# Patient Record
Sex: FEMALE | Race: WHITE | HISPANIC OR LATINO | Employment: OTHER | URBAN - METROPOLITAN AREA
[De-identification: names, ages, dates, MRNs, and addresses within clinical notes are randomized per-mention and may not be internally consistent; named-entity substitution may affect disease eponyms.]

---

## 2017-03-29 DIAGNOSIS — E11.65 TYPE 2 DIABETES MELLITUS WITH HYPERGLYCEMIA, WITH LONG-TERM CURRENT USE OF INSULIN (H): ICD-10-CM

## 2017-03-29 DIAGNOSIS — Z79.4 TYPE 2 DIABETES MELLITUS WITH HYPERGLYCEMIA, WITH LONG-TERM CURRENT USE OF INSULIN (H): ICD-10-CM

## 2017-03-29 DIAGNOSIS — Z79.4 ENCOUNTER FOR LONG-TERM (CURRENT) USE OF INSULIN (H): ICD-10-CM

## 2017-03-29 NOTE — TELEPHONE ENCOUNTER
Tresiba flextouch 200unit/ml         Last Written Prescription Date: 12/01/16  Last Fill Quantity: 3, # refills: 3  Last Office Visit with G, P or [facility] prescribing provider:  01/30/2017        BP Readings from Last 3 Encounters:   11/30/16 154/76   11/11/16 128/84   07/20/16 124/82     Lab Results   Component Value Date    MICROL 13 02/05/2016     No results found for: MICROALBUMIN  Creatinine   Date Value Ref Range Status   07/20/2016 0.61 0.52 - 1.04 mg/dL Final   ]  GFR Estimate   Date Value Ref Range Status   07/20/2016 >90  Non  GFR Calc   >60 mL/min/1.7m2 Final   04/12/2016 >90  Non  GFR Calc   >60 mL/min/1.7m2 Final   02/03/2015 >90  Non  GFR Calc   >60 mL/min/1.7m2 Final     GFR Estimate If Black   Date Value Ref Range Status   07/20/2016 >90   GFR Calc   >60 mL/min/1.7m2 Final   04/12/2016 >90   GFR Calc   >60 mL/min/1.7m2 Final   02/03/2015 >90   GFR Calc   >60 mL/min/1.7m2 Final     Lab Results   Component Value Date    CHOL 263 02/21/2015     Lab Results   Component Value Date    HDL 53 02/21/2015     Lab Results   Component Value Date     04/12/2016     02/21/2015     Lab Results   Component Value Date    TRIG 269 02/21/2015     Lab Results   Component Value Date    CHOLHDLRATIO 5.0 02/21/2015     Lab Results   Component Value Date    AST 17 07/20/2016     Lab Results   Component Value Date    ALT 24 07/20/2016     Lab Results   Component Value Date    A1C 12.2 11/30/2016    A1C 9.9 07/20/2016    A1C 9.4 04/12/2016    A1C 10.5 01/08/2016    A1C 11.8 02/03/2015     Potassium   Date Value Ref Range Status   07/20/2016 3.7 3.4 - 5.3 mmol/L Final     Shawna Barrera CPhT  [facility]  [phone]

## 2017-03-29 NOTE — TELEPHONE ENCOUNTER
Please let Maricarmen know she's due for follow up soon.  I haven't seen her since November - I would like to see her no later than the end of April.  I refilled the Tresiba for her.  Thanks,  Emily Phan NP  Endocrinology

## 2017-03-30 ENCOUNTER — OFFICE VISIT (OUTPATIENT)
Dept: ENDOCRINOLOGY | Facility: CLINIC | Age: 57
End: 2017-03-30
Payer: COMMERCIAL

## 2017-03-30 VITALS
DIASTOLIC BLOOD PRESSURE: 86 MMHG | HEIGHT: 64 IN | SYSTOLIC BLOOD PRESSURE: 126 MMHG | OXYGEN SATURATION: 96 % | BODY MASS INDEX: 34.81 KG/M2 | RESPIRATION RATE: 16 BRPM | HEART RATE: 76 BPM | WEIGHT: 203.9 LBS

## 2017-03-30 DIAGNOSIS — Z79.4 TYPE 2 DIABETES MELLITUS WITH HYPERGLYCEMIA, WITH LONG-TERM CURRENT USE OF INSULIN (H): Primary | ICD-10-CM

## 2017-03-30 DIAGNOSIS — E11.65 TYPE 2 DIABETES MELLITUS WITH HYPERGLYCEMIA, WITH LONG-TERM CURRENT USE OF INSULIN (H): Primary | ICD-10-CM

## 2017-03-30 LAB
CREAT UR-MCNC: 47 MG/DL
HBA1C MFR BLD: 9.7 % (ref 4.3–6)
MICROALBUMIN UR-MCNC: 207 MG/L
MICROALBUMIN/CREAT UR: 437.63 MG/G CR (ref 0–25)

## 2017-03-30 PROCEDURE — 82043 UR ALBUMIN QUANTITATIVE: CPT | Performed by: CLINICAL NURSE SPECIALIST

## 2017-03-30 PROCEDURE — 36415 COLL VENOUS BLD VENIPUNCTURE: CPT | Performed by: CLINICAL NURSE SPECIALIST

## 2017-03-30 PROCEDURE — 99214 OFFICE O/P EST MOD 30 MIN: CPT | Performed by: CLINICAL NURSE SPECIALIST

## 2017-03-30 PROCEDURE — 83036 HEMOGLOBIN GLYCOSYLATED A1C: CPT | Performed by: CLINICAL NURSE SPECIALIST

## 2017-03-30 NOTE — PROGRESS NOTES
Name: Maricarmen Dotson  F/u for Diabetes (Last seen 4/12/2016).  HPI:  Maricarmen Dotson is a 56 year old female who presents for the evaluation/management of:    1. Type 2 DM:  Originally diagnosed: in 1997 after starting Paxil and gaining 32 lbs in one month  Current Regimen: Tresiba 66 u q am, Novolog - 1 unit per 4 grams of carb + a correction of 1: 18>140-average dose 10-20 units per meal.   Does not want to take the Invokana.  BS checks: 2-3/day  Average Meter Download: 7-day avg 230 (128-347); 14-d avg 231 (128-347); 30-d avg 200 (); 90-d avg 201 ().    REPORTS PREVIOUS ADVERSE REACTION TO HUMALOG.  States she cannot take Humalog because it caused pancreatits.  Continues to struggle with a binge eating disorder.    Was previously treated by endo at [facility] (last seen there 11/6/2015).  She has had adverse reaction to most oral medications including Metformin (abdominal pain), glipizide (allergic type reaction), Avandia (abdominal pain and swelling), Byetta and Onglyza (pancreatitis).  Was previously prescribed Invokana but did not start because she was concerned about possible side effects.        History of eating disorder (binge eating) due to PTSD.  Will be working with a new counselor.  Has been able to control the binge eating better.  Exercising  - walking for 20-30 minutes.    Saw Dr. Lovell for evaluation of her adrenal and pituitary glands 12/2016 - no abnormal adrenal or pituitary abnormalities were noted.      Complications:   Diabetes Complications  Description / Detail    Diabetic Retinopathy   No retinopathy, next exam 4/2017   CAD / PAD   No   Neuropathy   No   Nephropathy / Microalbuminuria   No   Gastroparesis  No   Hypoglycemia Unawareness  No     2. Intermittent Hypertension: Blood Pressure today:   BP Readings from Last 3 Encounters:   03/30/17 126/86   11/30/16 154/76   11/11/16 128/84   .  Blood pressure medications include no medications.  Takes medications everyday without  forgetting a dose.    3. Lipids: Takes no medications for lipid control.        PMH/PSH:  Past Medical History:   Diagnosis Date     Depressive disorder     severe, prior hospitalization     Diabetes mellitus, type 2 (H)      Diverticulosis of colon (without mention of hemorrhage)      Fibromyalgia 2007     Insomnia      Irritable bowel syndrome      Past Surgical History:   Procedure Laterality Date     C SPINAL ORTHOSIS,NOS      lumbar surgery     choley  2011     HYSTERECTOMY, CERVIX STATUS UNKNOWN      TVH/BSO     Family Hx:  Family History   Problem Relation Age of Onset     DIABETES Mother      type 2     DIABETES Father      type 2     Hypertension Mother      Hypertension Father      CEREBROVASCULAR DISEASE Mother       stroke age 57     GASTROINTESTINAL DISEASE Father      colon polyps     Ovarian Cancer Sister 46     Ovarian Cancer Mother 43     Ovarian Cancer Maternal Grandmother 72     CANCER Mother      cervix     CANCER Maternal Grandmother      cervix     Thyroid disease:          DM2: Yes, mother and father         Autoimmune: DM1, SLE, RA, Vitiligo     Social Hx:  Social History     Social History     Marital status:      Spouse name: N/A     Number of children: 3     Years of education: N/A     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[employer]     Social History Main Topics     Smoking status: Never Smoker     Smokeless tobacco: Never Used     Alcohol use 0.0 oz/week     0 Standard drinks or equivalent per week      Comment: maybe once a month     Drug use: No     Sexual activity: Yes     Partners: Male     Birth control/ protection: Surgical     Other Topics Concern     Not on file     Social History Narrative          MEDICATIONS:  has a current medication list which includes the following prescription(s): insulin degludec, insulin aspart, pantothenic acid, omega-3 fatty acids, b complex-biotin-fa, insulin pen needle, insulin pen needle, insulin pen  "needle, epinephrine, gabapentin, STATIN NOT PRESCRIBED, INTENTIONAL,, tramadol, aspirin not prescribed, and polyethylene glycol.    ROS     ROS: 10 point ROS neg other than the symptoms noted above in the HPI.    Physical Exam   VS: /86 (BP Location: Left arm, Cuff Size: Adult Regular)  Pulse 76  Resp 16  Ht 1.626 m (5' 4\")  Wt 92.5 kg (203 lb 14.4 oz)  LMP 01/01/1999  SpO2 96%  BMI 35 kg/m2  GENERAL: NAD, well dressed, answering questions appropriately, appears stated age.  HEENT: OP clear, no exophthalmos, no proptosis, EOMI, no lig lag, no retraction, no scleral icterus  RESPIRATORY: Normal respiratory effort.  CARDIOVASCULAR: No peripheral edema.  EXTREMITIES: no edema, no obvious rashes, no lesions; feet with 2+ pulses, 10-g plantar sensation 6/6 bilaterally, no open lesions.  NEUROLOGY: CN grossly intact, no tremors  MSK: grossly intact, No digital cyanosis. Normal gait and station.  SKIN: no rashes, no obvious lesions, no ulcers  PSYCH: Intact judgment and insight. A&OX3 with a cordial affect.    LABS:  A1c:   Component    Latest Ref Rng 7/8/2014 9/22/2014 2/3/2015 1/8/2016 4/12/2016   Hemoglobin A1C    4.3 - 6.0 % 11.9 (H) 11.0 (H) 11.8 (H) 10.5 (H) 9.4 (H)     Basic Metabolic Panel:!COMPREHENSIVE Latest Ref Rng 4/12/2016 7/20/2016   SODIUM 133 - 144 mmol/L 143 138   POTASSIUM 3.4 - 5.3 mmol/L 3.8 3.7   CHLORIDE 94 - 109 mmol/L 107 104   CO2      BUN 7 - 30 mg/dL 15 10   Creatinine 0.52 - 1.04 mg/dL     Creatinine 0.52 - 1.04 mg/dL 0.58 0.61   Glucose 70 - 99 mg/dL 107 (H) 246 (H)   ANION GAP 3 - 14 mmol/L 5 8   CALCIUM 8.5 - 10.1 mg/dL 9.6 8.9     LFTS/Cholesterol Panel:  !LIPID/HEPATIC Latest Ref Rng 4/12/2016 7/20/2016   CHOLESTEROL <200 mg/dL     TRIGLYCERIDES 0 - 150 mg/dL     HDL CHOLESTEROL >50 mg/dL     LDL CHOLESTEROL DIRECT <100 mg/dL 160 (H)    LDL CHOLESTEROL, CALCULATED 0 - 129 mg/dL     VLDL-CHOLESTEROL 0 - 30 mg/dL     CHOLESTEROL/HDL RATIO 0.0 - 5.0     AST 0 - 45 U/L 16 17   ALT " 0 - 50 U/L 25 24     Thyroid Function:   !THYROID Latest Ref Rng 7/20/2016 4/12/2016   TSH 0.40 - 4.00 mU/L 0.70 0.86   T4 FREE 0.76 - 1.46 ng/dL  0.89     Urine Microalbumin:  Component    Latest Ref Rng 9/22/2014 2/5/2016   Creatinine Urine     177 54   Microalbumin Urine     18 13   Microalbumin mg/g Cr    0 - 25 mg/g Cr 10.06 24.44     Vitamin D Deficiency screening    30 - 75 ug/L 34     All pertinent notes, labs, and images personally reviewed by me.     A/P  Ms.Anna ROXANNE Dotson is a 56 year old here for the evaluation/management of diabetes:    1. DM2 - Uncontrolled.  Her binge eating disorder continues to be a big contributing factor to her poor control, she feels that she is controlling this better.  Her diabetes has been very difficulty to treat due to her eating disorder and reluctance to increase insulin or try additional oral medications.  Continue Tresiba U200, to 66 units, continue Novolog I:c ratio to 1:4 and correction factor 1:18.     There is some variability among people, most will usually develop symptoms suggestive of hypoglycemia when blood glucose levels are lowered to the mid 60's. The first set of symptoms are called adrenergic. Patients may experience any of the following nervousness, sweating, intense hunger, trembling, weakness, palpitations, and difficulty speaking.   The acute management of hypoglycemia involves the rapid delivery of a source of easily absorbed sugar. Regular soda, juice, lifesavers, table sugar, are good options. 15 grams of glucose is the dose that is given, followed by an assessment of symptoms and a blood glucose check if possible. If after 10 minutes there is no improvement, another 10-15 grams should be given. This can be repeated up to three times. The equivalency of 10-15 grams of glucose (approximate servings) are: 3-5 hard candies, 3 teaspoons of sugar, or 1/2 cup of regular soda or juice.   2.  Intermittent  Hypertension - Currently untreated.  Will continue  to monitor. She does not want to take any additional oral meds due to multiple past med reactions.    3. Hyperlipidemia - Currently untreated.  She is concerned about taking any oral medications due to previous adverse reactions.  Will continue to monitor.    Labs ordered today:   Orders Placed This Encounter   Procedures     Hemoglobin A1c     Albumin Random Urine Quantitative   Urine microalbumin     Radiology/Consults ordered today: None    All questions were answered.  The patient indicates understanding of the above issues and agrees with the plan set forth.  Total face to face time discussing diabetes treatment and target BG levels was greater than or equal to 25 minutes.       Follow-up:  3-4 months    Emily Phan NP  Endocrinology  [facility]  CC:

## 2017-03-30 NOTE — MR AVS SNAPSHOT
After Visit Summary   3/30/2017    Maricarmen Dotson    MRN: [id number]           Patient Information     Date Of Birth          1960        Visit Information        Provider Department      3/30/2017 10:00 AM Emily Phan APRN CNP [facility]        Today's Diagnoses     Type 2 diabetes mellitus with hyperglycemia, with long-term current use of insulin (H)    -  1       Follow-ups after your visit        Follow-up notes from your care team     Return in about 3 months (around 6/30/2017).      Who to contact     If you have questions or need follow up information about today's clinic visit or your schedule please contact [facility] directly at [phone].  Normal or non-critical lab and imaging results will be communicated to you by [employer], letter or phone within 4 business days after the clinic has received the results. If you do not hear from us within 7 days, please contact the clinic through [employer] or phone. If you have a critical or abnormal lab result, we will notify you by phone as soon as possible.  Submit refill requests through [employer] or call your pharmacy and they will forward the refill request to us. Please allow 3 business days for your refill to be completed.          Additional Information About Your Visit        MyChart Information     [employer] gives you secure access to your electronic health record. If you see a primary care provider, you can also send messages to your care team and make appointments. If you have questions, please call your primary care clinic.  If you do not have a primary care provider, please call [phone] and they will assist you.        Care EveryWhere ID     This is your Care EveryWhere ID. This could be used by other organizations to access your [facility] medical records  [id number]        Your Vitals Were     Pulse Respirations Height Last Period Pulse Oximetry BMI (Body Mass Index)    76 16 1.626 m (5'  "4\") 01/01/1999 96% 35 kg/m2       Blood Pressure from Last 3 Encounters:   03/30/17 126/86   11/30/16 154/76   11/11/16 128/84    Weight from Last 3 Encounters:   03/30/17 92.5 kg (203 lb 14.4 oz)   11/30/16 88 kg (194 lb)   11/11/16 86 kg (189 lb 11.2 oz)              We Performed the Following     Albumin Random Urine Quantitative     Hemoglobin A1c          Today's Medication Changes          These changes are accurate as of: 3/30/17 11:59 PM.  If you have any questions, ask your nurse or doctor.               Stop taking these medicines if you haven't already. Please contact your care team if you have questions.     blood glucose monitoring lancets   Stopped by:  Emily Phan APRN CNP           blood glucose monitoring test strip   Commonly known as:  ONE TOUCH VERIO IQ   Stopped by:  Emily Phan APRN CNP                    Primary Care Provider    Physician No Ref-Primary       No address on file        Thank you!     Thank you for choosing [facility]  for your care. Our goal is always to provide you with excellent care. Hearing back from our patients is one way we can continue to improve our services. Please take a few minutes to complete the written survey that you may receive in the mail after your visit with us. Thank you!             Your Updated Medication List - Protect others around you: Learn how to safely use, store and throw away your medicines at www.disposemymeds.org.          This list is accurate as of: 3/30/17 11:59 PM.  Always use your most recent med list.                   Brand Name Dispense Instructions for use    ASPIRIN NOT PRESCRIBED    INTENTIONAL    1 each    continuous prn Antiplatelet medication not prescribed intentionally due to age.       B-COMPLEX PO      Take by mouth daily       EPINEPHrine 0.3 MG/0.3ML injection     2 each    Inject 0.3 mLs (0.3 mg) into the muscle once as needed for anaphylaxis       gabapentin 100 MG capsule    NEURONTIN    " 90 capsule    Take 1 capsule (100 mg) by mouth daily       insulin degludec 200 UNIT/ML pen    TRESIBA    18 mL    Inject 68 Units Subcutaneous daily       * insulin pen needle 31G X 5 MM    B-D U/F    550 each    Use 6  time(s) per day.  Please dispense as BD Pen Needle Mini U/F 31G x 5 MM       * insulin pen needle 31G X 5 MM    B-D U/F    300 each    Use 6 daily or as directed.       * insulin pen needle 31G X 5 MM    B-D U/F    200 each    Use 5 daily or as directed.       NovoLOG FLEXPEN 100 UNIT/ML injection   Generic drug:  insulin aspart     30 mL    1 unit per 4 grams of carbohydrate eaten at each meal +a correction of 1 additional unit per 18 points of blood sugar above 140.  Total daily dose 100 units/day       OMEGA-3 FISH OIL PO      Take 500 mg by mouth daily       pantothenic acid 500 MG Tabs      Take 500 mg by mouth daily       polyethylene glycol Packet    MIRALAX/GLYCOLAX     Take 1 packet by mouth daily.       STATIN NOT PRESCRIBED (INTENTIONAL)     0 each    Statin not prescribed intentionally due to Intolerance       traMADol 50 MG tablet    ULTRAM     Take 50 mg by mouth every evening 1-2 tablets in the evening       * Notice:  This list has 3 medication(s) that are the same as other medications prescribed for you. Read the directions carefully, and ask your doctor or other care provider to review them with you.

## 2017-03-30 NOTE — NURSING NOTE
"Chief Complaint   Patient presents with     Follow Up For     Diabetes       Initial /86 (BP Location: Left arm, Cuff Size: Adult Regular)  Pulse 76  Resp 16  Ht 1.626 m (5' 4\")  Wt 92.5 kg (203 lb 14.4 oz)  LMP 01/01/1999  SpO2 96%  BMI 35 kg/m2 Estimated body mass index is 35 kg/(m^2) as calculated from the following:    Height as of this encounter: 1.626 m (5' 4\").    Weight as of this encounter: 92.5 kg (203 lb 14.4 oz).  Medication Reconciliation: complete     Ashley Huerta CMA    3/30/2017  10:21 AM      "

## 2017-03-31 NOTE — PROGRESS NOTES
Maricarmen,  Your urine protein was elevated.  It has been intermittently elevated before, in 2012 and again in 2008.  Both times, it returned to normal.  I recommend rechecking at your next visit to see if it resolves on its own or if it is persisting.  If not, the treatment is good blood sugar and blood pressure control and a medication called an ACE-inhibitor.  We can discuss this in more detail at your next follow up visit.  Here's a copy of the results for your records.  Emily Phan NP  Endocrinology

## 2017-04-03 ENCOUNTER — TELEPHONE (OUTPATIENT)
Dept: ENDOCRINOLOGY | Facility: CLINIC | Age: 57
End: 2017-04-03

## 2017-04-03 ENCOUNTER — TELEPHONE (OUTPATIENT)
Dept: FAMILY MEDICINE | Facility: CLINIC | Age: 57
End: 2017-04-03

## 2017-04-03 DIAGNOSIS — E78.5 HYPERLIPIDEMIA LDL GOAL <100: ICD-10-CM

## 2017-04-03 DIAGNOSIS — Z79.4 TYPE 2 DIABETES MELLITUS WITH HYPERGLYCEMIA, WITH LONG-TERM CURRENT USE OF INSULIN (H): ICD-10-CM

## 2017-04-03 DIAGNOSIS — E11.65 TYPE 2 DIABETES MELLITUS WITH HYPERGLYCEMIA, WITH LONG-TERM CURRENT USE OF INSULIN (H): ICD-10-CM

## 2017-04-03 DIAGNOSIS — Z79.4 ENCOUNTER FOR LONG-TERM (CURRENT) USE OF INSULIN (H): ICD-10-CM

## 2017-04-03 NOTE — TELEPHONE ENCOUNTER
Panel Management Review      Patient has the following on her problem list:     Depression / Dysthymia review  PHQ-9 SCORE 12/8/2015 6/9/2016 11/11/2016   Total Score - - -   Total Score 6 6 17   Some encounter information is confidential and restricted. Go to Review Flowsheets activity to see all data.      Patient is due for:  PHQ9 and DAP      Composite cancer screening  Chart review shows that this patient is due/due soon for the following Mammogram  Summary:    Patient is due/failing the following:   DAP, MAMMOGRAM and PHQ9    Action needed:   Patient needs to do PHQ9. and Patient needs referral/order: Mammogram    Type of outreach:    Sent letter.    Questions for provider review:    None                                                                                                                                    Grecia HAWK M.A.       Chart routed to Care Team .

## 2017-04-03 NOTE — TELEPHONE ENCOUNTER
REY Rolon from our pharmacy called to ask if rx for Novolog could be reprinted? Rx needs to be updated.  Shari Schoenberger, CMA

## 2017-04-03 NOTE — LETTER
[facility]  [address] [address]  Phone: [phone]  April 3, 2017      Maricarmen Dotson  [address] [place] [address]      Dear Maricarmen,    We care about your health and have reviewed your health plan including your medical conditions, medications, and lab results.  Based on this review, it is recommended that you follow up regarding the following health topic(s):  -Breast Cancer Screening    We recommend you take the following action(s):  Mammogram     Please call us at the [place] (or use [employer]) to address the above recommendations.     Thank you for trusting [facility] and we appreciate the opportunity to serve you.  We look forward to supporting your healthcare needs in the future.    Healthy Regards,    Your Health Care Team  [facility]

## 2017-04-05 ENCOUNTER — TELEPHONE (OUTPATIENT)
Dept: ENDOCRINOLOGY | Facility: CLINIC | Age: 57
End: 2017-04-05

## 2017-04-05 DIAGNOSIS — Z79.4 TYPE 2 DIABETES MELLITUS WITH HYPERGLYCEMIA, WITH LONG-TERM CURRENT USE OF INSULIN (H): Primary | ICD-10-CM

## 2017-04-05 DIAGNOSIS — E11.65 TYPE 2 DIABETES MELLITUS WITH HYPERGLYCEMIA, WITH LONG-TERM CURRENT USE OF INSULIN (H): Primary | ICD-10-CM

## 2017-04-05 NOTE — TELEPHONE ENCOUNTER
Rx changed to Humalog due to insurance formulary change. New printed Rx provided for humalog.  Emily Phan NP  Endocrinology

## 2017-04-05 NOTE — TELEPHONE ENCOUNTER
"I called pt to let her know that her script for Humalog was ready and pt stated \"I don't know why I have to keep telling you people that I can not have Humalog, it causes pancreatitis and it will put me in the hospital.\"  Pt would like Novolog and she would like it sent to the [facility].  I did update her allergy list.  Shari Schoenberger, [facility]     "

## 2017-06-19 ENCOUNTER — ALLIED HEALTH/NURSE VISIT (OUTPATIENT)
Dept: NURSING | Facility: CLINIC | Age: 57
End: 2017-06-19
Payer: COMMERCIAL

## 2017-06-19 VITALS — WEIGHT: 190 LBS | HEIGHT: 64 IN | BODY MASS INDEX: 32.44 KG/M2

## 2017-06-19 DIAGNOSIS — Z11.1 SCREENING FOR TUBERCULOSIS: Primary | ICD-10-CM

## 2017-06-19 PROCEDURE — 86580 TB INTRADERMAL TEST: CPT

## 2017-06-19 PROCEDURE — 99207 ZZC NO CHARGE NURSE ONLY: CPT

## 2017-06-19 NOTE — NURSING NOTE
"Chief Complaint   Patient presents with     Allied Health Visit       Initial Ht 5' 4\" (1.626 m)  Wt 190 lb (86.2 kg)  LMP 01/01/1999  BMI 32.61 kg/m2 Estimated body mass index is 32.61 kg/(m^2) as calculated from the following:    Height as of this encounter: 5' 4\" (1.626 m).    Weight as of this encounter: 190 lb (86.2 kg).  Medication Reconciliation: complete     Tina Tobar MA   June 19, 2017,  10:46 AM    "

## 2017-06-19 NOTE — PROGRESS NOTES
Mantoux administered left forearm - appointment made to have read in 48-72 hours.    Tina Tobar MA   June 19, 2017,  10:49 AM

## 2017-06-19 NOTE — MR AVS SNAPSHOT
After Visit Summary   6/19/2017    Maricarmen Dotson    MRN: [id number]           Patient Information     Date Of Birth          1960        Visit Information        Provider Department      6/19/2017 10:30 AM BLAKE NURSE AB [facility]        Today's Diagnoses     Screening for tuberculosis    -  1       Follow-ups after your visit        Follow-up notes from your care team     Return in 2 days (on 6/21/2017) for [place] read.      Your next 10 appointments already scheduled     Jun 21, 2017 10:30 AM CDT   Nurse Only with EA RN   [facility] ([facility])    [address]  [address]  [facility] [place] [address] [phone]              Who to contact     If you have questions or need follow up information about today's clinic visit or your schedule please contact [facility] directly at [phone].  Normal or non-critical lab and imaging results will be communicated to you by MyChart, letter or phone within 4 business days after the clinic has received the results. If you do not hear from us within 7 days, please contact the clinic through [employer] or phone. If you have a critical or abnormal lab result, we will notify you by phone as soon as possible.  Submit refill requests through [employer] or call your pharmacy and they will forward the refill request to us. Please allow 3 business days for your refill to be completed.          Additional Information About Your Visit        MyChart Information     [employer] gives you secure access to your electronic health record. If you see a primary care provider, you can also send messages to your care team and make appointments. If you have questions, please call your primary care clinic.  If you do not have a primary care provider, please call [phone] and they will assist you.        Care EveryWhere ID     This is your Care EveryWhere ID. This could be used by other organizations to access your  "[place] medical records  [id number]        Your Vitals Were     Height Last Period BMI (Body Mass Index)             5' 4\" (1.626 m) 01/01/1999 32.61 kg/m2          Blood Pressure from Last 3 Encounters:   03/30/17 126/86   11/30/16 154/76   11/11/16 128/84    Weight from Last 3 Encounters:   06/19/17 190 lb (86.2 kg)   03/30/17 203 lb 14.4 oz (92.5 kg)   11/30/16 194 lb (88 kg)              We Performed the Following     TB INTRADERMAL TEST        Primary Care Provider    Physician No Ref-Primary       No address on file        Thank you!     Thank you for choosing [facility]  for your care. Our goal is always to provide you with excellent care. Hearing back from our patients is one way we can continue to improve our services. Please take a few minutes to complete the written survey that you may receive in the mail after your visit with us. Thank you!             Your Updated Medication List - Protect others around you: Learn how to safely use, store and throw away your medicines at www.disposemymeds.org.          This list is accurate as of: 6/19/17 10:52 AM.  Always use your most recent med list.                   Brand Name Dispense Instructions for use    ASPIRIN NOT PRESCRIBED    INTENTIONAL    1 each    continuous prn Antiplatelet medication not prescribed intentionally due to age.       B-COMPLEX PO      Take by mouth daily       EPINEPHrine 0.3 MG/0.3ML injection     2 each    Inject 0.3 mLs (0.3 mg) into the muscle once as needed for anaphylaxis       gabapentin 100 MG capsule    NEURONTIN    90 capsule    Take 1 capsule (100 mg) by mouth daily       insulin aspart 100 UNIT/ML injection    NovoLOG FLEXPEN    90 mL    1 unit per 4 grams of carbohydrate eaten at each meal +a correction of 1 additional unit per 18 points of blood sugar above 140.  Total daily dose 100 units/day.  Dispense 6 boxes/30 pens for a 90-day supply or 2 boxes/10 pens for a 30-day supply as allowed by insurance.       " insulin degludec 200 UNIT/ML pen    TRESIBA    18 mL    Inject 68 Units Subcutaneous daily       * insulin pen needle 31G X 5 MM    B-D U/F    550 each    Use 6  time(s) per day.  Please dispense as BD Pen Needle Mini U/F 31G x 5 MM       * insulin pen needle 31G X 5 MM    B-D U/F    300 each    Use 6 daily or as directed.       * insulin pen needle 31G X 5 MM    B-D U/F    200 each    Use 5 daily or as directed.       pantothenic acid 500 MG Tabs      Take 500 mg by mouth daily       polyethylene glycol Packet    MIRALAX/GLYCOLAX     Take 1 packet by mouth daily.       STATIN NOT PRESCRIBED (INTENTIONAL)     0 each    Statin not prescribed intentionally due to Intolerance       traMADol 50 MG tablet    ULTRAM     Take 50 mg by mouth every evening 1-2 tablets in the evening       * Notice:  This list has 3 medication(s) that are the same as other medications prescribed for you. Read the directions carefully, and ask your doctor or other care provider to review them with you.

## 2017-06-21 ENCOUNTER — ALLIED HEALTH/NURSE VISIT (OUTPATIENT)
Dept: NURSING | Facility: CLINIC | Age: 57
End: 2017-06-21
Payer: COMMERCIAL

## 2017-06-21 DIAGNOSIS — Z11.1 SCREENING EXAMINATION FOR PULMONARY TUBERCULOSIS: Primary | ICD-10-CM

## 2017-06-21 LAB
PPDINDURATION: 0 MM (ref 0–5)
PPDREDNESS: 0 MM

## 2017-06-21 PROCEDURE — 99207 ZZC NO CHARGE NURSE ONLY: CPT

## 2017-06-21 NOTE — NURSING NOTE
Patient here for Mantoux TC test read.  A negative=normal result given to patient.    Ela Love RN

## 2017-06-21 NOTE — MR AVS SNAPSHOT
After Visit Summary   6/21/2017    Maricarmen Dotson    MRN: [id number]           Patient Information     Date Of Birth          1960        Visit Information        Provider Department      6/21/2017 10:30 AM EA RN [facility]        Today's Diagnoses     Screening examination for pulmonary tuberculosis    -  1       Follow-ups after your visit        Who to contact     If you have questions or need follow up information about today's clinic visit or your schedule please contact [facility] directly at [phone].  Normal or non-critical lab and imaging results will be communicated to you by [employer], letter or phone within 4 business days after the clinic has received the results. If you do not hear from us within 7 days, please contact the clinic through [employer] or phone. If you have a critical or abnormal lab result, we will notify you by phone as soon as possible.  Submit refill requests through [employer] or call your pharmacy and they will forward the refill request to us. Please allow 3 business days for your refill to be completed.          Additional Information About Your Visit        MyChart Information     [employer] gives you secure access to your electronic health record. If you see a primary care provider, you can also send messages to your care team and make appointments. If you have questions, please call your primary care clinic.  If you do not have a primary care provider, please call [phone] and they will assist you.        Care EveryWhere ID     This is your Care EveryWhere ID. This could be used by other organizations to access your [place] medical records  [id number]        Your Vitals Were     Last Period                   01/01/1999            Blood Pressure from Last 3 Encounters:   03/30/17 126/86   11/30/16 154/76   11/11/16 128/84    Weight from Last 3 Encounters:   06/19/17 190 lb (86.2 kg)   03/30/17 203 lb 14.4 oz (92.5 kg)   11/30/16 194 lb  (88 kg)              Today, you had the following     No orders found for display       Primary Care Provider    Physician No Ref-Primary       No address on file        Equal Access to Services     BRAULIO DALLAS : Hadii aad ku hadluis alfredo Sterling, nuno murguia, mora manriquez, dillon brasher. So [facility] [phone].    ATENCIÓN: Si habla español, tiene a richter disposición servicios gratuitos de asistencia lingüística. Llame al [phone].    We comply with applicable federal civil rights laws and [place] laws. We do not discriminate on the basis of race, color, national origin, age, disability sex, sexual orientation or gender identity.            Thank you!     Thank you for choosing [facility]  for your care. Our goal is always to provide you with excellent care. Hearing back from our patients is one way we can continue to improve our services. Please take a few minutes to complete the written survey that you may receive in the mail after your visit with us. Thank you!             Your Updated Medication List - Protect others around you: Learn how to safely use, store and throw away your medicines at www.disposemymeds.org.          This list is accurate as of: 6/21/17 10:38 AM.  Always use your most recent med list.                   Brand Name Dispense Instructions for use Diagnosis    ASPIRIN NOT PRESCRIBED    INTENTIONAL    1 each    continuous prn Antiplatelet medication not prescribed intentionally due to age.        B-COMPLEX PO      Take by mouth daily        EPINEPHrine 0.3 MG/0.3ML injection     2 each    Inject 0.3 mLs (0.3 mg) into the muscle once as needed for anaphylaxis    Allergic reaction       gabapentin 100 MG capsule    NEURONTIN    90 capsule    Take 1 capsule (100 mg) by mouth daily        insulin aspart 100 UNIT/ML injection    NovoLOG FLEXPEN    90 mL    1 unit per 4 grams of carbohydrate eaten at each meal +a correction of 1 additional unit  per 18 points of blood sugar above 140.  Total daily dose 100 units/day.  Dispense 6 boxes/30 pens for a 90-day supply or 2 boxes/10 pens for a 30-day supply as allowed by insurance.    Type 2 diabetes mellitus with hyperglycemia, with long-term current use of insulin (H)       insulin degludec 200 UNIT/ML pen    TRESIBA    18 mL    Inject 68 Units Subcutaneous daily    Type 2 diabetes mellitus with hyperglycemia, with long-term current use of insulin (H), Encounter for long-term (current) use of insulin (H)       * insulin pen needle 31G X 5 MM    B-D U/F    550 each    Use 6  time(s) per day.  Please dispense as BD Pen Needle Mini U/F 31G x 5 MM    Type 2 diabetes mellitus with hyperglycemia (H)       * insulin pen needle 31G X 5 MM    B-D U/F    300 each    Use 6 daily or as directed.    Type 2 diabetes mellitus with hyperglycemia (H)       * insulin pen needle 31G X 5 MM    B-D U/F    200 each    Use 5 daily or as directed.    Type 2 diabetes mellitus with hyperglycemia (H)       pantothenic acid 500 MG Tabs      Take 500 mg by mouth daily        polyethylene glycol Packet    MIRALAX/GLYCOLAX     Take 1 packet by mouth daily.        STATIN NOT PRESCRIBED (INTENTIONAL)     0 each    Statin not prescribed intentionally due to Intolerance    Statin intolerance       traMADol 50 MG tablet    ULTRAM     Take 50 mg by mouth every evening 1-2 tablets in the evening        * Notice:  This list has 3 medication(s) that are the same as other medications prescribed for you. Read the directions carefully, and ask your doctor or other care provider to review them with you.

## 2017-07-14 ENCOUNTER — TRANSFERRED RECORDS (OUTPATIENT)
Dept: HEALTH INFORMATION MANAGEMENT | Facility: CLINIC | Age: 57
End: 2017-07-14

## 2017-07-16 ENCOUNTER — OFFICE VISIT (OUTPATIENT)
Dept: URGENT CARE | Facility: URGENT CARE | Age: 57
End: 2017-07-16
Payer: COMMERCIAL

## 2017-07-16 VITALS
WEIGHT: 184 LBS | HEART RATE: 60 BPM | HEIGHT: 64 IN | TEMPERATURE: 98.7 F | OXYGEN SATURATION: 97 % | DIASTOLIC BLOOD PRESSURE: 60 MMHG | BODY MASS INDEX: 31.41 KG/M2 | SYSTOLIC BLOOD PRESSURE: 142 MMHG

## 2017-07-16 DIAGNOSIS — H61.23 BILATERAL IMPACTED CERUMEN: Primary | ICD-10-CM

## 2017-07-16 PROCEDURE — 99213 OFFICE O/P EST LOW 20 MIN: CPT | Performed by: PHYSICIAN ASSISTANT

## 2017-07-16 NOTE — PROGRESS NOTES
SUBJECTIVE:  Maricarmen Dotson is a 56 year old female who presents with bilateral ear pain, hearing loss, pressure and blockage for 1 day(s).   Was swimming and went under water and when came back up she had blockage in her ears and cannot hear.  Does wear ear plugs  Severity: moderate   Timing:gradual onset and still present  Additional symptoms include none.      History of recurrent otitis: no    Past Medical History:   Diagnosis Date     Depressive disorder     severe, prior hospitalization     Diabetes mellitus, type 2 (H)      Diverticulosis of colon (without mention of hemorrhage)      Fibromyalgia 6/26/2007     Insomnia      Irritable bowel syndrome      Current Outpatient Prescriptions   Medication Sig Dispense Refill     insulin aspart (NOVOLOG FLEXPEN) 100 UNIT/ML injection 1 unit per 4 grams of carbohydrate eaten at each meal +a correction of 1 additional unit per 18 points of blood sugar above 140.  Total daily dose 100 units/day.  Dispense 6 boxes/30 pens for a 90-day supply or 2 boxes/10 pens for a 30-day supply as allowed by insurance. 90 mL 3     insulin degludec (TRESIBA) 200 UNIT/ML pen Inject 68 Units Subcutaneous daily 18 mL 2     pantothenic acid 500 MG TABS Take 500 mg by mouth daily       B Complex-Biotin-FA (B-COMPLEX PO) Take by mouth daily       insulin pen needle (B-D U/F) 31G X 5 MM Use 5 daily or as directed. 200 each 11     insulin pen needle (B-D U/F) 31G X 5 MM Use 6 daily or as directed. 300 each prn     insulin pen needle (B-D U/F) 31G X 5 MM Use 6  time(s) per day.  Please dispense as BD Pen Needle Mini U/F 31G x 5  each 3     gabapentin (NEURONTIN) 100 MG capsule Take 1 capsule (100 mg) by mouth daily 90 capsule      traMADol (ULTRAM) 50 MG tablet Take 50 mg by mouth every evening 1-2 tablets in the evening       polyethylene glycol (MIRALAX/GLYCOLAX) packet Take 1 packet by mouth daily.       EPINEPHrine (EPIPEN) 0.3 MG/0.3ML injection Inject 0.3 mLs (0.3 mg) into the  "muscle once as needed for anaphylaxis (Patient not taking: Reported on 6/19/2017) 2 each 0     STATIN NOT PRESCRIBED, INTENTIONAL, Statin not prescribed intentionally due to Intolerance 0 each 0     ASPIRIN NOT PRESCRIBED, INTENTIONAL, continuous prn Antiplatelet medication not prescribed intentionally due to age. 1 each 0     Social History   Substance Use Topics     Smoking status: Never Smoker     Smokeless tobacco: Never Used     Alcohol use 0.0 oz/week     0 Standard drinks or equivalent per week      Comment: maybe once a month       ROS:   Review of systems negative except as stated above.    OBJECTIVE:  /60 (BP Location: Right arm, Patient Position: Chair, Cuff Size: Adult Regular)  Pulse 60  Temp 98.7  F (37.1  C) (Oral)  Ht 5' 4\" (1.626 m)  Wt 184 lb (83.5 kg)  LMP 01/01/1999  SpO2 97%  BMI 31.58 kg/m2   EXAM:  The right TM is not visualized secondary to cerumen     The right auditory canal is obstructed with cerumen  The left TM is not visualized secondary to cerumen  The left auditory canal is obstructed with cerumen  Oropharynx exam is normal: no lesions, erythema, adenopathy or exudate.  GENERAL: no acute distress  EYES: EOMI,  PERRL, conjunctiva clear  NECK: supple, non-tender to palpation, no adenopathy noted  RESP: lungs clear to auscultation - no rales, rhonchi or wheezes  CV: regular rates and rhythm, normal S1 S2, no murmur noted  SKIN: no suspicious lesions or rashes     ASSESSMENT:  Cerumen impaction    PLAN:  Ears flushed bilateral and complete removalof cerumen.  No signs of infection.  Prevention discussed and to FU with PCP as needed.      "

## 2017-07-16 NOTE — NURSING NOTE
"Maricarmen Dotson;   Chief Complaint   Patient presents with     Ear Problem     pain in both ears after swimming yesterday - did not sleep well due to pain      Urgent Care     Initial /60 (BP Location: Right arm, Patient Position: Chair, Cuff Size: Adult Regular)  Pulse 60  Temp 98.7  F (37.1  C) (Oral)  Ht 5' 4\" (1.626 m)  Wt 184 lb (83.5 kg)  LMP 01/01/1999  SpO2 97%  BMI 31.58 kg/m2 Estimated body mass index is 31.58 kg/(m^2) as calculated from the following:    Height as of this encounter: 5' 4\" (1.626 m).    Weight as of this encounter: 184 lb (83.5 kg)..  BP completed using cuff size regular.  Shivani Glynn R.N.  "

## 2017-07-16 NOTE — MR AVS SNAPSHOT
After Visit Summary   7/16/2017    Maricarmen Dotson    MRN: [id number]           Patient Information     Date Of Birth          1960        Visit Information        Provider Department      7/16/2017 10:10 AM Ronda Corley PA-C [facility]        Today's Diagnoses     Bilateral impacted cerumen    -  1       Follow-ups after your visit        Your next 10 appointments already scheduled     Jul 28, 2017  8:00 AM CDT   SHORT with Annamaria Erickson MD   [facility]    [address] [place] [address]  [address]  [facility] [address] [phone]              Who to contact     If you have questions or need follow up information about today's clinic visit or your schedule please contact [facility] directly at [phone].  Normal or non-critical lab and imaging results will be communicated to you by MyChart, letter or phone within 4 business days after the clinic has received the results. If you do not hear from us within 7 days, please contact the clinic through MyChart or phone. If you have a critical or abnormal lab result, we will notify you by phone as soon as possible.  Submit refill requests through [employer] or call your pharmacy and they will forward the refill request to us. Please allow 3 business days for your refill to be completed.          Additional Information About Your Visit        MyChart Information     [employer] gives you secure access to your electronic health record. If you see a primary care provider, you can also send messages to your care team and make appointments. If you have questions, please call your primary care clinic.  If you do not have a primary care provider, please call [phone] and they will assist you.        Care EveryWhere ID     This is your Care EveryWhere ID. This could be used by other organizations to access your [place] medical records  [id number]        Your Vitals Were  "    Pulse Temperature Height Last Period Pulse Oximetry BMI (Body Mass Index)    60 98.7  F (37.1  C) (Oral) 5' 4\" (1.626 m) 01/01/1999 97% 31.58 kg/m2       Blood Pressure from Last 3 Encounters:   07/16/17 142/60   03/30/17 126/86   11/30/16 154/76    Weight from Last 3 Encounters:   07/16/17 184 lb (83.5 kg)   06/19/17 190 lb (86.2 kg)   03/30/17 203 lb 14.4 oz (92.5 kg)              Today, you had the following     No orders found for display       Primary Care Provider    Physician No Ref-Primary       No address on file        Equal Access to Services     BRAULIO HAYNES : Virginia Sterling, nuno murguia, mora manriquez, dillon dye . So [facility] [phone].    ATENCIÓN: Si habla español, tiene a richter disposición servicios gratuitos de asistencia lingüística. Llame al [phone].    We comply with applicable federal civil rights laws and [place] laws. We do not discriminate on the basis of race, color, national origin, age, disability sex, sexual orientation or gender identity.            Thank you!     Thank you for choosing [facility]  for your care. Our goal is always to provide you with excellent care. Hearing back from our patients is one way we can continue to improve our services. Please take a few minutes to complete the written survey that you may receive in the mail after your visit with us. Thank you!             Your Updated Medication List - Protect others around you: Learn how to safely use, store and throw away your medicines at www.disposemymeds.org.          This list is accurate as of: 7/16/17 12:16 PM.  Always use your most recent med list.                   Brand Name Dispense Instructions for use Diagnosis    ASPIRIN NOT PRESCRIBED    INTENTIONAL    1 each    continuous prn Antiplatelet medication not prescribed intentionally due to age.        B-COMPLEX PO      Take by mouth daily        EPINEPHrine 0.3 MG/0.3ML injection " 2-pack    EPIPEN/ADRENACLICK/or ANY BX GENERIC EQUIV    2 each    Inject 0.3 mLs (0.3 mg) into the muscle once as needed for anaphylaxis    Allergic reaction       gabapentin 100 MG capsule    NEURONTIN    90 capsule    Take 1 capsule (100 mg) by mouth daily        insulin aspart 100 UNIT/ML injection    NovoLOG FLEXPEN    90 mL    1 unit per 4 grams of carbohydrate eaten at each meal +a correction of 1 additional unit per 18 points of blood sugar above 140.  Total daily dose 100 units/day.  Dispense 6 boxes/30 pens for a 90-day supply or 2 boxes/10 pens for a 30-day supply as allowed by insurance.    Type 2 diabetes mellitus with hyperglycemia, with long-term current use of insulin (H)       insulin degludec 200 UNIT/ML pen    TRESIBA    18 mL    Inject 68 Units Subcutaneous daily    Type 2 diabetes mellitus with hyperglycemia, with long-term current use of insulin (H), Encounter for long-term (current) use of insulin (H)       * insulin pen needle 31G X 5 MM    B-D U/F    550 each    Use 6  time(s) per day.  Please dispense as BD Pen Needle Mini U/F 31G x 5 MM    Type 2 diabetes mellitus with hyperglycemia (H)       * insulin pen needle 31G X 5 MM    B-D U/F    300 each    Use 6 daily or as directed.    Type 2 diabetes mellitus with hyperglycemia (H)       * insulin pen needle 31G X 5 MM    B-D U/F    200 each    Use 5 daily or as directed.    Type 2 diabetes mellitus with hyperglycemia (H)       pantothenic acid 500 MG Tabs      Take 500 mg by mouth daily        polyethylene glycol Packet    MIRALAX/GLYCOLAX     Take 1 packet by mouth daily.        STATIN NOT PRESCRIBED (INTENTIONAL)     0 each    Statin not prescribed intentionally due to Intolerance    Statin intolerance       traMADol 50 MG tablet    ULTRAM     Take 50 mg by mouth every evening 1-2 tablets in the evening        * Notice:  This list has 3 medication(s) that are the same as other medications prescribed for you. Read the directions carefully, and  ask your doctor or other care provider to review them with you.

## 2017-07-19 ENCOUNTER — TRANSFERRED RECORDS (OUTPATIENT)
Dept: HEALTH INFORMATION MANAGEMENT | Facility: CLINIC | Age: 57
End: 2017-07-19

## 2017-07-20 ENCOUNTER — TRANSFERRED RECORDS (OUTPATIENT)
Dept: HEALTH INFORMATION MANAGEMENT | Facility: CLINIC | Age: 57
End: 2017-07-20

## 2017-07-28 ENCOUNTER — OFFICE VISIT (OUTPATIENT)
Dept: PEDIATRICS | Facility: CLINIC | Age: 57
End: 2017-07-28
Payer: COMMERCIAL

## 2017-07-28 VITALS
TEMPERATURE: 98.3 F | WEIGHT: 184.2 LBS | SYSTOLIC BLOOD PRESSURE: 124 MMHG | HEART RATE: 57 BPM | DIASTOLIC BLOOD PRESSURE: 70 MMHG | OXYGEN SATURATION: 99 % | BODY MASS INDEX: 31.45 KG/M2 | HEIGHT: 64 IN

## 2017-07-28 DIAGNOSIS — E78.5 HYPERLIPIDEMIA LDL GOAL <100: ICD-10-CM

## 2017-07-28 DIAGNOSIS — Z13.820 SCREENING FOR OSTEOPOROSIS: ICD-10-CM

## 2017-07-28 DIAGNOSIS — S06.9X9S TBI (TRAUMATIC BRAIN INJURY), WITH LOC OF UNSPECIFIED DURATION, SEQUELA: ICD-10-CM

## 2017-07-28 DIAGNOSIS — Z11.59 NEED FOR HEPATITIS C SCREENING TEST: ICD-10-CM

## 2017-07-28 DIAGNOSIS — I65.23 CAROTID ATHEROSCLEROSIS, BILATERAL: Primary | ICD-10-CM

## 2017-07-28 DIAGNOSIS — Z23 NEED FOR TD VACCINE: ICD-10-CM

## 2017-07-28 DIAGNOSIS — G89.4 CHRONIC PAIN SYNDROME: ICD-10-CM

## 2017-07-28 DIAGNOSIS — M79.7 FIBROMYALGIA: ICD-10-CM

## 2017-07-28 DIAGNOSIS — R80.9 TYPE 2 DIABETES MELLITUS WITH MICROALBUMINURIA, WITH LONG-TERM CURRENT USE OF INSULIN (H): ICD-10-CM

## 2017-07-28 DIAGNOSIS — Z79.4 TYPE 2 DIABETES MELLITUS WITH MICROALBUMINURIA, WITH LONG-TERM CURRENT USE OF INSULIN (H): ICD-10-CM

## 2017-07-28 DIAGNOSIS — E11.29 TYPE 2 DIABETES MELLITUS WITH MICROALBUMINURIA, WITH LONG-TERM CURRENT USE OF INSULIN (H): ICD-10-CM

## 2017-07-28 PROCEDURE — 90471 IMMUNIZATION ADMIN: CPT | Performed by: INTERNAL MEDICINE

## 2017-07-28 PROCEDURE — 99215 OFFICE O/P EST HI 40 MIN: CPT | Mod: 25 | Performed by: INTERNAL MEDICINE

## 2017-07-28 PROCEDURE — 90714 TD VACC NO PRESV 7 YRS+ IM: CPT | Performed by: INTERNAL MEDICINE

## 2017-07-28 RX ORDER — NAPROXEN SODIUM 220 MG
440 TABLET ORAL 2 TIMES DAILY WITH MEALS
Qty: 60 TABLET | COMMUNITY
Start: 2017-07-28 | End: 2018-01-16

## 2017-07-28 RX ORDER — GABAPENTIN 100 MG/1
400 CAPSULE ORAL DAILY
Qty: 90 CAPSULE | COMMUNITY
Start: 2017-07-28 | End: 2018-01-16

## 2017-07-28 ASSESSMENT — ANXIETY QUESTIONNAIRES
IF YOU CHECKED OFF ANY PROBLEMS ON THIS QUESTIONNAIRE, HOW DIFFICULT HAVE THESE PROBLEMS MADE IT FOR YOU TO DO YOUR WORK, TAKE CARE OF THINGS AT HOME, OR GET ALONG WITH OTHER PEOPLE: NOT DIFFICULT AT ALL
2. NOT BEING ABLE TO STOP OR CONTROL WORRYING: SEVERAL DAYS
GAD7 TOTAL SCORE: 1
7. FEELING AFRAID AS IF SOMETHING AWFUL MIGHT HAPPEN: NOT AT ALL
5. BEING SO RESTLESS THAT IT IS HARD TO SIT STILL: NOT AT ALL
3. WORRYING TOO MUCH ABOUT DIFFERENT THINGS: NOT AT ALL
1. FEELING NERVOUS, ANXIOUS, OR ON EDGE: NOT AT ALL
6. BECOMING EASILY ANNOYED OR IRRITABLE: NOT AT ALL

## 2017-07-28 ASSESSMENT — PATIENT HEALTH QUESTIONNAIRE - PHQ9: 5. POOR APPETITE OR OVEREATING: NOT AT ALL

## 2017-07-28 NOTE — PATIENT INSTRUCTIONS
1. Schedule mammogram and bone density scan  2. Tetanus/diptheria vaccine today  3. Will check hepatitis C screening test with diabetes labs  4. Will get records and review to see if anything further is needed.

## 2017-07-28 NOTE — NURSING NOTE
"Chief Complaint   Patient presents with     Establish Care       Initial /70 (BP Location: Right arm, Patient Position: Chair, Cuff Size: Adult Regular)  Pulse 57  Temp 98.3  F (36.8  C) (Tympanic)  Ht 5' 4\" (1.626 m)  Wt 184 lb 3.2 oz (83.6 kg)  LMP 01/01/1999  SpO2 99%  BMI 31.62 kg/m2 Estimated body mass index is 31.62 kg/(m^2) as calculated from the following:    Height as of this encounter: 5' 4\" (1.626 m).    Weight as of this encounter: 184 lb 3.2 oz (83.6 kg).  Medication Reconciliation: dionisio Herndon LPN    Prior to injection verified patient identity using patient's name and date of birth.   Patient instructed to remain in clinic for 15 minutes afterwards, and to report any adverse reaction to me immediately.    "

## 2017-07-28 NOTE — MR AVS SNAPSHOT
After Visit Summary   7/28/2017    Maricarmen Dotson    MRN: [id number]           Patient Information     Date Of Birth          1960        Visit Information        Provider Department      7/28/2017 8:00 AM Annamaria Erickson MD [facility]        Today's Diagnoses     Screening for osteoporosis    -  1    Need for TD vaccine        Need for hepatitis C screening test          Care Instructions    1. Schedule mammogram and bone density scan  2. Tetanus/diptheria vaccine today  3. Will check hepatitis C screening test with diabetes labs  4. Will get records and review to see if anything further is needed.          Follow-ups after your visit        Future tests that were ordered for you today     Open Future Orders        Priority Expected Expires Ordered    Hepatitis C Screen Reflex to HCV RNA Quant and Genotype Routine  12/28/2017 7/28/2017    DX Hip/Pelvis/Spine Routine  7/28/2018 7/28/2017            Who to contact     If you have questions or need follow up information about today's clinic visit or your schedule please contact [facility] directly at [phone].  Normal or non-critical lab and imaging results will be communicated to you by [employer], letter or phone within 4 business days after the clinic has received the results. If you do not hear from us within 7 days, please contact the clinic through [employer] or phone. If you have a critical or abnormal lab result, we will notify you by phone as soon as possible.  Submit refill requests through MyDocTime or call your pharmacy and they will forward the refill request to us. Please allow 3 business days for your refill to be completed.          Additional Information About Your Visit        [employer] Information     My[employer]Time gives you secure access to your electronic health record. If you see a primary care provider, you can also send messages to your care team and make appointments. If you have questions, please call  "your primary care clinic.  If you do not have a primary care provider, please call [phone] and they will assist you.        Care EveryWhere ID     This is your Care EveryWhere ID. This could be used by other organizations to access your [facility] medical records  [id number]        Your Vitals Were     Pulse Temperature Height Last Period Pulse Oximetry BMI (Body Mass Index)    57 98.3  F (36.8  C) (Tympanic) 5' 4\" (1.626 m) 01/01/1999 99% 31.62 kg/m2       Blood Pressure from Last 3 Encounters:   07/28/17 124/70   07/16/17 142/60   03/30/17 126/86    Weight from Last 3 Encounters:   07/28/17 184 lb 3.2 oz (83.6 kg)   07/16/17 184 lb (83.5 kg)   06/19/17 190 lb (86.2 kg)              We Performed the Following     TD PRESERV FREE >=7 YRS ADS IM          Today's Medication Changes          These changes are accurate as of: 7/28/17  9:17 AM.  If you have any questions, ask your nurse or doctor.               These medicines have changed or have updated prescriptions.        Dose/Directions    gabapentin 100 MG capsule   Commonly known as:  NEURONTIN   This may have changed:  how much to take   Changed by:  Annamaria Erickson MD        Dose:  400 mg   Take 4 capsules (400 mg) by mouth daily   Quantity:  90 capsule   Refills:  0                Primary Care Provider    Physician No Ref-Primary       No address on file        Equal Access to Services     [facility] AH: Hadii peg thorntono Sosu, waaxda luqadaha, qaybta kaalmada dillon manriquez . So [facility] [phone].    ATENCIÓN: Si habla español, tiene a richter disposición servicios gratuitos de asistencia lingüística. Llame al [phone].    We comply with applicable federal civil rights laws and [place] laws. We do not discriminate on the basis of race, color, national origin, age, disability sex, sexual orientation or gender identity.            Thank you!     Thank you for choosing [facility]  for your " care. Our goal is always to provide you with excellent care. Hearing back from our patients is one way we can continue to improve our services. Please take a few minutes to complete the written survey that you may receive in the mail after your visit with us. Thank you!             Your Updated Medication List - Protect others around you: Learn how to safely use, store and throw away your medicines at www.disposemymeds.org.          This list is accurate as of: 7/28/17  9:17 AM.  Always use your most recent med list.                   Brand Name Dispense Instructions for use Diagnosis    ASPIRIN NOT PRESCRIBED    INTENTIONAL    1 each    continuous prn Antiplatelet medication not prescribed intentionally due to age.        B-COMPLEX PO      Take by mouth daily        [employer] OMEGA-3 PO      Take 3,400 mg by mouth daily        EPINEPHrine 0.3 MG/0.3ML injection 2-pack    EPIPEN/ADRENACLICK/or ANY BX GENERIC EQUIV    2 each    Inject 0.3 mLs (0.3 mg) into the muscle once as needed for anaphylaxis    Allergic reaction       gabapentin 100 MG capsule    NEURONTIN    90 capsule    Take 4 capsules (400 mg) by mouth daily        insulin aspart 100 UNIT/ML injection    NovoLOG FLEXPEN    90 mL    1 unit per 4 grams of carbohydrate eaten at each meal +a correction of 1 additional unit per 18 points of blood sugar above 140.  Total daily dose 100 units/day.  Dispense 6 boxes/30 pens for a 90-day supply or 2 boxes/10 pens for a 30-day supply as allowed by insurance.    Type 2 diabetes mellitus with hyperglycemia, with long-term current use of insulin (H)       insulin degludec 200 UNIT/ML pen    TRESIBA    18 mL    Inject 68 Units Subcutaneous daily    Type 2 diabetes mellitus with hyperglycemia, with long-term current use of insulin (H), Encounter for long-term (current) use of insulin (H)       * insulin pen needle 31G X 5 MM    B-D U/F    550 each    Use 6  time(s) per day.  Please dispense as BD Pen Needle Mini U/F 31G x 5 MM     Type 2 diabetes mellitus with hyperglycemia (H)       * insulin pen needle 31G X 5 MM    B-D U/F    300 each    Use 6 daily or as directed.    Type 2 diabetes mellitus with hyperglycemia (H)       * insulin pen needle 31G X 5 MM    B-D U/F    200 each    Use 5 daily or as directed.    Type 2 diabetes mellitus with hyperglycemia (H)       milk thistle extract 140 MG Caps capsule           naproxen sodium 220 MG tablet    ANAPROX    60 tablet    Take 2 tablets (440 mg) by mouth 2 times daily (with meals)    Screening for osteoporosis       pantothenic acid 500 MG Tabs      Take 500 mg by mouth daily        polyethylene glycol Packet    MIRALAX/GLYCOLAX     Take 1 packet by mouth daily.        STATIN NOT PRESCRIBED (INTENTIONAL)     0 each    Statin not prescribed intentionally due to Intolerance    Statin intolerance       traMADol 50 MG tablet    ULTRAM     Take 50 mg by mouth every evening 1-2 tablets in the evening        VOLTAREN 1 % Gel topical gel   Generic drug:  diclofenac     100 g    Apply topically nightly as needed for moderate pain Apply 4 grams to knees or 2 grams to hands four times daily using enclosed dosing card.        * Notice:  This list has 3 medication(s) that are the same as other medications prescribed for you. Read the directions carefully, and ask your doctor or other care provider to review them with you.

## 2017-07-28 NOTE — PROGRESS NOTES
SUBJECTIVE:                                                    Maricarmen Dotson is a 56 year old female who presents to clinic today for the following health issues:    New Patient/Transfer of Care - here to establish care.  accompanies her today. Previously saw Dr. Patel.    1. Carotid atherosclerosis. X-rays ordered by chiropractor for chronic back/neck pain. Showed an enlarged ascending thoracic aneurysm. New finding. Also showed carotid occlusion. Previous US in 2014 showed less than 50% occlusion. Has been unable to tolerate cholesterol medication in the past due to join pain. Has tried multiple medications in the past including atorvastatin and crestor. Does krill oil now.     2. H/o colon polyps. Has had since 20's. Last colonoscopy in 2012 and was negative. Recommended 10 year follow-up.     3. DM: follows with Emily Phan. Has lost some weight and has been able to reduce dose of Tresiba significantly. Knows she needs to schedule a follow-up visit.    4. Bones: taking 1000 units of vitamin D. Recently restarted. Has had GI upset with it in the past. Has to take with food. Gets 1-3 servings of calcium a day. Does no tolerate dairy products. Eats spinach. Has not tolerated oral calcium supplement - has nausea/pain, vomiting. Last DEXA 7-8 years ago. Was normal.     5. Fibromyalgia, osteoarthritis: Sees Dr. Mcadams - has been increasing gabapentin slowly. Up to 400 mg. Would like to get to 900 mg eventually. Has been trying different NSAID to see if can find one that is tolerated. Tried sulindac and did no tolerate. Using aleve right now, but only lasts for a couple of hours. Also uses a topical diclofenac gel. Uses tramadol 2 pills at bedtime. Too sleepy if takes during the day.    6. Concussion: Was 11/2012. Did therapy and has some deficits - still has right eye and right ear pain with fatigue. Headaches. Has been able to learn when coming on and how to avoid turning into a more severe headache.  "Has some unsteadiness in gait. Has some slowing of cognition - will take longer to learn something complex.    Reviewed and updated as needed this visit by clinical staff  Tobacco  Allergies  Meds  Problems  Med Hx  Surg Hx  Fam Hx  Soc Hx        Reviewed and updated as needed this visit by Provider  Allergies  Meds  Problems       -------------------------------------    Problem list and histories reviewed & adjusted, as indicated.  Additional history: as documented    ROS:  Constitutional, HEENT, cardiovascular, pulmonary, GI, , musculoskeletal, neuro, skin, endocrine and psych systems are negative, except as otherwise noted.      Problem list, Medication list, Allergies, and Medical/Social/Surgical histories reviewed in EPIC and updated as appropriate.    OBJECTIVE:                                                    /70 (BP Location: Right arm, Patient Position: Chair, Cuff Size: Adult Regular)  Pulse 57  Temp 98.3  F (36.8  C) (Tympanic)  Ht 5' 4\" (1.626 m)  Wt 184 lb 3.2 oz (83.6 kg)  LMP 01/01/1999  SpO2 99%  BMI 31.62 kg/m2   Body mass index is 31.62 kg/(m^2).  General Appearance: healthy, alert and no distress  Eyes:   no discharge, erythema.  Normal pupils.  Respiratory: lungs clear to auscultation - no rales, rhonchi or wheezes.  Cardiovascular: regular rate and rhythm, normal S1 S2, no S3 or S4 and no murmur, click or rub.  No peripheral edema.  Skin: no rashes or lesions.  Well perfused and normal turgor.    Diagnostic Test Results (from [facility]):  1. Lumbar spine x-ray (7/19/2017): uncomplicated surgical changes at L4-5, atherosclerosis in abdominal aorta. Possible osteopenia - recommend DEXA.  2. Thoracic spine x-ray (7/19/2017): mild diffuse degenerative changes, mild scoliosis, prominence and tortuosity of the thoracic aorta.   3. Cervical spine x-ray (7/19/2017): degenerative spondylolisthesis at C3-4 caused by facet arthrosis which is also present at C4-5 through C5-6. " Atherosclerosis of carotid sinus as well as prominence of aortic arch. 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 Possible osteopenia, recommend DEXA follow-up.     ASSESSMENT/PLAN:                                                      (I65.23) Carotid atherosclerosis, bilateral  (primary encounter diagnosis)  Comment: < 50% on last carotid US, no bruits, asymptomatic. Noted on x-ray but does not seem to be severe  Plan:   - no further imaging needed at this time  - patient intolerant of statins  - recommend controlling BP closely and controlled today.   - will recheck lipids with DM visit    (G89.4) Chronic pain syndrome  (M79.7) Fibromyalgia  Plan:   - continue gabapentin, tramadol and diclofenac gel  - continue to follow with Dr. Mcadams    (E11.29,  R80.9,  Z79.4) Type 2 diabetes mellitus with microalbuminuria, with long-term current use of insulin (H)  Comment: has been reducing doses. Over due for diabetes follow-up  Plan: continue to follow with endo - will schedule f/u with fasting labs.    (E78.5) Hyperlipidemia LDL goal <100  Comment: over due for recheck. Has not tolerated statin in past.  Plan: recheck with DM visit    (S06.9X9S) TBI (traumatic brain injury), with LOC of unspecified duration, sequela (H)  Comment: with residual symptoms of headaches and eye pain, fatigue  Plan: continue to monitor    (Z13.820) Screening for osteoporosis  Plan: DX Hip/Pelvis/Spine, naproxen sodium (ANAPROX)         220 MG tablet    (Z23) Need for TD vaccine  Comment: prefers Td to Tdap given previous reactions.  Plan: TD PRESERV FREE >=7 YRS ADS IM    (Z11.59) Need for hepatitis C screening test  Plan: Hepatitis C Screen Reflex to HCV RNA Quant and         Genotype    BP Screening:   Last 3 BP Readings:    BP Readings from Last 3 Encounters:   07/28/17 124/70   07/16/17 142/60   03/30/17 126/86       The following was recommended to the patient:  Re-screen BP within a year and recommended lifestyle  "modifications  BMI:   Estimated body mass index is 31.62 kg/(m^2) as calculated from the following:    Height as of this encounter: 5' 4\" (1.626 m).    Weight as of this encounter: 184 lb 3.2 oz (83.6 kg).   Weight management plan: Discussed healthy diet and exercise guidelines and patient will follow up in 6 months in clinic to re-evaluate.    A total of 45 minutes was spent with Maricarmen and her  in clinic today, of which >50% was spent counseling or in coordination of care regarding establishing care, carotid atherosclerosis, post-TBI symptoms, diabetes, osteoarthritis, fibromyalgia/chronic pain and preventive care.    Follow up with Provider - annual visit and as needed     [facility]        "

## 2017-07-29 ASSESSMENT — ANXIETY QUESTIONNAIRES: GAD7 TOTAL SCORE: 1

## 2017-07-29 ASSESSMENT — PATIENT HEALTH QUESTIONNAIRE - PHQ9: SUM OF ALL RESPONSES TO PHQ QUESTIONS 1-9: 1

## 2017-07-30 PROBLEM — M79.7 FIBROMYALGIA: Status: ACTIVE | Noted: 2017-07-30

## 2017-07-31 DIAGNOSIS — F32.1 MODERATE MAJOR DEPRESSION (H): Primary | ICD-10-CM

## 2017-08-05 ENCOUNTER — MYC MEDICAL ADVICE (OUTPATIENT)
Dept: ENDOCRINOLOGY | Facility: CLINIC | Age: 57
End: 2017-08-05

## 2017-08-12 ENCOUNTER — TRANSFERRED RECORDS (OUTPATIENT)
Dept: HEALTH INFORMATION MANAGEMENT | Facility: CLINIC | Age: 57
End: 2017-08-12

## 2017-08-14 ENCOUNTER — OFFICE VISIT (OUTPATIENT)
Dept: PEDIATRICS | Facility: CLINIC | Age: 57
End: 2017-08-14
Payer: COMMERCIAL

## 2017-08-14 VITALS
DIASTOLIC BLOOD PRESSURE: 74 MMHG | HEART RATE: 64 BPM | BODY MASS INDEX: 31.21 KG/M2 | OXYGEN SATURATION: 97 % | WEIGHT: 182.8 LBS | SYSTOLIC BLOOD PRESSURE: 134 MMHG | HEIGHT: 64 IN | TEMPERATURE: 99.1 F

## 2017-08-14 DIAGNOSIS — S76.311D RIGHT HAMSTRING MUSCLE STRAIN, SUBSEQUENT ENCOUNTER: Primary | ICD-10-CM

## 2017-08-14 PROCEDURE — 99213 OFFICE O/P EST LOW 20 MIN: CPT | Performed by: INTERNAL MEDICINE

## 2017-08-14 RX ORDER — SULINDAC 200 MG/1
200 TABLET ORAL 3 TIMES DAILY
COMMUNITY
Start: 2017-07-20 | End: 2018-06-26 | Stop reason: SINTOL

## 2017-08-14 NOTE — NURSING NOTE
"Chief Complaint   Patient presents with     Musculoskeletal Problem       Initial /74 (BP Location: Right arm, Patient Position: Chair, Cuff Size: Adult Regular)  Pulse 64  Temp 99.1  F (37.3  C) (Tympanic)  Ht 5' 4\" (1.626 m)  Wt 182 lb 12.8 oz (82.9 kg)  LMP 01/01/1999  SpO2 97%  BMI 31.38 kg/m2 Estimated body mass index is 31.38 kg/(m^2) as calculated from the following:    Height as of this encounter: 5' 4\" (1.626 m).    Weight as of this encounter: 182 lb 12.8 oz (82.9 kg).  Medication Reconciliation: dionisio Herndon LPN      "

## 2017-08-14 NOTE — LETTER
Maricarmen Dotson  [address] [place] [address]        August 14, 2017           To Whom It May Concern:    Maricarmen Dotson  was seen on 8/14/2017.  Please excuse her from work on 8/14/2017 due to injury. She may return to work on 8/15/2017 without any restrictions.    Sincerely,        Annamaria Erickson MD

## 2017-08-14 NOTE — PROGRESS NOTES
SUBJECTIVE:                                                    Maricarmen Dotson is a 56 year old female who presents to clinic today for the following health issues:    Musculoskeletal problem/pain      Duration: 2 days    Description  Location: right hamstring    Intensity:  severe    Accompanying signs and symptoms: none    History  Previous similar problem: no   Previous evaluation:  none    Precipitating or alleviating factors:  Trauma or overuse: YES- tripped and stumbled, felt a pull in right thigh  Aggravating factors include: sitting, climbing stairs, lifting, exercise and overuse    Therapies tried and outcome: rest/inactivity, ice and narcotics, stopped narcotics yesterday    Strained right hamstring Sunday after last visit. Got better. Last Saturday, stumbled and felt right hamstring pull. Other leg gave out from under her. Had to walk the rest of the way to get home. Seen in the [facility] on Saturday - gave narcotics to help with pain. Now able to walk on it. Has to be careful with sitting on it. Unable to go up stairs. Narcotic caused nausea and made more unsteady. Now using tramadol. Also using sulindac. Overall improving. Would like to go back to work tomorrow. 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     Reviewed and updated as needed this visit by clinical staffTobacco  Allergies  Meds  Problems       Reviewed and updated as needed this visit by Provider  Allergies  Meds  Problems       -------------------------------------    Problem list and histories reviewed & adjusted, as indicated.  Additional history: as documented    ROS:  Constitutional, HEENT, cardiovascular, pulmonary, gi and gu systems are negative, except as otherwise noted.    Problem list, Medication list, Allergies, and Medical/Social/Surgical histories reviewed in EPIC and updated as appropriate.    OBJECTIVE:                                                    /74 (BP Location: Right arm, Patient  "Position: Chair, Cuff Size: Adult Regular)  Pulse 64  Temp 99.1  F (37.3  C) (Tympanic)  Ht 5' 4\" (1.626 m)  Wt 182 lb 12.8 oz (82.9 kg)  LMP 01/01/1999  SpO2 97%  BMI 31.38 kg/m2   Body mass index is 31.38 kg/(m^2).  General Appearance: healthy, alert and no distress  Eyes:   no discharge, erythema.  Normal pupils.  Musculoskeletal: unable to sit directly on posterior right leg due to pain. Antalgic gait.  Skin: no rashes or lesions.  Well perfused and normal turgor.    Diagnostic Test Results:  none      ASSESSMENT/PLAN:                                                      (S74.827Y) Right hamstring muscle strain, subsequent encounter  (primary encounter diagnosis)  Comment: improving, but still with significant pain  Plan:   - ok to go back to work - letter written  - continue ice, compression, avoiding direct pressure  - ok to continue sulindac and tramadol  - if not improving in next 2 weeks, will have see sports medicine      Follow up with Provider - as needed     [facility]          "

## 2017-08-16 ENCOUNTER — OFFICE VISIT (OUTPATIENT)
Dept: URGENT CARE | Facility: URGENT CARE | Age: 57
End: 2017-08-16
Payer: COMMERCIAL

## 2017-08-16 ENCOUNTER — TELEPHONE (OUTPATIENT)
Dept: PEDIATRICS | Facility: CLINIC | Age: 57
End: 2017-08-16

## 2017-08-16 VITALS
TEMPERATURE: 98.9 F | HEART RATE: 71 BPM | SYSTOLIC BLOOD PRESSURE: 142 MMHG | OXYGEN SATURATION: 97 % | DIASTOLIC BLOOD PRESSURE: 90 MMHG | BODY MASS INDEX: 31.24 KG/M2 | WEIGHT: 182 LBS

## 2017-08-16 DIAGNOSIS — S80.11XA HEMATOMA OF RIGHT LOWER EXTREMITY, INITIAL ENCOUNTER: Primary | ICD-10-CM

## 2017-08-16 PROCEDURE — 99213 OFFICE O/P EST LOW 20 MIN: CPT | Performed by: FAMILY MEDICINE

## 2017-08-16 NOTE — PATIENT INSTRUCTIONS
Continue with current medications.  Warm compress to area.      Hematoma  A hematoma is a collection of blood trapped outside of a blood vessel. It is what we think of as a bruise or a contusion. It is usually seen under the skin as a black and blue spot on your arm or leg, or a bump on your head after an injury. It can be almost anywhere on or in your body. It can also occur in an internal organ where it can be more serious.  A hematoma is caused by an injury with damage to small blood vessels. This causes blood to leak into the tissues. Blood forms a pocket under the skin that swells and looks like a purplish patch.  Gradually the blood in the hematoma is absorbed back into the body. The swelling and pain of the hematoma will go away. This takes from 1 to 4 weeks, depending on the size of the hematoma. The skin over the hematoma may turn bluish then brown and yellow as the blood is dissolved and absorbed. Usually, this only takes a couple of weeks but can last months.  Home care    Limit motion of the joints near the hematoma. If the hematoma is large and painful, avoid sports and other vigorous physical activity until the swelling and pain goes away.    Apply an ice pack (ice cubes in a plastic bag, wrapped in a thin towel or a frozen bag of peas) over the injured area for 20 minutes every 1 to 2 hours the first day. Continue with ice packs 3 to 4 times a day for the next 2 days. Continue the use of ice packs for relief of pain and swelling as needed.    If you need anything for pain, you can take acetaminophen or ibuprofen, unless you were given a different pain medicine to use. Talk with your doctor before using these medicines if you have chronic liver or kidney disease, or ever had a stomach ulcer or gastrointestinal bleeding, or are taking blood thinner medicines.  Follow-up care  Follow up with your healthcare provider, or as advised. If X-rays or a CT scan were done, you will be notified if there is a  change in the reading, especially if it affects treatment.  When to seek medical advice  Call your healthcare provider right away f any of the following occur:    Redness around the hematoma    Increase in pain or warmth in the hematoma    Increase in size of the hematoma    Fever of 100.4 F (38 C) or higher, or as directed by your healthcare provider    If the hematoma is on the arm or leg, watch for:    Increased swelling or pain in the extremity    Numbness or tingling or blue color of the hand or foot  Date Last Reviewed: 11/5/2015 2000-2017 [employer]. [address] [place] [address]. All rights reserved. This information is not intended as a substitute for professional medical care. Always follow your healthcare professional's instructions.

## 2017-08-16 NOTE — TELEPHONE ENCOUNTER
Patient calling to report that she has noted ecchymosis that began yesterday.  Has bruising behind her knee, and then notes bruising posterior upper thigh as well.  States bruised area is about the size of a tennis ball.  Notes slight swelling to the area,  Minimal pain.  Bruised areas are not where she struck the rock.  Is on Sulindac.  Advised this is more than likely due to the injury.  Will discuss with Dr. Erickson.  KYLEE Kraft RN    [id number]-Per Dr Erickson-would just monitor for now.  Advised patient of this.  Asked her to call if any weakness in leg, increase in swelling or pain, or any other concerns.  Patient voices understanding of instructions.  No further questions.  Will call back if any other questions or concerns.  KYLEE Kraft RN

## 2017-08-16 NOTE — LETTER
Maricarmen Dotson  [address] [place] [address]        August 16, 2017           To Whom It May Concern:    Maricaremn Dotson  was seen on 8/16/2017.  Please excuse her from work 8/16-8/17 due to injury.        Sincerely,        Ramo De Santiago MD

## 2017-08-16 NOTE — PROGRESS NOTES
Hi Team! Carlotta seems to have OA and no other obvious signs of SLE. Since her NISREEN is still positive, will have her follow up ~annually. I did recommend she start the plaquenil as recommended by Dr. Bray. If she has another joint flare, she is to reach out to my office.  Thanks! Maranda Hernandez, DO [facility] 3/25/2025   SUBJECTIVE:  Chief Complaint   Patient presents with     Urgent Care     Musculoskeletal Problem     pulled right hamstring on saturday and progressively getting worse. Some bruising on back of leg .1-10 pain scale: 9.5 Tx: narcotic     Maricarmen Dotson is a 56 year old female presents with a chief complaint of right back of leg pain and bruising.  The injury occurred 5 day(s) ago.   The injury happened while out walking. How: fall, slid on left side but felt sharp pull on right leg.  The patient complained of moderate pain  and has had decreased ROM.  Pain exacerbated by movement and sitting.  Patient has been icing area since initial injury, using voltaren, sulindac and tramadol for pain control, does have underlying fibromyalgia.  Able to walk but slowly.  Sitting makes it worse as she is on the sore area.  Patient was seen by primary 2 days ago.    Past Medical History:   Diagnosis Date     Ascending aorta enlargement (H)      Depressive disorder     severe, prior hospitalization     Diabetes mellitus, type 2 (H)      Diverticulosis of colon (without mention of hemorrhage)      Fibromyalgia 6/26/2007     Insomnia      Irritable bowel syndrome      Current Outpatient Prescriptions   Medication Sig Dispense Refill     sulindac (CLINORIL) 200 MG tablet Take 200 mg by mouth 2 times daily Takes 1-2 times per day       milk thistle extract 140 MG CAPS capsule        Flax Oil-Fish Oil-Borage Oil (CVS OMEGA-3 PO) Take 3,400 mg by mouth daily       gabapentin (NEURONTIN) 100 MG capsule Take 4 capsules (400 mg) by mouth daily 90 capsule      diclofenac (VOLTAREN) 1 % GEL topical gel Apply topically nightly as needed for moderate pain Apply 4 grams to knees or 2 grams to hands four times daily using enclosed dosing card. 100 g 1     naproxen sodium (ANAPROX) 220 MG tablet Take 2 tablets (440 mg) by mouth 2 times daily (with meals) 60 tablet      insulin aspart (NOVOLOG FLEXPEN) 100 UNIT/ML injection 1 unit per 4 grams of  carbohydrate eaten at each meal +a correction of 1 additional unit per 18 points of blood sugar above 140.  Total daily dose 100 units/day.  Dispense 6 boxes/30 pens for a 90-day supply or 2 boxes/10 pens for a 30-day supply as allowed by insurance. 90 mL 3     insulin degludec (TRESIBA) 200 UNIT/ML pen Inject 68 Units Subcutaneous daily 18 mL 2     pantothenic acid 500 MG TABS Take 500 mg by mouth daily       B Complex-Biotin-FA (B-COMPLEX PO) Take by mouth daily       insulin pen needle (B-D U/F) 31G X 5 MM Use 5 daily or as directed. 200 each 11     insulin pen needle (B-D U/F) 31G X 5 MM Use 6 daily or as directed. 300 each prn     insulin pen needle (B-D U/F) 31G X 5 MM Use 6  time(s) per day.  Please dispense as BD Pen Needle Mini U/F 31G x 5  each 3     EPINEPHrine (EPIPEN) 0.3 MG/0.3ML injection Inject 0.3 mLs (0.3 mg) into the muscle once as needed for anaphylaxis 2 each 0     STATIN NOT PRESCRIBED, INTENTIONAL, Statin not prescribed intentionally due to Intolerance 0 each 0     traMADol (ULTRAM) 50 MG tablet Take 50 mg by mouth every evening 1-2 tablets in the evening       ASPIRIN NOT PRESCRIBED, INTENTIONAL, continuous prn Antiplatelet medication not prescribed intentionally due to age. 1 each 0     polyethylene glycol (MIRALAX/GLYCOLAX) packet Take 1 packet by mouth daily.       Social History   Substance Use Topics     Smoking status: Never Smoker     Smokeless tobacco: Never Used     Alcohol use 0.0 oz/week     0 Standard drinks or equivalent per week      Comment: maybe once a month       ROS:  CONSTITUTIONAL:NEGATIVE for fever, chills, change in weight  INTEGUMENTARY/SKIN: POSITIVE for bruising   ENT/MOUTH: NEGATIVE for ear, mouth and throat problems  RESP:NEGATIVE for significant cough or SOB  CV: NEGATIVE for chest pain, palpitations or peripheral edema  MUSCULOSKELETAL: POSITIVE  for injury to right leg    EXAM:   /90 (BP Location: Right arm, Patient Position: Standing, Cuff Size:  Adult Regular)  Pulse 71  Temp 98.9  F (37.2  C) (Tympanic)  Wt 182 lb (82.6 kg)  LMP 01/01/1999  SpO2 97%  BMI 31.24 kg/m2  Gen: healthy, alert and mild distress  Extremity: right leg - posterior with bluish/purplish ecchymosis, popliteal area with greenish bluish patches.  Mild tenderness.  ROM intact.   There is not compromise to the distal circulation.  Pulses are +2 and CRT is brisk  EXTREMITIES: peripheral pulses normal  NEURO: Normal strength and tone, sensory exam grossly normal, mentation intact and speech normal    X-RAY was not done.    ASSESSMENT/PLAN:   (S80.11XA) Hematoma of right lower extremity, initial encounter  (primary encounter diagnosis)  Comment: s/p injury, hamstring strain  Plan: symptomatic treatment      Reassurance given, reviewed symptomatic treatment with warm compress at this time instead of ice, rest, continue with current medications.  Reviewed that bruising/hematoma will descend downward, discussed possible small muscle tear but anticipate that will heal on its own.  Offered  flexeril but patient did declined, cites that has SE with this medication.    Work excuse note to be off 8/16-8/17  Follow up with primary if no resolution of symptoms.    Ramo De Santiago MD  August 16, 2017 4:30 PM

## 2017-08-16 NOTE — NURSING NOTE
"Chief Complaint   Patient presents with     Urgent Care     Musculoskeletal Problem     pulled right hamstring on saturday and progressively getting worse. Some bruising on back of leg .1-10 pain scale: 9.5 Tx: narcotic       Initial /90 (BP Location: Right arm, Patient Position: Standing, Cuff Size: Adult Regular)  Pulse 71  Temp 98.9  F (37.2  C) (Tympanic)  Wt 182 lb (82.6 kg)  LMP 01/01/1999  SpO2 97%  BMI 31.24 kg/m2 Estimated body mass index is 31.24 kg/(m^2) as calculated from the following:    Height as of 8/14/17: 5' 4\" (1.626 m).    Weight as of this encounter: 182 lb (82.6 kg).  Medication Reconciliation: unable or not appropriate to perform    Monica Leach Medical Assistant    "

## 2017-08-16 NOTE — MR AVS SNAPSHOT
After Visit Summary   8/16/2017    Maricarmen Dotson    MRN: [id number]           Patient Information     Date Of Birth          1960        Visit Information        Provider Department      8/16/2017 3:25 PM Ramo De Santiago MD [facility]        Today's Diagnoses     Hematoma of right lower extremity, initial encounter    -  1      Care Instructions    Continue with current medications.  Warm compress to area.      Hematoma  A hematoma is a collection of blood trapped outside of a blood vessel. It is what we think of as a bruise or a contusion. It is usually seen under the skin as a black and blue spot on your arm or leg, or a bump on your head after an injury. It can be almost anywhere on or in your body. It can also occur in an internal organ where it can be more serious.  A hematoma is caused by an injury with damage to small blood vessels. This causes blood to leak into the tissues. Blood forms a pocket under the skin that swells and looks like a purplish patch.  Gradually the blood in the hematoma is absorbed back into the body. The swelling and pain of the hematoma will go away. This takes from 1 to 4 weeks, depending on the size of the hematoma. The skin over the hematoma may turn bluish then brown and yellow as the blood is dissolved and absorbed. Usually, this only takes a couple of weeks but can last months.  Home care    Limit motion of the joints near the hematoma. If the hematoma is large and painful, avoid sports and other vigorous physical activity until the swelling and pain goes away.    Apply an ice pack (ice cubes in a plastic bag, wrapped in a thin towel or a frozen bag of peas) over the injured area for 20 minutes every 1 to 2 hours the first day. Continue with ice packs 3 to 4 times a day for the next 2 days. Continue the use of ice packs for relief of pain and swelling as needed.    If you need anything for pain, you can take acetaminophen or ibuprofen, unless you  were given a different pain medicine to use. Talk with your doctor before using these medicines if you have chronic liver or kidney disease, or ever had a stomach ulcer or gastrointestinal bleeding, or are taking blood thinner medicines.  Follow-up care  Follow up with your healthcare provider, or as advised. If X-rays or a CT scan were done, you will be notified if there is a change in the reading, especially if it affects treatment.  When to seek medical advice  Call your healthcare provider right away f any of the following occur:    Redness around the hematoma    Increase in pain or warmth in the hematoma    Increase in size of the hematoma    Fever of 100.4 F (38 C) or higher, or as directed by your healthcare provider    If the hematoma is on the arm or leg, watch for:    Increased swelling or pain in the extremity    Numbness or tingling or blue color of the hand or foot  Date Last Reviewed: 11/5/2015 2000-2017 [employer]. [address] [place] [address]. All rights reserved. This information is not intended as a substitute for professional medical care. Always follow your healthcare professional's instructions.                Follow-ups after your visit        Who to contact     If you have questions or need follow up information about today's clinic visit or your schedule please contact [facility] directly at [phone].  Normal or non-critical lab and imaging results will be communicated to you by MyChart, letter or phone within 4 business days after the clinic has received the results. If you do not hear from us within 7 days, please contact the clinic through MyChart or phone. If you have a critical or abnormal lab result, we will notify you by phone as soon as possible.  Submit refill requests through [employer] or call your pharmacy and they will forward the refill request to us. Please allow 3 business days for your refill to be completed.          Additional  Information About Your Visit        [employer] Information     [email or web address] gives you secure access to your electronic health record. If you see a primary care provider, you can also send messages to your care team and make appointments. If you have questions, please call your primary care clinic.  If you do not have a primary care provider, please call [phone] and they will assist you.        Care EveryWhere ID     This is your Care EveryWhere ID. This could be used by other organizations to access your [place] medical records  [id number]        Your Vitals Were     Pulse Temperature Last Period Pulse Oximetry BMI (Body Mass Index)       71 98.9  F (37.2  C) (Tympanic) 01/01/1999 97% 31.24 kg/m2        Blood Pressure from Last 3 Encounters:   08/16/17 142/90   08/14/17 134/74   07/28/17 124/70    Weight from Last 3 Encounters:   08/16/17 182 lb (82.6 kg)   08/14/17 182 lb 12.8 oz (82.9 kg)   07/28/17 184 lb 3.2 oz (83.6 kg)              Today, you had the following     No orders found for display       Primary Care Provider Office Phone # Fax #    Annamaria Erickson [phone] [address] [place] [address]        Equal Access to Services     [facility]: Hadii aad ku hadasho Soomaali, waaxda luqadaha, qaybta kaalmada adeegyada, waxay idiin haybertn rbuina ruiz lageoff brasher. So [facility] [phone].    ATENCIÓN: Si habla español, tiene a richter disposición servicios gratuitos de asistencia lingüística. Llame al [phone].    We comply with applicable federal civil rights laws and [place] laws. We do not discriminate on the basis of race, color, national origin, age, disability sex, sexual orientation or gender identity.            Thank you!     Thank you for choosing [facility] [place] [facility]  for your care. Our goal is always to provide you with excellent care. Hearing back from our patients is one way we can continue to improve our services. Please take a few minutes to  complete the written survey that you may receive in the mail after your visit with us. Thank you!             Your Updated Medication List - Protect others around you: Learn how to safely use, store and throw away your medicines at www.disposemymeds.org.          This list is accurate as of: 8/16/17  4:07 PM.  Always use your most recent med list.                   Brand Name Dispense Instructions for use Diagnosis    ASPIRIN NOT PRESCRIBED    INTENTIONAL    1 each    continuous prn Antiplatelet medication not prescribed intentionally due to age.        B-COMPLEX PO      Take by mouth daily        CVS OMEGA-3 PO      Take 3,400 mg by mouth daily        EPINEPHrine 0.3 MG/0.3ML injection 2-pack    EPIPEN/ADRENACLICK/or ANY BX GENERIC EQUIV    2 each    Inject 0.3 mLs (0.3 mg) into the muscle once as needed for anaphylaxis    Allergic reaction       gabapentin 100 MG capsule    NEURONTIN    90 capsule    Take 4 capsules (400 mg) by mouth daily        insulin aspart 100 UNIT/ML injection    NovoLOG FLEXPEN    90 mL    1 unit per 4 grams of carbohydrate eaten at each meal +a correction of 1 additional unit per 18 points of blood sugar above 140.  Total daily dose 100 units/day.  Dispense 6 boxes/30 pens for a 90-day supply or 2 boxes/10 pens for a 30-day supply as allowed by insurance.    Type 2 diabetes mellitus with hyperglycemia, with long-term current use of insulin (H)       insulin degludec 200 UNIT/ML pen    TRESIBA    18 mL    Inject 68 Units Subcutaneous daily    Type 2 diabetes mellitus with hyperglycemia, with long-term current use of insulin (H), Encounter for long-term (current) use of insulin (H)       * insulin pen needle 31G X 5 MM    B-D U/F    550 each    Use 6  time(s) per day.  Please dispense as BD Pen Needle Mini U/F 31G x 5 MM    Type 2 diabetes mellitus with hyperglycemia (H)       * insulin pen needle 31G X 5 MM    B-D U/F    300 each    Use 6 daily or as directed.    Type 2 diabetes mellitus  with hyperglycemia (H)       * insulin pen needle 31G X 5 MM    B-D U/F    200 each    Use 5 daily or as directed.    Type 2 diabetes mellitus with hyperglycemia (H)       milk thistle extract 140 MG Caps capsule           naproxen sodium 220 MG tablet    ANAPROX    60 tablet    Take 2 tablets (440 mg) by mouth 2 times daily (with meals)    Screening for osteoporosis       pantothenic acid 500 MG Tabs      Take 500 mg by mouth daily        polyethylene glycol Packet    MIRALAX/GLYCOLAX     Take 1 packet by mouth daily.        STATIN NOT PRESCRIBED (INTENTIONAL)     0 each    Statin not prescribed intentionally due to Intolerance    Statin intolerance       sulindac 200 MG tablet    CLINORIL     Take 200 mg by mouth 2 times daily Takes 1-2 times per day        traMADol 50 MG tablet    ULTRAM     Take 50 mg by mouth every evening 1-2 tablets in the evening        VOLTAREN 1 % Gel topical gel   Generic drug:  diclofenac     100 g    Apply topically nightly as needed for moderate pain Apply 4 grams to knees or 2 grams to hands four times daily using enclosed dosing card.        * Notice:  This list has 3 medication(s) that are the same as other medications prescribed for you. Read the directions carefully, and ask your doctor or other care provider to review them with you.

## 2017-08-17 ENCOUNTER — MYC MEDICAL ADVICE (OUTPATIENT)
Dept: PEDIATRICS | Facility: CLINIC | Age: 57
End: 2017-08-17

## 2017-08-18 ENCOUNTER — TELEPHONE (OUTPATIENT)
Dept: PEDIATRICS | Facility: CLINIC | Age: 57
End: 2017-08-18

## 2017-08-18 NOTE — LETTER
[address]  [address]  [place] [address]  Phone: [phone]  Fax: [phone]                        Maricarmen Dotson  [facility] [place] [address]           August 14, 2017               To Whom It May Concern:      Maricarmen Dotson  was seen in the clinic on 8/14/2017 and 8/16/2017.  Please excuse her from work on 8/14/2017- 8/18/2017 due to injury. She may return to work on 8/21/2017 without any restrictions.      Sincerely,          Annamaria Erickson MD

## 2017-08-18 NOTE — TELEPHONE ENCOUNTER
Patient calls.  Asking for an excuse letter for work for today, 8/18.  She received one on 8/14 from, PCP and on 8/16 for 8/16-8/17 from UC.  Patient was seen in UC on 8/16.  Patient states she is improving and plans to return to work on Monday.  No new symptoms.  Ok for the letter?  Letter saved, please print if ok and sign.  Please leave at the FD and call patient's cell phone when ready for .  OK to LM.    Ela Love RN  Message handled by Nurse Triage.

## 2017-08-30 ENCOUNTER — OFFICE VISIT (OUTPATIENT)
Dept: PEDIATRICS | Facility: CLINIC | Age: 57
End: 2017-08-30
Payer: COMMERCIAL

## 2017-08-30 ENCOUNTER — TELEPHONE (OUTPATIENT)
Dept: PEDIATRICS | Facility: CLINIC | Age: 57
End: 2017-08-30

## 2017-08-30 VITALS
BODY MASS INDEX: 30.23 KG/M2 | TEMPERATURE: 99.3 F | WEIGHT: 177.1 LBS | DIASTOLIC BLOOD PRESSURE: 82 MMHG | SYSTOLIC BLOOD PRESSURE: 138 MMHG | HEART RATE: 58 BPM | OXYGEN SATURATION: 97 % | HEIGHT: 64 IN

## 2017-08-30 DIAGNOSIS — R11.0 NAUSEA: ICD-10-CM

## 2017-08-30 DIAGNOSIS — R41.3 MEMORY CHANGES: ICD-10-CM

## 2017-08-30 DIAGNOSIS — M79.651 PAIN OF RIGHT THIGH: ICD-10-CM

## 2017-08-30 DIAGNOSIS — R51.9 NONINTRACTABLE EPISODIC HEADACHE, UNSPECIFIED HEADACHE TYPE: Primary | ICD-10-CM

## 2017-08-30 PROCEDURE — 99215 OFFICE O/P EST HI 40 MIN: CPT | Performed by: INTERNAL MEDICINE

## 2017-08-30 NOTE — TELEPHONE ENCOUNTER
Patient calling back.  Had a concussion 5 years ago.  For the past five days has had a headache at the top of her head posteriorly  Notes a little relief from the headache in the am upon arising, but by the afternoon, headache becomes much worse.  Symptoms of nausea and dizziness.  Notes that on weekends, when she is away from work, the headaches are less severe.  Patient doesn't think she struck her head in recent fall this past month, but is unsure.  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    Hamstring pain is recurring.  Appointment advised and scheduled due to symptom of headache, with nausea and dizziness.  Patient in agreement.  KYLEE Kraft RN

## 2017-08-30 NOTE — TELEPHONE ENCOUNTER
Pt calls.      She needed to stay home from work, so she needs a note.  Her leg is hurting and she has a headache.  She is wondering if she needs to be seen.   I wanted to ask more questions and then the phone went dead.    Will forward to Dr. Erickson for advisal.

## 2017-08-30 NOTE — NURSING NOTE
"Chief Complaint   Patient presents with     Musculoskeletal Problem     hamstring injury       Initial /82 (BP Location: Right arm, Patient Position: Sitting, Cuff Size: Adult Regular)  Pulse 58  Temp 99.3  F (37.4  C) (Tympanic)  Ht 5' 4\" (1.626 m)  Wt 177 lb 1.6 oz (80.3 kg)  LMP 01/01/1999  SpO2 97%  BMI 30.4 kg/m2 Estimated body mass index is 30.4 kg/(m^2) as calculated from the following:    Height as of this encounter: 5' 4\" (1.626 m).    Weight as of this encounter: 177 lb 1.6 oz (80.3 kg).  Medication Reconciliation: dionisio Fermin      "

## 2017-08-30 NOTE — PROGRESS NOTES
SUBJECTIVE:   Maricarmen Dotson is a 56 year old female who presents to clinic today for the following health issues:      Musculoskeletal problem/pain      Duration: 7/31 initial injury    Description  Location: hamstring, back of knee     Intensity:  8/10    Accompanying signs and symptoms: swelling and heat, pain extends entire length of upper leg, but focus is on knee and buttocks    History  Previous similar problem: no   Previous evaluation:  none    Precipitating or alleviating factors:  Trauma or overuse: YES  Aggravating factors include: sitting, standing, walking, climbing stairs, lifting, exercise and overuse    Therapies tried and outcome: rest/inactivity, heat, ice and tramadol    Patient with recent hamstring injury. Felt like improving, but now worse again. Bruise went away and now with new bruise. No new injury. Pain worse with pressure to area and with activity. Using ice, heat, tramadol and trying to stay off of it. Feels has worsened with trying to go back to work.    Headache for past 8 days, crown of head. Located in same area and intensity as previous concussion. Concerned possibly related to new job. 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 Symptoms similar to concussion symptoms in these patients as well. Tramadol and sulindac not helping. Has to pack with ice, which helps some. Previous concussion happened 5 years ago. Was at [facility] and had testing done. Had a hard time concentrating and difficulty with cognition at that time. Doesn't think hit head when fell a few weeks ago. Also having increased nausea, changes in cognition, hard to remember things with these headaches as well. Having a hard time retaining new information at work and employer getting frustrated with her. Has to really concentrate when trying to drive or concentrate on things. Started working with larger magnet  "MRI at work around the first of this month. Has had intermittent headaches since starting there, but now more intense and with nausea and memory issues only for past 8 days. Has noticed symptoms improve when home over the weekend, but worsen again when goes back to work. Patient considering stopping her job as these symptoms are concerning to her and has had difficulty with training with hamstring injury.     Reviewed and updated as needed this visit by clinical staffTobacco  Allergies  Meds  Problems  Med Hx  Surg Hx  Fam Hx  Soc Hx        Reviewed and updated as needed this visit by Provider  Allergies  Meds  Problems         -------------------------------------    Problem list and histories reviewed & adjusted, as indicated.  Additional history: as documented    ROS:  Constitutional, HEENT, cardiovascular, pulmonary, GI, , musculoskeletal, neuro, skin, endocrine and psych systems are negative, except as otherwise noted.      Problem list, Medication list, Allergies, and Medical/Social/Surgical histories reviewed in [facility] and updated as appropriate.    OBJECTIVE:                                                    /82 (BP Location: Right arm, Patient Position: Sitting, Cuff Size: Adult Regular)  Pulse 58  Temp 99.3  F (37.4  C) (Tympanic)  Ht 5' 4\" (1.626 m)  Wt 177 lb 1.6 oz (80.3 kg)  LMP 01/01/1999  SpO2 97%  BMI 30.4 kg/m2   Body mass index is 30.4 kg/(m^2).  General Appearance: tired appearing, alert and no distress, response to questions slower than typical for her  Eyes:   no discharge, erythema.  Normal pupils. EOMI  Respiratory: lungs clear to auscultation - no rales, rhonchi or wheezes.  Cardiovascular: regular rate and rhythm, normal S1 S2, no S3 or S4 and no murmur, click or rub.  No peripheral edema.  Musculoskeletal: bruising over inferior portion of posterior thigh about 3 x 4 cm in size. Tenderness to palpation over entire right hamstring.   Skin: bruising as above.  Well " perfused and normal turgor.  NEURO: alert and oriented x3, CN II-XII intact. Normal tone and strength in all four extremities.. Normal rapid alternating movements and normal finger-nose-finger testing. Romberg negative.     Diagnostic Test Results:  none      ASSESSMENT/PLAN:                                                      (R51) Nonintractable episodic headache, unspecified headache type  (primary encounter diagnosis)  (R41.3) Memory changes  (R11.0) Nausea  Comment: discussed at length with patient and . 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 Mechanism likely would be due to pull of magnet on brain tissue causing microtrauma against skull? Could also be due to other etiologies - new migraine, etc.   Plan:   - given case reports and difficulty with memory and also difficulty with training with thigh injury, patient has decided to put in notice at work.   - will observe over next couple of weeks and see if symptoms improve (previosuly would start to improve over weekend)  - if persists, would consider brain imaging vs referral to Neurology  - continue ice as this is most helpful  - discussed brain rest - limit screen time, activity etc  - f/u in 1 month for recheck - sooner if any change in symptoms.    (M79.651) Pain of right thigh  Comment: increased pain with new bruising. Suspected previous hamstring strain/sprain in past. Now wonder if has some tearing with increased bruising.  Plan:   - will be able to rest more fully now  - continue ice  - if not improving, will have see sports medicine - will call for referral. May need imaging.    Follow up with Provider - 1 month    A total of 40 minutes was spent with Maricarmen in clinic today, of which >50% was spent counseling or in coordination of care regarding headaches, nausea, memory changes and right thigh  pain/possible hamstring tear/sprain.    [facility]

## 2017-09-19 ENCOUNTER — TELEPHONE (OUTPATIENT)
Dept: PEDIATRICS | Facility: CLINIC | Age: 57
End: 2017-09-19

## 2017-09-19 NOTE — TELEPHONE ENCOUNTER
Patient calling that her concussion symptoms are getting worse. Cognition worse. Nausea and headaches. Concerned to drive.  Was told to be seen if sx worsening. Appointment scheduled.   Sunita Rush RN

## 2017-09-20 ENCOUNTER — OFFICE VISIT (OUTPATIENT)
Dept: PEDIATRICS | Facility: CLINIC | Age: 57
End: 2017-09-20
Payer: COMMERCIAL

## 2017-09-20 VITALS
OXYGEN SATURATION: 97 % | BODY MASS INDEX: 30.86 KG/M2 | DIASTOLIC BLOOD PRESSURE: 68 MMHG | RESPIRATION RATE: 16 BRPM | HEART RATE: 62 BPM | SYSTOLIC BLOOD PRESSURE: 122 MMHG | WEIGHT: 179.8 LBS

## 2017-09-20 DIAGNOSIS — G44.219 EPISODIC TENSION-TYPE HEADACHE, NOT INTRACTABLE: Primary | ICD-10-CM

## 2017-09-20 DIAGNOSIS — M79.644 PAIN OF RIGHT THUMB: ICD-10-CM

## 2017-09-20 DIAGNOSIS — R42 VERTIGO: ICD-10-CM

## 2017-09-20 DIAGNOSIS — R11.0 NAUSEA: ICD-10-CM

## 2017-09-20 DIAGNOSIS — H93.13 TINNITUS, BILATERAL: ICD-10-CM

## 2017-09-20 DIAGNOSIS — R50.9 FEVER, UNSPECIFIED: ICD-10-CM

## 2017-09-20 DIAGNOSIS — F40.240 CLAUSTROPHOBIA: ICD-10-CM

## 2017-09-20 LAB
ALBUMIN UR-MCNC: NEGATIVE MG/DL
APPEARANCE UR: CLEAR
BASOPHILS # BLD AUTO: 0 10E9/L (ref 0–0.2)
BASOPHILS NFR BLD AUTO: 0.2 %
BILIRUB UR QL STRIP: NEGATIVE
COLOR UR AUTO: YELLOW
CRP SERPL-MCNC: <2.9 MG/L (ref 0–8)
DIFFERENTIAL METHOD BLD: NORMAL
EOSINOPHIL # BLD AUTO: 0.2 10E9/L (ref 0–0.7)
EOSINOPHIL NFR BLD AUTO: 2.2 %
ERYTHROCYTE [DISTWIDTH] IN BLOOD BY AUTOMATED COUNT: 12 % (ref 10–15)
ERYTHROCYTE [SEDIMENTATION RATE] IN BLOOD BY WESTERGREN METHOD: 16 MM/H (ref 0–30)
GLUCOSE UR STRIP-MCNC: 500 MG/DL
HCT VFR BLD AUTO: 39.9 % (ref 35–47)
HGB BLD-MCNC: 13.7 G/DL (ref 11.7–15.7)
HGB UR QL STRIP: NEGATIVE
KETONES UR STRIP-MCNC: NEGATIVE MG/DL
LEUKOCYTE ESTERASE UR QL STRIP: NEGATIVE
LYMPHOCYTES # BLD AUTO: 2.1 10E9/L (ref 0.8–5.3)
LYMPHOCYTES NFR BLD AUTO: 23.1 %
MCH RBC QN AUTO: 31.1 PG (ref 26.5–33)
MCHC RBC AUTO-ENTMCNC: 34.3 G/DL (ref 31.5–36.5)
MCV RBC AUTO: 91 FL (ref 78–100)
MONOCYTES # BLD AUTO: 0.3 10E9/L (ref 0–1.3)
MONOCYTES NFR BLD AUTO: 3.5 %
NEUTROPHILS # BLD AUTO: 6.4 10E9/L (ref 1.6–8.3)
NEUTROPHILS NFR BLD AUTO: 71 %
NITRATE UR QL: NEGATIVE
NON-SQ EPI CELLS #/AREA URNS LPF: ABNORMAL /LPF
PH UR STRIP: 5 PH (ref 5–7)
PLATELET # BLD AUTO: 229 10E9/L (ref 150–450)
RBC # BLD AUTO: 4.41 10E12/L (ref 3.8–5.2)
RBC #/AREA URNS AUTO: ABNORMAL /HPF
SALICYLATES SERPL-MCNC: <2 MG/DL
SOURCE: ABNORMAL
SP GR UR STRIP: 1.02 (ref 1–1.03)
UROBILINOGEN UR STRIP-ACNC: 0.2 EU/DL (ref 0.2–1)
VIT B12 SERPL-MCNC: 431 PG/ML (ref 193–986)
WBC # BLD AUTO: 9.1 10E9/L (ref 4–11)
WBC #/AREA URNS AUTO: ABNORMAL /HPF

## 2017-09-20 PROCEDURE — 99214 OFFICE O/P EST MOD 30 MIN: CPT | Mod: GC | Performed by: INTERNAL MEDICINE

## 2017-09-20 PROCEDURE — 87040 BLOOD CULTURE FOR BACTERIA: CPT | Performed by: INTERNAL MEDICINE

## 2017-09-20 PROCEDURE — 80050 GENERAL HEALTH PANEL: CPT | Performed by: INTERNAL MEDICINE

## 2017-09-20 PROCEDURE — 80329 ANALGESICS NON-OPIOID 1 OR 2: CPT | Performed by: INTERNAL MEDICINE

## 2017-09-20 PROCEDURE — 81001 URINALYSIS AUTO W/SCOPE: CPT | Performed by: INTERNAL MEDICINE

## 2017-09-20 PROCEDURE — 86618 LYME DISEASE ANTIBODY: CPT | Performed by: INTERNAL MEDICINE

## 2017-09-20 PROCEDURE — 85652 RBC SED RATE AUTOMATED: CPT | Performed by: INTERNAL MEDICINE

## 2017-09-20 PROCEDURE — 82607 VITAMIN B-12: CPT | Performed by: INTERNAL MEDICINE

## 2017-09-20 PROCEDURE — 86140 C-REACTIVE PROTEIN: CPT | Performed by: INTERNAL MEDICINE

## 2017-09-20 PROCEDURE — 36415 COLL VENOUS BLD VENIPUNCTURE: CPT | Performed by: INTERNAL MEDICINE

## 2017-09-20 RX ORDER — DIAZEPAM 10 MG
10 TABLET ORAL
Qty: 1 TABLET | Refills: 0 | Status: SHIPPED | OUTPATIENT
Start: 2017-09-20 | End: 2017-10-16

## 2017-09-20 RX ORDER — MECLIZINE HYDROCHLORIDE 25 MG/1
25 TABLET ORAL EVERY 6 HOURS PRN
Qty: 30 TABLET | Refills: 1 | Status: SHIPPED | OUTPATIENT
Start: 2017-09-20 | End: 2017-10-18

## 2017-09-20 NOTE — NURSING NOTE
"Chief Complaint   Patient presents with     RECHECK     concussion; having worsening symptoms       Initial /68 (BP Location: Right arm, Patient Position: Chair, Cuff Size: Adult Regular)  Pulse 62  Resp 16  Wt 179 lb 12.8 oz (81.6 kg)  LMP 01/01/1999  SpO2 97%  BMI 30.86 kg/m2 Estimated body mass index is 30.86 kg/(m^2) as calculated from the following:    Height as of 8/30/17: 5' 4\" (1.626 m).    Weight as of this encounter: 179 lb 12.8 oz (81.6 kg).  Medication Reconciliation: complete  Bettina Escamilla, BOGDAN  "

## 2017-09-20 NOTE — MR AVS SNAPSHOT
After Visit Summary   9/20/2017    Maricarmen Dotson    MRN: [id number]           Patient Information     Date Of Birth          1960        Visit Information        Provider Department      9/20/2017 9:15 AM Don Magallanes MD [facility]        Today's Diagnoses     Episodic tension-type headache, not intractable    -  1    Vertigo        Tinnitus, bilateral        Fever, unspecified        Nausea        Pain of right thumb        Claustrophobia          Care Instructions      - you had blood and urine tests today. The results will be communicated with you once available.  - a brain MRI was ordered for you. You should be contacted to schedule this. If you do not hear from the MRI people, call [facility] and ask to be transferred to the Radiology Scheduling person  - you were prescribed meclizine as needed for nausea, use this as prescribed  - if all the tests are normal, we will refer you to neurology  - if your symptoms are not improving despite these interventions, return to clinic  - come back to clinic sooner if you're symptoms worsen. Go to the ER if you fall and hit your head or if you have any neurology concerns (new or worse confusion, walking that is more unsteady, stroke like symptoms, difficulty waking)    Vertigo (Unknown Cause)    In addition to helping with hearing, the inner ear is part of the balance center of your body. Problems with the inner ear can a false feeling of motion. This is called vertigo. Often, it feels as if you or the room is spinning. A vertigo attack may cause sudden nausea, vomiting and heavy sweating. Severe vertigo causes a loss of balance and can cause you to fall. During vertigo, small head movements and changes in body position will often make the symptoms worse. You may also have ringing in the ears called tinnitus.  An episode of vertigo may last seconds, minutes or hours. Once you are over the first episode, it may never come back.  However, symptoms may return off and on.  The cause of your vertigo is not yet known. Possible causes of vertigo include:    Inflammation of the inner ear    Disease of the nerves to the inner ear    Movement of calcium particles in the inner ear    Poor blood flow to the balance centers of the brain    Migraine headaches  Home care    If symptoms are severe, rest quietly in bed. Change positions very slowly. There is usually one position that will feel best, such as lying on one side or lying on your back with your head slightly raised on pillows.    Do not drive a car or work with dangerous machinery until symptoms have been gone for at least one week.    Take medicine as prescribed to relieve your symptoms. Unless another medicine was prescribed for symptoms of nausea, vomiting, and dizziness, you may use over-the-counter motion sickness pills. Ask your pharmacist for suggestions.  Follow-up care  Follow up with your healthcare provider or as directed. If you are referred to a specialist or for testing, make the appointment promptly.  When to seek medical advice  Call your healthcare provider if any of the following occur:    Fever of 100.4 F (38 C) or higher, or as directed by your healthcare provider    Vertigo worsens or is not controlled by prescribed medicine     Repeated vomiting not relieved by prescribed medicine     Severe headache    Confusion    Weakness of an arm or leg or one side of the face    Difficulty with speech or vision    Loss of consciousness     Seizure  Date Last Reviewed: 8/16/2015 2000-2017 [employer].. [address] [place] [address], [place] [id number]. All rights reserved. This information is not intended as a substitute for professional medical care. Always follow your healthcare professional's instructions.        Tinnitus (Ringing in the Ears)     Treatment may include maskers and hearing aids.     Tinnitus is the term for a noise in your ear not caused by an outside sound.  The noise might be a ringing, clicking, hiss, or roar. It can vary in pitch and may be soft or quite loud. For some people, tinnitus is a minor nuisance. But for others, the noise can make it hard to hear, work, and even sleep. When tinnitus can't be cured, a number of treatments may offer relief.  What causes tinnitus?  Loud noises, hearing loss, and ear wax can cause tinnitus. So can certain medicines. Large amounts of aspirin or caffeine are sometimes to blame. In many cases, the exact cause of tinnitus is unknown.  How is tinnitus treated?  Identifying and removing the cause is the best way to treat tinnitus. For that reason, your healthcare provider may refer you to an otolaryngologist (ear, nose, and throat doctor). Your hearing may also be checked by an audiologist (hearing specialist). If you have hearing loss, wearing a hearing aid may help your tinnitus. When the cause can't be found, the tinnitus itself may be treated. Some of the treatments are listed below, and your healthcare provider can tell you more about them:    Maskers are small devices that look like hearing aids. They emit a pleasant sound that helps cover up the ringing in your ears. Hearing aids and maskers are sometimes used together.    Medicines that treat anxiety and depression may ease tinnitus in some people.    Hypnosis or relaxation therapy may help head noise seem less severe.    Tinnitus retraining therapy combines counseling and maskers. Both can help take your mind off the tinnitus.  For more information    American Speech-Hearing-Language Association [phone] www.panda.org    American Tinnitus Association [phone] www.elaine.org    National [place] on Deafness and other Communication Disorders [phone] www.nidcd.nih.gov   Date Last Reviewed: 7/1/2016 2000-2017 [employer]. [address] [place] [address], [place] [address]. All rights reserved. This information is not intended as a substitute for professional  medical care. Always follow your healthcare professional's instructions.            Follow-ups after your visit        Future tests that were ordered for you today     Open Future Orders        Priority Expected Expires Ordered    MR Brain w/o & w Contrast Routine  9/20/2018 9/20/2017            Who to contact     If you have questions or need follow up information about today's clinic visit or your schedule please contact [facility] directly at [phone].  Normal or non-critical lab and imaging results will be communicated to you by [employer], letter or phone within 4 business days after the clinic has received the results. If you do not hear from us within 7 days, please contact the clinic through [employer] or phone. If you have a critical or abnormal lab result, we will notify you by phone as soon as possible.  Submit refill requests through [employer] or call your pharmacy and they will forward the refill request to us. Please allow 3 business days for your refill to be completed.          Additional Information About Your Visit        [employer] Information     [employer] gives you secure access to your electronic health record. If you see a primary care provider, you can also send messages to your care team and make appointments. If you have questions, please call your primary care clinic.  If you do not have a primary care provider, please call [phone] and they will assist you.        Care EveryWhere ID     This is your Care EveryWhere ID. This could be used by other organizations to access your [facility] medical records  [id number]        Your Vitals Were     Pulse Respirations Last Period Pulse Oximetry BMI (Body Mass Index)       62 16 01/01/1999 97% 30.86 kg/m2        Blood Pressure from Last 3 Encounters:   09/20/17 122/68   08/30/17 138/82   08/16/17 142/90    Weight from Last 3 Encounters:   09/20/17 179 lb 12.8 oz (81.6 kg)   08/30/17 177 lb 1.6 oz (80.3 kg)   08/16/17 182 lb (82.6 kg)               We Performed the Following     Blood culture     CBC with platelets and differential     Comprehensive metabolic panel (BMP + Alb, Alk Phos, ALT, AST, Total. Bili, TP)     CRP, inflammation     ESR: Erythrocyte sedimentation rate     Lyme Disease Juliette with reflex to WB Serum     Salicylate level     TSH with free T4 reflex     UA with Microscopic reflex to Culture     Vitamin B12          Today's Medication Changes          These changes are accurate as of: 9/20/17 10:17 AM.  If you have any questions, ask your nurse or doctor.               Start taking these medicines.        Dose/Directions    diazepam 10 MG tablet   Commonly known as:  VALIUM   Used for:  Pain of right thumb, Claustrophobia   Started by:  Don Magallanes MD        Dose:  10 mg   Take 1 tablet (10 mg) by mouth once as needed for anxiety Take 30-60 minutes before procedure.  Do not operate a vehicle after taking this medication.   Quantity:  1 tablet   Refills:  0       meclizine 25 MG tablet   Commonly known as:  ANTIVERT   Used for:  Nausea   Started by:  Don Magallanes MD        Dose:  25 mg   Take 1 tablet (25 mg) by mouth every 6 hours as needed for dizziness   Quantity:  30 tablet   Refills:  1            Where to get your medicines      These medications were sent to [facility] - [address]   [address], [place] [address]     Phone:  [phone]     meclizine 25 MG tablet         Some of these will need a paper prescription and others can be bought over the counter.  Ask your nurse if you have questions.     Bring a paper prescription for each of these medications     diazepam 10 MG tablet                Primary Care Provider Office Phone # Fax #    Annamaria Erickson [phone]       [facility] [place] [address]        Equal Access to Services     [facility] Hospital[facility] JAY AH: Virginia Sterling, nuno murguia, mora roldan  dillon manriquezjudith shajishelby alvaradoaan ah. Marycarmen [facility] [phone].    ATENCIÓN: Si rebekahla yudi, tiene a richter disposición servicios gratuitos de asistencia lingüística. Lynnette al [phone].    We comply with applicable federal civil rights laws and [place] laws. We do not discriminate on the basis of race, color, national origin, age, disability sex, sexual orientation or gender identity.            Thank you!     Thank you for choosing [facility]  for your care. Our goal is always to provide you with excellent care. Hearing back from our patients is one way we can continue to improve our services. Please take a few minutes to complete the written survey that you may receive in the mail after your visit with us. Thank you!             Your Updated Medication List - Protect others around you: Learn how to safely use, store and throw away your medicines at www.disposemymeds.org.          This list is accurate as of: 9/20/17 10:17 AM.  Always use your most recent med list.                   Brand Name Dispense Instructions for use Diagnosis    ASPIRIN NOT PRESCRIBED    INTENTIONAL    1 each    continuous prn Antiplatelet medication not prescribed intentionally due to age.        B-COMPLEX PO      Take by mouth daily        [facility] OMEGA-3 PO      Take 3,400 mg by mouth daily        diazepam 10 MG tablet    VALIUM    1 tablet    Take 1 tablet (10 mg) by mouth once as needed for anxiety Take 30-60 minutes before procedure.  Do not operate a vehicle after taking this medication.    Pain of right thumb, Claustrophobia       EPINEPHrine 0.3 MG/0.3ML injection 2-pack    EPIPEN/ADRENACLICK/or ANY BX GENERIC EQUIV    2 each    Inject 0.3 mLs (0.3 mg) into the muscle once as needed for anaphylaxis    Allergic reaction       gabapentin 100 MG capsule    NEURONTIN    90 capsule    Take 4 capsules (400 mg) by mouth daily        insulin aspart 100 UNIT/ML injection    NovoLOG FLEXPEN    90 mL    1 unit per 4  grams of carbohydrate eaten at each meal +a correction of 1 additional unit per 18 points of blood sugar above 140.  Total daily dose 100 units/day.  Dispense 6 boxes/30 pens for a 90-day supply or 2 boxes/10 pens for a 30-day supply as allowed by insurance.    Type 2 diabetes mellitus with hyperglycemia, with long-term current use of insulin (H)       insulin degludec 200 UNIT/ML pen    TRESIBA    18 mL    Inject 68 Units Subcutaneous daily    Type 2 diabetes mellitus with hyperglycemia, with long-term current use of insulin (H), Encounter for long-term (current) use of insulin (H)       * insulin pen needle 31G X 5 MM    B-D U/F    550 each    Use 6  time(s) per day.  Please dispense as BD Pen Needle Mini U/F 31G x 5 MM    Type 2 diabetes mellitus with hyperglycemia (H)       * insulin pen needle 31G X 5 MM    B-D U/F    300 each    Use 6 daily or as directed.    Type 2 diabetes mellitus with hyperglycemia (H)       * insulin pen needle 31G X 5 MM    B-D U/F    200 each    Use 5 daily or as directed.    Type 2 diabetes mellitus with hyperglycemia (H)       meclizine 25 MG tablet    ANTIVERT    30 tablet    Take 1 tablet (25 mg) by mouth every 6 hours as needed for dizziness    Nausea       milk thistle extract 140 MG Caps capsule           naproxen sodium 220 MG tablet    ANAPROX    60 tablet    Take 2 tablets (440 mg) by mouth 2 times daily (with meals)    Screening for osteoporosis       pantothenic acid 500 MG Tabs      Take 500 mg by mouth daily        polyethylene glycol Packet    MIRALAX/GLYCOLAX     Take 1 packet by mouth daily.        STATIN NOT PRESCRIBED (INTENTIONAL)     0 each    Statin not prescribed intentionally due to Intolerance    Statin intolerance       sulindac 200 MG tablet    CLINORIL     Take 200 mg by mouth 2 times daily Takes 1-2 times per day        traMADol 50 MG tablet    ULTRAM     Take 50 mg by mouth every evening 1-2 tablets in the evening        VOLTAREN 1 % Gel topical gel    Generic drug:  diclofenac     100 g    Apply topically nightly as needed for moderate pain Apply 4 grams to knees or 2 grams to hands four times daily using enclosed dosing card.        * Notice:  This list has 3 medication(s) that are the same as other medications prescribed for you. Read the directions carefully, and ask your doctor or other care provider to review them with you.

## 2017-09-21 LAB
ALBUMIN SERPL-MCNC: 3.7 G/DL (ref 3.4–5)
ALP SERPL-CCNC: 116 U/L (ref 40–150)
ALT SERPL W P-5'-P-CCNC: 23 U/L (ref 0–50)
ANION GAP SERPL CALCULATED.3IONS-SCNC: 11 MMOL/L (ref 3–14)
AST SERPL W P-5'-P-CCNC: 18 U/L (ref 0–45)
B BURGDOR IGG+IGM SER QL: 0.01 (ref 0–0.89)
BILIRUB SERPL-MCNC: 0.3 MG/DL (ref 0.2–1.3)
BUN SERPL-MCNC: 14 MG/DL (ref 7–30)
CALCIUM SERPL-MCNC: 9.2 MG/DL (ref 8.5–10.1)
CHLORIDE SERPL-SCNC: 102 MMOL/L (ref 94–109)
CO2 SERPL-SCNC: 23 MMOL/L (ref 20–32)
CREAT SERPL-MCNC: 0.72 MG/DL (ref 0.52–1.04)
GFR SERPL CREATININE-BSD FRML MDRD: 84 ML/MIN/1.7M2
GLUCOSE SERPL-MCNC: 349 MG/DL (ref 70–99)
POTASSIUM SERPL-SCNC: 4 MMOL/L (ref 3.4–5.3)
PROT SERPL-MCNC: 7 G/DL (ref 6.8–8.8)
SODIUM SERPL-SCNC: 136 MMOL/L (ref 133–144)
TSH SERPL DL<=0.005 MIU/L-ACNC: 1.18 MU/L (ref 0.4–4)

## 2017-09-22 ENCOUNTER — HOSPITAL ENCOUNTER (OUTPATIENT)
Dept: MRI IMAGING | Facility: CLINIC | Age: 57
Discharge: HOME | End: 2017-09-22
Attending: INTERNAL MEDICINE | Admitting: INTERNAL MEDICINE
Payer: COMMERCIAL

## 2017-09-22 DIAGNOSIS — G44.219 EPISODIC TENSION-TYPE HEADACHE, NOT INTRACTABLE: ICD-10-CM

## 2017-09-22 DIAGNOSIS — H93.13 TINNITUS, BILATERAL: ICD-10-CM

## 2017-09-22 DIAGNOSIS — R42 VERTIGO: ICD-10-CM

## 2017-09-22 DIAGNOSIS — R50.9 FEVER, UNSPECIFIED: ICD-10-CM

## 2017-09-22 DIAGNOSIS — R11.0 NAUSEA: ICD-10-CM

## 2017-09-22 PROCEDURE — 25000128 H RX IP 250 OP 636: Performed by: RADIOLOGY

## 2017-09-22 PROCEDURE — 70553 MRI BRAIN STEM W/O & W/DYE: CPT

## 2017-09-22 PROCEDURE — A9585 GADOBUTROL INJECTION: HCPCS | Performed by: RADIOLOGY

## 2017-09-22 RX ORDER — GADOBUTROL 604.72 MG/ML
10 INJECTION INTRAVENOUS ONCE
Status: COMPLETED | OUTPATIENT
Start: 2017-09-22 | End: 2017-09-22

## 2017-09-22 RX ADMIN — GADOBUTROL 8 ML: 604.72 INJECTION INTRAVENOUS at 15:10

## 2017-09-24 DIAGNOSIS — G44.219 EPISODIC TENSION-TYPE HEADACHE, NOT INTRACTABLE: Primary | ICD-10-CM

## 2017-09-24 DIAGNOSIS — R42 VERTIGO: ICD-10-CM

## 2017-09-26 LAB
BACTERIA SPEC CULT: NO GROWTH
Lab: NORMAL
SPECIMEN SOURCE: NORMAL

## 2017-10-16 ENCOUNTER — TELEPHONE (OUTPATIENT)
Dept: PEDIATRICS | Facility: CLINIC | Age: 57
End: 2017-10-16

## 2017-10-16 NOTE — TELEPHONE ENCOUNTER
I'm not sure if her symptoms are really from a concussion or if something else is going on. I would like Neurology's input on cause of her symptoms. There are not any medications that will help the tinnitus. The arm pain and numbness is new. I would want to see her if I'm going to prescribe any medications. Has she touched base with Dr. Mcadams who prescribes her pain medications?    Annamaria Erickson MD  Internal Medicine/Pediatrics

## 2017-10-16 NOTE — TELEPHONE ENCOUNTER
Patient notified of message below.  Scheduled appointment with Dr. Erickson on Wed.  Unable to come today.  Advised she may want to hold on Chiropractor appointment until she is seen by Dr Erickson.  No further questions.  Will call back if any other questions or concerns.  KYLEE [facility] RN

## 2017-10-16 NOTE — TELEPHONE ENCOUNTER
"Patient is going to follow-up with Chiropractor tomorrow for \"an adjustment\".  Is there anything we can do to give her relief today?  Would not be able to come in until after 1515.  Patient states that she has gotten easily confused since concussion.  Unable to get into Neurology until the 26th.   Unsure what Neurology will do for concussion symptoms?  KYLEE Kraft RN    "

## 2017-10-16 NOTE — TELEPHONE ENCOUNTER
Increasing concussion symptoms this w/e.  Takes tramadol and sulindac during the day now.  R arm shooting pain, L arm and both legs numb to fingers and toes.   Ringing in ears getting louder.  Yvrose louder when lying down  Neurology appointment 10-26-17.  She feels symptoms are worsening and this appointment too far out.  She is wondering if she should come for appointment here.  I told her I was unsure if we could help her at this time.  9-20-17 appointment with Dr. Amos:  - MRI brain (one time dose of valium prescribed for claustrophobia)   - if the above tests are negative, refer to neurology  - consider OT, concussion clinic otherwise for presumed concussion symptoms   I just wondered if she needs referral to concussion clinic?  OT?  Vi Cardenas RN

## 2017-10-16 NOTE — TELEPHONE ENCOUNTER
I have a 2:20 appointment today if she wants to be seen today. We could discuss starting OT.    Annamaria Erickson MD  Internal Medicine/Pediatrics

## 2017-10-18 ENCOUNTER — OFFICE VISIT (OUTPATIENT)
Dept: PEDIATRICS | Facility: CLINIC | Age: 57
End: 2017-10-18
Payer: COMMERCIAL

## 2017-10-18 VITALS
WEIGHT: 182.9 LBS | HEART RATE: 65 BPM | OXYGEN SATURATION: 97 % | TEMPERATURE: 98.7 F | HEIGHT: 64 IN | DIASTOLIC BLOOD PRESSURE: 96 MMHG | SYSTOLIC BLOOD PRESSURE: 156 MMHG | BODY MASS INDEX: 31.22 KG/M2

## 2017-10-18 DIAGNOSIS — M79.7 FIBROMYALGIA: ICD-10-CM

## 2017-10-18 DIAGNOSIS — M54.2 CERVICALGIA: ICD-10-CM

## 2017-10-18 DIAGNOSIS — R41.89 COGNITIVE CHANGES: ICD-10-CM

## 2017-10-18 DIAGNOSIS — R20.0 NUMBNESS AND TINGLING: Primary | ICD-10-CM

## 2017-10-18 DIAGNOSIS — M79.601 RIGHT ARM PAIN: ICD-10-CM

## 2017-10-18 DIAGNOSIS — S06.9X9S TRAUMATIC BRAIN INJURY WITH LOSS OF CONSCIOUSNESS, SEQUELA (H): ICD-10-CM

## 2017-10-18 DIAGNOSIS — R20.2 NUMBNESS AND TINGLING: Primary | ICD-10-CM

## 2017-10-18 DIAGNOSIS — G44.229 CHRONIC TENSION-TYPE HEADACHE, NOT INTRACTABLE: ICD-10-CM

## 2017-10-18 PROCEDURE — 99215 OFFICE O/P EST HI 40 MIN: CPT | Performed by: INTERNAL MEDICINE

## 2017-10-18 NOTE — NURSING NOTE
"Chief Complaint   Patient presents with     RECHECK     concussion       Initial BP (!) 156/96 (BP Location: Left arm, Patient Position: Chair, Cuff Size: Adult Regular)  Pulse 65  Temp 98.7  F (37.1  C) (Tympanic)  Ht 5' 4\" (1.626 m)  Wt 182 lb 14.4 oz (83 kg)  LMP 01/01/1999  SpO2 97%  BMI 31.39 kg/m2 Estimated body mass index is 31.39 kg/(m^2) as calculated from the following:    Height as of this encounter: 5' 4\" (1.626 m).    Weight as of this encounter: 182 lb 14.4 oz (83 kg).  Medication Reconciliation: dionisio Herndon LPN      "

## 2017-10-18 NOTE — PROGRESS NOTES
"  SUBJECTIVE:   Maricarmen Dotosn is a 56 year old female who presents to clinic today for the following health issues:  9\"08  Follow up concussion      Duration: 10 days    Description (location/character/radiation): bilateral arm pain becoming progressively worse    Intensity:  moderate, severe    Accompanying signs and symptoms: nausea, decreased use of right arm, numbness arms and legs, forgetting things, increase in tinnitis    History (similar episodes/previous evaluation): seen previously    Precipitating or alleviating factors: concussion    Therapies tried and outcome: Tramadol, Voltarin Gel, Sulindac, and meclizine ineffective    57 y/o F with h/o DM, fibromyalgia and previous concussion who started having concussion symptoms last August consisting of headache over the crown of her head, difficulty with concentration, memorry problems, nausea. She had a fall preceding her symptoms that resulted in a hamstring tear, but seh did nto recall hitting her head. Had also started working with a 3 Pinky MRI magnet and her  had found case reports of technicians having headache, nausea and cognitive changes after working the magnet. She decided to stop working with the magnet. Over the next month, had continued symptoms as well as developed tinnitus and was having feeling of unsteadiness. She was last seen on 9/20 and had brain MRI and extensive lab testing that was negative.    Now, started to have bilateral arm > leg pain over the past 10 days. Right > left. Having pins/needles feeling in all extremities. Pain mostly in arms. Worse when uses - worse with walking. Right arm numb in upper arm down to elbow. Has increased pain if lets hang down. If keeps up arm, feels better. Has been icing shoulder area.      Has some pain relief with both tramadol and sulindac, but only lasts about an hour or so. Tries to wait 4 hours to take something else. Taking 2 pills of tramadol 3 times a day (6 total in the day). Taking " "sulindac 200 mg TID. Taking gabapentin - 400 mg a day. Had tried increasing to 500 mg and had concern about cognitive ability.     Over last week, forgetting little things. For example, will forget steps when making sandwhich, uses wrong toothbrush.    Has an appointment scheduled with Neurology next week, but concerned about waiting that long.     Reviewed and updated as needed this visit by clinical staffTobacco  Allergies  Meds  Problems  Med Hx  Surg Hx  Fam Hx  Soc Hx        Reviewed and updated as needed this visit by Provider  Allergies  Meds  Problems       -------------------------------------    ROS:  Constitutional, HEENT, cardiovascular, pulmonary, GI, , musculoskeletal, neuro, skin, endocrine and psych systems are negative, except as otherwise noted.      OBJECTIVE:                                                    BP (!) 156/96 (BP Location: Left arm, Patient Position: Chair, Cuff Size: Adult Regular)  Pulse 65  Temp 98.7  F (37.1  C) (Tympanic)  Ht 5' 4\" (1.626 m)  Wt 182 lb 14.4 oz (83 kg)  LMP 01/01/1999  SpO2 97%  BMI 31.39 kg/m2   Body mass index is 31.39 kg/(m^2).  General Appearance: tired appearing, alert and no distress  Eyes:   no discharge, erythema.  Normal pupils. EOMI  Both Ears: normal: no effusions, no erythema, normal landmarks  Neck: tight muscles in back of neck  Respiratory: lungs clear to auscultation - no rales, rhonchi or wheezes.  Cardiovascular: regular rate and rhythm, normal S1 S2, no S3 or S4 and no murmur, click or rub.  No peripheral edema.  Musculoskeletal: holding right arm across body with hand on left shoulder. Has full range of motion passively; however, is very uncomfortable with moving her right arm/shoulder. Tender to palpation over anterior and top of shoulder and over upper arm. Strength in arm is normal.   Skin: no rashes or lesions.  Well perfused and normal turgor.  Neurological: speech normal, cranial nerves 2-12 intact, rapid alternating " movements normal and decreased sensation to light touch over upper arm  Psychiatric: mildly anxious appearing    Diagnostic Test Results:  Brain MRI 9/22: negative  9/20: normal UA, blood culture, ESR, CRP, Lyme, Salicylate level, vitamin B12, TSH, CMP and CBC     ASSESSMENT/PLAN:                                                      (R20.0,  R20.2) Numbness and tingling  (primary encounter diagnosis)  (M79.601) right arm pain  Comment: new numbness/tingling and arm pain after having had possible concussive symptoms with headache, difficulty with attention, memory problems and tinnitus. Previous concussion without definite traumatic incident triggering recurrence of symptoms (had fall without questionable trauma to head and exposure to 3 Pinky magnet in MRI). Had negative brain MRI. Numbness and tingling in all extremities with more severe pain in right upper extremity. Pain in right upper extremity could be due to rotator cuff or pinched nerve in neck; however, neither of these would explain her diffuse symptoms in all extremities. Had normal Vitamin B12 levels. Symptoms could be due to fibromyalgia, anxiety or reaction to medication. Gabapentin recently increased. Also possible side effect of higher doses of tramadol  Plan:   - will treat neck as below, hopefully this will also help arm symptoms  - decrease sulindac to twice daily - currently taking more than recommended dose  - will decrease gabapentin 300 mg a day and split up dose to 100 mg at dinner and 200 mg at bedtime in case more systemic sx due to medication  - will try limit tramadol to 4-6/day for now  - will try adding on acetaminophen for better pain control    (G44.229) Chronic tension-type headache, not intractable  (M54.2) Cervicalgia  Comment: as above, patient feels sx identical to previous concussion but without definite trigger. Could also be having headaches from tight muscles in neck. Negative MRI is reassuring  Plan: [facility] PT, HAND, AND  CHIROPRACTIC REFERRAL  - recommend have evaluation by PT to see if able to decrease pain in neck and headaches  - might also need referral to OT, will defer to Neuro  - has appointment with Neuro next week    (M79.7) Fibromyalgia  Comment: may be contributing to worsening pain.  Plan:   - tx as above    (S06.9X9S) Traumatic brain injury with loss of consciousness, sequela (H)  Comment: previous brain injury puts at increased risk for more severe symptoms even with mild trauma; however, would not expect to have worsening symptoms if having recurrence of symptoms due either to fall or stronger magnet  Plan:   - eval with Neurology    (R41.89) Cognitive changes  Comment: patient and  have noticed cognitive slowing. ?related to anxiety/stress vs pain related to above vs recurrence of concussion. Reassuring that MRI and labs negative.   Plan:   - Neuro eval  - may need neuropsych testing    A total of 45 minutes was spent with Maricarmen in clinic today, of which >50% was spent counseling or in coordination of care regarding numbness and tingling, right arm pain, chronic headache, cervicalgia, fibromyalgia, TBI, cognitive changes.    Follow up with Provider - as needed     Annamaria Xavier [facility]

## 2017-10-18 NOTE — PATIENT INSTRUCTIONS
1. Decrease sulindac to twice a day  2. Try taking gabapentin 100 mg at dinner and 200 mg before bed  3. Could take up to 8 pills of tramadol in the day - this could be causing some of the symptoms. Would try to stay with 4-6 tramadol  4. Could try using acetaminophen for a short period of time  5. You will be contacted to set up the appointment with physical  6. Sling when pain a lot worse - try not to use all of the time  7. Follow-up with Neurology next week  8. Consider occupational therapy in the future

## 2017-10-18 NOTE — MR AVS SNAPSHOT
After Visit Summary   10/18/2017    Maricarmen Dotson    MRN: [id number]           Patient Information     Date Of Birth          1960        Visit Information        Provider Department      10/18/2017 8:40 AM Annamaria Erickson MD [facility]        Today's Diagnoses     Numbness and tingling    -  1    Chronic tension-type headache, not intractable        Cervicalgia        Fibromyalgia        Traumatic brain injury with loss of consciousness, sequela (H)        Cognitive changes          Care Instructions    1. Decrease sulindac to twice a day  2. Try taking gabapentin 100 mg at dinner and 200 mg before bed  3. Could take up to 8 pills of tramadol in the day - this could be causing some of the symptoms. Would try to stay with 4-6 tramadol  4. Could try using acetaminophen for a short period of time  5. You will be contacted to set up the appointment with physical  6. Sling when pain a lot worse - try not to use all of the time  7. Follow-up with Neurology next week  8. Consider occupational therapy in the future          Follow-ups after your visit        Additional Services     [facility] PT, HAND, AND CHIROPRACTIC REFERRAL       === This order will print in the [facility] Scheduling  Office ===    Physical therapy, hand therapy and chiropractic care are available through:    [facility]  [facility]    Call one easy number to schedule at any of the above locations:  [phone].    Your provider has referred you to Physical Therapy at [facility] or [facility]    Indication/Reason for Referral: Neck Pain  Onset of Illness:  Several weeks  Therapy Orders:  Evaluate and Treat  Special Programs:  None  Special Request:  None    Additional Comments for the therapist or chiropractor:  none    Please be aware that coverage of these services is subject to the terms and limitations of your health insurance plan.  Call member services at your  "health plan with any benefit or coverage questions.      Please bring the following to your appointment:    >>   Your personal calendar for scheduling future appointments  >>   Comfortable clothing                  Who to contact     If you have questions or need follow up information about today's clinic visit or your schedule please contact [facility] directly at [phone].  Normal or non-critical lab and imaging results will be communicated to you by MyChart, letter or phone within 4 business days after the clinic has received the results. If you do not hear from us within 7 days, please contact the clinic through [employer] or phone. If you have a critical or abnormal lab result, we will notify you by phone as soon as possible.  Submit refill requests through [email or web address] or call your pharmacy and they will forward the refill request to us. Please allow 3 business days for your refill to be completed.          Additional Information About Your Visit        MyChart Information     [email or web address] gives you secure access to your electronic health record. If you see a primary care provider, you can also send messages to your care team and make appointments. If you have questions, please call your primary care clinic.  If you do not have a primary care provider, please call [phone] and they will assist you.        Care EveryWhere ID     This is your Care EveryWhere ID. This could be used by other organizations to access your [facility] medical records  [id number]        Your Vitals Were     Pulse Temperature Height Last Period Pulse Oximetry BMI (Body Mass Index)    65 98.7  F (37.1  C) (Tympanic) 5' 4\" (1.626 m) 01/01/1999 97% 31.39 kg/m2       Blood Pressure from Last 3 Encounters:   10/18/17 (!) 156/96   09/20/17 122/68   08/30/17 138/82    Weight from Last 3 Encounters:   10/18/17 182 lb 14.4 oz (83 kg)   09/20/17 179 lb 12.8 oz (81.6 kg)   08/30/17 177 lb 1.6 oz (80.3 kg)              We Performed the " Following     [facility], HAND, AND CHIR[facility]RACTIC REFERRAL          Today's Medication Changes          These changes are accurate as of: 10/18/17  9:54 AM.  If you have any questions, ask your nurse or doctor.               Stop taking these medicines if you haven't already. Please contact your care team if you have questions.     meclizine 25 MG tablet   Commonly known as:  ANTIVERT   Stopped by:  Annamaria Erickson MD                    Primary Care Provider Office Phone # Fax #    Annamaria Erickson [phone] [address] [place] [address]        Equal Access to Services     [facility]: Hadii aad ku hadasho Soomaali, waaxda luqadaha, qaybta kaalmada adeegyada, dillon dye . So [facility] [phone].    ATENCIÓN: Si habla español, tiene a richter disposición servicios gratuitos de asistencia lingüística. Llame al [phone].    We comply with applicable federal civil rights laws and [place] laws. We do not discriminate on the basis of race, color, national origin, age, disability, sex, sexual orientation, or gender identity.            Thank you!     [facility] you for choosing [facility]  [facility] your care. Our goal is always to provide you with excellent care. Hearing back from our patients is one way we can continue to improve our services. Please take a few minutes to complete the written survey that you may receive in the mail after your visit with us. Thank you!             Your Updated Medication List - Protect others around you: Learn how to safely use, store and throw away your medicines at www.disposemymeds.org.          This list is accurate as of: 10/18/17  9:54 AM.  Always use your most recent med list.                   Brand Name Dispense Instructions for use Diagnosis    ASPIRIN NOT PRESCRIBED    INTENTIONAL    1 each    continuous prn Antiplatelet medication not prescribed intentionally due to age.        B-COMPLEX PO       Take by mouth daily        [employer] OMEGA-3 PO      Take 3,400 mg by mouth daily        EPINEPHrine 0.3 MG/0.3ML injection 2-pack    EPIPEN/ADRENACLICK/or ANY BX GENERIC EQUIV    2 each    Inject 0.3 mLs (0.3 mg) into the muscle once as needed for anaphylaxis    Allergic reaction       gabapentin 100 MG capsule    NEURONTIN    90 capsule    Take 4 capsules (400 mg) by mouth daily        insulin aspart 100 UNIT/ML injection    NovoLOG FLEXPEN    90 mL    1 unit per 4 grams of carbohydrate eaten at each meal +a correction of 1 additional unit per 18 points of blood sugar above 140.  Total daily dose 100 units/day.  Dispense 6 boxes/30 pens for a 90-day supply or 2 boxes/10 pens for a 30-day supply as allowed by insurance.    Type 2 diabetes mellitus with hyperglycemia, with long-term current use of insulin (H)       insulin degludec 200 UNIT/ML pen    TRESIBA    18 mL    Inject 68 Units Subcutaneous daily    Type 2 diabetes mellitus with hyperglycemia, with long-term current use of insulin (H), Encounter for long-term (current) use of insulin (H)       * insulin pen needle 31G X 5 MM    B-D U/F    550 each    Use 6  time(s) per day.  Please dispense as BD Pen Needle Mini U/F 31G x 5 MM    Type 2 diabetes mellitus with hyperglycemia (H)       * insulin pen needle 31G X 5 MM    B-D U/F    300 each    Use 6 daily or as directed.    Type 2 diabetes mellitus with hyperglycemia (H)       * insulin pen needle 31G X 5 MM    B-D U/F    200 each    Use 5 daily or as directed.    Type 2 diabetes mellitus with hyperglycemia (H)       milk thistle extract 140 MG Caps capsule           naproxen sodium 220 MG tablet    ANAPROX    60 tablet    Take 2 tablets (440 mg) by mouth 2 times daily (with meals)    Screening for osteoporosis       pantothenic acid 500 MG Tabs      Take 500 mg by mouth daily        polyethylene glycol Packet    MIRALAX/GLYCOLAX     Take 1 packet by mouth daily.        STATIN NOT PRESCRIBED (INTENTIONAL)     0 each     Statin not prescribed intentionally due to Intolerance    Statin intolerance       sulindac 200 MG tablet    CLINORIL     Take 200 mg by mouth 3 times daily        traMADol 50 MG tablet    ULTRAM     Take 50 mg by mouth 3 times daily 1-2 tablets in the evening        VOLTAREN 1 % Gel topical gel   Generic drug:  diclofenac     100 g    Apply topically nightly as needed for moderate pain Apply 4 grams to knees or 2 grams to hands four times daily using enclosed dosing card.        * Notice:  This list has 3 medication(s) that are the same as other medications prescribed for you. Read the directions carefully, and ask your doctor or other care provider to review them with you.

## 2017-10-23 ENCOUNTER — THERAPY VISIT (OUTPATIENT)
Dept: PHYSICAL THERAPY | Facility: CLINIC | Age: 57
End: 2017-10-23
Payer: COMMERCIAL

## 2017-10-23 DIAGNOSIS — M54.2 CERVICAL PAIN: Primary | ICD-10-CM

## 2017-10-23 PROCEDURE — 97161 PT EVAL LOW COMPLEX 20 MIN: CPT | Mod: GP | Performed by: PHYSICAL THERAPIST

## 2017-10-23 PROCEDURE — 97110 THERAPEUTIC EXERCISES: CPT | Mod: GP | Performed by: PHYSICAL THERAPIST

## 2017-10-23 NOTE — PROGRESS NOTES
Subjective:    Patient is a 56 year old female presenting with rehab cervical spine hpi.     Condition occurred with:  Insidious onset.  Condition occurred: for unknown reasons.  This is a new condition  Patient reports that she has been having an on going pain in the neck, right tingling/numbness from shoulder to elbow.  Sometimes moves to the fingers.     Neck pain central low neck and right scapular boarder.  Patient reports having a reaction to gabapentin and stopping that (neuropathy that is improving).   Stopped working with 3T MRI until August 2018.    Started around August but the extremity symptoms worsened about 10/1/17.   Hobbies: painting, drawing, gardening, home improvement  Started home improvement projects (started 3 weeks ago and not done).  Hasn't been drawing and painting (for fun) since the symtpoms started.   Better with ice, holding arm in a certain position, pain gel.  Worse: life, using the arm too much, reading, phone use, sleeping.  Ears ringing with sleeping flat.  Had ringing in the ears since Aug and is getting worse.   Daughter concerned that she is sleeping during the day and doesn't remember sleeping (11:00am, 3:00pm).  Hit head yesterday.     .        Pain is described as aching and sharp and is intermittent and reported as 7/10.  Associated symptoms:  Headache. Pain is worse in the P.M. and worse in the A.M..     Since onset symptoms are gradually improving.        General health as reported by patient is fair.  Pertinent medical history includes:  Osteoarthritis, diabetes, overweight, fibromyalgia and sleep disorder/apnea.    Other surgeries include:  Orthopedic surgery.  Current medications:  Sleep medication, anti-inflammatory and pain medication.  Current occupation is unemployeed.  Patient is currently not working due to present treatment problem.      Barriers include:  None as reported by the patient.    Red flags:  None as reported by the patient.                         Objective:    System              Cervical/Thoracic Evaluation    Headaches: none  Cervical Myotomes:  normal                                                                          [facility] Cervical Evaluation    Posture:  Sitting: fair  Standing: fair  Protruding Head: no  Wry Neck: no  Correction of Posture: better    Movement Loss:  Protrusion (PRO): nil  Flexion (Flex): nil  Retraction (RET): min  Extension (EXT): min  Lateral Flexion Right (LF R): nil  Lateral Flexion Left (LF L): nil  Rotation Right (ROT R): min and pain  Rotation Left (ROT L): nil  Test Movements:      RET: During: produces  After: no worse  Mechanical Response: no effect  Repeat RET: During: produces  After: no worse  Mechanical Response: no effect            LF R: During: produces  After: no worse  Mechanical Response: no effect  Repeat LF R: During: produces  After: no worse  Mechanical Response: no effect                Principle of Treatment:      Extension: retraction  Lateral: SB brought on dizziness and nausea.                                             ROS    Assessment/Plan:      Patient is a 56 year old female with cervical complaints.    Patient has the following significant findings with corresponding treatment plan.                Diagnosis 1:  Neck and left arm pain  Pain -  hot/cold therapy, US, mechanical traction, manual therapy, self management, education, directional preference exercise and home program  Decreased ROM/flexibility - manual therapy, therapeutic exercise and home program  Impaired muscle performance - neuro re-education and home program  Decreased function - therapeutic activities and home program  Impaired posture - neuro re-education and home program    Therapy Evaluation Codes:   1) History comprised of:   Personal factors that impact the plan of care:      Overall behavior pattern and Past/current experiences.    Comorbidity factors that impact the plan of care are:      Diabetes, Fibromyalgia and  Migraines/headaches.     Medications impacting care: Muscle relaxant.  2) Examination of Body Systems comprised of:   Body structures and functions that impact the plan of care:      Cervical spine and Shoulder.   Activity limitations that impact the plan of care are:      Reading/Computer work, Sitting and Working.  3) Clinical presentation characteristics are:   Evolving/Changing.  4) Decision-Making    Moderate complexity using standardized patient assessment instrument and/or measureable assessment of functional outcome.  Cumulative Therapy Evaluation is: Moderate complexity.    Previous and current functional limitations:  (See Goal Flow Sheet for this information)    Short term and Long term goals: (See Goal Flow Sheet for this information)     Communication ability:  Patient appears to be able to clearly communicate and understand verbal and written communication and follow directions correctly.  Treatment Explanation - The following has been discussed with the patient:   RX ordered/plan of care  Anticipated outcomes  Possible risks and side effects  This patient would benefit from PT intervention to resume normal activities.   Rehab potential is good.    Frequency:  1 X week, once daily  Duration:  for 6 weeks  Discharge Plan:  Achieve all LTG.  Independent in home treatment program.  Reach maximal therapeutic benefit.    Please refer to the daily flowsheet for treatment today, total treatment time and time spent performing 1:1 timed codes.

## 2017-10-23 NOTE — MR AVS SNAPSHOT
After Visit Summary   10/23/2017    Maricarmen Dotson    MRN: [id number]           Patient Information     Date Of Birth          1960        Visit Information        Provider Department      10/23/2017 10:50 AM Mayank Hernandez PT [facility] [place]        Today's Diagnoses     Cervical pain    -  1       Follow-ups after your visit        Your next 10 appointments already scheduled     Oct 30, 2017 10:10 AM CDT   [facility]EE[facility] Spine with Mayank Hernandez PT   [facility] [place] ([facility] [place]  )    [address]  [address]  [place] [address]   [phone]              Who to contact     If you have questions or need follow up information about today's clinic visit or your schedule please contact [facility] [place] directly at [phone].  Normal or non-critical lab and imaging results will be communicated to you by MyChart, letter or phone within 4 business days after the clinic has received the results. If you do not hear from us within 7 days, please contact the clinic through [email or web address] or phone. If you have a critical or abnormal lab result, we will notify you by phone as soon as possible.  Submit refill requests through [facility] or call your pharmacy and they will forward the refill request to us. Please allow 3 business days for your refill to be completed.          Additional Information About Your Visit        MyChart Information     [facility] gives you secure access to your electronic health record. If you see a primary care provider, you can also send messages to your care team and make appointments. If you have questions, please call your primary care clinic.  If you do not have a primary care provider, please call [phone] and they will assist you.        Care EveryWhere ID     This is your Care EveryWhere ID. This could be used by other organizations to access your [facility] medical records  [id number]        Your Vitals Were      Last Period                   01/01/1999            Blood Pressure from Last 3 Encounters:   10/18/17 (!) 156/96   09/20/17 122/68   08/30/17 138/82    Weight from Last 3 Encounters:   10/18/17 83 kg (182 lb 14.4 oz)   09/20/17 81.6 kg (179 lb 12.8 oz)   08/30/17 80.3 kg (177 lb 1.6 oz)              We Performed the Following     HC PT EVAL, LOW COMPLEXITY     GEENA INITIAL EVAL REPORT     THERAPEUTIC EXERCISES        Primary Care Provider Office Phone # Fax #    Annamaria Erickson [phone] [address] [place] [address]        Equal Access to Services     [facility]: Hadii aad ku hadasho Sosu, waaxda luqadaha, qaybta kaalmada adejudithyada, dillon dye . So [facility] [place] [facility] [phone].    ATENCIÓN: Si habla español, tiene a richter disposición servicios gratuitos de asistencia lingüística. Llame al [phone].    We comply with applicable federal civil rights laws and [place] laws. We do not discriminate on the basis of race, color, national origin, age, disability, sex, sexual orientation, or gender identity.            Thank you!     Thank you for choosing [facility]  for your care. Our goal is always to provide you with excellent care. Hearing back from our patients is one way we can continue to improve our services. Please take a few minutes to complete the written survey that you may receive in the mail after your visit with us. Thank you!             Your Updated Medication List - Protect others around you: Learn how to safely use, store and throw away your medicines at www.disposemymeds.org.          This list is accurate as of: 10/23/17  1:23 PM.  Always use your most recent med list.                   Brand Name Dispense Instructions for use Diagnosis    ASPIRIN NOT PRESCRIBED    INTENTIONAL    1 each    continuous prn Antiplatelet medication not prescribed intentionally due to age.        B-COMPLEX PO      Take by  mouth daily        [employer] OMEGA-3 PO      Take 3,400 mg by mouth daily        EPINEPHrine 0.3 MG/0.3ML injection 2-pack    EPIPEN/ADRENACLICK/or ANY BX GENERIC EQUIV    2 each    Inject 0.3 mLs (0.3 mg) into the muscle once as needed for anaphylaxis    Allergic reaction       gabapentin 100 MG capsule    NEURONTIN    90 capsule    Take 4 capsules (400 mg) by mouth daily        insulin aspart 100 UNIT/ML injection    NovoLOG FLEXPEN    90 mL    1 unit per 4 grams of carbohydrate eaten at each meal +a correction of 1 additional unit per 18 points of blood sugar above 140.  Total daily dose 100 units/day.  Dispense 6 boxes/30 pens for a 90-day supply or 2 boxes/10 pens for a 30-day supply as allowed by insurance.    Type 2 diabetes mellitus with hyperglycemia, with long-term current use of insulin (H)       insulin degludec 200 UNIT/ML pen    TRESIBA    18 mL    Inject 68 Units Subcutaneous daily    Type 2 diabetes mellitus with hyperglycemia, with long-term current use of insulin (H), Encounter for long-term (current) use of insulin (H)       * insulin pen needle 31G X 5 MM    B-D U/F    550 each    Use 6  time(s) per day.  Please dispense as BD Pen Needle Mini U/F 31G x 5 MM    Type 2 diabetes mellitus with hyperglycemia (H)       * insulin pen needle 31G X 5 MM    B-D U/F    300 each    Use 6 daily or as directed.    Type 2 diabetes mellitus with hyperglycemia (H)       * insulin pen needle 31G X 5 MM    B-D U/F    200 each    Use 5 daily or as directed.    Type 2 diabetes mellitus with hyperglycemia (H)       milk thistle extract 140 MG Caps capsule           naproxen sodium 220 MG tablet    ANAPROX    60 tablet    Take 2 tablets (440 mg) by mouth 2 times daily (with meals)    Screening for osteoporosis       pantothenic acid 500 MG Tabs      Take 500 mg by mouth daily        polyethylene glycol Packet    MIRALAX/GLYCOLAX     Take 1 packet by mouth daily.        STATIN NOT PRESCRIBED (INTENTIONAL)     0 each    Statin  not prescribed intentionally due to Intolerance    Statin intolerance       sulindac 200 MG tablet    CLINORIL     Take 200 mg by mouth 3 times daily        traMADol 50 MG tablet    ULTRAM     Take 50 mg by mouth 3 times daily 1-2 tablets in the evening        VOLTAREN 1 % Gel topical gel   Generic drug:  diclofenac     100 g    Apply topically nightly as needed for moderate pain Apply 4 grams to knees or 2 grams to hands four times daily using enclosed dosing card.        * Notice:  This list has 3 medication(s) that are the same as other medications prescribed for you. Read the directions carefully, and ask your doctor or other care provider to review them with you.

## 2017-10-26 ENCOUNTER — TRANSFERRED RECORDS (OUTPATIENT)
Dept: HEALTH INFORMATION MANAGEMENT | Facility: CLINIC | Age: 57
End: 2017-10-26

## 2017-11-08 ENCOUNTER — TELEPHONE (OUTPATIENT)
Dept: PHARMACY | Facility: CLINIC | Age: 57
End: 2017-11-08

## 2017-11-08 NOTE — TELEPHONE ENCOUNTER
Please call patient to schedule an appointment with endocrinology, she is overdue for diabetic visit.    Thanks!

## 2017-11-22 ENCOUNTER — THERAPY VISIT (OUTPATIENT)
Dept: PHYSICAL THERAPY | Facility: CLINIC | Age: 57
End: 2017-11-22
Attending: INTERNAL MEDICINE

## 2017-11-22 DIAGNOSIS — R42 DIZZINESS: ICD-10-CM

## 2017-11-22 DIAGNOSIS — H93.13 TINNITUS OF BOTH EARS: ICD-10-CM

## 2017-11-22 NOTE — MR AVS SNAPSHOT
After Visit Summary   11/22/2017    Maricarmen Dotson    MRN: [id number]           Patient Information     Date Of Birth          1960        Visit Information        Provider Department      11/22/2017 3:00 PM Reno Calloway PT [facility]        Today's Diagnoses     Dizziness        Tinnitus of both ears           Follow-ups after your visit        Your next 10 appointments already scheduled     Dec 06, 2017  4:00 PM CST   Treatment 60 with [facility] ([facility])    [address] [place] [id number] [phone]              Who to contact     Please call your clinic at [phone] to:    Ask questions about your health    Make or cancel appointments    Discuss your medicines    Learn about your test results    Speak to your doctor   If you have compliments or concerns about an experience at your clinic, or if you wish to file a complaint, please contact [facility] Patient Relations at [phone] or email us at [email or web address]         Additional Information About Your Visit        MyChart Information     [employer] gives you secure access to your electronic health record. If you see a primary care provider, you can also send messages to your care team and make appointments. If you have questions, please call your primary care clinic.  If you do not have a primary care provider, please call [phone] and they will assist you.      SETiT is an electronic gateway that provides easy, online access to your medical records. With [employer], you can request a clinic appointment, read your test results, renew a prescription or communicate with your care team.     To access your existing account, please contact your [facility] Clinic or call [phone] for assistance.        Care EveryWhere ID     This is your Care EveryWhere ID. This could be used  by other organizations to access your [facility] medical records  [id number]        Your Vitals Were     Last Period                   01/01/1999            Blood Pressure from Last 3 Encounters:   10/18/17 (!) 156/96   09/20/17 122/68   08/30/17 138/82    Weight from Last 3 Encounters:   10/18/17 83 kg (182 lb 14.4 oz)   09/20/17 81.6 kg (179 lb 12.8 oz)   08/30/17 80.3 kg (177 lb 1.6 oz)              Today, you had the following     No orders found for display       Primary Care Provider Office Phone # Fax #    Annamaria Erickson [phone] [address] [place] [address]        Equal Access to Services     [facility]: Hadii peg thorntono Sosu, waaxda luqadaha, qaybta kaalmada adeegyada, dillon dye . So [facility] [phone].    ATENCIÓN: Si habla español, tiene a richter disposición servicios gratuitos de asistencia lingüística. [facility] [phone].    We comply with applicable federal civil rights laws and [place] laws. We do not discriminate on the basis of race, color, national origin, age, disability, sex, sexual orientation, or gender identity.            Thank you!     Thank you for choosing [facility]  for your care. Our goal is always to provide you with excellent care. Hearing back from our patients is one way we can continue to improve our services. Please take a few minutes to complete the written survey that you may receive in the mail after your visit with us. Thank you!             Your Updated Medication List - Protect others around you: Learn how to safely use, store and throw away your medicines at www.disposemymeds.org.          This list is accurate as of: 11/22/17  5:44 PM.  Always use your most recent med list.                   Brand Name Dispense Instructions for use Diagnosis    ASPIRIN NOT PRESCRIBED    INTENTIONAL    1 each    continuous prn Antiplatelet medication not prescribed intentionally due to age.         B-COMPLEX PO      Take by mouth daily        CVS OMEGA-3 PO      Take 3,400 mg by mouth daily        EPINEPHrine 0.3 MG/0.3ML injection 2-pack    EPIPEN/ADRENACLICK/or ANY BX GENERIC EQUIV    2 each    Inject 0.3 mLs (0.3 mg) into the muscle once as needed for anaphylaxis    Allergic reaction       gabapentin 100 MG capsule    NEURONTIN    90 capsule    Take 4 capsules (400 mg) by mouth daily        insulin aspart 100 UNIT/ML injection    NovoLOG FLEXPEN    90 mL    1 unit per 4 grams of carbohydrate eaten at each meal +a correction of 1 additional unit per 18 points of blood sugar above 140.  Total daily dose 100 units/day.  Dispense 6 boxes/30 pens for a 90-day supply or 2 boxes/10 pens for a 30-day supply as allowed by insurance.    Type 2 diabetes mellitus with hyperglycemia, with long-term current use of insulin (H)       insulin degludec 200 UNIT/ML pen    TRESIBA    18 mL    Inject 68 Units Subcutaneous daily    Type 2 diabetes mellitus with hyperglycemia, with long-term current use of insulin (H), Encounter for long-term (current) use of insulin (H)       * insulin pen needle 31G X 5 MM    B-D U/F    550 each    Use 6  time(s) per day.  Please dispense as BD Pen Needle Mini U/F 31G x 5 MM    Type 2 diabetes mellitus with hyperglycemia (H)       * insulin pen needle 31G X 5 MM    B-D U/F    300 each    Use 6 daily or as directed.    Type 2 diabetes mellitus with hyperglycemia (H)       * insulin pen needle 31G X 5 MM    B-D U/F    200 each    Use 5 daily or as directed.    Type 2 diabetes mellitus with hyperglycemia (H)       milk thistle extract 140 MG Caps capsule           naproxen sodium 220 MG tablet    ANAPROX    60 tablet    Take 2 tablets (440 mg) by mouth 2 times daily (with meals)    Screening for osteoporosis       pantothenic acid 500 MG Tabs      Take 500 mg by mouth daily        polyethylene glycol Packet    MIRALAX/GLYCOLAX     Take 1 packet by mouth daily.        STATIN NOT PRESCRIBED  (INTENTIONAL)     0 each    Statin not prescribed intentionally due to Intolerance    Statin intolerance       sulindac 200 MG tablet    CLINORIL     Take 200 mg by mouth 3 times daily        traMADol 50 MG tablet    ULTRAM     Take 50 mg by mouth 3 times daily 1-2 tablets in the evening        VOLTAREN 1 % Gel topical gel   Generic drug:  diclofenac     100 g    Apply topically nightly as needed for moderate pain Apply 4 grams to knees or 2 grams to hands four times daily using enclosed dosing card.        * Notice:  This list has 3 medication(s) that are the same as other medications prescribed for you. Read the directions carefully, and ask your doctor or other care provider to review them with you.

## 2017-11-22 NOTE — PROGRESS NOTES
11/22/17 1500   Quick Adds   Type of Visit Initial OP PT Evaluation       Present No   General Information   Start of Care Date 11/22/17   Referring Physician Annamaria Erickson MD   Orders Evaluate and Treat as Indicated   Order Date 10/26/17   Medical Diagnosis dizziness, unsteady gait.    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 currently feels a sense of motion, feels off balance at times and always has tinnitus with the sense of motion.  change in position can affect sx but lying down at night tends to make tinnitus worse.  denies hearing loss, just tinnitus. I did review her records for [facility] visit which did not provide etiology for sx other than potentially migraines/anxiety.  patient states MRI was WNL.    Fall Risk Screen   Have you fallen 2 or more times in the past year? Yes   Range of Motion (ROM)   ROM Comment per neurology notes, WNL   Gait   Gait Comments patient walks into exam room without difficulty   Gait Special Tests Dynamic Gait Index   Score out of 24 21   Comments see flowsheet for details   Balance Special Tests Modified CTSIB Conditions   Condition 1, seconds 30 Seconds   Condition 2, seconds 30 Seconds   Condition 4, seconds 30 Seconds   Condition 5, seconds 10 Seconds   Modified CTSIB Comments difficulty with condition 2, gets into squatting position after increased sway then exhibits no LOB in squatting position   Sensory Examination   Sensory Perception Comments per Neurology records WNL   Cervicogenic Screen   Neck ROM WFL for positional testing   Oculomotor Exam   Smooth Pursuit Abnormal   Smooth Pursuit Comment provokes dizziness   Saccades Normal   VOR Normal   VOR  Cancellation Abnormal   VOR Cancellation Comments provokes sx   Rapid Head Thrust Normal   Convergence Testing Normal   Infrared Goggle Exam or Frenzel Lense Exam   Vestibular Suppressant in Last 24 Hours? No   Exam completed with Infrared Goggles   Spontaneous Nystagmus Negative   Gaze Evoked Nystagmus Negative   Head Shake Horizontal Nystagmus Negative   Kortney-Hallpike (right) Negative   Kortney-Hallpike (Left) Negative   HSCC Supine Roll Test (Right) Negative   HSCC Supine Roll Test (Right) Comments provokes sx, no nystagmus   HSCC Supine Roll Test (Left) Negative   HSCC Supine Roll Test (Left) Comments  provokes sx, no nystagmus   Dynamic Visual Acuity (DVA)   Static Acuity (LogMar) .1   Horizontal Head Movement at 1 Hz (LogMar) .2   Horizontal Head Movement at 2 Hz (LogMar) .2   DVA Comments WNL but post test provokes severe dizziness and nauses, recovers in approx 5 min.   Planned Therapy Interventions   Planned Therapy Interventions balance training;gait training;neuromuscular re-education   Clinical Impression   Criteria for Skilled Therapeutic Interventions Met yes, treatment indicated   PT Diagnosis dysequilibrium of unknown etiology   Influenced by the following impairments impaired balance, vertigo   Functional limitations due to impairments impaired gait, history of falls   Clinical Presentation Evolving/Changing   Clinical Presentation Rationale comorbidities, clinical judgment   Clinical Decision Making (Complexity) Moderate complexity   Therapy Frequency other (see comments)  (5 visits)   Predicted Duration of Therapy Intervention (days/wks) 12-16 weeks   Risk & Benefits of therapy have been explained Yes   Patient, Family & other staff in agreement with plan of care Yes   Clinical Impression Comments patient with dizziness and atypical balance reactions of unknown etiology.  would benefit from compensatory strategies and trial of adaptation/subsitution exercises to see if she can increase her activity  level and improve her subjective sx.     Goal 1   Goal Identifier mCTSIB   Goal Description condition 5, 30 seconds   Target Date 03/14/18   Goal 2   Goal Identifier DHI   Goal Description score < 35   Target Date 03/14/18   Goal 3   Goal Identifier falls    Goal Description patient to report 0 falls in 3 month period   Target Date 03/14/18   Total Evaluation Time   Total Evaluation Time (Minutes) 40

## 2017-12-06 ENCOUNTER — THERAPY VISIT (OUTPATIENT)
Dept: PHYSICAL THERAPY | Facility: CLINIC | Age: 57
End: 2017-12-06

## 2017-12-06 DIAGNOSIS — R26.81 UNSTEADY GAIT: Primary | ICD-10-CM

## 2017-12-09 ENCOUNTER — TELEPHONE (OUTPATIENT)
Dept: FAMILY MEDICINE | Facility: CLINIC | Age: 57
End: 2017-12-09

## 2017-12-09 NOTE — TELEPHONE ENCOUNTER
Hi  As you may be aware [facility] is conducting a personalized medicine hypertension clinical trial (PGEN.)  We are enrolling patients with new onset hypertension or uncontrolled hypertension on one blood pressure medicine.      During a recent office visit this patient was identified as a potential candidate via a BPA alert.  It appears that her blood pressure was high at last visit but there was an acute injury at the time. Would you be ok with our research team reaching out to the patient and offering enrollment visit?  At the visit we would have the patient see a pharmacist or provider to recheck blood pressure.  And if high we would make the diagnosis of HTN and offer enrollment.  Details of there study can be found by typing <dot>PGENPISTUDYSUMMARY.  For your convenience I have copied and pasted the materials below     Sumaya Aldridge MD  [facility] principle investigator    =======================================================    [facility] Summary     Thank you for your interest in the [facility] hypertension research study. This study is designed to test whether genetically guided blood pressure medication management is better than the standard of care.  Both groups will use medications that have been used for many years to treat high blood pressure ( diuretics, beta blockers, calcium channel blockers, AceI /ARB).  The ORDER of medications chosen may be different however. All participants will receive the genetic testing for free along with free research related visits.  Participants will not be given the results of their genetic test results until the END of the study.Participants will also receive compensation totaling about $100 for completing all study visits and surveys.      If you would like to enroll or know more about using your genetics to determine which hypertensive medications may work best for you please contact the research coordinator as [phone] or email [email or web address]    Who may  qualify for the study:  1) New diagnosis of high blood pressure but not yet on blood pressure medication.  2) Existing diagnosis of hypertension but blood pressure is  uncontrolled with 1 or less class of medications.   3) Age between 30 and 80  4) BMI between 19-50    Who should NOT be in the study:    1) All patient taking more than 1 class of hypertensive medication are excluded.   2) Documented instance of following conditions    A. Cardiac Disease  i. ICD-10 Code for Coronary Artery Disease - CAD: 410.* -414.*, I25.*  ii. ICD-10 Code for Heart Failure - 428.*, I50.*  iii. ICD-10 Code for Congenital Cardiac Disease - 745.*, -747.*, Q20.*, -Q28.*   B. Kidney Disease        i. ICD-10 Code for Chronic Kidney Disease - CKD:ICD9 585.*and ICD10 N18.*   C. Vascular Disease        i. ICD-10 Code for Peripheral Vascular Disease - 443.9, I73.9        ii. ICD-10 Code for Pulmonary Hypertension - 416.0, 416.8, I27.0, I27.2   D. Secondary Hypertension                     i. ICD-10 Code for Hypertension Secondary to Other Diseases - I15.0-2, I15.8-9  3) Any patient who has chronic medical conditions that  their doctor/provider feels would make them unsafe to participate in a research study where initial choice of blood pressure  medication could be from 1 of these 4 BP classes of medicines: diuretics, beta blockers, calcium channel blockers, AceI /ARB.    Study visit occur at the following clinic locations  [place]:  1) [place]  2) [place]  3) [place]  4) [place]  5) [place]  6) [place]  7) [place]    South:  1) [facility])  2) [place]  3) [facility]:  1) [place]    Patient Expectations:    1) Take Hypertension medications  2) Attend scheduled study visits  3) Complete online surveys sent between visits     If enrolled patient is asked to attend 5 to 8 study visits over the next 12 months.

## 2017-12-10 NOTE — TELEPHONE ENCOUNTER
Yes, I'm ok with contacting the patient about the study.    Thanks,  Annamaria Erickson MD  Internal Medicine/Pediatrics

## 2017-12-11 NOTE — TELEPHONE ENCOUNTER
Spoke with patient regarding potential enrollment in the PGEN study and she is not interested at this time.

## 2017-12-11 NOTE — TELEPHONE ENCOUNTER
Please reach out to this patient and advise her of PGEN study.  Please arrange enrollment visit if patient is interested.  Sumaya Aldridge MD

## 2018-01-10 ENCOUNTER — TELEPHONE (OUTPATIENT)
Dept: PALLIATIVE MEDICINE | Facility: CLINIC | Age: 58
End: 2018-01-10

## 2018-01-10 NOTE — LETTER
January 16, 2018    Maricarmen Dotson  [address]  [place] [address]    Dear Maricarmen                                                                   Welcome to the [facility] at the [facility]. We are located at [address], [address], [place] [address]. Your appointment has been scheduled on Tuesday January 30, 2018 at 9:00 AM with Diamond Henry NP.    At your first visit, you will meet your team of caregivers who will help you to develop pain management strategies that will last a lifetime. You will meet with our support staff to review your insurance information and collect your co-payment if required by your insurance company. You will meet with a medical pain specialist and care coordinator who will assess your pain and develop a plan of care for your successful pain rehabilitation. You should expect to spend 1-2 hours at your first visit with us. Usually, patients work with us for a period of 6-12 months, and eventually return to their primary doctor once their pain management has stabilized.      To help us make your visit go as smoothly as possible, please bring the following items with you on your visit:   Completed Pain Questionnaire enclosed in this packet.  If you do not bring the completed questionnaire, we may have to reschedule your appointment.  List of any medicines that you are currently taking or have been prescribed  Pertinent NON-[facility] medical information such as medical records or tests results (X-rays, or laboratory tests)  Your health insurance card  Financial resources to cover your co-payment or balance due at the time of service (cash, personal check, Visa, and MasterCard are acceptable methods of payment)     Due to the high demand for new patient evaluations, you must notify the scheduling department 48 hours in advance if you are not able to keep this appointment.  Failure to do so could affect your ability to  reschedule with our clinic. Please do not assume that you will receive any prescription medications at your first visit.    Please call [phone] with any questions regarding your appointment. We look forward to meeting you and working to address your health care needs.     Sincerely,    [facility]

## 2018-01-15 NOTE — TELEPHONE ENCOUNTER
LM with family member to schedule new patient evaluation.      Madyson An    [facility]

## 2018-01-16 ENCOUNTER — OFFICE VISIT (OUTPATIENT)
Dept: ENDOCRINOLOGY | Facility: CLINIC | Age: 58
End: 2018-01-16
Payer: COMMERCIAL

## 2018-01-16 ENCOUNTER — ALLIED HEALTH/NURSE VISIT (OUTPATIENT)
Dept: EDUCATION SERVICES | Facility: CLINIC | Age: 58
End: 2018-01-16
Payer: COMMERCIAL

## 2018-01-16 VITALS
DIASTOLIC BLOOD PRESSURE: 68 MMHG | BODY MASS INDEX: 31.92 KG/M2 | WEIGHT: 187 LBS | SYSTOLIC BLOOD PRESSURE: 161 MMHG | RESPIRATION RATE: 16 BRPM | HEART RATE: 84 BPM | TEMPERATURE: 98.5 F | HEIGHT: 64 IN

## 2018-01-16 DIAGNOSIS — E11.65 TYPE 2 DIABETES MELLITUS WITH HYPERGLYCEMIA, WITH LONG-TERM CURRENT USE OF INSULIN (H): Primary | ICD-10-CM

## 2018-01-16 DIAGNOSIS — Z79.4 TYPE 2 DIABETES MELLITUS WITH HYPERGLYCEMIA, WITH LONG-TERM CURRENT USE OF INSULIN (H): Primary | ICD-10-CM

## 2018-01-16 DIAGNOSIS — E78.5 HYPERLIPIDEMIA LDL GOAL <100: ICD-10-CM

## 2018-01-16 LAB — HBA1C MFR BLD: 9.4 % (ref 4.3–6)

## 2018-01-16 PROCEDURE — 83036 HEMOGLOBIN GLYCOSYLATED A1C: CPT | Performed by: CLINICAL NURSE SPECIALIST

## 2018-01-16 PROCEDURE — 99207 C FOOT EXAM  NO CHARGE: CPT | Performed by: CLINICAL NURSE SPECIALIST

## 2018-01-16 PROCEDURE — 95250 CONT GLUC MNTR PHYS/QHP EQP: CPT

## 2018-01-16 PROCEDURE — 36415 COLL VENOUS BLD VENIPUNCTURE: CPT | Performed by: CLINICAL NURSE SPECIALIST

## 2018-01-16 PROCEDURE — 99214 OFFICE O/P EST MOD 30 MIN: CPT | Performed by: CLINICAL NURSE SPECIALIST

## 2018-01-16 NOTE — MR AVS SNAPSHOT
After Visit Summary   1/16/2018    Maricarmen Dotson    MRN: [id number]           Patient Information     Date Of Birth          1960        Visit Information        Provider Department      1/16/2018 1:00 PM Emily Phan APRN [facility]        Today's Diagnoses     Type 2 diabetes mellitus with hyperglycemia, with long-term current use of insulin (H)    -  1    Hyperlipidemia LDL goal <100          Care Instructions    Today's A1c:    Component      Latest Ref Rng & Units 11/30/2016 3/30/2017 1/16/2018   Hemoglobin A1C      4.3 - 6.0 % 12.2 (H) 9.7 (H) 9.4 (H)     Your target A1c is less than 7.0%.    Increase Tresiba to 48 units once daily.  If no low blood sugars AND fasting blood sugar before breakfast is greater than 130 - increase Tresiba further to 50 units once daily.    Follow up with diabetes ed in 1 week and 2 weeks.    Follow up with me in one month.    Emily Phan NP  Endocrinology            Follow-ups after your visit        Additional Services     DIABETES EDUCATOR REFERRAL       DIABETES SELF MANAGEMENT TRAINING (DSMT)      Your provider has referred you to Diabetes Education: FMG: Diabetes Education - All [facility] [phone]   [email or web address]     If an urgent visit is needed or A1C is above 12, Care Team to call the Diabetes  Education Team at [phone] or send an In Basket message to the Diabetes Education Pool (P DIAB ED-PATIENT CARE).    A  will call you to make your appointment. If it has been more than 3 business days since your referral was placed, please call the above phone number to schedule.    Type of training and number of hours: Previous Diagnosis: Follow-up DSMT - 2 hours.    Medicare covers: 10 hours of initial DSMT in 12 month period from the time of first visit, plus 2 hours of follow-up DSMT annually, and additional hours as requested for insulin  training.    Diabetes Type: Type 2 - On Insulin             Diabetes Co-Morbidities: other eating disorder               A1C Goal:  <7.0       A1C is: Lab Results       Component                Value               Date                       A1C                      9.4                 01/16/2018              Diabetes Education Topics: Diagnostic Continuous Glucose Monitoring     Special Educational Needs Requiring Individual DSMT: None       MEDICAL NUTRITION THERAPY (MNT) for Diabetes    Medical Nutrition Therapy with a Registered Dietitian can be provided in coordination with Diabetes Self-Management Training to assist in achieving optimal diabetes management.    MNT Type and Hours: Do not initiate MNT at this time.                       Medicare will cover: 3 hours initial MNT in 12 month period after first visit, plus 2 hours of follow-up MNT annually    Please be aware that coverage of these services is subject to the terms and limitations of your health insurance plan.  Call member services at your health plan to determine Diabetes Self-Management Training (Codes  &amp; ) and Medical Nutrition Therapy (Codes 02563 & 80680) benefits and ask which blood glucose monitor brands are covered by your plan.  Please bring the following with you to your appointment:    (1)  List of current medications   (2)  List of Blood Glucose Monitor brands that are covered by your insurance plan  (3)  Blood Glucose Monitor and log book  (4)   Food records for the 3 days prior to your visit    The Certified Diabetes Educator may make diabetes medication adjustments per the CDE Protocol and Collaborative Practice Agreement.                  Your next 10 appointments already scheduled     Jan 25, 2018 11:30 AM CST   Diabetic Education with Allie Matias   [facility] [place] ([facility])    [address] [facility] [address]   [phone]            Jan 30, 2018  9:00 AM  CST   New Visit with MARICRUZ Wadsworth CNP   [facility] ([facility])    [address] [place] [address]  [address]  [facility] [address]   [phone]            Feb 01, 2018 11:30 AM CST   Diabetic Education with Allie Matias   [facility] [place] ([facility])    [address]  [facility] [address] [phone]              Future tests that were ordered for you today     Open Future Orders        Priority Expected Expires Ordered    GLUCOSE MONITORING CONTINUOUS, >=72 HR Routine  1/16/2019 1/16/2018            Who to contact     If you have questions or need follow up information about today's clinic visit or your schedule please contact [facility] directly at [phone].  Normal or non-critical lab and imaging results will be communicated to you by [employer], letter or phone within 4 business days after the clinic has received the results. If you do not hear from us within 7 days, please contact the clinic through [employer] or phone. If you have a critical or abnormal lab result, we will notify you by phone as soon as possible.  Submit refill requests through [employer] or call your pharmacy and they will forward the refill request to us. Please allow 3 business days for your refill to be completed.          Additional Information About Your Visit        [employer] Information     [employer] gives you secure access to your electronic health record. If you see a primary care provider, you can also send messages to your care team and make appointments. If you have questions, please call your primary care clinic.  If you do not have a primary care provider, please call [phone] and they will assist you.        Care EveryWhere ID     This is your Care EveryWhere ID. This could be used by other organizations to access your [place] medical records  [id number]        Your Vitals Were     Pulse Temperature  "Respirations Height Last Period BMI (Body Mass Index)    84 98.5  F (36.9  C) (Oral) 16 1.613 m (5' 3.5\") 01/01/1999 32.61 kg/m2       Blood Pressure from Last 3 Encounters:   01/16/18 161/68   10/18/17 (!) 156/96   09/20/17 122/68    Weight from Last 3 Encounters:   01/16/18 84.8 kg (187 lb)   10/18/17 83 kg (182 lb 14.4 oz)   09/20/17 81.6 kg (179 lb 12.8 oz)              We Performed the Following     Albumin Random Urine Quantitative with Creat Ratio     DIABETES EDUCATOR REFERRAL     FOOT EXAM     Hemoglobin A1c        Primary Care Provider Office Phone # Fax #    Annamaria Erickson [phone] [address] [place] [address]        Equal Access to Services     [facility]: Hadii aad tonie hadasho Soomaali, waaxda luqadaha, qaybta kaalmada adeegyada, dillon castañeda haymanav dye . So [facility] [phone].    ATENCIÓN: Si habla español, tiene a richter disposición servicios gratuitos de asistencia lingüística. Llame al [phone].    We comply with applicable federal civil rights laws and [place] laws. We do not discriminate on the basis of race, color, national origin, age, disability, sex, sexual orientation, or gender identity.            Thank you!     Thank you for choosing [facility]  for your care. Our goal is always to provide you with excellent care. Hearing back from our patients is one way we can continue to improve our services. Please take a few minutes to complete the written survey that you may receive in the mail after your visit with us. Thank you!             Your Updated Medication List - Protect others around you: Learn how to safely use, store and throw away your medicines at www.disposemymeds.org.          This list is accurate as of: 1/16/18  2:13 PM.  Always use your most recent med list.                   Brand Name Dispense Instructions for use Diagnosis    ASPIRIN NOT PRESCRIBED    INTENTIONAL    1 each    continuous prn " Antiplatelet medication not prescribed intentionally due to age.        EPINEPHrine 0.3 MG/0.3ML injection 2-pack    EPIPEN/ADRENACLICK/or ANY BX GENERIC EQUIV    2 each    Inject 0.3 mLs (0.3 mg) into the muscle once as needed for anaphylaxis    Allergic reaction       insulin aspart 100 UNIT/ML injection    NovoLOG FLEXPEN    90 mL    1 unit per 4 grams of carbohydrate eaten at each meal +a correction of 1 additional unit per 18 points of blood sugar above 140.  Total daily dose 100 units/day.  Dispense 6 boxes/30 pens for a 90-day supply or 2 boxes/10 pens for a 30-day supply as allowed by insurance.    Type 2 diabetes mellitus with hyperglycemia, with long-term current use of insulin (H)       insulin degludec 200 UNIT/ML pen    TRESIBA    18 mL    Inject 68 Units Subcutaneous daily    Type 2 diabetes mellitus with hyperglycemia, with long-term current use of insulin (H), Encounter for long-term (current) use of insulin (H)       * insulin pen needle 31G X 5 MM    B-D U/F    550 each    Use 6  time(s) per day.  Please dispense as BD Pen Needle Mini U/F 31G x 5 MM    Type 2 diabetes mellitus with hyperglycemia (H)       * insulin pen needle 31G X 5 MM    B-D U/F    300 each    Use 6 daily or as directed.    Type 2 diabetes mellitus with hyperglycemia (H)       * insulin pen needle 31G X 5 MM    B-D U/F    200 each    Use 5 daily or as directed.    Type 2 diabetes mellitus with hyperglycemia (H)       polyethylene glycol Packet    MIRALAX/GLYCOLAX     Take 1 packet by mouth daily.        STATIN NOT PRESCRIBED (INTENTIONAL)     0 each    Statin not prescribed intentionally due to Intolerance    Statin intolerance       sulindac 200 MG tablet    CLINORIL     Take 200 mg by mouth 3 times daily        traMADol 50 MG tablet    ULTRAM     Take 50 mg by mouth 3 times daily 1-2 tablets in the evening        VOLTAREN 1 % Gel topical gel   Generic drug:  diclofenac     100 g    Apply topically nightly as needed for moderate  pain Apply 4 grams to knees or 2 grams to hands four times daily using enclosed dosing card.        * Notice:  This list has 3 medication(s) that are the same as other medications prescribed for you. Read the directions carefully, and ask your doctor or other care provider to review them with you.

## 2018-01-16 NOTE — MR AVS SNAPSHOT
After Visit Summary   1/16/2018    Maricarmen Dotson    MRN: [id number]           Patient Information     Date Of Birth          1960        Visit Information        Provider Department      1/16/2018 1:00 PM Deanna Bolton RN [facility]        Care Instructions    1. Plan to wear the LibrePro sensor for 14 days. It is okay to shower, bathe, and swim (up to 3 feet deep for 30 minutes)    2. Continue with your usual diabetes care plan - check blood sugars and take medicines, as prescribed.    3. Keep a log of what you eat and drink, when you take your medications and how much you take, and exercise you do while you are wearing the sensor.    3. Do not cover the sensor with extra adhesive (the small hole in the center of the sensor must remain uncovered)    4. Use a little extra care, especially when getting dressed or exercising, to avoid accidentally loosening or removing the sensor.     5. Remove the sensor if you need to have an MRI or CT scan.     Return the sensor to the [facility] on 2/1/18.    [facility] for the [facility]:  For Your Diabetes Education and Nutrition Appointments Call:  [phone]   For Diabetes Education or Nutrition Related Questions:   Phone: [phone]  E-mail: [email or web address]  Fax: [phone]   If you need a medication refill please contact your pharmacy. Please allow 3 business days for your refills to be completed.    Instructions for emailing the Diabetes Educators    If you need to communicate a non-urgent message to a Diabetes Educator via email, please send to [email or web address].    Please follow the following email guidelines:    Subject line: Secure: your clinic name (example: Secure: [facility])  In the email please include: First name, middle initial, last name and date of birth.    We will be in touch with you within one (1) business day.               Follow-ups after your visit        Your next 10 appointments already scheduled     Jan 25, 2018 11:30 AM CST   Diabetic Education with Allie Matias   [facility] [place] ([facility])    [address] [address] [phone]            Jan 30, 2018  9:00 AM CST   New Visit with MARICRUZ Wadsworth CNP   [facility] ([facility])    [address] [place] [address]  [address]  [facility] [address]   [phone]            Feb 01, 2018 11:30 AM CST   Diabetic Education with Allie Matias   [facility] [place] ([facility])    [address] [address] [phone]              Future tests that were ordered for you today     Open Future Orders        Priority Expected Expires Ordered    GLUCOSE MONITORING CONTINUOUS, >=72 HR Routine  1/16/2019 1/16/2018            Who to contact     If you have questions or need follow up information about today's clinic visit or your schedule please contact [facility] directly at [phone].  Normal or non-critical lab and imaging results will be communicated to you by [employer], letter or phone within 4 business days after the clinic has received the results. If you do not hear from us within 7 days, please contact the clinic through [employer] or phone. If you have a critical or abnormal lab result, we will notify you by phone as soon as possible.  Submit refill requests through [employer] or call your pharmacy and they will forward the refill request to us. Please allow 3 business days for your refill to be completed.          Additional Information About Your Visit        [employer] Information     [employer] gives you secure access to your electronic health record. If you see a primary care provider, you can also send messages to your care team and make appointments. If you have questions, please call  your primary care clinic.  If you do not have a primary care provider, please call [phone] and they will assist you.        Care EveryWhere ID     This is your Care EveryWhere ID. This could be used by other organizations to access your [facility] medical records  [id number]        Your Vitals Were     Last Period                   01/01/1999            Blood Pressure from Last 3 Encounters:   01/16/18 161/68   10/18/17 (!) 156/96   09/20/17 122/68    Weight from Last 3 Encounters:   01/16/18 84.8 kg (187 lb)   10/18/17 83 kg (182 lb 14.4 oz)   09/20/17 81.6 kg (179 lb 12.8 oz)              Today, you had the following     No orders found for display       Primary Care Provider Office Phone # Fax #    Annamaria Erickson [phone] [address] [place] [address]        Equal Access to Services     [facility]: Hadii aad ku hadasho Soomaali, waaxda luqadaha, qaybta kaalmada adeegyada, waxay allenin haybertn rubina dye . So [facility] [phone].    ATENCIÓN: Si habla español, tiene a richter disposición servicios gratuitos de asistencia lingüística. Llame al [phone].    We comply with applicable federal civil rights laws and [place] laws. We do not discriminate on the basis of race, color, national origin, age, disability, sex, sexual orientation, or gender identity.            Thank you!     Thank you for choosing [facility] [place]  for your care. Our goal is always to provide you with excellent care. Hearing back from our patients is one way we can continue to improve our services. Please take a few minutes to complete the written survey that you may receive in the mail after your visit with us. Thank you!             Your Updated Medication List - Protect others around you: Learn how to safely use, store and throw away your medicines at www.disposemymeds.org.          This list is accurate as of: 1/16/18  2:03 PM.  Always use your most  recent med list.                   Brand Name Dispense Instructions for use Diagnosis    ASPIRIN NOT PRESCRIBED    INTENTIONAL    1 each    continuous prn Antiplatelet medication not prescribed intentionally due to age.        EPINEPHrine 0.3 MG/0.3ML injection 2-pack    EPIPEN/ADRENACLICK/or ANY BX GENERIC EQUIV    2 each    Inject 0.3 mLs (0.3 mg) into the muscle once as needed for anaphylaxis    Allergic reaction       insulin aspart 100 UNIT/ML injection    NovoLOG FLEXPEN    90 mL    1 unit per 4 grams of carbohydrate eaten at each meal +a correction of 1 additional unit per 18 points of blood sugar above 140.  Total daily dose 100 units/day.  Dispense 6 boxes/30 pens for a 90-day supply or 2 boxes/10 pens for a 30-day supply as allowed by insurance.    Type 2 diabetes mellitus with hyperglycemia, with long-term current use of insulin (H)       insulin degludec 200 UNIT/ML pen    TRESIBA    18 mL    Inject 68 Units Subcutaneous daily    Type 2 diabetes mellitus with hyperglycemia, with long-term current use of insulin (H), Encounter for long-term (current) use of insulin (H)       * insulin pen needle 31G X 5 MM    B-D U/F    550 each    Use 6  time(s) per day.  Please dispense as BD Pen Needle Mini U/F 31G x 5 MM    Type 2 diabetes mellitus with hyperglycemia (H)       * insulin pen needle 31G X 5 MM    B-D U/F    300 each    Use 6 daily or as directed.    Type 2 diabetes mellitus with hyperglycemia (H)       * insulin pen needle 31G X 5 MM    B-D U/F    200 each    Use 5 daily or as directed.    Type 2 diabetes mellitus with hyperglycemia (H)       polyethylene glycol Packet    MIRALAX/GLYCOLAX     Take 1 packet by mouth daily.        STATIN NOT PRESCRIBED (INTENTIONAL)     0 each    Statin not prescribed intentionally due to Intolerance    Statin intolerance       sulindac 200 MG tablet    CLINORIL     Take 200 mg by mouth 3 times daily        traMADol 50 MG tablet    ULTRAM     Take 50 mg by mouth 3 times  daily 1-2 tablets in the evening        VOLTAREN 1 % Gel topical gel   Generic drug:  diclofenac     100 g    Apply topically nightly as needed for moderate pain Apply 4 grams to knees or 2 grams to hands four times daily using enclosed dosing card.        * Notice:  This list has 3 medication(s) that are the same as other medications prescribed for you. Read the directions carefully, and ask your doctor or other care provider to review them with you.

## 2018-01-16 NOTE — PROGRESS NOTES
Diabetes Self Management Training: Professional Continuous Glucose Monitor Insertion    SUBJECTIVE:   Maricarmen Dotson is accompanied by self and daughter    Patient concerns: I want to finally figure this out after 20 years of diabetes.  I also binge eat sometimes at night when I'm lonely which I'm working on with my therapist.    OBJECTIVE:    Lab Results:  Lab Results   Component Value Date    A1C 9.4 01/16/2018      Lab Results   Component Value Date     09/20/2017     Lab Results   Component Value Date    HDL 53 02/21/2015       Vitals:  LMP 01/01/1999    ASSESSMENT:    LibrePro sensor started today. (Lot # [id number], Serial # [id number], Expiration date 1/31/18) Sensor was inserted with no resistance or bleeding at insertion site.    Pt will plan to wear the sensor through  2/1/18.    WRITTEN AND VERBAL INFORMATION GIVEN TO SUPPORT UNDERSTANDING OF:  LibrePro CGM: Sensor insertion, intention of monitoring for 14 days. Keep records of BG, food intake, exercise, and medication dosing during wear.       Patient verbalizes understanding of how to remove sensor and all instructions provided.     Educational and other materials:  Food/exercise/medication log sheets  Contact information  Return Check List    PLAN:  Pt was given instructions for tracking BG, medications, food intake and activity.    See Patient Instructions, AVS printed and provided to patient today.    Follow-up:    Patient to return all items associated with professional Continuous Glucose Monitor System to the [facility] by 2/1/18.   1/25/18 & 2/1/18    Alecia Land MS, RD, LD, CDE    Time Spent: 15 minutes  Encounter Type: Individual

## 2018-01-16 NOTE — LETTER
1/16/2018         RE: Maricarmen Dotson  [address]  [place] [address]        Dear Colleague,    Thank you for referring your patient, Maricarmen Dotson, to the [facility]. Please see a copy of my visit note below.    Name: Maricarmen Dotson  F/u for Diabetes (Last seen 3/30/2017).  HPI:  Maricarmen Dotson is a 56 year old female who presents for the evaluation/management of:    1. Type 2 DM:  Originally diagnosed: in 1997 after starting Paxil and gaining 32 lbs in one month  Comes in today accompanied by her daughter.    Current Regimen: Tresiba 46 u q am, Novolog - 1 unit per 4 grams of carb + a correction of 1: 18>140-average dose 10-20 units per meal.   Does not want to take the Invokana.  BS checks: 2-3/day  Meter Download: Unable to download her meter.  Review of meter memory: 7-day avg 263 (201-363); 14-d avg 251 (185-363); 30-d avg 234 (); 90-d avg 226 ().    REPORTS PREVIOUS ADVERSE REACTION TO HUMALOG.  States she cannot take Humalog because it caused pancreatits.  Continues to struggle with a binge eating disorder.    Was previously treated by endo at [facility] (last seen there 11/6/2015).  She has had adverse reaction to most oral medications including Metformin (abdominal pain), glipizide (allergic type reaction), Avandia (abdominal pain and swelling), Byetta and Onglyza (pancreatitis).  Was previously prescribed Invokana but did not start because she was concerned about possible side effects.        History of eating disorder (binge eating) due to PTSD.  Now working with a new counselor.      Saw Dr. Lovell for evaluation of her adrenal and pituitary glands 12/2016 - no abnormal adrenal or pituitary abnormalities were noted.      Complications:   Diabetes Complications  Description / Detail    Diabetic Retinopathy   No retinopathy, next exam 4/2017   CAD / PAD   No   Neuropathy   No   Nephropathy / Microalbuminuria   No   Gastroparesis  No   Hypoglycemia Unawareness  No      2. Intermittent Hypertension: Blood Pressure today:   BP Readings from Last 3 Encounters:   18 161/68   10/18/17 (!) 156/96   17 122/68   .  Blood pressure medications include no medications.  Takes medications everyday without forgetting a dose.    3. Lipids: Takes no medications for lipid control.        PMH/PSH:  Past Medical History:   Diagnosis Date     Ascending aorta enlargement (H)      Depressive disorder     severe, prior hospitalization     Diabetes mellitus, type 2 (H)      Diverticulosis of colon (without mention of hemorrhage)      Fibromyalgia 2007     Insomnia      Irritable bowel syndrome      Past Surgical History:   Procedure Laterality Date     C SPINAL ORTHOSIS,NOS      lumbar surgery     choley  2011     HYSTERECTOMY, CERVIX STATUS UNKNOWN      TVH/BSO     Family Hx:  Family History   Problem Relation Age of Onset     DIABETES Mother      type 2     Hypertension Mother      CEREBROVASCULAR DISEASE Mother       stroke age 57     Ovarian Cancer Mother 43     CANCER Mother      cervix     DIABETES Father      type 2     Hypertension Father      GASTROINTESTINAL DISEASE Father      colon polyps     Breast Cancer Sister      Ovarian Cancer Sister 46     Breast Cancer Sister      Ovarian Cancer Maternal Grandmother 72     CANCER Maternal Grandmother      cervix     Thyroid disease:          DM2: Yes, mother and father         Autoimmune: DM1, SLE, RA, Vitiligo     Social Hx:  Social History     Social History     Marital status:      Spouse name: N/A     Number of children: 3     Years of education: N/A     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[employer]     Social History Main Topics     Smoking status: Never Smoker     Smokeless tobacco: Never Used     Alcohol use 0.0 oz/week     0 Standard drinks or equivalent per week      Comment: maybe once a month     Drug use: No     Sexual activity: Yes     Partners: Male     Birth control/  "protection: Surgical     Other Topics Concern     Not on file     Social History Narrative          MEDICATIONS:  has a current medication list which includes the following prescription(s): sulindac, diclofenac, insulin aspart, insulin degludec, insulin pen needle, insulin pen needle, insulin pen needle, epinephrine, STATIN NOT PRESCRIBED, INTENTIONAL,, tramadol, aspirin not prescribed, and polyethylene glycol.    ROS     ROS: 10 point ROS neg other than the symptoms noted above in the HPI.    Physical Exam   VS: /68 (BP Location: Right arm, Patient Position: Chair, Cuff Size: Adult Large)  Pulse 84  Temp 98.5  F (36.9  C) (Oral)  Resp 16  Ht 1.613 m (5' 3.5\")  Wt 84.8 kg (187 lb)  LMP 01/01/1999  BMI 32.61 kg/m2  GENERAL: NAD, well dressed, answering questions appropriately, appears stated age.  HEENT: OP clear, no exophthalmos, no proptosis, EOMI, no lig lag, no retraction, no scleral icterus  RESPIRATORY: Normal respiratory effort.  CARDIOVASCULAR: No peripheral edema.  EXTREMITIES: no edema, no obvious rashes, no lesions; feet with 2+ pulses, 10-g plantar sensation 6/6 bilaterally, no open lesions.  NEUROLOGY: CN grossly intact, no tremors  MSK: grossly intact, No digital cyanosis. Normal gait and station.  SKIN: no rashes, no obvious lesions, no ulcers  PSYCH: Intact judgment and insight. A&OX3 with a cordial affect.    LABS:  A1c:   Component      Latest Ref Rng & Units 11/30/2016 3/30/2017 1/16/2018   Hemoglobin A1C      4.3 - 6.0 % 12.2 (H) 9.7 (H) 9.4 (H)     Basic Metabolic Panel:  !COMPREHENSIVE Latest Ref Rng & Units 9/20/2017   SODIUM 133 - 144 mmol/L 136   POTASSIUM 3.4 - 5.3 mmol/L 4.0   CHLORIDE 94 - 109 mmol/L 102   CO2 mmol/L    BUN 7 - 30 mg/dL 14   Creatinine 0.52 - 1.04 mg/dL    Creatinine 0.52 - 1.04 mg/dL 0.72   Glucose 70 - 99 mg/dL 349 (H)   ANION GAP 3 - 14 mmol/L 11   CALCIUM 8.5 - 10.1 mg/dL 9.2     LFTS/Cholesterol Panel:  !LIPID/HEPATIC Latest Ref Rng 4/12/2016 7/20/2016 "   CHOLESTEROL <200 mg/dL     TRIGLYCERIDES 0 - 150 mg/dL     HDL CHOLESTEROL >50 mg/dL     LDL CHOLESTEROL DIRECT <100 mg/dL 160 (H)    LDL CHOLESTEROL, CALCULATED 0 - 129 mg/dL     VLDL-CHOLESTEROL 0 - 30 mg/dL     CHOLESTEROL/HDL RATIO 0.0 - 5.0       !LIPID/HEPATIC Latest Ref Rng & Units 7/20/2016 9/20/2017   AST 0 - 45 U/L 17 18   ALT 0 - 50 U/L 24 23     Thyroid Function:   !THYROID Latest Ref Rng & Units 9/20/2017   TSH 0.40 - 4.00 mU/L 1.18     Urine Microalbumin:  Component    Latest Ref Rng & Units 3/30/2017   Creatinine Urine    mg/dL 47   Albumin Urine mg/L    mg/L 207   Albumin Urine mg/g Cr    0 - 25 mg/g Cr 437.63 (H)       Vitamin D Deficiency screening    30 - 75 ug/L 34     All pertinent notes, labs, and images personally reviewed by me.     A/P  Ms.Anna ROXANNE Dotson is a 56 year old here for the evaluation/management of diabetes:    1. DM2 - Uncontrolled.    Her binge eating disorder continues to be a big contributing factor to her poor control, she's now seeing a therapist.    Her diabetes has been very difficulty to treat due to her eating disorder and reluctance to increase insulin or try additional oral medications.  Increase Tresiba U200, to 48 --> 50 units as needed to target fasting glucose , continue Novolog I:C ratio to 1:4 and correction factor 1:18.   Obtain diagnostic CGM.  Consider personal CGM.  She also expressed an interest in possible insulin pump therapy.    There is some variability among people, most will usually develop symptoms suggestive of hypoglycemia when blood glucose levels are lowered to the mid 60's. The first set of symptoms are called adrenergic. Patients may experience any of the following nervousness, sweating, intense hunger, trembling, weakness, palpitations, and difficulty speaking.   The acute management of hypoglycemia involves the rapid delivery of a source of easily absorbed sugar. Regular soda, juice, lifesavers, table sugar, are good options. 15 grams  of glucose is the dose that is given, followed by an assessment of symptoms and a blood glucose check if possible. If after 10 minutes there is no improvement, another 10-15 grams should be given. This can be repeated up to three times. The equivalency of 10-15 grams of glucose (approximate servings) are: 3-5 hard candies, 3 teaspoons of sugar, or 1/2 cup of regular soda or juice.   2.  Intermittent  Hypertension - Currently untreated.  Will continue to monitor. She does not want to take any additional oral meds due to multiple past med reactions.    3. Hyperlipidemia - Currently untreated-? Statin intolerance.  She is concerned about taking any oral medications due to previous adverse reactions.  Will continue to monitor.    Labs ordered today:   Orders Placed This Encounter   Procedures     GLUCOSE MONITORING CONTINUOUS, >=72 HR     FOOT EXAM     Hemoglobin A1c     DIABETES EDUCATOR REFERRAL   Urine microalbumin     Radiology/Consults ordered today: C FOOT EXAM  NO CHARGE  DIABETES EDUCATOR REFERRAL    All questions were answered.  The patient indicates understanding of the above issues and agrees with the plan set forth.  Total face to face time discussing diabetes treatment and target BG levels was greater than or equal to 25 minutes.       Follow-up:  1 month    Emily Phan NP  Endocrinology  [facility]  CC: Annamaria Erickson          Again, thank you for allowing me to participate in the care of your patient.        Sincerely,        MARICRUZ Castillo CNP

## 2018-01-16 NOTE — TELEPHONE ENCOUNTER
[facility] Referral      1. Confirmed address with patient? Yes  2. Confirmed phone number with patient? Yes  3. Confirmed referring provider? Yes  4. Is the PCP the same as the referring provider? Yes  5. Has the patient been to any previous pain clinics? Yes-[facility] AND [facility]-10-15 YRS AGO  (If yes, send DEVIKA with welcome letter)  6. Which insurance are we to bill for this appointment?  BCBS    7. Informed pt of cancellation (48 hour) policy? Yes    REGARDING OPIOID MEDICATIONS: We will always address appropriateness of opioid pain medications, but we generally will not automatically take on a prescribing role. When we do take on prescribing of opioids for chronic pain, it is in collaboration with the referring physician for an intermediate period of time (months), with an expectation that the primary physician or provider will assume the prescribing role if medications are effective at stable doses with demonstrated compliance. Therefore, please do not assume that your prescribing responsibilities end on the day of pain clinic consultation.  7. Informed pt of prescribing policy? Yes

## 2018-01-16 NOTE — PROGRESS NOTES
Name: Maricarmen Dotson  F/u for Diabetes (Last seen 3/30/2017).  HPI:  Maricarmen Dotson is a 56 year old female who presents for the evaluation/management of:    1. Type 2 DM:  Originally diagnosed: in 1997 after starting Paxil and gaining 32 lbs in one month  Comes in today accompanied by her daughter.    Current Regimen: Tresiba 46 u q am, Novolog - 1 unit per 4 grams of carb + a correction of 1: 18>140-average dose 10-20 units per meal.   Does not want to take the Invokana.  BS checks: 2-3/day  Meter Download: Unable to download her meter.  Review of meter memory: 7-day avg 263 (201-363); 14-d avg 251 (185-363); 30-d avg 234 (); 90-d avg 226 ().    REPORTS PREVIOUS ADVERSE REACTION TO HUMALOG.  States she cannot take Humalog because it caused pancreatits.  Continues to struggle with a binge eating disorder.    Was previously treated by endo at [facility] (last seen there 11/6/2015).  She has had adverse reaction to most oral medications including Metformin (abdominal pain), glipizide (allergic type reaction), Avandia (abdominal pain and swelling), Byetta and Onglyza (pancreatitis).  Was previously prescribed Invokana but did not start because she was concerned about possible side effects.        History of eating disorder (binge eating) due to PTSD.  Now working with a new counselor.      Saw Dr. Lovell for evaluation of her adrenal and pituitary glands 12/2016 - no abnormal adrenal or pituitary abnormalities were noted.      Complications:   Diabetes Complications  Description / Detail    Diabetic Retinopathy   No retinopathy, next exam 4/2017   CAD / PAD   No   Neuropathy   No   Nephropathy / Microalbuminuria   No   Gastroparesis  No   Hypoglycemia Unawareness  No     2. Intermittent Hypertension: Blood Pressure today:   BP Readings from Last 3 Encounters:   01/16/18 161/68   10/18/17 (!) 156/96   09/20/17 122/68   .  Blood pressure medications include no medications.  Takes medications everyday without  forgetting a dose.    3. Lipids: Takes no medications for lipid control.        PMH/PSH:  Past Medical History:   Diagnosis Date     Ascending aorta enlargement (H)      Depressive disorder     severe, prior hospitalization     Diabetes mellitus, type 2 (H)      Diverticulosis of colon (without mention of hemorrhage)      Fibromyalgia 2007     Insomnia      Irritable bowel syndrome      Past Surgical History:   Procedure Laterality Date     C SPINAL ORTHOSIS,NOS      lumbar surgery     choley  2011     HYSTERECTOMY, CERVIX STATUS UNKNOWN      TVH/BSO     Family Hx:  Family History   Problem Relation Age of Onset     DIABETES Mother      type 2     Hypertension Mother      CEREBROVASCULAR DISEASE Mother       stroke age 57     Ovarian Cancer Mother 43     CANCER Mother      cervix     DIABETES Father      type 2     Hypertension Father      GASTROINTESTINAL DISEASE Father      colon polyps     Breast Cancer Sister      Ovarian Cancer Sister 46     Breast Cancer Sister      Ovarian Cancer Maternal Grandmother 72     CANCER Maternal Grandmother      cervix     Thyroid disease:          DM2: Yes, mother and father         Autoimmune: DM1, SLE, RA, Vitiligo     Social Hx:  Social History     Social History     Marital status:      Spouse name: N/A     Number of children: 3     Years of education: N/A     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[employer]     Social History Main Topics     Smoking status: Never Smoker     Smokeless tobacco: Never Used     Alcohol use 0.0 oz/week     0 Standard drinks or equivalent per week      Comment: maybe once a month     Drug use: No     Sexual activity: Yes     Partners: Male     Birth control/ protection: Surgical     Other Topics Concern     Not on file     Social History Narrative          MEDICATIONS:  has a current medication list which includes the following prescription(s): sulindac, diclofenac, insulin aspart, insulin degludec,  "insulin pen needle, insulin pen needle, insulin pen needle, epinephrine, STATIN NOT PRESCRIBED, INTENTIONAL,, tramadol, aspirin not prescribed, and polyethylene glycol.    ROS     ROS: 10 point ROS neg other than the symptoms noted above in the HPI.    Physical Exam   VS: /68 (BP Location: Right arm, Patient Position: Chair, Cuff Size: Adult Large)  Pulse 84  Temp 98.5  F (36.9  C) (Oral)  Resp 16  Ht 1.613 m (5' 3.5\")  Wt 84.8 kg (187 lb)  LMP 01/01/1999  BMI 32.61 kg/m2  GENERAL: NAD, well dressed, answering questions appropriately, appears stated age.  HEENT: OP clear, no exophthalmos, no proptosis, EOMI, no lig lag, no retraction, no scleral icterus  RESPIRATORY: Normal respiratory effort.  CARDIOVASCULAR: No peripheral edema.  EXTREMITIES: no edema, no obvious rashes, no lesions; feet with 2+ pulses, 10-g plantar sensation 6/6 bilaterally, no open lesions.  NEUROLOGY: CN grossly intact, no tremors  MSK: grossly intact, No digital cyanosis. Normal gait and station.  SKIN: no rashes, no obvious lesions, no ulcers  PSYCH: Intact judgment and insight. A&OX3 with a cordial affect.    LABS:  A1c:   Component      Latest Ref Rng & Units 11/30/2016 3/30/2017 1/16/2018   Hemoglobin A1C      4.3 - 6.0 % 12.2 (H) 9.7 (H) 9.4 (H)     Basic Metabolic Panel:  !COMPREHENSIVE Latest Ref Rng & Units 9/20/2017   SODIUM 133 - 144 mmol/L 136   POTASSIUM 3.4 - 5.3 mmol/L 4.0   CHLORIDE 94 - 109 mmol/L 102   CO2 mmol/L    BUN 7 - 30 mg/dL 14   Creatinine 0.52 - 1.04 mg/dL    Creatinine 0.52 - 1.04 mg/dL 0.72   Glucose 70 - 99 mg/dL 349 (H)   ANION GAP 3 - 14 mmol/L 11   CALCIUM 8.5 - 10.1 mg/dL 9.2     LFTS/Cholesterol Panel:  !LIPID/HEPATIC Latest Ref Rng 4/12/2016 7/20/2016   CHOLESTEROL <200 mg/dL     TRIGLYCERIDES 0 - 150 mg/dL     HDL CHOLESTEROL >50 mg/dL     LDL CHOLESTEROL DIRECT <100 mg/dL 160 (H)    LDL CHOLESTEROL, CALCULATED 0 - 129 mg/dL     VLDL-CHOLESTEROL 0 - 30 mg/dL     CHOLESTEROL/HDL RATIO 0.0 - 5.0  "      !LIPID/HEPATIC Latest Ref Rng & Units 7/20/2016 9/20/2017   AST 0 - 45 U/L 17 18   ALT 0 - 50 U/L 24 23     Thyroid Function:   !THYROID Latest Ref Rng & Units 9/20/2017   TSH 0.40 - 4.00 mU/L 1.18     Urine Microalbumin:  Component    Latest Ref Rng & Units 3/30/2017   Creatinine Urine    mg/dL 47   Albumin Urine mg/L    mg/L 207   Albumin Urine mg/g Cr    0 - 25 mg/g Cr 437.63 (H)       Vitamin D Deficiency screening    30 - 75 ug/L 34     All pertinent notes, labs, and images personally reviewed by me.     A/P  Ms.Anna ROXANNE Dotson is a 56 year old here for the evaluation/management of diabetes:    1. DM2 - Uncontrolled.    Her binge eating disorder continues to be a big contributing factor to her poor control, she's now seeing a therapist.    Her diabetes has been very difficulty to treat due to her eating disorder and reluctance to increase insulin or try additional oral medications.  Increase Tresiba U200, to 48 --> 50 units as needed to target fasting glucose , continue Novolog I:C ratio to 1:4 and correction factor 1:18.   Obtain diagnostic CGM.  Consider personal CGM.  She also expressed an interest in possible insulin pump therapy.    There is some variability among people, most will usually develop symptoms suggestive of hypoglycemia when blood glucose levels are lowered to the mid 60's. The first set of symptoms are called adrenergic. Patients may experience any of the following nervousness, sweating, intense hunger, trembling, weakness, palpitations, and difficulty speaking.   The acute management of hypoglycemia involves the rapid delivery of a source of easily absorbed sugar. Regular soda, juice, lifesavers, table sugar, are good options. 15 grams of glucose is the dose that is given, followed by an assessment of symptoms and a blood glucose check if possible. If after 10 minutes there is no improvement, another 10-15 grams should be given. This can be repeated up to three times. The  equivalency of 10-15 grams of glucose (approximate servings) are: 3-5 hard candies, 3 teaspoons of sugar, or 1/2 cup of regular soda or juice.   2.  Intermittent  Hypertension - Currently untreated.  Will continue to monitor. She does not want to take any additional oral meds due to multiple past med reactions.    3. Hyperlipidemia - Currently untreated-? Statin intolerance.  She is concerned about taking any oral medications due to previous adverse reactions.  Will continue to monitor.    Labs ordered today:   Orders Placed This Encounter   Procedures     GLUCOSE MONITORING CONTINUOUS, >=72 HR     FOOT EXAM     Hemoglobin A1c     DIABETES EDUCATOR REFERRAL   Urine microalbumin     Radiology/Consults ordered today: C FOOT EXAM  NO CHARGE  DIABETES EDUCATOR REFERRAL    All questions were answered.  The patient indicates understanding of the above issues and agrees with the plan set forth.  Total face to face time discussing diabetes treatment and target BG levels was greater than or equal to 25 minutes.       Follow-up:  1 month    Emily Phan NP  Endocrinology  [facility]  CC: Annamaria Erickson

## 2018-01-16 NOTE — NURSING NOTE
"Chief Complaint   Patient presents with     Diabetes       Initial /68 (BP Location: Right arm, Patient Position: Chair, Cuff Size: Adult Large)  Pulse 84  Temp 98.5  F (36.9  C) (Oral)  Resp 16  Ht 1.613 m (5' 3.5\")  Wt 84.8 kg (187 lb)  LMP 01/01/1999  BMI 32.61 kg/m2 Estimated body mass index is 32.61 kg/(m^2) as calculated from the following:    Height as of this encounter: 1.613 m (5' 3.5\").    Weight as of this encounter: 84.8 kg (187 lb).  Medication Reconciliation: complete    "

## 2018-01-16 NOTE — PATIENT INSTRUCTIONS
Today's A1c:    Component      Latest Ref Rng & Units 11/30/2016 3/30/2017 1/16/2018   Hemoglobin A1C      4.3 - 6.0 % 12.2 (H) 9.7 (H) 9.4 (H)     Your target A1c is less than 7.0%.    Increase Tresiba to 48 units once daily.  If no low blood sugars AND fasting blood sugar before breakfast is greater than 130 - increase Tresiba further to 50 units once daily.    Follow up with diabetes ed in 1 week and 2 weeks.    Follow up with me in one month.    Emily Phan NP  Endocrinology

## 2018-01-16 NOTE — LETTER
1/16/2018         RE: Maricarmen Dotson  [address]  [place] [address]        Dear Colleague,    Thank you for referring your patient, Maricarmen Dotson, to the [facility]. Please see a copy of my visit note below.    Diabetes Self Management Training: Professional Continuous Glucose Monitor Insertion    SUBJECTIVE:   Maricarmen Dotson is accompanied by self and daughter    Patient concerns: I want to finally figure this out after 20 years of diabetes.  I also binge eat sometimes at night when I'm lonely which I'm working on with my therapist.    OBJECTIVE:    Lab Results:  Lab Results   Component Value Date    A1C 9.4 01/16/2018      Lab Results   Component Value Date     09/20/2017     Lab Results   Component Value Date    HDL 53 02/21/2015       Vitals:  LMP 01/01/1999    ASSESSMENT:    LibrePro sensor started today. (Lot # [id number], Serial # [id number], Expiration date 1/31/18) Sensor was inserted with no resistance or bleeding at insertion site.    Pt will plan to wear the sensor through  2/1/18.    WRITTEN AND VERBAL INFORMATION GIVEN TO SUPPORT UNDERSTANDING OF:  LibrePro CGM: Sensor insertion, intention of monitoring for 14 days. Keep records of BG, food intake, exercise, and medication dosing during wear.       Patient verbalizes understanding of how to remove sensor and all instructions provided.     Educational and other materials:  Food/exercise/medication log sheets  Contact information  Return Check List    PLAN:  Pt was given instructions for tracking BG, medications, food intake and activity.    See Patient Instructions, AVS printed and provided to patient today.    Follow-up:    Patient to return all items associated with professional Continuous Glucose Monitor System to the [facility] by 2/1/18.   1/25/18 & 2/1/18    Alecia Land MS, RD, LD, CDE    Time Spent: 15 minutes  Encounter Type: Individual

## 2018-01-17 NOTE — PROGRESS NOTES
Maricarmen,  Here's a copy of your recent A1c result for your records.  Emily Phan NP  Endocrinology

## 2018-01-19 ENCOUNTER — VIRTUAL VISIT (OUTPATIENT)
Dept: EDUCATION SERVICES | Facility: CLINIC | Age: 58
End: 2018-01-19
Payer: COMMERCIAL

## 2018-01-19 DIAGNOSIS — Z79.4 TYPE 2 DIABETES MELLITUS WITH HYPERGLYCEMIA, WITH LONG-TERM CURRENT USE OF INSULIN (H): Primary | ICD-10-CM

## 2018-01-19 DIAGNOSIS — E11.65 TYPE 2 DIABETES MELLITUS WITH HYPERGLYCEMIA, WITH LONG-TERM CURRENT USE OF INSULIN (H): Primary | ICD-10-CM

## 2018-01-19 NOTE — PROGRESS NOTES
"Diabetes Follow-up    Subjective/Objective:    Maricarmen Dotson sent in blood glucose log for review. Last date of communication was: 1/16.    Diabetes is being managed with Injectable Medications: Tresiba 46-0-0-0 and NovoLog Insulin TID per carb ratio.   Maricarmen is currently wearing a CGM for 14 day study, will not make changes today, see conversation below.    BG/Food Log:               Assessment:  Briefly, Numbers are elevated majority of the time. When she has checked \"not feeling well\" that is pain or anxiety.     I called Maricarmen to confirm that she is wearing the CGM, she is. Since she is in the middle of an ongoing assessment, we agreed that this is not the time to make medication adjustments since, in a few days, she will have much more detailed information available to help with decision making.   She tells me she really just sent in the data to see if the [employer] program worked (it is associated with her meter). I let her know we did get the info.    Other topics in our conversation (spent ~ 45 minutes on the phone):  1) she has an eating disorder associated with PTSD and anxiety- sometimes binge eats  2) she feels she has gotten better at \"when to eat and the amounts\", trying not to binge at night, trying to eat less  3) Maricarmen says that she used to be on 68 units of Tresiba, but that was months ago- she tried a 30elimination diet from a book she got at the co-op and felt much better, saw BG improvements and decreased insulin at the time, now  BG is increasing  4) describes the elimination diet as focusing on more plant foods, plant proteins, \"thinking about how many hands have touched the food\", or more whole foods oriented- she is interested in learning more about plant-based eating for health; provided web site pcrm.org for some information and invited her to work together eventually if she wants to move closer to that as a goal    Plan/Response:  See Patient Instructions for co-developed, patient-stated " behavior change goals.  No changes in the patient's current treatment plan  She will follow up on CGM as planned.     Liseth Taveras RD, CHARLETTE, CDE      Any diabetes medication dose changes were made via the CDE Protocol and Collaborative Practice Agreement with the patient's referring provider. A copy of this encounter was shared with the provider.

## 2018-01-19 NOTE — MR AVS SNAPSHOT
After Visit Summary   1/19/2018    Maricarmen Dotson    MRN: [id number]           Patient Information     Date Of Birth          1960        Visit Information        Provider Department      1/19/2018 1:30 PM O[facility] DIABETIC ED RESOURCE [facility]        Today's Diagnoses     Type 2 diabetes mellitus with hyperglycemia, with long-term current use of insulin (H)    -  1       Follow-ups after your visit        Your next 10 appointments already scheduled     Jan 25, 2018 11:30 AM CST   Diabetic Education with Deanna Bolton RN   [facility] [place] ([facility])    [address] [facility] [address]   [phone]            Jan 30, 2018  9:00 AM CST   New Visit with MARICRUZ [facility] ([facility])    [address]  [address]  [facility] [address]   [phone]            Feb 01, 2018 11:30 AM CST   Diabetic Education with Allie Matias   [facility] [place] ([facility])    [address] [facility] [address]   [phone]            Feb 05, 2018 11:00 AM CST   (Arrive by 10:45 AM)   MA SCREENING DIGITAL BILATERAL with [id number]   [facility] ([facility])    [address]  [facility] [id number] [phone]           Do not use any powder, lotion or deodorant under your arms or on your breast. If you do, we will ask you to remove it before your exam.  Wear comfortable, two-piece clothing.  If you have any allergies, tell your care team.  Bring any previous mammograms from other facilities or have them mailed to the breast center. Three-dimensional (3D) mammograms are available at [facility] locations in [facility], [facility], [facility], and [place]. -[facility] locations include [place] [facility] in  [place]. Benefits of 3D mammograms include: - Improved rate of cancer detection - Decreases your chance of having to go back for more tests, which means fewer: - \"False-positive\" results (This means that there is an abnormal area but it isn't cancer.) - Invasive testing procedures, such as a biopsy or surgery - Can provide clearer images of the breast if you have dense breast tissue. 3D mammography is an optional exam that anyone can have with a 2D mammogram. It doesn't replace or take the place of a 2D mammogram. 2D mammograms remain an effective screening test for all women.  Not all insurance companies cover the cost of a 3D mammogram. Check with your insurance.            Feb 13, 2018 10:00 AM CST   Return Visit with MARICRUZ Castillo [facility] ([facility])    [address] [facility] [address] [phone]              Who to contact     If you have questions or need follow up information about today's clinic visit or your schedule please contact [facility] directly at [phone].  Normal or non-critical lab and imaging results will be communicated to you by [employer], letter or phone within 4 business days after the clinic has received the results. If you do not hear from us within 7 days, please contact the clinic through [employer] or phone. If you have a critical or abnormal lab result, we will notify you by phone as soon as possible.  Submit refill requests through [employer] or call your pharmacy and they will forward the refill request to us. Please allow 3 business days for your refill to be completed.          Additional Information About Your Visit        [employer] Information     [employer] gives you secure access to your electronic health record. If you see a primary care provider, you can also send messages to your care team and make appointments. If you have questions, please call your primary care clinic.  If " you do not have a primary care provider, please call [phone] and they will assist you.        Care EveryWhere ID     This is your Care EveryWhere ID. This could be used by other organizations to access your [facility] medical records  [id number]        Your Vitals Were     Last Period                   01/01/1999            Blood Pressure from Last 3 Encounters:   01/16/18 161/68   10/18/17 (!) 156/96   09/20/17 122/68    Weight from Last 3 Encounters:   01/16/18 84.8 kg (187 lb)   10/18/17 83 kg (182 lb 14.4 oz)   09/20/17 81.6 kg (179 lb 12.8 oz)              Today, you had the following     No orders found for display       Primary Care Provider Office Phone # Fax #    Annamaria Erickson [phone] [address] [place] [address]        Equal Access to Services     [facility]: Hadii aad ku hadasho Soomaali, waaxda luqadaha, qaybta kaalmada adeegyada, dillon villain haybertn rubina dye . So [facility] [phone].    ATENCIÓN: Si habla español, tiene a richter disposición servicios gratuitos de asistencia lingüística. Llame al [phone].    We comply with applicable federal civil rights laws and [place] laws. We do not discriminate on the basis of race, color, national origin, age, disability, sex, sexual orientation, or gender identity.            Thank you!     Thank you for choosing [facility] [facility]  for your care. Our goal is always to provide you with excellent care. Hearing back from our patients is one way we can continue to improve our services. Please take a few minutes to complete the written survey that you may receive in the mail after your visit with us. Thank you!             Your Updated Medication List - Protect others around you: Learn how to safely use, store and throw away your medicines at www.disposemymeds.org.          This list is accurate as of: 1/19/18  2:43 PM.  Always use your most recent med list.                    Brand Name Dispense Instructions for use Diagnosis    ASPIRIN NOT PRESCRIBED    INTENTIONAL    1 each    continuous prn Antiplatelet medication not prescribed intentionally due to age.        EPINEPHrine 0.3 MG/0.3ML injection 2-pack    EPIPEN/ADRENACLICK/or ANY BX GENERIC EQUIV    2 each    Inject 0.3 mLs (0.3 mg) into the muscle once as needed for anaphylaxis    Allergic reaction       insulin aspart 100 UNIT/ML injection    NovoLOG FLEXPEN    90 mL    1 unit per 4 grams of carbohydrate eaten at each meal +a correction of 1 additional unit per 18 points of blood sugar above 140.  Total daily dose 100 units/day.  Dispense 6 boxes/30 pens for a 90-day supply or 2 boxes/10 pens for a 30-day supply as allowed by insurance.    Type 2 diabetes mellitus with hyperglycemia, with long-term current use of insulin (H)       insulin degludec 200 UNIT/ML pen    TRESIBA    18 mL    Inject 68 Units Subcutaneous daily    Type 2 diabetes mellitus with hyperglycemia, with long-term current use of insulin (H), Encounter for long-term (current) use of insulin (H)       * insulin pen needle 31G X 5 MM    B-D U/F    550 each    Use 6  time(s) per day.  Please dispense as BD Pen Needle Mini U/F 31G x 5 MM    Type 2 diabetes mellitus with hyperglycemia (H)       * insulin pen needle 31G X 5 MM    B-D U/F    300 each    Use 6 daily or as directed.    Type 2 diabetes mellitus with hyperglycemia (H)       * insulin pen needle 31G X 5 MM    B-D U/F    200 each    Use 5 daily or as directed.    Type 2 diabetes mellitus with hyperglycemia (H)       polyethylene glycol Packet    MIRALAX/GLYCOLAX     Take 1 packet by mouth daily.        STATIN NOT PRESCRIBED (INTENTIONAL)     0 each    Statin not prescribed intentionally due to Intolerance    Statin intolerance       sulindac 200 MG tablet    CLINORIL     Take 200 mg by mouth 3 times daily        traMADol 50 MG tablet    ULTRAM     Take 50 mg by mouth 3 times daily 1-2 tablets in the evening         VOLTAREN 1 % Gel topical gel   Generic drug:  diclofenac     100 g    Apply topically nightly as needed for moderate pain Apply 4 grams to knees or 2 grams to hands four times daily using enclosed dosing card.        * Notice:  This list has 3 medication(s) that are the same as other medications prescribed for you. Read the directions carefully, and ask your doctor or other care provider to review them with you.

## 2018-01-23 DIAGNOSIS — Z79.4 TYPE 2 DIABETES MELLITUS WITH HYPERGLYCEMIA, WITH LONG-TERM CURRENT USE OF INSULIN (H): ICD-10-CM

## 2018-01-23 DIAGNOSIS — Z79.4 ENCOUNTER FOR LONG-TERM (CURRENT) USE OF INSULIN (H): ICD-10-CM

## 2018-01-23 DIAGNOSIS — E11.65 TYPE 2 DIABETES MELLITUS WITH HYPERGLYCEMIA, WITH LONG-TERM CURRENT USE OF INSULIN (H): ICD-10-CM

## 2018-01-23 NOTE — TELEPHONE ENCOUNTER
"Requested Prescriptions   Pending Prescriptions Disp Refills     TRESIBA FLEXTOUCH 200 UNIT/ML pen [Pharmacy Med Name: TRESIBA FLEXTOUCH 200UNIT/ML SOPN]  Last Written Prescription Date:  3/29/2017  Last Fill Quantity: 18 mL,  # refills: 2   Last Office Visit with FMSTEPHAN, YAHIR or [facility] prescribing provider:  1/16/2018     Future Office Visit:    Next 5 appointments (look out 90 days)     Feb 13, 2018 10:00 AM CST   Return Visit with MARICRUZ Castillo CNP   [facility]    [address]  [facility] [address]   [phone]               18 mL 1     Sig: INJECT 68 UNITS UNDER THE SKIN DAILY    Long Acting Insulin Protocol Failed    1/23/2018  3:34 PM       Failed - Blood pressure less than 140/90 in past 6 months    BP Readings from Last 3 Encounters:   01/16/18 161/68   10/18/17 (!) 156/96   09/20/17 122/68          Failed - LDL on file in past 12 months    Recent Labs   Lab Test  04/12/16   1402   LDL  160*          Passed - Microalbumin on file in past 12 months    Recent Labs   Lab Test  03/30/17   1030   MICROL  207   UMALCR  437.63*          Passed - Serum creatinine on file in past 12 months    Recent Labs   Lab Test  09/20/17   1020   CR  0.72          Passed - HgbA1C in past 3 or 6 months    Recent Labs   Lab Test  01/16/18   1321   A1C  9.4*          Passed - Patient is age 18 or older       Passed - Recent visit with authorizing provider's specialty in past 6 months    Patient had office visit in the last 6 months or has a visit in the next 30 days with authorizing provider.  See \"Patient Info\" tab in inbasket, or \"Choose Columns\" in Meds & Orders section of the refill encounter.              "

## 2018-01-24 RX ORDER — INSULIN DEGLUDEC 200 U/ML
INJECTION, SOLUTION SUBCUTANEOUS
Qty: 18 ML | Refills: 1 | Status: SHIPPED | OUTPATIENT
Start: 2018-01-24 | End: 2018-03-22

## 2018-01-24 NOTE — TELEPHONE ENCOUNTER
Emily-failing bp and FLP.  Please advise.  Elsy Crespo, RN      1/16/18  A/P  Ms.Anna ROXANNE Dotson is a 56 year old here for the evaluation/management of diabetes:     1. DM2 - Uncontrolled.    Her binge eating disorder continues to be a big contributing factor to her poor control, she's now seeing a therapist.    Her diabetes has been very difficulty to treat due to her eating disorder and reluctance to increase insulin or try additional oral medications.  Increase Tresiba U200, to 48 --> 50 units as needed to target fasting glucose , continue Novolog I:C ratio to 1:4 and correction factor 1:18.   Obtain diagnostic CGM.  Consider personal CGM.  She also expressed an interest in possible insulin pump therapy.     There is some variability among people, most will usually develop symptoms suggestive of hypoglycemia when blood glucose levels are lowered to the mid 60's. The first set of symptoms are called adrenergic. Patients may experience any of the following nervousness, sweating, intense hunger, trembling, weakness, palpitations, and difficulty speaking.   The acute management of hypoglycemia involves the rapid delivery of a source of easily absorbed sugar. Regular soda, juice, lifesavers, table sugar, are good options. 15 grams of glucose is the dose that is given, followed by an assessment of symptoms and a blood glucose check if possible. If after 10 minutes there is no improvement, another 10-15 grams should be given. This can be repeated up to three times. The equivalency of 10-15 grams of glucose (approximate servings) are: 3-5 hard candies, 3 teaspoons of sugar, or 1/2 cup of regular soda or juice.   2.  Intermittent  Hypertension - Currently untreated.  Will continue to monitor. She does not want to take any additional oral meds due to multiple past med reactions.     3. Hyperlipidemia - Currently untreated-? Statin intolerance.  She is concerned about taking any oral medications due to previous  adverse reactions.  Will continue to monitor.     Labs ordered today:       Orders Placed This Encounter   Procedures     GLUCOSE MONITORING CONTINUOUS, >=72 HR     FOOT EXAM     Hemoglobin A1c     DIABETES EDUCATOR REFERRAL   Urine microalbumin      Radiology/Consults ordered today: C FOOT EXAM  NO CHARGE  DIABETES EDUCATOR REFERRAL     All questions were answered.  The patient indicates understanding of the above issues and agrees with the plan set forth.  Total face to face time discussing diabetes treatment and target BG levels was greater than or equal to 25 minutes.         Follow-up:  1 month     Emily Phan NP  Endocrinology  [facility]  CC: Annamaria Erickson

## 2018-01-25 ENCOUNTER — ALLIED HEALTH/NURSE VISIT (OUTPATIENT)
Dept: EDUCATION SERVICES | Facility: CLINIC | Age: 58
End: 2018-01-25
Payer: COMMERCIAL

## 2018-01-25 DIAGNOSIS — E11.9 TYPE 2 DIABETES, HBA1C GOAL < 7% (H): Primary | ICD-10-CM

## 2018-01-25 PROCEDURE — G0108 DIAB MANAGE TRN  PER INDIV: HCPCS

## 2018-01-25 NOTE — MR AVS SNAPSHOT
After Visit Summary   1/25/2018    Maricarmen Dotson    MRN: [id number]           Patient Information     Date Of Birth          1960        Visit Information        Provider Department      1/25/2018 11:30 AM Deanna Bolton RN [facility]        Today's Diagnoses     Type 2 diabetes, HbA1c goal < 7% (H)    -  1      Care Instructions        Continue with your carbohydrate ratio of 1 unit per 5 grams of carb          Follow-ups after your visit        Additional Services     HEALTH  REFERRAL       Your provider has referred you to a Health .    A Health  will reach out to the patient within three days, if the following criteria has been met.     Clinic: [facility]    Reason for Referral: Diabetes/Obesity    Patient does have knowledge of this service.    Patient Criteria: Diabetes (A1C Score >= 8)/Obesity (BMI >= 35kg/m2)    Provider's Main Focus: Diet/Weight Loss, Lifestyle Changes/Disease Management: manage binge eating                  Your next 10 appointments already scheduled     Jan 29, 2018  9:30 AM CST   Diabetic Education with Deanna Bolton RN   [facility] ([facility])    [address] [id number] [phone]            Jan 30, 2018  9:00 AM CST   New Visit with MARICRUZ [facility] ([facility])    [address]  [address]  [place] [address]   [phone]            Feb 05, 2018 11:00 AM CST   (Arrive by 10:45 AM)   MA SCREENING DIGITAL BILATERAL with [id number]   [facility] ([facility])    [address], [address]  [place] [id number] [phone]           Do not use any powder, lotion or deodorant under your arms or on your breast. If you do, we will ask you to remove it before your exam.  Wear comfortable, two-piece clothing.  If you have  "any allergies, tell your care team.  Bring any previous mammograms from other facilities or have them mailed to the breast center. Three-dimensional (3D) mammograms are available at [facility] locations in [facility], [place], [place], [facility], [place], [place], and [place]. [facility] locations include [place] and [facility] in [place]. Benefits of 3D mammograms include: - Improved rate of cancer detection - Decreases your chance of having to go back for more tests, which means fewer: - \"False-positive\" results (This means that there is an abnormal area but it isn't cancer.) - Invasive testing procedures, such as a biopsy or surgery - Can provide clearer images of the breast if you have dense breast tissue. 3D mammography is an optional exam that anyone can have with a 2D mammogram. It doesn't replace or take the place of a 2D mammogram. 2D mammograms remain an effective screening test for all women.  Not all insurance companies cover the cost of a 3D mammogram. Check with your insurance.            Feb 13, 2018 10:00 AM CST   [facility]    [address]  [place] [facility] [address] [phone]              Who to contact     If you have questions or need follow up information about today's clinic visit or your schedule please contact [facility] directly at [phone].  Normal or non-critical lab and imaging results will be communicated to you by MyChart, letter or phone within 4 business days after the clinic has received the results. If you do not hear from us within 7 days, please contact the clinic through MyChart or phone. If you have a critical or abnormal lab result, we will notify you by phone as soon as possible.  Submit refill requests through [employer] or call your pharmacy and they will forward the refill request to us. Please allow 3 " business days for your refill to be completed.          Additional Information About Your Visit        MyChart Information     [employer] gives you secure access to your electronic health record. If you see a primary care provider, you can also send messages to your care team and make appointments. If you have questions, please call your primary care clinic.  If you do not have a primary care provider, please call [phone] and they will assist you.        Care EveryWhere ID     This is your Care EveryWhere ID. This could be used by other organizations to access your [place] medical records  [id number]        Your Vitals Were     Last Period                   01/01/1999            Blood Pressure from Last 3 Encounters:   01/16/18 161/68   10/18/17 (!) 156/96   09/20/17 122/68    Weight from Last 3 Encounters:   01/16/18 84.8 kg (187 lb)   10/18/17 83 kg (182 lb 14.4 oz)   09/20/17 81.6 kg (179 lb 12.8 oz)              We Performed the Following     DIABETES EDUCATION - Individual  []     HEALTH  REFERRAL        Primary Care Provider Office Phone # Fax #    Annamaria Erickson [phone] [address] [place] [address]        Equal Access to Services     [facility]: Hadii aad ku hadasho Soomaali, waaxda luqadaha, qaybta kaalmada adeegyada, dillon brasher. So [facility] [phone].    ATENCIÓN: Si habla español, tiene a richter disposición servicios gratuitos de asistencia lingüística. Llame al [phone].    We comply with applicable federal civil rights laws and [place] laws. We do not discriminate on the basis of race, color, national origin, age, disability, sex, sexual orientation, or gender identity.            Thank you!     Thank you for choosing [facility] [place]  for your care. Our goal is always to provide you with excellent care. Hearing back from our patients is one way we can continue to improve our  services. Please take a few minutes to complete the written survey that you may receive in the mail after your visit with us. Thank you!             Your Updated Medication List - Protect others around you: Learn how to safely use, store and throw away your medicines at www.disposemymeds.org.          This list is accurate as of 1/25/18  5:02 PM.  Always use your most recent med list.                   Brand Name Dispense Instructions for use Diagnosis    ASPIRIN NOT PRESCRIBED    INTENTIONAL    1 each    continuous prn Antiplatelet medication not prescribed intentionally due to age.        EPINEPHrine 0.3 MG/0.3ML injection 2-pack    EPIPEN/ADRENACLICK/or ANY BX GENERIC EQUIV    2 each    Inject 0.3 mLs (0.3 mg) into the muscle once as needed for anaphylaxis    Allergic reaction       insulin aspart 100 UNIT/ML injection    NovoLOG FLEXPEN    90 mL    1 unit per 4 grams of carbohydrate eaten at each meal +a correction of 1 additional unit per 18 points of blood sugar above 140.  Total daily dose 100 units/day.  Dispense 6 boxes/30 pens for a 90-day supply or 2 boxes/10 pens for a 30-day supply as allowed by insurance.    Type 2 diabetes mellitus with hyperglycemia, with long-term current use of insulin (H)       * insulin pen needle 31G X 5 MM    B-D U/F    550 each    Use 6  time(s) per day.  Please dispense as BD Pen Needle Mini U/F 31G x 5 MM    Type 2 diabetes mellitus with hyperglycemia (H)       * insulin pen needle 31G X 5 MM    B-D U/F    300 each    Use 6 daily or as directed.    Type 2 diabetes mellitus with hyperglycemia (H)       * insulin pen needle 31G X 5 MM    B-D U/F    200 each    Use 5 daily or as directed.    Type 2 diabetes mellitus with hyperglycemia (H)       polyethylene glycol Packet    MIRALAX/GLYCOLAX     Take 1 packet by mouth daily.        STATIN NOT PRESCRIBED (INTENTIONAL)     0 each    Statin not prescribed intentionally due to Intolerance    Statin intolerance       sulindac 200 MG  tablet    CLINORIL     Take 200 mg by mouth 3 times daily        traMADol 50 MG tablet    ULTRAM     Take 50 mg by mouth 3 times daily 1-2 tablets in the evening        TRESIBA FLEXTOUCH 200 UNIT/ML pen   Generic drug:  insulin degludec     18 mL    INJECT 68 UNITS UNDER THE SKIN DAILY    Type 2 diabetes mellitus with hyperglycemia, with long-term current use of insulin (H), Encounter for long-term (current) use of insulin (H)       VOLTAREN 1 % Gel topical gel   Generic drug:  diclofenac     100 g    Apply topically nightly as needed for moderate pain Apply 4 grams to knees or 2 grams to hands four times daily using enclosed dosing card.        * Notice:  This list has 3 medication(s) that are the same as other medications prescribed for you. Read the directions carefully, and ask your doctor or other care provider to review them with you.

## 2018-01-25 NOTE — LETTER
1/25/2018         RE: Maricarmen Dotson  [address]  [place] [address]        Dear Colleague,    Thank you for referring your patient, Maricarmen Dotson, to the [facility]. Please see a copy of my visit note below.    Diabetes Self Management Training: Follow-up Visit and Continuous Glucose Monitor Download    Maricarmen Dotson presents today for education, evaluation of glucose control and download of continuous glucose monitoring related to Type 2 diabetes.    She is accompanied by self    Patient's diabetes management related comments/concerns: dealing with a concussion, post traumatic stress and often binge eating, trying to do better but may need help, wants better control of diabetes      Current Diabetes Management per Patient:  Taking diabetes medications?   yes:     Diabetes Medication(s)     Insulin Sig    TRESIBA FLEXTOUCH 200 UNIT/ML pen INJECT 68 UNITS UNDER THE SKIN DAILY    insulin aspart (NOVOLOG FLEXPEN) 100 UNIT/ML injection 1 unit per 4 grams of carbohydrate eaten at each meal +a correction of 1 additional unit per 18 points of blood sugar above 140.  Total daily dose 100 units/day.  Dispense 6 boxes/30 pens for a 90-day supply or 2 boxes/10 pens for a 30-day supply as allowed by insurance.          *Abbreviated insulin dose documentation key: Insulin Trade Name ([id number]-ivfjb-tytmek-bedtime) - i.e. Humalog 5-5-5-0 (Humalog 5 units at breakfast, 5 units at lunch, and 5 units at dinner).    LibrePro Continuous Glucose Monitor Interpretation         Most Recent A1c Result:    Lab Results   Component Value Date    A1C 9.4 01/16/2018     Indication/s for LibrePro study: Difficult to manage hypoglycemia and/or hyperglycemia, despite multiple adjustments in self-monitoring of blood glucose and diabetes medication administration.    Statistics:   1. Sensor worn for 10 days.  2. Glucose excursions:   Percent in target is: 53%  Percent above target is: 46%  Percent  below target is: 1%  3. Estimated A1c: 7.9%                        Data evaluation:   1. Sensor modal day evaluation shows the followin. Consistent day-to-day patterns noted: pattern of daytime hyperglycemia  2. pattern of nocturnal hyperglycemia.  3. Average glucose: 181 mg/dL.  4. Low glucose events: 2 events with and average duration of 30 minutes          Patient's Logbook shows the following:   Carbohydrate counting is: patient does not carbohydrate count  Medication and/or insulin dosing is: accurate    Assessment:  Patient glucose above goal due to binge eating, she is not taking insulin when she binges and it's not the regular meal.  Multiple     INTERVENTION:  Recommend taking Novolog when binge eating  Consider insulin pump  Discussed health      Education provided today on:  AADE Self-Care Behaviors:  Healthy Eating: consistency in amount, composition, and timing of food intake  Taking Medication: action of prescribed medication, when to take medications and dosing  Benefits of insulin pump therapy    Pt verbalized understanding of concepts discussed and recommendations provided today.       Education Materials Provided:  Omnipod pump brochure    PLAN:  Continue tresiba 46 units  Carbohydrate ratio of 1:5 - take Novolog when binge eating  Correction of 1:20 > 140  See Health   Consider insulin pump    FOLLOW-UP:  Follow-up appointment scheduled on 18.  Chart routed to referring provider.    Deanna Bolton RN,CDE   Time Spent: 60 minutes  Encounter Type: Individual    Any diabetes medication dose changes were made via the CDE Protocol and Collaborative Practice Agreement with the patient's referring provider. A copy of this encounter was shared with the provider.

## 2018-01-25 NOTE — PROGRESS NOTES
Diabetes Self Management Training: Follow-up Visit and Continuous Glucose Monitor Download    Maricarmen Dotson presents today for education, evaluation of glucose control and download of continuous glucose monitoring related to Type 2 diabetes.    She is accompanied by self    Patient's diabetes management related comments/concerns: dealing with a concussion, post traumatic stress and often binge eating, trying to do better but may need help, wants better control of diabetes      Current Diabetes Management per Patient:  Taking diabetes medications?   yes:     Diabetes Medication(s)     Insulin Sig    TRESIBA FLEXTOUCH 200 UNIT/ML pen INJECT 68 UNITS UNDER THE SKIN DAILY    insulin aspart (NOVOLOG FLEXPEN) 100 UNIT/ML injection 1 unit per 4 grams of carbohydrate eaten at each meal +a correction of 1 additional unit per 18 points of blood sugar above 140.  Total daily dose 100 units/day.  Dispense 6 boxes/30 pens for a 90-day supply or 2 boxes/10 pens for a 30-day supply as allowed by insurance.          *Abbreviated insulin dose documentation key: Insulin Trade Name ([id number]-nhhmq-mubozx-bedtime) - i.e. Humalog 5-5-5-0 (Humalog 5 units at breakfast, 5 units at lunch, and 5 units at dinner).    LibrePro Continuous Glucose Monitor Interpretation         Most Recent A1c Result:    Lab Results   Component Value Date    A1C 9.4 2018     Indication/s for LibrePro study: Difficult to manage hypoglycemia and/or hyperglycemia, despite multiple adjustments in self-monitoring of blood glucose and diabetes medication administration.    Statistics:   1. Sensor worn for 10 days.  2. Glucose excursions:   Percent in target is: 53%  Percent above target is: 46%  Percent below target is: 1%  3. Estimated A1c: 7.9%                        Data evaluation:   1. Sensor modal day evaluation shows the followin. Consistent day-to-day patterns noted: pattern of daytime hyperglycemia  2. pattern of nocturnal  hyperglycemia.  3. Average glucose: 181 mg/dL.  4. Low glucose events: 2 events with and average duration of 30 minutes          Patient's Logbook shows the following:   Carbohydrate counting is: patient does not carbohydrate count  Medication and/or insulin dosing is: accurate    Assessment:  Patient glucose above goal due to binge eating, she is not taking insulin when she binges and it's not the regular meal.  Multiple     INTERVENTION:  Recommend taking Novolog when binge eating  Consider insulin pump  Discussed health      Education provided today on:  AADE Self-Care Behaviors:  Healthy Eating: consistency in amount, composition, and timing of food intake  Taking Medication: action of prescribed medication, when to take medications and dosing  Benefits of insulin pump therapy    Pt verbalized understanding of concepts discussed and recommendations provided today.       Education Materials Provided:  Omnipod pump brochure    PLAN:  Continue tresiba 46 units  Carbohydrate ratio of 1:5 - take Novolog when binge eating  Correction of 1:20 > 140  See Health   Consider insulin pump    FOLLOW-UP:  Follow-up appointment scheduled on 1/29/18.  Chart routed to referring provider.    Deanna Bolton RN,CDE   Time Spent: 60 minutes  Encounter Type: Individual    Any diabetes medication dose changes were made via the CDE Protocol and Collaborative Practice Agreement with the patient's referring provider. A copy of this encounter was shared with the provider.

## 2018-01-29 ENCOUNTER — ALLIED HEALTH/NURSE VISIT (OUTPATIENT)
Dept: EDUCATION SERVICES | Facility: CLINIC | Age: 58
End: 2018-01-29
Payer: COMMERCIAL

## 2018-01-29 ENCOUNTER — TELEPHONE (OUTPATIENT)
Dept: NURSING | Facility: CLINIC | Age: 58
End: 2018-01-29

## 2018-01-29 DIAGNOSIS — E11.65 TYPE 2 DIABETES MELLITUS WITH HYPERGLYCEMIA (H): ICD-10-CM

## 2018-01-29 DIAGNOSIS — E11.9 TYPE 2 DIABETES, HBA1C GOAL < 7% (H): Primary | ICD-10-CM

## 2018-01-29 PROCEDURE — G0108 DIAB MANAGE TRN  PER INDIV: HCPCS

## 2018-01-29 NOTE — PROGRESS NOTES
Diabetes Self Management Training: Follow-up Visit and Continuous Glucose Monitor Download    Maricarmen Dotson presents today for education and download of continuous glucose monitoring related to Type 2 diabetes.    She is accompanied by daughter    Patient's diabetes management related comments/concerns: dealing with PTSD and post concussive syndrome, binge eating and not taking insulin at that time      Current Diabetes Management per Patient:  Taking diabetes medications?   yes:     Diabetes Medication(s)     Insulin Sig    TRESIBA FLEXTOUCH 200 UNIT/ML pen INJECT 68 UNITS UNDER THE SKIN DAILY    insulin aspart (NOVOLOG FLEXPEN) 100 UNIT/ML injection 1 unit per 4 grams of carbohydrate eaten at each meal +a correction of 1 additional unit per 18 points of blood sugar above 140.  Total daily dose 100 units/day.  Dispense 6 boxes/30 pens for a 90-day supply or 2 boxes/10 pens for a 30-day supply as allowed by insurance.          *Abbreviated insulin dose documentation key: Insulin Trade Name ([id number]-qcpmh-kaencf-bedtime) - i.e. Humalog 5-5-5-0 (Humalog 5 units at breakfast, 5 units at lunch, and 5 units at dinner).    LibrePro Continuous Glucose Monitor Interpretation           Most Recent A1c Result:    Lab Results   Component Value Date    A1C 9.4 2018     Indication/s for LibrePro study: Difficult to manage hypoglycemia and/or hyperglycemia, despite multiple adjustments in self-monitoring of blood glucose and diabetes medication administration.    Statistics:   1. Sensor worn for 14 days.  2. Glucose excursions:   Percent in target is: 53%  Percent above target is: 46%  Percent below target is: 1%  3. Estimated A1c: 7.9%                        Data evaluation:   1. Sensor modal day evaluation shows the followin. Consistent day-to-day patterns noted: pattern of daytime hyperglycemia  2. pattern of nocturnal hyperglycemia.  3. Average glucose: 181 mg/dL.  4. Low glucose events: 2 events with and  average duration of 30 minutes          Patient's Logbook shows the following:   Carbohydrate counting is: not currently carbohydrate counting, does understand carbohydrate counting but not using at this time.  Binge eating when stressed, not bolusing for snacking or binging during the night time.  Medication and/or insulin dosing is: accurate when she is bolus    Assessment:  Patient not bolusing for all carbohydrates when she is binge eating due to complexity of calculating carbohydrates when her stress level is high.  PTSD and post concussive syndrome making it difficult to manage her diabetes.   Patient has long term use of insulin and she may do better with an Omnipod which can be set up with a food library to decrease the complexity of her calculating insulin bolus at her stressful times.        INTERVENTION:    Education provided today on:  AADE Self-Care Behaviors:  Healthy Eating: consistency in amount, composition, and timing of food intake  Monitoring: individual blood glucose targets and frequency of monitoring  Healthy Coping: methods for coping with stress and when to seek professional counseling    Insulin pump management and the importance of Managing insulin pump therapy and Basic button pushing    Pt verbalized understanding of concepts discussed and recommendations provided today.         PLAN:  No changes to insulin dosing today  Take pictures of your food when you can  Send your log prior to appointment   prepump appointment scheduled on 2/13/18 at 10:30am    Contact [employer] regarding the status of your pump  AVS printed and provided to patient today.    FOLLOW-UP:  Follow-up appointment scheduled on 2/13/18.  Chart routed to referring provider.    Deanna Bolton RN,CDE   Time Spent: 60 minutes  Encounter Type: Individual    Any diabetes medication dose changes were made via the CDE Protocol and Collaborative Practice Agreement with the patient's referring provider. A copy of this encounter was  shared with the provider.

## 2018-01-29 NOTE — LETTER
1/29/2018         RE: Maricarmen Dotson  [address]  [place] [address]        Dear Colleague,    Thank you for referring your patient, Maricarmen Dotson, to the [facility]. Please see a copy of my visit note below.    Diabetes Self Management Training: Follow-up Visit and Continuous Glucose Monitor Download    Maricarmen Dotson presents today for education and download of continuous glucose monitoring related to Type 2 diabetes.    She is accompanied by daughter    Patient's diabetes management related comments/concerns: dealing with PTSD and post concussive syndrome, binge eating and not taking insulin at that time      Current Diabetes Management per Patient:  Taking diabetes medications?   yes:     Diabetes Medication(s)     Insulin Sig    TRESIBA FLEXTOUCH 200 UNIT/ML pen INJECT 68 UNITS UNDER THE SKIN DAILY    insulin aspart (NOVOLOG FLEXPEN) 100 UNIT/ML injection 1 unit per 4 grams of carbohydrate eaten at each meal +a correction of 1 additional unit per 18 points of blood sugar above 140.  Total daily dose 100 units/day.  Dispense 6 boxes/30 pens for a 90-day supply or 2 boxes/10 pens for a 30-day supply as allowed by insurance.          *Abbreviated insulin dose documentation key: Insulin Trade Name ([id number]-[id number]-[id number]-bedtime) - i.e. Humalog 5-5-5-0 (Humalog 5 units at breakfast, 5 units at lunch, and 5 units at dinner).    LibrePro Continuous Glucose Monitor Interpretation           Most Recent A1c Result:    Lab Results   Component Value Date    A1C 9.4 01/16/2018     Indication/s for LibrePro study: Difficult to manage hypoglycemia and/or hyperglycemia, despite multiple adjustments in self-monitoring of blood glucose and diabetes medication administration.    Statistics:   1. Sensor worn for 14 days.  2. Glucose excursions:   Percent in target is: 53%  Percent above target is: 46%  Percent below target is: 1%  3. Estimated A1c: 7.9%                        Data  evaluation:   1. Sensor modal day evaluation shows the followin. Consistent day-to-day patterns noted: pattern of daytime hyperglycemia  2. pattern of nocturnal hyperglycemia.  3. Average glucose: 181 mg/dL.  4. Low glucose events: 2 events with and average duration of 30 minutes          Patient's Logbook shows the following:   Carbohydrate counting is: not currently carbohydrate counting, does understand carbohydrate counting but not using at this time.  Binge eating when stressed, not bolusing for snacking or binging during the night time.  Medication and/or insulin dosing is: accurate when she is bolus    Assessment:  Patient not bolusing for all carbohydrates when she is binge eating due to complexity of calculating carbohydrates when her stress level is high.  PTSD and post concussive syndrome making it difficult to manage her diabetes.   Patient has long term use of insulin and she may do better with an Omnipod which can be set up with a food library to decrease the complexity of her calculating insulin bolus at her stressful times.        INTERVENTION:    Education provided today on:  AADE Self-Care Behaviors:  Healthy Eating: consistency in amount, composition, and timing of food intake  Monitoring: individual blood glucose targets and frequency of monitoring  Healthy Coping: methods for coping with stress and when to seek professional counseling    Insulin pump management and the importance of Managing insulin pump therapy and Basic button pushing    Pt verbalized understanding of concepts discussed and recommendations provided today.         PLAN:  No changes to insulin dosing today  Take pictures of your food when you can  Send your log prior to appointment   prepump appointment scheduled on 18 at 10:30am    Contact Omnipod regarding the status of your pump  AVS printed and provided to patient today.    FOLLOW-UP:  Follow-up appointment scheduled on 18.  Chart routed to referring  provider.    Deanna Bolton RN,CDE   Time Spent: 60 minutes  Encounter Type: Individual    Any diabetes medication dose changes were made via the CDE Protocol and Collaborative Practice Agreement with the patient's referring provider. A copy of this encounter was shared with the provider.

## 2018-01-29 NOTE — PROGRESS NOTES
"      [facility]     Date of visit: 1/30/2018    Reason for consultation:    Maricarmen Dotson is a 57 year old female who is seen in consultation today at the request of her PCP,  Annamaria Erickson for evaluation of her pain issues and recommendations for management, with specific emphasis on  Reason for Referral: Comprehensive Evaluation and Management    Please complete the following questions:    What is your diagnosis for the patient's pain? Fibromyalgia    Do you have any specific questions for the pain specialist? Yes: help with pain management of fibromyalgia    Are there any red flags that may impact the assessment or management of the patient? None     Please see the [facility] [place] health questionnaire which the patient completed and reviewed with me in detail.    Review of [place] Prescription Monitoring Program (): No concern for abuse or misuse of controlled medications based on this report.     Pain medications are being prescribed by Dr. Mcadams.     Subjective:    Chief Complaint:    Chief Complaint   Patient presents with     Pain       Pain history:  Maricarmen Dotson is a 57 year old female who presents for initial evaluation of chief complaint of widespread pain.      She first started having problems with widespread pain in 1996. She states that at that time she suddenly had difficulty walking, had extensive evaluation of that time and was ultimately was diagnosed with fibromyalgia. Maricarmen reports over the years she has learned triggers and methods to help with pain. She has tried physical therapy, biofeedback, opioids, and other medication management. Maricarmen reports benefit from biofeedback and meditation, states she has learned a great deal about the mind body connection. She states,\"movement is paramount.\" She has tried regular exercise, continues to walk every other day. She has tried acupuncture with good benefit. She has also tried massage therapy " "with variable relief. She was referred to [facility] in the early 2000's, states she was put on opioids at that time without relief. Ultimately stopped going as she was not having benefit. She was referred to [facility] in 2009, worked with the program for only a couple visits, had little benefit. She states she has recently been mindful of her diet, spent 30 days with an anti-inflammatory diet while analyzing food that were triggers for pain. She found this helpful for pain. Specifically found nitrates cause an increase in pain. She has used ice and TENS unit with variable relief. She was on gabapentin for a long time with good benefit, stopped taking a few months ago due to increased arm pain. She has been on numerous medication in the past for treatment of fibromyalgia without success, states, \"when I take medication, it often does the opposite.\" She continues to follow with a rheumatologist, Dr. Mcadams, states he prescribes her tramadol and Sulindac long term. Her pain is located in neck, bilateral shoulders, upper back, lower back, bilateral upper arms, and bilateral leg pain. Occasional numbness and tingling in right elbow. Denies weakness.       Pain description:  Location: bilateral shoulders, upper back  Quality: sharp, burning, scraping, aching, shooting, raw  Severity/Intensity: 5/10 at best, 10/10 at worst, 8/10 on average  Aggravating factors include: physical activity, waking up and before bed  Relieving factors include: pressure points, ice, movement, prescribed medications    The patient otherwise denies bowel or bladder incontinence, parasthesias, weakness, saddle anesthesia, unintentional weight loss, or fever/chills/sweats.     Maricarmen Dotson has been seen at a pain clinic in the past. [facility] and [facility] in 2009.    Pain Treatments:  1. Medications:       Current pain medications:   Sulindac 200mg BID- H, SE of stomach upset, better with " "food   Tramadol 50mg 2-6 tabs/day- H, SE stomach upset, somewhat sedated   Voltaren gel- H, variable relief    Biofreeze- [facility]    Current calculated MME: 30    THE 4 A's OF OPIOID MAINTENANCE ANALGESIA    Analgesia: good    Activity: good    Adverse effects: upset stomach    Adherence to Rx protocol: good    1. Previous Pain Relevant Medications:  (H--helped; HI--Helped initially; [facility]--Somewhat helpful; [facility]--No help; W--worse; SE--side effects; ?--Unsure if helpful)   NOTE: This medication information taken from patient's intake form, not medical records.    Opiates: T3- SE, nausea/vomiting, hydrocodone- SE, nausea, trouble walking, morphine- SE, nausea/vomiting, oxycodone- SE, nausea/vomiting, tramadol- H, SE, stomach upset, sedated   NSAIDS: Ibuprofen- NH, Aleve- SE, \"javad me up,\" Toradol- SE, GI upset    Muscle Relaxants: Flexeril- SE \"jacked me up, crawling up to the ceiling,\" Zanaflex- NH, Norflex- NH, caused muscle spasms, clonazepam- W, caused muscle spasms   Anti-migraine mediations: no   Anti-depressants: amitriptyline- SE, worsened anxiety, Wellbutrin- SE, worsened anxiety, Celexa- SE, worsened anxiety, SI, despiramine- SE, worsened anxiety, doxepin- SE, worsened anxiety, \"really bad, more depressed,\" Cymbalta- NH, SE, worsened anxiety, insomnia, Remeron- SE, worsened anxiety, insomnia, nortriptyline- SE, worsened anxiety, Zoloft- NH, SE, worsened anxiety, trazodone- SE, worsened anxiety, Effexor- SE, worsened anxiety, insomnia, hallucinating, Lexapro- SE, worsened anxiety, Mirapex- SE, worsened anxiety, depression    Sleep aids: Lunesta- W, insomnia, Ambien- W, insomnia,  temazepam- W, insomnia   Anxiolytics: Valium- W, Klonopin- W   Neuropathics: gabapentin- HI, tegretol- NH, SE, cognitive issues, sedated depakote- NH, SE cognitive issues, lyrica- NH, SE, topamax- NH, SE    Topicals: Biofreeze- H   Other medications not covered above: Tylenol- NH    2. Physical Therapy: yes- NH  3. Surgery: no  4. " Injections: right knee steroid injection- NH  5. Chiropractic: yes- H  6. Acupuncture: yes- H  7. TENS Unit: yes- H, variable    Imaging:  MRI of brain was completed on 9/22/17 and shows:  FINDINGS: There is no evidence of hemorrhage, mass, acute infarct, or  anomaly. The brain parenchyma, ventricles and subarachnoid spaces  appear normal. There are no gadolinium enhancing lesions.     The facial structures appear normal. The arteries at the base of the  brain and the dural venous sinuses appear patent.     Past Medical History:  Past Medical History:   Diagnosis Date     Ascending aorta enlargement (H)      Depressive disorder     severe, prior hospitalization     Diabetes mellitus, type 2 (H)      Diverticulosis of colon (without mention of hemorrhage)      Fibromyalgia 6/26/2007     Insomnia      Irritable bowel syndrome        Past Surgical History:  Past Surgical History:   Procedure Laterality Date     C SPINAL ORTHOSIS,NOS  2002    lumbar surgery     choley  2011     HYSTERECTOMY, CERVIX STATUS UNKNOWN  2000    TVH/BSO       Medications:  Current Outpatient Prescriptions   Medication Sig Dispense Refill     TRESIBA FLEXTOUCH 200 UNIT/ML pen INJECT 68 UNITS UNDER THE SKIN DAILY 18 mL 1     sulindac (CLINORIL) 200 MG tablet Take 200 mg by mouth 3 times daily        diclofenac (VOLTAREN) 1 % GEL topical gel Apply topically nightly as needed for moderate pain Apply 4 grams to knees or 2 grams to hands four times daily using enclosed dosing card. 100 g 1     insulin aspart (NOVOLOG FLEXPEN) 100 UNIT/ML injection 1 unit per 4 grams of carbohydrate eaten at each meal +a correction of 1 additional unit per 18 points of blood sugar above 140.  Total daily dose 100 units/day.  Dispense 6 boxes/30 pens for a 90-day supply or 2 boxes/10 pens for a 30-day supply as allowed by insurance. 90 mL 3     insulin pen needle (B-D U/F) 31G X 5 MM Use 5 daily or as directed. 200 each 11     insulin pen needle (B-D U/F) 31G X 5 MM  Use 6 daily or as directed. 300 each prn     insulin pen needle (B-D U/F) 31G X 5 MM Use 6  time(s) per day.  Please dispense as BD Pen Needle Mini U/F 31G x 5  each 3     EPINEPHrine (EPIPEN) 0.3 MG/0.3ML injection Inject 0.3 mLs (0.3 mg) into the muscle once as needed for anaphylaxis 2 each 0     STATIN NOT PRESCRIBED, INTENTIONAL, Statin not prescribed intentionally due to Intolerance 0 each 0     traMADol (ULTRAM) 50 MG tablet Take 50 mg by mouth 3 times daily 1-2 tablets in the evening        ASPIRIN NOT PRESCRIBED, INTENTIONAL, continuous prn Antiplatelet medication not prescribed intentionally due to age. 1 each 0     polyethylene glycol (MIRALAX/GLYCOLAX) packet Take 1 packet by mouth daily.         Allergies:     Allergies   Allergen Reactions     Amoxicillin Anaphylaxis     Bee Anaphylaxis     Bee sting       Iodine Anaphylaxis     Severe anaphalatic shock       Sulfa Drugs Anaphylaxis     Tetanus-Diphtheria Toxoids      Rxn was to Tdap-whole body joint pain and fever.  Had similar reaction to Td vaccine 7/28/2017     Zithromax [Azithromycin Dihydrate] Nausea and Vomiting     Contrast Dye      Cyproheptadine      Severe headache, cardiac issues, and dizziness     Humalog [Insulin Lispro]      Causes pancreatitis -      Latex      Skin burn and can't breathe       Metformin      Onglyza [Saxagliptin Hydrochloride]      Fibromyalgia flare     Prochlorperazine      Altered mental status, hallucinations     Tetracycline Nausea and Vomiting, Hives and Difficulty breathing     Pt called to update today after the visit that she is allergic to the tetracycline-added to her list       Social History:  Home situation: lives in a house with  and 2 daughters  Support system: family  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, rad tech  Tobacco use: no  Drug use: no  Alcohol use:  1-3x a month  History of chemical dependency treatment: no  Mental health admissions: yes, depression and anxiety, most recently as      Family history:  Family History   Problem Relation Age of Onset     DIABETES Mother      type 2     Hypertension Mother      CEREBROVASCULAR DISEASE Mother       stroke age 57     Ovarian Cancer Mother 43     CANCER Mother      cervix     DIABETES Father      type 2     Hypertension Father      GASTROINTESTINAL DISEASE Father      colon polyps     Breast Cancer Sister      Ovarian Cancer Sister 46     Breast Cancer Sister      Ovarian Cancer Maternal Grandmother 72     CANCER Maternal Grandmother      cervix     Family history of headaches: yes    Review of Systems:    POSTIVE IN BOLD  GENERAL: fever/chills, fatigue, general unwell feeling, weight gain/loss.  HEAD/EYES:  headache, dizziness, or vision changes.    EARS/NOSE/THROAT: nosebleeds, hearing loss, sinus infection, earache, tinnitus.  IMMUNE:  allergies, cancer, immune deficiency, or infections.  SKIN:  itching, rash, hives  HEME/Lymphatic: anemia, easy bruising, easy bleeding.  RESPIRATORY: cough, wheezing, or shortness of breath  CARDIOVASCULAR/Circulation: extremity edema, syncope, hypertension, tachycardia, or angina.  GASTROINTESTINAL: abdominal pain, nausea/emesis, diarrhea, constipation,  hematochezia, or melena.  ENDOCRINE:  diabetes, steroid use,  thyroid disease or osteoporosis.  MUSCULOSKELETAL: joint pain, stiffness, neck pain, back pain, arthritis, or gout.  GENITOURINARY: frequency, urgency, dysuria, difficulty voiding, hematuria or incontinence.  NEUROLOGIC: weakness, numbness, paresthesias, seizure, tremor, stroke or memory loss.  PSYCHIATRIC: depression, anxiety, stress, suicidal thoughts or mood swings.     Objective:    Physical Exam:  Vitals:    18 0902 18 0904 18 1008   BP: 154/78 193/83 138/84   Pulse: 69 69 91   SpO2: 96%     Weight: 85.3 kg (188 lb)       Exam:  Constitutional: Well developed, well nourished, appears stated age.  HEENT: Head atraumatic, normocephalic. Eyes without conjunctival injection  or jaundice. Oropharynx clear. Neck supple. No obvious neck masses.  Cardiovascular: Regular rate/rhythm; no murmurs/rubs/gallops appreciated.  Respiratory: Lungs clear to auscultation bilaterally. Good aeration. No wheezing/rales/rhonchi.   Skin: No rash, lesions, or petechiae of exposed skin.   Extremities: Peripheral pulses intact. No clubbing, cyanosis, or edema.  Psychiatric/mental status: Alert, without lethargy or stupor. Speech fluent. Appropriate affect. Mood normal. Able to follow commands without difficulty.     Musculoskeletal exam:  Able to walk on the heels and toes with moderate difficulty. Patient has antalgic gait favoring the right side.   Normal bulk and tone. Unremarkable spinal curvature.     Cervical spine:  Range of motion within normal limits   Tenderness in the cervical paraspinal muscles.Yes  Spurling's negative bilaterally.     Thoracic spine:    Kyphosis. Yes- slight   Tenderness in the thoracic paraspinal muscles.Yes    Lumbar spine:    Flex:  110 degrees   Ext: 25 degrees   Tenderness in the lumbar paraspinal muscles.No   Rotation/ext to right: painful    Rotation/ext to left: painful     Myofascial tenderness:  throughout  Focal tenderness: Bilateral SI joint tenderness. No gluteal, piriformis, or GT, tenderness.    Neurologic exam:  CN:  Cranial nerves 2-12 are grossly intact  Motor:  5/5 UE and LE strength  Strength:       C4 (shoulder shrug)  symmetric 5/5       C5 (shoulder abduction) symmetric 5/5       C6 (elbow flexion) symmetric 5/5       C7 (elbow extension) symmetric 5/5       C8 (finger abduction, thumb flexion) symmetric 5/5    Reflexes:     Biceps:     R:  2/4 L: 2/4   Brachioradialis   R:  2/4 L: 2/4   Patella:  R:  2/4 L: 2/4   Achilles:  R:  2/4 L: 2/4  Other reflexes:    No ankle clonus     Sensory:   Light touch: normal bilateral upper and lower extremities    Vibration: normal in LE   No allodynia, dysesthesia. Hyperalgesia.    Fibromyalgia Assessment:    Myofascial  tenderness:  yes   Tender point survey:  18/18 2010 ACR Criteria for fibromyalgia - scoring   Widespread pain index of > or equal to 7 - yes   Symptoms present for at least 3 months - yes   No other disorder to explain their pain - yes   Symptoms Severity scale score > or equal to 5   Fatigue (0-3) - 3   Waking unrefreshed (0-3) - 3   Cognitive symptoms (0-3) - 3   Somatic Symptoms - 3  (None - 0, Few - 1, Moderate - 2, Great deal - 3)    Patient does meet the criteria for a diagnosis of Fibromyalgia.     DIRE Score for ongoing opioid management is calculated as follows:   Diagnosis = 2 pts (slowly progressive; moderate pain/objective findings)   Intractability = 2 pts (most treatments tried; patient not fully engaged/barriers)   Risk    Psych = 3 pts (no significant personality dysfunction/mental illness; good communication with clinic)    Chem Hlth = 2 pts (use of medications to cope with stress; chemical dependency in remission)   Reliability = 2 pts (occasional difficulties with compliance; generally reliable)   Social = 2 pts (reduction in some relationships/life rolls)   (Psych + Chem hlth + Reliability + Social) = 13     Efficacy = 2 pts (moderate benefit/function; low med dose; too early/not tried meds)         DIRE Score = 15        7-13: likely NOT suitable candidate for long-term opioid analgesia       14-21: may be a suitable candidate for long-term opioid analgesia     Assessment:  Maricarmen Dotson is a 57 year old female with a past medical history significant for IBS, fibromyalgia, diabetes mellitus type 2, and depression who presents with complaints of widespread pain.     1. Widespread pain- etiology likely fibromyalgia.   2. Mental Health - the patient's mental health concerns, specifically depression, affect her experience of pain and contribute to her clinically significant distress.    1. Fibromyalgia    2. Chronic pain syndrome        Plan:  The following recommendations were given to the  patient. Diagnosis, treatment options, risks, benefits, and alternatives were discussed, and all questions were answered. The patient expressed understanding of the plan for management.     I am recommending a multidisciplinary treatment plan to help this patient better manage her pain.  This includes:      1.  Pain Physical Therapy:  YES   Schedule first visit with CHARBEL Licea.   2.  Pain Psychologist to address relaxation, behavioral change, coping style, and other factors important to improvement.  YES  Schedule first visit with Dr. Hannah.    3.  Medication Management:     1. We discussed that Maricarmen has tried numerous medications in the past for treatment of fibromyalgia with very little success and ultimately, we may find that medications may not be helpful for her pain management.     2. We discussed that I do not recommend opioids as a long term option for pain management when other options are available and that opioids are not indicated for fibromyalgia. We reviewed the development of tolerance, significant risks and side effects, and opioid induced hyperalgesia. She verbalized understanding. States she may be interested in tapering off of tramadol in the future, will continue with rheumatologist for now.    3.  Continue current medication regimen.    4.  We discussed low dose naltrexone as a possible option. She will consider low dose naltrexone as a possible medication to trial for fibromyalgia, discuss at next visit.    4.  Follow up with MARICRUZ Patel CNP in 4-6 weeks.     Review of Electronic Chart: Today I have also reviewed available medical information in the patient's medical record at [facility] ([facility] including relevant provider notes, laboratory work, and imaging.       I spent 60 minutes of time face to face with the patient.  Greater than 50% of this time was spent in patient counseling and/or coordination of care regarding principles of multidisciplinary care, medication management, and  treatment options as discussed above.      MARICRUZ Patel [facility] [place]

## 2018-01-29 NOTE — PATIENT INSTRUCTIONS
No changes to insulin dosing today    Take pictures of your food when you can    Send your log prior to appointment     prepump appointment scheduled on 2/13/18 at 10:30am    Contact Omn[employer]od regarding the status of your pump

## 2018-01-29 NOTE — MR AVS SNAPSHOT
"              After Visit Summary   1/29/2018    Maricarmen Dotson    MRN: [id number]           Patient Information     Date Of Birth          1960        Visit Information        Provider Department      1/29/2018 9:30 AM Deanna Bolton RN [facility]        Care Instructions    No changes to insulin dosing today    Take pictures of your food when you can    Send your log prior to appointment     prepump appointment scheduled on 2/13/18 at 10:30am    Contact [employer] regarding the status of your pump          Follow-ups after your visit        Your next 10 appointments already scheduled     Jan 30, 2018  9:00 AM CST   New Visit with MARICRUZ Wadsworth CNP   [facility] ([facility] [place])    [address] [facility] [address]  [address]  [facility] [address]   [phone]            Feb 05, 2018 11:00 AM CST   (Arrive by 10:45 AM)   MA SCREENING DIGITAL BILATERAL with RHBCMA2   [facility] ([facility])    [address]  [facility] [id number] [phone]           Do not use any powder, lotion or deodorant under your arms or on your breast. If you do, we will ask you to remove it before your exam.  Wear comfortable, two-piece clothing.  If you have any allergies, tell your care team.  Bring any previous mammograms from other facilities or have them mailed to the breast center. Three-dimensional (3D) mammograms are available at [facility] locations in [place], [facility], [place], [facility], [place], [place], and [place]. -Health locations include [facility] in [place]. Benefits of 3D mammograms include: - Improved rate of cancer detection - Decreases your chance of having to go back for more tests, which means fewer: - \"False-positive\" results (This means that there is an abnormal area but it isn't cancer.) - Invasive testing procedures, such as a biopsy or " surgery - Can provide clearer images of the breast if you have dense breast tissue. 3D mammography is an optional exam that anyone can have with a 2D mammogram. It doesn't replace or take the place of a 2D mammogram. 2D mammograms remain an effective screening test for all women.  Not all insurance companies cover the cost of a 3D mammogram. Check with your insurance.            Feb 13, 2018 10:00 AM CST   Return Visit with MARICRUZ Castillo CNP   [facility] ([facility])    [id number] [place] [id number] [phone]            Feb 13, 2018 10:30 AM CST   Diabetic Education with Deanna Bolton RN   [facility] ([facility])    [address] [id number] [phone]            Feb 27, 2018  9:30 AM CST   Diabetic Education with Deanna Bolton RN   [facility] ([facility])    [address] [facility] [id number] [phone]              Who to contact     If you have questions or need follow up information about today's clinic visit or your schedule please contact [facility] directly at [phone].  Normal or non-critical lab and imaging results will be communicated to you by MyChart, letter or phone within 4 business days after the clinic has received the results. If you do not hear from us within 7 days, please contact the clinic through MyChart or phone. If you have a critical or abnormal lab result, we will notify you by phone as soon as possible.  Submit refill requests through [employer] or call your pharmacy and they will forward the refill request to us. Please allow 3 business days for your refill to be completed.          Additional Information About Your Visit        [employer] Information     [employer] gives you secure access to your electronic health record. If you see a primary care  provider, you can also send messages to your care team and make appointments. If you have questions, please call your primary care clinic.  If you do not have a primary care provider, please call [phone] and they will assist you.        Care EveryWhere ID     This is your Care EveryWhere ID. This could be used by other organizations to access your [facility] medical records  [id number]        Your Vitals Were     Last Period                   01/01/1999            Blood Pressure from Last 3 Encounters:   01/16/18 161/68   10/18/17 (!) 156/96   09/20/17 122/68    Weight from Last 3 Encounters:   01/16/18 84.8 kg (187 lb)   10/18/17 83 kg (182 lb 14.4 oz)   09/20/17 81.6 kg (179 lb 12.8 oz)              Today, you had the following     No orders found for display       Primary Care Provider Office Phone # Fax #    Annamaria Erickson [phone] [address] [place] [address]        Equal Access to Services     [facility]: Hadii aad ku hadasho Soomaali, waaxda luqadaha, qaybta kaalmada adeegyada, waxay idiin hayaan rubina kharatony dye . So [facility] [phone].    ATENCIÓN: Si habla español, tiene a richter disposición servicios gratuitos de asistencia lingüística. Llame al [phone].    We comply with applicable federal civil rights laws and [place] laws. We do not discriminate on the basis of race, color, national origin, age, disability, sex, sexual orientation, or gender identity.            Thank you!     Thank you for choosing [facility]  for your care. Our goal is always to provide you with excellent care. Hearing back from our patients is one way we can continue to improve our services. Please take a few minutes to complete the written survey that you may receive in the mail after your visit with us. Thank you!             Your Updated Medication List - Protect others around you: Learn how to safely use, store and throw away your  medicines at www.disposemymeds.org.          This list is accurate as of 1/29/18 10:58 AM.  Always use your most recent med list.                   Brand Name Dispense Instructions for use Diagnosis    ASPIRIN NOT PRESCRIBED    INTENTIONAL    1 each    continuous prn Antiplatelet medication not prescribed intentionally due to age.        EPINEPHrine 0.3 MG/0.3ML injection 2-pack    EPIPEN/ADRENACLICK/or ANY BX GENERIC EQUIV    2 each    Inject 0.3 mLs (0.3 mg) into the muscle once as needed for anaphylaxis    Allergic reaction       insulin aspart 100 UNIT/ML injection    NovoLOG FLEXPEN    90 mL    1 unit per 4 grams of carbohydrate eaten at each meal +a correction of 1 additional unit per 18 points of blood sugar above 140.  Total daily dose 100 units/day.  Dispense 6 boxes/30 pens for a 90-day supply or 2 boxes/10 pens for a 30-day supply as allowed by insurance.    Type 2 diabetes mellitus with hyperglycemia, with long-term current use of insulin (H)       * insulin pen needle 31G X 5 MM    B-D U/F    550 each    Use 6  time(s) per day.  Please dispense as BD Pen Needle Mini U/F 31G x 5 MM    Type 2 diabetes mellitus with hyperglycemia (H)       * insulin pen needle 31G X 5 MM    B-D U/F    300 each    Use 6 daily or as directed.    Type 2 diabetes mellitus with hyperglycemia (H)       * insulin pen needle 31G X 5 MM    B-D U/F    200 each    Use 5 daily or as directed.    Type 2 diabetes mellitus with hyperglycemia (H)       polyethylene glycol Packet    MIRALAX/GLYCOLAX     Take 1 packet by mouth daily.        STATIN NOT PRESCRIBED (INTENTIONAL)     0 each    Statin not prescribed intentionally due to Intolerance    Statin intolerance       sulindac 200 MG tablet    CLINORIL     Take 200 mg by mouth 3 times daily        traMADol 50 MG tablet    ULTRAM     Take 50 mg by mouth 3 times daily 1-2 tablets in the evening        TRESIBA FLEXTOUCH 200 UNIT/ML pen   Generic drug:  insulin degludec     18 mL    INJECT  68 UNITS UNDER THE SKIN DAILY    Type 2 diabetes mellitus with hyperglycemia, with long-term current use of insulin (H), Encounter for long-term (current) use of insulin (H)       VOLTAREN 1 % Gel topical gel   Generic drug:  diclofenac     100 g    Apply topically nightly as needed for moderate pain Apply 4 grams to knees or 2 grams to hands four times daily using enclosed dosing card.        * Notice:  This list has 3 medication(s) that are the same as other medications prescribed for you. Read the directions carefully, and ask your doctor or other care provider to review them with you.

## 2018-01-30 ENCOUNTER — TELEPHONE (OUTPATIENT)
Dept: ENDOCRINOLOGY | Facility: CLINIC | Age: 58
End: 2018-01-30

## 2018-01-30 ENCOUNTER — OFFICE VISIT (OUTPATIENT)
Dept: PALLIATIVE MEDICINE | Facility: CLINIC | Age: 58
End: 2018-01-30
Payer: COMMERCIAL

## 2018-01-30 VITALS
WEIGHT: 188 LBS | BODY MASS INDEX: 32.78 KG/M2 | DIASTOLIC BLOOD PRESSURE: 84 MMHG | SYSTOLIC BLOOD PRESSURE: 138 MMHG | OXYGEN SATURATION: 96 % | HEART RATE: 91 BPM

## 2018-01-30 DIAGNOSIS — G89.4 CHRONIC PAIN SYNDROME: ICD-10-CM

## 2018-01-30 DIAGNOSIS — M79.7 FIBROMYALGIA: Primary | ICD-10-CM

## 2018-01-30 PROCEDURE — 99244 OFF/OP CNSLTJ NEW/EST MOD 40: CPT | Performed by: NURSE PRACTITIONER

## 2018-01-30 ASSESSMENT — PAIN SCALES - GENERAL: PAINLEVEL: EXTREME PAIN (8)

## 2018-01-30 NOTE — MR AVS SNAPSHOT
After Visit Summary   1/30/2018    Maricarmen Dotson    MRN: [id number]           Patient Information     Date Of Birth          1960        Visit Information        Provider Department      1/30/2018 9:00 AM Geri Henry APRN CNP [facility]        Today's Diagnoses     Fibromyalgia    -  1    Chronic pain syndrome          Care Instructions    Diagnosis reviewed, treatment option addressed, and risk/benifits discussed.  Self-care instructions given.  I am recommending a multidisciplinary treatment plan to help this patient better manage her pain.       1.  Pain Physical Therapy:  YES   Schedule first visit with LAMBERT Licea   2.  Pain Psychologist to address relaxation, behavioral change, coping style, and other factors important to improvement.  YES  Schedule first visit with Dr. Hannah.    3.  Medication Management:     1. Continue current medication regimen.    2. Consider low dose naltrexone as a possible medication for management of fibromyalgia.     4.  Follow up with MARICRUZ Patel CNP in 4-6 weeks.     Angi Hannah, Ph.D., L.P.   Clinical Health Psychologist  [email or web address]"  [address] [place] [address]  [phone]      ----------------------------------------------------------------  Nurse Triage line:  [phone]   Call this number with any questions or concerns. You may leave a detailed message anytime. Calls are typically returned Monday through Friday between 8 AM and 4:30 PM. We usually get back to you within 2 business days depending on the issue/request.       Medication refills:    For non-narcotic medications, call your pharmacy directly to request a refill. The pharmacy will contact the Pain Management Center for authorization. Please allow 3-4 days for these refills to be processed.     For narcotic refills, call the nurse triage line or send a [employer] message. Please contact us 7-10 days before your refill is due. The message MUST  include the name of the specific medication(s) requested and how you would like to receive the prescription(s). The options are as follows:    Pain Clinic staff can mail the prescription to your pharmacy. Please tell us the name of the pharmacy.    You may pick the prescription up at the Pain Clinic (tell us the location) or during a clinic visit with your pain provider    Pain Clinic staff can deliver the prescription to the [facility] pharmacy in the clinic building. Please tell us the location.      Scheduling number: [phone].  Call this number to schedule or change appointments.    We believe regular attendance is key to your success in our program.    Any time you are unable to keep your appointment we ask that you call us at least 24 hours in advance to let us know. This will allow us to offer the appointment time to another patient.               Follow-ups after your visit        Additional Services     PAIN PHD EVAL/TREAT/FOLLOW UP           PAIN PT EVAL AND TREAT                 Your next 10 appointments already scheduled     Feb 05, 2018 11:00 AM CST   (Arrive by 10:45 AM)   MA SCREENING DIGITAL BILATERAL with RHBCMA2   [facility] ([facility])    [address]  [facility] [address]   [phone]           Do not use any powder, lotion or deodorant under your arms or on your breast. If you do, we will ask you to remove it before your exam.  Wear comfortable, two-piece clothing.  If you have any allergies, tell your care team.  Bring any previous mammograms from other facilities or have them mailed to the breast center. Three-dimensional (3D) mammograms are available at [facility] locations in [facility], [facility], [facility], and [place]. -Health locations include [facility] in [place]. Benefits of 3D mammograms include: - Improved rate of cancer detection - Decreases your chance of  "having to go back for more tests, which means fewer: - \"False-positive\" results (This means that there is an abnormal area but it isn't cancer.) - Invasive testing procedures, such as a biopsy or surgery - Can provide clearer images of the breast if you have dense breast tissue. 3D mammography is an optional exam that anyone can have with a 2D mammogram. It doesn't replace or take the place of a 2D mammogram. 2D mammograms remain an effective screening test for all women.  Not all insurance companies cover the cost of a 3D mammogram. Check with your insurance.            Feb 13, 2018 10:00 AM CST   Return Visit with MARICRUZ Castillo [facility] ([facility])    [address] [facility] [address]   [phone]            Feb 13, 2018 10:30 AM CST   Diabetic Education with Deanna Bolton RN   [facility] ([facility])    [address] [address]   [phone]            Feb 27, 2018  9:30 AM CST   Diabetic Education with Deanna Bolton RN   [facility] ([facility])    [address] [address]   [phone]              Who to contact     If you have questions or need follow up information about today's clinic visit or your schedule please contact [facility] directly at [phone].  Normal or non-critical lab and imaging results will be communicated to you by MyChart, letter or phone within 4 business days after the clinic has received the results. If you do not hear from us within 7 days, please contact the clinic through MyChart or phone. If you have a critical or abnormal lab result, we will notify you by phone as soon as possible.  Submit refill requests through [employer] or call your pharmacy and they will forward the refill request to us. Please allow 3 business days for your refill to be " completed.          Additional Information About Your Visit        [employer] Information     [employer] gives you secure access to your electronic health record. If you see a primary care provider, you can also send messages to your care team and make appointments. If you have questions, please call your primary care clinic.  If you do not have a primary care provider, please call [phone] and they will assist you.        Care EveryWhere ID     This is your Care EveryWhere ID. This could be used by other organizations to access your [facility] medical records  [id number]        Your Vitals Were     Pulse Last Period Pulse Oximetry BMI (Body Mass Index)          69 01/01/1999 96% 32.78 kg/m2         Blood Pressure from Last 3 Encounters:   01/30/18 193/83   01/16/18 161/68   10/18/17 (!) 156/96    Weight from Last 3 Encounters:   01/30/18 85.3 kg (188 lb)   01/16/18 84.8 kg (187 lb)   10/18/17 83 kg (182 lb 14.4 oz)              We Performed the Following     PAIN PHD EVAL/TREAT/FOLLOW UP     PAIN PT EVAL AND TREAT        Primary Care Provider Office Phone # Fax #    Annamaria Erickson [phone] [address] [place] [address]        Equal Access to Services     BRAULIO HAYNES : Hadii aad ku hadasho Soomaali, waaxda luqadaha, qaybta kaalmada adeegyada, dillon dye . So [facility] [phone].    ATENCIÓN: Si habla español, tiene a richter disposición servicios gratuitos de asistencia lingüística. Llame al [phone].    We comply with applicable federal civil rights laws and [place] laws. We do not discriminate on the basis of race, color, national origin, age, disability, sex, sexual orientation, or gender identity.            Thank you!     Thank you for choosing [facility]  for your care. Our goal is always to provide you with excellent care. Hearing back from our patients is one way we can continue to improve our services. Please take a  few minutes to complete the written survey that you may receive in the mail after your visit with us. Thank you!             Your Updated Medication List - Protect others around you: Learn how to safely use, store and throw away your medicines at www.disposemymeds.org.          This list is accurate as of 1/30/18  9:55 AM.  Always use your most recent med list.                   Brand Name Dispense Instructions for use Diagnosis    ASPIRIN NOT PRESCRIBED    INTENTIONAL    1 each    continuous prn Antiplatelet medication not prescribed intentionally due to age.        EPINEPHrine 0.3 MG/0.3ML injection 2-pack    EPIPEN/ADRENACLICK/or ANY BX GENERIC EQUIV    2 each    Inject 0.3 mLs (0.3 mg) into the muscle once as needed for anaphylaxis    Allergic reaction       insulin aspart 100 UNIT/ML injection    NovoLOG FLEXPEN    90 mL    1 unit per 4 grams of carbohydrate eaten at each meal +a correction of 1 additional unit per 18 points of blood sugar above 140.  Total daily dose 100 units/day.  Dispense 6 boxes/30 pens for a 90-day supply or 2 boxes/10 pens for a 30-day supply as allowed by insurance.    Type 2 diabetes mellitus with hyperglycemia, with long-term current use of insulin (H)       * insulin pen needle 31G X 5 MM    B-D U/F    550 each    Use 6  time(s) per day.  Please dispense as BD Pen Needle Mini U/F 31G x 5 MM    Type 2 diabetes mellitus with hyperglycemia (H)       * insulin pen needle 31G X 5 MM    B-D U/F    300 each    Use 6 daily or as directed.    Type 2 diabetes mellitus with hyperglycemia (H)       * insulin pen needle 31G X 5 MM    B-D U/F    200 each    Use 5 daily or as directed.    Type 2 diabetes mellitus with hyperglycemia (H)       polyethylene glycol Packet    MIRALAX/GLYCOLAX     Take 1 packet by mouth daily.        STATIN NOT PRESCRIBED (INTENTIONAL)     0 each    Statin not prescribed intentionally due to Intolerance    Statin intolerance       sulindac 200 MG tablet    CLINORIL      Take 200 mg by mouth 3 times daily        traMADol 50 MG tablet    ULTRAM     Take 50 mg by mouth 3 times daily 1-2 tablets in the evening        TRESIBA FLEXTOUCH 200 UNIT/ML pen   Generic drug:  insulin degludec     18 mL    INJECT 68 UNITS UNDER THE SKIN DAILY    Type 2 diabetes mellitus with hyperglycemia, with long-term current use of insulin (H), Encounter for long-term (current) use of insulin (H)       VOLTAREN 1 % Gel topical gel   Generic drug:  diclofenac     100 g    Apply topically nightly as needed for moderate pain Apply 4 grams to knees or 2 grams to hands four times daily using enclosed dosing card.        * Notice:  This list has 3 medication(s) that are the same as other medications prescribed for you. Read the directions carefully, and ask your doctor or other care provider to review them with you.

## 2018-01-30 NOTE — NURSING NOTE
"Chief Complaint   Patient presents with     Pain       Initial /83  Pulse 69  Wt 85.3 kg (188 lb)  LMP 01/01/1999  SpO2 96%  BMI 32.78 kg/m2 Estimated body mass index is 32.78 kg/(m^2) as calculated from the following:    Height as of 1/16/18: 1.613 m (5' 3.5\").    Weight as of this encounter: 85.3 kg (188 lb).        Gianna PICHARDO LPN  [facility] [place]     "

## 2018-01-30 NOTE — TELEPHONE ENCOUNTER
Paperwork completed and signed.  Please fax back to [employer]  Emily Phan NP  Endocrinology

## 2018-01-30 NOTE — PATIENT INSTRUCTIONS
Diagnosis reviewed, treatment option addressed, and risk/benifits discussed.  Self-care instructions given.  I am recommending a multidisciplinary treatment plan to help this patient better manage her pain.       1.  Pain Physical Therapy:  YES   Schedule first visit with CHARBEL Licea.   2.  Pain Psychologist to address relaxation, behavioral change, coping style, and other factors important to improvement.  YES  Schedule first visit with Dr. Hannah.    3.  Medication Management:     1. Continue current medication regimen.    2. Consider low dose naltrexone as a possible medication for management of fibromyalgia.     4.  Follow up with MARICRUZ Patel CNP in 4-6 weeks.     Angi Hannah, Ph.D., L.P.   Clinical Health Psychologist  [email or web address] [address]  [phone]      ----------------------------------------------------------------  Nurse Triage line:  [phone]   Call this number with any questions or concerns. You may leave a detailed message anytime. Calls are typically returned Monday through Friday between 8 AM and 4:30 PM. We usually get back to you within 2 business days depending on the issue/request.       Medication refills:    For non-narcotic medications, call your pharmacy directly to request a refill. The pharmacy will contact the Pain Management Center for authorization. Please allow 3-4 days for these refills to be processed.     For narcotic refills, call the nurse triage line or send a [employer] message. Please contact us 7-10 days before your refill is due. The message MUST include the name of the specific medication(s) requested and how you would like to receive the prescription(s). The options are as follows:    Pain Clinic staff can mail the prescription to your pharmacy. Please tell us the name of the pharmacy.    You may pick the prescription up at the Pain Clinic (tell us the location) or during a clinic visit with your pain provider    Pain Clinic staff  can deliver the prescription to the [facility] pharmacy in the clinic building. Please tell us the location.      Scheduling number: [phone].  Call this number to schedule or change appointments.    We believe regular attendance is key to your success in our program.    Any time you are unable to keep your appointment we ask that you call us at least 24 hours in advance to let us know. This will allow us to offer the appointment time to another patient.

## 2018-01-30 NOTE — TELEPHONE ENCOUNTER
Received Certificate of Medical Necessity for an Omnipod from Amina Shaffer, Associate  via email.  Phone# [phone].  Fax completed form to [employer] at fax# [phone].  Form on Emily Phan's desk.  Linda Holder M.A.

## 2018-01-31 ENCOUNTER — MYC MEDICAL ADVICE (OUTPATIENT)
Dept: EDUCATION SERVICES | Facility: CLINIC | Age: 58
End: 2018-01-31

## 2018-01-31 ENCOUNTER — MYC MEDICAL ADVICE (OUTPATIENT)
Dept: ENDOCRINOLOGY | Facility: CLINIC | Age: 58
End: 2018-01-31

## 2018-01-31 NOTE — TELEPHONE ENCOUNTER
Received e-mail from Amina Shaffer at [facility] asking for the last two clinic visit notes to be faxed to [employer].  Faxed notes to [facility] at [phone].  Linda Holder M.A.

## 2018-02-02 ENCOUNTER — OFFICE VISIT (OUTPATIENT)
Dept: PALLIATIVE MEDICINE | Facility: CLINIC | Age: 58
End: 2018-02-02
Payer: COMMERCIAL

## 2018-02-02 ENCOUNTER — TELEPHONE (OUTPATIENT)
Dept: EDUCATION SERVICES | Facility: CLINIC | Age: 58
End: 2018-02-02

## 2018-02-02 DIAGNOSIS — M79.7 FIBROMYALGIA: Primary | ICD-10-CM

## 2018-02-02 PROCEDURE — 97530 THERAPEUTIC ACTIVITIES: CPT | Mod: GP | Performed by: PHYSICAL THERAPIST

## 2018-02-02 PROCEDURE — 97162 PT EVAL MOD COMPLEX 30 MIN: CPT | Mod: GP | Performed by: PHYSICAL THERAPIST

## 2018-02-02 NOTE — PROGRESS NOTES
PHYSICAL THERAPY INITIAL EVALUATION and PLAN OF CARE    Patient Name: Maricarmen Dotson     : 1960    MRN: [id number]   Pain Management Provider:  MARICRUZ Solitario CNP    Diagnosis: Fibromyalgia    SUBJECTIVE:    PRESENTATION AND ETIOLOGY    Chief Complaint: bilateral shoulder and upper back pain; see providers note; currently working with psychologist for PTSD related to childhood trauma    Onset / Etiology: widespread pain in     Pattern Since Onset: Worsened    Pain is described as sharp, burning, scraping, aching, shooting, raw      Frequency: Constant      Intensity: Best 5/10, Worst 10/10; Average 8/10    LEVEL OF FUNCTION AT START OF CARE    Walking tolerance: 10-45 minutes  Sitting tolerance: 15-20 minutes  Standing tolerance: less than 5 minutes  Housework tolerance: 10-15 minutes  Sleep: wakes every 1-2 hours during sleep    Current Aggrevating Activities / Functional Limitations: physical activity, waking up and before bed      CURRENT / PREVIOUS INTERVENTION(S):     Relieving Activities / Self Care: pressure points, ice, movement, prescribed medications    Previous / Current therapies for current chief complaint: PT, biofeedback, opioids, meditation, Pain Clinic in early , [facility] , ice, TENS       DEMOGRAPHICS  Employment Status: rad tech, not working    Social Support: lives with  and 2 daughters    Home or Community Barriers: difficulty with focus and concentration; Stairs: difficulty with balance, coordination and pain    Pertinent Medical  / Surgical History: Epic Snapshot Reviewed, See provider's note    Patient's goals for physical therapy: learn pain management strategies     ===============================================================  OBJECTIVE:  POSTURE:  Observation: Patient demonstrates forward head, protracted shoulders and thoracic kyphosis in standing and sitting posture.  Appears moderately anxious, daughter is present during  appointment       GAIT, LOCOMOTION, and BALANCE:  Gait and Locomotion: normal  Balance: next    RANGE OF MOTION:   Cervical: next  Lumbar: next  Shoulders: next  Hips: next      MUSCLE PERFORMANCE:   Strength: demonstrates core instability      Flexibility: tightness anterior chest and upper traps    Pain behaviors: None    ===============================================================  Today's Treatment:  Initial evaluation  Therapeutic Activity:   For 30 minutes including Pt educated on the concept of the nervous system as a hypersensitive and hyperactive alarm system and the role of physical therapy in desensitizing the nerves and reducing pain. Patient was educated about nerve sensitivity caused by central sensitization and driven by both biological and psychosocial factors.  The patient was encouraged to identify personal stressors which contribute to pain and to discuss these with PT for guidance and plan to move ahead.  Patient was educated regarding multiple pain management treatment options.  These include pain education, movement, regular aerobic exercise, strengthening exercise, medications and sleep hygiene, balanced diet and lifestyle, and stress management techniques.  The patient was encouraged to identify/commit to initial steps in pain management treatment options and write down challenges, goals and questions.  Focus next session: self cares, cardio, HEP  ===============================================================  ASSESSMENT:  Physical Therapy Diagnosis:Impaired Posture and Impaired Muscle Performance    Patient requires PT intervention for the following impairments: Limited knowledge of condition and / or self care - inability to control symptoms, Impaired functional mobility, Impaired posture / muscle imbalance, Pain, ROM limitations and Deconditioning    Anticipated Goals and Expected Outcomes:  8 weeks  Patient will report the use of 2 self care practices during their day.  Patient will  report the participation in 30 minutes of aerobic activity daily and practicing stretching daily.  Patient will demonstrate the ability to find core strength in neutral posture.  Patient will demonstrate the ability to relax muscle group before stretching.  16 weeks  Patient will be independent with a home exercise program.  Patient will be independent with posture correction.  Patient will report independence with a self care/flare management program.  Patient will demonstrate improved functional strength and endurance as reports by increased tolerance for IADLs and more consistent participation in daily activity.     Rehab potential for achieving goals: good.    ===============================================================  PLAN:   Patient will benefit from skilled physical therapy consisting of:  neuromuscular reeducation of: kinesthetic sense and posture for sitting and/or standing activities, education in self care / home management training to include instruction in: symptom control techniques, therapeutic activities to achieve improved functional performance in: daily actvities and therapeutic exercise to develop: strength and endurance, flexibility and core stability,     Assessment will be ongoing with changes in treatment as indicated.  Benefits/risks/alternatives to treatment have been reviewed and the patient has been instructed to contact this office if they have any questions or concerns.  This plan of care has been discussed with the patient and the patient is in agreement.     Frequency / Duration:  Patient will be seen for a total of 13 visits; 16 weeks    Total Visit Time: 45  minutes            Anuja Hernández, PT                                      Date:  2/2/2018      =====================================================  **  Referring Provider Certification: Referring Provider reviewing certifies that the above treatment / plan of care is required and authorized, and that the patient's plan  will be reviewed every thirty (30) days **.   ======================================================     PRESENT:  NA    MULTIDISCIPLINARY PATIENT / FAMILY EDUCATION RECORD  Department:  Physical Therapy    Readiness to Learn: Ability to understand verbal instructions, Ability to understand written instructions, Knowledge of educational needs / treatment plan  Specific Barriers to Learning: None  Referrals: None  Learning Needs: Rehabilitation techniques to improve functional independence Pain management education to improve daily activity tolerance.  Who: Patient  How: Demonstration, Verbal instructions, Written instructions  Response: Appropriate verbal response, Asked questions, Demonstrated ability, Verbalized recall / understanding

## 2018-02-02 NOTE — MR AVS SNAPSHOT
After Visit Summary   2/2/2018    Maricarmen Dotson    MRN: [id number]           Patient Information     Date Of Birth          1960        Visit Information        Provider Department      2/2/2018 10:15 AM Anuja Hernández, [facility]        Today's Diagnoses     Fibromyalgia    -  1       Follow-ups after your visit        Your next 10 appointments already scheduled     Feb 05, 2018  3:15 PM CST   Return Visit with Anuja Hernández PT   [facility] ([facility])    [address]  [address] [place] [address]   [phone]            Feb 13, 2018 10:30 AM CST   Diabetic Education with Deanna Bolton RN   [facility] ([facility])    [address]  [facility] [address]   [phone]            Feb 20, 2018  9:30 AM CST   Diabetic Education with Deanna Bolton RN   [facility] ([facility])    [address]  [facility] [address]   [phone]            Feb 27, 2018  1:00 PM CST   Return Visit with MARICRUZ Wadsworth CNP   [facility] ([facility])    [address]  [address] [place] [address]   [phone]            Mar 20, 2018 10:30 AM CDT   Return Visit with MARICRUZ Castillo CNP   [facility] ([facility])    [address]  [facility] [id number] [phone]              Who to contact     If you have questions or need follow up information about today's clinic visit or your schedule please contact [facility] directly at [phone].  Normal or non-critical lab and imaging results will be communicated to you by MyChart, letter or phone within 4 business days after the clinic has received the results. If you do not hear from us within 7 days, please contact the clinic through  Q2ebankinghart or phone. If you have a critical or abnormal lab result, we will notify you by phone as soon as possible.  Submit refill requests through Gochikuru or call your pharmacy and they will forward the refill request to us. Please allow 3 business days for your refill to be completed.          Additional Information About Your Visit        Q2ebankinghart Information     Gochikuru gives you secure access to your electronic health record. If you see a primary care provider, you can also send messages to your care team and make appointments. If you have questions, please call your primary care clinic.  If you do not have a primary care provider, please call [phone] and they will assist you.        Care EveryWhere ID     This is your Care EveryWhere ID. This could be used by other organizations to access your [facility] medical records  [id number]        Your Vitals Were     Last Period                   01/01/1999            Blood Pressure from Last 3 Encounters:   01/30/18 138/84   01/16/18 161/68   10/18/17 (!) 156/96    Weight from Last 3 Encounters:   01/30/18 85.3 kg (188 lb)   01/16/18 84.8 kg (187 lb)   10/18/17 83 kg (182 lb 14.4 oz)              We Performed the Following     HC PT EVAL, MODERATE COMPLEXITY     THERAPEUTIC ACTIVITIES        Primary Care Provider Office Phone # Fax #    Annamaria Erickson [phone] [address] [place] [address]        Equal Access to Services     [facility]: Hadii peg schilling hadasho Sosu, waaxda luqadaha, qaybta kaalmada mitra, dillon dye . So [facility] [phone].    ATENCIÓN: Si habla español, tiene a richter disposición servicios gratuitos de asistencia lingüística. Llame al [phone].    We comply with applicable federal civil rights laws and [place] laws. We do not discriminate on the basis of race, color, national origin, age, disability, sex, sexual orientation, or gender identity.            Thank  you!     Thank you for choosing [facility]  for your care. Our goal is always to provide you with excellent care. Hearing back from our patients is one way we can continue to improve our services. Please take a few minutes to complete the written survey that you may receive in the mail after your visit with us. Thank you!             Your Updated Medication List - Protect others around you: Learn how to safely use, store and throw away your medicines at www.disposemymeds.org.          This list is accurate as of 2/2/18  2:49 PM.  Always use your most recent med list.                   Brand Name Dispense Instructions for use Diagnosis    ASPIRIN NOT PRESCRIBED    INTENTIONAL    1 each    continuous prn Antiplatelet medication not prescribed intentionally due to age.        EPINEPHrine 0.3 MG/0.3ML injection 2-pack    EPIPEN/ADRENACLICK/or ANY BX GENERIC EQUIV    2 each    Inject 0.3 mLs (0.3 mg) into the muscle once as needed for anaphylaxis    Allergic reaction       insulin aspart 100 UNIT/ML injection    NovoLOG FLEXPEN    90 mL    1 unit per 4 grams of carbohydrate eaten at each meal +a correction of 1 additional unit per 18 points of blood sugar above 140.  Total daily dose 100 units/day.  Dispense 6 boxes/30 pens for a 90-day supply or 2 boxes/10 pens for a 30-day supply as allowed by insurance.    Type 2 diabetes mellitus with hyperglycemia, with long-term current use of insulin (H)       * insulin pen needle 31G X 5 MM    B-D U/F    550 each    Use 6  time(s) per day.  Please dispense as BD Pen Needle Mini U/F 31G x 5 MM    Type 2 diabetes mellitus with hyperglycemia (H)       * insulin pen needle 31G X 5 MM    B-D U/F    300 each    Use 6 daily or as directed.    Type 2 diabetes mellitus with hyperglycemia (H)       * insulin pen needle 31G X 5 MM    B-D U/F    200 each    Use 5 daily or as directed.    Type 2 diabetes mellitus with hyperglycemia (H)       polyethylene glycol Packet     MIRALAX/GLYCOLAX     Take 1 packet by mouth daily.        STATIN NOT PRESCRIBED (INTENTIONAL)     0 each    Statin not prescribed intentionally due to Intolerance    Statin intolerance       sulindac 200 MG tablet    CLINORIL     Take 200 mg by mouth 3 times daily        traMADol 50 MG tablet    ULTRAM     Take 50 mg by mouth 3 times daily 1-2 tablets in the evening        TRESIBA FLEXTOUCH 200 UNIT/ML pen   Generic drug:  insulin degludec     18 mL    INJECT 68 UNITS UNDER THE SKIN DAILY    Type 2 diabetes mellitus with hyperglycemia, with long-term current use of insulin (H), Encounter for long-term (current) use of insulin (H)       VOLTAREN 1 % Gel topical gel   Generic drug:  diclofenac     100 g    Apply topically nightly as needed for moderate pain Apply 4 grams to knees or 2 grams to hands four times daily using enclosed dosing card.        * Notice:  This list has 3 medication(s) that are the same as other medications prescribed for you. Read the directions carefully, and ask your doctor or other care provider to review them with you.

## 2018-02-02 NOTE — TELEPHONE ENCOUNTER
Maricarmen sent in a BG report, the link is here:    [email or web address]   Spoke to Maricarmen on the phone due to discrepancy in appointment schedule.    Maricarmen thinks she is coming in on 2/13 for pre pump (that is listed in appointments) and she thinks she has cancelled the pump start on 2/27 and moved it up to 2/20 at 9:30 .  But the 2/27 is still listed in her appointments and the 2/20 is not     Asked CDE she is scheduled with to take a look and reschedule pump start from 2/27 to 2/20 at 9:30.  Maricarmen believes that is the plan unless she hears differently.     I looked at her BG log, she is high (200-300's) and has been ill with suspected food poisoning L She increased her Tresiba 2 units as she saw higher numbers with the illness.   She denies desire to make other changes today and said she will send in numbers the day before you meet. No other action taken today.    Liseth Taveras RD, LD, CDE

## 2018-02-05 ENCOUNTER — OFFICE VISIT (OUTPATIENT)
Dept: PALLIATIVE MEDICINE | Facility: CLINIC | Age: 58
End: 2018-02-05
Payer: COMMERCIAL

## 2018-02-05 DIAGNOSIS — M79.7 FIBROMYALGIA: Primary | ICD-10-CM

## 2018-02-05 PROCEDURE — 97112 NEUROMUSCULAR REEDUCATION: CPT | Mod: GP | Performed by: PHYSICAL THERAPIST

## 2018-02-05 NOTE — PROGRESS NOTES
PAIN PHYSICAL THERAPY PROGRESS NOTE  Patient Name: Maricarmen Dotson      YOB: 1960     Medical Record Number: [id number]  Diagnosis: Fibromyalgia    Visit: 2/13    Subjective: Patient reports feeling more sore since cold weather and not sleeping well.  Had to put dog down on last Friday after appointment.  Walks 5-10 minutes several times per day.    Objective Findings:  OBSERVATION: Patient demonstrates forward head, protracted shoulders and thoracic kyphosis in standing and sitting posture.  GAIT & LOCOMOTION: Mild antalgic WB on the left due to left knee pain  RANGE OF MOTION: AROM cervical, lumbar, shoulders, hips WFL all directions.  Noted hyper mobility throughout trunk and extremities.  Difficulty with pacing movements during HEP demonstration using foam roller.  Poor abdominal core stability with supine toe dips.  Instructed in supine kinesthetic body scan.      Treatment Interventions:  Neuromuscular Reeducation:   For 45 minutes including instruction in supine kinesthetic body scan and review of HEP/foam roller  __________________________________________________________________  Instruction on home program: body scan    Assessment:  Ongoing Functional Limitations Include:  Patient tolerated/responded well to treatment    Intensity Level: 3 (1=low intensity; 5=high intensity)  Demonstrates/Verbalizes Technique: 4 (1= poor technique-difficulty performing exercises,significant cues required; 5= good technique-performs exercises without cues)  Body Awareness: 2 (1=low awareness; 5=high awareness)  Posture/Stability: 3 (1= poor posture, stability; 5= good posture, stability)  Motivational Level: Cooperative and Distracted, anxious  Response to Teaching: cooperative  Factors that affect learning: None    _______________________________________________________________________  Plan of Care  Continue PT to support reactivation and integration of self regulation pain management skills;  Continue with  prescribed plan of care - progress as tolerated.  Focus next session will be on: rotation on roller, toe dips     Present:  NA     Total Visit Time:  45 minutes    Therapist: Anuja Hernández PT             Date: 2/5/2018

## 2018-02-05 NOTE — MR AVS SNAPSHOT
After Visit Summary   2/5/2018    Maricarmen Dotson    MRN: [id number]           Patient Information     Date Of Birth          1960        Visit Information        Provider Department      2/5/2018 3:15 PM Anuja Hernández, PT [facility]        Today's Diagnoses     Fibromyalgia    -  1       Follow-ups after your visit        Your next 10 appointments already scheduled     Feb 13, 2018 10:30 AM CST   Diabetic Education with Deanna Bolton RN   [facility] ([facility])    [address] [facility] [address]   [phone]            Feb 20, 2018  9:30 AM CST   Diabetic Education with Deanna Bolton RN   [facility] ([facility])    [address] [address]   [phone]            Feb 27, 2018  1:00 PM CST   Return Visit with MARICRUZ Wadsworth CNP   [facility] ([facility])    [address]  [address] [place] [address]   [phone]            Mar 20, 2018 10:30 AM CDT   Return Visit with MARICRUZ Castillo CNP   [facility]    [address] [facility] [address] [phone]              Who to contact     If you have questions or need follow up information about today's clinic visit or your schedule please contact [facility] directly at [phone].  Normal or non-critical lab and imaging results will be communicated to you by MyChart, letter or phone within 4 business days after the clinic has received the results. If you do not hear from us within 7 days, please contact the clinic through [employer] Information System[employer]art or phone. If you have a critical or abnormal lab result, we will notify you by phone as soon as possible.  Submit refill requests through Retsly or call your pharmacy and they will forward the refill request to us.  Please allow 3 business days for your refill to be completed.          Additional Information About Your Visit        MyChart Information     cfgAdvancehart gives you secure access to your electronic health record. If you see a primary care provider, you can also send messages to your care team and make appointments. If you have questions, please call your primary care clinic.  If you do not have a primary care provider, please call [phone] and they will assist you.        Care EveryWhere ID     This is your Care EveryWhere ID. This could be used by other organizations to access your [facility] medical records  [id number]        Your Vitals Were     Last Period                   01/01/1999            Blood Pressure from Last 3 Encounters:   01/30/18 138/84   01/16/18 161/68   10/18/17 (!) 156/96    Weight from Last 3 Encounters:   01/30/18 85.3 kg (188 lb)   01/16/18 84.8 kg (187 lb)   10/18/17 83 kg (182 lb 14.4 oz)              We Performed the Following     NEUROMUSCULAR RE-EDUCATION        Primary Care Provider Office Phone # Fax #    Annamaria Erickson [phone] [address] [place] [address]        Equal Access to Services     [facility]: Hadii aad ku hadasho Soomaali, waaxda luqadaha, qaybta kaalmada adeegyada, dillon willamsn rubina dye ah. So [facility] [phone].    ATENCIÓN: Si habla español, tiene a richter disposición servicios gratuitos de asistencia lingüística. Llame al [phone].    We comply with applicable federal civil rights laws and [place] laws. We do not discriminate on the basis of race, color, national origin, age, disability, sex, sexual orientation, or gender identity.            Thank you!     Thank you for choosing [facility]  for your care. Our goal is always to provide you with excellent care. Hearing back from our patients is one way we can continue to improve our services. Please take a few minutes to complete the  written survey that you may receive in the mail after your visit with us. Thank you!             Your Updated Medication List - Protect others around you: Learn how to safely use, store and throw away your medicines at www.disposemymeds.org.          This list is accurate as of 2/5/18  4:12 PM.  Always use your most recent med list.                   Brand Name Dispense Instructions for use Diagnosis    ASPIRIN NOT PRESCRIBED    INTENTIONAL    1 each    continuous prn Antiplatelet medication not prescribed intentionally due to age.        EPINEPHrine 0.3 MG/0.3ML injection 2-pack    EPIPEN/ADRENACLICK/or ANY BX GENERIC EQUIV    2 each    Inject 0.3 mLs (0.3 mg) into the muscle once as needed for anaphylaxis    Allergic reaction       insulin aspart 100 UNIT/ML injection    NovoLOG FLEXPEN    90 mL    1 unit per 4 grams of carbohydrate eaten at each meal +a correction of 1 additional unit per 18 points of blood sugar above 140.  Total daily dose 100 units/day.  Dispense 6 boxes/30 pens for a 90-day supply or 2 boxes/10 pens for a 30-day supply as allowed by insurance.    Type 2 diabetes mellitus with hyperglycemia, with long-term current use of insulin (H)       * insulin pen needle 31G X 5 MM    B-D U/F    550 each    Use 6  time(s) per day.  Please dispense as BD Pen Needle Mini U/F 31G x 5 MM    Type 2 diabetes mellitus with hyperglycemia (H)       * insulin pen needle 31G X 5 MM    B-D U/F    300 each    Use 6 daily or as directed.    Type 2 diabetes mellitus with hyperglycemia (H)       * insulin pen needle 31G X 5 MM    B-D U/F    200 each    Use 5 daily or as directed.    Type 2 diabetes mellitus with hyperglycemia (H)       polyethylene glycol Packet    MIRALAX/GLYCOLAX     Take 1 packet by mouth daily.        STATIN NOT PRESCRIBED (INTENTIONAL)     0 each    Statin not prescribed intentionally due to Intolerance    Statin intolerance       sulindac 200 MG tablet    CLINORIL     Take 200 mg by mouth 3 times  daily        traMADol 50 MG tablet    ULTRAM     Take 50 mg by mouth 3 times daily 1-2 tablets in the evening        TRESIBA FLEXTOUCH 200 UNIT/ML pen   Generic drug:  insulin degludec     18 mL    INJECT 68 UNITS UNDER THE SKIN DAILY    Type 2 diabetes mellitus with hyperglycemia, with long-term current use of insulin (H), Encounter for long-term (current) use of insulin (H)       VOLTAREN 1 % Gel topical gel   Generic drug:  diclofenac     100 g    Apply topically nightly as needed for moderate pain Apply 4 grams to knees or 2 grams to hands four times daily using enclosed dosing card.        * Notice:  This list has 3 medication(s) that are the same as other medications prescribed for you. Read the directions carefully, and ask your doctor or other care provider to review them with you.

## 2018-02-06 ENCOUNTER — TELEPHONE (OUTPATIENT)
Dept: NURSING | Facility: CLINIC | Age: 58
End: 2018-02-06

## 2018-02-06 NOTE — TELEPHONE ENCOUNTER
Outreached pt. For second time and was able to connect with someone at the listed number who said that the pt. Is not available at the moment.  Will try back another time.     Neel Miller, Health    2/6/2018    1:58 PM

## 2018-02-13 ENCOUNTER — ALLIED HEALTH/NURSE VISIT (OUTPATIENT)
Dept: EDUCATION SERVICES | Facility: CLINIC | Age: 58
End: 2018-02-13
Payer: COMMERCIAL

## 2018-02-13 ENCOUNTER — TELEPHONE (OUTPATIENT)
Dept: EDUCATION SERVICES | Facility: CLINIC | Age: 58
End: 2018-02-13

## 2018-02-13 DIAGNOSIS — E11.65 TYPE 2 DIABETES MELLITUS WITH HYPERGLYCEMIA, WITH LONG-TERM CURRENT USE OF INSULIN (H): Primary | ICD-10-CM

## 2018-02-13 DIAGNOSIS — Z79.4 TYPE 2 DIABETES MELLITUS WITH HYPERGLYCEMIA, WITH LONG-TERM CURRENT USE OF INSULIN (H): Primary | ICD-10-CM

## 2018-02-13 DIAGNOSIS — E11.65 TYPE 2 DIABETES MELLITUS WITH HYPERGLYCEMIA (H): Primary | ICD-10-CM

## 2018-02-13 PROCEDURE — G0108 DIAB MANAGE TRN  PER INDIV: HCPCS

## 2018-02-13 RX ORDER — SYRINGE-NEEDLE,INSULIN,0.5 ML 27GX1/2"
SYRINGE, EMPTY DISPOSABLE MISCELLANEOUS
Qty: 100 EACH | Refills: 11 | Status: SHIPPED | OUTPATIENT
Start: 2018-02-13 | End: 2018-09-10

## 2018-02-13 NOTE — TELEPHONE ENCOUNTER
Formula for insulin pump settings:  Pre-Pump TDD of 64 units insulin - 10% = 52 Units     Basal rate is 50% of 52 insulin pump TDD = 26 ÷ 24 hours = 1.05 units/hr  I:C ratio: rule of 450 ÷ 52insulin pump TDD = 9 grams/unit (round to the nearest whole number)  Insulin sensitivity factor: 1800 ÷ 52 insulin pump TDD = 35 mg/dl/ unit     Patient Initial Pump settings:  Omnipod  BASAL RATES and times:  12 AM (midnight): 1.05 units/hour    CARB RATIO and times:  12 AM (midnight): 9   Corection Factor (Sensitivity) and times:  12 AM - 12 AM: 35 mg/dL    Active Insulin Time: 4 hours   BLOOD GLUCOSE TARGET: 100mg/dl  Correct above 120mg/dl  Reverse correction: On  Minimum BG for bolus calculation:65mg/dl   Max basal: 2.0  Max bolus: 25  Temp basal: percent  Meter BG goal:   Low reservoir: 20 units  Pod expiration: 1-3 hours per patient preference      Additional notes: Adjustments to current basal insulin(Lantus/Levemir/Basaglar/Toujeo/Tresiba) prior to pump start? No Tresiba the morning of the pump start      Addition order needed:  Insulin vials Yes   Test strips  Yes   Glucagon Yes   Ketone test strips Yes   Syringes Yes     Please let me know if you agree with above initiation settings or indicate alternative plan.    Deanna Bolton RN,CDE

## 2018-02-13 NOTE — PROGRESS NOTES
"Diabetes Self Management Training: Pre-Insulin Pump Training    Maricarmen Dotson presents today for pre-insulin pump start education related to Type 2 diabetes.    She is accompanied by daughter    Patient's diabetes management related comments/concerns: excited to start the pump, hoping to get blood sugars under control and not have to worry about figuring out insulin doses      ASSESSMENT:  Patient Problem List reviewed for relevant medical history and current medical status.    Current Diabetes Management per Patient:  Taking diabetes medications?   yes:     Diabetes Medication(s)     Insulin Sig    TRESIBA FLEXTOUCH 200 UNIT/ML pen INJECT 48 UNITS UNDER THE SKIN DAILY    insulin aspart (NOVOLOG FLEXPEN) 100 UNIT/ML injection 1 unit per 4 grams of carbohydrate eaten at each meal +a correction of 1 additional unit per 18 points of blood sugar above 140.  Total daily dose 100 units/day.  Dispense 6 boxes/30 pens for a 90-day supply or 2 boxes/10 pens for a 30-day supply as allowed by insurance.          *Abbreviated insulin dose documentation key: Insulin Trade Name ([id number]-gflyi-ardsfd-bedtime) - i.e. Humalog 5-5-5-0 (Humalog 5 units at breakfast, 5 units at lunch, and 5 units at dinner).    Patient glucose self monitoring as follows: three times daily  BG results:        Patient's most recent   Lab Results   Component Value Date    A1C 9.4 01/16/2018    is not meeting goal of <7.0    Nutrition:  Patient currently counts carbohydrates in grams    Physical Activity:    No regular exercise    Diabetes Complications:  Acute Complications: At risk for hypoglycemia? yes  Symptoms of low blood sugar? none    Vitals:  [facility] 01/01/1999  Estimated body mass index is 32.78 kg/(m^2) as calculated from the following:    Height as of 1/16/18: 1.613 m (5' 3.5\").    Weight as of 1/30/18: 85.3 kg (188 lb).   Last 3 BP:   BP Readings from Last 3 Encounters:   01/30/18 138/84   01/16/18 161/68   10/18/17 (!) 156/96       History "   Smoking Status     Never Smoker   Smokeless Tobacco     Never Used       Labs:  Lab Results   Component Value Date    A1C 9.4 01/16/2018     Lab Results   Component Value Date     09/20/2017     Lab Results   Component Value Date     04/12/2016     02/21/2015     HDL Cholesterol   Date Value Ref Range Status   02/21/2015 53 >50 mg/dL Final   ]  GFR Estimate   Date Value Ref Range Status   09/20/2017 84 >60 mL/min/1.7m2 Final     Comment:     Non  GFR Calc     GFR Estimate If Black   Date Value Ref Range Status   09/20/2017 >90 >60 mL/min/1.7m2 Final     Comment:      GFR Calc     Lab Results   Component Value Date    CR 0.72 09/20/2017     No results found for: MICROALBUMIN    Health Beliefs and Attitudes:   Patient Activation Measure Survey Score:  JEFFERY Score (Last Two) 6/2/2011 10/18/2013   JEFFERY Raw Score 50 42   Activation Score 86.3 66   JEFFERY Level 4 3       Diabetes knowledge and skills assessment:     Patient is knowledgeable in diabetes management concepts related to: Monitoring and Taking Medication    Patient needs further education on the following diabetes management concepts: Healthy Eating    Patient is knowledgeable in the following insulin pump concepts: Carbohydrate counting and Calculating boluses    Patient needs further education on the following insulin pump concepts: Balancing glucose and insulin, Managing insulin pump therapy and Basic button pushing    Barriers to Learning Assessment: Cognitive impairment      INTERVENTION:    Education provided today on:  Insulin Pump Concepts:  Balancing glucose and insulin  Carbohydrate counting  Calculating boluses  Problem solving with insulin pump therapy (BG monitoring; hypoglycemia signs/symptoms, treatment (glucagon) and prevention; hyperglycemia signs/symptoms, treatment and prevention; ketones, DKA signs/symptoms and prevention)  Hands on practice with basic pump button use    Opportunities for  ongoing education and support in diabetes-self management were discussed.    Pt verbalized understanding of concepts discussed and recommendations provided today.       Education Materials Provided:  Pre-pump education checklist    PLAN:  Patient has completed pre-insulin pump start education and is ready to proceed with insulin pump start.  Insulin pump order form given to MD to complete.  Insulin pump start appointment scheduled on: 2/20/18.  No Tresiba the morning of the pump start      Deanna Bolton RN,CDE   Time Spent: 70 minutes  Encounter Type: Individual    Any diabetes medication dose changes were made via the CDE Protocol and Collaborative Practice Agreement with the patient's referring provider. A copy of this encounter was shared with the provider.

## 2018-02-13 NOTE — MR AVS SNAPSHOT
After Visit Summary   2/13/2018    Maricarmen Dotson    MRN: [id number]           Patient Information     Date Of Birth          1960        Visit Information        Provider Department      2/13/2018 10:30 AM Deanna Bolton RN [facility]        Today's Diagnoses     Type 2 diabetes mellitus with hyperglycemia (H)    -  1       Follow-ups after your visit        Your next 10 appointments already scheduled     Feb 20, 2018  9:30 AM CST   Diabetic Education with Deanna Bolton RN   [facility]    [address] [address]   [phone]            Feb 27, 2018  1:00 PM CST   Return Visit with MARICRUZ Wadsworth CNP   [facility] ([facility])    [address]  [address] [place] [address]   [phone]            Mar 20, 2018 10:30 AM CDT   Return Visit with [facility]CRUZ Castillo CNP   [facility]    [address] [facility] [address]   [phone]              Who to contact     If you have questions or need follow up information about today's clinic visit or your schedule please contact [facility] directly at [phone].  Normal or non-critical lab and imaging results will be communicated to you by MyChart, letter or phone within 4 business days after the clinic has received the results. If you do not hear from us within 7 days, please contact the clinic through MyChart or phone. If you have a critical or abnormal lab result, we will notify you by phone as soon as possible.  Submit refill requests through [employer] or call your pharmacy and they will forward the refill request to us. Please allow 3 business days for your refill to be completed.          Additional Information About Your Visit        MyChart Information     [facility] gives  you secure access to your electronic health record. If you see a primary care provider, you can also send messages to your care team and make appointments. If you have questions, please call your primary care clinic.  If you do not have a primary care provider, please call [phone] and they will assist you.        Care EveryWhere ID     This is your Care EveryWhere ID. This could be used by other organizations to access your [facility] medical records  [id number]        Your Vitals Were     Last Period                   01/01/1999            Blood Pressure from Last 3 Encounters:   01/30/18 138/84   01/16/18 161/68   10/18/17 (!) 156/96    Weight from Last 3 Encounters:   01/30/18 85.3 kg (188 lb)   01/16/18 84.8 kg (187 lb)   10/18/17 83 kg (182 lb 14.4 oz)              We Performed the Following     DIABETES EDUCATION - Individual  []          Today's Medication Changes          These changes are accurate as of 2/13/18 11:59 PM.  If you have any questions, ask your nurse or doctor.               Start taking these medicines.        Dose/Directions    acetone (Urine) test Strp   Used for:  Type 2 diabetes mellitus with hyperglycemia, with long-term current use of insulin (H)   Started by:  Emily Phan APRN CNP        Dose:  1 strip   1 strip by In Vitro route daily   Quantity:  50 each   Refills:  3       blood glucose monitoring test strip   Commonly known as:  no brand specified   Used for:  Type 2 diabetes mellitus with hyperglycemia, with long-term current use of insulin (H)   Started by:  Emily Phan APRN CNP        Freestyle test strips (NOT LITE or INSULINX) to be used with Omnipod pump test 6 times daily   Quantity:  300 each   Refills:  11       glucagon 1 MG kit   Commonly known as:  GLUCAGON EMERGENCY   Used for:  Type 2 diabetes mellitus with hyperglycemia, with long-term current use of insulin (H)   Started by:  Emily Phan APRN CNP        Dose:  1 mg   Inject 1 mg into  "the muscle once for 1 dose   Quantity:  1 mg   Refills:  0       insulin syringe-needle U-100 31G X 5/16\" 1 ML   Commonly known as:  BD insulin syringe ULTRAFINE   Used for:  Type 2 diabetes mellitus with hyperglycemia, with long-term current use of insulin (H)   Started by:  Emily Phan APRN CNP        Use one syringe daily or as directed.   Quantity:  100 each   Refills:  11         These medicines have changed or have updated prescriptions.        Dose/Directions    * insulin aspart 100 UNIT/ML injection   Commonly known as:  NovoLOG FLEXPEN   This may have changed:  Another medication with the same name was added. Make sure you understand how and when to take each.   Used for:  Type 2 diabetes mellitus with hyperglycemia, with long-term current use of insulin (H)   Changed by:  Emily Phan APRN CNP        1 unit per 4 grams of carbohydrate eaten at each meal +a correction of 1 additional unit per 18 points of blood sugar above 140.  Total daily dose 100 units/day.  Dispense 6 boxes/30 pens for a 90-day supply or 2 boxes/10 pens for a 30-day supply as allowed by insurance.   Quantity:  90 mL   Refills:  3       * insulin aspart 100 UNITS/ML injection   Commonly known as:  NovoLOG VIAL   This may have changed:  You were already taking a medication with the same name, and this prescription was added. Make sure you understand how and when to take each.   Used for:  Type 2 diabetes mellitus with hyperglycemia, with long-term current use of insulin (H)   Changed by:  Emily Phan APRN CNP        To be used in insulin pump est TDD 65-75 units   Quantity:  3 vial   Refills:  3       * Notice:  This list has 2 medication(s) that are the same as other medications prescribed for you. Read the directions carefully, and ask your doctor or other care provider to review them with you.         Where to get your medicines      These medications were sent to [facility] [place], [place] [address]   [address], [place] [address]     Phone:  [phone]     acetone (Urine) test Strp    blood glucose monitoring test strip    glucagon 1 MG kit    insulin aspart 100 UNITS/ML injection    insulin syringe-needle U-100 31G X 5/16\" 1 ML                Primary Care Provider Office Phone # Fax #    Annamaria Erickson [phone]       [address] [place] [address]        Equal Access to Services     [facility]: Hadii aad ku hadasho Soomaali, waaxda luqadaha, qaybta kaalmada adeegyada, waxay idiin hayaan adeeg kharash la'aan ah. So [facility] [phone].    ATENCIÓN: Si habla español, tiene a richter disposición servicios gratuitos de asistencia lingüística. [facility] [phone].    We comply with applicable federal civil rights laws and [place] laws. We do not discriminate on the basis of race, color, national origin, age, disability, sex, sexual orientation, or gender identity.            Thank you!     Thank you for choosing [facility]  for your care. Our goal is always to provide you with excellent care. Hearing back from our patients is one way we can continue to improve our services. Please take a few minutes to complete the written survey that you may receive in the mail after your visit with us. Thank you!             Your Updated Medication List - Protect others around you: Learn how to safely use, store and throw away your medicines at www.disposemymeds.org.          This list is accurate as of 2/13/18 11:59 PM.  Always use your most recent med list.                   Brand Name Dispense Instructions for use Diagnosis    acetone (Urine) test Strp     50 each    1 strip by In Vitro route daily    Type 2 diabetes mellitus with hyperglycemia, with long-term current use of insulin (H)       ASPIRIN NOT PRESCRIBED    INTENTIONAL    1 each    continuous prn Antiplatelet medication not prescribed " "intentionally due to age.        blood glucose monitoring test strip    no brand specified    300 each    Freestyle test strips (NOT LITE or INSULINX) to be used with Omnipod pump test 6 times daily    Type 2 diabetes mellitus with hyperglycemia, with long-term current use of insulin (H)       EPINEPHrine 0.3 MG/0.3ML injection 2-pack    EPIPEN/ADRENACLICK/or ANY BX GENERIC EQUIV    2 each    Inject 0.3 mLs (0.3 mg) into the muscle once as needed for anaphylaxis    Allergic reaction       glucagon 1 MG kit    GLUCAGON EMERGENCY    1 mg    Inject 1 mg into the muscle once for 1 dose    Type 2 diabetes mellitus with hyperglycemia, with long-term current use of insulin (H)       * insulin aspart 100 UNIT/ML injection    NovoLOG FLEXPEN    90 mL    1 unit per 4 grams of carbohydrate eaten at each meal +a correction of 1 additional unit per 18 points of blood sugar above 140.  Total daily dose 100 units/day.  Dispense 6 boxes/30 pens for a 90-day supply or 2 boxes/10 pens for a 30-day supply as allowed by insurance.    Type 2 diabetes mellitus with hyperglycemia, with long-term current use of insulin (H)       * insulin aspart 100 UNITS/ML injection    NovoLOG VIAL    3 vial    To be used in insulin pump est TDD 65-75 units    Type 2 diabetes mellitus with hyperglycemia, with long-term current use of insulin (H)       * insulin pen needle 31G X 5 MM    B-D U/F    550 each    Use 6  time(s) per day.  Please dispense as BD Pen Needle Mini U/F 31G x 5 MM    Type 2 diabetes mellitus with hyperglycemia (H)       * insulin pen needle 31G X 5 MM    B-D U/F    300 each    Use 6 daily or as directed.    Type 2 diabetes mellitus with hyperglycemia (H)       * insulin pen needle 31G X 5 MM    B-D U/F    200 each    Use 5 daily or as directed.    Type 2 diabetes mellitus with hyperglycemia (H)       insulin syringe-needle U-100 31G X 5/16\" 1 ML    BD insulin syringe ULTRAFINE    100 each    Use one syringe daily or as directed.    " Type 2 diabetes mellitus with hyperglycemia, with long-term current use of insulin (H)       polyethylene glycol Packet    MIRALAX/GLYCOLAX     Take 1 packet by mouth daily.        STATIN NOT PRESCRIBED (INTENTIONAL)     0 each    Statin not prescribed intentionally due to Intolerance    Statin intolerance       sulindac 200 MG tablet    CLINORIL     Take 200 mg by mouth 3 times daily        traMADol 50 MG tablet    ULTRAM     Take 50 mg by mouth 3 times daily 1-2 tablets in the evening        TRESIBA FLEXTOUCH 200 UNIT/ML pen   Generic drug:  insulin degludec     18 mL    INJECT 68 UNITS UNDER THE SKIN DAILY    Type 2 diabetes mellitus with hyperglycemia, with long-term current use of insulin (H), Encounter for long-term (current) use of insulin (H)       VOLTAREN 1 % Gel topical gel   Generic drug:  diclofenac     100 g    Apply topically nightly as needed for moderate pain Apply 4 grams to knees or 2 grams to hands four times daily using enclosed dosing card.        * Notice:  This list has 5 medication(s) that are the same as other medications prescribed for you. Read the directions carefully, and ask your doctor or other care provider to review them with you.

## 2018-02-13 NOTE — LETTER
2/13/2018         RE: Maricarmen Dotson  [address] [place] [address]        Dear Colleague,    Thank you for referring your patient, Maricarmen Dotson, to the [facility] [place]. Please see a copy of my visit note below.    Diabetes Self Management Training: Pre-Insulin Pump Training    Maricarmen Dotson presents today for pre-insulin pump start education related to Type 2 diabetes.    She is accompanied by daughter    Patient's diabetes management related comments/concerns: excited to start the pump, hoping to get blood sugars under control and not have to worry about figuring out insulin doses      ASSESSMENT:  Patient Problem List reviewed for relevant medical history and current medical status.    Current Diabetes Management per Patient:  Taking diabetes medications?   yes:     Diabetes Medication(s)     Insulin Sig    TRESIBA FLEXTOUCH 200 UNIT/ML pen INJECT 48 UNITS UNDER THE SKIN DAILY    insulin aspart (NOVOLOG FLEXPEN) 100 UNIT/ML injection 1 unit per 4 grams of carbohydrate eaten at each meal +a correction of 1 additional unit per 18 points of blood sugar above 140.  Total daily dose 100 units/day.  Dispense 6 boxes/30 pens for a 90-day supply or 2 boxes/10 pens for a 30-day supply as allowed by insurance.          *Abbreviated insulin dose documentation key: Insulin Trade Name (wxlpfcqlq-xdeea-zlthiy-bedtime) - i.e. Humalog 5-5-5-0 (Humalog 5 units at breakfast, 5 units at lunch, and 5 units at dinner).    Patient glucose self monitoring as follows: three times daily  BG results:        Patient's most recent   Lab Results   Component Value Date    A1C 9.4 01/16/2018    is not meeting goal of <7.0    Nutrition:  Patient currently counts carbohydrates in grams    Physical Activity:    No regular exercise    Diabetes Complications:  Acute Complications: At risk for hypoglycemia? yes  Symptoms of low blood sugar? none    Vitals:  LMP 01/01/1999  Estimated body mass index is  "32.78 kg/(m^2) as calculated from the following:    Height as of 1/16/18: 1.613 m (5' 3.5\").    Weight as of 1/30/18: 85.3 kg (188 lb).   Last 3 BP:   BP Readings from Last 3 Encounters:   01/30/18 138/84   01/16/18 161/68   10/18/17 (!) 156/96       History   Smoking Status     Never Smoker   Smokeless Tobacco     Never Used       Labs:  Lab Results   Component Value Date    A1C 9.4 01/16/2018     Lab Results   Component Value Date     09/20/2017     Lab Results   Component Value Date     04/12/2016     02/21/2015     HDL Cholesterol   Date Value Ref Range Status   02/21/2015 53 >50 mg/dL Final   ]  GFR Estimate   Date Value Ref Range Status   09/20/2017 84 >60 mL/min/1.7m2 Final     Comment:     Non  GFR Calc     GFR Estimate If Black   Date Value Ref Range Status   09/20/2017 >90 >60 mL/min/1.7m2 Final     Comment:      GFR Calc     Lab Results   Component Value Date    CR 0.72 09/20/2017     No results found for: MICROALBUMIN    Health Beliefs and Attitudes:   Patient Activation Measure Survey Score:  JEFFERY Score (Last Two) 6/2/2011 10/18/2013   JEFFERY Raw Score 50 42   Activation Score 86.3 66   JEFFERY Level 4 3       Diabetes knowledge and skills assessment:     Patient is knowledgeable in diabetes management concepts related to: Monitoring and Taking Medication    Patient needs further education on the following diabetes management concepts: Healthy Eating    Patient is knowledgeable in the following insulin pump concepts: Carbohydrate counting and Calculating boluses    Patient needs further education on the following insulin pump concepts: Balancing glucose and insulin, Managing insulin pump therapy and Basic button pushing    Barriers to Learning Assessment: Cognitive impairment      INTERVENTION:    Education provided today on:  Insulin Pump Concepts:  Balancing glucose and insulin  Carbohydrate counting  Calculating boluses  Problem solving with insulin pump " therapy (BG monitoring; hypoglycemia signs/symptoms, treatment (glucagon) and prevention; hyperglycemia signs/symptoms, treatment and prevention; ketones, DKA signs/symptoms and prevention)  Hands on practice with basic pump button use    Opportunities for ongoing education and support in diabetes-self management were discussed.    Pt verbalized understanding of concepts discussed and recommendations provided today.       Education Materials Provided:  Pre-pump education checklist    PLAN:  Patient has completed pre-insulin pump start education and is ready to proceed with insulin pump start.  Insulin pump order form given to MD to complete.  Insulin pump start appointment scheduled on: 2/20/18.  No Tresiba the morning of the pump start      Deanna Bolton RN,CDE   Time Spent: 70 minutes  Encounter Type: Individual    Any diabetes medication dose changes were made via the CDE Protocol and Collaborative Practice Agreement with the patient's referring provider. A copy of this encounter was shared with the provider.

## 2018-02-13 NOTE — TELEPHONE ENCOUNTER
I agree with the pump initiation settings as previously discussed with BEBETO Christian, and as documented in this encounter.  Emily Phan NP  Endocrinology

## 2018-02-19 ENCOUNTER — TELEPHONE (OUTPATIENT)
Dept: NURSING | Facility: CLINIC | Age: 58
End: 2018-02-19

## 2018-02-20 ENCOUNTER — ALLIED HEALTH/NURSE VISIT (OUTPATIENT)
Dept: EDUCATION SERVICES | Facility: CLINIC | Age: 58
End: 2018-02-20
Payer: COMMERCIAL

## 2018-02-20 DIAGNOSIS — E11.9 TYPE 2 DIABETES, HBA1C GOAL < 7% (H): Primary | ICD-10-CM

## 2018-02-20 DIAGNOSIS — E11.65 TYPE 2 DIABETES MELLITUS WITH HYPERGLYCEMIA (H): ICD-10-CM

## 2018-02-20 PROCEDURE — G0108 DIAB MANAGE TRN  PER INDIV: HCPCS

## 2018-02-20 NOTE — MR AVS SNAPSHOT
After Visit Summary   2/20/2018    Maricarmen Dotson    MRN: [id number]           Patient Information     Date Of Birth          1960        Visit Information        Provider Department      2/20/2018 9:30 AM Deanna Bolton RN [facility]        Today's Diagnoses     Type 2 diabetes, HbA1c goal < 7% (H)    -  1    Type 2 diabetes mellitus with hyperglycemia (H)           Follow-ups after your visit        Your next 10 appointments already scheduled     Feb 27, 2018  1:00 PM CST   Return Visit with MARICRUZ Wadsworth CNP   [facility] ([facility])    [address]  [address]  [place] [place] [address]   [phone]            Mar 01, 2018  1:30 PM CST   Diabetic Education with Deanna Bolton RN   [facility] ([facility])    [address]  [facility] [address] [phone]            Mar 20, 2018 10:30 AM CDT   Return Visit with MARICRUZ Castillo CNP   [facility] ([facility])    [address]  [facility] [address] [phone]              Who to contact     If you have questions or need follow up information about today's clinic visit or your schedule please contact [facility] directly at [phone].  Normal or non-critical lab and imaging results will be communicated to you by MyChart, letter or phone within 4 business days after the clinic has received the results. If you do not hear from us within 7 days, please contact the clinic through MyChart or phone. If you have a critical or abnormal lab result, we will notify you by phone as soon as possible.  Submit refill requests through [employer] or call your pharmacy and they will forward the refill request to us. Please allow 3 business days for your refill to be completed.          Additional Information About Your Visit         [facility] Information     [employer] gives you secure access to your electronic health record. If you see a primary care provider, you can also send messages to your care team and make appointments. If you have questions, please call your primary care clinic.  If you do not have a primary care provider, please call [phone] and they will assist you.        Care EveryWhere ID     This is your Care EveryWhere ID. This could be used by other organizations to access your [place] medical records  [id number]        Your Vitals Were     Last Period                   01/01/1999            Blood Pressure from Last 3 Encounters:   01/30/18 138/84   01/16/18 161/68   10/18/17 (!) 156/96    Weight from Last 3 Encounters:   01/30/18 85.3 kg (188 lb)   01/16/18 84.8 kg (187 lb)   10/18/17 83 kg (182 lb 14.4 oz)              We Performed the Following     DIABETES EDUCATION - Individual  []        Primary Care Provider Office Phone # Fax #    Annamaria Erickson [phone] [address] [place] [address]        Equal Access to Services     [facility]: Hadii aad ku hadasho Soomaali, waaxda luqadaha, qaybta kaalmada mitra, dillon dye . So [facility] [phone].    ATENCIÓN: Si habla español, tiene a richter disposición servicios gratuitos de asistencia lingüística. [facility] [phone].    We comply with applicable federal civil rights laws and [place] laws. We do not discriminate on the basis of race, color, national origin, age, disability, sex, sexual orientation, or gender identity.            Thank you!     Thank you for choosing [facility]  for your care. Our goal is always to provide you with excellent care. Hearing back from our patients is one way we can continue to improve our services. Please take a few minutes to complete the written survey that you may receive in the mail after your visit with us. Thank  you!             Your Updated Medication List - Protect others around you: Learn how to safely use, store and throw away your medicines at www.disposemymeds.org.          This list is accurate as of 2/20/18 12:20 PM.  Always use your most recent med list.                   Brand Name Dispense Instructions for use Diagnosis    acetone (Urine) test Strp     50 each    1 strip by In Vitro route daily    Type 2 diabetes mellitus with hyperglycemia, with long-term current use of insulin (H)       * ASPIRIN NOT PRESCRIBED    INTENTIONAL    1 each    continuous prn Antiplatelet medication not prescribed intentionally due to age.        blood glucose monitoring test strip    no brand specified    300 each    Freestyle test strips (NOT LITE or INSULINX) to be used with Omnipod pump test 6 times daily    Type 2 diabetes mellitus with hyperglycemia, with long-term current use of insulin (H)       EPINEPHrine 0.3 MG/0.3ML injection 2-pack    EPIPEN/ADRENACLICK/or ANY BX GENERIC EQUIV    2 each    Inject 0.3 mLs (0.3 mg) into the muscle once as needed for anaphylaxis    Allergic reaction       glucagon 1 MG kit    GLUCAGON EMERGENCY    1 mg    Inject 1 mg into the muscle once for 1 dose    Type 2 diabetes mellitus with hyperglycemia, with long-term current use of insulin (H)       * insulin aspart 100 UNIT/ML injection    NovoLOG FLEXPEN    90 mL    1 unit per 4 grams of carbohydrate eaten at each meal +a correction of 1 additional unit per 18 points of blood sugar above 140.  Total daily dose 100 units/day.  Dispense 6 boxes/30 pens for a 90-day supply or 2 boxes/10 pens for a 30-day supply as allowed by insurance.    Type 2 diabetes mellitus with hyperglycemia, with long-term current use of insulin (H)       * insulin aspart 100 UNITS/ML injection    NovoLOG VIAL    3 vial    To be used in insulin pump est TDD 65-75 units    Type 2 diabetes mellitus with hyperglycemia, with long-term current use of insulin (H)       * insulin  "pen needle 31G X 5 MM    B-D U/F    550 each    Use 6  time(s) per day.  Please dispense as BD Pen Needle Mini U/F 31G x 5 MM    Type 2 diabetes mellitus with hyperglycemia (H)       * insulin pen needle 31G X 5 MM    B-D U/F    300 each    Use 6 daily or as directed.    Type 2 diabetes mellitus with hyperglycemia (H)       * insulin pen needle 31G X 5 MM    B-D U/F    200 each    Use 5 daily or as directed.    Type 2 diabetes mellitus with hyperglycemia (H)       * insulin pump infusion      Date last updated:  2/20/18 Omnipod BASAL RATES and times: 12   AM (midnight): 1.05 units/hour  . CARB RATIO and times: 12   AM (midnight): 9 Corection Factor (Sensitivity) and times: 12   AM (midnight): 35 mg/dL BLOOD GLUCOSE TARGET and times: 12   AM (midnight): 100, correct above 120 Active Insulin Time:  4 hours        insulin syringe-needle U-100 31G X 5/16\" 1 ML    BD insulin syringe ULTRAFINE    100 each    Use one syringe daily or as directed.    Type 2 diabetes mellitus with hyperglycemia, with long-term current use of insulin (H)       polyethylene glycol Packet    MIRALAX/GLYCOLAX     Take 1 packet by mouth daily.        STATIN NOT PRESCRIBED (INTENTIONAL)     0 each    Statin not prescribed intentionally due to Intolerance    Statin intolerance       sulindac 200 MG tablet    CLINORIL     Take 200 mg by mouth 3 times daily        traMADol 50 MG tablet    ULTRAM     Take 50 mg by mouth 3 times daily 1-2 tablets in the evening        TRESIBA FLEXTOUCH 200 UNIT/ML pen   Generic drug:  insulin degludec     18 mL    INJECT 68 UNITS UNDER THE SKIN DAILY    Type 2 diabetes mellitus with hyperglycemia, with long-term current use of insulin (H), Encounter for long-term (current) use of insulin (H)       VOLTAREN 1 % Gel topical gel   Generic drug:  diclofenac     100 g    Apply topically nightly as needed for moderate pain Apply 4 grams to knees or 2 grams to hands four times daily using enclosed dosing card.        * " Notice:  This list has 7 medication(s) that are the same as other medications prescribed for you. Read the directions carefully, and ask your doctor or other care provider to review them with you.

## 2018-02-20 NOTE — PROGRESS NOTES
Diabetes Self Management Training: Insulin Pump Start    SUBJECTIVE:  Patient presents for an insulin pump start.   Pump Type: OmniPod  Last basal insulin injection: 48 units of Tresiba taken at 8am on 2/19/18   Last bolus insulin injection:  4-5 units of NovoLog taken at 7:40am on 2/20/18    OBJECTIVE:  Vitals: LMP 01/01/1999    Blood sugar at beginning of pump start appointment: 145 mg/dL  Blood sugar at the end of pump start appointment: 234 mg/dL      ASSESSMENT / INTERVENTION:  Patient instructed on basic insulin pump topics and insulin pump programming.     Insulin pump was programmed according to the Pump Start Orders signed by MD- see telephone encounter on 2/13/18     Patient Initial Pump settings:  Omnipod  BASAL RATES and times:  12 AM (midnight): 1.05 units/hour    CARB RATIO and times:  12 AM (midnight): 9   Corection Factor (Sensitivity) and times:  12 AM - 12 AM: 35 mg/dL    Active Insulin Time: 4 hours   BLOOD GLUCOSE TARGET: 100mg/dl  Correct above 120mg/dl  Reverse correction: On  Minimum BG for bolus calculation:65mg/dl   Max basal: 2.0  Max bolus: 25  Temp basal: percent  Meter BG goal:   Low reservoir: 20 units  Pod expiration: 1-3 hours per patient preference         Problem Solving: high blood sugars and DKA prevention  when to call a healthcare provider  when to call the pump company help line     Education materials provided to patient: Tips to remember after starting an insulin pump    FOLLOW-UP:  Telephone follow-up scheduled in 1-3 days.    Post-pump start follow-up appointment scheduled on: 3/1/18 at 1:30pm      Deanna Bolton RN,CDE   Time Spent: 90  Visit Type: Individual    Any diabetes medication dose changes were made via the CDE Protocol and Collaborative Practice Agreement with the patient's referring provider. A copy of this encounter was shared with the provider.

## 2018-02-20 NOTE — LETTER
2/20/2018         RE: Maricarmen Dotson  [address] [place] [address]        Dear Colleague,    Thank you for referring your patient, Maricarmen Dotson, to the [facility]. Please see a copy of my visit note below.    Diabetes Self Management Training: Insulin Pump Start    SUBJECTIVE:  Patient presents for an insulin pump start.   Pump Type: OmniPod  Last basal insulin injection: 48 units of Tresiba taken at 8am on 2/19/18   Last bolus insulin injection:  4-5 units of NovoLog taken at 7:40am on 2/20/18    OBJECTIVE:  Vitals: LMP 01/01/1999    Blood sugar at beginning of pump start appointment: 145 mg/dL  Blood sugar at the end of pump start appointment: 234 mg/dL      ASSESSMENT / INTERVENTION:  Patient instructed on basic insulin pump topics and insulin pump programming.     Insulin pump was programmed according to the Pump Start Orders signed by MD- see telephone encounter on 2/13/18     Patient Initial Pump settings:  Omnipod  BASAL RATES and times:  12 AM (midnight): 1.05 units/hour    CARB RATIO and times:  12 AM (midnight): 9   Corection Factor (Sensitivity) and times:  12 AM - 12 AM: 35 mg/dL    Active Insulin Time: 4 hours   BLOOD GLUCOSE TARGET: 100mg/dl  Correct above 120mg/dl  Reverse correction: On  Minimum BG for bolus calculation:65mg/dl   Max basal: 2.0  Max bolus: 25  Temp basal: percent  Meter BG goal:   Low reservoir: 20 units  Pod expiration: 1-3 hours per patient preference         Problem Solving: high blood sugars and DKA prevention  when to call a healthcare provider  when to call the pump company help line     Education materials provided to patient: Tips to remember after starting an insulin pump    FOLLOW-UP:  Telephone follow-up scheduled in 1-3 days.    Post-pump start follow-up appointment scheduled on: 3/1/18 at 1:30pm      Deanna Bolton RN,CDE   Time Spent: 90  Visit Type: Individual    Any diabetes medication dose changes were made via the  CDE Protocol and Collaborative Practice Agreement with the patient's referring provider. A copy of this encounter was shared with the provider.

## 2018-02-26 NOTE — PROGRESS NOTES
[facility]    Date of visit: 2/27/2018    Chief complaint:   Chief Complaint   Patient presents with     Pain       Interval history:  Maricarmen Dotson was last seen on 1/30/18.      Recommendations/plan at the last visit included:                      1.  Pain Physical Therapy:  YES   Schedule first visit with CHARBEL Licea.                        2.  Pain Psychologist to address relaxation, behavioral change, coping style, and other factors important to improvement.  YES  Schedule first visit with Dr. Hannah.                         3.  Medication Management:                                               1. We discussed that Maricarmen has tried numerous medications in the past for treatment of fibromyalgia with very little success and ultimately, we may find that medications may not be helpful for her pain management.                                               2. We discussed that I do not recommend opioids as a long term option for pain management when other options are available and that opioids are not indicated for fibromyalgia. We reviewed the development of tolerance, significant risks and side effects, and opioid induced hyperalgesia. She verbalized understanding. States she may be interested in tapering off of tramadol in the future, will continue with rheumatologist for now.                                              3.  Continue current medication regimen.                                              4.  We discussed low dose naltrexone as a possible option. She will consider low dose naltrexone as a possible medication to trial for fibromyalgia, discuss at next visit.                         4.  Follow up with MARICRUZ Patel CNP in 4-6 weeks.    Since her last visit, Maricarmen Dotson reports:  -Her pain is worse than it was at last visit.   -She has had x2 visits with Anuja. States she would like to continue working with her but states she is unable to as  "her insurance does not cover well and it is too expensive.   -She is not able to see Dr. Hannah either as she already sees a psychologist and her insurance will not cover.   -She expresses frustration with the situation, would like to be able to participate but cannot afford it.  -She states she does not want to start other medications as she tolerates medication differently than most people and feels as though she would have side effects.   -She states she came to this visit today to inform me she cannot continue.     Pain scores:   Pain quality: Aching, Burning, Gnawing, Numb, Sharp, Shooting, Stabbing and Tender    Pain timing: Constant     Pain rating: intensity ranges from 7/10 to 9/10, and averages 8/10 on a 0-10 scale.   Aggravating factors include: doing too much, stress   Relieving factors include: movement, ice    Annual Controlled Substance Agreement/UDS due date: N/A    Current pain treatments:    Sulindac 200mg BID- H, SE of stomach upset, better with food   Tramadol 50mg 2-6 tabs/day- H, SE stomach upset, somewhat sedated   Voltaren gel- H, variable relief    Biofreeze- [facility]    Current MME: 0    Past pain treatments:  1. Previous Pain Relevant Medications:  (H--helped; HI--Helped initially; [facility]--Somewhat helpful; NH--No help; W--worse; SE--side effects; ?--Unsure if helpful)   NOTE: This medication information taken from patient's intake form, not medical records.                         Opiates: T3- SE, nausea/vomiting, hydrocodone- SE, nausea, trouble walking, morphine- SE, nausea/vomiting, oxycodone- SE, nausea/vomiting, tramadol- H, SE, stomach upset, sedated                        NSAIDS: Ibuprofen- NH, Aleve- SE, \"javad me up,\" Toradol- SE, GI upset                        Muscle Relaxants: Flexeril- SE \"jacked me up, crawling up to the ceiling,\" Zanaflex- NH, Norflex- NH, caused muscle spasms, clonazepam- W, caused muscle spasms                        Anti-migraine mediations: no                     " "   Anti-depressants: amitriptyline- SE, worsened anxiety, Wellbutrin- SE, worsened anxiety, Celexa- SE, worsened anxiety, SI, despiramine- SE, worsened anxiety, doxepin- SE, worsened anxiety, \"really bad, more depressed,\" Cymbalta- NH, SE, worsened anxiety, insomnia, Remeron- SE, worsened anxiety, insomnia, nortriptyline- SE, worsened anxiety, Zoloft- NH, SE, worsened anxiety, trazodone- SE, worsened anxiety, Effexor- SE, worsened anxiety, insomnia, hallucinating, Lexapro- SE, worsened anxiety, Mirapex- SE, worsened anxiety, depression                         Sleep aids: Lunesta- W, insomnia, Ambien- W, insomnia,  temazepam- W, insomnia                        Anxiolytics: Valium- W, Klonopin- W                        Neuropathics: gabapentin- HI, tegretol- NH, SE, cognitive issues, sedated depakote- NH, SE cognitive issues, lyrica- NH, SE, topamax- NH, SE                                         Topicals: Biofreeze- H                        Other medications not covered above: Tylenol- NH     2. Physical Therapy: yes- NH  3. Surgery: no  4. Injections: right knee steroid injection- NH  5. Chiropractic: yes- H  6. Acupuncture: yes- H  7. TENS Unit: yes- H, variable    [place] Board of Pharmacy Data Base Reviewed:    YES; As expected, no concern for misuse/abuse of controlled medications based on this report.    Medications:  Current Outpatient Prescriptions   Medication Sig Dispense Refill     INSULIN PUMP - OUTPATIENT Date last updated:  2/20/18  Omnipod  BASAL RATES and times:  12   AM (midnight): 1.05 units/hour  .  CARB RATIO and times:  12   AM (midnight): 9  Corection Factor (Sensitivity) and times:  12   AM (midnight): 35 mg/dL  BLOOD GLUCOSE TARGET and times:  12   AM (midnight): 100, correct above 120  Active Insulin Time:  4 hours       blood glucose monitoring (NO BRAND SPECIFIED) test strip Freestyle test strips (NOT LITE or INSULINX) to be used with Omnipod pump test 6 times daily 300 each 11     " "insulin aspart (NOVOLOG VIAL) 100 UNITS/ML injection To be used in insulin pump est TDD 65-75 units 3 vial 3     acetone, Urine, test STRP 1 strip by In Vitro route daily 50 each 3     insulin syringe-needle U-100 (BD INSULIN SYRINGE ULTRAFINE) 31G X 5/16\" 1 ML Use one syringe daily or as directed. 100 each 11     TRESIBA FLEXTOUCH 200 UNIT/ML pen INJECT 68 UNITS UNDER THE SKIN DAILY 18 mL 1     sulindac (CLINORIL) 200 MG tablet Take 200 mg by mouth 3 times daily        diclofenac (VOLTAREN) 1 % GEL topical gel Apply topically nightly as needed for moderate pain Apply 4 grams to knees or 2 grams to hands four times daily using enclosed dosing card. 100 g 1     insulin aspart (NOVOLOG FLEXPEN) 100 UNIT/ML injection 1 unit per 4 grams of carbohydrate eaten at each meal +a correction of 1 additional unit per 18 points of blood sugar above 140.  Total daily dose 100 units/day.  Dispense 6 boxes/30 pens for a 90-day supply or 2 boxes/10 pens for a 30-day supply as allowed by insurance. 90 mL 3     insulin pen needle (B-D U/F) 31G X 5 MM Use 5 daily or as directed. 200 each 11     insulin pen needle (B-D U/F) 31G X 5 MM Use 6 daily or as directed. 300 each prn     insulin pen needle (B-D U/F) 31G X 5 MM Use 6  time(s) per day.  Please dispense as BD Pen Needle Mini U/F 31G x 5  each 3     EPINEPHrine (EPIPEN) 0.3 MG/0.3ML injection Inject 0.3 mLs (0.3 mg) into the muscle once as needed for anaphylaxis 2 each 0     STATIN NOT PRESCRIBED, INTENTIONAL, Statin not prescribed intentionally due to Intolerance 0 each 0     traMADol (ULTRAM) 50 MG tablet Take 50 mg by mouth 3 times daily 1-2 tablets in the evening        ASPIRIN NOT PRESCRIBED, INTENTIONAL, continuous prn Antiplatelet medication not prescribed intentionally due to age. 1 each 0     polyethylene glycol (MIRALAX/GLYCOLAX) packet Take 1 packet by mouth daily.       glucagon (GLUCAGON EMERGENCY) 1 MG kit Inject 1 mg into the muscle once for 1 dose 1 mg 0 " "      Medical History: any changes in medical history since they were last seen? No    Imaging:  MRI of brain was completed on 9/22/17 and shows:  FINDINGS: There is no evidence of hemorrhage, mass, acute infarct, or  anomaly. The brain parenchyma, ventricles and subarachnoid spaces  appear normal. There are no gadolinium enhancing lesions.      The facial structures appear normal. The arteries at the base of the  brain and the dural venous sinuses appear patent.      Review of Systems:  The 14 system ROS was reviewed from the intake questionnaire, and is positive for: dizziness, ringing in ears, abdominal pain, nausea, diarrhea, constipation, diabetes, joint pain, stiffness, back pain, neck pain, weakness, numbness and tingling, anxiety, stress.   Any bowel or bladder problems: denies  Mood: \"not great\"    Physical Exam:  Blood pressure (!) 139/98, pulse 75, weight 85.3 kg (188 lb), last menstrual period 01/01/1999, SpO2 96 %.  General: A&O x4, no signs of distress.  Gait: Normal  MSK exam: 5/5 upper and lower extremity strength.     Assessment:   Maricarmen Dotson is a 57 year old female with a past medical history significant for IBS, fibromyalgia, diabetes mellitus type 2, and depression who presents with complaints of widespread pain.      1. Widespread pain- etiology likely fibromyalgia.   2. Mental Health - the patient's mental health concerns, specifically depression, affect her experience of pain and contribute to her clinically significant distress.    1. Fibromyalgia    2. Chronic pain syndrome        Plan:    Maricarmen states she is unable to continue to participate in our program due to the expense/insurance coverage. I would recommend ongoing regular physical activity and stretching moving forward. I would also recommend a gradual taper off tramadol (prescribed by her rheumatologist) and a trial of low dose naltrexone. May also consider use of Robaxin for muscle spasm as she has had issues with other muscle " relaxants. She is not interested in this.     She will follow up with her PCP for further management.     Total time spent was 30 minutes, and more than 50% of face to face time was spent in counseling and/or coordination of care regarding chronic pain management.      MARICRUZ Patel [facility] [place]

## 2018-02-27 ENCOUNTER — OFFICE VISIT (OUTPATIENT)
Dept: PALLIATIVE MEDICINE | Facility: CLINIC | Age: 58
End: 2018-02-27
Payer: COMMERCIAL

## 2018-02-27 ENCOUNTER — VIRTUAL VISIT (OUTPATIENT)
Dept: EDUCATION SERVICES | Facility: CLINIC | Age: 58
End: 2018-02-27
Payer: COMMERCIAL

## 2018-02-27 VITALS
DIASTOLIC BLOOD PRESSURE: 98 MMHG | OXYGEN SATURATION: 96 % | WEIGHT: 188 LBS | SYSTOLIC BLOOD PRESSURE: 139 MMHG | HEART RATE: 75 BPM | BODY MASS INDEX: 32.78 KG/M2

## 2018-02-27 DIAGNOSIS — G89.4 CHRONIC PAIN SYNDROME: ICD-10-CM

## 2018-02-27 DIAGNOSIS — E11.65 TYPE 2 DIABETES MELLITUS WITH HYPERGLYCEMIA (H): Primary | ICD-10-CM

## 2018-02-27 DIAGNOSIS — M79.7 FIBROMYALGIA: Primary | ICD-10-CM

## 2018-02-27 PROCEDURE — 99214 OFFICE O/P EST MOD 30 MIN: CPT | Performed by: NURSE PRACTITIONER

## 2018-02-27 ASSESSMENT — PAIN SCALES - GENERAL: PAINLEVEL: EXTREME PAIN (8)

## 2018-02-27 NOTE — PATIENT INSTRUCTIONS
----------------------------------------------------------------  Nurse Triage line:  [phone]   Call this number with any questions or concerns. You may leave a detailed message anytime. Calls are typically returned Monday through Friday between 8 AM and 4:30 PM. We usually get back to you within 2 business days depending on the issue/request.       Medication refills:    For non-narcotic medications, call your pharmacy directly to request a refill. The pharmacy will contact the [facility] Management Center for authorization. Please allow 3-4 days for these refills to be processed.       Scheduling number: [phone].  Call this number to schedule or change appointments.    We believe regular attendance is key to your success in our program.    Any time you are unable to keep your appointment we ask that you call us at least 24 hours in advance to let us know. This will allow us to offer the appointment time to another patient.

## 2018-02-27 NOTE — MR AVS SNAPSHOT
After Visit Summary   2/27/2018    Maricarmen Dotson    MRN: [id number]           Patient Information     Date Of Birth          1960        Visit Information        Provider Department      2/27/2018 1:00 PM Geri Henry APRN CNP [facility]        Today's Diagnoses     Fibromyalgia    -  1    Chronic pain syndrome          Care Instructions        ----------------------------------------------------------------  Nurse Triage line:  [phone]   Call this number with any questions or concerns. You may leave a detailed message anytime. Calls are typically returned Monday through Friday between 8 AM and 4:30 PM. We usually get back to you within 2 business days depending on the issue/request.       Medication refills:    For non-narcotic medications, call your pharmacy directly to request a refill. The pharmacy will contact the [facility] for authorization. Please allow 3-4 days for these refills to be processed.       Scheduling number: [phone].  Call this number to schedule or change appointments.    We believe regular attendance is key to your success in our program.    Any time you are unable to keep your appointment we ask that you call us at least 24 hours in advance to let us know. This will allow us to offer the appointment time to another patient.               Follow-ups after your visit        Your next 10 appointments already scheduled     Mar 01, 2018  1:30 PM CST   Diabetic Education with Deanna Bolton RN   [facility] ([facility])    [address]  [facility] [address]   [phone]            Mar 22, 2018  9:00 AM CDT   Return Visit with MARICRUZ Castillo CNP   [facility] ([facility])    [address]  [facility] [address]   [phone]              Who to contact     If you have questions or need follow up information about  today's clinic visit or your schedule please contact [facility] directly at [phone].  Normal or non-critical lab and imaging results will be communicated to you by MyChart, letter or phone within 4 business days after the clinic has received the results. If you do not hear from us within 7 days, please contact the clinic through Fishbowl[employer] or phone. If you have a critical or abnormal lab result, we will notify you by phone as soon as possible.  Submit refill requests through [employer] or call your pharmacy and they will forward the refill request to us. Please allow 3 business days for your refill to be completed.          Additional Information About Your Visit        [employer] Information     [employer] gives you secure access to your electronic health record. If you see a primary care provider, you can also send messages to your care team and make appointments. If you have questions, please call your primary care clinic.  If you do not have a primary care provider, please call [phone] and they will assist you.        Care EveryWhere ID     This is your Care EveryWhere ID. This could be used by other organizations to access your [facility] medical records  [id number]        Your Vitals Were     Pulse Last Period Pulse Oximetry BMI (Body Mass Index)          75 01/01/1999 96% 32.78 kg/m2         Blood Pressure from Last 3 Encounters:   02/27/18 (!) 139/98   01/30/18 138/84   01/16/18 161/68    Weight from Last 3 Encounters:   02/27/18 85.3 kg (188 lb)   01/30/18 85.3 kg (188 lb)   01/16/18 84.8 kg (187 lb)              Today, you had the following     No orders found for display       Primary Care Provider Office Phone # Fax #    Annamaria Erickson [phone] [address] [place] [address]        Equal Access to Services     BRAULIO HAYNES : Virginia Sterling, nuno murguia, dillon hicks.  So [facility] [phone].    ATENCIÓN: Si mima bagley, tiene a richter disposición servicios gratuitos de asistencia lingüística. Lynnette kent [phone].    We comply with applicable federal civil rights laws and [place] laws. We do not discriminate on the basis of race, color, national origin, age, disability, sex, sexual orientation, or gender identity.            Thank you!     Thank you for choosing [facility]  for your care. Our goal is always to provide you with excellent care. Hearing back from our patients is one way we can continue to improve our services. Please take a few minutes to complete the written survey that you may receive in the mail after your visit with us. Thank you!             Your Updated Medication List - Protect others around you: Learn how to safely use, store and throw away your medicines at www.disposemymeds.org.          This list is accurate as of 2/27/18  1:21 PM.  Always use your most recent med list.                   Brand Name Dispense Instructions for use Diagnosis    acetone (Urine) test Strp     50 each    1 strip by In Vitro route daily    Type 2 diabetes mellitus with hyperglycemia, with long-term current use of insulin (H)       * ASPIRIN NOT PRESCRIBED    INTENTIONAL    1 each    continuous prn Antiplatelet medication not prescribed intentionally due to age.        blood glucose monitoring test strip    no brand specified    300 each    Freestyle test strips (NOT LITE or INSULINX) to be used with Omnipod pump test 6 times daily    Type 2 diabetes mellitus with hyperglycemia, with long-term current use of insulin (H)       EPINEPHrine 0.3 MG/0.3ML injection 2-pack    EPIPEN/ADRENACLICK/or ANY BX GENERIC EQUIV    2 each    Inject 0.3 mLs (0.3 mg) into the muscle once as needed for anaphylaxis    Allergic reaction       glucagon 1 MG kit    GLUCAGON EMERGENCY    1 mg    Inject 1 mg into the muscle once for 1 dose    Type 2 diabetes mellitus with hyperglycemia, with  "long-term current use of insulin (H)       * insulin aspart 100 UNIT/ML injection    NovoLOG FLEXPEN    90 mL    1 unit per 4 grams of carbohydrate eaten at each meal +a correction of 1 additional unit per 18 points of blood sugar above 140.  Total daily dose 100 units/day.  Dispense 6 boxes/30 pens for a 90-day supply or 2 boxes/10 pens for a 30-day supply as allowed by insurance.    Type 2 diabetes mellitus with hyperglycemia, with long-term current use of insulin (H)       * insulin aspart 100 UNITS/ML injection    NovoLOG VIAL    3 vial    To be used in insulin pump est TDD 65-75 units    Type 2 diabetes mellitus with hyperglycemia, with long-term current use of insulin (H)       * insulin pen needle 31G X 5 MM    B-D U/F    550 each    Use 6  time(s) per day.  Please dispense as BD Pen Needle Mini U/F 31G x 5 MM    Type 2 diabetes mellitus with hyperglycemia (H)       * insulin pen needle 31G X 5 MM    B-D U/F    300 each    Use 6 daily or as directed.    Type 2 diabetes mellitus with hyperglycemia (H)       * insulin pen needle 31G X 5 MM    B-D U/F    200 each    Use 5 daily or as directed.    Type 2 diabetes mellitus with hyperglycemia (H)       * insulin pump infusion      Date last updated:  2/20/18 Omnipod BASAL RATES and times: 12   AM (midnight): 1.05 units/hour  . CARB RATIO and times: 12   AM (midnight): 9 Corection Factor (Sensitivity) and times: 12   AM (midnight): 35 mg/dL BLOOD GLUCOSE TARGET and times: 12   AM (midnight): 100, correct above 120 Active Insulin Time:  4 hours        insulin syringe-needle U-100 31G X 5/16\" 1 ML    BD insulin syringe ULTRAFINE    100 each    Use one syringe daily or as directed.    Type 2 diabetes mellitus with hyperglycemia, with long-term current use of insulin (H)       polyethylene glycol Packet    MIRALAX/GLYCOLAX     Take 1 packet by mouth daily.        STATIN NOT PRESCRIBED (INTENTIONAL)     0 each    Statin not prescribed intentionally due to Intolerance    " Statin intolerance       sulindac 200 MG tablet    CLINORIL     Take 200 mg by mouth 3 times daily        traMADol 50 MG tablet    ULTRAM     Take 50 mg by mouth 3 times daily 1-2 tablets in the evening        TRESIBA FLEXTOUCH 200 UNIT/ML pen   Generic drug:  insulin degludec     18 mL    INJECT 68 UNITS UNDER THE SKIN DAILY    Type 2 diabetes mellitus with hyperglycemia, with long-term current use of insulin (H), Encounter for long-term (current) use of insulin (H)       VOLTAREN 1 % Gel topical gel   Generic drug:  diclofenac     100 g    Apply topically nightly as needed for moderate pain Apply 4 grams to knees or 2 grams to hands four times daily using enclosed dosing card.        * Notice:  This list has 7 medication(s) that are the same as other medications prescribed for you. Read the directions carefully, and ask your doctor or other care provider to review them with you.

## 2018-02-27 NOTE — PROGRESS NOTES
Diabetes Self-Management Training: Insulin Pump Telephone Visit    Current Diabetes Management per Patient:  Comments/concerns:   Dear Deanna,  Not sure if you have been getting information from my pod.  See you this Thursday.  Sincerely,  Maricarmen Dotson    Insulin Pump Type: OmniPod    Taking other diabetes medications?   yes:     Diabetes Medication(s)     Diabetic Other Sig    glucagon (GLUCAGON EMERGENCY) 1 MG kit Inject 1 mg into the muscle once for 1 dose    Insulin Sig    INSULIN PUMP - OUTPATIENT Date last updated:  2/20/18  Omnipod  BASAL RATES and times:  12   AM (midnight): 1.05 units/hour  .  CARB RATIO and times:  12   AM (midnight): 9  Corection Factor (Sensitivity) and times:  12   AM (midnight): 35 mg/dL  BLOOD GLUCOSE TARGET and times:  12   AM (midnight): 100, correct above 120  Active Insulin Time:  4 hours    insulin aspart (NOVOLOG VIAL) 100 UNITS/ML injection To be used in insulin pump est TDD 65-75 units    TRESIBA FLEXTOUCH 200 UNIT/ML pen INJECT 68 UNITS UNDER THE SKIN DAILY    insulin aspart (NOVOLOG FLEXPEN) 100 UNIT/ML injection 1 unit per 4 grams of carbohydrate eaten at each meal +a correction of 1 additional unit per 18 points of blood sugar above 140.  Total daily dose 100 units/day.  Dispense 6 boxes/30 pens for a 90-day supply or 2 boxes/10 pens for a 30-day supply as allowed by insurance.          Blood Glucose Results and Insulin Use:       Current Pump Settings: See Insulin Pump - Outpatient in Medication List for complete list of settings.     Assessment:  In goal during the day, some higher readings in the evening, overnight and morning. Will discuss at appointment on 3/1/18.      Intervention/Plan:  Alex Hernadez,    Thank you for sending an email with your amnao report. Everything looks good, a few high readings but nothing I want to make a change on before our appointment on Thursday.      I will see you on Thursday at 1:30pm.     Deanna Bolton RN,CDE     Any  diabetes medication dose changes were made via the CDE Protocol and Collaborative Practice Agreement with the patient's referring provider. A copy of this encounter was shared with the provider.

## 2018-02-27 NOTE — MR AVS SNAPSHOT
After Visit Summary   2/27/2018    Maricarmen Dotson    MRN: [id number]           Patient Information     Date Of Birth          1960        Visit Information        Provider Department      2/27/2018 2:30 PM Deanna Bolton RN [facility]        Today's Diagnoses     Type 2 diabetes mellitus with hyperglycemia (H)    -  1       Follow-ups after your visit        Your next 10 appointments already scheduled     Mar 01, 2018  1:30 PM CST   Diabetic Education with Deanna Bolton RN   [facility] ([facility])    [address]  [facility] [id number] [phone]            Mar 22, 2018  9:00 AM CDT   Return Visit with MARICRUZ Castillo [facility] ([facility])    [address]  [facility] [address] [phone]              Who to contact     If you have questions or need follow up information about today's clinic visit or your schedule please contact [facility] directly at [phone].  Normal or non-critical lab and imaging results will be communicated to you by [employer], letter or phone within 4 business days after the clinic has received the results. If you do not hear from us within 7 days, please contact the clinic through [employer] or phone. If you have a critical or abnormal lab result, we will notify you by phone as soon as possible.  Submit refill requests through [employer] or call your pharmacy and they will forward the refill request to us. Please allow 3 business days for your refill to be completed.          Additional Information About Your Visit        [employer] Information     [employer] gives you secure access to your electronic health record. If you see a primary care provider, you can also send messages to your care team and make appointments. If you have questions, please call your primary care clinic.  If you  do not have a primary care provider, please call [phone] and they will assist you.        Care EveryWhere ID     This is your Care EveryWhere ID. This could be used by other organizations to access your [facility] medical records  [id number]        Your Vitals Were     Last Period                   01/01/1999            Blood Pressure from Last 3 Encounters:   02/27/18 (!) 139/98   01/30/18 138/84   01/16/18 161/68    Weight from Last 3 Encounters:   02/27/18 85.3 kg (188 lb)   01/30/18 85.3 kg (188 lb)   01/16/18 84.8 kg (187 lb)              Today, you had the following     No orders found for display       Primary Care Provider Office Phone # Fax #    Annamaria Erickson [phone] [address] [place] [address]        Equal Access to Services     [facility]: Hadii peg schilling hadasho Sosu, waaxda luqadaha, qaybta kaalmada adeegyada, dillon castañeda haymanav dye . So [facility] [phone].    ATENCIÓN: Si habla español, tiene a richter disposición servicios gratuitos de asistencia lingüística. Llame al [phone].    We comply with applicable federal civil rights laws and [place] laws. We do not discriminate on the basis of race, color, national origin, age, disability, sex, sexual orientation, or gender identity.            Thank you!     Thank you for choosing [facility]  for your care. Our goal is always to provide you with excellent care. Hearing back from our patients is one way we can continue to improve our services. Please take a few minutes to complete the written survey that you may receive in the mail after your visit with us. Thank you!             Your Updated Medication List - Protect others around you: Learn how to safely use, store and throw away your medicines at www.disposemymeds.org.          This list is accurate as of 2/27/18  3:42 PM.  Always use your most recent med list.                   Brand Name  Dispense Instructions for use Diagnosis    acetone (Urine) test Strp     50 each    1 strip by In Vitro route daily    Type 2 diabetes mellitus with hyperglycemia, with long-term current use of insulin (H)       * ASPIRIN NOT PRESCRIBED    INTENTIONAL    1 each    continuous prn Antiplatelet medication not prescribed intentionally due to age.        blood glucose monitoring test strip    no brand specified    300 each    Freestyle test strips (NOT LITE or INSULINX) to be used with Omnipod pump test 6 times daily    Type 2 diabetes mellitus with hyperglycemia, with long-term current use of insulin (H)       EPINEPHrine 0.3 MG/0.3ML injection 2-pack    EPIPEN/ADRENACLICK/or ANY BX GENERIC EQUIV    2 each    Inject 0.3 mLs (0.3 mg) into the muscle once as needed for anaphylaxis    Allergic reaction       glucagon 1 MG kit    GLUCAGON EMERGENCY    1 mg    Inject 1 mg into the muscle once for 1 dose    Type 2 diabetes mellitus with hyperglycemia, with long-term current use of insulin (H)       * insulin aspart 100 UNIT/ML injection    NovoLOG FLEXPEN    90 mL    1 unit per 4 grams of carbohydrate eaten at each meal +a correction of 1 additional unit per 18 points of blood sugar above 140.  Total daily dose 100 units/day.  Dispense 6 boxes/30 pens for a 90-day supply or 2 boxes/10 pens for a 30-day supply as allowed by insurance.    Type 2 diabetes mellitus with hyperglycemia, with long-term current use of insulin (H)       * insulin aspart 100 UNITS/ML injection    NovoLOG VIAL    3 vial    To be used in insulin pump est TDD 65-75 units    Type 2 diabetes mellitus with hyperglycemia, with long-term current use of insulin (H)       * insulin pen needle 31G X 5 MM    B-D U/F    550 each    Use 6  time(s) per day.  Please dispense as BD Pen Needle Mini U/F 31G x 5 MM    Type 2 diabetes mellitus with hyperglycemia (H)       * insulin pen needle 31G X 5 MM    B-D U/F    300 each    Use 6 daily or as directed.    Type 2  "diabetes mellitus with hyperglycemia (H)       * insulin pen needle 31G X 5 MM    B-D U/F    200 each    Use 5 daily or as directed.    Type 2 diabetes mellitus with hyperglycemia (H)       * insulin pump infusion      Date last updated:  2/20/18 Omnipod BASAL RATES and times: 12   AM (midnight): 1.05 units/hour  . CARB RATIO and times: 12   AM (midnight): 9 Corection Factor (Sensitivity) and times: 12   AM (midnight): 35 mg/dL BLOOD GLUCOSE TARGET and times: 12   AM (midnight): 100, correct above 120 Active Insulin Time:  4 hours        insulin syringe-needle U-100 31G X 5/16\" 1 ML    BD insulin syringe ULTRAFINE    100 each    Use one syringe daily or as directed.    Type 2 diabetes mellitus with hyperglycemia, with long-term current use of insulin (H)       polyethylene glycol Packet    MIRALAX/GLYCOLAX     Take 1 packet by mouth daily.        STATIN NOT PRESCRIBED (INTENTIONAL)     0 each    Statin not prescribed intentionally due to Intolerance    Statin intolerance       sulindac 200 MG tablet    CLINORIL     Take 200 mg by mouth 3 times daily        traMADol 50 MG tablet    ULTRAM     Take 50 mg by mouth 3 times daily 1-2 tablets in the evening        TRESIBA FLEXTOUCH 200 UNIT/ML pen   Generic drug:  insulin degludec     18 mL    INJECT 68 UNITS UNDER THE SKIN DAILY    Type 2 diabetes mellitus with hyperglycemia, with long-term current use of insulin (H), Encounter for long-term (current) use of insulin (H)       VOLTAREN 1 % Gel topical gel   Generic drug:  diclofenac     100 g    Apply topically nightly as needed for moderate pain Apply 4 grams to knees or 2 grams to hands four times daily using enclosed dosing card.        * Notice:  This list has 7 medication(s) that are the same as other medications prescribed for you. Read the directions carefully, and ask your doctor or other care provider to review them with you.      "

## 2018-03-01 ENCOUNTER — ALLIED HEALTH/NURSE VISIT (OUTPATIENT)
Dept: EDUCATION SERVICES | Facility: CLINIC | Age: 58
End: 2018-03-01
Payer: COMMERCIAL

## 2018-03-01 DIAGNOSIS — E11.65 TYPE 2 DIABETES MELLITUS WITH HYPERGLYCEMIA (H): Primary | ICD-10-CM

## 2018-03-01 PROCEDURE — G0108 DIAB MANAGE TRN  PER INDIV: HCPCS

## 2018-03-01 NOTE — PROGRESS NOTES
Diabetes Self Management Training: Insulin Pump Follow-up Visit    Maricarmen Dotson presents today for education, evaluation of glucose control and insulin pump follow up related to Type 2 diabetes.    She is accompanied by self    Patient's diabetes management related comments/concerns: glooko issues with downloading, doesn't want to waste insulin so waiting until pod is close to 80 hours before changing, doesn't like the alarms. Still binge eating during the night and not entering the carbohydrates, worries about giving too much insulin and going low, but will take a correction afterwards- this reduces the stress and anxiety during this time.   Experiencing abdominal bloating and wonder if related to the Novolog  Canula insert hurts every time changing the pod    ASSESSMENT:  Patient Problem List and Family Medical History reviewed for relevant medical history, current medical status, and diabetes risk factors.    Current Diabetes Management per Patient:  Insulin Pump Type: OmniPod    Taking other diabetes medications?   yes:     Diabetes Medication(s)     Diabetic Other Sig    glucagon (GLUCAGON EMERGENCY) 1 MG kit Inject 1 mg into the muscle once for 1 dose    Insulin Sig    INSULIN PUMP - OUTPATIENT Date last updated:  2/20/18  Omnipod  BASAL RATES and times:  12   AM (midnight): 1.05 units/hour  .  CARB RATIO and times:  12   AM (midnight): 9  Corection Factor (Sensitivity) and times:  12   AM (midnight): 35 mg/dL  BLOOD GLUCOSE TARGET and times:  12   AM (midnight): 100, correct above 120  Active Insulin Time:  4 hours    insulin aspart (NOVOLOG VIAL) 100 UNITS/ML injection To be used in insulin pump est TDD 65-75 units    TRESIBA FLEXTOUCH 200 UNIT/ML pen INJECT 68 UNITS UNDER THE SKIN DAILY    insulin aspart (NOVOLOG FLEXPEN) 100 UNIT/ML injection 1 unit per 4 grams of carbohydrate eaten at each meal +a correction of 1 additional unit per 18 points of blood sugar above 140.  Total daily dose 100 units/day.   Dispense 6 boxes/30 pens for a 90-day supply or 2 boxes/10 pens for a 30-day supply as allowed by insurance.            Blood Glucose Results and Insulin Use:             Current Pump Settings: See Insulin Pump - Outpatient in Medication List for complete list of settings.         Patient's most recent   Lab Results   Component Value Date    A1C 9.4 01/16/2018     Diabetes Complications:  Acute Complications: At risk for hypoglycemia? yes  Symptoms of low blood sugar? none      INTERVENTION:  Evening post meal glucose rise, recommend a decrease in carbohydrate ratio in the evening.  Overnight binge eating contributing to the high fasting glucose, it would be best to bolus for the carbohydrates at the time when she is binge eating but to prevent further anxiety recommend continue with correcting the high glucose with a correction only after the binge eating.    Unclear what the bloating symptoms can be from since she was on the Novolog prior th the pump start. Possibly from binge eating or something in her diet. Recommend she follow up with [facility]. She is not interested at this time.    Review [employer]io upload    Changes made to pump settings:  carb ratio: 5pm; 9 --> 8      Education provided today on:  Education specific to insulin pump provided today on:   importance of changing pod every 3 days(72 hours) to reduce additional alarms, fill with 3 days of insulin to prevent waste, what pump alerts and alarms indicate, button pressing- howto cahnge her carbohydrate ratio, where to find alerts and alarm history  Uploading to [employer]    Pt verbalized understanding of concepts discussed and recommendations provided today.         PLAN:  See Patient Instructions for co-developed, patient-stated behavior change goals.  AVS printed and provided to patient today.    FOLLOW-UP:  Upload pump 1 week  See Emily guerrero on 3/22 as scheduled.  Chart routed to referring provider.      Deanna Bolton RN,CDE   Time Spent: 60  minutes  Encounter Type: Individual      Any diabetes medication dose changes were made via the CDE Protocol and Collaborative Practice Agreement with the patient's referring provider. A copy of this encounter was shared with the provider.

## 2018-03-01 NOTE — MR AVS SNAPSHOT
After Visit Summary   3/1/2018    Maricarmen Dotson    MRN: [id number]           Patient Information     Date Of Birth          1960        Visit Information        Provider Department      3/1/2018 1:30 PM Deanna Bolton RN [facility]        Care Instructions      When you binge eat during the night it's ok to just take a correction after you eat and NOT put in the carbohydrates     Today your evening carbohydrate ratio was changed from 9 to 8. This will allow more insulin for your carbohydrates     Upload your pump in 1 week    Follow up with Emily on 3/22/18          Follow-ups after your visit        Your next 10 appointments already scheduled     Mar 22, 2018  9:00 AM CDT   Return Visit with MARICRUZ Castillo CNP   [facility] ([facility]    [address]  [facility] [address] [phone]              Who to contact     If you have questions or need follow up information about today's clinic visit or your schedule please contact [facility] directly at [phone].  Normal or non-critical lab and imaging results will be communicated to you by [employer], letter or phone within 4 business days after the clinic has received the results. If you do not hear from us within 7 days, please contact the clinic through [employer] or phone. If you have a critical or abnormal lab result, we will notify you by phone as soon as possible.  Submit refill requests through [employer] or call your pharmacy and they will forward the refill request to us. Please allow 3 business days for your refill to be completed.          Additional Information About Your Visit        MyChart Information     [employer] gives you secure access to your electronic health record. If you see a primary care provider, you can also send messages to your care team and make appointments. If you have questions, please call your  primary care clinic.  If you do not have a primary care provider, please call [phone] and they will assist you.        Care EveryWhere ID     This is your Care EveryWhere ID. This could be used by other organizations to access your [place] medical records  [id number]        Your Vitals Were     Last Period                   01/01/1999            Blood Pressure from Last 3 Encounters:   02/27/18 (!) 139/98   01/30/18 138/84   01/16/18 161/68    Weight from Last 3 Encounters:   02/27/18 85.3 kg (188 lb)   01/30/18 85.3 kg (188 lb)   01/16/18 84.8 kg (187 lb)              Today, you had the following     No orders found for display       Primary Care Provider Office Phone # Fax #    Annamaria Erickson [phone] [address] [place] [address]        Equal Access to Services     [facility]: Hadii aad ku hadasho Soomaali, waaxda luqadaha, qaybta kaalmada adeegyada, dillon castañeda haymanav dye . So [facility] [phone].    ATENCIÓN: Si habla español, tiene a richter disposición servicios gratuitos de asistencia lingüística. Llame al [phone].    We comply with applicable federal civil rights laws and [place] laws. We do not discriminate on the basis of race, color, national origin, age, disability, sex, sexual orientation, or gender identity.            Thank you!     Thank you for choosing [facility] [place]  for your care. Our goal is always to provide you with excellent care. Hearing back from our patients is one way we can continue to improve our services. Please take a few minutes to complete the written survey that you may receive in the mail after your visit with us. Thank you!             Your Updated Medication List - Protect others around you: Learn how to safely use, store and throw away your medicines at www.disposemymeds.org.          This list is accurate as of 3/1/18  2:18 PM.  Always use your most recent med list.                    Brand Name Dispense Instructions for use Diagnosis    acetone (Urine) test Strp     50 each    1 strip by In Vitro route daily    Type 2 diabetes mellitus with hyperglycemia, with long-term current use of insulin (H)       * ASPIRIN NOT PRESCRIBED    INTENTIONAL    1 each    continuous prn Antiplatelet medication not prescribed intentionally due to age.        blood glucose monitoring test strip    no brand specified    300 each    Freestyle test strips (NOT LITE or INSULINX) to be used with Omnipod pump test 6 times daily    Type 2 diabetes mellitus with hyperglycemia, with long-term current use of insulin (H)       EPINEPHrine 0.3 MG/0.3ML injection 2-pack    EPIPEN/ADRENACLICK/or ANY BX GENERIC EQUIV    2 each    Inject 0.3 mLs (0.3 mg) into the muscle once as needed for anaphylaxis    Allergic reaction       glucagon 1 MG kit    GLUCAGON EMERGENCY    1 mg    Inject 1 mg into the muscle once for 1 dose    Type 2 diabetes mellitus with hyperglycemia, with long-term current use of insulin (H)       * insulin aspart 100 UNIT/ML injection    NovoLOG FLEXPEN    90 mL    1 unit per 4 grams of carbohydrate eaten at each meal +a correction of 1 additional unit per 18 points of blood sugar above 140.  Total daily dose 100 units/day.  Dispense 6 boxes/30 pens for a 90-day supply or 2 boxes/10 pens for a 30-day supply as allowed by insurance.    Type 2 diabetes mellitus with hyperglycemia, with long-term current use of insulin (H)       * insulin aspart 100 UNITS/ML injection    NovoLOG VIAL    3 vial    To be used in insulin pump est TDD 65-75 units    Type 2 diabetes mellitus with hyperglycemia, with long-term current use of insulin (H)       * insulin pen needle 31G X 5 MM    B-D U/F    550 each    Use 6  time(s) per day.  Please dispense as BD Pen Needle Mini U/F 31G x 5 MM    Type 2 diabetes mellitus with hyperglycemia (H)       * insulin pen needle 31G X 5 MM    B-D U/F    300 each    Use 6 daily or as  "directed.    Type 2 diabetes mellitus with hyperglycemia (H)       * insulin pen needle 31G X 5 MM    B-D U/F    200 each    Use 5 daily or as directed.    Type 2 diabetes mellitus with hyperglycemia (H)       * insulin pump infusion      Date last updated:  2/20/18 Omnipod BASAL RATES and times: 12   AM (midnight): 1.05 units/hour  . CARB RATIO and times: 12   AM (midnight): 9 Corection Factor (Sensitivity) and times: 12   AM (midnight): 35 mg/dL BLOOD GLUCOSE TARGET and times: 12   AM (midnight): 100, correct above 120 Active Insulin Time:  4 hours        insulin syringe-needle U-100 31G X 5/16\" 1 ML    BD insulin syringe ULTRAFINE    100 each    Use one syringe daily or as directed.    Type 2 diabetes mellitus with hyperglycemia, with long-term current use of insulin (H)       polyethylene glycol Packet    MIRALAX/GLYCOLAX     Take 1 packet by mouth daily.        STATIN NOT PRESCRIBED (INTENTIONAL)     0 each    Statin not prescribed intentionally due to Intolerance    Statin intolerance       sulindac 200 MG tablet    CLINORIL     Take 200 mg by mouth 3 times daily        traMADol 50 MG tablet    ULTRAM     Take 50 mg by mouth 3 times daily 1-2 tablets in the evening        TRESIBA FLEXTOUCH 200 UNIT/ML pen   Generic drug:  insulin degludec     18 mL    INJECT 68 UNITS UNDER THE SKIN DAILY    Type 2 diabetes mellitus with hyperglycemia, with long-term current use of insulin (H), Encounter for long-term (current) use of insulin (H)       VOLTAREN 1 % Gel topical gel   Generic drug:  diclofenac     100 g    Apply topically nightly as needed for moderate pain Apply 4 grams to knees or 2 grams to hands four times daily using enclosed dosing card.        * Notice:  This list has 7 medication(s) that are the same as other medications prescribed for you. Read the directions carefully, and ask your doctor or other care provider to review them with you.      "

## 2018-03-01 NOTE — Clinical Note
Patient arrived on unit with transport via gurney. [facility] Ox 4. Immobilizer in place to left arm. C/o uncontrolled pain. 2 RN skin check performed with Mickey DUNCAN. Unhealing wound to left 2nd digit and to medial heel. Wound to right 2 digit as well. Calloused Bilateral feet. Swelling to BLE. Swelling, discoloration and bruising to LUE. Otherwise skin intact.    fyi

## 2018-03-01 NOTE — PATIENT INSTRUCTIONS
When you binge eat during the night it's ok to just take a correction after you eat and NOT put in the carbohydrates in the evening    Today your evening carbohydrate ratio was changed from 9 to 8. This will allow more insulin for your carbohydrates     Upload your pump in 1 week    Follow up with Emily on 3/22/18

## 2018-03-01 NOTE — LETTER
3/1/2018         RE: Maricarmen Dotson  [address]  [place] [address]        Dear Colleague,    Thank you for referring your patient, Maricarmen Dotson, to the [facility] [place]. Please see a copy of my visit note below.    Diabetes Self Management Training: Insulin Pump Follow-up Visit    Maricarmen Dotson presents today for education, evaluation of glucose control and insulin pump follow up related to Type 2 diabetes.    She is accompanied by self    Patient's diabetes management related comments/concerns: [employer] issues with downloading, doesn't want to waste insulin so waiting until pod is close to 80 hours before changing, doesn't like the alarms. Still binge eating during the night and not entering the carbohydrates, worries about giving too much insulin and going low, but will take a correction afterwards- this reduces the stress and anxiety during this time.   Experiencing abdominal bloating and wonder if related to the Novolog  [place] insert hurts every time changing the pod    ASSESSMENT:  Patient Problem List and Family Medical History reviewed for relevant medical history, current medical status, and diabetes risk factors.    Current Diabetes Management per Patient:  Insulin Pump Type: OmniPod    Taking other diabetes medications?   yes:     Diabetes Medication(s)     Diabetic Other Sig    glucagon (GLUCAGON EMERGENCY) 1 MG kit Inject 1 mg into the muscle once for 1 dose    Insulin Sig    INSULIN PUMP - OUTPATIENT Date last updated:  2/20/18  Omnipod  BASAL RATES and times:  12   AM (midnight): 1.05 units/hour  .  CARB RATIO and times:  12   AM (midnight): 9  Corection Factor (Sensitivity) and times:  12   AM (midnight): 35 mg/dL  BLOOD GLUCOSE TARGET and times:  12   AM (midnight): 100, correct above 120  Active Insulin Time:  4 hours    insulin aspart (NOVOLOG VIAL) 100 UNITS/ML injection To be used in insulin pump est TDD 65-75 units    TRESIBA FLEXTOUCH 200 UNIT/ML pen  INJECT 68 UNITS UNDER THE SKIN DAILY    insulin aspart (NOVOLOG FLEXPEN) 100 UNIT/ML injection 1 unit per 4 grams of carbohydrate eaten at each meal +a correction of 1 additional unit per 18 points of blood sugar above 140.  Total daily dose 100 units/day.  Dispense 6 boxes/30 pens for a 90-day supply or 2 boxes/10 pens for a 30-day supply as allowed by insurance.            Blood Glucose Results and Insulin Use:             Current Pump Settings: See Insulin Pump - Outpatient in Medication List for complete list of settings.         Patient's most recent   Lab Results   Component Value Date    A1C 9.4 01/16/2018     Diabetes Complications:  Acute Complications: At risk for hypoglycemia? yes  Symptoms of low blood sugar? none      INTERVENTION:  Evening post meal glucose rise, recommend a decrease in carbohydrate ratio in the evening.  Overnight binge eating contributing to the high fasting glucose, it would be best to bolus for the carbohydrates at the time when she is binge eating but to prevent further anxiety recommend continue with correcting the high glucose with a correction only after the binge eating.    Unclear what the bloating symptoms can be from since she was on the Novolog prior th the pump start. Possibly from binge eating or something in her diet. Recommend she follow up with [facility]. She is not interested at this time.    Review [employer] upload    Changes made to pump settings:  carb ratio: 5pm; 9 --> 8      Education provided today on:  Education specific to insulin pump provided today on:   importance of changing pod every 3 days(72 hours) to reduce additional alarms, fill with 3 days of insulin to prevent waste, what pump alerts and alarms indicate, button pressing- howto cahnge her carbohydrate ratio, where to find alerts and alarm history  Uploading to [employer]    Pt verbalized understanding of concepts discussed and recommendations provided today.         PLAN:  See Patient Instructions for  co-developed, patient-stated behavior change goals.  AVS printed and provided to patient today.    FOLLOW-UP:  Upload pump 1 week  See Emily guerrero on 3/22 as scheduled.  Chart routed to referring provider.      Deanna Bolton RN,CDE   Time Spent: 60 minutes  Encounter Type: Individual      Any diabetes medication dose changes were made via the CDE Protocol and Collaborative Practice Agreement with the patient's referring provider. A copy of this encounter was shared with the provider.

## 2018-03-12 ENCOUNTER — ALLIED HEALTH/NURSE VISIT (OUTPATIENT)
Dept: EDUCATION SERVICES | Facility: CLINIC | Age: 58
End: 2018-03-12
Payer: COMMERCIAL

## 2018-03-12 ENCOUNTER — TELEPHONE (OUTPATIENT)
Dept: EDUCATION SERVICES | Facility: CLINIC | Age: 58
End: 2018-03-12

## 2018-03-12 DIAGNOSIS — E11.65 TYPE 2 DIABETES MELLITUS WITH HYPERGLYCEMIA (H): Primary | ICD-10-CM

## 2018-03-12 PROCEDURE — G0108 DIAB MANAGE TRN  PER INDIV: HCPCS

## 2018-03-12 NOTE — PATIENT INSTRUCTIONS
"If your blood sugars are not coming down go ahead and change your basal pattern to \"anxiety\"    If after 3 days and your sugars are still high then change your basal pattern to \" crazy sugars\"    Your correction was changed today to give you more insulin for your high blood sugars    If you enter false carbohydrates make sure to keep track and let us know when you are entering these carbohydrates    Follow up weekly   "

## 2018-03-12 NOTE — LETTER
3/12/2018         RE: Maricarmen Dotson  [address]  [place] [address]        Dear Colleague,    Thank you for referring your patient, Maricarmen Dotson, to the [facility] [place]. Please see a copy of my visit note below.    Diabetes Self Management Training: Insulin Pump Follow-up Visit    Maricarmen Dotson presents today for evaluation of glucose control related to Type 2 diabetes.    She is accompanied by daughter    Patient's diabetes management related comments/concerns: stressed and anxious,has been binge eating more often,  not sure why blood sugars are high, ringing in her ears has been worse lately and knows its because of high blood sugars    ASSESSMENT:  Patient Problem List and Family Medical History reviewed for relevant medical history, current medical status, and diabetes risk factors.    Current Diabetes Management per Patient:  Insulin Pump Type: OmniPod    Taking other diabetes medications? no      Blood Glucose Results and Insulin Use:              Current Pump Settings: See Insulin Pump - Outpatient in Medication List for complete list of settings.       BG values are: not in goal    Patient's most recent   Lab Results   Component Value Date    A1C 9.4 01/16/2018     Nutrition:  Patient counts carbohydrates in grams  Binge eating in during the night    Diabetes Complications:  Acute Complications: At risk for hypoglycemia? yes  Symptoms of low blood sugar? none  Symptoms of hyperglycemia? Ringing in ears      INTERVENTION:  Patient overall glucose has increase since last visit without any hypoglycemia,  she would benefit from increase in basal rate. The plan needs to be as simple as possible to reduce her stress and anxiety.  Recommend set alternate basal patterns for times of stress and anxiety.   Discussed the options of using a temp basal, patient feels it will be too difficult and possibly increase her stress/anxiety to have to think about the process of setting  "up a temp basal. She prefers the pattern option.     Correction does not bring glucose into goal, recommend decrease in correction/sensitivity and bolusing before meals.    Changes made to pump settings: patterns named per patient preference   basal rate :   - \"regular\" 1.05 --> 1.15   - \"anxiety\" 1.25  - \"crazy glucose\" 1.35    correction/sensitivity: 35 -->22    Patient instructed on when to use the different patterns and the patterns do not change back on their own, she will need to change the patterns as she needs them.     Education provided today on:  AADE Self-Care Behaviors:  Taking Medication: action of prescribed medication  Problem Solving: low blood glucose - causes, signs/symptoms, treatment and prevention and carrying a carbohydrate source at all times    Education specific to insulin pump provided today on:   importance of bolusing before meals and treating hypoglycemia correctly (Rule of 15)    Pt verbalized understanding of concepts discussed and recommendations provided today.       PLAN:  See Patient Instructions for co-developed, patient-stated behavior change goals.  AVS printed and provided to patient today.    FOLLOW-UP:  Follow-up appointment schedule  3/19  Chart routed to referring provider.      Deanna Bolton RN,CDE   Time Spent: 60 minutes  Encounter Type: Individual      Any diabetes medication dose changes were made via the CDE Protocol and Collaborative Practice Agreement with the patient's referring provider. A copy of this encounter was shared with the provider.          "

## 2018-03-12 NOTE — TELEPHONE ENCOUNTER
Pt is saying ever since she changed her dose from 9 to 8 her BG has been running between 250-400.

## 2018-03-12 NOTE — PROGRESS NOTES
Diabetes Self Management Training: Insulin Pump Follow-up Visit    Maricarmen Dotson presents today for evaluation of glucose control related to Type 2 diabetes.    She is accompanied by daughter    Patient's diabetes management related comments/concerns: stressed and anxious,has been binge eating more often,  not sure why blood sugars are high, ringing in her ears has been worse lately and knows its because of high blood sugars    ASSESSMENT:  Patient Problem List and Family Medical History reviewed for relevant medical history, current medical status, and diabetes risk factors.    Current Diabetes Management per Patient:  Insulin Pump Type: OmniPod    Taking other diabetes medications? no      Blood Glucose Results and Insulin Use:              Current Pump Settings: See Insulin Pump - Outpatient in Medication List for complete list of settings.       BG values are: not in goal    Patient's most recent   Lab Results   Component Value Date    A1C 9.4 01/16/2018     Nutrition:  Patient counts carbohydrates in grams  Binge eating in during the night    Diabetes Complications:  Acute Complications: At risk for hypoglycemia? yes  Symptoms of low blood sugar? none  Symptoms of hyperglycemia? Ringing in ears      INTERVENTION:  Patient overall glucose has increase since last visit without any hypoglycemia,  she would benefit from increase in basal rate. The plan needs to be as simple as possible to reduce her stress and anxiety.  Recommend set alternate basal patterns for times of stress and anxiety.   Discussed the options of using a temp basal, patient feels it will be too difficult and possibly increase her stress/anxiety to have to think about the process of setting up a temp basal. She prefers the pattern option.     Correction does not bring glucose into goal, recommend decrease in correction/sensitivity and bolusing before meals.    Changes made to pump settings: patterns named per patient preference   basal rate :  "  - \"regular\" 1.05 --> 1.15   - \"anxiety\" 1.25  - \"crazy glucose\" 1.35    correction/sensitivity: 35 -->22    Patient instructed on when to use the different patterns and the patterns do not change back on their own, she will need to change the patterns as she needs them.     Education provided today on:  AADE Self-Care Behaviors:  Taking Medication: action of prescribed medication  Problem Solving: low blood glucose - causes, signs/symptoms, treatment and prevention and carrying a carbohydrate source at all times    Education specific to insulin pump provided today on:   importance of bolusing before meals and treating hypoglycemia correctly (Rule of 15)    Pt verbalized understanding of concepts discussed and recommendations provided today.       PLAN:  See Patient Instructions for co-developed, patient-stated behavior change goals.  AVS printed and provided to patient today.    FOLLOW-UP:  Follow-up appointment schedule  3/19  Chart routed to referring provider.      Deanna Bolton RN,CDE   Time Spent: 60 minutes  Encounter Type: Individual      Any diabetes medication dose changes were made via the CDE Protocol and Collaborative Practice Agreement with the patient's referring provider. A copy of this encounter was shared with the provider.        "

## 2018-03-12 NOTE — TELEPHONE ENCOUNTER
Patient sent email later Friday evening:  Dear Diabetic Educators,  Having very high glucose readings. Not sure if you are receiving my glucose readings from from [employer]. Not sure what to do, so I have been telling the opd meter that I am eating 60 to 70 grams of carbs when I am really only eating 12 to 25. This is giving me a higher bolus to cover for my glucose readings of 250 and more.  I was changed on March 1st, 2018, from a 9 to an 8, what ever that means, and have had very high glucose readings every since.  Please help me change my meter from the 9 to back to 8 or even 7 or what ever. Please let me know if you are getting my [employer] readings because my phone tells me nothing.  I hope to hear from someone by Monday, March 12th, 2018.   I would like another follow up appointment with Deanna or anyone else who may help.  You may email me at [email or web address]    Sincerely,  Maricarmen Dotson      Response:  Call to patient - scheduled follow up today at 2:30pm.  Deanna Bolton RN,CDE

## 2018-03-12 NOTE — MR AVS SNAPSHOT
"              After Visit Summary   3/12/2018    Maricarmen Dotson    MRN: [id number]           Patient Information     Date Of Birth          1960        Visit Information        Provider Department      3/12/2018 2:30 PM Deanna Bolton RN [facility]        Care Instructions    If your blood sugars are not coming down go ahead and change your basal pattern to \"anxiety\"    If after 3 days and your sugars are still high then change your basal pattern to \" crazy sugars\"    Your correction was changed today to give you more insulin for your high blood sugars    If you enter false carbohydrates make sure to keep track and let us know when you are entering these carbohydrates    Follow up weekly           Follow-ups after your visit        Your next 10 appointments already scheduled     Mar 19, 2018  9:30 AM CDT   Diabetic Education with Allie Matias   [facility] ([facility])    [address]  [facility] [address]   [phone]            Mar 22, 2018  9:00 AM CDT   Return Visit with MARICRUZ Castillo [facility] ([facility])    [address]  [facility] [address]   [phone]            Mar 28, 2018 11:30 AM CDT   Diabetic Education with Allie Matias   [facility] ([facility])    [address]  [facility] [address]   [phone]            Apr 02, 2018  9:30 AM CDT   Diabetic Education with Allie Matias   [facility] ([facility])    [address]  [facility] [address]   [phone]            Apr 09, 2018  9:30 AM CDT   Diabetic Education with Deanna Bolton RN   [facility] ([facility])    [address]  [facility] [address]   [phone]              Who to contact     If you have " questions or need follow up information about today's clinic visit or your schedule please contact [facility] directly at [phone].  Normal or non-critical lab and imaging results will be communicated to you by MyChart, letter or phone within 4 business days after the clinic has received the results. If you do not hear from us within 7 days, please contact the clinic through MyChart or phone. If you have a critical or abnormal lab result, we will notify you by phone as soon as possible.  Submit refill requests through [employer] or call your pharmacy and they will forward the refill request to us. Please allow 3 business days for your refill to be completed.          Additional Information About Your Visit        [employer] Information     [employer] gives you secure access to your electronic health record. If you see a primary care provider, you can also send messages to your care team and make appointments. If you have questions, please call your primary care clinic.  If you do not have a primary care provider, please call [phone] and they will assist you.        Care EveryWhere ID     This is your Care EveryWhere ID. This could be used by other organizations to access your [facility] medical records  [id number]        Your Vitals Were     Last Period                   01/01/1999            Blood Pressure from Last 3 Encounters:   02/27/18 (!) 139/98   01/30/18 138/84   01/16/18 161/68    Weight from Last 3 Encounters:   02/27/18 85.3 kg (188 lb)   01/30/18 85.3 kg (188 lb)   01/16/18 84.8 kg (187 lb)              Today, you had the following     No orders found for display       Primary Care Provider Office Phone # Fax #    Annamaria Erickson [phone] [address] [place] [address]        Equal Access to Services     [facility] [place] AH: Virginia Sterling, nuno murguia, dillon hicks  la'aan sameera. So [facility] [phone].    ATENCIÓN: Si habla yudi, tiene a richter disposición servicios gratuitos de asistencia lingüística. Lynnette al [phone].    We comply with applicable federal civil rights laws and [place] laws. We do not discriminate on the basis of race, color, national origin, age, disability, sex, sexual orientation, or gender identity.            Thank you!     Thank you for choosing [facility]  for your care. Our goal is always to provide you with excellent care. Hearing back from our patients is one way we can continue to improve our services. Please take a few minutes to complete the written survey that you may receive in the mail after your visit with us. Thank you!             Your Updated Medication List - Protect others around you: Learn how to safely use, store and throw away your medicines at www.disposemymeds.org.          This list is accurate as of 3/12/18  3:33 PM.  Always use your most recent med list.                   Brand Name Dispense Instructions for use Diagnosis    acetone (Urine) test Strp     50 each    1 strip by In Vitro route daily    Type 2 diabetes mellitus with hyperglycemia, with long-term current use of insulin (H)       * ASPIRIN NOT PRESCRIBED    INTENTIONAL    1 each    continuous prn Antiplatelet medication not prescribed intentionally due to age.        blood glucose monitoring test strip    no brand specified    300 each    Freestyle test strips (NOT LITE or INSULINX) to be used with Omnipod pump test 6 times daily    Type 2 diabetes mellitus with hyperglycemia, with long-term current use of insulin (H)       EPINEPHrine 0.3 MG/0.3ML injection 2-pack    EPIPEN/ADRENACLICK/or ANY BX GENERIC EQUIV    2 each    Inject 0.3 mLs (0.3 mg) into the muscle once as needed for anaphylaxis    Allergic reaction       glucagon 1 MG kit    GLUCAGON EMERGENCY    1 mg    Inject 1 mg into the muscle once for 1 dose    Type 2 diabetes mellitus  "with hyperglycemia, with long-term current use of insulin (H)       * insulin aspart 100 UNIT/ML injection    NovoLOG FLEXPEN    90 mL    1 unit per 4 grams of carbohydrate eaten at each meal +a correction of 1 additional unit per 18 points of blood sugar above 140.  Total daily dose 100 units/day.  Dispense 6 boxes/30 pens for a 90-day supply or 2 boxes/10 pens for a 30-day supply as allowed by insurance.    Type 2 diabetes mellitus with hyperglycemia, with long-term current use of insulin (H)       * insulin aspart 100 UNITS/ML injection    NovoLOG VIAL    3 vial    To be used in insulin pump est TDD 65-75 units    Type 2 diabetes mellitus with hyperglycemia, with long-term current use of insulin (H)       * insulin pen needle 31G X 5 MM    B-D U/F    550 each    Use 6  time(s) per day.  Please dispense as BD Pen Needle Mini U/F 31G x 5 MM    Type 2 diabetes mellitus with hyperglycemia (H)       * insulin pen needle 31G X 5 MM    B-D U/F    300 each    Use 6 daily or as directed.    Type 2 diabetes mellitus with hyperglycemia (H)       * insulin pen needle 31G X 5 MM    B-D U/F    200 each    Use 5 daily or as directed.    Type 2 diabetes mellitus with hyperglycemia (H)       * insulin pump infusion      Date last updated:  3/1/18 Omnipod BASAL RATES and times: 12   AM (midnight): 1.05 units/hour  . CARB RATIO and times: 12   AM (midnight): 9 5 PM: 8 Corection Factor (Sensitivity) and times: 12   AM (midnight): 35 mg/dL BLOOD GLUCOSE TARGET and times: 12   AM (midnight): 100, correct above 120 Active Insulin Time:  4 hours        insulin syringe-needle U-100 31G X 5/16\" 1 ML    BD insulin syringe ULTRAFINE    100 each    Use one syringe daily or as directed.    Type 2 diabetes mellitus with hyperglycemia, with long-term current use of insulin (H)       polyethylene glycol Packet    MIRALAX/GLYCOLAX     Take 1 packet by mouth daily.        STATIN NOT PRESCRIBED (INTENTIONAL)     0 each    Statin not prescribed " intentionally due to Intolerance    Statin intolerance       sulindac 200 MG tablet    CLINORIL     Take 200 mg by mouth 3 times daily        traMADol 50 MG tablet    ULTRAM     Take 50 mg by mouth 3 times daily 1-2 tablets in the evening        TRESIBA FLEXTOUCH 200 UNIT/ML pen   Generic drug:  insulin degludec     18 mL    INJECT 68 UNITS UNDER THE SKIN DAILY    Type 2 diabetes mellitus with hyperglycemia, with long-term current use of insulin (H), Encounter for long-term (current) use of insulin (H)       VOLTAREN 1 % Gel topical gel   Generic drug:  diclofenac     100 g    Apply topically nightly as needed for moderate pain Apply 4 grams to knees or 2 grams to hands four times daily using enclosed dosing card.        * Notice:  This list has 7 medication(s) that are the same as other medications prescribed for you. Read the directions carefully, and ask your doctor or other care provider to review them with you.

## 2018-03-16 ENCOUNTER — TELEPHONE (OUTPATIENT)
Dept: EDUCATION SERVICES | Facility: CLINIC | Age: 58
End: 2018-03-16

## 2018-03-16 NOTE — TELEPHONE ENCOUNTER
Maricarmen send in BG log copied below.   She was recently directed to bolus pre-meal and used different basal patterns depending on anxiety level. Has pattern of some binge eating with stress.   Maricarmen is schedule to see CDE Monday, in 2 days, so will direct her to bring information to appointment about food intake, basal rate used for full evaluation.   She is schedule for regular follow up in clinic.    Replied to Maricarmen:     Alex Hernadez,   Thank you for sending in your data!    I see you were working with Deanna on a couple of different basal settings, and on pre-meal dosing.   Since you have an appointment on Monday at clinic, I think we will not make changes today since your appointment will cover more detail.     Can you please bring along your records including a food record? If you have not kept one, it might be handy to write down when, what, and how much you eat today and over the weekend- just to best see how your insulin is working on food ;)     Thanks Maricarmen!  Have a nice weekend, Masha

## 2018-03-19 ENCOUNTER — ALLIED HEALTH/NURSE VISIT (OUTPATIENT)
Dept: EDUCATION SERVICES | Facility: CLINIC | Age: 58
End: 2018-03-19
Payer: COMMERCIAL

## 2018-03-19 DIAGNOSIS — Z79.4 TYPE 2 DIABETES MELLITUS WITH HYPERGLYCEMIA, WITH LONG-TERM CURRENT USE OF INSULIN (H): Primary | ICD-10-CM

## 2018-03-19 DIAGNOSIS — E11.65 TYPE 2 DIABETES MELLITUS WITH HYPERGLYCEMIA, WITH LONG-TERM CURRENT USE OF INSULIN (H): Primary | ICD-10-CM

## 2018-03-19 PROCEDURE — G0108 DIAB MANAGE TRN  PER INDIV: HCPCS | Performed by: DIETITIAN, REGISTERED

## 2018-03-19 NOTE — MR AVS SNAPSHOT
After Visit Summary   3/19/2018    Maricarmen Dotson    MRN: [id number]           Patient Information     Date Of Birth          1960        Visit Information        Provider Department      3/19/2018 9:30 AM Allie Matias [facility]        Today's Diagnoses     Type 2 diabetes mellitus with hyperglycemia, with long-term current use of insulin (H)    -  1       Follow-ups after your visit        Your next 10 appointments already scheduled     Mar 22, 2018  9:00 AM CDT   Return Visit with MARICRUZ Castillo [facility] ([facility])    [address]  [facility] [address]   [phone]            Mar 28, 2018 11:30 AM CDT   Diabetic Education with Allie Matias   [facility] ([facility])    [address]  [facility] [address]   [phone]            Apr 02, 2018  9:30 AM CDT   Diabetic Education with Allie Matias   [facility] ([facility])    [address]  [facility] [address]   [phone]            Apr 09, 2018  9:30 AM CDT   Diabetic Education with Deanna Bolton RN   [facility] ([facility])    [address]  [facility] [address] [phone]              Who to contact     If you have questions or need follow up information about today's clinic visit or your schedule please contact [facility] directly at [phone].  Normal or non-critical lab and imaging results will be communicated to you by MyChart, letter or phone within 4 business days after the clinic has received the results. If you do not hear from us within 7 days, please contact the clinic through MyChart or phone. If you have a critical or abnormal lab result, we will notify you by phone as soon as possible.  Submit refill requests  through [email or web address] or call your pharmacy and they will forward the refill request to us. Please allow 3 business days for your refill to be completed.          Additional Information About Your Visit        [employer] Information     [email or web address] gives you secure access to your electronic health record. If you see a primary care provider, you can also send messages to your care team and make appointments. If you have questions, please call your primary care clinic.  If you do not have a primary care provider, please call [phone] and they will assist you.        Care EveryWhere ID     This is your Care EveryWhere ID. This could be used by other organizations to access your [facility] medical records  [id number]        Your Vitals Were     Last Period                   01/01/1999            Blood Pressure from Last 3 Encounters:   02/27/18 (!) 139/98   01/30/18 138/84   01/16/18 161/68    Weight from Last 3 Encounters:   02/27/18 85.3 kg (188 lb)   01/30/18 85.3 kg (188 lb)   01/16/18 84.8 kg (187 lb)              We Performed the Following     DIABETES EDUCATION - Individual  []        Primary Care Provider Office Phone # Fax #    Annamaria Erickson [phone] [address] [place] [address]        Equal Access to Services     [facility] : Hadii aad ku hadasho Soomaali, waaxda luqadaha, qaybta kaalmada adeegyada, dillon brasher. So [facility] [phone].    ATENCIÓN: Si habla español, tiene a richter disposición servicios gratuitos de asistencia lingüística. Llame al [phone].    We comply with applicable federal civil rights laws and [place] laws. We do not discriminate on the basis of race, color, national origin, age, disability, sex, sexual orientation, or gender identity.            Thank you!     Thank you for choosing [facility] [place]  for your care. Our goal is always to provide you with excellent care. Hearing back from  our patients is one way we can continue to improve our services. Please take a few minutes to complete the written survey that you may receive in the mail after your visit with us. Thank you!             Your Updated Medication List - Protect others around you: Learn how to safely use, store and throw away your medicines at www.disposemymeds.org.          This list is accurate as of 3/19/18 11:59 PM.  Always use your most recent med list.                   Brand Name Dispense Instructions for use Diagnosis    acetone (Urine) test Strp     50 each    1 strip by In Vitro route daily    Type 2 diabetes mellitus with hyperglycemia, with long-term current use of insulin (H)       * ASPIRIN NOT PRESCRIBED    INTENTIONAL    1 each    continuous prn Antiplatelet medication not prescribed intentionally due to age.        blood glucose monitoring test strip    no brand specified    300 each    Freestyle test strips (NOT LITE or INSULINX) to be used with Omnipod pump test 6 times daily    Type 2 diabetes mellitus with hyperglycemia, with long-term current use of insulin (H)       EPINEPHrine 0.3 MG/0.3ML injection 2-pack    EPIPEN/ADRENACLICK/or ANY BX GENERIC EQUIV    2 each    Inject 0.3 mLs (0.3 mg) into the muscle once as needed for anaphylaxis    Allergic reaction       * insulin aspart 100 UNIT/ML injection    NovoLOG FLEXPEN    90 mL    1 unit per 4 grams of carbohydrate eaten at each meal +a correction of 1 additional unit per 18 points of blood sugar above 140.  Total daily dose 100 units/day.  Dispense 6 boxes/30 pens for a 90-day supply or 2 boxes/10 pens for a 30-day supply as allowed by insurance.    Type 2 diabetes mellitus with hyperglycemia, with long-term current use of insulin (H)       * insulin aspart 100 UNITS/ML injection    NovoLOG VIAL    3 vial    To be used in insulin pump est TDD 65-75 units    Type 2 diabetes mellitus with hyperglycemia, with long-term current use of insulin (H)       * insulin pen  "needle 31G X 5 MM    B-D U/F    550 each    Use 6  time(s) per day.  Please dispense as BD Pen Needle Mini U/F 31G x 5 MM    Type 2 diabetes mellitus with hyperglycemia (H)       * insulin pen needle 31G X 5 MM    B-D U/F    300 each    Use 6 daily or as directed.    Type 2 diabetes mellitus with hyperglycemia (H)       * insulin pen needle 31G X 5 MM    B-D U/F    200 each    Use 5 daily or as directed.    Type 2 diabetes mellitus with hyperglycemia (H)       * insulin pump infusion      Date last updated:  3/1/18 Omnipod BASAL RATES and times: 12   AM (midnight): 1.05 units/hour  . CARB RATIO and times: 12   AM (midnight): 9 5 PM: 8 Corection Factor (Sensitivity) and times: 12   AM (midnight): 35 mg/dL BLOOD GLUCOSE TARGET and times: 12   AM (midnight): 100, correct above 120 Active Insulin Time:  4 hours        insulin syringe-needle U-100 31G X 5/16\" 1 ML    BD insulin syringe ULTRAFINE    100 each    Use one syringe daily or as directed.    Type 2 diabetes mellitus with hyperglycemia, with long-term current use of insulin (H)       polyethylene glycol Packet    MIRALAX/GLYCOLAX     Take 1 packet by mouth daily.        STATIN NOT PRESCRIBED (INTENTIONAL)     0 each    Statin not prescribed intentionally due to Intolerance    Statin intolerance       sulindac 200 MG tablet    CLINORIL     Take 200 mg by mouth 3 times daily        traMADol 50 MG tablet    ULTRAM     Take 50 mg by mouth 3 times daily 1-2 tablets in the evening        TRESIBA FLEXTOUCH 200 UNIT/ML pen   Generic drug:  insulin degludec     18 mL    INJECT 68 UNITS UNDER THE SKIN DAILY    Type 2 diabetes mellitus with hyperglycemia, with long-term current use of insulin (H), Encounter for long-term (current) use of insulin (H)       VOLTAREN 1 % Gel topical gel   Generic drug:  diclofenac     100 g    Apply topically nightly as needed for moderate pain Apply 4 grams to knees or 2 grams to hands four times daily using enclosed dosing card.        * " Notice:  This list has 7 medication(s) that are the same as other medications prescribed for you. Read the directions carefully, and ask your doctor or other care provider to review them with you.

## 2018-03-19 NOTE — LETTER
"    3/19/2018         RE: Maricarmen Dotson  [address] [place] [address]        Dear Colleague,    Thank you for referring your patient, Maricarmen Dotson, to the [facility]. Please see a copy of my visit note below.    Diabetes Self Management Training: Insulin Pump    SUBJECTIVE:  Maricarmen Dotson presents today for evaluation of glucose control related to  Type 2 diabetes   She is accompanied by daughter    ASSESSMENT:  Patient Problem List and Family Medical History reviewed for relevant medical history, current medical status, and diabetes risk factors.     Current Diabetes Management per Patient:  Insulin Pump Type: OmniPod    Taking other diabetes medications? no      Blood Glucose Results and Insulin Use:              Patient concerns: concerned that pump does not always offer the right amount of bolus insulin, is paying closer attention to BG correction    Current Pump Settings--            BG values are: not in goal    Patient's most recent A1c is not meeting target of <7.0%    Lab Results   Component Value Date    A1C 9.4 01/16/2018    A1C 9.7 03/30/2017    A1C 12.2 11/30/2016        Nutrition:  Patient counts carbohydrates in grams  continued struggle with stress eating, but wants to improve, realizes that she does not feel as well when BG's are high    Physical Activity:    not assessed    Diabetes Complications:  Acute Complications: At risk for hypoglycemia? yes  No hypoglycemia episodes past week    INTERVENTION:  Patient did not alternate between different basal patterns as discussed at last visit (\"regular, anxiety, crazy glucose\").  Patient would benefit from adjustment of basal rate per different time periods.  May also want to re-consider whether pt would find it less stressful to have one basal pattern, keeping in mind that \"she feels anxious and stressed most days\".    Changes made to pump settings: (patterns previously named per patient preference)  basal " "rate adjusted per the following  - \"regular\" : 1.15 -->  1.25 at 12am, 1.0 at 12pm, 1.25 at 6pm    - \"anxiety\" :  1.25 -->  1.35 at 12am, 1.0 at 12pm, 1.35 at 6pm    - \"crazy glucose\" : 1.35 --> 1.45 at 12am, 1.0 at 12pm, 1.45 at 6pm     Patient would also benefit from adjustment of correction/sensitivity:  33 --> 31     Education provided today on:  AADE Self-Care Behaviors:  Problem Solving: high blood glucose - pt is addressing stress eating tendencies with counselor, would continue to benefit from weekly CDE follow up for adjustment of pump settings. Pt reports \"starting to feel better\" as she notices some improvement in glycemic control.     Follow-up:    Telephone follow up in 3 days.   Follow-up appointment scheduled on 3/26/18 with BEBETO Coon RD.  Chart routed to referring provider.    BEBETO Coon RD  Diabetes       Time Spent: 60 minutes  Encounter Type: Individual        "

## 2018-03-19 NOTE — PROGRESS NOTES
"Diabetes Self Management Training: Insulin Pump    SUBJECTIVE:  Maricarmen Dotson presents today for evaluation of glucose control related to  Type 2 diabetes   She is accompanied by daughter    ASSESSMENT:  Patient Problem List and Family Medical History reviewed for relevant medical history, current medical status, and diabetes risk factors.     Current Diabetes Management per Patient:  Insulin Pump Type: OmniPod    Taking other diabetes medications? no      Blood Glucose Results and Insulin Use:              Patient concerns: concerned that pump does not always offer the right amount of bolus insulin, is paying closer attention to BG correction    Current Pump Settings--            BG values are: not in goal    Patient's most recent A1c is not meeting target of <7.0%    Lab Results   Component Value Date    A1C 9.4 01/16/2018    A1C 9.7 03/30/2017    A1C 12.2 11/30/2016        Nutrition:  Patient counts carbohydrates in grams  continued struggle with stress eating, but wants to improve, realizes that she does not feel as well when BG's are high    Physical Activity:    not assessed    Diabetes Complications:  Acute Complications: At risk for hypoglycemia? yes  No hypoglycemia episodes past week    INTERVENTION:  Patient did not alternate between different basal patterns as discussed at last visit (\"regular, anxiety, crazy glucose\").  Patient would benefit from adjustment of basal rate per different time periods.  May also want to re-consider whether pt would find it less stressful to have one basal pattern, keeping in mind that \"she feels anxious and stressed most days\".    Changes made to pump settings: (patterns previously named per patient preference)  basal rate adjusted per the following  - \"regular\" : 1.15 -->  1.25 at 12am, 1.0 at 12pm, 1.25 at 6pm    - \"anxiety\" :  1.25 -->  1.35 at 12am, 1.0 at 12pm, 1.35 at 6pm    - \"crazy glucose\" : 1.35 --> 1.45 at 12am, 1.0 at 12pm, 1.45 at 6pm     Patient would also " "benefit from adjustment of correction/sensitivity:  33 --> 31     Education provided today on:  AADE Self-Care Behaviors:  Problem Solving: high blood glucose - pt is addressing stress eating tendencies with counselor, would continue to benefit from weekly CDE follow up for adjustment of pump settings. Pt reports \"starting to feel better\" as she notices some improvement in glycemic control.     Follow-up:    Telephone follow up in 3 days.   Follow-up appointment scheduled on 3/26/18 with BEBETO Coon RD.  Chart routed to referring provider.    BEBETO Coon RD  Diabetes       Time Spent: 60 minutes  Encounter Type: Individual      "

## 2018-03-21 ENCOUNTER — VIRTUAL VISIT (OUTPATIENT)
Dept: EDUCATION SERVICES | Facility: CLINIC | Age: 58
End: 2018-03-21
Payer: COMMERCIAL

## 2018-03-21 DIAGNOSIS — Z79.4 TYPE 2 DIABETES MELLITUS WITH HYPERGLYCEMIA, WITH LONG-TERM CURRENT USE OF INSULIN (H): Primary | ICD-10-CM

## 2018-03-21 DIAGNOSIS — E11.65 TYPE 2 DIABETES MELLITUS WITH HYPERGLYCEMIA, WITH LONG-TERM CURRENT USE OF INSULIN (H): Primary | ICD-10-CM

## 2018-03-21 NOTE — MR AVS SNAPSHOT
After Visit Summary   3/21/2018    Maricarmen Dotson    MRN: [id number]           Patient Information     Date Of Birth          1960        Visit Information        Provider Department      3/21/2018 11:30 AM Allie Matias [facility]        Today's Diagnoses     Type 2 diabetes mellitus with hyperglycemia, with long-term current use of insulin (H)    -  1       Follow-ups after your visit        Your next 10 appointments already scheduled     Mar 22, 2018  9:00 AM CDT   Return Visit with MARICRUZ Castillo [facility] ([facility])    [address]  [facility] [address]   [phone]            Mar 28, 2018 11:30 AM CDT   Diabetic Education with Allie Matias   [facility] ([facility])    [address]  [facility] [address]   [phone]            Apr 02, 2018  9:30 AM CDT   Diabetic Education with Allie Matias   [facility] ([facility])    [address]  [facility] [address]   [phone]            Apr 09, 2018  9:30 AM CDT   Diabetic Education with Deanna Bolton RN   [facility] ([facility])    [address]  [facility] [id number] [phone]              Who to contact     If you have questions or need follow up information about today's clinic visit or your schedule please contact [facility] directly at [phone].  Normal or non-critical lab and imaging results will be communicated to you by MyChart, letter or phone within 4 business days after the clinic has received the results. If you do not hear from us within 7 days, please contact the clinic through MyChart or phone. If you have a critical or abnormal lab result, we will notify you by phone as soon as possible.  Submit refill requests  through [employer] or call your pharmacy and they will forward the refill request to us. Please allow 3 business days for your refill to be completed.          Additional Information About Your Visit        [employer] Information     [employer] gives you secure access to your electronic health record. If you see a primary care provider, you can also send messages to your care team and make appointments. If you have questions, please call your primary care clinic.  If you do not have a primary care provider, please call [phone] and they will assist you.        Care EveryWhere ID     This is your Care EveryWhere ID. This could be used by other organizations to access your [facility] medical records  [id number]        Your Vitals Were     Last Period                   01/01/1999            Blood Pressure from Last 3 Encounters:   02/27/18 (!) 139/98   01/30/18 138/84   01/16/18 161/68    Weight from Last 3 Encounters:   02/27/18 85.3 kg (188 lb)   01/30/18 85.3 kg (188 lb)   01/16/18 84.8 kg (187 lb)              Today, you had the following     No orders found for display       Primary Care Provider Office Phone # Fax #    Annamaria Erickson [phone] [address] [place] [address]        Equal Access to Services     [facility] JAY : Hadii aad ku hadasho Soomaali, waaxda luqadaha, qaybta kaalmada adeegyada, dillon brasher. So [facility] [phone].    ATENCIÓN: Si habla español, tiene a richter disposición servicios gratuitos de asistencia lingüística. Llame al [phone].    We comply with applicable federal civil rights laws and [place] laws. We do not discriminate on the basis of race, color, national origin, age, disability, sex, sexual orientation, or gender identity.            Thank you!     Thank you for choosing [facility] [place]  for your care. Our goal is always to provide you with excellent care. Hearing back from our  patients is one way we can continue to improve our services. Please take a few minutes to complete the written survey that you may receive in the mail after your visit with us. Thank you!             Your Updated Medication List - Protect others around you: Learn how to safely use, store and throw away your medicines at www.disposemymeds.org.          This list is accurate as of 3/21/18 12:37 PM.  Always use your most recent med list.                   Brand Name Dispense Instructions for use Diagnosis    acetone (Urine) test Strp     50 each    1 strip by In Vitro route daily    Type 2 diabetes mellitus with hyperglycemia, with long-term current use of insulin (H)       * ASPIRIN NOT PRESCRIBED    INTENTIONAL    1 each    continuous prn Antiplatelet medication not prescribed intentionally due to age.        blood glucose monitoring test strip    no brand specified    300 each    Freestyle test strips (NOT LITE or INSULINX) to be used with Omnipod pump test 6 times daily    Type 2 diabetes mellitus with hyperglycemia, with long-term current use of insulin (H)       EPINEPHrine 0.3 MG/0.3ML injection 2-pack    EPIPEN/ADRENACLICK/or ANY BX GENERIC EQUIV    2 each    Inject 0.3 mLs (0.3 mg) into the muscle once as needed for anaphylaxis    Allergic reaction       glucagon 1 MG kit    GLUCAGON EMERGENCY    1 mg    Inject 1 mg into the muscle once for 1 dose    Type 2 diabetes mellitus with hyperglycemia, with long-term current use of insulin (H)       * insulin aspart 100 UNIT/ML injection    NovoLOG FLEXPEN    90 mL    1 unit per 4 grams of carbohydrate eaten at each meal +a correction of 1 additional unit per 18 points of blood sugar above 140.  Total daily dose 100 units/day.  Dispense 6 boxes/30 pens for a 90-day supply or 2 boxes/10 pens for a 30-day supply as allowed by insurance.    Type 2 diabetes mellitus with hyperglycemia, with long-term current use of insulin (H)       * insulin aspart 100 UNITS/ML injection  "   NovoLOG VIAL    3 vial    To be used in insulin pump est TDD 65-75 units    Type 2 diabetes mellitus with hyperglycemia, with long-term current use of insulin (H)       * insulin pen needle 31G X 5 MM    B-D U/F    550 each    Use 6  time(s) per day.  Please dispense as BD Pen Needle Mini U/F 31G x 5 MM    Type 2 diabetes mellitus with hyperglycemia (H)       * insulin pen needle 31G X 5 MM    B-D U/F    300 each    Use 6 daily or as directed.    Type 2 diabetes mellitus with hyperglycemia (H)       * insulin pen needle 31G X 5 MM    B-D U/F    200 each    Use 5 daily or as directed.    Type 2 diabetes mellitus with hyperglycemia (H)       * insulin pump infusion      Date last updated:  3/1/18 Omnipod BASAL RATES and times: 12   AM (midnight): 1.05 units/hour  . CARB RATIO and times: 12   AM (midnight): 9 5 PM: 8 Corection Factor (Sensitivity) and times: 12   AM (midnight): 35 mg/dL BLOOD GLUCOSE TARGET and times: 12   AM (midnight): 100, correct above 120 Active Insulin Time:  4 hours        insulin syringe-needle U-100 31G X 5/16\" 1 ML    BD insulin syringe ULTRAFINE    100 each    Use one syringe daily or as directed.    Type 2 diabetes mellitus with hyperglycemia, with long-term current use of insulin (H)       polyethylene glycol Packet    MIRALAX/GLYCOLAX     Take 1 packet by mouth daily.        STATIN NOT PRESCRIBED (INTENTIONAL)     0 each    Statin not prescribed intentionally due to Intolerance    Statin intolerance       sulindac 200 MG tablet    CLINORIL     Take 200 mg by mouth 3 times daily        traMADol 50 MG tablet    ULTRAM     Take 50 mg by mouth 3 times daily 1-2 tablets in the evening        TRESIBA FLEXTOUCH 200 UNIT/ML pen   Generic drug:  insulin degludec     18 mL    INJECT 68 UNITS UNDER THE SKIN DAILY    Type 2 diabetes mellitus with hyperglycemia, with long-term current use of insulin (H), Encounter for long-term (current) use of insulin (H)       VOLTAREN 1 % Gel topical gel "   Generic drug:  diclofenac     100 g    Apply topically nightly as needed for moderate pain Apply 4 grams to knees or 2 grams to hands four times daily using enclosed dosing card.        * Notice:  This list has 7 medication(s) that are the same as other medications prescribed for you. Read the directions carefully, and ask your doctor or other care provider to review them with you.

## 2018-03-21 NOTE — Clinical Note
Ramon Huerta- just miguel, no changes made. I'm wondering about whether it might be worth considering going back to one basal rate when she comes in next week? Let's talk more.   Thanks, Allie

## 2018-03-21 NOTE — PROGRESS NOTES
Diabetes Self-Management Training: Insulin Pump Telephone Visit    Current Diabetes Management per Patient:  Insulin Pump Type: OmniPod    Taking other diabetes medications?  No    Late date of communication: 3/19/18    Patient comments:  Reports feeling encouraged that she is getting more of a handle on entering the correct amount of carbs, and doing fewer over rides for BG correction.     Blood Glucose Results and Insulin Use:         Current Pump Settings:         Assessment:  BG values are: Not in goal   Pt reports having increased pain past couple of day, but feeling more positive    Intervention/Plan:  Patient may benefit from increasing the current basal rate- however recommend (and pt agreed) that she may want to give it a few more days before making another adjustment.     Follow-up:  Follow-up appointment scheduled on 3/26 with BEBETO Coon RD.  Chart routed to referring provider.    Allie Matias RD, BEBETO  Diabetes

## 2018-03-22 ENCOUNTER — OFFICE VISIT (OUTPATIENT)
Dept: ENDOCRINOLOGY | Facility: CLINIC | Age: 58
End: 2018-03-22
Payer: COMMERCIAL

## 2018-03-22 VITALS
BODY MASS INDEX: 33.32 KG/M2 | DIASTOLIC BLOOD PRESSURE: 80 MMHG | TEMPERATURE: 97.9 F | HEART RATE: 68 BPM | SYSTOLIC BLOOD PRESSURE: 154 MMHG | WEIGHT: 191.1 LBS

## 2018-03-22 DIAGNOSIS — Z79.4 TYPE 2 DIABETES MELLITUS WITH HYPERGLYCEMIA, WITH LONG-TERM CURRENT USE OF INSULIN (H): ICD-10-CM

## 2018-03-22 DIAGNOSIS — E11.65 TYPE 2 DIABETES MELLITUS WITH HYPERGLYCEMIA, WITH LONG-TERM CURRENT USE OF INSULIN (H): ICD-10-CM

## 2018-03-22 PROCEDURE — 99214 OFFICE O/P EST MOD 30 MIN: CPT | Performed by: CLINICAL NURSE SPECIALIST

## 2018-03-22 NOTE — LETTER
3/22/2018         RE: Maricarmen Dotson  [address]  [place] [address]        Dear Colleague,    Thank you for referring your patient, Maricarmen Dotson, to the [facility]. Please see a copy of my visit note below.    Name: Maricarmen Dotson  F/u for Diabetes (Last seen 1/16/2018).  HPI:  Maricarmen Dotson is a 57 year old female who presents for the evaluation/management of:    1. Type 2 DM:  Originally diagnosed: in 1997 after starting Paxil and gaining 32 lbs in one month  Comes in today accompanied by her daughter.    Insulin pump start 2/20/2018.    Blood sugar range:     Current Regimen:   Insulin pump - Omnipod  Time Rate (U/hr)   0000 1.250   12:00 1.000   18:00 1.250       Pattern 2 (anxiety)  Time Rate (U/hr)   0000 1.350   12:00 1.000   18:00 1.350       Pattern 3 (crazy sugars)  Time Rate (U/hr)   0000 1.450   12:00 1.000   18:00 1.450         Carbohydrate Ratio -    Time Ratio   0000 9   17:00 8     Sensitivity   31   Active Insulin Time   hours   Basal  42% (25.8 units)   Bolus   58% (36.3 units)   Total Carbohydrates/day 115 g   Total Insulin/day  62.1   Average Blood Sugar 238   BS Checks 4.7 times a day   Target range  100-120       REPORTS PREVIOUS ADVERSE REACTION TO HUMALOG.  States she cannot take Humalog because it caused pancreatits.  Continues to struggle with a binge eating disorder.    Was previously treated by endo at [facility] (last seen there 11/6/2015).  She has had adverse reaction to most oral medications including Metformin (abdominal pain), glipizide (allergic type reaction), Avandia (abdominal pain and swelling), Byetta and Onglyza (pancreatitis).  Was previously prescribed Invokana but did not start because she was concerned about possible side effects.        History of eating disorder (binge eating) due to PTSD.  Now working with a new counselor.      Saw Dr. Lovell for evaluation of her adrenal and pituitary glands 12/2016 - no abnormal adrenal or  pituitary abnormalities were noted.      Complications:   Diabetes Complications  Description / Detail    Diabetic Retinopathy   No retinopathy, next exam 2017   CAD / PAD   No   Neuropathy   No   Nephropathy / Microalbuminuria   No   Gastroparesis  No   Hypoglycemia Unawareness  No     2. Intermittent Hypertension: Blood Pressure today:   BP Readings from Last 3 Encounters:   18 154/80   18 (!) 139/98   18 138/84   .  Blood pressure medications include no medications.  Takes medications everyday without forgetting a dose.    3. Lipids: Takes no medications for lipid control.        PMH/PSH:  Past Medical History:   Diagnosis Date     Ascending aorta enlargement (H)      Depressive disorder     severe, prior hospitalization     Diabetes mellitus, type 2 (H)      Diverticulosis of colon (without mention of hemorrhage)      Fibromyalgia 2007     Insomnia      Irritable bowel syndrome      Past Surgical History:   Procedure Laterality Date     C SPINAL ORTHOSIS,NOS      lumbar surgery     choley       HYSTERECTOMY, CERVIX STATUS UNKNOWN      TVH/BSO     Family Hx:  Family History   Problem Relation Age of Onset     DIABETES Mother      type 2     Hypertension Mother      CEREBROVASCULAR DISEASE Mother       stroke age 57     Ovarian Cancer Mother 43     CANCER Mother      cervix     DIABETES Father      type 2     Hypertension Father      GASTROINTESTINAL DISEASE Father      colon polyps     Breast Cancer Sister      Ovarian Cancer Sister 46     Breast Cancer Sister      Ovarian Cancer Maternal Grandmother 72     CANCER Maternal Grandmother      cervix     Thyroid disease:          DM2: Yes, mother and father         Autoimmune: DM1, SLE, RA, Vitiligo     Social Hx:  Social History     Social History     Marital status:      Spouse name: N/A     Number of children: 3     Years of education: N/A     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[employer]      Social History Main Topics     Smoking status: Never Smoker     Smokeless tobacco: Never Used     Alcohol use 0.0 oz/week     0 Standard drinks or equivalent per week      Comment: maybe once a month     Drug use: No     Sexual activity: Yes     Partners: Male     Birth control/ protection: Surgical     Other Topics Concern     Not on file     Social History Narrative          MEDICATIONS:  has a current medication list which includes the following prescription(s): insulin aspart, insulin pump, blood glucose monitoring, insulin aspart, acetone (urine) test, insulin syringe-needle u-100, sulindac, diclofenac, insulin aspart, insulin pen needle, insulin pen needle, insulin pen needle, epinephrine, STATIN NOT PRESCRIBED, INTENTIONAL,, tramadol, aspirin not prescribed, polyethylene glycol, and glucagon.    ROS     ROS: 10 point ROS neg other than the symptoms noted above in the HPI.    Physical Exam   VS: /80 (BP Location: Left arm, Patient Position: Chair, Cuff Size: Adult Large)  Pulse 68  Temp 97.9  F (36.6  C) (Oral)  Wt 86.7 kg (191 lb 1.6 oz)  LMP 01/01/1999  BMI 33.32 kg/m2  GENERAL: NAD, well dressed, answering questions appropriately, appears stated age.  HEENT: OP clear, no exophthalmos, no proptosis, EOMI, no lig lag, no retraction, no scleral icterus  RESPIRATORY: Clear bilaterally.  Normal respiratory effort.  CARDIOVASCULAR: RRR.  No peripheral edema.  EXTREMITIES: no edema, no obvious rashes, no lesions  NEUROLOGY: CN grossly intact, no tremors  MSK: grossly intact, No digital cyanosis. Normal gait and station.  SKIN: no rashes, no obvious lesions, no ulcers  PSYCH: Intact judgment and insight. A&OX3 with a cordial affect.    LABS:  A1c:   Component      Latest Ref Rng & Units 11/30/2016 3/30/2017 1/16/2018   Hemoglobin A1C      4.3 - 6.0 % 12.2 (H) 9.7 (H) 9.4 (H)     Basic Metabolic Panel:  !COMPREHENSIVE Latest Ref Rng & Units 9/20/2017   SODIUM 133 - 144 mmol/L 136   POTASSIUM 3.4 - 5.3  mmol/L 4.0   CHLORIDE 94 - 109 mmol/L 102   CO2 mmol/L    BUN 7 - 30 mg/dL 14   Creatinine 0.52 - 1.04 mg/dL    Creatinine 0.52 - 1.04 mg/dL 0.72   Glucose 70 - 99 mg/dL 349 (H)   ANION GAP 3 - 14 mmol/L 11   CALCIUM 8.5 - 10.1 mg/dL 9.2     LFTS/Cholesterol Panel:  !LIPID/HEPATIC Latest Ref Rng 4/12/2016 7/20/2016   CHOLESTEROL <200 mg/dL     TRIGLYCERIDES 0 - 150 mg/dL     HDL CHOLESTEROL >50 mg/dL     LDL CHOLESTEROL DIRECT <100 mg/dL 160 (H)    LDL CHOLESTEROL, CALCULATED 0 - 129 mg/dL     VLDL-CHOLESTEROL 0 - 30 mg/dL     CHOLESTEROL/HDL RATIO 0.0 - 5.0       !LIPID/HEPATIC Latest Ref Rng & Units 7/20/2016 9/20/2017   AST 0 - 45 U/L 17 18   ALT 0 - 50 U/L 24 23     Thyroid Function:   !THYROID Latest Ref Rng & Units 9/20/2017   TSH 0.40 - 4.00 mU/L 1.18     Urine Microalbumin:  Component    Latest Ref Rng & Units 3/30/2017   Creatinine Urine    mg/dL 47   Albumin Urine mg/L    mg/L 207   Albumin Urine mg/g Cr    0 - 25 mg/g Cr 437.63 (H)       Vitamin D Deficiency screening    30 - 75 ug/L 34     All pertinent notes, labs, and images personally reviewed by me.     A/P  Ms.Anna ROXANNE Dotson is a 57 year old here for the evaluation/management of diabetes:    1. DM2 - Uncontrolled.    Her binge eating disorder continues to be a big contributing factor to her poor control, she's now seeing a therapist.    Blood sugars are improving since starting insulin pump therapy.  She does not want to make any pump changes today - she prefers to wait until her regular contact with BEBETO Coon this coming Monday to make pump adjustments - I.e. Increase the basal rate.  Plan to change to more rapid acting Fiasp insulin as she boluses after eating due to her eating disorder -  once she has used up her current supply of Novolog.  Continue to work with diabetes ed weekly for ongoing pump adjustments.  There is some variability among people, most will usually develop symptoms suggestive of hypoglycemia when blood glucose levels  are lowered to the mid 60's. The first set of symptoms are called adrenergic. Patients may experience any of the following nervousness, sweating, intense hunger, trembling, weakness, palpitations, and difficulty speaking.   The acute management of hypoglycemia involves the rapid delivery of a source of easily absorbed sugar. Regular soda, juice, lifesavers, table sugar, are good options. 15 grams of glucose is the dose that is given, followed by an assessment of symptoms and a blood glucose check if possible. If after 10 minutes there is no improvement, another 10-15 grams should be given. This can be repeated up to three times. The equivalency of 10-15 grams of glucose (approximate servings) are: 3-5 hard candies, 3 teaspoons of sugar, or 1/2 cup of regular soda or juice.   2.  Intermittent  Hypertension - Currently untreated.  Will continue to monitor. She does not want to take any additional oral meds due to multiple past med reactions.    3. Hyperlipidemia - Currently untreated-? Statin intolerance.  She is concerned about taking any oral medications due to previous adverse reactions.  Will continue to monitor.    Labs ordered today:   Orders Placed This Encounter   Procedures     Lipid panel reflex to direct LDL Fasting     Hemoglobin A1c     Albumin Random Urine Quantitative with Creat Ratio   Urine microalbumin     Radiology/Consults ordered today: None    All questions were answered.  The patient indicates understanding of the above issues and agrees with the plan set forth.  Total face to face time discussing diabetes treatment and target BG levels was greater than or equal to 25 minutes.       Follow-up:  3 months    Emily Phan NP  Endocrinology  [facility]  CC: Annamaria Erickson                   Again, thank you for allowing me to participate in the care of your patient.        Sincerely,        MARICRUZ Castillo CNP

## 2018-03-22 NOTE — PATIENT INSTRUCTIONS
After you use up your supply of Novolog, we'll plan to change to Fiasp insulin.  You have the printed prescription for this.    Check in with Allie on Monday for further pump adjustments.    Follow up with me in 3 months.    Emily Phan NP  Endocrinology

## 2018-03-22 NOTE — MR AVS SNAPSHOT
After Visit Summary   3/22/2018    Maricarmen Dotson    MRN: [id number]           Patient Information     Date Of Birth          1960        Visit Information        Provider Department      3/22/2018 9:00 AM Emily Phan APRN [facility]        Today's Diagnoses     Type 2 diabetes mellitus with hyperglycemia, with long-term current use of insulin (H)          Care Instructions        After you use up your supply of Novolog, we'll plan to change to Fiasp insulin.  You have the printed prescription for this.    Check in with Allie on Monday for further pump adjustments.    Follow up with me in 3 months.    Emily Phan NP  Endocrinology            Follow-ups after your visit        Your next 10 appointments already scheduled     Mar 28, 2018 11:30 AM CDT   Diabetic Education with Allie Matias   [facility] ([facility])    [address] [facility] [id number] [phone]            Apr 02, 2018  9:30 AM CDT   Diabetic Education with Allie Matias   [facility] ([facility])    [address] [facility] [phone]            Apr 09, 2018  9:30 AM CDT   Diabetic Education with Denana Bolton RN   [facility] ([facility])    [address] [facility] [phone]              Who to contact     If you have questions or need follow up information about today's clinic visit or your schedule please contact [facility] directly at [phone].  Normal or non-critical lab and imaging results will be communicated to you by MyChart, letter or phone within 4 business days after the clinic has received the results. If you do not hear from us within 7 days, please contact the clinic through MyChart or phone. If you have a critical or abnormal lab result, we will  notify you by phone as soon as possible.  Submit refill requests through [employer] or call your pharmacy and they will forward the refill request to us. Please allow 3 business days for your refill to be completed.          Additional Information About Your Visit        [facility] Information     [employer] gives you secure access to your electronic health record. If you see a primary care provider, you can also send messages to your care team and make appointments. If you have questions, please call your primary care clinic.  If you do not have a primary care provider, please call [phone] and they will assist you.        Care EveryWhere ID     This is your Care EveryWhere ID. This could be used by other organizations to access your [facility] medical records  [id number]        Your Vitals Were     Pulse Temperature Last Period BMI (Body Mass Index)          68 97.9  F (36.6  C) (Oral) 01/01/1999 33.32 kg/m2         Blood Pressure from Last 3 Encounters:   03/22/18 142/80   02/27/18 (!) 139/98   01/30/18 138/84    Weight from Last 3 Encounters:   03/22/18 86.7 kg (191 lb 1.6 oz)   02/27/18 85.3 kg (188 lb)   01/30/18 85.3 kg (188 lb)              We Performed the Following     Albumin Random Urine Quantitative with Creat Ratio          Today's Medication Changes          These changes are accurate as of 3/22/18  9:52 AM.  If you have any questions, ask your nurse or doctor.               These medicines have changed or have updated prescriptions.        Dose/Directions    * insulin aspart 100 UNIT/ML injection   Commonly known as:  NovoLOG FLEXPEN   This may have changed:  Another medication with the same name was added. Make sure you understand how and when to take each.   Used for:  Type 2 diabetes mellitus with hyperglycemia, with long-term current use of insulin (H)   Changed by:  Emily Phan APRN CNP        1 unit per 4 grams of carbohydrate eaten at each meal +a correction of 1 additional unit per 18  points of blood sugar above 140.  Total daily dose 100 units/day.  Dispense 6 boxes/30 pens for a 90-day supply or 2 boxes/10 pens for a 30-day supply as allowed by insurance.   Quantity:  90 mL   Refills:  3       * insulin aspart 100 UNITS/ML injection   Commonly known as:  NovoLOG VIAL   This may have changed:  Another medication with the same name was added. Make sure you understand how and when to take each.   Used for:  Type 2 diabetes mellitus with hyperglycemia, with long-term current use of insulin (H)   Changed by:  Emily Phan APRN CNP        To be used in insulin pump est TDD 65-75 units   Quantity:  3 vial   Refills:  3       * insulin aspart 100 UNITS/ML injection   Commonly known as:  FIASP   This may have changed:  You were already taking a medication with the same name, and this prescription was added. Make sure you understand how and when to take each.   Used for:  Type 2 diabetes mellitus with hyperglycemia, with long-term current use of insulin (H)   Changed by:  Emily Phan APRN CNP        Up to 75 units sq daily via insulin pump   Quantity:  70 mL   Refills:  1       * Notice:  This list has 3 medication(s) that are the same as other medications prescribed for you. Read the directions carefully, and ask your doctor or other care provider to review them with you.         Where to get your medicines      Some of these will need a paper prescription and others can be bought over the counter.  Ask your nurse if you have questions.     Bring a paper prescription for each of these medications     insulin aspart 100 UNITS/ML injection                Primary Care Provider Office Phone # Fax #    Annamaria Erickson [phone]       [facility] [place] [address]        Equal Access to Services     [facility] AH: Virginia hoang Sosu, waaxda luqadaha, qaybta kaalleonel manriquez, dillon brasher. So [facility]  [phone].    ATENCIÓN: Si mima bagley, tiene a richter disposición servicios gratuitos de asistencia lingüística. Lynnette kent [phone].    We comply with applicable federal civil rights laws and [place] laws. We do not discriminate on the basis of race, color, national origin, age, disability, sex, sexual orientation, or gender identity.            Thank you!     Thank you for choosing [facility]  for your care. Our goal is always to provide you with excellent care. Hearing back from our patients is one way we can continue to improve our services. Please take a few minutes to complete the written survey that you may receive in the mail after your visit with us. Thank you!             Your Updated Medication List - Protect others around you: Learn how to safely use, store and throw away your medicines at www.disposemymeds.org.          This list is accurate as of 3/22/18  9:52 AM.  Always use your most recent med list.                   Brand Name Dispense Instructions for use Diagnosis    acetone (Urine) test Strp     50 each    1 strip by In Vitro route daily    Type 2 diabetes mellitus with hyperglycemia, with long-term current use of insulin (H)       * ASPIRIN NOT PRESCRIBED    INTENTIONAL    1 each    continuous prn Antiplatelet medication not prescribed intentionally due to age.        blood glucose monitoring test strip    no brand specified    300 each    Freestyle test strips (NOT LITE or INSULINX) to be used with Omnipod pump test 6 times daily    Type 2 diabetes mellitus with hyperglycemia, with long-term current use of insulin (H)       EPINEPHrine 0.3 MG/0.3ML injection 2-pack    EPIPEN/ADRENACLICK/or ANY BX GENERIC EQUIV    2 each    Inject 0.3 mLs (0.3 mg) into the muscle once as needed for anaphylaxis    Allergic reaction       glucagon 1 MG kit    GLUCAGON EMERGENCY    1 mg    Inject 1 mg into the muscle once for 1 dose    Type 2 diabetes mellitus with hyperglycemia, with  "long-term current use of insulin (H)       * insulin aspart 100 UNIT/ML injection    NovoLOG FLEXPEN    90 mL    1 unit per 4 grams of carbohydrate eaten at each meal +a correction of 1 additional unit per 18 points of blood sugar above 140.  Total daily dose 100 units/day.  Dispense 6 boxes/30 pens for a 90-day supply or 2 boxes/10 pens for a 30-day supply as allowed by insurance.    Type 2 diabetes mellitus with hyperglycemia, with long-term current use of insulin (H)       * insulin aspart 100 UNITS/ML injection    NovoLOG VIAL    3 vial    To be used in insulin pump est TDD 65-75 units    Type 2 diabetes mellitus with hyperglycemia, with long-term current use of insulin (H)       * insulin aspart 100 UNITS/ML injection    FIASP    70 mL    Up to 75 units sq daily via insulin pump    Type 2 diabetes mellitus with hyperglycemia, with long-term current use of insulin (H)       * insulin pen needle 31G X 5 MM    B-D U/F    550 each    Use 6  time(s) per day.  Please dispense as BD Pen Needle Mini U/F 31G x 5 MM    Type 2 diabetes mellitus with hyperglycemia (H)       * insulin pen needle 31G X 5 MM    B-D U/F    300 each    Use 6 daily or as directed.    Type 2 diabetes mellitus with hyperglycemia (H)       * insulin pen needle 31G X 5 MM    B-D U/F    200 each    Use 5 daily or as directed.    Type 2 diabetes mellitus with hyperglycemia (H)       * insulin pump infusion      Date last updated:  3/1/18 Omnipod BASAL RATES and times: 12   AM (midnight): 1.05 units/hour  . CARB RATIO and times: 12   AM (midnight): 9 5 PM: 8 Corection Factor (Sensitivity) and times: 12   AM (midnight): 35 mg/dL BLOOD GLUCOSE TARGET and times: 12   AM (midnight): 100, correct above 120 Active Insulin Time:  4 hours        insulin syringe-needle U-100 31G X 5/16\" 1 ML    BD insulin syringe ULTRAFINE    100 each    Use one syringe daily or as directed.    Type 2 diabetes mellitus with hyperglycemia, with long-term current use of insulin " (H)       polyethylene glycol Packet    MIRALAX/GLYCOLAX     Take 1 packet by mouth daily.        STATIN NOT PRESCRIBED (INTENTIONAL)     0 each    Statin not prescribed intentionally due to Intolerance    Statin intolerance       sulindac 200 MG tablet    CLINORIL     Take 200 mg by mouth 3 times daily        traMADol 50 MG tablet    ULTRAM     Take 50 mg by mouth 3 times daily 1-2 tablets in the evening        VOLTAREN 1 % Gel topical gel   Generic drug:  diclofenac     100 g    Apply topically nightly as needed for moderate pain Apply 4 grams to knees or 2 grams to hands four times daily using enclosed dosing card.        * Notice:  This list has 8 medication(s) that are the same as other medications prescribed for you. Read the directions carefully, and ask your doctor or other care provider to review them with you.

## 2018-03-22 NOTE — PROGRESS NOTES
Name: Maricarmen Dotson  F/u for Diabetes (Last seen 1/16/2018).  HPI:  Maricarmen Dotson is a 57 year old female who presents for the evaluation/management of:    1. Type 2 DM:  Originally diagnosed: in 1997 after starting Paxil and gaining 32 lbs in one month  Comes in today accompanied by her daughter.    Insulin pump start 2/20/2018.    Blood sugar range:     Current Regimen:   Insulin pump - Omnipod  Time Rate (U/hr)   0000 1.250   12:00 1.000   18:00 1.250       Pattern 2 (anxiety)  Time Rate (U/hr)   0000 1.350   12:00 1.000   18:00 1.350       Pattern 3 (crazy sugars)  Time Rate (U/hr)   0000 1.450   12:00 1.000   18:00 1.450         Carbohydrate Ratio -    Time Ratio   0000 9   17:00 8     Sensitivity   31   Active Insulin Time   hours   Basal  42% (25.8 units)   Bolus   58% (36.3 units)   Total Carbohydrates/day 115 g   Total Insulin/day  62.1   Average Blood Sugar 238   BS Checks 4.7 times a day   Target range  100-120       REPORTS PREVIOUS ADVERSE REACTION TO HUMALOG.  States she cannot take Humalog because it caused pancreatits.  Continues to struggle with a binge eating disorder.    Was previously treated by endo at [facility] (last seen there 11/6/2015).  She has had adverse reaction to most oral medications including Metformin (abdominal pain), glipizide (allergic type reaction), Avandia (abdominal pain and swelling), Byetta and Onglyza (pancreatitis).  Was previously prescribed Invokana but did not start because she was concerned about possible side effects.        History of eating disorder (binge eating) due to PTSD.  Now working with a new counselor.      Saw Dr. Lovell for evaluation of her adrenal and pituitary glands 12/2016 - no abnormal adrenal or pituitary abnormalities were noted.      Complications:   Diabetes Complications  Description / Detail    Diabetic Retinopathy   No retinopathy, next exam 4/2017   CAD / PAD   No   Neuropathy   No   Nephropathy / Microalbuminuria   No   Gastroparesis   No   Hypoglycemia Unawareness  No     2. Intermittent Hypertension: Blood Pressure today:   BP Readings from Last 3 Encounters:   18 154/80   18 (!) 139/98   18 138/84   .  Blood pressure medications include no medications.  Takes medications everyday without forgetting a dose.    3. Lipids: Takes no medications for lipid control.        PMH/PSH:  Past Medical History:   Diagnosis Date     Ascending aorta enlargement (H)      Depressive disorder     severe, prior hospitalization     Diabetes mellitus, type 2 (H)      Diverticulosis of colon (without mention of hemorrhage)      Fibromyalgia 2007     Insomnia      Irritable bowel syndrome      Past Surgical History:   Procedure Laterality Date     C SPINAL ORTHOSIS,NOS      lumbar surgery     choley  2011     HYSTERECTOMY, CERVIX STATUS UNKNOWN      TVH/BSO     Family Hx:  Family History   Problem Relation Age of Onset     DIABETES Mother      type 2     Hypertension Mother      CEREBROVASCULAR DISEASE Mother       stroke age 57     Ovarian Cancer Mother 43     CANCER Mother      cervix     DIABETES Father      type 2     Hypertension Father      GASTROINTESTINAL DISEASE Father      colon polyps     Breast Cancer Sister      Ovarian Cancer Sister 46     Breast Cancer Sister      Ovarian Cancer Maternal Grandmother 72     CANCER Maternal Grandmother      cervix     Thyroid disease:          DM2: Yes, mother and father         Autoimmune: DM1, SLE, RA, Vitiligo     Social Hx:  Social History     Social History     Marital status:      Spouse name: N/A     Number of children: 3     Years of education: N/A     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[employer]     Social History Main Topics     Smoking status: Never Smoker     Smokeless tobacco: Never Used     Alcohol use 0.0 oz/week     0 Standard drinks or equivalent per week      Comment: maybe once a month     Drug use: No     Sexual activity: Yes      Partners: Male     Birth control/ protection: Surgical     Other Topics Concern     Not on file     Social History Narrative          MEDICATIONS:  has a current medication list which includes the following prescription(s): insulin aspart, insulin pump, blood glucose monitoring, insulin aspart, acetone (urine) test, insulin syringe-needle u-100, sulindac, diclofenac, insulin aspart, insulin pen needle, insulin pen needle, insulin pen needle, epinephrine, STATIN NOT PRESCRIBED, INTENTIONAL,, tramadol, aspirin not prescribed, polyethylene glycol, and glucagon.    ROS     ROS: 10 point ROS neg other than the symptoms noted above in the HPI.    Physical Exam   VS: /80 (BP Location: Left arm, Patient Position: Chair, Cuff Size: Adult Large)  Pulse 68  Temp 97.9  F (36.6  C) (Oral)  Wt 86.7 kg (191 lb 1.6 oz)  LMP 01/01/1999  BMI 33.32 kg/m2  GENERAL: NAD, well dressed, answering questions appropriately, appears stated age.  HEENT: OP clear, no exophthalmos, no proptosis, EOMI, no lig lag, no retraction, no scleral icterus  RESPIRATORY: Clear bilaterally.  Normal respiratory effort.  CARDIOVASCULAR: RRR.  No peripheral edema.  EXTREMITIES: no edema, no obvious rashes, no lesions  NEUROLOGY: CN grossly intact, no tremors  MSK: grossly intact, No digital cyanosis. Normal gait and station.  SKIN: no rashes, no obvious lesions, no ulcers  PSYCH: Intact judgment and insight. A&OX3 with a cordial affect.    LABS:  A1c:   Component      Latest Ref Rng & Units 11/30/2016 3/30/2017 1/16/2018   Hemoglobin A1C      4.3 - 6.0 % 12.2 (H) 9.7 (H) 9.4 (H)     Basic Metabolic Panel:  !COMPREHENSIVE Latest Ref Rng & Units 9/20/2017   SODIUM 133 - 144 mmol/L 136   POTASSIUM 3.4 - 5.3 mmol/L 4.0   CHLORIDE 94 - 109 mmol/L 102   CO2 mmol/L    BUN 7 - 30 mg/dL 14   Creatinine 0.52 - 1.04 mg/dL    Creatinine 0.52 - 1.04 mg/dL 0.72   Glucose 70 - 99 mg/dL 349 (H)   ANION GAP 3 - 14 mmol/L 11   CALCIUM 8.5 - 10.1 mg/dL 9.2      LFTS/Cholesterol Panel:  !LIPID/HEPATIC Latest Ref Rng 4/12/2016 7/20/2016   CHOLESTEROL <200 mg/dL     TRIGLYCERIDES 0 - 150 mg/dL     HDL CHOLESTEROL >50 mg/dL     LDL CHOLESTEROL DIRECT <100 mg/dL 160 (H)    LDL CHOLESTEROL, CALCULATED 0 - 129 mg/dL     VLDL-CHOLESTEROL 0 - 30 mg/dL     CHOLESTEROL/HDL RATIO 0.0 - 5.0       !LIPID/HEPATIC Latest Ref Rng & Units 7/20/2016 9/20/2017   AST 0 - 45 U/L 17 18   ALT 0 - 50 U/L 24 23     Thyroid Function:   !THYROID Latest Ref Rng & Units 9/20/2017   TSH 0.40 - 4.00 mU/L 1.18     Urine Microalbumin:  Component    Latest Ref Rng & Units 3/30/2017   Creatinine Urine    mg/dL 47   Albumin Urine mg/L    mg/L 207   Albumin Urine mg/g Cr    0 - 25 mg/g Cr 437.63 (H)       Vitamin D Deficiency screening    30 - 75 ug/L 34     All pertinent notes, labs, and images personally reviewed by me.     A/P  Ms.Anna ROXANNE Dotson is a 57 year old here for the evaluation/management of diabetes:    1. DM2 - Uncontrolled.    Her binge eating disorder continues to be a big contributing factor to her poor control, she's now seeing a therapist.    Blood sugars are improving since starting insulin pump therapy.  She does not want to make any pump changes today - she prefers to wait until her regular contact with BEBETO Coon this coming Monday to make pump adjustments - I.e. Increase the basal rate.  Plan to change to more rapid acting Fiasp insulin as she boluses after eating due to her eating disorder -  once she has used up her current supply of Novolog.  Continue to work with diabetes ed weekly for ongoing pump adjustments.  There is some variability among people, most will usually develop symptoms suggestive of hypoglycemia when blood glucose levels are lowered to the mid 60's. The first set of symptoms are called adrenergic. Patients may experience any of the following nervousness, sweating, intense hunger, trembling, weakness, palpitations, and difficulty speaking.   The acute  management of hypoglycemia involves the rapid delivery of a source of easily absorbed sugar. Regular soda, juice, lifesavers, table sugar, are good options. 15 grams of glucose is the dose that is given, followed by an assessment of symptoms and a blood glucose check if possible. If after 10 minutes there is no improvement, another 10-15 grams should be given. This can be repeated up to three times. The equivalency of 10-15 grams of glucose (approximate servings) are: 3-5 hard candies, 3 teaspoons of sugar, or 1/2 cup of regular soda or juice.   2.  Intermittent  Hypertension - Currently untreated.  Will continue to monitor. She does not want to take any additional oral meds due to multiple past med reactions.    3. Hyperlipidemia - Currently untreated-? Statin intolerance.  She is concerned about taking any oral medications due to previous adverse reactions.  Will continue to monitor.    Labs ordered today:   Orders Placed This Encounter   Procedures     Lipid panel reflex to direct LDL Fasting     Hemoglobin A1c     Albumin Random Urine Quantitative with Creat Ratio   Urine microalbumin     Radiology/Consults ordered today: None    All questions were answered.  The patient indicates understanding of the above issues and agrees with the plan set forth.  Total face to face time discussing diabetes treatment and target BG levels was greater than or equal to 25 minutes.       Follow-up:  3 months    Emily Phan NP  Endocrinology  [facility]  CC: Annamaria Erickson

## 2018-03-28 ENCOUNTER — ALLIED HEALTH/NURSE VISIT (OUTPATIENT)
Dept: EDUCATION SERVICES | Facility: CLINIC | Age: 58
End: 2018-03-28
Payer: COMMERCIAL

## 2018-03-28 DIAGNOSIS — Z79.4 TYPE 2 DIABETES MELLITUS WITH HYPERGLYCEMIA, WITH LONG-TERM CURRENT USE OF INSULIN (H): Primary | ICD-10-CM

## 2018-03-28 DIAGNOSIS — E11.65 TYPE 2 DIABETES MELLITUS WITH HYPERGLYCEMIA, WITH LONG-TERM CURRENT USE OF INSULIN (H): Primary | ICD-10-CM

## 2018-03-28 PROCEDURE — G0108 DIAB MANAGE TRN  PER INDIV: HCPCS | Performed by: DIETITIAN, REGISTERED

## 2018-03-28 NOTE — PROGRESS NOTES
Diabetes Self-Management Training: Insulin Pump Follow up    Current Diabetes Management per Patient:  Insulin Pump Type: OmniPod  Late date of communication: 3/21/18    Patient comments: reports that she continues to feel better, has not been stress eating, was not feeling well over the past few days.     Taking other diabetes medications? no    Blood Glucose Results and Insulin Use:           Current Pump Settings: See Insulin Pump - Outpatient in Medication List for complete list of settings.       Assessment:  BG values are: Not in goal    Patient would benefit from increase in basal rate , decrease in correction/sensitivity and using a temporary basal rate.    Intervention/Plan:  Basal settings increased (using only Regular pattern):    12am: 1.25 --> increased to 1.35  12pm: 1.0 --> increased to 1.10  6pm: 1.25 --> increased to 1.35    Sensitivity: changed from 31 --> 30 (more insulin to correct high blood sugar).    Try experimenting with the Temp basal as discussed (increase for anxiety, decrease for exercise, etc). Start with small adjustments to get some experience on how it works. If this feature works well for pt- consider reducing down to one basal pattern.    Follow-up:  Follow-up appointment scheduled on April 2nd.  Chart routed to referring provider.    Allie Matias RD, CDE  Diabetes       Download data sent to be scanned into this Epic encounter.    Any diabetes medication dose changes were made via the CDE Protocol and Collaborative Practice Agreement with the patient's endocrinology provider. A copy of this encounter was shared with the provider.

## 2018-03-28 NOTE — LETTER
3/28/2018         RE: Maricarmen Dotson  [address] [place] [address]        Dear Colleague,    Thank you for referring your patient, Maricarmen Dotson, to the [facility]. Please see a copy of my visit note below.    Diabetes Self-Management Training: Insulin Pump Telephone Visit    Current Diabetes Management per Patient:  Insulin Pump Type: OmniPod  Late date of communication: 3/21/18    Patient comments: reports that she continues to feel better, has not been stress eating, was not feeling well over the past few days.     Taking other diabetes medications? no    Blood Glucose Results and Insulin Use:           Current Pump Settings: See Insulin Pump - Outpatient in Medication List for complete list of settings.       Assessment:  BG values are: Not in goal    Patient would benefit from increase in basal rate , decrease in correction/sensitivity and using a temporary basal rate.    Intervention/Plan:  Basal settings increased (using only Regular pattern):    12am: 1.25 --> increased to 1.35  12pm: 1.0 --> increased to 1.10  6pm: 1.25 --> increased to 1.35    Sensitivity: changed from 31 --> 30 (more insulin to correct high blood sugar).    Try experimenting with the Temp basal as discussed (increase for anxiety, decrease for exercise, etc). Start with small adjustments to get some experience on how it works. If this feature works well for pt- consider reducing down to one basal pattern.    Follow-up:  Follow-up appointment scheduled on April 2nd.  Chart routed to referring provider.    Allie Matias RD, CDE  Diabetes       Download data sent to be scanned into this Epic encounter.    Any diabetes medication dose changes were made via the CDE Protocol and Collaborative Practice Agreement with the patient's endocrinology provider. A copy of this encounter was shared with the provider.

## 2018-03-28 NOTE — MR AVS SNAPSHOT
After Visit Summary   3/28/2018    Maricarmen Dotson    MRN: [id number]           Patient Information     Date Of Birth          1960        Visit Information        Provider Department      3/28/2018 11:30 AM Allie Matias [facility]        Care Instructions    Changes in pump settings made at today's visit:    Basal settings increased (Regular):    12am: 1.25 --> increased to 1.35  12pm: 1.0 --> increased to 1.10  6pm: 1.25 --> increased to 1.35    Sensitivity: changed from 31 --> 30 (more insulin to correct high blood sugar).    Try experimenting with the Temp basal as discussed (for anxiety, exercise, etc).    Allie Matias RD, CDE  Diabetes      [facility] for [facility]:  For Your Diabetes Education and Nutrition Appointments Call:  [phone]   For Diabetes Education or Nutrition Related Questions:   Phone: [phone]  E-mail: [email or web address]  Fax: [phone]   If you need a medication refill please contact your pharmacy. Please allow 3 business days for your refills to be completed.    Instructions for emailing the Diabetes Educators    If you need to communicate a non-urgent message to a Diabetes Educator via email, please send to [email or web address].    Please follow the following email guidelines:    Subject line: Secure: your clinic name (example: Secure: [facility])  In the email please include: First name, middle initial, last name and date of birth.    We will be in touch with you within one (1) business day.             Follow-ups after your visit        Your next 10 appointments already scheduled     Apr 02, 2018  9:30 AM CDT   Diabetic Education with Allie Matias   [facility] ([facility])    [address] [id number] [phone]            Apr 09, 2018  9:30 AM CDT   Diabetic Education with  Deanna Bolton RN   [facility] ([facility])    [address] [address] [phone]            Jun 22, 2018  8:45 AM CDT   LAB with CR LAB   [facility] ([facility])    [address] [facility] [id number] [phone]           Please do not eat 10-12 hours before your appointment if you are coming in fasting for labs on lipids, cholesterol, or glucose (sugar). This does not apply to pregnant women. Water, hot tea and black coffee (with nothing added) are okay. Do not drink other fluids, diet soda or chew gum.            Jun 22, 2018  9:00 AM CDT   Return Visit with MARICRUZ Castillo CNP   [facility] ([facility])    [address] [address] [facility] [address] [phone]              Who to contact     If you have questions or need follow up information about today's clinic visit or your schedule please contact [facility] directly at [phone].  Normal or non-critical lab and imaging results will be communicated to you by MyChart, letter or phone within 4 business days after the clinic has received the results. If you do not hear from us within 7 days, please contact the clinic through MyLuvshart or phone. If you have a critical or abnormal lab result, we will notify you by phone as soon as possible.  Submit refill requests through [employer] or call your pharmacy and they will forward the refill request to us. Please allow 3 business days for your refill to be completed.          Additional Information About Your Visit        MyLuvshart Information     [employer] gives you secure access to your electronic health record. If you see a primary care provider, you can also send messages to your care team and make appointments. If you have questions, please call your primary care clinic.  If you do not have a primary care  provider, please call [phone] and they will assist you.        Care EveryWhere ID     This is your Care EveryWhere ID. This could be used by other organizations to access your [place] medical records  [id number]        Your Vitals Were     Last Period                   01/01/1999            Blood Pressure from Last 3 Encounters:   03/22/18 154/80   02/27/18 (!) 139/98   01/30/18 138/84    Weight from Last 3 Encounters:   03/22/18 86.7 kg (191 lb 1.6 oz)   02/27/18 85.3 kg (188 lb)   01/30/18 85.3 kg (188 lb)              Today, you had the following     No orders found for display       Primary Care Provider Office Phone # Fax #    Annamaria Erickson [phone] [address] [place] [address]        Equal Access to Services     [facility]: Hadii peg schilling hadasho Soomaali, waaxda luqadaha, qaybta kaalmada adejudithyada, dillon dye . So [facility] [phone].    ATENCIÓN: Si habla español, tiene a richter disposición servicios gratuitos de asistencia lingüística. Llame al [phone].    We comply with applicable federal civil rights laws and [place] laws. We do not discriminate on the basis of race, color, national origin, age, disability, sex, sexual orientation, or gender identity.            Thank you!     Thank you for choosing [facility]  for your care. Our goal is always to provide you with excellent care. Hearing back from our patients is one way we can continue to improve our services. Please take a few minutes to complete the written survey that you may receive in the mail after your visit with us. Thank you!             Your Updated Medication List - Protect others around you: Learn how to safely use, store and throw away your medicines at www.disposemymeds.org.          This list is accurate as of 3/28/18 12:08 PM.  Always use your most recent med list.                   Brand Name Dispense Instructions for  use Diagnosis    acetone (Urine) test Strp     50 each    1 strip by In Vitro route daily    Type 2 diabetes mellitus with hyperglycemia, with long-term current use of insulin (H)       * ASPIRIN NOT PRESCRIBED    INTENTIONAL    1 each    continuous prn Antiplatelet medication not prescribed intentionally due to age.        blood glucose monitoring test strip    no brand specified    300 each    Freestyle test strips (NOT LITE or INSULINX) to be used with Omnipod pump test 6 times daily    Type 2 diabetes mellitus with hyperglycemia, with long-term current use of insulin (H)       EPINEPHrine 0.3 MG/0.3ML injection 2-pack    EPIPEN/ADRENACLICK/or ANY BX GENERIC EQUIV    2 each    Inject 0.3 mLs (0.3 mg) into the muscle once as needed for anaphylaxis    Allergic reaction       glucagon 1 MG kit    GLUCAGON EMERGENCY    1 mg    Inject 1 mg into the muscle once for 1 dose    Type 2 diabetes mellitus with hyperglycemia, with long-term current use of insulin (H)       * insulin aspart 100 UNIT/ML injection    NovoLOG FLEXPEN    90 mL    1 unit per 4 grams of carbohydrate eaten at each meal +a correction of 1 additional unit per 18 points of blood sugar above 140.  Total daily dose 100 units/day.  Dispense 6 boxes/30 pens for a 90-day supply or 2 boxes/10 pens for a 30-day supply as allowed by insurance.    Type 2 diabetes mellitus with hyperglycemia, with long-term current use of insulin (H)       * insulin aspart 100 UNITS/ML injection    NovoLOG VIAL    3 vial    To be used in insulin pump est TDD 65-75 units    Type 2 diabetes mellitus with hyperglycemia, with long-term current use of insulin (H)       * insulin aspart 100 UNITS/ML injection    FIASP    70 mL    Up to 75 units sq daily via insulin pump    Type 2 diabetes mellitus with hyperglycemia, with long-term current use of insulin (H)       * insulin pen needle 31G X 5 MM    B-D U/F    550 each    Use 6  time(s) per day.  Please dispense as BD Pen Needle Mini  "U/F 31G x 5 MM    Type 2 diabetes mellitus with hyperglycemia (H)       * insulin pen needle 31G X 5 MM    B-D U/F    300 each    Use 6 daily or as directed.    Type 2 diabetes mellitus with hyperglycemia (H)       * insulin pen needle 31G X 5 MM    B-D U/F    200 each    Use 5 daily or as directed.    Type 2 diabetes mellitus with hyperglycemia (H)       * insulin pump infusion      Date last updated:  3/1/18 Omnipod BASAL RATES and times: 12   AM (midnight): 1.05 units/hour  . CARB RATIO and times: 12   AM (midnight): 9 5 PM: 8 Corection Factor (Sensitivity) and times: 12   AM (midnight): 35 mg/dL BLOOD GLUCOSE TARGET and times: 12   AM (midnight): 100, correct above 120 Active Insulin Time:  4 hours        insulin syringe-needle U-100 31G X 5/16\" 1 ML    BD insulin syringe ULTRAFINE    100 each    Use one syringe daily or as directed.    Type 2 diabetes mellitus with hyperglycemia, with long-term current use of insulin (H)       polyethylene glycol Packet    MIRALAX/GLYCOLAX     Take 1 packet by mouth daily.        STATIN NOT PRESCRIBED (INTENTIONAL)     0 each    Statin not prescribed intentionally due to Intolerance    Statin intolerance       sulindac 200 MG tablet    CLINORIL     Take 200 mg by mouth 3 times daily        traMADol 50 MG tablet    ULTRAM     Take 50 mg by mouth 3 times daily 1-2 tablets in the evening        VOLTAREN 1 % Gel topical gel   Generic drug:  diclofenac     100 g    Apply topically nightly as needed for moderate pain Apply 4 grams to knees or 2 grams to hands four times daily using enclosed dosing card.        * Notice:  This list has 8 medication(s) that are the same as other medications prescribed for you. Read the directions carefully, and ask your doctor or other care provider to review them with you.      "

## 2018-03-28 NOTE — PATIENT INSTRUCTIONS
Changes in pump settings made at today's visit:    Basal settings increased (Regular):    12am: 1.25 --> increased to 1.35  12pm: 1.0 --> increased to 1.10  6pm: 1.25 --> increased to 1.35    Sensitivity: changed from 31 --> 30 (more insulin to correct high blood sugar).    Try experimenting with the Temp basal as discussed (for anxiety, exercise, etc).    Allie Matias RD, CDE  Diabetes      [facility] for [facility]:  For Your Diabetes Education and Nutrition Appointments Call:  [phone]   For Diabetes Education or Nutrition Related Questions:   Phone: [phone]  E-mail: [email or web address]  Fax: [phone]   If you need a medication refill please contact your pharmacy. Please allow 3 business days for your refills to be completed.    Instructions for emailing the Diabetes Educators    If you need to communicate a non-urgent message to a Diabetes Educator via email, please send to [email or web address].    Please follow the following email guidelines:    Subject line: Secure: your clinic name (example: Secure: [facility])  In the email please include: First name, middle initial, last name and date of birth.    We will be in touch with you within one (1) business day.

## 2018-04-02 ENCOUNTER — OFFICE VISIT (OUTPATIENT)
Dept: PEDIATRICS | Facility: CLINIC | Age: 58
End: 2018-04-02
Payer: COMMERCIAL

## 2018-04-02 ENCOUNTER — ALLIED HEALTH/NURSE VISIT (OUTPATIENT)
Dept: EDUCATION SERVICES | Facility: CLINIC | Age: 58
End: 2018-04-02
Payer: COMMERCIAL

## 2018-04-02 VITALS
WEIGHT: 193 LBS | OXYGEN SATURATION: 95 % | HEIGHT: 64 IN | BODY MASS INDEX: 32.95 KG/M2 | HEART RATE: 63 BPM | TEMPERATURE: 98.1 F | SYSTOLIC BLOOD PRESSURE: 144 MMHG | DIASTOLIC BLOOD PRESSURE: 76 MMHG

## 2018-04-02 DIAGNOSIS — H69.93 DYSFUNCTION OF BOTH EUSTACHIAN TUBES: Primary | ICD-10-CM

## 2018-04-02 DIAGNOSIS — H61.22 IMPACTED CERUMEN OF LEFT EAR: ICD-10-CM

## 2018-04-02 DIAGNOSIS — E11.9 DIABETES MELLITUS, TYPE 2 (H): Primary | ICD-10-CM

## 2018-04-02 PROCEDURE — 99213 OFFICE O/P EST LOW 20 MIN: CPT | Performed by: PHYSICIAN ASSISTANT

## 2018-04-02 PROCEDURE — G0108 DIAB MANAGE TRN  PER INDIV: HCPCS | Performed by: DIETITIAN, REGISTERED

## 2018-04-02 RX ORDER — FLUTICASONE PROPIONATE 50 MCG
2 SPRAY, SUSPENSION (ML) NASAL DAILY
Qty: 1 BOTTLE | Refills: 0 | Status: SHIPPED | OUTPATIENT
Start: 2018-04-02 | End: 2018-06-29

## 2018-04-02 NOTE — MR AVS SNAPSHOT
After Visit Summary   4/2/2018    Maricarmen Dotson    MRN: [id number]           Patient Information     Date Of Birth          1960        Visit Information        Provider Department      4/2/2018 11:30 AM Michael Adams PA-C [facility]        Today's Diagnoses     Dysfunction of both eustachian tubes    -  1    Impacted cerumen of left ear           Follow-ups after your visit        Your next 10 appointments already scheduled     Apr 09, 2018  9:30 AM CDT   Diabetic Education with Deanna Bolton RN   [facility] ([facility])    [address] [address]   [phone]            Apr 16, 2018  9:30 AM CDT   Diabetic Education with Deanna Bolton RN   [facility] ([facility])    [address] [address] [address]   [phone]            Apr 23, 2018  9:30 AM CDT   Diabetic Education with Allie Matias   [facility] ([facility])    [address] [address]   [phone]            Jun 22, 2018  8:45 AM CDT   LAB with CR LAB   [facility] ([facility])    [address] [place] [address]   [phone]           Please do not eat 10-12 hours before your appointment if you are coming in fasting for labs on lipids, cholesterol, or glucose (sugar). This does not apply to pregnant women. Water, hot tea and black coffee (with nothing added) are okay. Do not drink other fluids, diet soda or chew gum.            Jun 22, 2018  9:00 AM CDT   Return Visit with MARICRUZ Castillo CNP   [facility] ([facility])    [address] [place] [address]   [phone]              Who to contact     If you have questions or need follow up information about today's clinic visit or  "your schedule please contact [facility] directly at [phone].  Normal or non-critical lab and imaging results will be communicated to you by MyChart, letter or phone within 4 business days after the clinic has received the results. If you do not hear from us within 7 days, please contact the clinic through [employer] or phone. If you have a critical or abnormal lab result, we will notify you by phone as soon as possible.  Submit refill requests through [employer] or call your pharmacy and they will forward the refill request to us. Please allow 3 business days for your refill to be completed.          Additional Information About Your Visit        [employer] Information     [employer] gives you secure access to your electronic health record. If you see a primary care provider, you can also send messages to your care team and make appointments. If you have questions, please call your primary care clinic.  If you do not have a primary care provider, please call [phone] and they will assist you.        Care EveryWhere ID     This is your Care EveryWhere ID. This could be used by other organizations to access your [place] medical records  [id number]        Your Vitals Were     Pulse Temperature Height Last Period Pulse Oximetry BMI (Body Mass Index)    63 98.1  F (36.7  C) (Oral) 5' 3.5\" (1.613 m) 01/01/1999 95% 33.65 kg/m2       Blood Pressure from Last 3 Encounters:   04/02/18 144/76   03/22/18 154/80   02/27/18 (!) 139/98    Weight from Last 3 Encounters:   04/02/18 193 lb (87.5 kg)   03/22/18 191 lb 1.6 oz (86.7 kg)   02/27/18 188 lb (85.3 kg)              Today, you had the following     No orders found for display         Today's Medication Changes          These changes are accurate as of 4/2/18 12:04 PM.  If you have any questions, ask your nurse or doctor.               Start taking these medicines.        Dose/Directions    fluticasone 50 MCG/ACT spray   Commonly known as:  FLONASE   Used for:  " Dysfunction of both eustachian tubes   Started by:  Michael Adams PA-C        Dose:  2 spray   Spray 2 sprays into both nostrils daily   Quantity:  1 Bottle   Refills:  0         These medicines have changed or have updated prescriptions.        Dose/Directions    * ASPIRIN NOT PRESCRIBED   Commonly known as:  INTENTIONAL   This may have changed:  Another medication with the same name was added. Make sure you understand how and when to take each.   Changed by:  Allie Matias        continuous prn Antiplatelet medication not prescribed intentionally due to age.   Quantity:  1 each   Refills:  0       * insulin pump infusion   This may have changed:  You were already taking a medication with the same name, and this prescription was added. Make sure you understand how and when to take each.   Used for:  Diabetes mellitus, type 2 (H)   Changed by:  Allie Matias        Date last updated:  3/28/18 Omnipod BASAL RATES and times: 12am: 1.35 12pm: 1.2 6pm: 1.35 CARB RATIO and times: 12am-12am: 8 Corection Factor (Sensitivity) and times: 12   AM (midnight): 31 BLOOD GLUCOSE TARGET and times: 12   AM (midnight): 100, correct above 120 Active Insulin Time:  4 hours   Refills:  0       * Notice:  This list has 2 medication(s) that are the same as other medications prescribed for you. Read the directions carefully, and ask your doctor or other care provider to review them with you.         Where to get your medicines      These medications were sent to [facility] - [address]   [address]  [address], [place] [address]     Phone:  [phone]     fluticasone 50 MCG/ACT spray         Some of these will need a paper prescription and others can be bought over the counter.  Ask your nurse if you have questions.     You don't need a prescription for these medications     insulin pump infusion                Primary Care Provider Office Phone # Fax #    Annamaria  Duc Erickson [phone] [address] [place] [address]        Equal Access to Services     [facility] [place] : Hadsmiley peg schilling natalya Sterling, walaurada elliejonah, mora kasharmin manriquez, dillon brasher. So [facility] [phone].    ATENCIÓN: Si habla español, tiene a richter disposición servicios gratuitos de asistencia lingüística. Llame al [phone].    We comply with applicable federal civil rights laws and [place] laws. We do not discriminate on the basis of race, color, national origin, age, disability, sex, sexual orientation, or gender identity.            Thank you!     Thank you for choosing [facility]  for your care. Our goal is always to provide you with excellent care. Hearing back from our patients is one way we can continue to improve our services. Please take a few minutes to complete the written survey that you may receive in the mail after your visit with us. Thank you!             Your Updated Medication List - Protect others around you: Learn how to safely use, store and throw away your medicines at www.disposemymeds.org.          This list is accurate as of 4/2/18 12:04 PM.  Always use your most recent med list.                   Brand Name Dispense Instructions for use Diagnosis    acetone (Urine) test Strp     50 each    1 strip by In Vitro route daily    Type 2 diabetes mellitus with hyperglycemia, with long-term current use of insulin (H)       * ASPIRIN NOT PRESCRIBED    INTENTIONAL    1 each    continuous prn Antiplatelet medication not prescribed intentionally due to age.        blood glucose monitoring test strip    no brand specified    300 each    Freestyle test strips (NOT LITE or INSULINX) to be used with Omnipod pump test 6 times daily    Type 2 diabetes mellitus with hyperglycemia, with long-term current use of insulin (H)       EPINEPHrine 0.3 MG/0.3ML injection 2-pack    EPIPEN/ADRENACLICK/or ANY BX GENERIC EQUIV     2 each    Inject 0.3 mLs (0.3 mg) into the muscle once as needed for anaphylaxis    Allergic reaction       fluticasone 50 MCG/ACT spray    FLONASE    1 Bottle    Spray 2 sprays into both nostrils daily    Dysfunction of both eustachian tubes       glucagon 1 MG kit    GLUCAGON EMERGENCY    1 mg    Inject 1 mg into the muscle once for 1 dose    Type 2 diabetes mellitus with hyperglycemia, with long-term current use of insulin (H)       * insulin aspart 100 UNIT/ML injection    NovoLOG FLEXPEN    90 mL    1 unit per 4 grams of carbohydrate eaten at each meal +a correction of 1 additional unit per 18 points of blood sugar above 140.  Total daily dose 100 units/day.  Dispense 6 boxes/30 pens for a 90-day supply or 2 boxes/10 pens for a 30-day supply as allowed by insurance.    Type 2 diabetes mellitus with hyperglycemia, with long-term current use of insulin (H)       * insulin aspart 100 UNITS/ML injection    NovoLOG VIAL    3 vial    To be used in insulin pump est TDD 65-75 units    Type 2 diabetes mellitus with hyperglycemia, with long-term current use of insulin (H)       * insulin aspart 100 UNITS/ML injection    FIASP    70 mL    Up to 75 units sq daily via insulin pump    Type 2 diabetes mellitus with hyperglycemia, with long-term current use of insulin (H)       * insulin pen needle 31G X 5 MM    B-D U/F    550 each    Use 6  time(s) per day.  Please dispense as BD Pen Needle Mini U/F 31G x 5 MM    Type 2 diabetes mellitus with hyperglycemia (H)       * insulin pen needle 31G X 5 MM    B-D U/F    300 each    Use 6 daily or as directed.    Type 2 diabetes mellitus with hyperglycemia (H)       * insulin pen needle 31G X 5 MM    B-D U/F    200 each    Use 5 daily or as directed.    Type 2 diabetes mellitus with hyperglycemia (H)       * insulin pump infusion      Date last updated:  3/28/18 Omnipod BASAL RATES and times: 12am: 1.35 12pm: 1.2 6pm: 1.35 CARB RATIO and times: 12am-12am: 8 Corection Factor  "(Sensitivity) and times: 12   AM (midnight): 31 BLOOD GLUCOSE TARGET and times: 12   AM (midnight): 100, correct above 120 Active Insulin Time:  4 hours    Diabetes mellitus, type 2 (H)       insulin syringe-needle U-100 31G X 5/16\" 1 ML    BD insulin syringe ULTRAFINE    100 each    Use one syringe daily or as directed.    Type 2 diabetes mellitus with hyperglycemia, with long-term current use of insulin (H)       polyethylene glycol Packet    MIRALAX/GLYCOLAX     Take 1 packet by mouth daily.        STATIN NOT PRESCRIBED (INTENTIONAL)     0 each    Statin not prescribed intentionally due to Intolerance    Statin intolerance       sulindac 200 MG tablet    CLINORIL     Take 200 mg by mouth 3 times daily        traMADol 50 MG tablet    ULTRAM     Take 50 mg by mouth 3 times daily 1-2 tablets in the evening        VOLTAREN 1 % Gel topical gel   Generic drug:  diclofenac     100 g    Apply topically nightly as needed for moderate pain Apply 4 grams to knees or 2 grams to hands four times daily using enclosed dosing card.        * Notice:  This list has 8 medication(s) that are the same as other medications prescribed for you. Read the directions carefully, and ask your doctor or other care provider to review them with you.      "

## 2018-04-02 NOTE — PROGRESS NOTES
"  SUBJECTIVE:   Maricarmen Dotson is a 57 year old female who presents to clinic today for the following health issues:    Pt is here today to have bilateral ears looked at. States she is unable to hear and ringing in both ears. Has been getting worse over time.     ROS:  ROS otherwise negative    OBJECTIVE:                                                    /76 (BP Location: Right arm, Cuff Size: Adult Regular)  Pulse 63  Temp 98.1  F (36.7  C) (Oral)  Ht 5' 3.5\" (1.613 m)  Wt 193 lb (87.5 kg)  LMP 01/01/1999  SpO2 95%  BMI 33.65 kg/m2  Body mass index is 33.65 kg/(m^2).   GENERAL: alert, no distress  HENT: ear canals- cerumen impaction on left; ear irrigation completed and reveals mild cerumen on posterior aspect. No longer impacted. TMs- fluid bilaterally; Nose- normal; Mouth- no ulcers, no lesions  NECK: no tenderness, no adenopathy  RESP: lungs clear to auscultation - no rales, no rhonchi, no wheezes  CV: regular rates and rhythm, normal S1 S2, no S3 or S4 and no murmur, no click or rub    Diagnostic test results:  No results found for this or any previous visit (from the past 24 hour(s)).     ASSESSMENT/PLAN:                                                    (H69.83) Dysfunction of both eustachian tubes  (primary encounter diagnosis)  Comment: begin flonase and fluids.  Plan: fluticasone (FLONASE) 50 MCG/ACT spray            (H61.22) Impacted cerumen of left ear  Comment:   Plan:     See Patient Instructions    Michael Adams PA-C  [facility]    "

## 2018-04-02 NOTE — LETTER
"    4/2/2018         RE: Maricarmen Dotson  [address] [place] [address]        Dear Colleague,    Thank you for referring your patient, Maricarmen Dotson, to the [facility]. Please see a copy of my visit note below.    Diabetes Self-Management Training: Insulin Pump Follow up    Current Diabetes Management per Patient:  Insulin Pump Type: OmniPod    Taking other diabetes medications? No    Blood Glucose Results and Insulin Use:             Current Pump Settings:         Assessment:  Pt reports making progress with less binge eating during the night (smaller quantities and less frequency). Reports that when she does go into \"dissociation\" and uses food- she \"goes into a different world\". Pt continues to use correction after the unplanned snacking as needed.     Blood sugars are lower earlier part of the day, then trend higher by evening meal in spite of no snacking in the afternoon.     Patient may benefit from lowering the I:C to allow for more insulin to cover carbs during breakfast/ lunch, along with increasing the afternoon basal rate to prevent late afternoon/pre-dinner hyperglycemia.    Intervention/Plan:  Changes made to pump settings currently using Regular pattern only):  basal rate: increase from 1.1 to --> 1.2 from 12pm- 6pm  carb ratio: decrease from 9 to --> 8 from 12am - 5pm.     Also reviewed use of Temp basal- pt able to provide a return demonstration with appropriate questions.     Follow-up:  Follow-up appointment scheduled on 4/9/18 at 9:30.  Chart routed to referring provider.    Allie Matias RD, CDE  Diabetes       Download data sent to be scanned into this Epic encounter.    Any diabetes medication dose changes were made via the CDE Protocol and Collaborative Practice Agreement with the patient's endocrinology provider. A copy of this encounter was shared with the provider.      "

## 2018-04-02 NOTE — PROGRESS NOTES
"Diabetes Self-Management Training: Insulin Pump Follow up    Current Diabetes Management per Patient:  Insulin Pump Type: OmniPod    Taking other diabetes medications? No    Blood Glucose Results and Insulin Use:             Current Pump Settings:         Assessment:  Pt reports making progress with less binge eating during the night (smaller quantities and less frequency). Reports that when she does go into \"dissociation\" and uses food- she \"goes into a different world\". Pt continues to use correction after the unplanned snacking as needed.     Blood sugars are lower earlier part of the day, then trend higher by evening meal in spite of no snacking in the afternoon.     Patient may benefit from lowering the I:C to allow for more insulin to cover carbs during breakfast/ lunch, along with increasing the afternoon basal rate to prevent late afternoon/pre-dinner hyperglycemia.    Intervention/Plan:  Changes made to pump settings currently using Regular pattern only):  basal rate: increase from 1.1 to --> 1.2 from 12pm- 6pm  carb ratio: decrease from 9 to --> 8 from 12am - 5pm.     Also reviewed use of Temp basal- pt able to provide a return demonstration with appropriate questions.     Follow-up:  Follow-up appointment scheduled on 4/9/18 at 9:30.  Chart routed to referring provider.    Allie Matias RD, CDE  Diabetes       Download data sent to be scanned into this Epic encounter.    Any diabetes medication dose changes were made via the CDE Protocol and Collaborative Practice Agreement with the patient's endocrinology provider. A copy of this encounter was shared with the provider.    "

## 2018-04-02 NOTE — MR AVS SNAPSHOT
After Visit Summary   4/2/2018    Maricarmen Dotson    MRN: [id number]           Patient Information     Date Of Birth          1960        Visit Information        Provider Department      4/2/2018 9:30 AM Allie Matias [facility] Instructions    Changes made today with your pump settings to help reduce blood sugars going into evening meal:     1. Insulin to carbohydrate ratio changed from 9 --> to 8 from 12am to 5pm (more insulin for carbs at breakfast/lunch). Continue 8 from 5pm to midnight.    2. Increase basal rate from 12pm to 6pm from 1.1 --> 1.2.     Allie Matias RD, LD, CDE  Diabetes        Bring blood glucose meter and logbook with you to all doctor and follow-up appointments.     [facility] the [facility]:  For Your Diabetes Education and Nutrition Appointments Call:  [phone]   For Diabetes Education or Nutrition Related Questions:   Phone: [phone]  E-mail: [email or web address]  Fax: [phone]   If you need a medication refill please contact your pharmacy. Please allow 3 business days for your refills to be completed.    Instructions for emailing the Diabetes Educators    If you need to communicate a non-urgent message to a Diabetes Educator via email, please send to [email or web address].    Please follow the following email guidelines:    Subject line: Secure: your clinic name (example: Secure: [facility])  In the email please include: First name, middle initial, last name and date of birth.    We will be in touch with you within one (1) business day.             Follow-ups after your visit        Your next 10 appointments already scheduled     Apr 02, 2018 11:30 AM CDT   SHORT with Michael Adams PA-C   [facility] ([facility])    [address] [place] [address]  [address]  [facility] [address] [phone]            Apr  09, 2018  9:30 AM CDT   Diabetic Education with Deanna Bolton RN   [facility] ([facility])    [address] [id number] [phone]            Apr 16, 2018  9:30 AM CDT   Diabetic Education with Deanna Bolton RN   [facility] ([facility])    [address]  [facility] [id number] [phone]            Apr 23, 2018  9:30 AM CDT   Diabetic Education with Allie SURAJ Matias   [facility] ([facility])    [address] [id number] [phone]            Jun 22, 2018  8:45 AM CDT   LAB with [facility] LAB   [facility] ([facility])    [address] [place] [id number] [phone]           Please do not eat 10-12 hours before your appointment if you are coming in fasting for labs on lipids, cholesterol, or glucose (sugar). This does not apply to pregnant women. Water, hot tea and black coffee (with nothing added) are okay. Do not drink other fluids, diet soda or chew gum.            Jun 22, 2018  9:00 AM CDT   Return Visit with MARICRUZ Castillo [facility] ([facility])    [address] [place] [address]. [address]  [facility] [phone]              Who to contact     If you have questions or need follow up information about today's clinic visit or your schedule please contact [facility] directly at [phone].  Normal or non-critical lab and imaging results will be communicated to you by MyChart, letter or phone within 4 business days after the clinic has received the results. If you do not hear from us within 7 days, please contact the clinic through MyChart or phone. If you have a critical or abnormal lab result, we will notify you by phone as soon as possible.  Submit refill requests through  [employer] or call your pharmacy and they will forward the refill request to us. Please allow 3 business days for your refill to be completed.          Additional Information About Your Visit        MyChart Information     [employer] gives you secure access to your electronic health record. If you see a primary care provider, you can also send messages to your care team and make appointments. If you have questions, please call your primary care clinic.  If you do not have a primary care provider, please call [phone] and they will assist you.        Care EveryWhere ID     This is your Care EveryWhere ID. This could be used by other organizations to access your [facility] medical records  [id number]        Your Vitals Were     Last Period                   01/01/1999            Blood Pressure from Last 3 Encounters:   03/22/18 154/80   02/27/18 (!) 139/98   01/30/18 138/84    Weight from Last 3 Encounters:   03/22/18 86.7 kg (191 lb 1.6 oz)   02/27/18 85.3 kg (188 lb)   01/30/18 85.3 kg (188 lb)              Today, you had the following     No orders found for display       Primary Care Provider Office Phone # Fax #    Annamaria Erickson [phone] [address] [place] [address]        Equal Access to Services     [facility] : Hadii aad ku hadasho Soomaali, waaxda luqadaha, qaybta kaalmada adeegyada, waxay maddy brasher. So [facility] [phone].    ATENCIÓN: Si habla español, tiene a richter disposición servicios gratuitos de asistencia lingüística. Llame al [phone].    We comply with applicable federal civil rights laws and [place] laws. We do not discriminate on the basis of race, color, national origin, age, disability, sex, sexual orientation, or gender identity.            Thank you!     Thank you for choosing [facility]  for your care. Our goal is always to provide you with excellent care. Hearing back from our patients  is one way we can continue to improve our services. Please take a few minutes to complete the written survey that you may receive in the mail after your visit with us. Thank you!             Your Updated Medication List - Protect others around you: Learn how to safely use, store and throw away your medicines at www.disposemymeds.org.          This list is accurate as of 4/2/18 10:13 AM.  Always use your most recent med list.                   Brand Name Dispense Instructions for use Diagnosis    acetone (Urine) test Strp     50 each    1 strip by In Vitro route daily    Type 2 diabetes mellitus with hyperglycemia, with long-term current use of insulin (H)       * ASPIRIN NOT PRESCRIBED    INTENTIONAL    1 each    continuous prn Antiplatelet medication not prescribed intentionally due to age.        blood glucose monitoring test strip    no brand specified    300 each    Freestyle test strips (NOT LITE or INSULINX) to be used with Omnipod pump test 6 times daily    Type 2 diabetes mellitus with hyperglycemia, with long-term current use of insulin (H)       EPINEPHrine 0.3 MG/0.3ML injection 2-pack    EPIPEN/ADRENACLICK/or ANY BX GENERIC EQUIV    2 each    Inject 0.3 mLs (0.3 mg) into the muscle once as needed for anaphylaxis    Allergic reaction       glucagon 1 MG kit    GLUCAGON EMERGENCY    1 mg    Inject 1 mg into the muscle once for 1 dose    Type 2 diabetes mellitus with hyperglycemia, with long-term current use of insulin (H)       * insulin aspart 100 UNIT/ML injection    NovoLOG FLEXPEN    90 mL    1 unit per 4 grams of carbohydrate eaten at each meal +a correction of 1 additional unit per 18 points of blood sugar above 140.  Total daily dose 100 units/day.  Dispense 6 boxes/30 pens for a 90-day supply or 2 boxes/10 pens for a 30-day supply as allowed by insurance.    Type 2 diabetes mellitus with hyperglycemia, with long-term current use of insulin (H)       * insulin aspart 100 UNITS/ML injection     "NovoLOG VIAL    3 vial    To be used in insulin pump est TDD 65-75 units    Type 2 diabetes mellitus with hyperglycemia, with long-term current use of insulin (H)       * insulin aspart 100 UNITS/ML injection    FIASP    70 mL    Up to 75 units sq daily via insulin pump    Type 2 diabetes mellitus with hyperglycemia, with long-term current use of insulin (H)       * insulin pen needle 31G X 5 MM    B-D U/F    550 each    Use 6  time(s) per day.  Please dispense as BD Pen Needle Mini U/F 31G x 5 MM    Type 2 diabetes mellitus with hyperglycemia (H)       * insulin pen needle 31G X 5 MM    B-D U/F    300 each    Use 6 daily or as directed.    Type 2 diabetes mellitus with hyperglycemia (H)       * insulin pen needle 31G X 5 MM    B-D U/F    200 each    Use 5 daily or as directed.    Type 2 diabetes mellitus with hyperglycemia (H)       * insulin pump infusion      Date last updated:  3/1/18 Omnipod BASAL RATES and times: 12   AM (midnight): 1.05 units/hour  . CARB RATIO and times: 12   AM (midnight): 9 5 PM: 8 Corection Factor (Sensitivity) and times: 12   AM (midnight): 35 mg/dL BLOOD GLUCOSE TARGET and times: 12   AM (midnight): 100, correct above 120 Active Insulin Time:  4 hours        insulin syringe-needle U-100 31G X 5/16\" 1 ML    BD insulin syringe ULTRAFINE    100 each    Use one syringe daily or as directed.    Type 2 diabetes mellitus with hyperglycemia, with long-term current use of insulin (H)       polyethylene glycol Packet    MIRALAX/GLYCOLAX     Take 1 packet by mouth daily.        STATIN NOT PRESCRIBED (INTENTIONAL)     0 each    Statin not prescribed intentionally due to Intolerance    Statin intolerance       sulindac 200 MG tablet    CLINORIL     Take 200 mg by mouth 3 times daily        traMADol 50 MG tablet    ULTRAM     Take 50 mg by mouth 3 times daily 1-2 tablets in the evening        VOLTAREN 1 % Gel topical gel   Generic drug:  diclofenac     100 g    Apply topically nightly as needed for " moderate pain Apply 4 grams to knees or 2 grams to hands four times daily using enclosed dosing card.        * Notice:  This list has 8 medication(s) that are the same as other medications prescribed for you. Read the directions carefully, and ask your doctor or other care provider to review them with you.

## 2018-04-02 NOTE — PATIENT INSTRUCTIONS
Changes made today with your pump settings to help reduce blood sugars going into evening meal:     1. Insulin to carbohydrate ratio changed from 9 --> to 8 from 12am to 5pm (more insulin for carbs at breakfast/lunch). Continue 8 from 5pm to midnight.    2. Increase basal rate from 12pm to 6pm from 1.1 --> 1.2.     Allie Matias RD, LD, CDE  Diabetes        Bring blood glucose meter and logbook with you to all doctor and follow-up appointments.     [facility] for the [facility]:  For Your Diabetes Education and Nutrition Appointments Call:  [phone]   For Diabetes Education or Nutrition Related Questions:   Phone: [phone]  E-mail: [email or web address]  Fax: [phone]   If you need a medication refill please contact your pharmacy. Please allow 3 business days for your refills to be completed.    Instructions for emailing the Diabetes Educators    If you need to communicate a non-urgent message to a Diabetes Educator via email, please send to [email or web address].    Please follow the following email guidelines:    Subject line: Secure: your clinic name (example: Secure: [facility])  In the email please include: First name, middle initial, last name and date of birth.    We will be in touch with you within one (1) business day.

## 2018-04-09 ENCOUNTER — ALLIED HEALTH/NURSE VISIT (OUTPATIENT)
Dept: EDUCATION SERVICES | Facility: CLINIC | Age: 58
End: 2018-04-09
Payer: COMMERCIAL

## 2018-04-09 DIAGNOSIS — E11.65 TYPE 2 DIABETES MELLITUS WITH HYPERGLYCEMIA (H): Primary | ICD-10-CM

## 2018-04-09 PROCEDURE — G0108 DIAB MANAGE TRN  PER INDIV: HCPCS

## 2018-04-09 NOTE — LETTER
4/9/2018         RE: Maricarmen Dotson  [address]  [place] [address]        Dear Colleague,    Thank you for referring your patient, Maricarmen Dotson, to the [facility]. Please see a copy of my visit note below.    Diabetes Self Management Training: Insulin Pump Follow-up Visit    Maricarmen Dotson presents today for evaluation of glucose control related to Type 2 diabetes.    She is accompanied by self    Patient's diabetes management related comments/concerns: things are going better, felling better, less bloating so she knows things are going better, still bumping up the bolus dose at times because she doesn't think it's enough. Wants to continue to go slow with dose adjustments.      ASSESSMENT:  Patient Problem List and Family Medical History reviewed for relevant medical history, current medical status, and diabetes risk factors.    Current Diabetes Management per Patient:  Insulin Pump Type: OmniPod    Taking other diabetes medications? no      Blood Glucose Results and Insulin Use:          Current Pump Settings: See Insulin Pump - Outpatient in Medication List for complete list of settings.       BG values are: not in goal    Patient's most recent   Lab Results   Component Value Date    A1C 9.4 01/16/2018       INTERVENTION:  Patient overall  glucose above goal, she continues to eat during the night due to anxiety and she is not able to bolus for the carbohydrates at the time or it will cause additional stress and anxiety, instead she will take a correction dose afterward.  She is working with behavior therapy on this. Post meal glucose above goal and post correction above goal. She did increase the recommended bolus amount on 5 of the 6 reported days.     Instead of increasing her base dose (regular pattern) recommend she change to her anxiety pattern which will allow a small increase to her basal rate. And update the patterns to reflect a 0.1u/hr increase per  pattern. This way she can also return to the regular pattern if she is not comfortable with the dose adjustment.     Correction does not bring glucose into goal, recommend decrease in sensitivity  Post meal glucose above goal recommend decrease to carbohydrate ratio     Changes made to pump settings:  Basal patterns:  regular anxiety Crazy sugars   12am-12am:  1.35  12pm-6pm:    1.20  6pm-12am:    1.35 12am-12am:  1.35 --> 1.45  12pm-6pm:    1.00 --> 1.3  6pm-12am:    1.35 --> 1.45 12am-12am:  1.45 --> 1.55  12pm-6pm:    1.00 --> 1.4  6pm-12am:    1.45 --> 1.55     carb ratio: 8 --> 7  correction/sensitivity: 31 --> 29    Education provided today on:  AADE Self-Care Behaviors:  Monitoring: individual blood glucose targets and frequency of monitoring  Taking Medication: action of prescribed medication and how a basal rate delivers insulin in a pump    Education specific to insulin pump provided today on:   buttom pushing, how to change between patterns and how to use a temporary basal rate  Review of treating hypoglycemia correctly (Rule of 15)    Pt verbalized understanding of concepts discussed and recommendations provided today.       PLAN:  Changes made to pump settings today to allow more insulin delivery.  Change to regular pattern if anxiety of concerns of hypoglycemia  May also try a temp basal at +/- 10% when concerned or feeling anxious about high or low blood sugars ( goal  before meals)      FOLLOW-UP:  Follow-up appointment scheduled on Monday 4/16/18.  Chart routed to referring provider.      Deanna Bolton RN,CDE   Time Spent: 45 minutes  Encounter Type: Individual      Any diabetes medication dose changes were made via the CDE Protocol and Collaborative Practice Agreement with the patient's referring provider. A copy of this encounter was shared with the provider.

## 2018-04-09 NOTE — PROGRESS NOTES
Diabetes Self Management Training: Insulin Pump Follow-up Visit    Maricarmen Dotson presents today for evaluation of glucose control related to Type 2 diabetes.    She is accompanied by self    Patient's diabetes management related comments/concerns: things are going better, felling better, less bloating so she knows things are going better, still bumping up the bolus dose at times because she doesn't think it's enough. Wants to continue to go slow with dose adjustments.      ASSESSMENT:  Patient Problem List and Family Medical History reviewed for relevant medical history, current medical status, and diabetes risk factors.    Current Diabetes Management per Patient:  Insulin Pump Type: OmniPod    Taking other diabetes medications? no      Blood Glucose Results and Insulin Use:          Current Pump Settings: See Insulin Pump - Outpatient in Medication List for complete list of settings.       BG values are: not in goal    Patient's most recent   Lab Results   Component Value Date    A1C 9.4 01/16/2018       INTERVENTION:  Patient overall  glucose above goal, she continues to eat during the night due to anxiety and she is not able to bolus for the carbohydrates at the time or it will cause additional stress and anxiety, instead she will take a correction dose afterward.  She is working with behavior therapy on this. Post meal glucose above goal and post correction above goal. She did increase the recommended bolus amount on 5 of the 6 reported days.     Instead of increasing her base dose (regular pattern) recommend she change to her anxiety pattern which will allow a small increase to her basal rate. And update the patterns to reflect a 0.1u/hr increase per pattern. This way she can also return to the regular pattern if she is not comfortable with the dose adjustment.     Correction does not bring glucose into goal, recommend decrease in sensitivity  Post meal glucose above goal recommend decrease to carbohydrate  ratio     Changes made to pump settings:  Basal patterns:  regular anxiety Crazy sugars   12am-12am:  1.35  12pm-6pm:    1.20  6pm-12am:    1.35 12am-12am:  1.35 --> 1.45  12pm-6pm:    1.00 --> 1.3  6pm-12am:    1.35 --> 1.45 12am-12am:  1.45 --> 1.55  12pm-6pm:    1.00 --> 1.4  6pm-12am:    1.45 --> 1.55     carb ratio: 8 --> 7  correction/sensitivity: 31 --> 29    Education provided today on:  AADE Self-Care Behaviors:  Monitoring: individual blood glucose targets and frequency of monitoring  Taking Medication: action of prescribed medication and how a basal rate delivers insulin in a pump    Education specific to insulin pump provided today on:   buttom pushing, how to change between patterns and how to use a temporary basal rate  Review of treating hypoglycemia correctly (Rule of 15)    Pt verbalized understanding of concepts discussed and recommendations provided today.       PLAN:  Changes made to pump settings today to allow more insulin delivery.  Change to regular pattern if anxiety of concerns of hypoglycemia  May also try a temp basal at +/- 10% when concerned or feeling anxious about high or low blood sugars ( goal  before meals)      FOLLOW-UP:  Follow-up appointment scheduled on Monday 4/16/18.  Chart routed to referring provider.      Deanna Bolton RN,CDE   Time Spent: 45 minutes  Encounter Type: Individual      Any diabetes medication dose changes were made via the CDE Protocol and Collaborative Practice Agreement with the patient's referring provider. A copy of this encounter was shared with the provider.

## 2018-04-09 NOTE — MR AVS SNAPSHOT
After Visit Summary   4/9/2018    Maricarmen Dotson    MRN: [id number]           Patient Information     Date Of Birth          1960        Visit Information        Provider Department      4/9/2018 9:30 AM Deanna Bolton RN [facility]        Today's Diagnoses     Type 2 diabetes mellitus with hyperglycemia (H)    -  1       Follow-ups after your visit        Your next 10 appointments already scheduled     Apr 16, 2018  9:30 AM CDT   Diabetic Education with Deanna Bolton RN   [facility] ([facility])    [id number] [place] [id number]   [phone]            Apr 23, 2018  9:30 AM CDT   Diabetic Education with Allie Matias   [facility] ([facility])    [address] [place] [id number] [phone]            Jun 22, 2018  8:45 AM CDT   LAB with CR LAB   [facility] ([facility])    [address] [place] [id number] [phone]           Please do not eat 10-12 hours before your appointment if you are coming in fasting for labs on lipids, cholesterol, or glucose (sugar). This does not apply to pregnant women. Water, hot tea and black coffee (with nothing added) are okay. Do not drink other fluids, diet soda or chew gum.            Jun 22, 2018  9:00 AM CDT   Return Visit with MARICRUZ Castillo [facility] ([facility])    [address] [place] [id number] [phone]              Who to contact     If you have questions or need follow up information about today's clinic visit or your schedule please contact [facility] directly at [phone].  Normal or non-critical lab and imaging results will be communicated to you by MyChart, letter or phone within 4 business days after the clinic has  received the results. If you do not hear from us within 7 days, please contact the clinic through [employer] or phone. If you have a critical or abnormal lab result, we will notify you by phone as soon as possible.  Submit refill requests through [employer] or call your pharmacy and they will forward the refill request to us. Please allow 3 business days for your refill to be completed.          Additional Information About Your Visit        [employer] Information     [employer] gives you secure access to your electronic health record. If you see a primary care provider, you can also send messages to your care team and make appointments. If you have questions, please call your primary care clinic.  If you do not have a primary care provider, please call [phone] and they will assist you.        Care EveryWhere ID     This is your Care EveryWhere ID. This could be used by other organizations to access your [facility] medical records  [id number]        Your Vitals Were     Last Period                   01/01/1999            Blood Pressure from Last 3 Encounters:   04/02/18 144/76   03/22/18 154/80   02/27/18 (!) 139/98    Weight from Last 3 Encounters:   04/02/18 87.5 kg (193 lb)   03/22/18 86.7 kg (191 lb 1.6 oz)   02/27/18 85.3 kg (188 lb)              We Performed the Following     DIABETES EDUCATION - Individual  []        Primary Care Provider Office Phone # Fax #    Annamaria Erickson [phone] [address] [place] [address]        Equal Access to Services     GONZALO HAYNES : Hadii aad ku hadasho Soomaali, waaxda luqadaha, qaybta kaalmada adeegraciel, dillon dye . So [facility] [phone].    ATENCIÓN: Si habla español, tiene a richter disposición servicios gratuitos de asistencia lingüística. Llame al [phone].    We comply with applicable federal civil rights laws and [place] laws. We do not discriminate on the basis of race, color, national  origin, age, disability, sex, sexual orientation, or gender identity.            Thank you!     Thank you for choosing [facility]  for your care. Our goal is always to provide you with excellent care. Hearing back from our patients is one way we can continue to improve our services. Please take a few minutes to complete the written survey that you may receive in the mail after your visit with us. Thank you!             Your Updated Medication List - Protect others around you: Learn how to safely use, store and throw away your medicines at www.disposemymeds.org.          This list is accurate as of 4/9/18 10:58 AM.  Always use your most recent med list.                   Brand Name Dispense Instructions for use Diagnosis    acetone (Urine) test Strp     50 each    1 strip by In Vitro route daily    Type 2 diabetes mellitus with hyperglycemia, with long-term current use of insulin (H)       * ASPIRIN NOT PRESCRIBED    INTENTIONAL    1 each    continuous prn Antiplatelet medication not prescribed intentionally due to age.        blood glucose monitoring test strip    no brand specified    300 each    Freestyle test strips (NOT LITE or INSULINX) to be used with Omnipod pump test 6 times daily    Type 2 diabetes mellitus with hyperglycemia, with long-term current use of insulin (H)       EPINEPHrine 0.3 MG/0.3ML injection 2-pack    EPIPEN/ADRENACLICK/or ANY BX GENERIC EQUIV    2 each    Inject 0.3 mLs (0.3 mg) into the muscle once as needed for anaphylaxis    Allergic reaction       fluticasone 50 MCG/ACT spray    FLONASE    1 Bottle    Spray 2 sprays into both nostrils daily    Dysfunction of both eustachian tubes       glucagon 1 MG kit    GLUCAGON EMERGENCY    1 mg    Inject 1 mg into the muscle once for 1 dose    Type 2 diabetes mellitus with hyperglycemia, with long-term current use of insulin (H)       * insulin aspart 100 UNIT/ML injection    NovoLOG FLEXPEN    90 mL    1 unit per 4  "grams of carbohydrate eaten at each meal +a correction of 1 additional unit per 18 points of blood sugar above 140.  Total daily dose 100 units/day.  Dispense 6 boxes/30 pens for a 90-day supply or 2 boxes/10 pens for a 30-day supply as allowed by insurance.    Type 2 diabetes mellitus with hyperglycemia, with long-term current use of insulin (H)       * insulin aspart 100 UNITS/ML injection    NovoLOG VIAL    3 vial    To be used in insulin pump est TDD 65-75 units    Type 2 diabetes mellitus with hyperglycemia, with long-term current use of insulin (H)       * insulin aspart 100 UNITS/ML injection    FIASP    70 mL    Up to 75 units sq daily via insulin pump    Type 2 diabetes mellitus with hyperglycemia, with long-term current use of insulin (H)       * insulin pen needle 31G X 5 MM    B-D U/F    550 each    Use 6  time(s) per day.  Please dispense as BD Pen Needle Mini U/F 31G x 5 MM    Type 2 diabetes mellitus with hyperglycemia (H)       * insulin pen needle 31G X 5 MM    B-D U/F    300 each    Use 6 daily or as directed.    Type 2 diabetes mellitus with hyperglycemia (H)       * insulin pen needle 31G X 5 MM    B-D U/F    200 each    Use 5 daily or as directed.    Type 2 diabetes mellitus with hyperglycemia (H)       * insulin pump infusion      Date last updated:  3/28/18 Omnipod BASAL RATES and times: 12am: 1.35 12pm: 1.2 6pm: 1.35 CARB RATIO and times: 12am-12am: 8 Corection Factor (Sensitivity) and times: 12   AM (midnight): 31 BLOOD GLUCOSE TARGET and times: 12   AM (midnight): 100, correct above 120 Active Insulin Time:  4 hours    Diabetes mellitus, type 2 (H)       insulin syringe-needle U-100 31G X 5/16\" 1 ML    BD insulin syringe ULTRAFINE    100 each    Use one syringe daily or as directed.    Type 2 diabetes mellitus with hyperglycemia, with long-term current use of insulin (H)       polyethylene glycol Packet    MIRALAX/GLYCOLAX     Take 1 packet by mouth daily.        STATIN NOT PRESCRIBED " (INTENTIONAL)     0 each    Statin not prescribed intentionally due to Intolerance    Statin intolerance       sulindac 200 MG tablet    CLINORIL     Take 200 mg by mouth 3 times daily        traMADol 50 MG tablet    ULTRAM     Take 50 mg by mouth 3 times daily 1-2 tablets in the evening        VOLTAREN 1 % Gel topical gel   Generic drug:  diclofenac     100 g    Apply topically nightly as needed for moderate pain Apply 4 grams to knees or 2 grams to hands four times daily using enclosed dosing card.        * Notice:  This list has 8 medication(s) that are the same as other medications prescribed for you. Read the directions carefully, and ask your doctor or other care provider to review them with you.

## 2018-04-16 ENCOUNTER — ALLIED HEALTH/NURSE VISIT (OUTPATIENT)
Dept: EDUCATION SERVICES | Facility: CLINIC | Age: 58
End: 2018-04-16
Payer: COMMERCIAL

## 2018-04-16 DIAGNOSIS — E11.9 TYPE 2 DIABETES, HBA1C GOAL < 7% (H): Primary | ICD-10-CM

## 2018-04-16 PROCEDURE — G0108 DIAB MANAGE TRN  PER INDIV: HCPCS

## 2018-04-16 NOTE — LETTER
4/16/2018         RE: Maricarmen Dotson  [address]  [place] [address]        Dear Colleague,    Thank you for referring your patient, Maricarmen Dotson, to the [facility]. Please see a copy of my visit note below.    Diabetes Self Management Training: Insulin Pump Follow-up Visit    Maricarmen Dotson presents today for evaluation of glucose control and insulin pump review related to Type 2 diabetes.    She is accompanied by self    Patient's diabetes management related comments/concerns: stopped the nasal steroid and BG's have been a little better the last couple days. Has been entering additional carbohydrates not eating at meals because the amount of insulin doesn't seem to be enough.  Still experiencing anxiety.     ASSESSMENT:  Patient Problem List and Family Medical History reviewed for relevant medical history, current medical status, and diabetes risk factors.    Current Diabetes Management per Patient:  Insulin Pump Type: OmniPod    Taking other diabetes medications?   yes:     Diabetes Medication(s)     Diabetic Other Sig    glucagon (GLUCAGON EMERGENCY) 1 MG kit Inject 1 mg into the muscle once for 1 dose    Insulin Sig    INSULIN PUMP - OUTPATIENT Date last updated:  4/9/18  Omnipod  BASAL RATES and times:  REGULAR PATTERN:  12am: 1.35  12pm: 1.2  6pm: 1.35  ANXIETY PATTERN:  12am: 1.45  12pm: 1.3  6pm: 1.45  CRAZY SUGAR PATTERN:  12am: 1.55  12pm: 1.4  6pm: 1.55  CARB RATIO and times:  12am-12am: 7  Corection Factor (Sensitivity) and times:  12   AM (midnight): 29  BLOOD GLUCOSE TARGET and times:  12   AM (midnight): 100, correct above 120  Active Insulin Time:  4 hours    insulin aspart (FIASP) 100 UNITS/ML injection Up to 75 units sq daily via insulin pump    insulin aspart (NOVOLOG VIAL) 100 UNITS/ML injection To be used in insulin pump est TDD 65-75 units    insulin aspart (NOVOLOG FLEXPEN) 100 UNIT/ML injection 1 unit per 4 grams of carbohydrate eaten at each  meal +a correction of 1 additional unit per 18 points of blood sugar above 140.  Total daily dose 100 units/day.  Dispense 6 boxes/30 pens for a 90-day supply or 2 boxes/10 pens for a 30-day supply as allowed by insurance.            Blood Glucose Results and Insulin Use:          Current Pump Settings: See Insulin Pump - Outpatient in Medication List for complete list of settings.       BG values are: improving    Patient's most recent   Lab Results   Component Value Date    A1C 9.4 01/16/2018    is not meeting goal of <7.0    Diabetes Complications:  Acute Complications: At risk for hypoglycemia? yes  Symptoms of low blood sugar? none      INTERVENTION:  Patient glucose improved over the last couple of days since she stopped the nasal steroid, she does have post meal rise even with adding in false carbohydrates. she would benefit from decrease in carbohydrate ratio.    If she does have decrease in her anxiety she may need to change to her regular pattern. Recommend watch for hypoglycemia and change pattern if needed. Education on how to make changes.     Changes made to pump settings:  carb ratio: 7 --> 6      Education provided today on:  AIDA Self-Care Behaviors:  Monitoring: individual blood glucose targets and frequency of monitoring    Education specific to insulin pump provided today on:   Review of how to use a temporary basal rate, discussed the importance of counting and entering carbohydrates accurately and how to change basal patterns with hands on practice  Pt verbalized understanding of concepts discussed and recommendations provided today.       Education Materials Provided:  Freddie personal sensor    PLAN:  Carbohydrate ratio decrease today to allow more insulin for carbohydrate   Enter correct amount of carbohydrates at meals.  If any lows between meals or overnight then change basal pattern from anxiety to --> regular  If assistance needed in make changes she can call the [facility] (Omnipod) 24 hour  helpline    FOLLOW-UP:  Follow-up appointment scheduled on 4/23/18 with Allie mejia.  Chart routed to referring provider.      Deanna Bolton RN,CDE   Time Spent: 30 minutes  Encounter Type: Individual      Any diabetes medication dose changes were made via the CDE Protocol and Collaborative Practice Agreement with the patient's referring provider. A copy of this encounter was shared with the provider.

## 2018-04-16 NOTE — PROGRESS NOTES
Diabetes Self Management Training: Insulin Pump Follow-up Visit    Maricarmen Dotson presents today for evaluation of glucose control and insulin pump review related to Type 2 diabetes.    She is accompanied by self    Patient's diabetes management related comments/concerns: stopped the nasal steroid and BG's have been a little better the last couple days. Has been entering additional carbohydrates not eating at meals because the amount of insulin doesn't seem to be enough.  Still experiencing anxiety.     ASSESSMENT:  Patient Problem List and Family Medical History reviewed for relevant medical history, current medical status, and diabetes risk factors.    Current Diabetes Management per Patient:  Insulin Pump Type: OmniPod    Taking other diabetes medications?   yes:     Diabetes Medication(s)     Diabetic Other Sig    glucagon (GLUCAGON EMERGENCY) 1 MG kit Inject 1 mg into the muscle once for 1 dose    Insulin Sig    INSULIN PUMP - OUTPATIENT Date last updated:  4/9/18  Omnipod  BASAL RATES and times:  REGULAR PATTERN:  12am: 1.35  12pm: 1.2  6pm: 1.35  ANXIETY PATTERN:  12am: 1.45  12pm: 1.3  6pm: 1.45  CRAZY SUGAR PATTERN:  12am: 1.55  12pm: 1.4  6pm: 1.55  CARB RATIO and times:  12am-12am: 7  Corection Factor (Sensitivity) and times:  12   AM (midnight): 29  BLOOD GLUCOSE TARGET and times:  12   AM (midnight): 100, correct above 120  Active Insulin Time:  4 hours    insulin aspart (FIASP) 100 UNITS/ML injection Up to 75 units sq daily via insulin pump    insulin aspart (NOVOLOG VIAL) 100 UNITS/ML injection To be used in insulin pump est TDD 65-75 units    insulin aspart (NOVOLOG FLEXPEN) 100 UNIT/ML injection 1 unit per 4 grams of carbohydrate eaten at each meal +a correction of 1 additional unit per 18 points of blood sugar above 140.  Total daily dose 100 units/day.  Dispense 6 boxes/30 pens for a 90-day supply or 2 boxes/10 pens for a 30-day supply as allowed by insurance.            Blood Glucose Results and  Insulin Use:          Current Pump Settings: See Insulin Pump - Outpatient in Medication List for complete list of settings.       BG values are: improving    Patient's most recent   Lab Results   Component Value Date    A1C 9.4 01/16/2018    is not meeting goal of <7.0    Diabetes Complications:  Acute Complications: At risk for hypoglycemia? yes  Symptoms of low blood sugar? none      INTERVENTION:  Patient glucose improved over the last couple of days since she stopped the nasal steroid, she does have post meal rise even with adding in false carbohydrates. she would benefit from decrease in carbohydrate ratio.    If she does have decrease in her anxiety she may need to change to her regular pattern. Recommend watch for hypoglycemia and change pattern if needed. Education on how to make changes.     Changes made to pump settings:  carb ratio: 7 --> 6      Education provided today on:  AADE Self-Care Behaviors:  Monitoring: individual blood glucose targets and frequency of monitoring    Education specific to insulin pump provided today on:   Review of how to use a temporary basal rate, discussed the importance of counting and entering carbohydrates accurately and how to change basal patterns with hands on practice  Pt verbalized understanding of concepts discussed and recommendations provided today.       Education Materials Provided:  Freddie personal sensor    PLAN:  Carbohydrate ratio decrease today to allow more insulin for carbohydrate   Enter correct amount of carbohydrates at meals.  If any lows between meals or overnight then change basal pattern from anxiety to --> regular  If assistance needed in make changes she can call the [facility] ([employer]) 24 hour helpline    FOLLOW-UP:  Follow-up appointment scheduled on 4/23/18 with Allie mejia.  Chart routed to referring provider.      Deanna Bolton RN,CDE   Time Spent: 30 minutes  Encounter Type: Individual      Any diabetes medication dose changes were made via  the CDE Protocol and Collaborative Practice Agreement with the patient's referring provider. A copy of this encounter was shared with the provider.

## 2018-04-16 NOTE — MR AVS SNAPSHOT
After Visit Summary   4/16/2018    Maricarmen Dotson    MRN: [id number]           Patient Information     Date Of Birth          1960        Visit Information        Provider Department      4/16/2018 9:30 AM Deanna Bolton RN [facility]        Today's Diagnoses     Type 2 diabetes, HbA1c goal < 7% (H)    -  1       Follow-ups after your visit        Your next 10 appointments already scheduled     Apr 23, 2018  9:30 AM CDT   Diabetic Education with Allie Matias   [facility])    [address] [place] [address]   [phone]            Apr 30, 2018  1:30 PM CDT   Diabetic Education with Deanna Bolton RN   [facility] ([facility])    [address] [facility] [address]   [phone]            May 07, 2018 10:30 AM CDT   Diabetic Education with Deanna Bolton RN   [facility] ([facility])    [address] [place] [address]   [phone]            Jun 22, 2018  8:45 AM CDT   LAB with CR LAB   [facility] ([facility])    [address] [place] [address]   [phone]           Please do not eat 10-12 hours before your appointment if you are coming in fasting for labs on lipids, cholesterol, or glucose (sugar). This does not apply to pregnant women. Water, hot tea and black coffee (with nothing added) are okay. Do not drink other fluids, diet soda or chew gum.            Jun 22, 2018  9:00 AM CDT   Return Visit with MARICRUZ Castillo [facility] ([facility])    [address] [place] [address]   [phone]              Who to contact     If you have questions or need follow up information about today's clinic visit or your schedule please contact  [facility] [place] directly at [phone].  Normal or non-critical lab and imaging results will be communicated to you by MyChart, letter or phone within 4 business days after the clinic has received the results. If you do not hear from us within 7 days, please contact the clinic through Nazarhart or phone. If you have a critical or abnormal lab result, we will notify you by phone as soon as possible.  Submit refill requests through [employer] or call your pharmacy and they will forward the refill request to us. Please allow 3 business days for your refill to be completed.          Additional Information About Your Visit        NazarharVEEDIMS Information     [employer] gives you secure access to your electronic health record. If you see a primary care provider, you can also send messages to your care team and make appointments. If you have questions, please call your primary care clinic.  If you do not have a primary care provider, please call [phone] and they will assist you.        Care EveryWhere ID     This is your Care EveryWhere ID. This could be used by other organizations to access your [place] medical records  [id number]        Your Vitals Were     Last Period                   01/01/1999            Blood Pressure from Last 3 Encounters:   04/02/18 144/76   03/22/18 154/80   02/27/18 (!) 139/98    Weight from Last 3 Encounters:   04/02/18 87.5 kg (193 lb)   03/22/18 86.7 kg (191 lb 1.6 oz)   02/27/18 85.3 kg (188 lb)              We Performed the Following     DIABETES EDUCATION - Individual  []          Today's Medication Changes          These changes are accurate as of 4/16/18 10:24 AM.  If you have any questions, ask your nurse or doctor.               These medicines have changed or have updated prescriptions.        Dose/Directions    * ASPIRIN NOT PRESCRIBED   Commonly known as:  INTENTIONAL   This may have changed:  Another medication with the same name was changed. Make sure  you understand how and when to take each.        continuous prn Antiplatelet medication not prescribed intentionally due to age.   Quantity:  1 each   Refills:  0       * insulin pump infusion   This may have changed:  additional instructions        Date last updated:  4/16/18 Omnipod BASAL RATES and times: REGULAR PATTERN: 12am: 1.35 12pm: 1.2 6pm: 1.35 ANXIETY PATTERN: 12am: 1.45 12pm: 1.3 6pm: 1.45 CRAZY SUGAR PATTERN: 12am: 1.55 12pm: 1.4 6pm: 1.55 CARB RATIO and times: 12am-12am: 6 Corection Factor (Sensitivity) and times: 12   AM (midnight): 29 BLOOD GLUCOSE TARGET and times: 12   AM (midnight): 100, correct above 120 Active Insulin Time:  4 hours   Refills:  0       * Notice:  This list has 2 medication(s) that are the same as other medications prescribed for you. Read the directions carefully, and ask your doctor or other care provider to review them with you.             Primary Care Provider Office Phone # Fax #    Annamaria Erickson [phone] [address] [place] [address]        Equal Access to Services     [facility]: Hadii peg schilling hadtaruno Sosu, waaxda luqadaha, qaybta kaalmada adearun, dillon dye . So [facility] [phone].    ATENCIÓN: Si habla español, tiene a richter disposición servicios gratuitos de asistencia lingüística. Llame al [phone].    We comply with applicable federal civil rights laws and [place] laws. We do not discriminate on the basis of race, color, national origin, age, disability, sex, sexual orientation, or gender identity.            Thank you!     Thank you for choosing [facility] [place]  for your care. Our goal is always to provide you with excellent care. Hearing back from our patients is one way we can continue to improve our services. Please take a few minutes to complete the written survey that you may receive in the mail after your visit with us. Thank you!              Your Updated Medication List - Protect others around you: Learn how to safely use, store and throw away your medicines at www.disposemymeds.org.          This list is accurate as of 4/16/18 10:24 AM.  Always use your most recent med list.                   Brand Name Dispense Instructions for use Diagnosis    acetone (Urine) test Strp     50 each    1 strip by In Vitro route daily    Type 2 diabetes mellitus with hyperglycemia, with long-term current use of insulin (H)       * ASPIRIN NOT PRESCRIBED    INTENTIONAL    1 each    continuous prn Antiplatelet medication not prescribed intentionally due to age.        blood glucose monitoring test strip    no brand specified    300 each    Freestyle test strips (NOT LITE or INSULINX) to be used with Omnipod pump test 6 times daily    Type 2 diabetes mellitus with hyperglycemia, with long-term current use of insulin (H)       EPINEPHrine 0.3 MG/0.3ML injection 2-pack    EPIPEN/ADRENACLICK/or ANY BX GENERIC EQUIV    2 each    Inject 0.3 mLs (0.3 mg) into the muscle once as needed for anaphylaxis    Allergic reaction       fluticasone 50 MCG/ACT spray    FLONASE    1 Bottle    Spray 2 sprays into both nostrils daily    Dysfunction of both eustachian tubes       glucagon 1 MG kit    GLUCAGON EMERGENCY    1 mg    Inject 1 mg into the muscle once for 1 dose    Type 2 diabetes mellitus with hyperglycemia, with long-term current use of insulin (H)       * insulin aspart 100 UNIT/ML injection    NovoLOG FLEXPEN    90 mL    1 unit per 4 grams of carbohydrate eaten at each meal +a correction of 1 additional unit per 18 points of blood sugar above 140.  Total daily dose 100 units/day.  Dispense 6 boxes/30 pens for a 90-day supply or 2 boxes/10 pens for a 30-day supply as allowed by insurance.    Type 2 diabetes mellitus with hyperglycemia, with long-term current use of insulin (H)       * insulin aspart 100 UNITS/ML injection    NovoLOG VIAL    3 vial    To be used in insulin pump  "est TDD 65-75 units    Type 2 diabetes mellitus with hyperglycemia, with long-term current use of insulin (H)       * insulin aspart 100 UNITS/ML injection    FIASP    70 mL    Up to 75 units sq daily via insulin pump    Type 2 diabetes mellitus with hyperglycemia, with long-term current use of insulin (H)       * insulin pen needle 31G X 5 MM    B-D U/F    550 each    Use 6  time(s) per day.  Please dispense as BD Pen Needle Mini U/F 31G x 5 MM    Type 2 diabetes mellitus with hyperglycemia (H)       * insulin pen needle 31G X 5 MM    B-D U/F    300 each    Use 6 daily or as directed.    Type 2 diabetes mellitus with hyperglycemia (H)       * insulin pen needle 31G X 5 MM    B-D U/F    200 each    Use 5 daily or as directed.    Type 2 diabetes mellitus with hyperglycemia (H)       * insulin pump infusion      Date last updated:  4/16/18 Omnipod BASAL RATES and times: REGULAR PATTERN: 12am: 1.35 12pm: 1.2 6pm: 1.35 ANXIETY PATTERN: 12am: 1.45 12pm: 1.3 6pm: 1.45 CRAZY SUGAR PATTERN: 12am: 1.55 12pm: 1.4 6pm: 1.55 CARB RATIO and times: 12am-12am: 6 Corection Factor (Sensitivity) and times: 12   AM (midnight): 29 BLOOD GLUCOSE TARGET and times: 12   AM (midnight): 100, correct above 120 Active Insulin Time:  4 hours        insulin syringe-needle U-100 31G X 5/16\" 1 ML    BD insulin syringe ULTRAFINE    100 each    Use one syringe daily or as directed.    Type 2 diabetes mellitus with hyperglycemia, with long-term current use of insulin (H)       polyethylene glycol Packet    MIRALAX/GLYCOLAX     Take 1 packet by mouth daily.        STATIN NOT PRESCRIBED (INTENTIONAL)     0 each    Statin not prescribed intentionally due to Intolerance    Statin intolerance       sulindac 200 MG tablet    CLINORIL     Take 200 mg by mouth 3 times daily        traMADol 50 MG tablet    ULTRAM     Take 50 mg by mouth 3 times daily 1-2 tablets in the evening        VOLTAREN 1 % Gel topical gel   Generic drug:  diclofenac     100 g    Apply " topically nightly as needed for moderate pain Apply 4 grams to knees or 2 grams to hands four times daily using enclosed dosing card.        * Notice:  This list has 8 medication(s) that are the same as other medications prescribed for you. Read the directions carefully, and ask your doctor or other care provider to review them with you.

## 2018-04-23 ENCOUNTER — ALLIED HEALTH/NURSE VISIT (OUTPATIENT)
Dept: EDUCATION SERVICES | Facility: CLINIC | Age: 58
End: 2018-04-23
Payer: COMMERCIAL

## 2018-04-23 DIAGNOSIS — E11.65 TYPE 2 DIABETES MELLITUS WITH HYPERGLYCEMIA, WITH LONG-TERM CURRENT USE OF INSULIN (H): ICD-10-CM

## 2018-04-23 DIAGNOSIS — Z79.4 TYPE 2 DIABETES MELLITUS WITH HYPERGLYCEMIA, WITH LONG-TERM CURRENT USE OF INSULIN (H): ICD-10-CM

## 2018-04-23 DIAGNOSIS — E11.9 DIABETES MELLITUS, TYPE 2 (H): Primary | ICD-10-CM

## 2018-04-23 LAB
CREAT UR-MCNC: 63 MG/DL
MICROALBUMIN UR-MCNC: 12 MG/L
MICROALBUMIN/CREAT UR: 19.37 MG/G CR (ref 0–25)

## 2018-04-23 PROCEDURE — 82043 UR ALBUMIN QUANTITATIVE: CPT | Performed by: CLINICAL NURSE SPECIALIST

## 2018-04-23 PROCEDURE — G0108 DIAB MANAGE TRN  PER INDIV: HCPCS | Performed by: DIETITIAN, REGISTERED

## 2018-04-23 NOTE — MR AVS SNAPSHOT
After Visit Summary   4/23/2018    Maricarmen Dotson    MRN: [id number]           Patient Information     Date Of Birth          1960        Visit Information        Provider Department      4/23/2018 9:30 AM Allie Matias [facility]        Today's Diagnoses     Diabetes mellitus, type 2 (H)    -  1       Follow-ups after your visit        Your next 10 appointments already scheduled     Apr 23, 2018 10:30 AM CDT   LAB with CR LAB   [facility] ([facility])    [address] [place] [address]   [phone]           Please do not eat 10-12 hours before your appointment if you are coming in fasting for labs on lipids, cholesterol, or glucose (sugar). This does not apply to pregnant women. Water, hot tea and black coffee (with nothing added) are okay. Do not drink other fluids, diet soda or chew gum.            Apr 30, 2018  1:30 PM CDT   Diabetic Education with Deanna Bolton RN   [facility] ([facility])    [address] [facility] [address] [address]   [phone]            May 07, 2018 10:30 AM CDT   Diabetic Education with Deanna Bolton RN   [facility] ([facility])    [address] [address]   [phone]            May 14, 2018  9:30 AM CDT   Diabetic Education with Allie Matias   [facility] ([facility])    [address] [address]   [phone]            May 21, 2018  9:30 AM CDT   Diabetic Education with Deanna Bolton RN   [facility] ([facility])    [address] [address]   [phone]            May 29, 2018  9:30 AM CDT   Diabetic Education with Deanna Bolton RN   [facility] ([facility]  [facility]    [address] [facility] [address] [facility] [address] [phone]            Jun 22, 2018  8:45 AM CDT   LAB with CR LAB   [facility] ([facility])    [address] [address] [phone]           Please do not eat 10-12 hours before your appointment if you are coming in fasting for labs on lipids, cholesterol, or glucose (sugar). This does not apply to pregnant women. Water, hot tea and black coffee (with nothing added) are okay. Do not drink other fluids, diet soda or chew gum.            Jun 22, 2018  9:00 AM CDT   Return Visit with MARICRUZ Castillo CNP   [facility] ([facility])    [address] [facility] [address] [address] [phone]              Who to contact     If you have questions or need follow up information about today's clinic visit or your schedule please contact [facility] [place] directly at [phone].  Normal or non-critical lab and imaging results will be communicated to you by Applausehart, letter or phone within 4 business days after the clinic has received the results. If you do not hear from us within 7 days, please contact the clinic through SongAftert or phone. If you have a critical or abnormal lab result, we will notify you by phone as soon as possible.  Submit refill requests through [employer] or call your pharmacy and they will forward the refill request to us. Please allow 3 business days for your refill to be completed.          Additional Information About Your Visit        ApplauseharAthletePath Information     [employer] gives you secure access to your electronic health record. If you see a primary care provider, you can also send messages to your care team and make appointments. If you have questions, please call your primary care clinic.  If you do not have a primary care provider, please call [phone] and they will assist you.        Care EveryWhere ID      This is your Care EveryWhere ID. This could be used by other organizations to access your [place] medical records  [id number]        Your Vitals Were     Last Period                   01/01/1999            Blood Pressure from Last 3 Encounters:   04/02/18 144/76   03/22/18 154/80   02/27/18 (!) 139/98    Weight from Last 3 Encounters:   04/02/18 87.5 kg (193 lb)   03/22/18 86.7 kg (191 lb 1.6 oz)   02/27/18 85.3 kg (188 lb)              We Performed the Following     DIABETES EDUCATION - Individual  []        Primary Care Provider Office Phone # Fax #    Annamaria Erickson [phone] [address] [place] [address]        Equal Access to Services     [facility]: Hadii peg schilling hadasho Soomaali, waaxda luqadaha, qaybta kaalmada adeegyada, dillon villain haymanav dye . So [facility] [phone].    ATENCIÓN: Si habla español, tiene a richter disposición servicios gratuitos de asistencia lingüística. Llame al [phone].    We comply with applicable federal civil rights laws and [place] laws. We do not discriminate on the basis of race, color, national origin, age, disability, sex, sexual orientation, or gender identity.            Thank you!     Thank you for choosing [facility]  for your care. Our goal is always to provide you with excellent care. Hearing back from our patients is one way we can continue to improve our services. Please take a few minutes to complete the written survey that you may receive in the mail after your visit with us. Thank you!             Your Updated Medication List - Protect others around you: Learn how to safely use, store and throw away your medicines at www.disposemymeds.org.          This list is accurate as of 4/23/18 10:28 AM.  Always use your most recent med list.                   Brand Name Dispense Instructions for use Diagnosis    acetone (Urine) test Strp     50 each    1 strip by In  Vitro route daily    Type 2 diabetes mellitus with hyperglycemia, with long-term current use of insulin (H)       * ASPIRIN NOT PRESCRIBED    INTENTIONAL    1 each    continuous prn Antiplatelet medication not prescribed intentionally due to age.        blood glucose monitoring test strip    no brand specified    300 each    Freestyle test strips (NOT LITE or INSULINX) to be used with Omnipod pump test 6 times daily    Type 2 diabetes mellitus with hyperglycemia, with long-term current use of insulin (H)       EPINEPHrine 0.3 MG/0.3ML injection 2-pack    EPIPEN/ADRENACLICK/or ANY BX GENERIC EQUIV    2 each    Inject 0.3 mLs (0.3 mg) into the muscle once as needed for anaphylaxis    Allergic reaction       fluticasone 50 MCG/ACT spray    FLONASE    1 Bottle    Spray 2 sprays into both nostrils daily    Dysfunction of both eustachian tubes       glucagon 1 MG kit    GLUCAGON EMERGENCY    1 mg    Inject 1 mg into the muscle once for 1 dose    Type 2 diabetes mellitus with hyperglycemia, with long-term current use of insulin (H)       * insulin aspart 100 UNIT/ML injection    NovoLOG FLEXPEN    90 mL    1 unit per 4 grams of carbohydrate eaten at each meal +a correction of 1 additional unit per 18 points of blood sugar above 140.  Total daily dose 100 units/day.  Dispense 6 boxes/30 pens for a 90-day supply or 2 boxes/10 pens for a 30-day supply as allowed by insurance.    Type 2 diabetes mellitus with hyperglycemia, with long-term current use of insulin (H)       * insulin aspart 100 UNITS/ML injection    NovoLOG VIAL    3 vial    To be used in insulin pump est TDD 65-75 units    Type 2 diabetes mellitus with hyperglycemia, with long-term current use of insulin (H)       * insulin aspart 100 UNITS/ML injection    FIASP    70 mL    Up to 75 units sq daily via insulin pump    Type 2 diabetes mellitus with hyperglycemia, with long-term current use of insulin (H)       * insulin pen needle 31G X 5 MM    B-D U/F    550 each  "   Use 6  time(s) per day.  Please dispense as BD Pen Needle Mini U/F 31G x 5 MM    Type 2 diabetes mellitus with hyperglycemia (H)       * insulin pen needle 31G X 5 MM    B-D U/F    300 each    Use 6 daily or as directed.    Type 2 diabetes mellitus with hyperglycemia (H)       * insulin pen needle 31G X 5 MM    B-D U/F    200 each    Use 5 daily or as directed.    Type 2 diabetes mellitus with hyperglycemia (H)       * insulin pump infusion      Date last updated:  4/16/18 Omnipod BASAL RATES and times: REGULAR PATTERN: 12am: 1.35 12pm: 1.2 6pm: 1.35 ANXIETY PATTERN: 12am: 1.45 12pm: 1.3 6pm: 1.45 CRAZY SUGAR PATTERN: 12am: 1.55 12pm: 1.4 6pm: 1.55 CARB RATIO and times: 12am-12am: 6 Corection Factor (Sensitivity) and times: 12   AM (midnight): 29 BLOOD GLUCOSE TARGET and times: 12   AM (midnight): 100, correct above 120 Active Insulin Time:  4 hours        insulin syringe-needle U-100 31G X 5/16\" 1 ML    BD insulin syringe ULTRAFINE    100 each    Use one syringe daily or as directed.    Type 2 diabetes mellitus with hyperglycemia, with long-term current use of insulin (H)       polyethylene glycol Packet    MIRALAX/GLYCOLAX     Take 1 packet by mouth daily.        STATIN NOT PRESCRIBED (INTENTIONAL)     0 each    Statin not prescribed intentionally due to Intolerance    Statin intolerance       sulindac 200 MG tablet    CLINORIL     Take 200 mg by mouth 3 times daily        traMADol 50 MG tablet    ULTRAM     Take 50 mg by mouth 3 times daily 1-2 tablets in the evening        VOLTAREN 1 % Gel topical gel   Generic drug:  diclofenac     100 g    Apply topically nightly as needed for moderate pain Apply 4 grams to knees or 2 grams to hands four times daily using enclosed dosing card.        * Notice:  This list has 8 medication(s) that are the same as other medications prescribed for you. Read the directions carefully, and ask your doctor or other care provider to review them with you.      "

## 2018-04-23 NOTE — LETTER
"    4/23/2018         RE: Maricarmen Dotson  [address] [place] [address]        Dear Colleague,    Thank you for referring your patient, Maricarmen Dotson, to the [facility]. Please see a copy of my visit note below.    Diabetes Self Management Training: Insulin Pump Follow up Visit    SUBJECTIVE:  Maricarmen Dotson presents today for education and evaluation of glucose control related to Type 2 diabetes.     She is accompanied by self    Patient concerns: is pleased that \"blood sugars have been really good on some days\". Notes the days where she has more pain and anxiety- but has not changed her basal patterns. Wanted to review again today.    Current Diabetes Management per Patient:  Insulin Pump Type: OmniPod    Diabetes Medication(s)     Diabetic Other Sig    glucagon (GLUCAGON EMERGENCY) 1 MG kit Inject 1 mg into the muscle once for 1 dose    Insulin Sig    insulin aspart (FIASP) 100 UNITS/ML injection Up to 75 units sq daily via insulin pump    insulin aspart (NOVOLOG FLEXPEN) 100 UNIT/ML injection 1 unit per 4 grams of carbohydrate eaten at each meal +a correction of 1 additional unit per 18 points of blood sugar above 140.  Total daily dose 100 units/day.  Dispense 6 boxes/30 pens for a 90-day supply or 2 boxes/10 pens for a 30-day supply as allowed by insurance.    insulin aspart (NOVOLOG VIAL) 100 UNITS/ML injection To be used in insulin pump est TDD 65-75 units    INSULIN PUMP - OUTPATIENT Date last updated:  4/16/18  Omnipod  BASAL RATES and times:  REGULAR PATTERN:  12am: 1.35  12pm: 1.2  6pm: 1.35  ANXIETY PATTERN:  12am: 1.45  12pm: 1.3  6pm: 1.45  CRAZY SUGAR PATTERN:  12am: 1.55  12pm: 1.4  6pm: 1.55  CARB RATIO and times:  12am-12am: 6  Corection Factor (Sensitivity) and times:  12   AM (midnight): 29  BLOOD GLUCOSE TARGET and times:  12   AM (midnight): 100, correct above 120  Active Insulin Time:  4 hours          Blood Glucose Results and Insulin " "Use:                Current Pump Settings: See Insulin Pump - Outpatient in Medication List for complete list of settings.       Assessment:    BG avg improved from 27% in range the previous week to 53% in range this past week.     Pt had several questions- and would benefit from further education/ reinforcement on how to use the different basal patterns.     Intervention/Plan:  Continue to use \"anxiety\" basal pattern at this time.  Pt will use \"crazy sugar\" pattern when experiencing continued/significant hyperglycemia and/or having increased pain/ less activity, etc. (Pt would like to try this when fasting BG >220 mg/dl- and will use caution).  Recommend no changes in current pump settings.     Follow-up:  Follow-up appointment scheduled on 4/29/18 with Deanna Bolton.  Chart routed to referring provider.    Allie Matias RD, CDE  Diabetes     Time spent: 30 min  Encounter Type: Individual        Any diabetes medication dose changes were made via the CDE Protocol and Collaborative Practice Agreement with the patient's referring provider. A copy of this encounter was shared with the provider.       "

## 2018-04-23 NOTE — PROGRESS NOTES
"Diabetes Self Management Training: Insulin Pump Follow up Visit    SUBJECTIVE:  Maricarmen Dotson presents today for education and evaluation of glucose control related to Type 2 diabetes.     She is accompanied by self    Patient concerns: is pleased that \"blood sugars have been really good on some days\". Notes the days where she has more pain and anxiety- but has not changed her basal patterns. Wanted to review again today.    Current Diabetes Management per Patient:  Insulin Pump Type: OmniPod    Diabetes Medication(s)     Diabetic Other Sig    glucagon (GLUCAGON EMERGENCY) 1 MG kit Inject 1 mg into the muscle once for 1 dose    Insulin Sig    insulin aspart (FIASP) 100 UNITS/ML injection Up to 75 units sq daily via insulin pump    insulin aspart (NOVOLOG FLEXPEN) 100 UNIT/ML injection 1 unit per 4 grams of carbohydrate eaten at each meal +a correction of 1 additional unit per 18 points of blood sugar above 140.  Total daily dose 100 units/day.  Dispense 6 boxes/30 pens for a 90-day supply or 2 boxes/10 pens for a 30-day supply as allowed by insurance.    insulin aspart (NOVOLOG VIAL) 100 UNITS/ML injection To be used in insulin pump est TDD 65-75 units    INSULIN PUMP - OUTPATIENT Date last updated:  4/16/18  Omnipod  BASAL RATES and times:  REGULAR PATTERN:  12am: 1.35  12pm: 1.2  6pm: 1.35  ANXIETY PATTERN:  12am: 1.45  12pm: 1.3  6pm: 1.45  CRAZY SUGAR PATTERN:  12am: 1.55  12pm: 1.4  6pm: 1.55  CARB RATIO and times:  12am-12am: 6  Corection Factor (Sensitivity) and times:  12   AM (midnight): 29  BLOOD GLUCOSE TARGET and times:  12   AM (midnight): 100, correct above 120  Active Insulin Time:  4 hours          Blood Glucose Results and Insulin Use:                Current Pump Settings: See Insulin Pump - Outpatient in Medication List for complete list of settings.       Assessment:    BG avg improved from 27% in range the previous week to 53% in range this past week.     Pt had several questions- and would " "benefit from further education/ reinforcement on how to use the different basal patterns.     Intervention/Plan:  Continue to use \"anxiety\" basal pattern at this time.  Pt will use \"crazy sugar\" pattern when experiencing continued/significant hyperglycemia and/or having increased pain/ less activity, etc. (Pt would like to try this when fasting BG >220 mg/dl- and will use caution).  Recommend no changes in current pump settings.     Follow-up:  Follow-up appointment scheduled on 4/29/18 with Deanna Bolton.  Chart routed to referring provider.    Allie Matias RD, CDE  Diabetes     Time spent: 30 min  Encounter Type: Individual        Any diabetes medication dose changes were made via the CDE Protocol and Collaborative Practice Agreement with the patient's referring provider. A copy of this encounter was shared with the provider.     "

## 2018-04-24 ENCOUNTER — TRANSFERRED RECORDS (OUTPATIENT)
Dept: HEALTH INFORMATION MANAGEMENT | Facility: CLINIC | Age: 58
End: 2018-04-24

## 2018-04-24 NOTE — PROGRESS NOTES
Maricarmen,  Your repeat urine protein test was normal - there was no abnormally elevated protein in your urine.  Here's a copy of the results for your records.  Emily Phan NP  Endocrinology

## 2018-04-26 ENCOUNTER — TRANSFERRED RECORDS (OUTPATIENT)
Dept: HEALTH INFORMATION MANAGEMENT | Facility: CLINIC | Age: 58
End: 2018-04-26

## 2018-04-27 PROBLEM — I65.23 CAROTID ATHEROSCLEROSIS, BILATERAL: Status: ACTIVE | Noted: 2018-04-27

## 2018-04-30 ENCOUNTER — ALLIED HEALTH/NURSE VISIT (OUTPATIENT)
Dept: EDUCATION SERVICES | Facility: CLINIC | Age: 58
End: 2018-04-30
Payer: COMMERCIAL

## 2018-04-30 DIAGNOSIS — E11.9 TYPE 2 DIABETES, HBA1C GOAL < 7% (H): Primary | ICD-10-CM

## 2018-04-30 DIAGNOSIS — E11.65 TYPE 2 DIABETES MELLITUS WITH HYPERGLYCEMIA (H): ICD-10-CM

## 2018-04-30 PROCEDURE — G0108 DIAB MANAGE TRN  PER INDIV: HCPCS

## 2018-04-30 NOTE — PROGRESS NOTES
Diabetes Self Management Training: Insulin Pump Follow-up Visit    Maricarmen Dotson presents today for education and evaluation of glucose control related to Type 2 diabetes.    She is accompanied by self    Patient's diabetes management related comments/concerns: feeling a lot better since starting the pump, blood sugars seem to be better during the day, wants to review how to change basal patterns.     ASSESSMENT:  Patient Problem List and Family Medical History reviewed for relevant medical history, current medical status, and diabetes risk factors.    Current Diabetes Management per Patient:  Insulin Pump Type: OmniPod      Taking other diabetes medications? No      Blood Glucose Results and Insulin Use:           Current Pump Settings: See Insulin Pump - Outpatient in Medication List for complete list of settings.       BG values are: not in goal    Patient's most recent   Lab Results   Component Value Date    A1C 9.4 01/16/2018    is not meeting goal of <8.0    Nutrition:  Patient counts carbohydrates in grams and continues to binge eat overnight      Physical Activity:    Walking the dogs    Diabetes Complications:  Acute Complications: At risk for hypoglycemia? yes  Symptoms of low blood sugar? none      INTERVENTION:  Patient glucose improving but continues to have highest readings overnight and early morning due to binge eating. She is not able to bolus at the time of binging due to anxiety and ability to cope with counting and entering carbohydrates at that time.  She continues to correction after the binge. Correction does not bring her first morning glucose into goal so she would benefit from decrease in correction/sensitivity from 12am-5am. This will not fix the high glucose from binge eating but should improve first morning glucose before breakfast.     Changes made to pump settings:  correction/sensitivity: 12am-5am --> 29-->27    Education provided today on:  AADE Self-Care Behaviors:  Taking  Medication: action of prescribed medication  Problem Solving: low blood glucose - causes, signs/symptoms, treatment and prevention    Education specific to insulin pump provided today on:   pump button pressing and erview of how to change between basal patterns    Pt verbalized understanding of concepts discussed and recommendations provided today.       PLAN:  Changes made to pump settings  AVS printed and provided to patient today.    FOLLOW-UP:  Follow-up appointment scheduled on 5/7/18.  Chart routed to referring provider.      Deanna Bolton RN,CDE   Time Spent: 30 minutes  Encounter Type: Individual      Any diabetes medication dose changes were made via the CDE Protocol and Collaborative Practice Agreement with the patient's referring provider. A copy of this encounter was shared with the provider.

## 2018-04-30 NOTE — MR AVS SNAPSHOT
After Visit Summary   4/30/2018    Maricarmen Dotson    MRN: [id number]           Patient Information     Date Of Birth          1960        Visit Information        Provider Department      4/30/2018 1:30 PM Deanna Bolton RN [facility]        Today's Diagnoses     Type 2 diabetes, HbA1c goal < 7% (H)    -  1    Type 2 diabetes mellitus with hyperglycemia (H)           Follow-ups after your visit        Your next 10 appointments already scheduled     May 07, 2018 10:30 AM CDT   Diabetic Education with Deanna Bolton RN   [facility] ([facility])    [id number] [place] [address]   [phone]            May 14, 2018  9:30 AM CDT   Diabetic Education with Allie Matias   [facility] ([facility])    [address] [place] [address]   [phone]            May 21, 2018  9:30 AM CDT   Diabetic Education with Deanna Bolton RN   [facility] ([facility])    [id number] [place] [address]   [phone]            May 29, 2018  9:30 AM CDT   Diabetic Education with Deanna Bolton RN   [facility] ([facility])    [id number] [place] [address]   [phone]            Jun 22, 2018  8:45 AM CDT   LAB with [facility]   [facility]    [address] [place] [id number]   [phone]           Please do not eat 10-12 hours before your appointment if you are coming in fasting for labs on lipids, cholesterol, or glucose (sugar). This does not apply to pregnant women. Water, hot tea and black coffee (with nothing added) are okay. Do not drink other fluids, diet soda or chew gum.            Jun 22, 2018  9:00 AM CDT   Return Visit with MARICRUZ Castillo  CNP   [facility] ([facility]    [address]  [facility] [address] [phone]              Who to contact     If you have questions or need follow up information about today's clinic visit or your schedule please contact [facility] directly at [phone].  Normal or non-critical lab and imaging results will be communicated to you by MyChart, letter or phone within 4 business days after the clinic has received the results. If you do not hear from us within 7 days, please contact the clinic through [employer] [facility] or phone. If you have a critical or abnormal lab result, we will notify you by phone as soon as possible.  Submit refill requests through [facility] or call your pharmacy and they will forward the refill request to us. Please allow 3 business days for your refill to be completed.          Additional Information About Your Visit        [employer] Information     [facility] gives you secure access to your electronic health record. If you see a primary care provider, you can also send messages to your care team and make appointments. If you have questions, please call your primary care clinic.  If you do not have a primary care provider, please call [phone] and they will assist you.        Care EveryWhere ID     This is your Care EveryWhere ID. This could be used by other organizations to access your [facility] medical records  [id number]        Your Vitals Were     Last Period                   01/01/1999            Blood Pressure from Last 3 Encounters:   04/02/18 144/76   03/22/18 154/80   02/27/18 (!) 139/98    Weight from Last 3 Encounters:   04/02/18 87.5 kg (193 lb)   03/22/18 86.7 kg (191 lb 1.6 oz)   02/27/18 85.3 kg (188 lb)              We Performed the Following     DIABETES EDUCATION - Individual  []          Today's Medication Changes          These changes are accurate as of 4/30/18  2:15 PM.  If you have any  questions, ask your nurse or doctor.               These medicines have changed or have updated prescriptions.        Dose/Directions    * ASPIRIN NOT PRESCRIBED   Commonly known as:  INTENTIONAL   This may have changed:  Another medication with the same name was changed. Make sure you understand how and when to take each.        continuous prn Antiplatelet medication not prescribed intentionally due to age.   Quantity:  1 each   Refills:  0       * insulin pump infusion   This may have changed:  additional instructions        Date last updated:  4/30/18 Omnipod BASAL RATES and times: REGULAR PATTERN: 12am: 1.35 12pm: 1.2 6pm: 1.35 ANXIETY PATTERN: 12am: 1.45 12pm: 1.3 6pm: 1.45 CRAZY SUGAR PATTERN: 12am: 1.55 12pm: 1.4 6pm: 1.55 CARB RATIO and times: 12am-12am: 6 Corection Factor (Sensitivity) and times: 12   AM (midnight): 27 5 AM: 29 BLOOD GLUCOSE TARGET and times: 12   AM (midnight): 100, correct above 120 Active Insulin Time:  4 hours   Refills:  0       * Notice:  This list has 2 medication(s) that are the same as other medications prescribed for you. Read the directions carefully, and ask your doctor or other care provider to review them with you.             Primary Care Provider Office Phone # Fax #    Annamaria Erickson [phone] [address] [place] [address]        Equal Access to Services     : Hadii peg ku hadasho Soomaali, waaxda luqadaha, qaybta kaalmada adejudithyada, dillon brasher. So [facility] [phone].    ATENCIÓN: Si habla español, tiene a richter disposición servicios gratuitos de asistencia lingüística. Llame al [phone].    We comply with applicable federal civil rights laws and [place] laws. We do not discriminate on the basis of race, color, national origin, age, disability, sex, sexual orientation, or gender identity.            Thank you!     Thank you for choosing [facility]  for  your care. Our goal is always to provide you with excellent care. Hearing back from our patients is one way we can continue to improve our services. Please take a few minutes to complete the written survey that you may receive in the mail after your visit with us. Thank you!             Your Updated Medication List - Protect others around you: Learn how to safely use, store and throw away your medicines at www.disposemymeds.org.          This list is accurate as of 4/30/18  2:15 PM.  Always use your most recent med list.                   Brand Name Dispense Instructions for use Diagnosis    acetone (Urine) test Strp     50 each    1 strip by In Vitro route daily    Type 2 diabetes mellitus with hyperglycemia, with long-term current use of insulin (H)       * ASPIRIN NOT PRESCRIBED    INTENTIONAL    1 each    continuous prn Antiplatelet medication not prescribed intentionally due to age.        blood glucose monitoring test strip    no brand specified    300 each    Freestyle test strips (NOT LITE or INSULINX) to be used with Omnipod pump test 6 times daily    Type 2 diabetes mellitus with hyperglycemia, with long-term current use of insulin (H)       EPINEPHrine 0.3 MG/0.3ML injection 2-pack    EPIPEN/ADRENACLICK/or ANY BX GENERIC EQUIV    2 each    Inject 0.3 mLs (0.3 mg) into the muscle once as needed for anaphylaxis    Allergic reaction       fluticasone 50 MCG/ACT spray    FLONASE    1 Bottle    Spray 2 sprays into both nostrils daily    Dysfunction of both eustachian tubes       glucagon 1 MG kit    GLUCAGON EMERGENCY    1 mg    Inject 1 mg into the muscle once for 1 dose    Type 2 diabetes mellitus with hyperglycemia, with long-term current use of insulin (H)       * insulin aspart 100 UNIT/ML injection    NovoLOG FLEXPEN    90 mL    1 unit per 4 grams of carbohydrate eaten at each meal +a correction of 1 additional unit per 18 points of blood sugar above 140.  Total daily dose 100 units/day.  Dispense 6  "boxes/30 pens for a 90-day supply or 2 boxes/10 pens for a 30-day supply as allowed by insurance.    Type 2 diabetes mellitus with hyperglycemia, with long-term current use of insulin (H)       * insulin aspart 100 UNITS/ML injection    NovoLOG VIAL    3 vial    To be used in insulin pump est TDD 65-75 units    Type 2 diabetes mellitus with hyperglycemia, with long-term current use of insulin (H)       * insulin aspart 100 UNITS/ML injection    FIASP    70 mL    Up to 75 units sq daily via insulin pump    Type 2 diabetes mellitus with hyperglycemia, with long-term current use of insulin (H)       * insulin pen needle 31G X 5 MM    B-D U/F    550 each    Use 6  time(s) per day.  Please dispense as BD Pen Needle Mini U/F 31G x 5 MM    Type 2 diabetes mellitus with hyperglycemia (H)       * insulin pen needle 31G X 5 MM    B-D U/F    300 each    Use 6 daily or as directed.    Type 2 diabetes mellitus with hyperglycemia (H)       * insulin pen needle 31G X 5 MM    B-D U/F    200 each    Use 5 daily or as directed.    Type 2 diabetes mellitus with hyperglycemia (H)       * insulin pump infusion      Date last updated:  4/30/18 Omnipod BASAL RATES and times: REGULAR PATTERN: 12am: 1.35 12pm: 1.2 6pm: 1.35 ANXIETY PATTERN: 12am: 1.45 12pm: 1.3 6pm: 1.45 CRAZY SUGAR PATTERN: 12am: 1.55 12pm: 1.4 6pm: 1.55 CARB RATIO and times: 12am-12am: 6 Corection Factor (Sensitivity) and times: 12   AM (midnight): 27 5 AM: 29 BLOOD GLUCOSE TARGET and times: 12   AM (midnight): 100, correct above 120 Active Insulin Time:  4 hours        insulin syringe-needle U-100 31G X 5/16\" 1 ML    BD insulin syringe ULTRAFINE    100 each    Use one syringe daily or as directed.    Type 2 diabetes mellitus with hyperglycemia, with long-term current use of insulin (H)       polyethylene glycol Packet    MIRALAX/GLYCOLAX     Take 1 packet by mouth daily.        STATIN NOT PRESCRIBED (INTENTIONAL)     0 each    Statin not prescribed intentionally due to " Intolerance    Statin intolerance       sulindac 200 MG tablet    CLINORIL     Take 200 mg by mouth 3 times daily        traMADol 50 MG tablet    ULTRAM     Take 50 mg by mouth 3 times daily 1-2 tablets in the evening        VOLTAREN 1 % Gel topical gel   Generic drug:  diclofenac     100 g    Apply topically nightly as needed for moderate pain Apply 4 grams to knees or 2 grams to hands four times daily using enclosed dosing card.        * Notice:  This list has 8 medication(s) that are the same as other medications prescribed for you. Read the directions carefully, and ask your doctor or other care provider to review them with you.

## 2018-04-30 NOTE — LETTER
4/30/2018         RE: Maricarmen Dotson  [address]  [place] [address]        Dear Colleague,    Thank you for referring your patient, Maricarmen Dotson, to the [facility]. Please see a copy of my visit note below.    Diabetes Self Management Training: Insulin Pump Follow-up Visit    Maricarmen Dotson presents today for education and evaluation of glucose control related to Type 2 diabetes.    She is accompanied by self    Patient's diabetes management related comments/concerns: feeling a lot better since starting the pump, blood sugars seem to be better during the day, wants to review how to change basal patterns.     ASSESSMENT:  Patient Problem List and Family Medical History reviewed for relevant medical history, current medical status, and diabetes risk factors.    Current Diabetes Management per Patient:  Insulin Pump Type: OmniPod      Taking other diabetes medications? No      Blood Glucose Results and Insulin Use:           Current Pump Settings: See Insulin Pump - Outpatient in Medication List for complete list of settings.       BG values are: not in goal    Patient's most recent   Lab Results   Component Value Date    A1C 9.4 01/16/2018    is not meeting goal of <8.0    Nutrition:  Patient counts carbohydrates in grams and continues to binge eat overnight      Physical Activity:    Walking the dogs    Diabetes Complications:  Acute Complications: At risk for hypoglycemia? yes  Symptoms of low blood sugar? none      INTERVENTION:  Patient glucose improving but continues to have highest readings overnight and early morning due to binge eating. She is not able to bolus at the time of binging due to anxiety and ability to cope with counting and entering carbohydrates at that time.  She continues to correction after the binge. Correction does not bring her first morning glucose into goal so she would benefit from decrease in correction/sensitivity from 12am-5am. This will  not fix the high glucose from binge eating but should improve first morning glucose before breakfast.     Changes made to pump settings:  correction/sensitivity: 12am-5am --> 29-->27    Education provided today on:  AADE Self-Care Behaviors:  Taking Medication: action of prescribed medication  Problem Solving: low blood glucose - causes, signs/symptoms, treatment and prevention    Education specific to insulin pump provided today on:   pump button pressing and erview of how to change between basal patterns    Pt verbalized understanding of concepts discussed and recommendations provided today.       PLAN:  Changes made to pump settings  AVS printed and provided to patient today.    FOLLOW-UP:  Follow-up appointment scheduled on 5/7/18.  Chart routed to referring provider.      Deanna Bolton RN,CDE   Time Spent: 30 minutes  Encounter Type: Individual      Any diabetes medication dose changes were made via the CDE Protocol and Collaborative Practice Agreement with the patient's referring provider. A copy of this encounter was shared with the provider.

## 2018-05-07 ENCOUNTER — ALLIED HEALTH/NURSE VISIT (OUTPATIENT)
Dept: EDUCATION SERVICES | Facility: CLINIC | Age: 58
End: 2018-05-07
Payer: COMMERCIAL

## 2018-05-07 DIAGNOSIS — E11.65 TYPE 2 DIABETES MELLITUS WITH HYPERGLYCEMIA (H): Primary | ICD-10-CM

## 2018-05-07 PROCEDURE — G0108 DIAB MANAGE TRN  PER INDIV: HCPCS

## 2018-05-07 NOTE — MR AVS SNAPSHOT
After Visit Summary   5/7/2018    Maricarmen Dotson    MRN: [id number]           Patient Information     Date Of Birth          1960        Visit Information        Provider Department      5/7/2018 10:30 AM Deanna Bolton RN [facility]        Today's Diagnoses     Type 2 diabetes mellitus with hyperglycemia (H)    -  1      Care Instructions    Decrease in correction 12am-5am 27 --> 26, thi will allow a little more insulin to correct the high overnight glucose from the binge eating.   Enter carbohydrates before the meal, if you get to the end of the meal and realize you ate more carbohydrates than you entered then it is ok to enter the additional carbohydrates at the end of the meal.  You do NOT have to test your blood sugar at the end of the meal.          Follow-ups after your visit        Your next 10 appointments already scheduled     May 14, 2018  9:30 AM CDT   Diabetic Education with Allie Matias   [facility] ([facility])    [address] [place] [id number]   [phone]            May 21, 2018  9:30 AM CDT   Diabetic Education with Deanna Bolton RN   [facility] ([facility])    [address] [place] [address]   [phone]            May 29, 2018  9:30 AM CDT   Diabetic Education with Deanna Bolton RN   [facility] ([facility])    [address] [place] [address]   [phone]            Jun 22, 2018  8:45 AM CDT   LAB with [facility]   [facility] ([facility]    [address] [place] [address]   [phone]           Please do not eat 10-12 hours before your appointment if you are coming in fasting for labs on lipids, cholesterol, or glucose (sugar). This does not apply to pregnant women. Water, hot tea and  black coffee (with nothing added) are okay. Do not drink other fluids, diet soda or chew gum.            Jun 22, 2018  9:00 AM CDT   Return Visit with MARICRUZ Castillo CNP   [facility] ([facility]    [address]  [facility] [id number] [phone]              Who to contact     If you have questions or need follow up information about today's clinic visit or your schedule please contact [facility] directly at [phone].  Normal or non-critical lab and imaging results will be communicated to you by [employer], letter or phone within 4 business days after the clinic has received the results. If you do not hear from us within 7 days, please contact the clinic through [employer] or phone. If you have a critical or abnormal lab result, we will notify you by phone as soon as possible.  Submit refill requests through [employer] or call your pharmacy and they will forward the refill request to us. Please allow 3 business days for your refill to be completed.          Additional Information About Your Visit        MyChart Information     [employer] gives you secure access to your electronic health record. If you see a primary care provider, you can also send messages to your care team and make appointments. If you have questions, please call your primary care clinic.  If you do not have a primary care provider, please call [phone] and they will assist you.        Care EveryWhere ID     This is your Care EveryWhere ID. This could be used by other organizations to access your [facility] medical records  [id number]        Your Vitals Were     Last Period                   01/01/1999            Blood Pressure from Last 3 Encounters:   04/02/18 144/76   03/22/18 154/80   02/27/18 (!) 139/98    Weight from Last 3 Encounters:   04/02/18 87.5 kg (193 lb)   03/22/18 86.7 kg (191 lb 1.6 oz)   02/27/18 85.3 kg (188 lb)              We Performed the  Following     DIABETES EDUCATION - Individual  []          Today's Medication Changes          These changes are accurate as of 5/7/18 11:46 AM.  If you have any questions, ask your nurse or doctor.               These medicines have changed or have updated prescriptions.        Dose/Directions    * ASPIRIN NOT PRESCRIBED   Commonly known as:  INTENTIONAL   This may have changed:  Another medication with the same name was changed. Make sure you understand how and when to take each.        continuous prn Antiplatelet medication not prescribed intentionally due to age.   Quantity:  1 each   Refills:  0       * insulin pump infusion   This may have changed:  additional instructions        Date last updated:  5/7/18 Omnipod BASAL RATES and times: REGULAR PATTERN: 12am: 1.35 12pm: 1.2 6pm: 1.35 ANXIETY PATTERN: 12am: 1.45 12pm: 1.3 6pm: 1.45 CRAZY SUGAR PATTERN: 12am: 1.55 12pm: 1.4 6pm: 1.55 CARB RATIO and times: 12am-12am: 6 Corection Factor (Sensitivity) and times: 12   AM (midnight): 26 5 AM: 29 BLOOD GLUCOSE TARGET and times: 12   AM (midnight): 100, correct above 120 Active Insulin Time:  4 hours   Refills:  0       * Notice:  This list has 2 medication(s) that are the same as other medications prescribed for you. Read the directions carefully, and ask your doctor or other care provider to review them with you.             Primary Care Provider Office Phone # Fax #    Annamaria Erickson [phone] [address] [place] [address]        Equal Access to Services     [facility] : Hadii aad ku hadasho Soomaali, waaxda luqadaha, qaybta kaalmada adeegyada, waxay maddy dye . So [facility] [phone].    ATENCIÓN: Si habla español, tiene a richter disposición servicios gratuitos de asistencia lingüística. Llame al [phone].    We comply with applicable federal civil rights laws and [place] laws. We do not discriminate on the basis of race, color,  national origin, age, disability, sex, sexual orientation, or gender identity.            Thank you!     Thank you for choosing [facility]  for your care. Our goal is always to provide you with excellent care. Hearing back from our patients is one way we can continue to improve our services. Please take a few minutes to complete the written survey that you may receive in the mail after your visit with us. Thank you!             Your Updated Medication List - Protect others around you: Learn how to safely use, store and throw away your medicines at www.disposemymeds.org.          This list is accurate as of 5/7/18 11:46 AM.  Always use your most recent med list.                   Brand Name Dispense Instructions for use Diagnosis    acetone (Urine) test Strp     50 each    1 strip by In Vitro route daily    Type 2 diabetes mellitus with hyperglycemia, with long-term current use of insulin (H)       * ASPIRIN NOT PRESCRIBED    INTENTIONAL    1 each    continuous prn Antiplatelet medication not prescribed intentionally due to age.        blood glucose monitoring test strip    no brand specified    300 each    Freestyle test strips (NOT LITE or INSULINX) to be used with Omnipod pump test 6 times daily    Type 2 diabetes mellitus with hyperglycemia, with long-term current use of insulin (H)       EPINEPHrine 0.3 MG/0.3ML injection 2-pack    EPIPEN/ADRENACLICK/or ANY BX GENERIC EQUIV    2 each    Inject 0.3 mLs (0.3 mg) into the muscle once as needed for anaphylaxis    Allergic reaction       fluticasone 50 MCG/ACT spray    FLONASE    1 Bottle    Spray 2 sprays into both nostrils daily    Dysfunction of both eustachian tubes       glucagon 1 MG kit    GLUCAGON EMERGENCY    1 mg    Inject 1 mg into the muscle once for 1 dose    Type 2 diabetes mellitus with hyperglycemia, with long-term current use of insulin (H)       * insulin aspart 100 UNIT/ML injection    NovoLOG FLEXPEN    90 mL    1  "unit per 4 grams of carbohydrate eaten at each meal +a correction of 1 additional unit per 18 points of blood sugar above 140.  Total daily dose 100 units/day.  Dispense 6 boxes/30 pens for a 90-day supply or 2 boxes/10 pens for a 30-day supply as allowed by insurance.    Type 2 diabetes mellitus with hyperglycemia, with long-term current use of insulin (H)       * insulin aspart 100 UNITS/ML injection    NovoLOG VIAL    3 vial    To be used in insulin pump est TDD 65-75 units    Type 2 diabetes mellitus with hyperglycemia, with long-term current use of insulin (H)       * insulin aspart 100 UNITS/ML injection    FIASP    70 mL    Up to 75 units sq daily via insulin pump    Type 2 diabetes mellitus with hyperglycemia, with long-term current use of insulin (H)       * insulin pen needle 31G X 5 MM    B-D U/F    550 each    Use 6  time(s) per day.  Please dispense as BD Pen Needle Mini U/F 31G x 5 MM    Type 2 diabetes mellitus with hyperglycemia (H)       * insulin pen needle 31G X 5 MM    B-D U/F    300 each    Use 6 daily or as directed.    Type 2 diabetes mellitus with hyperglycemia (H)       * insulin pen needle 31G X 5 MM    B-D U/F    200 each    Use 5 daily or as directed.    Type 2 diabetes mellitus with hyperglycemia (H)       * insulin pump infusion      Date last updated:  5/7/18 Omnipod BASAL RATES and times: REGULAR PATTERN: 12am: 1.35 12pm: 1.2 6pm: 1.35 ANXIETY PATTERN: 12am: 1.45 12pm: 1.3 6pm: 1.45 CRAZY SUGAR PATTERN: 12am: 1.55 12pm: 1.4 6pm: 1.55 CARB RATIO and times: 12am-12am: 6 Corection Factor (Sensitivity) and times: 12   AM (midnight): 26 5 AM: 29 BLOOD GLUCOSE TARGET and times: 12   AM (midnight): 100, correct above 120 Active Insulin Time:  4 hours        insulin syringe-needle U-100 31G X 5/16\" 1 ML    BD insulin syringe ULTRAFINE    100 each    Use one syringe daily or as directed.    Type 2 diabetes mellitus with hyperglycemia, with long-term current use of insulin (H)       " polyethylene glycol Packet    MIRALAX/GLYCOLAX     Take 1 packet by mouth daily.        STATIN NOT PRESCRIBED (INTENTIONAL)     0 each    Statin not prescribed intentionally due to Intolerance    Statin intolerance       sulindac 200 MG tablet    CLINORIL     Take 200 mg by mouth 3 times daily        traMADol 50 MG tablet    ULTRAM     Take 50 mg by mouth 3 times daily 1-2 tablets in the evening        VOLTAREN 1 % Gel topical gel   Generic drug:  diclofenac     100 g    Apply topically nightly as needed for moderate pain Apply 4 grams to knees or 2 grams to hands four times daily using enclosed dosing card.        * Notice:  This list has 8 medication(s) that are the same as other medications prescribed for you. Read the directions carefully, and ask your doctor or other care provider to review them with you.

## 2018-05-07 NOTE — PATIENT INSTRUCTIONS
Decrease in correction 12am-5am 27 --> 26, thi will allow a little more insulin to correct the high overnight glucose from the binge eating.   Enter carbohydrates before the meal, if you get to the end of the meal and realize you ate more carbohydrates than you entered then it is ok to enter the additional carbohydrates at the end of the meal.  You do NOT have to test your blood sugar at the end of the meal.

## 2018-05-07 NOTE — LETTER
5/7/2018         RE: Maricarmen Dotson  [address]  [place] [address]        Dear Colleague,    Thank you for referring your patient, Maricarmen Dotson, to the [facility]. Please see a copy of my visit note below.    Diabetes Self Management Training: Insulin Pump Follow-up Visit    Maricarmen Dotson presents today for education and evaluation of glucose control related to Type 2 diabetes.    She is accompanied by self    Patient's diabetes management related comments/concerns: feeling good about the change to correction last time. Some high readings during the day due to not entering all carbohydrates. Ended up eating more than planned so did not enter all the carbohydrates at the beginning of the meal and was not sure what to do about it.     ASSESSMENT:  Patient Problem List and Family Medical History reviewed for relevant medical history, current medical status, and diabetes risk factors.    Current Diabetes Management per Patient:  Insulin Pump Type: OmniPod    Taking other diabetes medications? no      Blood Glucose Results and Insulin Use:     \     Current Pump Settings: See Insulin Pump - Outpatient in Medication List for complete list of settings.       BG values are: improving    Patient's most recent   Lab Results   Component Value Date    A1C 9.4 01/16/2018       INTERVENTION:  Patient  would benefit from decrease in correction/sensitivity 12am-5am bolusing before meals and enter additional carbohydrates into the pump at the end of the meal if she eats more than she entered at the beginning of the meal.    Changes made to pump settings:  correction/sensitivity: 12am-5am: 27 -->26    Education provided today on:  AADE Self-Care Behaviors:  Taking Medication: action of prescribed medication    Education specific to insulin pump provided today on:   pump button pressing and importance of bolusing before meals    Pt verbalized understanding of concepts discussed and  recommendations provided today.         PLAN:  Decrease in correction 12am-5am 27 --> 26, thi will allow a little more insulin to correct the high overnight glucose from the binge eating.   Enter carbohydrates before the meal, if you get to the end of the meal and realize you ate more carbohydrates than you entered then it is ok to enter the additional carbohydrates at the end of the meal.  You do NOT have to test your blood sugar at the end of the meal.  AVS printed and provided to patient today.    FOLLOW-UP:  Follow-up appointment scheduled on 5/14/18.  Chart routed to referring provider.      Deanna Bolton RN,CDE   Time Spent: 30 minutes  Encounter Type: Individual      Any diabetes medication dose changes were made via the CDE Protocol and Collaborative Practice Agreement with the patient's referring provider. A copy of this encounter was shared with the provider.

## 2018-05-07 NOTE — PROGRESS NOTES
Diabetes Self Management Training: Insulin Pump Follow-up Visit    Maricarmen Dotson presents today for education and evaluation of glucose control related to Type 2 diabetes.    She is accompanied by self    Patient's diabetes management related comments/concerns: feeling good about the change to correction last time. Some high readings during the day due to not entering all carbohydrates. Ended up eating more than planned so did not enter all the carbohydrates at the beginning of the meal and was not sure what to do about it.     ASSESSMENT:  Patient Problem List and Family Medical History reviewed for relevant medical history, current medical status, and diabetes risk factors.    Current Diabetes Management per Patient:  Insulin Pump Type: OmniPod    Taking other diabetes medications? no      Blood Glucose Results and Insulin Use:     \     Current Pump Settings: See Insulin Pump - Outpatient in Medication List for complete list of settings.       BG values are: improving    Patient's most recent   Lab Results   Component Value Date    A1C 9.4 01/16/2018       INTERVENTION:  Patient  would benefit from decrease in correction/sensitivity 12am-5am bolusing before meals and enter additional carbohydrates into the pump at the end of the meal if she eats more than she entered at the beginning of the meal.    Changes made to pump settings:  correction/sensitivity: 12am-5am: 27 -->26    Education provided today on:  AADE Self-Care Behaviors:  Taking Medication: action of prescribed medication    Education specific to insulin pump provided today on:   pump button pressing and importance of bolusing before meals    Pt verbalized understanding of concepts discussed and recommendations provided today.         PLAN:  Decrease in correction 12am-5am 27 --> 26, thi will allow a little more insulin to correct the high overnight glucose from the binge eating.   Enter carbohydrates before the meal, if you get to the end of the meal  and realize you ate more carbohydrates than you entered then it is ok to enter the additional carbohydrates at the end of the meal.  You do NOT have to test your blood sugar at the end of the meal.  AVS printed and provided to patient today.    FOLLOW-UP:  Follow-up appointment scheduled on 5/14/18.  Chart routed to referring provider.      Deanna Bolton RN,CDE   Time Spent: 30 minutes  Encounter Type: Individual      Any diabetes medication dose changes were made via the CDE Protocol and Collaborative Practice Agreement with the patient's referring provider. A copy of this encounter was shared with the provider.

## 2018-05-11 ENCOUNTER — TRANSFERRED RECORDS (OUTPATIENT)
Dept: HEALTH INFORMATION MANAGEMENT | Facility: CLINIC | Age: 58
End: 2018-05-11

## 2018-05-14 ENCOUNTER — ALLIED HEALTH/NURSE VISIT (OUTPATIENT)
Dept: EDUCATION SERVICES | Facility: CLINIC | Age: 58
End: 2018-05-14
Payer: COMMERCIAL

## 2018-05-14 DIAGNOSIS — Z79.4 TYPE 2 DIABETES MELLITUS WITH HYPERGLYCEMIA, WITH LONG-TERM CURRENT USE OF INSULIN (H): Primary | ICD-10-CM

## 2018-05-14 DIAGNOSIS — E11.65 TYPE 2 DIABETES MELLITUS WITH HYPERGLYCEMIA, WITH LONG-TERM CURRENT USE OF INSULIN (H): Primary | ICD-10-CM

## 2018-05-14 PROCEDURE — G0108 DIAB MANAGE TRN  PER INDIV: HCPCS | Performed by: DIETITIAN, REGISTERED

## 2018-05-14 NOTE — LETTER
"    5/14/2018         RE: Maricarmen Dotson  [address] [place] [address]        Dear Colleague,    Thank you for referring your patient, Maricarmen Dotson, to the [facility]. Please see a copy of my visit note below.    Diabetes Self Management Training: Insulin Pump    SUBJECTIVE:  Maricarmen Dotson presents today for evaluation of glucose control related to  Type 2 diabetes     She is accompanied by self    Patient concerns: had a procedure 3 days ago- in a lot of pain causing higher blood sugars, also getting ready for son's wedding this week-end (making all the flowers)    ASSESSMENT:  Patient Problem List and Family Medical History reviewed for relevant medical history, current medical status, and diabetes risk factors.     Current Diabetes Management per Patient:  Insulin Pump Type: OmniPod     Taking other diabetes medications? No    Blood Glucose Results and Insulin Use:           Current Pump Settings: See Insulin Pump - Outpatient in Medication List for complete list of settings.         INTERVENTION:  Patient requesting further decrease in correction/ sensitivity from 12am- 5am.    Pt would also benefit from switching to \"crazy sugars\" basal program when experiencing consistently high BG's. She is hesitant about doing this- as she \"does not want to give herself too much insulin\". Did review difference in total basal insulin for \"anxiety\" vs \"crazy sugars\" which is 2.4 u/d- pt felt more comfortable with this reinforcement and was able to provide a return demonstration for shifting between basal patterns.    Changes made to pump settings:  correction/sensitivity: 12:00 to 5:00 am- change from 26--> 25    Education provided today on:  AADE Self-Care Behaviors:  Problem Solving: correcting high blood glucose    Education specific to insulin pump provided today on:   how to change to alternative basal rate    Education Materials Provided:  No new materials provided " today    PLAN:  Decrease sensitivity at 12:00 to 5:00 am- changed from 26--> 25 to provide more insulin for BG correction.    FOLLOW-UP:  Follow-up appointment scheduled on 5/21/18.  Chart routed to referring provider.      Allie Matias RD, CDE  Diabetes     Time Spent: 30 minutes  Encounter Type: Individual    Any diabetes medication dose changes were made via the CDE Protocol and Collaborative Practice Agreement with the patient's referring provider. A copy of this encounter was shared with the provider

## 2018-05-14 NOTE — MR AVS SNAPSHOT
After Visit Summary   5/14/2018    Maricarmen Dotson    MRN: [id number]           Patient Information     Date Of Birth          1960        Visit Information        Provider Department      5/14/2018 9:30 AM Allie Matias [facility]        Today's Diagnoses     Type 2 diabetes mellitus with hyperglycemia, with long-term current use of insulin (H)    -  1       Follow-ups after your visit        Your next 10 appointments already scheduled     May 21, 2018  9:30 AM CDT   Diabetic Education with Deanna Bolton RN   [facility] ([facility])    [id number] [place] [id number]   [phone]            May 29, 2018  9:30 AM CDT   Diabetic Education with Deanna Bolton RN   [facility] ([facility])    [id number] [place] [id number] [phone]            Jun 22, 2018  8:45 AM CDT   LAB with [facility] LAB   [facility]    [address] [place] [id number] [phone]           Please do not eat 10-12 hours before your appointment if you are coming in fasting for labs on lipids, cholesterol, or glucose (sugar). This does not apply to pregnant women. Water, hot tea and black coffee (with nothing added) are okay. Do not drink other fluids, diet soda or chew gum.            Jun 22, 2018  9:00 AM CDT   Return Visit with MARICRUZ Castillo CNP   [facility] ([facility]    [address] [place] [id number] [phone]              Who to contact     If you have questions or need follow up information about today's clinic visit or your schedule please contact [facility] directly at [phone].  Normal or non-critical lab and imaging results will be communicated to you by MyChart, letter or phone  within 4 business days after the clinic has received the results. If you do not hear from us within 7 days, please contact the clinic through [employer] or phone. If you have a critical or abnormal lab result, we will notify you by phone as soon as possible.  Submit refill requests through [employer] or call your pharmacy and they will forward the refill request to us. Please allow 3 business days for your refill to be completed.          Additional Information About Your Visit        [employer] Information     [employer] gives you secure access to your electronic health record. If you see a primary care provider, you can also send messages to your care team and make appointments. If you have questions, please call your primary care clinic.  If you do not have a primary care provider, please call [phone] and they will assist you.        Care EveryWhere ID     This is your Care EveryWhere ID. This could be used by other organizations to access your [facility] medical records  [id number]        Your Vitals Were     Last Period                   01/01/1999            Blood Pressure from Last 3 Encounters:   04/02/18 144/76   03/22/18 154/80   02/27/18 (!) 139/98    Weight from Last 3 Encounters:   04/02/18 87.5 kg (193 lb)   03/22/18 86.7 kg (191 lb 1.6 oz)   02/27/18 85.3 kg (188 lb)              We Performed the Following     DIABETES EDUCATION - Individual  []        Primary Care Provider Office Phone # Fax #    Annamaria Erickson [phone] [address] [place] [address]        Equal Access to Services     [facility]: Hadii aad ku hadasho Soomaali, waaxda luqadaha, qaybta kaalmada adeegyada, dillon dye . So [facility] [phone].    ATENCIÓN: Si habla español, tiene a richter disposición servicios gratuitos de asistencia lingüística. Llame al [phone].    We comply with applicable federal civil rights laws and [place] laws. We do not discriminate  on the basis of race, color, national origin, age, disability, sex, sexual orientation, or gender identity.            Thank you!     Thank you for choosing [facility]  for your care. Our goal is always to provide you with excellent care. Hearing back from our patients is one way we can continue to improve our services. Please take a few minutes to complete the written survey that you may receive in the mail after your visit with us. Thank you!             Your Updated Medication List - Protect others around you: Learn how to safely use, store and throw away your medicines at www.disposemymeds.org.          This list is accurate as of 5/14/18 12:04 PM.  Always use your most recent med list.                   Brand Name Dispense Instructions for use Diagnosis    acetone (Urine) test Strp     50 each    1 strip by In Vitro route daily    Type 2 diabetes mellitus with hyperglycemia, with long-term current use of insulin (H)       * ASPIRIN NOT PRESCRIBED    INTENTIONAL    1 each    continuous prn Antiplatelet medication not prescribed intentionally due to age.        blood glucose monitoring test strip    no brand specified    300 each    Freestyle test strips (NOT LITE or INSULINX) to be used with Omnipod pump test 6 times daily    Type 2 diabetes mellitus with hyperglycemia, with long-term current use of insulin (H)       EPINEPHrine 0.3 MG/0.3ML injection 2-pack    EPIPEN/ADRENACLICK/or ANY BX GENERIC EQUIV    2 each    Inject 0.3 mLs (0.3 mg) into the muscle once as needed for anaphylaxis    Allergic reaction       fluticasone 50 MCG/ACT spray    FLONASE    1 Bottle    Spray 2 sprays into both nostrils daily    Dysfunction of both eustachian tubes       glucagon 1 MG kit    GLUCAGON EMERGENCY    1 mg    Inject 1 mg into the muscle once for 1 dose    Type 2 diabetes mellitus with hyperglycemia, with long-term current use of insulin (H)       * insulin aspart 100 UNIT/ML injection     "NovoLOG FLEXPEN    90 mL    1 unit per 4 grams of carbohydrate eaten at each meal +a correction of 1 additional unit per 18 points of blood sugar above 140.  Total daily dose 100 units/day.  Dispense 6 boxes/30 pens for a 90-day supply or 2 boxes/10 pens for a 30-day supply as allowed by insurance.    Type 2 diabetes mellitus with hyperglycemia, with long-term current use of insulin (H)       * insulin aspart 100 UNITS/ML injection    NovoLOG VIAL    3 vial    To be used in insulin pump est TDD 65-75 units    Type 2 diabetes mellitus with hyperglycemia, with long-term current use of insulin (H)       * insulin aspart 100 UNITS/ML injection    FIASP    70 mL    Up to 75 units sq daily via insulin pump    Type 2 diabetes mellitus with hyperglycemia, with long-term current use of insulin (H)       * insulin pen needle 31G X 5 MM    B-D U/F    550 each    Use 6  time(s) per day.  Please dispense as BD Pen Needle Mini U/F 31G x 5 MM    Type 2 diabetes mellitus with hyperglycemia (H)       * insulin pen needle 31G X 5 MM    B-D U/F    300 each    Use 6 daily or as directed.    Type 2 diabetes mellitus with hyperglycemia (H)       * insulin pen needle 31G X 5 MM    B-D U/F    200 each    Use 5 daily or as directed.    Type 2 diabetes mellitus with hyperglycemia (H)       * insulin pump infusion      Date last updated:  5/7/18 Omnipod BASAL RATES and times: REGULAR PATTERN: 12am: 1.35 12pm: 1.2 6pm: 1.35 ANXIETY PATTERN: 12am: 1.45 12pm: 1.3 6pm: 1.45 CRAZY SUGAR PATTERN: 12am: 1.55 12pm: 1.4 6pm: 1.55 CARB RATIO and times: 12am-12am: 6 Corection Factor (Sensitivity) and times: 12   AM (midnight): 26 5 AM: 29 BLOOD GLUCOSE TARGET and times: 12   AM (midnight): 100, correct above 120 Active Insulin Time:  4 hours        insulin syringe-needle U-100 31G X 5/16\" 1 ML    BD insulin syringe ULTRAFINE    100 each    Use one syringe daily or as directed.    Type 2 diabetes mellitus with hyperglycemia, with long-term current use of " insulin (H)       polyethylene glycol Packet    MIRALAX/GLYCOLAX     Take 1 packet by mouth daily.        STATIN NOT PRESCRIBED (INTENTIONAL)     0 each    Statin not prescribed intentionally due to Intolerance    Statin intolerance       sulindac 200 MG tablet    CLINORIL     Take 200 mg by mouth 3 times daily        traMADol 50 MG tablet    ULTRAM     Take 50 mg by mouth 3 times daily 1-2 tablets in the evening        VOLTAREN 1 % Gel topical gel   Generic drug:  diclofenac     100 g    Apply topically nightly as needed for moderate pain Apply 4 grams to knees or 2 grams to hands four times daily using enclosed dosing card.        * Notice:  This list has 8 medication(s) that are the same as other medications prescribed for you. Read the directions carefully, and ask your doctor or other care provider to review them with you.

## 2018-05-14 NOTE — PROGRESS NOTES
"Diabetes Self Management Training: Insulin Pump    SUBJECTIVE:  Maricarmen Dotson presents today for evaluation of glucose control related to  Type 2 diabetes     She is accompanied by self    Patient concerns: had a procedure 3 days ago- in a lot of pain causing higher blood sugars, also getting ready for son's wedding this week-end (making all the flowers)    ASSESSMENT:  Patient Problem List and Family Medical History reviewed for relevant medical history, current medical status, and diabetes risk factors.     Current Diabetes Management per Patient:  Insulin Pump Type: OmniPod     Taking other diabetes medications? No    Blood Glucose Results and Insulin Use:           Current Pump Settings: See Insulin Pump - Outpatient in Medication List for complete list of settings.         INTERVENTION:  Patient requesting further decrease in correction/ sensitivity from 12am- 5am.    Pt would also benefit from switching to \"crazy sugars\" basal program when experiencing consistently high BG's. She is hesitant about doing this- as she \"does not want to give herself too much insulin\". Did review difference in total basal insulin for \"anxiety\" vs \"crazy sugars\" which is 2.4 u/d- pt felt more comfortable with this reinforcement and was able to provide a return demonstration for shifting between basal patterns.    Changes made to pump settings:  correction/sensitivity: 12:00 to 5:00 am- change from 26--> 25    Education provided today on:  AADE Self-Care Behaviors:  Problem Solving: correcting high blood glucose    Education specific to insulin pump provided today on:   how to change to alternative basal rate    Education Materials Provided:  No new materials provided today    PLAN:  Decrease sensitivity at 12:00 to 5:00 am- changed from 26--> 25 to provide more insulin for BG correction.    FOLLOW-UP:  Follow-up appointment scheduled on 5/21/18.  Chart routed to referring provider.      Allie Matias RD, CDE  Diabetes Education " Specialist    Time Spent: 30 minutes  Encounter Type: Individual    Any diabetes medication dose changes were made via the CDE Protocol and Collaborative Practice Agreement with the patient's referring provider. A copy of this encounter was shared with the provider

## 2018-05-21 ENCOUNTER — ALLIED HEALTH/NURSE VISIT (OUTPATIENT)
Dept: EDUCATION SERVICES | Facility: CLINIC | Age: 58
End: 2018-05-21
Payer: COMMERCIAL

## 2018-05-21 ENCOUNTER — TRANSFERRED RECORDS (OUTPATIENT)
Dept: HEALTH INFORMATION MANAGEMENT | Facility: CLINIC | Age: 58
End: 2018-05-21

## 2018-05-21 DIAGNOSIS — E11.65 TYPE 2 DIABETES MELLITUS WITH HYPERGLYCEMIA (H): Primary | ICD-10-CM

## 2018-05-21 PROCEDURE — G0108 DIAB MANAGE TRN  PER INDIV: HCPCS

## 2018-05-21 NOTE — LETTER
"    5/21/2018         RE: Maricarmen Dotson  [address] [place] [address]        Dear Colleague,    Thank you for referring your patient, Maricarmen Dotson, to the [facility]. Please see a copy of my visit note below.    Diabetes Self Management Training: Insulin Pump Follow-up Visit    Maricarmen Dotson presents today for evaluation of glucose control related to Type 2 diabetes.    She is accompanied by     Patient's diabetes management related comments/concerns: used the crazy sugar pattern on Saturday 5/19 during son's wedding and glucose was good all day.  Yesterday went back to anxiety pattern and glucose was high but may of been due to increase pain. Having MRI today, gets anxious just took a valium.  Feeling confident can now change her anxiety pattern to to regular daily pattern since she has been consistently using it daily.      ASSESSMENT:  Patient Problem List and Family Medical History reviewed for relevant medical history, current medical status, and diabetes risk factors.    Current Diabetes Management per Patient:  Insulin Pump Type: OmniPod    BG meter: Omnipod PDM meter    Taking other diabetes medications? no      Blood Glucose Results and Insulin Use:       Current Pump Settings: See Insulin Pump - Outpatient in Medication List for complete list of settings.       BG values are: not in goal    Patient's most recent   Lab Results   Component Value Date    A1C 9.4 01/16/2018    is not meeting goal of <8.0    Nutrition:  Patient counts carbohydrates in grams      Physical Activity:    Not discussed    Diabetes Complications:  Acute Complications: At risk for hypoglycemia? yes  Symptoms of low blood sugar? none      INTERVENTION:  Patient glucose above goal, glucose was in goal on 5/19 when she was busy and stressed and used the \"crazy sugars pattern\" . Higher glucose yesterday and today due to pain and anxiety.  Recommend increase to basal insulin.   Patient " feeling more confident in managing pump and trusting the increase in dose. Recommend increase to all basal patterns.      Changes made to pump settings:  REGULAR PATTERN:   12am: 1.35 -->1.45   12pm:  1.2 -->1.3   6pm: 1.35 -->1.45   ANXIETY PATTERN:   12am: 1.45 --> 1.55   12pm: 1.3 -->1.4   6pm:  1.45 -->1.55   CRAZY SUGAR PATTERN:   12am:  1.55 -->1.65   12pm:  1.4 --> 1.5   6pm:  1.55 --> 1.65     Education provided today on:  AADE Self-Care Behaviors:  Taking Medication: action of prescribed medication    Education specific to insulin pump provided today on:   Using the different basal patterns     Pt verbalized understanding of concepts discussed and recommendations provided today.       PLAN:  Increase to basal settings today.  Basal rate set at the regular pattern, increase to anxiety pattern if glucose not improving today after MRI.      FOLLOW-UP:  Follow-up appointment scheduled on 5/29/18.  Chart routed to referring provider.      Deanna Bolton RN,CDE   Time Spent: 30 minutes  Encounter Type: Individual      Any diabetes medication dose changes were made via the CDE Protocol and Collaborative Practice Agreement with the patient's referring provider. A copy of this encounter was shared with the provider.

## 2018-05-21 NOTE — PROGRESS NOTES
"Diabetes Self Management Training: Insulin Pump Follow-up Visit    Maricarmen Dotson presents today for evaluation of glucose control related to Type 2 diabetes.    She is accompanied by     Patient's diabetes management related comments/concerns: used the crazy sugar pattern on Saturday 5/19 during son's wedding and glucose was good all day.  Yesterday went back to anxiety pattern and glucose was high but may of been due to increase pain. Having MRI today, gets anxious just took a valium.  Feeling confident can now change her anxiety pattern to to regular daily pattern since she has been consistently using it daily.      ASSESSMENT:  Patient Problem List and Family Medical History reviewed for relevant medical history, current medical status, and diabetes risk factors.    Current Diabetes Management per Patient:  Insulin Pump Type: OmniPod    BG meter: Omnipod PDM meter    Taking other diabetes medications? no      Blood Glucose Results and Insulin Use:       Current Pump Settings: See Insulin Pump - Outpatient in Medication List for complete list of settings.       BG values are: not in goal    Patient's most recent   Lab Results   Component Value Date    A1C 9.4 01/16/2018    is not meeting goal of <8.0    Nutrition:  Patient counts carbohydrates in grams      Physical Activity:    Not discussed    Diabetes Complications:  Acute Complications: At risk for hypoglycemia? yes  Symptoms of low blood sugar? none      INTERVENTION:  Patient glucose above goal, glucose was in goal on 5/19 when she was busy and stressed and used the \"crazy sugars pattern\" . Higher glucose yesterday and today due to pain and anxiety.  Recommend increase to basal insulin.   Patient feeling more confident in managing pump and trusting the increase in dose. Recommend increase to all basal patterns.      Changes made to pump settings:  REGULAR PATTERN:   12am: 1.35 -->1.45   12pm:  1.2 -->1.3   6pm: 1.35 -->1.45   ANXIETY PATTERN: "   12am: 1.45 --> 1.55   12pm: 1.3 -->1.4   6pm:  1.45 -->1.55   CRAZY SUGAR PATTERN:   12am:  1.55 -->1.65   12pm:  1.4 --> 1.5   6pm:  1.55 --> 1.65     Education provided today on:  AADE Self-Care Behaviors:  Taking Medication: action of prescribed medication    Education specific to insulin pump provided today on:   Using the different basal patterns     Pt verbalized understanding of concepts discussed and recommendations provided today.       PLAN:  Increase to basal settings today.  Basal rate set at the regular pattern, increase to anxiety pattern if glucose not improving today after MRI.      FOLLOW-UP:  Follow-up appointment scheduled on 5/29/18.  Chart routed to referring provider.      Deanna Bolton RN,CDE   Time Spent: 30 minutes  Encounter Type: Individual      Any diabetes medication dose changes were made via the CDE Protocol and Collaborative Practice Agreement with the patient's referring provider. A copy of this encounter was shared with the provider.

## 2018-05-21 NOTE — MR AVS SNAPSHOT
After Visit Summary   5/21/2018    Maricarmen Dotson    MRN: [id number]           Patient Information     Date Of Birth          1960        Visit Information        Provider Department      5/21/2018 9:30 AM Deanna Bolton RN [facility]        Today's Diagnoses     Type 2 diabetes mellitus with hyperglycemia (H)    -  1       Follow-ups after your visit        Your next 10 appointments already scheduled     May 29, 2018  9:30 AM CDT   Diabetic Education with Deanna Bolton RN   [facility] ([facility])    [address] [address]   [phone]            Jun 11, 2018 10:30 AM CDT   Diabetic Education with Allie Matias   [facility] ([facility])    [address] [facility] [address] [phone]            Jun 25, 2018  9:30 AM CDT   Diabetic Education with Deanna Bolton RN   [facility] ([facility])    [address] [address]   [phone]              Who to contact     If you have questions or need follow up information about today's clinic visit or your schedule please contact [facility] directly at [phone].  Normal or non-critical lab and imaging results will be communicated to you by MyChart, letter or phone within 4 business days after the clinic has received the results. If you do not hear from us within 7 days, please contact the clinic through MyChart or phone. If you have a critical or abnormal lab result, we will notify you by phone as soon as possible.  Submit refill requests through [email or web address] or call your pharmacy and they will forward the refill request to us. Please allow 3 business days for your refill to be completed.          Additional Information About Your Visit        MyChart Information     [employer] gives  you secure access to your electronic health record. If you see a primary care provider, you can also send messages to your care team and make appointments. If you have questions, please call your primary care clinic.  If you do not have a primary care provider, please call [phone] and they will assist you.        Care EveryWhere ID     This is your Care EveryWhere ID. This could be used by other organizations to access your [facility] medical records  [id number]        Your Vitals Were     Last Period                   01/01/1999            Blood Pressure from Last 3 Encounters:   04/02/18 144/76   03/22/18 154/80   02/27/18 (!) 139/98    Weight from Last 3 Encounters:   04/02/18 87.5 kg (193 lb)   03/22/18 86.7 kg (191 lb 1.6 oz)   02/27/18 85.3 kg (188 lb)              We Performed the Following     DIABETES EDUCATION - Individual  []          Today's Medication Changes          These changes are accurate as of 5/21/18 10:33 AM.  If you have any questions, ask your nurse or doctor.               These medicines have changed or have updated prescriptions.        Dose/Directions    * ASPIRIN NOT PRESCRIBED   Commonly known as:  INTENTIONAL   This may have changed:  Another medication with the same name was changed. Make sure you understand how and when to take each.        continuous prn Antiplatelet medication not prescribed intentionally due to age.   Quantity:  1 each   Refills:  0       * insulin aspart 100 UNITS/ML injection   Commonly known as:  NovoLOG VIAL   This may have changed:  Another medication with the same name was removed. Continue taking this medication, and follow the directions you see here.   Used for:  Type 2 diabetes mellitus with hyperglycemia, with long-term current use of insulin (H)        To be used in insulin pump est TDD 65-75 units   Quantity:  3 vial   Refills:  3       * insulin aspart 100 UNITS/ML injection   Commonly known as:  FIASP   This may have changed:  Another  medication with the same name was removed. Continue taking this medication, and follow the directions you see here.   Used for:  Type 2 diabetes mellitus with hyperglycemia, with long-term current use of insulin (H)        Up to 75 units sq daily via insulin pump   Quantity:  70 mL   Refills:  1       * insulin pump infusion   This may have changed:  additional instructions        Date last updated:  5/21/18 Omnipod BASAL RATES and times: REGULAR PATTERN: 12am: 1.45 12pm: 1.3 6pm: 1.45 ANXIETY PATTERN: 12am: 1.55 12pm: 1.4 6pm: 1.55 CRAZY SUGAR PATTERN: 12am: 1.65 12pm: 1.5 6pm: 1.65 CARB RATIO and times: 12am-12am: 6 Corection Factor (Sensitivity) and times: 12   AM (midnight): 25 5 AM: 29 BLOOD GLUCOSE TARGET and times: 12   AM (midnight): 100, correct above 120 Active Insulin Time:  4 hours   Refills:  0       * Notice:  This list has 4 medication(s) that are the same as other medications prescribed for you. Read the directions carefully, and ask your doctor or other care provider to review them with you.             Primary Care Provider Office Phone # Fax #    Annamaria Erickson [phone] [address] [place] [address]        Equal Access to Services     [facility] : Hadii aad ku hadasho Soomaali, waaxda luqadaha, qaybta kaalmada adeegyada, dillon brasher. So [facility] [phone].    ATENCIÓN: Si habla español, tiene a richter disposición servicios gratuitos de asistencia lingüística. Llame al [phone].    We comply with applicable federal civil rights laws and [place] laws. We do not discriminate on the basis of race, color, national origin, age, disability, sex, sexual orientation, or gender identity.            Thank you!     Thank you for choosing [facility]  for your care. Our goal is always to provide you with excellent care. Hearing back from our patients is one way we can continue to improve our services.  Please take a few minutes to complete the written survey that you may receive in the mail after your visit with us. Thank you!             Your Updated Medication List - Protect others around you: Learn how to safely use, store and throw away your medicines at www.disposemymeds.org.          This list is accurate as of 5/21/18 10:33 AM.  Always use your most recent med list.                   Brand Name Dispense Instructions for use Diagnosis    acetone (Urine) test Strp     50 each    1 strip by In Vitro route daily    Type 2 diabetes mellitus with hyperglycemia, with long-term current use of insulin (H)       * ASPIRIN NOT PRESCRIBED    INTENTIONAL    1 each    continuous prn Antiplatelet medication not prescribed intentionally due to age.        blood glucose monitoring test strip    no brand specified    300 each    Freestyle test strips (NOT LITE or INSULINX) to be used with Omnipod pump test 6 times daily    Type 2 diabetes mellitus with hyperglycemia, with long-term current use of insulin (H)       EPINEPHrine 0.3 MG/0.3ML injection 2-pack    EPIPEN/ADRENACLICK/or ANY BX GENERIC EQUIV    2 each    Inject 0.3 mLs (0.3 mg) into the muscle once as needed for anaphylaxis    Allergic reaction       fluticasone 50 MCG/ACT spray    FLONASE    1 Bottle    Spray 2 sprays into both nostrils daily    Dysfunction of both eustachian tubes       glucagon 1 MG kit    GLUCAGON EMERGENCY    1 mg    Inject 1 mg into the muscle once for 1 dose    Type 2 diabetes mellitus with hyperglycemia, with long-term current use of insulin (H)       * insulin aspart 100 UNITS/ML injection    NovoLOG VIAL    3 vial    To be used in insulin pump est TDD 65-75 units    Type 2 diabetes mellitus with hyperglycemia, with long-term current use of insulin (H)       * insulin aspart 100 UNITS/ML injection    FIASP    70 mL    Up to 75 units sq daily via insulin pump    Type 2 diabetes mellitus with hyperglycemia, with long-term current use of  "insulin (H)       * insulin pen needle 31G X 5 MM    B-D U/F    550 each    Use 6  time(s) per day.  Please dispense as BD Pen Needle Mini U/F 31G x 5 MM    Type 2 diabetes mellitus with hyperglycemia (H)       * insulin pen needle 31G X 5 MM    B-D U/F    300 each    Use 6 daily or as directed.    Type 2 diabetes mellitus with hyperglycemia (H)       * insulin pen needle 31G X 5 MM    B-D U/F    200 each    Use 5 daily or as directed.    Type 2 diabetes mellitus with hyperglycemia (H)       * insulin pump infusion      Date last updated:  5/21/18 Omnipod BASAL RATES and times: REGULAR PATTERN: 12am: 1.45 12pm: 1.3 6pm: 1.45 ANXIETY PATTERN: 12am: 1.55 12pm: 1.4 6pm: 1.55 CRAZY SUGAR PATTERN: 12am: 1.65 12pm: 1.5 6pm: 1.65 CARB RATIO and times: 12am-12am: 6 Corection Factor (Sensitivity) and times: 12   AM (midnight): 25 5 AM: 29 BLOOD GLUCOSE TARGET and times: 12   AM (midnight): 100, correct above 120 Active Insulin Time:  4 hours        insulin syringe-needle U-100 31G X 5/16\" 1 ML    BD insulin syringe ULTRAFINE    100 each    Use one syringe daily or as directed.    Type 2 diabetes mellitus with hyperglycemia, with long-term current use of insulin (H)       polyethylene glycol Packet    MIRALAX/GLYCOLAX     Take 1 packet by mouth daily.        STATIN NOT PRESCRIBED (INTENTIONAL)     0 each    Statin not prescribed intentionally due to Intolerance    Statin intolerance       sulindac 200 MG tablet    CLINORIL     Take 200 mg by mouth 3 times daily        traMADol 50 MG tablet    ULTRAM     Take 50 mg by mouth 3 times daily 1-2 tablets in the evening        VOLTAREN 1 % Gel topical gel   Generic drug:  diclofenac     100 g    Apply topically nightly as needed for moderate pain Apply 4 grams to knees or 2 grams to hands four times daily using enclosed dosing card.        * Notice:  This list has 7 medication(s) that are the same as other medications prescribed for you. Read the directions carefully, and ask your " doctor or other care provider to review them with you.

## 2018-05-29 ENCOUNTER — ALLIED HEALTH/NURSE VISIT (OUTPATIENT)
Dept: EDUCATION SERVICES | Facility: CLINIC | Age: 58
End: 2018-05-29
Payer: COMMERCIAL

## 2018-05-29 DIAGNOSIS — E11.9 TYPE 2 DIABETES, HBA1C GOAL < 7% (H): Primary | ICD-10-CM

## 2018-05-29 PROCEDURE — G0108 DIAB MANAGE TRN  PER INDIV: HCPCS

## 2018-05-29 NOTE — PROGRESS NOTES
"Diabetes Self Management Training: Insulin Pump Follow-up Visit    Maricarmen Dotson presents today for evaluation of glucose control related to Type 2 diabetes.    She is accompanied by self    Patient's diabetes management related comments/concerns: did not binge eat during the night 5/23-/5/25, last couple days glucose readings are higher due to overdoing activity and increase pain, wondering if she should use the \"anxiety pattern for a couple hours today due to pain and higher glucose readings?, plans to see Emily in July.    ASSESSMENT:  Patient Problem List and Family Medical History reviewed for relevant medical history, current medical status, and diabetes risk factors.    Current Diabetes Management per Patient:  Insulin Pump Type: OmniPod    Taking other diabetes medications? no      Blood Glucose Results and Insulin Use:           Current Pump Settings: See Insulin Pump - Outpatient in Medication List for complete list of settings.       BG values are: improving but     Patient's most recent   Lab Results   Component Value Date    A1C 9.4 01/16/2018    is not meeting goal of <7.0    Nutrition:  Patient counts carbohydrates in grams      Physical Activity:    Not assessed    Diabetes Complications:  Not discussed today.      INTERVENTION:  Patient had 3 days without overnight binge eating and fasting glucose higher than bedtime. Recommend small  increase to basal rate 12am-6am.      Changes made to pump settings:  basal rate:   REGULAR PATTERN:    12am: 1.45 --> 1.55  6 AM: 1.45  12pm: 1.3  6pm: 1.45  ANXIETY PATTERN:  12am: 1.55 --> 1.65   6am: 1.55  12pm: 1.4  6pm: 1.55  CRAZY SUGAR PATTERN:  12am: 1.65 --> 1.75  6am: 1.65  12pm: 1.5  6pm: 1.65    Education provided today on:  AADE Self-Care Behaviors:  Taking Medication: action of prescribed medication    Education specific to insulin pump provided today on:   pump button pressing and discussed using the temporary basal rate for a couple hours instead of " "changing patterns.  She is not comfortable using the temp basal    Pt verbalized understanding of concepts discussed and recommendations provided today.         PLAN:  Changes made to basal rate to allow a little more insulin between 12am- 6am  Test BG when you get home and take a correction if glucose is still high, if you notice glucose is still high and not improving ok to change to \" anxiety pattern\" and monitor glucose   See Emily in July  Follow up with Allie next week  AVS printed and provided to patient today.      Deanna Bolton RN,CDE   Time Spent: 30 minutes  Encounter Type: Individual      Any diabetes medication dose changes were made via the CDE Protocol and Collaborative Practice Agreement with the patient's referring provider. A copy of this encounter was shared with the provider.        "

## 2018-05-29 NOTE — PATIENT INSTRUCTIONS
"Changes made to basal rate to allow a little more insulin between 12am- 6am  Test BG when you get home and take a correction if glucose is still high, if you notice glucose is still high and not improving ok to change to \" anxiety pattern\" and monitor glucose   See Emily in July  Follow up with Allie next week  "

## 2018-05-29 NOTE — MR AVS SNAPSHOT
"              After Visit Summary   5/29/2018    Maricarmen Dotson    MRN: [id number]           Patient Information     Date Of Birth          1960        Visit Information        Provider Department      5/29/2018 9:30 AM Deanna Bolton RN [facility]        Care Instructions    Changes made to basal rate to allow a little more insulin between 12am- 6am  Test BG when you get home and take a correction if glucose is still high, if you notice glucose is still high and not improving ok to change to \" anxiety pattern\" and monitor glucose   See Emily in July  Follow up with Allie next week          Follow-ups after your visit        Your next 10 appointments already scheduled     Jun 11, 2018 10:30 AM CDT   Diabetic Education with Allie Matias   [facility] ([facility])    [address] [id number] [phone]            Jun 25, 2018  9:30 AM CDT   Diabetic Education with Deanna Bolton RN   [facility] ([facility])    [address] [id number] [phone]              Who to contact     If you have questions or need follow up information about today's clinic visit or your schedule please contact [facility] directly at [phone].  Normal or non-critical lab and imaging results will be communicated to you by MyChart, letter or phone within 4 business days after the clinic has received the results. If you do not hear from us within 7 days, please contact the clinic through MyChart or phone. If you have a critical or abnormal lab result, we will notify you by phone as soon as possible.  Submit refill requests through [employer] or call your pharmacy and they will forward the refill request to us. Please allow 3 business days for your refill to be completed.          Additional Information About Your Visit      "   [employer] Information     [employer] gives you secure access to your electronic health record. If you see a primary care provider, you can also send messages to your care team and make appointments. If you have questions, please call your primary care clinic.  If you do not have a primary care provider, please call [phone] and they will assist you.        Care EveryWhere ID     This is your Care EveryWhere ID. This could be used by other organizations to access your [facility] medical records  [id number]        Your Vitals Were     Last Period                   01/01/1999            Blood Pressure from Last 3 Encounters:   04/02/18 144/76   03/22/18 154/80   02/27/18 (!) 139/98    Weight from Last 3 Encounters:   04/02/18 87.5 kg (193 lb)   03/22/18 86.7 kg (191 lb 1.6 oz)   02/27/18 85.3 kg (188 lb)              Today, you had the following     No orders found for display       Primary Care Provider Office Phone # Fax #    Annamaria Erickson [phone] [address] [place] [address]        Equal Access to Services     : Hadii aad ku hadasho Sosu, waaxda luqadaha, qaybta kaalmada mitra, dillon dye . So [facility] [phone].    ATENCIÓN: Si habla español, tiene a richter disposición servicios gratuitos de asistencia lingüística. [facility] [phone].    We comply with applicable federal civil rights laws and [place] laws. We do not discriminate on the basis of race, color, national origin, age, disability, sex, sexual orientation, or gender identity.            Thank you!     Thank you for choosing [facility]  for your care. Our goal is always to provide you with excellent care. Hearing back from our patients is one way we can continue to improve our services. Please take a few minutes to complete the written survey that you may receive in the mail after your visit with us. Thank you!              Your Updated Medication List - Protect others around you: Learn how to safely use, store and throw away your medicines at www.disposemymeds.org.          This list is accurate as of 5/29/18 10:21 AM.  Always use your most recent med list.                   Brand Name Dispense Instructions for use Diagnosis    acetone (Urine) test Strp     50 each    1 strip by In Vitro route daily    Type 2 diabetes mellitus with hyperglycemia, with long-term current use of insulin (H)       * ASPIRIN NOT PRESCRIBED    INTENTIONAL    1 each    continuous prn Antiplatelet medication not prescribed intentionally due to age.        blood glucose monitoring test strip    no brand specified    300 each    Freestyle test strips (NOT LITE or INSULINX) to be used with Omnipod pump test 6 times daily    Type 2 diabetes mellitus with hyperglycemia, with long-term current use of insulin (H)       EPINEPHrine 0.3 MG/0.3ML injection 2-pack    EPIPEN/ADRENACLICK/or ANY BX GENERIC EQUIV    2 each    Inject 0.3 mLs (0.3 mg) into the muscle once as needed for anaphylaxis    Allergic reaction       fluticasone 50 MCG/ACT spray    FLONASE    1 Bottle    Spray 2 sprays into both nostrils daily    Dysfunction of both eustachian tubes       glucagon 1 MG kit    GLUCAGON EMERGENCY    1 mg    Inject 1 mg into the muscle once for 1 dose    Type 2 diabetes mellitus with hyperglycemia, with long-term current use of insulin (H)       * insulin aspart 100 UNITS/ML injection    NovoLOG VIAL    3 vial    To be used in insulin pump est TDD 65-75 units    Type 2 diabetes mellitus with hyperglycemia, with long-term current use of insulin (H)       * insulin aspart 100 UNITS/ML injection    FIASP    70 mL    Up to 75 units sq daily via insulin pump    Type 2 diabetes mellitus with hyperglycemia, with long-term current use of insulin (H)       * insulin pen needle 31G X 5 MM    B-D U/F    550 each    Use 6  time(s) per day.  Please dispense as BD Pen Needle Mini U/F  "31G x 5 MM    Type 2 diabetes mellitus with hyperglycemia (H)       * insulin pen needle 31G X 5 MM    B-D U/F    300 each    Use 6 daily or as directed.    Type 2 diabetes mellitus with hyperglycemia (H)       * insulin pen needle 31G X 5 MM    B-D U/F    200 each    Use 5 daily or as directed.    Type 2 diabetes mellitus with hyperglycemia (H)       * insulin pump infusion      Date last updated:  5/21/18 Omnipod BASAL RATES and times: REGULAR PATTERN: 12am: 1.45 12pm: 1.3 6pm: 1.45 ANXIETY PATTERN: 12am: 1.55 12pm: 1.4 6pm: 1.55 CRAZY SUGAR PATTERN: 12am: 1.65 12pm: 1.5 6pm: 1.65 CARB RATIO and times: 12am-12am: 6 Corection Factor (Sensitivity) and times: 12   AM (midnight): 25 5 AM: 29 BLOOD GLUCOSE TARGET and times: 12   AM (midnight): 100, correct above 120 Active Insulin Time:  4 hours        insulin syringe-needle U-100 31G X 5/16\" 1 ML    BD insulin syringe ULTRAFINE    100 each    Use one syringe daily or as directed.    Type 2 diabetes mellitus with hyperglycemia, with long-term current use of insulin (H)       polyethylene glycol Packet    MIRALAX/GLYCOLAX     Take 1 packet by mouth daily.        STATIN NOT PRESCRIBED (INTENTIONAL)     0 each    Statin not prescribed intentionally due to Intolerance    Statin intolerance       sulindac 200 MG tablet    CLINORIL     Take 200 mg by mouth 3 times daily        traMADol 50 MG tablet    ULTRAM     Take 50 mg by mouth 3 times daily 1-2 tablets in the evening        VOLTAREN 1 % Gel topical gel   Generic drug:  diclofenac     100 g    Apply topically nightly as needed for moderate pain Apply 4 grams to knees or 2 grams to hands four times daily using enclosed dosing card.        * Notice:  This list has 7 medication(s) that are the same as other medications prescribed for you. Read the directions carefully, and ask your doctor or other care provider to review them with you.      "

## 2018-05-29 NOTE — LETTER
"    5/29/2018         RE: Maricarmen Dotson  [address] [place] [address]        Dear Colleague,    Thank you for referring your patient, Maricarmen Dotson, to the [facility]. Please see a copy of my visit note below.    Diabetes Self Management Training: Insulin Pump Follow-up Visit    Maricarmen Dotson presents today for evaluation of glucose control related to Type 2 diabetes.    She is accompanied by self    Patient's diabetes management related comments/concerns: did not binge eat during the night 5/23-/5/25, last couple days glucose readings are higher due to overdoing activity and increase pain, wondering if she should use the \"anxiety pattern for a couple hours today due to pain and higher glucose readings?, plans to see Emily in July.    ASSESSMENT:  Patient Problem List and Family Medical History reviewed for relevant medical history, current medical status, and diabetes risk factors.    Current Diabetes Management per Patient:  Insulin Pump Type: OmniPod    Taking other diabetes medications? no      Blood Glucose Results and Insulin Use:           Current Pump Settings: See Insulin Pump - Outpatient in Medication List for complete list of settings.       BG values are: improving but     Patient's most recent   Lab Results   Component Value Date    A1C 9.4 01/16/2018    is not meeting goal of <7.0    Nutrition:  Patient counts carbohydrates in grams      Physical Activity:    Not assessed    Diabetes Complications:  Not discussed today.      INTERVENTION:  Patient had 3 days without overnight binge eating and fasting glucose higher than bedtime. Recommend small  increase to basal rate 12am-6am.      Changes made to pump settings:  basal rate:   REGULAR PATTERN:    12am: 1.45 --> 1.55  6 AM: 1.45  12pm: 1.3  6pm: 1.45  ANXIETY PATTERN:  12am: 1.55 --> 1.65   6am: 1.55  12pm: 1.4  6pm: 1.55  CRAZY SUGAR PATTERN:  12am: 1.65 --> 1.75  6am: 1.65  12pm: 1.5  6pm: " "1.65    Education provided today on:  AADE Self-Care Behaviors:  Taking Medication: action of prescribed medication    Education specific to insulin pump provided today on:   pump button pressing and discussed using the temporary basal rate for a couple hours instead of changing patterns.  She is not comfortable using the temp basal    Pt verbalized understanding of concepts discussed and recommendations provided today.         PLAN:  Changes made to basal rate to allow a little more insulin between 12am- 6am  Test BG when you get home and take a correction if glucose is still high, if you notice glucose is still high and not improving ok to change to \" anxiety pattern\" and monitor glucose   See Emily in July  Follow up with Allie next week  AVS printed and provided to patient today.      Deanna Bolton RN,CDE   Time Spent: 30 minutes  Encounter Type: Individual      Any diabetes medication dose changes were made via the CDE Protocol and Collaborative Practice Agreement with the patient's referring provider. A copy of this encounter was shared with the provider.          "

## 2018-06-05 ENCOUNTER — TRANSFERRED RECORDS (OUTPATIENT)
Dept: HEALTH INFORMATION MANAGEMENT | Facility: CLINIC | Age: 58
End: 2018-06-05

## 2018-06-11 ENCOUNTER — ALLIED HEALTH/NURSE VISIT (OUTPATIENT)
Dept: EDUCATION SERVICES | Facility: CLINIC | Age: 58
End: 2018-06-11
Payer: COMMERCIAL

## 2018-06-11 DIAGNOSIS — E11.65 TYPE 2 DIABETES MELLITUS WITH HYPERGLYCEMIA, WITH LONG-TERM CURRENT USE OF INSULIN (H): Primary | ICD-10-CM

## 2018-06-11 DIAGNOSIS — Z79.4 TYPE 2 DIABETES MELLITUS WITH HYPERGLYCEMIA, WITH LONG-TERM CURRENT USE OF INSULIN (H): Primary | ICD-10-CM

## 2018-06-11 PROCEDURE — G0108 DIAB MANAGE TRN  PER INDIV: HCPCS | Performed by: DIETITIAN, REGISTERED

## 2018-06-11 NOTE — MR AVS SNAPSHOT
After Visit Summary   6/11/2018    Maricarmen Dotson    MRN: [id number]           Patient Information     Date Of Birth          1960        Visit Information        Provider Department      6/11/2018 10:30 AM Allie Matias [facility]        Care Instructions    My Diabetes Care Goals:    1. Basal rate increased during the night from 12:00 am to 6:00 am:  Regular rate:  1.55 to --> 1.6  Anxiety rate: 1.65 to --> 1.7  Crazy Sugars: 1.75 to --> 1.8    2. Sensitivity changed at 12:00 am to 5:00 am from 25 --> to 23.     Allie Matias RD, LD, CDE  Diabetes       [facility] for the [facility]:  For Your Diabetes Education and Nutrition Appointments Call:  [phone]   For Diabetes Education or Nutrition Related Questions:   Phone: [phone]  E-mail: [email or web address]  Fax: [phone]   If you need a medication refill please contact your pharmacy. Please allow 3 business days for your refills to be completed.    Instructions for emailing the Diabetes Educators    If you need to communicate a non-urgent message to a Diabetes Educator via email, please send to [email or web address].    Please follow the following email guidelines:    Subject line: Secure: your clinic name (example: Secure: [facility])  In the email please include: First name, middle initial, last name and date of birth.    We will be in touch with you within one (1) business day.             Follow-ups after your visit        Your next 10 appointments already scheduled     Jun 25, 2018  9:30 AM CDT   Diabetic Education with Deanna Bolton RN   [facility] ([facility])    [address] [address]   [phone]            Jul 06, 2018  8:00 AM CDT   Return Visit with MARICRUZ Castillo CNP   [facility] ([facility])     [address]  [facility] [address]   [phone]            Jul 09, 2018  9:30 AM CDT   Diabetic Education with Allie Matias   [facility] ([facility])    [address]  [facility] [address]   [phone]            Jul 23, 2018  9:30 AM CDT   Diabetic Education with Deanna Bolton RN   [facility] ([facility])    [address]  [facility] [address] [phone]              Who to contact     If you have questions or need follow up information about today's clinic visit or your schedule please contact [facility] directly at [phone].  Normal or non-critical lab and imaging results will be communicated to you by [employer], letter or phone within 4 business days after the clinic has received the results. If you do not hear from us within 7 days, please contact the clinic through [employer] or phone. If you have a critical or abnormal lab result, we will notify you by phone as soon as possible.  Submit refill requests through [employer] or call your pharmacy and they will forward the refill request to us. Please allow 3 business days for your refill to be completed.          Additional Information About Your Visit        Source[employer] Information     [employer] gives you secure access to your electronic health record. If you see a primary care provider, you can also send messages to your care team and make appointments. If you have questions, please call your primary care clinic.  If you do not have a primary care provider, please call [phone] and they will assist you.        Care EveryWhere ID     This is your Care EveryWhere ID. This could be used by other organizations to access your [facility] medical records  [id number]        Your Vitals Were     Last Period                   01/01/1999            Blood Pressure from Last 3 Encounters:   04/02/18 144/76    03/22/18 154/80   02/27/18 (!) 139/98    Weight from Last 3 Encounters:   04/02/18 87.5 kg (193 lb)   03/22/18 86.7 kg (191 lb 1.6 oz)   02/27/18 85.3 kg (188 lb)              Today, you had the following     No orders found for display       Primary Care Provider Office Phone # Fax #    Annamaria Erickson [phone] [address] [place] [address]        Equal Access to Services     [facility]: Hadii aad ku hadasho Soomaali, waaxda luqadaha, qaybta kaalmada adeegyada, waxay idiin hayaan adeeg joseph dye . So [facility] [phone].    ATENCIÓN: Si habla español, tiene a richter disposición servicios gratuitos de asistencia lingüística. Llame al [phone].    We comply with applicable federal civil rights laws and [place] laws. We do not discriminate on the basis of race, color, national origin, age, disability, sex, sexual orientation, or gender identity.            Thank you!     Thank you for choosing [facility]  for your care. Our goal is always to provide you with excellent care. Hearing back from our patients is one way we can continue to improve our services. Please take a few minutes to complete the written survey that you may receive in the mail after your visit with us. Thank you!             Your Updated Medication List - Protect others around you: Learn how to safely use, store and throw away your medicines at www.disposemymeds.org.          This list is accurate as of 6/11/18 11:18 AM.  Always use your most recent med list.                   Brand Name Dispense Instructions for use Diagnosis    acetone (Urine) test Strp     50 each    1 strip by In Vitro route daily    Type 2 diabetes mellitus with hyperglycemia, with long-term current use of insulin (H)       * ASPIRIN NOT PRESCRIBED    INTENTIONAL    1 each    continuous prn Antiplatelet medication not prescribed intentionally due to age.        blood glucose monitoring test  strip    no brand specified    300 each    Freestyle test strips (NOT LITE or INSULINX) to be used with Omnipod pump test 6 times daily    Type 2 diabetes mellitus with hyperglycemia, with long-term current use of insulin (H)       EPINEPHrine 0.3 MG/0.3ML injection 2-pack    EPIPEN/ADRENACLICK/or ANY BX GENERIC EQUIV    2 each    Inject 0.3 mLs (0.3 mg) into the muscle once as needed for anaphylaxis    Allergic reaction       fluticasone 50 MCG/ACT spray    FLONASE    1 Bottle    Spray 2 sprays into both nostrils daily    Dysfunction of both eustachian tubes       glucagon 1 MG kit    GLUCAGON EMERGENCY    1 mg    Inject 1 mg into the muscle once for 1 dose    Type 2 diabetes mellitus with hyperglycemia, with long-term current use of insulin (H)       * insulin aspart 100 UNITS/ML injection    NovoLOG VIAL    3 vial    To be used in insulin pump est TDD 65-75 units    Type 2 diabetes mellitus with hyperglycemia, with long-term current use of insulin (H)       * insulin aspart 100 UNITS/ML injection    FIASP    70 mL    Up to 75 units sq daily via insulin pump    Type 2 diabetes mellitus with hyperglycemia, with long-term current use of insulin (H)       * insulin pen needle 31G X 5 MM    B-D U/F    550 each    Use 6  time(s) per day.  Please dispense as BD Pen Needle Mini U/F 31G x 5 MM    Type 2 diabetes mellitus with hyperglycemia (H)       * insulin pen needle 31G X 5 MM    B-D U/F    300 each    Use 6 daily or as directed.    Type 2 diabetes mellitus with hyperglycemia (H)       * insulin pen needle 31G X 5 MM    B-D U/F    200 each    Use 5 daily or as directed.    Type 2 diabetes mellitus with hyperglycemia (H)       * insulin pump infusion      Date last updated:  5/29/18 Omnipod BASAL RATES and times: REGULAR PATTERN: 12am: 1.55 6 AM:  1.45 12pm: 1.3 6pm: 1.45 ANXIETY PATTERN: 12am: 1.65  6 AM: 1.55 12pm: 1.4 6pm: 1.55 CRAZY SUGAR PATTERN: 12am: 1.75  6 AM: 1.65 12pm: 1.5 6pm: 1.65 CARB RATIO and times:  "12am-12am: 6 Corection Factor (Sensitivity) and times: 12   AM (midnight): 25 5 AM: 29 BLOOD GLUCOSE TARGET and times: 12   AM (midnight): 100, correct above 120 Active Insulin Time:  4 hours        insulin syringe-needle U-100 31G X 5/16\" 1 ML    BD insulin syringe ULTRAFINE    100 each    Use one syringe daily or as directed.    Type 2 diabetes mellitus with hyperglycemia, with long-term current use of insulin (H)       polyethylene glycol Packet    MIRALAX/GLYCOLAX     Take 1 packet by mouth daily.        STATIN NOT PRESCRIBED (INTENTIONAL)     0 each    Statin not prescribed intentionally due to Intolerance    Statin intolerance       sulindac 200 MG tablet    CLINORIL     Take 200 mg by mouth 3 times daily        traMADol 50 MG tablet    ULTRAM     Take 50 mg by mouth 3 times daily 1-2 tablets in the evening        VOLTAREN 1 % Gel topical gel   Generic drug:  diclofenac     100 g    Apply topically nightly as needed for moderate pain Apply 4 grams to knees or 2 grams to hands four times daily using enclosed dosing card.        * Notice:  This list has 7 medication(s) that are the same as other medications prescribed for you. Read the directions carefully, and ask your doctor or other care provider to review them with you.      "

## 2018-06-11 NOTE — PATIENT INSTRUCTIONS
My Diabetes Care Goals:    1. Basal rate increased during the night from 12:00 am to 6:00 am:  Regular rate:  1.55 to --> 1.6  Anxiety rate: 1.65 to --> 1.7  Crazy Sugars: 1.75 to --> 1.8    2. Sensitivity changed at 12:00 am to 5:00 am from 25 --> to 23.     Allie Matias RD, LD, CDE  Diabetes       [facility] for the [facility]:  For Your Diabetes Education and Nutrition Appointments Call:  [phone]   For Diabetes Education or Nutrition Related Questions:   Phone: [phone]  E-mail: [email or web address]  Fax: [phone]   If you need a medication refill please contact your pharmacy. Please allow 3 business days for your refills to be completed.    Instructions for emailing the Diabetes Educators    If you need to communicate a non-urgent message to a Diabetes Educator via email, please send to [email or web address].    Please follow the following email guidelines:    Subject line: Secure: your clinic name (example: Secure: [facility])  In the email please include: First name, middle initial, last name and date of birth.    We will be in touch with you within one (1) business day.

## 2018-06-11 NOTE — LETTER
"    6/11/2018         RE: Maricarmen Dotson  [address] [place] [address]        Dear Colleague,    Thank you for referring your patient, Maricarmen Dotson, to the [facility] [place]. Please see a copy of my visit note below.    Diabetes Self Management Training: Insulin Pump    SUBJECTIVE:  Maricarmen Dotson presents today for evaluation of glucose control related to Type 2 diabetes.    She is accompanied by self    Patient concerns: is concerned about high blood sugars in the morning, has been using \"crazy sugars\" pattern last couple of days- feels more comfortable changing basal pattern as needed, is wondering however- if her overnight settings should increase.    ASSESSMENT:  Patient Problem List and Family Medical History reviewed for relevant medical history, current medical status, and diabetes risk factors.     Current Diabetes Management per Patient:  Insulin Pump Type: OmniPod     Taking other diabetes medications?   No  Diabetes Medication(s)     Diabetic Other Sig    glucagon (GLUCAGON EMERGENCY) 1 MG kit Inject 1 mg into the muscle once for 1 dose    Insulin Sig    insulin aspart (FIASP) 100 UNITS/ML injection Up to 75 units sq daily via insulin pump    insulin aspart (NOVOLOG VIAL) 100 UNITS/ML injection To be used in insulin pump est TDD 65-75 units    INSULIN PUMP - OUTPATIENT Date last updated:  5/29/18  Omnipod  BASAL RATES and times:  REGULAR PATTERN:  12am: 1.55  6 AM:  1.45  12pm: 1.3  6pm: 1.45  ANXIETY PATTERN:  12am: 1.65   6 AM: 1.55  12pm: 1.4  6pm: 1.55  CRAZY SUGAR PATTERN:  12am: 1.75   6 AM: 1.65  12pm: 1.5  6pm: 1.65  CARB RATIO and times:  12am-12am: 6  Corection Factor (Sensitivity) and times:  12   AM (midnight): 25  5 AM: 29  BLOOD GLUCOSE TARGET and times:  12   AM (midnight): 100, correct above 120  Active Insulin Time:  4 hours             Blood Glucose Results and Insulin Use:               Hypoglycemia:  none      Nutrition:  Patient continues to " "struggle with binge eating during the night- however overall \"eating less\", takes a correction afterward but does not bolus before the food (due to complicated stress response)    Physical Activity:    Walking 4 miles qod, and 1 1/2 miles on alternate days.    Current Pump Settings: See Insulin Pump - Outpatient in Medication List for complete list of settings.         SENSOR USE:  Using Continuous Glucose Monitor in conjunction with pump? No      INTERVENTION:  Patient would benefit from increase in basal rate overnight  and decrease in correction/sensitivity overnight.    Changes made to pump settings:  Basal rate increased during the night from 12:00 am to 6:00 am:  Regular rate:  1.55 to --> 1.6  Anxiety rate: 1.65 to --> 1.7  Crazy Sugars: 1.75 to --> 1.8    Sensitivity changed at 12:00 am to 5:00 am from 25 --> to 23.    Education provided today on:  AADE Self-Care Behaviors:  Problem Solving: high blood glucose - causes, signs/symptoms, treatment and prevention    Education specific to insulin pump provided today on:   Reviewed difference between BG correction and bolus for food, ways to compensate for missed bolus by using a higher basal rate along with stronger (lower) sensitivity factor    Education Materials Provided:  [facility] Understanding Diabetes Booklet, Safe Disposal Options for Needles & Syringes, BG Log Sheet and No new materials provided today    PLAN:  See Patient Instructions for co-developed, patient-stated behavior change goals.  AVS printed and provided to patient today.  Written instructions for pump setting changes given to patient.     FOLLOW-UP:  Follow-up appointment 2 weeks.  Chart routed to referring provider.      Allie Matias RD, CDE  Diabetes     Time Spent: 45 minutes  Encounter Type: Individual    Any diabetes medication dose changes were made via the CDE Protocol and Collaborative Practice Agreement with the patient's referring provider. A copy of this " encounter was shared with the provider

## 2018-06-11 NOTE — PROGRESS NOTES
"Diabetes Self Management Training: Insulin Pump    SUBJECTIVE:  Maricarmen Dotson presents today for evaluation of glucose control related to Type 2 diabetes.    She is accompanied by self    Patient concerns: is concerned about high blood sugars in the morning, has been using \"crazy sugars\" pattern last couple of days- feels more comfortable changing basal pattern as needed, is wondering however- if her overnight settings should increase.    ASSESSMENT:  Patient Problem List and Family Medical History reviewed for relevant medical history, current medical status, and diabetes risk factors.     Current Diabetes Management per Patient:  Insulin Pump Type: OmniPod     Taking other diabetes medications?   No  Diabetes Medication(s)     Diabetic Other Sig    glucagon (GLUCAGON EMERGENCY) 1 MG kit Inject 1 mg into the muscle once for 1 dose    Insulin Sig    insulin aspart (FIASP) 100 UNITS/ML injection Up to 75 units sq daily via insulin pump    insulin aspart (NOVOLOG VIAL) 100 UNITS/ML injection To be used in insulin pump est TDD 65-75 units    INSULIN PUMP - OUTPATIENT Date last updated:  5/29/18  Omnipod  BASAL RATES and times:  REGULAR PATTERN:  12am: 1.55  6 AM:  1.45  12pm: 1.3  6pm: 1.45  ANXIETY PATTERN:  12am: 1.65   6 AM: 1.55  12pm: 1.4  6pm: 1.55  CRAZY SUGAR PATTERN:  12am: 1.75   6 AM: 1.65  12pm: 1.5  6pm: 1.65  CARB RATIO and times:  12am-12am: 6  Corection Factor (Sensitivity) and times:  12   AM (midnight): 25  5 AM: 29  BLOOD GLUCOSE TARGET and times:  12   AM (midnight): 100, correct above 120  Active Insulin Time:  4 hours             Blood Glucose Results and Insulin Use:               Hypoglycemia:  none      Nutrition:  Patient continues to struggle with binge eating during the night- however overall \"eating less\", takes a correction afterward but does not bolus before the food (due to complicated stress response)    Physical Activity:    Walking 4 miles qod, and 1 1/2 miles on alternate " days.    Current Pump Settings: See Insulin Pump - Outpatient in Medication List for complete list of settings.         SENSOR USE:  Using Continuous Glucose Monitor in conjunction with pump? No      INTERVENTION:  Patient would benefit from increase in basal rate overnight  and decrease in correction/sensitivity overnight.    Changes made to pump settings:  Basal rate increased during the night from 12:00 am to 6:00 am:  Regular rate:  1.55 to --> 1.6  Anxiety rate: 1.65 to --> 1.7  Crazy Sugars: 1.75 to --> 1.8    Sensitivity changed at 12:00 am to 5:00 am from 25 --> to 23.    Education provided today on:  AADE Self-Care Behaviors:  Problem Solving: high blood glucose - causes, signs/symptoms, treatment and prevention    Education specific to insulin pump provided today on:   Reviewed difference between BG correction and bolus for food, ways to compensate for missed bolus by using a higher basal rate along with stronger (lower) sensitivity factor    Education Materials Provided:  [facility] Understanding Diabetes Booklet, Safe Disposal Options for Needles & Syringes, BG Log Sheet and No new materials provided today    PLAN:  See Patient Instructions for co-developed, patient-stated behavior change goals.  AVS printed and provided to patient today.  Written instructions for pump setting changes given to patient.     FOLLOW-UP:  Follow-up appointment 2 weeks.  Chart routed to referring provider.      Allie Matias RD, CDE  Diabetes     Time Spent: 45 minutes  Encounter Type: Individual    Any diabetes medication dose changes were made via the CDE Protocol and Collaborative Practice Agreement with the patient's referring provider. A copy of this encounter was shared with the provider

## 2018-06-19 ENCOUNTER — MYC MEDICAL ADVICE (OUTPATIENT)
Dept: EDUCATION SERVICES | Facility: CLINIC | Age: 58
End: 2018-06-19

## 2018-06-19 ENCOUNTER — MYC MEDICAL ADVICE (OUTPATIENT)
Dept: ENDOCRINOLOGY | Facility: CLINIC | Age: 58
End: 2018-06-19

## 2018-06-19 DIAGNOSIS — Z79.4 TYPE 2 DIABETES MELLITUS WITH HYPERGLYCEMIA, WITH LONG-TERM CURRENT USE OF INSULIN (H): Primary | ICD-10-CM

## 2018-06-19 DIAGNOSIS — E11.65 TYPE 2 DIABETES MELLITUS WITH HYPERGLYCEMIA, WITH LONG-TERM CURRENT USE OF INSULIN (H): Primary | ICD-10-CM

## 2018-06-20 ENCOUNTER — PATIENT OUTREACH (OUTPATIENT)
Dept: CARE COORDINATION | Facility: CLINIC | Age: 58
End: 2018-06-20

## 2018-06-20 NOTE — LETTER
[facility] CARE COORDINATION  June 20, 2018    Maricarmen Dotson  [address]  [place] [address]      Dear Maricarmen,    I am a clinic care coordinator who works with Annamaria Erickson MD. I recently tried to call and was unable to reach you. I wanted to introduce myself and provide you with my contact information so that you can call me with questions or concerns about your health care. Below is a description of clinic care coordination and how I can further assist you.     The clinic care coordinator is a registered nurse and/or  who understand the health care system. The goal of clinic care coordination is to help you manage your health and improve access to the [facility] system in the most efficient manner. The registered nurse can assist you in meeting your health care goals by providing education, coordinating services, and strengthening the communication among your providers. The  can assist you with financial, behavioral, psychosocial, chemical dependency, counseling, and/or psychiatric resources.    Please feel free to contact me at [phone], with any questions or concerns. We at [facility] are focused on providing you with the highest-quality healthcare experience possible and that all starts with you.     I am sorry to hear you've been experiencing difficulty in obtaining your insulin; please note, our Diabetes Educator has outlined some helpful suggestions that may be alternatives to defray some of the newly increased costs. She has responded to your inquiry via StudyRoom.     Sincerely,     Josiane Fernandez

## 2018-06-20 NOTE — PROGRESS NOTES
Clinic Care Coordination Contact      CC RN received referral from Emily Phan CNP with endocrinology, re: assistance with affordability of insulin. Upon chart review, it looks like the Diabetes Educator, has taken the lead on offering some of the best options for coverage/alternatives.    CC RN notified Emily Phan that care coordination would defer to Diabetes Ed for guidance on this, as they are more the subject-matter experts on insulin pump supplies, medications, and alternatives.   The [facility] labs messages to the patient regarding this matter have not yet been read as of 06/20/18 @ 1045, so CC RN did phone outreach to patient to attempt to alert her of the dialogue and recommendations from the team.     Clinical Data: Care Coordinator Outreach  Outreach attempted x 1.  Left message on voicemail with call back information and requested return call.  Plan: Care Coordinator will mail out care coordination introduction letter with care coordinator contact information and explanation of care coordination services. Care Coordinator will do no further outreaches at this time; will defer to diabetes education's suggestions.     Josiane Fernandez, RN  Clinic Care Coordinator  [phone]

## 2018-06-21 ENCOUNTER — MYC MEDICAL ADVICE (OUTPATIENT)
Dept: ENDOCRINOLOGY | Facility: CLINIC | Age: 58
End: 2018-06-21

## 2018-06-21 DIAGNOSIS — Z79.4 TYPE 2 DIABETES MELLITUS WITH HYPERGLYCEMIA, WITH LONG-TERM CURRENT USE OF INSULIN (H): ICD-10-CM

## 2018-06-21 DIAGNOSIS — E11.65 TYPE 2 DIABETES MELLITUS WITH HYPERGLYCEMIA, WITH LONG-TERM CURRENT USE OF INSULIN (H): ICD-10-CM

## 2018-06-25 ENCOUNTER — ALLIED HEALTH/NURSE VISIT (OUTPATIENT)
Dept: EDUCATION SERVICES | Facility: CLINIC | Age: 58
End: 2018-06-25
Payer: COMMERCIAL

## 2018-06-25 DIAGNOSIS — E11.9 DIABETES MELLITUS, TYPE 2 (H): Primary | ICD-10-CM

## 2018-06-25 PROCEDURE — G0108 DIAB MANAGE TRN  PER INDIV: HCPCS

## 2018-06-25 NOTE — PROGRESS NOTES
"Diabetes Self Management Training: Insulin Pump Follow-up Visit    Maricarmen Dotson presents today for education and evaluation of glucose control related to Type 2 diabetes.    She is accompanied by self    Patient's diabetes management related comments/concerns: blood sugars are high because a lot of anxiety the last couple weeks.   Was still having problems with PDM now communicating very well with the pod and the time staying on track, ordered a new PDM and self set up with the assistance from a [employer] rep.     ASSESSMENT:  Patient Problem List and Family Medical History reviewed for relevant medical history, current medical status, and diabetes risk factors.    Current Diabetes Management per Patient:  Insulin Pump Type: OmniPod    Taking other diabetes medications? no      Blood Glucose Results and Insulin Use:           Current Pump Settings: See Insulin Pump - Outpatient in Medication List for complete list of settings.       BG values are: not in goal    Patient's most recent   Lab Results   Component Value Date    A1C 9.4 01/16/2018    is not meeting goal of <8.0    Nutrition:  Patient continues to struggle with binge eating during the night and takes a correction afterward but does not bolus before the food     Physical Activity:    No assessed    Diabetes Complications:  Not discussed today.      INTERVENTION:  Patient glucose above goal, she is currently using her regular basal pattern, recommend using \"anxiety pattern\" and if glucose continues above goal recommend change to \"crazy sugars pattern\"        Education provided today on:  AADE Self-Care Behaviors:  Problem Solving: review of low blood glucose - causes, signs/symptoms, treatment and prevention    Education specific to insulin pump provided today on:   pump button pressing and how to change patterns, importance of counting carbohydrates accurately    Pt verbalized understanding of concepts discussed and recommendations provided today.   "     Education Materials Provided:  No new materials provided    PLAN:  Basal pattern changed to anxiety, if glucose not in goal in 3 days change to crazy sugars pattern.      FOLLOW-UP:  Follow-up appointment scheduled on 7/9/18 with Allie.  Chart routed to referring provider.      Deanna Bolton RN,CDE   Time Spent: 30 minutes  Encounter Type: Individual      Any diabetes medication dose changes were made via the CDE Protocol and Collaborative Practice Agreement with the patient's referring provider. A copy of this encounter was shared with the provider.

## 2018-06-25 NOTE — LETTER
"    6/25/2018         RE: Maricarmen Dotson  [address] [place] [address]        Dear Colleague,    Thank you for referring your patient, Maricarmen Dotson, to the [facility]. Please see a copy of my visit note below.    Diabetes Self Management Training: Insulin Pump Follow-up Visit    Maricarmen Dotson presents today for education and evaluation of glucose control related to Type 2 diabetes.    She is accompanied by self    Patient's diabetes management related comments/concerns: blood sugars are high because a lot of anxiety the last couple weeks.   Was still having problems with PDM now communicating very well with the pod and the time staying on track, ordered a new PDM and self set up with the assistance from a [employer] rep.     ASSESSMENT:  Patient Problem List and Family Medical History reviewed for relevant medical history, current medical status, and diabetes risk factors.    Current Diabetes Management per Patient:  Insulin Pump Type: OmniPod    Taking other diabetes medications? no      Blood Glucose Results and Insulin Use:           Current Pump Settings: See Insulin Pump - Outpatient in Medication List for complete list of settings.       BG values are: not in goal    Patient's most recent   Lab Results   Component Value Date    A1C 9.4 01/16/2018    is not meeting goal of <8.0    Nutrition:  Patient continues to struggle with binge eating during the night and takes a correction afterward but does not bolus before the food     Physical Activity:    No assessed    Diabetes Complications:  Not discussed today.      INTERVENTION:  Patient glucose above goal, she is currently using her regular basal pattern, recommend using \"anxiety pattern\" and if glucose continues above goal recommend change to \"crazy sugars pattern\"        Education provided today on:  AADE Self-Care Behaviors:  Problem Solving: review of low blood glucose - causes, signs/symptoms, treatment and " prevention    Education specific to insulin pump provided today on:   pump button pressing and how to change patterns, importance of counting carbohydrates accurately    Pt verbalized understanding of concepts discussed and recommendations provided today.       Education Materials Provided:  No new materials provided    PLAN:  Basal pattern changed to anxiety, if glucose not in goal in 3 days change to crazy sugars pattern.      FOLLOW-UP:  Follow-up appointment scheduled on 7/9/18 with [facility]  Chart routed to referring provider.      Deanna Bolton RN,CDE   Time Spent: 30 minutes  Encounter Type: Individual      Any diabetes medication dose changes were made via the CDE Protocol and Collaborative Practice Agreement with the patient's referring provider. A copy of this encounter was shared with the provider.

## 2018-06-26 ENCOUNTER — OFFICE VISIT (OUTPATIENT)
Dept: PEDIATRICS | Facility: CLINIC | Age: 58
End: 2018-06-26
Payer: COMMERCIAL

## 2018-06-26 ENCOUNTER — TELEPHONE (OUTPATIENT)
Dept: PEDIATRICS | Facility: CLINIC | Age: 58
End: 2018-06-26

## 2018-06-26 VITALS
DIASTOLIC BLOOD PRESSURE: 80 MMHG | OXYGEN SATURATION: 94 % | HEIGHT: 64 IN | WEIGHT: 187.1 LBS | BODY MASS INDEX: 31.94 KG/M2 | HEART RATE: 70 BPM | TEMPERATURE: 98.6 F | SYSTOLIC BLOOD PRESSURE: 148 MMHG

## 2018-06-26 DIAGNOSIS — R51.9 ACUTE NONINTRACTABLE HEADACHE, UNSPECIFIED HEADACHE TYPE: ICD-10-CM

## 2018-06-26 DIAGNOSIS — Z79.4 TYPE 2 DIABETES MELLITUS WITH HYPERGLYCEMIA, WITH LONG-TERM CURRENT USE OF INSULIN (H): ICD-10-CM

## 2018-06-26 DIAGNOSIS — R00.2 PALPITATIONS: Primary | ICD-10-CM

## 2018-06-26 DIAGNOSIS — E11.65 TYPE 2 DIABETES MELLITUS WITH HYPERGLYCEMIA, WITH LONG-TERM CURRENT USE OF INSULIN (H): ICD-10-CM

## 2018-06-26 LAB
BASOPHILS # BLD AUTO: 0 10E9/L (ref 0–0.2)
BASOPHILS NFR BLD AUTO: 0.3 %
DIFFERENTIAL METHOD BLD: NORMAL
EOSINOPHIL # BLD AUTO: 0.3 10E9/L (ref 0–0.7)
EOSINOPHIL NFR BLD AUTO: 2.5 %
ERYTHROCYTE [DISTWIDTH] IN BLOOD BY AUTOMATED COUNT: 11.9 % (ref 10–15)
HBA1C MFR BLD: 8.7 % (ref 0–5.6)
HCT VFR BLD AUTO: 42.6 % (ref 35–47)
HGB BLD-MCNC: 14.5 G/DL (ref 11.7–15.7)
LYMPHOCYTES # BLD AUTO: 2.9 10E9/L (ref 0.8–5.3)
LYMPHOCYTES NFR BLD AUTO: 26.8 %
MCH RBC QN AUTO: 30.5 PG (ref 26.5–33)
MCHC RBC AUTO-ENTMCNC: 34 G/DL (ref 31.5–36.5)
MCV RBC AUTO: 90 FL (ref 78–100)
MONOCYTES # BLD AUTO: 0.5 10E9/L (ref 0–1.3)
MONOCYTES NFR BLD AUTO: 5.1 %
NEUTROPHILS # BLD AUTO: 7 10E9/L (ref 1.6–8.3)
NEUTROPHILS NFR BLD AUTO: 65.3 %
PLATELET # BLD AUTO: 255 10E9/L (ref 150–450)
RBC # BLD AUTO: 4.75 10E12/L (ref 3.8–5.2)
WBC # BLD AUTO: 10.7 10E9/L (ref 4–11)

## 2018-06-26 PROCEDURE — 93000 ELECTROCARDIOGRAM COMPLETE: CPT | Performed by: PHYSICIAN ASSISTANT

## 2018-06-26 PROCEDURE — 80053 COMPREHEN METABOLIC PANEL: CPT | Performed by: PHYSICIAN ASSISTANT

## 2018-06-26 PROCEDURE — 85025 COMPLETE CBC W/AUTO DIFF WBC: CPT | Performed by: PHYSICIAN ASSISTANT

## 2018-06-26 PROCEDURE — 36415 COLL VENOUS BLD VENIPUNCTURE: CPT | Performed by: PHYSICIAN ASSISTANT

## 2018-06-26 PROCEDURE — 99214 OFFICE O/P EST MOD 30 MIN: CPT | Performed by: PHYSICIAN ASSISTANT

## 2018-06-26 PROCEDURE — 84443 ASSAY THYROID STIM HORMONE: CPT | Performed by: PHYSICIAN ASSISTANT

## 2018-06-26 PROCEDURE — 80061 LIPID PANEL: CPT | Performed by: PHYSICIAN ASSISTANT

## 2018-06-26 PROCEDURE — 83036 HEMOGLOBIN GLYCOSYLATED A1C: CPT | Performed by: PHYSICIAN ASSISTANT

## 2018-06-26 RX ORDER — KETOROLAC TROMETHAMINE 30 MG/ML
60 INJECTION, SOLUTION INTRAMUSCULAR; INTRAVENOUS ONCE
Qty: 2 ML | Refills: 0 | OUTPATIENT
Start: 2018-06-26 | End: 2018-06-26

## 2018-06-26 NOTE — PROGRESS NOTES
"  SUBJECTIVE:   Maricarmen Dotson is a 57 year old female, accompanied by , who presents to clinic today for the following health issues:    Headache  Onset: 1 day ago--late morning (24 hours)    Description:   Location: bilateral in the frontal area, bilateral in the temporal area, bilateral in the occipital area   Character: throbbing pain, dull pain, sharp pain, squeezing pain, global  Frequency:  constant  Duration:  All day  Patient has been having more headaches frequently with lower blood sugars (90s)     Intensity: severe    Progression of Symptoms:  worsening and constant    Accompanying Signs & Symptoms:  Stiff neck: YES  Neck or upper back pain: no  Fever: no  Sinus pressure: YES  Nausea or vomiting: YES- nausea  Dizziness: YES  Numbness: no  Weakness: YES--diffuse    Visual changes: YES--blurry    History:   Head trauma: YES- last year  Family history of migraines: no  Previous tests for headaches: YES--history of migraines  Neurologist evaluations: no  Able to do daily activities: no  Wake with a headaches: YES  Didn't sleep last night  Do headaches wake you up: YES  Daily pain medication use: no  Work/school stressors/changes: no    Precipitating factors:   Does light make it worse: YES  Does sound make it worse: YES    Alleviating factors:  Does sleep help: no  Therapies Tried and outcome: tramadol with no relief    ROS:  ROS otherwise negative    OBJECTIVE:                                                    /80 (BP Location: Right arm, Cuff Size: Adult Regular)  Pulse 70  Temp 98.6  F (37  C) (Oral)  Ht 5' 3.5\" (1.613 m)  Wt 187 lb 1.6 oz (84.9 kg)  LMP 01/01/1999  SpO2 94%  BMI 32.62 kg/m2  Body mass index is 32.62 kg/(m^2).   GENERAL: alert, in darkened room  Eyes:  Eyes grossly normal to inspection, PERRL and conjunctivae and sclerae normal  HENT: ear canals- normal; TMs- normal; Nose- normal; Mouth- no ulcers, no lesions  NECK: no tenderness, no adenopathy  RESP: lungs clear to " auscultation - no rales, no rhonchi, no wheezes  CV: regular rates and irregular rhythm, normal S1 S2, no S3 or S4 and no murmur, no click or rub  SKIN: no LE edema    Diagnostic test results:  EKG reveals slight bradycardia at 59 and flipped T waves compared to previous EKG  Results for orders placed or performed in visit on 06/26/18 (from the past 24 hour(s))   CBC with platelets differential   Result Value Ref Range    WBC 10.7 4.0 - 11.0 10e9/L    RBC Count 4.75 3.8 - 5.2 10e12/L    Hemoglobin 14.5 11.7 - 15.7 g/dL    Hematocrit 42.6 35.0 - 47.0 %    MCV 90 78 - 100 fl    MCH 30.5 26.5 - 33.0 pg    MCHC 34.0 31.5 - 36.5 g/dL    RDW 11.9 10.0 - 15.0 %    Platelet Count 255 150 - 450 10e9/L    Diff Method Automated Method     % Neutrophils 65.3 %    % Lymphocytes 26.8 %    % Monocytes 5.1 %    % Eosinophils 2.5 %    % Basophils 0.3 %    Absolute Neutrophil 7.0 1.6 - 8.3 10e9/L    Absolute Lymphocytes 2.9 0.8 - 5.3 10e9/L    Absolute Monocytes 0.5 0.0 - 1.3 10e9/L    Absolute Eosinophils 0.3 0.0 - 0.7 10e9/L    Absolute Basophils 0.0 0.0 - 0.2 10e9/L   Hemoglobin A1c   Result Value Ref Range    Hemoglobin A1C 8.7 (H) 0 - 5.6 %        ASSESSMENT/PLAN:                                                    (R00.2) Palpitations  (primary encounter diagnosis)  Comment: obtain labs. Follow up with PMD to discuss further.   Plan: EKG 12-lead complete w/read - Clinics, TSH with        free T4 reflex, CBC with platelets         differential, Comprehensive metabolic panel            (R51) Acute nonintractable headache, unspecified headache type  Comment: proceed with toradol, fluids, rest.  Plan: ketorolac (TORADOL) 60 MG/2ML SOLN injection            (E11.65,  Z79.4) Type 2 diabetes mellitus with hyperglycemia, with long-term current use of insulin (H)  Comment: labs sent to endocrinology  Plan:       [facility]

## 2018-06-26 NOTE — TELEPHONE ENCOUNTER
"Patient has had a headache for the past two days.  Began yesterday late morning.  Described as a \"throbbing pain\".  Unable to sleep with this headache.  Is \"packing her head in ice\" for some relief.  Bending over makes the headache worse.  Nausea, but not vomiting.  Unable to eat due to the nausea.  Headache begins in her eyes and radiates to the top of her head bilaterally.  Has not had a migraine in years.  Neck muscles feel tight.  Stopped the Sulindac two weeks ago as she felt this was causing palpitations.  Takes Tramadol for chronic pain but not offering much relief from headache.  No weakness in any extremites.  Facial symmetry is normal.  Speech is clear.  Patient is alert and oriented.  Had MRI of the brain done last month and reports this was normal.  Appointment scheduled with CARLO Adams this morning.  Patient in agreement.  KYLEE Kraft RN      "

## 2018-06-26 NOTE — NURSING NOTE
The following medication was given:     MEDICATION: Toradol 60 mg  ROUTE: IM  SITE: Ventrogluteal - Right  DOSE: 2mL  LOT #: [id number]  :  Simple-Fill  EXPIRATION DATE:  08/19  NDC#: 746820  Obi Berrios CMA

## 2018-06-26 NOTE — MR AVS SNAPSHOT
After Visit Summary   6/26/2018    Maricarmen Dotson    MRN: [id number]           Patient Information     Date Of Birth          1960        Visit Information        Provider Department      6/26/2018 11:30 AM Michael Adams PA-C [facility]        Today's Diagnoses     Palpitations    -  1    Acute nonintractable headache, unspecified headache type        Type 2 diabetes mellitus with hyperglycemia, with long-term current use of insulin (H)           Follow-ups after your visit        Your next 10 appointments already scheduled     Jun 29, 2018 11:40 AM CDT   Office Visit with Annamaria Erickson MD   [facility]    [address]  [address]  [facility] [id number] [phone]           Bring a current list of meds and any records pertaining to this visit. For Physicals, please bring immunization records and any forms needing to be filled out. Please arrive 10 minutes early to complete paperwork.            Jul 06, 2018  8:00 AM CDT   Return Visit with MARICRUZ Castillo CNP   [facility]    [address]  [facility] [address]   [phone]            Jul 09, 2018  9:30 AM CDT   Diabetic Education with Allie Matias   [facility] ([facility])    [address]  [facility] [address]   [phone]            Jul 23, 2018  9:30 AM CDT   Diabetic Education with Deanna Bolton RN   [facility] ([facility])    [address] [facility] [address]   [phone]              Who to contact     If you have questions or need follow up information about today's clinic visit or your schedule please contact [facility] directly at [phone].  Normal or non-critical lab and imaging results will be communicated to you by Courtney, letter  "or phone within 4 business days after the clinic has received the results. If you do not hear from us within 7 days, please contact the clinic through Muzzley or phone. If you have a critical or abnormal lab result, we will notify you by phone as soon as possible.  Submit refill requests through Muzzley or call your pharmacy and they will forward the refill request to us. Please allow 3 business days for your refill to be completed.          Additional Information About Your Visit        Muzzley Information     Muzzley gives you secure access to your electronic health record. If you see a primary care provider, you can also send messages to your care team and make appointments. If you have questions, please call your primary care clinic.  If you do not have a primary care provider, please call [phone] and they will assist you.        Care EveryWhere ID     This is your Care EveryWhere ID. This could be used by other organizations to access your [place] medical records  [id number]        Your Vitals Were     Pulse Temperature Height Last Period Pulse Oximetry BMI (Body Mass Index)    70 98.6  F (37  C) (Oral) 5' 3.5\" (1.613 m) 01/01/1999 94% 32.62 kg/m2       Blood Pressure from Last 3 Encounters:   06/26/18 148/80   04/02/18 144/76   03/22/18 154/80    Weight from Last 3 Encounters:   06/26/18 187 lb 1.6 oz (84.9 kg)   04/02/18 193 lb (87.5 kg)   03/22/18 191 lb 1.6 oz (86.7 kg)              We Performed the Following     CBC with platelets differential     Comprehensive metabolic panel     EKG 12-lead complete w/read - Clinics     Hemoglobin A1c     Lipid panel reflex to direct LDL Fasting     TSH with free T4 reflex          Today's Medication Changes          These changes are accurate as of 6/26/18  1:04 PM.  If you have any questions, ask your nurse or doctor.               Start taking these medicines.        Dose/Directions    ketorolac 60 MG/2ML Soln injection   Commonly known as:  TORADOL   Used " for:  Acute nonintractable headache, unspecified headache type   Started by:  Michael Adams PA-C        Dose:  60 mg   Inject 2 mLs (60 mg) into the muscle once for 1 dose   Quantity:  2 mL   Refills:  0         Stop taking these medicines if you haven't already. Please contact your care team if you have questions.     sulindac 200 MG tablet   Commonly known as:  CLINORIL   Stopped by:  Michael Adams PA-C                Where to get your medicines      Some of these will need a paper prescription and others can be bought over the counter.  Ask your nurse if you have questions.     You don't need a prescription for these medications     ketorolac 60 MG/2ML Soln injection                Primary Care Provider Office Phone # Fax #    Annamaria Erickson [phone] [address] [place] [address]        Equal Access to Services     [facility]: Hadii peg thorntono Sosu, waaxda luqadaha, qaybta kaalmada mitra, dillon dye . So [facility] [phone].    ATENCIÓN: Si habla español, tiene a richter disposición servicios gratuitos de asistencia lingüística. Llame al [phone].    We comply with applicable federal civil rights laws and [place] laws. We do not discriminate on the basis of race, color, national origin, age, disability, sex, sexual orientation, or gender identity.            Thank you!     Thank you for choosing [facility]  for your care. Our goal is always to provide you with excellent care. Hearing back from our patients is one way we can continue to improve our services. Please take a few minutes to complete the written survey that you may receive in the mail after your visit with us. Thank you!             Your Updated Medication List - Protect others around you: Learn how to safely use, store and throw away your medicines at www.disposemymeds.org.          This list is accurate as of 6/26/18   1:04 PM.  Always use your most recent med list.                   Brand Name Dispense Instructions for use Diagnosis    acetone (Urine) test Strp     50 each    1 strip by In Vitro route daily    Type 2 diabetes mellitus with hyperglycemia, with long-term current use of insulin (H)       * ASPIRIN NOT PRESCRIBED    INTENTIONAL    1 each    continuous prn Antiplatelet medication not prescribed intentionally due to age.        blood glucose monitoring test strip    no brand specified    300 each    Freestyle test strips (NOT LITE or INSULINX) to be used with Omnipod pump test 6 times daily    Type 2 diabetes mellitus with hyperglycemia, with long-term current use of insulin (H)       EPINEPHrine 0.3 MG/0.3ML injection 2-pack    EPIPEN/ADRENACLICK/or ANY BX GENERIC EQUIV    2 each    Inject 0.3 mLs (0.3 mg) into the muscle once as needed for anaphylaxis    Allergic reaction       fluticasone 50 MCG/ACT spray    FLONASE    1 Bottle    Spray 2 sprays into both nostrils daily    Dysfunction of both eustachian tubes       glucagon 1 MG kit    GLUCAGON EMERGENCY    1 mg    Inject 1 mg into the muscle once for 1 dose    Type 2 diabetes mellitus with hyperglycemia, with long-term current use of insulin (H)       * insulin aspart 100 UNITS/ML injection    NovoLOG VIAL    3 vial    To be used in insulin pump est TDD 65-75 units    Type 2 diabetes mellitus with hyperglycemia, with long-term current use of insulin (H)       * insulin aspart 100 UNITS/ML injection    FIASP    70 mL    Up to 75 units sq daily via insulin pump    Type 2 diabetes mellitus with hyperglycemia, with long-term current use of insulin (H)       * insulin pen needle 31G X 5 MM    B-D U/F    550 each    Use 6  time(s) per day.  Please dispense as BD Pen Needle Mini U/F 31G x 5 MM    Type 2 diabetes mellitus with hyperglycemia (H)       * insulin pen needle 31G X 5 MM    B-D U/F    300 each    Use 6 daily or as directed.    Type 2 diabetes mellitus with  "hyperglycemia (H)       * insulin pen needle 31G X 5 MM    B-D U/F    200 each    Use 5 daily or as directed.    Type 2 diabetes mellitus with hyperglycemia (H)       * insulin pump infusion      Date last updated:  6/11/18 Omnipod BASAL RATES and times: REGULAR PATTERN: 12am: 1.60 6 AM:  1.45 12pm: 1.3 6pm: 1.45 ANXIETY PATTERN: 12am: 1.70  6 AM: 1.55 12pm: 1.4 6pm: 1.55 CRAZY SUGAR PATTERN: 12am: 1.80  6 AM: 1.65 12pm: 1.5 6pm: 1.65 CARB RATIO and times: 12am-12am: 6 Corection Factor (Sensitivity) and times: 12   AM (midnight): 23 5 AM: 29 BLOOD GLUCOSE TARGET and times: 12   AM (midnight): 100, correct above 120    Type 2 diabetes mellitus with hyperglycemia, with long-term current use of insulin (H)       insulin syringe-needle U-100 31G X 5/16\" 1 ML    BD insulin syringe ULTRAFINE    100 each    Use one syringe daily or as directed.    Type 2 diabetes mellitus with hyperglycemia, with long-term current use of insulin (H)       ketorolac 60 MG/2ML Soln injection    TORADOL    2 mL    Inject 2 mLs (60 mg) into the muscle once for 1 dose    Acute nonintractable headache, unspecified headache type       polyethylene glycol Packet    MIRALAX/GLYCOLAX     Take 1 packet by mouth daily.        STATIN NOT PRESCRIBED (INTENTIONAL)     0 each    Statin not prescribed intentionally due to Intolerance    Statin intolerance       traMADol 50 MG tablet    ULTRAM     Take 50 mg by mouth 3 times daily 1-2 tablets in the evening        VOLTAREN 1 % Gel topical gel   Generic drug:  diclofenac     100 g    Apply topically nightly as needed for moderate pain Apply 4 grams to knees or 2 grams to hands four times daily using enclosed dosing card.        * Notice:  This list has 7 medication(s) that are the same as other medications prescribed for you. Read the directions carefully, and ask your doctor or other care provider to review them with you.      "

## 2018-06-27 ENCOUNTER — MYC MEDICAL ADVICE (OUTPATIENT)
Dept: ENDOCRINOLOGY | Facility: CLINIC | Age: 58
End: 2018-06-27

## 2018-06-27 DIAGNOSIS — Z79.4 TYPE 2 DIABETES MELLITUS WITH MICROALBUMINURIA, WITH LONG-TERM CURRENT USE OF INSULIN (H): Primary | ICD-10-CM

## 2018-06-27 DIAGNOSIS — E11.29 TYPE 2 DIABETES MELLITUS WITH MICROALBUMINURIA, WITH LONG-TERM CURRENT USE OF INSULIN (H): Primary | ICD-10-CM

## 2018-06-27 DIAGNOSIS — Z96.41 INSULIN PUMP IN PLACE: ICD-10-CM

## 2018-06-27 DIAGNOSIS — Z79.4 TYPE 2 DIABETES MELLITUS WITH HYPERGLYCEMIA, WITH LONG-TERM CURRENT USE OF INSULIN (H): ICD-10-CM

## 2018-06-27 DIAGNOSIS — R80.9 TYPE 2 DIABETES MELLITUS WITH MICROALBUMINURIA, WITH LONG-TERM CURRENT USE OF INSULIN (H): Primary | ICD-10-CM

## 2018-06-27 DIAGNOSIS — E11.65 TYPE 2 DIABETES MELLITUS WITH HYPERGLYCEMIA, WITH LONG-TERM CURRENT USE OF INSULIN (H): ICD-10-CM

## 2018-06-27 LAB
ALBUMIN SERPL-MCNC: 4 G/DL (ref 3.4–5)
ALP SERPL-CCNC: 110 U/L (ref 40–150)
ALT SERPL W P-5'-P-CCNC: 23 U/L (ref 0–50)
ANION GAP SERPL CALCULATED.3IONS-SCNC: 7 MMOL/L (ref 3–14)
AST SERPL W P-5'-P-CCNC: 18 U/L (ref 0–45)
BILIRUB SERPL-MCNC: 0.6 MG/DL (ref 0.2–1.3)
BUN SERPL-MCNC: 10 MG/DL (ref 7–30)
CALCIUM SERPL-MCNC: 9.3 MG/DL (ref 8.5–10.1)
CHLORIDE SERPL-SCNC: 105 MMOL/L (ref 94–109)
CHOLEST SERPL-MCNC: 256 MG/DL
CO2 SERPL-SCNC: 28 MMOL/L (ref 20–32)
CREAT SERPL-MCNC: 0.71 MG/DL (ref 0.52–1.04)
GFR SERPL CREATININE-BSD FRML MDRD: 85 ML/MIN/1.7M2
GLUCOSE SERPL-MCNC: 143 MG/DL (ref 70–99)
HDLC SERPL-MCNC: 51 MG/DL
LDLC SERPL CALC-MCNC: 171 MG/DL
NONHDLC SERPL-MCNC: 205 MG/DL
POTASSIUM SERPL-SCNC: 3.6 MMOL/L (ref 3.4–5.3)
PROT SERPL-MCNC: 7.4 G/DL (ref 6.8–8.8)
SODIUM SERPL-SCNC: 140 MMOL/L (ref 133–144)
TRIGL SERPL-MCNC: 171 MG/DL
TSH SERPL DL<=0.005 MIU/L-ACNC: 0.83 MU/L (ref 0.4–4)

## 2018-06-27 NOTE — TELEPHONE ENCOUNTER
Patient called in again.  Worried that she is unable to pay for Rx.  Needs assistance from Emily to write a letter of necessity for her. Please call to discuss.

## 2018-06-27 NOTE — TELEPHONE ENCOUNTER
Emily, Please fax a prescription for a 90 day supply of the patients Novolog to [facility].  See Network for Good request regarding this.  Linda Holder M.A.

## 2018-06-27 NOTE — TELEPHONE ENCOUNTER
Sent the following Electronic Brailler message:    Alex ShanksEmily harvey has a written prescription here for you for Humalog with a 1 month free voucher.   (interchangeable with Novolog).  This would at least buy you a little more time.  You would need to come in and pick this up unless you prefer I walk it across the pfeiffer to our pharmacy here in the building in [place].   I tried to call you as I see you have several phone calls into the clinic.  I want to make sure I answer all of your concerns.   I think at this point, it would be much better to have these conversations either face to face or over the phone.  The number listed in your chart is no longer in service.  Do you have an updated phone number that I can reach you at?  I am confused about your message regarding the appeal.  You mention that you need to write an appeal but then at the end of your note, you mention letting you know if there is anything Emily needs from you to write the appeal.  Appeals can often be done by patients so I am confused as to who is writing the appeal.  I reviewed your chart and do not see a message from your insurance letting us know the appeal process.   Did you receive a letter from them?  Please call me so I can help you.   Ask to speak with Linda in the gold station.    Thank you,  Linda Holder M.A.  [facility]  [phone] [facility]

## 2018-06-27 NOTE — TELEPHONE ENCOUNTER
Please call - I have coupons for a free vial of Humalog.  She could come pick it up here along with a prescription for the Humalog.  At least that would buy her a little more time.  Emily Phan NP  Endocrinology

## 2018-06-28 ENCOUNTER — MYC MEDICAL ADVICE (OUTPATIENT)
Dept: ENDOCRINOLOGY | Facility: CLINIC | Age: 58
End: 2018-06-28

## 2018-06-28 NOTE — PROGRESS NOTES
Maricarmen,  Your cholesterol numbers are elevated.  I know that you have been unable to tolerate statins in the past.  Maybe there are other alternatives?  Have you considered seeing a cardiologist?  Your A1c improved from 9.4% to 8.7% so that's good!  Here's a copy of the results for your records.  Emily Phan NP  Endocrinology

## 2018-06-28 NOTE — TELEPHONE ENCOUNTER
Sent the following MyChart:    Alex Hernadez,    I see that you saw that Emily sent in the prescription for Novolog to [facility].  Sorry to hear the co-pay is so high.  Hopefully your appeal will be approved.  In the meantime, I was wondering what you decided regarding the prescription for Humalog that Emily Phan wrote for you.  It has a voucher that goes with it for a one month free sample.  Emily felt that this could buy you a little more time while they are considering your appeal for the Novolog.  The Humalog is interchangeable with the Novolog.  (the Humalog is not meant to be combined with Novolog.  Use them separately)  I have the prescription here.  I can either walk it over to our pharmacy here or you can pick it up.  Please let me know what you would like to do.    Thank you,  Linda Holder M.A.  Endocrinology  [facility]  [phone] [facility]

## 2018-06-28 NOTE — PROGRESS NOTES
SUBJECTIVE:   Maricarmen Dotson is a 57 year old female who presents to clinic today for the following health issues:    Follow up on same day provider visit 6/26/18 for headache, palpitations    Headache started last Wednesday. Had to go to bed. First time had headache that bad in years. Had a hard time breaking the headache. [place] nauseated. Saw Mary Ann on 6/26 because headache came back and could not break it. Had a Toradol injection. Helped minimally. Taking tramadol 2 pills with dinner and then 1 more before goes to bed - usual dosing.     Headaches start in eyes, starts to have blurred vision. Moves up to back of head. Throbbing pain. Also back of head/neck area. Using ice pack on top of head helps somewhat. Has been trying not to do things to exacerbate headache. Also sensitive to light. Symptoms triggered by activity. Has some pain all of the time. If goes in dark area and relaxes, will help to decrease. When worsens, starts with ice around shoulder and neck. If that doesn't help has to pack top of head. Worse when bending over.    Stopped sulindac about 3 weeks ago due to heart palpitations. They have decreased since stopping the medication. Has been gradually improving. BP also better with stopping. BP's were more 158/100 prior. Feels headaches are probably from withdrawal from sulindac. Has not been drinking any caffeine with headaches and palpitations. Used to drink caffeine 2-3 times a month. No other new or changed medications. Not using tylenol or NSAIDs. Previously with concussion with chronic headaches and tried multiple medications at that. Used to have a prn medication for her migraines many years ago, but does not recall the name. Daughter recently with Lyme, but patient without known tick bite or rash.    Continues with bilateral ear pain. Was prescribed flonase in the past but did not like the side effects.    Diabetes: price of insulin has drastically increased. Unable to afford any longer.  "Working with endo and diabetes ed to get things straightened out and find a more affordable option.     Reviewed and updated as needed this visit by clinical staff  Tobacco  Allergies  Meds  Problems  Med Hx  Surg Hx  Fam Hx  Soc Hx        Reviewed and updated as needed this visit by Provider  Allergies  Meds  Problems         -------------------------------------    ROS:  Constitutional, HEENT, cardiovascular, pulmonary, GI, , musculoskeletal, neuro, skin, endocrine and psych systems are negative, except as otherwise noted.    OBJECTIVE:                                                    /82 (BP Location: Right arm, Patient Position: Sitting, Cuff Size: Adult Large)  Pulse 65  Temp 99.3  F (37.4  C) (Tympanic)  Ht 5' 3.5\" (1.613 m)  Wt 187 lb 1.6 oz (84.9 kg)  LMP 01/01/1999  SpO2 98%  BMI 32.62 kg/m2   Body mass index is 32.62 kg/(m^2).  General Appearance: healthy, alert and no distress  Eyes:   no discharge, erythema.  Normal pupils. EOMI  Oropharynx: tongue mediline  Neck: Supple.  No adenopathy, no asymmetry, masses, or scars and thyroid normal to palpation  Respiratory: lungs clear to auscultation - no rales, rhonchi or wheezes.  Cardiovascular: regular rate and rhythm, normal S1 S2, no S3 or S4 and no murmur, click or rub.  No peripheral edema.  Skin: no rashes or lesions.  Well perfused and normal turgor.  NEURO: alert and oriented x3, CN II-XII intact. Normal tone and strength in all four extremities.  Normal rapid alternating movements and normal finger-nose-finger testing. Normal gait     Diagnostic Test Results:  Reviewed normal EKG, cmp, tsh, cbc from earlier this week     ASSESSMENT/PLAN:                                                      (R51) Chronic intractable headache, unspecified headache type  (primary encounter diagnosis)  Comment: could be medication withdrawal headaches as she was on the sulindac for a number of years prior to stopping the medication. Could also be " recurrence of migraines. Less likely intracranial pathology with normal MRI 1 year ago, but still possible. Could also be getting cluster headaches. Had similar last summer. No red flag symptoms.  Plan: SUMAtriptan (IMITREX) 50 MG tablet  - will continue tramadol and ice  - imitrex for more severe symptoms. - discussed possible symptoms  - if not improving, would consider trial of low dose propranolol as preventive - has not tolerated many of the other preventives in the past  - if does not respond to propranolol, would recommend see Neurology     (R00.2) Palpitations  Comment: improving with stopping sulindac. Has normal cardiac exam today and reassuring EKG a couple of days ago  Plan: will monitor for symptoms  - if do not completely resolve over next few weeks, would consider holter monitor    (E11.29) Type 2 diabetes  Comment: working with endo and on a pump currently. Having issues with cost of insulin    A total of 40 minutes was spent in face-to-face contact with Maricarmen in clinic today, of which >50% was spent counseling or in coordination of care regarding headaches and palpitations.    FUTURE APPOINTMENTS:       - Follow-up visit in 1 month if palpitations not improving    [facility]

## 2018-06-29 ENCOUNTER — OFFICE VISIT (OUTPATIENT)
Dept: PEDIATRICS | Facility: CLINIC | Age: 58
End: 2018-06-29
Payer: COMMERCIAL

## 2018-06-29 VITALS
HEIGHT: 64 IN | SYSTOLIC BLOOD PRESSURE: 136 MMHG | BODY MASS INDEX: 31.94 KG/M2 | HEART RATE: 65 BPM | TEMPERATURE: 99.3 F | OXYGEN SATURATION: 98 % | WEIGHT: 187.1 LBS | DIASTOLIC BLOOD PRESSURE: 82 MMHG

## 2018-06-29 DIAGNOSIS — G89.29 CHRONIC INTRACTABLE HEADACHE, UNSPECIFIED HEADACHE TYPE: Primary | ICD-10-CM

## 2018-06-29 DIAGNOSIS — R80.9 TYPE 2 DIABETES MELLITUS WITH MICROALBUMINURIA, WITH LONG-TERM CURRENT USE OF INSULIN (H): ICD-10-CM

## 2018-06-29 DIAGNOSIS — R00.2 PALPITATIONS: ICD-10-CM

## 2018-06-29 DIAGNOSIS — R51.9 CHRONIC INTRACTABLE HEADACHE, UNSPECIFIED HEADACHE TYPE: Primary | ICD-10-CM

## 2018-06-29 DIAGNOSIS — Z79.4 TYPE 2 DIABETES MELLITUS WITH MICROALBUMINURIA, WITH LONG-TERM CURRENT USE OF INSULIN (H): ICD-10-CM

## 2018-06-29 DIAGNOSIS — E11.29 TYPE 2 DIABETES MELLITUS WITH MICROALBUMINURIA, WITH LONG-TERM CURRENT USE OF INSULIN (H): ICD-10-CM

## 2018-06-29 PROCEDURE — 99215 OFFICE O/P EST HI 40 MIN: CPT | Performed by: INTERNAL MEDICINE

## 2018-06-29 RX ORDER — SUMATRIPTAN 50 MG/1
50-100 TABLET, FILM COATED ORAL
Qty: 9 TABLET | Refills: 1 | Status: SHIPPED | OUTPATIENT
Start: 2018-06-29 | End: 2018-07-06

## 2018-06-29 ASSESSMENT — PATIENT HEALTH QUESTIONNAIRE - PHQ9: 5. POOR APPETITE OR OVEREATING: SEVERAL DAYS

## 2018-06-29 ASSESSMENT — ANXIETY QUESTIONNAIRES
2. NOT BEING ABLE TO STOP OR CONTROL WORRYING: SEVERAL DAYS
5. BEING SO RESTLESS THAT IT IS HARD TO SIT STILL: SEVERAL DAYS
GAD7 TOTAL SCORE: 11
1. FEELING NERVOUS, ANXIOUS, OR ON EDGE: NEARLY EVERY DAY
3. WORRYING TOO MUCH ABOUT DIFFERENT THINGS: SEVERAL DAYS
6. BECOMING EASILY ANNOYED OR IRRITABLE: MORE THAN HALF THE DAYS
7. FEELING AFRAID AS IF SOMETHING AWFUL MIGHT HAPPEN: MORE THAN HALF THE DAYS
IF YOU CHECKED OFF ANY PROBLEMS ON THIS QUESTIONNAIRE, HOW DIFFICULT HAVE THESE PROBLEMS MADE IT FOR YOU TO DO YOUR WORK, TAKE CARE OF THINGS AT HOME, OR GET ALONG WITH OTHER PEOPLE: VERY DIFFICULT

## 2018-06-29 NOTE — LETTER
My Depression Action Plan  Name: Maricarmen Dotson   Date of Birth 1960  Date: 6/29/2018    My doctor: Annamaria Erickson   My clinic: [address]  [address]  [facility] [address] [phone]          GREEN    ZONE   Good Control    What it looks like:     Things are going generally well. You have normal up s and down s. You may even feel depressed from time to time, but bad moods usually last less than a day.   What you need to do:  1. Continue to care for yourself (see self care plan)  2. Check your depression survival kit and update it as needed  3. Follow your physician s recommendations including any medication.  4. Do not stop taking medication unless you consult with your physician first.           YELLOW         ZONE Getting Worse    What it looks like:     Depression is starting to interfere with your life.     It may be hard to get out of bed; you may be starting to isolate yourself from others.    Symptoms of depression are starting to last most all day and this has happened for several days.     You may have suicidal thoughts but they are not constant.   What you need to do:     1. Call your care team, your response to treatment will improve if you keep your care team informed of your progress. Yellow periods are signs an adjustment may need to be made.     2. Continue your self-care, even if you have to fake it!    3. Talk to someone in your support network    4. Open up your depression survival kit           RED    ZONE Medical Alert - Get Help    What it looks like:     Depression is seriously interfering with your life.     You may experience these or other symptoms: You can t get out of bed most days, can t work or engage in other necessary activities, you have trouble taking care of basic hygiene, or basic responsibilities, thoughts of suicide or death that will not go away, self-injurious behavior.     What you need to do:  1. Call  your care team and request a same-day appointment. If they are not available (weekends or after hours) call your local crisis line, emergency room or 911.            Depression Self Care Plan / Survival Kit    Self-Care for Depression  Here s the deal. Your body and mind are really not as separate as most people think.  What you do and think affects how you feel and how you feel influences what you do and think. This means if you do things that people who feel good do, it will help you feel better.  Sometimes this is all it takes.  There is also a place for medication and therapy depending on how severe your depression is, so be sure to consult with your medical provider and/ or Behavioral Health Consultant if your symptoms are worsening or not improving.     In order to better manage my stress, I will:    Exercise  Get some form of exercise, every day. This will help reduce pain and release endorphins, the  feel good  chemicals in your brain. This is almost as good as taking antidepressants!  This is not the same as joining a gym and then never going! (they count on that by the way ) It can be as simple as just going for a walk or doing some gardening, anything that will get you moving.      Hygiene   Maintain good hygiene (Get out of bed in the morning, Make your bed, Brush your teeth, Take a shower, and Get dressed like you were going to work, even if you are unemployed).  If your clothes don't fit try to get ones that do.    Diet  I will strive to eat foods that are good for me, drink plenty of water, and avoid excessive sugar, caffeine, alcohol, and other mood-altering substances.  Some foods that are helpful in depression are: complex carbohydrates, B vitamins, flaxseed, fish or fish oil, fresh fruits and vegetables.    Psychotherapy  I agree to participate in Individual Therapy (if recommended).    Medication  If prescribed medications, I agree to take them.  Missing doses can result in serious side effects.   I understand that drinking alcohol, or other illicit drug use, may cause potential side effects.  I will not stop my medication abruptly without first discussing it with my provider.    Staying Connected With Others  I will stay in touch with my friends, family members, and my primary care provider/team.    Use your imagination  Be creative.  We all have a creative side; it doesn t matter if it s oil painting, sand castles, or mud pies! This will also kick up the endorphins.    Witness Beauty  (AKA stop and smell the roses) Take a look outside, even in mid-winter. Notice colors, textures. Watch the squirrels and birds.     Service to others  Be of service to others.  There is always someone else in need.  By helping others we can  get out of ourselves  and remember the really important things.  This also provides opportunities for practicing all the other parts of the program.    Humor  Laugh and be silly!  Adjust your TV habits for less news and crime-drama and more comedy.    Control your stress  Try breathing deep, massage therapy, biofeedback, and meditation. Find time to relax each day.     My support system    Clinic Contact:  Phone number:    Contact 1:  Phone number:    Contact 2:  Phone number:    [place]/:  Phone number:    Therapist:  Phone number:    [facility] crisis center:    Phone number:    Other community support:  Phone number:

## 2018-06-29 NOTE — PATIENT INSTRUCTIONS
1. Try sumatriptan if headache worsens - can repeat in 2 hours  - can take 1-2 pills at a time  2. If not improving, would try propranolol as a preventive to try to break headache cycle  3. Follow-up in about 1 month if palpitations are not fully gone

## 2018-06-29 NOTE — MR AVS SNAPSHOT
After Visit Summary   6/29/2018    Maricarmen Dotson    MRN: [id number]           Patient Information     Date Of Birth          1960        Visit Information        Provider Department      6/29/2018 11:40 AM [facility]kson [facility]        Today's Diagnoses     Chronic intractable headache, unspecified headache type    -  1      Care Instructions    1. Try sumatriptan if headache worsens - can repeat in 2 hours  - can take 1-2 pills at a time  2. If not improving, would try propranolol as a preventive to try to break headache cycle  3. Follow-up in about 1 month if palpitations are not fully gone          Follow-ups after your visit        Follow-up notes from your care team     Return in about 4 weeks (around 7/27/2018), or if symptoms worsen or fail to improve.      Your next 10 appointments already scheduled     Jul 06, 2018  8:00 AM CDT   [facility]    [address]  [facility] [address]   [phone]            Jul 09, 2018  9:30 AM CDT   Diabetic Education with Allie Matias   [facility] ([facility]    [address]  [facility] [address]   [phone]            Jul 23, 2018  9:30 AM CDT   Diabetic Education with Deanna Bolton RN   [facility] ([facility]    [address]  [facility] [address]   [phone]              Who to contact     If you have questions or need follow up information about today's clinic visit or your schedule please contact [facility] directly at [phone].  Normal or non-critical lab and imaging results will be communicated to you by MyChart, letter or phone within 4 business days after the clinic has received the results. If you do not hear from us within 7 days, please contact the clinic  "through [employer] or phone. If you have a critical or abnormal lab result, we will notify you by phone as soon as possible.  Submit refill requests through [employer] or call your pharmacy and they will forward the refill request to us. Please allow 3 business days for your refill to be completed.          Additional Information About Your Visit        [employer] Information     [employer] gives you secure access to your electronic health record. If you see a primary care provider, you can also send messages to your care team and make appointments. If you have questions, please call your primary care clinic.  If you do not have a primary care provider, please call [phone] and they will assist you.        Care EveryWhere ID     This is your Care EveryWhere ID. This could be used by other organizations to access your [facility] medical records  [id number]        Your Vitals Were     Pulse Temperature Height Last Period Pulse Oximetry BMI (Body Mass Index)    65 99.3  F (37.4  C) (Tympanic) 5' 3.5\" (1.613 m) 01/01/1999 98% 32.62 kg/m2       Blood Pressure from Last 3 Encounters:   06/29/18 136/82   06/26/18 148/80   04/02/18 144/76    Weight from Last 3 Encounters:   06/29/18 187 lb 1.6 oz (84.9 kg)   06/26/18 187 lb 1.6 oz (84.9 kg)   04/02/18 193 lb (87.5 kg)              Today, you had the following     No orders found for display         Today's Medication Changes          These changes are accurate as of 6/29/18 12:48 PM.  If you have any questions, ask your nurse or doctor.               Start taking these medicines.        Dose/Directions    SUMAtriptan 50 MG tablet   Commonly known as:  IMITREX   Used for:  Chronic intractable headache, unspecified headache type   Started by:  Annamaria Erickson MD        Dose:   mg   Take 1-2 tablets ( mg) by mouth at onset of headache for migraine May repeat in 2 hours. Max 4 tablets/24 hours.   Quantity:  9 tablet   Refills:  1            Where to get your " medicines      These medications were sent to [facility] [place] [facility] [place] - [address]   [address], [place] [address]     Phone:  [phone]     SUMAtriptan 50 MG tablet                Primary Care Provider Office Phone # Fax #    Annamaria Duc Erickson [phone] [address] [place] [address]        Equal Access to Services     [facility]: Hadii aad ku hadasho Soomaali, waaxda luqadaha, qaybta kaalmada adeegyada, waxay idiin hayaan adeeg kharash la'aan ah. So [facility] [phone].    ATENCIÓN: Si habla espcarrie, tiene a richter disposición servicios gratuitos de asistencia lingüística. Llame al [phone].    We comply with applicable federal civil rights laws and [place] laws. We do not discriminate on the basis of race, color, national origin, age, disability, sex, sexual orientation, or gender identity.            Thank you!     Thank you for choosing [facility]  for your care. Our goal is always to provide you with excellent care. Hearing back from our patients is one way we can continue to improve our services. Please take a few minutes to complete the written survey that you may receive in the mail after your visit with us. Thank you!             Your Updated Medication List - Protect others around you: Learn how to safely use, store and throw away your medicines at www.disposemymeds.org.          This list is accurate as of 6/29/18 12:48 PM.  Always use your most recent med list.                   Brand Name Dispense Instructions for use Diagnosis    acetone (Urine) test Strp     50 each    1 strip by In Vitro route daily    Type 2 diabetes mellitus with hyperglycemia, with long-term current use of insulin (H)       * ASPIRIN NOT PRESCRIBED    INTENTIONAL    1 each    continuous prn Antiplatelet medication not prescribed intentionally due to age.        blood glucose monitoring test strip    no brand  specified    300 each    Freestyle test strips (NOT LITE or INSULINX) to be used with Omnipod pump test 6 times daily    Type 2 diabetes mellitus with hyperglycemia, with long-term current use of insulin (H)       EPINEPHrine 0.3 MG/0.3ML injection 2-pack    EPIPEN/ADRENACLICK/or ANY BX GENERIC EQUIV    2 each    Inject 0.3 mLs (0.3 mg) into the muscle once as needed for anaphylaxis    Allergic reaction       glucagon 1 MG kit    GLUCAGON EMERGENCY    1 mg    Inject 1 mg into the muscle once for 1 dose    Type 2 diabetes mellitus with hyperglycemia, with long-term current use of insulin (H)       * insulin aspart 100 UNITS/ML injection    FIASP    70 mL    Up to 75 units sq daily via insulin pump    Type 2 diabetes mellitus with hyperglycemia, with long-term current use of insulin (H)       * insulin aspart 100 UNITS/ML injection    NovoLOG VIAL    3 vial    To be used in insulin pump est TDD 65-75 units    Type 2 diabetes mellitus with hyperglycemia, with long-term current use of insulin (H)       insulin lispro 100 UNIT/ML injection    humaLOG    10 mL    Up to 75 units per day via insulin pump    Type 2 diabetes mellitus with microalbuminuria, with long-term current use of insulin (H), Insulin pump in place       * insulin pen needle 31G X 5 MM    B-D U/F    550 each    Use 6  time(s) per day.  Please dispense as BD Pen Needle Mini U/F 31G x 5 MM    Type 2 diabetes mellitus with hyperglycemia (H)       * insulin pen needle 31G X 5 MM    B-D U/F    300 each    Use 6 daily or as directed.    Type 2 diabetes mellitus with hyperglycemia (H)       * insulin pen needle 31G X 5 MM    B-D U/F    200 each    Use 5 daily or as directed.    Type 2 diabetes mellitus with hyperglycemia (H)       * insulin pump infusion      Date last updated:  6/11/18 Omnipod BASAL RATES and times: REGULAR PATTERN: 12am: 1.60 6 AM:  1.45 12pm: 1.3 6pm: 1.45 ANXIETY PATTERN: 12am: 1.70  6 AM: 1.55 12pm: 1.4 6pm: 1.55 CRAZY SUGAR PATTERN: 12am:  "1.80  6 AM: 1.65 12pm: 1.5 6pm: 1.65 CARB RATIO and times: 12am-12am: 6 Corection Factor (Sensitivity) and times: 12   AM (midnight): 23 5 AM: 29 BLOOD GLUCOSE TARGET and times: 12   AM (midnight): 100, correct above 120    Type 2 diabetes mellitus with hyperglycemia, with long-term current use of insulin (H)       insulin syringe-needle U-100 31G X 5/16\" 1 ML    BD insulin syringe ULTRAFINE    100 each    Use one syringe daily or as directed.    Type 2 diabetes mellitus with hyperglycemia, with long-term current use of insulin (H)       polyethylene glycol Packet    MIRALAX/GLYCOLAX     Take 1 packet by mouth daily.        STATIN NOT PRESCRIBED (INTENTIONAL)     0 each    Statin not prescribed intentionally due to Intolerance    Statin intolerance       SUMAtriptan 50 MG tablet    IMITREX    9 tablet    Take 1-2 tablets ( mg) by mouth at onset of headache for migraine May repeat in 2 hours. Max 4 tablets/24 hours.    Chronic intractable headache, unspecified headache type       traMADol 50 MG tablet    ULTRAM     Take 50 mg by mouth 3 times daily 1-2 tablets in the evening        VOLTAREN 1 % Gel topical gel   Generic drug:  diclofenac     100 g    Apply topically nightly as needed for moderate pain Apply 4 grams to knees or 2 grams to hands four times daily using enclosed dosing card.        * Notice:  This list has 7 medication(s) that are the same as other medications prescribed for you. Read the directions carefully, and ask your doctor or other care provider to review them with you.      "

## 2018-06-30 ASSESSMENT — ANXIETY QUESTIONNAIRES: GAD7 TOTAL SCORE: 11

## 2018-06-30 ASSESSMENT — PATIENT HEALTH QUESTIONNAIRE - PHQ9: SUM OF ALL RESPONSES TO PHQ QUESTIONS 1-9: 11

## 2018-07-02 ENCOUNTER — MYC MEDICAL ADVICE (OUTPATIENT)
Dept: PEDIATRICS | Facility: CLINIC | Age: 58
End: 2018-07-02

## 2018-07-02 DIAGNOSIS — G89.29 CHRONIC INTRACTABLE HEADACHE, UNSPECIFIED HEADACHE TYPE: ICD-10-CM

## 2018-07-02 DIAGNOSIS — R51.9 CHRONIC INTRACTABLE HEADACHE, UNSPECIFIED HEADACHE TYPE: ICD-10-CM

## 2018-07-02 NOTE — TELEPHONE ENCOUNTER
Patient just filled RX on 06/29 and has used 5 pills.  Advised she has a refill at the pharmacy, and can contact them for a refill.  However, I think her insurance will decline to cover it this early.  May need to consider a preventative medication.  Per office appointment note of 06/29/18-will continue tramadol and ice  - imitrex for more severe symptoms. - discussed possible symptoms  - if not improving, would consider trial of low dose propranolol as preventive - has not tolerated many of the other preventives in the past  - if does not respond to propranolol, would recommend see Neurology      KYLEE Kraft RN.

## 2018-07-02 NOTE — TELEPHONE ENCOUNTER
Please find out how she has been taking the medication. I agree, she cannot take it every day or will cause rebound headaches. I can change the script to 100 mg if she is needing 2 when she takes it, but really should start a preventive medication if she is still having daily headaches. rx pended for propranolol and higher dose of imitrex. Would start propranolol at 80 mg daily for 5 days, then increase to 160 mg if she is tolerating (160 mg is typically the lowest effective dose)    Annamaria Erickson MD  Internal Medicine/Pediatrics

## 2018-07-03 ENCOUNTER — MYC MEDICAL ADVICE (OUTPATIENT)
Dept: ENDOCRINOLOGY | Facility: CLINIC | Age: 58
End: 2018-07-03

## 2018-07-03 DIAGNOSIS — Z79.4 TYPE 2 DIABETES MELLITUS WITH HYPERGLYCEMIA, WITH LONG-TERM CURRENT USE OF INSULIN (H): ICD-10-CM

## 2018-07-03 DIAGNOSIS — E11.65 TYPE 2 DIABETES MELLITUS WITH HYPERGLYCEMIA, WITH LONG-TERM CURRENT USE OF INSULIN (H): ICD-10-CM

## 2018-07-03 NOTE — TELEPHONE ENCOUNTER
The following message sent to the patient via [employer]:    Alex Hernadez    Emilycandice Phan is out of the office but will return on 7/05/18 and will be able to address your note then.  Just want to keep you in the loop so you know when to expect a reply.  Have a good 4th of July.    Thank you,  Linda Holder M.A.  Endocrinology  [facility]  [phone] [facility]

## 2018-07-05 ENCOUNTER — MYC MEDICAL ADVICE (OUTPATIENT)
Dept: ENDOCRINOLOGY | Facility: CLINIC | Age: 58
End: 2018-07-05

## 2018-07-05 DIAGNOSIS — E11.65 TYPE 2 DIABETES MELLITUS WITH HYPERGLYCEMIA, WITH LONG-TERM CURRENT USE OF INSULIN (H): ICD-10-CM

## 2018-07-05 DIAGNOSIS — Z79.4 TYPE 2 DIABETES MELLITUS WITH HYPERGLYCEMIA, WITH LONG-TERM CURRENT USE OF INSULIN (H): ICD-10-CM

## 2018-07-05 NOTE — TELEPHONE ENCOUNTER
On review of previous note regarding this request it is noted that the prescription was sent to the wrong pharmacy.  Please send prescription to the correct pharmacy.  Pharmacy updated in this note to reflect [facility] in [place] off [address].  Patient will need to be notified via MyChart when this has been done.  Linda Holder M.A.

## 2018-07-06 ENCOUNTER — MYC MEDICAL ADVICE (OUTPATIENT)
Dept: ENDOCRINOLOGY | Facility: CLINIC | Age: 58
End: 2018-07-06

## 2018-07-06 ENCOUNTER — TELEPHONE (OUTPATIENT)
Dept: ENDOCRINOLOGY | Facility: CLINIC | Age: 58
End: 2018-07-06

## 2018-07-06 ENCOUNTER — OFFICE VISIT (OUTPATIENT)
Dept: ENDOCRINOLOGY | Facility: CLINIC | Age: 58
End: 2018-07-06
Payer: COMMERCIAL

## 2018-07-06 VITALS
TEMPERATURE: 98.7 F | BODY MASS INDEX: 32.59 KG/M2 | OXYGEN SATURATION: 98 % | WEIGHT: 186.9 LBS | DIASTOLIC BLOOD PRESSURE: 90 MMHG | SYSTOLIC BLOOD PRESSURE: 150 MMHG | HEART RATE: 64 BPM

## 2018-07-06 DIAGNOSIS — Z79.4 TYPE 2 DIABETES MELLITUS WITH HYPERGLYCEMIA, WITH LONG-TERM CURRENT USE OF INSULIN (H): ICD-10-CM

## 2018-07-06 DIAGNOSIS — E11.65 TYPE 2 DIABETES MELLITUS WITH HYPERGLYCEMIA, WITH LONG-TERM CURRENT USE OF INSULIN (H): ICD-10-CM

## 2018-07-06 PROCEDURE — 99213 OFFICE O/P EST LOW 20 MIN: CPT | Performed by: CLINICAL NURSE SPECIALIST

## 2018-07-06 RX ORDER — SUMATRIPTAN 100 MG/1
100 TABLET, FILM COATED ORAL
Qty: 9 TABLET | Refills: 1 | Status: CANCELLED | OUTPATIENT
Start: 2018-07-06

## 2018-07-06 RX ORDER — PROPRANOLOL HYDROCHLORIDE 80 MG/1
80 CAPSULE, EXTENDED RELEASE ORAL DAILY
Qty: 60 CAPSULE | Refills: 1 | Status: CANCELLED | OUTPATIENT
Start: 2018-07-06

## 2018-07-06 NOTE — PATIENT INSTRUCTIONS
Patient to follow up with Primary Care provider regarding elevated blood pressure.    Component      Latest Ref Rng & Units 11/30/2016 3/30/2017 1/16/2018 6/26/2018   Hemoglobin A1C      0 - 5.6 % 12.2 (H) 9.7 (H) 9.4 (H) 8.7 (H)

## 2018-07-06 NOTE — LETTER
7/6/2018         RE: Maricarmen Dotson  [address]  [place] [address]        Dear Colleague,    Thank you for referring your patient, Maricarmen Dotson, to the [facility]. Please see a copy of my visit note below.    Name: Maricarmen Dotson  F/u for Diabetes (Last seen 3/22/2018).  HPI:  Maricarmen Dotson is a 57 year old female who presents for the evaluation/management of:    1. Type 2 DM:  Originally diagnosed: in 1997 after starting Paxil and gaining 32 lbs in one month  Comes in today accompanied by her daughter.    Insulin pump start 2/20/2018.    Blood sugar range:   Having issues with insurance coverage for Novolog - high out of pocket co-pay, can't afford it, is appealing    Current Regimen:   Insulin pump - Omnipod  Currently using pattern 2   Time Rate (U/hr)   0000 1.600   06:00 1.450   12:00 1.300   18:00 1.450   Pattern 2 (anxiety)  Time Rate (U/hr)   0000 1.700   06:00 1.550   12:00 1.400   18:00 1.550   Pattern 3 (crazy sugars)  Time Rate (U/hr)   0000 1.800   06:00 1.650   12:00 1.500   18:00 1.650     Carbohydrate Ratio -    Time Ratio   0000 6         Sensitivity   00:00  05:00    23  29   Active Insulin Time   hours   Basal  62% (35.1 units)   Bolus   38% (21.1 units)   Total Carbohydrates/day 80 g   Total Insulin/day  56.2 units   Average Blood Sugar 177   BS Checks 4.7 times a day   Target range  100-120       REPORTS PREVIOUS ADVERSE REACTION TO HUMALOG.  States she cannot take Humalog because it caused pancreatits.  Continues to struggle with a binge eating disorder.    Was previously treated by endo at [facility] (last seen there 11/6/2015).  She has had adverse reaction to most oral medications including Metformin (abdominal pain), glipizide (allergic type reaction), Avandia (abdominal pain and swelling), Byetta and Onglyza (pancreatitis).  Was previously prescribed Invokana but did not start because she was concerned about possible side effects.        History of  eating disorder (binge eating) due to PTSD.  Now working with a new counselor.      Saw Dr. Lovell for evaluation of her adrenal and pituitary glands 2016 - no abnormal adrenal or pituitary abnormalities were noted.      Complications:   Diabetes Complications  Description / Detail    Diabetic Retinopathy   No retinopathy, next exam 2017   CAD / PAD   No   Neuropathy   No   Nephropathy / Microalbuminuria   No   Gastroparesis  No   Hypoglycemia Unawareness  No     2. Intermittent Hypertension: Blood Pressure today:   BP Readings from Last 3 Encounters:   18 150/90   18 136/82   18 148/80   .  Blood pressure medications include no medications.  Takes medications everyday without forgetting a dose.    3. Lipids: Takes no medications for lipid control.        PMH/PSH:  Past Medical History:   Diagnosis Date     Ascending aorta enlargement (H)      Depressive disorder     severe, prior hospitalization     Diabetes mellitus, type 2 (H)      Diverticulosis of colon (without mention of hemorrhage)      Fibromyalgia 2007     Insomnia      Irritable bowel syndrome      Past Surgical History:   Procedure Laterality Date     C SPINAL ORTHOSIS,NOS      lumbar surgery     choley       HYSTERECTOMY, CERVIX STATUS UNKNOWN      TVH/BSO     Family Hx:  Family History   Problem Relation Age of Onset     Diabetes Mother      type 2     Hypertension Mother      Cerebrovascular Disease Mother       stroke age 57     Ovarian Cancer Mother 43     Cancer Mother      cervix     Diabetes Father      type 2     Hypertension Father      GASTROINTESTINAL DISEASE Father      colon polyps     Breast Cancer Sister      Ovarian Cancer Sister 46     Breast Cancer Sister      Ovarian Cancer Maternal Grandmother 72     Cancer Maternal Grandmother      cervix     Thyroid disease:          DM2: Yes, mother and father         Autoimmune: DM1, SLE, RA, Vitiligo     Social Hx:  Social History     Social History      Marital status:      Spouse name: N/A     Number of children: 3     Years of education: N/A     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[employer]     Social History Main Topics     Smoking status: Never Smoker     Smokeless tobacco: Never Used     Alcohol use 0.0 oz/week     0 Standard drinks or equivalent per week      Comment: maybe once a month     Drug use: No     Sexual activity: Yes     Partners: Male     Birth control/ protection: Surgical     Other Topics Concern     Not on file     Social History Narrative          MEDICATIONS:  has a current medication list which includes the following prescription(s): acetone (urine) test, aspirin not prescribed, blood glucose monitoring, diclofenac, epinephrine, glucagon, insulin aspart, insulin aspart, insulin pen needle, insulin pen needle, insulin pen needle, insulin pump, insulin syringe-needle u-100, polyethylene glycol, STATIN NOT PRESCRIBED, INTENTIONAL,, and tramadol.    ROS     ROS: 10 point ROS neg other than the symptoms noted above in the HPI.    Physical Exam   VS: /90 (BP Location: Left arm, Patient Position: Chair, Cuff Size: Adult Large)  Pulse 64  Temp 98.7  F (37.1  C) (Oral)  Wt 84.8 kg (186 lb 14.4 oz)  LMP 01/01/1999  SpO2 98%  BMI 32.59 kg/m2  GENERAL: NAD, well dressed, answering questions appropriately, appears stated age.  HEENT: OP clear, no exophthalmos, no proptosis, EOMI, no lig lag, no retraction, no scleral icterus  RESPIRATORY: Normal respiratory effort.  CARDIOVASCULAR: Regular rate.  No peripheral edema.  NEUROLOGY: CN grossly intact, no tremors  MSK: grossly intact, No digital cyanosis. Normal gait and station.  PSYCH: Intact judgment and insight. A&OX3 with a cordial affect.    LABS:  A1c:   Component      Latest Ref Rng & Units 11/30/2016 3/30/2017 1/16/2018 6/26/2018   Hemoglobin A1C      0 - 5.6 % 12.2 (H) 9.7 (H) 9.4 (H) 8.7 (H)     Basic Metabolic Panel:  !COMPREHENSIVE Latest Ref Rng &  Units 9/20/2017   SODIUM 133 - 144 mmol/L 136   POTASSIUM 3.4 - 5.3 mmol/L 4.0   CHLORIDE 94 - 109 mmol/L 102   CO2 mmol/L    BUN 7 - 30 mg/dL 14   Creatinine 0.52 - 1.04 mg/dL    Creatinine 0.52 - 1.04 mg/dL 0.72   Glucose 70 - 99 mg/dL 349 (H)   ANION GAP 3 - 14 mmol/L 11   CALCIUM 8.5 - 10.1 mg/dL 9.2     LFTS/Cholesterol Panel:  !LIPID/HEPATIC Latest Ref Rng 4/12/2016 7/20/2016   CHOLESTEROL <200 mg/dL     TRIGLYCERIDES 0 - 150 mg/dL     HDL CHOLESTEROL >50 mg/dL     LDL CHOLESTEROL DIRECT <100 mg/dL 160 (H)    LDL CHOLESTEROL, CALCULATED 0 - 129 mg/dL     VLDL-CHOLESTEROL 0 - 30 mg/dL     CHOLESTEROL/HDL RATIO 0.0 - 5.0       !LIPID/HEPATIC Latest Ref Rng & Units 7/20/2016 9/20/2017   AST 0 - 45 U/L 17 18   ALT 0 - 50 U/L 24 23     Thyroid Function:   !THYROID Latest Ref Rng & Units 9/20/2017   TSH 0.40 - 4.00 mU/L 1.18     Urine Microalbumin:  Component    Latest Ref Rng & Units 3/30/2017   Creatinine Urine    mg/dL 47   Albumin Urine mg/L    mg/L 207   Albumin Urine mg/g Cr    0 - 25 mg/g Cr 437.63 (H)       Vitamin D Deficiency screening    30 - 75 ug/L 34     All pertinent notes, labs, and images personally reviewed by me.     A/P  Ms.Anna ROXANNE Dotson is a 57 year old here for the evaluation/management of diabetes:    1. DM2 - Uncontrolled.    Her binge eating disorder continues to be a big contributing factor to her poor control, she has been seeing a therapist.    Blood sugars are improving since starting insulin pump therapy.  She does not want to make any pump changes today -she is concerned about increasing her insulin use until she can resolve insurance coverage issues or get some assistance with the out of pocket costs.    Continue to work with diabetes ed weekly for ongoing pump adjustments.  There is some variability among people, most will usually develop symptoms suggestive of hypoglycemia when blood glucose levels are lowered to the mid 60's. The first set of symptoms are called adrenergic.  Patients may experience any of the following nervousness, sweating, intense hunger, trembling, weakness, palpitations, and difficulty speaking.   The acute management of hypoglycemia involves the rapid delivery of a source of easily absorbed sugar. Regular soda, juice, lifesavers, table sugar, are good options. 15 grams of glucose is the dose that is given, followed by an assessment of symptoms and a blood glucose check if possible. If after 10 minutes there is no improvement, another 10-15 grams should be given. This can be repeated up to three times. The equivalency of 10-15 grams of glucose (approximate servings) are: 3-5 hard candies, 3 teaspoons of sugar, or 1/2 cup of regular soda or juice.   2.  Intermittent  Hypertension - Currently untreated.  Will continue to monitor. She does not want to take any additional oral meds due to multiple past med reactions.    3. Hyperlipidemia - Currently untreated-? Statin intolerance.  She is concerned about taking any oral medications due to previous adverse reactions.  Will continue to monitor.    Labs ordered today:   No orders of the defined types were placed in this encounter.  Urine microalbumin     Radiology/Consults ordered today: None    All questions were answered.  The patient indicates understanding of the above issues and agrees with the plan set forth.  Total face to face time discussing diabetes treatment and target BG levels was greater than or equal to 20 minutes.       Follow-up:  3 months    Emily Phan NP  Endocrinology  [facility]  CC: Annamaria Erickson                   Again, thank you for allowing me to participate in the care of your patient.        Sincerely,        MARICRUZ Castillo CNP

## 2018-07-06 NOTE — PROGRESS NOTES
Name: Maricarmen Dotson  F/u for Diabetes (Last seen 3/22/2018).  HPI:  Maricarmen Dotson is a 57 year old female who presents for the evaluation/management of:    1. Type 2 DM:  Originally diagnosed: in 1997 after starting Paxil and gaining 32 lbs in one month  Comes in today accompanied by her daughter.    Insulin pump start 2/20/2018.    Blood sugar range:   Having issues with insurance coverage for Novolog - high out of pocket co-pay, can't afford it, is appealing    Current Regimen:   Insulin pump - Omnipod  Currently using pattern 2   Time Rate (U/hr)   0000 1.600   06:00 1.450   12:00 1.300   18:00 1.450   Pattern 2 (anxiety)  Time Rate (U/hr)   0000 1.700   06:00 1.550   12:00 1.400   18:00 1.550   Pattern 3 (crazy sugars)  Time Rate (U/hr)   0000 1.800   06:00 1.650   12:00 1.500   18:00 1.650     Carbohydrate Ratio -    Time Ratio   0000 6         Sensitivity   00:00  05:00    23  29   Active Insulin Time   hours   Basal  62% (35.1 units)   Bolus   38% (21.1 units)   Total Carbohydrates/day 80 g   Total Insulin/day  56.2 units   Average Blood Sugar 177   BS Checks 4.7 times a day   Target range  100-120       REPORTS PREVIOUS ADVERSE REACTION TO HUMALOG.  States she cannot take Humalog because it caused pancreatits.  Continues to struggle with a binge eating disorder.    Was previously treated by endo at [facility] (last seen there 11/6/2015).  She has had adverse reaction to most oral medications including Metformin (abdominal pain), glipizide (allergic type reaction), Avandia (abdominal pain and swelling), Byetta and Onglyza (pancreatitis).  Was previously prescribed Invokana but did not start because she was concerned about possible side effects.        History of eating disorder (binge eating) due to PTSD.  Now working with a new counselor.      Saw Dr. Lovell for evaluation of her adrenal and pituitary glands 12/2016 - no abnormal adrenal or pituitary abnormalities were noted.      Complications:    Diabetes Complications  Description / Detail    Diabetic Retinopathy   No retinopathy, next exam 2017   CAD / PAD   No   Neuropathy   No   Nephropathy / Microalbuminuria   No   Gastroparesis  No   Hypoglycemia Unawareness  No     2. Intermittent Hypertension: Blood Pressure today:   BP Readings from Last 3 Encounters:   18 150/90   18 136/82   18 148/80   .  Blood pressure medications include no medications.  Takes medications everyday without forgetting a dose.    3. Lipids: Takes no medications for lipid control.        PMH/PSH:  Past Medical History:   Diagnosis Date     Ascending aorta enlargement (H)      Depressive disorder     severe, prior hospitalization     Diabetes mellitus, type 2 (H)      Diverticulosis of colon (without mention of hemorrhage)      Fibromyalgia 2007     Insomnia      Irritable bowel syndrome      Past Surgical History:   Procedure Laterality Date     C SPINAL ORTHOSIS,NOS      lumbar surgery     choley  2011     HYSTERECTOMY, CERVIX STATUS UNKNOWN      TVH/BSO     Family Hx:  Family History   Problem Relation Age of Onset     Diabetes Mother      type 2     Hypertension Mother      Cerebrovascular Disease Mother       stroke age 57     Ovarian Cancer Mother 43     Cancer Mother      cervix     Diabetes Father      type 2     Hypertension Father      GASTROINTESTINAL DISEASE Father      colon polyps     Breast Cancer Sister      Ovarian Cancer Sister 46     Breast Cancer Sister      Ovarian Cancer Maternal Grandmother 72     Cancer Maternal Grandmother      cervix     Thyroid disease:          DM2: Yes, mother and father         Autoimmune: DM1, SLE, RA, Vitiligo     Social Hx:  Social History     Social History     Marital status:      Spouse name: N/A     Number of children: 3     Years of education: N/A     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[employer]     Social History Main Topics     Smoking status: Never Smoker      Smokeless tobacco: Never Used     Alcohol use 0.0 oz/week     0 Standard drinks or equivalent per week      Comment: maybe once a month     Drug use: No     Sexual activity: Yes     Partners: Male     Birth control/ protection: Surgical     Other Topics Concern     Not on file     Social History Narrative          MEDICATIONS:  has a current medication list which includes the following prescription(s): acetone (urine) test, aspirin not prescribed, blood glucose monitoring, diclofenac, epinephrine, glucagon, insulin aspart, insulin aspart, insulin pen needle, insulin pen needle, insulin pen needle, insulin pump, insulin syringe-needle u-100, polyethylene glycol, STATIN NOT PRESCRIBED, INTENTIONAL,, and tramadol.    ROS     ROS: 10 point ROS neg other than the symptoms noted above in the HPI.    Physical Exam   VS: /90 (BP Location: Left arm, Patient Position: Chair, Cuff Size: Adult Large)  Pulse 64  Temp 98.7  F (37.1  C) (Oral)  Wt 84.8 kg (186 lb 14.4 oz)  LMP 01/01/1999  SpO2 98%  BMI 32.59 kg/m2  GENERAL: NAD, well dressed, answering questions appropriately, appears stated age.  HEENT: OP clear, no exophthalmos, no proptosis, EOMI, no lig lag, no retraction, no scleral icterus  RESPIRATORY: Normal respiratory effort.  CARDIOVASCULAR: Regular rate.  No peripheral edema.  NEUROLOGY: CN grossly intact, no tremors  MSK: grossly intact, No digital cyanosis. Normal gait and station.  PSYCH: Intact judgment and insight. A&OX3 with a cordial affect.    LABS:  A1c:   Component      Latest Ref Rng & Units 11/30/2016 3/30/2017 1/16/2018 6/26/2018   Hemoglobin A1C      0 - 5.6 % 12.2 (H) 9.7 (H) 9.4 (H) 8.7 (H)     Basic Metabolic Panel:  !COMPREHENSIVE Latest Ref Rng & Units 9/20/2017   SODIUM 133 - 144 mmol/L 136   POTASSIUM 3.4 - 5.3 mmol/L 4.0   CHLORIDE 94 - 109 mmol/L 102   CO2 mmol/L    BUN 7 - 30 mg/dL 14   Creatinine 0.52 - 1.04 mg/dL    Creatinine 0.52 - 1.04 mg/dL 0.72   Glucose 70 - 99 mg/dL 349  (H)   ANION GAP 3 - 14 mmol/L 11   CALCIUM 8.5 - 10.1 mg/dL 9.2     LFTS/Cholesterol Panel:  !LIPID/HEPATIC Latest Ref Rng 4/12/2016 7/20/2016   CHOLESTEROL <200 mg/dL     TRIGLYCERIDES 0 - 150 mg/dL     HDL CHOLESTEROL >50 mg/dL     LDL CHOLESTEROL DIRECT <100 mg/dL 160 (H)    LDL CHOLESTEROL, CALCULATED 0 - 129 mg/dL     VLDL-CHOLESTEROL 0 - 30 mg/dL     CHOLESTEROL/HDL RATIO 0.0 - 5.0       !LIPID/HEPATIC Latest Ref Rng & Units 7/20/2016 9/20/2017   AST 0 - 45 U/L 17 18   ALT 0 - 50 U/L 24 23     Thyroid Function:   !THYROID Latest Ref Rng & Units 9/20/2017   TSH 0.40 - 4.00 mU/L 1.18     Urine Microalbumin:  Component    Latest Ref Rng & Units 3/30/2017   Creatinine Urine    mg/dL 47   Albumin Urine mg/L    mg/L 207   Albumin Urine mg/g Cr    0 - 25 mg/g Cr 437.63 (H)       Vitamin D Deficiency screening    30 - 75 ug/L 34     All pertinent notes, labs, and images personally reviewed by me.     A/P  Ms.Anna ROXANNE Dotson is a 57 year old here for the evaluation/management of diabetes:    1. DM2 - Uncontrolled.    Her binge eating disorder continues to be a big contributing factor to her poor control, she has been seeing a therapist.    Blood sugars are improving since starting insulin pump therapy.  She does not want to make any pump changes today -she is concerned about increasing her insulin use until she can resolve insurance coverage issues or get some assistance with the out of pocket costs.    Continue to work with diabetes ed weekly for ongoing pump adjustments.  There is some variability among people, most will usually develop symptoms suggestive of hypoglycemia when blood glucose levels are lowered to the mid 60's. The first set of symptoms are called adrenergic. Patients may experience any of the following nervousness, sweating, intense hunger, trembling, weakness, palpitations, and difficulty speaking.   The acute management of hypoglycemia involves the rapid delivery of a source of easily absorbed  sugar. Regular soda, juice, lifesavers, table sugar, are good options. 15 grams of glucose is the dose that is given, followed by an assessment of symptoms and a blood glucose check if possible. If after 10 minutes there is no improvement, another 10-15 grams should be given. This can be repeated up to three times. The equivalency of 10-15 grams of glucose (approximate servings) are: 3-5 hard candies, 3 teaspoons of sugar, or 1/2 cup of regular soda or juice.   2.  Intermittent  Hypertension - Currently untreated.  Will continue to monitor. She does not want to take any additional oral meds due to multiple past med reactions.    3. Hyperlipidemia - Currently untreated-? Statin intolerance.  She is concerned about taking any oral medications due to previous adverse reactions.  Will continue to monitor.    Labs ordered today:   No orders of the defined types were placed in this encounter.  Urine microalbumin     Radiology/Consults ordered today: None    All questions were answered.  The patient indicates understanding of the above issues and agrees with the plan set forth.  Total face to face time discussing diabetes treatment and target BG levels was greater than or equal to 20 minutes.       Follow-up:  3 months    Emily Phan NP  Endocrinology  [facility]  CC: Annamaria Erickson

## 2018-07-06 NOTE — MR AVS SNAPSHOT
After Visit Summary   7/6/2018    Maricarmen Dotson    MRN: [id number]           Patient Information     Date Of Birth          1960        Visit Information        Provider Department      7/6/2018 8:00 AM Emily Phan APRN CNP [facility]        Today's Diagnoses     Type 2 diabetes mellitus with hyperglycemia, with long-term current use of insulin (H)          Care Instructions    Patient to follow up with Primary Care provider regarding elevated blood pressure.    Component      Latest Ref Rng & Units 11/30/2016 3/30/2017 1/16/2018 6/26/2018   Hemoglobin A1C      0 - 5.6 % 12.2 (H) 9.7 (H) 9.4 (H) 8.7 (H)             Follow-ups after your visit        Follow-up notes from your care team     Return in about 3 months (around 10/6/2018).      Your next 10 appointments already scheduled     Jul 09, 2018  9:30 AM CDT   Diabetic Education with Allie Matias   [facility] ([facility])    [address] [address] [phone]            Jul 23, 2018  9:30 AM CDT   Diabetic Education with Deanna Bolton RN   [facility] ([facility])    [address] [id number] [phone]              Who to contact     If you have questions or need follow up information about today's clinic visit or your schedule please contact [facility] directly at [phone].  Normal or non-critical lab and imaging results will be communicated to you by MyChart, letter or phone within 4 business days after the clinic has received the results. If you do not hear from us within 7 days, please contact the clinic through MyChart or phone. If you have a critical or abnormal lab result, we will notify you by phone as soon as possible.  Submit refill requests through [employer] or call your pharmacy and they will forward the refill request to us. Please  allow 3 business days for your refill to be completed.          Additional Information About Your Visit        [employer] Information     [employer] gives you secure access to your electronic health record. If you see a primary care provider, you can also send messages to your care team and make appointments. If you have questions, please call your primary care clinic.  If you do not have a primary care provider, please call [phone] and they will assist you.        Care EveryWhere ID     This is your Care EveryWhere ID. This could be used by other organizations to access your [place] medical records  [id number]        Your Vitals Were     Pulse Temperature Last Period Pulse Oximetry BMI (Body Mass Index)       64 98.7  F (37.1  C) (Oral) 01/01/1999 98% 32.59 kg/m2        Blood Pressure from Last 3 Encounters:   07/06/18 150/90   06/29/18 136/82   06/26/18 148/80    Weight from Last 3 Encounters:   07/06/18 84.8 kg (186 lb 14.4 oz)   06/29/18 84.9 kg (187 lb 1.6 oz)   06/26/18 84.9 kg (187 lb 1.6 oz)              Today, you had the following     No orders found for display         Today's Medication Changes          These changes are accurate as of 7/6/18  9:55 AM.  If you have any questions, ask your nurse or doctor.               These medicines have changed or have updated prescriptions.        Dose/Directions    insulin aspart 100 UNITS/ML injection   Commonly known as:  NovoLOG VIAL   This may have changed:  additional instructions   Used for:  Type 2 diabetes mellitus with hyperglycemia, with long-term current use of insulin (H)   Changed by:  Emily Phan APRN CNP        To be used in insulin pump. TDD 80 units   Quantity:  7 vial   Refills:  3       insulin pen needle 31G X 5 MM   Commonly known as:  B-D U/F   This may have changed:  Another medication with the same name was removed. Continue taking this medication, and follow the directions you see here.   Used for:  Type 2 diabetes mellitus with  hyperglycemia (H)   Changed by:  Emily Phan APRN CNP        Use 6  time(s) per day.  Please dispense as BD Pen Needle Mini U/F 31G x 5 MM   Quantity:  550 each   Refills:  3            Where to get your medicines      Some of these will need a paper prescription and others can be bought over the counter.  Ask your nurse if you have questions.     Bring a paper prescription for each of these medications     insulin aspart 100 UNITS/ML injection                Primary Care Provider Office Phone # Fax #    Annamaria Duc Erickson [phone] [address] [place] [address]        Equal Access to Services     [facility]: Hadii aad ku hadasho Soomaali, waaxda luqadaha, qaybta kaalmada rubinayamari, dillon dye . So [facility] [phone].    ATENCIÓN: Si habla español, tiene a richter disposición servicios gratuitos de asistencia lingüística. [facility] [phone].    We comply with applicable federal civil rights laws and [place] laws. We do not discriminate on the basis of race, color, national origin, age, disability, sex, sexual orientation, or gender identity.            Thank you!     Thank you for choosing [facility]  for your care. Our goal is always to provide you with excellent care. Hearing back from our patients is one way we can continue to improve our services. Please take a few minutes to complete the written survey that you may receive in the mail after your visit with us. Thank you!             Your Updated Medication List - Protect others around you: Learn how to safely use, store and throw away your medicines at www.disposemymeds.org.          This list is accurate as of 7/6/18  9:55 AM.  Always use your most recent med list.                   Brand Name Dispense Instructions for use Diagnosis    acetone (Urine) test Strp     50 each    1 strip by In Vitro route daily    Type 2 diabetes mellitus with hyperglycemia, with  "long-term current use of insulin (H)       * ASPIRIN NOT PRESCRIBED    INTENTIONAL    1 each    continuous prn Antiplatelet medication not prescribed intentionally due to age.        blood glucose monitoring test strip    no brand specified    300 each    Freestyle test strips (NOT LITE or INSULINX) to be used with Omnipod pump test 6 times daily    Type 2 diabetes mellitus with hyperglycemia, with long-term current use of insulin (H)       EPINEPHrine 0.3 MG/0.3ML injection 2-pack    EPIPEN/ADRENACLICK/or ANY BX GENERIC EQUIV    2 each    Inject 0.3 mLs (0.3 mg) into the muscle once as needed for anaphylaxis    Allergic reaction       glucagon 1 MG kit    GLUCAGON EMERGENCY    1 mg    Inject 1 mg into the muscle once for 1 dose    Type 2 diabetes mellitus with hyperglycemia, with long-term current use of insulin (H)       insulin aspart 100 UNITS/ML injection    NovoLOG VIAL    7 vial    To be used in insulin pump. TDD 80 units    Type 2 diabetes mellitus with hyperglycemia, with long-term current use of insulin (H)       insulin pen needle 31G X 5 MM    B-D U/F    550 each    Use 6  time(s) per day.  Please dispense as BD Pen Needle Mini U/F 31G x 5 MM    Type 2 diabetes mellitus with hyperglycemia (H)       * insulin pump infusion      Date last updated:  6/11/18 Omnipod BASAL RATES and times: REGULAR PATTERN: 12am: 1.60 6 AM:  1.45 12pm: 1.3 6pm: 1.45 ANXIETY PATTERN: 12am: 1.70  6 AM: 1.55 12pm: 1.4 6pm: 1.55 CRAZY SUGAR PATTERN: 12am: 1.80  6 AM: 1.65 12pm: 1.5 6pm: 1.65 CARB RATIO and times: 12am-12am: 6 Corection Factor (Sensitivity) and times: 12   AM (midnight): 23 5 AM: 29 BLOOD GLUCOSE TARGET and times: 12   AM (midnight): 100, correct above 120    Type 2 diabetes mellitus with hyperglycemia, with long-term current use of insulin (H)       insulin syringe-needle U-100 31G X 5/16\" 1 ML    BD insulin syringe ULTRAFINE    100 each    Use one syringe daily or as directed.    Type 2 diabetes mellitus with " hyperglycemia, with long-term current use of insulin (H)       polyethylene glycol Packet    MIRALAX/GLYCOLAX     Take 1 packet by mouth daily.        STATIN NOT PRESCRIBED (INTENTIONAL)     0 each    Statin not prescribed intentionally due to Intolerance    Statin intolerance       traMADol 50 MG tablet    ULTRAM     Take 50 mg by mouth 3 times daily 1-2 tablets in the evening        VOLTAREN 1 % Gel topical gel   Generic drug:  diclofenac     100 g    Apply topically nightly as needed for moderate pain Apply 4 grams to knees or 2 grams to hands four times daily using enclosed dosing card.        * Notice:  This list has 2 medication(s) that are the same as other medications prescribed for you. Read the directions carefully, and ask your doctor or other care provider to review them with you.

## 2018-07-06 NOTE — TELEPHONE ENCOUNTER
Patient returned my call.  All information given.  Appointment scheduled for Wednesday, July 11th at noon per Emily Phan's request.  Patient is in agreement.  Linda Holder M.A.

## 2018-07-06 NOTE — TELEPHONE ENCOUNTER
Patient states she does not want either one of them. States she is now having side effects from the imitrex and doesn't want to try anything new as well. States she would rather have the headaches. States if they haven't gone away within a week or so she will call back to discuss.  Sunita Rush RN

## 2018-07-06 NOTE — TELEPHONE ENCOUNTER
"Patient brought in an Appeal Filing Form for her Novolog.  (ph# [phone])  Form states \"if you wish to appeal, complete and send this form and your denial notice to ATTN: Administrative Appeals Department  [employer] at fax# [phone].  Form completed by Emily Phan NP.  Patient did not leave a copy of her denial notice.  1. Will need to notify patient to bring this in.  2.  Advise patient to come in to see Emily for a NO CHARGE visit on Wednesday, July 11th at 11:50 a.m to follow up on her insulin.  Called patient and left a message for her to return my call or view the Acsis message I sent.  Patient reply pending.  Linda Holder M.A.  "

## 2018-07-09 ENCOUNTER — ALLIED HEALTH/NURSE VISIT (OUTPATIENT)
Dept: EDUCATION SERVICES | Facility: CLINIC | Age: 58
End: 2018-07-09
Payer: COMMERCIAL

## 2018-07-09 DIAGNOSIS — E11.9 DIABETES MELLITUS, TYPE 2 (H): Primary | ICD-10-CM

## 2018-07-09 NOTE — MR AVS SNAPSHOT
After Visit Summary   7/9/2018    Maricarmen Dotson    MRN: [id number]           Patient Information     Date Of Birth          1960        Visit Information        Provider Department      7/9/2018 9:30 AM Allie Matias [facility]        Today's Diagnoses     Diabetes mellitus, type 2 (H)    -  1       Follow-ups after your visit        Your next 10 appointments already scheduled     Jul 11, 2018 12:00 PM CDT   [facility]UZ [facility]    [address]  [facility] [address] [phone]            Jul 23, 2018  9:30 AM CDT   Diabetic Education with Deanna Bolton RN   [facility] ([facility])    [address]  [facility] [address] [phone]              Who to contact     If you have questions or need follow up information about today's clinic visit or your schedule please contact [facility] directly at [phone].  Normal or non-critical lab and imaging results will be communicated to you by Inspiratohart, letter or phone within 4 business days after the clinic has received the results. If you do not hear from us within 7 days, please contact the clinic through Inspir[employer]art or phone. If you have a critical or abnormal lab result, we will notify you by phone as soon as possible.  Submit refill requests through [employer] or call your pharmacy and they will forward the refill request to us. Please allow 3 business days for your refill to be completed.          Additional Information About Your Visit        Inspir[employer]art Information     [employer] gives you secure access to your electronic health record. If you see a primary care provider, you can also send messages to your care team and make appointments. If you have questions, please call your primary care clinic.  If you do not have a primary care  provider, please call [phone] and they will assist you.        Care EveryWhere ID     This is your Care EveryWhere ID. This could be used by other organizations to access your [place] medical records  [id number]        Your Vitals Were     Last Period                   01/01/1999            Blood Pressure from Last 3 Encounters:   07/06/18 150/90   06/29/18 136/82   06/26/18 148/80    Weight from Last 3 Encounters:   07/06/18 84.8 kg (186 lb 14.4 oz)   06/29/18 84.9 kg (187 lb 1.6 oz)   06/26/18 84.9 kg (187 lb 1.6 oz)              Today, you had the following     No orders found for display       Primary Care Provider Office Phone # Fax #    Annamaria Erickson [phone] [address] [place] [address]        Equal Access to Services     [facility]: Hadii aad ku hadasho Soomaali, waaxda luqadaha, qaybta kaalmada adeegyada, dillon villain haybertn rubina dye . So [facility] [phone].    ATENCIÓN: Si habla español, tiene a richter disposición servicios gratuitos de asistencia lingüística. Llame al [phone].    We comply with applicable federal civil rights laws and [place] laws. We do not discriminate on the basis of race, color, national origin, age, disability, sex, sexual orientation, or gender identity.            Thank you!     Thank you for choosing [facility]  for your care. Our goal is always to provide you with excellent care. Hearing back from our patients is one way we can continue to improve our services. Please take a few minutes to complete the written survey that you may receive in the mail after your visit with us. Thank you!             Your Updated Medication List - Protect others around you: Learn how to safely use, store and throw away your medicines at www.disposemymeds.org.          This list is accurate as of 7/9/18 10:08 AM.  Always use your most recent med list.                   Brand Name Dispense  Instructions for use Diagnosis    acetone (Urine) test Strp     50 each    1 strip by In Vitro route daily    Type 2 diabetes mellitus with hyperglycemia, with long-term current use of insulin (H)       * ASPIRIN NOT PRESCRIBED    INTENTIONAL    1 each    continuous prn Antiplatelet medication not prescribed intentionally due to age.        blood glucose monitoring test strip    no brand specified    300 each    Freestyle test strips (NOT LITE or INSULINX) to be used with Omnipod pump test 6 times daily    Type 2 diabetes mellitus with hyperglycemia, with long-term current use of insulin (H)       EPINEPHrine 0.3 MG/0.3ML injection 2-pack    EPIPEN/ADRENACLICK/or ANY BX GENERIC EQUIV    2 each    Inject 0.3 mLs (0.3 mg) into the muscle once as needed for anaphylaxis    Allergic reaction       glucagon 1 MG kit    GLUCAGON EMERGENCY    1 mg    Inject 1 mg into the muscle once for 1 dose    Type 2 diabetes mellitus with hyperglycemia, with long-term current use of insulin (H)       insulin aspart 100 UNITS/ML injection    NovoLOG VIAL    7 vial    To be used in insulin pump. TDD 80 units    Type 2 diabetes mellitus with hyperglycemia, with long-term current use of insulin (H)       insulin pen needle 31G X 5 MM    B-D U/F    550 each    Use 6  time(s) per day.  Please dispense as BD Pen Needle Mini U/F 31G x 5 MM    Type 2 diabetes mellitus with hyperglycemia (H)       * insulin pump infusion      Date last updated:  6/11/18 Omnipod BASAL RATES and times: REGULAR PATTERN: 12am: 1.60 6 AM:  1.45 12pm: 1.3 6pm: 1.45 ANXIETY PATTERN: 12am: 1.70  6 AM: 1.55 12pm: 1.4 6pm: 1.55 CRAZY SUGAR PATTERN: 12am: 1.80  6 AM: 1.65 12pm: 1.5 6pm: 1.65 CARB RATIO and times: 12am-12am: 6 Corection Factor (Sensitivity) and times: 12   AM (midnight): 23 5 AM: 29 BLOOD GLUCOSE TARGET and times: 12   AM (midnight): 100, correct above 120    Type 2 diabetes mellitus with hyperglycemia, with long-term current use of insulin (H)       insulin  "syringe-needle U-100 31G X 5/16\" 1 ML    BD insulin syringe ULTRAFINE    100 each    Use one syringe daily or as directed.    Type 2 diabetes mellitus with hyperglycemia, with long-term current use of insulin (H)       polyethylene glycol Packet    MIRALAX/GLYCOLAX     Take 1 packet by mouth daily.        STATIN NOT PRESCRIBED (INTENTIONAL)     0 each    Statin not prescribed intentionally due to Intolerance    Statin intolerance       traMADol 50 MG tablet    ULTRAM     Take 50 mg by mouth 3 times daily 1-2 tablets in the evening        VOLTAREN 1 % Gel topical gel   Generic drug:  diclofenac     100 g    Apply topically nightly as needed for moderate pain Apply 4 grams to knees or 2 grams to hands four times daily using enclosed dosing card.        * Notice:  This list has 2 medication(s) that are the same as other medications prescribed for you. Read the directions carefully, and ask your doctor or other care provider to review them with you.      "

## 2018-07-09 NOTE — PROGRESS NOTES
"Pt came in for DM Ed scheduled appt- but stated \"I'm not sure why they wanted me to keep this appt\".  Reports that she does not want to make any changes to her pump settings, is trying to be conservative with her insulin as she will be running out. Has a high co-pay, is trying to work with her insurance company. Says she also has Tresiba on hand if needed.     Pt appeared agitated- left after 10 min. Stated \"I have one more appt\"- we'll see after that \"once I get this insulin issue resolved\".     Plan:  Follow up with Endo as planned 7/11/18.  CDE follow up 7/23/18 with Deanna Bolton.    Time spent: 10 min  No charge visit      "

## 2018-07-09 NOTE — LETTER
"    7/9/2018         RE: Maricarmen Dotson  [address]  [place] [address]        Dear Colleague,    Thank you for referring your patient, Maricarmen Dotson, to the [facility]. Please see a copy of my visit note below.    Pt came in for DM Ed scheduled appt- but stated \"I'm not sure why they wanted me to keep this appt\".  Reports that she does not want to make any changes to her pump settings, is trying to be conservative with her insulin as she will be running out. Has a high co-pay, is trying to work with her insurance company. Says she also has Tresiba on hand if needed.     Pt appeared agitated- left after 10 min. Stated \"I have one more appt\"- we'll see after that \"once I get this insulin issue resolved\".     Plan:  Follow up with Endo as planned 7/11/18.  CDE follow up 7/23/18 with Deanna Bolton.    Time spent: 10 min  No charge visit        "

## 2018-07-10 ENCOUNTER — MYC MEDICAL ADVICE (OUTPATIENT)
Dept: PEDIATRICS | Facility: CLINIC | Age: 58
End: 2018-07-10

## 2018-07-10 ENCOUNTER — MYC MEDICAL ADVICE (OUTPATIENT)
Dept: ENDOCRINOLOGY | Facility: CLINIC | Age: 58
End: 2018-07-10

## 2018-07-11 ENCOUNTER — OFFICE VISIT (OUTPATIENT)
Dept: ENDOCRINOLOGY | Facility: CLINIC | Age: 58
End: 2018-07-11
Payer: COMMERCIAL

## 2018-07-11 VITALS
BODY MASS INDEX: 33.04 KG/M2 | TEMPERATURE: 98.6 F | DIASTOLIC BLOOD PRESSURE: 92 MMHG | WEIGHT: 189.5 LBS | HEART RATE: 76 BPM | OXYGEN SATURATION: 96 % | SYSTOLIC BLOOD PRESSURE: 160 MMHG

## 2018-07-11 DIAGNOSIS — E11.65 TYPE 2 DIABETES MELLITUS WITH HYPERGLYCEMIA, WITH LONG-TERM CURRENT USE OF INSULIN (H): ICD-10-CM

## 2018-07-11 DIAGNOSIS — Z79.4 TYPE 2 DIABETES MELLITUS WITH HYPERGLYCEMIA, WITH LONG-TERM CURRENT USE OF INSULIN (H): ICD-10-CM

## 2018-07-11 PROCEDURE — 99207 ZZC NO BILLABLE SERVICE THIS VISIT: CPT | Performed by: CLINICAL NURSE SPECIALIST

## 2018-07-11 NOTE — TELEPHONE ENCOUNTER
"Patient brought in a copy of her denial today at 12:00 p.m.  The appeal filing form and a copy of the denial was faxed immediately to the Administrative Appeals Department - [employer] at fax# [phone]. (ph# [phone]) Requested this as a \"RUSH\"  Patient informed.  Advised patient I would be happy to call them to verbally request they rush this request.  Patient states this is not necessary.  Patient states she has spoken with the appeals department and was told that they have to follow their protocol regardless of a \"rush\" request and will call her once they have processed the paperwork and have made a decision.  Welcomed patient to call me with any concerns or requests.  Linda Holder M.A.  "

## 2018-07-11 NOTE — PROGRESS NOTES
Patient was put on my schedule to complete forms for denial appeal for her insurance company.  He insurance company denied coverage of Novolog - this needs to be appealed as she cannot use Humalog due to previous allergic reaction.  She has been using Novolog without a problem.      No charge for this encounter.    Emily Phan NP  Endocrinology

## 2018-07-11 NOTE — LETTER
7/11/2018         RE: Maricarmen Dotson  [address]  [place] [address]        Dear Colleague,    Thank you for referring your patient, Maricarmen Dotson, to the [facility]. Please see a copy of my visit note below.    Patient was put on my schedule to complete forms for denial appeal for her insurance company.  He insurance company denied coverage of Novolog - this needs to be appealed as she cannot use Humalog due to previous allergic reaction.  She has been using Novolog without a problem.      No charge for this encounter.    Emily Phan NP  Endocrinology      Again, thank you for allowing me to participate in the care of your patient.        Sincerely,        MARICRUZ Castillo CNP

## 2018-07-11 NOTE — MR AVS SNAPSHOT
After Visit Summary   7/11/2018    Maricarmen Dotson    MRN: [id number]           Patient Information     Date Of Birth          1960        Visit Information        Provider Department      7/11/2018 12:00 PM Emily Phan APRN CNP [facility]        Today's Diagnoses     Type 2 diabetes mellitus with hyperglycemia, with long-term current use of insulin (H)           Follow-ups after your visit        Your next 10 appointments already scheduled     Jul 23, 2018  9:30 AM CDT   Diabetic Education with Deanna Bolton RN   [facility] ([facility])    [address] [facility] [id number] [phone]              Who to contact     If you have questions or need follow up information about today's clinic visit or your schedule please contact [facility] directly at [phone].  Normal or non-critical lab and imaging results will be communicated to you by MyChart, letter or phone within 4 business days after the clinic has received the results. If you do not hear from us within 7 days, please contact the clinic through MyChart or phone. If you have a critical or abnormal lab result, we will notify you by phone as soon as possible.  Submit refill requests through [email or web address] or call your pharmacy and they will forward the refill request to us. Please allow 3 business days for your refill to be completed.          Additional Information About Your Visit        MyChart Information     [email or web address] gives you secure access to your electronic health record. If you see a primary care provider, you can also send messages to your care team and make appointments. If you have questions, please call your primary care clinic.  If you do not have a primary care provider, please call [phone] and they will assist you.        Care EveryWhere ID     This is your Care EveryWhere ID. This could be used by other organizations  to access your [place] medical records  [id number]        Your Vitals Were     Pulse Temperature Last Period Pulse Oximetry Breastfeeding? BMI (Body Mass Index)    76 98.6  F (37  C) (Oral) 01/01/1999 96% No 33.04 kg/m2       Blood Pressure from Last 3 Encounters:   07/11/18 (!) 160/92   07/06/18 150/90   06/29/18 136/82    Weight from Last 3 Encounters:   07/11/18 86 kg (189 lb 8 oz)   07/06/18 84.8 kg (186 lb 14.4 oz)   06/29/18 84.9 kg (187 lb 1.6 oz)              Today, you had the following     No orders found for display         Where to get your medicines      These medications were sent to [email or web address]  [address] [address]     Phone:  [phone]     insulin aspart 100 UNITS/ML injection          Primary Care Provider Office Phone # Fax #    Annamaria Erickson [phone] [address] [place] [address]        Equal Access to Services     [facility]: Hadii aad ku hadasho Sosu, waaxda luqadaha, qaybta kaalmada mitra, dillon brasher. So [facility] [phone].    ATENCIÓN: Si habla español, tiene a richter disposición servicios gratuitos de asistencia lingüística. [facility] [phone].    We comply with applicable federal civil rights laws and [place] laws. We do not discriminate on the basis of race, color, national origin, age, disability, sex, sexual orientation, or gender identity.            Thank you!     Thank you for choosing [facility]  for your care. Our goal is always to provide you with excellent care. Hearing back from our patients is one way we can continue to improve our services. Please take a few minutes to complete the written survey that you may receive in the mail after your visit with us. Thank you!             Your Updated Medication List - Protect others around you: Learn how to safely use, store and throw away your  medicines at www.disposemymeds.org.          This list is accurate as of 7/11/18 11:59 PM.  Always use your most recent med list.                   Brand Name Dispense Instructions for use Diagnosis    acetone (Urine) test Strp     50 each    1 strip by In Vitro route daily    Type 2 diabetes mellitus with hyperglycemia, with long-term current use of insulin (H)       * ASPIRIN NOT PRESCRIBED    INTENTIONAL    1 each    continuous prn Antiplatelet medication not prescribed intentionally due to age.        blood glucose monitoring test strip    no brand specified    300 each    Freestyle test strips (NOT LITE or INSULINX) to be used with Omnipod pump test 6 times daily    Type 2 diabetes mellitus with hyperglycemia, with long-term current use of insulin (H)       EPINEPHrine 0.3 MG/0.3ML injection 2-pack    EPIPEN/ADRENACLICK/or ANY BX GENERIC EQUIV    2 each    Inject 0.3 mLs (0.3 mg) into the muscle once as needed for anaphylaxis    Allergic reaction       glucagon 1 MG kit    GLUCAGON EMERGENCY    1 mg    Inject 1 mg into the muscle once for 1 dose    Type 2 diabetes mellitus with hyperglycemia, with long-term current use of insulin (H)       insulin aspart 100 UNITS/ML injection    NovoLOG VIAL    7 vial    To be used in insulin pump. TDD 80 units    Type 2 diabetes mellitus with hyperglycemia, with long-term current use of insulin (H)       insulin pen needle 31G X 5 MM    B-D U/F    550 each    Use 6  time(s) per day.  Please dispense as BD Pen Needle Mini U/F 31G x 5 MM    Type 2 diabetes mellitus with hyperglycemia (H)       * insulin pump infusion      Date last updated:  6/11/18 Omnipod BASAL RATES and times: REGULAR PATTERN: 12am: 1.60 6 AM:  1.45 12pm: 1.3 6pm: 1.45 ANXIETY PATTERN: 12am: 1.70  6 AM: 1.55 12pm: 1.4 6pm: 1.55 CRAZY SUGAR PATTERN: 12am: 1.80  6 AM: 1.65 12pm: 1.5 6pm: 1.65 CARB RATIO and times: 12am-12am: 6 Corection Factor (Sensitivity) and times: 12   AM (midnight): 23 5 AM: 29 BLOOD  "GLUCOSE TARGET and times: 12   AM (midnight): 100, correct above 120    Type 2 diabetes mellitus with hyperglycemia, with long-term current use of insulin (H)       insulin syringe-needle U-100 31G X 5/16\" 1 ML    BD insulin syringe ULTRAFINE    100 each    Use one syringe daily or as directed.    Type 2 diabetes mellitus with hyperglycemia, with long-term current use of insulin (H)       polyethylene glycol Packet    MIRALAX/GLYCOLAX     Take 1 packet by mouth daily.        STATIN NOT PRESCRIBED (INTENTIONAL)     0 each    Statin not prescribed intentionally due to Intolerance    Statin intolerance       traMADol 50 MG tablet    ULTRAM     Take 50 mg by mouth 3 times daily 1-2 tablets in the evening        VOLTAREN 1 % Gel topical gel   Generic drug:  diclofenac     100 g    Apply topically nightly as needed for moderate pain Apply 4 grams to knees or 2 grams to hands four times daily using enclosed dosing card.        * Notice:  This list has 2 medication(s) that are the same as other medications prescribed for you. Read the directions carefully, and ask your doctor or other care provider to review them with you.      "

## 2018-07-11 NOTE — TELEPHONE ENCOUNTER
Prescription for Novolog [facility] per Emily Phan.  Patient informed.  Linda Holder M.A.

## 2018-07-23 ENCOUNTER — OFFICE VISIT (OUTPATIENT)
Dept: NURSING | Facility: CLINIC | Age: 58
End: 2018-07-23
Payer: COMMERCIAL

## 2018-07-23 ENCOUNTER — ALLIED HEALTH/NURSE VISIT (OUTPATIENT)
Dept: EDUCATION SERVICES | Facility: CLINIC | Age: 58
End: 2018-07-23
Payer: COMMERCIAL

## 2018-07-23 VITALS
SYSTOLIC BLOOD PRESSURE: 150 MMHG | BODY MASS INDEX: 32.35 KG/M2 | WEIGHT: 189.5 LBS | HEART RATE: 64 BPM | HEIGHT: 64 IN | DIASTOLIC BLOOD PRESSURE: 78 MMHG

## 2018-07-23 DIAGNOSIS — Z00.6 EXAMINATION OF PARTICIPANT IN CLINICAL TRIAL: Primary | ICD-10-CM

## 2018-07-23 DIAGNOSIS — I10 HYPERTENSION GOAL BP (BLOOD PRESSURE) < 140/90: ICD-10-CM

## 2018-07-23 DIAGNOSIS — E11.65 TYPE 2 DIABETES MELLITUS WITH HYPERGLYCEMIA (H): Primary | ICD-10-CM

## 2018-07-23 PROCEDURE — G0108 DIAB MANAGE TRN  PER INDIV: HCPCS

## 2018-07-23 PROCEDURE — 99207 ZZC NO CHARGE NURSE ONLY: CPT

## 2018-07-23 NOTE — PATIENT INSTRUCTIONS
[facility] Hypertension Study   Visit 1 patient information    Thank you for your interest in the [facility] hypertension research study. In two weeks, your hypertension medication will be prescribed through a free follow-up virtual encounter (via phone or through [employer]).    In the coming days you will be contacted by a research team member to schedule your next office visit. After it is scheduled, be sure to let the research coordinator know as soon as possible if you cannot make it so that the visit can be rescheduled for you.      Please contact the research coordinator if you receive care for your high blood pressure outside of your study visits.    If you have any questions, please contact the research coordinator at [phone]. If you experience any symptoms please call the [facility] 24/7 triage  number at [phone]. If your phone number, email or address changes please alert the research coordinator.      In the meantime, we ask that you complete the online surveys emailed out to you, if completing online, or mailed out to you, if completing paper surveys, before your next visit.    Lifestyle changes that can help control high blood pressure:  Even though [facility] to test effectiveness of genetically guided medications for managing high blood pressure, there are several things you can do to ensure your blood pressure stays in good control:    Maintain a healthy weight (BMI<26). A modest amount of weight loss can be helpful    Limit salt intake to under 2400mg daily    Follow the DASH diet (lean meats, low salt, whole grains, lots of fruits/vegies)    Stay active, try to get in 30 minutes of exercise daily.    Manage your daily stress.    Do not smoke cigarettes (or cut back)    Limit alcohol (2 drinks/day for men, 1 drink/day for women)

## 2018-07-23 NOTE — PROGRESS NOTES
The following section is to be completed by [facility] Pharmacist/ PGEN provider:      PGEN study visit 1 (or Research lab visit 1)  : Potential New Onset HTN  7/23/2018     This research participant has responded to study outreach efforts and has expressed interest in PGEN study.  she has self reported that she satisfies the following inclusion and exclusion criteria:    Inclusion criteria:  1) New diagnosis of high blood pressure but not yet on blood pressure medication or   2) Existing hypertension not controlled with one CLASS of blood pressure medication and  3) Age between 30 and 80 and  4) BMI between 19-50      Today's bmi is Body mass index is 33.04 kg/(m^2).    Exclusion Criteria:    1) All patients taking more than 1 class of hypertensive medications  2) Heart disease  (Coronary artery disease)  3) Chronic kidney disease  4) Vascular disease / Peripheral artery disease/ Pulmonary HTN   5) High blood pressure due to other underlying condition (i.e. Secondary HTN)  6) Systolic BP > 169 OR  DBP >109        Is screening BP reading today above the following parameters? Yes  >140/90 for ages 30-59 and all patients with diabetes   >150/90 for age >60       Smoking: none  Alcohol consumption may a couple times yearly  Exercise walking twice daily (approximately 3 miles at least)  Following a DASH diet currently ?Yes    Update  Medications, and Allergies: Done      Current Outpatient Prescriptions on File Prior to Visit:  acetone, Urine, test STRP 1 strip by In Vitro route daily   ASPIRIN NOT PRESCRIBED, INTENTIONAL, continuous prn Antiplatelet medication not prescribed intentionally due to age.   blood glucose monitoring (NO BRAND SPECIFIED) test strip Freestyle test strips (NOT LITE or INSULINX) to be used with Omnipod pump test 6 times daily   diclofenac (VOLTAREN) 1 % GEL topical gel Apply topically nightly as needed for moderate pain Apply 4 grams to knees or 2 grams to hands four times daily using enclosed  "dosing card.   EPINEPHrine (EPIPEN) 0.3 MG/0.3ML injection Inject 0.3 mLs (0.3 mg) into the muscle once as needed for anaphylaxis   glucagon (GLUCAGON EMERGENCY) 1 MG kit Inject 1 mg into the muscle once for 1 dose   insulin aspart (NOVOLOG VIAL) 100 UNITS/ML injection To be used in insulin pump. TDD 80 units   insulin pen needle (B-D U/F) 31G X 5 MM Use 6  time(s) per day.  Please dispense as BD Pen Needle Mini U/F 31G x 5 MM   INSULIN PUMP - OUTPATIENT Date last updated:  6/11/18OmnipodBASAL RATES and times:REGULAR PATTERN:12am: 1.606 AM:  1.4512pm: 1.36pm: 1.45ANXIETY PATTERN:12am: 1.70 6 AM: 1.5512pm: 1.46pm: 1.55CRAZY SUGAR PATTERN:12am: 1.80 6 AM: 1.6512pm: 1.56pm: 1.65CARB RATIO and times:12am-12am: 6Corection Factor (Sensitivity) and times:12   AM (midnight): 235 AM: 29BLOOD GLUCOSE TARGET and times:12   AM (midnight): 100, correct above 120   insulin syringe-needle U-100 (BD INSULIN SYRINGE ULTRAFINE) 31G X 5/16\" 1 ML Use one syringe daily or as directed.   polyethylene glycol (MIRALAX/GLYCOLAX) packet Take 1 packet by mouth daily.   STATIN NOT PRESCRIBED, INTENTIONAL, Statin not prescribed intentionally due to Intolerance   traMADol (ULTRAM) 50 MG tablet Take 50 mg by mouth 3 times daily 1-2 tablets in the evening      No current facility-administered medications on file prior to visit.      Patient states that she will take up to 2 - 4 tabs (100 - 200 mg) of tramadol depending on the severity of her pain at night time from 5 PM to 9 PM prior to bedtime. Patient states she had been on sulindac for pain but recently had ADR of heart palpitations and elevated BP from the med.    Last Basic Metabolic Panel:  Lab Results   Component Value Date     06/26/2018      Lab Results   Component Value Date    POTASSIUM 3.6 06/26/2018     Lab Results   Component Value Date    CHLORIDE 105 06/26/2018     Lab Results   Component Value Date    KARIN 9.3 06/26/2018     Lab Results   Component Value Date    CO2 28 " "06/26/2018     Lab Results   Component Value Date    BUN 10 06/26/2018     Lab Results   Component Value Date    CR 0.71 06/26/2018     Lab Results   Component Value Date     06/26/2018        A baseline potassium, creatinine, BUN, GFR has been done within past 12 months (at [facility] or accessible via Care Everywhere)    /78 (BP Location: Right arm, Patient Position: Sitting, Cuff Size: Adult Large)  Pulse 64  Ht 5' 3.5\" (1.613 m)  Wt 189 lb 8 oz (86 kg)  LMP 01/01/1999  BMI 33.04 kg/m2  Estimated body mass index is 33.04 kg/(m^2) as calculated from the following:    Height as of this encounter: 5' 3.5\" (1.613 m).    Weight as of this encounter: 189 lb 8 oz (86 kg).      Is the manual confirmatory BP reading as recorded in the vitals section today within the study parameters for enrollment? Yes  >140/90 for ages 30-59 and diabetics  >150/90 for age >60  Exclude patients with: Systolic BP > 169 OR  DBP >109       Reviews consent with patient by Jayesh Bridges PharmD     Please complete \"PGEN Hypertension Study\" flow sheet : Done    Which arm is this potential patient being considered for enrollment into ? New Onset HTN     Patient has signed the consent form and HIPPAA form: Done.     Patients with established HTN but not currently taking blood pressure medication are advised to wait 4 weeks to be started on medication as per protocol (even though he/she would not being going through a 'wash out' period )      Cheek swabs (genetic profile test) was obtained per protocol and will be provided to clinic lab today : Done    Bar code associated with genetic test kit is : GCE 1200     Results will be available to the research team in 2-3 weeks and patient will be advised of the treatment plan, based on the randomization process, at that time (or at 4 weeks for patients in the uncontrolled HTN group)      Our research team will reach out to patient to schedule a follow up visit.    Patient was counseled " regarding the lifestyle changes (listed below)  to help with BP management Done  Lifestyle changes that can help control high blood pressure:  Even though PGEN is a study to test effectiveness of genetically guided medications for managing high blood pressure, there are several things you can do to ensure your blood pressure stays in good control:    Maintain a healthy weight (BMI<26). A modest amount of weight loss can be helpful    Limit salt intake to under 2400mg daily    Follow the DASH diet (lean meats, low salt, whole grains, lots of fruits/vegies)    Stay active, try to get in 30 minutes of exercise daily.    Manage your daily stress.    Do not smoke cigarettes (or cut back)    Limit alcohol (2 drinks/day for men, 1 drink/day for women)  Was AVS  provided to patient with the relevant <dot>PGENPI... dot phrase pulled into patient instructions: yes    Patient was given an opportunity to ask questions.  Patient verbalized understanding of this plan and is agreeable to continuing with this research study : yes    Jayesh Bridges, PharmD    [facility] provider notes and plan:  Based on the above and chart review the patient is noted to have:New Onset HTN    Will patient be enrolled into PGEN study today?:  yes    I have added the diagnosis of hypertension to the problem list with the appropriate JNC8 target blood pressure:  yes        PGEN research team will reach out to patient and arrange future research visit follow-up   See avs for more information about this study and today's visit.     Claudia Hart PA-C

## 2018-07-23 NOTE — MR AVS SNAPSHOT
After Visit Summary   7/23/2018    Maricarmen Dotson    MRN: [id number]           Patient Information     Date Of Birth          1960        Visit Information        Provider Department      7/23/2018 9:30 AM Deanna Bolton RN [facility]        Today's Diagnoses     Type 2 diabetes mellitus with hyperglycemia (H)    -  1       Follow-ups after your visit        Your next 10 appointments already scheduled     Aug 06, 2018  9:30 AM CDT   Diabetic Education with Deanna Bolton RN   [facility] ([facility])    [address]  [facility] [address]   [phone]            Aug 20, 2018  9:30 AM CDT   Diabetic Education with Allie Matias   [facility] ([facility])    [address]  [facility] [address]   [phone]            Sep 05, 2018  9:30 AM CDT   Diabetic Education with Deanna Bolton RN   [facility] ([facility])    [address]  [facility] [address]   [phone]            Sep 17, 2018  9:30 AM CDT   Diabetic Education with Allie Matias   [facility] ([facility])    [address]  [facility] [address]   [phone]              Who to contact     If you have questions or need follow up information about today's clinic visit or your schedule please contact [facility] directly at [phone].  Normal or non-critical lab and imaging results will be communicated to you by MyChart, letter or phone within 4 business days after the clinic has received the results. If you do not hear from us within 7 days, please contact the clinic through MyChart or phone. If you have a critical or abnormal lab result, we will notify you by phone as soon as possible.  Submit refill requests through [employer] or call  your pharmacy and they will forward the refill request to us. Please allow 3 business days for your refill to be completed.          Additional Information About Your Visit        [employer] Information     mojio gives you secure access to your electronic health record. If you see a primary care provider, you can also send messages to your care team and make appointments. If you have questions, please call your primary care clinic.  If you do not have a primary care provider, please call [phone] and they will assist you.        Care EveryWhere ID     This is your Care EveryWhere ID. This could be used by other organizations to access your [place] medical records  [id number]        Your Vitals Were     Last Period                   01/01/1999            Blood Pressure from Last 3 Encounters:   07/23/18 150/78   07/11/18 (!) 160/92   07/06/18 150/90    Weight from Last 3 Encounters:   07/23/18 86 kg (189 lb 8 oz)   07/11/18 86 kg (189 lb 8 oz)   07/06/18 84.8 kg (186 lb 14.4 oz)              We Performed the Following     DIABETES EDUCATION - Individual  []        Primary Care Provider Office Phone # Fax #    Annamaria Erickson [phone] [address] [place] [address]        Equal Access to Services     [facility] : Hadii aad ku hadasho Soomaali, waaxda luqadaha, qaybta kaalmada adeegyada, dillon brasher. So [facility] [phone].    ATENCIÓN: Si habla español, tiene a richter disposición servicios gratuitos de asistencia lingüística. Llame al [phone].    We comply with applicable federal civil rights laws and [place] laws. We do not discriminate on the basis of race, color, national origin, age, disability, sex, sexual orientation, or gender identity.            Thank you!     Thank you for choosing [facility]  for your care. Our goal is always to provide you with excellent care. Hearing back from our  patients is one way we can continue to improve our services. Please take a few minutes to complete the written survey that you may receive in the mail after your visit with us. Thank you!             Your Updated Medication List - Protect others around you: Learn how to safely use, store and throw away your medicines at www.disposemymeds.org.          This list is accurate as of 7/23/18  5:06 PM.  Always use your most recent med list.                   Brand Name Dispense Instructions for use Diagnosis    acetone (Urine) test Strp     50 each    1 strip by In Vitro route daily    Type 2 diabetes mellitus with hyperglycemia, with long-term current use of insulin (H)       * ASPIRIN NOT PRESCRIBED    INTENTIONAL    1 each    continuous prn Antiplatelet medication not prescribed intentionally due to age.        blood glucose monitoring test strip    no brand specified    300 each    Freestyle test strips (NOT LITE or INSULINX) to be used with Omnipod pump test 6 times daily    Type 2 diabetes mellitus with hyperglycemia, with long-term current use of insulin (H)       EPINEPHrine 0.3 MG/0.3ML injection 2-pack    EPIPEN/ADRENACLICK/or ANY BX GENERIC EQUIV    2 each    Inject 0.3 mLs (0.3 mg) into the muscle once as needed for anaphylaxis    Allergic reaction       glucagon 1 MG kit    GLUCAGON EMERGENCY    1 mg    Inject 1 mg into the muscle once for 1 dose    Type 2 diabetes mellitus with hyperglycemia, with long-term current use of insulin (H)       insulin aspart 100 UNITS/ML injection    NovoLOG VIAL    7 vial    To be used in insulin pump. TDD 80 units    Type 2 diabetes mellitus with hyperglycemia, with long-term current use of insulin (H)       insulin pen needle 31G X 5 MM    B-D U/F    550 each    Use 6  time(s) per day.  Please dispense as BD Pen Needle Mini U/F 31G x 5 MM    Type 2 diabetes mellitus with hyperglycemia (H)       * insulin pump infusion      Date last updated:  6/11/18 Omnipod BASAL RATES and  "times: REGULAR PATTERN: 12am: 1.60 6 AM:  1.45 12pm: 1.3 6pm: 1.45 ANXIETY PATTERN: 12am: 1.70  6 AM: 1.55 12pm: 1.4 6pm: 1.55 CRAZY SUGAR PATTERN: 12am: 1.80  6 AM: 1.65 12pm: 1.5 6pm: 1.65 CARB RATIO and times: 12am-12am: 6 Corection Factor (Sensitivity) and times: 12   AM (midnight): 23 5 AM: 29 BLOOD GLUCOSE TARGET and times: 12   AM (midnight): 100, correct above 120    Type 2 diabetes mellitus with hyperglycemia, with long-term current use of insulin (H)       insulin syringe-needle U-100 31G X 5/16\" 1 ML    BD insulin syringe ULTRAFINE    100 each    Use one syringe daily or as directed.    Type 2 diabetes mellitus with hyperglycemia, with long-term current use of insulin (H)       polyethylene glycol Packet    MIRALAX/GLYCOLAX     Take 1 packet by mouth daily.        STATIN NOT PRESCRIBED (INTENTIONAL)     0 each    Statin not prescribed intentionally due to Intolerance    Statin intolerance       traMADol 50 MG tablet    ULTRAM     Take 50 mg by mouth 3 times daily 1-2 tablets in the evening        VOLTAREN 1 % Gel topical gel   Generic drug:  diclofenac     100 g    Apply topically nightly as needed for moderate pain Apply 4 grams to knees or 2 grams to hands four times daily using enclosed dosing card.        * Notice:  This list has 2 medication(s) that are the same as other medications prescribed for you. Read the directions carefully, and ask your doctor or other care provider to review them with you.      "

## 2018-07-23 NOTE — PROGRESS NOTES
Diabetes Self Management Training: Insulin Pump Follow-up Visit    Maricarmen Dotson presents today for education, evaluation of glucose control and modification of medication(s) related to Type 2 diabetes.    She is accompanied by self    Patient's diabetes management related comments/concerns: very anxious lately, has been using the crazy sugars basal pattern but doesn't like the name, wants to change it today. Still fighting with insurance on insulin coverage but has enough right now. Noticing decrease in cognition when anxiety is high.    ASSESSMENT:  Patient Problem List and Family Medical History reviewed for relevant medical history, current medical status, and diabetes risk factors.    Current Diabetes Management per Patient:  Insulin Pump Type: OmniPod    Taking other diabetes medications? no      Pump report:            Current Pump Settings: See Insulin Pump - Outpatient in Medication List for complete list of settings.       BG values are: not in goal    Patient's most recent   Lab Results   Component Value Date    A1C 8.7 06/26/2018    is not meeting goal of <7.0    Nutrition:  Patient counts carbohydrates in grams, not eating a lot of carbohydrates but is entering false carbohydrates into the pump to treat the high glucose  Binge eating in the afternoons between 1-3pm, not able to enter carbohydrates into the pump at that time due to anxiety      Physical Activity:    Patient is Walking 5 miles a day when she can    Diabetes Complications:  Acute Complications: At risk for hypoglycemia? yes  Symptoms of low blood sugar? none      INTERVENTION:  Patient would benefit from additional basal pattern to match her afternoon binge. She has been able to change between patterns without difficulty, if hse feels comfortable may want to consider trying the temp basal when increase anxiety/stress and the need for insulin.     Changes made to pump settings:  Anxiety basal pattern changed to High  Crazy sugars pattern  changed to Anxiety  Added additional pattern called Afternoonbinge: settings as follow:  12am: 1.8  6am: 1.65  8pm: 1.8      Education provided today on:  AADE Self-Care Behaviors:  Healthy Eating: consistency in amount, composition, and timing of food intake  Taking Medication: action of prescribed medication    Education specific to insulin pump provided today on:   how to use a temporary basal rate and changes in basal patterns    Pt verbalized understanding of concepts discussed and recommendations provided today.       Education Materials Provided:  No new materials provided    PLAN:  Changes made to pump settings, patient entered afternoon binge basal pattern   use temp basal at +10 or 20 % for high stress/anxiety and at -10 or 20% for activity      FOLLOW-UP:  Chart routed to referring provider.  2 weeks      Deanna Bolton RN,CDE   Time Spent: 60 minutes  Encounter Type: Individual      Any diabetes medication dose changes were made via the CDE Protocol and Collaborative Practice Agreement with the patient's referring provider. A copy of this encounter was shared with the provider.

## 2018-07-23 NOTE — Clinical Note
Please review and sign off on the assessment / enrollment data; send off to Shellie.  Thanks. Jayesh

## 2018-07-23 NOTE — MR AVS SNAPSHOT
After Visit Summary   7/23/2018    Maricarmen Dotson    MRN: [id number]           Patient Information     Date Of Birth          1960        Visit Information        Provider Department      7/23/2018 2:30 PM Provider, [facility]        Care Instructions    [facility] Hypertension Study   Visit 1 patient information    Thank you for your interest in the [facility] hypertension research study. In two weeks, your hypertension medication will be prescribed through a free follow-up virtual encounter (via phone or through [employer]).    In the coming days you will be contacted by a research team member to schedule your next office visit. After it is scheduled, be sure to let the research coordinator know as soon as possible if you cannot make it so that the visit can be rescheduled for you.      Please contact the research coordinator if you receive care for your high blood pressure outside of your study visits.    If you have any questions, please contact the research coordinator at [phone]. If you experience any symptoms please call the [place] 24/7 triage  number at [phone]. If your phone number, email or address changes please alert the research coordinator.      In the meantime, we ask that you complete the online surveys emailed out to you, if completing online, or mailed out to you, if completing paper surveys, before your next visit.    Lifestyle changes that can help control high blood pressure:  Even though [facility] is a study to test effectiveness of genetically guided medications for managing high blood pressure, there are several things you can do to ensure your blood pressure stays in good control:    Maintain a healthy weight (BMI<26). A modest amount of weight loss can be helpful    Limit salt intake to under 2400mg daily    Follow the DASH diet (lean meats, low salt, whole grains, lots of fruits/vegies)    Stay active, try to get in 30 minutes of exercise  daily.    Manage your daily stress.    Do not smoke cigarettes (or cut back)    Limit alcohol (2 drinks/day for men, 1 drink/day for women)            Follow-ups after your visit        Your next 10 appointments already scheduled     Aug 06, 2018  9:30 AM CDT   Diabetic Education with Deanna Bolton RN   [facility] ([facility])    [address]  [facility] [address]   [phone]            Aug 20, 2018  9:30 AM CDT   Diabetic Education with Allie Matias   [facility] ([facility]    [address]  [facility] [address]   [phone]            Sep 05, 2018  9:30 AM CDT   Diabetic Education with Deanna Bolton RN   [facility] [place] [facility] ([facility])    [address]  [facility] [address]   [phone]            Sep 17, 2018  9:30 AM CDT   Diabetic Education with Allie Matias   [facility] ([facility])    [address]  [facility] [address]   [phone]              Who to contact     If you have questions or need follow up information about today's clinic visit or your schedule please contact [facility] directly at [phone].  Normal or non-critical lab and imaging results will be communicated to you by MyChart, letter or phone within 4 business days after the clinic has received the results. If you do not hear from us within 7 days, please contact the clinic through [employer] or phone. If you have a critical or abnormal lab result, we will notify you by phone as soon as possible.  Submit refill requests through [employer] or call your pharmacy and they will forward the refill request to us. Please allow 3 business days for your refill to be completed.          Additional Information About Your Visit        [employer] Information     [employer] gives you secure access to your  "electronic health record. If you see a primary care provider, you can also send messages to your care team and make appointments. If you have questions, please call your primary care clinic.  If you do not have a primary care provider, please call [phone] and they will assist you.        Care EveryWhere ID     This is your Care EveryWhere ID. This could be used by other organizations to access your [facility] medical records  [id number]        Your Vitals Were     Pulse Height Last Period BMI (Body Mass Index)          64 5' 3.5\" (1.613 m) 01/01/1999 33.04 kg/m2         Blood Pressure from Last 3 Encounters:   07/23/18 150/78   07/11/18 (!) 160/92   07/06/18 150/90    Weight from Last 3 Encounters:   07/23/18 189 lb 8 oz (86 kg)   07/11/18 189 lb 8 oz (86 kg)   07/06/18 186 lb 14.4 oz (84.8 kg)              Today, you had the following     No orders found for display       Primary Care Provider Office Phone # Fax #    Annamaria Erickson [phone] [address] [place] [address]        Equal Access to Services     [facility] AH: Hadii aad ku hadasho Somariluali, waaxda luqadaha, qaybta kaalmada adeegyada, dillon willamsn rubina dye ah. So [facility] [phone].    ATENCIÓN: Si habla español, tiene a richter disposición servicios gratuitos de asistencia lingüística. [facility] [phone].    We comply with applicable federal civil rights laws and [place] laws. We do not discriminate on the basis of race, color, national origin, age, disability, sex, sexual orientation, or gender identity.            Thank you!     Thank you for choosing [facility]  for your care. Our goal is always to provide you with excellent care. Hearing back from our patients is one way we can continue to improve our services. Please take a few minutes to complete the written survey that you may receive in the mail after your visit with us. Thank you!             Your Updated " Medication List - Protect others around you: Learn how to safely use, store and throw away your medicines at www.disposemymeds.org.          This list is accurate as of 7/23/18  3:24 PM.  Always use your most recent med list.                   Brand Name Dispense Instructions for use Diagnosis    acetone (Urine) test Strp     50 each    1 strip by In Vitro route daily    Type 2 diabetes mellitus with hyperglycemia, with long-term current use of insulin (H)       * ASPIRIN NOT PRESCRIBED    INTENTIONAL    1 each    continuous prn Antiplatelet medication not prescribed intentionally due to age.        blood glucose monitoring test strip    no brand specified    300 each    Freestyle test strips (NOT LITE or INSULINX) to be used with Omnipod pump test 6 times daily    Type 2 diabetes mellitus with hyperglycemia, with long-term current use of insulin (H)       EPINEPHrine 0.3 MG/0.3ML injection 2-pack    EPIPEN/ADRENACLICK/or ANY BX GENERIC EQUIV    2 each    Inject 0.3 mLs (0.3 mg) into the muscle once as needed for anaphylaxis    Allergic reaction       glucagon 1 MG kit    GLUCAGON EMERGENCY    1 mg    Inject 1 mg into the muscle once for 1 dose    Type 2 diabetes mellitus with hyperglycemia, with long-term current use of insulin (H)       insulin aspart 100 UNITS/ML injection    NovoLOG VIAL    7 vial    To be used in insulin pump. TDD 80 units    Type 2 diabetes mellitus with hyperglycemia, with long-term current use of insulin (H)       insulin pen needle 31G X 5 MM    B-D U/F    550 each    Use 6  time(s) per day.  Please dispense as BD Pen Needle Mini U/F 31G x 5 MM    Type 2 diabetes mellitus with hyperglycemia (H)       * insulin pump infusion      Date last updated:  6/11/18 Omnipod BASAL RATES and times: REGULAR PATTERN: 12am: 1.60 6 AM:  1.45 12pm: 1.3 6pm: 1.45 ANXIETY PATTERN: 12am: 1.70  6 AM: 1.55 12pm: 1.4 6pm: 1.55 CRAZY SUGAR PATTERN: 12am: 1.80  6 AM: 1.65 12pm: 1.5 6pm: 1.65 CARB RATIO and times:  "12am-12am: 6 Corection Factor (Sensitivity) and times: 12   AM (midnight): 23 5 AM: 29 BLOOD GLUCOSE TARGET and times: 12   AM (midnight): 100, correct above 120    Type 2 diabetes mellitus with hyperglycemia, with long-term current use of insulin (H)       insulin syringe-needle U-100 31G X 5/16\" 1 ML    BD insulin syringe ULTRAFINE    100 each    Use one syringe daily or as directed.    Type 2 diabetes mellitus with hyperglycemia, with long-term current use of insulin (H)       polyethylene glycol Packet    MIRALAX/GLYCOLAX     Take 1 packet by mouth daily.        STATIN NOT PRESCRIBED (INTENTIONAL)     0 each    Statin not prescribed intentionally due to Intolerance    Statin intolerance       traMADol 50 MG tablet    ULTRAM     Take 50 mg by mouth 3 times daily 1-2 tablets in the evening        VOLTAREN 1 % Gel topical gel   Generic drug:  diclofenac     100 g    Apply topically nightly as needed for moderate pain Apply 4 grams to knees or 2 grams to hands four times daily using enclosed dosing card.        * Notice:  This list has 2 medication(s) that are the same as other medications prescribed for you. Read the directions carefully, and ask your doctor or other care provider to review them with you.      "

## 2018-07-23 NOTE — LETTER
7/23/2018         RE: Maricarmen Dotson  [address]  [place] [address]        Dear Colleague,    Thank you for referring your patient, Maricarmen Dotson, to the [facility]. Please see a copy of my visit note below.    Diabetes Self Management Training: Insulin Pump Follow-up Visit    Maricarmen Doston presents today for education, evaluation of glucose control and modification of medication(s) related to Type 2 diabetes.    She is accompanied by self    Patient's diabetes management related comments/concerns: very anxious lately, has been using the crazy sugars basal pattern but doesn't like the name, wants to change it today. Still fighting with insurance on insulin coverage but has enough right now. Noticing decrease in cognition when anxiety is high.    ASSESSMENT:  Patient Problem List and Family Medical History reviewed for relevant medical history, current medical status, and diabetes risk factors.    Current Diabetes Management per Patient:  Insulin Pump Type: OmniPod    Taking other diabetes medications? no      Pump report:            Current Pump Settings: See Insulin Pump - Outpatient in Medication List for complete list of settings.       BG values are: not in goal    Patient's most recent   Lab Results   Component Value Date    A1C 8.7 06/26/2018    is not meeting goal of <7.0    Nutrition:  Patient counts carbohydrates in grams, not eating a lot of carbohydrates but is entering false carbohydrates into the pump to treat the high glucose  Binge eating in the afternoons between 1-3pm, not able to enter carbohydrates into the pump at that time due to anxiety      Physical Activity:    Patient is Walking 5 miles a day when she can    Diabetes Complications:  Acute Complications: At risk for hypoglycemia? yes  Symptoms of low blood sugar? none      INTERVENTION:  Patient would benefit from additional basal pattern to match her afternoon binge. She has been able to change  between patterns without difficulty, if hse feels comfortable may want to consider trying the temp basal when increase anxiety/stress and the need for insulin.     Changes made to pump settings:  Anxiety basal pattern changed to High  Crazy sugars pattern changed to Anxiety  Added additional pattern called Afternoonbinge: settings as follow:  12am: 1.8  6am: 1.65  8pm: 1.8      Education provided today on:  AADE Self-Care Behaviors:  Healthy Eating: consistency in amount, composition, and timing of food intake  Taking Medication: action of prescribed medication    Education specific to insulin pump provided today on:   how to use a temporary basal rate and changes in basal patterns    Pt verbalized understanding of concepts discussed and recommendations provided today.       Education Materials Provided:  No new materials provided    PLAN:  Changes made to pump settings, patient entered afternoon binge basal pattern   use temp basal at +10 or 20 % for high stress/anxiety and at -10 or 20% for activity      FOLLOW-UP:  Chart routed to referring provider.  2 weeks      Deanna Bolton RN,CDE   Time Spent: 60 minutes  Encounter Type: Individual      Any diabetes medication dose changes were made via the CDE Protocol and Collaborative Practice Agreement with the patient's referring provider. A copy of this encounter was shared with the provider.

## 2018-08-06 ENCOUNTER — ALLIED HEALTH/NURSE VISIT (OUTPATIENT)
Dept: EDUCATION SERVICES | Facility: CLINIC | Age: 58
End: 2018-08-06
Payer: COMMERCIAL

## 2018-08-06 DIAGNOSIS — E11.65 TYPE 2 DIABETES MELLITUS WITH HYPERGLYCEMIA (H): Primary | ICD-10-CM

## 2018-08-06 PROCEDURE — G0108 DIAB MANAGE TRN  PER INDIV: HCPCS

## 2018-08-06 PROCEDURE — 99207 ZZC DROP WITH A PROCEDURE: CPT

## 2018-08-06 NOTE — PATIENT INSTRUCTIONS
Continue on basal pattern 2.  If you binge eat, try setting a temp basal at + 10% for 1 hour at the beginning of the binge eating.    Low reservoir will now notify you at 10 units  Only fill the reservoir with 150 units to prevent waste

## 2018-08-06 NOTE — PROGRESS NOTES
Diabetes Self Management Training: Insulin Pump Follow-up Visit    Maricarmen Dotson presents today for evaluation of glucose control related to Type 2 diabetes.    She is accompanied by self    Patient's diabetes management related comments/concerns: using pattern 2 right now but thinking about changing to pattern 3. Worries about BG's going high, was at 144 fasting this morning.    Wondering how much insulin to put in the pos to prevent waste  Low reservoir alarm is annoying      ASSESSMENT:  Patient Problem List and Family Medical History reviewed for relevant medical history, current medical status, and diabetes risk factors.    Current Diabetes Management per Patient:  Insulin Pump Type: OmniPod    Taking other diabetes medications? no      Pump report:             Current Pump Settings: See Insulin Pump - Outpatient in Medication List for complete list of settings.         BG values are: improving    Patient's most recent   Lab Results   Component Value Date    A1C 8.7 06/26/2018    is not meeting goal of <7.0      INTERVENTION:  Patient glucose is improving and has been in goal most of the time within the last 2 weeks. Her highest readings are in the afternoon after binge eating.  Stress reaction binge eating in the afternoon, she does not feel she can bolus for her carbohydrates at that time. Instead of changing to pattern 3 which will run continuously until she changes it, recommend she try setting a temp basal at + 10% at the beginning of binge eating to compensate some for the carbohydrates she is consuming.      Changes made to pump settings:  Low reservoir changed from 20 units to --> 10 units    Education provided today on:  AADE Self-Care Behaviors:  Taking Medication: review of action of prescribed medication, difference between basal/bolus insulin    Education specific to insulin pump provided today on:   pump button pressing, how to use a temporary basal rate, filling pod with 3 days insulin and low  reservoir settings    Pt verbalized understanding of concepts discussed and recommendations provided today.         PLAN:  Continue on basal pattern 2.  If you binge eat, try setting a temp basal at + 10% for 1 hour at the beginning of the binge eating.    Low reservoir will now notify you at 10 units  Only fill the reservoir with 150 units to prevent waste  AVS printed and provided to patient today.    FOLLOW-UP:  Follow-up appointment scheduled on 8/20/18.  Chart routed to referring provider.      Deanna Bolton RN,CDE   Time Spent: 30 minutes  Encounter Type: Individual      Any diabetes medication dose changes were made via the CDE Protocol and Collaborative Practice Agreement with the patient's referring provider. A copy of this encounter was shared with the provider.

## 2018-08-06 NOTE — MR AVS SNAPSHOT
After Visit Summary   8/6/2018    Maricarmen Dotson    MRN: [id number]           Patient Information     Date Of Birth          1960        Visit Information        Provider Department      8/6/2018 9:30 AM Deanna Bolton RN [facility]        Care Instructions      Continue on basal pattern 2.  If you binge eat, try setting a temp basal at + 10% for 1 hour at the beginning of the binge eating.    Low reservoir will now notify you at 10 units  Only fill the reservoir with 150 units to prevent waste            Follow-ups after your visit        Your next 10 appointments already scheduled     Aug 20, 2018  9:30 AM CDT   Diabetic Education with Allie Matias   [facility] ([facility])    [address]  [facility] [id number] [phone]            Sep 05, 2018  9:30 AM CDT   Diabetic Education with Deanna Bolton RN   [facility] ([facility])    [address]  [facility] [id number] [phone]            Sep 17, 2018  9:30 AM CDT   Diabetic Education with Allie Matias   [facility] ([facility])    [address]  [facility] [id number] [phone]              Who to contact     If you have questions or need follow up information about today's clinic visit or your schedule please contact [facility] directly at [phone].  Normal or non-critical lab and imaging results will be communicated to you by MyChart, letter or phone within 4 business days after the clinic has received the results. If you do not hear from us within 7 days, please contact the clinic through MyChart or phone. If you have a critical or abnormal lab result, we will notify you by phone as soon as possible.  Submit refill requests through [employer] or call your pharmacy and they will forward the  refill request to us. Please allow 3 business days for your refill to be completed.          Additional Information About Your Visit        [facility] Information     [employer] gives you secure access to your electronic health record. If you see a primary care provider, you can also send messages to your care team and make appointments. If you have questions, please call your primary care clinic.  If you do not have a primary care provider, please call [phone] and they will assist you.        Care EveryWhere ID     This is your Care EveryWhere ID. This could be used by other organizations to access your [place] medical records  [id number]        Your Vitals Were     Last Period                   01/01/1999            Blood Pressure from Last 3 Encounters:   07/23/18 150/78   07/11/18 (!) 160/92   07/06/18 150/90    Weight from Last 3 Encounters:   07/23/18 86 kg (189 lb 8 oz)   07/11/18 86 kg (189 lb 8 oz)   07/06/18 84.8 kg (186 lb 14.4 oz)              Today, you had the following     No orders found for display       Primary Care Provider Office Phone # Fax #    Annamaria Erickson [phone] [address] [place] [address]        Equal Access to Services     [facility] : Hadii peg ku hadasho Soomaali, waaxda luqadaha, qaybta kaalmada adeegyada, dillon brasher. So [facility] [phone].    ATENCIÓN: Si habla español, tiene a richter disposición servicios gratuitos de asistencia lingüística. Llame al [phone].    We comply with applicable federal civil rights laws and [place] laws. We do not discriminate on the basis of race, color, national origin, age, disability, sex, sexual orientation, or gender identity.            Thank you!     Thank you for choosing [facility] [place]  for your care. Our goal is always to provide you with excellent care. Hearing back from our patients is one way we can continue to improve our  services. Please take a few minutes to complete the written survey that you may receive in the mail after your visit with us. Thank you!             Your Updated Medication List - Protect others around you: Learn how to safely use, store and throw away your medicines at www.disposemymeds.org.          This list is accurate as of 8/6/18 10:11 AM.  Always use your most recent med list.                   Brand Name Dispense Instructions for use Diagnosis    acetone (Urine) test Strp     50 each    1 strip by In Vitro route daily    Type 2 diabetes mellitus with hyperglycemia, with long-term current use of insulin (H)       * ASPIRIN NOT PRESCRIBED    INTENTIONAL    1 each    continuous prn Antiplatelet medication not prescribed intentionally due to age.        blood glucose monitoring test strip    no brand specified    300 each    Freestyle test strips (NOT LITE or INSULINX) to be used with Omnipod pump test 6 times daily    Type 2 diabetes mellitus with hyperglycemia, with long-term current use of insulin (H)       EPINEPHrine 0.3 MG/0.3ML injection 2-pack    EPIPEN/ADRENACLICK/or ANY BX GENERIC EQUIV    2 each    Inject 0.3 mLs (0.3 mg) into the muscle once as needed for anaphylaxis    Allergic reaction       glucagon 1 MG kit    GLUCAGON EMERGENCY    1 mg    Inject 1 mg into the muscle once for 1 dose    Type 2 diabetes mellitus with hyperglycemia, with long-term current use of insulin (H)       insulin aspart 100 UNITS/ML injection    NovoLOG VIAL    7 vial    To be used in insulin pump. TDD 80 units    Type 2 diabetes mellitus with hyperglycemia, with long-term current use of insulin (H)       insulin pen needle 31G X 5 MM    B-D U/F    550 each    Use 6  time(s) per day.  Please dispense as BD Pen Needle Mini U/F 31G x 5 MM    Type 2 diabetes mellitus with hyperglycemia (H)       * insulin pump infusion      Date last updated:  6/11/18 Omnipod BASAL RATES and times: REGULAR PATTERN: 12am: 1.60 6 AM:  1.45 12pm:  "1.3 6pm: 1.45 ANXIETY PATTERN: 12am: 1.70  6 AM: 1.55 12pm: 1.4 6pm: 1.55 CRAZY SUGAR PATTERN: 12am: 1.80  6 AM: 1.65 12pm: 1.5 6pm: 1.65 CARB RATIO and times: 12am-12am: 6 Corection Factor (Sensitivity) and times: 12   AM (midnight): 23 5 AM: 29 BLOOD GLUCOSE TARGET and times: 12   AM (midnight): 100, correct above 120    Type 2 diabetes mellitus with hyperglycemia, with long-term current use of insulin (H)       insulin syringe-needle U-100 31G X 5/16\" 1 ML    BD insulin syringe ULTRAFINE    100 each    Use one syringe daily or as directed.    Type 2 diabetes mellitus with hyperglycemia, with long-term current use of insulin (H)       polyethylene glycol Packet    MIRALAX/GLYCOLAX     Take 1 packet by mouth daily.        STATIN NOT PRESCRIBED (INTENTIONAL)     0 each    Statin not prescribed intentionally due to Intolerance    Statin intolerance       traMADol 50 MG tablet    ULTRAM     Take 50 mg by mouth 3 times daily 1-2 tablets in the evening        VOLTAREN 1 % Gel topical gel   Generic drug:  diclofenac     100 g    Apply topically nightly as needed for moderate pain Apply 4 grams to knees or 2 grams to hands four times daily using enclosed dosing card.        * Notice:  This list has 2 medication(s) that are the same as other medications prescribed for you. Read the directions carefully, and ask your doctor or other care provider to review them with you.      "

## 2018-08-06 NOTE — LETTER
8/6/2018         RE: Maricarmen Dotson  [address]  [place] [address]        Dear Colleague,    Thank you for referring your patient, Maricarmen Dotson, to the [facility]. Please see a copy of my visit note below.    Diabetes Self Management Training: Insulin Pump Follow-up Visit    Maricarmen Dotson presents today for evaluation of glucose control related to Type 2 diabetes.    She is accompanied by self    Patient's diabetes management related comments/concerns: using pattern 2 right now but thinking about changing to pattern 3. Worries about BG's going high, was at 144 fasting this morning.    Wondering how much insulin to put in the pos to prevent waste  Low reservoir alarm is annoying      ASSESSMENT:  Patient Problem List and Family Medical History reviewed for relevant medical history, current medical status, and diabetes risk factors.    Current Diabetes Management per Patient:  Insulin Pump Type: OmniPod    Taking other diabetes medications? no      Pump report:             Current Pump Settings: See Insulin Pump - Outpatient in Medication List for complete list of settings.         BG values are: improving    Patient's most recent   Lab Results   Component Value Date    A1C 8.7 06/26/2018    is not meeting goal of <7.0      INTERVENTION:  Patient glucose is improving and has been in goal most of the time within the last 2 weeks. Her highest readings are in the afternoon after binge eating.  Stress reaction binge eating in the afternoon, she does not feel she can bolus for her carbohydrates at that time. Instead of changing to pattern 3 which will run continuously until she changes it, recommend she try setting a temp basal at + 10% at the beginning of binge eating to compensate some for the carbohydrates she is consuming.      Changes made to pump settings:  Low reservoir changed from 20 units to --> 10 units    Education provided today on:  AADE Self-Care  Behaviors:  Taking Medication: review of action of prescribed medication, difference between basal/bolus insulin    Education specific to insulin pump provided today on:   pump button pressing, how to use a temporary basal rate, filling pod with 3 days insulin and low reservoir settings    Pt verbalized understanding of concepts discussed and recommendations provided today.         PLAN:  Continue on basal pattern 2.  If you binge eat, try setting a temp basal at + 10% for 1 hour at the beginning of the binge eating.    Low reservoir will now notify you at 10 units  Only fill the reservoir with 150 units to prevent waste  AVS printed and provided to patient today.    FOLLOW-UP:  Follow-up appointment scheduled on 8/20/18.  Chart routed to referring provider.      Deanna Bolton RN,CDE   Time Spent: 30 minutes  Encounter Type: Individual      Any diabetes medication dose changes were made via the CDE Protocol and Collaborative Practice Agreement with the patient's referring provider. A copy of this encounter was shared with the provider.

## 2018-08-20 ENCOUNTER — ALLIED HEALTH/NURSE VISIT (OUTPATIENT)
Dept: EDUCATION SERVICES | Facility: CLINIC | Age: 58
End: 2018-08-20
Payer: COMMERCIAL

## 2018-08-20 DIAGNOSIS — E11.65 TYPE 2 DIABETES MELLITUS WITH HYPERGLYCEMIA, WITH LONG-TERM CURRENT USE OF INSULIN (H): Primary | ICD-10-CM

## 2018-08-20 DIAGNOSIS — Z79.4 TYPE 2 DIABETES MELLITUS WITH HYPERGLYCEMIA, WITH LONG-TERM CURRENT USE OF INSULIN (H): Primary | ICD-10-CM

## 2018-08-20 PROCEDURE — G0108 DIAB MANAGE TRN  PER INDIV: HCPCS | Performed by: DIETITIAN, REGISTERED

## 2018-08-20 NOTE — MR AVS SNAPSHOT
After Visit Summary   8/20/2018    Maricarmen Dotson    MRN: [id number]           Patient Information     Date Of Birth          1960        Visit Information        Provider Department      8/20/2018 9:30 AM Allie Matias RD [facility]        Today's Diagnoses     Type 2 diabetes mellitus with hyperglycemia, with long-term current use of insulin (H)    -  1      Care Instructions    My Diabetes Care Goals:    Max basal increased from 2.0 to --> 2.25 unit(s)/hr to allow for temp basal insulin increase as needed.    Allie Matias RD, LD, CDE  Diabetes       Bring blood glucose meter and logbook with you to all doctor and follow-up appointments.     [facility] for the [facility] Area:  For Your Diabetes Education and Nutrition Appointments Call:  [phone]   For Diabetes Education or Nutrition Related Questions:   Phone: [phone]  E-mail: [email or web address]  Fax: [phone]   If you need a medication refill please contact your pharmacy. Please allow 3 business days for your refills to be completed.    Instructions for emailing the Diabetes Educators    If you need to communicate a non-urgent message to a Diabetes Educator via email, please send to [email or web address].    Please follow the following email guidelines:    Subject line: Secure: your clinic name (example: Secure: [facility])  In the email please include: First name, middle initial, last name and date of birth.    We will be in touch with you within one (1) business day.               Follow-ups after your visit        Your next 10 appointments already scheduled     Sep 05, 2018  9:30 AM CDT   Diabetes Education with Deanna Bolton RN   [facility] ([facility])    [address] [facility] [address]   [phone]            Sep 17, 2018  9:30 AM CDT   Diabetes Education  with Allie Matias RD   [facility] ([facility])    [address]  [facility] [address] [phone]              Who to contact     If you have questions or need follow up information about today's clinic visit or your schedule please contact [facility] directly at [phone].  Normal or non-critical lab and imaging results will be communicated to you by MyChart, letter or phone within 4 business days after the clinic has received the results. If you do not hear from us within 7 days, please contact the clinic through [employer] or phone. If you have a critical or abnormal lab result, we will notify you by phone as soon as possible.  Submit refill requests through [employer] or call your pharmacy and they will forward the refill request to us. Please allow 3 business days for your refill to be completed.          Additional Information About Your Visit        [employer] Information     [employer] gives you secure access to your electronic health record. If you see a primary care provider, you can also send messages to your care team and make appointments. If you have questions, please call your primary care clinic.  If you do not have a primary care provider, please call [phone] and they will assist you.        Care EveryWhere ID     This is your Care EveryWhere ID. This could be used by other organizations to access your [facility] medical records  [id number]        Your Vitals Were     Last Period                   01/01/1999            Blood Pressure from Last 3 Encounters:   07/23/18 150/78   07/11/18 (!) 160/92   07/06/18 150/90    Weight from Last 3 Encounters:   07/23/18 86 kg (189 lb 8 oz)   07/11/18 86 kg (189 lb 8 oz)   07/06/18 84.8 kg (186 lb 14.4 oz)              We Performed the Following     DIABETES EDUCATION - Individual  []        Primary Care Provider Office Phone # Fax #    Annamaria Erickson MD  [phone] [address] [place] [address]        Equal Access to Services     BRAULIO HAYNES : Hadii aad ku hadtarunleroy Hallie, walaurada luqkeara, obita kaabisaida tamarun, dillon allenin hayaaeliza turciosjudith ruiz laPepemanav brasher. So [facility] [phone].    ATENCIÓN: Si habla español, tiene a richter disposición servicios gratuitos de asistencia lingüística. Llame al [phone].    We comply with applicable federal civil rights laws and [place] laws. We do not discriminate on the basis of race, color, national origin, age, disability, sex, sexual orientation, or gender identity.            Thank you!     Thank you for choosing [facility] [place]  for your care. Our goal is always to provide you with excellent care. Hearing back from our patients is one way we can continue to improve our services. Please take a few minutes to complete the written survey that you may receive in the mail after your visit with us. Thank you!             Your Updated Medication List - Protect others around you: Learn how to safely use, store and throw away your medicines at www.disposemymeds.org.          This list is accurate as of 8/20/18  5:34 PM.  Always use your most recent med list.                   Brand Name Dispense Instructions for use Diagnosis    acetone (Urine) test Strp     50 each    1 strip by In Vitro route daily    Type 2 diabetes mellitus with hyperglycemia, with long-term current use of insulin (H)       * ASPIRIN NOT PRESCRIBED    INTENTIONAL    1 each    continuous prn Antiplatelet medication not prescribed intentionally due to age.        blood glucose monitoring test strip    no brand specified    300 each    Freestyle test strips (NOT LITE or INSULINX) to be used with Omnipod pump test 6 times daily    Type 2 diabetes mellitus with hyperglycemia, with long-term current use of insulin (H)       EPINEPHrine 0.3 MG/0.3ML injection 2-pack    EPIPEN/ADRENACLICK/or ANY BX GENERIC  "EQUIV    2 each    Inject 0.3 mLs (0.3 mg) into the muscle once as needed for anaphylaxis    Allergic reaction       glucagon 1 MG kit    GLUCAGON EMERGENCY    1 mg    Inject 1 mg into the muscle once for 1 dose    Type 2 diabetes mellitus with hyperglycemia, with long-term current use of insulin (H)       insulin aspart 100 UNITS/ML injection    NovoLOG VIAL    7 vial    To be used in insulin pump. TDD 80 units    Type 2 diabetes mellitus with hyperglycemia, with long-term current use of insulin (H)       insulin pen needle 31G X 5 MM    B-D U/F    550 each    Use 6  time(s) per day.  Please dispense as BD Pen Needle Mini U/F 31G x 5 MM    Type 2 diabetes mellitus with hyperglycemia (H)       * insulin pump infusion      Date last updated:  6/11/18 Omnipod BASAL RATES and times: REGULAR PATTERN: 12am: 1.60 6 AM:  1.45 12pm: 1.3 6pm: 1.45 ANXIETY PATTERN: 12am: 1.70  6 AM: 1.55 12pm: 1.4 6pm: 1.55 CRAZY SUGAR PATTERN: 12am: 1.80  6 AM: 1.65 12pm: 1.5 6pm: 1.65 CARB RATIO and times: 12am-12am: 6 Corection Factor (Sensitivity) and times: 12   AM (midnight): 23 5 AM: 29 BLOOD GLUCOSE TARGET and times: 12   AM (midnight): 100, correct above 120    Type 2 diabetes mellitus with hyperglycemia, with long-term current use of insulin (H)       insulin syringe-needle U-100 31G X 5/16\" 1 ML    BD insulin syringe ULTRAFINE    100 each    Use one syringe daily or as directed.    Type 2 diabetes mellitus with hyperglycemia, with long-term current use of insulin (H)       polyethylene glycol Packet    MIRALAX/GLYCOLAX     Take 1 packet by mouth daily.        STATIN NOT PRESCRIBED (INTENTIONAL)     0 each    Statin not prescribed intentionally due to Intolerance    Statin intolerance       traMADol 50 MG tablet    ULTRAM     Take 50 mg by mouth 3 times daily 1-2 tablets in the evening        VOLTAREN 1 % Gel topical gel   Generic drug:  diclofenac     100 g    Apply topically nightly as needed for moderate pain Apply 4 grams to " knees or 2 grams to hands four times daily using enclosed dosing card.        * Notice:  This list has 2 medication(s) that are the same as other medications prescribed for you. Read the directions carefully, and ask your doctor or other care provider to review them with you.

## 2018-08-20 NOTE — LETTER
"    8/20/2018         RE: Maricarmen Dotson  [address] [place] [address]        Dear Colleague,    Thank you for referring your patient, Maricarmen Dotson, to the [facility]. Please see a copy of my visit note below.    Diabetes Self Management Training: Insulin Pump    SUBJECTIVE:  Maricarmen Dotson presents today for evaluation of glucose control related to Type 2 diabetes.    She is accompanied by self    Patient concerns: has no specific concerns, has been using basal pattern 3, also tried to use a temp basal increase which worked the first time- but reports was \"unable to do a second temp basal\"    ASSESSMENT:  Patient Problem List and Family Medical History reviewed for relevant medical history, current medical status, and diabetes risk factors.     Current Diabetes Management per Patient:  Insulin Pump Type: OmniPod         Blood Glucose Results and Insulin Use:               Hypoglycemia:      Reports no recent episodes  Symptoms of low blood sugar? Yes - nausea  Patient carries a carbohydrate source with them regularly: Yes - glucose tabs    Nutrition:  Patient currently counts carbohydrates in grams and has an inconsistent intake of carbohydrates  Avg Daily Carbs (g)  88.5       Physical Activity:    No changes- continues regular walking program      Current Pump Settings: See Insulin Pump - Outpatient in Medication List for complete list of settings.           SENSOR USE:  Using Continuous Glucose Monitor in conjunction with pump? No      INTERVENTION:  Patient would benefit from changing the max basal setting from 2.0 --> 2.25 to allow for temp basal increases as needed.    Education provided today on:  AADE Self-Care Behaviors:  Problem Solving: high blood glucose - causes, signs/symptoms, treatment and prevention    Education specific to insulin pump provided today on:   Use of temp basal vs BG correction via bolus    Education Materials Provided:  No new materials " provided    PLAN:  See Patient Instructions for co-developed, patient-stated behavior change goals.  AVS printed and provided to patient today.    FOLLOW-UP:  Follow-up appointment scheduled 2 weeks with Deanna Bolton RN, CDE.  Chart routed to referring provider.      Allie Matias RD, CDE  Diabetes     Time Spent: 45 minutes  Encounter Type: Individual    Any diabetes medication dose changes were made via the CDE Protocol and Collaborative Practice Agreement with the patient's referring provider. A copy of this encounter was shared with the provider

## 2018-08-20 NOTE — PATIENT INSTRUCTIONS
My Diabetes Care Goals:    Max basal increased from 2.0 to --> 2.25 unit(s)/hr to allow for temp basal insulin increase as needed.    Allie Matias RD, LD, CDE  Diabetes       Bring blood glucose meter and logbook with you to all doctor and follow-up appointments.     [facility] for the [facility]:  For Your Diabetes Education and Nutrition Appointments Call:  [phone]   For Diabetes Education or Nutrition Related Questions:   Phone: [phone]  E-mail: [email or web address]  Fax: [phone]   If you need a medication refill please contact your pharmacy. Please allow 3 business days for your refills to be completed.    Instructions for emailing the Diabetes Educators    If you need to communicate a non-urgent message to a Diabetes Educator via email, please send to [email or web address].    Please follow the following email guidelines:    Subject line: Secure: your clinic name (example: Secure: [facility])  In the email please include: First name, middle initial, last name and date of birth.    We will be in touch with you within one (1) business day.

## 2018-08-20 NOTE — PROGRESS NOTES
"Diabetes Self Management Training: Insulin Pump    SUBJECTIVE:  Maricarmen Dotson presents today for evaluation of glucose control related to Type 2 diabetes.    She is accompanied by self    Patient concerns: has no specific concerns, has been using basal pattern 3, also tried to use a temp basal increase which worked the first time- but reports was \"unable to do a second temp basal\"    ASSESSMENT:  Patient Problem List and Family Medical History reviewed for relevant medical history, current medical status, and diabetes risk factors.     Current Diabetes Management per Patient:  Insulin Pump Type: OmniPod         Blood Glucose Results and Insulin Use:               Hypoglycemia:      Reports no recent episodes  Symptoms of low blood sugar? Yes - nausea  Patient carries a carbohydrate source with them regularly: Yes - glucose tabs    Nutrition:  Patient currently counts carbohydrates in grams and has an inconsistent intake of carbohydrates  Avg Daily Carbs (g)  88.5       Physical Activity:    No changes- continues regular walking program      Current Pump Settings: See Insulin Pump - Outpatient in Medication List for complete list of settings.           SENSOR USE:  Using Continuous Glucose Monitor in conjunction with pump? No      INTERVENTION:  Patient would benefit from changing the max basal setting from 2.0 --> 2.25 to allow for temp basal increases as needed.    Education provided today on:  AADE Self-Care Behaviors:  Problem Solving: high blood glucose - causes, signs/symptoms, treatment and prevention    Education specific to insulin pump provided today on:   Use of temp basal vs BG correction via bolus    Education Materials Provided:  No new materials provided    PLAN:  See Patient Instructions for co-developed, patient-stated behavior change goals.  AVS printed and provided to patient today.    FOLLOW-UP:  Follow-up appointment scheduled 2 weeks with Deanna Bolton RN, CDE.  Chart routed to referring " provider.      Allie Matias RD, CDE  Diabetes     Time Spent: 45 minutes  Encounter Type: Individual    Any diabetes medication dose changes were made via the CDE Protocol and Collaborative Practice Agreement with the patient's referring provider. A copy of this encounter was shared with the provider

## 2018-08-24 ENCOUNTER — TRANSFERRED RECORDS (OUTPATIENT)
Dept: HEALTH INFORMATION MANAGEMENT | Facility: CLINIC | Age: 58
End: 2018-08-24

## 2018-08-24 ENCOUNTER — TELEPHONE (OUTPATIENT)
Dept: FAMILY MEDICINE | Facility: CLINIC | Age: 58
End: 2018-08-24

## 2018-08-24 DIAGNOSIS — I10 HYPERTENSION GOAL BP (BLOOD PRESSURE) < 140/90: Primary | ICD-10-CM

## 2018-08-24 NOTE — TELEPHONE ENCOUNTER
Hi    Your patient is a participant in the PGEN blood pressure study that [facility] is conducting.     she is scheduled to be started on medications based on PGEN protocol.    I have placed the standing order according to our study protocol.  Please refer to those orders for more details.       If you do NOT agree with the standing order, please route back to me with the changes you would like to see and I can then modify the order to reflect your adjustment based on clinical judgement.    More details about this study can be found by going to [email or web address].  Or typing [email or web address]  In Epic        Study team:   Please reach out and advise regarding prescription for metoprolol XL 25mg once daily.    Please advise patient of these common side effects potentially associated with this prescription: bradycardia; fatigue.    Pharmacist will also provide more medication related education.    Please also arrange follow-up per protocol.   Please also advise that our study team advises all patients to establish/maintain an ongoing primary care relationship since that is so important to help better manage her ongoing health care needs.    Sanjeev Connell MD  PGen study co-     Claudia Hart PA-C  PGen study co-investigator

## 2018-08-24 NOTE — TELEPHONE ENCOUNTER
Patient is due to be prescribed medication on August 6th based on genetic recommendations. The preferred pharmacy is [facility].    As a reminder, please place the standing order in addition to prescribing medication.

## 2018-08-28 NOTE — TELEPHONE ENCOUNTER
Agree with plan to start beta blocker.  Would consider move next to ACE/ARB based on comorbid conditions and avoid thiazide type diuretics based on listed allergy (anaphylaxis) to sulfa drugs.    Sanjeev Connell MD

## 2018-08-29 NOTE — TELEPHONE ENCOUNTER
Contacted patient but she was unable to be advised of medication at this time - she requested we return the call tomorrow when she will be able to talk.    Shellie Ortiz

## 2018-08-31 RX ORDER — METOPROLOL SUCCINATE 25 MG/1
25 TABLET, EXTENDED RELEASE ORAL DAILY
Qty: 30 TABLET | Refills: 1 | Status: SHIPPED | OUTPATIENT
Start: 2018-08-31 | End: 2018-09-07 | Stop reason: SINTOL

## 2018-08-31 NOTE — TELEPHONE ENCOUNTER
Patient advised of medication, she will pick it up and begin taking it on August 31st. Her second PGen visit is scheduled for Sept. 14th at 9:45 in [place].

## 2018-09-05 ENCOUNTER — ALLIED HEALTH/NURSE VISIT (OUTPATIENT)
Dept: EDUCATION SERVICES | Facility: CLINIC | Age: 58
End: 2018-09-05
Payer: COMMERCIAL

## 2018-09-05 DIAGNOSIS — E11.65 TYPE 2 DIABETES MELLITUS WITH HYPERGLYCEMIA (H): Primary | ICD-10-CM

## 2018-09-05 DIAGNOSIS — Z79.4 TYPE 2 DIABETES MELLITUS WITH HYPERGLYCEMIA, WITH LONG-TERM CURRENT USE OF INSULIN (H): ICD-10-CM

## 2018-09-05 DIAGNOSIS — E11.65 TYPE 2 DIABETES MELLITUS WITH HYPERGLYCEMIA, WITH LONG-TERM CURRENT USE OF INSULIN (H): ICD-10-CM

## 2018-09-05 PROCEDURE — G0108 DIAB MANAGE TRN  PER INDIV: HCPCS

## 2018-09-05 NOTE — LETTER
"    9/5/2018         RE: Maricarmen Dotson  [address] [place] [address]        Dear Colleague,    Thank you for referring your patient, Maricarmen Dotson, to the [facility]. Please see a copy of my visit note below.    Diabetes Self-Management Education & Support    Diabetes Education Self Management & Training and Insulin Pump    SUBJECTIVE/OBJECTIVE:  Presents for: Follow-up  Accompanied by: Self  Diabetes education in the past 24mo: Yes  Focus of Visit: Taking Medication  Diabetes type: Type 2  Date of diagnosis: 06/01/94  Disease course: Stable  How confident are you filling out medical forms by yourself:: Extremely  Diabetes management related comments/concerns: has not been feeling well, sore throast and thinks blood sugars are up from illness  Transportation concerns: No  Other concerns:: Other (anxiety )  Cultural Influences/Ethnic Background:  American      Diabetes Symptoms & Complications  Blurred vision: No  Fatigue: Yes  Foot paresthesias: No  Foot ulcerations: No  Polydipsia: No  Polyphagia: Yes  Polyuria: No  Visual change: No  Weakness: Yes  Weight loss: No  Symptom course: Stable  Weight trend: Increasing steadily  Autonomic neuropathy: No  CVA: No  Heart disease: No  Nephropathy: No  Peripheral neuropathy: No  Peripheral Vascular Disease: No  Retinopathy: No  Sexual dysfunction: No    Patient Problem List and Family Medical History reviewed for relevant medical history, current medical status, and diabetes risk factors.    Vitals:  [facility] 01/01/1999  Estimated body mass index is 33.04 kg/(m^2) as calculated from the following:    Height as of 7/23/18: 1.613 m (5' 3.5\").    Weight as of 7/23/18: 86 kg (189 lb 8 oz).   Last 3 BP:   BP Readings from Last 3 Encounters:   07/23/18 150/78   07/11/18 (!) 160/92   07/06/18 150/90       History   Smoking Status     Never Smoker   Smokeless Tobacco     Never Used       Labs:  Lab Results   Component Value Date    A1C 8.7 " 2018     Lab Results   Component Value Date     2018     Lab Results   Component Value Date     2018     HDL Cholesterol   Date Value Ref Range Status   2018 51 >49 mg/dL Final   ]  GFR Estimate   Date Value Ref Range Status   2018 85 >60 mL/min/1.7m2 Final     Comment:     Non  GFR Calc     GFR Estimate If Black   Date Value Ref Range Status   2018 >90 >60 mL/min/1.7m2 Final     Comment:      GFR Calc     Lab Results   Component Value Date    CR 0.71 2018     No results found for: MICROALBUMIN    Healthy Eating  Cultural/Baptism diet restrictions?: No  Meal planning: Avoiding sweets, Carbohydrate counting, Heart healthy, Low salt, Smaller portions  Meals include: Breakfast, Lunch, Dinner, Snacks  Beverages: Water    Being Active  Exercise:: Yes  Days per week of moderate to strenuous exercise (like a brisk walk): 7  On average, minutes per day of exercise at this level: 30  How intense was your typical exercise? : Moderate (like brisk walking)  Exercise Minutes per Week: 210  Barrier to exercise: None    Monitoring  Did patient bring glucose meter to appointment? : No  Blood Glucose Meter: Accu-check  Home Glucose (Sugar) Monitorin+ times per day  Blood glucose trend: Increasing steadily  Low Glucose Range (mg/dL): 180-200  High Glucose Range (mg/dL): >200  Overall Range (mg/dL): 180-200    Taking Medications  Diabetes Medication(s)     Diabetic Other Sig    glucagon (GLUCAGON EMERGENCY) 1 MG kit Inject 1 mg into the muscle once for 1 dose    Insulin Sig    insulin aspart (NOVOLOG VIAL) 100 UNITS/ML injection To be used in insulin pump. TDD 80 units    INSULIN PUMP - OUTPATIENT Date last updated:  18  Omnipod  BASAL RATES and times:  REGULAR PATTERN:  12am: 1.60  6 AM:  1.45  12pm: 1.3  6pm: 1.45  ANXIETY PATTERN:  12am: 1.70   6 AM: 1.55  12pm: 1.4  6pm: 1.55  CRAZY SUGAR PATTERN:  12am: 1.80   6 AM: 1.65  12pm:  1.5  6pm: 1.65  CARB RATIO and times:  12am-12am: 6  Corection Factor (Sensitivity) and times:  12   AM (midnight): 23  5 AM: 29  BLOOD GLUCOSE TARGET and times:  12   AM (midnight): 100, correct above 120          Current Treatments: Insulin Pump  Dose schedule: pre-breakfast, pre-lunch, pre-dinner, at bedtime  Given by: Patient  Injection/Infusion sites: Abdomen, Arms, Buttocks, Thighs    Problem Solving  Hypoglycemia Frequency: Never  Hypoglycemia Treatment: Glucose (tablets or gel)  Patient carries a carbohydrate source: Yes  Medical alert: No  Severe weather/disaster plan for diabetes management?: Yes  DKA prevention plan?: No  Sick day plan for diabetes management?: (P) No    Hypoglycemia Symptoms       Hypoglycemia Complications       Reducing Risks  CAD Risks: Diabetes Mellitus, Family history, Hypertension, Post-menopausal, Stress  Has dilated eye exam at least once a year?: Yes  Sees dentist every 6 months?: Yes  Sees podiatrist (foot doctor)?: No    Healthy Coping  Emotional response to diabetes: Ready to learn  Informal Support system:: Family  Stage of change: ACTION (Actively working towards change)  Difficulty affording diabetes management supplies?: Yes  Support resources: In-person Offerings  Patient Activation Measure Survey Score:  JEFFERY Score (Last Two) 6/2/2011 10/18/2013   JEFFERY Raw Score 50 42   Activation Score 86.3 66   JEFFERY Level 4 3       Patient seen today for Insulin Pump Review    Insulin Pump Review  Taking other diabetes medications?: No  Patient has glucagon emergency kit: Yes  Patient understands DKA prevention: Yes  Patient has ketone test strips: Yes  Patient has an insulin multiple daily injection back-up plan: Yes    Pump report:              ASSESSMENT:  Patient glucose above goal, she is fighting a throat infection and does not feel she needs to go the the doctor at this time.  She changed her basal to her afternoon binge basal patten on the evening of 9/1/18 and since then her  glucose has improved.     Goals        General    Monitoring (pt-stated)     Notes - Note created  9/5/2018 11:44 AM by Deanna Bolton RN    Goal Statement: I will test blood sugar before bed and take a correction dose if needed    Measure of Success: pump download    Strengths: using omnipod as meter  Ideas to overcome barriers: none    Date to Achieve By: 10/8/18              Patient's most recent   Lab Results   Component Value Date    A1C 8.7 06/26/2018    is not meeting goal of <8.0    INTERVENTION:   Diabetes knowledge and skills assessment:     Patient is knowledgeable in diabetes management concepts related to: Being Active and carbohydrate counting    Patient needs further education on the following diabetes management concepts: Taking Medication    Based on learning assessment above, most appropriate setting for further diabetes education would be: Individual setting.    Education provided today on:  AADE Self-Care Behaviors:  Monitoring: frequency of monitoring  Taking Medication: action of prescribed medication, when to take medications and dosing, using basal patterns    Opportunities for ongoing education and support in diabetes-self management were discussed.    Pt verbalized understanding of concepts discussed and recommendations provided today.       Education Materials Provided:  No new materials provided today    PLAN:  See Patient Instructions for co-developed, patient-stated behavior change goals.  AVS printed and provided to patient today. See Follow-Up section for recommended follow-up.    Deanna Bolton RN,CDE   Time Spent: 45 minutes  Encounter Type: Individual    Any diabetes medication dose changes were made via the CDE Protocol and Collaborative Practice Agreement with the patient's referring provider. A copy of this encounter was shared with the provider.

## 2018-09-05 NOTE — PROGRESS NOTES
"Diabetes Self-Management Education & Support    Diabetes Education Self Management & Training and Insulin Pump    SUBJECTIVE/OBJECTIVE:  Presents for: Follow-up  Accompanied by: Self  Diabetes education in the past 24mo: Yes  Focus of Visit: Taking Medication  Diabetes type: Type 2  Date of diagnosis: 06/01/94  Disease course: Stable  How confident are you filling out medical forms by yourself:: Extremely  Diabetes management related comments/concerns: has not been feeling well, sore throast and thinks blood sugars are up from illness  Transportation concerns: No  Other concerns:: Other (anxiety )  Cultural Influences/Ethnic Background:  American      Diabetes Symptoms & Complications  Blurred vision: No  Fatigue: Yes  Foot paresthesias: No  Foot ulcerations: No  Polydipsia: No  Polyphagia: Yes  Polyuria: No  Visual change: No  Weakness: Yes  Weight loss: No  Symptom course: Stable  Weight trend: Increasing steadily  Autonomic neuropathy: No  CVA: No  Heart disease: No  Nephropathy: No  Peripheral neuropathy: No  Peripheral Vascular Disease: No  Retinopathy: No  Sexual dysfunction: No    Patient Problem List and Family Medical History reviewed for relevant medical history, current medical status, and diabetes risk factors.    Vitals:  [facility] 01/01/1999  Estimated body mass index is 33.04 kg/(m^2) as calculated from the following:    Height as of 7/23/18: 1.613 m (5' 3.5\").    Weight as of 7/23/18: 86 kg (189 lb 8 oz).   Last 3 BP:   BP Readings from Last 3 Encounters:   07/23/18 150/78   07/11/18 (!) 160/92   07/06/18 150/90       History   Smoking Status     Never Smoker   Smokeless Tobacco     Never Used       Labs:  Lab Results   Component Value Date    A1C 8.7 06/26/2018     Lab Results   Component Value Date     06/26/2018     Lab Results   Component Value Date     06/26/2018     HDL Cholesterol   Date Value Ref Range Status   06/26/2018 51 >49 mg/dL Final   ]  GFR Estimate   Date Value Ref Range " Status   2018 85 >60 mL/min/1.7m2 Final     Comment:     Non  GFR Calc     GFR Estimate If Black   Date Value Ref Range Status   2018 >90 >60 mL/min/1.7m2 Final     Comment:      GFR Calc     Lab Results   Component Value Date    CR 0.71 2018     No results found for: MICROALBUMIN    Healthy Eating  Cultural/Sabianist diet restrictions?: No  Meal planning: Avoiding sweets, Carbohydrate counting, Heart healthy, Low salt, Smaller portions  Meals include: Breakfast, Lunch, Dinner, Snacks  Beverages: Water    Being Active  Exercise:: Yes  Days per week of moderate to strenuous exercise (like a brisk walk): 7  On average, minutes per day of exercise at this level: 30  How intense was your typical exercise? : Moderate (like brisk walking)  Exercise Minutes per Week: 210  Barrier to exercise: None    Monitoring  Did patient bring glucose meter to appointment? : No  Blood Glucose Meter: Accu-check  Home Glucose (Sugar) Monitorin+ times per day  Blood glucose trend: Increasing steadily  Low Glucose Range (mg/dL): 180-200  High Glucose Range (mg/dL): >200  Overall Range (mg/dL): 180-200    Taking Medications  Diabetes Medication(s)     Diabetic Other Sig    glucagon (GLUCAGON EMERGENCY) 1 MG kit Inject 1 mg into the muscle once for 1 dose    Insulin Sig    insulin aspart (NOVOLOG VIAL) 100 UNITS/ML injection To be used in insulin pump. TDD 80 units    INSULIN PUMP - OUTPATIENT Date last updated:  18  Omnipod  BASAL RATES and times:  REGULAR PATTERN:  12am: 1.60  6 AM:  1.45  12pm: 1.3  6pm: 1.45  ANXIETY PATTERN:  12am: 1.70   6 AM: 1.55  12pm: 1.4  6pm: 1.55  CRAZY SUGAR PATTERN:  12am: 1.80   6 AM: 1.65  12pm: 1.5  6pm: 1.65  CARB RATIO and times:  12am-12am: 6  Corection Factor (Sensitivity) and times:  12   AM (midnight): 23  5 AM: 29  BLOOD GLUCOSE TARGET and times:  12   AM (midnight): 100, correct above 120          Current Treatments: Insulin Pump  Dose  schedule: pre-breakfast, pre-lunch, pre-dinner, at bedtime  Given by: Patient  Injection/Infusion sites: Abdomen, Arms, Buttocks, Thighs    Problem Solving  Hypoglycemia Frequency: Never  Hypoglycemia Treatment: Glucose (tablets or gel)  Patient carries a carbohydrate source: Yes  Medical alert: No  Severe weather/disaster plan for diabetes management?: Yes  DKA prevention plan?: No  Sick day plan for diabetes management?: (P) No    Hypoglycemia Symptoms       Hypoglycemia Complications       Reducing Risks  CAD Risks: Diabetes Mellitus, Family history, Hypertension, Post-menopausal, Stress  Has dilated eye exam at least once a year?: Yes  Sees dentist every 6 months?: Yes  Sees podiatrist (foot doctor)?: No    Healthy Coping  Emotional response to diabetes: Ready to learn  Informal Support system:: Family  Stage of change: ACTION (Actively working towards change)  Difficulty affording diabetes management supplies?: Yes  Support resources: In-person Offerings  Patient Activation Measure Survey Score:  JEFFERY Score (Last Two) 6/2/2011 10/18/2013   JEFFERY Raw Score 50 42   Activation Score 86.3 66   JEFFERY Level 4 3       Patient seen today for Insulin Pump Review    Insulin Pump Review  Taking other diabetes medications?: No  Patient has glucagon emergency kit: Yes  Patient understands DKA prevention: Yes  Patient has ketone test strips: Yes  Patient has an insulin multiple daily injection back-up plan: Yes    Pump report:              ASSESSMENT:  Patient glucose above goal, she is fighting a throat infection and does not feel she needs to go the the doctor at this time.  She changed her basal to her afternoon binge basal patten on the evening of 9/1/18 and since then her glucose has improved.     Goals        General    Monitoring (pt-stated)     Notes - Note created  9/5/2018 11:44 AM by Deanna Bolton RN    Goal Statement: I will test blood sugar before bed and take a correction dose if needed    Measure of Success:  pump download    Strengths: using omnipod as meter  Ideas to overcome barriers: none    Date to Achieve By: 10/8/18              Patient's most recent   Lab Results   Component Value Date    A1C 8.7 06/26/2018    is not meeting goal of <8.0    INTERVENTION:   Diabetes knowledge and skills assessment:     Patient is knowledgeable in diabetes management concepts related to: Being Active and carbohydrate counting    Patient needs further education on the following diabetes management concepts: Taking Medication    Based on learning assessment above, most appropriate setting for further diabetes education would be: Individual setting.    Education provided today on:  AADE Self-Care Behaviors:  Monitoring: frequency of monitoring  Taking Medication: action of prescribed medication, when to take medications and dosing, using basal patterns    Opportunities for ongoing education and support in diabetes-self management were discussed.    Pt verbalized understanding of concepts discussed and recommendations provided today.       Education Materials Provided:  No new materials provided today    PLAN:  See Patient Instructions for co-developed, patient-stated behavior change goals.  AVS printed and provided to patient today. See Follow-Up section for recommended follow-up.    Deanna Bolton RN,CDE   Time Spent: 45 minutes  Encounter Type: Individual    Any diabetes medication dose changes were made via the CDE Protocol and Collaborative Practice Agreement with the patient's referring provider. A copy of this encounter was shared with the provider.

## 2018-09-05 NOTE — MR AVS SNAPSHOT
After Visit Summary   9/5/2018    Maricarmen Dotson    MRN: [id number]           Patient Information     Date Of Birth          1960        Visit Information        Provider Department      9/5/2018 9:30 AM Deanna Bolton RN [facility]        Today's Diagnoses     Type 2 diabetes mellitus with hyperglycemia (H)    -  1    Type 2 diabetes mellitus with hyperglycemia, with long-term current use of insulin (H)          Care Instructions    Test blood sugar before bed and take correction dose          Follow-ups after your visit        Your next 10 appointments already scheduled     Sep 14, 2018  9:45 AM CDT   SHORT with Ri Cyriln Provider   [facility]    [address]  [facility] [address] [phone]            Sep 24, 2018  9:30 AM CDT   Diabetes Education with Allie Matias RD   [facility] ([facility])    [address]  [facility] [id number] [phone]            Oct 08, 2018  9:30 AM CDT   Diabetes Education with Deanna Bolton RN   [facility] [place] ([facility])    [address]  [facility] [address] [phone]            Oct 22, 2018  9:30 AM CDT   Diabetes Education with Allie Matias RD   [facility] ([facility])    [address]  [facility] [address] [phone]              Who to contact     If you have questions or need follow up information about today's clinic visit or your schedule please contact [facility] directly at [phone].  Normal or non-critical lab and imaging results will be communicated to you by MyChart, letter or phone within 4 business days after the clinic has received the results. If you do not hear from us within 7 days, please contact the clinic through [employer] or  phone. If you have a critical or abnormal lab result, we will notify you by phone as soon as possible.  Submit refill requests through [employer] or call your pharmacy and they will forward the refill request to us. Please allow 3 business days for your refill to be completed.          Additional Information About Your Visit        [employer] Information     [employer] gives you secure access to your electronic health record. If you see a primary care provider, you can also send messages to your care team and make appointments. If you have questions, please call your primary care clinic.  If you do not have a primary care provider, please call [phone] and they will assist you.        Care EveryWhere ID     This is your Care EveryWhere ID. This could be used by other organizations to access your [place] medical records  [id number]        Your Vitals Were     Last Period                   01/01/1999            Blood Pressure from Last 3 Encounters:   07/23/18 150/78   07/11/18 (!) 160/92   07/06/18 150/90    Weight from Last 3 Encounters:   07/23/18 86 kg (189 lb 8 oz)   07/11/18 86 kg (189 lb 8 oz)   07/06/18 84.8 kg (186 lb 14.4 oz)              We Performed the Following     DIABETES EDUCATION - Individual  []          Today's Medication Changes          These changes are accurate as of 9/5/18  1:59 PM.  If you have any questions, ask your nurse or doctor.               These medicines have changed or have updated prescriptions.        Dose/Directions    * ASPIRIN NOT PRESCRIBED   Commonly known as:  INTENTIONAL   This may have changed:  Another medication with the same name was changed. Make sure you understand how and when to take each.        continuous prn Antiplatelet medication not prescribed intentionally due to age.   Quantity:  1 each   Refills:  0       * insulin pump infusion   This may have changed:  additional instructions   Used for:  Type 2 diabetes mellitus with hyperglycemia, with long-term  current use of insulin (H)        Date last updated: 9/4/18 Omnipod BASAL RATES and times: REGULAR: 12am: 1.60 --6 AM: 1.45 --12pm: 1.3 -- 6pm: 1.45 HIGH 2: 12am: 1.70 --  6 AM: 1.55 -- 12pm: 1.4 -- 6pm: 1.55 ANXIETY 3: 12am: 1.80 -- 6 AM: 1.65 --12pm: 1.5 --6pm: 1.65 AFTERNOON BINGE: 12 AM: 1.8 -- 6 AM: 4.65 --8 PM: 1.8 CARB RATIO: 12am-12am: 6 Corection Factor (Sensitivity): 12AM (midnight): 23 -- 5 AM: 29 BLOOD GLUCOSE TARGET:12AM (midnight):100, correct [id number]   Refills:  0       * Notice:  This list has 2 medication(s) that are the same as other medications prescribed for you. Read the directions carefully, and ask your doctor or other care provider to review them with you.             Primary Care Provider Office Phone # Fax #    Annamaria Duc Erickson [phone] [address] [place] [address]        Goals        General    Monitoring (pt-stated)     Notes - Note created  9/5/2018 11:44 AM by Deanna Bolton, RN    Goal Statement: I will test blood sugar before bed and take a correction dose if needed    Measure of Success: pump download    Strengths: using omnipod as meter  Ideas to overcome barriers: none    Date to Achieve By: 10/8/18          Equal Access to Services     GONZALO HAYNES AH: Hadsmiley thorntono Sosu, waaxda luqadaha, qaybta kaalmada adeegyada, dillon brasher. So [facility] [phone].    ATENCIÓN: Si habla español, tiene a richter disposición servicios gratuitos de asistencia lingüística. Llame al [phone].    We comply with applicable federal civil rights laws and [place] laws. We do not discriminate on the basis of race, color, national origin, age, disability, sex, sexual orientation, or gender identity.            Thank you!     Thank you for choosing [facility] [facility]  for your care. Our goal is always to provide you with excellent care. Hearing back from our patients is one way we can continue to  improve our services. Please take a few minutes to complete the written survey that you may receive in the mail after your visit with us. Thank you!             Your Updated Medication List - Protect others around you: Learn how to safely use, store and throw away your medicines at www.disposemymeds.org.          This list is accurate as of 9/5/18  1:59 PM.  Always use your most recent med list.                   Brand Name Dispense Instructions for use Diagnosis    acetone (Urine) test Strp     50 each    1 strip by In Vitro route daily    Type 2 diabetes mellitus with hyperglycemia, with long-term current use of insulin (H)       * ASPIRIN NOT PRESCRIBED    INTENTIONAL    1 each    continuous prn Antiplatelet medication not prescribed intentionally due to age.        blood glucose monitoring test strip    no brand specified    300 each    Freestyle test strips (NOT LITE or INSULINX) to be used with Omnipod pump test 6 times daily    Type 2 diabetes mellitus with hyperglycemia, with long-term current use of insulin (H)       EPINEPHrine 0.3 MG/0.3ML injection 2-pack    EPIPEN/ADRENACLICK/or ANY BX GENERIC EQUIV    2 each    Inject 0.3 mLs (0.3 mg) into the muscle once as needed for anaphylaxis    Allergic reaction       glucagon 1 MG kit    GLUCAGON EMERGENCY    1 mg    Inject 1 mg into the muscle once for 1 dose    Type 2 diabetes mellitus with hyperglycemia, with long-term current use of insulin (H)       insulin aspart 100 UNITS/ML injection    NovoLOG VIAL    7 vial    To be used in insulin pump. TDD 80 units    Type 2 diabetes mellitus with hyperglycemia, with long-term current use of insulin (H)       insulin pen needle 31G X 5 MM    B-D U/F    550 each    Use 6  time(s) per day.  Please dispense as BD Pen Needle Mini U/F 31G x 5 MM    Type 2 diabetes mellitus with hyperglycemia (H)       * insulin pump infusion      Date last updated: 9/4/18 Omnipod BASAL RATES and times: REGULAR: 12am: 1.60 --6 AM: 1.45  "--12pm: 1.3 -- 6pm: 1.45 HIGH 2: 12am: 1.70 --  6 AM: 1.55 -- 12pm: 1.4 -- 6pm: 1.55 ANXIETY 3: 12am: 1.80 -- 6 AM: 1.65 --12pm: 1.5 --6pm: 1.65 AFTERNOON BINGE: 12 AM: 1.8 -- 6 AM: 4.65 --8 PM: 1.8 CARB RATIO: 12am-12am: 6 Corection Factor (Sensitivity): 12AM (midnight): 23 -- 5 AM: 29 BLOOD GLUCOSE TARGET:12AM (midnight):100, correct [id number]    Type 2 diabetes mellitus with hyperglycemia, with long-term current use of insulin (H)       insulin syringe-needle U-100 31G X 5/16\" 1 ML    BD insulin syringe ULTRAFINE    100 each    Use one syringe daily or as directed.    Type 2 diabetes mellitus with hyperglycemia, with long-term current use of insulin (H)       metoprolol succinate 25 MG 24 hr tablet    TOPROL-XL    30 tablet    Take 1 tablet (25 mg) by mouth daily    Hypertension goal BP (blood pressure) < 140/90       polyethylene glycol Packet    MIRALAX/GLYCOLAX     Take 1 packet by mouth daily.        STATIN NOT PRESCRIBED (INTENTIONAL)     0 each    Statin not prescribed intentionally due to Intolerance    Statin intolerance       traMADol 50 MG tablet    ULTRAM     Take 50 mg by mouth 3 times daily 1-2 tablets in the evening        VOLTAREN 1 % Gel topical gel   Generic drug:  diclofenac     100 g    Apply topically nightly as needed for moderate pain Apply 4 grams to knees or 2 grams to hands four times daily using enclosed dosing card.        * Notice:  This list has 2 medication(s) that are the same as other medications prescribed for you. Read the directions carefully, and ask your doctor or other care provider to review them with you.      "

## 2018-09-07 ENCOUNTER — TELEPHONE (OUTPATIENT)
Dept: NURSING | Facility: CLINIC | Age: 58
End: 2018-09-07

## 2018-09-07 DIAGNOSIS — I10 HYPERTENSION GOAL BP (BLOOD PRESSURE) < 140/90: Primary | ICD-10-CM

## 2018-09-07 RX ORDER — AMLODIPINE BESYLATE 5 MG/1
5 TABLET ORAL DAILY
Qty: 30 TABLET | Refills: 1 | Status: SHIPPED | OUTPATIENT
Start: 2018-09-07 | End: 2018-12-06

## 2018-09-07 NOTE — TELEPHONE ENCOUNTER
Patient called reporting side effects from metoprolol: severe fatigue, joint pain, tightness in throat. She was advised to stop the medication today. The next medication on her standing order, amlodipine 5mg, has been sent to her pharmacy. She will  and begin taking the medication today and her second visit has been rescheduled for 9/24.    Shellie Ortiz RN

## 2018-09-10 ENCOUNTER — OFFICE VISIT (OUTPATIENT)
Dept: PEDIATRICS | Facility: CLINIC | Age: 58
End: 2018-09-10
Payer: COMMERCIAL

## 2018-09-10 VITALS
HEART RATE: 73 BPM | BODY MASS INDEX: 32.83 KG/M2 | TEMPERATURE: 98.2 F | SYSTOLIC BLOOD PRESSURE: 148 MMHG | WEIGHT: 192.3 LBS | OXYGEN SATURATION: 97 % | HEIGHT: 64 IN | DIASTOLIC BLOOD PRESSURE: 88 MMHG

## 2018-09-10 DIAGNOSIS — J02.9 ACUTE PHARYNGITIS, UNSPECIFIED ETIOLOGY: Primary | ICD-10-CM

## 2018-09-10 DIAGNOSIS — J35.8 CALCULUS, TONSIL: ICD-10-CM

## 2018-09-10 LAB
DEPRECATED S PYO AG THROAT QL EIA: NORMAL
HETEROPH AB SER QL: NEGATIVE
SPECIMEN SOURCE: NORMAL

## 2018-09-10 PROCEDURE — 36415 COLL VENOUS BLD VENIPUNCTURE: CPT | Performed by: PHYSICIAN ASSISTANT

## 2018-09-10 PROCEDURE — 86308 HETEROPHILE ANTIBODY SCREEN: CPT | Performed by: PHYSICIAN ASSISTANT

## 2018-09-10 PROCEDURE — 87081 CULTURE SCREEN ONLY: CPT | Performed by: PHYSICIAN ASSISTANT

## 2018-09-10 PROCEDURE — 87880 STREP A ASSAY W/OPTIC: CPT | Performed by: PHYSICIAN ASSISTANT

## 2018-09-10 PROCEDURE — 99214 OFFICE O/P EST MOD 30 MIN: CPT | Performed by: PHYSICIAN ASSISTANT

## 2018-09-10 NOTE — PROGRESS NOTES
"  SUBJECTIVE:   Maricarmen Dotson is a 57 year old female who presents to clinic today for the following health issues:    Acute Illness   Acute illness concerns: throat  Onset: x3 weeks    Fever: YES- low    Chills/Sweats: YES- chills    Headache (location?): YES- top, behind eyes    Sinus Pressure:no    Conjunctivitis:  no    Ear Pain: YES: bilateral    Rhinorrhea: YES- slight    Congestion: YES- chest    Sore Throat: YES     Cough: YES-non-productive    Wheeze: no    Decreased Appetite: no    Nausea: YES    Vomiting: no    Diarrhea:  no    Dysuria/Freq.: no    Fatigue/Achiness: YES- both    Sick/Strep Exposure: YES     Therapies Tried and outcome: Benadryl    ROS:  ROS otherwise negative    OBJECTIVE:                                                    /88 (BP Location: Right arm, Cuff Size: Adult Regular)  Pulse 73  Temp 98.2  F (36.8  C) (Tympanic)  Ht 5' 3.5\" (1.613 m)  Wt 192 lb 4.8 oz (87.2 kg)  LMP 01/01/1999  SpO2 97%  BMI 33.53 kg/m2  Body mass index is 33.53 kg/(m^2).   GENERAL: alert, no distress  HENT: ear canals- normal; TMs- normal; Nose- normal; Mouth-erythemic posterior pharynx with tonsiliths present  NECK: no tenderness, no adenopathy  RESP: lungs clear to auscultation - no rales, no rhonchi, no wheezes  CV: regular rates and rhythm, normal S1 S2, no S3 or S4 and no murmur, no click or rub    Diagnostic test results:  Results for orders placed or performed in visit on 09/10/18 (from the past 24 hour(s))   Mononucleosis screen   Result Value Ref Range    Mononucleosis Screen Negative NEG^Negative        ASSESSMENT/PLAN:                                                    1. Acute pharyngitis, unspecified etiology  TC pending.   - Strep, Rapid Screen  - Mononucleosis screen  - Beta strep group A culture    2. Calculus, tonsil  If unable to remove, call for ENT referral.    Michael Adams PA-C  [facility] [place]  "

## 2018-09-10 NOTE — MR AVS SNAPSHOT
After Visit Summary   9/10/2018    Maricarmen Dotson    MRN: [id number]           Patient Information     Date Of Birth          1960        Visit Information        Provider Department      9/10/2018 1:10 PM Michael Adams [facility]-C [facility]        Today's Diagnoses     Acute pharyngitis, unspecified etiology    -  1    Calculus, tonsil           Follow-ups after your visit        Follow-up notes from your care team     Return in about 2 weeks (around 9/24/2018) for if symptoms worsen or fail to improve.      Your next 10 appointments already scheduled     Sep 24, 2018  9:30 AM CDT   Diabetes Education with Allie Matias RD   [facility] [place] ([facility])    [address]  [facility] [address]   [phone]            Sep 24, 2018 11:00 AM CDT   SHORT with Gil Barrera Provider   [facility] ([facility]    [address]  [facility] [id number] [phone]            Oct 08, 2018  9:30 AM CDT   Diabetes Education with Deanna Bolton RN   [facility] [place] ([facility])    [address]  [facility] [address]   [phone]            Oct 22, 2018  9:30 AM CDT   Diabetes Education with Allie Matias RD   [facility] [place] ([facility])    [address]  [facility] [address]   [phone]              Who to contact     If you have questions or need follow up information about today's clinic visit or your schedule please contact [facility] directly at [phone].  Normal or non-critical lab and imaging results will be communicated to you by MyChart, letter or phone within 4 business days after the clinic has received the results. If you do not hear from us within 7 days, please contact the clinic through MyChart or phone. If you have a critical or abnormal lab  "result, we will notify you by phone as soon as possible.  Submit refill requests through [employer] or call your pharmacy and they will forward the refill request to us. Please allow 3 business days for your refill to be completed.          Additional Information About Your Visit        [employer] Information     [employer] gives you secure access to your electronic health record. If you see a primary care provider, you can also send messages to your care team and make appointments. If you have questions, please call your primary care clinic.  If you do not have a primary care provider, please call [phone] and they will assist you.        Care EveryWhere ID     This is your Care EveryWhere ID. This could be used by other organizations to access your [place] medical records  [id number]        Your Vitals Were     Pulse Temperature Height Last Period Pulse Oximetry BMI (Body Mass Index)    73 98.2  F (36.8  C) (Tympanic) 5' 3.5\" (1.613 m) 01/01/1999 97% 33.53 kg/m2       Blood Pressure from Last 3 Encounters:   09/10/18 148/88   07/23/18 150/78   07/11/18 (!) 160/92    Weight from Last 3 Encounters:   09/10/18 192 lb 4.8 oz (87.2 kg)   07/23/18 189 lb 8 oz (86 kg)   07/11/18 189 lb 8 oz (86 kg)              We Performed the Following     Beta strep group A culture     Mononucleosis screen     Strep, Rapid Screen          Today's Medication Changes          These changes are accurate as of 9/10/18  3:16 PM.  If you have any questions, ask your nurse or doctor.               Stop taking these medicines if you haven't already. Please contact your care team if you have questions.     insulin pen needle 31G X 5 MM   Commonly known as:  B-D U/F   Stopped by:  Michael Adams PA-C                    Primary Care Provider Office Phone # Fax #    Annamaria Erickson [phone] [address] [place] [address]        Goals        General    Monitoring (pt-stated)     Notes - " Note created  9/5/2018 11:44 AM by Deanna Bolton, RN    Goal Statement: I will test blood sugar before bed and take a correction dose if needed    Measure of Success: pump download    Strengths: using omnipod as meter  Ideas to overcome barriers: none    Date to Achieve By: 10/8/18          Equal Access to Services     HERBIEGONZALO DALLAS AH: Hadii peg ku hadasho Soomaali, waaxda luqadaha, qaybta kaalmada rubinageneda, dillon castañeda екатеринаeliza girardkaylintony brasher. So [facility] [phone].    ATENCIÓN: Si habla español, tiene a richter disposición servicios gratuitos de asistencia lingüística. Llame al [phone].    We comply with applicable federal civil rights laws and [place] laws. We do not discriminate on the basis of race, color, national origin, age, disability, sex, sexual orientation, or gender identity.            Thank you!     Thank you for choosing [facility]  for your care. Our goal is always to provide you with excellent care. Hearing back from our patients is one way we can continue to improve our services. Please take a few minutes to complete the written survey that you may receive in the mail after your visit with us. Thank you!             Your Updated Medication List - Protect others around you: Learn how to safely use, store and throw away your medicines at www.disposemymeds.org.          This list is accurate as of 9/10/18  3:16 PM.  Always use your most recent med list.                   Brand Name Dispense Instructions for use Diagnosis    acetone (Urine) test Strp     50 each    1 strip by In Vitro route daily    Type 2 diabetes mellitus with hyperglycemia, with long-term current use of insulin (H)       amLODIPine 5 MG tablet    NORVASC    30 tablet    Take 1 tablet (5 mg) by mouth daily    Hypertension goal BP (blood pressure) < 140/90       blood glucose monitoring test strip    no brand specified    300 each    Freestyle test strips (NOT LITE or INSULINX) to be used with Omnipod pump test 6  times daily    Type 2 diabetes mellitus with hyperglycemia, with long-term current use of insulin (H)       EPINEPHrine 0.3 MG/0.3ML injection 2-pack    EPIPEN/ADRENACLICK/or ANY BX GENERIC EQUIV    2 each    Inject 0.3 mLs (0.3 mg) into the muscle once as needed for anaphylaxis    Allergic reaction       glucagon 1 MG kit    GLUCAGON EMERGENCY    1 mg    Inject 1 mg into the muscle once for 1 dose    Type 2 diabetes mellitus with hyperglycemia, with long-term current use of insulin (H)       insulin aspart 100 UNITS/ML injection    NovoLOG VIAL    7 vial    To be used in insulin pump. TDD 80 units    Type 2 diabetes mellitus with hyperglycemia, with long-term current use of insulin (H)       insulin pump infusion      Date last updated: 9/4/18 Omnipod BASAL RATES and times: REGULAR: 12am: 1.60 --6 AM: 1.45 --12pm: 1.3 -- 6pm: 1.45 HIGH 2: 12am: 1.70 --  6 AM: 1.55 -- 12pm: 1.4 -- 6pm: 1.55 ANXIETY 3: 12am: 1.80 -- 6 AM: 1.65 --12pm: 1.5 --6pm: 1.65 AFTERNOON BINGE: 12 AM: 1.8 -- 6 AM: 4.65 --8 PM: 1.8 CARB RATIO: 12am-12am: 6 Corection Factor (Sensitivity): 12AM (midnight): 23 -- 5 AM: 29 BLOOD GLUCOSE TARGET:12AM (midnight):100, correct [id number]    Type 2 diabetes mellitus with hyperglycemia, with long-term current use of insulin (H)       polyethylene glycol Packet    MIRALAX/GLYCOLAX     Take 1 packet by mouth daily.        STATIN NOT PRESCRIBED (INTENTIONAL)     0 each    Statin not prescribed intentionally due to Intolerance    Statin intolerance       traMADol 50 MG tablet    ULTRAM     Take 50 mg by mouth 3 times daily 1-2 tablets in the evening        VOLTAREN 1 % Gel topical gel   Generic drug:  diclofenac     100 g    Apply topically nightly as needed for moderate pain Apply 4 grams to knees or 2 grams to hands four times daily using enclosed dosing card.

## 2018-09-11 LAB
BACTERIA SPEC CULT: NORMAL
SPECIMEN SOURCE: NORMAL

## 2018-09-12 ENCOUNTER — TELEPHONE (OUTPATIENT)
Dept: FAMILY MEDICINE | Facility: CLINIC | Age: 58
End: 2018-09-12

## 2018-09-12 NOTE — TELEPHONE ENCOUNTER
"Patient called to report symptoms she attributes to amlodipine. She began taking amlodipine on Sept 7th, but recently began experiencing heart palpitations and reports home blood pressure measurements of 158/100 and 162/98. She also reported chest pain, dizziness, and shortness of breath overnight that caused problems with sleep. She stated she took a 4 mile walk this morning which she says improved her symptoms as \"the medication was worked out of her system\". She is not currently symptomatic as of this call.    She was advised to go to the emergency department with chest pain, dizziness, shortness of breath symptoms. She declined and stated she \"knows what this is and is fine\". She was encouraged to be seen in clinic or urgent care today. Again, she refused stating she would just stop the medication and make an appointment for next week. She was again advised of symptoms she should seek emergency care for (including BP >180/110). She stated understanding and agreement.     At this time she has been withdrawn from the study as we have exhausted our medications options for her on the study, and with her extensive history of drug reactions should be followed more closely by PCP for medication management of blood pressure.     Shellie Ortiz RN  "

## 2018-10-08 ENCOUNTER — ALLIED HEALTH/NURSE VISIT (OUTPATIENT)
Dept: EDUCATION SERVICES | Facility: CLINIC | Age: 58
End: 2018-10-08
Payer: COMMERCIAL

## 2018-10-08 DIAGNOSIS — E11.65 TYPE 2 DIABETES MELLITUS WITH HYPERGLYCEMIA, WITH LONG-TERM CURRENT USE OF INSULIN (H): ICD-10-CM

## 2018-10-08 DIAGNOSIS — E11.65 TYPE 2 DIABETES MELLITUS WITH HYPERGLYCEMIA (H): Primary | ICD-10-CM

## 2018-10-08 DIAGNOSIS — Z79.4 TYPE 2 DIABETES MELLITUS WITH HYPERGLYCEMIA, WITH LONG-TERM CURRENT USE OF INSULIN (H): ICD-10-CM

## 2018-10-08 PROCEDURE — G0108 DIAB MANAGE TRN  PER INDIV: HCPCS

## 2018-10-08 NOTE — PROGRESS NOTES
Diabetes Self-Management Education & Support      Diabetes Self-Management Education & Support - Insulin Pump Review    SUBJECTIVE/OBJECTIVE  Presents for: Follow-up  Accompanied by: Daughter, Self  Diabetes education in the past 24mo: No  Focus of Visit: Insulin Pump  Diabetes type: Type 2  Disease course: Getting harder to manage  Diabetes management related comments/concerns: more insulin i use the more i need and the more insulin resistance i become, entering false carbs to bring the high readings down and sometimes setting a temp basal at increase 30%.  Transportation concerns: No  Other concerns:: Cognitive impairment (struggling more with memory)  Cultural Influences/Ethnic Background:  American      Patient seen today for Insulin Pump Review:              Insulin Pump Information  Insulin Pump Type: OmniPod    Insulin Pump Review  Insulin Pump Type: OmniPod  Taking other diabetes medications?: No  Problems taking diabetes medications regularly?: No  Diabetes medication side effects?: No  Patient has glucagon emergency kit: Yes  Patient understands DKA prevention: Yes  Patient has ketone test strips: Yes  Patient has an insulin multiple daily injection back-up plan: Yes  Patient would benefit from: Change in basal rate(s), Use of a temporary basal rate, Change in insulin sensitivity factor(s)  Changes made to pump settings: Basal rate, Correction/sensitivity  Education specific to insulin pump provided today: Pump button pressing, How to use a temporary basal rate, Importance of bolusing before meals, Benefits of post-meal blood glucose testing, Other (not entering false carbs)    Healthy Eating  Healthy Eating Assessed Today: Yes  Cultural/Mosque diet restrictions?: No  Patient on a regular basis: Has a low intake of carbohydrates, Counts carbohydrates, Snacks frequently throughout the day (binge eating during times of stress/anxiety)  Meal planning: Avoiding sweets, Carbohydrate counting, Heart healthy,  Low salt, Smaller portions  Meals include: Breakfast, Lunch, Dinner, Snacks  Beverages: Water  Has patient met with a dietitian in the past?: Yes    Being Active  Being Active Assessed Today: No  How intense was your typical exercise? : Moderate (like brisk walking)  Barrier to exercise: Physical limitation    Monitoring  Monitoring Assessed Today: Yes  Did patient bring glucose meter to appointment? : Yes  Blood Glucose Meter: FreeStyle  Home Glucose (Sugar) Monitorin+ times per day  Blood glucose trend: Increasing steadily  Low Glucose Range (mg/dL):   High Glucose Range (mg/dL): >200  Overall Range (mg/dL): >200      Taking Medications  Diabetes Medication(s)     Diabetic Other Sig    glucagon (GLUCAGON EMERGENCY) 1 MG kit Inject 1 mg into the muscle once for 1 dose    Insulin Sig    insulin aspart (NOVOLOG VIAL) 100 UNITS/ML injection To be used in insulin pump. TDD 80 units    INSULIN PUMP - OUTPATIENT Date last updated: 18 Omnipod  BASAL RATES and times:  REGULAR:  12am: 1.60 --6 AM: 1.45 --12pm: 1.3 -- 6pm: 1.45  HIGH 2:  12am: 1.70 --  6 AM: 1.55 -- 12pm: 1.4 -- 6pm: 1.55  ANXIETY 3:  12am: 1.80 -- 6 AM: 1.65 --12pm: 1.5 --6pm: 1.65  AFTERNOON BINGE:  12 AM: 1.8 -- 6 AM: 4.65 --8 PM: 1.8  CARB RATIO: 12am-12am: 6  Corection Factor (Sensitivity): 12AM (midnight): 23 -- 5 AM: 29  BLOOD GLUCOSE TARGET:12AM (midnight):100, correct [id number]          Taking Medication Assessed Today: Yes  Current Treatments: Insulin Pump  Dose schedule:  (premeal, presnacks and corection dose)  Given by: Patient  Injection/Infusion sites: Abdomen  Problems taking diabetes medications regularly?: No  Diabetes medication side effects?: No  Treatment Compliance: All of the time    Problem Solving  Problem Solving Assessed Today: No  Hypoglycemia Frequency: Never  Hypoglycemia Treatment: Glucose (tablets or gel)  Patient carries a carbohydrate source: Yes  Medical alert: No  Severe weather/disaster plan for diabetes  management?: Yes  DKA prevention plan?: No  Sick day plan for diabetes management?: (P) No              Reducing Risks  Reducing Risks Assessed Today: No  CAD Risks: Diabetes Mellitus, Family history, Hypertension, Obesity, Post-menopausal, Stress  Has dilated eye exam at least once a year?: Yes  Sees dentist every 6 months?: Yes  Sees podiatrist (foot doctor)?: No    Healthy Coping  Healthy Coping Assessed Today: Yes  Informal Support system:: Family, Spouse  Stage of change: ACTION (Actively working towards change)  Difficulty affording diabetes management supplies?: Yes  Patient Activation Measure Survey Score:  JEFFERY Score (Last Two) 6/2/2011 10/18/2013   JEFFERY Raw Score 50 42   Activation Score 86.3 66   JEFFERY Level 4 3         ASSESSMENT  Patient glucose above goal, stress and anxiety contributing to increase glucose, some binge eating.  She is entering false carbohydrates and occassionally using a temp basal for high readings. Recommend increase to daytime basal, decrease in carbohydrate ratio and only enter carbohydrates eaten.  Close follow up and assessment needed to reduce patient anxiety. Recommend she upload pump once a week and send a mychart/email to request CDE review results and make recommendations.     Changes made to pump settings:  Basal: added new basal pattern called Very High 5 per patient request.  1 basal rate set 12am-12am: 1.8 unit(s)/hr.  If consistently high recommend she increase to 1.9 units/hr. Provided deomonstration to patients daughter in case she needs assistance with making these changes.   Sensitivity:  12am: 23  5 am: 29 --> 25    INTERVENTION:   Diabetes knowledge and skills assessment:     Patient is knowledgeable in diabetes management concepts related to: Healthy Eating and Monitoring    Patient needs further education on the following diabetes management concepts: Taking Medication, Problem Solving and Healthy Coping    Based on learning assessment above, most appropriate  setting for further diabetes education would be: Individual setting.    Education provided today on:  AADE Self-Care Behaviors:  Monitoring: frequency of monitoring  Taking Medication: action of prescribed medication, when to take medications and dosing  Healthy Coping: utilize support systems     Opportunities for ongoing education and support in diabetes-self management were discussed.    Pt verbalized understanding of concepts discussed and recommendations provided today.       Education Materials Provided:  No new materials provided today      PLAN  See Patient Instructions for co-developed, patient-stated behavior change goals.  AVS printed and provided to patient today. See Follow-Up section for recommended follow-up.    Deanna Bolton RN,CDE   Time Spent: 60 minutes  Encounter Type: Individual    Any diabetes medication dose changes were made via the CDE Protocol and Collaborative Practice Agreement with the patient's referring provider. A copy of this encounter was shared with the provider.

## 2018-10-08 NOTE — LETTER
10/8/2018         RE: Maricarmen Dotson  [address] [place] [address]        Dear Colleague,    Thank you for referring your patient, Maricarmen Dotson, to the [facility]. Please see a copy of my visit note below.    Diabetes Self-Management Education & Support      Diabetes Self-Management Education & Support - Insulin Pump Review    SUBJECTIVE/OBJECTIVE  Presents for: Follow-up  Accompanied by: Daughter, Self  Diabetes education in the past 24mo: No  Focus of Visit: Insulin Pump  Diabetes type: Type 2  Disease course: Getting harder to manage  Diabetes management related comments/concerns: more insulin i use the more i need and the more insulin resistance i become, entering false carbs to bring the high readings down and sometimes setting a temp basal at increase 30%.  Transportation concerns: No  Other concerns:: Cognitive impairment (struggling more with memory)  Cultural Influences/Ethnic Background:  American      Patient seen today for Insulin Pump Review:              Insulin Pump Information  Insulin Pump Type: OmniPod    Insulin Pump Review  Insulin Pump Type: OmniPod  Taking other diabetes medications?: No  Problems taking diabetes medications regularly?: No  Diabetes medication side effects?: No  Patient has glucagon emergency kit: Yes  Patient understands DKA prevention: Yes  Patient has ketone test strips: Yes  Patient has an insulin multiple daily injection back-up plan: Yes  Patient would benefit from: Change in basal rate(s), Use of a temporary basal rate, Change in insulin sensitivity factor(s)  Changes made to pump settings: Basal rate, Correction/sensitivity  Education specific to insulin pump provided today: Pump button pressing, How to use a temporary basal rate, Importance of bolusing before meals, Benefits of post-meal blood glucose testing, Other (not entering false carbs)    Healthy Eating  Healthy Eating Assessed Today: Yes  Cultural/Mu-ism diet  restrictions?: No  Patient on a regular basis: Has a low intake of carbohydrates, Counts carbohydrates, Snacks frequently throughout the day (binge eating during times of stress/anxiety)  Meal planning: Avoiding sweets, Carbohydrate counting, Heart healthy, Low salt, Smaller portions  Meals include: Breakfast, Lunch, Dinner, Snacks  Beverages: Water  Has patient met with a dietitian in the past?: Yes    Being Active  Being Active Assessed Today: No  How intense was your typical exercise? : Moderate (like brisk walking)  Barrier to exercise: Physical limitation    Monitoring  Monitoring Assessed Today: Yes  Did patient bring glucose meter to appointment? : Yes  Blood Glucose Meter: FreeStyle  Home Glucose (Sugar) Monitorin+ times per day  Blood glucose trend: Increasing steadily  Low Glucose Range (mg/dL):   High Glucose Range (mg/dL): >200  Overall Range (mg/dL): >200      Taking Medications  Diabetes Medication(s)     Diabetic Other Sig    glucagon (GLUCAGON EMERGENCY) 1 MG kit Inject 1 mg into the muscle once for 1 dose    Insulin Sig    insulin aspart (NOVOLOG VIAL) 100 UNITS/ML injection To be used in insulin pump. TDD 80 units    INSULIN PUMP - OUTPATIENT Date last updated: 18 Omnipod  BASAL RATES and times:  REGULAR:  12am: 1.60 --6 AM: 1.45 --12pm: 1.3 -- 6pm: 1.45  HIGH 2:  12am: 1.70 --  6 AM: 1.55 -- 12pm: 1.4 -- 6pm: 1.55  ANXIETY 3:  12am: 1.80 -- 6 AM: 1.65 --12pm: 1.5 --6pm: 1.65  AFTERNOON BINGE:  12 AM: 1.8 -- 6 AM: 4.65 --8 PM: 1.8  CARB RATIO: 12am-12am: 6  Corection Factor (Sensitivity): 12AM (midnight): 23 -- 5 AM: 29  BLOOD GLUCOSE TARGET:12AM (midnight):100, correct [id number]          Taking Medication Assessed Today: Yes  Current Treatments: Insulin Pump  Dose schedule:  (premeal, presnacks and corection dose)  Given by: Patient  Injection/Infusion sites: Abdomen  Problems taking diabetes medications regularly?: No  Diabetes medication side effects?: No  Treatment Compliance: All  of the time    Problem Solving  Problem Solving Assessed Today: No  Hypoglycemia Frequency: Never  Hypoglycemia Treatment: Glucose (tablets or gel)  Patient carries a carbohydrate source: Yes  Medical alert: No  Severe weather/disaster plan for diabetes management?: Yes  DKA prevention plan?: No  Sick day plan for diabetes management?: (P) No              Reducing Risks  Reducing Risks Assessed Today: No  CAD Risks: Diabetes Mellitus, Family history, Hypertension, Obesity, Post-menopausal, Stress  Has dilated eye exam at least once a year?: Yes  Sees dentist every 6 months?: Yes  Sees podiatrist (foot doctor)?: No    Healthy Coping  Healthy Coping Assessed Today: Yes  Informal Support system:: Family, Spouse  Stage of change: ACTION (Actively working towards change)  Difficulty affording diabetes management supplies?: Yes  Patient Activation Measure Survey Score:  JEFFERY Score (Last Two) 6/2/2011 10/18/2013   JEFFERY Raw Score 50 42   Activation Score 86.3 66   JEFFERY Level 4 3         ASSESSMENT  Patient glucose above goal, stress and anxiety contributing to increase glucose, some binge eating.  She is entering false carbohydrates and occassionally using a temp basal for high readings. Recommend increase to daytime basal, decrease in carbohydrate ratio and only enter carbohydrates eaten.  Close follow up and assessment needed to reduce patient anxiety. Recommend she upload pump once a week and send a mychart/email to request CDE review results and make recommendations.     Changes made to pump settings:  Basal: added new basal pattern called Very High 5 per patient request.  1 basal rate set 12am-12am: 1.8 unit(s)/hr.  If consistently high recommend she increase to 1.9 units/hr. Provided deomonstration to patients daughter in case she needs assistance with making these changes.   Sensitivity:  12am: 23  5 am: 29 --> 25    INTERVENTION:   Diabetes knowledge and skills assessment:     Patient is knowledgeable in diabetes  management concepts related to: Healthy Eating and Monitoring    Patient needs further education on the following diabetes management concepts: Taking Medication, Problem Solving and Healthy Coping    Based on learning assessment above, most appropriate setting for further diabetes education would be: Individual setting.    Education provided today on:  AADE Self-Care Behaviors:  Monitoring: frequency of monitoring  Taking Medication: action of prescribed medication, when to take medications and dosing  Healthy Coping: utilize support systems     Opportunities for ongoing education and support in diabetes-self management were discussed.    Pt verbalized understanding of concepts discussed and recommendations provided today.       Education Materials Provided:  No new materials provided today      PLAN  See Patient Instructions for co-developed, patient-stated behavior change goals.  AVS printed and provided to patient today. See Follow-Up section for recommended follow-up.    Deanna Bolton RN,CDE   Time Spent: 60 minutes  Encounter Type: Individual    Any diabetes medication dose changes were made via the CDE Protocol and Collaborative Practice Agreement with the patient's referring provider. A copy of this encounter was shared with the provider.

## 2018-10-08 NOTE — MR AVS SNAPSHOT
After Visit Summary   10/8/2018    Maricarmen Dotson    MRN: [id number]           Patient Information     Date Of Birth          1960        Visit Information        Provider Department      10/8/2018 9:30 AM Deanna Bolton RN [facility]        Care Instructions        If you continue to notice your blood sugars are consistently high with No low readings then increase your Very High 5 pattern to 1.9 units/hr    If you need to use your temp basal then increase to no more than 10-20%.               Follow-ups after your visit        Your next 10 appointments already scheduled     Oct 22, 2018  9:30 AM CDT   Diabetes Education with Allie Matias RD   [facility]    [address] [facility] [address] [phone]              Who to contact     If you have questions or need follow up information about today's clinic visit or your schedule please contact [facility] directly at [phone].  Normal or non-critical lab and imaging results will be communicated to you by [email or web address], letter or phone within 4 business days after the clinic has received the results. If you do not hear from us within 7 days, please contact the clinic through [email or web address] or phone. If you have a critical or abnormal lab result, we will notify you by phone as soon as possible.  Submit refill requests through Scrip-t or call your pharmacy and they will forward the refill request to us. Please allow 3 business days for your refill to be completed.          Additional Information About Your Visit        [email or web address] Information     Scrip-t gives you secure access to your electronic health record. If you see a primary care provider, you can also send messages to your care team and make appointments. If you have questions, please call your primary care clinic.  If you do not have a primary care provider, please  call [phone] and they will assist you.        Care EveryWhere ID     This is your Care EveryWhere ID. This could be used by other organizations to access your [place] medical records  [id number]        Your Vitals Were     Last Period                   01/01/1999            Blood Pressure from Last 3 Encounters:   09/10/18 148/88   07/23/18 150/78   07/11/18 (!) 160/92    Weight from Last 3 Encounters:   09/10/18 87.2 kg (192 lb 4.8 oz)   07/23/18 86 kg (189 lb 8 oz)   07/11/18 86 kg (189 lb 8 oz)              Today, you had the following     No orders found for display       Primary Care Provider Office Phone # Fax #    Annamaria Erickson [phone] [address] [place] [id number]        Goals        General    Monitoring (pt-stated)     Notes - Note created  9/5/2018 11:44 AM by Deanna Bolton RN    Goal Statement: I will test blood sugar before bed and take a correction dose if needed    Measure of Success: pump download    Strengths: using omnipod as meter  Ideas to overcome barriers: none    Date to Achieve By: 10/8/18          Equal Access to Services     BRAULIO HAYNES AH: Hadii peg Sterling, waaxda luelfegoadaha, qaybta kaalmada adejudithyada, dillon brasher. So [facility] [phone].    ATENCIÓN: Si habla español, tiene a richter disposición servicios gratuitos de asistencia lingüística. [facility] [phone].    We comply with applicable federal civil rights laws and [place] laws. We do not discriminate on the basis of race, color, national origin, age, disability, sex, sexual orientation, or gender identity.            Thank you!     Thank you for choosing [facility]  for your care. Our goal is always to provide you with excellent care. Hearing back from our patients is one way we can continue to improve our services. Please take a few minutes to complete the written survey that you may receive in the mail  after your visit with us. Thank you!             Your Updated Medication List - Protect others around you: Learn how to safely use, store and throw away your medicines at www.disposemymeds.org.          This list is accurate as of 10/8/18 10:43 AM.  Always use your most recent med list.                   Brand Name Dispense Instructions for use Diagnosis    acetone (urine) test strip    KETOSTIX    50 each    1 strip by In Vitro route daily    Type 2 diabetes mellitus with hyperglycemia, with long-term current use of insulin (H)       amLODIPine 5 MG tablet    NORVASC    30 tablet    Take 1 tablet (5 mg) by mouth daily    Hypertension goal BP (blood pressure) < 140/90       blood glucose monitoring test strip    no brand specified    300 each    Freestyle test strips (NOT LITE or INSULINX) to be used with Omnipod pump test 6 times daily    Type 2 diabetes mellitus with hyperglycemia, with long-term current use of insulin (H)       EPINEPHrine 0.3 MG/0.3ML injection 2-pack    EPIPEN/ADRENACLICK/or ANY BX GENERIC EQUIV    2 each    Inject 0.3 mLs (0.3 mg) into the muscle once as needed for anaphylaxis    Allergic reaction       glucagon 1 MG kit    GLUCAGON EMERGENCY    1 mg    Inject 1 mg into the muscle once for 1 dose    Type 2 diabetes mellitus with hyperglycemia, with long-term current use of insulin (H)       insulin aspart 100 UNITS/ML injection    NovoLOG VIAL    7 vial    To be used in insulin pump. TDD 80 units    Type 2 diabetes mellitus with hyperglycemia, with long-term current use of insulin (H)       insulin pump infusion      Date last updated: 9/4/18 Omnipod BASAL RATES and times: REGULAR: 12am: 1.60 --6 AM: 1.45 --12pm: 1.3 -- 6pm: 1.45 HIGH 2: 12am: 1.70 --  6 AM: 1.55 -- 12pm: 1.4 -- 6pm: 1.55 ANXIETY 3: 12am: 1.80 -- 6 AM: 1.65 --12pm: 1.5 --6pm: 1.65 AFTERNOON BINGE: 12 AM: 1.8 -- 6 AM: 4.65 --8 PM: 1.8 CARB RATIO: 12am-12am: 6 Corection Factor (Sensitivity): 12AM (midnight): 23 -- 5 AM: 29 BLOOD  GLUCOSE TARGET:12AM (midnight):100, correct [id number]    Type 2 diabetes mellitus with hyperglycemia, with long-term current use of insulin (H)       polyethylene glycol Packet    MIRALAX/GLYCOLAX     Take 1 packet by mouth daily.        STATIN NOT PRESCRIBED (INTENTIONAL)     0 each    Statin not prescribed intentionally due to Intolerance    Statin intolerance       traMADol 50 MG tablet    ULTRAM     Take 50 mg by mouth 3 times daily 1-2 tablets in the evening        VOLTAREN 1 % Gel topical gel   Generic drug:  diclofenac     100 g    Apply topically nightly as needed for moderate pain Apply 4 grams to knees or 2 grams to hands four times daily using enclosed dosing card.

## 2018-10-08 NOTE — PATIENT INSTRUCTIONS
If you continue to notice your blood sugars are consistently high with No low readings then increase your Very High 5 pattern to 1.9 units/hr    If you need to use your temp basal then increase to no more than 10-20%.

## 2018-10-11 ENCOUNTER — TRANSFERRED RECORDS (OUTPATIENT)
Dept: HEALTH INFORMATION MANAGEMENT | Facility: CLINIC | Age: 58
End: 2018-10-11

## 2018-10-11 LAB
ALT SERPL-CCNC: 21 IU/L (ref 0–32)
AST SERPL-CCNC: 27 IU/L (ref 0–40)

## 2018-10-16 ENCOUNTER — TELEPHONE (OUTPATIENT)
Dept: ENDOCRINOLOGY | Facility: CLINIC | Age: 58
End: 2018-10-16

## 2018-10-16 ENCOUNTER — PATIENT OUTREACH (OUTPATIENT)
Dept: EDUCATION SERVICES | Facility: CLINIC | Age: 58
End: 2018-10-16
Payer: COMMERCIAL

## 2018-10-16 DIAGNOSIS — Z79.4 TYPE 2 DIABETES MELLITUS WITH HYPERGLYCEMIA, WITH LONG-TERM CURRENT USE OF INSULIN (H): Primary | ICD-10-CM

## 2018-10-16 DIAGNOSIS — E11.65 TYPE 2 DIABETES MELLITUS WITH HYPERGLYCEMIA, WITH LONG-TERM CURRENT USE OF INSULIN (H): Primary | ICD-10-CM

## 2018-10-16 NOTE — PROGRESS NOTES
Diabetes Self-Management Training: Insulin Pump Telephone Visit    Current Diabetes Management per Patient:  Comments/concerns: email from patient with pump report, no comments.    Insulin Pump Type: OmniPod    Taking other diabetes medications? no    Current Pump report:        Assessment/Plan:  Alex Hernadez,    Thank you for sending your report.  It looks like the changes we made did improve your glucose readings. I see on October 11th you were consistently on the lower side of normal, then had a couple rebound higher readings the next day.  I would not make any changes based on what I see on your report unless there is something you noticed and have some more insight for me.  Otherwise keep up the good work and upload again next Sunday.    I will be out of the office starting tomorrow and returning next Monday so please reach out to Allie or our team if you need something.      Deanna Bolton RN, CDE      Any diabetes medication dose changes were made via the CDE Protocol and Collaborative Practice Agreement with the patient's referring provider. A copy of this encounter was shared with the provider.

## 2018-10-16 NOTE — TELEPHONE ENCOUNTER
Panel Management Review      Patient has the following on her problem list:     Diabetes    ASA: Failed    Last A1C  Lab Results   Component Value Date    A1C 8.7 06/26/2018    A1C 9.4 01/16/2018    A1C 9.7 03/30/2017    A1C 12.2 11/30/2016    A1C 9.9 07/20/2016     A1C tested: FAILED    Last LDL:    Lab Results   Component Value Date    CHOL 256 06/26/2018     Lab Results   Component Value Date    HDL 51 06/26/2018     Lab Results   Component Value Date     06/26/2018     Lab Results   Component Value Date    TRIG 171 06/26/2018     Lab Results   Component Value Date    CHOLHDLRATIO 5.0 02/21/2015     Lab Results   Component Value Date    NHDL 205 06/26/2018       Is the patient on a Statin? YES             Is the patient on Aspirin? NO    Medications     HMG CoA Reductase Inhibitors    STATIN NOT PRESCRIBED, INTENTIONAL,          Last three blood pressure readings:  BP Readings from Last 3 Encounters:   09/10/18 148/88   07/23/18 150/78   07/11/18 (!) 160/92       Date of last diabetes office visit: 7/11/18     Tobacco History:     History   Smoking Status     Never Smoker   Smokeless Tobacco     Never Used         Hypertension   Last three blood pressure readings:  BP Readings from Last 3 Encounters:   09/10/18 148/88   07/23/18 150/78   07/11/18 (!) 160/92     Blood pressure: FAILED    HTN Guidelines:  Age 18-59 BP range:  Less than 140/90  Age 60-85 with Diabetes:  Less than 140/90  Age 60-85 without Diabetes:  less than 150/90      Composite cancer screening  Chart review shows that this patient is due/due soon for the following None  Summary:    Patient is due/failing the following:   ASA and BP CHECK    Action needed:   Patient needs nurse only appointment. For blood pressure check.  Already has diabetic follow up scheduled for 12/27/18.    Type of outreach:    Phone, left message for patient to call back.     Questions for provider review:    Patient is failing Aspirin therapy.  Please indicate if  Intentionally not prescribed.                                                                                                                                    Linda Holder M.A.     Chart routed to Provider .

## 2018-10-16 NOTE — LETTER
[facility]  [address]  [facility] [address]  [phone]  October 19, 2018    Maricarmen Dotson  [address]  [place] [address]    Dear Maricarmen,    I care about your health and have reviewed your health plan. I have reviewed your medical conditions, medication list, and lab results and am making recommendations based on this review, to better manage your health.    You are in particular need of attention regarding:  -High Blood Pressure  I am recommending that you:  {recommendations:-schedule a NURSE-ONLY BLOOD PRESSURE APPOINTMENT within the next 1-4 weeks.    Here is a list of Health Maintenance topics that are due now or due soon:  Health Maintenance Due   Topic Date Due     PNEUMOVAX 1X HI RISK PATIENT < 65 (NO IB MSG)  12/03/1963     URINE DRUG SCREEN Q1 YR  12/03/1976     HIV SCREEN (SYSTEM ASSIGNED)  12/03/1979     HEPATITIS C SCREENING  12/03/1979     ADVANCE DIRECTIVE PLANNING Q5 YRS  12/03/2016     MAMMO SCREEN Q2 YR (SYSTEM ASSIGNED)  10/14/2017     EYE EXAM Q1 YEAR  07/14/2018     INFLUENZA VACCINE (1) 09/01/2018     LIPID MONITORING Q3 MO  09/26/2018     A1C Q3 MO  09/26/2018       Please call us at [phone] (or use [employer]) to address the above recommendations.     Thank you for trusting [facility] and we appreciate the opportunity to serve you.  We look forward to supporting your healthcare needs in the future.    Healthy Regards,    Emily Phan NP         none

## 2018-10-19 NOTE — TELEPHONE ENCOUNTER
OM for patient to call back to the [facility].  Letter sent.  Linda Holder M.A.

## 2018-10-19 NOTE — TELEPHONE ENCOUNTER
Emily Phan, patient is failing ASA therapy.  Please document if ASA is intentionally not prescribed.  Thank you.  Linda Holder M.A.

## 2018-10-22 NOTE — TELEPHONE ENCOUNTER
Linda, can you call her and see if she is taking ASA or not, and if not, why?   Let me know,  Thanks,  Emily

## 2018-10-30 ENCOUNTER — PATIENT OUTREACH (OUTPATIENT)
Dept: EDUCATION SERVICES | Facility: CLINIC | Age: 58
End: 2018-10-30
Payer: COMMERCIAL

## 2018-10-30 ENCOUNTER — MYC MEDICAL ADVICE (OUTPATIENT)
Dept: EDUCATION SERVICES | Facility: CLINIC | Age: 58
End: 2018-10-30

## 2018-10-30 NOTE — TELEPHONE ENCOUNTER
Diabetes Self-Management Training: Insulin Pump Telephone Visit    Current Diabetes Management per Patient:  Comments/concerns:   Dear Deanna,    I am having trouble sending my down load from my OmniPod.  It will let me down load the weeks information, but will not let me email you or myself the results.  So I will see you on our next apt on November 6th.     Needless to say I am very frustrated with this OmniPod system.    Also...  After 1 to 3 hours after I start a new pot the meter gives me this REMINDER and sets of an alarm that will continue to beep until I press OK... It says : Reminder: Always check BG after a pod change. Check infusion site and ensure cannula is properly inserted.  Question: Am I receiving insulin between the time I change my pod and this message comes up on my PDM?    Sincerely,  Maricarmen Dotson    Insulin Pump Type: OmniPod    Taking other diabetes medications? no    Current Pump report:                      Assessment/Plan:  Alex Hernadez,  Don't worry about sending me the report I can look it up. If you just want to send me a [employer] message from now on telling me you uploaded, that is all I need.      The answer is yes you are getting insulin after a pod change. The reason for the reminder is the there is an issue with a pod it is often right after a pod change. It just wants to make sure you are considering it.  I am looking into if you can turn that off.      Otherwise your report really does look good. I noticed 1 low reading and some high readings but most are in goal.      I will let you know what I find out about the alarm.    Deanna Bolton RN,CDE       Any diabetes medication dose changes were made via the CDE Protocol and Collaborative Practice Agreement with the patient's referring provider. A copy of this encounter was shared with the provider.

## 2018-11-01 NOTE — TELEPHONE ENCOUNTER
Spoke with patient.  Patient states she can't take Aspirin because of gastrointestinal problems.  States she has a history of Stomach ulcers.  Please hawa on med list that Asa is intentionally not prescribed.  (tried to do this on your behalf but had trouble)  Linda Holder M.A.

## 2018-11-06 ENCOUNTER — MYC MEDICAL ADVICE (OUTPATIENT)
Dept: PEDIATRICS | Facility: CLINIC | Age: 58
End: 2018-11-06

## 2018-11-06 ENCOUNTER — MYC MEDICAL ADVICE (OUTPATIENT)
Dept: ENDOCRINOLOGY | Facility: CLINIC | Age: 58
End: 2018-11-06

## 2018-11-06 ENCOUNTER — ALLIED HEALTH/NURSE VISIT (OUTPATIENT)
Dept: EDUCATION SERVICES | Facility: CLINIC | Age: 58
End: 2018-11-06
Payer: COMMERCIAL

## 2018-11-06 DIAGNOSIS — E11.65 TYPE 2 DIABETES MELLITUS WITH HYPERGLYCEMIA (H): Primary | ICD-10-CM

## 2018-11-06 PROCEDURE — G0108 DIAB MANAGE TRN  PER INDIV: HCPCS

## 2018-11-06 NOTE — PROGRESS NOTES
Diabetes Self-Management Education & Support      Diabetes Self-Management Education & Support - Insulin Pump Review    SUBJECTIVE/OBJECTIVE  Presents for: Follow-up  Accompanied by: Daughter, Self  Diabetes education in the past 24mo: No  Focus of Visit: Insulin Pump  Diabetes type: Type 2  Disease course: Stable  Diabetes management related comments/concerns: struggling with anxiety and binge eating, wants help with downloading pump to phone/[employer]  Transportation concerns: No  Other concerns:: Cognitive impairment (struggling more with memory)  Cultural Influences/Ethnic Background:  American      Patient seen today for Insulin Pump Review:              Insulin Pump Information  Insulin Pump Type: OmniPod    Insulin Pump Review  Insulin Pump Type: OmniPod  Taking other diabetes medications?: No  Problems taking diabetes medications regularly?: No  Diabetes medication side effects?: No  Patient has glucagon emergency kit: Yes  Patient understands DKA prevention: Yes  Patient has ketone test strips: Yes  Patient has an insulin multiple daily injection back-up plan: Yes  Patient would benefit from: Change in basal rate(s), Use of a temporary basal rate  Changes made to pump settings: Basal rate  Education specific to insulin pump provided today: Pump button pressing, Other, Steps to take when blood glucose is about 250 mg/dL (changing basal rates, review of downloading Omnipod PDM to [employer])    Healthy Eating  Healthy Eating Assessed Today: Yes  Cultural/Gnosticism diet restrictions?: No  Patient on a regular basis: Counts carbohydrates, Eats 3 meals a day (binge eating lately wiht anxiety and not cognitive when binge is occuring but able to take care afterwards)  Meal planning: Avoiding sweets, Carbohydrate counting, Heart healthy, Low salt, Smaller portions  Meals include: Breakfast, Lunch, Dinner, Snacks  Beverages: Water  Has patient met with a dietitian in the past?: Yes    Being Active  Being Active Assessed  Today: No  Barrier to exercise: Physical limitation    Monitoring  Monitoring Assessed Today: Yes  Did patient bring glucose meter to appointment? : Yes  Blood Glucose Meter: FreeStyle  Home Glucose (Sugar) Monitorin+ times per day  Blood glucose trend: Increasing steadily  Low Glucose Range (mg/dL):   High Glucose Range (mg/dL): >200  Overall Range (mg/dL): >200      Taking Medications  Diabetes Medication(s)     Diabetic Other Sig    glucagon (GLUCAGON EMERGENCY) 1 MG kit Inject 1 mg into the muscle once for 1 dose    Insulin Sig    insulin aspart (NOVOLOG VIAL) 100 UNITS/ML injection To be used in insulin pump. TDD 80 units    INSULIN PUMP - OUTPATIENT Date last updated: 10/8/18 Omnipod  BASAL RATES and times:  REGULAR:  12am: 1.60 --6 AM: 1.45 --12pm: 1.3 -- 6pm: 1.45  HIGH 2:  12am: 1.70 --  6 AM: 1.55 -- 12pm: 1.4 -- 6pm: 1.55  ANXIETY 3:  12am: 1.80 -- 6 AM: 1.65 --12pm: 1.5 --6pm: 1.65  AFTERNOON BINGE:  12 AM: 1.8 -- 6 AM: 4.65 --8 PM: 1.8  VERY HIGH 5: 12 AM: 1.8  CARB RATIO: 12am-12am: 6  Corection Factor (Sensitivity): 12AM (midnight): 23 -- 5 AM: 25  BLOOD GLUCOSE TARGET:12AM (midnigh          Taking Medication Assessed Today: Yes  Current Treatments: Insulin Pump  Dose schedule:  (premeal, presnacks and corection dose)  Given by: Patient  Injection/Infusion sites: Abdomen  Problems taking diabetes medications regularly?: No  Diabetes medication side effects?: No  Treatment Compliance: All of the time    Problem Solving  Problem Solving Assessed Today: Yes  Hypoglycemia Frequency: Other  Hypoglycemia Treatment: Glucose (tablets or gel)  Patient carries a carbohydrate source: Yes  Medical alert: No  Severe weather/disaster plan for diabetes management?: Yes  DKA prevention plan?: No              Reducing Risks  Reducing Risks Assessed Today: No  CAD Risks: Diabetes Mellitus, Family history, Hypertension, Obesity, Post-menopausal, Stress  Has dilated eye exam at least once a year?: Yes  Sees  dentist every 6 months?: Yes  Sees podiatrist (foot doctor)?: No    Healthy Coping  Healthy Coping Assessed Today: Yes  Informal Support system:: Family, Spouse  Stage of change: ACTION (Actively working towards change)  Difficulty affording diabetes management supplies?: Yes  Patient Activation Measure Survey Score:  JEFFERY Score (Last Two) 6/2/2011 10/18/2013   JEFFERY Raw Score 50 42   Activation Score 86.3 66   JEFFERY Level 4 3         ASSESSMENT  Patient glucose above goal, likely due to binge eating. Patient is not able to bolus when she is binge eating, continue to recommend she correct when she is able to.  Overall glucose above goal, recommend increase to current basal pattern in the evening and overnight.  Also patient prefers to rename the afternoon binge pattern to anxiety 4. Also recommend a small adjustment to anxiety 3 to allow her to change to anxiety 3 pattern if her anxiety and binge eating improves and she needs to reduce her basal insulin.     Changes made to pump settings:    Anxiety 3 pattern:  12am: 1.8  6am: 1.65  12pm: 1.5  6pm: 1.65 --> 1.8      Afternoon binge --> change to Anxiety 4  12am: 1.8 --> 1.9  6am: 1.65  8pm --> 6pm: 1.8 --> 1.9    INTERVENTION:   Diabetes knowledge and skills assessment:     Education provided today on:  AADE Self-Care Behaviors:  Taking Medication: when to take medications and dosing  Problem Solving: high blood glucose - causes, signs/symptoms, treatment and prevention and low blood glucose - causes, signs/symptoms, treatment and prevention    Education specific to insulin pump provided today on:   pump button pressing and downloading pump to [employer]      Opportunities for ongoing education and support in diabetes-self management were discussed.    Pt verbalized understanding of concepts discussed and recommendations provided today.       Education Materials Provided:  No new materials provided today      PLAN  See Patient Instructions for co-developed, patient-stated  behavior change goals.  AVS printed and provided to patient today. See Follow-Up section for recommended follow-up.    Deanna Bolton RN,CDE   Time Spent: 60 minutes  Encounter Type: Individual    Any diabetes medication dose changes were made via the CDE Protocol and Collaborative Practice Agreement with the patient's referring provider. A copy of this encounter was shared with the provider.

## 2018-11-06 NOTE — MR AVS SNAPSHOT
After Visit Summary   11/6/2018    Maricarmen Dotson    MRN: [id number]           Patient Information     Date Of Birth          1960        Visit Information        Provider Department      11/6/2018 12:30 PM Deanna Bolton RN [facility]        Today's Diagnoses     Type 2 diabetes mellitus with hyperglycemia (H)    -  1       Follow-ups after your visit        Your next 10 appointments already scheduled     Dec 13, 2018  9:30 AM CST   Diabetes Education with Deanna Bolton RN   [facility] ([facility])    [address]  [facility] [address] [phone]            Dec 27, 2018  6:00 PM CST   Return Visit with MARICRUZ Castillo [facility] ([facility])    [address]  [facility] [id number] [phone]              Future tests that were ordered for you today     Open Future Orders        Priority Expected Expires Ordered    **A1C FUTURE 1yr Routine 10/7/2019 11/6/2019 11/6/2018    Lipid panel reflex to direct LDL Fasting Routine 10/7/2019 11/6/2019 11/6/2018            Who to contact     If you have questions or need follow up information about today's clinic visit or your schedule please contact [facility] directly at [phone].  Normal or non-critical lab and imaging results will be communicated to you by MyChart, letter or phone within 4 business days after the clinic has received the results. If you do not hear from us within 7 days, please contact the clinic through MyChart or phone. If you have a critical or abnormal lab result, we will notify you by phone as soon as possible.  Submit refill requests through [employer] or call your pharmacy and they will forward the refill request to us. Please allow 3 business days for your refill to be completed.          Additional Information About Your Visit         [employer] Information     [employer] gives you secure access to your electronic health record. If you see a primary care provider, you can also send messages to your care team and make appointments. If you have questions, please call your primary care clinic.  If you do not have a primary care provider, please call [phone] and they will assist you.        Care EveryWhere ID     This is your Care EveryWhere ID. This could be used by other organizations to access your [facility] medical records  [id number]        Your Vitals Were     Last Period                   01/01/1999            Blood Pressure from Last 3 Encounters:   09/10/18 148/88   07/23/18 150/78   07/11/18 (!) 160/92    Weight from Last 3 Encounters:   09/10/18 87.2 kg (192 lb 4.8 oz)   07/23/18 86 kg (189 lb 8 oz)   07/11/18 86 kg (189 lb 8 oz)              We Performed the Following     DIABETES EDUCATION - Individual  []        Primary Care Provider Office Phone # Fax #    Annamaria Duc Erickson [phone] [address] [place] [address]        Goals        General    Monitoring (pt-stated)     Notes - Note created  9/5/2018 11:44 AM by Deanna Bolton, RN    Goal Statement: I will test blood sugar before bed and take a correction dose if needed    Measure of Success: pump download    Strengths: using omnipod as meter  Ideas to overcome barriers: none    Date to Achieve By: 10/8/18          Equal Access to Services     BRAULIO HAYNES AH: Hadii peg thorntono Sosu, waaxda luqadaha, qaybta kaalmada adeegyada, dillon brasher. So [facility] [phone].    ATENCIÓN: Si habla español, tiene a richter disposición servicios gratuitos de asistencia lingüística. Llame al [phone].    We comply with applicable federal civil rights laws and [place] laws. We do not discriminate on the basis of race, color, national origin, age, disability, sex, sexual orientation, or gender identity.             Thank you!     Thank you for choosing [facility]  for your care. Our goal is always to provide you with excellent care. Hearing back from our patients is one way we can continue to improve our services. Please take a few minutes to complete the written survey that you may receive in the mail after your visit with us. Thank you!             Your Updated Medication List - Protect others around you: Learn how to safely use, store and throw away your medicines at www.disposemymeds.org.          This list is accurate as of 11/6/18  3:11 PM.  Always use your most recent med list.                   Brand Name Dispense Instructions for use Diagnosis    acetone (urine) test strip    KETOSTIX    50 each    1 strip by In Vitro route daily    Type 2 diabetes mellitus with hyperglycemia, with long-term current use of insulin (H)       amLODIPine 5 MG tablet    NORVASC    30 tablet    Take 1 tablet (5 mg) by mouth daily    Hypertension goal BP (blood pressure) < 140/90       blood glucose monitoring test strip    no brand specified    300 each    Freestyle test strips (NOT LITE or INSULINX) to be used with Omnipod pump test 6 times daily    Type 2 diabetes mellitus with hyperglycemia, with long-term current use of insulin (H)       EPINEPHrine 0.3 MG/0.3ML injection 2-pack    EPIPEN/ADRENACLICK/or ANY BX GENERIC EQUIV    2 each    Inject 0.3 mLs (0.3 mg) into the muscle once as needed for anaphylaxis    Allergic reaction       glucagon 1 MG kit    GLUCAGON EMERGENCY    1 mg    Inject 1 mg into the muscle once for 1 dose    Type 2 diabetes mellitus with hyperglycemia, with long-term current use of insulin (H)       insulin aspart 100 UNITS/ML injection    NovoLOG VIAL    7 vial    To be used in insulin pump. TDD 80 units    Type 2 diabetes mellitus with hyperglycemia, with long-term current use of insulin (H)       insulin pump infusion      Date last updated: 10/8/18 Omnipod BASAL RATES and times:  REGULAR: 12am: 1.60 --6 AM: 1.45 --12pm: 1.3 -- 6pm: 1.45 HIGH 2: 12am: 1.70 --  6 AM: 1.55 -- 12pm: 1.4 -- 6pm: 1.55 ANXIETY 3: 12am: 1.80 -- 6 AM: 1.65 --12pm: 1.5 --6pm: 1.65 AFTERNOON BINGE: 12 AM: 1.8 -- 6 AM: 4.65 --8 PM: 1.8 VERY HIGH 5: 12 AM: 1.8 CARB RATIO: 12am-12am: 6 Corection Factor (Sensitivity): 12AM (midnight): 23 -- 5 AM: 25 BLOOD GLUCOSE TARGET:12AM (midnigh    Type 2 diabetes mellitus with hyperglycemia, with long-term current use of insulin (H)       polyethylene glycol Packet    MIRALAX/GLYCOLAX     Take 1 packet by mouth daily.        STATIN NOT PRESCRIBED (INTENTIONAL)     0 each    Statin not prescribed intentionally due to Intolerance    Statin intolerance       traMADol 50 MG tablet    ULTRAM     Take 50 mg by mouth 3 times daily 1-2 tablets in the evening        VOLTAREN 1 % Gel topical gel   Generic drug:  diclofenac     100 g    Apply topically nightly as needed for moderate pain Apply 4 grams to knees or 2 grams to hands four times daily using enclosed dosing card.

## 2018-11-20 ENCOUNTER — PATIENT OUTREACH (OUTPATIENT)
Dept: EDUCATION SERVICES | Facility: CLINIC | Age: 58
End: 2018-11-20
Payer: COMMERCIAL

## 2018-12-06 ENCOUNTER — OFFICE VISIT (OUTPATIENT)
Dept: PEDIATRICS | Facility: CLINIC | Age: 58
End: 2018-12-06
Payer: COMMERCIAL

## 2018-12-06 VITALS
BODY MASS INDEX: 33.38 KG/M2 | HEART RATE: 67 BPM | DIASTOLIC BLOOD PRESSURE: 84 MMHG | OXYGEN SATURATION: 96 % | HEIGHT: 64 IN | SYSTOLIC BLOOD PRESSURE: 146 MMHG | TEMPERATURE: 98.4 F | WEIGHT: 195.5 LBS

## 2018-12-06 DIAGNOSIS — K59.00 CONSTIPATION, UNSPECIFIED CONSTIPATION TYPE: Primary | ICD-10-CM

## 2018-12-06 DIAGNOSIS — K58.2 IRRITABLE BOWEL SYNDROME WITH BOTH CONSTIPATION AND DIARRHEA: ICD-10-CM

## 2018-12-06 DIAGNOSIS — F43.10 POSTTRAUMATIC STRESS DISORDER: ICD-10-CM

## 2018-12-06 DIAGNOSIS — L60.8 TOENAIL DEFORMITY: ICD-10-CM

## 2018-12-06 DIAGNOSIS — F32.1 MODERATE MAJOR DEPRESSION (H): ICD-10-CM

## 2018-12-06 DIAGNOSIS — G47.00 INSOMNIA, UNSPECIFIED TYPE: ICD-10-CM

## 2018-12-06 PROCEDURE — 99215 OFFICE O/P EST HI 40 MIN: CPT | Performed by: INTERNAL MEDICINE

## 2018-12-06 RX ORDER — MELOXICAM 15 MG/1
15 TABLET ORAL AT BEDTIME
COMMUNITY
Start: 2018-10-29 | End: 2020-02-17 | Stop reason: SINTOL

## 2018-12-06 ASSESSMENT — ANXIETY QUESTIONNAIRES
3. WORRYING TOO MUCH ABOUT DIFFERENT THINGS: SEVERAL DAYS
1. FEELING NERVOUS, ANXIOUS, OR ON EDGE: MORE THAN HALF THE DAYS
IF YOU CHECKED OFF ANY PROBLEMS ON THIS QUESTIONNAIRE, HOW DIFFICULT HAVE THESE PROBLEMS MADE IT FOR YOU TO DO YOUR WORK, TAKE CARE OF THINGS AT HOME, OR GET ALONG WITH OTHER PEOPLE: SOMEWHAT DIFFICULT
2. NOT BEING ABLE TO STOP OR CONTROL WORRYING: SEVERAL DAYS
5. BEING SO RESTLESS THAT IT IS HARD TO SIT STILL: SEVERAL DAYS
7. FEELING AFRAID AS IF SOMETHING AWFUL MIGHT HAPPEN: SEVERAL DAYS
GAD7 TOTAL SCORE: 8
6. BECOMING EASILY ANNOYED OR IRRITABLE: SEVERAL DAYS

## 2018-12-06 ASSESSMENT — PATIENT HEALTH QUESTIONNAIRE - PHQ9
SUM OF ALL RESPONSES TO PHQ QUESTIONS 1-9: 10
5. POOR APPETITE OR OVEREATING: SEVERAL DAYS

## 2018-12-06 NOTE — MR AVS SNAPSHOT
After Visit Summary   12/6/2018    Maricarmen Dotson    MRN: [id number]           Patient Information     Date Of Birth          1960        Visit Information        Provider Department      12/6/2018 11:20 AM Annamaria Erickson MD [facility]        Care Instructions    1. Recommend bowel clean out with miralax followed by magnesium citrate  2. Then would continue fiber and daily miralax for a few weeks, then can try backing off  3. Continue to monitor sleep          Follow-ups after your visit        Follow-up notes from your care team     Return in about 4 weeks (around 1/3/2019), or if symptoms worsen or fail to improve.      Your next 10 appointments already scheduled     Dec 13, 2018  9:30 AM CST   Diabetes Education with Deanna Bolton RN   [facility] ([facility])    [address] [address]   [phone]            Dec 27, 2018  6:00 PM CST   Return Visit with MARICRUZ Castillo CNP   [facility]    [address] [address]   [phone]              Who to contact     If you have questions or need follow up information about today's clinic visit or your schedule please contact [facility] directly at [phone].  Normal or non-critical lab and imaging results will be communicated to you by MyChart, letter or phone within 4 business days after the clinic has received the results. If you do not hear from us within 7 days, please contact the clinic through MyChart or phone. If you have a critical or abnormal lab result, we will notify you by phone as soon as possible.  Submit refill requests through [facility] or call your pharmacy and they will forward the refill request to us. Please allow 3 business days for your refill to be completed.          Additional Information About Your Visit        MyChart Information      "[employer] gives you secure access to your electronic health record. If you see a primary care provider, you can also send messages to your care team and make appointments. If you have questions, please call your primary care clinic.  If you do not have a primary care provider, please call [phone] and they will assist you.        Care EveryWhere ID     This is your Care EveryWhere ID. This could be used by other organizations to access your [facility] medical records  [id number]        Your Vitals Were     Pulse Temperature Height Last Period Pulse Oximetry BMI (Body Mass Index)    67 98.4  F (36.9  C) (Tympanic) 5' 3.5\" (1.613 m) 01/01/1999 96% 34.09 kg/m2       Blood Pressure from Last 3 Encounters:   12/06/18 146/84   09/10/18 148/88   07/23/18 150/78    Weight from Last 3 Encounters:   12/06/18 195 lb 8 oz (88.7 kg)   09/10/18 192 lb 4.8 oz (87.2 kg)   07/23/18 189 lb 8 oz (86 kg)              Today, you had the following     No orders found for display       Primary Care Provider Office Phone # Fax #    Annamaria Erickson [phone]       [address] [place] [address]        Goals        General    Monitoring (pt-stated)     Notes - Note created  9/5/2018 11:44 AM by Deanna Bolton RN    Goal Statement: I will test blood sugar before bed and take a correction dose if needed    Measure of Success: pump download    Strengths: using omnipod as meter  Ideas to overcome barriers: none    Date to Achieve By: 10/8/18          Equal Access to Services     BRAULIO HAYNES AH: Hadii peg Sterling, waaxda luqadaha, qaybluther cooleyaldillon murphy. So [facility] [phone].    ATENCIÓN: Si habla español, tiene a richter disposición servicios gratuitos de asistencia lingüística. Llame al [phone].    We comply with applicable federal civil rights laws and [place] laws. We do not discriminate on the basis of race, color, national origin, age, " disability, sex, sexual orientation, or gender identity.            Thank you!     Thank you for choosing [facility]  for your care. Our goal is always to provide you with excellent care. Hearing back from our patients is one way we can continue to improve our services. Please take a few minutes to complete the written survey that you may receive in the mail after your visit with us. Thank you!             Your Updated Medication List - Protect others around you: Learn how to safely use, store and throw away your medicines at www.disposemymeds.org.          This list is accurate as of 12/6/18 12:20 PM.  Always use your most recent med list.                   Brand Name Dispense Instructions for use Diagnosis    acetone urine test strip    KETOSTIX    50 each    1 strip by In Vitro route daily    Type 2 diabetes mellitus with hyperglycemia, with long-term current use of insulin (H)       ASPIRIN NOT PRESCRIBED    INTENTIONAL    0 each    Please choose reason not prescribed, below    Type 2 diabetes mellitus with hyperglycemia, with long-term current use of insulin (H)       BENEFIBER DRINK MIX PO      Take 2 Tablespoonful by mouth 2 times daily        blood glucose monitoring test strip    NO BRAND SPECIFIED    300 each    Freestyle test strips (NOT LITE or INSULINX) to be used with Omnipod pump test 6 times daily    Type 2 diabetes mellitus with hyperglycemia, with long-term current use of insulin (H)       EPINEPHrine 0.3 MG/0.3ML injection 2-pack    EPIPEN/ADRENACLICK/or ANY BX GENERIC EQUIV    2 each    Inject 0.3 mLs (0.3 mg) into the muscle once as needed for anaphylaxis    Allergic reaction       glucagon 1 MG kit    GLUCAGON EMERGENCY    1 mg    Inject 1 mg into the muscle once for 1 dose    Type 2 diabetes mellitus with hyperglycemia, with long-term current use of insulin (H)       insulin aspart 100 UNITS/ML vial    NovoLOG VIAL    7 vial    To be used in insulin pump. TDD 80 units    Type 2  diabetes mellitus with hyperglycemia, with long-term current use of insulin (H)       insulin pump infusion      Date last updated: 10/8/18 Omnipod BASAL RATES and times: REGULAR: 12am: 1.60 --6 AM: 1.45 --12pm: 1.3 -- 6pm: 1.45 HIGH 2: 12am: 1.70 --  6 AM: 1.55 -- 12pm: 1.4 -- 6pm: 1.55 ANXIETY 3: 12am: 1.80 -- 6 AM: 1.65 --12pm: 1.5 --6pm: 1.65 AFTERNOON BINGE: 12 AM: 1.8 -- 6 AM: 4.65 --8 PM: 1.8 VERY HIGH 5: 12 AM: 1.8 CARB RATIO: 12am-12am: 6 Corection Factor (Sensitivity): 12AM (midnight): 23 -- 5 AM: 25 BLOOD GLUCOSE TARGET:12AM (midnigh    Type 2 diabetes mellitus with hyperglycemia, with long-term current use of insulin (H)       meloxicam 15 MG tablet    MOBIC          STATIN NOT PRESCRIBED    INTENTIONAL    0 each    Statin not prescribed intentionally due to Intolerance    Statin intolerance       traMADol 50 MG tablet    ULTRAM     Take 50 mg by mouth 3 times daily 1-2 tablets in the evening        UNABLE TO FIND      progest oil 50 mg cap        VOLTAREN 1 % topical gel   Generic drug:  diclofenac     100 g    Apply topically nightly as needed for moderate pain Apply 4 grams to knees or 2 grams to hands four times daily using enclosed dosing card.

## 2018-12-06 NOTE — PROGRESS NOTES
SUBJECTIVE:   Maricarmen Dotson is a 58 year old female who presents to clinic today for the following health issues:    Constipation      Duration: 1 month    Description:       Frequency of bowel movements: 24 hours       Consistency of stool: starts hard but then softens    Intensity:  moderate, severe    Accompanying signs and symptoms: severe splenic flexure       Abdominal pain: YES       Rectal pain: YES       Blood in stool: no        Nausea/vomitting: YES- nausea    History:        Similar problems in past: no     Precipitating or alleviating factors: IBS       Medications worsening symptoms: no     Therapies tried and outcome: Benefiber 2x/day       Chronic laxative use: YES- fiber    With irritable bowel tends to alternate between constipation and loose stool. Worse over the last month. Usually takes 2 tablespoons of fiber before bed and that is adequate and increased to twice a day. Has not tried other laxatives. The only change is starting progesterone about a month ago. No dietary changes. Has been eating a lot of fiber drinking a lot of water. Has continued to exercise. Doesn't feel like completely empties. Feels like has to go more, but unable to go more. Pain across top and left side of abdomen. Tends to improve after bowel movements. Has used oral stool softeners in the past. Previously was using miralax and didn't feel like was doing as well. Stopped using 3 months ago. Previously took 1 capful once a day.     Insomnia      Duration: 25 years but worsening    Description  Frequency of insomnia:  nightly  Time to fall asleep: 10-60 minutes  Middle of night awakening:  nightly  Early morning awakening:  nightly    Accompanying signs and symptoms:  excessive daytime sleepiness, fatigue, memory impairment, morning headache, recent weight gain and depression/mood changes    History  Similar episodes in past:  YES  Previous evaluation/sleep study:  YES    Precipitating or alleviating factors:  New  "stressful situation: YES  Caffeine intake after lunchtime: no   OTC decongestants: no  Any new medications: YES- progesterone oil caps    Therapies tried and outcome: caffeine avoidance, alcohol and Benadryl -  not effective    Sleep issues worse lately. Having a harder time detaching and getting to sleep. Is still seeing a trauma therapist and trying to get prepared to start DBT for trauma. Trying to keep same daytime routine. Has been trying to avoid driving. Seems to go in 8 day cycle where will have 1-2 nights where she sleeps pretty well, then decreasing amounts of sleep and then no sleep for a couple of day.    Taking 2-4 benadryl at night to help with sleep. Not helping. Stopped taking for the most part. Took 2 last night. Also takes if feeling anxious. Not new - has been doing this for years. Has tried sleeping medications in the past. Has had side effects with them. Does not want to try other medications right now. Using melatonin already. Has done CBT-I in past as well.    Toenail: also dropped something on great toenail on left side. Lifted up a little and had to cut out portion. No redness or drainage. No pain. Looks like growing out. Is diabetic.    Reviewed and updated as needed this visit by clinical staff  Tobacco  Allergies  Meds  Problems  Med Hx  Surg Hx  Fam Hx  Soc Hx        Reviewed and updated as needed this visit by Provider  Allergies  Meds  Problems       -------------------------------------    ROS:  Constitutional, HEENT, cardiovascular, pulmonary, GI, , musculoskeletal, neuro, skin, endocrine and psych systems are negative, except as otherwise noted.    OBJECTIVE:                                                    /84 (BP Location: Right arm, Patient Position: Sitting, Cuff Size: Adult Regular)  Pulse 67  Temp 98.4  F (36.9  C) (Tympanic)  Ht 5' 3.5\" (1.613 m)  Wt 195 lb 8 oz (88.7 kg)  LMP 01/01/1999  SpO2 96%  BMI 34.09 kg/m2   Body mass index is 34.09 " kg/(m^2).  General Appearance: healthy, alert and no distress  Eyes:   no discharge, erythema.  Normal pupils.  Respiratory: lungs clear to auscultation - no rales, rhonchi or wheezes.  Cardiovascular: regular rate and rhythm, normal S1 S2, no S3 or S4 and no murmur, click or rub.  No peripheral edema.  Skin: left great toenail with medial portion cut back. Small slit and lifting up of pain on remaining medial portion of nail. Proximal 4-5 mm looks like normal healthy nail. No erythema. no rashes or lesions.  Well perfused and normal turgor.  Psychiatric: mentation appears normal and affect normal/bright.    Diagnostic Test Results:  none      ASSESSMENT/PLAN:                                                      (K59.00) Constipation, unspecified constipation type  (primary encounter diagnosis)  (K58.2) Irritable bowel syndrome with both constipation and diarrhea  Comment: constipation not controlled, sounds like not fully emptying. Likely with dilated colon with prolonged constipation.  Plan:   - recommend bowel clean out with miralax and mag citrate as she has done with colonoscopy in the past  - continue fiber supplement and daily miralax over next couple of weeks and then can try to wean back on one    (G47.00) Insomnia, unspecified type  Comment: very longstanding problem.   Plan:   - discussed at length. She has tried pretty much everything  - encouraged go sleep hygiene  - will continue to work with her therapist  - hopefully will improve some if we can get her constipation under control  - does not want to try other medications at this time    (F32.1) Moderate major depression (H)  (F43.10) Posttraumatic stress disorder  Comment: not well controlled  Plan:   - working with trauma therapist and plans trauma DBT in future    (L60.8) toenail deformity  Comment: looks like growing out nicely without evidence of infection  Plan: continue to monitor  - f/u if develops pain, redness or drainage.    A total of 40  minutes was spent in face-to-face contact with Maricarmen in clinic today, of which >50% was spent counseling or in coordination of care regarding constipation, IBS, insomnia, depression, PTSD and toe nail deformity.    FUTURE APPOINTMENTS:       - Follow-up for annual visit or as needed    [facility]

## 2018-12-06 NOTE — PATIENT INSTRUCTIONS
1. Recommend bowel clean out with miralax followed by magnesium citrate  2. Then would continue fiber and daily miralax for a few weeks, then can try backing off  3. Continue to monitor sleep

## 2018-12-07 ASSESSMENT — ANXIETY QUESTIONNAIRES: GAD7 TOTAL SCORE: 8

## 2018-12-10 ENCOUNTER — PATIENT OUTREACH (OUTPATIENT)
Dept: EDUCATION SERVICES | Facility: CLINIC | Age: 58
End: 2018-12-10
Payer: COMMERCIAL

## 2018-12-10 DIAGNOSIS — E11.65 TYPE 2 DIABETES MELLITUS WITH HYPERGLYCEMIA (H): Primary | ICD-10-CM

## 2018-12-10 NOTE — PROGRESS NOTES
Diabetes Self-Management Training: Insulin Pump Telephone Visit    Current Diabetes Management per Patient:  Comments/concerns:   From: [email or web address]   Sent: Monday, December 10, 2018 12:11 PM  To: DEPT-CLINICS-DIABETIC-EDUCATION; [email or web address]  Subject: [place] PDF [place]    Insulin Pump Type: OmniPod    Taking other diabetes medications? no    Current Pump report:                  Assessment/Plan:  Patient using Very high basal pattern, she typically uses this pattern when she has a lot of stress/anxiety and is binge eating. Highest readings overnight some inconsistency during the day. Recommend only make changes in person at her appointment to reduce additional stress/anxiety.      Alex Hernadez,    Thank you for uploading your pump.  I see you are using your highest basal pattern.  Overall your report doesn t look to bad.   You have an appointment with me on Thursday, I don t think you need to change anything before you see.  If you do have concerns about the higher numbers let me know and I can give you some suggestions.  Otherwise just keep testing and correcting when needed and I will see you Thursday.  Deanna Bolton RN, CDE        Any diabetes medication dose changes were made via the CDE Protocol and Collaborative Practice Agreement with the patient's referring provider. A copy of this encounter was shared with the provider.

## 2018-12-13 ENCOUNTER — ALLIED HEALTH/NURSE VISIT (OUTPATIENT)
Dept: EDUCATION SERVICES | Facility: CLINIC | Age: 58
End: 2018-12-13
Payer: COMMERCIAL

## 2018-12-13 DIAGNOSIS — E11.65 TYPE 2 DIABETES MELLITUS WITH HYPERGLYCEMIA (H): ICD-10-CM

## 2018-12-13 DIAGNOSIS — E11.65 TYPE 2 DIABETES MELLITUS WITH HYPERGLYCEMIA (H): Primary | ICD-10-CM

## 2018-12-13 LAB
CHOLEST SERPL-MCNC: 262 MG/DL
HBA1C MFR BLD: 8.4 % (ref 0–5.6)
HDLC SERPL-MCNC: 50 MG/DL
LDLC SERPL CALC-MCNC: 178 MG/DL
NONHDLC SERPL-MCNC: 212 MG/DL
TRIGL SERPL-MCNC: 168 MG/DL

## 2018-12-13 PROCEDURE — 80061 LIPID PANEL: CPT | Performed by: CLINICAL NURSE SPECIALIST

## 2018-12-13 PROCEDURE — 36415 COLL VENOUS BLD VENIPUNCTURE: CPT | Performed by: CLINICAL NURSE SPECIALIST

## 2018-12-13 PROCEDURE — 83036 HEMOGLOBIN GLYCOSYLATED A1C: CPT | Performed by: CLINICAL NURSE SPECIALIST

## 2018-12-13 PROCEDURE — G0108 DIAB MANAGE TRN  PER INDIV: HCPCS

## 2018-12-13 NOTE — PROGRESS NOTES
Diabetes Self-Management Education & Support      Diabetes Self-Management Education & Support - Insulin Pump Review    SUBJECTIVE/OBJECTIVE  Presents for: Follow-up  Accompanied by: Daughter, Self  Diabetes education in the past 24mo: Yes  Focus of Visit: Insulin Pump  Diabetes type: Type 2  Disease course: Stable  Diabetes management related comments/concerns: struggling with anxiety and binge eating, wants help with downloading pump to phone/[employer]  Transportation concerns: No  Other concerns:: Cognitive impairment(struggling more with memory)  Cultural Influences/Ethnic Background:  American      Patient seen today for Insulin Pump Review:          Insulin Pump Information  Insulin Pump Type: OmniPod    Insulin Pump Review  Insulin Pump Type: OmniPod  Problems taking diabetes medications regularly?: No(only when stress binge eating, unable to bolus at that time but will corrrect afterwards)  Diabetes medication side effects?: No  Patient would benefit from: Change in basal rate(s)  Changes made to pump settings: Basal rate  Education specific to insulin pump provided today: review of how to use a temporary basal rate    Healthy Eating  Healthy Eating Assessed Today: Yes  Cultural/Gnosticist diet restrictions?: No  Patient on a regular basis: Snacks frequently throughout the night, Eats 3 meals a day(stress- binge eating during the night)  Meal planning: Avoiding sweets, Carbohydrate counting, Heart healthy, Low salt, Smaller portions  Meals include: Breakfast, Lunch, Dinner, Snacks  Beverages: Water  Has patient met with a dietitian in the past?: Yes    Being Active  Being Active Assessed Today: No  Barrier to exercise: Physical limitation    Monitoring  Monitoring Assessed Today: Yes  Did patient bring glucose meter to appointment? : Yes  Blood Glucose Meter: FreeStyle  Home Glucose (Sugar) Monitorin+ times per day  Blood glucose trend: Increasing steadily  Low Glucose Range (mg/dL):   High Glucose  Range (mg/dL): >200  Overall Range (mg/dL): >200      Taking Medications  Diabetes Medication(s)     Diabetic Other Sig    glucagon (GLUCAGON EMERGENCY) 1 MG kit Inject 1 mg into the muscle once for 1 dose    Insulin Sig    insulin aspart (NOVOLOG VIAL) 100 UNITS/ML injection To be used in insulin pump. TDD 80 units    INSULIN PUMP - OUTPATIENT Date last updated: 10/8/18 Omnipod  BASAL RATES and times:  REGULAR:  12am: 1.60 --6 AM: 1.45 --12pm: 1.3 -- 6pm: 1.45  HIGH 2:  12am: 1.70 --  6 AM: 1.55 -- 12pm: 1.4 -- 6pm: 1.55  ANXIETY 3:  12am: 1.80 -- 6 AM: 1.65 --12pm: 1.5 --6pm: 1.65  AFTERNOON BINGE:  12 AM: 1.8 -- 6 AM: 4.65 --8 PM: 1.8  VERY HIGH 5: 12 AM: 1.8  CARB RATIO: 12am-12am: 6  Corection Factor (Sensitivity): 12AM (midnight): 23 -- 5 AM: 25  BLOOD GLUCOSE TARGET:12AM (midnigh          Taking Medication Assessed Today: Yes  Current Treatments: Insulin Pump  Dose schedule: (premeal, presnacks and corection dose)  Given by: Patient  Injection/Infusion sites: Abdomen  Problems taking diabetes medications regularly?: No(only when stress binge eating, unable to bolus at that time but will corrrect afterwards)  Diabetes medication side effects?: No  Treatment Compliance: All of the time    Problem Solving  Problem Solving Assessed Today: Yes  Hypoglycemia Frequency: Other(occured once due to not eating as much carbs as entered at previous meal)  Hypoglycemia Treatment: Glucose (tablets or gel)  Patient carries a carbohydrate source: Yes  Medical alert: No  Severe weather/disaster plan for diabetes management?: Yes  DKA prevention plan?: No              Reducing Risks  Reducing Risks Assessed Today: No  CAD Risks: Diabetes Mellitus, Family history, Hypertension, Obesity, Post-menopausal, Stress  Has dilated eye exam at least once a year?: Yes  Sees dentist every 6 months?: Yes  Sees podiatrist (foot doctor)?: No    Healthy Coping  Healthy Coping Assessed Today: No  Informal Support system:: Family, Spouse  Stage  of change: ACTION (Actively working towards change)  Difficulty affording diabetes management supplies?: Yes  Patient Activation Measure Survey Score:  JEFFERY Score (Last Two) 6/2/2011 10/18/2013   JEFFERY Raw Score 50 42   Activation Score 86.3 66   JEFFERY Level 4 3         ASSESSMENT  Patient started pattern 5 on the evening of 12/7/18. Glucose has improved with the change in pattern, she continues to be high during the night due to binge eating, she will correct and glucose does improve but still waking a little high in the morning.  On 12/12/18 she did not binge eat overnight but did take a correction after midnight, fasting glucose did rise that morning. Recommend a small increase to the overnight basal from 12am-6am to improve overnight glucose.  This may need to start earlier in the night but recommend monitor the change for now then reevaluate.   The hypoglycemia episode patient stated she did not feel well so did not eat all carbohydrates at previous meal, review of monitoring closely if this occurs and may want to consider suppliment with a fast acting carbohydrate if she is not able to eat her full meal to prevent the hypoglycemia from occurring.      INTERVENTION:   Diabetes knowledge and skills assessment:     Patient is knowledgeable in diabetes management concepts related to: Monitoring and carbohydrate counting    Patient needs further education on the following diabetes management concepts: Taking Medication    Based on learning assessment above, most appropriate setting for further diabetes education would be: Individual setting.    Education provided today on:  AADE Self-Care Behaviors:  Monitoring: frequency of monitoring  Taking Medication: action of prescribed medication and dosing  Problem Solving: review of low blood glucose - causes, signs/symptoms, treatment and prevention    Opportunities for ongoing education and support in diabetes-self management were discussed.    Pt verbalized understanding of  concepts discussed and recommendations provided today.       Education Materials Provided:  No new materials provided today      PLAN  See Patient Instructions for co-developed, patient-stated behavior change goals.  AVS printed and provided to patient today. See Follow-Up section for recommended follow-up.    Deanna Bolton RN,CDE   Time Spent: 30 minutes  Encounter Type: Individual    Any diabetes medication dose changes were made via the CDE Protocol and Collaborative Practice Agreement with the patient's referring provider. A copy of this encounter was shared with the provider.

## 2018-12-13 NOTE — LETTER
12/13/2018         RE: Maricarmen Dotson  [address] [place] [address]        Dear Colleague,    Thank you for referring your patient, Maricarmen Dtoson, to the [facility]. Please see a copy of my visit note below.    Diabetes Self-Management Education & Support      Diabetes Self-Management Education & Support - Insulin Pump Review    SUBJECTIVE/OBJECTIVE  Presents for: Follow-up  Accompanied by: Daughter, Self  Diabetes education in the past 24mo: Yes  Focus of Visit: Insulin Pump  Diabetes type: Type 2  Disease course: Stable  Diabetes management related comments/concerns: struggling with anxiety and binge eating, wants help with downloading pump to phone/[employer]  Transportation concerns: No  Other concerns:: Cognitive impairment(struggling more with memory)  Cultural Influences/Ethnic Background:  American      Patient seen today for Insulin Pump Review:          Insulin Pump Information  Insulin Pump Type: OmniPod    Insulin Pump Review  Insulin Pump Type: OmniPod  Problems taking diabetes medications regularly?: No(only when stress binge eating, unable to bolus at that time but will corrrect afterwards)  Diabetes medication side effects?: No  Patient would benefit from: Change in basal rate(s)  Changes made to pump settings: Basal rate  Education specific to insulin pump provided today: review of how to use a temporary basal rate    Healthy Eating  Healthy Eating Assessed Today: Yes  Cultural/Baptism diet restrictions?: No  Patient on a regular basis: Snacks frequently throughout the night, Eats 3 meals a day(stress- binge eating during the night)  Meal planning: Avoiding sweets, Carbohydrate counting, Heart healthy, Low salt, Smaller portions  Meals include: Breakfast, Lunch, Dinner, Snacks  Beverages: Water  Has patient met with a dietitian in the past?: Yes    Being Active  Being Active Assessed Today: No  Barrier to exercise: Physical  limitation    Monitoring  Monitoring Assessed Today: Yes  Did patient bring glucose meter to appointment? : Yes  Blood Glucose Meter: FreeStyle  Home Glucose (Sugar) Monitorin+ times per day  Blood glucose trend: Increasing steadily  Low Glucose Range (mg/dL):   High Glucose Range (mg/dL): >200  Overall Range (mg/dL): >200      Taking Medications  Diabetes Medication(s)     Diabetic Other Sig    glucagon (GLUCAGON EMERGENCY) 1 MG kit Inject 1 mg into the muscle once for 1 dose    Insulin Sig    insulin aspart (NOVOLOG VIAL) 100 UNITS/ML injection To be used in insulin pump. TDD 80 units    INSULIN PUMP - OUTPATIENT Date last updated: 10/8/18 Omnipod  BASAL RATES and times:  REGULAR:  12am: 1.60 --6 AM: 1.45 --12pm: 1.3 -- 6pm: 1.45  HIGH 2:  12am: 1.70 --  6 AM: 1.55 -- 12pm: 1.4 -- 6pm: 1.55  ANXIETY 3:  12am: 1.80 -- 6 AM: 1.65 --12pm: 1.5 --6pm: 1.65  AFTERNOON BINGE:  12 AM: 1.8 -- 6 AM: 4.65 --8 PM: 1.8  VERY HIGH 5: 12 AM: 1.8  CARB RATIO: 12am-12am: 6  Corection Factor (Sensitivity): 12AM (midnight): 23 -- 5 AM: 25  BLOOD GLUCOSE TARGET:12AM (midnigh          Taking Medication Assessed Today: Yes  Current Treatments: Insulin Pump  Dose schedule: (premeal, presnacks and corection dose)  Given by: Patient  Injection/Infusion sites: Abdomen  Problems taking diabetes medications regularly?: No(only when stress binge eating, unable to bolus at that time but will corrrect afterwards)  Diabetes medication side effects?: No  Treatment Compliance: All of the time    Problem Solving  Problem Solving Assessed Today: Yes  Hypoglycemia Frequency: Other(occured once due to not eating as much carbs as entered at previous meal)  Hypoglycemia Treatment: Glucose (tablets or gel)  Patient carries a carbohydrate source: Yes  Medical alert: No  Severe weather/disaster plan for diabetes management?: Yes  DKA prevention plan?: No              Reducing Risks  Reducing Risks Assessed Today: No  CAD Risks: Diabetes Mellitus,  Family history, Hypertension, Obesity, Post-menopausal, Stress  Has dilated eye exam at least once a year?: Yes  Sees dentist every 6 months?: Yes  Sees podiatrist (foot doctor)?: No    Healthy Coping  Healthy Coping Assessed Today: No  Informal Support system:: Family, Spouse  Stage of change: ACTION (Actively working towards change)  Difficulty affording diabetes management supplies?: Yes  Patient Activation Measure Survey Score:  JEFFERY Score (Last Two) 6/2/2011 10/18/2013   JEFFERY Raw Score 50 42   Activation Score 86.3 66   JEFFERY Level 4 3         ASSESSMENT  Patient started pattern 5 on the evening of 12/7/18. Glucose has improved with the change in pattern, she continues to be high during the night due to binge eating, she will correct and glucose does improve but still waking a little high in the morning.  On 12/12/18 she did not binge eat overnight but did take a correction after midnight, fasting glucose did rise that morning. Recommend a small increase to the overnight basal from 12am-6am to improve overnight glucose.  This may need to start earlier in the night but recommend monitor the change for now then reevaluate.   The hypoglycemia episode patient stated she did not feel well so did not eat all carbohydrates at previous meal, review of monitoring closely if this occurs and may want to consider suppliment with a fast acting carbohydrate if she is not able to eat her full meal to prevent the hypoglycemia from occurring.      INTERVENTION:   Diabetes knowledge and skills assessment:     Patient is knowledgeable in diabetes management concepts related to: Monitoring and carbohydrate counting    Patient needs further education on the following diabetes management concepts: Taking Medication    Based on learning assessment above, most appropriate setting for further diabetes education would be: Individual setting.    Education provided today on:  AADE Self-Care Behaviors:  Monitoring: frequency of  monitoring  Taking Medication: action of prescribed medication and dosing  Problem Solving: review of low blood glucose - causes, signs/symptoms, treatment and prevention    Opportunities for ongoing education and support in diabetes-self management were discussed.    Pt verbalized understanding of concepts discussed and recommendations provided today.       Education Materials Provided:  No new materials provided today      PLAN  See Patient Instructions for co-developed, patient-stated behavior change goals.  AVS printed and provided to patient today. See Follow-Up section for recommended follow-up.    Deanna Bolton RN,CDE   Time Spent: 30 minutes  Encounter Type: Individual    Any diabetes medication dose changes were made via the CDE Protocol and Collaborative Practice Agreement with the patient's referring provider. A copy of this encounter was shared with the provider.

## 2018-12-13 NOTE — PATIENT INSTRUCTIONS
Increase to basal 5 overnight, use temp basal as needed at +/- 10 %     Consider personal ashleigh sensor to evaluate glucose patterns     Upload pump next week    Follow up on 12/27 with Emily    Follow up with Deanna in January

## 2018-12-15 NOTE — RESULT ENCOUNTER NOTE
Maricarmen,  Here's a copy of your recent lab results.  We can talk about these results in detail at your upcoming visit.  Emily Phan NP  Endocrinology

## 2018-12-27 ENCOUNTER — OFFICE VISIT (OUTPATIENT)
Dept: ENDOCRINOLOGY | Facility: CLINIC | Age: 58
End: 2018-12-27
Payer: COMMERCIAL

## 2018-12-27 VITALS
OXYGEN SATURATION: 95 % | HEART RATE: 78 BPM | TEMPERATURE: 98.5 F | SYSTOLIC BLOOD PRESSURE: 160 MMHG | WEIGHT: 197 LBS | BODY MASS INDEX: 34.35 KG/M2 | DIASTOLIC BLOOD PRESSURE: 96 MMHG

## 2018-12-27 DIAGNOSIS — E11.65 TYPE 2 DIABETES MELLITUS WITH HYPERGLYCEMIA, WITH LONG-TERM CURRENT USE OF INSULIN (H): ICD-10-CM

## 2018-12-27 DIAGNOSIS — Z79.4 TYPE 2 DIABETES MELLITUS WITH HYPERGLYCEMIA, WITH LONG-TERM CURRENT USE OF INSULIN (H): ICD-10-CM

## 2018-12-27 DIAGNOSIS — Z96.41 INSULIN PUMP IN PLACE: Primary | ICD-10-CM

## 2018-12-27 PROCEDURE — 99207 C FOOT EXAM  NO CHARGE: CPT | Performed by: CLINICAL NURSE SPECIALIST

## 2018-12-27 PROCEDURE — 99214 OFFICE O/P EST MOD 30 MIN: CPT | Performed by: CLINICAL NURSE SPECIALIST

## 2018-12-27 RX ORDER — FLASH GLUCOSE SENSOR
KIT MISCELLANEOUS
Qty: 1 EACH | Refills: 0 | Status: SHIPPED | OUTPATIENT
Start: 2018-12-27 | End: 2019-08-26

## 2018-12-27 RX ORDER — FLASH GLUCOSE SENSOR
KIT MISCELLANEOUS
Qty: 3 EACH | Refills: 11 | Status: SHIPPED | OUTPATIENT
Start: 2018-12-27 | End: 2019-05-20

## 2018-12-27 NOTE — PROGRESS NOTES
Patient declined flu shot this season.     Patient advised to follow up with Primary Care provider regarding elevated blood pressure.  Highly encouraged patient to allow me to set up an appointment with her primary to discuss her high blood pressure.  Patient refuses at this time.   Declines any blood pressure medication at this time due to GI issues.   Linda Holder CMA on 12/27/2018 at 5:52 PM

## 2018-12-27 NOTE — LETTER
12/27/2018         RE: Maricarmen Dotson  [address]  [place] [address]        Dear Colleague,    Thank you for referring your patient, Maricarmen Dotson, to the [facility]. Please see a copy of my visit note below.    Patient declined flu shot this season.     Patient advised to follow up with Primary Care provider regarding elevated blood pressure.  Highly encouraged patient to allow me to set up an appointment with her primary to discuss her high blood pressure.  Patient refuses at this time.   Declines any blood pressure medication at this time due to GI issues.   Linda Holder, BOGDAN on 12/27/2018 at 5:52 PM    Name: Maricarmen Dotson  F/u for Diabetes (Last seen 7/11/2018).  HPI:  Maricarmen Dotson is a 58 year old female who presents for the evaluation/management of:    1. Type 2 DM:  Originally diagnosed: in 1997 after starting Paxil and gaining 32 lbs in one month  Comes in today accompanied by her .    Insulin pump start 2/20/2018.    Blood sugar range:   Has issues with skin irritation after one week wearing the Freddie.    Current Regimen:   Insulin pump - Omnipod  Currently using pattern 2   Time Rate (U/hr)   0000 1.600   06:00 1.450   12:00 1.300   18:00 1.450     Pattern 2 (anxiety)  Time Rate (U/hr)   0000 1.700   06:00 1.550   12:00 1.400   18:00 1.550     Pattern 3 (crazy sugars)  Time Rate (U/hr)   0000 1.800   06:00 1.650   12:00 1.500   18:00 1.650     Pattern 4   Time Rate (U/hr)   0000 1.900   06:00 1.650   18:00 1.900     Pattern 5   Time Rate (U/hr)   0000 2.00   06:00 1.800     Carbohydrate Ratio -    Time Ratio   0000 6         Sensitivity   00:00  05:00    23  25   Active Insulin Time  4 hours   Basal  62% (43.5 units)   Bolus   38% (26.9 units)   Total Carbohydrates/day 89 g   Total Insulin/day  70.3 units   Average Blood Sugar  BG range 181     BS Checks 4.5 times a day   Target range 100-120       REPORTS PREVIOUS ADVERSE REACTION TO HUMALOG.   States she cannot take Humalog because it caused pancreatits.  Continues to struggle with a binge eating disorder.    Was previously treated by endo at [facility] (last seen there 11/6/2015).  She has had adverse reaction to most oral medications including Metformin (abdominal pain), glipizide (allergic type reaction), Avandia (abdominal pain and swelling), Byetta and Onglyza (pancreatitis).  Was previously prescribed Invokana but did not start because she was concerned about possible side effects.        History of eating disorder (binge eating) due to PTSD.  Now working with a new counselor.      Saw Dr. Lovell for evaluation of her adrenal and pituitary glands 12/2016 - no abnormal adrenal or pituitary abnormalities were noted.      Complications:   Diabetes Complications  Description / Detail    Diabetic Retinopathy   No retinopathy, plans to reschedule - referral to [facility] provided   CAD / PAD   No   Neuropathy   No   Nephropathy / Microalbuminuria   No   Gastroparesis  No   Hypoglycemia Unawareness  No     2. Intermittent Hypertension: Blood Pressure today:   BP Readings from Last 3 Encounters:   12/27/18 (!) 160/96   12/06/18 146/84   09/10/18 148/88   .  Blood pressure medications include no medications.    3. Lipids: Takes no medications for lipid control.        PMH/PSH:  Past Medical History:   Diagnosis Date     Ascending aorta enlargement (H)      Depressive disorder     severe, prior hospitalization     Diabetes mellitus, type 2 (H)      Diverticulosis of colon (without mention of hemorrhage)      Fibromyalgia 6/26/2007     Insomnia      Irritable bowel syndrome      Past Surgical History:   Procedure Laterality Date     C SPINAL ORTHOSIS,NOS  2002    lumbar surgery     choley  2011     HYSTERECTOMY, CERVIX STATUS UNKNOWN  2000    TVH/BSO     Family Hx:  Family History   Problem Relation Age of Onset     Diabetes Mother         type 2     Hypertension Mother      Cerebrovascular Disease Mother           stroke age 57     Ovarian Cancer Mother 43     Cancer Mother         cervix     Diabetes Father         type 2     Hypertension Father      Gastrointestinal Disease Father         colon polyps     Breast Cancer Sister      Ovarian Cancer Sister 46     Breast Cancer Sister      Ovarian Cancer Maternal Grandmother 72     Cancer Maternal Grandmother         cervix     Thyroid disease:          DM2: Yes, mother and father         Autoimmune: DM1, SLE, RA, Vitiligo     Social Hx:  Social History     Socioeconomic History     Marital status:      Spouse name: Not on file     Number of children: 3     Years of education: Not on file     Highest education level: Not on file   Social Needs     Financial resource strain: Not on file     Food insecurity - worry: Not on file     Food insecurity - inability: Not on file     Transportation needs - medical: Not on file     Transportation needs - non-medical: Not on file   Occupational History     Occupation: xr technician     Employer: [employer]   Tobacco Use     Smoking status: Never Smoker     Smokeless tobacco: Never Used   Substance and Sexual Activity     Alcohol use: Yes     Alcohol/week: 0.0 oz     Comment: maybe once a month     Drug use: No     Sexual activity: Yes     Partners: Male     Birth control/protection: Surgical   Other Topics Concern     Parent/sibling w/ CABG, MI or angioplasty before 65F 55M? Not Asked   Social History Narrative     Not on file          MEDICATIONS:  has a current medication list which includes the following prescription(s): acetone urine, aspirin not prescribed, blood glucose, continuous blood glucose monitoring, continuous blood glucose monitoring, diclofenac, epinephrine, insulin aspart, insulin pump, meloxicam, statin not prescribed, tramadol, UNABLE TO FIND, wheat dextrin, freestyle ashleigh 14 day reader, freestyle ashleigh 14 day sensor, and glucagon.    ROS     ROS: 10 point ROS neg other than the symptoms  noted above in the HPI.    Physical Exam   VS: BP (!) 160/96 (BP Location: Left arm, Patient Position: Chair, Cuff Size: Adult Large)   Pulse 78   Temp 98.5  F (36.9  C) (Oral)   Wt 89.4 kg (197 lb)   LMP 01/01/1999   SpO2 95%   Breastfeeding? No   BMI 34.35 kg/m     GENERAL: NAD, well dressed, answering questions appropriately, appears stated age.  HEENT: no exophthalmos, no proptosis, no lig lag, no retraction, no scleral icterus  RESPIRATORY: Normal respiratory effort.  CARDIOVASCULAR: Regular rate.  No peripheral edema.  EXTREMTIES: Feet with 2+ pulses, 10-gram plantar sensation 6/6 bilaterally, no open lesions  NEUROLOGY: CN grossly intact, no tremors  MSK: grossly intact, No digital cyanosis. Normal gait and station.  PSYCH: Intact judgment and insight. A&OX3 with a cordial affect.    LABS:  A1c:   Component      Latest Ref Rng & Units 3/30/2017 1/16/2018 6/26/2018 12/13/2018   Hemoglobin A1C      0 - 5.6 % 9.7 (H) 9.4 (H) 8.7 (H) 8.4 (H)     Basic Metabolic Panel:  !COMPREHENSIVE Latest Ref Rng & Units 6/26/2018   SODIUM 133 - 144 mmol/L 140   POTASSIUM 3.4 - 5.3 mmol/L 3.6   CHLORIDE 94 - 109 mmol/L 105   BUN 7 - 30 mg/dL 10   Creatinine 0.52 - 1.04 mg/dL    Creatinine 0.52 - 1.04 mg/dL 0.71   Glucose 70 - 99 mg/dL 143 (H)   ANION GAP 3 - 14 mmol/L 7   CALCIUM 8.5 - 10.1 mg/dL 9.3   ALBUMIN 3.4 - 5.0 g/dL 4.0     LFTS/Cholesterol Panel:  !LIPID/HEPATIC Latest Ref Rng & Units 10/11/2018   CHOLESTEROL <200 mg/dL    TRIGLYCERIDES <150 mg/dL    HDL CHOLESTEROL >49 mg/dL    LDL CHOLESTEROL DIRECT <100 mg/dL    LDL CHOLESTEROL, CALCULATED <100 mg/dL    VLDL-CHOLESTEROL 0 - 30 mg/dL    NON HDL CHOLESTEROL <130 mg/dL    CHOLESTEROL/HDL RATIO 0.0 - 5.0    AST 0 - 40 IU/L 27   ALT 0 - 32 IU/L 21     !LIPID/HEPATIC Latest Ref Rng & Units 12/13/2018   CHOLESTEROL <200 mg/dL 262 (H)   TRIGLYCERIDES <150 mg/dL 168 (H)   HDL CHOLESTEROL >49 mg/dL 50   LDL CHOLESTEROL DIRECT <100 mg/dL    LDL CHOLESTEROL, CALCULATED  <100 mg/dL 178 (H)   VLDL-CHOLESTEROL 0 - 30 mg/dL    NON HDL CHOLESTEROL <130 mg/dL 212 (H)   CHOLESTEROL/HDL RATIO 0.0 - 5.0    AST 0 - 40 IU/L    ALT 0 - 32 IU/L      Thyroid Function:   !THYROID Latest Ref Rng & Units 6/26/2018   TSH 0.40 - 4.00 mU/L 0.83     Urine Microalbumin:  Component      Latest Ref Rng & Units 4/23/2018   Creatinine Urine      mg/dL 63   Albumin Urine mg/L      mg/L 12   Albumin Urine mg/g Cr      0 - 25 mg/g Cr 19.37     Vitamin D Deficiency screening    30 - 75 ug/L 34     All pertinent notes, labs, and images personally reviewed by me.     A/P  Ms.Anna ROXANNE Dotson is a 58 year old here for the evaluation/management of diabetes:    1. DM2 - Uncontrolled - continues to improve.    Currently on insulin pump therapy.    Recommend she try spraying her skin with Nasonex prior to applying Freddie.  Will order 10-day freddei for now, not sure she will be able to tolerate 14 day sensors due to skin irritation.  Continue to work with diabetes ed weekly for ongoing pump adjustments.    2.  Intermittent  Hypertension - Currently untreated.  Will continue to monitor. She does not want to take any additional oral meds due to multiple past med reactions.  Schedule nurse only BP recheck, f/u with PCP for further treatment.    3. Hyperlipidemia - Currently untreated-? Statin intolerance.  She is concerned about taking any oral medications due to previous adverse reactions.  Will continue to monitor.    Labs ordered today:   Orders Placed This Encounter   Procedures     FOOT EXAM     OPHTHALMOLOGY ADULT REFERRAL   Urine microalbumin     Radiology/Consults ordered today: C FOOT EXAM  NO CHARGE  OPHTHALMOLOGY ADULT REFERRAL    All questions were answered.  The patient indicates understanding of the above issues and agrees with the plan set forth.  Total face to face time discussing diabetes treatment and target BG levels was greater than or equal to 25 minutes.       Follow-up:  4 months    Emily Phan  NP  Endocrinology  [facility]  CC: Annamaria Erickson                   Again, thank you for allowing me to participate in the care of your patient.        Sincerely,        MARICRUZ Castillo CNP

## 2018-12-28 NOTE — PROGRESS NOTES
Name: Maricarmen Dotson  F/u for Diabetes (Last seen 7/11/2018).  HPI:  Maricarmen Dotson is a 58 year old female who presents for the evaluation/management of:    1. Type 2 DM:  Originally diagnosed: in 1997 after starting Paxil and gaining 32 lbs in one month  Comes in today accompanied by her .    Insulin pump start 2/20/2018.    Blood sugar range:   Has issues with skin irritation after one week wearing the Freddie.    Current Regimen:   Insulin pump - Omnipod  Currently using pattern 2   Time Rate (U/hr)   0000 1.600   06:00 1.450   12:00 1.300   18:00 1.450     Pattern 2 (anxiety)  Time Rate (U/hr)   0000 1.700   06:00 1.550   12:00 1.400   18:00 1.550     Pattern 3 (crazy sugars)  Time Rate (U/hr)   0000 1.800   06:00 1.650   12:00 1.500   18:00 1.650     Pattern 4   Time Rate (U/hr)   0000 1.900   06:00 1.650   18:00 1.900     Pattern 5   Time Rate (U/hr)   0000 2.00   06:00 1.800     Carbohydrate Ratio -    Time Ratio   0000 6         Sensitivity   00:00  05:00    23  25   Active Insulin Time  4 hours   Basal  62% (43.5 units)   Bolus   38% (26.9 units)   Total Carbohydrates/day 89 g   Total Insulin/day  70.3 units   Average Blood Sugar  BG range 181     BS Checks 4.5 times a day   Target range 100-120       REPORTS PREVIOUS ADVERSE REACTION TO HUMALOG.  States she cannot take Humalog because it caused pancreatits.  Continues to struggle with a binge eating disorder.    Was previously treated by endo at [facility] (last seen there 11/6/2015).  She has had adverse reaction to most oral medications including Metformin (abdominal pain), glipizide (allergic type reaction), Avandia (abdominal pain and swelling), Byetta and Onglyza (pancreatitis).  Was previously prescribed Invokana but did not start because she was concerned about possible side effects.        History of eating disorder (binge eating) due to PTSD.  Now working with a new counselor.      Saw Dr. Lovell for evaluation of her adrenal and  pituitary glands 2016 - no abnormal adrenal or pituitary abnormalities were noted.      Complications:   Diabetes Complications  Description / Detail    Diabetic Retinopathy   No retinopathy, plans to reschedule - referral to [facility]   CAD / PAD   No   Neuropathy   No   Nephropathy / Microalbuminuria   No   Gastroparesis  No   Hypoglycemia Unawareness  No     2. Intermittent Hypertension: Blood Pressure today:   BP Readings from Last 3 Encounters:   18 (!) 160/96   18 146/84   09/10/18 148/88   .  Blood pressure medications include no medications.    3. Lipids: Takes no medications for lipid control.        PMH/PSH:  Past Medical History:   Diagnosis Date     Ascending aorta enlargement (H)      Depressive disorder     severe, prior hospitalization     Diabetes mellitus, type 2 (H)      Diverticulosis of colon (without mention of hemorrhage)      Fibromyalgia 2007     Insomnia      Irritable bowel syndrome      Past Surgical History:   Procedure Laterality Date     C SPINAL ORTHOSIS,NOS      lumbar surgery     choley       HYSTERECTOMY, CERVIX STATUS UNKNOWN      TVH/BSO     Family Hx:  Family History   Problem Relation Age of Onset     Diabetes Mother         type 2     Hypertension Mother      Cerebrovascular Disease Mother          stroke age 57     Ovarian Cancer Mother 43     Cancer Mother         cervix     Diabetes Father         type 2     Hypertension Father      Gastrointestinal Disease Father         colon polyps     Breast Cancer Sister      Ovarian Cancer Sister 46     Breast Cancer Sister      Ovarian Cancer Maternal Grandmother 72     Cancer Maternal Grandmother         cervix     Thyroid disease:          DM2: Yes, mother and father         Autoimmune: DM1, SLE, RA, Vitiligo     Social Hx:  Social History     Socioeconomic History     Marital status:      Spouse name: Not on file     Number of children: 3     Years of education: Not on file      Highest education level: Not on file   Social Needs     Financial resource strain: Not on file     Food insecurity - worry: Not on file     Food insecurity - inability: Not on file     Transportation needs - medical: Not on file     Transportation needs - non-medical: Not on file   Occupational History     Occupation: xr technician     Employer: [employer]   Tobacco Use     Smoking status: Never Smoker     Smokeless tobacco: Never Used   Substance and Sexual Activity     Alcohol use: Yes     Alcohol/week: 0.0 oz     Comment: maybe once a month     Drug use: No     Sexual activity: Yes     Partners: Male     Birth control/protection: Surgical   Other Topics Concern     Parent/sibling w/ CABG, MI or angioplasty before 65F 55M? Not Asked   Social History Narrative     Not on file          MEDICATIONS:  has a current medication list which includes the following prescription(s): acetone urine, aspirin not prescribed, blood glucose, continuous blood glucose monitoring, continuous blood glucose monitoring, diclofenac, epinephrine, insulin aspart, insulin pump, meloxicam, statin not prescribed, tramadol, UNABLE TO FIND, wheat dextrin, freestyle ashleigh 14 day reader, freestyle ashleigh 14 day sensor, and glucagon.    ROS     ROS: 10 point ROS neg other than the symptoms noted above in the HPI.    Physical Exam   VS: BP (!) 160/96 (BP Location: Left arm, Patient Position: Chair, Cuff Size: Adult Large)   Pulse 78   Temp 98.5  F (36.9  C) (Oral)   Wt 89.4 kg (197 lb)   LMP 01/01/1999   SpO2 95%   Breastfeeding? No   BMI 34.35 kg/m    GENERAL: NAD, well dressed, answering questions appropriately, appears stated age.  HEENT: no exophthalmos, no proptosis, no lig lag, no retraction, no scleral icterus  RESPIRATORY: Normal respiratory effort.  CARDIOVASCULAR: Regular rate.  No peripheral edema.  EXTREMTIES: Feet with 2+ pulses, 10-gram plantar sensation 6/6 bilaterally, no open lesions  NEUROLOGY: CN grossly  intact, no tremors  MSK: grossly intact, No digital cyanosis. Normal gait and station.  PSYCH: Intact judgment and insight. A&OX3 with a cordial affect.    LABS:  A1c:   Component      Latest Ref Rng & Units 3/30/2017 1/16/2018 6/26/2018 12/13/2018   Hemoglobin A1C      0 - 5.6 % 9.7 (H) 9.4 (H) 8.7 (H) 8.4 (H)     Basic Metabolic Panel:  !COMPREHENSIVE Latest Ref Rng & Units 6/26/2018   SODIUM 133 - 144 mmol/L 140   POTASSIUM 3.4 - 5.3 mmol/L 3.6   CHLORIDE 94 - 109 mmol/L 105   BUN 7 - 30 mg/dL 10   Creatinine 0.52 - 1.04 mg/dL    Creatinine 0.52 - 1.04 mg/dL 0.71   Glucose 70 - 99 mg/dL 143 (H)   ANION GAP 3 - 14 mmol/L 7   CALCIUM 8.5 - 10.1 mg/dL 9.3   ALBUMIN 3.4 - 5.0 g/dL 4.0     LFTS/Cholesterol Panel:  !LIPID/HEPATIC Latest Ref Rng & Units 10/11/2018   CHOLESTEROL <200 mg/dL    TRIGLYCERIDES <150 mg/dL    HDL CHOLESTEROL >49 mg/dL    LDL CHOLESTEROL DIRECT <100 mg/dL    LDL CHOLESTEROL, CALCULATED <100 mg/dL    VLDL-CHOLESTEROL 0 - 30 mg/dL    NON HDL CHOLESTEROL <130 mg/dL    CHOLESTEROL/HDL RATIO 0.0 - 5.0    AST 0 - 40 IU/L 27   ALT 0 - 32 IU/L 21     !LIPID/HEPATIC Latest Ref Rng & Units 12/13/2018   CHOLESTEROL <200 mg/dL 262 (H)   TRIGLYCERIDES <150 mg/dL 168 (H)   HDL CHOLESTEROL >49 mg/dL 50   LDL CHOLESTEROL DIRECT <100 mg/dL    LDL CHOLESTEROL, CALCULATED <100 mg/dL 178 (H)   VLDL-CHOLESTEROL 0 - 30 mg/dL    NON HDL CHOLESTEROL <130 mg/dL 212 (H)   CHOLESTEROL/HDL RATIO 0.0 - 5.0    AST 0 - 40 IU/L    ALT 0 - 32 IU/L      Thyroid Function:   !THYROID Latest Ref Rng & Units 6/26/2018   TSH 0.40 - 4.00 mU/L 0.83     Urine Microalbumin:  Component      Latest Ref Rng & Units 4/23/2018   Creatinine Urine      mg/dL 63   Albumin Urine mg/L      mg/L 12   Albumin Urine mg/g Cr      0 - 25 mg/g Cr 19.37     Vitamin D Deficiency screening    30 - 75 ug/L 34     All pertinent notes, labs, and images personally reviewed by me.     A/P  Ms.Maricarmen Dotson is a 58 year old here for the evaluation/management  of diabetes:    1. DM2 - Uncontrolled - continues to improve.    Currently on insulin pump therapy.    Recommend she try spraying her skin with Nasonex prior to applying Freddie.  Will order 10-day freddie for now, not sure she will be able to tolerate 14 day sensors due to skin irritation.  Continue to work with diabetes ed weekly for ongoing pump adjustments.    2.  Intermittent  Hypertension - Currently untreated.  Will continue to monitor. She does not want to take any additional oral meds due to multiple past med reactions.  Schedule nurse only BP recheck, f/u with PCP for further treatment.    3. Hyperlipidemia - Currently untreated-? Statin intolerance.  She is concerned about taking any oral medications due to previous adverse reactions.  Will continue to monitor.    Labs ordered today:   Orders Placed This Encounter   Procedures     FOOT EXAM     OPHTHALMOLOGY ADULT REFERRAL   Urine microalbumin     Radiology/Consults ordered today: C FOOT EXAM  NO CHARGE  OPHTHALMOLOGY ADULT REFERRAL    All questions were answered.  The patient indicates understanding of the above issues and agrees with the plan set forth.  Total face to face time discussing diabetes treatment and target BG levels was greater than or equal to 25 minutes.       Follow-up:  4 months    Emily Phan NP  Endocrinology  [facility]  CC: Annamaria Erickson

## 2018-12-28 NOTE — PATIENT INSTRUCTIONS
If you decide you want the 14-day Freddie - just message me.    Component      Latest Ref Rng & Units 3/30/2017 1/16/2018 6/26/2018 12/13/2018   Hemoglobin A1C      0 - 5.6 % 9.7 (H) 9.4 (H) 8.7 (H) 8.4 (H)     Follow up Thursday April 25 at 9 am.     Patient advised to follow up with Primary Care provider regarding elevated blood pressure.    Emily Phan NP  Endocrinology

## 2018-12-31 ENCOUNTER — TELEPHONE (OUTPATIENT)
Dept: PEDIATRICS | Facility: CLINIC | Age: 58
End: 2018-12-31

## 2018-12-31 DIAGNOSIS — Z79.4 TYPE 2 DIABETES MELLITUS WITH HYPERGLYCEMIA, WITH LONG-TERM CURRENT USE OF INSULIN (H): Primary | ICD-10-CM

## 2018-12-31 DIAGNOSIS — E11.65 TYPE 2 DIABETES MELLITUS WITH HYPERGLYCEMIA, WITH LONG-TERM CURRENT USE OF INSULIN (H): Primary | ICD-10-CM

## 2018-12-31 RX ORDER — FLASH GLUCOSE SENSOR
1 KIT MISCELLANEOUS
Qty: 2 EACH | Refills: 11 | Status: SHIPPED | OUTPATIENT
Start: 2018-12-31 | End: 2019-05-20

## 2018-12-31 RX ORDER — FLASH GLUCOSE SCANNING READER
1 EACH MISCELLANEOUS CONTINUOUS
Qty: 1 DEVICE | Refills: 0 | Status: SHIPPED | OUTPATIENT
Start: 2018-12-31 | End: 2019-08-07

## 2018-12-31 NOTE — TELEPHONE ENCOUNTER
Hello,    Could you please send over an order for the Freestyle Freddie [place](14 day) as well as the Freestyle Freddie Sensor (14 day)    Thank you,    [facility]

## 2019-01-06 ENCOUNTER — MYC MEDICAL ADVICE (OUTPATIENT)
Dept: EDUCATION SERVICES | Facility: CLINIC | Age: 59
End: 2019-01-06

## 2019-01-07 ENCOUNTER — PATIENT OUTREACH (OUTPATIENT)
Dept: EDUCATION SERVICES | Facility: CLINIC | Age: 59
End: 2019-01-07
Payer: COMMERCIAL

## 2019-01-09 ENCOUNTER — MYC MEDICAL ADVICE (OUTPATIENT)
Dept: EDUCATION SERVICES | Facility: CLINIC | Age: 59
End: 2019-01-09

## 2019-01-09 ENCOUNTER — TELEPHONE (OUTPATIENT)
Dept: ENDOCRINOLOGY | Facility: CLINIC | Age: 59
End: 2019-01-09

## 2019-01-09 NOTE — TELEPHONE ENCOUNTER
Received a fax for a Physician Order Update Form regarding her Omnipod.  Form on Emily Phan's desk.  Linda Holder M.A.

## 2019-01-17 ENCOUNTER — MYC MEDICAL ADVICE (OUTPATIENT)
Dept: ENDOCRINOLOGY | Facility: CLINIC | Age: 59
End: 2019-01-17

## 2019-01-17 NOTE — TELEPHONE ENCOUNTER
Updated Rx - Physician Order Update completed and signed.  The order and supporting clinic documentation including clinic visit, diabetic visit and recent A1c's faxed to [facility] at fax# [phone].  Informed patient of the status of this request via [employer].  Linda Holder CMA on 1/17/2019 at 9:25 AM

## 2019-01-18 ENCOUNTER — MYC MEDICAL ADVICE (OUTPATIENT)
Dept: EDUCATION SERVICES | Facility: CLINIC | Age: 59
End: 2019-01-18

## 2019-01-21 ENCOUNTER — PATIENT OUTREACH (OUTPATIENT)
Dept: EDUCATION SERVICES | Facility: CLINIC | Age: 59
End: 2019-01-21

## 2019-01-21 NOTE — PROGRESS NOTES
Diabetes Follow-up    Subjective/Objective:    Maricarmen Dotson sent in blood glucose log for review. Last date of communication was: 1/18.  She is scheduled for CDE visit this morning, just sent in FYI.       Diabetes is being managed with   Diabetes Medications   Diabetes Medication(s)     Diabetic Other Sig    glucagon (GLUCAGON EMERGENCY) 1 MG kit Inject 1 mg into the muscle once for 1 dose    Insulin Sig    insulin aspart (NOVOLOG VIAL) 100 UNITS/ML vial To be used in insulin pump.  units    INSULIN PUMP - OUTPATIENT Date last updated: 10/8/18 Omnipod  BASAL RATES and times:  REGULAR:  12am: 1.60 --6 AM: 1.45 --12pm: 1.3 -- 6pm: 1.45  HIGH 2:  12am: 1.70 --  6 AM: 1.55 -- 12pm: 1.4 -- 6pm: 1.55  ANXIETY 3:  12am: 1.80 -- 6 AM: 1.65 --12pm: 1.5 --6pm: 1.65  AFTERNOON BINGE:  12 AM: 1.8 -- 6 AM: 4.65 --8 PM: 1.8  VERY HIGH 5: 12 AM: 1.8  CARB RATIO: 12am-12am: 6  Corection Factor (Sensitivity): 12AM (midnight): 23 -- 5 AM: 25  BLOOD GLUCOSE TARGET:12AM (midnigh          BG/Food Log:               Assessment:  CDE that Maricarmen was scheduled with is out ill today. Maricarmen was sent a [employer] message but had not read it. I called to let her know, and that someone will call to reschedule her. She denies immediate needs today and will wait until later in the week for appointment to address log.    Plan/Response:  Reschedule appointment.    Liseth Taveras RD, LD, CDE      Any diabetes medication dose changes were made via the CDE Protocol and Collaborative Practice Agreement with the patient's referring provider.

## 2019-04-18 ENCOUNTER — HOSPITAL ENCOUNTER (OUTPATIENT)
Facility: CLINIC | Age: 59
Setting detail: OBSERVATION
Discharge: HOME | End: 2019-04-19
Attending: EMERGENCY MEDICINE | Admitting: INTERNAL MEDICINE
Payer: COMMERCIAL

## 2019-04-18 ENCOUNTER — TRANSFERRED RECORDS (OUTPATIENT)
Dept: HEALTH INFORMATION MANAGEMENT | Facility: CLINIC | Age: 59
End: 2019-04-18

## 2019-04-18 ENCOUNTER — APPOINTMENT (OUTPATIENT)
Dept: GENERAL RADIOLOGY | Facility: CLINIC | Age: 59
End: 2019-04-18
Attending: EMERGENCY MEDICINE
Payer: COMMERCIAL

## 2019-04-18 DIAGNOSIS — R06.02 SHORTNESS OF BREATH: ICD-10-CM

## 2019-04-18 DIAGNOSIS — R07.9 ACUTE CHEST PAIN: ICD-10-CM

## 2019-04-18 DIAGNOSIS — I16.0 HYPERTENSIVE URGENCY: ICD-10-CM

## 2019-04-18 DIAGNOSIS — I10 HYPERTENSION, UNSPECIFIED TYPE: Primary | ICD-10-CM

## 2019-04-18 LAB
ANION GAP SERPL CALCULATED.3IONS-SCNC: 6 MMOL/L (ref 3–14)
BASOPHILS # BLD AUTO: 0.1 10E9/L (ref 0–0.2)
BASOPHILS NFR BLD AUTO: 0.4 %
BUN SERPL-MCNC: 18 MG/DL (ref 7–30)
CALCIUM SERPL-MCNC: 9.7 MG/DL (ref 8.5–10.1)
CHLORIDE SERPL-SCNC: 106 MMOL/L (ref 94–109)
CO2 SERPL-SCNC: 28 MMOL/L (ref 20–32)
CREAT SERPL-MCNC: 0.84 MG/DL (ref 0.52–1.04)
DIFFERENTIAL METHOD BLD: ABNORMAL
EOSINOPHIL # BLD AUTO: 0.2 10E9/L (ref 0–0.7)
EOSINOPHIL NFR BLD AUTO: 1.3 %
ERYTHROCYTE [DISTWIDTH] IN BLOOD BY AUTOMATED COUNT: 12.5 % (ref 10–15)
GFR SERPL CREATININE-BSD FRML MDRD: 77 ML/MIN/{1.73_M2}
GLUCOSE BLDC GLUCOMTR-MCNC: 229 MG/DL (ref 70–99)
GLUCOSE SERPL-MCNC: 198 MG/DL (ref 70–99)
HCT VFR BLD AUTO: 44.1 % (ref 35–47)
HGB BLD-MCNC: 14.9 G/DL (ref 11.7–15.7)
IMM GRANULOCYTES # BLD: 0.1 10E9/L (ref 0–0.4)
IMM GRANULOCYTES NFR BLD: 0.6 %
LYMPHOCYTES # BLD AUTO: 3.1 10E9/L (ref 0.8–5.3)
LYMPHOCYTES NFR BLD AUTO: 24.2 %
MCH RBC QN AUTO: 29.6 PG (ref 26.5–33)
MCHC RBC AUTO-ENTMCNC: 33.8 G/DL (ref 31.5–36.5)
MCV RBC AUTO: 88 FL (ref 78–100)
MONOCYTES # BLD AUTO: 0.6 10E9/L (ref 0–1.3)
MONOCYTES NFR BLD AUTO: 4.3 %
NEUTROPHILS # BLD AUTO: 9 10E9/L (ref 1.6–8.3)
NEUTROPHILS NFR BLD AUTO: 69.2 %
NRBC # BLD AUTO: 0 10*3/UL
NRBC BLD AUTO-RTO: 0 /100
NT-PROBNP SERPL-MCNC: 50 PG/ML (ref 0–900)
PLATELET # BLD AUTO: 314 10E9/L (ref 150–450)
POTASSIUM SERPL-SCNC: 3.6 MMOL/L (ref 3.4–5.3)
RBC # BLD AUTO: 5.03 10E12/L (ref 3.8–5.2)
SODIUM SERPL-SCNC: 140 MMOL/L (ref 133–144)
TROPONIN I SERPL-MCNC: <0.015 UG/L (ref 0–0.04)
TROPONIN I SERPL-MCNC: <0.015 UG/L (ref 0–0.04)
WBC # BLD AUTO: 13 10E9/L (ref 4–11)

## 2019-04-18 PROCEDURE — 83880 ASSAY OF NATRIURETIC PEPTIDE: CPT | Performed by: EMERGENCY MEDICINE

## 2019-04-18 PROCEDURE — 25000128 H RX IP 250 OP 636

## 2019-04-18 PROCEDURE — 25000132 ZZH RX MED GY IP 250 OP 250 PS 637: Performed by: PHYSICIAN ASSISTANT

## 2019-04-18 PROCEDURE — 99220 ZZC INITIAL OBSERVATION CARE,LEVL III: CPT | Performed by: PHYSICIAN ASSISTANT

## 2019-04-18 PROCEDURE — 83036 HEMOGLOBIN GLYCOSYLATED A1C: CPT | Performed by: EMERGENCY MEDICINE

## 2019-04-18 PROCEDURE — 84484 ASSAY OF TROPONIN QUANT: CPT | Performed by: PHYSICIAN ASSISTANT

## 2019-04-18 PROCEDURE — 25000132 ZZH RX MED GY IP 250 OP 250 PS 637: Performed by: EMERGENCY MEDICINE

## 2019-04-18 PROCEDURE — 71046 X-RAY EXAM CHEST 2 VIEWS: CPT

## 2019-04-18 PROCEDURE — 96374 THER/PROPH/DIAG INJ IV PUSH: CPT

## 2019-04-18 PROCEDURE — 36415 COLL VENOUS BLD VENIPUNCTURE: CPT | Performed by: PHYSICIAN ASSISTANT

## 2019-04-18 PROCEDURE — G0378 HOSPITAL OBSERVATION PER HR: HCPCS

## 2019-04-18 PROCEDURE — 84484 ASSAY OF TROPONIN QUANT: CPT | Performed by: EMERGENCY MEDICINE

## 2019-04-18 PROCEDURE — 85025 COMPLETE CBC W/AUTO DIFF WBC: CPT | Performed by: EMERGENCY MEDICINE

## 2019-04-18 PROCEDURE — 96375 TX/PRO/DX INJ NEW DRUG ADDON: CPT

## 2019-04-18 PROCEDURE — 80048 BASIC METABOLIC PNL TOTAL CA: CPT | Performed by: EMERGENCY MEDICINE

## 2019-04-18 PROCEDURE — 25000128 H RX IP 250 OP 636: Performed by: EMERGENCY MEDICINE

## 2019-04-18 PROCEDURE — 00000146 ZZHCL STATISTIC GLUCOSE BY METER IP

## 2019-04-18 PROCEDURE — 93005 ELECTROCARDIOGRAM TRACING: CPT

## 2019-04-18 PROCEDURE — 99285 EMERGENCY DEPT VISIT HI MDM: CPT | Mod: 25

## 2019-04-18 RX ORDER — NICOTINE POLACRILEX 4 MG
15-30 LOZENGE BUCCAL
Status: DISCONTINUED | OUTPATIENT
Start: 2019-04-18 | End: 2019-04-19

## 2019-04-18 RX ORDER — HYDRALAZINE HYDROCHLORIDE 20 MG/ML
10 INJECTION INTRAMUSCULAR; INTRAVENOUS EVERY 4 HOURS PRN
Status: DISCONTINUED | OUTPATIENT
Start: 2019-04-18 | End: 2019-04-19 | Stop reason: HOSPADM

## 2019-04-18 RX ORDER — ONDANSETRON 2 MG/ML
4 INJECTION INTRAMUSCULAR; INTRAVENOUS ONCE
Status: COMPLETED | OUTPATIENT
Start: 2019-04-18 | End: 2019-04-18

## 2019-04-18 RX ORDER — POLYETHYLENE GLYCOL 3350 17 G/17G
17 POWDER, FOR SOLUTION ORAL AT BEDTIME
Status: DISCONTINUED | OUTPATIENT
Start: 2019-04-18 | End: 2019-04-19 | Stop reason: HOSPADM

## 2019-04-18 RX ORDER — NITROGLYCERIN 0.4 MG/1
0.4 TABLET SUBLINGUAL EVERY 5 MIN PRN
Status: DISCONTINUED | OUTPATIENT
Start: 2019-04-18 | End: 2019-04-19 | Stop reason: HOSPADM

## 2019-04-18 RX ORDER — LISINOPRIL 10 MG/1
10 TABLET ORAL ONCE
Status: DISCONTINUED | OUTPATIENT
Start: 2019-04-18 | End: 2019-04-18

## 2019-04-18 RX ORDER — FUROSEMIDE 10 MG/ML
20 INJECTION INTRAMUSCULAR; INTRAVENOUS ONCE
Status: COMPLETED | OUTPATIENT
Start: 2019-04-18 | End: 2019-04-18

## 2019-04-18 RX ORDER — ALUMINA, MAGNESIA, AND SIMETHICONE 2400; 2400; 240 MG/30ML; MG/30ML; MG/30ML
30 SUSPENSION ORAL EVERY 4 HOURS PRN
Status: DISCONTINUED | OUTPATIENT
Start: 2019-04-18 | End: 2019-04-19 | Stop reason: HOSPADM

## 2019-04-18 RX ORDER — LOSARTAN POTASSIUM 25 MG/1
25 TABLET ORAL DAILY
Status: DISCONTINUED | OUTPATIENT
Start: 2019-04-19 | End: 2019-04-19 | Stop reason: HOSPADM

## 2019-04-18 RX ORDER — ACETAMINOPHEN 325 MG/1
650 TABLET ORAL EVERY 4 HOURS PRN
Status: DISCONTINUED | OUTPATIENT
Start: 2019-04-18 | End: 2019-04-19 | Stop reason: HOSPADM

## 2019-04-18 RX ORDER — ONDANSETRON 2 MG/ML
INJECTION INTRAMUSCULAR; INTRAVENOUS
Status: COMPLETED
Start: 2019-04-18 | End: 2019-04-18

## 2019-04-18 RX ORDER — NALOXONE HYDROCHLORIDE 0.4 MG/ML
.1-.4 INJECTION, SOLUTION INTRAMUSCULAR; INTRAVENOUS; SUBCUTANEOUS
Status: DISCONTINUED | OUTPATIENT
Start: 2019-04-18 | End: 2019-04-19 | Stop reason: HOSPADM

## 2019-04-18 RX ORDER — TRAMADOL HYDROCHLORIDE 50 MG/1
100 TABLET ORAL AT BEDTIME
Status: DISCONTINUED | OUTPATIENT
Start: 2019-04-18 | End: 2019-04-19 | Stop reason: HOSPADM

## 2019-04-18 RX ORDER — ASPIRIN 81 MG/1
81 TABLET ORAL DAILY
Status: DISCONTINUED | OUTPATIENT
Start: 2019-04-19 | End: 2019-04-19 | Stop reason: HOSPADM

## 2019-04-18 RX ORDER — POLYETHYLENE GLYCOL 3350 17 G/17G
17 POWDER, FOR SOLUTION ORAL AT BEDTIME
COMMUNITY

## 2019-04-18 RX ORDER — DEXTROSE MONOHYDRATE 25 G/50ML
25-50 INJECTION, SOLUTION INTRAVENOUS
Status: DISCONTINUED | OUTPATIENT
Start: 2019-04-18 | End: 2019-04-19 | Stop reason: HOSPADM

## 2019-04-18 RX ORDER — AMLODIPINE BESYLATE 5 MG/1
5 TABLET ORAL ONCE
Status: COMPLETED | OUTPATIENT
Start: 2019-04-18 | End: 2019-04-18

## 2019-04-18 RX ORDER — DEXTROSE MONOHYDRATE 25 G/50ML
25-50 INJECTION, SOLUTION INTRAVENOUS
Status: DISCONTINUED | OUTPATIENT
Start: 2019-04-18 | End: 2019-04-19

## 2019-04-18 RX ORDER — ASPIRIN 81 MG/1
162 TABLET, CHEWABLE ORAL ONCE
Status: DISCONTINUED | OUTPATIENT
Start: 2019-04-18 | End: 2019-04-19 | Stop reason: HOSPADM

## 2019-04-18 RX ORDER — MELOXICAM 15 MG/1
15 TABLET ORAL AT BEDTIME
Status: DISCONTINUED | OUTPATIENT
Start: 2019-04-18 | End: 2019-04-19 | Stop reason: HOSPADM

## 2019-04-18 RX ORDER — ACETAMINOPHEN 650 MG/1
650 SUPPOSITORY RECTAL EVERY 4 HOURS PRN
Status: DISCONTINUED | OUTPATIENT
Start: 2019-04-18 | End: 2019-04-19 | Stop reason: HOSPADM

## 2019-04-18 RX ORDER — LISINOPRIL 10 MG/1
10 TABLET ORAL DAILY
Status: DISCONTINUED | OUTPATIENT
Start: 2019-04-18 | End: 2019-04-18

## 2019-04-18 RX ORDER — NICOTINE POLACRILEX 4 MG
15-30 LOZENGE BUCCAL
Status: DISCONTINUED | OUTPATIENT
Start: 2019-04-18 | End: 2019-04-19 | Stop reason: HOSPADM

## 2019-04-18 RX ORDER — HYDRALAZINE HYDROCHLORIDE 20 MG/ML
10 INJECTION INTRAMUSCULAR; INTRAVENOUS ONCE
Status: COMPLETED | OUTPATIENT
Start: 2019-04-18 | End: 2019-04-18

## 2019-04-18 RX ORDER — LIDOCAINE 40 MG/G
CREAM TOPICAL
Status: DISCONTINUED | OUTPATIENT
Start: 2019-04-18 | End: 2019-04-19 | Stop reason: HOSPADM

## 2019-04-18 RX ADMIN — ONDANSETRON 4 MG: 2 INJECTION INTRAMUSCULAR; INTRAVENOUS at 15:55

## 2019-04-18 RX ADMIN — AMLODIPINE BESYLATE 5 MG: 5 TABLET ORAL at 18:43

## 2019-04-18 RX ADMIN — TRAMADOL HYDROCHLORIDE 100 MG: 50 TABLET, COATED ORAL at 21:41

## 2019-04-18 RX ADMIN — FUROSEMIDE 20 MG: 10 INJECTION, SOLUTION INTRAVENOUS at 17:09

## 2019-04-18 RX ADMIN — POLYETHYLENE GLYCOL 3350 17 G: 17 POWDER, FOR SOLUTION ORAL at 21:42

## 2019-04-18 RX ADMIN — HYDRALAZINE HYDROCHLORIDE 10 MG: 20 INJECTION INTRAMUSCULAR; INTRAVENOUS at 15:41

## 2019-04-18 RX ADMIN — LISINOPRIL 10 MG: 10 TABLET ORAL at 18:42

## 2019-04-18 ASSESSMENT — ENCOUNTER SYMPTOMS
NAUSEA: 1
SHORTNESS OF BREATH: 1
VOMITING: 0

## 2019-04-18 ASSESSMENT — MIFFLIN-ST. JEOR: SCORE: 1477.19

## 2019-04-18 NOTE — ED NOTES
Pt repeatedly complaining about her blood pressure cuff squeezing her. Informed pt that her BP is dangerously high and I really need the cuff to be on. Assured pt that the hydralazine will likely reduce her BP and it will stop hurting so bad as it goes down.

## 2019-04-18 NOTE — ED NOTES
[facility]  ED Nurse Handoff Report    Maricarmen Dotson is a 58 year old female   ED Chief complaint: Chest Pain  . ED Diagnosis:   Final diagnoses:   Hypertensive urgency   Acute chest pain   Shortness of breath     Allergies:   Allergies   Allergen Reactions     Amoxicillin Anaphylaxis     Bee Anaphylaxis     Bee sting       Iodine Anaphylaxis     Severe anaphalatic shock       Sulfa Drugs Anaphylaxis     Tetanus-Diphtheria Toxoids      Rxn was to Tdap-whole body joint pain and fever.  Had similar reaction to Td vaccine 7/28/2017     Metoprolol Other (See Comments)     Fatigue, joint pain, throat tightness     Zithromax [Azithromycin Dihydrate] Nausea and Vomiting     Contrast Dye      Cyproheptadine      Severe headache, cardiac issues, and dizziness     Humalog [Insulin Lispro]      Causes pancreatitis -      Latex      Skin burn and can't breathe       Metformin      Onglyza [Saxagliptin Hydrochloride]      Fibromyalgia flare     Prochlorperazine      Altered mental status, hallucinations     Sumatriptan      Causes severe depression     Tetracycline Nausea and Vomiting, Hives and Difficulty breathing     Pt called to update today after the visit that she is allergic to the tetracycline-added to her list     Sulindac Anxiety     Panic attacks       Code Status: Full Code  Activity level - Baseline/Home:  Independent. Activity Level - Current:   Stand with Assist. Lift room needed: No. Bariatric: No   Needed: No   Isolation: No. Infection: Not Applicable.     Vital Signs:   Vitals:    04/18/19 1600 04/18/19 1615 04/18/19 1630 04/18/19 1715   BP: 174/77 183/81 171/85 184/87   Pulse: 75 73 78 88   Resp:  19  13   Temp:       TempSrc:       SpO2:  97%     Weight:       Height:           Cardiac Rhythm:  ,   Cardiac  Cardiac Rhythm: Normal sinus rhythm  Pain level:    Patient confused: No. Patient Falls Risk: Yes.   Elimination Status: Has voided   Patient Report - Initial Complaint:     "Intermittent center chest pain for 3 days. Describes it as a \"burning\" and says nothing makes it better or worse. Says pain is worse after exercising. Associated nausea. ABCs intact.     . Focused Assessment: Cardiac - Cardiac WDL: -WDL except   Chest Pain Assessment - Chest Pain Location: midsternal  Precipitating Factors: unknown  Associated Signs/Symptoms: anxiety; nausea   Cardiac Monitoring - EKG Monitoring: Yes  Cardiac Regularity: Regular  Cardiac Rhythm: NSR    Tests Performed: labs, xray, EKG. Abnormal Results:   Labs Ordered and Resulted from Time of ED Arrival Up to the Time of Departure from the ED   CBC WITH PLATELETS DIFFERENTIAL - Abnormal; Notable for the following components:       Result Value    WBC 13.0 (*)     Absolute Neutrophil 9.0 (*)     All other components within normal limits   BASIC METABOLIC PANEL - Abnormal; Notable for the following components:    Glucose 198 (*)     All other components within normal limits   NT PROBNP INPATIENT   TROPONIN I   PERIPHERAL IV CATHETER   CARDIAC CONTINUOUS MONITORING     XR Chest 2 Views   Preliminary Result   IMPRESSION: Normal. No change.        .   Treatments provided: BP management  Family Comments:  at bedside  OBS brochure/video discussed/provided to patient:  Yes  ED Medications:   Medications   lisinopril (PRINIVIL/ZESTRIL) tablet 10 mg (has no administration in time range)   hydrALAZINE (APRESOLINE) injection 10 mg (10 mg Intravenous Given 4/18/19 1541)   ondansetron (ZOFRAN) injection 4 mg (4 mg Intravenous Given 4/18/19 1555)   furosemide (LASIX) injection 20 mg (20 mg Intravenous Given 4/18/19 1709)     Drips infusing:  No  For the majority of the shift, the patient's behavior Green. Interventions performed were none.     Severe Sepsis OR Septic Shock Diagnosis Present: No      ED Nurse Name/Phone Number: Mirian Marcano,   5:20 PM    RECEIVING UNIT ED HANDOFF REVIEW    Above ED Nurse Handoff Report was reviewed: Yes  Reviewed by: " Lorraine Chavarria on April 18, 2019 at 5:51 PM

## 2019-04-18 NOTE — ED PROVIDER NOTES
History     Chief Complaint:  Chest Pain    HPI   Maricarmen Dotson is a 58 year old diabetic female with history of hypertension who presents to the emergency department for evaluation of chest pain. Patient has had 3 days of intermittent chest pain with some noted nausea no vomiting. Also she has been feeling disproportionately winded/short of breath after her couple mile walks and notes her legs develop cramping pain at the end of her walks. She notes that she stopped using her metoprolol a few months ago and tried to manage it holistically. She has bilateral leg swelling. Patient denies recent cough or cold symptoms. Her last A1C was 8. Patient notes that she was in a trial for blood pressure medication that caused her to have abdominal pain.      Allergies:  Amoxicillin  Bee  Iodine  Sulfa Drugs  Tetanus-Diphtheria Toxoids  Metoprolol  Zithromax [Azithromycin Dihydrate]  Contrast Dye  Cyproheptadine  Humalog [Insulin Lispro]  Latex  Metformin  Onglyza [Saxagliptin Hydrochloride]  Prochlorperazine  Sumatriptan  Tetracycline  Sulindac     Medications:    diclofenac (VOLTAREN) 1 % GEL topical gel  EPINEPHrine (EPIPEN) 0.3 MG/0.3ML injection  glucagon (GLUCAGON EMERGENCY) 1 MG kit  insulin aspart (NOVOLOG VIAL) 100 UNITS/ML vial  meloxicam (MOBIC) 15 MG tablet  traMADol (ULTRAM) 50 MG tablet  Wheat Dextrin (BENEFIBER DRINK MIX PO)    Past Medical History:    Ascending aorta enlargement    Depressive disorder   Diabetes mellitus, type 2    Diverticulosis of colon (without mention of hemorrhage)   Fibromyalgia  Insomnia   Irritable bowel syndrome    Past Surgical History:    Lumbar surgery  Cholecystectomy  Bilateral salpingo oophorectomy      Family History:    Mother- diabetes, hypertension, CV disease, ovarian cancer, cervical cancer  Father- type 2 diabetes, hypertension, colon polyps  Sister- breast cancer, ovarian cancer, breast cancer  Maternal grandmother- ovarian cancer, servical cancre    Social  "History:  Marital Status:   [2]  Social History     Tobacco Use     Smoking status: Never Smoker     Smokeless tobacco: Never Used   Substance Use Topics     Alcohol use: Yes     Alcohol/week: 0.0 oz     Comment: maybe once a month     Drug use: No        Review of Systems   Respiratory: Positive for shortness of breath.    Cardiovascular: Positive for chest pain and leg swelling.   Gastrointestinal: Positive for nausea. Negative for vomiting.   All other systems reviewed and are negative.        Physical Exam     Patient Vitals for the past 24 hrs:   BP Temp Temp src Pulse Heart Rate Resp SpO2 Height Weight   04/18/19 1600 174/77 -- -- 75 -- -- -- -- --   04/18/19 1550 -- -- -- -- 75 14 96 % -- --   04/18/19 1545 (!) 204/95 -- -- 76 74 -- -- -- --   04/18/19 1541 (!) 217/82 -- -- -- -- -- -- -- --   04/18/19 1522 (!) 219/123 -- -- -- -- -- -- -- --   04/18/19 1517 -- 98.6  F (37  C) Oral 72 72 -- 95 % 1.626 m (5' 4\") 90.7 kg (200 lb)        Physical Exam  General: Patient is alert and interactive when I enter the room  Head:  The scalp, face, and head appear normal  Eyes:  The pupils are equal, round, and reactive to light    Conjunctivae and sclerae are normal  ENT:    External acoustic canals are normal    The oropharynx is normal without erythema.     Uvula is in the midline  Neck:  Normal range of motion  CV:  Regular rate. S1/S2. No murmurs.   Resp:  Lungs are clear without wheezes or rales. No distress  GI:  Abdomen is soft, no rigidity, guarding, or rebound    No distension. No tenderness to palpation in any quadrant.     MS:  Normal tone. Joints grossly normal without effusions.     No asymmetric leg swelling, calf or thigh tenderness.      Normal motor assessment of all extremities.  Skin:  No rash or lesions noted. Normal capillary refill noted  Neuro: Speech is normal and fluent. Face is symmetric.     Moving all extremities well. CN's II-XII intact. 5/5 UE and LE strength.   Psych:  Awake. Alert. "  Normal affect.  Appropriate interactions.  Lymph: No anterior cervical lymphadenopathy noted    Emergency Department Course     ECG:  ECG taken at 1518, ECG read at 1528  Normal sinus rhythm  Moderate voltage criteria for LCH, may be normal variant  T wave abnormality, consider lateral ischemia   Inverted T wave worse since 6/28/18 otherwise unchanged  Abnormal ECG  Rate 73 bpm. MI interval 162 ms. QRS duration 94 ms. QT/QTc 412/453 ms. P-R-T axes 38 -12 -19.    Imaging:  Radiology findings were communicated with the patient who voiced understanding of the findings.    XR Chest 2 Views  Preliminary Result  IMPRESSION: Normal. No change.  Readings are preliminary at time of note     Laboratory:  Laboratory findings were communicated with the patient who voiced understanding of the findings.    CBC: WBC 13 (H), HGB 14.9,   BMP: glucose 198 (H), o/w WNL (Creatinine 0.84)  BNP: 50  Troponin (Collected 1533): <0.015     Interventions:  Hydralazine 10 mg IV  Zofran 4 mg IV  Lasix 20 mg IV     Emergency Department Course:  Nursing notes and vitals reviewed.  I performed an exam of the patient as documented above.   I discussed the treatment plan with the patient. They expressed understanding of this plan and consented to admission. I discussed the patient with Dr. Alegria, who will admit the patient to a monitored bed for further evaluation and treatment.     I personally reviewed the imaging and lab results with the Patient and answered all related questions prior to admission for observation.  Impression & Plan      Medical Decision Making:  Maricarmen Dotson is a 58 year old female who presents for evaluation of elevated blood pressure.  There is a history of hypertension in the past.  The workup here is negative and the patient does not have any clinical, laboratory, ecg or historical signs of end-organ dysfunction. Likely hypertensive urgency with markedly elevated pressures. Shortness of breath at the end of  exertion of uncertain significance but lack during exercise makes ACS less likely. Still would stress her as an outpatient or inpatient and do serial trops.     Diagnosis:    ICD-10-CM    1. Hypertensive urgency I16.0    2. Acute chest pain R07.9    3. Shortness of breath R06.02      Disposition:   Admission for observation    Scribe Disclosure:  I, Jules Naikabel, am serving as a scribe at 5:05 PM on 4/18/2019 to document services personally performed by Kemal Garduno MD, based on my observations and the provider's statements to me.   [facility] EMERGENCY DEPARTMENT       Kemal Garduno MD  04/18/19 1531

## 2019-04-18 NOTE — ED NOTES
"Pt refused lisinopril saying \"the last time I took that I had to crawl everywhere because I couldn't use my hips or my knees or my ankles.\" MD Garduno notified  "

## 2019-04-18 NOTE — ED TRIAGE NOTES
"Intermittent center chest pain for 3 days. Describes it as a \"burning\" and says nothing makes it better or worse. Says pain is worse after exercising. ABCs intact.  "

## 2019-04-18 NOTE — PROGRESS NOTES
ROOM # 232    Living Situation (if not independent, order SW consult):  Facility name:[facility]  :     Activity level at baseline: ind  Activity level on admit: ind      Patient registered to observation; given Patient Bill of Rights; given the opportunity to ask questions about observation status and their plan of care.  Patient has been oriented to the observation room, bathroom and call light is in place.    Discussed discharge goals and expectations with patient/family.

## 2019-04-19 ENCOUNTER — APPOINTMENT (OUTPATIENT)
Dept: NUCLEAR MEDICINE | Facility: CLINIC | Age: 59
End: 2019-04-19
Attending: PHYSICIAN ASSISTANT
Payer: COMMERCIAL

## 2019-04-19 ENCOUNTER — APPOINTMENT (OUTPATIENT)
Dept: CARDIOLOGY | Facility: CLINIC | Age: 59
End: 2019-04-19
Attending: PHYSICIAN ASSISTANT
Payer: COMMERCIAL

## 2019-04-19 ENCOUNTER — APPOINTMENT (OUTPATIENT)
Dept: MRI IMAGING | Facility: CLINIC | Age: 59
End: 2019-04-19
Attending: INTERNAL MEDICINE
Payer: COMMERCIAL

## 2019-04-19 VITALS
DIASTOLIC BLOOD PRESSURE: 73 MMHG | RESPIRATION RATE: 16 BRPM | TEMPERATURE: 98.8 F | HEIGHT: 64 IN | WEIGHT: 200 LBS | BODY MASS INDEX: 34.15 KG/M2 | SYSTOLIC BLOOD PRESSURE: 116 MMHG | HEART RATE: 83 BPM | OXYGEN SATURATION: 94 %

## 2019-04-19 PROBLEM — I16.0 HYPERTENSIVE URGENCY: Status: ACTIVE | Noted: 2019-04-19

## 2019-04-19 LAB
GLUCOSE BLDC GLUCOMTR-MCNC: 136 MG/DL (ref 70–99)
GLUCOSE BLDC GLUCOMTR-MCNC: 148 MG/DL (ref 70–99)
HBA1C MFR BLD: 8.5 % (ref 0–5.6)
INTERPRETATION ECG - MUSE: NORMAL
TROPONIN I SERPL-MCNC: <0.015 UG/L (ref 0–0.04)

## 2019-04-19 PROCEDURE — 99207 ZZC NO CHARGE LOS: CPT

## 2019-04-19 PROCEDURE — 25800025 ZZH RX 258: Performed by: INTERNAL MEDICINE

## 2019-04-19 PROCEDURE — 36415 COLL VENOUS BLD VENIPUNCTURE: CPT | Performed by: PHYSICIAN ASSISTANT

## 2019-04-19 PROCEDURE — 93017 CV STRESS TEST TRACING ONLY: CPT

## 2019-04-19 PROCEDURE — 25000131 ZZH RX MED GY IP 250 OP 636 PS 637: Performed by: PHYSICIAN ASSISTANT

## 2019-04-19 PROCEDURE — 93018 CV STRESS TEST I&R ONLY: CPT | Performed by: INTERNAL MEDICINE

## 2019-04-19 PROCEDURE — 70553 MRI BRAIN STEM W/O & W/DYE: CPT

## 2019-04-19 PROCEDURE — 99217 ZZC OBSERVATION CARE DISCHARGE: CPT | Performed by: INTERNAL MEDICINE

## 2019-04-19 PROCEDURE — 78452 HT MUSCLE IMAGE SPECT MULT: CPT | Mod: 26 | Performed by: INTERNAL MEDICINE

## 2019-04-19 PROCEDURE — 00000146 ZZHCL STATISTIC GLUCOSE BY METER IP

## 2019-04-19 PROCEDURE — 93016 CV STRESS TEST SUPVJ ONLY: CPT | Performed by: INTERNAL MEDICINE

## 2019-04-19 PROCEDURE — 93306 TTE W/DOPPLER COMPLETE: CPT | Mod: 26 | Performed by: INTERNAL MEDICINE

## 2019-04-19 PROCEDURE — 25500064 ZZH RX 255 OP 636: Performed by: INTERNAL MEDICINE

## 2019-04-19 PROCEDURE — 40000264 ECHOCARDIOGRAM COMPLETE

## 2019-04-19 PROCEDURE — 78452 HT MUSCLE IMAGE SPECT MULT: CPT

## 2019-04-19 PROCEDURE — 84484 ASSAY OF TROPONIN QUANT: CPT | Performed by: PHYSICIAN ASSISTANT

## 2019-04-19 PROCEDURE — 34300033 ZZH RX 343: Performed by: INTERNAL MEDICINE

## 2019-04-19 PROCEDURE — 25000132 ZZH RX MED GY IP 250 OP 250 PS 637: Performed by: INTERNAL MEDICINE

## 2019-04-19 PROCEDURE — 25000128 H RX IP 250 OP 636

## 2019-04-19 PROCEDURE — G0378 HOSPITAL OBSERVATION PER HR: HCPCS

## 2019-04-19 PROCEDURE — A9502 TC99M TETROFOSMIN: HCPCS | Performed by: INTERNAL MEDICINE

## 2019-04-19 PROCEDURE — 25000132 ZZH RX MED GY IP 250 OP 250 PS 637: Performed by: PHYSICIAN ASSISTANT

## 2019-04-19 PROCEDURE — A9585 GADOBUTROL INJECTION: HCPCS | Performed by: INTERNAL MEDICINE

## 2019-04-19 RX ORDER — LORAZEPAM 0.5 MG/1
.5-1 TABLET ORAL ONCE
Status: COMPLETED | OUTPATIENT
Start: 2019-04-19 | End: 2019-04-19

## 2019-04-19 RX ORDER — ACYCLOVIR 200 MG/1
30 CAPSULE ORAL ONCE
Status: COMPLETED | OUTPATIENT
Start: 2019-04-19 | End: 2019-04-19

## 2019-04-19 RX ORDER — ONDANSETRON 2 MG/ML
4 INJECTION INTRAMUSCULAR; INTRAVENOUS EVERY 6 HOURS PRN
Status: DISCONTINUED | OUTPATIENT
Start: 2019-04-19 | End: 2019-04-19 | Stop reason: HOSPADM

## 2019-04-19 RX ORDER — REGADENOSON 0.08 MG/ML
INJECTION, SOLUTION INTRAVENOUS
Status: COMPLETED
Start: 2019-04-19 | End: 2019-04-19

## 2019-04-19 RX ORDER — LOSARTAN POTASSIUM 25 MG/1
25 TABLET ORAL DAILY
Qty: 30 TABLET | Refills: 3 | Status: SHIPPED | OUTPATIENT
Start: 2019-04-20 | End: 2019-04-29

## 2019-04-19 RX ORDER — ONDANSETRON 4 MG/1
4 TABLET, ORALLY DISINTEGRATING ORAL EVERY 6 HOURS PRN
Status: DISCONTINUED | OUTPATIENT
Start: 2019-04-19 | End: 2019-04-19 | Stop reason: HOSPADM

## 2019-04-19 RX ORDER — GADOBUTROL 604.72 MG/ML
10 INJECTION INTRAVENOUS ONCE
Status: COMPLETED | OUTPATIENT
Start: 2019-04-19 | End: 2019-04-19

## 2019-04-19 RX ADMIN — GADOBUTROL 9 ML: 604.72 INJECTION INTRAVENOUS at 14:49

## 2019-04-19 RX ADMIN — REGADENOSON 0.4 MG: 0.08 INJECTION, SOLUTION INTRAVENOUS at 12:32

## 2019-04-19 RX ADMIN — TETROFOSMIN 11 MCI.: 1.38 INJECTION, POWDER, LYOPHILIZED, FOR SOLUTION INTRAVENOUS at 10:21

## 2019-04-19 RX ADMIN — ONDANSETRON 4 MG: 4 TABLET, ORALLY DISINTEGRATING ORAL at 01:04

## 2019-04-19 RX ADMIN — LOSARTAN POTASSIUM 25 MG: 25 TABLET, FILM COATED ORAL at 09:41

## 2019-04-19 RX ADMIN — TETROFOSMIN 30.7 MCI.: 1.38 INJECTION, POWDER, LYOPHILIZED, FOR SOLUTION INTRAVENOUS at 12:34

## 2019-04-19 RX ADMIN — SODIUM CHLORIDE 30 ML: 9 INJECTION, SOLUTION INTRAMUSCULAR; INTRAVENOUS; SUBCUTANEOUS at 08:29

## 2019-04-19 RX ADMIN — LORAZEPAM 1 MG: 0.5 TABLET ORAL at 14:04

## 2019-04-19 RX ADMIN — ONDANSETRON 4 MG: 4 TABLET, ORALLY DISINTEGRATING ORAL at 09:42

## 2019-04-19 NOTE — PROGRESS NOTES
PRIMARY DIAGNOSIS: Hypertension  OUTPATIENT/OBSERVATION GOALS TO BE MET BEFORE DISCHARGE:  1. ADLs back to baseline: No    2. Activity and level of assistance: Ind    3. Pain status: Improved with use of alternative comfort measures i.e.: ice    4. Return to near baseline physical activity: Yes       Discharge Planner Nurse   Safe discharge environment identified: Yes  Barriers to discharge: No       Entered by: Carmen Sousa 04/19/2019 3:17 AM    A&Ox4, VSS. Continues to have chest pressure but has much improved. Denies SOB. C/o nausea, PRN zofran given. Independent in room. Plan for tele monitoring, serial trops, and lexiscan and complete echo in AM. Will continue to monitor.

## 2019-04-19 NOTE — PHARMACY-ADMISSION MEDICATION HISTORY
Admission medication history interview status for this patient is complete. See [facility] admission navigator for allergy information, prior to admission medications and immunization status.     Medication history interview source(s):Patient  Medication history resources (including written lists, pill bottles, clinic record):[facility]   Primary pharmacy: [facility]    Changes made to PTA medication list:  Added: Miralaz  Deleted: Benefiber, Progest oil  Changed: Tramadol, Will need to update INSULIN pump settings (pt will provide once able)    Medication reconciliation/reorder completed by provider prior to medication history? No    For patients on insulin therapy: Y (Y/N)  Insulin pump, Novolog  S  Prior to Admission medications    Medication Sig Last Dose Taking? Auth Provider   EPINEPHrine (EPIPEN) 0.3 MG/0.3ML injection Inject 0.3 mLs (0.3 mg) into the muscle once as needed for anaphylaxis  Yes Scarlett Bullard Ra, APRN CNP   meloxicam (MOBIC) 15 MG tablet Take 15 mg by mouth At Bedtime  4/17/2019 at Unknown time Yes Reported, Patient   polyethylene glycol (MIRALAX/GLYCOLAX) packet Take 17 g by mouth At Bedtime 4/17/2019 at Unknown time Yes Unknown, Entered By History   traMADol (ULTRAM) 50 MG tablet Take 100 mg by mouth At Bedtime  4/17/2019 at Unknown time Yes Reported, Patient   acetone, Urine, test STRP 1 strip by In Vitro route daily   Emily Phan APRN CNP   ASPIRIN NOT PRESCRIBED (INTENTIONAL) Please choose reason not prescribed, below   Emily Phan APRN CNP   diclofenac (VOLTAREN) 1 % topical gel Apply topically nightly as needed for moderate pain Apply 4 grams to knees or 2 grams to hands four times daily using enclosed dosing card.   Annamaria Erickson MD   glucagon (GLUCAGON EMERGENCY) 1 MG kit Inject 1 mg into the muscle once for 1 dose   Emily Phan APRN CNP   insulin aspart (NOVOLOG VIAL) 100 UNITS/ML vial To be used in insulin pump. pt does use.  TDD ???  Emily Phan  MARICRUZ Boone CNP   INSULIN PUMP - OUTPATIENT Date last updated: 10/8/18 Omnipod    pt does use, will need to be updated once info in provided  Emily Phan APRN CNP   STATIN NOT PRESCRIBED, INTENTIONAL, Statin not prescribed intentionally due to Intolerance   Anne York MD

## 2019-04-19 NOTE — PHARMACY
Pharmacy: Assistance with insulin pump management  Pharmacist interrogated and updated insulin pump    INSULIN PUMP - OUTPATIENT  Date last updated: 4/19/19 Omnipod   BASAL RATES and times: Pt has several basal programs  REGULAR:   12am: 1.60 --6 AM: 1.45 --12pm: 1.3 -- 6pm: 1.45   HIGH 2:   12am: 1.70 --  6 AM: 1.55 -- 12pm: 1.4 -- 6pm: 1.55   ANXIETY 3:   12am: 1.80 -- 6 AM: 1.65 --12pm: 1.5 --6pm: 1.8  Anxiety 4: 12 am: 1.9--6am: 1.65--6pm: 1.9  AFTERNOON BINGE:   12 AM: 1.8 -- 6 AM: 4.65 --8 PM: 1.8   VERY HIGH 5: 12 AM: 2 --6am:1.8--6pm: 2  CARB RATIO: 12am-12am: 6   Corection Factor (Sensitivity): 12AM (midnight): 23 -- 5 AM: 25  Target Blood glucose: 100-120  Active insulin time: 4 hrs    Plan: Pt is currently using the Very high basal program

## 2019-04-19 NOTE — PLAN OF CARE
Patient's After Visit Summary was reviewed with patient and/or spouse.   Patient verbalized understanding of After Visit Summary, recommended follow up and was given an opportunity to ask questions.   Discharge medications sent home with patient/family: YES, Medication explained.     Discharged with spouse    OBSERVATION patient END time: 4739

## 2019-04-19 NOTE — PROGRESS NOTES
PRIMARY DIAGNOSIS: Hypertension  OUTPATIENT/OBSERVATION GOALS TO BE MET BEFORE DISCHARGE:  1. ADLs back to baseline: No    2. Activity and level of assistance: Ind    3. Pain status: Improved with use of alternative comfort measures i.e.: ice    4. Return to near baseline physical activity: Yes       Discharge Planner Nurse   Safe discharge environment identified: Yes  Barriers to discharge: No       Entered by: Carmen Sousa 04/19/2019 5:44 AM    VSS. Independent in room. Plan for tele monitoring, serial trops, lexiscan and complete echo in AM. Will continue to monitor.       Temp: 98  F (36.7  C) Temp src: Oral BP: 134/70 Pulse: 83 Heart Rate: 66 Resp: 16 SpO2: 95 % O2 Device: None (Room air)

## 2019-04-19 NOTE — PLAN OF CARE
"PRIMARY DIAGNOSIS: CHEST PAIN  OUTPATIENT/OBSERVATION GOALS TO BE MET BEFORE DISCHARGE:  1. Ruled out acute coronary syndrome:  Yes, troponin negative x3  2. Pain Status: rates pain 7/10 for a headache, heart palpitations are \"uncomfortable\" declines intervention   3. Appropriate provocative testing performed: Yes  - Stress Test Procedure: Lexiscan, echo  - Interpretation of cardiac rhythm per telemetry tech: off tele the majority of the day d\t being at a lexiscan and then went to get her MRI done      4. Cleared by Consultants (if applicable):No  5. Return to near baseline physical activity: Yes  Discharge Planner Nurse   Safe discharge environment identified: Yes  Barriers to discharge: No       Entered by: Rosa Elena Cedeno 04/19/2019 3:57 PM  VSS, on RA. Up independently in room. NPO. Complains of 7/10 pain for headache, heart palpitations are uncomfortable, pt stated she is just anxious related to her stay. Pt had an Echo this morning. Pt has an insulin pump and has been managing her own insulin. . Pt has been complaining of being nauseous, Zofran given x1. Pt refused scheduled Aspirin, per pt she gets stomach ulcers when she takes Aspirin. Pt completed her lexiscan and MRI this afternoon, just waiting for results  then possibly discharge home with  if results are negative.       Please review provider order for any additional goals.   Nurse to notify provider when observation goals have been met and patient is ready for discharge.    "

## 2019-04-19 NOTE — DISCHARGE SUMMARY
Discharge Summary    Maricarmen Dotson MRN# [id number]   YOB: 1960 Age: 58 year old     Date of Admission:  4/18/2019  Date of Discharge:  4/19/2019  Admitting Physician:  Maricarmen Mcare MD  Discharge Physician:  Hammad Valles MD  Discharging Service:  Hospitalist     Home clinic: [facility]  Primary Provider: Annamaria Erickson          Discharge Diagnosis:   1. Hypertensive urgency  2. Shortness of breath and dyspnea on exertion, likely due to 1 above  3. Episode of disorientation earlier in week. MRI negative. Consider due to hypertensive encephalopathy  4. History of concussion  5. Type 2 diabetes, managed with insulin pump  6. Fibromyalgia             Discharge Disposition:   Discharged to home           Allergies:   Allergies   Allergen Reactions     Amoxicillin Anaphylaxis     Bee Anaphylaxis     Bee sting       Iodine Anaphylaxis     Severe anaphalatic shock       Sulfa Drugs Anaphylaxis     Tetanus-Diphtheria Toxoids      Rxn was to Tdap-whole body joint pain and fever.  Had similar reaction to Td vaccine 7/28/2017     Metoprolol Other (See Comments)     Fatigue, joint pain, throat tightness     Zithromax [Azithromycin Dihydrate] Nausea and Vomiting     Contrast Dye      Cyproheptadine      Severe headache, cardiac issues, and dizziness     Humalog [Insulin Lispro]      Causes pancreatitis -      Latex      Skin burn and can't breathe       Lisinopril      Joint aches     Metformin      Onglyza [Saxagliptin Hydrochloride]      Fibromyalgia flare     Prochlorperazine      Altered mental status, hallucinations     Sumatriptan      Causes severe depression     Tetracycline Nausea and Vomiting, Hives and Difficulty breathing     Pt called to update today after the visit that she is allergic to the tetracycline-added to her list     Sulindac Anxiety     Panic attacks                Condition on Discharge:   Discharge condition: Stable   Discharge vitals: Blood pressure 126/65, pulse  "83, temperature 97.7  F (36.5  C), temperature source Oral, resp. rate 16, height 1.626 m (5' 4\"), weight 90.7 kg (200 lb), last menstrual period 01/01/1999, SpO2 94 %, not currently breastfeeding.   Code status on discharge: Full Code   Physical exam on day of discharge:   GENERAL:  Comfortable. Cooperative.  PSYCH: pleasant, oriented, No acute distress.  EYES: PERRLA, Normal conjunctiva.  HEART:  Regular rate and rhythm. No JVD. Pulses normal. No edema.  LUNGS:  Clear to auscultation, normal Respiratory effort.  ABDOMEN:  Soft, no hepatosplenomegaly, normal bowel sounds.  EXTREMETIES: No clubbing, cyanosis or ischemia  SKIN:  Dry to touch, No rash.         History of Present Illness and Hospital Course:     See detailed admission note for full details.  Maricarmen Dotson is a 58 year old female with history of DM II managed on an insulin pump, fibromyalgia (chronic cervical, thoracic, and chest pain), history of TBI/concussion, MDD, migraines, anxiety, HLD, IBS, and many adverse reactions to multiple medications. She presented to the ED from Opthalmology clinic on 4/18/19 with hypertension. Her eye exam was unremarkable but her doctor recommended that she come to the ED for evaluation of high blood pressure. In the ED her BP was initially 204/95. Other vital signs were normal. She endorsed symptoms of headache. She also reported recent dyspnea with exertion, myalgias (chronic), and a recent episode 4 days earlier when she got lost driving home in a familiar place.  Her blood pressure was treated with Lisinopril, Hydralazine, and Lasix. She was admitted to observation. She was ruled out for MI with serial troponins. VASQUEZ was evaluated with echocardiogram and Lexiscan stress test. These were both unremarkable. Her episode of disorientation was evaluated with MRI of brain which was normal. Lisinopril was not continued due to history of joint pain with this. She was stared on Losartan 25 mg daily. Her blood pressure was " much improved. Her symptoms of VASQUEZ, headache, and fatigue all improved.  I suspect that her symptoms were related to uncontrolled hypertension. She will discharge home today on Losartan 25 mg daily and follow up with her PCP with a CARLA in 1-2 weeks.              Procedures / Imaging:   Echocardiogram  Lexiscan stress test  MRI of brain  CXR           Consultations:   No consultations were requested during this admission             Pending Results:   None           Discharge Instructions and Follow-Up:   Discharge diet: Diabetic (1800 ADA)   Discharge activity: Activity as tolerated   Discharge follow-up: Follow up with primary care provider in 1-2 weeks with a BMP   Outpatient therapy: None    Other instructions: None             Discharge Medications:   Current Discharge Medication List      START taking these medications    Details   losartan (COZAAR) 25 MG tablet Take 1 tablet (25 mg) by mouth daily  Qty: 30 tablet, Refills: 3    Associated Diagnoses: Hypertension, unspecified type         CONTINUE these medications which have NOT CHANGED    Details   EPINEPHrine (EPIPEN) 0.3 MG/0.3ML injection Inject 0.3 mLs (0.3 mg) into the muscle once as needed for anaphylaxis  Qty: 2 each, Refills: 0    Associated Diagnoses: Allergic reaction      !! Insulin Aspart (INSULIN PUMP - OUTPATIENT)       !! Insulin Aspart (INSULIN PUMP - OUTPATIENT) Inject Subcutaneous continuous INSULIN PUMP - OUTPATIENT  Date last updated: 4/19/2019 Omnipod   BASAL RATES and times:   REGULAR:   12am: 1.60 --6 AM: 1.45 --12pm: 1.3 -- 6pm: 1.45   HIGH 2:   12am: 1.70 --  6 AM: 1.55 -- 12pm: 1.4 -- 6pm: 1.55   ANXIETY 3:   12am: 1.80 -- 6 AM: 1.65 --12pm:1.5--6pm: 1.8  ANXIETY 4:   12am: 1.90 -- 6 AM: 1.65 --6pm: 1.9  AFTERNOON BINGE:   12 AM: 1.8 -- 6 AM: 4.65 --8 PM: 1.8   VERY HIGH 5: 12 AM: 2--6am:1.8--6pm:2   CARB RATIO: 12am-12am: 6   Corection Factor (Sensitivity): 12AM (midnight): 23 -- 5 AM: 25   Target blood glucose: 100-120  Active  insulin time: 4 hrs      meloxicam (MOBIC) 15 MG tablet Take 15 mg by mouth At Bedtime       polyethylene glycol (MIRALAX/GLYCOLAX) packet Take 17 g by mouth At Bedtime      traMADol (ULTRAM) 50 MG tablet Take 100 mg by mouth At Bedtime       acetone, Urine, test STRP 1 strip by In Vitro route daily  Qty: 50 each, Refills: 3    Comments: Urine ketone stix  Associated Diagnoses: Type 2 diabetes mellitus with hyperglycemia, with long-term current use of insulin (H)      ASPIRIN NOT PRESCRIBED (INTENTIONAL) Please choose reason not prescribed, below  Qty: 0 each, Refills: 0    Associated Diagnoses: Type 2 diabetes mellitus with hyperglycemia, with long-term current use of insulin (H)      blood glucose (NO BRAND SPECIFIED) test strip Freestyle test strips (NOT LITE or INSULINX) to be used with Omnipod pump test 6 times daily  Qty: 200 each, Refills: 11    Associated Diagnoses: Type 2 diabetes mellitus with hyperglycemia, with long-term current use of insulin (H)      Continuous Blood Gluc  (FREESTYLE RADHA 14 DAY READER) MARIA C 1 each continuous  Qty: 1 Device, Refills: 0    Associated Diagnoses: Type 2 diabetes mellitus with hyperglycemia, with long-term current use of insulin (H)      !! Continuous Blood Gluc Sensor (FREESTYLE RADHA 14 DAY SENSOR) MISC 1 each every 14 days  Qty: 2 each, Refills: 11    Associated Diagnoses: Type 2 diabetes mellitus with hyperglycemia, with long-term current use of insulin (H)      !! continuous blood glucose monitoring (FREESTYLE RADHA) sensor For use with Freestyle Radha Flash  for continuous monitioring of blood glucose levels. Replace sensor every 10 days.  Qty: 1 each, Refills: 0    Associated Diagnoses: Type 2 diabetes mellitus with hyperglycemia, with long-term current use of insulin (H); Insulin pump in place      !! continuous blood glucose monitoring (FREESTYLE RADHA) sensor For use with Freestyle Radha Flash  for continuous monitioring of blood glucose  levels. Replace sensor every 10 days.  Qty: 3 each, Refills: 11    Associated Diagnoses: Type 2 diabetes mellitus with hyperglycemia, with long-term current use of insulin (H); Insulin pump in place      diclofenac (VOLTAREN) 1 % topical gel Apply topically nightly as needed for moderate pain Apply 4 grams to knees or 2 grams to hands four times daily using enclosed dosing card.  Qty: 100 g, Refills: 1      glucagon (GLUCAGON EMERGENCY) 1 MG kit Inject 1 mg into the muscle once for 1 dose  Qty: 1 mg, Refills: 0    Associated Diagnoses: Type 2 diabetes mellitus with hyperglycemia, with long-term current use of insulin (H)      insulin aspart (NOVOLOG VIAL) 100 UNITS/ML vial To be used in insulin pump.  units  Qty: 9 vial, Refills: 3    Associated Diagnoses: Type 2 diabetes mellitus with hyperglycemia, with long-term current use of insulin (H)      STATIN NOT PRESCRIBED, INTENTIONAL, Statin not prescribed intentionally due to Intolerance  Qty: 0 each, Refills: 0    Associated Diagnoses: Statin intolerance       !! - Potential duplicate medications found. Please discuss with provider.             Total time spent in face to face contact with the patient and coordinating discharge was:  25 Minutes

## 2019-04-19 NOTE — PROGRESS NOTES
Pre-procedure:  Are you having any pain or shortness of breath (prior to starting)? no  Initial vital signs: /80, HR 71, RR 16  Allergies reviewed: reviewed   Rhythm: Sinus  Medications taken within 48 hours of procedure: denies   Any nitrates within the last 48 hours:denies  Last Caffeine: yesterday 0830  Lung sounds: CTA, no wheezing, crackles or rtx  Health History (COPD, Asthma, etc): denies           Procedure: Lexiscan  Reaction/symptoms after receiving Erin injection: Shortness of breath, headache  Intensity of Pain: 8  Rhythm: sinus  1. Vital Signs:/80, HR 99, RR 16  2. Vital Signs:/78, HR 96, RR 18     Reversal agent: N/A    Post:   Resolution of symptoms?: YES, now at 5, headache, tightness in chest and shortness of breath dissipating.   Vital signs: /68, HR 81, RR 16  Rhythm: sinus  Walk: YES  Comment: informed about procedure. Verbalized understanding.  pt tolerated procedure well. Symptoms subsiding.   Return to Radiology

## 2019-04-19 NOTE — PLAN OF CARE
"PRIMARY DIAGNOSIS: CHEST PAIN  OUTPATIENT/OBSERVATION GOALS TO BE MET BEFORE DISCHARGE:  1. Ruled out acute coronary syndrome:  Yes, troponin negative x3  2. Pain Status: rates pain 7/10 for a headache, heart palpitations are \"uncomfortable\" declines intervention   3. Appropriate provocative testing performed: No  - Stress Test Procedure: Lexiscan  - Interpretation of cardiac rhythm per telemetry tech: SB SR ST depression inverted T wave prolonged QT    4. Cleared by Consultants (if applicable):No  5. Return to near baseline physical activity: Yes  Discharge Planner Nurse   Safe discharge environment identified: Yes  Barriers to discharge: Yes       Entered by: Rosa Elena Cedeno 04/19/2019 3:57 PM  VSS, on RA. Up independently in room. NPO. Complains of 7/10 pain for headache, heart palpitations are uncomfortable, pt stated she is just anxious related to her stay. Pt had an Echo this morning. Pt has an insulin pump and has been managing her own insulin. . Pt has been complaining of being nauseous, Zofran given x1. Pt refused scheduled Aspirin, per pt she gets stomach ulcers when she takes Aspirin. Plan is for pt to have a lexiscan and MRI today then possibly discharge home with  if results are negative.      Please review provider order for any additional goals.   Nurse to notify provider when observation goals have been met and patient is ready for discharge.    "

## 2019-04-19 NOTE — PROGRESS NOTES
PRIMARY DIAGNOSIS: Hypertension  OUTPATIENT/OBSERVATION GOALS TO BE MET BEFORE DISCHARGE:  1. ADLs back to baseline: No    2. Activity and level of assistance: Up with standby assistance.    3. Pain status: Improved with use of alternative comfort measures i.e.: ice    4. Return to near baseline physical activity: Yes     Discharge Planner Nurse   Safe discharge environment identified: Yes  Barriers to discharge: No       Entered by: Lorraine Chavarria 04/18/2019 8:12 PM   VSS. Alert and Oriented. Up with SBA. Pain 8/10 headache.   Please review provider order for any additional goals.   Nurse to notify provider when observation goals have been met and patient is ready for discharge.

## 2019-04-19 NOTE — H&P
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     1. Hypertensive urgency and SOB after walking ~1 mile  Patient has a history of hypertension but went off blood pressure meds about a year ago given issues with adverse side effects to multiple medications (diffuse severe joint aches with lisinopril, fatigue/joint pain/ throat tightness with metoprolol).  She has been noticing symptoms of shortness of breath after taking her dog for a walk 1 to 2 miles in, which is new, as well as headache and chest discomfort, though she also states that she has chronic issues with overall body aches related to her fibromyalgia, so she was not really sure if this was anything particularly new.  She also mentions that about a week ago she had a several minute episode while she was driving where she felt confused about how to get home (has lived in the same area for 20 years) but eventually found her way; no other significant neurologic deficits at that time. She presented today because she went to an eye appointment and was told that while her exam looked excellent, her blood pressure was way too high. No lab evidence of end organ damage related to elevated BPs.  -Monitor blood pressures tonight after receiving IV Lasix 20 mg, IV hydralazine 10 mg, p.o. lisinopril 10 mg, and p.o. amlodipine 5 mg  -As needed hydralazine available this evening for SBP > 180  -Given her adverse side effects to lisinopril and metoprolol in the past, as well as reports of increased bilateral leg swelling  "recently, would consider trialing losartan if discharging on a new blood pressure medication  -Ideal goal BP overnight would be to get under systolic 180 and diastolic 100  -Telemetry  -TTE w/ bubble study  -Lexiscan stress test given decreased activity tolerance/SOB with activity  -Unclear what exactly was going on with the episode about a week ago where she became confused while driving.  She does not have any focal neurologic deficits at this time. Unclear if this is related to her prior history of TBI from concussion vs an adverse issue with uncontrolled hypertension ?TIA. Discussed with patient that we could pursue MRI to rule out stroke, the patient did not want to \"waste an insulin pump\" and would prefer to pursue further work up in outpatient setting rather than undergoing that testing here, so this has not been ordered tonight and will defer to AM provider regarding what needs to be done inpatient prior to discharge    2. DM II  Has not tolerated oral diabetic medications due to adverse side effects (see Allergy list). She is now on an insulin pump.   -Pharmacy consult to help with orders PTA insulin pump settings  -QID (before meal and bedtime) glucose checks  -Hypoglycemia protocol ordered if needed    3. Fibromyalgia  -Continue PTA meloxicam 15 mg HS, Ultram 100 mg HS    4. History of concussion  Patient self-reports some mild cognitive issues with this. Recently resumed outpatient therapy for management.       Diet: Low Saturated Fat Na <2400 mg No Caffeinated Beverages    DVT Prophylaxis: Low Risk/Ambulatory with no VTE prophylaxis indicated  Walker Catheter: not present  Code Status: Full Code      Disposition Plan   Expected discharge: Tomorrow, recommended to prior living arrangement once blood pressure less than , DBP <100.  Entered: Ashley Thomas PA-C 04/18/2019, 9:46 PM     The patient's care was discussed with the Patient and Patient's Family.    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     Patient reports also a checkup today for her history of diabetes.  She was told that her exam looks great, but that her blood pressure was extremely high.  She was told to go to the ER for further evaluation.  In retrospect, the patient reports that she has been having some new concerning symptoms over the past week.  A little over a week ago she noticed that after walking about a mile with her dog she was getting very short of breath, which is not common for her.  she has also noticed some increased swelling in her legs bilaterally over the past day or so.  She has been noticing some increased chest discomfort (has been diagnosed with costochondritis in the past) as well as chronic cervical and thoracic back pain, but she felt like this was just a normal variant of her fibromyalgia at the time and did not think much of it.  She also reports having a headache. She has not been having any blurred vision, slurred speech, difficulty swallowing, facial droop, unilateral weakness, or new  sensory issues.  She also mentions that she was trying to drive home about a week ago and felt confused about how to get home (she and her  have lived in the same place for about 20 years, so this was out of the ordinary) and needed to drive around a while before she eventually found her  way home.  She states that she has a history of concussion and has some concern for mild cognitive decline with that, but has not ever had an episode like the one last week before.  She has had no recurrence of those types of symptoms since that episode last week.  She came to the ER at the instruction of the staff at her eye clinic to evaluate her high blood pressure.    On arrival to the ER, blood pressures were very high with /123. Otherwise, vital signs were stable.  BMP unremarkable. CBC w/ diff showed mild leukocytosis of 13.0 with absolute PMNs 9.0, o/w WNL. BNP was 50. Troponin <0.015. CXR was normal. With persistently elevated BPs after receiving IV lasix 20 mg, IV hydralazine 10 mg, PO lisinopril 10 mg, and PO amlodipine 5 mg, ER provider requested admission to observation for further BP management and possible ACS rule out.    Review of Systems    The 10 point Review of Systems is negative other than noted in the HPI.    Past Medical History    I have reviewed this patient's medical history and updated it with pertinent information if needed.   Past Medical History:   Diagnosis Date     Ascending aorta enlargement (H)      Depressive disorder     severe, prior hospitalization     Diabetes mellitus, type 2 (H)      Diverticulosis of colon (without mention of hemorrhage)      Fibromyalgia 6/26/2007     Insomnia      Irritable bowel syndrome      Past Surgical History   I have reviewed this patient's surgical history and updated it with pertinent information if needed.  Past Surgical History:   Procedure Laterality Date     C SPINAL ORTHOSIS,NOS  2002    lumbar surgery     choley  2011     HYSTERECTOMY, CERVIX STATUS UNKNOWN  2000    TVH/BSO       Social History   I have reviewed this patient's social history and updated it with pertinent information if needed.  Social History     Tobacco Use     Smoking status: Never Smoker     Smokeless tobacco: Never Used   Substance Use Topics     Alcohol use: Yes      Alcohol/week: 0.0 oz     Comment: maybe once a month     Drug use: No       Family History   I have reviewed this patient's family history and updated it with pertinent information if needed.   Family History   Problem Relation Age of Onset     Diabetes Mother         type 2     Hypertension Mother      Cerebrovascular Disease Mother          stroke age 57     Ovarian Cancer Mother 43     Cancer Mother         cervix     Diabetes Father         type 2     Hypertension Father      Gastrointestinal Disease Father         colon polyps     Breast Cancer Sister      Ovarian Cancer Sister 46     Breast Cancer Sister      Ovarian Cancer Maternal Grandmother 72     Cancer Maternal Grandmother         cervix       Prior to Admission Medications   Prior to Admission Medications   Prescriptions Last Dose Informant Patient Reported? Taking?   ASPIRIN NOT PRESCRIBED (INTENTIONAL)   No No   Sig: Please choose reason not prescribed, below   Continuous Blood Gluc  (FREESTYLE RADHA 14 DAY READER) MARIA C   No No   Si each continuous   Continuous Blood Gluc Sensor (FREESTYLE RADHA 14 DAY SENSOR) MISC   No No   Si each every 14 days   EPINEPHrine (EPIPEN) 0.3 MG/0.3ML injection   No Yes   Sig: Inject 0.3 mLs (0.3 mg) into the muscle once as needed for anaphylaxis   INSULIN PUMP - OUTPATIENT pt does use, will need to be updated once info in provided  No No   Sig: Date last updated: 10/8/18 Omnipod  BASAL RATES and times:  REGULAR:  12am: 1.60 --6 AM: 1.45 --12pm: 1.3 -- 6pm: 1.45  HIGH 2:  12am: 1.70 --  6 AM: 1.55 -- 12pm: 1.4 -- 6pm: 1.55  ANXIETY 3:  12am: 1.80 -- 6 AM: 1.65 --12pm: 1.5 --6pm: 1.65  AFTERNOON BINGE:  12 AM: 1.8 -- 6 AM: 4.65 --8 PM: 1.8  VERY HIGH 5: 12 AM: 1.8  CARB RATIO: 12am-12am: 6  Corection Factor (Sensitivity): 12AM (midnight): 23 -- 5 AM: 25  BLOOD GLUCOSE TARGET:12AM (midnigh   STATIN NOT PRESCRIBED, INTENTIONAL,   No No   Sig: Statin not prescribed intentionally due to Intolerance    acetone, Urine, test STRP   No No   Si strip by In Vitro route daily   blood glucose (NO BRAND SPECIFIED) test strip   No No   Sig: Freestyle test strips (NOT LITE or INSULINX) to be used with Omnipod pump test 6 times daily   continuous blood glucose monitoring (FREESTYLE RADHA) sensor   No No   Sig: For use with Freestyle Radha Flash  for continuous monitioring of blood glucose levels. Replace sensor every 10 days.   continuous blood glucose monitoring (FREESTYLE RADHA) sensor   No No   Sig: For use with Freestyle Radha Flash  for continuous monitioring of blood glucose levels. Replace sensor every 10 days.   diclofenac (VOLTAREN) 1 % topical gel   Yes No   Sig: Apply topically nightly as needed for moderate pain Apply 4 grams to knees or 2 grams to hands four times daily using enclosed dosing card.   glucagon (GLUCAGON EMERGENCY) 1 MG kit   No No   Sig: Inject 1 mg into the muscle once for 1 dose   insulin aspart (NOVOLOG VIAL) 100 UNITS/ML vial pt does use.  TDD ???  No No   Sig: To be used in insulin pump.  units   meloxicam (MOBIC) 15 MG tablet 2019 at Unknown time  Yes Yes   Sig: Take 15 mg by mouth At Bedtime    polyethylene glycol (MIRALAX/GLYCOLAX) packet 2019 at Unknown time  Yes Yes   Sig: Take 17 g by mouth At Bedtime   traMADol (ULTRAM) 50 MG tablet 2019 at Unknown time  Yes Yes   Sig: Take 100 mg by mouth At Bedtime       Facility-Administered Medications: None     Allergies   Allergies   Allergen Reactions     Amoxicillin Anaphylaxis     Bee Anaphylaxis     Bee sting       Iodine Anaphylaxis     Severe anaphalatic shock       Sulfa Drugs Anaphylaxis     Tetanus-Diphtheria Toxoids      Rxn was to Tdap-whole body joint pain and fever.  Had similar reaction to Td vaccine 2017     Metoprolol Other (See Comments)     Fatigue, joint pain, throat tightness     Zithromax [Azithromycin Dihydrate] Nausea and Vomiting     Contrast Dye      Cyproheptadine       Severe headache, cardiac issues, and dizziness     Humalog [Insulin Lispro]      Causes pancreatitis -      Latex      Skin burn and can't breathe       Lisinopril      Joint aches     Metformin      Onglyza [Saxagliptin Hydrochloride]      Fibromyalgia flare     Prochlorperazine      Altered mental status, hallucinations     Sumatriptan      Causes severe depression     Tetracycline Nausea and Vomiting, Hives and Difficulty breathing     Pt called to update today after the visit that she is allergic to the tetracycline-added to her list     Sulindac Anxiety     Panic attacks       Physical Exam   Vital Signs: Temp: 98.3  F (36.8  C) Temp src: Oral BP: (!) 171/100 Pulse: 83 Heart Rate: 96 Resp: 20 SpO2: 96 % O2 Device: None (Room air)    Weight: 200 lbs 0 oz    GENERAL:  Comfortable.  PSYCH: pleasant, oriented, No acute distress.  HEENT:  Atraumatic, normocephalic. PERRLA. Normal conjunctiva, normal hearing, and oropharynx is normal.  NECK:  Supple, no neck vein distention, adenopathy or bruits, normal thyroid.  HEART:  Normal S1, S2 with no murmur, no pericardial rub, gallops or S3 or S4.  LUNGS:  Clear to auscultation, normal respiratory effort. No wheezing, rales or ronchi.  GI:  Soft, no hepatosplenomegaly, normal bowel sounds. Non-tender, non distended.   EXTREMITIES:  No pedal edema, +2 pulses bilateral and equal.  SKIN:  Dry to touch, No rash, wound or ulcerations.  NEUROLOGIC:  CN 2-12 intact, BL 5/5 symmetric upper and lower extremity strength, sensation is intact with no focal deficits.     Data   Data reviewed today: I reviewed all medications, new labs and imaging results over the last 24 hours. I personally reviewed:    Results for orders placed or performed during the hospital encounter of 04/18/19   XR Chest 2 Views    Narrative    CHEST TWO VIEWS  4/18/2019 4:47 PM     HISTORY: Dyspnea, evaluate for cardiomegaly, pleural effusions.    COMPARISON: 5/23/2011      Impression    IMPRESSION: Normal. No  change.    KHUSHI HSU MD   CBC with platelets differential   Result Value Ref Range    WBC 13.0 (H) 4.0 - 11.0 10e9/L    RBC Count 5.03 3.8 - 5.2 10e12/L    Hemoglobin 14.9 11.7 - 15.7 g/dL    Hematocrit 44.1 35.0 - 47.0 %    MCV 88 78 - 100 fl    MCH 29.6 26.5 - 33.0 pg    MCHC 33.8 31.5 - 36.5 g/dL    RDW 12.5 10.0 - 15.0 %    Platelet Count 314 150 - 450 10e9/L    Diff Method Automated Method     % Neutrophils 69.2 %    % Lymphocytes 24.2 %    % Monocytes 4.3 %    % Eosinophils 1.3 %    % Basophils 0.4 %    % Immature Granulocytes 0.6 %    Nucleated RBCs 0 0 /100    Absolute Neutrophil 9.0 (H) 1.6 - 8.3 10e9/L    Absolute Lymphocytes 3.1 0.8 - 5.3 10e9/L    Absolute Monocytes 0.6 0.0 - 1.3 10e9/L    Absolute Eosinophils 0.2 0.0 - 0.7 10e9/L    Absolute Basophils 0.1 0.0 - 0.2 10e9/L    Abs Immature Granulocytes 0.1 0 - 0.4 10e9/L    Absolute Nucleated RBC 0.0    Basic metabolic panel   Result Value Ref Range    Sodium 140 133 - 144 mmol/L    Potassium 3.6 3.4 - 5.3 mmol/L    Chloride 106 94 - 109 mmol/L    Carbon Dioxide 28 20 - 32 mmol/L    Anion Gap 6 3 - 14 mmol/L    Glucose 198 (H) 70 - 99 mg/dL    Urea Nitrogen 18 7 - 30 mg/dL    Creatinine 0.84 0.52 - 1.04 mg/dL    GFR Estimate 77 >60 mL/min/[1.73_m2]    GFR Estimate If Black 89 >60 mL/min/[1.73_m2]    Calcium 9.7 8.5 - 10.1 mg/dL   Nt probnp inpatient (BNP)   Result Value Ref Range    N-Terminal Pro BNP Inpatient 50 0 - 900 pg/mL   Troponin I   Result Value Ref Range    Troponin I ES <0.015 0.000 - 0.045 ug/L   Troponin I - Now then in 4 hours x 2   Result Value Ref Range    Troponin I ES <0.015 0.000 - 0.045 ug/L   EKG 12 lead   Result Value Ref Range    Interpretation ECG Click View Image link to view waveform and result      *Note: Due to a large number of results and/or encounters for the requested time period, some results have not been displayed. A complete set of results can be found in Results Review.

## 2019-04-22 ENCOUNTER — TELEPHONE (OUTPATIENT)
Dept: PEDIATRICS | Facility: CLINIC | Age: 59
End: 2019-04-22

## 2019-04-22 NOTE — TELEPHONE ENCOUNTER
Please contact patient for In-patient follow up.  [phone] (home)     Visit date: 041919  Diagnosis listed: Hypertension, Unspecified Type, Hypertensive Urgency  Number of visits in past 12 months: 0 ED / 0 IP

## 2019-04-23 NOTE — TELEPHONE ENCOUNTER
Agree with plan. May take some time for her body to adjust to the losartan. Need to give it a longer chance. Can discuss anxiety medication at appointment  Annamaria Erickson MD  Internal Medicine/Pediatrics

## 2019-04-23 NOTE — TELEPHONE ENCOUNTER
Per discharge note-She will discharge home today on Losartan 25 mg daily and follow up with her PCP with a BMP in 1-2 weeks.   Has appointment with Endocrinology on 04/25/19.    ED / Discharge Outreach Protocol    Patient Contact    Attempt # 2    Was call answered?  No.  Left message on voicemail with information to call me back.  KYLEE Kraft RN

## 2019-04-23 NOTE — TELEPHONE ENCOUNTER
"Hospital/TCU/ED for chronic condition Discharge Protocol  Dr. Erickson-please see blood pressure reading of today, and plan we discussed.  Appointment scheduled for Monday, 04/29.    \"Hi, my name is Alyssa Kraft, a registered nurse, and I am calling from [facility].  I am calling to follow up and see how things are going for you after your recent emergency visit/hospital/TCU stay.\"    Tell me how you are doing now that you are home?\" Patient states that the headaches have resolved.  Still having issues with her blood pressure.   Is using a manual blood pressure cuff.  Is taking Losartan at 4 pm daily.   Has to eat later in the evening due to the nausea.  Home readings are running-  130/73 on Saturday  160/90 this am.   No dizziness, shortness of breath or chest pain with this blood pressure.  Has noted dry cough and nausea since starting the Losartan.  Blood sugars have been running normal.  Will check her blood pressure this afternoon prior to taking the Losartan, and then again two hours later.   Will My Chart blood pressure reading to me tomorrow.  Patient would like to discuss anxiety medication as she feels this is playing into her elevated blood pressure.      Discharge Instructions    \"Let's review your discharge instructions.  What is/are the follow-up recommendations?  Pt. Response: To follow-up with PCP in 1-2 weeks    \"Has an appointment with your primary care provider been scheduled?\"   No (schedule appointment)    \"When you see the provider, I would recommend that you bring your medications with you.\"    Medications    \"Tell me what changed about your medicines when you discharged?\"    Changes to chronic meds?    0-1    \"What questions do you have about your medications?\"    None     New diagnoses of heart failure, COPD, diabetes, or MI?    No     On insulin: \"Did you start on insulin in the hospital or did you have your insulin dose changed?\"  No         Medication reconciliation completed? Yes  Was " "MTM referral placed (*Make sure to put transitions as reason for referral)?   No    Call Summary    \"What questions or concerns do you have about your recent visit and your follow-up care?\"     none    \"If you have questions or things don't continue to improve, we encourage you contact us through the main clinic number (give number).  Even if the clinic is not open, triage nurses are available 24/7 to help you.     We would like you to know that our clinic has extended hours (provide information).  We also have urgent care (provide details on closest location and hours/contact info)\"      \"Thank you for your time and take care!\"    KYLEE Kraft RN         "

## 2019-04-24 ENCOUNTER — MYC MEDICAL ADVICE (OUTPATIENT)
Dept: PEDIATRICS | Facility: CLINIC | Age: 59
End: 2019-04-24

## 2019-04-25 ENCOUNTER — APPOINTMENT (OUTPATIENT)
Dept: CT IMAGING | Facility: CLINIC | Age: 59
End: 2019-04-25
Attending: EMERGENCY MEDICINE
Payer: COMMERCIAL

## 2019-04-25 ENCOUNTER — HOSPITAL ENCOUNTER (EMERGENCY)
Facility: CLINIC | Age: 59
Discharge: HOME | End: 2019-04-25
Attending: EMERGENCY MEDICINE | Admitting: EMERGENCY MEDICINE
Payer: COMMERCIAL

## 2019-04-25 VITALS
RESPIRATION RATE: 21 BRPM | DIASTOLIC BLOOD PRESSURE: 52 MMHG | HEART RATE: 63 BPM | BODY MASS INDEX: 34.15 KG/M2 | TEMPERATURE: 98.8 F | WEIGHT: 200 LBS | SYSTOLIC BLOOD PRESSURE: 108 MMHG | HEIGHT: 64 IN | OXYGEN SATURATION: 95 %

## 2019-04-25 DIAGNOSIS — R51.9 NONINTRACTABLE HEADACHE, UNSPECIFIED CHRONICITY PATTERN, UNSPECIFIED HEADACHE TYPE: ICD-10-CM

## 2019-04-25 DIAGNOSIS — I10 ESSENTIAL HYPERTENSION: ICD-10-CM

## 2019-04-25 LAB
ANION GAP SERPL CALCULATED.3IONS-SCNC: 6 MMOL/L (ref 3–14)
BUN SERPL-MCNC: 15 MG/DL (ref 7–30)
CALCIUM SERPL-MCNC: 8.9 MG/DL (ref 8.5–10.1)
CHLORIDE SERPL-SCNC: 108 MMOL/L (ref 94–109)
CO2 SERPL-SCNC: 26 MMOL/L (ref 20–32)
CREAT SERPL-MCNC: 0.69 MG/DL (ref 0.52–1.04)
ERYTHROCYTE [DISTWIDTH] IN BLOOD BY AUTOMATED COUNT: 12.5 % (ref 10–15)
GFR SERPL CREATININE-BSD FRML MDRD: >90 ML/MIN/{1.73_M2}
GLUCOSE BLDC GLUCOMTR-MCNC: 205 MG/DL (ref 70–99)
GLUCOSE SERPL-MCNC: 126 MG/DL (ref 70–99)
HCT VFR BLD AUTO: 40.5 % (ref 35–47)
HGB BLD-MCNC: 13.9 G/DL (ref 11.7–15.7)
MCH RBC QN AUTO: 30.5 PG (ref 26.5–33)
MCHC RBC AUTO-ENTMCNC: 34.3 G/DL (ref 31.5–36.5)
MCV RBC AUTO: 89 FL (ref 78–100)
PLATELET # BLD AUTO: 295 10E9/L (ref 150–450)
POTASSIUM SERPL-SCNC: 3.6 MMOL/L (ref 3.4–5.3)
RBC # BLD AUTO: 4.55 10E12/L (ref 3.8–5.2)
SODIUM SERPL-SCNC: 140 MMOL/L (ref 133–144)
TROPONIN I SERPL-MCNC: <0.015 UG/L (ref 0–0.04)
WBC # BLD AUTO: 11.5 10E9/L (ref 4–11)

## 2019-04-25 PROCEDURE — 96375 TX/PRO/DX INJ NEW DRUG ADDON: CPT

## 2019-04-25 PROCEDURE — 25800030 ZZH RX IP 258 OP 636: Performed by: EMERGENCY MEDICINE

## 2019-04-25 PROCEDURE — 70450 CT HEAD/BRAIN W/O DYE: CPT

## 2019-04-25 PROCEDURE — 84484 ASSAY OF TROPONIN QUANT: CPT | Performed by: EMERGENCY MEDICINE

## 2019-04-25 PROCEDURE — 25000132 ZZH RX MED GY IP 250 OP 250 PS 637: Performed by: EMERGENCY MEDICINE

## 2019-04-25 PROCEDURE — 25000128 H RX IP 250 OP 636: Performed by: EMERGENCY MEDICINE

## 2019-04-25 PROCEDURE — 00000146 ZZHCL STATISTIC GLUCOSE BY METER IP

## 2019-04-25 PROCEDURE — 93005 ELECTROCARDIOGRAM TRACING: CPT

## 2019-04-25 PROCEDURE — 96376 TX/PRO/DX INJ SAME DRUG ADON: CPT

## 2019-04-25 PROCEDURE — 80048 BASIC METABOLIC PNL TOTAL CA: CPT | Performed by: EMERGENCY MEDICINE

## 2019-04-25 PROCEDURE — 99285 EMERGENCY DEPT VISIT HI MDM: CPT | Mod: 25

## 2019-04-25 PROCEDURE — 96374 THER/PROPH/DIAG INJ IV PUSH: CPT

## 2019-04-25 PROCEDURE — 85027 COMPLETE CBC AUTOMATED: CPT | Performed by: EMERGENCY MEDICINE

## 2019-04-25 RX ORDER — METOCLOPRAMIDE HYDROCHLORIDE 5 MG/ML
10 INJECTION INTRAMUSCULAR; INTRAVENOUS ONCE
Status: COMPLETED | OUTPATIENT
Start: 2019-04-25 | End: 2019-04-25

## 2019-04-25 RX ORDER — DIPHENHYDRAMINE HYDROCHLORIDE 50 MG/ML
25 INJECTION INTRAMUSCULAR; INTRAVENOUS ONCE
Status: COMPLETED | OUTPATIENT
Start: 2019-04-25 | End: 2019-04-25

## 2019-04-25 RX ORDER — CLONIDINE HYDROCHLORIDE 0.1 MG/1
0.1 TABLET ORAL ONCE
Status: COMPLETED | OUTPATIENT
Start: 2019-04-25 | End: 2019-04-25

## 2019-04-25 RX ORDER — LORAZEPAM 2 MG/ML
1 INJECTION INTRAMUSCULAR ONCE
Status: COMPLETED | OUTPATIENT
Start: 2019-04-25 | End: 2019-04-25

## 2019-04-25 RX ORDER — PROMETHAZINE HYDROCHLORIDE 25 MG/ML
25 INJECTION, SOLUTION INTRAMUSCULAR; INTRAVENOUS ONCE
Status: DISCONTINUED | OUTPATIENT
Start: 2019-04-25 | End: 2019-04-25

## 2019-04-25 RX ORDER — DIPHENHYDRAMINE HYDROCHLORIDE 50 MG/ML
12.5 INJECTION INTRAMUSCULAR; INTRAVENOUS ONCE
Status: COMPLETED | OUTPATIENT
Start: 2019-04-25 | End: 2019-04-25

## 2019-04-25 RX ORDER — ONDANSETRON 2 MG/ML
8 INJECTION INTRAMUSCULAR; INTRAVENOUS ONCE
Status: COMPLETED | OUTPATIENT
Start: 2019-04-25 | End: 2019-04-25

## 2019-04-25 RX ORDER — AMLODIPINE BESYLATE 5 MG/1
5 TABLET ORAL ONCE
Status: COMPLETED | OUTPATIENT
Start: 2019-04-25 | End: 2019-04-25

## 2019-04-25 RX ORDER — DIAZEPAM 10 MG/2ML
5 INJECTION, SOLUTION INTRAMUSCULAR; INTRAVENOUS ONCE
Status: COMPLETED | OUTPATIENT
Start: 2019-04-25 | End: 2019-04-25

## 2019-04-25 RX ORDER — AMLODIPINE BESYLATE 5 MG/1
5 TABLET ORAL DAILY
Qty: 30 TABLET | Refills: 0 | Status: SHIPPED | OUTPATIENT
Start: 2019-04-25 | End: 2019-05-02

## 2019-04-25 RX ADMIN — PROMETHAZINE HYDROCHLORIDE 25 MG: 25 INJECTION INTRAMUSCULAR; INTRAVENOUS at 20:27

## 2019-04-25 RX ADMIN — AMLODIPINE BESYLATE 5 MG: 5 TABLET ORAL at 16:32

## 2019-04-25 RX ADMIN — DIPHENHYDRAMINE HYDROCHLORIDE 25 MG: 50 INJECTION, SOLUTION INTRAMUSCULAR; INTRAVENOUS at 17:41

## 2019-04-25 RX ADMIN — LORAZEPAM 1 MG: 2 INJECTION INTRAMUSCULAR; INTRAVENOUS at 20:53

## 2019-04-25 RX ADMIN — ONDANSETRON 8 MG: 2 INJECTION INTRAMUSCULAR; INTRAVENOUS at 16:32

## 2019-04-25 RX ADMIN — Medication 5 MG: at 22:28

## 2019-04-25 RX ADMIN — DIPHENHYDRAMINE HYDROCHLORIDE 25 MG: 50 INJECTION, SOLUTION INTRAMUSCULAR; INTRAVENOUS at 18:00

## 2019-04-25 RX ADMIN — METOCLOPRAMIDE 10 MG: 5 INJECTION, SOLUTION INTRAMUSCULAR; INTRAVENOUS at 17:41

## 2019-04-25 RX ADMIN — CLONIDINE HYDROCHLORIDE 0.1 MG: 0.1 TABLET ORAL at 18:32

## 2019-04-25 RX ADMIN — DIPHENHYDRAMINE HYDROCHLORIDE 12.5 MG: 50 INJECTION, SOLUTION INTRAMUSCULAR; INTRAVENOUS at 21:21

## 2019-04-25 ASSESSMENT — ENCOUNTER SYMPTOMS
PALPITATIONS: 1
NAUSEA: 1
HEADACHES: 1
SHORTNESS OF BREATH: 0

## 2019-04-25 ASSESSMENT — MIFFLIN-ST. JEOR: SCORE: 1477.19

## 2019-04-25 NOTE — ED AVS SNAPSHOT
[facility] Emergency Department  [address]  [facility] [address]  Phone:  [phone]  Fax:  [phone]                                    Maricarmen Dotson   MRN: [id number]    Department:  [facility] Emergency Department   Date of Visit:  4/25/2019           After Visit Summary Signature Page    I have received my discharge instructions, and my questions have been answered. I have discussed any challenges I see with this plan with the nurse or doctor.    ..........................................................................................................................................  Patient/Patient Representative Signature      ..........................................................................................................................................  Patient Representative Print Name and Relationship to Patient    ..................................................               ................................................  Date                                   Time    ..........................................................................................................................................  Reviewed by Signature/Title    ...................................................              ..............................................  Date                                               Time          22EPIC Rev 08/18

## 2019-04-25 NOTE — TELEPHONE ENCOUNTER
Patient is in the ER for evaluation now.  Advised to go to the ER at 3:00, but she waited to see if higher dose of medication would work.  KYLEE Kraft RN

## 2019-04-25 NOTE — TELEPHONE ENCOUNTER
I called patient twice, but she is unable to hear me.  I told her I would My Chart her and she did hear that.  KYLEE Kraft RN

## 2019-04-25 NOTE — ED PROVIDER NOTES
History     Chief Complaint:  Hypertension     HPI   Maricarmen Dotson is a 58 year old female who presents with her daughter to the emergency department with concern for hypertension. The patient was evaluated for hypertensive urgency here in the ER about 7 days ago and was admitted to the hospital and changed from lisinopril to losartan on discharge and at that point her blood pressure was controlled at 130s/70s but  Throughout the last week her blood pressure climbed back up to around 200 systolic today prompting her nurse to instruct her to return here. She notes symptoms of blurred vision, headache, nausea.  She denies drinking coffee, herbal medications, or other supplements. She has been on metoprolol in the past but this caused body aches.  She denies chest pain, shortness of breath, but did note palpitations earlier after lunch several hours ago. .     Allergies:  Sulindac  Tetracycline  Sumatriptan  Prochlorperazine  Onglyza  Metformin  Lisinopril  Latex  Humalog  Cyproheptadine  Contrast dye  Zithromax  Metoprolol  Sulfa  Tetanus-diptheria toxoids  Iodine  Amoxicillin     Medications:    Tramadol  Cozaar  Mobic  Voltaren  Epipen  Insulin  Aspirin     Past Medical History:    Ascending aorta enlargement  Depression  DM type II  Diverticulosis  Fibromyalgia  Insomnia  IBS    Past Surgical History:    Lumbar surgery  Hysterectomy    Family History:    DM type II  HTN  CVD  Cancer    Social History:  The patient denies tobacco or alcohol use.    Review of Systems   Eyes: Positive for visual disturbance.   Respiratory: Negative for shortness of breath.    Cardiovascular: Positive for palpitations. Negative for chest pain.   Gastrointestinal: Positive for nausea.   Neurological: Positive for headaches.   All other systems reviewed and are negative.      Physical Exam     Patient Vitals for the past 24 hrs:   BP Temp Temp src Pulse Heart Rate Resp SpO2 Height Weight   04/25/19 2030 108/52 -- -- 63 -- -- 94 % --  "--   04/25/19 2020 111/60 -- -- -- 62 -- -- -- --   04/25/19 1900 133/69 -- -- 66 66 21 95 % -- --   04/25/19 1850 135/70 -- -- -- 72 13 96 % -- --   04/25/19 1805 -- -- -- -- 66 13 96 % -- --   04/25/19 1800 184/87 -- -- 70 78 21 96 % -- --   04/25/19 1700 176/82 -- -- 64 63 -- -- -- --   04/25/19 1650 (!) 159/91 -- -- -- 61 -- 97 % -- --   04/25/19 1645 172/90 -- -- -- 61 11 96 % -- --   04/25/19 1615 -- -- -- -- -- -- 97 % -- --   04/25/19 1610 (!) 199/106 -- -- -- -- -- 97 % -- --   04/25/19 1558 (!) 198/101 98.8  F (37.1  C) Oral 66 66 18 98 % 1.626 m (5' 4\") 90.7 kg (200 lb)         Physical Exam    GENERAL:  Patient appears in no obvious discomfort  HEENT:   External ears are normal.     Temporal arteries are non-tender.      Oropharynx is moist, without lesions or trismus.  Eyes:   PERRL.  EOMI.      No corneal clouding.   NECK:   Supple, no meningismus.       Negative Brudzinski's sign.  CV:    Regular rate and rhythm.    No murmurs, rubs or gallops.    No peripheral edema or unilateral leg swelling    2+ radial pulses bilateral  PULM:   Clear to auscultation bilateral.      No respiratory distress.      No stridor or wheezing.  ABD:  Soft, non-tender, non-distended.      No pulsatile masses.      No rebound or guarding.  MSK:    No gross deformity to all four extremities.      No significant joint effusions.  LYMPH:  No cervical lymphadenopathy.  NEURO:  A & O x 3    CN II-XII intact, speech is clear with no aphasia.      No pronator drift.      Strength is 5/5 in all 4 extremities.  Sensation is intact.      Normal muscular tone, no tremor.  SKIN:   Warm, dry and intact.    PSYCH:   Mood is good and affect is appropriate.      Emergency Department Course   ECG:  Indication: hypertension  Time: 1604  Vent. Rate  66 bpm. [place] interval 174. QRS duration 98. QT/QTc 412/431. P-R-T axis  45 -11  -3.  Normal sinus rhythm. Moderate voltage criteria for LVH, may be normal variant. Cannot rule out septal infarct, age " undetermined. Abnormal ECG. No significant change compared to EKG dated 17. Read time: 1610    Imaging:   Radiographic findings communicated with patient who voiced understanding.   CT Head without contrast:   Normal CT scan of the head. as per radiology.      Laboratory:    CBC: WBC: 11.5, HGB: 13.9, PLT: 295  BMP: Glucose 126, o/w WNL (Creatinine: 0.69)     1632 Troponin: <0.015    Interventions:  Patient's condition partially ameliorated with IV benzodiazepines     1632 Zofran 8 mg IV   Amlodipine 5 mg Oral  1741 Reglan 10 mg IV   Benadryl 25 mg IV  1800 Benadryl 25 mg IV  1832 Catapres 0.1 mg Oral   Phenergan 25 mg IV   Ativan 1 mg IV   Benadryl 12.5 mg IV  Valium 5 mg IV        Emergency Department Course:  Nursing notes and vitals reviewed. () I performed an exam of the patient as documented above.     IV inserted. Medicine administered as documented above. Blood drawn. This was sent to the lab for further testing, results above.    Patient sent in for head CT.     () I rechecked the patient and discussed the results of her workup thus far.     Findings and plan explained to the Patient. Patient discharged home with instructions regarding supportive care, medications, and reasons to return. The importance of close follow-up was reviewed. The patient was prescribed Amlodipine.     I personally reviewed the imaging results with the Patient and answered all related questions prior to discharge.     Impression & Plan      Medical Decision Makin-year-old female presented to the ED with primary concerns of hypertension.  She has no signs of acute endorgan damage with no evidence of acute renal insufficiency, myocardial infarction or clinical signs of stroke.  She was given oral antihypertensives with remarkable improvement of her blood pressure.  Patient will increase her losartan from 25 to 50 mg.  I will add on 5 mg daily amlodipine with close follow-up with her primary care  physician for further investigation and treatment.    During her ED course, she did develop a severe headache that was not initially present. Kassidy was somewhat recalcitrant to the initial medications thus head CT undertaken. CT reveals no evidence of acute intracranial hemorrhage/alternative pathology.  She initially had a response to Reglan but then had recurrence of symptoms and likely profound/prolonged akathisia related to the additional medications.  This required multiple rounds of diphenhydramine as well as benzodiazepines to ultimately control symptoms.  Patient safe for discharge home with close follow-up PCP and return ED for any worsening symptoms.      Diagnosis:    ICD-10-CM    1. Essential hypertension I10    2. Nonintractable headache, unspecified chronicity pattern, unspecified headache type R51        Disposition:  discharged to home    Discharge Medications:     Medication List      Started    amLODIPine 5 MG tablet  Commonly known as:  NORVASC  5 mg, Oral, DAILY          Scribe Disclosure:  Neville LUIS, am serving as a scribe on 4/25/2019 at 4:10 PM to personally document services performed by Magdy Jonas MD based on my observations and the provider's statements to me.     Neville Gomez  4/25/2019   [facility] EMERGENCY DEPARTMENT       aMgdy Jonas MD  04/27/19 1690

## 2019-04-25 NOTE — TELEPHONE ENCOUNTER
Please call patient and make sure she feels ok with blood pressures that high. definitely recommend increasing losartan to 50 mg.     Annamaria Erickson MD  Internal Medicine/Pediatrics

## 2019-04-25 NOTE — TELEPHONE ENCOUNTER
Patient reports having headaches, blurred vision, shortness of breath and dizziness with elevated blood pressure.  Denies chest pain.  Advised her to proceed to the ER for evaluation due to symptoms she is having.  KYLEE Kraft RN

## 2019-04-25 NOTE — ED TRIAGE NOTES
Patient presents with complaints of hypertension which started last Thursday. She was seen here in the ER recently for this. She states that her Losartan was increased. She called her PCP who told her to come in to ER. Patient states she is having headaches, nausea, and blurred vision. All similar symptoms to when she was seen here recently. Patient has not followed up with PCP. She called nurse who told her to come to ER. ABC intact without need for intervention at this time.

## 2019-04-25 NOTE — ED NOTES
"Pt complains of HA \"from eyes to back of head.\" rates 9/10.  Nausea continues but has improved since zofran administration.  Pt is moaning and appears restless.  C/O Blurred vision.    "

## 2019-04-26 ENCOUNTER — MYC MEDICAL ADVICE (OUTPATIENT)
Dept: PEDIATRICS | Facility: CLINIC | Age: 59
End: 2019-04-26

## 2019-04-26 LAB — INTERPRETATION ECG - MUSE: NORMAL

## 2019-04-26 NOTE — TELEPHONE ENCOUNTER
Message handled by Nurse Triage with [facility] - provider name: Dr. Sandra LU for patient to continue medications.  Agreed to have patient only check BP once daily 1 hour after medications and keep log.  Should continue to improve with continued use.  Reassure checking to much can increase anxiety and cause symptoms.     Discussed recommendations with patient.  She does agree with the plan and seemed reassured regarding continued decrease over next few days.  Will seek ER for worsening symptoms.  Annamaria PICHARDO RN - Triage  [facility]

## 2019-04-26 NOTE — DISCHARGE INSTRUCTIONS
Please increase your losartan from 25 mg to 50 mg a day.      I am also starting you on a new blood pressure medication called amlodipine.    Discharge Instructions  Hypertension - High Blood Pressure    During you visit to the Emergency Department, your blood pressure was higher than the recommended blood pressure.  This may be related to stress, pain, medication or other temporary conditions. In these cases, your blood pressure may return to normal on its own. If you have a history of high blood pressure, you may need to have your provider adjust your medications. Sometimes, your high measurement here may indicate that you have developed high blood pressure that will stay high unless it is treated. As a general rule, high blood pressure causes problems over years rather than days, weeks, or months. So, while it is important to treat blood pressure, it is rarely important to treat blood pressure immediately. Occasionally we will begin a medication in the Emergency Department; more often we will recommend close follow-up for medications with a primary doctor/clinic.    Generally, every Emergency Department visit should have a follow-up clinic visit with either a primary or a specialty clinic/provider. Please follow-up as instructed by your emergency provider today.    Return to the Emergency Department if you start to have:  A severe headache.  Chest pain.  Shortness of breath.  Weakness or numbness that affects one part of the body.  Confusion.  Vision changes.  Significant swelling of legs and/or eyes.  A reaction to any medication started in the Emergency Department.    What can I do to help myself?  Avoid alcohol.  Take any blood pressure medicine that you are prescribed.  Get a good night?s sleep.  Lower your salt intake.  Exercise.  Lose weight.  Manage stress.  See your doctor regularly    If blood pressure medication was started in the Emergency Department:  The medicine may not have an immediate effect. The  body and brain determine what blood pressure you have. The medicine?s job is to retrain the body?s ?thermostat? to a lower blood pressure.  You will need to follow up with your provider to see how this medicine is working for you.  If you were given a prescription for medicine here today, be sure to read all of the information (including the package insert) that comes with your prescription.  This will include important information about the medicine, its side effects, and any warnings that you need to know about.  The pharmacist who fills the prescription can provide more information and answer questions you may have about the medicine.  If you have questions or concerns that the pharmacist cannot address, please call or return to the Emergency Department.   Remember that you can always come back to the Emergency Department if you are not able to see your regular provider in the amount of time listed above, if you get any new symptoms, or if there is anything that worries you.  Discharge Instructions  Headache    You were seen today for a headache. Headaches may be caused by many different things such as muscle tension, sinus inflammation, anxiety and stress, having too little sleep, too much alcohol, some medical conditions or injury. You may have a migraine, which is caused by changes in the blood vessels in your head.  At this time your provider does not find that your headache is a sign of anything dangerous or life-threatening.  However, sometimes the signs of serious illness do not show up right away.      Generally, every Emergency Department visit should have a follow-up clinic visit with either a primary or a specialty clinic/provider. Please follow-up as instructed by your emergency provider today.    Return to the Emergency Department if:  You get a new fever of 100.4 F or higher.  Your headache gets much worse.  You get a stiff neck with your headache.  You get a new headache that is significantly  different or worse than headaches you have had before.  You are vomiting (throwing up) and cannot keep food or water down.  You have blurry or double vision or other problems with your eyes.  You have a new weakness on one side of your body.  You have difficulty with balance which is new.  You or your family thinks you are confused.  You have a seizure.    What can I do to help myself?  Pain medications - You may take a pain medication such as Tylenol  (acetaminophen), Advil , Motrin  (ibuprofen) or Aleve  (naproxen).  Take a pain reliever as soon as you notice symptoms.  Starting medications as soon as you start to have symptoms may lessen the amount of pain you have.  Relaxing in a quiet, dark room may help.  Get enough sleep and eat meals regularly.  You may need to watch for certain foods or other things which may trigger your headaches.  Keeping a journal of your headaches and possible triggers may help you and your primary provider to identify things which you should avoid which may be causing your headaches.  If you were given a prescription for medicine here today, be sure to read all of the information (including the package insert) that comes with your prescription.  This will include important information about the medicine, its side effects, and any warnings that you need to know about.  The pharmacist who fills the prescription can provide more information and answer questions you may have about the medicine.  If you have questions or concerns that the pharmacist cannot address, please call or return to the Emergency Department.   Remember that you can always come back to the Emergency Department if you are not able to see your regular provider in the amount of time listed above, if you get any new symptoms, or if there is anything that worries you.

## 2019-04-26 NOTE — TELEPHONE ENCOUNTER
BP elevated so went to ER yesterday and given amlodipine 5 mg.    BP Now is 160/90.  No BP med's yet.  Will take BP med's at noon when  from pharmacy.    Patient is denying any CP, SOB, vision changes/disturbances, discussed seeking ER care for those concerns.      Patient will re-check her BP 1 hour after taking medications and my chart back with readings for further evaluation.  Annamaria PICHARDO RN - Triage  [facility]

## 2019-04-26 NOTE — ED NOTES
Pt states that ativan did not reduce anxiety or restlessness.  MD notified.  Verbal order for 12.5mg of Bendryl given.  Benadryl administered.  Spouse states that he believes that patient is hallucinating. States that she is reaching out for things that are not in the room. MD notified.

## 2019-04-26 NOTE — ED NOTES
"Pt is restless and stating that she is \"anxious.\"  Pt is noted to be standing in room and swaying.  Moaning at times.  MD notified.  Ativan ordered and administered.    "

## 2019-04-26 NOTE — TELEPHONE ENCOUNTER
Patient calling to update on BP reading today.  Patient was seen in ED yesterday for elevated BP and associated symptoms.    Reports just taking her BP now and it was 150/90.  This was taken 1 hour after taking both amlodipine and Losartan.    Patient has continued symptoms of concern noting that her chest feels tight, SOB, Blurred vision, fatigue, nausea.  Same since seen in ED yesterday.    Patient can be called back at [phone]    Please review and advise,  Annamaria PICHARDO RN - Triage  [facility]

## 2019-04-29 ENCOUNTER — OFFICE VISIT (OUTPATIENT)
Dept: PEDIATRICS | Facility: CLINIC | Age: 59
End: 2019-04-29
Payer: COMMERCIAL

## 2019-04-29 VITALS
BODY MASS INDEX: 34.04 KG/M2 | TEMPERATURE: 99.1 F | DIASTOLIC BLOOD PRESSURE: 76 MMHG | SYSTOLIC BLOOD PRESSURE: 120 MMHG | RESPIRATION RATE: 16 BRPM | OXYGEN SATURATION: 96 % | HEART RATE: 66 BPM | WEIGHT: 198.3 LBS

## 2019-04-29 DIAGNOSIS — F43.10 POSTTRAUMATIC STRESS DISORDER: ICD-10-CM

## 2019-04-29 DIAGNOSIS — I10 HYPERTENSION GOAL BP (BLOOD PRESSURE) < 140/90: Primary | ICD-10-CM

## 2019-04-29 DIAGNOSIS — G47.00 INSOMNIA, UNSPECIFIED TYPE: ICD-10-CM

## 2019-04-29 DIAGNOSIS — F41.9 ANXIETY: ICD-10-CM

## 2019-04-29 PROCEDURE — 99214 OFFICE O/P EST MOD 30 MIN: CPT | Performed by: INTERNAL MEDICINE

## 2019-04-29 RX ORDER — LOSARTAN POTASSIUM 50 MG/1
50 TABLET ORAL DAILY
Qty: 30 TABLET | Refills: 1 | Status: SHIPPED | OUTPATIENT
Start: 2019-04-29 | End: 2019-05-20

## 2019-04-29 RX ORDER — LORAZEPAM 1 MG/1
.5-1 TABLET ORAL
Qty: 15 TABLET | Refills: 0 | Status: SHIPPED | OUTPATIENT
Start: 2019-04-29 | End: 2019-06-04

## 2019-04-29 ASSESSMENT — PATIENT HEALTH QUESTIONNAIRE - PHQ9
5. POOR APPETITE OR OVEREATING: NEARLY EVERY DAY
SUM OF ALL RESPONSES TO PHQ QUESTIONS 1-9: 14

## 2019-04-29 ASSESSMENT — ANXIETY QUESTIONNAIRES
IF YOU CHECKED OFF ANY PROBLEMS ON THIS QUESTIONNAIRE, HOW DIFFICULT HAVE THESE PROBLEMS MADE IT FOR YOU TO DO YOUR WORK, TAKE CARE OF THINGS AT HOME, OR GET ALONG WITH OTHER PEOPLE: VERY DIFFICULT
5. BEING SO RESTLESS THAT IT IS HARD TO SIT STILL: NEARLY EVERY DAY
7. FEELING AFRAID AS IF SOMETHING AWFUL MIGHT HAPPEN: MORE THAN HALF THE DAYS
2. NOT BEING ABLE TO STOP OR CONTROL WORRYING: MORE THAN HALF THE DAYS
3. WORRYING TOO MUCH ABOUT DIFFERENT THINGS: MORE THAN HALF THE DAYS
1. FEELING NERVOUS, ANXIOUS, OR ON EDGE: NEARLY EVERY DAY
6. BECOMING EASILY ANNOYED OR IRRITABLE: MORE THAN HALF THE DAYS
GAD7 TOTAL SCORE: 17
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
## 2019-04-29 NOTE — PATIENT INSTRUCTIONS
1. Changed losartan to 50 mg pill  2. Continue amlodipine for now for another week or so - if feet symptoms not improving, will try switching to a different calcium channel blocker  3. Lorazepam (ativan) 0.5-1 mg at bedtime as needed  - will try to limit to about twice a week  4. Continue with therapist  5. Follow-up labs in 2 weeks for lab visit to check electrolytes  6. Follow-up with me in 1-2 months

## 2019-04-30 ASSESSMENT — ANXIETY QUESTIONNAIRES: GAD7 TOTAL SCORE: 17

## 2019-05-02 ENCOUNTER — MYC MEDICAL ADVICE (OUTPATIENT)
Dept: ENDOCRINOLOGY | Facility: CLINIC | Age: 59
End: 2019-05-02

## 2019-05-10 ENCOUNTER — MYC MEDICAL ADVICE (OUTPATIENT)
Dept: PEDIATRICS | Facility: CLINIC | Age: 59
End: 2019-05-10

## 2019-05-10 DIAGNOSIS — I10 ESSENTIAL HYPERTENSION: Primary | ICD-10-CM

## 2019-05-13 DIAGNOSIS — I10 HYPERTENSION GOAL BP (BLOOD PRESSURE) < 140/90: ICD-10-CM

## 2019-05-13 LAB
ANION GAP SERPL CALCULATED.3IONS-SCNC: 4 MMOL/L (ref 3–14)
BUN SERPL-MCNC: 17 MG/DL (ref 7–30)
CALCIUM SERPL-MCNC: 9.2 MG/DL (ref 8.5–10.1)
CHLORIDE SERPL-SCNC: 108 MMOL/L (ref 94–109)
CO2 SERPL-SCNC: 27 MMOL/L (ref 20–32)
CREAT SERPL-MCNC: 0.77 MG/DL (ref 0.52–1.04)
GFR SERPL CREATININE-BSD FRML MDRD: 86 ML/MIN/{1.73_M2}
GLUCOSE SERPL-MCNC: 183 MG/DL (ref 70–99)
POTASSIUM SERPL-SCNC: 3.7 MMOL/L (ref 3.4–5.3)
SODIUM SERPL-SCNC: 139 MMOL/L (ref 133–144)

## 2019-05-13 PROCEDURE — 80048 BASIC METABOLIC PNL TOTAL CA: CPT | Performed by: INTERNAL MEDICINE

## 2019-05-13 PROCEDURE — 36415 COLL VENOUS BLD VENIPUNCTURE: CPT | Performed by: INTERNAL MEDICINE

## 2019-05-20 ENCOUNTER — OFFICE VISIT (OUTPATIENT)
Dept: CARDIOLOGY | Facility: CLINIC | Age: 59
End: 2019-05-20
Attending: INTERNAL MEDICINE
Payer: COMMERCIAL

## 2019-05-20 VITALS
SYSTOLIC BLOOD PRESSURE: 144 MMHG | DIASTOLIC BLOOD PRESSURE: 82 MMHG | WEIGHT: 201 LBS | BODY MASS INDEX: 34.31 KG/M2 | HEART RATE: 70 BPM | HEIGHT: 64 IN | OXYGEN SATURATION: 97 %

## 2019-05-20 DIAGNOSIS — Z82.49 FAMILY HISTORY OF ISCHEMIC HEART DISEASE: ICD-10-CM

## 2019-05-20 DIAGNOSIS — I10 HYPERTENSION GOAL BP (BLOOD PRESSURE) < 140/90: ICD-10-CM

## 2019-05-20 DIAGNOSIS — I10 ACCELERATED ESSENTIAL HYPERTENSION: ICD-10-CM

## 2019-05-20 DIAGNOSIS — E11.9 DIABETES MELLITUS WITHOUT COMPLICATION (H): Primary | ICD-10-CM

## 2019-05-20 DIAGNOSIS — G47.30 SLEEP APNEA, UNSPECIFIED TYPE: ICD-10-CM

## 2019-05-20 DIAGNOSIS — E78.5 HYPERLIPIDEMIA, UNSPECIFIED HYPERLIPIDEMIA TYPE: ICD-10-CM

## 2019-05-20 PROCEDURE — 99204 OFFICE O/P NEW MOD 45 MIN: CPT | Performed by: INTERNAL MEDICINE

## 2019-05-20 RX ORDER — SIMVASTATIN 10 MG
5 TABLET ORAL AT BEDTIME
Qty: 10 TABLET | Refills: 1 | Status: SHIPPED | OUTPATIENT
Start: 2019-05-20 | End: 2019-06-10

## 2019-05-20 RX ORDER — LOSARTAN POTASSIUM 50 MG/1
100 TABLET ORAL DAILY
Qty: 60 TABLET | Refills: 3 | Status: SHIPPED | OUTPATIENT
Start: 2019-05-20 | End: 2019-09-26

## 2019-05-20 RX ORDER — HYDROCHLOROTHIAZIDE 12.5 MG/1
12.5 CAPSULE ORAL DAILY
Qty: 10 CAPSULE | Refills: 1 | Status: SHIPPED | OUTPATIENT
Start: 2019-05-20 | End: 2019-06-10

## 2019-05-20 ASSESSMENT — MIFFLIN-ST. JEOR: SCORE: 1481.98

## 2019-05-20 NOTE — LETTER
2019      Annamaria Erickson MD  [address] [place] [address]      RE: Maricarmen ROXANNE Dotson       Dear Colleague,    I had the pleasure of seeing Maricarmen Dotson in the [facility].    Service Date: 2019      REFERRING PHYSICIAN:  Dr. Annamaria Erickson.      HISTORY OF PRESENT ILLNESS:  Ms. Dotson is a very pleasant 58-year-old female who has been referred to our clinic today due to accelerated hypertension.  In reading through her chart, she has had a diagnosis of high blood pressure since this last fall and been tried on a couple of medications but has had some severe reactions.  In fact, she was hospitalized in April for hypertensive urgency and had some hallucinations and very severely elevated blood pressures with systolics over 200, diastolics over 110.  She has had reaction to amlodipine, clonidine, lisinopril and nifedipine.  She is tolerating Cozaar or losartan currently and she has titrated this up to 100 mg.  She initially told me that she had hives when she started the 100 mg strength and reduced it back to 50, but I guess she is still taking 100 mg.  She is a little cautious, however, about obtaining a full script for this, as she is concerned about the possibility of side effect yet with this medication.  Nevertheless, she is taking it and her blood pressures have been better.  She did note that when she first increased it her blood pressures actually went up, but now they have started to come down again.  She has several risk factors for heart disease.  She has a very strong family history.  Father had a myocardial infarction at age 62.  Her mom had one at 48 and  at age 57 from heart disease.  She has mixed hyperlipidemia and has tried Lipitor and Crestor in the past, both with severe myalgias within 24 hours of starting.  Because of this, she has been very reluctant to trying any others.  She has insulin-dependent diabetes and has been on  medication for diabetes for over 25 years.  She had gestational diabetes and preeclampsia a long time ago -- that was over 40 years ago.  Her last hemoglobin A1c was measured at 8.5.  This was 04/18.  She says normally she has been under better control but lately with all of the blood pressure issues has had a lot of trouble.  When she was hospitalized, she was tried on several medications, but I do not see that they did any secondary hypertensive workup.  She denies ever having imaging of her abdomen.  I do not see any testing for aldosterone or cortisol levels.  Lastly, it sounds like she was seen by Dr. Xiong for sleep apnea and was recommended to wear a CPAP mask, but she is severely claustrophobic, so she never followed up with her.  I could not gather whether she had mild, moderate or severe, but this certainly could be causing a lot of difficulty with her blood pressure as well, so I convinced her to go back to a different Sleep Center where they can not only assess the degree of her sleep apnea but hopefully treat her symptoms despite her claustrophobia.  I did review her EKG from 04/25.  This shows a normal sinus rhythm with flattened T waves that are nonspecific.  She does have criteria for LVH.  She has had ischemic testing here as when she presented with accelerated hypertension she was having some chest tightness.  Myocardial perfusion imaging study on 04/19 was normal with normal ejection fraction.  Her echocardiogram suggested mild concentric hypertrophy, normal LV function with an EF 55%-60%.  RV function appeared normal.  We could not estimate her pulmonary pressures due to inadequate tricuspid regurgitation.  There was some mild sclerosis of her aortic valve, but no significant incompetence or insufficiencies for her valves were noted.      PHYSICAL EXAMINATION:   VITAL SIGNS:  On exam today, blood pressure was measured at 144/82, pulse is 70, weight 201 with a body mass index of 34.   NECK:   Carotid upstrokes seem brisk without bruit.  I did not appreciate any jugular venous distention.   CARDIOVASCULAR:  Tones were regular.  I did hear a soft systolic murmur at the right upper sternal border.   LUNGS:  Clear posteriorly.   EXTREMITIES:  She has strong and symmetric pulses in both the upper and lower extremities without peripheral edema.      SUMMARY:  Ms. Dotson is a very pleasant 58-year-old female with accelerated hypertension, mixed hyperlipidemia untreated, insulin-dependent diabetes not well controlled, strong family history of premature coronary artery disease and untreated sleep apnea.  I am really concerned about all of her health issues that are not well controlled or not being treated, mostly it sounds like related to side effects she is having.  She is at increased risk for future cardiovascular events, in fact high risk, and I did talk about this today.  She was brought to my attention for her blood pressures, so I will recommend the following for this.  I would assess for some simple testing for secondary causes.  I would like her to undergo a renal ultrasound to assess for renal artery stenosis.  We can maybe measure aldosterone levels and cortisol levels if this comes back negative.  I would want to refer her back to a Sleep Center for further evaluation of her sleep apnea and treatment if possible, and I have sent her to [facility] under Dr. Efraín Tyler for this, who I believe can be helpful with her obstacles with claustrophobia as well.  She is still hesitant at the dosage of losartan, but she is taking it and she is willing to continue to try it.  I am very hesitant to starting any multiple medications with her because of her intolerances in the past and issues in the past, but I really do feel strongly she should be on a statin with her risk factors and so we talked about this.  We talked about starting another statin, but first we are going to try her on a very  small dose of hydrochlorothiazide at 12.5 mg in addition to the losartan that she is taking.  This is a good combination drug for her, and if she is tolerant of these and they work, we can change it to the pill that has both, the combination pill.  I did go over the potential side effects of hydrochlorothiazide with her, and I only gave her 10 pills as she seems to develop her intolerance quite quickly with these medications.  I do not want to give her too many.  She is instructed to contact me if she is tolerating this.  I have asked her to take some type of sport drink -- with the exception not Gatorade -- something like vitamin water or coconut water to replenish her potassium levels.  If she is tolerant after 10 days and requesting a refill, I will likely want her to get her blood tested for it just to see where we are at with her potassium, and if she happens to tolerate this medicine, we will go on to the next, which will be simvastatin.  She has tried Lipitor and Crestor in the past and that has been in her allergy list or side effect list.  We do have to go through the gamut of statins in order for her insurance to consider the new injectable PCSK9 inhibitors, which I talked to her about today.  She is willing to try these once again, so I did prescribe her simvastatin 10 pills of 5 mg, and once again, she is not going to start this until she knows where she is at with the hydrochlorothiazide, but this will be the next drug I will have her try and work on correcting her mixed hyperlipidemia with.  I have encouraged her to work with her endocrinologist on getting her blood sugars under better control, and we will start with this and work our way through her risk factors to try to get her in a better position in her health with reduced risk, both with her blood pressure and cholesterol to start with.  I will see her back in clinic after she has her sleep study evaluation.  Please feel free to contact me with  any questions you have in regards to her care.  Thank you for allowing me to participate in the care of your nice patient.      cc:   [place] [address]         THIEN BENDER DO             D: 2019   T: 2019   MT: KEN      Name:     HARRY DAMIAN   MRN:      [id number]        Account:      [id number]   :      1960           Service Date: 2019      Document: [id number]         Outpatient Encounter Medications as of 2019   Medication Sig Dispense Refill     acetone, Urine, test STRP 1 strip by In Vitro route daily 50 each 3     blood glucose (NO BRAND SPECIFIED) test strip Freestyle test strips (NOT LITE or INSULINX) to be used with Omnipod pump test 6 times daily 200 each 11     Continuous Blood Gluc  (FREESTYLE FREDDIE 14 DAY READER) MARIA C 1 each continuous 1 Device 0     continuous blood glucose monitoring (FREESTYLE FREDDIE) sensor For use with Freestyle Freddie Flash  for continuous monitioring of blood glucose levels. Replace sensor every 10 days. 1 each 0     hydrochlorothiazide (MICROZIDE) 12.5 MG capsule Take 1 capsule (12.5 mg) by mouth daily 10 capsule 1     Insulin Aspart (INSULIN PUMP - OUTPATIENT)        Insulin Aspart (INSULIN PUMP - OUTPATIENT) Inject Subcutaneous continuous INSULIN PUMP - OUTPATIENT  Date last updated: 2019 Omnipod   BASAL RATES and times:   REGULAR:   12am: 1.60 --6 AM: 1.45 --12pm: 1.3 -- 6pm: 1.45   HIGH 2:   12am: 1.70 --  6 AM: 1.55 -- 12pm: 1.4 -- 6pm: 1.55   ANXIETY 3:   12am: 1.80 -- 6 AM: 1.65 --12pm:1.5--6pm: 1.8  ANXIETY 4:   12am: 1.90 -- 6 AM: 1.65 --6pm: 1.9  AFTERNOON BINGE:   12 AM: 1.8 -- 6 AM: 4.65 --8 PM: 1.8   VERY HIGH 5: 12 AM: 2--6am:1.8--6pm:2   CARB RATIO: 12am-12am: 6   Corection Factor (Sensitivity): 12AM (midnight): 23 -- 5 AM: 25   Target blood glucose: 100-120  Active insulin time: 4 hrs       insulin aspart  (NOVOLOG VIAL) 100 UNITS/ML vial To be used in insulin pump.  units 9 vial 3     LORazepam (ATIVAN) 1 MG tablet Take 0.5-1 tablets (0.5-1 mg) by mouth nightly as needed for anxiety or sleep 15 tablet 0     losartan (COZAAR) 50 MG tablet Take 2 tablets (100 mg) by mouth daily 60 tablet 3     meloxicam (MOBIC) 15 MG tablet Take 15 mg by mouth At Bedtime        polyethylene glycol (MIRALAX/GLYCOLAX) packet Take 17 g by mouth At Bedtime       simvastatin (ZOCOR) 10 MG tablet Take 0.5 tablets (5 mg) by mouth At Bedtime 10 tablet 1     traMADol (ULTRAM) 50 MG tablet Take 100 mg by mouth At Bedtime        ASPIRIN NOT PRESCRIBED (INTENTIONAL) Please choose reason not prescribed, below (Patient not taking: Reported on 5/20/2019) 0 each 0     diclofenac (VOLTAREN) 1 % topical gel Apply topically nightly as needed for moderate pain Apply 4 grams to knees or 2 grams to hands four times daily using enclosed dosing card. 100 g 1     EPINEPHrine (EPIPEN) 0.3 MG/0.3ML injection Inject 0.3 mLs (0.3 mg) into the muscle once as needed for anaphylaxis (Patient not taking: Reported on 5/20/2019) 2 each 0     glucagon (GLUCAGON EMERGENCY) 1 MG kit Inject 1 mg into the muscle once for 1 dose 1 mg 0     NIFEdipine ER OSMOTIC (PROCARDIA XL) 30 MG 24 hr tablet Take 1 tablet (30 mg) by mouth daily (Patient not taking: Reported on 5/20/2019) 30 tablet 0     STATIN NOT PRESCRIBED, INTENTIONAL, Statin not prescribed intentionally due to Intolerance (Patient not taking: Reported on 5/20/2019) 0 each 0     [DISCONTINUED] Continuous Blood Gluc Sensor (FREESTYLE RADHA 14 DAY SENSOR) MIS 1 each every 14 days 2 each 11     [DISCONTINUED] continuous blood glucose monitoring (FREESTYLE RADHA) sensor For use with Freestyle Radha Flash  for continuous monitioring of blood glucose levels. Replace sensor every 10 days. 3 each 11     [DISCONTINUED] losartan (COZAAR) 50 MG tablet Take 1 tablet (50 mg) by mouth daily (Patient taking  differently: Take 100 mg by mouth daily ) 30 tablet 1     No facility-administered encounter medications on file as of 5/20/2019.                    Again, thank you for allowing me to participate in the care of your patient.      Sincerely,    Nyla Jensen,      [facility]

## 2019-05-20 NOTE — PROGRESS NOTES
HPI and Plan:   See dictation    Orders Placed This Encounter   Procedures     US Renal Complete     SLEEP EVALUATION & MANAGEMENT REFERRAL - [facility] [phone]  (Age 18 and up)     Follow-Up with Cardiologist       Orders Placed This Encounter   Medications     simvastatin (ZOCOR) 10 MG tablet     Sig: Take 0.5 tablets (5 mg) by mouth At Bedtime     Dispense:  10 tablet     Refill:  1     hydrochlorothiazide (MICROZIDE) 12.5 MG capsule     Sig: Take 1 capsule (12.5 mg) by mouth daily     Dispense:  10 capsule     Refill:  1     losartan (COZAAR) 50 MG tablet     Sig: Take 2 tablets (100 mg) by mouth daily     Dispense:  60 tablet     Refill:  3       Medications Discontinued During This Encounter   Medication Reason     continuous blood glucose monitoring (FREESTYLE RADHA) sensor Medication Reconciliation Clean Up     Continuous Blood Gluc Sensor (FREESTYLE RADHA 14 DAY SENSOR) MISC Medication Reconciliation Clean Up     losartan (COZAAR) 50 MG tablet Reorder         Encounter Diagnoses   Name Primary?     Diabetes mellitus without complication (H) Yes     Accelerated essential hypertension      Hyperlipidemia, unspecified hyperlipidemia type      Family history of ischemic heart disease      Sleep apnea, unspecified type      Hypertension goal BP (blood pressure) < 140/90        CURRENT MEDICATIONS:  Current Outpatient Medications   Medication Sig Dispense Refill     acetone, Urine, test STRP 1 strip by In Vitro route daily 50 each 3     blood glucose (NO BRAND SPECIFIED) test strip Freestyle test strips (NOT LITE or INSULINX) to be used with Omnipod pump test 6 times daily 200 each 11     Continuous Blood Gluc  (FREESTYLE RADHA 14 DAY READER) MARIA C 1 each continuous 1 Device 0     continuous blood glucose monitoring (FREESTYLE RADHA) sensor For use with Freestyle Radha Flash  for continuous monitioring of blood glucose levels. Replace sensor every 10 days. 1 each  0     hydrochlorothiazide (MICROZIDE) 12.5 MG capsule Take 1 capsule (12.5 mg) by mouth daily 10 capsule 1     Insulin Aspart (INSULIN PUMP - OUTPATIENT)        Insulin Aspart (INSULIN PUMP - OUTPATIENT) Inject Subcutaneous continuous INSULIN PUMP - OUTPATIENT  Date last updated: 4/19/2019 Omnipod   BASAL RATES and times:   REGULAR:   12am: 1.60 --6 AM: 1.45 --12pm: 1.3 -- 6pm: 1.45   HIGH 2:   12am: 1.70 --  6 AM: 1.55 -- 12pm: 1.4 -- 6pm: 1.55   ANXIETY 3:   12am: 1.80 -- 6 AM: 1.65 --12pm:1.5--6pm: 1.8  ANXIETY 4:   12am: 1.90 -- 6 AM: 1.65 --6pm: 1.9  AFTERNOON BINGE:   12 AM: 1.8 -- 6 AM: 4.65 --8 PM: 1.8   VERY HIGH 5: 12 AM: 2--6am:1.8--6pm:2   CARB RATIO: 12am-12am: 6   Corection Factor (Sensitivity): 12AM (midnight): 23 -- 5 AM: 25   Target blood glucose: 100-120  Active insulin time: 4 hrs       insulin aspart (NOVOLOG VIAL) 100 UNITS/ML vial To be used in insulin pump.  units 9 vial 3     LORazepam (ATIVAN) 1 MG tablet Take 0.5-1 tablets (0.5-1 mg) by mouth nightly as needed for anxiety or sleep 15 tablet 0     losartan (COZAAR) 50 MG tablet Take 2 tablets (100 mg) by mouth daily 60 tablet 3     meloxicam (MOBIC) 15 MG tablet Take 15 mg by mouth At Bedtime        polyethylene glycol (MIRALAX/GLYCOLAX) packet Take 17 g by mouth At Bedtime       simvastatin (ZOCOR) 10 MG tablet Take 0.5 tablets (5 mg) by mouth At Bedtime 10 tablet 1     traMADol (ULTRAM) 50 MG tablet Take 100 mg by mouth At Bedtime        ASPIRIN NOT PRESCRIBED (INTENTIONAL) Please choose reason not prescribed, below (Patient not taking: Reported on 5/20/2019) 0 each 0     diclofenac (VOLTAREN) 1 % topical gel Apply topically nightly as needed for moderate pain Apply 4 grams to knees or 2 grams to hands four times daily using enclosed dosing card. 100 g 1     EPINEPHrine (EPIPEN) 0.3 MG/0.3ML injection Inject 0.3 mLs (0.3 mg) into the muscle once as needed for anaphylaxis (Patient not taking: Reported on 5/20/2019) 2 each 0      glucagon (GLUCAGON EMERGENCY) 1 MG kit Inject 1 mg into the muscle once for 1 dose 1 mg 0     NIFEdipine ER OSMOTIC (PROCARDIA XL) 30 MG 24 hr tablet Take 1 tablet (30 mg) by mouth daily (Patient not taking: Reported on 5/20/2019) 30 tablet 0     STATIN NOT PRESCRIBED, INTENTIONAL, Statin not prescribed intentionally due to Intolerance (Patient not taking: Reported on 5/20/2019) 0 each 0       ALLERGIES     Allergies   Allergen Reactions     Amoxicillin Anaphylaxis     Bee Anaphylaxis     Bee sting       Iodine Anaphylaxis     Severe anaphalatic shock       Sulfa Drugs Anaphylaxis     Tetanus-Diphtheria Toxoids      Rxn was to Tdap-whole body joint pain and fever.  Had similar reaction to Td vaccine 7/28/2017     Metoprolol Other (See Comments)     Fatigue, joint pain, throat tightness     Zithromax [Azithromycin Dihydrate] Nausea and Vomiting     Amlodipine      Neuropathic type pain in hands/feet     Atorvastatin      myalgias     Catapres [Clonidine]      Contrast Dye      Crestor [Rosuvastatin]      myalgias     Cyproheptadine      Severe headache, cardiac issues, and dizziness     Humalog [Insulin Lispro]      Causes pancreatitis -      Latex      Skin burn and can't breathe       Lisinopril      Joint aches     Metformin      Nifedipine      Onglyza [Saxagliptin Hydrochloride]      Fibromyalgia flare     Phenergan [Promethazine]      Prochlorperazine      Altered mental status, hallucinations     Reglan [Metoclopramide]      Sumatriptan      Causes severe depression     Tetracycline Nausea and Vomiting, Hives and Difficulty breathing     Pt called to update today after the visit that she is allergic to the tetracycline-added to her list     Sulindac Anxiety     Panic attacks       PAST MEDICAL HISTORY:  Past Medical History:   Diagnosis Date     Ascending aorta enlargement (H)      Depressive disorder     severe, prior hospitalization     Diabetes mellitus, type 2 (H)      Diverticulosis of colon (without  mention of hemorrhage)      Fibromyalgia 2007     Insomnia      Irritable bowel syndrome        PAST SURGICAL HISTORY:  Past Surgical History:   Procedure Laterality Date     C SPINAL ORTHOSIS,NOS  2002    lumbar surgery     choley  2011     HYSTERECTOMY, CERVIX STATUS UNKNOWN      TVH/BSO       FAMILY HISTORY:  Family History   Problem Relation Age of Onset     Diabetes Mother         type 2     Hypertension Mother      Cerebrovascular Disease Mother          stroke age 57     Ovarian Cancer Mother 43     Cancer Mother         cervix     Diabetes Father         type 2     Hypertension Father      Gastrointestinal Disease Father         colon polyps     Breast Cancer Sister      Ovarian Cancer Sister 46     Breast Cancer Sister      Ovarian Cancer Maternal Grandmother 72     Cancer Maternal Grandmother         cervix       SOCIAL HISTORY:  Social History     Socioeconomic History     Marital status:      Spouse name: None     Number of children: 3     Years of education: None     Highest education level: None   Occupational History     Occupation: xr technician     Employer: [employer]   Social Needs     Financial resource strain: None     Food insecurity:     Worry: None     Inability: None     Transportation needs:     Medical: None     Non-medical: None   Tobacco Use     Smoking status: Never Smoker     Smokeless tobacco: Never Used   Substance and Sexual Activity     Alcohol use: Yes     Alcohol/week: 0.0 oz     Comment: maybe once a month     Drug use: No     Sexual activity: Yes     Partners: Male     Birth control/protection: Surgical   Lifestyle     Physical activity:     Days per week: None     Minutes per session: None     Stress: None   Relationships     Social connections:     Talks on phone: None     Gets together: None     Attends Gnosticism service: None     Active member of club or organization: None     Attends meetings of clubs or organizations: None      "Relationship status: None     Intimate partner violence:     Fear of current or ex partner: None     Emotionally abused: None     Physically abused: None     Forced sexual activity: None   Other Topics Concern     Parent/sibling w/ CABG, MI or angioplasty before 65F 55M? Not Asked   Social History Narrative     None       Review of Systems:  Skin:  Positive for pigmentation warts and mole color changes    Eyes:  Positive for glasses    ENT:  Positive for nasal congestion    Respiratory:  Positive for shortness of breath;cough;wheezing;dyspnea on exertion     Cardiovascular:    Positive for;palpitations;chest pain;edema;fatigue;lightheadedness;dizziness feet and hands more at the end of the day   Gastroenterology: Positive for nausea;excessive gas or bloating    Genitourinary:         Musculoskeletal:  Positive for back pain;neck pain;arthritis;joint pain;fibromyalgia    Neurologic:  Positive for headaches    Psychiatric:  Positive for anxiety    Heme/Lymph/Imm:  Positive for allergies    Endocrine:  Positive for diabetes      Physical Exam:  Vitals: /82 (BP Location: Right arm, Patient Position: Sitting, Cuff Size: Adult Large)   Pulse 70   Ht 1.626 m (5' 4.02\")   Wt 91.2 kg (201 lb)   LMP 01/01/1999   SpO2 97%   BMI 34.48 kg/m      Constitutional:  cooperative;in no acute distress        Skin:  warm and dry to the touch          Head:  normocephalic        Eyes:  pupils equal and round        Lymph:      ENT:  no pallor or cyanosis        Neck:  carotid pulses are full and equal bilaterally        Respiratory:  clear to auscultation;normal symmetry         Cardiac: regular rhythm       systolic ejection murmur        pulses full and equal                                        GI:  abdomen soft;no bruits        Extremities and Muscular Skeletal:  no deformities, clubbing, cyanosis, erythema observed;no edema              Neurological:  no gross motor deficits;affect appropriate        Psych:  Alert and " Oriented x 3          CC  Annamaria Erickson MD  [address] [place] [address]

## 2019-05-20 NOTE — PROGRESS NOTES
Service Date: 2019      REFERRING PHYSICIAN:  Dr. Annamaria Erickson.      HISTORY OF PRESENT ILLNESS:  Ms. Dotson is a very pleasant 58-year-old female who has been referred to our clinic today due to accelerated hypertension.  In reading through her chart, she has had a diagnosis of high blood pressure since this last fall and been tried on a couple of medications but has had some severe reactions.  In fact, she was hospitalized in April for hypertensive urgency and had some hallucinations and very severely elevated blood pressures with systolics over 200, diastolics over 110.  She has had reaction to amlodipine, clonidine, lisinopril and nifedipine.  She is tolerating Cozaar or losartan currently and she has titrated this up to 100 mg.  She initially told me that she had hives when she started the 100 mg strength and reduced it back to 50, but I guess she is still taking 100 mg.  She is a little cautious, however, about obtaining a full script for this, as she is concerned about the possibility of side effect yet with this medication.  Nevertheless, she is taking it and her blood pressures have been better.  She did note that when she first increased it her blood pressures actually went up, but now they have started to come down again.  She has several risk factors for heart disease.  She has a very strong family history.  Father had a myocardial infarction at age 62.  Her mom had one at 48 and  at age 57 from heart disease.  She has mixed hyperlipidemia and has tried Lipitor and Crestor in the past, both with severe myalgias within 24 hours of starting.  Because of this, she has been very reluctant to trying any others.  She has insulin-dependent diabetes and has been on medication for diabetes for over 25 years.  She had gestational diabetes and preeclampsia a long time ago -- that was over 40 years ago.  Her last hemoglobin A1c was measured at 8.5.  This was .  She says normally she has been under  better control but lately with all of the blood pressure issues has had a lot of trouble.  When she was hospitalized, she was tried on several medications, but I do not see that they did any secondary hypertensive workup.  She denies ever having imaging of her abdomen.  I do not see any testing for aldosterone or cortisol levels.  Lastly, it sounds like she was seen by Dr. Xiong for sleep apnea and was recommended to wear a CPAP mask, but she is severely claustrophobic, so she never followed up with her.  I could not gather whether she had mild, moderate or severe, but this certainly could be causing a lot of difficulty with her blood pressure as well, so I convinced her to go back to a different Sleep Center where they can not only assess the degree of her sleep apnea but hopefully treat her symptoms despite her claustrophobia.  I did review her EKG from 04/25.  This shows a normal sinus rhythm with flattened T waves that are nonspecific.  She does have criteria for LVH.  She has had ischemic testing here as when she presented with accelerated hypertension she was having some chest tightness.  Myocardial perfusion imaging study on 04/19 was normal with normal ejection fraction.  Her echocardiogram suggested mild concentric hypertrophy, normal LV function with an EF 55%-60%.  RV function appeared normal.  We could not estimate her pulmonary pressures due to inadequate tricuspid regurgitation.  There was some mild sclerosis of her aortic valve, but no significant incompetence or insufficiencies for her valves were noted.      PHYSICAL EXAMINATION:   VITAL SIGNS:  On exam today, blood pressure was measured at 144/82, pulse is 70, weight 201 with a body mass index of 34.   NECK:  Carotid upstrokes seem brisk without bruit.  I did not appreciate any jugular venous distention.   CARDIOVASCULAR:  Tones were regular.  I did hear a soft systolic murmur at the right upper sternal border.   LUNGS:  Clear posteriorly.    EXTREMITIES:  She has strong and symmetric pulses in both the upper and lower extremities without peripheral edema.      SUMMARY:  Ms. Dotson is a very pleasant 58-year-old female with accelerated hypertension, mixed hyperlipidemia untreated, insulin-dependent diabetes not well controlled, strong family history of premature coronary artery disease and untreated sleep apnea.  I am really concerned about all of her health issues that are not well controlled or not being treated, mostly it sounds like related to side effects she is having.  She is at increased risk for future cardiovascular events, in fact high risk, and I did talk about this today.  She was brought to my attention for her blood pressures, so I will recommend the following for this.  I would assess for some simple testing for secondary causes.  I would like her to undergo a renal ultrasound to assess for renal artery stenosis.  We can maybe measure aldosterone levels and cortisol levels if this comes back negative.  I would want to refer her back to a Sleep Center for further evaluation of her sleep apnea and treatment if possible, and I have sent her to [facility] under Dr. Efraín Tyler for this, who I believe can be helpful with her obstacles with claustrophobia as well.  She is still hesitant at the dosage of losartan, but she is taking it and she is willing to continue to try it.  I am very hesitant to starting any multiple medications with her because of her intolerances in the past and issues in the past, but I really do feel strongly she should be on a statin with her risk factors and so we talked about this.  We talked about starting another statin, but first we are going to try her on a very small dose of hydrochlorothiazide at 12.5 mg in addition to the losartan that she is taking.  This is a good combination drug for her, and if she is tolerant of these and they work, we can change it to the pill that has both, the  combination pill.  I did go over the potential side effects of hydrochlorothiazide with her, and I only gave her 10 pills as she seems to develop her intolerance quite quickly with these medications.  I do not want to give her too many.  She is instructed to contact me if she is tolerating this.  I have asked her to take some type of sport drink -- with the exception not Gatorade -- something like vitamin water or coconut water to replenish her potassium levels.  If she is tolerant after 10 days and requesting a refill, I will likely want her to get her blood tested for it just to see where we are at with her potassium, and if she happens to tolerate this medicine, we will go on to the next, which will be simvastatin.  She has tried Lipitor and Crestor in the past and that has been in her allergy list or side effect list.  We do have to go through the gamut of statins in order for her insurance to consider the new injectable PCSK9 inhibitors, which I talked to her about today.  She is willing to try these once again, so I did prescribe her simvastatin 10 pills of 5 mg, and once again, she is not going to start this until she knows where she is at with the hydrochlorothiazide, but this will be the next drug I will have her try and work on correcting her mixed hyperlipidemia with.  I have encouraged her to work with her endocrinologist on getting her blood sugars under better control, and we will start with this and work our way through her risk factors to try to get her in a better position in her health with reduced risk, both with her blood pressure and cholesterol to start with.  I will see her back in clinic after she has her sleep study evaluation.  Please feel free to contact me with any questions you have in regards to her care.  Thank you for allowing me to participate in the care of your nice patient.      cc:   Annamaria Erickson MD    [facility]    [address]  [place]  [address]         THIEN BENDER DO             D: 2019   T: 2019   MT: KEN      Name:     HARRY DAMIAN   MRN:      [id number]        Account:      [id number]   :      1960           Service Date: 2019      Document: [id number]

## 2019-05-20 NOTE — PATIENT INSTRUCTIONS
START hydrochlorothiazide 12.5MG ONCE DAILY (AM) FOR BLOOD PRESSURE.   DRINK A VITAMIN WATER, COCONUT WATER OR SIMILAR (NOT GATORADE) BECAUSE IT MIGHT DEPLETE YOUR BODY OF POTASSIUM  CALL ME IF YOU TOLERATE FOR MORE PILLS OR IF YOU HAVE SIDE EFFECT    NEXT, TRY SIMVASTATIN FOR CHOLESTEROL, SIMILAR SIDE EFFECT PROFILE TO OTHERS, CALL IF YOU TOLERATE OR IF NOT, WILL TRY ANOTHER. WE NEED TO FAIL ALL BEFORE INJECTABLES CAN BE COVERED.

## 2019-05-20 NOTE — LETTER
5/20/2019    Annamaria Erickson MD  [address] [place] [address]    RE: Maricarmen Dotson       Dear Colleague,    I had the pleasure of seeing Maricarmen Dotson in the [facility].    HPI and Plan:   See dictation    Orders Placed This Encounter   Procedures     US Renal Complete     SLEEP EVALUATION & MANAGEMENT REFERRAL - [facility] [phone]  (Age 18 and up)     Follow-Up with Cardiologist       Orders Placed This Encounter   Medications     simvastatin (ZOCOR) 10 MG tablet     Sig: Take 0.5 tablets (5 mg) by mouth At Bedtime     Dispense:  10 tablet     Refill:  1     hydrochlorothiazide (MICROZIDE) 12.5 MG capsule     Sig: Take 1 capsule (12.5 mg) by mouth daily     Dispense:  10 capsule     Refill:  1     losartan (COZAAR) 50 MG tablet     Sig: Take 2 tablets (100 mg) by mouth daily     Dispense:  60 tablet     Refill:  3       Medications Discontinued During This Encounter   Medication Reason     continuous blood glucose monitoring (FREESTYLE RADHA) sensor Medication Reconciliation Clean Up     Continuous Blood Gluc Sensor (FREESTYLE RADHA 14 DAY SENSOR) MISC Medication Reconciliation Clean Up     losartan (COZAAR) 50 MG tablet Reorder         Encounter Diagnoses   Name Primary?     Diabetes mellitus without complication (H) Yes     Accelerated essential hypertension      Hyperlipidemia, unspecified hyperlipidemia type      Family history of ischemic heart disease      Sleep apnea, unspecified type      Hypertension goal BP (blood pressure) < 140/90        CURRENT MEDICATIONS:  Current Outpatient Medications   Medication Sig Dispense Refill     acetone, Urine, test STRP 1 strip by In Vitro route daily 50 each 3     blood glucose (NO BRAND SPECIFIED) test strip Freestyle test strips (NOT LITE or INSULINX) to be used with Omnipod pump test 6 times daily 200 each 11     Continuous Blood Gluc  (FREESTYLE RADHA 14 DAY READER)  MARIA C 1 each continuous 1 Device 0     continuous blood glucose monitoring (FREESTYLE RADHA) sensor For use with Freestyle Radha Flash  for continuous monitioring of blood glucose levels. Replace sensor every 10 days. 1 each 0     hydrochlorothiazide (MICROZIDE) 12.5 MG capsule Take 1 capsule (12.5 mg) by mouth daily 10 capsule 1     Insulin Aspart (INSULIN PUMP - OUTPATIENT)        Insulin Aspart (INSULIN PUMP - OUTPATIENT) Inject Subcutaneous continuous INSULIN PUMP - OUTPATIENT  Date last updated: 4/19/2019 Omnipod   BASAL RATES and times:   REGULAR:   12am: 1.60 --6 AM: 1.45 --12pm: 1.3 -- 6pm: 1.45   HIGH 2:   12am: 1.70 --  6 AM: 1.55 -- 12pm: 1.4 -- 6pm: 1.55   ANXIETY 3:   12am: 1.80 -- 6 AM: 1.65 --12pm:1.5--6pm: 1.8  ANXIETY 4:   12am: 1.90 -- 6 AM: 1.65 --6pm: 1.9  AFTERNOON BINGE:   12 AM: 1.8 -- 6 AM: 4.65 --8 PM: 1.8   VERY HIGH 5: 12 AM: 2--6am:1.8--6pm:2   CARB RATIO: 12am-12am: 6   Corection Factor (Sensitivity): 12AM (midnight): 23 -- 5 AM: 25   Target blood glucose: 100-120  Active insulin time: 4 hrs       insulin aspart (NOVOLOG VIAL) 100 UNITS/ML vial To be used in insulin pump.  units 9 vial 3     LORazepam (ATIVAN) 1 MG tablet Take 0.5-1 tablets (0.5-1 mg) by mouth nightly as needed for anxiety or sleep 15 tablet 0     losartan (COZAAR) 50 MG tablet Take 2 tablets (100 mg) by mouth daily 60 tablet 3     meloxicam (MOBIC) 15 MG tablet Take 15 mg by mouth At Bedtime        polyethylene glycol (MIRALAX/GLYCOLAX) packet Take 17 g by mouth At Bedtime       simvastatin (ZOCOR) 10 MG tablet Take 0.5 tablets (5 mg) by mouth At Bedtime 10 tablet 1     traMADol (ULTRAM) 50 MG tablet Take 100 mg by mouth At Bedtime        ASPIRIN NOT PRESCRIBED (INTENTIONAL) Please choose reason not prescribed, below (Patient not taking: Reported on 5/20/2019) 0 each 0     diclofenac (VOLTAREN) 1 % topical gel Apply topically nightly as needed for moderate pain Apply 4 grams to knees or 2 grams to hands  four times daily using enclosed dosing card. 100 g 1     EPINEPHrine (EPIPEN) 0.3 MG/0.3ML injection Inject 0.3 mLs (0.3 mg) into the muscle once as needed for anaphylaxis (Patient not taking: Reported on 5/20/2019) 2 each 0     glucagon (GLUCAGON EMERGENCY) 1 MG kit Inject 1 mg into the muscle once for 1 dose 1 mg 0     NIFEdipine ER OSMOTIC (PROCARDIA XL) 30 MG 24 hr tablet Take 1 tablet (30 mg) by mouth daily (Patient not taking: Reported on 5/20/2019) 30 tablet 0     STATIN NOT PRESCRIBED, INTENTIONAL, Statin not prescribed intentionally due to Intolerance (Patient not taking: Reported on 5/20/2019) 0 each 0       ALLERGIES     Allergies   Allergen Reactions     Amoxicillin Anaphylaxis     Bee Anaphylaxis     Bee sting       Iodine Anaphylaxis     Severe anaphalatic shock       Sulfa Drugs Anaphylaxis     Tetanus-Diphtheria Toxoids      Rxn was to Tdap-whole body joint pain and fever.  Had similar reaction to Td vaccine 7/28/2017     Metoprolol Other (See Comments)     Fatigue, joint pain, throat tightness     Zithromax [Azithromycin Dihydrate] Nausea and Vomiting     Amlodipine      Neuropathic type pain in hands/feet     Atorvastatin      myalgias     Catapres [Clonidine]      Contrast Dye      Crestor [Rosuvastatin]      myalgias     Cyproheptadine      Severe headache, cardiac issues, and dizziness     Humalog [Insulin Lispro]      Causes pancreatitis -      Latex      Skin burn and can't breathe       Lisinopril      Joint aches     Metformin      Nifedipine      Onglyza [Saxagliptin Hydrochloride]      Fibromyalgia flare     Phenergan [Promethazine]      Prochlorperazine      Altered mental status, hallucinations     Reglan [Metoclopramide]      Sumatriptan      Causes severe depression     Tetracycline Nausea and Vomiting, Hives and Difficulty breathing     Pt called to update today after the visit that she is allergic to the tetracycline-added to her list     Sulindac Anxiety     Panic attacks       PAST  MEDICAL HISTORY:  Past Medical History:   Diagnosis Date     Ascending aorta enlargement (H)      Depressive disorder     severe, prior hospitalization     Diabetes mellitus, type 2 (H)      Diverticulosis of colon (without mention of hemorrhage)      Fibromyalgia 2007     Insomnia      Irritable bowel syndrome        PAST SURGICAL HISTORY:  Past Surgical History:   Procedure Laterality Date     C SPINAL ORTHOSIS,NOS      lumbar surgery     choley  2011     HYSTERECTOMY, CERVIX STATUS UNKNOWN      TVH/BSO       FAMILY HISTORY:  Family History   Problem Relation Age of Onset     Diabetes Mother         type 2     Hypertension Mother      Cerebrovascular Disease Mother          stroke age 57     Ovarian Cancer Mother 43     Cancer Mother         cervix     Diabetes Father         type 2     Hypertension Father      Gastrointestinal Disease Father         colon polyps     Breast Cancer Sister      Ovarian Cancer Sister 46     Breast Cancer Sister      Ovarian Cancer Maternal Grandmother 72     Cancer Maternal Grandmother         cervix       SOCIAL HISTORY:  Social History     Socioeconomic History     Marital status:      Spouse name: None     Number of children: 3     Years of education: None     Highest education level: None   Occupational History     Occupation: xr technician     Employer: [employer]   Social Needs     Financial resource strain: None     Food insecurity:     Worry: None     Inability: None     Transportation needs:     Medical: None     Non-medical: None   Tobacco Use     Smoking status: Never Smoker     Smokeless tobacco: Never Used   Substance and Sexual Activity     Alcohol use: Yes     Alcohol/week: 0.0 oz     Comment: maybe once a month     Drug use: No     Sexual activity: Yes     Partners: Male     Birth control/protection: Surgical   Lifestyle     Physical activity:     Days per week: None     Minutes per session: None     Stress: None  "  Relationships     Social connections:     Talks on phone: None     Gets together: None     Attends Latter day service: None     Active member of club or organization: None     Attends meetings of clubs or organizations: None     Relationship status: None     Intimate partner violence:     Fear of current or ex partner: None     Emotionally abused: None     Physically abused: None     Forced sexual activity: None   Other Topics Concern     Parent/sibling w/ CABG, MI or angioplasty before 65F 55M? Not Asked   Social History Narrative     None       Review of Systems:  Skin:  Positive for pigmentation warts and mole color changes    Eyes:  Positive for glasses    ENT:  Positive for nasal congestion    Respiratory:  Positive for shortness of breath;cough;wheezing;dyspnea on exertion     Cardiovascular:    Positive for;palpitations;chest pain;edema;fatigue;lightheadedness;dizziness feet and hands more at the end of the day   Gastroenterology: Positive for nausea;excessive gas or bloating    Genitourinary:         Musculoskeletal:  Positive for back pain;neck pain;arthritis;joint pain;fibromyalgia    Neurologic:  Positive for headaches    Psychiatric:  Positive for anxiety    Heme/Lymph/Imm:  Positive for allergies    Endocrine:  Positive for diabetes      Physical Exam:  Vitals: /82 (BP Location: Right arm, Patient Position: Sitting, Cuff Size: Adult Large)   Pulse 70   Ht 1.626 m (5' 4.02\")   Wt 91.2 kg (201 lb)   LMP 01/01/1999   SpO2 97%   BMI 34.48 kg/m       Constitutional:  cooperative;in no acute distress        Skin:  warm and dry to the touch          Head:  normocephalic        Eyes:  pupils equal and round        Lymph:      ENT:  no pallor or cyanosis        Neck:  carotid pulses are full and equal bilaterally        Respiratory:  clear to auscultation;normal symmetry         Cardiac: regular rhythm       systolic ejection murmur        pulses full and equal                                    "     GI:  abdomen soft;no bruits        Extremities and Muscular Skeletal:  no deformities, clubbing, cyanosis, erythema observed;no edema              Neurological:  no gross motor deficits;affect appropriate        Psych:  Alert and Oriented x 3          CC  Annamaria Erickson MD  [address] [place] [address] [place] [address]                    Thank you for allowing me to participate in the care of your patient.      Sincerely,     Nyla Jensen,      [facility]    cc:   Annamaria Erickson MD  [address] [place] [address] [place] [address]

## 2019-06-04 ENCOUNTER — TELEPHONE (OUTPATIENT)
Dept: CARDIOLOGY | Facility: CLINIC | Age: 59
End: 2019-06-04

## 2019-06-04 DIAGNOSIS — F41.9 ANXIETY: ICD-10-CM

## 2019-06-04 DIAGNOSIS — G47.00 INSOMNIA, UNSPECIFIED TYPE: ICD-10-CM

## 2019-06-04 NOTE — TELEPHONE ENCOUNTER
Left detailed voicemail for pt regarding MyChart messages. Reviewed messages with Dr. Jensen and she said that the pt should be seen in the clinic again to be evaluated better. Left scheduling's number and Team 1's number for pt to call back and schedule an appt.

## 2019-06-05 RX ORDER — LORAZEPAM 1 MG/1
TABLET ORAL
Qty: 15 TABLET | Refills: 0 | Status: SHIPPED | OUTPATIENT
Start: 2019-06-05 | End: 2019-07-20

## 2019-06-05 NOTE — TELEPHONE ENCOUNTER
Requested Prescriptions   Pending Prescriptions Disp Refills     LORazepam (ATIVAN) 1 MG tablet [Pharmacy Med Name: LORAZEPAM 1MG TABS]        Last Written Prescription Date:  4/29/2019  Last Fill Quantity: 15,   # refills: 0  Last Office Visit: 4/29/2019  Future Office visit:    Next 5 appointments (look out 90 days)    Jun 06, 2019  1:00 PM CDT  Return Visit with MARICRUZ Castillo CNP  [facility] ([facility] [address] [facility] [address] [phone]   Aug 26, 2019 10:45 AM CDT  Return Visit with Nyla Jensen DO  [facility] ([facility]) [address] [address] [address] [phone]           Routing refill request to provider for review/approval because:  Drug not on the [facility] or [facility] refill protocol or controlled substance   15 tablet 0     Sig: TAKE ONE-HALF TO ONE TABLET BY MOUTH NIGHTLY AS NEEDED FOR ANXIETY OR SLEEP       There is no refill protocol information for this order

## 2019-06-05 NOTE — TELEPHONE ENCOUNTER
** checked, Patient has been receiving Tramadol HCl 50 mg from Dr Tyson Mcadams-ongoing in the last year refills monthly for #90-last refilled Tramadol for #90 on 5/21, last refilled 4/29 for #15**    Ela Love, RN  Message handled by Nurse Triage.

## 2019-06-06 ENCOUNTER — OFFICE VISIT (OUTPATIENT)
Dept: ENDOCRINOLOGY | Facility: CLINIC | Age: 59
End: 2019-06-06
Payer: COMMERCIAL

## 2019-06-06 VITALS
TEMPERATURE: 98.5 F | OXYGEN SATURATION: 97 % | DIASTOLIC BLOOD PRESSURE: 72 MMHG | BODY MASS INDEX: 34.18 KG/M2 | SYSTOLIC BLOOD PRESSURE: 124 MMHG | HEART RATE: 64 BPM | WEIGHT: 199.2 LBS | RESPIRATION RATE: 14 BRPM

## 2019-06-06 DIAGNOSIS — E11.65 TYPE 2 DIABETES MELLITUS WITH HYPERGLYCEMIA, WITH LONG-TERM CURRENT USE OF INSULIN (H): Primary | ICD-10-CM

## 2019-06-06 DIAGNOSIS — Z79.4 TYPE 2 DIABETES MELLITUS WITH HYPERGLYCEMIA, WITH LONG-TERM CURRENT USE OF INSULIN (H): Primary | ICD-10-CM

## 2019-06-06 DIAGNOSIS — Z96.41 INSULIN PUMP IN PLACE: ICD-10-CM

## 2019-06-06 PROCEDURE — 99214 OFFICE O/P EST MOD 30 MIN: CPT | Performed by: CLINICAL NURSE SPECIALIST

## 2019-06-06 NOTE — PATIENT INSTRUCTIONS
In an effort to stay on schedule, please remember to check in for your next clinic appointment 15-20 minutes early.  This is necessary so your insulin pump/blood glucose meter or CGM can be uploaded and any labs can be done if necessary.  We appreciate your understanding.    Labs to recheck A1c and thyroid function 7/18 or later.

## 2019-06-06 NOTE — LETTER
6/6/2019         RE: Maricarmen Dotson  [address]  [place] [address]        Dear Colleague,    Thank you for referring your patient, Maricarmen Doston, to the [facility]. Please see a copy of my visit note below.    Name: Maricarmen Dotson  F/u for Diabetes (Last seen 12/27/2018).  HPI:  Maricarmen Dotson is a 58 year old female who presents for the management of:    1. Type 2 DM:  Originally diagnosed: in 1997 after starting Paxil and gaining 32 lbs in one month    Insulin pump start 2/20/2018.    Blood sugar range:   Has issues with skin irritation after one week wearing the Freddie.    Current Regimen:   Insulin pump - Omnipod  Currently using pattern 3    Standard pattern  Time Rate (U/hr)   0000 1.600   06:00 1.450   12:00 1.300   18:00 1.450     Pattern 2 (anxiety)  Time Rate (U/hr)   0000 1.700   06:00 1.550   12:00 1.400   18:00 1.550     Pattern 3 (crazy sugars)  Time Rate (U/hr)   0000 1.800   06:00 1.650   12:00 1.500   18:00 1.800     Pattern 4   Time Rate (U/hr)   0000 1.900   06:00 1.650   18:00 1.900     Pattern 5   Time Rate (U/hr)   0000 2.000   06:00 1.800   18:00 2.000     Carbohydrate Ratio -    Time Ratio   0000 6         Sensitivity   00:00  05:00    23  25   Active Insulin Time  4 hours   Basal  62% (34.7 units)   Bolus   38% (21.6 units)   Total Carbohydrates/day 69 g   Total Insulin/day  56.3 units   Average Blood Sugar  BG range 182     BS Checks 4.5 times a day   Target range 100-120       REPORTS PREVIOUS ADVERSE REACTION TO HUMALOG.  States she cannot take Humalog because it caused pancreatits.  Continues to struggle with a binge eating disorder.    Was previously treated by endo at [facility] (last seen there 11/6/2015).  She has had adverse reaction to most oral medications including Metformin (abdominal pain), glipizide (allergic type reaction), Avandia (abdominal pain and swelling), Byetta and Onglyza (pancreatitis).  Was previously prescribed Invokana but  did not start because she was concerned about possible side effects.        History of eating disorder (binge eating) due to PTSD.  Now working with a new counselor.        Complications:   Diabetes Complications  Description / Detail    Diabetic Retinopathy   No retinopathy, last exam 2019   CAD / PAD   No   Neuropathy   No   Nephropathy / Microalbuminuria   No   Gastroparesis  No   Hypoglycemia Unawareness  No     2. Intermittent Hypertension: Blood Pressure today:   BP Readings from Last 3 Encounters:   19 124/72   19 144/82   19 120/76   .  Blood pressure medications include no medications.    3. Lipids: Takes no medications for lipid control.        PMH/PSH:  Past Medical History:   Diagnosis Date     Ascending aorta enlargement (H)      Depressive disorder     severe, prior hospitalization     Diabetes mellitus, type 2 (H)      Diverticulosis of colon (without mention of hemorrhage)      Fibromyalgia 2007     Insomnia      Irritable bowel syndrome      Past Surgical History:   Procedure Laterality Date     C SPINAL ORTHOSIS,NOS      lumbar surgery     choley       HYSTERECTOMY, CERVIX STATUS UNKNOWN      TVH/BSO     Family Hx:  Family History   Problem Relation Age of Onset     Diabetes Mother         type 2     Hypertension Mother      Cerebrovascular Disease Mother          stroke age 57     Ovarian Cancer Mother 43     Cancer Mother         cervix     Diabetes Father         type 2     Hypertension Father      Gastrointestinal Disease Father         colon polyps     Breast Cancer Sister      Ovarian Cancer Sister 46     Breast Cancer Sister      Ovarian Cancer Maternal Grandmother 72     Cancer Maternal Grandmother         cervix     Thyroid disease:          DM2: Yes, mother and father         Autoimmune: DM1, SLE, RA, Vitiligo     Social Hx:  Social History     Socioeconomic History     Marital status:      Spouse name: Not on file     Number of children:  3     Years of education: Not on file     Highest education level: Not on file   Occupational History     Occupation: xr technician     Employer: [employer]   Social Needs     Financial resource strain: Not on file     Food insecurity:     Worry: Not on file     Inability: Not on file     Transportation needs:     Medical: Not on file     Non-medical: Not on file   Tobacco Use     Smoking status: Never Smoker     Smokeless tobacco: Never Used   Substance and Sexual Activity     Alcohol use: Yes     Alcohol/week: 0.0 oz     Comment: maybe once a month     Drug use: No     Sexual activity: Yes     Partners: Male     Birth control/protection: Surgical   Lifestyle     Physical activity:     Days per week: Not on file     Minutes per session: Not on file     Stress: Not on file   Relationships     Social connections:     Talks on phone: Not on file     Gets together: Not on file     Attends Mormonism service: Not on file     Active member of club or organization: Not on file     Attends meetings of clubs or organizations: Not on file     Relationship status: Not on file     Intimate partner violence:     Fear of current or ex partner: Not on file     Emotionally abused: Not on file     Physically abused: Not on file     Forced sexual activity: Not on file   Other Topics Concern     Parent/sibling w/ CABG, MI or angioplasty before 65F 55M? Not Asked   Social History Narrative     Not on file          MEDICATIONS:  has a current medication list which includes the following prescription(s): acetone urine, aspirin not prescribed, blood glucose, freestyle ashleigh 14 day reader, continuous blood glucose monitoring, voltaren, epinephrine, glucagon, hydrochlorothiazide, insulin aspart, insulin aspart, insulin aspart, lorazepam, losartan, meloxicam, nifedipine er osmotic, polyethylene glycol, simvastatin, statin not prescribed, and tramadol.    ROS     ROS: 10 point ROS neg other than the symptoms noted above in  the HPI.    Physical Exam   VS: /72 (BP Location: Left arm, Patient Position: Chair, Cuff Size: Adult Large)   Pulse 64   Temp 98.5  F (36.9  C) (Oral)   Resp 14   Wt 90.4 kg (199 lb 3.2 oz)   LMP 01/01/1999   SpO2 97%   Breastfeeding? No   BMI 34.18 kg/m     GENERAL: NAD, well dressed, answering questions appropriately, appears stated age.  HEENT: no exophthalmos, no proptosis, no lig lag, no retraction, no scleral icterus  RESPIRATORY: Clear bilaterally. Normal respiratory effort.  CARDIOVASCULAR: RRR.  EXTREMTIES: No edema.  NEUROLOGY: CN grossly intact, no tremors  MSK: grossly intact, No digital cyanosis. Normal gait and station.  PSYCH: Intact judgment and insight. A&OX3 with a cordial affect.    LABS:  A1c:   Component      Latest Ref Rng & Units 1/16/2018 6/26/2018 4/18/2019   Hemoglobin A1C      0 - 5.6 % 9.4 (H) 8.7 (H) 8.5 (H)     Basic Metabolic Panel:  !COMPREHENSIVE Latest Ref Rng & Units 5/13/2019   SODIUM 133 - 144 mmol/L 139   POTASSIUM 3.4 - 5.3 mmol/L 3.7   CHLORIDE 94 - 109 mmol/L 108   BUN 7 - 30 mg/dL 17   Creatinine 0.52 - 1.04 mg/dL    Creatinine 0.52 - 1.04 mg/dL 0.77   Glucose 70 - 99 mg/dL 183 (H)   ANION GAP 3 - 14 mmol/L 4   CALCIUM 8.5 - 10.1 mg/dL 9.2     LFTS/Cholesterol Panel:  !LIPID/HEPATIC Latest Ref Rng & Units 12/13/2018   CHOLESTEROL <200 mg/dL 262 (H)   TRIGLYCERIDES <150 mg/dL 168 (H)   HDL CHOLESTEROL >49 mg/dL 50   LDL CHOLESTEROL DIRECT <100 mg/dL    LDL CHOLESTEROL, CALCULATED <100 mg/dL 178 (H)   VLDL-CHOLESTEROL 0 - 30 mg/dL    NON HDL CHOLESTEROL <130 mg/dL 212 (H)     !LIPID/HEPATIC Latest Ref Rng & Units 10/11/2018   AST 0 - 40 IU/L 27   ALT 0 - 32 IU/L 21     Thyroid Function:   !THYROID Latest Ref Rng & Units 6/26/2018   TSH 0.40 - 4.00 mU/L 0.83     Urine Microalbumin:  Component      Latest Ref Rng & Units 4/23/2018   Creatinine Urine      mg/dL 63   Albumin Urine mg/L      mg/L 12   Albumin Urine mg/g Cr      0 - 25 mg/g Cr 19.37     Vitamin D  Deficiency screening    30 - 75 ug/L 34     All pertinent notes, labs, and images personally reviewed by me.     A/P  Ms.Anna ROXANNE Dotson is a 58 year old here for the evaluation/management of diabetes:    1. DM2 - Uncontrolled - continues to improve.    Currently on insulin pump therapy.    Glucose running higher evenings and overnight.  Will increase 6 pm to 6 am basal rate.  Pattern 3 (crazy sugars)  Time Rate (U/hr)   0000 1.800-->1.85   06:00 1.650   12:00 1.500   18:00 1.800-->1.850     Has Freddie but hasn't started using it yet.  Referral to diabetes ed provided for help starting sensor.  Continue to work with diabetes ed weekly for ongoing pump adjustments.  Lab-only appointment 7/18 for A1c and TFT's.    2.  Intermittent  Hypertension - Currently untreated. Now working with cardiology.    3. Hyperlipidemia - Currently untreated-? Statin intolerance.  Now working with cardiology.    Labs ordered today:   Orders Placed This Encounter   Procedures     **A1C FUTURE anytime     TSH     T4 FREE     DIABETES EDUCATOR REFERRAL   Urine microalbumin     Radiology/Consults ordered today: DIABETES EDUCATOR REFERRAL    All questions were answered.  The patient indicates understanding of the above issues and agrees with the plan set forth.  Total face to face time discussing diabetes treatment and target BG levels was greater than or equal to 25 minutes.       Follow-up:  4 months    Emily Phan NP  Endocrinology  [facility]  CC: Annamaria Erickson                   Again, thank you for allowing me to participate in the care of your patient.        Sincerely,        MARICRUZ Castillo CNP

## 2019-06-06 NOTE — PROGRESS NOTES
Name: Maricarmen Dotson  F/u for Diabetes (Last seen 12/27/2018).  HPI:  Maricarmen Dotson is a 58 year old female who presents for the management of:    1. Type 2 DM:  Originally diagnosed: in 1997 after starting Paxil and gaining 32 lbs in one month    Insulin pump start 2/20/2018.    Blood sugar range:   Has issues with skin irritation after one week wearing the Freddie.    Current Regimen:   Insulin pump - Omnipod  Currently using pattern 3    Standard pattern  Time Rate (U/hr)   0000 1.600   06:00 1.450   12:00 1.300   18:00 1.450     Pattern 2 (anxiety)  Time Rate (U/hr)   0000 1.700   06:00 1.550   12:00 1.400   18:00 1.550     Pattern 3 (crazy sugars)  Time Rate (U/hr)   0000 1.800   06:00 1.650   12:00 1.500   18:00 1.800     Pattern 4   Time Rate (U/hr)   0000 1.900   06:00 1.650   18:00 1.900     Pattern 5   Time Rate (U/hr)   0000 2.000   06:00 1.800   18:00 2.000     Carbohydrate Ratio -    Time Ratio   0000 6         Sensitivity   00:00  05:00    23  25   Active Insulin Time  4 hours   Basal  62% (34.7 units)   Bolus   38% (21.6 units)   Total Carbohydrates/day 69 g   Total Insulin/day  56.3 units   Average Blood Sugar  BG range 182     BS Checks 4.5 times a day   Target range 100-120       REPORTS PREVIOUS ADVERSE REACTION TO HUMALOG.  States she cannot take Humalog because it caused pancreatits.  Continues to struggle with a binge eating disorder.    Was previously treated by endo at [facility] (last seen there 11/6/2015).  She has had adverse reaction to most oral medications including Metformin (abdominal pain), glipizide (allergic type reaction), Avandia (abdominal pain and swelling), Byetta and Onglyza (pancreatitis).  Was previously prescribed Invokana but did not start because she was concerned about possible side effects.        History of eating disorder (binge eating) due to PTSD.  Now working with a new counselor.        Complications:   Diabetes Complications  Description / Detail     Diabetic Retinopathy   No retinopathy, last exam 2019   CAD / PAD   No   Neuropathy   No   Nephropathy / Microalbuminuria   No   Gastroparesis  No   Hypoglycemia Unawareness  No     2. Intermittent Hypertension: Blood Pressure today:   BP Readings from Last 3 Encounters:   19 124/72   19 144/82   19 120/76   .  Blood pressure medications include no medications.    3. Lipids: Takes no medications for lipid control.        PMH/PSH:  Past Medical History:   Diagnosis Date     Ascending aorta enlargement (H)      Depressive disorder     severe, prior hospitalization     Diabetes mellitus, type 2 (H)      Diverticulosis of colon (without mention of hemorrhage)      Fibromyalgia 2007     Insomnia      Irritable bowel syndrome      Past Surgical History:   Procedure Laterality Date     C SPINAL ORTHOSIS,NOS      lumbar surgery     choley  2011     HYSTERECTOMY, CERVIX STATUS UNKNOWN      TVH/BSO     Family Hx:  Family History   Problem Relation Age of Onset     Diabetes Mother         type 2     Hypertension Mother      Cerebrovascular Disease Mother          stroke age 57     Ovarian Cancer Mother 43     Cancer Mother         cervix     Diabetes Father         type 2     Hypertension Father      Gastrointestinal Disease Father         colon polyps     Breast Cancer Sister      Ovarian Cancer Sister 46     Breast Cancer Sister      Ovarian Cancer Maternal Grandmother 72     Cancer Maternal Grandmother         cervix     Thyroid disease:          DM2: Yes, mother and father         Autoimmune: DM1, SLE, RA, Vitiligo     Social Hx:  Social History     Socioeconomic History     Marital status:      Spouse name: Not on file     Number of children: 3     Years of education: Not on file     Highest education level: Not on file   Occupational History     Occupation: xr technician     Employer: [employer]   Social Needs     Financial resource strain: Not on file      Food insecurity:     Worry: Not on file     Inability: Not on file     Transportation needs:     Medical: Not on file     Non-medical: Not on file   Tobacco Use     Smoking status: Never Smoker     Smokeless tobacco: Never Used   Substance and Sexual Activity     Alcohol use: Yes     Alcohol/week: 0.0 oz     Comment: maybe once a month     Drug use: No     Sexual activity: Yes     Partners: Male     Birth control/protection: Surgical   Lifestyle     Physical activity:     Days per week: Not on file     Minutes per session: Not on file     Stress: Not on file   Relationships     Social connections:     Talks on phone: Not on file     Gets together: Not on file     Attends Denominational service: Not on file     Active member of club or organization: Not on file     Attends meetings of clubs or organizations: Not on file     Relationship status: Not on file     Intimate partner violence:     Fear of current or ex partner: Not on file     Emotionally abused: Not on file     Physically abused: Not on file     Forced sexual activity: Not on file   Other Topics Concern     Parent/sibling w/ CABG, MI or angioplasty before 65F 55M? Not Asked   Social History Narrative     Not on file          MEDICATIONS:  has a current medication list which includes the following prescription(s): acetone urine, aspirin not prescribed, blood glucose, freestyle ashleigh 14 day reader, continuous blood glucose monitoring, voltaren, epinephrine, glucagon, hydrochlorothiazide, insulin aspart, insulin aspart, insulin aspart, lorazepam, losartan, meloxicam, nifedipine er osmotic, polyethylene glycol, simvastatin, statin not prescribed, and tramadol.    ROS     ROS: 10 point ROS neg other than the symptoms noted above in the HPI.    Physical Exam   VS: /72 (BP Location: Left arm, Patient Position: Chair, Cuff Size: Adult Large)   Pulse 64   Temp 98.5  F (36.9  C) (Oral)   Resp 14   Wt 90.4 kg (199 lb 3.2 oz)   LMP 01/01/1999   SpO2 97%    Breastfeeding? No   BMI 34.18 kg/m    GENERAL: NAD, well dressed, answering questions appropriately, appears stated age.  HEENT: no exophthalmos, no proptosis, no lig lag, no retraction, no scleral icterus  RESPIRATORY: Clear bilaterally. Normal respiratory effort.  CARDIOVASCULAR: RRR.  EXTREMTIES: No edema.  NEUROLOGY: CN grossly intact, no tremors  MSK: grossly intact, No digital cyanosis. Normal gait and station.  PSYCH: Intact judgment and insight. A&OX3 with a cordial affect.    LABS:  A1c:   Component      Latest Ref Rng & Units 1/16/2018 6/26/2018 4/18/2019   Hemoglobin A1C      0 - 5.6 % 9.4 (H) 8.7 (H) 8.5 (H)     Basic Metabolic Panel:  !COMPREHENSIVE Latest Ref Rng & Units 5/13/2019   SODIUM 133 - 144 mmol/L 139   POTASSIUM 3.4 - 5.3 mmol/L 3.7   CHLORIDE 94 - 109 mmol/L 108   BUN 7 - 30 mg/dL 17   Creatinine 0.52 - 1.04 mg/dL    Creatinine 0.52 - 1.04 mg/dL 0.77   Glucose 70 - 99 mg/dL 183 (H)   ANION GAP 3 - 14 mmol/L 4   CALCIUM 8.5 - 10.1 mg/dL 9.2     LFTS/Cholesterol Panel:  !LIPID/HEPATIC Latest Ref Rng & Units 12/13/2018   CHOLESTEROL <200 mg/dL 262 (H)   TRIGLYCERIDES <150 mg/dL 168 (H)   HDL CHOLESTEROL >49 mg/dL 50   LDL CHOLESTEROL DIRECT <100 mg/dL    LDL CHOLESTEROL, CALCULATED <100 mg/dL 178 (H)   VLDL-CHOLESTEROL 0 - 30 mg/dL    NON HDL CHOLESTEROL <130 mg/dL 212 (H)     !LIPID/HEPATIC Latest Ref Rng & Units 10/11/2018   AST 0 - 40 IU/L 27   ALT 0 - 32 IU/L 21     Thyroid Function:   !THYROID Latest Ref Rng & Units 6/26/2018   TSH 0.40 - 4.00 mU/L 0.83     Urine Microalbumin:  Component      Latest Ref Rng & Units 4/23/2018   Creatinine Urine      mg/dL 63   Albumin Urine mg/L      mg/L 12   Albumin Urine mg/g Cr      0 - 25 mg/g Cr 19.37     Vitamin D Deficiency screening    30 - 75 ug/L 34     All pertinent notes, labs, and images personally reviewed by me.     A/P  Ms.Maricarmen Dotson is a 58 year old here for the evaluation/management of diabetes:    1. DM2 - Uncontrolled - continues  to improve.    Currently on insulin pump therapy.    Glucose running higher evenings and overnight.  Will increase 6 pm to 6 am basal rate.  Pattern 3 (crazy sugars)  Time Rate (U/hr)   0000 1.800-->1.85   06:00 1.650   12:00 1.500   18:00 1.800-->1.850     Has Freddie but hasn't started using it yet.  Referral to diabetes ed provided for help starting sensor.  Continue to work with diabetes ed weekly for ongoing pump adjustments.  Lab-only appointment 7/18 for A1c and TFT's.    2.  Intermittent  Hypertension - Currently untreated. Now working with cardiology.    3. Hyperlipidemia - Currently untreated-? Statin intolerance.  Now working with cardiology.    Labs ordered today:   Orders Placed This Encounter   Procedures     **A1C FUTURE anytime     TSH     T4 FREE     DIABETES EDUCATOR REFERRAL   Urine microalbumin     Radiology/Consults ordered today: DIABETES EDUCATOR REFERRAL    All questions were answered.  The patient indicates understanding of the above issues and agrees with the plan set forth.  Total face to face time discussing diabetes treatment and target BG levels was greater than or equal to 25 minutes.       Follow-up:  4 months    Emily Pahn NP  Endocrinology  [facility]  CC: Annamaria Erickson

## 2019-06-07 ENCOUNTER — HOSPITAL ENCOUNTER (OUTPATIENT)
Dept: CARDIOLOGY | Facility: CLINIC | Age: 59
Discharge: HOME | End: 2019-06-07
Attending: INTERNAL MEDICINE | Admitting: INTERNAL MEDICINE
Payer: COMMERCIAL

## 2019-06-07 ENCOUNTER — TELEPHONE (OUTPATIENT)
Dept: CARDIOLOGY | Facility: CLINIC | Age: 59
End: 2019-06-07

## 2019-06-07 DIAGNOSIS — E11.9 DIABETES MELLITUS WITHOUT COMPLICATION (H): ICD-10-CM

## 2019-06-07 DIAGNOSIS — I10 ACCELERATED ESSENTIAL HYPERTENSION: ICD-10-CM

## 2019-06-07 DIAGNOSIS — I70.1 RENAL ARTERY STENOSIS (H): Primary | ICD-10-CM

## 2019-06-07 DIAGNOSIS — I10 HTN (HYPERTENSION): ICD-10-CM

## 2019-06-07 PROCEDURE — 93975 VASCULAR STUDY: CPT | Mod: 26 | Performed by: INTERNAL MEDICINE

## 2019-06-07 PROCEDURE — 76770 US EXAM ABDO BACK WALL COMP: CPT | Mod: 26 | Performed by: INTERNAL MEDICINE

## 2019-06-07 PROCEDURE — 93975 VASCULAR STUDY: CPT | Mod: TC

## 2019-06-07 NOTE — TELEPHONE ENCOUNTER
Renal ultrasound results noted, please see impression below. Pt is scheduled for follow up on 7/29/19 with Dr. Jensen. Will route to Dr. Jensen for recommendations.     Impression:    1. Right kidney:    1a. Renal parenchyma: Normal cortical thickness, with no evidence of  hydronephrosis.  1b. Renal artery: Mid-artery velocities are elevated ( cm/s)  which may suggest hemodynamically (>60%) renal artery stenosis.  Renal-aortic-ratio likely underestimated in setting of elevated aortic  velocities. 2-D imaging limited in evaluation for plaque or  fibromuscular dysplasia. Recommend CT angiogram.      2. Left kidney:    2a. Renal parenchyma: Normal cortical thickness, with no evidence of  hydronephrosis.  2b. Renal artery: Mid-artery velocities are elevated ( cm/s)  which may suggest hemodynamically (>60%) renal artery stenosis.  Renal-aortic-ratio likely underestimated in setting of elevated aortic  velocities. 2-D imaging limited in evaluation for plaque or  fibromuscular dysplasia. Recommend CT angiogram.

## 2019-06-10 ENCOUNTER — OFFICE VISIT (OUTPATIENT)
Dept: PEDIATRICS | Facility: CLINIC | Age: 59
End: 2019-06-10
Payer: COMMERCIAL

## 2019-06-10 VITALS
HEART RATE: 67 BPM | SYSTOLIC BLOOD PRESSURE: 122 MMHG | BODY MASS INDEX: 34.38 KG/M2 | WEIGHT: 201.4 LBS | HEIGHT: 64 IN | DIASTOLIC BLOOD PRESSURE: 70 MMHG | OXYGEN SATURATION: 97 %

## 2019-06-10 DIAGNOSIS — G56.01 CARPAL TUNNEL SYNDROME OF RIGHT WRIST: Primary | ICD-10-CM

## 2019-06-10 DIAGNOSIS — R20.0 NUMBNESS AND TINGLING OF FOOT: ICD-10-CM

## 2019-06-10 DIAGNOSIS — M79.89 SWELLING OF RIGHT HAND: ICD-10-CM

## 2019-06-10 DIAGNOSIS — I10 HYPERTENSION GOAL BP (BLOOD PRESSURE) < 140/90: ICD-10-CM

## 2019-06-10 DIAGNOSIS — E78.5 HYPERLIPIDEMIA LDL GOAL <100: ICD-10-CM

## 2019-06-10 DIAGNOSIS — R20.2 NUMBNESS AND TINGLING OF FOOT: ICD-10-CM

## 2019-06-10 PROCEDURE — 99214 OFFICE O/P EST MOD 30 MIN: CPT | Performed by: INTERNAL MEDICINE

## 2019-06-10 ASSESSMENT — MIFFLIN-ST. JEOR: SCORE: 1483.54

## 2019-06-10 NOTE — PROGRESS NOTES
"Subjective     Maricarmen Dotson is a 58 year old female who presents to clinic today for the following health issues: problem with hydrochlorothiazide 12.5 mg once daily.     HPI   Concern - \"severe\" pain, swelling, numbness/tingling in right hand since starting Hydrochlorothiazide 12.5 mg. Stopped med on 5/22 or 5/23.  Onset: since 5/22 - about 2 days after starting med    Description:   See above    Intensity: severe    Progression of Symptoms:  same and constant    Accompanying Signs & Symptoms:  See above    Previous history of similar problem:   Yes, patient recalls having similar difficulty with another medication but it improved after stopping that particular med.    Precipitating factors:   Worsened by: every day use including ADLs    Alleviating factors:  Improved by: nothing is improving pain - immobilization helps the most.     Therapies Tried and outcome: ice, wrist brace, meloxicam once daily, tramadol t.i.d.    57 y/o F with poorly controlled type 2 diabetes, accelerated hypertension and multiple drug allergies presents for possible med reaction as swell as wrist and joint pain. Given difficulty controlling BP and med allergies, she was referred to Cardiology. Saw Dr. Jensen on 5/20 and started on a low dose of hydrochlorothiazide. She developed numbness in both hands, feet, joint pains, and joint swelling after a couple of days after starting the medication. Stopped the medication after symptoms started. Also had bilateral CVA tenderness - felt like throbbing and aching constantly. Symptoms worse with urination. Flank pain were went away with stopping the medication. Other symptoms improved, but still with pain and swelling in right hand and numbness up to elbow and numbness in left heel. Has not been able to cook, garden, do housework or do painting. No injury to arm. Has been using ice, wrist brace. Icing helps with throbbing pain so can fall asleep. Also using usual meloxicam and tramadol. Wears a " "hard splint at night and a soft splint during the day. Pain on both sides of wrists and shoots into all fingers. If overuses it, then will get the shooting pain up into elbow. Immobilization helps with constant pain.     Hypertension: is taking 100 mg of losartan. BP has been fairly well controlled with this. Recently had renal US that showed possible renal artery stenosis bilaterally. Will need CT angiogram. Also referred for sleep study as had a history of MIESHA in past, but did not tolerate CPAP due to claustrophobia.    HLP: patient with elevated cholesterol as well as family history of early coronary disease and poorly controlled diabetes. Has not tolerated statin medications in the past. Dr. Jensen appropriately recommended a retrial of statin medication given her multiple risk factors. Patient plans to wait on this trial until she sees Dr. Jensen back in clinic. Also discussed possibility of PCSK9 inhibitors.    Reviewed and updated as needed this visit by Provider  Tobacco  Allergies  Meds  Problems  Med Hx  Surg Hx  Fam Hx       Review of Systems   ROS COMP: Constitutional, HEENT, cardiovascular, pulmonary, gi and gu systems are negative, except as otherwise noted.      Objective    /70 (BP Location: Right arm, Patient Position: Sitting, Cuff Size: Adult Regular)   Pulse 67   Ht 1.626 m (5' 4\")   Wt 91.4 kg (201 lb 6.4 oz)   LMP 01/01/1999   SpO2 97%   Breastfeeding? No   BMI 34.57 kg/m    Body mass index is 34.57 kg/m .  Physical Exam   GENERAL: healthy, alert and no distress  EYES: Eyes grossly normal to inspection, PERRL and conjunctivae and sclerae normal  RESP: lungs clear to auscultation - no rales, rhonchi or wheezes  CV: regular rate and rhythm, normal S1 S2, no S3 or S4, no murmur, click or rub, no peripheral edema and peripheral pulses strong  MS: right wrist with mild swelling. Normal ROM. Some tenderness to palpation over volar aspect of wrist. Positive Phalen's and Tinel's " "signs.    Diagnostic Test Results:  none         Assessment & Plan     1. Carpal tunnel syndrome of right wrist  2. Swelling of right hand  Symptoms c/w carpal tunnel. Unclear if caused by hydrochlorothiazide. Not a typical reaction. Recommend continued ice and increase rest with using harder splint during the day at times as well. Has not tolerated injections in the past as they cause her glucose to go very high and caused blurriness. Discussed next step if not improving over next several weeks would be to see hand surgery.    3. Numbness and tingling of foot  Majority of symptoms resolved. Still with some residual numbness on bottom of left heel. Will continue to monitor.    4. Hypertension goal BP (blood pressure) < 140/90  Controlled today. Continue losartan 100 mg once a day. Possible bilateral renal artery stenosis on US. Will get a CT angiogram for further evaluation.    5. Hyperlipidemia LDL goal <100  Cholesterol very elevated with multiple risk factors. Working with Cardiology to re-trial a statin and may consider a PCSK9 inhibitor if not tolerated.     BMI:   Estimated body mass index is 34.57 kg/m  as calculated from the following:    Height as of this encounter: 1.626 m (5' 4\").    Weight as of this encounter: 91.4 kg (201 lb 6.4 oz).   Weight management plan: Discussed healthy diet and exercise guidelines      Return in about 1 month (around 7/8/2019), or if symptoms worsen or fail to improve.    Annamaria Erickson MD  [facility]    "

## 2019-06-11 NOTE — PATIENT INSTRUCTIONS
1. Continue ice  2. Use wrist braces at all times to rest for now  3. Continue Mobic  4. If not improving in 4-6 weeks, would have see Hand surgery for evaluation - let me know if you need a referral   5. Follow-up with Cardiology as scheduled    Patient Education     Understanding Carpal Tunnel Syndrome    The carpal tunnel is a narrow space inside the wrist. It is ringed by bone and a band of tough tissue called the transverse carpal ligament. A major nerve called the median nerve runs from the forearm into the hand through the carpal tunnel. Tendons also run through the carpal tunnel.  With carpal tunnel syndrome, the tendons or nearby tissues within the carpal tunnel may swell or thicken. Or the transverse carpal ligament may harden and shorten. This narrows the space in the carpal tunnel and puts pressure on the median nerve. This pressure leads to tingling and numbness of the hand and wrist. In time, the condition can make even simple tasks hard to do.  What causes carpal tunnel syndrome?  Doctors aren t entirely clear why the condition occurs. Certain things may make a person more likely to have it. These include:    Being female    Being pregnant    Being overweight    Having diabetes or rheumatoid arthritis  Symptoms of carpal tunnel syndrome  Symptoms often come and go. At first, symptoms may occur mainly at night. Later, they may be noticed during the day as well. They may get worse with activities such as driving, reading, typing, or holding a phone. Symptoms can include:    Tingling and numbness in the hand or wrist    Sharp pain that shoots up the arm or down to the fingers    Hand stiffness or cramping, especially in the morning    Trouble making a fist    Hand weakness and clumsiness  Treatment for carpal tunnel syndrome  Certain treatments help reduce the pressure on the median nerve and relieve symptoms. Choices for treatment may include one or more of the following:    Wrist splint. This involves  wearing a special brace on the wrist and hand. The splint holds the wrist straight, in a neutral position. This helps keep the carpal tunnel as open as possible.    Cortisone shots. Cortisone is a medicine that helps reduce swelling. It is injected directly into the wrist. It helps shrink tissues inside the carpal tunnel. This relieves symptoms for a time.    Pain medicines. You may take over-the-counter or prescription medicines to help reduce swelling and relieve symptoms.    Surgery. If the condition doesn t respond to other treatments and doesn t go away on its own, you may need surgery. During surgery, the surgeon cuts the transverse carpal ligament to relieve pressure on the median nerve.     When to call your healthcare provider  Call your healthcare provider right away if you have any of these:    Fever of 100.4 F (38 C) or higher, or as directed    Symptoms that don t get better, or get worse    New symptoms   Date Last Reviewed: 3/10/2016    1389-5104 [employer]. [address] [place] [address], [place] [address]. All rights reserved. This information is not intended as a substitute for professional medical care. Always follow your healthcare professional's instructions.

## 2019-06-13 NOTE — TELEPHONE ENCOUNTER
Reviewed with Dr. Jensen, who gave verbal order for CT angiogram of renal arteries. Order placed. Noted pt has contrast dye listed on allergies, no reaction documented. Called pt to clarify, no answer. LVM requesting call back to Team 1.     Addendum 6/14/19: Have not yet received return call, sent [employer] message to pt.     Addendum 6/18/19 - Received return call from pt. Inquired about pt's contrast allergy. Pt stated while she was hospitalized in September 2011 for a back injury pt had anaphylaxis following a CT scan with contrast. Allergy list updated. Pt stated that she is not sure if she is comfortable having a test with IV contrast. Advised will review with Dr. Jensen. Pt also stated if Dr. Jensen would like to order an MRI that she is severely claustrophobic but has tolerated MRIs before with premedication for anxiety.

## 2019-06-17 ENCOUNTER — OFFICE VISIT (OUTPATIENT)
Dept: CARDIOLOGY | Facility: CLINIC | Age: 59
End: 2019-06-17
Payer: COMMERCIAL

## 2019-06-17 VITALS
DIASTOLIC BLOOD PRESSURE: 72 MMHG | BODY MASS INDEX: 34.15 KG/M2 | SYSTOLIC BLOOD PRESSURE: 138 MMHG | HEIGHT: 64 IN | HEART RATE: 70 BPM | WEIGHT: 200 LBS

## 2019-06-17 DIAGNOSIS — I10 HYPERTENSION GOAL BP (BLOOD PRESSURE) < 140/90: Primary | ICD-10-CM

## 2019-06-17 DIAGNOSIS — E11.9 TYPE 2 DIABETES, HBA1C GOAL < 7% (H): ICD-10-CM

## 2019-06-17 PROCEDURE — 99214 OFFICE O/P EST MOD 30 MIN: CPT | Performed by: NURSE PRACTITIONER

## 2019-06-17 ASSESSMENT — MIFFLIN-ST. JEOR: SCORE: 1477.19

## 2019-06-17 NOTE — LETTER
6/17/2019    Annamaria Erickson MD  [address] [place] [address]    RE: Maricarmen Dotson       Dear Colleague,    I had the pleasure of seeing Maricarmen Dotson in the [facility].    Cardiology Clinic Progress Note  Maricarmen Dotson MRN# [id number]   YOB: 1960 Age: 58 year old     Reason For Visit:  Blood pressure follow up   Primary Cardiologist:   Dr. Jensen          History of Presenting Illness:    Maricarmen Dotson is a pleasant 58 year old patient who carries a past medical history significant for DTs, depression, fibromyalgia and hypertension.    She was last seen on 5/20/2019 for evaluation of uncontrolled hypertension.  Unfortunately, she has had significant reactions to multiple antihypertensives therefore finding a tolerable medication has been difficult.  She was started on low-dose hydrochlorothiazide in addition to her losartan and unfortunately started to have severe pain, swelling, numbness and tingling to her right hand, and severe flank pain.  Upon stopping the hydrochlorothiazide, her back pain and the majority of her swelling has resolved.  She continues with trace right hand swelling. Today she presents with her daughter for follow up.     She is feeling well on a cardiac standpoint, denies chest pain, shortness of breath, PND, orthopnea, presyncope, syncope, edema, heart racing, or palpitations. Her primary complaints are pain from her fibromyalgia and difficulty with sleep. She is scheduled with sleep medicine on 6/25/19 for evaluation. Of note, she has previously been diagnosed with sleep apnea and intolerant to all masks due to severe claustrophobia. She currently wears a dental appliance.     She recently underwent a renal ultrasound that demonstrated no evidence of hydronephrosis but mid artery elevated velocities bilaterally suggestive for hemodynamically > 60% renal artery stenosis.  She is recommended for CT angiogram for  further evaluation.     Blood pressure today is well controlled at 138/70, with repeat reading 138/72.  Home pressure readings average in the 130s systolic with an occasional outlier in the 170s, which she attributes to episodes of anxiety or increased myalgia pain.  She states she is working ways to control her external stressors such as walking, monitoring her diet, wearing wrist braces to control her carpal tunnel pain, and seeking out a therapist for better control of her anxiety.    She was recommended she start on a statin but due to severe myalgia pain has opted to no statins at this time. Cholesterol levels were significantly elevated with a total cholesterol of 262, HDL 50, , and triglycerides 168.  On 4/18/2019 she did undergo a nuclear stress test that was negative for ischemia or infarct.  Ejection fraction was normal at 85%.    Activity consists of walking daily.   Follows a strict heart healthy diet. Diabetes not well controlled, last A1C 8.5  Remains compliant with all medications.                   Assessment and Plan:     1. Hypertension - Well controlled today. Home pressures average in the 130's systolic. Continue to monitor daily with a goal of <140/90. Continue on current dose of losartan. Follow up with renal US. Encourage exercise, weight loss, and strict low sodium diet.     2. Sleep apnea - hx of sleep apnea, intolerant to masks due to claustrophobia. Scheduled to follow up with sleep medicine for re-evaluation and management.     3. Diabetes - Last A1C 8.5, managed on insulin. Follows with PCP.     4. Anxiety - Follow up with PCP for management.                 Thank you for allowing me to participate in this delightful patient's care. I have recommended she follow up with Dr. Jensen as scheduled.        Edith Monae, MARICRUZ, CNP          Review of Systems:   Review of Systems:  Skin:  Negative for     Eyes:  Positive for glasses  ENT:  Positive for tinnitus  Respiratory:  Positive  for shortness of breath;dyspnea on exertion;cough  Cardiovascular:    palpitations;chest pain;Positive for;edema;fatigue;lightheadedness  Gastroenterology: Positive for nausea;heartburn;constipation  Genitourinary:  Negative for    Musculoskeletal:  Positive for back pain;neck pain;joint pain  Neurologic:  Positive for numbness or tingling of hands;numbness or tingling of feet  Psychiatric:  Positive for sleep disturbances;anxiety  Heme/Lymph/Imm:  Positive for allergies  Endocrine:  Positive for diabetes              Physical Exam:     GEN:  In general, this is a overweight female in no acute distress.  HEENT:  Pupils equal, round. Sclerae nonicteric. Clear oropharynx. Mucous membranes moist.  NECK: Supple, no masses appreciated. Trachea midline. No JVD   C/V:  Regular rate and rhythm, No murmur, rub or gallop. No S3 or RV heave.   RESP: Respirations are unlabored. No use of accessory muscles. Clear to auscultation bilaterally without wheezing, rales, or rhonchi.  GI: Abdomen soft, nontender, nondistended. No HSM appreciated.   EXTREM: No LE edema. No cyanosis or clubbing.  NEURO: Alert and oriented, cooperative. No obvious focal deficits.   PSYCH: Normal affect.  SKIN: Warm and dry. No rashes or petechiae appreciated.          Past Medical History:     Past Medical History:   Diagnosis Date     Ascending aorta enlargement (H)      Depressive disorder     severe, prior hospitalization     Diabetes mellitus, type 2 (H)      Diverticulosis of colon (without mention of hemorrhage)      Fibromyalgia 6/26/2007     Insomnia      Irritable bowel syndrome               Past Surgical History:     Past Surgical History:   Procedure Laterality Date     C SPINAL ORTHOSIS,NOS  2002    lumbar surgery     choley  2011     HYSTERECTOMY, CERVIX STATUS UNKNOWN  2000    TVH/BSO              Allergies:   Amoxicillin; Bee; Iodine; Sulfa drugs; Tetanus-diphtheria toxoids; Metoprolol; Zithromax [azithromycin dihydrate]; Amlodipine;  Atorvastatin; Catapres [clonidine]; Contrast dye; Crestor [rosuvastatin]; Cyproheptadine; Humalog [insulin lispro]; Hydrochlorothiazide; Latex; Lisinopril; Metformin; Nifedipine; Onglyza [saxagliptin hydrochloride]; Phenergan [promethazine]; Prochlorperazine; Reglan [metoclopramide]; Sumatriptan; Tetracycline; and Sulindac       Data:   All laboratory data reviewed:    LAST CHOLESTEROL:  Lab Results   Component Value Date    CHOL 262 12/13/2018     Lab Results   Component Value Date    HDL 50 12/13/2018     Lab Results   Component Value Date     12/13/2018     Lab Results   Component Value Date    TRIG 168 12/13/2018     Lab Results   Component Value Date    CHOLHDLRATIO 5.0 02/21/2015       LAST BMP:  Lab Results   Component Value Date     05/13/2019      Lab Results   Component Value Date    POTASSIUM 3.7 05/13/2019     Lab Results   Component Value Date    CHLORIDE 108 05/13/2019     Lab Results   Component Value Date    KARIN 9.2 05/13/2019     Lab Results   Component Value Date    CO2 27 05/13/2019     Lab Results   Component Value Date    BUN 17 05/13/2019     Lab Results   Component Value Date    CR 0.77 05/13/2019     Lab Results   Component Value Date     05/13/2019       LAST CBC:  Lab Results   Component Value Date    WBC 11.5 04/25/2019     Lab Results   Component Value Date    RBC 4.55 04/25/2019     Lab Results   Component Value Date    HGB 13.9 04/25/2019     Lab Results   Component Value Date    HCT 40.5 04/25/2019     Lab Results   Component Value Date    MCV 89 04/25/2019     Lab Results   Component Value Date    MCH 30.5 04/25/2019     Lab Results   Component Value Date    MCHC 34.3 04/25/2019     Lab Results   Component Value Date    RDW 12.5 04/25/2019     Lab Results   Component Value Date     04/25/2019                     Thank you for allowing me to participate in the care of your patient    Sincerely,     MARICRUZ [facility]

## 2019-06-17 NOTE — PROGRESS NOTES
Cardiology Clinic Progress Note  Maricarmen Dotson MRN# [id number]   YOB: 1960 Age: 58 year old     Reason For Visit:  Blood pressure follow up   Primary Cardiologist:   Dr. Jensen          History of Presenting Illness:    Maricarmen Dotson is a pleasant 58 year old patient who carries a past medical history significant for DTs, depression, fibromyalgia and hypertension.    She was last seen on 5/20/2019 for evaluation of uncontrolled hypertension.  Unfortunately, she has had significant reactions to multiple antihypertensives therefore finding a tolerable medication has been difficult.  She was started on low-dose hydrochlorothiazide in addition to her losartan and unfortunately started to have severe pain, swelling, numbness and tingling to her right hand, and severe flank pain.  Upon stopping the hydrochlorothiazide, her back pain and the majority of her swelling has resolved.  She continues with trace right hand swelling. Today she presents with her daughter for follow up.     She is feeling well on a cardiac standpoint, denies chest pain, shortness of breath, PND, orthopnea, presyncope, syncope, edema, heart racing, or palpitations. Her primary complaints are pain from her fibromyalgia and difficulty with sleep. She is scheduled with sleep medicine on 6/25/19 for evaluation. Of note, she has previously been diagnosed with sleep apnea and intolerant to all masks due to severe claustrophobia. She currently wears a dental appliance.     She recently underwent a renal ultrasound that demonstrated no evidence of hydronephrosis but mid artery elevated velocities bilaterally suggestive for hemodynamically > 60% renal artery stenosis.  She is recommended for CT angiogram for further evaluation.     Blood pressure today is well controlled at 138/70, with repeat reading 138/72.  Home pressure readings average in the 130s systolic with an occasional outlier in the 170s, which she attributes to episodes of  anxiety or increased myalgia pain.  She states she is working ways to control her external stressors such as walking, monitoring her diet, wearing wrist braces to control her carpal tunnel pain, and seeking out a therapist for better control of her anxiety.    She was recommended she start on a statin but due to severe myalgia pain has opted to no statins at this time. Cholesterol levels were significantly elevated with a total cholesterol of 262, HDL 50, , and triglycerides 168.  On 4/18/2019 she did undergo a nuclear stress test that was negative for ischemia or infarct.  Ejection fraction was normal at 85%.    Activity consists of walking daily.   Follows a strict heart healthy diet. Diabetes not well controlled, last A1C 8.5  Remains compliant with all medications.                   Assessment and Plan:     1. Hypertension - Well controlled today. Home pressures average in the 130's systolic. Continue to monitor daily with a goal of <140/90. Continue on current dose of losartan. Follow up with renal US. Encourage exercise, weight loss, and strict low sodium diet.     2. Sleep apnea - hx of sleep apnea, intolerant to masks due to claustrophobia. Scheduled to follow up with sleep medicine for re-evaluation and management.     3. Diabetes - Last A1C 8.5, managed on insulin. Follows with PCP.     4. Anxiety - Follow up with PCP for management.                 Thank you for allowing me to participate in this delightful patient's care. I have recommended she follow up with Dr. Jensen as scheduled.        MARICRUZ Freeman, CNP          Review of Systems:   Review of Systems:  Skin:  Negative for     Eyes:  Positive for glasses  ENT:  Positive for tinnitus  Respiratory:  Positive for shortness of breath;dyspnea on exertion;cough  Cardiovascular:    palpitations;chest pain;Positive for;edema;fatigue;lightheadedness  Gastroenterology: Positive for nausea;heartburn;constipation  Genitourinary:  Negative for     Musculoskeletal:  Positive for back pain;neck pain;joint pain  Neurologic:  Positive for numbness or tingling of hands;numbness or tingling of feet  Psychiatric:  Positive for sleep disturbances;anxiety  Heme/Lymph/Imm:  Positive for allergies  Endocrine:  Positive for diabetes              Physical Exam:     GEN:  In general, this is a overweight female in no acute distress.  HEENT:  Pupils equal, round. Sclerae nonicteric. Clear oropharynx. Mucous membranes moist.  NECK: Supple, no masses appreciated. Trachea midline. No JVD   C/V:  Regular rate and rhythm, No murmur, rub or gallop. No S3 or RV heave.   RESP: Respirations are unlabored. No use of accessory muscles. Clear to auscultation bilaterally without wheezing, rales, or rhonchi.  GI: Abdomen soft, nontender, nondistended. No HSM appreciated.   EXTREM: No LE edema. No cyanosis or clubbing.  NEURO: Alert and oriented, cooperative. No obvious focal deficits.   PSYCH: Normal affect.  SKIN: Warm and dry. No rashes or petechiae appreciated.          Past Medical History:     Past Medical History:   Diagnosis Date     Ascending aorta enlargement (H)      Depressive disorder     severe, prior hospitalization     Diabetes mellitus, type 2 (H)      Diverticulosis of colon (without mention of hemorrhage)      Fibromyalgia 6/26/2007     Insomnia      Irritable bowel syndrome               Past Surgical History:     Past Surgical History:   Procedure Laterality Date     C SPINAL ORTHOSIS,NOS  2002    lumbar surgery     choley  2011     HYSTERECTOMY, CERVIX STATUS UNKNOWN  2000    TVH/BSO              Allergies:   Amoxicillin; Bee; Iodine; Sulfa drugs; Tetanus-diphtheria toxoids; Metoprolol; Zithromax [azithromycin dihydrate]; Amlodipine; Atorvastatin; Catapres [clonidine]; Contrast dye; Crestor [rosuvastatin]; Cyproheptadine; Humalog [insulin lispro]; Hydrochlorothiazide; Latex; Lisinopril; Metformin; Nifedipine; Onglyza [saxagliptin hydrochloride]; Phenergan  [promethazine]; Prochlorperazine; Reglan [metoclopramide]; Sumatriptan; Tetracycline; and Sulindac       Data:   All laboratory data reviewed:    LAST CHOLESTEROL:  Lab Results   Component Value Date    CHOL 262 12/13/2018     Lab Results   Component Value Date    HDL 50 12/13/2018     Lab Results   Component Value Date     12/13/2018     Lab Results   Component Value Date    TRIG 168 12/13/2018     Lab Results   Component Value Date    CHOLHDLRATIO 5.0 02/21/2015       LAST BMP:  Lab Results   Component Value Date     05/13/2019      Lab Results   Component Value Date    POTASSIUM 3.7 05/13/2019     Lab Results   Component Value Date    CHLORIDE 108 05/13/2019     Lab Results   Component Value Date    KARIN 9.2 05/13/2019     Lab Results   Component Value Date    CO2 27 05/13/2019     Lab Results   Component Value Date    BUN 17 05/13/2019     Lab Results   Component Value Date    CR 0.77 05/13/2019     Lab Results   Component Value Date     05/13/2019       LAST CBC:  Lab Results   Component Value Date    WBC 11.5 04/25/2019     Lab Results   Component Value Date    RBC 4.55 04/25/2019     Lab Results   Component Value Date    HGB 13.9 04/25/2019     Lab Results   Component Value Date    HCT 40.5 04/25/2019     Lab Results   Component Value Date    MCV 89 04/25/2019     Lab Results   Component Value Date    MCH 30.5 04/25/2019     Lab Results   Component Value Date    MCHC 34.3 04/25/2019     Lab Results   Component Value Date    RDW 12.5 04/25/2019     Lab Results   Component Value Date     04/25/2019

## 2019-06-17 NOTE — PATIENT INSTRUCTIONS
Thanks for coming into [facility] clinic today.    Blood pressures stable today.  Continue with current medical therapy.   Monitor blood pressures at home with a goal < 140/90  Follow up with Renal CT  Follow up with sleep medicine   Follow up with Dr. Jensen as scheduled.           Please call my nurse at [phone] with any questions or concerns.    Scheduling phone number: [phone]  Reminder: Please bring in all current medications, over the counter supplements and vitamin bottles to your next appointment.

## 2019-06-18 NOTE — TELEPHONE ENCOUNTER
Reviewed with Dr. Jensen, who stated to cancel order for for CTA of renal arteries and refer pt to Nephrology. Called and spoke with pt, communicated recommendations. Pt verbalized agreement with plan. Referral placed to [facility], [facility] location. Provided phone number to pt.

## 2019-06-19 ENCOUNTER — ALLIED HEALTH/NURSE VISIT (OUTPATIENT)
Dept: EDUCATION SERVICES | Facility: CLINIC | Age: 59
End: 2019-06-19
Payer: COMMERCIAL

## 2019-06-19 DIAGNOSIS — E11.65 TYPE 2 DIABETES MELLITUS WITH HYPERGLYCEMIA, WITH LONG-TERM CURRENT USE OF INSULIN (H): Primary | ICD-10-CM

## 2019-06-19 DIAGNOSIS — Z79.4 TYPE 2 DIABETES MELLITUS WITH HYPERGLYCEMIA, WITH LONG-TERM CURRENT USE OF INSULIN (H): Primary | ICD-10-CM

## 2019-06-19 PROCEDURE — G0108 DIAB MANAGE TRN  PER INDIV: HCPCS

## 2019-06-19 NOTE — PROGRESS NOTES
Diabetes Self-Management Education & Support      Diabetes Self-Management Education & Support - Insulin Pump Review and Personal CGM Start:      SUBJECTIVE/OBJECTIVE  Presents for: Individual review  Accompanied by: Self  Diabetes education in the past 24mo: No  Focus of Visit: Insulin Pump, CGM  Diabetes type: Type 2  Disease course: Worsening  How confident are you filling out medical forms by yourself:: Not Assessed  Diabetes management related comments/concerns: would like help startingthe freddie sensor. having issues with the pump not working, screen goes black, deactivates the pod and needs to reset date and time. Had some issues with anxiety the last couple of months but doing better now, wants a chance to do better and not change pump settings.  Transportation concerns: No  Other concerns:: Other  Cultural Influences/Ethnic Background:  American     Personal CGM start:  Sensor started:: Started today in clinic  CGM Initial Settings: Freestyle Freddie  Freestyle Freddie Target Glucose Range (mg/dL):   CGM Start Plan: Change sensor, as directed.    Patient seen today for Insulin Pump Review:            Insulin Pump Information  Insulin Pump Type: OmniPod    Insulin Pump Review  Insulin Pump Type: OmniPod  Problems taking diabetes medications regularly?: No(only when stress binge eating, unable to bolus at that time but will corrrect afterwards)  Diabetes medication side effects?: No    Healthy Eating  Healthy Eating Assessed Today: No  Cultural/Sabianist diet restrictions?: No  Meal planning: Avoiding sweets, Carbohydrate counting, Heart healthy, Low salt, Smaller portions    Being Active  Being Active Assessed Today: No  How intense was your typical exercise? : Moderate (like brisk walking)  Barrier to exercise: Other    Monitoring  Monitoring Assessed Today: Yes  Blood Glucose Meter: Accu-check, FreeStyle  Home Glucose (Sugar) Monitorin+ times per day  Blood glucose trend: Fluctuating minimally  Low  Glucose Range (mg/dL): 70-90  High Glucose Range (mg/dL): >200  Overall Range (mg/dL): 180-200      Taking Medications  Diabetes Medication(s)     Insulin       Insulin Aspart (INSULIN PUMP - OUTPATIENT)         Insulin Aspart (INSULIN PUMP - OUTPATIENT)    Inject Subcutaneous continuous INSULIN PUMP - OUTPATIENT  Date last updated: 4/19/2019 [employer]   BASAL RATES and times:   REGULAR:   12am: 1.60 --6 AM: 1.45 --12pm: 1.3 -- 6pm: 1.45   HIGH 2:   12am: 1.70 --  6 AM: 1.55 -- 12pm: 1.4 -- 6pm: 1.55   ANXIETY 3:   12am: 1.80 -- 6 AM: 1.65 --12pm:1.5--6pm: 1.8  ANXIETY 4:   12am: 1.90 -- 6 AM: 1.65 --6pm: 1.9  AFTERNOON BINGE:   12 AM: 1.8 -- 6 AM: 4.65 --8 PM: 1.8   VERY HIGH 5: 12 AM: 2--6am:1.8--6pm:2   CARB RATIO: 12am-12am: 6   Corection Factor (Sensitivity): 12AM (midnight): 23 -- 5 AM: 25   Target blood glucose: 100-120  Active insulin time: 4 hrs     insulin aspart (NOVOLOG VIAL) 100 UNITS/ML vial    To be used in insulin pump.  units          Taking Medication Assessed Today: Yes  Current Treatments: Diet, Insulin Pump  Problems taking diabetes medications regularly?: No(only when stress binge eating, unable to bolus at that time but will corrrect afterwards)  Diabetes medication side effects?: No    Problem Solving  Problem Solving Assessed Today: Yes  Hypoglycemia Frequency: Rarely  Hypoglycemia Treatment: Juice  Patient carries a carbohydrate source: Yes  Medical alert: No  Severe weather/disaster plan for diabetes management?: Yes  DKA prevention plan?: No  Sick day plan for diabetes management?: (P) Yes    Hypoglycemia symptoms  Confusion: Yes  Dizziness or Light-Headedness: Yes  Headaches: Yes  Hunger: Yes  Mood changes: Yes  Nervousness/Anxiety: Yes  Sleepiness: No  Speech difficulty: No  Sweats: No  Tremors: No    Hypoglycemia Complications  Blackouts: No  Hospitalization: No  Nocturnal hypoglycemia: No  Required assistance: No  Required glucagon injection: No  Seizures: No    Reducing  Risks  Reducing Risks Assessed Today: No  CAD Risks: No known risk factors, Diabetes Mellitus, Family history, Hypertension, Obesity, Post-menopausal, Stress  Has dilated eye exam at least once a year?: Yes  Sees dentist every 6 months?: Yes  Sees podiatrist (foot doctor)?: No    Healthy Coping  Healthy Coping Assessed Today: Yes  Emotional response to diabetes: Ready to learn  Informal Support system:: Family  Stage of change: RELAPSE (Returned to unhealthy behavior)  Difficulty affording diabetes management supplies?: Yes  Patient Activation Measure Survey Score:  JEFFERY Score (Last Two) 6/2/2011 10/18/2013   JEFFERY Raw Score 50 42   Activation Score 86.3 66   JEFFERY Level 4 3         ASSESSMENT  Patient history of anxiety which makes it difficult for her to manage her diabetes at times. The pump has been a helpful tool for her She still struggles to bolus for binge eating when she is anxious and not sleeping, but states not binge eating as much. She does take a correction dose later when she is able to cope and Bg is high.     She is having issues with pump functioning correctly.  Pump did fail while in clinic today, screen blanked out, pod deactivated and date and time needed reset.  Recommend she call the [employer] helpline to troubleshoot or get a new PDM.  In the mean time to make sure she is getting her insulin, a loaner PDM provided to her today in clinic through [employer].   [employer] PDM set up for her and verified all current settings.      Patient was able to start Freddie sensor today in clinic without difficulty.     No changes to pump settings today. Recommend she wear the sensor to evaluate glucose trends and pump settings. She can use the loaner PDM during this time and if she receives a new PDM it can be set up at her follow up appointment.     INTERVENTION:   Diabetes knowledge and skills assessment:     Patient is knowledgeable in diabetes management concepts related to: Monitoring and carbohydrate  counting    Patient needs further education on the following diabetes management concepts: Taking Medication and Healthy Coping    Based on learning assessment above, most appropriate setting for further diabetes education would be: Individual setting.    Education provided today on:  AADE Self-Care Behaviors:  Taking Medication: action of prescribed medication and when to take medications  Healthy Coping: utilize support systems      CGM-specific education:   Freestyle Freddie sensor: insertion technique, sensor site location and rotation, insulin administration in relation to sensor placement, sensor wear, reasons to remove sensor (MRI, CT, diathermy), Vitamin C & Aspirin effects on sensor, Freddie [place]: frequency of scanning sensor, length of time data is visible, use of built in glucose meter, Precision X-tra test strips, Use of trends and graphs for pattern management and problem solving and Dosing insulin based on sensor glucose results    Opportunities for ongoing education and support in diabetes-self management were discussed.    Pt verbalized understanding of concepts discussed and recommendations provided today.       Education Materials Provided:  No new materials provided today      PLAN  See Patient Instructions for co-developed, patient-stated behavior change goals.    Deanna Bolton RN,CDE   Time Spent: 80 minutes  Encounter Type: Individual    Any diabetes medication dose changes were made via the CDE Protocol and Collaborative Practice Agreement with the patient's referring provider. A copy of this encounter was shared with the provider.

## 2019-06-19 NOTE — PATIENT INSTRUCTIONS
Call [employer] regarding your PDM not working, use the [employer] PDM until your follow up.  If [employer] sends you a new PDM then bring it to your follow up and [place] help you set it up  --------------------------------------------------------------------------------------------------------------------------------  Freddie Sensor Instructions:  1. The first hour after you place the sensor is the warm up period (black out period).  You will need to carry your blood glucose meter and check blood glucose during this time.    2. Check blood sugar if feeling symptoms of hypoglycemia but meter is not displaying a low number- may be some variability between sensor and meter at times.    3. Change sensor every 14 days.    4. Read/scan blood sugars every 8 hours to ensure entirety of data is available.     5. Watch trend arrows- will let you know if blood sugars are rising, falling or stable at the time you check.    6. It is okay to shower, bathe, and swim (up to 3 feet deep for 30 minutes) with sensor. Do not get reader wet.    7. Remove the sensor if you need to have an MRI or CT scan.    8. Do not cover the sensor with extra adhesive (the small hole  in the center of the sensor must remain uncovered).  If you have trouble with sensor falling off, these are approved products to help with sensor adhesion: Torbot Skin Tac, SKIN-PREP Protective Barrier Wipe and Mastisol Liquid Adhesive

## 2019-06-19 NOTE — LETTER
6/19/2019         RE: Maricarmen Dotson  [address] [place] [address]        Dear Colleague,    Thank you for referring your patient, Maricarmen Dotson, to the [facility]. Please see a copy of my visit note below.    Diabetes Self-Management Education & Support      Diabetes Self-Management Education & Support - Insulin Pump Review and Personal CGM Start:      SUBJECTIVE/OBJECTIVE  Presents for: Individual review  Accompanied by: Self  Diabetes education in the past 24mo: No  Focus of Visit: Insulin Pump, CGM  Diabetes type: Type 2  Disease course: Worsening  How confident are you filling out medical forms by yourself:: Not Assessed  Diabetes management related comments/concerns: would like help startingthe freddie sensor. having issues with the pump not working, screen goes black, deactivates the pod and needs to reset date and time. Had some issues with anxiety the last couple of months but doing better now, wants a chance to do better and not change pump settings.  Transportation concerns: No  Other concerns:: Other  Cultural Influences/Ethnic Background:  American     Personal CGM start:  Sensor started:: Started today in clinic  CGM Initial Settings: Freestyle Freddie  Freestyle Freddie Target Glucose Range (mg/dL):   CGM Start Plan: Change sensor, as directed.    Patient seen today for Insulin Pump Review:            Insulin Pump Information  Insulin Pump Type: OmniPod    Insulin Pump Review  Insulin Pump Type: OmniPod  Problems taking diabetes medications regularly?: No(only when stress binge eating, unable to bolus at that time but will corrrect afterwards)  Diabetes medication side effects?: No    Healthy Eating  Healthy Eating Assessed Today: No  Cultural/Religion diet restrictions?: No  Meal planning: Avoiding sweets, Carbohydrate counting, Heart healthy, Low salt, Smaller portions    Being Active  Being Active Assessed Today: No  How intense was your typical  exercise? : Moderate (like brisk walking)  Barrier to exercise: Other    Monitoring  Monitoring Assessed Today: Yes  Blood Glucose Meter: Accu-check, FreeStyle  Home Glucose (Sugar) Monitorin+ times per day  Blood glucose trend: Fluctuating minimally  Low Glucose Range (mg/dL): 70-90  High Glucose Range (mg/dL): >200  Overall Range (mg/dL): 180-200      Taking Medications  Diabetes Medication(s)     Insulin       Insulin Aspart (INSULIN PUMP - OUTPATIENT)         Insulin Aspart (INSULIN PUMP - OUTPATIENT)    Inject Subcutaneous continuous INSULIN PUMP - OUTPATIENT  Date last updated: 2019 Omnipod   BASAL RATES and times:   REGULAR:   12am: 1.60 --6 AM: 1.45 --12pm: 1.3 -- 6pm: 1.45   HIGH 2:   12am: 1.70 --  6 AM: 1.55 -- 12pm: 1.4 -- 6pm: 1.55   ANXIETY 3:   12am: 1.80 -- 6 AM: 1.65 --12pm:1.5--6pm: 1.8  ANXIETY 4:   12am: 1.90 -- 6 AM: 1.65 --6pm: 1.9  AFTERNOON BINGE:   12 AM: 1.8 -- 6 AM: 4.65 --8 PM: 1.8   VERY HIGH 5: 12 AM: 2--6am:1.8--6pm:2   CARB RATIO: 12am-12am: 6   Corection Factor (Sensitivity): 12AM (midnight): 23 -- 5 AM: 25   Target blood glucose: 100-120  Active insulin time: 4 hrs     insulin aspart (NOVOLOG VIAL) 100 UNITS/ML vial    To be used in insulin pump.  units          Taking Medication Assessed Today: Yes  Current Treatments: Diet, Insulin Pump  Problems taking diabetes medications regularly?: No(only when stress binge eating, unable to bolus at that time but will corrrect afterwards)  Diabetes medication side effects?: No    Problem Solving  Problem Solving Assessed Today: Yes  Hypoglycemia Frequency: Rarely  Hypoglycemia Treatment: Juice  Patient carries a carbohydrate source: Yes  Medical alert: No  Severe weather/disaster plan for diabetes management?: Yes  DKA prevention plan?: No  Sick day plan for diabetes management?: (P) Yes    Hypoglycemia symptoms  Confusion: Yes  Dizziness or Light-Headedness: Yes  Headaches: Yes  Hunger: Yes  Mood changes:  Yes  Nervousness/Anxiety: Yes  Sleepiness: No  Speech difficulty: No  Sweats: No  Tremors: No    Hypoglycemia Complications  Blackouts: No  Hospitalization: No  Nocturnal hypoglycemia: No  Required assistance: No  Required glucagon injection: No  Seizures: No    Reducing Risks  Reducing Risks Assessed Today: No  CAD Risks: No known risk factors, Diabetes Mellitus, Family history, Hypertension, Obesity, Post-menopausal, Stress  Has dilated eye exam at least once a year?: Yes  Sees dentist every 6 months?: Yes  Sees podiatrist (foot doctor)?: No    Healthy Coping  Healthy Coping Assessed Today: Yes  Emotional response to diabetes: Ready to learn  Informal Support system:: Family  Stage of change: RELAPSE (Returned to unhealthy behavior)  Difficulty affording diabetes management supplies?: Yes  Patient Activation Measure Survey Score:  JEFFERY Score (Last Two) 6/2/2011 10/18/2013   JEFFERY Raw Score 50 42   Activation Score 86.3 66   JEFFERY Level 4 3         ASSESSMENT  Patient history of anxiety which makes it difficult for her to manage her diabetes at times. The pump has been a helpful tool for her She still struggles to bolus for binge eating when she is anxious and not sleeping, but states not binge eating as much. She does take a correction dose later when she is able to cope and Bg is high.     She is having issues with pump functioning correctly.  Pump did fail while in clinic today, screen blanked out, pod deactivated and date and time needed reset.  Recommend she call the [facility] helpline to troubleshoot or get a new PDM.  In the mean time to make sure she is getting her insulin, a Unsiloaner PDM provided to her today in clinic through [facility].   Loaner PDM set up for her and verified all current settings.      Patient was able to start Freddie sensor today in clinic without difficulty.     No changes to pump settings today. Recommend she wear the sensor to evaluate glucose trends and pump settings. She can use the Unsiloaner PDM  during this time and if she receives a new PDM it can be set up at her follow up appointment.     INTERVENTION:   Diabetes knowledge and skills assessment:     Patient is knowledgeable in diabetes management concepts related to: Monitoring and carbohydrate counting    Patient needs further education on the following diabetes management concepts: Taking Medication and Healthy Coping    Based on learning assessment above, most appropriate setting for further diabetes education would be: Individual setting.    Education provided today on:  AADE Self-Care Behaviors:  Taking Medication: action of prescribed medication and when to take medications  Healthy Coping: utilize support systems      CGM-specific education:   Freestyle Freddie sensor: insertion technique, sensor site location and rotation, insulin administration in relation to sensor placement, sensor wear, reasons to remove sensor (MRI, CT, diathermy), Vitamin C & Aspirin effects on sensor, Freddie Galva: frequency of scanning sensor, length of time data is visible, use of built in glucose meter, Precision X-tra test strips, Use of trends and graphs for pattern management and problem solving and Dosing insulin based on sensor glucose results    Opportunities for ongoing education and support in diabetes-self management were discussed.    Pt verbalized understanding of concepts discussed and recommendations provided today.       Education Materials Provided:  No new materials provided today      PLAN  See Patient Instructions for co-developed, patient-stated behavior change goals.    Deanna Bolton RN,CDE   Time Spent: 80 minutes  Encounter Type: Individual    Any diabetes medication dose changes were made via the CDE Protocol and Collaborative Practice Agreement with the patient's referring provider. A copy of this encounter was shared with the provider.

## 2019-06-25 ENCOUNTER — OFFICE VISIT (OUTPATIENT)
Dept: SLEEP MEDICINE | Facility: CLINIC | Age: 59
End: 2019-06-25
Payer: COMMERCIAL

## 2019-06-25 VITALS
DIASTOLIC BLOOD PRESSURE: 88 MMHG | HEART RATE: 67 BPM | RESPIRATION RATE: 14 BRPM | OXYGEN SATURATION: 98 % | HEIGHT: 64 IN | BODY MASS INDEX: 34.15 KG/M2 | WEIGHT: 200 LBS | SYSTOLIC BLOOD PRESSURE: 172 MMHG

## 2019-06-25 DIAGNOSIS — F51.04 CHRONIC INSOMNIA: ICD-10-CM

## 2019-06-25 DIAGNOSIS — G47.8 UARS (UPPER AIRWAY RESISTANCE SYNDROME): Primary | ICD-10-CM

## 2019-06-25 DIAGNOSIS — I10 ACCELERATED ESSENTIAL HYPERTENSION: ICD-10-CM

## 2019-06-25 PROCEDURE — 99244 OFF/OP CNSLTJ NEW/EST MOD 40: CPT | Performed by: PHYSICIAN ASSISTANT

## 2019-06-25 ASSESSMENT — MIFFLIN-ST. JEOR: SCORE: 1469.25

## 2019-06-25 NOTE — PROGRESS NOTES
Sleep Consultation:    Date on this visit: 6/25/2019    Maricarmen Dotson is sent by Nyla Camarena for a sleep consultation regarding MIESHA.    Primary Physician: Annamaria Erickson     Maricarmen Dotson presents for evaluation of possible sleep apnea as a contributing factor to accelerated HTN. Her medical history is also significant for TBI, DM 2, anxiety, depression, PTSD, fibromyalgia, IBS. She presents with her daughter.    She has been diagnosed with idiopathic hypersomnia by Dr. Xiong at [facility] on 7/20/2005. She was diagnosed with mild UARS, RDI 13/hr, AHI 1.3/hr. Her O2 aurea was 90%. The events did not appear positional. She had no significant PLMs. Her weight was 188.  She had an MSLT after the PSG. Her mean latency was 1.5 minutes with no sleep onset REM periods.    She had a PSG and MSLT on 12/2/2004. Her AHI was 1.2/hr, RDI 5/hr. MSLT showed a mean latency of 1.6 minutes with no SOREMPs. She was not aware that she had fallen asleep during those naps.     She had a PSG on 8/20/2004. Her AHI was 1.5/hr and RDI 4.3/hr. Her weight was 188 pounds.       She did feel CPAP helped, but would rip the mask off in the middle of the night.     She was last seen at [facility] in 2016 for treatment of insomnia. She was taking Rozerem with some perceived benefit, but was taking it very early and kept an irregular schedule. She stopped that a few years ago. She has not really noticed any difference getting off of that. She had paradoxical insomnia with eszopiclone, zolpidem, and sonata. She notes she has tried doxepin and trazodone, but thinks she has tried just about everything. She has not tried suvorexant. She also uses diphenhydramine about 3-4 days in a row and then not for another 3-4 days. She likes to let it get out of her system because she thinks it contributes to elevated blood pressure and palpitations. She felt gabapentin caused elevated blood pressure.     Maricarmen goes to sleep at 12:00 AM to 5 AM during  the week. She wakes up at 7:00-8:00 AM without an alarm. She falls asleep in 10-15 minutes.  Maricarmen has difficulty falling asleep.  She wakes up 1 times a night, usually for the rest of the night.  Maricarmen wakes up to uncertain reasons and pain.  On weekends, her sleep is the same.  Patient gets an average of 2-6 hours of sleep per night. She has pain in her shoulders, arms, hips, ankles. That is exacerbated by sleeping on her sides and stomach.     She has 3-4 nights of 5-6 hours of sleep with diphenhydramine 50 mg or melatonin 20 mg. Once she starts feeling palpitations, she stops the Benadryl for 3-4 days, she will get a couple of hours of sleep. On those nights, she goes to bed 3-4 AM and may sleep until 8 AM if she is kaylie. Sometimes she does not get any sleep. She will watch TV or read, play cards, not in her bedroom.     Patient does not use electronics in bed, watch TV in bed and read in bed. She does breathing exercises and positive thinking at bedtime.     Maricarmen is not currently working. She used to work as a [employer] tech. She lives with her  and 2 daughters.      Maricarmen does snore every night and snoring is loud. Patient does have a regular bed partner. There is no report of gasping and snorting.  She does have witnessed apneas. They occasionally sleep separately due to her 's snoring.  Patient sleeps on her back, side and stomach. She often wakes on her stomach. She feels some restlessness throughout her body when having an anxiety attack. That usually happens at night. She denies morning dry mouth, morning headaches and morning confusion. She will sometimes wake choking if she dozes sitting up. She attributes that to fluid draining down the back of her throat. Maricarmen denies any sleep walking, sleep talking, dream enactment, sleep paralysis, cataplexy and hypnogogic/hypnopompic hallucinations. She has worn a bite guard for bruxing for 20 years. She was on gabapentin and liked it, but she got to  "the point where she struggled to straighten her fingers and toes, even on only 100 mg. She also had stiffness in her spine. She also tried Lyrica and had similar symptoms. She has tried a couple of muscle relaxants but had similar stiffness. She does cry or scream in her sleep if she has exposure to some traumatic trigger. She has not had any since February. She will do a form of dream rehearsal therapy when she starts having nightmares. She will discuss it with her  too. She may take lorazepam 1-2 times per month.    Maricarmen has claustrophobia and anxiety, denies difficulty breathing through her nose, reflux at night, heartburn and depression.      Maricarmen has gained 20 pounds in the last 2 years.  Patient describes themself as a morning person.  She would prefer to go to sleep at 11:00 PM and wake up at 7:00 AM.  Patient's [place] Sleepiness score 11/24 consistent with mild daytime sleepiness.  She has tried stimulants to help her stay awake in the day, but it increased her blood pressure.    Maricarmen does not intentionally take naps. After 2-3 nights of not sleeping well, she will doze in front of the TV. Her daughter says she sleeps in the day more than she thinks. She has \"epic\" naps in bed a few times per month. She will avoid driving if she is in the part of her cycle where she is not sleeping.  She denies dozing while driving, but sometimes feels a little cognitively impaired or impaired reaction time.  Patient was counseled on the importance of driving while alert, to pull over if drowsy, or nap before getting into the vehicle if sleepy.  She uses 1-2 cups, 1-2 times per month of coffee. Last caffeine intake is usually before noon. It causes palpitations.    Allergies:    Allergies   Allergen Reactions     Amoxicillin Anaphylaxis     Bee Anaphylaxis     Bee sting       Contrast Dye Anaphylaxis     Iodine Anaphylaxis     Severe anaphalatic shock       Sulfa Drugs Anaphylaxis     Tetanus-Diphtheria Toxoids      " Rxn was to Tdap-whole body joint pain and fever.  Had similar reaction to Td vaccine 7/28/2017     Metoprolol Other (See Comments)     Fatigue, joint pain, throat tightness     Zithromax [Azithromycin Dihydrate] Nausea and Vomiting     Amlodipine      Neuropathic type pain in hands/feet     Atorvastatin      myalgias     Catapres [Clonidine]      Crestor [Rosuvastatin]      myalgias     Cyproheptadine      Severe headache, cardiac issues, and dizziness     Humalog [Insulin Lispro]      Causes pancreatitis -      Hydrochlorothiazide      numbness     Latex      Skin burn and can't breathe       Lisinopril      Joint aches     Metformin      Nifedipine      Onglyza [Saxagliptin Hydrochloride]      Fibromyalgia flare     Phenergan [Promethazine]      Prochlorperazine      Altered mental status, hallucinations     Reglan [Metoclopramide]      Sumatriptan      Causes severe depression     Tetracycline Nausea and Vomiting, Hives and Difficulty breathing     Pt called to update today after the visit that she is allergic to the tetracycline-added to her list     Sulindac Anxiety     Panic attacks       Medications:    Current Outpatient Medications   Medication Sig Dispense Refill     Cholecalciferol (VITAMIN D3 PO) Take by mouth daily       Cyanocobalamin (VITAMIN B12 PO)        insulin aspart (NOVOLOG VIAL) 100 UNITS/ML vial To be used in insulin pump.  units 9 vial 3     LORazepam (ATIVAN) 1 MG tablet TAKE ONE-HALF TO ONE TABLET BY MOUTH NIGHTLY AS NEEDED FOR ANXIETY OR SLEEP 15 tablet 0     losartan (COZAAR) 50 MG tablet Take 2 tablets (100 mg) by mouth daily 60 tablet 3     MELATONIN PO        meloxicam (MOBIC) 15 MG tablet Take 15 mg by mouth At Bedtime        acetone, Urine, test STRP 1 strip by In Vitro route daily 50 each 3     ASPIRIN NOT PRESCRIBED (INTENTIONAL) Please choose reason not prescribed, below (Patient not taking: Reported on 5/20/2019) 0 each 0     blood glucose (NO BRAND SPECIFIED) test strip  Freestyle test strips (NOT LITE or INSULINX) to be used with Omnipod pump test 6 times daily 200 each 11     Continuous Blood Gluc  (FREESTYLE RADHA 14 DAY READER) MARIA C 1 each continuous 1 Device 0     continuous blood glucose monitoring (FREESTYLE RADHA) sensor For use with Freestyle Radha Flash  for continuous monitioring of blood glucose levels. Replace sensor every 10 days. 1 each 0     diclofenac (VOLTAREN) 1 % topical gel Apply topically nightly as needed for moderate pain Apply 4 grams to knees or 2 grams to hands four times daily using enclosed dosing card. 100 g 1     EPINEPHrine (EPIPEN) 0.3 MG/0.3ML injection Inject 0.3 mLs (0.3 mg) into the muscle once as needed for anaphylaxis (Patient not taking: Reported on 5/20/2019) 2 each 0     Insulin Aspart (INSULIN PUMP - OUTPATIENT)        Insulin Aspart (INSULIN PUMP - OUTPATIENT) Inject Subcutaneous continuous INSULIN PUMP - OUTPATIENT  Date last updated: 4/19/2019 Omnipod   BASAL RATES and times:   REGULAR:   12am: 1.60 --6 AM: 1.45 --12pm: 1.3 -- 6pm: 1.45   HIGH 2:   12am: 1.70 --  6 AM: 1.55 -- 12pm: 1.4 -- 6pm: 1.55   ANXIETY 3:   12am: 1.80 -- 6 AM: 1.65 --12pm:1.5--6pm: 1.8  ANXIETY 4:   12am: 1.90 -- 6 AM: 1.65 --6pm: 1.9  AFTERNOON BINGE:   12 AM: 1.8 -- 6 AM: 4.65 --8 PM: 1.8   VERY HIGH 5: 12 AM: 2--6am:1.8--6pm:2   CARB RATIO: 12am-12am: 6   Corection Factor (Sensitivity): 12AM (midnight): 23 -- 5 AM: 25   Target blood glucose: 100-120  Active insulin time: 4 hrs       polyethylene glycol (MIRALAX/GLYCOLAX) packet Take 17 g by mouth At Bedtime       STATIN NOT PRESCRIBED, INTENTIONAL, Statin not prescribed intentionally due to Intolerance (Patient not taking: Reported on 5/20/2019) 0 each 0     traMADol (ULTRAM) 50 MG tablet Take 100 mg by mouth At Bedtime          Problem List:  Patient Active Problem List    Diagnosis Date Noted     Hypertensive urgency 04/19/2019     Priority: Medium     Posttraumatic stress disorder 12/06/2018      Priority: Medium     Hypertension goal BP (blood pressure) < 140/90 07/23/2018     Priority: Medium     Insulin pump in place 06/27/2018     Priority: Medium     Carotid atherosclerosis, bilateral 04/27/2018     Priority: Medium     Cervical pain 10/23/2017     Priority: Medium     Fibromyalgia 07/30/2017     Priority: Medium     Type 2 diabetes mellitus with hyperglycemia (H) 10/20/2015     Priority: Medium     Statin intolerance 02/24/2015     Priority: Medium     Pain in thoracic spine 07/11/2014     Priority: Medium     Nonallopathic lesion of cervical region 07/11/2014     Priority: Medium     Problem list name updated by automated process. Provider to review       Cervicalgia 07/11/2014     Priority: Medium     Lumbago 07/11/2014     Priority: Medium     TBI (traumatic brain injury) (H) 01/07/2014     Priority: Medium     Anxiety 11/15/2013     Priority: Medium     Panic attacks 11/15/2013     Priority: Medium     Migraine headache 09/03/2013     Priority: Medium     Pain in joint, ankle and foot 06/19/2013     Priority: Medium     Post concussion syndrome 05/24/2013     Priority: Medium     Plantar fasciitis 05/24/2013     Priority: Medium     Overweight 09/26/2012     Priority: Medium     Problem list name updated by automated process. Provider to review       Moderate major depression (H) 02/24/2012     Priority: Medium     S/p hospitalization         Hepatic injury 11/09/2011     Priority: Medium     Type 2 diabetes, HbA1c goal < 7% (H) 01/21/2010     Priority: Medium     Dyspepsia 01/07/2010     Priority: Medium     Hyperlipidemia LDL goal <100 09/26/2008     Priority: Medium     Chronic pain syndrome 06/27/2007     Priority: Medium     Patient is followed by DEANA ORTEGA for ongoing prescription of narcotic pain medicine.  Med: Percocet 5/325, zanaflex 2mg .   Maximum use per month:15 (percocet) zanaflex (60)  Expected duration: no end date  Narcotic agreement on file: YES  Clinic visit recommended:  Q 3 months         Irritable bowel syndrome      Priority: Medium     Diverticulosis of large intestine      Priority: Medium     Problem list name updated by automated process. Provider to review       Insomnia      Priority: Medium     Problem list name updated by automated process. Provider to review          Past Medical/Surgical History:  Past Medical History:   Diagnosis Date     Ascending aorta enlargement (H)      Depressive disorder     severe, prior hospitalization     Diabetes mellitus, type 2 (H)      Diverticulosis of colon (without mention of hemorrhage)      Fibromyalgia 6/26/2007     Insomnia      Irritable bowel syndrome      Past Surgical History:   Procedure Laterality Date     C SPINAL ORTHOSIS,NOS  2002    lumbar surgery     choley  2011     HYSTERECTOMY, CERVIX STATUS UNKNOWN  2000    TVH/BSO       Social History:  Social History     Socioeconomic History     Marital status:      Spouse name: Not on file     Number of children: 3     Years of education: Not on file     Highest education level: Not on file   Occupational History     Occupation: xr technician     Employer: [employer]   Social Needs     Financial resource strain: Not on file     Food insecurity:     Worry: Not on file     Inability: Not on file     Transportation needs:     Medical: Not on file     Non-medical: Not on file   Tobacco Use     Smoking status: Never Smoker     Smokeless tobacco: Never Used   Substance and Sexual Activity     Alcohol use: Yes     Alcohol/week: 0.0 oz     Comment: maybe once a month     Drug use: No     Sexual activity: Yes     Partners: Male     Birth control/protection: Surgical   Lifestyle     Physical activity:     Days per week: Not on file     Minutes per session: Not on file     Stress: Not on file   Relationships     Social connections:     Talks on phone: Not on file     Gets together: Not on file     Attends [employer] service: Not on file     Active member of club or  organization: Not on file     Attends meetings of clubs or organizations: Not on file     Relationship status: Not on file     Intimate partner violence:     Fear of current or ex partner: Not on file     Emotionally abused: Not on file     Physically abused: Not on file     Forced sexual activity: Not on file   Other Topics Concern     Parent/sibling w/ CABG, MI or angioplasty before 65F 55M? Not Asked   Social History Narrative     Not on file       Family History:  Family History   Problem Relation Age of Onset     Diabetes Mother         type 2     Hypertension Mother      Cerebrovascular Disease Mother          stroke age 57     Ovarian Cancer Mother 43     Cancer Mother         cervix     Diabetes Father         type 2     Hypertension Father      Gastrointestinal Disease Father         colon polyps     Sleep Apnea Brother      Breast Cancer Sister      Ovarian Cancer Sister 46     Breast Cancer Sister      Ovarian Cancer Maternal Grandmother 72     Cancer Maternal Grandmother         cervix       Review of Systems:  A complete review of systems reviewed by me is negative with the exeption of what has been mentioned in the history of present illness.  CONSTITUTIONAL: NEGATIVE for weight loss, fever, chills, sweats or night sweats.  CONSTITUTIONAL:  POSITIVE for  weight gain and drug allergies   EYES: NEGATIVE for changes in vision, blind spots, double vision.  ENT: NEGATIVE for sore throat, sinus pain, post-nasal drip, runny nose, bloody nose  ENT:  POSITIVE for  ear pain  NEUROLOGIC:  POSITIVE for  headaches and weakness or numbness in the arms or legs  DERMATOLOGIC: NEGATIVE for rashes, new moles or change in mole(s)  PULMONARY: NEGATIVE SOB at rest, productive cough, coughing up blood, wheezing or whistling when breathing.    PULMONARY:  POSITIVE for  SOB with activity and dry cough  GASTROINTESTINAL: NEGATIVE for nausea or vomitting, loose or watery stools, fat or grease in stools, bowel movements  "black in color or blood noted.  GASTROINTESTINAL:  POSITIVE for  constipation and abdominal pain  GENITOURINARY: NEGATIVE for pain during urination, blood in urine, urinating more frequently than usual, irregular menstrual periods.  MUSCULOSKELETAL:  POSITIVE for  muscle pain, bone or joint pain and swollen joints  ENDOCRINE: NEGATIVE for increased thirst or urination.  ENDOCRINE:  POSITIVE for  diabetes  LYMPHATIC: NEGATIVE for swollen lymph nodes, lumps or bumps in the breasts or nipple discharge.  MENTAL HEALTH: POSITIVE for anxiety    Physical Examination:  Vitals: /87   Pulse 67   Resp 14   Ht 1.613 m (5' 3.5\")   Wt 90.7 kg (200 lb)   LMP 01/01/1999   SpO2 98%   BMI 34.87 kg/m      Neck Cir (cm): 39 cm    [place] Total Score 6/25/2019   Total score - [place] 11       ALESSANDRA Total Score: 21 (06/25/19 0831)    GENERAL APPEARANCE: healthy, alert, no distress and cooperative  EYES: Eyes grossly normal to inspection, PERRL, conjunctivae and sclerae normal and lids and lashes normal  HENT: nose and mouth without ulcers or lesions, oral mucous membranes moist and oropharynx clear  NECK: no adenopathy, no asymmetry, masses, or scars, thyroid normal to palpation and trachea midline and normal to palpation  RESP: lungs clear to auscultation - no rales, rhonchi or wheezes  CV: regular rates and rhythm, normal S1 S2, no S3 or S4, no murmur, click or rub and no irregular beats  LYMPHATICS: no cervical adenopathy  MS: extremities normal- no gross deformities noted  NEURO: Normal strength and tone, mentation intact, speech normal and cranial nerves 2-12 intact  Mallampati Class: I.  Tonsillar Stage: 2  visible at pillars.    Impression/Plan:    (G47.8) UARS (upper airway resistance syndrome)  (primary encounter diagnosis), (I10) Accelerated essential hypertension  Comment: She had 3 sleep studies between 2004 and 2005. The first 2 showed an AHI of about 1/hr and RDI of 5/hr or less. Her last study on 7/20/2005, " showed an AHI of 1.3/hr, but her RDI was 13/hr. She tried CPAP and there are notes suggesting she felt better, but she would wake ripping the mask off. She was asked to be re-evaluated by her cardiologist because she has accelerated HTN. She has had adverse effects to numerous medications (for sleep and blood pressure). Her weight was 188 at the time of her previous studies and is now 200. Her  does observe pauses in breathing, but it is unclear if anything influences how likely the pauses are to occur. She has daytime sleepiness with an ESS of 11/24. Her daughter seems to think she dozes much more frequently in the daytime than she is aware. That is consistent with 2 prior MSLTs that showed a mean latency of about 1.5 minutes (no SOREMPs). She was not aware of having dozed on the naps. She has tried stimulants but they increase her blood pressure and palpitations. She has TMJ and uses a bite guard. She is not sure whether or not she would be able to tolerate a dental appliance for apnea. I strongly recommend an in lab test rather than home test given her insomnia and borderline test results in the past.   Plan: Comprehensive Sleep Study        Split night PSG with CPAP/BiPAP titration if indicated. She was encouraged to bring her diphenhydramine for the test.      (F51.04) Chronic insomnia  Comment: She has a long history of difficulty sleeping. This is influenced by pain, but seems to have a strong psychophysiologic component. She has been resistant to CBT in the past and has tried numerous sleep aids with either adverse or paradoxical effect. She usually gets 3-4 nights in a row of 5-6 hours of sleep, aided by diphenhydramine. Then she starts to notice palpitations, so she stops the diphenhydramine. She then expects 3-4 nights of bad sleep with 2-3 hours of total sleep. She will stay up until 3-4 AM and sleep in until 7-8 AM if she can. She is observed to doze inadvertently frequently, which she seems  "unaware of. She also is observed to occasionally take \"epic\" naps in bed. She denied that at first, but then said they may occur after a couple of nights of short sleep.  Plan: We reviewed concepts of circadian and homeostatic sleep drives. She was advised to try to avoid sleep in the day and create a more consistent sleep schedule at night with a very consistent wake time. She was advised to aim for about 7 hours in bed per night. We will work on setting up a more specific plan for her after the sleep study.      Literature provided regarding sleep apnea and insomnia.      She will follow up with me in approximately two weeks after her sleep study has been competed to review the results and discuss plan of care.       Polysomnography reviewed.  Obstructive sleep apnea reviewed.  Complications of untreated sleep apnea were reviewed.  45 minutes was spent during this visit, over 50% in counseling and coordination of care.   Bennett Goltz, PA-C    CC: Nyla Lopez*  Annamaria Erickson MD         "

## 2019-06-25 NOTE — NURSING NOTE
"Chief Complaint   Patient presents with     Sleep Problem       Initial /87   Pulse 67   Resp 14   Ht 1.613 m (5' 3.5\")   Wt 90.7 kg (200 lb)   LMP 01/01/1999   SpO2 98%   BMI 34.87 kg/m   Estimated body mass index is 34.87 kg/m  as calculated from the following:    Height as of this encounter: 1.613 m (5' 3.5\").    Weight as of this encounter: 90.7 kg (200 lb).    Medication Reconciliation: complete     ESS 11  Neck 39cm  Lorraine Smart      "

## 2019-06-25 NOTE — PATIENT INSTRUCTIONS
"MY TREATMENT INFORMATION FOR SLEEP APNEA-  Maricarmen Dotson    DOCTOR : Bennett Ezra Goltz, PA-C  SLEEP CENTER : [facility]     MY CONTACT NUMBER: [phone]    Am I having a sleep study at a sleep center?  Make sure you have an appointment for the study before you leave!    Am I having a home sleep study?  Watch this video:  [email or web address]  Please verify your insurance coverage with your insurance carrier    Frequently asked questions:  1. What is Obstructive Sleep Apnea (MIESHA)? MIESHA is the most common type of sleep apnea. Apnea means, \"without breath.\"  Apnea is most often caused by narrowing or collapse of the upper airway as muscles relax during sleep.   Almost everyone has occasional apneas. Most people with sleep apnea have had brief interruptions at night frequently for many years.  The severity of sleep apnea is related to how frequent and severe the events are.   2. What are the consequences of MIESHA? Symptoms include: feeling sleepy during the day, snoring loudly, gasping or stopping of breathing, trouble sleeping, and occasionally morning headaches or heartburn at night.  Sleepiness can be serious and even increase the risk of falling asleep while driving. Other health consequences may include development of high blood pressure and other cardiovascular disease in persons who are susceptible. Untreated MIESHA  can contribute to heart disease, stroke and diabetes.   3. What are the treatment options? In most situations, sleep apnea is a lifelong disease that must be managed with daily therapy. Medications are not effective for sleep apnea and surgery is generally not considered until other therapies have been tried. Your treatment is your choice . Continuous Positive Airway (CPAP) works right away and is the therapy that is effective in nearly everyone. An oral device to hold your jaw forward is usually the next most reliable option. Other options " include postioning devices (to keep you off your back), weight loss, and surgery including a tongue pacing device. There is more detail about some of these options below.    Important tips for using CPAP and similar devices   Know your equipment:  CPAP is continuous positive airway pressure that prevents obstructive sleep apnea by keeping the throat from collapsing while you are sleeping. In most cases, the device is  smart  and can slowly self-adjusts if your throat collapses and keeps a record every day of how well you are treated-this information is available to you and your care team.  BPAP is bilevel positive airway pressure that keeps your throat open and also assists each breath with a pressure boost to maintain adequate breathing.  Special kinds of BPAP are used in patients who have inadequate breathing from lung or heart disease. In most cases, the device is  smart  and can slowly self-adjusts to assist breathing. Like CPAP, the device keeps a record of how well you are treated.  Your mask is your connection to the device. You get to choose what feels most comfortable and the staff will help to make sure if fits. Here: are some examples of the different masks that are available:       Key points to remember on your journey with sleep apnea:  1. Sleep study.  PAP devices often need to be adjusted during a sleep study to show that they are effective and adjusted right.  2. Good tips to remember: Try wearing just the mask during a quiet time during the day so your body adapts to wearing it. A humidifier is recommended for comfort in most cases to prevent drying of your nose and throat. Allergy medication from your provider may help you if you are having nasal congestion.  3. Getting settled-in. It takes more than one night for most of us to get used to wearing a mask. Try wearing just the mask during a quiet time during the day so your body adapts to wearing it. A humidifier is recommended for comfort in most  cases. Our team will work with you carefully on the first day and will be in contact within 4 days and again at 2 and 4 weeks for advice and remote device adjustments. Your therapy is evaluated by the device each day.   4. Use it every night. The more you are able to sleep naturally for 7-8 hours, the more likely you will have good sleep and to prevent health risks or symptoms from sleep apnea. Even if you use it 4 hours it helps. Occasionally all of us are unable to use a medical therapy, in sleep apnea, it is not dangerous to miss one night.   5. Communicate. Call our skilled team on the number provided on the first day if your visit for problems that make it difficult to wear the device. Over 2 out of 3 patients can learn to wear the device long-term with help from our team. Remember to call our team or your sleep providers if you are unable to wear the device as we may have other solutions for those who cannot adapt to mask CPAP therapy. It is recommended that you sleep your sleep provider within the first 3 months and yearly after that if you are not having problems.   6. Use it for your health. We encourage use of CPAP masks during daytime quiet periods to allow your face and brain to adapt to the sensation of CPAP so that it will be a more natural sensation to awaken to at night or during naps. This can be very useful during the first few weeks or months of adapting to CPAP though it does not help medically to wear CPAP during wakefulness and  should not be used as a strategy just to meet guidelines.  7. Take care of your equipment. Make sure you clean your mask and tubing using directions every day and that your filter and mask are replaced as recommended or if they are not working.     BESIDES CPAP, WHAT OTHER THERAPIES ARE THERE?    Positioning Device  Positioning devices are generally used when sleep apnea is mild and only occurs on your back.This example shows a pillow that straps around the waist. It  may be appropriate for those whose sleep study shows milder sleep apnea that occurs primarily when lying flat on one's back. Preliminary studies have shown benefit but effectiveness at home may need to be verified by a home sleep test. These devices are generally not covered by medical insurance.  Examples of devices that maintain sleeping on the back to prevent snoring and mild sleep apnea.    Belt type body positioner  Http://Draft.com/    Electronic reminder  Http://nightshifttherapy.com/  Http://www.[email or web address].au/      Oral Appliance  What is oral appliance therapy?  An oral appliance device fits on your teeth at night like a retainer used after having braces. The device is made by a specialized dentist and requires several visits over 1-2 months before a manufactured device is made to fit your teeth and is adjusted to prevent your sleep apnea. Once an oral device is working properly, snoring should be improved. A home sleep test may be recommended at that time if to determine whether the sleep apnea is adequately treated.       Some things to remember:  -Oral devices are often, but not always, covered by your medical insurance. Be sure to check with your insurance provider.   -If you are referred for oral therapy, you will be given a list of specialized dentists to consider or you may choose to visit the Web site of the American Academy of Dental Sleep Medicine  -Oral devices are less likely to work if you have severe sleep apnea or are extremely overweight.     More detailed information  An oral appliance is a small acrylic device that fits over the upper and lower teeth  (similar to a retainer or a mouth guard). This device slightly moves jaw forward, which moves the base of the tongue forward, opens the airway, improves breathing for effective treat snoring and obstructive sleep apnea in perhaps 7 out of 10 people .  The best working devices are custom-made by a dental device  after a mold is  made of the teeth 1, 2, 3.  When is an oral appliance indicated?  Oral appliance therapy is recommended as a first-line treatment for patients with primary snoring, mild sleep apnea, and for patients with moderate sleep apnea who prefer appliance therapy to use of CPAP4, 5. Severity of sleep apnea is determined by sleep testing and is based on the number of respiratory events per hour of sleep.   How successful is oral appliance therapy?  The success rate of oral appliance therapy in patients with mild sleep apnea is 75-80% while in patients with moderate sleep apnea it is 50-70%. The chance of success in patients with severe sleep apnea is 40-50%. The research also shows that oral appliances have a beneficial effect on the cardiovascular health of MIESHA patients at the same magnitude as CPAP therapy7.  Oral appliances should be a second-line treatment in cases of severe sleep apnea, but if not completely successful then a combination therapy utilizing CPAP plus oral appliance therapy may be effective. Oral appliances tend to be effective in a broad range of patients although studies show that the patients who have the highest success are females, younger patients, those with milder disease, and less severe obesity. 3, 6.   Finding a dentist that practices dental sleep medicine  Specific training is available through the American Academy of Dental Sleep Medicine for dentists interested in working in the field of sleep. To find a dentist who is educated in the field of sleep and the use of oral appliances, near you, visit the Web site of the American Academy of Dental Sleep Medicine.    References  1. Eneida, et al. Objectively measured vs self-reported compliance during oral appliance therapy for sleep-disordered breathing. Chest 2013; 144(5): 2801-2374.  2. Isai et al. Objective measurement of compliance during oral appliance therapy for sleep-disordered breathing. Thorax 2013; 68(1): 91-96.  3. yNdia  et al. Mandibular advancement devices in 620 men and women with MIESHA and snoring: tolerability and predictors of treatment success. Chest 2004; 125: 6233-4364.  4. Lin et al. Oral appliances for snoring and MIESHA: a review. Sleep 2006; 29: 244-262.  5. Carla et al. Oral appliance treatment for MIESHA: an update. J Clin Sleep Med 2014; 10(2): 215-227.  6. Shyanne et al. Predictors of OSAH treatment outcome. J Dent Res 2007; 86: 5408-8420.    Weight Loss:    Weight loss is a long-term strategy that may improve sleep apnea in some patients.    Weight management is a personal decision and the decision should be based on your interest and the potential benefits.  If you are interested in exploring weight loss strategies, the following discussion covers the impact on weight loss on sleep apnea and the approaches that may be successful.    Being overweight does not necessarily mean you will have health consequences.  Those who have BMI over 35 or over 27 with existing medical conditions carries greater risk.   Weight loss decreases severity of sleep apnea in most people with obesity. For those with mild obesity who have developed snoring with weight gain, even 15-30 pound weight loss can improve and occasionally eliminate sleep apnea.  Structured and life-long dietary and health habits are necessary to lose weight and keep healthier weight levels.     Though there may be significant health benefits from weight loss, long-term weight loss is very difficult to achieve- studies show success with dietary management in less than 10% of people. In addition, substantial weight loss may require years of dietary control and may be difficult if patients have severe obesity. In these cases, surgical management may be considered.  Finally, older individuals who have tolerated obesity without health complications may be less likely to benefit from weight loss strategies.      Your BMI is Body mass index is 34.87 kg/m .  Weight  management is a personal decision.  If you are interested in exploring weight loss strategies, the following discussion covers the approaches that may be successful. Body mass index (BMI) is one way to tell whether you are at a healthy weight, overweight, or obese. It measures your weight in relation to your height.  A BMI of 18.5 to 24.9 is in the healthy range. A person with a BMI of 25 to 29.9 is considered overweight, and someone with a BMI of 30 or greater is considered obese. More than two-thirds of American adults are considered overweight or obese.  Being overweight or obese increases the risk for further weight gain. Excess weight may lead to heart disease and diabetes.  Creating and following plans for healthy eating and physical activity may help you improve your health.  Weight control is part of healthy lifestyle and includes exercise, emotional health, and healthy eating habits. Careful eating habits lifelong are the mainstay of weight control. Though there are significant health benefits from weight loss, long-term weight loss with diet alone may be very difficult to achieve- studies show long-term success with dietary management in less than 10% of people. Attaining a healthy weight may be especially difficult to achieve in those with severe obesity. In some cases, medications, devices and surgical management might be considered.  What can you do?  If you are overweight or obese and are interested in methods for weight loss, you should discuss this with your provider.     Consider reducing daily calorie intake by 500 calories.     Keep a food journal.     Avoiding skipping meals, consider cutting portions instead.    Diet combined with exercise helps maintain muscle while optimizing fat loss. Strength training is particularly important for building and maintaining muscle mass. Exercise helps reduce stress, increase energy, and improves fitness. Increasing exercise without diet control, however, may  not burn enough calories to loose weight.       Start walking three days a week 10-20 minutes at a time    Work towards walking thirty minutes five days a week     Eventually, increase the speed of your walking for 1-2 minutes at time    In addition, we recommend that you review healthy lifestyles and methods for weight loss available through the National Institutes of Health patient information sites:  http://win.niddk.nih.gov/publications/index.htm    And look into health and wellness programs that may be available through your health insurance provider, employer, local community center, or [employer].    Weight management plan: Patient was referred to their PCP to discuss a diet and exercise plan.  Surgery:  Surgery for obstructive sleep apnea is considered generally only when other therapies fail to work. Surgery may be discussed with you if you are having a difficult time tolerating CPAP and or when there is an abnormal structure that requires surgical correction.  Nose and throat surgeries often enlarge the airway to prevent collapse.  Most of these surgeries create pain for 1-2 weeks and up to half of the most common surgeries are not effective throughout life.  You should carefully discuss the benefits and drawbacks to surgery with your sleep provider and surgeon to determine if it is the best solution for you.   More information  Surgery for MIESHA is directed at areas that are responsible for narrowing or complete obstruction of the airway during sleep.  There are a wide range of procedures available to enlarge and/or stabilize the airway to prevent blockage of breathing in the three major areas where it can occur: the palate, tongue, and nasal regions.  Successful surgical treatment depends on the accurate identification of the factors responsible for obstructive sleep apnea in each person.  A personalized approach is required because there is no single treatment that works well for everyone.  Because of  anatomic variation, consultation with an examination by a sleep surgeon is a critical first step in determining what surgical options are best for each patient.  In some cases, examination during sedation may be recommended in order to guide the selection of procedures.  Patients will be counseled about risks and benefits as well as the typical recovery course after surgery. Surgery is typically not a cure for a person s MIESHA.  However, surgery will often significantly improve one s MIESHA severity (termed  success rate ).  Even in the absence of a cure, surgery will decrease the cardiovascular risk associated with OSA7; improve overall quality of life8 (sleepiness, functionality, sleep quality, etc).  Palate Procedures:  Patients with MIESHA often have narrowing of their airway in the region of their tonsils and uvula.  The goals of palate procedures are to widen the airway in this region as well as to help the tissues resist collapse.  Modern palate procedure techniques focus on tissue conservation and soft tissue rearrangement, rather than tissue removal.  Often the uvula is preserved in this procedure. Residual sleep apnea is common in patient after pharyngoplasty with an average reduction in sleep apnea events of 33%2.    Tongue Procedures:  ExamWhile patients are awake, the muscles that surround the throat are active and keep this region open for breathing. These muscles relax during sleep, allowing the tongue and other structures to collapse and block breathing.  There are several different tongue procedures available.  Selection of a tongue base procedure depends on characteristics seen on physical exam.  Generally, procedures are aimed at removing bulky tissues in this area or preventing the back of the tongue from falling back during sleep.  Success rates for tongue surgery range from 50-62%3.  Hypoglossal Nerve Stimulation:  Hypoglossal nerve stimulation has recently received approval from the [place] Food  and Drug Administration for the treatment of obstructive sleep apnea.  This is based on research showing that the system was safe and effective in treating sleep apnea6.  Results showed that the median AHI score decreased 68%, from 29.3 to 9.0. This therapy uses an implant system that senses breathing patterns and delivers mild stimulation to airway muscles, which keeps the airway open during sleep.  The system consists of three fully implanted components: a small generator (similar in size to a pacemaker), a breathing sensor, and a stimulation lead.  Using a small handheld remote, a patient turns the therapy on before bed and off upon awakening.    Candidates for this device must be greater than 22 years of age, have moderate to severe MIESHA (AHI between 20-65), BMI less than 32, have tried CPAP/oral appliance without success, and have appropriate upper airway anatomy (determined by a sleep endoscopy performed by Dr. Cole).  Hypoglossal Nerve Stimulation Pathway:    The sleep surgeon s office will work with the patient through the insurance prior-authorization process (including communications and appeals).    Nasal Procedures:  Nasal obstruction can interfere with nasal breathing during the day and night.  Studies have shown that relief of nasal obstruction can improve the ability of some patients to tolerate positive airway pressure therapy for obstructive sleep apnea1.  Treatment options include medications such as nasal saline, topical corticosteroid and antihistamine sprays, and oral medications such as antihistamines or decongestants. Non-surgical treatments can include external nasal dilators for selected patients. If these are not successful by themselves, surgery can improve the nasal airway either alone or in combination with these other options.  Combination Procedures:  Combination of surgical procedures and other treatments may be recommended, particularly if patients have more than one area of narrowing  or persistent positional disease.  The success rate of combination surgery ranges from 66-80%2,3.    References  1. Kim SRIVASTAVA. The Role of the Nose in Snoring and Obstructive Sleep Apnoea: An Update.  Eur Arch Otorhinolaryngol. 2011; 268: 1365-73.  2.  Darcie SM; Theo JA; Siddharth JR; Pallanch JF; Arianne MB; Lior SG; Francisco RUIZ. Surgical modifications of the upper airway for obstructive sleep apnea in adults: a systematic review and meta-analysis. SLEEP 2010;33(10):7753-2751. Lucy SIMON. Hypopharyngeal surgery in obstructive sleep apnea: an evidence-based medicine review.  Arch Otolaryngol Head Neck Surg. 2006 Feb;132(2):206-13.  3. Diaz YH1, Edith Y, Sadiq RIGOBERTO. The efficacy of anatomically based multilevel surgery for obstructive sleep apnea. Otolaryngol Head Neck Surg. 2003 Oct;129(4):327-35.  4. Lucy SIMON, Goldberg A. Hypopharyngeal Surgery in Obstructive Sleep Apnea: An Evidence-Based Medicine Review. Arch Otolaryngol Head Neck Surg. 2006 Feb;132(2):206-13.  5. Sophiao PJ et al. Upper-Airway Stimulation for Obstructive Sleep Apnea.  N Engl J Med. 2014 Jan 9;370(2):139-49.  6. Missy Y et al. Increased Incidence of Cardiovascular Disease in Middle-aged Men with Obstructive Sleep Apnea. Am J Respir Crit Care Med; 2002 166: 159-165  7. Brito EM et al. Studying Life Effects and Effectiveness of Palatopharyngoplasty (SLEEP) study: Subjective Outcomes of Isolated Uvulopalatopharyngoplasty. Otolaryngol Head Neck Surg. 2011; 144: 623-631.    General recommendations for sleep problems (Insomnia)  Allow 2-4 weeks to see results     Establish a regular sleep schedule    Most people only need 7-8 hours of sleep.  Don't be in bed longer than you need     to sleep or you will end up spending more time awake in bed. This trains your    brain to think of the bed as a place to not sleep.  Go to bed at same time each night   Get up at same time each day - Set an alarm everyday (even weekends). This is one of    the most  important tips. It prevents you from relying on your insomnia to get you    up on time for your day. That actually reinforces insomnia. It also will help your    body get into a pattern where you start feeling tired at a consistent time each    night.  The body functions best when you keep a consistent routine.  Avoid sleeping-in and napping. Anytime you sleep during the day, you will be less tired at    night. You may be tired enough to fall asleep, but you will wake more in the    middle of the night because you will have met your sleep need before the night is    done.   Cut down time in bed (if not asleep, get up)- Use your bed only for sleep and sex    Anytime you spend time in bed doing activities other than sleep (reading,    watching TV, working, playing on the computer or phone, or even just laying in    bed trying to sleep), you are training  your brain to think of the bed as a place to    do activities other than sleep. If you are not falling asleep within 20-30 minutes,    get out of bed. While out of bed, avoid bright lights. Avoid work or chores. Being    productive in the middle of the night reinforces waking up at night. Find relaxing,    not particularly entertaining activities like reading, listening to music, or relaxation    exercises. Go back to bed if you start feeling groggy, or after about 30 minutes,    even if not feeling very tired. Sometimes, just getting out of bed stops the pattern    of getting frustrated about laying in bed not sleeping, and that can help you fall    asleep.   Avoid trying to force yourself to sleep- sleep is not like everything else. The harder you    work at most things, the more you can accomplish. The harder you work at    sleep, the less you will sleep.     Make the bedroom comfortable - quiet, dark and cool are better. Consider ear plugs    (silicon). Use dark blinds or wear an eye mask if needed     Make a relaxing routine prior to bedtime  Relaxation  exercises:   Progressive muscle relaxation: Relax each muscle group individually    Begin with your feet, flex, then relax. Try to imagine your feet feeling heavy and sinking into the bed. Move to your calves, do the same thing. Work through each muscle group toward your head.    Relaxing Mental Imagery: Try to imagine a trip that you took and found relaxing, or imagine a day at the beach. Try to walk yourself through the day in your mind as if you were dreaming it. Try to imagine sensing the different experiences, such as feeling sand between your toes, the heat of the sun on your skin, seeing the waves crashing the shore, the smell of the salt water, etc.     Deal with your worries before bedtime    Set aside a worry time around dinner time for 10-15 minutes. Write down the    things that are on your mind. Plan time in the coming days to address those    issues. Brainstorm ideas on how you will deal with them. Try to identify issues    that are out of your control, and try to let those issues go.  Listen to relaxation tapes   Classical Music or Nature sounds   Back Massage   Get regular exercise each day (at least 1-2 hours before bedtime)   Take medications only as directed   Eat a light bedtime snack or warm drink   Warm milk   Warm herbal tea (non-caffeinated)       Things to avoid   No overstimulating activities just before bed   No competitive games before bedtime   No exciting television programs before bedtime   Avoid caffeine after lunchtime   Avoid chocolate   Do not use alcohol to induce sleep (worsens Insomnia)   Do not take someone else's sleeping pills   Do not look at the clock when awakening   Do not turn on light when getting up to use bathroom, use a nightlight     Online Programs     www.SHUTi.me (pronounced shut eye). There is a fee for this program. Enter the code  [employer]  if you decide to enroll in this program.      www.sleepIO.com (pronounced sleep ee oh). There is a fee for this program.  Enter the code  [facility]  if you decide to enroll in this program.     Suggested Resources  Insomnia Treatment Books     Overcoming Insomnia by Tc Garcia and Sruthi Conley (2008)    No More Sleepless Nights by Chino Yarbrough and Zulma Fallon (1996)    Say Juan Jose to Insomnia by Ricardo Solano (2009)    The Insomnia Workbook by Dafne Chacon and Neville Gordon (2009)    The Insomnia Answer by Dedrick Diaz and Frankie Foley (2006)      Stress Management and Relaxation Books    The Relaxation and Stress Reduction Workbook by Azul Rea, Marisol Eckert and Lj Barnes (2008)    Stress Management Workbook: Techniques and Self-Assessment Procedures by Cally Levine and Bucky Talley (1997)    A Mindfulness-Based Stress Reduction Workbook by Filippo Gordon and Bridget Panchal (2010)    The Complete Stress Management Workbook by Darinel Teran, Jonathan Benavidez and Vitor Randle (1996)    Assert Yourself by Kimi Sanchez and Dexter Sanchez (1977)    Relaxation Resources for Computer Download   These websites offer resources to help you relax. This list is for information only. [facility] is not responsible for the quality of services or the actions of any person or organization.  Progressive Muscle Relaxation (PMR):     http://www.[employer]/progressive-muscle-relaxation-exercise.html     http://studentsupport.[facility]/counseling/resources/self-help/relaxation-and-stress-management/   Deep Breathing Exercises:    http://www.Gaming [employer] Good/breathing-awareness.html     Meditation:     www.[facility].com    www.theCS-Keysguided-meditation-site.com You may have to pay for some of these resources.    Guided Imagery:    http://www.Gaming for Good/guided-imagery-scripts.html     http://[employer]/library/exyrqswfvp-suvggs-nkiljph/     Counseling / Behavioral Health  [facility]  Visit www.[facility].org or call [phone] to  find a clinic close to you.  Or call [phone] for [facility].

## 2019-07-03 ENCOUNTER — ALLIED HEALTH/NURSE VISIT (OUTPATIENT)
Dept: EDUCATION SERVICES | Facility: CLINIC | Age: 59
End: 2019-07-03
Payer: COMMERCIAL

## 2019-07-03 DIAGNOSIS — E11.65 TYPE 2 DIABETES MELLITUS WITH HYPERGLYCEMIA, WITH LONG-TERM CURRENT USE OF INSULIN (H): Primary | ICD-10-CM

## 2019-07-03 DIAGNOSIS — Z79.4 TYPE 2 DIABETES MELLITUS WITH HYPERGLYCEMIA, WITH LONG-TERM CURRENT USE OF INSULIN (H): Primary | ICD-10-CM

## 2019-07-03 PROCEDURE — G0108 DIAB MANAGE TRN  PER INDIV: HCPCS

## 2019-07-03 NOTE — PROGRESS NOTES
Diabetes Self-Management Education & Support    Diabetes Self-Management Education & Support - Insulin Pump/CGM Review    SUBJECTIVE/OBJECTIVE  Presents for: Individual review  Accompanied by: Self, Daughter  Diabetes education in the past 24mo: Yes  Focus of Visit: Insulin Pump, CGM  Diabetes type: Type 2  Disease course: Improving  How confident are you filling out medical forms by yourself:: Not Assessed  Diabetes management related comments/concerns: recieved new PDM and would like help setting it up. Having increased pain and not sleeping as well. Also continuing to binge eat. Used the temp basal a couple of times but not sure how much or how to run it for.   Transportation concerns: No  Other concerns:: Other  Cultural Influences/Ethnic Background:  American    Patient seen today for Insulin Pump CGM  Review:    Reports:                    Insulin Pump Information  Insulin Pump Type: OmniPod    Insulin Pump Review  Insulin Pump Type: OmniPod  Problems taking diabetes medications regularly?: No(only when stress binge eating, unable to bolus at that time but will corrrect afterwards)  Diabetes medication side effects?: No  Patient would benefit from: Use of a temporary basal rate, Change in basal rate(s)    Healthy Eating  Healthy Eating Assessed Today: No  Has patient met with a dietitian in the past?: No    Being Active   Not assessed    Monitoring  Monitoring Assessed Today: Yes  Did patient bring glucose meter to appointment? : Yes  Blood Glucose Meter: CGM    Taking Medications  Diabetes Medication(s)     Insulin       Insulin Aspart (INSULIN PUMP - OUTPATIENT)         Insulin Aspart (INSULIN PUMP - OUTPATIENT)    Inject Subcutaneous continuous INSULIN PUMP - OUTPATIENT  Date last updated: 4/19/2019 Omnipod   BASAL RATES and times:   REGULAR:   12am: 1.60 --6 AM: 1.45 --12pm: 1.3 -- 6pm: 1.45   HIGH 2:   12am: 1.70 --  6 AM: 1.55 -- 12pm: 1.4 -- 6pm: 1.55   ANXIETY 3:   12am: 1.80 -- 6 AM: 1.65  --12pm:1.5--6pm: 1.8  ANXIETY 4:   12am: 1.90 -- 6 AM: 1.65 --6pm: 1.9  AFTERNOON BINGE:   12 AM: 1.8 -- 6 AM: 4.65 --8 PM: 1.8   VERY HIGH 5: 12 AM: 2--6am:1.8--6pm:2   CARB RATIO: 12am-12am: 6   Corection Factor (Sensitivity): 12AM (midnight): 23 -- 5 AM: 25   Target blood glucose: 100-120  Active insulin time: 4 hrs     insulin aspart (NOVOLOG VIAL) 100 UNITS/ML vial    To be used in insulin pump.  units          Taking Medication Assessed Today: Yes  Current Treatments: Insulin Pump  Problems taking diabetes medications regularly?: No(only when stress binge eating, unable to bolus at that time but will corrrect afterwards)  Diabetes medication side effects?: No  Treatment Compliance: Most of the time    Problem Solving  Problem Solving Assessed Today: Yes  Hypoglycemia Frequency: Rarely  Patient carries a carbohydrate source: Yes    Reducing Risks  Reducing Risks Assessed Today: No    Healthy Coping  Healthy Coping Assessed Today: Yes  Emotional response to diabetes: Ready to learn  Stage of change: ACTION (Actively working towards change)  Patient Activation Measure Survey Score:  JEFFERY Score (Last Two) 6/2/2011 10/18/2013   JEFFERY Raw Score 50 42   Activation Score 86.3 66   JEFFERY Level 4 3         ASSESSMENT  Transferred and verified all settings to new PDM for Maricarmen.    Glucose has been above goal for the last 2 weeks, pain, sleep and binge eating all contributing to the higher readings.  Recommend she change from basal 1 to High 2 pattern to allow more basal insulin.     She has used the temp basal a couple of times at increase 20-25% for 30 minutes but does not feel confident in choosing herself especially when dealing with episodes of pain or binge eating relatedto anxiety.    She would benefit from setting up preset temp basal patterns.      Presets temp basals added to pump:  Low concern- decrease 25% for 1 hour  Pain - increase 20% for 2 hours  binge eating- increase 20% for 2 hours        INTERVENTION:    Education provided today on:  AADE Self-Care Behaviors:  Taking Medication: action of prescribed medication, when to take medications and dosing    Education specific to insulin pump provided today on:   how to use a temporary basal rate and changing between preset patterns    Opportunities for ongoing education and support in diabetes-self management were discussed.    Pt verbalized understanding of concepts discussed and recommendations provided today.       Education Materials Provided:  No new materials provided today      PLAN  See Patient Instructions for co-developed, patient-stated behavior change goals.  AVS printed and provided to patient today. See Follow-Up section for recommended follow-up.    Deanna Bolton RN,CDE   Time Spent: 60 minutes  Encounter Type: Individual    Any diabetes medication dose changes were made via the CDE Protocol and Collaborative Practice Agreement with the patient's referring provider. A copy of this encounter was shared with the provider.

## 2019-07-03 NOTE — LETTER
7/3/2019         RE: Maricarmen Dotson  [address] [place] [address]        Dear Colleague,    Thank you for referring your patient, Maricarmen Dotson, to the [facility]. Please see a copy of my visit note below.    Diabetes Self-Management Education & Support    Diabetes Self-Management Education & Support - Insulin Pump/CGM Review    SUBJECTIVE/OBJECTIVE  Presents for: Individual review  Accompanied by: Self, Daughter  Diabetes education in the past 24mo: Yes  Focus of Visit: Insulin Pump, CGM  Diabetes type: Type 2  Disease course: Improving  How confident are you filling out medical forms by yourself:: Not Assessed  Diabetes management related comments/concerns: recieved new PDM and would like help setting it up. Having increased pain and not sleeping as well. Also continuing to binge eat. Used the temp basal a couple of times but not sure how much or how to run it for.   Transportation concerns: No  Other concerns:: Other  Cultural Influences/Ethnic Background:  American    Patient seen today for Insulin Pump CGM  Review:    Reports:                    Insulin Pump Information  Insulin Pump Type: OmniPod    Insulin Pump Review  Insulin Pump Type: OmniPod  Problems taking diabetes medications regularly?: No(only when stress binge eating, unable to bolus at that time but will corrrect afterwards)  Diabetes medication side effects?: No  Patient would benefit from: Use of a temporary basal rate, Change in basal rate(s)    Healthy Eating  Healthy Eating Assessed Today: No  Has patient met with a dietitian in the past?: No    Being Active   Not assessed    Monitoring  Monitoring Assessed Today: Yes  Did patient bring glucose meter to appointment? : Yes  Blood Glucose Meter: CGM    Taking Medications  Diabetes Medication(s)     Insulin       Insulin Aspart (INSULIN PUMP - OUTPATIENT)         Insulin Aspart (INSULIN PUMP - OUTPATIENT)    Inject Subcutaneous continuous INSULIN  PUMP - OUTPATIENT  Date last updated: 4/19/2019 Omnipod   BASAL RATES and times:   REGULAR:   12am: 1.60 --6 AM: 1.45 --12pm: 1.3 -- 6pm: 1.45   HIGH 2:   12am: 1.70 --  6 AM: 1.55 -- 12pm: 1.4 -- 6pm: 1.55   ANXIETY 3:   12am: 1.80 -- 6 AM: 1.65 --12pm:1.5--6pm: 1.8  ANXIETY 4:   12am: 1.90 -- 6 AM: 1.65 --6pm: 1.9  AFTERNOON BINGE:   12 AM: 1.8 -- 6 AM: 4.65 --8 PM: 1.8   VERY HIGH 5: 12 AM: 2--6am:1.8--6pm:2   CARB RATIO: 12am-12am: 6   Corection Factor (Sensitivity): 12AM (midnight): 23 -- 5 AM: 25   Target blood glucose: 100-120  Active insulin time: 4 hrs     insulin aspart (NOVOLOG VIAL) 100 UNITS/ML vial    To be used in insulin pump.  units          Taking Medication Assessed Today: Yes  Current Treatments: Insulin Pump  Problems taking diabetes medications regularly?: No(only when stress binge eating, unable to bolus at that time but will corrrect afterwards)  Diabetes medication side effects?: No  Treatment Compliance: Most of the time    Problem Solving  Problem Solving Assessed Today: Yes  Hypoglycemia Frequency: Rarely  Patient carries a carbohydrate source: Yes    Reducing Risks  Reducing Risks Assessed Today: No    Healthy Coping  Healthy Coping Assessed Today: Yes  Emotional response to diabetes: Ready to learn  Stage of change: ACTION (Actively working towards change)  Patient Activation Measure Survey Score:  JEFFERY Score (Last Two) 6/2/2011 10/18/2013   JEFFERY Raw Score 50 42   Activation Score 86.3 66   JEFFERY Level 4 3         ASSESSMENT  Transferred and verified all settings to new PDM for Maricarmen.    Glucose has been above goal for the last 2 weeks, pain, sleep and binge eating all contributing to the higher readings.  Recommend she change from basal 1 to High 2 pattern to allow more basal insulin.     She has used the temp basal a couple of times at increase 20-25% for 30 minutes but does not feel confident in choosing herself especially when dealing with episodes of pain or binge eating  relatedto anxiety.    She would benefit from setting up preset temp basal patterns.      Presets temp basals added to pump:  Low concern- decrease 25% for 1 hour  Pain - increase 20% for 2 hours  binge eating- increase 20% for 2 hours        INTERVENTION:   Education provided today on:  AADE Self-Care Behaviors:  Taking Medication: action of prescribed medication, when to take medications and dosing    Education specific to insulin pump provided today on:   how to use a temporary basal rate and changing between preset patterns    Opportunities for ongoing education and support in diabetes-self management were discussed.    Pt verbalized understanding of concepts discussed and recommendations provided today.       Education Materials Provided:  No new materials provided today      PLAN  See Patient Instructions for co-developed, patient-stated behavior change goals.  AVS printed and provided to patient today. See Follow-Up section for recommended follow-up.    Deanna Bolton RN,CDE   Time Spent: 60 minutes  Encounter Type: Individual    Any diabetes medication dose changes were made via the CDE Protocol and Collaborative Practice Agreement with the patient's referring provider. A copy of this encounter was shared with the provider.

## 2019-07-07 NOTE — TELEPHONE ENCOUNTER
Message reviewed and doned by Emily.  Primary advised visit if forms need to be completed.  Elsy Crespo RN     - - -

## 2019-07-16 ENCOUNTER — THERAPY VISIT (OUTPATIENT)
Dept: SLEEP MEDICINE | Facility: CLINIC | Age: 59
End: 2019-07-16
Payer: COMMERCIAL

## 2019-07-16 DIAGNOSIS — G47.8 UARS (UPPER AIRWAY RESISTANCE SYNDROME): ICD-10-CM

## 2019-07-16 DIAGNOSIS — I10 ACCELERATED ESSENTIAL HYPERTENSION: ICD-10-CM

## 2019-07-16 PROCEDURE — 95810 POLYSOM 6/> YRS 4/> PARAM: CPT | Performed by: PSYCHIATRY & NEUROLOGY

## 2019-07-17 LAB — SLPCOMP: NORMAL

## 2019-07-17 NOTE — PROCEDURES
" SLEEP STUDY INTERPRETATION  DIAGNOSTIC POLYSOMNOGRAPHY REPORT      Patient: HARRY DAMIAN  YOB: 1960  Study Date: 7/16/2019  MRN: [id number]  Referring Provider: MD Jensen Constance Jennifer  Ordering Provider: Goltz, PA-C, Bennett    Indications for Polysomnography: The patient is a 58 y old Female who is 5' 3\" and weighs 200.0 lbs. Her BMI is 35.0, [place] sleepiness scale 11.0 and neck circumference is 39.0 cm. Relevant medical history includes snoring, ? apneas. A diagnostic polysomnogram was performed to evaluate for sleep apnea.    Polysomnogram Data: A full night polysomnogram recorded the standard physiologic parameters including EEG, EOG, EMG, ECG, nasal and oral airflow. Respiratory parameters of chest and abdominal movements were recorded with respiratory inductance plethysmography. Oxygen saturation was recorded by pulse oximetry. Hypopnea scoring rule used: 1B 4%.    Sleep Architecture: Sleep fragmentation  The total recording time of the polysomnogram was 408.8 minutes. The total sleep time was 258.5 minutes. Sleep latency was 104.3 minutes. REM latency was 196.5 minutes. Arousal index was 70.6 arousals per hour. Sleep efficiency was 63.2%. Wake after sleep onset was 45.5 minutes. The patient spent 6.0% of total sleep time in Stage N1, 66.9% in Stage N2, 20.1% in Stage N3, and 7.0% in REM. Time in REM supine was 18.0 minutes.    Respiration: Moderate MIESHA    Events ? The polysomnogram revealed a presence of 1 obstructive, 1 central, and - mixed apneas resulting in an apnea index of 0.5 events per hour. There were 112 obstructive hypopneas and - central hypopneas resulting in an obstructive hypopnea index of 26.0 and central hypopnea index of - events per hour. The combined apnea/hypopnea index was 26.5 events per hour (central apnea/hypopnea index was 0.2 events per hour). The REM AHI was 40.0 events per hour. The supine AHI was 41.5 events per hour. The RERA index was 10.4 events per " hour.  The RDI was 36.9 events per hour.    Snoring - was reported as mild-moderate.    Respiratory rate and pattern - was notable for normal respiratory rate and pattern.    Sustained Sleep Associated Hypoventilation - Transcutaneous carbon dioxide monitoring was not used.    Sleep Associated Hypoxemia - (Greater than 5 minutes O2 sat at or below 88%) was not present. Baseline oxygen saturation was 94.7%. Lowest oxygen saturation was 86.1%. Time spent less than or equal to 88% was 0.3 minutes. Time spent less than or equal to 89% was 1.8 minutes.    Movement Activity: The patient had elevated periodic limb movements (PLMs). The majority of these were not associated with cortical arousal.    Periodic Limb Activity - There were 83 PLMs during the entire study. The PLM index was 19.3 movements per hour. The PLM Arousal Index was 10.9 per hour.    REM EMG Activity - Excessive muscle activity was not present.    Nocturnal Behavior - Abnormal sleep related behaviors were not noted.    Bruxism - None apparent.    Cardiac Summary: Limited 1 lead EKG whose signal was intermittently obscured by artifact. Regardless the study demonstrated  predominantly narrow QRS complexes preceded by P waves suggestive of sinus rhythm.   The average pulse rate was 55.6 bpm. The minimum pulse rate was 47.9 bpm while the maximum pulse rate was 75.0 bpm.      Assessment:     Moderate MIESHA     The patient had elevated periodic limb movements (PLMs). The majority of these were not associated with cortical arousal.    Recommendations:    MIESHA can be treated with the following options:  o Dental Appliance  o Auto CPAP  o Upper Airway Surgery    Advice regarding the risks of drowsy driving.    Suggest optimizing sleep schedule and avoiding sleep deprivation.    Weight management     Treatment of PLMs (dopaminergic agents or delta-2 ligands) should be targeted at patients who either have wakeful motor restlessness or those in whom there is a high  clinical suspicion of periodic limb movement disorder and not for elevated PLMs alone.    Diagnostic Code(s): G47.33, G47.9      Sanjeev Tyler MD 7-

## 2019-07-17 NOTE — PROGRESS NOTES
Diagnostic PSG completed per provider order.  Patient met criteria for PAP therapy but not early enough in the study to start treatment.

## 2019-07-17 NOTE — PATIENT INSTRUCTIONS
[facility]    1. Your sleep study will be reviewed by a sleep physician within the next few days.     2. Please follow up in the sleep clinic as scheduled, or, make an appointment with your sleep provider to be seen within two weeks to discuss the results of the sleep study.    3. If you have any questions or problems with your treatment plan, please contact your sleep clinic provider at [phone] to further manage your condition.    4. Please review your attached medication list, and, at your follow-up appointment advise your sleep clinic provider about any changes.    5. Go to [email or web address] for more information about your sleep problems.    Obdulia Carvalho, RPSGT  July 17, 2019

## 2019-07-20 DIAGNOSIS — G47.00 INSOMNIA, UNSPECIFIED TYPE: ICD-10-CM

## 2019-07-20 DIAGNOSIS — F41.9 ANXIETY: ICD-10-CM

## 2019-07-22 DIAGNOSIS — Z96.41 INSULIN PUMP IN PLACE: ICD-10-CM

## 2019-07-22 DIAGNOSIS — E11.65 TYPE 2 DIABETES MELLITUS WITH HYPERGLYCEMIA, WITH LONG-TERM CURRENT USE OF INSULIN (H): ICD-10-CM

## 2019-07-22 DIAGNOSIS — Z79.4 TYPE 2 DIABETES MELLITUS WITH HYPERGLYCEMIA, WITH LONG-TERM CURRENT USE OF INSULIN (H): ICD-10-CM

## 2019-07-22 LAB
CREAT UR-MCNC: 160 MG/DL
HBA1C MFR BLD: 8.2 % (ref 0–5.6)
MICROALBUMIN UR-MCNC: 18 MG/L
MICROALBUMIN/CREAT UR: 11.38 MG/G CR (ref 0–25)
T4 FREE SERPL-MCNC: 0.87 NG/DL (ref 0.76–1.46)
TSH SERPL DL<=0.005 MIU/L-ACNC: 0.95 MU/L (ref 0.4–4)

## 2019-07-22 PROCEDURE — 84439 ASSAY OF FREE THYROXINE: CPT | Performed by: CLINICAL NURSE SPECIALIST

## 2019-07-22 PROCEDURE — 82043 UR ALBUMIN QUANTITATIVE: CPT | Performed by: CLINICAL NURSE SPECIALIST

## 2019-07-22 PROCEDURE — 36415 COLL VENOUS BLD VENIPUNCTURE: CPT | Performed by: CLINICAL NURSE SPECIALIST

## 2019-07-22 PROCEDURE — 84443 ASSAY THYROID STIM HORMONE: CPT | Performed by: CLINICAL NURSE SPECIALIST

## 2019-07-22 PROCEDURE — 83036 HEMOGLOBIN GLYCOSYLATED A1C: CPT | Performed by: CLINICAL NURSE SPECIALIST

## 2019-07-22 NOTE — TELEPHONE ENCOUNTER
Requested Prescriptions   Pending Prescriptions Disp Refills     LORazepam (ATIVAN) 1 MG tablet [Pharmacy Med Name: LORAZEPAM 1MG TABS]        Last Written Prescription Date:  6/5/2019  Last Fill Quantity: 15,   # refills: 0  Last Office Visit: 6/10/2019  Future Office visit:    Next 5 appointments (look out 90 days)    Aug 14, 2019  4:30 PM CDT  Telephone Visit with Bennett Ezra Goltz, PA-C  [facility] ([facility]) [address] [place] [id number] [phone]   Aug 26, 2019  9:45 AM CDT  Return Visit with Nyla Jensen DO  [facility] ([facility]) [address]  [facility] [address] [phone]           Routing refill request to provider for review/approval because:  Drug not on the [facility], [facility] or [facility] refill protocol or controlled substance   15 tablet 0     Sig: TAKE ONE-HALF TO ONE TABLET BY MOUTH NIGHTLY AS NEEDED FOR ANXIETY OR SLEEP       There is no refill protocol information for this order

## 2019-07-23 ENCOUNTER — ALLIED HEALTH/NURSE VISIT (OUTPATIENT)
Dept: EDUCATION SERVICES | Facility: CLINIC | Age: 59
End: 2019-07-23
Payer: COMMERCIAL

## 2019-07-23 DIAGNOSIS — E11.65 TYPE 2 DIABETES MELLITUS WITH HYPERGLYCEMIA (H): Primary | ICD-10-CM

## 2019-07-23 PROCEDURE — G0108 DIAB MANAGE TRN  PER INDIV: HCPCS

## 2019-07-23 RX ORDER — LORAZEPAM 1 MG/1
TABLET ORAL
Qty: 15 TABLET | Refills: 0 | Status: SHIPPED | OUTPATIENT
Start: 2019-07-23 | End: 2019-08-26

## 2019-07-23 NOTE — PROGRESS NOTES
Diabetes Self-Management Education & Support    Diabetes Self-Management Education & Support - Insulin Pump/CGM Review    SUBJECTIVE/OBJECTIVE  Presents for: Individual review  Accompanied by: Self, Daughter  Diabetes education in the past 24mo: Yes  Focus of Visit: Insulin Pump, CGM  Diabetes type: Type 2  Disease course: Improving  How confident are you filling out medical forms by yourself:: Not Assessed  Diabetes management related comments/concerns: readings have been higher, due to anxiety/panic attacks related to recent sleep study. Having difficulty figuring out how to download the ashleigh   Transportation concerns: No  Other concerns:: Other  Cultural Influences/Ethnic Background:  American      Patient seen today for Insulin Pump CGM  Review:    Reports:                      Insulin Pump Information  Insulin Pump Type: OmniPod    Insulin Pump Review  Insulin Pump Type: OmniPod  Problems taking diabetes medications regularly?: No(only when stress binge eating, unable to bolus at that time but will corrrect afterwards)  Diabetes medication side effects?: No  Patient would benefit from: Use of a temporary basal rate(change to alternate basal patterns )  Changes made to pump settings: (entered additional preset temp basal)    Statistics/Data Evaluation:  Consistent day-to-day patterns: Other(pattern of post binge eating hyperglycemia)      Healthy Eating  Healthy Eating Assessed Today: Yes  Patient on a regular basis: Counts carbohydrates(binge eating lately due to anxiety and panic attacks)    Being Active  Being Active Assessed Today: No    Monitoring  Monitoring Assessed Today: Yes  Did patient bring glucose meter to appointment? : Yes  Blood Glucose Meter: CGM    Taking Medications  Diabetes Medication(s)     Insulin       Insulin Aspart (INSULIN PUMP - OUTPATIENT)         Insulin Aspart (INSULIN PUMP - OUTPATIENT)    Inject Subcutaneous continuous INSULIN PUMP - OUTPATIENT  Date last updated: 4/19/2019  Omnipod   BASAL RATES and times:   REGULAR:   12am: 1.60 --6 AM: 1.45 --12pm: 1.3 -- 6pm: 1.45   HIGH 2:   12am: 1.70 --  6 AM: 1.55 -- 12pm: 1.4 -- 6pm: 1.55   ANXIETY 3:   12am: 1.80 -- 6 AM: 1.65 --12pm:1.5--6pm: 1.8  ANXIETY 4:   12am: 1.90 -- 6 AM: 1.65 --6pm: 1.9  AFTERNOON BINGE:   12 AM: 1.8 -- 6 AM: 4.65 --8 PM: 1.8   VERY HIGH 5: 12 AM: 2--6am:1.8--6pm:2   CARB RATIO: 12am-12am: 6   Corection Factor (Sensitivity): 12AM (midnight): 23 -- 5 AM: 25   Target blood glucose: 100-120  Active insulin time: 4 hrs     insulin aspart (NOVOLOG VIAL) 100 UNITS/ML vial    To be used in insulin pump.  units          Taking Medication Assessed Today: Yes  Current Treatments: Insulin Pump  Dose schedule: pre-breakfast, pre-lunch, pre-dinner  Given by: Patient  Problems taking diabetes medications regularly?: No(only when stress binge eating, unable to bolus at that time but will corrrect afterwards)  Diabetes medication side effects?: No  Treatment Compliance: Most of the time    Problem Solving  Problem Solving Assessed Today: Yes  Hypoglycemia Frequency: Rarely  Patient carries a carbohydrate source: Yes       Reducing Risks  Reducing Risks Assessed Today: No    Healthy Coping  Healthy Coping Assessed Today: Yes  Emotional response to diabetes: Ready to learn  Stage of change: ACTION (Actively working towards change)  Patient Activation Measure Survey Score:  JEFFERY Score (Last Two) 6/2/2011 10/18/2013   JEFFERY Raw Score 50 42   Activation Score 86.3 66   JEFFERY Level 4 3         ASSESSMENT  Patient struggling to control glucose due to anxiety and binge eating.  She is consistently above goal and would benefit from increasing her insulin to her basal pattern 3.    She admits to entering false carbohydrates at times when her glucose is high.  She has used the temp basal with pain but not as much with binge eating.     Recommend she use the temp basal when she is concerned about high glucose instead of entering false  "carbohydrates. Adding a preset temp basal names \" High Concern\" set at 20% more for 1 hour may help her remember to use this.    She already has preset temp basals set for:    Low concern- 25% less for 2 hours  Pain- increase 20% for 2 hours  Binge eating- increase 20% for 2 hours    Now added:  High Concern: increase 20% for 1 hour    Assisted patent with setting up [employer] and [employer] for download of CGM data      INTERVENTION:   Diabetes knowledge and skills assessment:     Patient is knowledgeable in diabetes management concepts related to: Monitoring, Problem Solving and carbohydrate counting    Patient needs further education on the following diabetes management concepts: Monitoring and Taking Medication    Based on learning assessment above, most appropriate setting for further diabetes education would be: Individual setting.    Education provided today on:  AADE Self-Care Behaviors:  Taking Medication: action of prescribed medication, when to take medications   Problem Solving: high blood glucose - causes, signs/symptoms, treatment and prevention and low blood glucose - causes, signs/symptoms, treatment and prevention    Opportunities for ongoing education and support in diabetes-self management were discussed.    Pt verbalized understanding of concepts discussed and recommendations provided today.       Education Materials Provided:  No new materials provided today      PLAN  See Patient Instructions for co-developed, patient-stated behavior change goals.    Deanna Bolton RN,CDE   Time Spent: 60 minutes  Encounter Type: Individual    Any diabetes medication dose changes were made via the CDE Protocol and Collaborative Practice Agreement with the patient's referring provider. A copy of this encounter was shared with the provider.    "

## 2019-07-23 NOTE — RESULT ENCOUNTER NOTE
Maricarmen,  There was no abnormal protein in your urine.  Your thyroid levels are in normal range.  Your A1c has improved from 8.5% to 8.2%.  Here's a copy of the results for your records.  Take care,  Emily Phan NP  Endocrinology

## 2019-07-23 NOTE — PATIENT INSTRUCTIONS
Use temp basal instead of entering false carbohydrates.    Use the temp basal for binge eating when you remember    Your ashleigh was linked to your phone and with [employer]    Download your pump in 1 week and message [employer] when complete

## 2019-07-23 NOTE — LETTER
7/23/2019         RE: Maricarmen Dotson  [address] [place] [address]        Dear Colleague,    Thank you for referring your patient, Maricarmen Dotson, to the [facility]. Please see a copy of my visit note below.    Diabetes Self-Management Education & Support    Diabetes Self-Management Education & Support - Insulin Pump/CGM Review    SUBJECTIVE/OBJECTIVE  Presents for: Individual review  Accompanied by: Self, Daughter  Diabetes education in the past 24mo: Yes  Focus of Visit: Insulin Pump, CGM  Diabetes type: Type 2  Disease course: Improving  How confident are you filling out medical forms by yourself:: Not Assessed  Diabetes management related comments/concerns: readings have been higher, due to anxiety/panic attacks related to recent sleep study. Having difficulty figuring out how to download the ashleigh   Transportation concerns: No  Other concerns:: Other  Cultural Influences/Ethnic Background:  American      Patient seen today for Insulin Pump CGM  Review:    Reports:                      Insulin Pump Information  Insulin Pump Type: OmniPod    Insulin Pump Review  Insulin Pump Type: OmniPod  Problems taking diabetes medications regularly?: No(only when stress binge eating, unable to bolus at that time but will corrrect afterwards)  Diabetes medication side effects?: No  Patient would benefit from: Use of a temporary basal rate(change to alternate basal patterns )  Changes made to pump settings: (entered additional preset temp basal)    Statistics/Data Evaluation:  Consistent day-to-day patterns: Other(pattern of post binge eating hyperglycemia)      Healthy Eating  Healthy Eating Assessed Today: Yes  Patient on a regular basis: Counts carbohydrates(binge eating lately due to anxiety and panic attacks)    Being Active  Being Active Assessed Today: No    Monitoring  Monitoring Assessed Today: Yes  Did patient bring glucose meter to appointment? : Yes  Blood Glucose Meter:  CGM    Taking Medications  Diabetes Medication(s)     Insulin       Insulin Aspart (INSULIN PUMP - OUTPATIENT)         Insulin Aspart (INSULIN PUMP - OUTPATIENT)    Inject Subcutaneous continuous INSULIN PUMP - OUTPATIENT  Date last updated: 4/19/2019 Omnipod   BASAL RATES and times:   REGULAR:   12am: 1.60 --6 AM: 1.45 --12pm: 1.3 -- 6pm: 1.45   HIGH 2:   12am: 1.70 --  6 AM: 1.55 -- 12pm: 1.4 -- 6pm: 1.55   ANXIETY 3:   12am: 1.80 -- 6 AM: 1.65 --12pm:1.5--6pm: 1.8  ANXIETY 4:   12am: 1.90 -- 6 AM: 1.65 --6pm: 1.9  AFTERNOON BINGE:   12 AM: 1.8 -- 6 AM: 4.65 --8 PM: 1.8   VERY HIGH 5: 12 AM: 2--6am:1.8--6pm:2   CARB RATIO: 12am-12am: 6   Corection Factor (Sensitivity): 12AM (midnight): 23 -- 5 AM: 25   Target blood glucose: 100-120  Active insulin time: 4 hrs     insulin aspart (NOVOLOG VIAL) 100 UNITS/ML vial    To be used in insulin pump.  units          Taking Medication Assessed Today: Yes  Current Treatments: Insulin Pump  Dose schedule: pre-breakfast, pre-lunch, pre-dinner  Given by: Patient  Problems taking diabetes medications regularly?: No(only when stress binge eating, unable to bolus at that time but will corrrect afterwards)  Diabetes medication side effects?: No  Treatment Compliance: Most of the time    Problem Solving  Problem Solving Assessed Today: Yes  Hypoglycemia Frequency: Rarely  Patient carries a carbohydrate source: Yes       Reducing Risks  Reducing Risks Assessed Today: No    Healthy Coping  Healthy Coping Assessed Today: Yes  Emotional response to diabetes: Ready to learn  Stage of change: ACTION (Actively working towards change)  Patient Activation Measure Survey Score:  JEFFERY Score (Last Two) 6/2/2011 10/18/2013   JEFFERY Raw Score 50 42   Activation Score 86.3 66   JEFFERY Level 4 3         ASSESSMENT  Patient struggling to control glucose due to anxiety and binge eating.  She is consistently above goal and would benefit from increasing her insulin to her basal pattern 3.    She admits  "to entering false carbohydrates at times when her glucose is high.  She has used the temp basal with pain but not as much with binge eating.     Recommend she use the temp basal when she is concerned about high glucose instead of entering false carbohydrates. Adding a preset temp basal names \" High Concern\" set at 20% more for 1 hour may help her remember to use this.    She already has preset temp basals set for:    Low concern- 25% less for 2 hours  Pain- increase 20% for 2 hours  Binge eating- increase 20% for 2 hours    Now added:  High Concern: increase 20% for 1 hour    Assisted patent with setting up [employer] for download of CGM data      INTERVENTION:   Diabetes knowledge and skills assessment:     Patient is knowledgeable in diabetes management concepts related to: Monitoring, Problem Solving and carbohydrate counting    Patient needs further education on the following diabetes management concepts: Monitoring and Taking Medication    Based on learning assessment above, most appropriate setting for further diabetes education would be: Individual setting.    Education provided today on:  AADE Self-Care Behaviors:  Taking Medication: action of prescribed medication, when to take medications   Problem Solving: high blood glucose - causes, signs/symptoms, treatment and prevention and low blood glucose - causes, signs/symptoms, treatment and prevention    Opportunities for ongoing education and support in diabetes-self management were discussed.    Pt verbalized understanding of concepts discussed and recommendations provided today.       Education Materials Provided:  No new materials provided today      PLAN  See Patient Instructions for co-developed, patient-stated behavior change goals.    Deanna Bolton RN,CDE   Time Spent: 60 minutes  Encounter Type: Individual    Any diabetes medication dose changes were made via the CDE Protocol and Collaborative Practice Agreement with the patient's " referring provider. A copy of this encounter was shared with the provider.

## 2019-07-23 NOTE — TELEPHONE ENCOUNTER
"Routing LORazepam (ATIVAN) 1 MG tablet refill to Dr Erickson for approval.    Check , last picked up on 06/05/19.    - Don \"Billy\" PALOMA Kilgore  [facility]  "

## 2019-07-29 ENCOUNTER — PATIENT OUTREACH (OUTPATIENT)
Dept: EDUCATION SERVICES | Facility: CLINIC | Age: 59
End: 2019-07-29

## 2019-07-30 ENCOUNTER — MYC MEDICAL ADVICE (OUTPATIENT)
Dept: EDUCATION SERVICES | Facility: CLINIC | Age: 59
End: 2019-07-30

## 2019-07-31 ENCOUNTER — TRANSFERRED RECORDS (OUTPATIENT)
Dept: HEALTH INFORMATION MANAGEMENT | Facility: CLINIC | Age: 59
End: 2019-07-31

## 2019-07-31 ENCOUNTER — PATIENT OUTREACH (OUTPATIENT)
Dept: EDUCATION SERVICES | Facility: CLINIC | Age: 59
End: 2019-07-31

## 2019-07-31 NOTE — TELEPHONE ENCOUNTER
Diabetes Self-Management Training: Insulin Pump Telephone Visit    Current Diabetes Management per Patient:  Comments/concerns:   Dear Deanna,    I found my cord to down load the PDM to my phone.    Please let me know if you received the information and if you need me to do any changes.    Thank you  Maricarmen    Insulin Pump Type: OmniPod    Taking other diabetes medications? no    Current Pump report:                Assessment/Plan:  Alex Hernadez,    I'm glad you found your cord!    I looked at your report and first of all, great job on using your temp basal!  You have used it every day since I saw you. I see you have used it 5 of the 7 days between noon-6pm. And the 2 days you did not use it you had higher readings and did a correction dose.    Because you have been consistent on using the temp basal I would like you to change to basal pattern High 4. This pattern will allow more insulin between noon to 6pm and just a slight amount more from midnight to 6am and again from 6pm to midnight.  It will NOT give you any additional basal insulin from 6am-noon.  The total increase over 24 hours will only be 1.5 units.  You can still use the temp basal as often as you need too, and if you have any concerns about low glucose levels you can always go back down to the basal pattern high 3.      Let me know if you have any questions otherwise upload again in 1-2 weeks.    Deanna Bolton RN,CDE     Any diabetes medication dose changes were made via the CDE Protocol and Collaborative Practice Agreement with the patient's referring provider. A copy of this encounter was shared with the provider.

## 2019-08-02 ENCOUNTER — TELEPHONE (OUTPATIENT)
Dept: ONCOLOGY | Facility: CLINIC | Age: 59
End: 2019-08-02

## 2019-08-02 NOTE — TELEPHONE ENCOUNTER
I received a forwarded voicemail message from Dr. Fermin that Maricarmen had left her with some questions about the previous genetic testing that Maricarmen had and about if this testing would have included analysis for a genetic variant recently identified in one of her relatives.    I tried to call Maricarmen back, but I got her voicemail.  I left her a message with my contact information so she could reach me back.    Sunshine Martinez MS, [facility]  Genetic Counselor  Ph: [phone]  Pager: [phone]

## 2019-08-06 ENCOUNTER — MYC MEDICAL ADVICE (OUTPATIENT)
Dept: EDUCATION SERVICES | Facility: CLINIC | Age: 59
End: 2019-08-06

## 2019-08-07 ENCOUNTER — PATIENT OUTREACH (OUTPATIENT)
Dept: EDUCATION SERVICES | Facility: CLINIC | Age: 59
End: 2019-08-07

## 2019-08-07 DIAGNOSIS — Z79.4 TYPE 2 DIABETES MELLITUS WITH HYPERGLYCEMIA, WITH LONG-TERM CURRENT USE OF INSULIN (H): ICD-10-CM

## 2019-08-07 DIAGNOSIS — F41.9 ANXIETY: ICD-10-CM

## 2019-08-07 DIAGNOSIS — E11.65 TYPE 2 DIABETES MELLITUS WITH HYPERGLYCEMIA, WITH LONG-TERM CURRENT USE OF INSULIN (H): ICD-10-CM

## 2019-08-07 DIAGNOSIS — G47.00 INSOMNIA, UNSPECIFIED TYPE: ICD-10-CM

## 2019-08-07 RX ORDER — LORAZEPAM 1 MG/1
TABLET ORAL
Qty: 15 TABLET | Refills: 0 | Status: SHIPPED | OUTPATIENT
Start: 2019-08-07 | End: 2019-09-09

## 2019-08-07 RX ORDER — FLASH GLUCOSE SCANNING READER
EACH MISCELLANEOUS
Qty: 1 DEVICE | Refills: 0 | Status: SHIPPED | OUTPATIENT
Start: 2019-08-07

## 2019-08-07 NOTE — TELEPHONE ENCOUNTER
Requested Prescriptions   Pending Prescriptions Disp Refills     LORazepam (ATIVAN) 1 MG tablet [Pharmacy Med Name: LORAZEPAM 1MG TABS]        Last Written Prescription Date:  7/23/2019  Last Fill Quantity: 15,   # refills: 0  Last Office Visit: 6/10/2019  Future Office visit:    Next 5 appointments (look out 90 days)    Aug 14, 2019  4:30 PM CDT  Telephone Visit with Bennett Ezra Goltz, PA-C  [facility] ([facility]) [address]  [place] [address] [phone]   Aug 26, 2019  9:45 AM CDT  Return Visit with Nyla Jensen DO  [facility] ([facility]) [address]  [place] [place] [facility] [id number] [phone]   Oct 25, 2019 10:00 AM CDT  (Arrive by 9:45 AM)  Return Visit with MARICRUZ Castillo CNP  [facility] ([facility]) [address]  [facility] [place] [id number] [phone]           Routing refill request to provider for review/approval because:  Drug not on the [facility], [facility] or [facility] refill protocol or controlled substance   15 tablet 0     Sig: TAKE ONE-HALF TO ONE TABLET BY MOUTH NIGHTLY AS NEEDED FOR ANXIETY OR SLEEP       There is no refill protocol information for this order

## 2019-08-07 NOTE — TELEPHONE ENCOUNTER
Diabetes Self-Management Training: Insulin Pump Telephone Visit    Current Diabetes Management per Patient:  Comments/concerns: see mychart message    Insulin Pump Type: OmniPod    Taking other diabetes medications? no    Current Pump report:              Assessment/Plan:  Per pump report patient changed to pattern 4 on 8/4/19 in the afternoon, glucose improved after that. She did go below 80mg/dl last night but no pump data for last evening/ night to see if due to basal or carbohydrate/correction dosing.     Call to patient, she states she did not binge eat last night so went a little low but only to 79mg/dl so she was not worried about it.  She likes the pattern 4 and would like to give it more time before making adjustments.  Recommend she continue with pattern 4 at this time. If she does notice hypoglycemia she can either use the temp basal or change back to the pattern 3. She can upload again on Sunday and send [employer] message after she uploads.    Patient states understanding and is able to repeat plan back.    Deanna Bolton RN,CDE     Any diabetes medication dose changes were made via the CDE Protocol and Collaborative Practice Agreement with the patient's referring provider. A copy of this encounter was shared with the provider.

## 2019-08-12 ENCOUNTER — PATIENT OUTREACH (OUTPATIENT)
Dept: EDUCATION SERVICES | Facility: CLINIC | Age: 59
End: 2019-08-12

## 2019-08-12 ENCOUNTER — MYC MEDICAL ADVICE (OUTPATIENT)
Dept: EDUCATION SERVICES | Facility: CLINIC | Age: 59
End: 2019-08-12

## 2019-08-12 NOTE — TELEPHONE ENCOUNTER
Diabetes Self-Management Training: Insulin Pump Telephone Visit    Current Diabetes Management per Patient:  Comments/concerns: none noted, patient sent pump report    Insulin Pump Type: OmniPod    Taking other diabetes medications? no    Current Pump report:              Assessment/Plan:    Alex Hernadez,    I see you sent your pump report.  I did have a chance to look at the report along with the ashleigh report.  First of all great job using the temp basal!  It looks like you had some high readings going into the afternoon/evening time. And one really high overnight( early morning ) on August 8th.  Can you identify why these occurred, such as pain or binge eating?  If it is then just continue to use the temp basal and you can use it as often as you need to. If not then we may want to adjust your settings.  DO NOT go to pattern 5, you don't need it. We would just tweek your current settings a little.    Let me know if you have any insight for me.    Deanna Bolton RN,CDE       Deanna Bolton RN,CDE     Any diabetes medication dose changes were made via the CDE Protocol and Collaborative Practice Agreement with the patient's referring provider. A copy of this encounter was shared with the provider.

## 2019-08-14 ENCOUNTER — MYC MEDICAL ADVICE (OUTPATIENT)
Dept: EDUCATION SERVICES | Facility: CLINIC | Age: 59
End: 2019-08-14

## 2019-08-14 ENCOUNTER — TELEPHONE (OUTPATIENT)
Dept: ONCOLOGY | Facility: CLINIC | Age: 59
End: 2019-08-14

## 2019-08-14 ENCOUNTER — OFFICE VISIT (OUTPATIENT)
Dept: SLEEP MEDICINE | Facility: CLINIC | Age: 59
End: 2019-08-14
Payer: COMMERCIAL

## 2019-08-14 VITALS
HEART RATE: 66 BPM | HEIGHT: 64 IN | SYSTOLIC BLOOD PRESSURE: 157 MMHG | RESPIRATION RATE: 16 BRPM | DIASTOLIC BLOOD PRESSURE: 85 MMHG | WEIGHT: 199 LBS | OXYGEN SATURATION: 97 % | BODY MASS INDEX: 33.97 KG/M2

## 2019-08-14 DIAGNOSIS — F51.04 CHRONIC INSOMNIA: ICD-10-CM

## 2019-08-14 DIAGNOSIS — G47.33 OSA (OBSTRUCTIVE SLEEP APNEA): Primary | ICD-10-CM

## 2019-08-14 PROCEDURE — 99214 OFFICE O/P EST MOD 30 MIN: CPT | Performed by: PHYSICIAN ASSISTANT

## 2019-08-14 ASSESSMENT — MIFFLIN-ST. JEOR: SCORE: 1464.72

## 2019-08-14 NOTE — PROGRESS NOTES
Sleep Study Follow-Up Visit:    Date on this visit: 8/14/2019    Maricarmen Dotson comes in today for follow-up of her sleep study done on 7/16/2019 at the [facility] for evaluation of possible sleep apnea as a contributing factor to accelerated HTN. Her medical history is also significant for TBI, DM 2, anxiety, depression, PTSD, fibromyalgia, IBS.    Sleep latency 104.3 minutes with melatonin and diphenhydramine.  REM achieved.   REM latency 196.5 minutes.  Sleep efficiency 63.2%. Total sleep time 258.5 minutes.    Sleep architecture:  Stage 1, 6% (5%), stage 2, 66.9% (45-55%), stage 3, 20.1% (15-20%), stage REM, 7% (20-25%).  AHI was 26.5/hr (0.9% centrals), with desaturations down to 87%. S/He spent 1.8 minutes below 90% SpO2 and 0.3 minutes below 89%. RDI 36.9/hr.  REM RDI 40/hr, consistent with severe REM MIESHA.  Supine RDI 53.7/hr, consistent with severe SUPINE MIESHA. Her non-supine RDI was 10.7/hr (AHI 3/hr). Periodic Limb Movement Index 19.3/hour, 10.9/hr were associated with arousals.       CPAP titration: CPAP was not initiated. These findings were reviewed with the patient.    She feels she can get a good 6 hours of sleep if she takes lorazepam around 3 PM. She goes to bed 10:30 PM to 2 AM and is up 7-8 AM. She feels the lorazepam only works for 1 night and then she has to avoid taking it for 3-4 days because it won't work.     Past medical/surgical history, family history, social history, medications and allergies were reviewed.      Problem List:  Patient Active Problem List    Diagnosis Date Noted     Hypertensive urgency 04/19/2019     Priority: Medium     Posttraumatic stress disorder 12/06/2018     Priority: Medium     Hypertension goal BP (blood pressure) < 140/90 07/23/2018     Priority: Medium     Insulin pump in place 06/27/2018     Priority: Medium     Carotid atherosclerosis, bilateral 04/27/2018     Priority: Medium     Cervical pain 10/23/2017     Priority: Medium      Fibromyalgia 07/30/2017     Priority: Medium     Type 2 diabetes mellitus with hyperglycemia (H) 10/20/2015     Priority: Medium     Statin intolerance 02/24/2015     Priority: Medium     Pain in thoracic spine 07/11/2014     Priority: Medium     Nonallopathic lesion of cervical region 07/11/2014     Priority: Medium     Problem list name updated by automated process. Provider to review       Cervicalgia 07/11/2014     Priority: Medium     Lumbago 07/11/2014     Priority: Medium     TBI (traumatic brain injury) (H) 01/07/2014     Priority: Medium     Anxiety 11/15/2013     Priority: Medium     Panic attacks 11/15/2013     Priority: Medium     Migraine headache 09/03/2013     Priority: Medium     Pain in joint, ankle and foot 06/19/2013     Priority: Medium     Post concussion syndrome 05/24/2013     Priority: Medium     Plantar fasciitis 05/24/2013     Priority: Medium     Overweight 09/26/2012     Priority: Medium     Problem list name updated by automated process. Provider to review       Moderate major depression (H) 02/24/2012     Priority: Medium     S/p hospitalization         Hepatic injury 11/09/2011     Priority: Medium     Type 2 diabetes, HbA1c goal < 7% (H) 01/21/2010     Priority: Medium     Dyspepsia 01/07/2010     Priority: Medium     Hyperlipidemia LDL goal <100 09/26/2008     Priority: Medium     Chronic pain syndrome 06/27/2007     Priority: Medium     Patient is followed by DEANA ORTEGA for ongoing prescription of narcotic pain medicine.  Med: Percocet 5/325, zanaflex 2mg .   Maximum use per month:15 (percocet) zanaflex (60)  Expected duration: no end date  Narcotic agreement on file: YES  Clinic visit recommended: Q 3 months         Irritable bowel syndrome      Priority: Medium     Diverticulosis of large intestine      Priority: Medium     Problem list name updated by automated process. Provider to review       Insomnia      Priority: Medium     Problem list name updated by automated process.  Provider to review          Impression/Plan:    (G47.33) MIESHA (obstructive sleep apnea)  (primary encounter diagnosis)  Comment: this study showed moderate MIESHA. It was largely positional though. Her non-supine RDI was 10.7/hr (AHI 3/hr), compared to an RDI of 53.7/hr when supine. She did not have REM sleep laterally though. She was shown CPAP at the study and it was better than she remembered it. However, when she starts thinking about her previous experience with CPAP, she gets very anxious again. She was not ready to try CPAP at this time. She hopes that with DBT in the near future, that she will be more ready to try it. She uses a bite guard for bruxism. She wonders if she will be a good candidate for a mandibular advancement device for apnea given she has some TM joint dysfunction.   Plan: SLEEP DENTAL REFERRAL        She was given a referral to dentistry and was advised to use the Slumberbump to keep her off of her back as well. We will re-visit the option of CPAP at her follow up, especially if she is deemed not a good candidate for a dental appliance.      (F51.04) Chronic insomnia  Comment: Her sleep schedule is irregular. Her  says she gets a couple of good nights and then about 8 bad nights and then the cycle starts over. She has been using diphenhydramine, melatonin and lorazepam for a day or two at a time. She has a lot of anxiety and PTSD. She wakes in a panic at times and has nightmares. I think she underestimates how much sleep she actually gets based on what her  says and her daughter stating she sleeps in the day more than she thinks (at her last visit).   Plan: Try to only be in bed about midnight to 6 AM. Set an alarm in the morning to avoid oversleeping. Avoid being in bed if not sleeping. We will work on more behavioral recommendations       She will follow up with me in about 3 month(s).     Twenty-five minutes spent with patient, all of which were spent face-to-face counseling,  consulting, coordinating plan of care.      Bennett Goltz, PA-C    CC: Nyla Lopez*  Annamaria Erickson MD

## 2019-08-14 NOTE — PATIENT INSTRUCTIONS
Only allow sleep between midnight and 6 AM. Do not be in bed outside of that time. If not sleeping in the night, do not stay in bed.     If the dentist does not think you are a good candidate for a dental appliance, please contact me so that we can try CPAP.

## 2019-08-20 ENCOUNTER — PATIENT OUTREACH (OUTPATIENT)
Dept: EDUCATION SERVICES | Facility: CLINIC | Age: 59
End: 2019-08-20

## 2019-08-20 DIAGNOSIS — E11.65 TYPE 2 DIABETES MELLITUS WITH HYPERGLYCEMIA (H): Primary | ICD-10-CM

## 2019-08-20 NOTE — PROGRESS NOTES
Diabetes Self-Management Training: Insulin Pump Telephone Visit    Current Diabetes Management per Patient:  Comments/concerns: NA    Insulin Pump Type: OmniPod    Taking other diabetes medications? no    Current Pump and CGM report:                Assessment/Plan:  Mychart sent to patient:  Alex Hernadez,    Thanks for uploading your pump. I see you used your temp basal 3 of the days in the afternoon.  It looks like this last week was a little more irregular.  Any thoughts?        Deanna Bolton RN,CDE      Response from patient:  Alex Huerta,     I have been struggling with my anxiety and pain.   I want to wait another week before I want to change anything. Just to see how high/low I get.     Thanks, till next week.   Maricarmen     Response to patient:   Sorry you are struggling right now.  Just use the temp basal as often as you need to.  Upload again in a week.   .   Deanna Bolton RN,CDE       Any diabetes medication dose changes were made via the CDE Protocol and Collaborative Practice Agreement with the patient's referring provider. A copy of this encounter was shared with the provider.

## 2019-08-21 NOTE — TELEPHONE ENCOUNTER
"Maricarmen had left a message on Dr. Fermin's voicemail with some updates about cancer genetic testing in her family.  Dr. Fermin relayed this message to me, and I called Maricarmen back.     Maricarmen reported to me that one of her relatives recently had genetic testing that identified a gene variant in the CHEK2 gene (c.470T>C).  Maricarmen had a genetic testing panel coordinated through Allie Butcher MS, [facility] back in 2015, and she wondered if her analysis included the CHEK2 gene.  We talked about that her genetic testing panel performed at [facility] DID include an analysis of the CHEK2 gene and that her genetic testing for this gene was NEGATIVE.  However, we talked about that if Maricarmen could forward me a copy of her relative's test report, I would be happy to contact [facility] to have them \"double check\" that their assay would have picked up that particular gene variant.  I provided Maricarmen with my email address, and she was going to forward me a copy of her relatives test report, so this could be done.    Sunshine Martinez MS, [facility]  Genetic Counselor  Ph: [phone]  Pager: [phone]    "

## 2019-08-26 ENCOUNTER — OFFICE VISIT (OUTPATIENT)
Dept: CARDIOLOGY | Facility: CLINIC | Age: 59
End: 2019-08-26
Payer: COMMERCIAL

## 2019-08-26 VITALS
HEIGHT: 64 IN | WEIGHT: 202.5 LBS | BODY MASS INDEX: 34.57 KG/M2 | SYSTOLIC BLOOD PRESSURE: 144 MMHG | OXYGEN SATURATION: 97 % | HEART RATE: 67 BPM | DIASTOLIC BLOOD PRESSURE: 82 MMHG

## 2019-08-26 DIAGNOSIS — E78.5 HYPERLIPIDEMIA, UNSPECIFIED HYPERLIPIDEMIA TYPE: ICD-10-CM

## 2019-08-26 DIAGNOSIS — G47.30 SLEEP APNEA, UNSPECIFIED TYPE: ICD-10-CM

## 2019-08-26 DIAGNOSIS — E11.9 TYPE 2 DIABETES, HBA1C GOAL < 7% (H): ICD-10-CM

## 2019-08-26 DIAGNOSIS — Z82.49 FAMILY HISTORY OF ISCHEMIC HEART DISEASE: ICD-10-CM

## 2019-08-26 DIAGNOSIS — I15.0 RENOVASCULAR HYPERTENSION: Primary | ICD-10-CM

## 2019-08-26 PROCEDURE — 99214 OFFICE O/P EST MOD 30 MIN: CPT | Performed by: INTERNAL MEDICINE

## 2019-08-26 ASSESSMENT — MIFFLIN-ST. JEOR: SCORE: 1480.66

## 2019-08-26 NOTE — PROGRESS NOTES
Service Date: 08/26/2019      REFERRING PHYSICIAN:  Annamaria Erickson MD       HISTORY OF PRESENT ILLNESS:  Ms. Dotson is a 58-year-old female with a history of hypertension, insulin-dependent diabetes and mixed hyperlipidemia with multiple drug intolerances.  She has moderate sleep apnea, but is intolerant of the CPAP mask.  She did have a followup visit 08/14 with the sleep center, and according to this, she may benefit from a dental appliance, but she has not sought that out out of concern that it may not be covered.  It does say that her apneic events were mostly positional related, and so they did teach her some sleep hygiene.  She also had followup with Nephrology.  She had an abnormal renal ultrasound indicating bilateral renal artery stenoses.  She did not undergo the CT renal angiogram to confirm; however, Nephrology did not recommend since she was tolerating losartan without kidney dysfunction, and her blood pressures were reasonably controlled on losartan.  He did test for hyperaldosteronism and hyper-reninism, and both were normal.  Ms. Dotson did not react well to low-dose hydrochlorothiazide.  She also states she tried the simvastatin and did not react well to this, either.  We talked about some over-the-counter supplements, such as cinnamon for her diabetes, turmeric for her body aches and anti-inflammatories and phytosterols, all of which she says she has tried and reacted poorly to.  She is very reluctant to trying any new drugs for treatment of any of her risk factors.  Her diabetes continues to be uncontrolled with her latest hemoglobin A1c at 8.2 on 07/22.  Her blood pressure is modestly controlled with the losartan at 100 mg, which she states she is taking today.  Her blood pressure is 144/82, pulse is 67, weight 202, and body mass index is 35.  She does state she walks 1/2-1 mile every day.  She is trying to follow a low-sugar diet for her diabetes.      In summary, Ms. Dotson is a  58-year-old female with an underlying history of uncontrolled hypertension, uncontrolled mixed hyperlipidemia, uncontrolled insulin-dependent diabetes with evidence of renal artery stenoses, moderate obstructive sleep apnea and history of fibromyalgia with multiple drug intolerances.  She is unwilling to try any new medication and even unwilling with most over-the-counter supplements, stating she has already tried them.  She states she is exercising on a daily basis and following a low-sugar diet.  Her , who is present with her today, wonders if there is anything injectable that she can try for blood pressure or cholesterol, feeling that he thinks she does better with injectables than with oral pills.  Unfortunately, there is no approved injectable for high blood pressure.  There is one for high cholesterol, the PCSK9 inhibitors; however, she is unwilling to go through all of the statin therapies, which is required in order to gain approval for the PCSK9 inhibitors.  She does feel that she has benefited from acupuncture in the past, but she has had some bad reactions to this as well and is somewhat reluctant to trying this again.  I have encouraged her to continue with a low-sugar diet and regular exercise, as lifestyle modifications are the only thing she seems to tolerate at this point.  I would recommend that she follow up with Nephrology every 2-3 years given the diagnosis of renal artery stenoses, as this will likely progress and become problematic for her in her future.        Please feel free to contact me with any questions you have in regard to her care.      cc:   Annamaria Erickson MD   [address] [address]         THIEN BENDER DO             D: 2019   T: 2019   MT: sonia      Name:     HARRY DAMIAN   MRN:      [id number]        Account:      [id number]   :      1960           Service Date: 2019       Document: [id number]

## 2019-08-26 NOTE — PROGRESS NOTES
HPI and Plan:   See dictation    No orders of the defined types were placed in this encounter.      No orders of the defined types were placed in this encounter.      Medications Discontinued During This Encounter   Medication Reason     continuous blood glucose monitoring (FREESTYLE RADHA) sensor Medication Reconciliation Clean Up     Insulin Aspart (INSULIN PUMP - OUTPATIENT) Medication Reconciliation Clean Up     insulin aspart (NOVOLOG VIAL) 100 UNITS/ML vial Medication Reconciliation Clean Up     LORazepam (ATIVAN) 1 MG tablet Medication Reconciliation Clean Up         No diagnosis found.    CURRENT MEDICATIONS:  Current Outpatient Medications   Medication Sig Dispense Refill     Cholecalciferol (VITAMIN D3 PO) Take by mouth daily       Continuous Blood Gluc  (FREESTYLE RADHA 14 DAY READER) MARIA C USE TO CHECK BLOOD SUGARS AS DIRECTED 1 Device 0     Cyanocobalamin (VITAMIN B12 PO)        diclofenac (VOLTAREN) 1 % topical gel Apply topically nightly as needed for moderate pain Apply 4 grams to knees or 2 grams to hands four times daily using enclosed dosing card. 100 g 1     EPINEPHrine (EPIPEN) 0.3 MG/0.3ML injection Inject 0.3 mLs (0.3 mg) into the muscle once as needed for anaphylaxis 2 each 0     Insulin Aspart (INSULIN PUMP - OUTPATIENT) Inject Subcutaneous continuous INSULIN PUMP - OUTPATIENT  Date last updated: 4/19/2019 Omnipod   BASAL RATES and times:   REGULAR:   12am: 1.60 --6 AM: 1.45 --12pm: 1.3 -- 6pm: 1.45   HIGH 2:   12am: 1.70 --  6 AM: 1.55 -- 12pm: 1.4 -- 6pm: 1.55   ANXIETY 3:   12am: 1.80 -- 6 AM: 1.65 --12pm:1.5--6pm: 1.8  ANXIETY 4:   12am: 1.90 -- 6 AM: 1.65 --6pm: 1.9  AFTERNOON BINGE:   12 AM: 1.8 -- 6 AM: 4.65 --8 PM: 1.8   VERY HIGH 5: 12 AM: 2--6am:1.8--6pm:2   CARB RATIO: 12am-12am: 6   Corection Factor (Sensitivity): 12AM (midnight): 23 -- 5 AM: 25   Target blood glucose: 100-120  Active insulin time: 4 hrs       LORazepam (ATIVAN) 1 MG tablet TAKE ONE-HALF TO ONE TABLET BY  MOUTH NIGHTLY AS NEEDED FOR ANXIETY OR SLEEP 15 tablet 0     losartan (COZAAR) 50 MG tablet Take 2 tablets (100 mg) by mouth daily 60 tablet 3     MELATONIN PO        meloxicam (MOBIC) 15 MG tablet Take 15 mg by mouth At Bedtime        polyethylene glycol (MIRALAX/GLYCOLAX) packet Take 17 g by mouth At Bedtime       traMADol (ULTRAM) 50 MG tablet Take 100 mg by mouth At Bedtime        acetone, Urine, test STRP 1 strip by In Vitro route daily (Patient not taking: Reported on 8/26/2019) 50 each 3     ASPIRIN NOT PRESCRIBED (INTENTIONAL) Please choose reason not prescribed, below (Patient not taking: Reported on 5/20/2019) 0 each 0     blood glucose (NO BRAND SPECIFIED) test strip Freestyle test strips (NOT LITE or INSULINX) to be used with Omnipod pump test 6 times daily (Patient not taking: Reported on 8/26/2019) 200 each 11     STATIN NOT PRESCRIBED, INTENTIONAL, Statin not prescribed intentionally due to Intolerance (Patient not taking: Reported on 5/20/2019) 0 each 0       ALLERGIES     Allergies   Allergen Reactions     Amoxicillin Anaphylaxis     Bee Anaphylaxis     Bee sting       Contrast Dye Anaphylaxis     Iodine Anaphylaxis     Severe anaphalatic shock       Sulfa Drugs Anaphylaxis     Tetanus-Diphtheria Toxoids      Rxn was to Tdap-whole body joint pain and fever.  Had similar reaction to Td vaccine 7/28/2017     Metoprolol Other (See Comments)     Fatigue, joint pain, throat tightness     Zithromax [Azithromycin Dihydrate] Nausea and Vomiting     Amlodipine      Neuropathic type pain in hands/feet     Atorvastatin      myalgias     Catapres [Clonidine]      Crestor [Rosuvastatin]      myalgias     Cyproheptadine      Severe headache, cardiac issues, and dizziness     Humalog [Insulin Lispro]      Causes pancreatitis -      Hydrochlorothiazide      numbness     Latex      Skin burn and can't breathe       Lisinopril      Joint aches     Metformin      Nifedipine      Onglyza [Saxagliptin Hydrochloride]       Fibromyalgia flare     Phenergan [Promethazine]      Prochlorperazine      Altered mental status, hallucinations     Reglan [Metoclopramide]      Sumatriptan      Causes severe depression     Tetracycline Nausea and Vomiting, Hives and Difficulty breathing     Pt called to update today after the visit that she is allergic to the tetracycline-added to her list     Sulindac Anxiety     Panic attacks       PAST MEDICAL HISTORY:  Past Medical History:   Diagnosis Date     Ascending aorta enlargement (H)      Depressive disorder     severe, prior hospitalization     Diabetes mellitus, type 2 (H)      Diverticulosis of colon (without mention of hemorrhage)      Fibromyalgia 2007     Insomnia      Irritable bowel syndrome        PAST SURGICAL HISTORY:  Past Surgical History:   Procedure Laterality Date     BACK SURGERY      fusion  and removal of hardware      C SPINAL ORTHOSIS,NOS  2002    lumbar surgery     CHOLECYSTECTOMY       choley  2011     ENT SURGERY      septoplasty     HYSTERECTOMY, CERVIX STATUS UNKNOWN      TVH/BSO     ORTHOPEDIC SURGERY      left ankle       FAMILY HISTORY:  Family History   Problem Relation Age of Onset     Diabetes Mother         type 2     Hypertension Mother      Cerebrovascular Disease Mother          stroke age 57     Ovarian Cancer Mother 43     Cancer Mother         cervix     Diabetes Father         type 2     Hypertension Father      Gastrointestinal Disease Father         colon polyps     Sleep Apnea Brother      Breast Cancer Sister      Ovarian Cancer Sister 46     Breast Cancer Sister      Ovarian Cancer Maternal Grandmother 72     Cancer Maternal Grandmother         cervix       SOCIAL HISTORY:  Social History     Socioeconomic History     Marital status:      Spouse name: None     Number of children: 3     Years of education: None     Highest education level: None   Occupational History     Occupation: xr technician     Employer:  [employer]   Social Needs     Financial resource strain: None     Food insecurity:     Worry: None     Inability: None     Transportation needs:     Medical: None     Non-medical: None   Tobacco Use     Smoking status: Never Smoker     Smokeless tobacco: Never Used   Substance and Sexual Activity     Alcohol use: Yes     Alcohol/week: 0.0 oz     Comment: maybe once a month     Drug use: No     Sexual activity: Yes     Partners: Male     Birth control/protection: Surgical   Lifestyle     Physical activity:     Days per week: None     Minutes per session: None     Stress: None   Relationships     Social connections:     Talks on phone: None     Gets together: None     Attends Hindu service: None     Active member of club or organization: None     Attends meetings of clubs or organizations: None     Relationship status: None     Intimate partner violence:     Fear of current or ex partner: None     Emotionally abused: None     Physically abused: None     Forced sexual activity: None   Other Topics Concern     Parent/sibling w/ CABG, MI or angioplasty before 65F 55M? Not Asked   Social History Narrative     None       Review of Systems:  Skin:  Negative for       Eyes:  Positive for glasses    ENT:  Positive for tinnitus    Respiratory:  Positive for dyspnea on exertion;cough;wheezing has allergies   Cardiovascular:    palpitations;Positive for;edema;fatigue;lightheadedness;dizziness edema in the hands   Gastroenterology: Positive for nausea;heartburn    Genitourinary:  Negative for      Musculoskeletal:  Positive for back pain;neck pain;joint pain;fibromyalgia    Neurologic:  Positive for numbness or tingling of hands;numbness or tingling of feet    Psychiatric:  Positive for sleep disturbances;anxiety    Heme/Lymph/Imm:  Positive for allergies    Endocrine:  Positive for diabetes      Physical Exam:  Vitals: BP (!) 144/82 (BP Location: Right arm, Patient Position: Sitting, Cuff Size: Adult  "Regular)   Pulse 67   Ht 1.613 m (5' 3.5\")   Wt 91.9 kg (202 lb 8 oz)   LMP 01/01/1999   SpO2 97%   BMI 35.30 kg/m      Constitutional:  cooperative;in no acute distress        Skin:  warm and dry to the touch          Head:  normocephalic        Eyes:  pupils equal and round        Lymph:      ENT:  no pallor or cyanosis        Neck:  carotid pulses are full and equal bilaterally        Respiratory:  clear to auscultation;normal symmetry         Cardiac: regular rhythm       systolic ejection murmur        pulses full and equal                                        GI:  abdomen soft;no bruits        Extremities and Muscular Skeletal:  no deformities, clubbing, cyanosis, erythema observed;no edema              Neurological:  no gross motor deficits;affect appropriate        Psych:  Alert and Oriented x 3          CC  No referring provider defined for this encounter.                  "

## 2019-08-26 NOTE — LETTER
8/26/2019    Annamaria Erickson MD  [address] [place] [address]    RE: Maricarmen Dotson       Dear Colleague,    I had the pleasure of seeing Maricarmen Dotson in the [facility].    HPI and Plan:   See dictation    No orders of the defined types were placed in this encounter.      No orders of the defined types were placed in this encounter.      Medications Discontinued During This Encounter   Medication Reason     continuous blood glucose monitoring (FREESTYLE RADHA) sensor Medication Reconciliation Clean Up     Insulin Aspart (INSULIN PUMP - OUTPATIENT) Medication Reconciliation Clean Up     insulin aspart (NOVOLOG VIAL) 100 UNITS/ML vial Medication Reconciliation Clean Up     LORazepam (ATIVAN) 1 MG tablet Medication Reconciliation Clean Up         No diagnosis found.    CURRENT MEDICATIONS:  Current Outpatient Medications   Medication Sig Dispense Refill     Cholecalciferol (VITAMIN D3 PO) Take by mouth daily       Continuous Blood Gluc  (FREESTYLE RADHA 14 DAY READER) MARIA C USE TO CHECK BLOOD SUGARS AS DIRECTED 1 Device 0     Cyanocobalamin (VITAMIN B12 PO)        diclofenac (VOLTAREN) 1 % topical gel Apply topically nightly as needed for moderate pain Apply 4 grams to knees or 2 grams to hands four times daily using enclosed dosing card. 100 g 1     EPINEPHrine (EPIPEN) 0.3 MG/0.3ML injection Inject 0.3 mLs (0.3 mg) into the muscle once as needed for anaphylaxis 2 each 0     Insulin Aspart (INSULIN PUMP - OUTPATIENT) Inject Subcutaneous continuous INSULIN PUMP - OUTPATIENT  Date last updated: 4/19/2019 Omnipod   BASAL RATES and times:   REGULAR:   12am: 1.60 --6 AM: 1.45 --12pm: 1.3 -- 6pm: 1.45   HIGH 2:   12am: 1.70 --  6 AM: 1.55 -- 12pm: 1.4 -- 6pm: 1.55   ANXIETY 3:   12am: 1.80 -- 6 AM: 1.65 --12pm:1.5--6pm: 1.8  ANXIETY 4:   12am: 1.90 -- 6 AM: 1.65 --6pm: 1.9  AFTERNOON BINGE:   12 AM: 1.8 -- 6 AM: 4.65 --8 PM: 1.8   VERY HIGH 5: 12 AM:  2--6am:1.8--6pm:2   CARB RATIO: 12am-12am: 6   Corection Factor (Sensitivity): 12AM (midnight): 23 -- 5 AM: 25   Target blood glucose: 100-120  Active insulin time: 4 hrs       LORazepam (ATIVAN) 1 MG tablet TAKE ONE-HALF TO ONE TABLET BY MOUTH NIGHTLY AS NEEDED FOR ANXIETY OR SLEEP 15 tablet 0     losartan (COZAAR) 50 MG tablet Take 2 tablets (100 mg) by mouth daily 60 tablet 3     MELATONIN PO        meloxicam (MOBIC) 15 MG tablet Take 15 mg by mouth At Bedtime        polyethylene glycol (MIRALAX/GLYCOLAX) packet Take 17 g by mouth At Bedtime       traMADol (ULTRAM) 50 MG tablet Take 100 mg by mouth At Bedtime        acetone, Urine, test STRP 1 strip by In Vitro route daily (Patient not taking: Reported on 8/26/2019) 50 each 3     ASPIRIN NOT PRESCRIBED (INTENTIONAL) Please choose reason not prescribed, below (Patient not taking: Reported on 5/20/2019) 0 each 0     blood glucose (NO BRAND SPECIFIED) test strip Freestyle test strips (NOT LITE or INSULINX) to be used with Omnipod pump test 6 times daily (Patient not taking: Reported on 8/26/2019) 200 each 11     STATIN NOT PRESCRIBED, INTENTIONAL, Statin not prescribed intentionally due to Intolerance (Patient not taking: Reported on 5/20/2019) 0 each 0       ALLERGIES     Allergies   Allergen Reactions     Amoxicillin Anaphylaxis     Bee Anaphylaxis     Bee sting       Contrast Dye Anaphylaxis     Iodine Anaphylaxis     Severe anaphalatic shock       Sulfa Drugs Anaphylaxis     Tetanus-Diphtheria Toxoids      Rxn was to Tdap-whole body joint pain and fever.  Had similar reaction to Td vaccine 7/28/2017     Metoprolol Other (See Comments)     Fatigue, joint pain, throat tightness     Zithromax [Azithromycin Dihydrate] Nausea and Vomiting     Amlodipine      Neuropathic type pain in hands/feet     Atorvastatin      myalgias     Catapres [Clonidine]      Crestor [Rosuvastatin]      myalgias     Cyproheptadine      Severe headache, cardiac issues, and dizziness      Humalog [Insulin Lispro]      Causes pancreatitis -      Hydrochlorothiazide      numbness     Latex      Skin burn and can't breathe       Lisinopril      Joint aches     Metformin      Nifedipine      Onglyza [Saxagliptin Hydrochloride]      Fibromyalgia flare     Phenergan [Promethazine]      Prochlorperazine      Altered mental status, hallucinations     Reglan [Metoclopramide]      Sumatriptan      Causes severe depression     Tetracycline Nausea and Vomiting, Hives and Difficulty breathing     Pt called to update today after the visit that she is allergic to the tetracycline-added to her list     Sulindac Anxiety     Panic attacks       PAST MEDICAL HISTORY:  Past Medical History:   Diagnosis Date     Ascending aorta enlargement (H)      Depressive disorder     severe, prior hospitalization     Diabetes mellitus, type 2 (H)      Diverticulosis of colon (without mention of hemorrhage)      Fibromyalgia 2007     Insomnia      Irritable bowel syndrome        PAST SURGICAL HISTORY:  Past Surgical History:   Procedure Laterality Date     BACK SURGERY      fusion  and removal of hardware 2004     C SPINAL ORTHOSIS,NOS  2002    lumbar surgery     CHOLECYSTECTOMY  2011     choley  2011     ENT SURGERY  1986    septoplasty     HYSTERECTOMY, CERVIX STATUS UNKNOWN      TVH/BSO     ORTHOPEDIC SURGERY      left ankle       FAMILY HISTORY:  Family History   Problem Relation Age of Onset     Diabetes Mother         type 2     Hypertension Mother      Cerebrovascular Disease Mother          stroke age 57     Ovarian Cancer Mother 43     Cancer Mother         cervix     Diabetes Father         type 2     Hypertension Father      Gastrointestinal Disease Father         colon polyps     Sleep Apnea Brother      Breast Cancer Sister      Ovarian Cancer Sister 46     Breast Cancer Sister      Ovarian Cancer Maternal Grandmother 72     Cancer Maternal Grandmother         cervix       SOCIAL HISTORY:  Social  History     Socioeconomic History     Marital status:      Spouse name: None     Number of children: 3     Years of education: None     Highest education level: None   Occupational History     Occupation: xr technician     Employer: [employer]   Social Needs     Financial resource strain: None     Food insecurity:     Worry: None     Inability: None     Transportation needs:     Medical: None     Non-medical: None   Tobacco Use     Smoking status: Never Smoker     Smokeless tobacco: Never Used   Substance and Sexual Activity     Alcohol use: Yes     Alcohol/week: 0.0 oz     Comment: maybe once a month     Drug use: No     Sexual activity: Yes     Partners: Male     Birth control/protection: Surgical   Lifestyle     Physical activity:     Days per week: None     Minutes per session: None     Stress: None   Relationships     Social connections:     Talks on phone: None     Gets together: None     Attends Druze service: None     Active member of club or organization: None     Attends meetings of clubs or organizations: None     Relationship status: None     Intimate partner violence:     Fear of current or ex partner: None     Emotionally abused: None     Physically abused: None     Forced sexual activity: None   Other Topics Concern     Parent/sibling w/ CABG, MI or angioplasty before 65F 55M? Not Asked   Social History Narrative     None       Review of Systems:  Skin:  Negative for       Eyes:  Positive for glasses    ENT:  Positive for tinnitus    Respiratory:  Positive for dyspnea on exertion;cough;wheezing has allergies   Cardiovascular:    palpitations;Positive for;edema;fatigue;lightheadedness;dizziness edema in the hands   Gastroenterology: Positive for nausea;heartburn    Genitourinary:  Negative for      Musculoskeletal:  Positive for back pain;neck pain;joint pain;fibromyalgia    Neurologic:  Positive for numbness or tingling of hands;numbness or tingling of feet    Psychiatric:  " Positive for sleep disturbances;anxiety    Heme/Lymph/Imm:  Positive for allergies    Endocrine:  Positive for diabetes      Physical Exam:  Vitals: BP (!) 144/82 (BP Location: Right arm, Patient Position: Sitting, Cuff Size: Adult Regular)   Pulse 67   Ht 1.613 m (5' 3.5\")   Wt 91.9 kg (202 lb 8 oz)   LMP 01/01/1999   SpO2 97%   BMI 35.30 kg/m       Constitutional:  cooperative;in no acute distress        Skin:  warm and dry to the touch          Head:  normocephalic        Eyes:  pupils equal and round        Lymph:      ENT:  no pallor or cyanosis        Neck:  carotid pulses are full and equal bilaterally        Respiratory:  clear to auscultation;normal symmetry         Cardiac: regular rhythm       systolic ejection murmur        pulses full and equal                                        GI:  abdomen soft;no bruits        Extremities and Muscular Skeletal:  no deformities, clubbing, cyanosis, erythema observed;no edema              Neurological:  no gross motor deficits;affect appropriate        Psych:  Alert and Oriented x 3          CC  No referring provider defined for this encounter.                    Thank you for allowing me to participate in the care of your patient.      Sincerely,     Nyla Jensen,      [facility]    cc:   No referring provider defined for this encounter.        "

## 2019-08-26 NOTE — LETTER
8/26/2019      Annamaria Erickson MD  [address] [place] [address]      RE: Maricarmen ROXANNE Dotson       Dear Colleague,    I had the pleasure of seeing Maricarmen Cannontoribio in the [facility].    Service Date: 08/26/2019      REFERRING PHYSICIAN:  Annamaria Erickson MD       HISTORY OF PRESENT ILLNESS:  Ms. Dotson is a 58-year-old female with a history of hypertension, insulin-dependent diabetes and mixed hyperlipidemia with multiple drug intolerances.  She has moderate sleep apnea, but is intolerant of the CPAP mask.  She did have a followup visit 08/14 with the sleep center, and according to this, she may benefit from a dental appliance, but she has not sought that out out of concern that it may not be covered.  It does say that her apneic events were mostly positional related, and so they did teach her some sleep hygiene.  She also had followup with Nephrology.  She had an abnormal renal ultrasound indicating bilateral renal artery stenoses.  She did not undergo the CT renal angiogram to confirm; however, Nephrology did not recommend since she was tolerating losartan without kidney dysfunction, and her blood pressures were reasonably controlled on losartan.  He did test for hyperaldosteronism and hyper-reninism, and both were normal.  Ms. Dotson did not react well to low-dose hydrochlorothiazide.  She also states she tried the simvastatin and did not react well to this, either.  We talked about some over-the-counter supplements, such as cinnamon for her diabetes, turmeric for her body aches and anti-inflammatories and phytosterols, all of which she says she has tried and reacted poorly to.  She is very reluctant to trying any new drugs for treatment of any of her risk factors.  Her diabetes continues to be uncontrolled with her latest hemoglobin A1c at 8.2 on 07/22.  Her blood pressure is modestly controlled with the losartan at 100 mg, which she states she is taking  today.  Her blood pressure is 144/82, pulse is 67, weight 202, and body mass index is 35.  She does state she walks 1/2-1 mile every day.  She is trying to follow a low-sugar diet for her diabetes.      In summary, Ms. Dotson is a 58-year-old female with an underlying history of uncontrolled hypertension, uncontrolled mixed hyperlipidemia, uncontrolled insulin-dependent diabetes with evidence of renal artery stenoses, moderate obstructive sleep apnea and history of fibromyalgia with multiple drug intolerances.  She is unwilling to try any new medication and even unwilling with most over-the-counter supplements, stating she has already tried them.  She states she is exercising on a daily basis and following a low-sugar diet.  Her , who is present with her today, wonders if there is anything injectable that she can try for blood pressure or cholesterol, feeling that he thinks she does better with injectables than with oral pills.  Unfortunately, there is no approved injectable for high blood pressure.  There is one for high cholesterol, the PCSK9 inhibitors; however, she is unwilling to go through all of the statin therapies, which is required in order to gain approval for the PCSK9 inhibitors.  She does feel that she has benefited from acupuncture in the past, but she has had some bad reactions to this as well and is somewhat reluctant to trying this again.  I have encouraged her to continue with a low-sugar diet and regular exercise, as lifestyle modifications are the only thing she seems to tolerate at this point.  I would recommend that she follow up with Nephrology every 2-3 years given the diagnosis of renal artery stenoses, as this will likely progress and become problematic for her in her future.        Please feel free to contact me with any questions you have in regard to her care.      cc:   Annamaria Erickson MD   [facility]    [address] [place] [address]          THIEN BENDER DO             D: 2019   T: 2019   MT: sonia      Name:     HARRY DAMIAN   MRN:      [id number]        Account:      [id number]   :      1960           Service Date: 2019      Document: [id number]         Outpatient Encounter Medications as of 2019   Medication Sig Dispense Refill     Cholecalciferol (VITAMIN D3 PO) Take by mouth daily       Continuous Blood Gluc  (FREESTYLE RADHA 14 DAY READER) MARIA C USE TO CHECK BLOOD SUGARS AS DIRECTED 1 Device 0     Cyanocobalamin (VITAMIN B12 PO)        diclofenac (VOLTAREN) 1 % topical gel Apply topically nightly as needed for moderate pain Apply 4 grams to knees or 2 grams to hands four times daily using enclosed dosing card. 100 g 1     EPINEPHrine (EPIPEN) 0.3 MG/0.3ML injection Inject 0.3 mLs (0.3 mg) into the muscle once as needed for anaphylaxis 2 each 0     Insulin Aspart (INSULIN PUMP - OUTPATIENT) Inject Subcutaneous continuous INSULIN PUMP - OUTPATIENT  Date last updated: 2019 Omnipod   BASAL RATES and times:   REGULAR:   12am: 1.60 --6 AM: 1.45 --12pm: 1.3 -- 6pm: 1.45   HIGH 2:   12am: 1.70 --  6 AM: 1.55 -- 12pm: 1.4 -- 6pm: 1.55   ANXIETY 3:   12am: 1.80 -- 6 AM: 1.65 --12pm:1.5--6pm: 1.8  ANXIETY 4:   12am: 1.90 -- 6 AM: 1.65 --6pm: 1.9  AFTERNOON BINGE:   12 AM: 1.8 -- 6 AM: 4.65 --8 PM: 1.8   VERY HIGH 5: 12 AM: 2--6am:1.8--6pm:2   CARB RATIO: 12am-12am: 6   Corection Factor (Sensitivity): 12AM (midnight): 23 -- 5 AM: 25   Target blood glucose: 100-120  Active insulin time: 4 hrs       LORazepam (ATIVAN) 1 MG tablet TAKE ONE-HALF TO ONE TABLET BY MOUTH NIGHTLY AS NEEDED FOR ANXIETY OR SLEEP 15 tablet 0     losartan (COZAAR) 50 MG tablet Take 2 tablets (100 mg) by mouth daily 60 tablet 3     MELATONIN PO        meloxicam (MOBIC) 15 MG tablet Take 15 mg by mouth At Bedtime        polyethylene glycol (MIRALAX/GLYCOLAX) packet Take 17 g by mouth At Bedtime       traMADol (ULTRAM) 50 MG tablet Take  100 mg by mouth At Bedtime        acetone, Urine, test STRP 1 strip by In Vitro route daily (Patient not taking: Reported on 8/26/2019) 50 each 3     ASPIRIN NOT PRESCRIBED (INTENTIONAL) Please choose reason not prescribed, below (Patient not taking: Reported on 5/20/2019) 0 each 0     blood glucose (NO BRAND SPECIFIED) test strip Freestyle test strips (NOT LITE or INSULINX) to be used with Omnipod pump test 6 times daily (Patient not taking: Reported on 8/26/2019) 200 each 11     STATIN NOT PRESCRIBED, INTENTIONAL, Statin not prescribed intentionally due to Intolerance (Patient not taking: Reported on 5/20/2019) 0 each 0     [DISCONTINUED] continuous blood glucose monitoring (FREESTYLE RADHA) sensor For use with Freestyle Radha Flash  for continuous monitioring of blood glucose levels. Replace sensor every 10 days. 1 each 0     [DISCONTINUED] Insulin Aspart (INSULIN PUMP - OUTPATIENT)        [DISCONTINUED] insulin aspart (NOVOLOG VIAL) 100 UNITS/ML vial To be used in insulin pump.  units 9 vial 3     [DISCONTINUED] LORazepam (ATIVAN) 1 MG tablet TAKE ONE-HALF TO ONE TABLET BY MOUTH NIGHTLY AS NEEDED FOR ANXIETY OR SLEEP 15 tablet 0     No facility-administered encounter medications on file as of 8/26/2019.        Again, thank you for allowing me to participate in the care of your patient.      Sincerely,    Nyla Jensen, DO     [facility]

## 2019-09-09 ENCOUNTER — PATIENT OUTREACH (OUTPATIENT)
Dept: EDUCATION SERVICES | Facility: CLINIC | Age: 59
End: 2019-09-09

## 2019-09-09 ENCOUNTER — MYC MEDICAL ADVICE (OUTPATIENT)
Dept: EDUCATION SERVICES | Facility: CLINIC | Age: 59
End: 2019-09-09

## 2019-09-09 DIAGNOSIS — E11.65 TYPE 2 DIABETES MELLITUS WITH HYPERGLYCEMIA, WITH LONG-TERM CURRENT USE OF INSULIN (H): Primary | ICD-10-CM

## 2019-09-09 DIAGNOSIS — F41.9 ANXIETY: ICD-10-CM

## 2019-09-09 DIAGNOSIS — Z79.4 TYPE 2 DIABETES MELLITUS WITH HYPERGLYCEMIA, WITH LONG-TERM CURRENT USE OF INSULIN (H): Primary | ICD-10-CM

## 2019-09-09 DIAGNOSIS — G47.00 INSOMNIA, UNSPECIFIED TYPE: ICD-10-CM

## 2019-09-09 RX ORDER — LORAZEPAM 1 MG/1
TABLET ORAL
Qty: 15 TABLET | Refills: 0 | Status: SHIPPED | OUTPATIENT
Start: 2019-09-09 | End: 2019-10-19

## 2019-09-09 NOTE — TELEPHONE ENCOUNTER
Diabetes Self-Management Training: Insulin Pump Telephone Visit    Current Diabetes Management per Patient:  Comments/concerns:   Dear Deanna,    Just sent you my download. I will start using the glucose pod reader later today.    Please let me know if you want me to change anything.    Thank you,  Maricarmen    Insulin Pump Type: OmniPod    Taking other diabetes medications? no    Current Pump report:                  Assessment/Plan:  Alex Hernadez,    Your report looks better the last week.  A bit high in the overnight time between midnight and 4am but it comes down nicely when you take a correction.  Lets try a small change to your evening basal rate to see if it helps you go into the night with better results.    Right now you are using basal pattern high 4.  I want you to stay on this pattern but change the 6pm-12am rate from 1.9 to 2.0.     So the pattern will look like this:  12am-6am:  1.9  6am-6pm: 1.65  6pm-12am: 2.0    This will only give you a total of 0.6 units per day. A small change but may make a big difference.      You can continue to use the temp basal as you need to.     Let me know if you have any questions or concerns. Otherwise upload again in 2 weeks.      Deanna Bolton RN,CDE         Any diabetes medication dose changes were made via the CDE Protocol and Collaborative Practice Agreement with the patient's referring provider. A copy of this encounter was shared with the provider.

## 2019-09-09 NOTE — TELEPHONE ENCOUNTER
Requested Prescriptions   Pending Prescriptions Disp Refills     LORazepam (ATIVAN) 1 MG tablet [Pharmacy Med Name: LORAZEPAM 1MG TABS]        Last Written Prescription Date:  8/7/2019  Last Fill Quantity: 15,   # refills: 0  Last Office Visit: 6/10/2019  Future Office visit:    Next 5 appointments (look out 90 days)    Oct 25, 2019 10:00 AM CDT  (Arrive by 9:45 AM)  Return Visit with MARICRUZ Castillo CNP  [facility] [address]  [facility] [address]  [phone]           Routing refill request to provider for review/approval because:  Drug not on the [facility], [facility] or  Health refill protocol or controlled substance   15 tablet 0     Sig: TAKE ONE-HALF TO ONE TABLET BY MOUTH NIGHTLY AS NEEDED FOR ANXIETY OR SLEEP       There is no refill protocol information for this order

## 2019-09-26 DIAGNOSIS — I10 HYPERTENSION GOAL BP (BLOOD PRESSURE) < 140/90: ICD-10-CM

## 2019-09-26 RX ORDER — LOSARTAN POTASSIUM 50 MG/1
100 TABLET ORAL DAILY
Qty: 60 TABLET | Refills: 3 | Status: SHIPPED | OUTPATIENT
Start: 2019-09-26 | End: 2020-01-24

## 2019-09-26 NOTE — TELEPHONE ENCOUNTER
This medication was last prescribed by the patient's cardiac doctor, but she is out of medication and was wondering if Dr. Erickson would okay the prescription.    Thank you,  Celeste Vega [facility]

## 2019-09-26 NOTE — TELEPHONE ENCOUNTER
Dr. Erickson:    Patient is requesting a refill of losartan (COZAAR) 50 MG tablets    This has previously been prescribed by cardiology, she is inquiring if you would be okay signing for it.     Per cardiology note:  She is tolerating Cozaar or losartan currently and she has titrated this up to 100 mg.  She initially told me that she had hives when she started the 100 mg strength and reduced it back to 50, but I guess she is still taking 100 mg.  She is a little cautious, however, about obtaining a full script for this, as she is concerned about the possibility of side effect yet with this medication.  Nevertheless, she is taking it and her blood pressures have been better.    BP Readings from Last 3 Encounters:   08/26/19 (!) 144/82   08/14/19 (!) 157/85   06/25/19 172/88     Shira Carranza RN BSN   [facility]

## 2019-09-26 NOTE — TELEPHONE ENCOUNTER
"Requested Prescriptions   Pending Prescriptions Disp Refills     losartan (COZAAR) 50 MG tablet    Last Written Prescription Date:  5/20/2019  Last Fill Quantity: 60,  # refills: 3   Last office visit: 6/10/2019 with prescribing provider:  Annamaria Erickson     Future Office Visit:   Next 5 appointments (look out 90 days)    Oct 25, 2019 10:00 AM CDT  (Arrive by 9:45 AM)  Return Visit with MARICRUZ Castillo CNP  [facility] [address] [facility] [address] [phone]          60 tablet 3     Sig: Take 2 tablets (100 mg) by mouth daily       Angiotensin-II Receptors Failed - 9/26/2019  9:08 AM        Failed - Last blood pressure under 140/90 in past 12 months     BP Readings from Last 3 Encounters:   08/26/19 (!) 144/82   08/14/19 (!) 157/85   06/25/19 172/88                 Passed - Recent (12 mo) or future (30 days) visit within the authorizing provider's specialty     Patient had office visit in the last 12 months or has a visit in the next 30 days with authorizing provider or within the authorizing provider's specialty.  See \"Patient Info\" tab in inbasket, or \"Choose Columns\" in Meds & Orders section of the refill encounter.              Passed - Medication is active on med list        Passed - Patient is age 18 or older        Passed - No active pregnancy on record        Passed - Normal serum creatinine on file in past 12 months     Recent Labs   Lab Test 05/13/19  0959  03/23/12  1204   CR 0.77   < >  --    CREAT  --   --  0.7    < > = values in this interval not displayed.             Passed - Normal serum potassium on file in past 12 months     Recent Labs   Lab Test 05/13/19  0959   POTASSIUM 3.7                    Passed - No positive pregnancy test in past 12 months          "

## 2019-09-27 ENCOUNTER — HEALTH MAINTENANCE LETTER (OUTPATIENT)
Age: 59
End: 2019-09-27

## 2019-10-19 DIAGNOSIS — F41.9 ANXIETY: ICD-10-CM

## 2019-10-19 DIAGNOSIS — G47.00 INSOMNIA, UNSPECIFIED TYPE: ICD-10-CM

## 2019-10-19 NOTE — TELEPHONE ENCOUNTER
Requested Prescriptions   Pending Prescriptions Disp Refills     LORazepam (ATIVAN) 1 MG tablet [Pharmacy Med Name: LORAZEPAM 1MG TABS]  Last Written Prescription Date:  09/09/2019  Last Fill Quantity: 15 tablet,  # refills: 0   Last Office Visit: 6/10/2019 Annamaria Erickson MD   Future Office Visit:    Next 5 appointments (look out 90 days)    Oct 25, 2019 10:00 AM CDT  (Arrive by 9:45 AM)  Return Visit with MARICRUZ Castillo [facility] ([facility]) [address]  [facility] [address]  [phone]          15 tablet 0     Sig: TAKE ONE-HALF TO ONE TABLET BY MOUTH NIGHTLY AS NEEDED FOR ANXIETY OR SLEEP       There is no refill protocol information for this order

## 2019-10-21 ENCOUNTER — MYC MEDICAL ADVICE (OUTPATIENT)
Dept: EDUCATION SERVICES | Facility: CLINIC | Age: 59
End: 2019-10-21

## 2019-10-21 ENCOUNTER — PATIENT OUTREACH (OUTPATIENT)
Dept: EDUCATION SERVICES | Facility: CLINIC | Age: 59
End: 2019-10-21
Payer: MEDICARE

## 2019-10-21 RX ORDER — LORAZEPAM 1 MG/1
TABLET ORAL
Qty: 15 TABLET | Refills: 0 | Status: SHIPPED | OUTPATIENT
Start: 2019-10-21 | End: 2019-11-23

## 2019-10-21 NOTE — TELEPHONE ENCOUNTER
As per  - last 5 refills were on 9/9, 8/7, 7/24, 6/6 & 4/29 for 15# each.    Clem, RN  Triage Nurse

## 2019-10-21 NOTE — TELEPHONE ENCOUNTER
Diabetes Self-Management Training: Insulin Pump Telephone Visit    Current Diabetes Management per Patient:  Comments/concerns:   Dear Deanna   Here are my readings for glucose.  Please let you know if you want to adjust anything.    Insulin Pump Type: OmniPod    Taking other diabetes medications? no    Current Pump report:                    Assessment/Plan:  Alex Hernadez,    Thank you for uploading !    Your overall average for the last 2 weeks is 166mg/dl  which looks good!  You are pretty stable overnight but you did have a low around 1am this morning.  You were heading low into the night after taking insulin around 8pm for both  45 grams carbohydrate and a correction dose.  Do you feel your carbohydrate count was accurate last night?    Your higher readings tend to be during the afternoon and into the evening.  How are you doing with your anxiety and binge eating?    Great job on using the temp basal. Is this still a good tool for you to make adjustments when you have concerns about your glucose levels?     Let me know how you are doing then we can decide if you need any changes to you pump settings.      Deanna Bolton RN,CDE       Any diabetes medication dose changes were made via the CDE Protocol and Collaborative Practice Agreement with the patient's referring provider. A copy of this encounter was shared with the provider.

## 2019-10-25 ENCOUNTER — MYC MEDICAL ADVICE (OUTPATIENT)
Dept: ENDOCRINOLOGY | Facility: CLINIC | Age: 59
End: 2019-10-25

## 2019-10-26 ENCOUNTER — HEALTH MAINTENANCE LETTER (OUTPATIENT)
Age: 59
End: 2019-10-26

## 2019-10-28 ENCOUNTER — OFFICE VISIT (OUTPATIENT)
Dept: ENDOCRINOLOGY | Facility: CLINIC | Age: 59
End: 2019-10-28
Payer: COMMERCIAL

## 2019-10-28 VITALS
SYSTOLIC BLOOD PRESSURE: 146 MMHG | RESPIRATION RATE: 16 BRPM | OXYGEN SATURATION: 97 % | BODY MASS INDEX: 34.87 KG/M2 | HEART RATE: 63 BPM | WEIGHT: 200 LBS | TEMPERATURE: 98.6 F | DIASTOLIC BLOOD PRESSURE: 80 MMHG

## 2019-10-28 DIAGNOSIS — E11.29 TYPE 2 DIABETES MELLITUS WITH MICROALBUMINURIA, WITH LONG-TERM CURRENT USE OF INSULIN (H): ICD-10-CM

## 2019-10-28 DIAGNOSIS — Z96.41 INSULIN PUMP IN PLACE: Primary | ICD-10-CM

## 2019-10-28 DIAGNOSIS — Z79.4 TYPE 2 DIABETES MELLITUS WITH MICROALBUMINURIA, WITH LONG-TERM CURRENT USE OF INSULIN (H): ICD-10-CM

## 2019-10-28 DIAGNOSIS — R80.9 TYPE 2 DIABETES MELLITUS WITH MICROALBUMINURIA, WITH LONG-TERM CURRENT USE OF INSULIN (H): ICD-10-CM

## 2019-10-28 LAB — HBA1C MFR BLD: 8 % (ref 0–5.6)

## 2019-10-28 PROCEDURE — 83036 HEMOGLOBIN GLYCOSYLATED A1C: CPT | Performed by: CLINICAL NURSE SPECIALIST

## 2019-10-28 PROCEDURE — 99207 C FOOT EXAM  NO CHARGE: CPT | Performed by: CLINICAL NURSE SPECIALIST

## 2019-10-28 PROCEDURE — 99214 OFFICE O/P EST MOD 30 MIN: CPT | Performed by: CLINICAL NURSE SPECIALIST

## 2019-10-28 PROCEDURE — 36415 COLL VENOUS BLD VENIPUNCTURE: CPT | Performed by: CLINICAL NURSE SPECIALIST

## 2019-10-28 NOTE — LETTER
10/28/2019         RE: Maricarmen Dotson  [address]  [place] [address]        Dear Colleague,    Thank you for referring your patient, Maricarmen Dotson, to the [facility]. Please see a copy of my visit note below.    Name: Maricarmen Dotson  F/u for Diabetes (Last seen 6/6/2019).  HPI:  Maricarmen Dotson is a 58 year old female who presents for the management of:    1. Type 2 DM:  Originally diagnosed: in 1997 after starting Paxil and gaining 32 lbs in one month    Insulin pump start 2/20/2018.    Sister recently diagnosed with metastatic breast/ovarian cancer.  Will be traveling to [place] soon to see her sister.    Current Regimen:   Insulin pump - Omnipod  Currently using pattern 4  Standard pattern  Time Rate (U/hr)   0000 1.600   06:00 1.450   12:00 1.300   18:00 1.450     Pattern 2   Time Rate (U/hr)   0000 1.700   06:00 1.550   12:00 1.400   18:00 1.550     Pattern 3   Time Rate (U/hr)   0000 1.850   06:00 1.650   12:00 1.500   18:00 1.850     Pattern 4   Time Rate (U/hr)   0000 1.900   06:00 1.650   18:00 2.000     Pattern 5   Time Rate (U/hr)   0000 2.000   06:00 1.800   18:00 2.000     Carbohydrate Ratio -    Time Ratio   0000 6         Sensitivity   00:00  05:00    23  25   Active Insulin Time  4 hours   Basal  54% (41.9 units)   Bolus   46% (35.3 units)   Total Carbohydrates/day 144 g   Total Insulin/day  77.2 units   Average Blood Sugar  BG range 210     BS Checks 4.5 times a day   Target range 100-120     Freddie download:  Average glucose 184; 51% above target range, 49% in target range, 0% below range.    REPORTS PREVIOUS ADVERSE REACTION TO HUMALOG.  States she cannot take Humalog because it caused pancreatits.  Continues to struggle with a binge eating disorder.    Was previously treated by endo at [facility] (last seen there 11/6/2015).  She has had adverse reaction to most oral medications including Metformin (abdominal pain), glipizide (allergic type reaction), Avandia  (abdominal pain and swelling), Byetta and Onglyza (pancreatitis).  Was previously prescribed Invokana but did not start because she was concerned about possible side effects.        History of eating disorder (binge eating) due to PTSD.  Now working with a new counselor.        Complications:   Diabetes Complications  Description / Detail    Diabetic Retinopathy   No retinopathy, last exam 2019   CAD / PAD   No   Neuropathy   No   Nephropathy / Microalbuminuria   No   Gastroparesis  No   Hypoglycemia Unawareness  No     2. Intermittent Hypertension: Blood Pressure today:   BP Readings from Last 3 Encounters:   10/28/19 (!) 146/80   19 (!) 144/82   19 (!) 157/85   .  Blood pressure medications include no medications.    3. Lipids: Takes no medications for lipid control.        PMH/PSH:  Past Medical History:   Diagnosis Date     Ascending aorta enlargement (H)      Depressive disorder     severe, prior hospitalization     Diabetes mellitus, type 2 (H)      Diverticulosis of colon (without mention of hemorrhage)      Fibromyalgia 2007     Insomnia      Irritable bowel syndrome      Past Surgical History:   Procedure Laterality Date     BACK SURGERY      fusion  and removal of hardware      C SPINAL ORTHOSIS,NOS      lumbar surgery     CHOLECYSTECTOMY  2011     choley  2011     ENT SURGERY      septoplasty     HYSTERECTOMY, CERVIX STATUS UNKNOWN      TVH/BSO     ORTHOPEDIC SURGERY  2005    left ankle     Family Hx:  Family History   Problem Relation Age of Onset     Diabetes Mother         type 2     Hypertension Mother      Cerebrovascular Disease Mother          stroke age 57     Ovarian Cancer Mother 43     Cancer Mother         cervix     Diabetes Father         type 2     Hypertension Father      Gastrointestinal Disease Father         colon polyps     Sleep Apnea Brother      Breast Cancer Sister      Ovarian Cancer Sister 46     Breast Cancer Sister      Ovarian  Cancer Maternal Grandmother 72     Cancer Maternal Grandmother         cervix     Thyroid disease:          DM2: Yes, mother and father         Autoimmune: DM1, SLE, RA, Vitiligo     Social Hx:  Social History     Socioeconomic History     Marital status:      Spouse name: Not on file     Number of children: 3     Years of education: Not on file     Highest education level: Not on file   Occupational History     Occupation: xr technician     Employer: [employer]   Social Needs     Financial resource strain: Not on file     Food insecurity:     Worry: Not on file     Inability: Not on file     Transportation needs:     Medical: Not on file     Non-medical: Not on file   Tobacco Use     Smoking status: Never Smoker     Smokeless tobacco: Never Used   Substance and Sexual Activity     Alcohol use: Yes     Alcohol/week: 0.0 standard drinks     Comment: maybe once a month     Drug use: No     Sexual activity: Yes     Partners: Male     Birth control/protection: Surgical   Lifestyle     Physical activity:     Days per week: Not on file     Minutes per session: Not on file     Stress: Not on file   Relationships     Social connections:     Talks on phone: Not on file     Gets together: Not on file     Attends Protestant service: Not on file     Active member of club or organization: Not on file     Attends meetings of clubs or organizations: Not on file     Relationship status: Not on file     Intimate partner violence:     Fear of current or ex partner: Not on file     Emotionally abused: Not on file     Physically abused: Not on file     Forced sexual activity: Not on file   Other Topics Concern     Parent/sibling w/ CABG, MI or angioplasty before 65F 55M? Not Asked   Social History Narrative     Not on file          MEDICATIONS:  has a current medication list which includes the following prescription(s): acetone urine, aspirin not prescribed, blood glucose, cholecalciferol, freestyle ashleigh 14 day  reader, cyanocobalamin, voltaren, epinephrine, HEMP OIL OR EXTRACT OR OTHER CBD CANNABINOID, NOT MEDICAL CANNABIS,, insulin pump, lorazepam, losartan, melatonin, meloxicam, polyethylene glycol, statin not prescribed, and tramadol.    ROS     ROS: 10 point ROS neg other than the symptoms noted above in the HPI.    Physical Exam   VS: BP (!) 146/80 (BP Location: Left arm, Patient Position: Chair, Cuff Size: Adult Large)   Pulse 63   Temp 98.6  F (37  C) (Oral)   Resp 16   Wt 90.7 kg (200 lb)   LMP 01/01/1999   SpO2 97%   Breastfeeding? No   BMI 34.87 kg/m     GENERAL: NAD, well dressed, answering questions appropriately, appears stated age.  HEENT: no exophthalmos, no proptosis, no lig lag, no retraction, no scleral icterus  RESPIRATORY: Clear bilaterally. Normal respiratory effort.  CARDIOVASCULAR: RRR.  EXTREMTIES: No edema, feet with 2+ pulses, 10-gram plantar sensation 6/6 bilaterally, no open lesions.  Some continued numbness at the right heel.  NEUROLOGY: CN grossly intact, no tremors  MSK: grossly intact, No digital cyanosis. Normal gait and station.  PSYCH: Intact judgment and insight. A&OX3 with a cordial affect.    LABS:  A1c:   Component      Latest Ref Rng & Units 6/26/2018 4/18/2019 10/28/2019   Hemoglobin A1C      0 - 5.6 % 8.7 (H) 8.5 (H) 8.0 (H)     Basic Metabolic Panel:  !COMPREHENSIVE Latest Ref Rng & Units 5/13/2019   SODIUM 133 - 144 mmol/L 139   POTASSIUM 3.4 - 5.3 mmol/L 3.7   CHLORIDE 94 - 109 mmol/L 108   BUN 7 - 30 mg/dL 17   Creatinine 0.52 - 1.04 mg/dL    Creatinine 0.52 - 1.04 mg/dL 0.77   Glucose 70 - 99 mg/dL 183 (H)   ANION GAP 3 - 14 mmol/L 4   CALCIUM 8.5 - 10.1 mg/dL 9.2     LFTS/Cholesterol Panel:  !LIPID/HEPATIC Latest Ref Rng & Units 12/13/2018   CHOLESTEROL <200 mg/dL 262 (H)   TRIGLYCERIDES <150 mg/dL 168 (H)   HDL CHOLESTEROL >49 mg/dL 50   LDL CHOLESTEROL DIRECT <100 mg/dL    LDL CHOLESTEROL, CALCULATED <100 mg/dL 178 (H)   VLDL-CHOLESTEROL 0 - 30 mg/dL    NON HDL  CHOLESTEROL <130 mg/dL 212 (H)     !LIPID/HEPATIC Latest Ref Rng & Units 10/11/2018   AST 0 - 40 IU/L 27   ALT 0 - 32 IU/L 21     Thyroid Function:   !THYROID Latest Ref Rng & Units 6/26/2018   TSH 0.40 - 4.00 mU/L 0.83     Urine Microalbumin:  Component      Latest Ref Rng & Units 4/23/2018   Creatinine Urine      mg/dL 63   Albumin Urine mg/L      mg/L 12   Albumin Urine mg/g Cr      0 - 25 mg/g Cr 19.37     Vitamin D Deficiency screening    30 - 75 ug/L 34     All pertinent notes, labs, and images personally reviewed by me.     A/P  Ms.Anna ROXANNE Dotson is a 58 year old here for the management of diabetes:    1. DM2 - Uncontrolled - continues to improve.  Today's A1c 8.0% which is the lowest A1c she has had since 2013.  Currently on insulin pump therapy.    Glucose running higher evenings and overnight.  Change from pattern 4 to pattern 5 and make the following changes to pattern 5  Pattern 5   Time Rate (U/hr)   0000 2.000   06:00 1.800--> 1.650   18:00 2.000-->2.200     Will follow up with diabetes ed in December to review and make changes to pump settings if needed.  Continue to work with diabetes ed weekly for ongoing pump adjustments.      Labs ordered today:   Orders Placed This Encounter   Procedures     Hemoglobin A1c   Urine microalbumin     Radiology/Consults ordered today: None    All questions were answered.  The patient indicates understanding of the above issues and agrees with the plan set forth.  More than 50% of the time spent with Ms. Dotson on counseling / coordinating her care discussing the above plan of careTotal face to face time discussing diabetes treatment and target BG levels was greater than or equal to 25 minutes.       Follow-up:  4 months    Emily Phan NP  Endocrinology  [facility]  CC: Annamaria Erickson                   Again, thank you for allowing me to participate in the care of your patient.        Sincerely,        MARICRUZ Castillo CNP

## 2019-10-28 NOTE — PROGRESS NOTES
Name: Maricarmen Dotson  F/u for Diabetes (Last seen 6/6/2019).  HPI:  Maricarmen Dotson is a 58 year old female who presents for the management of:    1. Type 2 DM:  Originally diagnosed: in 1997 after starting Paxil and gaining 32 lbs in one month    Insulin pump start 2/20/2018.    Sister recently diagnosed with metastatic breast/ovarian cancer.  Will be traveling to [place] soon to see her sister.    Current Regimen:   Insulin pump - Omnipod  Currently using pattern 4  Standard pattern  Time Rate (U/hr)   0000 1.600   06:00 1.450   12:00 1.300   18:00 1.450     Pattern 2   Time Rate (U/hr)   0000 1.700   06:00 1.550   12:00 1.400   18:00 1.550     Pattern 3   Time Rate (U/hr)   0000 1.850   06:00 1.650   12:00 1.500   18:00 1.850     Pattern 4   Time Rate (U/hr)   0000 1.900   06:00 1.650   18:00 2.000     Pattern 5   Time Rate (U/hr)   0000 2.000   06:00 1.800   18:00 2.000     Carbohydrate Ratio -    Time Ratio   0000 6         Sensitivity   00:00  05:00    23  25   Active Insulin Time  4 hours   Basal  54% (41.9 units)   Bolus   46% (35.3 units)   Total Carbohydrates/day 144 g   Total Insulin/day  77.2 units   Average Blood Sugar  BG range 210     BS Checks 4.5 times a day   Target range 100-120     Freddie download:  Average glucose 184; 51% above target range, 49% in target range, 0% below range.    REPORTS PREVIOUS ADVERSE REACTION TO HUMALOG.  States she cannot take Humalog because it caused pancreatits.  Continues to struggle with a binge eating disorder.    Was previously treated by endo at [facility] (last seen there 11/6/2015).  She has had adverse reaction to most oral medications including Metformin (abdominal pain), glipizide (allergic type reaction), Avandia (abdominal pain and swelling), Byetta and Onglyza (pancreatitis).  Was previously prescribed Invokana but did not start because she was concerned about possible side effects.        History of eating disorder (binge eating) due to PTSD.  Now  working with a new counselor.        Complications:   Diabetes Complications  Description / Detail    Diabetic Retinopathy   No retinopathy, last exam 2019   CAD / PAD   No   Neuropathy   No   Nephropathy / Microalbuminuria   No   Gastroparesis  No   Hypoglycemia Unawareness  No     2. Intermittent Hypertension: Blood Pressure today:   BP Readings from Last 3 Encounters:   10/28/19 (!) 146/80   19 (!) 144/82   19 (!) 157/85   .  Blood pressure medications include no medications.    3. Lipids: Takes no medications for lipid control.        PMH/PSH:  Past Medical History:   Diagnosis Date     Ascending aorta enlargement (H)      Depressive disorder     severe, prior hospitalization     Diabetes mellitus, type 2 (H)      Diverticulosis of colon (without mention of hemorrhage)      Fibromyalgia 2007     Insomnia      Irritable bowel syndrome      Past Surgical History:   Procedure Laterality Date     BACK SURGERY      fusion  and removal of hardware      C SPINAL ORTHOSIS,NOS      lumbar surgery     CHOLECYSTECTOMY       choley  2011     ENT SURGERY      septoplasty     HYSTERECTOMY, CERVIX STATUS UNKNOWN      TVH/BSO     ORTHOPEDIC SURGERY      left ankle     Family Hx:  Family History   Problem Relation Age of Onset     Diabetes Mother         type 2     Hypertension Mother      Cerebrovascular Disease Mother          stroke age 57     Ovarian Cancer Mother 43     Cancer Mother         cervix     Diabetes Father         type 2     Hypertension Father      Gastrointestinal Disease Father         colon polyps     Sleep Apnea Brother      Breast Cancer Sister      Ovarian Cancer Sister 46     Breast Cancer Sister      Ovarian Cancer Maternal Grandmother 72     Cancer Maternal Grandmother         cervix     Thyroid disease:          DM2: Yes, mother and father         Autoimmune: DM1, SLE, RA, Vitiligo     Social Hx:  Social History     Socioeconomic History      Marital status:      Spouse name: Not on file     Number of children: 3     Years of education: Not on file     Highest education level: Not on file   Occupational History     Occupation: xr technician     Employer: [employer]   Social Needs     Financial resource strain: Not on file     Food insecurity:     Worry: Not on file     Inability: Not on file     Transportation needs:     Medical: Not on file     Non-medical: Not on file   Tobacco Use     Smoking status: Never Smoker     Smokeless tobacco: Never Used   Substance and Sexual Activity     Alcohol use: Yes     Alcohol/week: 0.0 standard drinks     Comment: maybe once a month     Drug use: No     Sexual activity: Yes     Partners: Male     Birth control/protection: Surgical   Lifestyle     Physical activity:     Days per week: Not on file     Minutes per session: Not on file     Stress: Not on file   Relationships     Social connections:     Talks on phone: Not on file     Gets together: Not on file     Attends Mormon service: Not on file     Active member of club or organization: Not on file     Attends meetings of clubs or organizations: Not on file     Relationship status: Not on file     Intimate partner violence:     Fear of current or ex partner: Not on file     Emotionally abused: Not on file     Physically abused: Not on file     Forced sexual activity: Not on file   Other Topics Concern     Parent/sibling w/ CABG, MI or angioplasty before 65F 55M? Not Asked   Social History Narrative     Not on file          MEDICATIONS:  has a current medication list which includes the following prescription(s): acetone urine, aspirin not prescribed, blood glucose, cholecalciferol, freestyle ashleigh 14 day reader, cyanocobalamin, voltaren, epinephrine, HEMP OIL OR EXTRACT OR OTHER CBD CANNABINOID, NOT MEDICAL CANNABIS,, insulin pump, lorazepam, losartan, melatonin, meloxicam, polyethylene glycol, statin not prescribed, and tramadol.    ROS      ROS: 10 point ROS neg other than the symptoms noted above in the HPI.    Physical Exam   VS: BP (!) 146/80 (BP Location: Left arm, Patient Position: Chair, Cuff Size: Adult Large)   Pulse 63   Temp 98.6  F (37  C) (Oral)   Resp 16   Wt 90.7 kg (200 lb)   LMP 01/01/1999   SpO2 97%   Breastfeeding? No   BMI 34.87 kg/m    GENERAL: NAD, well dressed, answering questions appropriately, appears stated age.  HEENT: no exophthalmos, no proptosis, no lig lag, no retraction, no scleral icterus  RESPIRATORY: Clear bilaterally. Normal respiratory effort.  CARDIOVASCULAR: RRR.  EXTREMTIES: No edema, feet with 2+ pulses, 10-gram plantar sensation 6/6 bilaterally, no open lesions.  Some continued numbness at the right heel.  NEUROLOGY: CN grossly intact, no tremors  MSK: grossly intact, No digital cyanosis. Normal gait and station.  PSYCH: Intact judgment and insight. A&OX3 with a cordial affect.    LABS:  A1c:   Component      Latest Ref Rng & Units 6/26/2018 4/18/2019 10/28/2019   Hemoglobin A1C      0 - 5.6 % 8.7 (H) 8.5 (H) 8.0 (H)     Basic Metabolic Panel:  !COMPREHENSIVE Latest Ref Rng & Units 5/13/2019   SODIUM 133 - 144 mmol/L 139   POTASSIUM 3.4 - 5.3 mmol/L 3.7   CHLORIDE 94 - 109 mmol/L 108   BUN 7 - 30 mg/dL 17   Creatinine 0.52 - 1.04 mg/dL    Creatinine 0.52 - 1.04 mg/dL 0.77   Glucose 70 - 99 mg/dL 183 (H)   ANION GAP 3 - 14 mmol/L 4   CALCIUM 8.5 - 10.1 mg/dL 9.2     LFTS/Cholesterol Panel:  !LIPID/HEPATIC Latest Ref Rng & Units 12/13/2018   CHOLESTEROL <200 mg/dL 262 (H)   TRIGLYCERIDES <150 mg/dL 168 (H)   HDL CHOLESTEROL >49 mg/dL 50   LDL CHOLESTEROL DIRECT <100 mg/dL    LDL CHOLESTEROL, CALCULATED <100 mg/dL 178 (H)   VLDL-CHOLESTEROL 0 - 30 mg/dL    NON HDL CHOLESTEROL <130 mg/dL 212 (H)     !LIPID/HEPATIC Latest Ref Rng & Units 10/11/2018   AST 0 - 40 IU/L 27   ALT 0 - 32 IU/L 21     Thyroid Function:   !THYROID Latest Ref Rng & Units 6/26/2018   TSH 0.40 - 4.00 mU/L 0.83     Urine  Microalbumin:  Component      Latest Ref Rng & Units 4/23/2018   Creatinine Urine      mg/dL 63   Albumin Urine mg/L      mg/L 12   Albumin Urine mg/g Cr      0 - 25 mg/g Cr 19.37     Vitamin D Deficiency screening    30 - 75 ug/L 34     All pertinent notes, labs, and images personally reviewed by me.     A/P  Ms.Anna ROXANNE Dotson is a 58 year old here for the management of diabetes:    1. DM2 - Uncontrolled - continues to improve.  Today's A1c 8.0% which is the lowest A1c she has had since 2013.  Currently on insulin pump therapy.    Glucose running higher evenings and overnight.  Change from pattern 4 to pattern 5 and make the following changes to pattern 5  Pattern 5   Time Rate (U/hr)   0000 2.000   06:00 1.800--> 1.650   18:00 2.000-->2.200     Will follow up with diabetes ed in December to review and make changes to pump settings if needed.  Continue to work with diabetes ed weekly for ongoing pump adjustments.      Labs ordered today:   Orders Placed This Encounter   Procedures     Hemoglobin A1c   Urine microalbumin     Radiology/Consults ordered today: None    All questions were answered.  The patient indicates understanding of the above issues and agrees with the plan set forth.  More than 50% of the time spent with Ms. Dotson on counseling / coordinating her care discussing the above plan of careTotal face to face time discussing diabetes treatment and target BG levels was greater than or equal to 25 minutes.       Follow-up:  4 months    Emily Phan NP  Endocrinology  [facility]  CC: Annamaria Erickson

## 2019-10-28 NOTE — LETTER
[facility]  [address] [place], [address]  [phone]        October 28, 2019    RE:  Maricarmen Dotson                                                                                                                                 [address] [facility] [address]    To Whom It May Concern,    Maricarmen Dotson is a patient under my care for insulin-dependent type 2 diabetes.  Her diabetes is currently treated with continuous insulin infusion via an Omnipod insulin pump and Freddie continuous glucose sensor.  She wears both of these devices attached to body continuously.      Sincerely,          Emily Phan NP  Endocrinology  [facility]

## 2019-10-29 NOTE — RESULT ENCOUNTER NOTE
Maricarmen,  Here is a copy of your recent A1c result for your records.  Emily Phan NP  Endocrinology

## 2019-11-15 ENCOUNTER — OFFICE VISIT (OUTPATIENT)
Dept: SLEEP MEDICINE | Facility: CLINIC | Age: 59
End: 2019-11-15
Payer: COMMERCIAL

## 2019-11-15 VITALS
OXYGEN SATURATION: 96 % | HEIGHT: 64 IN | WEIGHT: 207 LBS | DIASTOLIC BLOOD PRESSURE: 78 MMHG | SYSTOLIC BLOOD PRESSURE: 144 MMHG | RESPIRATION RATE: 16 BRPM | BODY MASS INDEX: 35.34 KG/M2 | HEART RATE: 65 BPM

## 2019-11-15 DIAGNOSIS — G47.33 OSA (OBSTRUCTIVE SLEEP APNEA): Primary | ICD-10-CM

## 2019-11-15 DIAGNOSIS — F51.04 CHRONIC INSOMNIA: ICD-10-CM

## 2019-11-15 PROCEDURE — 99214 OFFICE O/P EST MOD 30 MIN: CPT | Performed by: PHYSICIAN ASSISTANT

## 2019-11-15 ASSESSMENT — MIFFLIN-ST. JEOR: SCORE: 1501.01

## 2019-11-15 NOTE — PATIENT INSTRUCTIONS
Your BMI is Body mass index is 36.09 kg/m .  Weight management is a personal decision.  If you are interested in exploring weight loss strategies, the following discussion covers the approaches that may be successful. Body mass index (BMI) is one way to tell whether you are at a healthy weight, overweight, or obese. It measures your weight in relation to your height.  A BMI of 18.5 to 24.9 is in the healthy range. A person with a BMI of 25 to 29.9 is considered overweight, and someone with a BMI of 30 or greater is considered obese. More than two-thirds of American adults are considered overweight or obese.  Being overweight or obese increases the risk for further weight gain. Excess weight may lead to heart disease and diabetes.  Creating and following plans for healthy eating and physical activity may help you improve your health.  Weight control is part of healthy lifestyle and includes exercise, emotional health, and healthy eating habits. Careful eating habits lifelong are the mainstay of weight control. Though there are significant health benefits from weight loss, long-term weight loss with diet alone may be very difficult to achieve- studies show long-term success with dietary management in less than 10% of people. Attaining a healthy weight may be especially difficult to achieve in those with severe obesity. In some cases, medications, devices and surgical management might be considered.  What can you do?  If you are overweight or obese and are interested in methods for weight loss, you should discuss this with your provider.     Consider reducing daily calorie intake by 500 calories.     Keep a food journal.     Avoiding skipping meals, consider cutting portions instead.    Diet combined with exercise helps maintain muscle while optimizing fat loss. Strength training is particularly important for building and maintaining muscle mass. Exercise helps reduce stress, increase energy, and improves fitness.  Increasing exercise without diet control, however, may not burn enough calories to loose weight.       Start walking three days a week 10-20 minutes at a time    Work towards walking thirty minutes five days a week     Eventually, increase the speed of your walking for 1-2 minutes at time    In addition, we recommend that you review healthy lifestyles and methods for weight loss available through the National Institutes of Health patient information sites:  http://win.niddk.nih.gov/publications/index.htm    And look into health and wellness programs that may be available through your health insurance provider, employer, local community center, or [employer].    Weight management plan: Patient was referred to their PCP to discuss a diet and exercise plan.

## 2019-11-15 NOTE — PROGRESS NOTES
Sleep Follow-Up Visit:    Date on this visit: 11/15/2019    Maricarmen Dotson comes in today for follow-up of her treatment of positional apnea with a dental appliance. She was initially seen for evaluation of possible sleep apnea as a contributing factor to accelerated HTN. Her medical history is also significant for TBI, DM 2, anxiety, depression, PTSD, fibromyalgia, IBS.      AHI was 26.5/hr (0.9% centrals), with desaturations down to 87%. S/He spent 1.8 minutes below 90% SpO2 and 0.3 minutes below 89%. RDI 36.9/hr.  REM RDI 40/hr.  Supine RDI 53.7/hr.  Her non-supine RDI was 10.7/hr (AHI 3/hr). Periodic Limb Movement Index 19.3/hour, 10.9/hr were associated with arousals.    She saw a dentist and was told that she was not a candidate for a dental appliance due to her TMJ disorder. She has been trying to stay off of her back. She has been using CBD oil and melatonin. She feels that has been helping more than other things. She has been trying to wedge a pillow behind her. Her arms fall asleep if she sleeps on her back, so positional restriction has helped that issue as well. She still does not think she can tolerate CPAP. It terrifies her. She binge eats as a self-soothing behavior. She is looking for a new counselor.   She gets 3-4 nights of adequate sleep and then 3-4 nights of inadequate sleep. She can fall asleep at 3-4 AM instead of 5-6 AM. She is getting up daily at 7:00-7:30 AM. She gets into bed between 11:30 PM and 4 AM. She tries to tries to do relaxing things. She falls asleep in about 20-30 minutes and may wake with a startle in a high adrenaline state. She does nap if she has 3-4 nights of not sleeping. She will doze inadvertently reading. She has tried to focus on limiting time in bed and that gave her anxiety. She has been focusing more on keeping a consistent wake time.       Past medical/surgical history, family history, social history, medications and allergies were reviewed.      Problem  List:  Patient Active Problem List    Diagnosis Date Noted     MIESHA (obstructive sleep apnea) 08/14/2019     Priority: Medium     Hypertensive urgency 04/19/2019     Priority: Medium     Posttraumatic stress disorder 12/06/2018     Priority: Medium     Hypertension goal BP (blood pressure) < 140/90 07/23/2018     Priority: Medium     Insulin pump in place 06/27/2018     Priority: Medium     Carotid atherosclerosis, bilateral 04/27/2018     Priority: Medium     Cervical pain 10/23/2017     Priority: Medium     Fibromyalgia 07/30/2017     Priority: Medium     Type 2 diabetes mellitus with hyperglycemia (H) 10/20/2015     Priority: Medium     Statin intolerance 02/24/2015     Priority: Medium     Pain in thoracic spine 07/11/2014     Priority: Medium     Nonallopathic lesion of cervical region 07/11/2014     Priority: Medium     Problem list name updated by automated process. Provider to review       Cervicalgia 07/11/2014     Priority: Medium     Lumbago 07/11/2014     Priority: Medium     TBI (traumatic brain injury) (H) 01/07/2014     Priority: Medium     Anxiety 11/15/2013     Priority: Medium     Panic attacks 11/15/2013     Priority: Medium     Migraine headache 09/03/2013     Priority: Medium     Pain in joint, ankle and foot 06/19/2013     Priority: Medium     Post concussion syndrome 05/24/2013     Priority: Medium     Plantar fasciitis 05/24/2013     Priority: Medium     Overweight 09/26/2012     Priority: Medium     Problem list name updated by automated process. Provider to review       Moderate major depression (H) 02/24/2012     Priority: Medium     S/p hospitalization         Hepatic injury 11/09/2011     Priority: Medium     Type 2 diabetes, HbA1c goal < 7% (H) 01/21/2010     Priority: Medium     Dyspepsia 01/07/2010     Priority: Medium     Hyperlipidemia LDL goal <100 09/26/2008     Priority: Medium     Chronic pain syndrome 06/27/2007     Priority: Medium     Patient is followed by DEANA ORTEGA for  ongoing prescription of narcotic pain medicine.  Med: Percocet 5/325, zanaflex 2mg .   Maximum use per month:15 (percocet) zanaflex (60)  Expected duration: no end date  Narcotic agreement on file: YES  Clinic visit recommended: Q 3 months         Irritable bowel syndrome      Priority: Medium     Diverticulosis of large intestine      Priority: Medium     Problem list name updated by automated process. Provider to review       Insomnia      Priority: Medium     Problem list name updated by automated process. Provider to review          Impression/Plan:    (G47.33) MIESHA (obstructive sleep apnea)  (primary encounter diagnosis)  Comment: Maricarmen has moderate MIESHA that is largely positional. She has extreme anxiety regarding attempting CPAP due to a past traumatic experience. She was not a candidate for a dental appliance due to TMJ. She binge eats to help her cope with stress. She is looking for another counselor.   Plan:  She will sew herself a positional restriction device. Weight loss was encouraged as well.    (F51.04) Chronic insomnia  Comment: She has a few good nights of sleeping 12-7 AM and then will have a few bad nights of sleeping 3 or 4 AM to 7 AM. Her insomnia is in large part related to anxiety. On bad nights, she will sometimes wake as much as hourly with a feeling of adrenaline surging through her body.  She has tried a number of medications and has had paradoxical effects. She is currently using melatonin and CBD oil, which she finds helpful. She asks specifically about CBT for insomnia today to see if there are any other sleep hygiene recommendations that could help her. We have worked to some degree on sleep restriction. The concept of restricting her sleep makes her anxious, but she has found benefit in keeping her wake time fixed.   Plan: She was referred to Dr. Flores for further evaluation/management of her insomnia, although anxiety may limit how much she is willing to accept some of the  recommendations.       She will follow up with me in the future as needed after seeing Dr. Flores, or when she is ready to try CPAP.     Twenty-five minutes spent with patient, all of which were spent face-to-face counseling, consulting, coordinating plan of care.      Bennett Goltz, PA-C    CC: No ref. provider found

## 2019-11-15 NOTE — NURSING NOTE
"Chief Complaint   Patient presents with     Sleep Apnea     Dental device, insomnia follow up        Initial BP (!) 144/78   Pulse 65   Resp 16   Ht 1.613 m (5' 3.5\")   Wt 93.9 kg (207 lb)   LMP 01/01/1999   SpO2 96%   BMI 36.09 kg/m   Estimated body mass index is 36.09 kg/m  as calculated from the following:    Height as of this encounter: 1.613 m (5' 3.5\").    Weight as of this encounter: 93.9 kg (207 lb).    Medication Reconciliation: complete     ESS 11  Lorraine Smart MA        "

## 2019-11-18 ENCOUNTER — DOCUMENTATION ONLY (OUTPATIENT)
Dept: SLEEP MEDICINE | Facility: CLINIC | Age: 59
End: 2019-11-18
Payer: MEDICARE

## 2019-11-18 ENCOUNTER — OFFICE VISIT (OUTPATIENT)
Dept: SLEEP MEDICINE | Facility: CLINIC | Age: 59
End: 2019-11-18
Payer: MEDICARE

## 2019-11-18 ENCOUNTER — MYC MEDICAL ADVICE (OUTPATIENT)
Dept: ENDOCRINOLOGY | Facility: CLINIC | Age: 59
End: 2019-11-18

## 2019-11-18 DIAGNOSIS — G47.00 INSOMNIA: Primary | ICD-10-CM

## 2019-11-18 NOTE — PROGRESS NOTES
Pt contacted for STM pre-clinical insomnia visit.  Pt has a lot of anxiety and it is significantly impacting her ability to sleep at night.  She has done CBT for other issues in the past and feels like this would be helpful for her to try for her insomnia.  She feels she doesn't sleep much at all.  She takes 20mg of Melatonin every night and occasionally takes ativan in the afternoon.  An overview of CBT-I was given and sleep diaries were explained to patient.  Consult with Dr. Flores scheduled for 2/11/20. Sleep diaries, info on CBT-I and 1st module mailed to pt.

## 2019-11-23 DIAGNOSIS — G47.00 INSOMNIA, UNSPECIFIED TYPE: ICD-10-CM

## 2019-11-23 DIAGNOSIS — F41.9 ANXIETY: ICD-10-CM

## 2019-11-23 NOTE — TELEPHONE ENCOUNTER
Requested Prescriptions   Pending Prescriptions Disp Refills     LORazepam (ATIVAN) 1 MG tablet [Pharmacy Med Name: LORAZEPAM 1MG TABS]  Last Written Prescription Date:  10/21/2019  Last Fill Quantity: 15 tablet,  # refills: 0   Last Office Visit: 6/10/2019 Annamaria Erickson MD  Future Office Visit:    Next 5 appointments (look out 90 days)    Feb 05, 2020 10:20 AM CST  Office Visit with Madyson Mclaughlin MD, Ea Rn Pal 3a, EA EXAM ROOM 01  [facility] [address] [place] [phone]          15 tablet 0     Sig: TAKE ONE-HALF TO ONE TABLET BY MOUTH EVERY NIGHT AT BEDTIME AS NEEDED FOR ANXIETY OR SLEEP       There is no refill protocol information for this order

## 2019-11-25 ENCOUNTER — PATIENT OUTREACH (OUTPATIENT)
Dept: EDUCATION SERVICES | Facility: CLINIC | Age: 59
End: 2019-11-25
Payer: MEDICARE

## 2019-11-25 RX ORDER — LORAZEPAM 1 MG/1
TABLET ORAL
Qty: 15 TABLET | Refills: 0 | Status: SHIPPED | OUTPATIENT
Start: 2019-11-25 | End: 2020-01-24

## 2019-11-25 NOTE — TELEPHONE ENCOUNTER
rx sent. Has an appointment to establish with a new provider.    Annamaria Erickson MD  Internal Medicine/Pediatrics

## 2019-11-25 NOTE — PROGRESS NOTES
Diabetes Self-Management Training: Insulin Pump Telephone Visit    Current Diabetes Management per Patient:  Comments/concerns: no    Insulin Pump Type: OmniPod    Taking other diabetes medications? no    Current Pump report:                        Assessment/Plan:  Alex Hernadez,    Thank you for sending in your pump report.  I had a chance to go through it along with your sensor data.  Your overall average is not too bad at 168mg/dl.  You did have a couple of low glucose patterns , a couple were overnight, once was after a correction and another was not specific but you were trending down after carb the evening before.  Your high patterns are after carbs and usually around breakfast and dinner time. How are you doing with taking your insulin before you eat? Are you having any issues with stress/anxiety and binge eating?  we could give you more insulin for your carb but my concern is then it will be too much for you.   Since we have an appointment coming up in a couple weeks I would like you to do your best to bolus for all carbs before you eat and use the temp basal as often as you need.  If your glucose is still fluctuating we can make the changes together in clinic on December 16th.    Let me know if you have any questions or concerns before then.      Deanna Bolton RN, CDE  Diabetes     Any diabetes medication dose changes were made via the CDE Protocol and Collaborative Practice Agreement with the patient's referring provider. A copy of this encounter was shared with the provider.

## 2019-11-25 NOTE — TELEPHONE ENCOUNTER
RX [place] ([place]) reviewed:  not reviewed/not due - last done on 10/19/19    [place]:  [email or web address]

## 2019-12-12 ENCOUNTER — PATIENT OUTREACH (OUTPATIENT)
Dept: PEDIATRICS | Facility: CLINIC | Age: 59
End: 2019-12-12

## 2019-12-12 NOTE — TELEPHONE ENCOUNTER
Panel Management Review      Patient has the following on her problem list:     Depression / Dysthymia review    Measure:  Needs PHQ-9 score of 4 or less during index window.  Administer PHQ-9 and if score is 5 or more, send encounter to provider for next steps.    5 - 7 month window range: none    PHQ-9 SCORE 6/29/2018 12/6/2018 4/29/2019   PHQ-9 Total Score - - -   PHQ-9 Total Score 11 10 14   Some encounter information is confidential and restricted. Go to Review Flowsheets activity to see all data.       If PHQ-9 recheck is 5 or more, route to provider for next steps.    Patient is due for:  PHQ9    Hypertension   Last three blood pressure readings:  BP Readings from Last 3 Encounters:   11/15/19 (!) 144/78   10/28/19 (!) 146/80   08/26/19 (!) 144/82     Blood pressure: MONITOR    HTN Guidelines:  Less than 140/90      Composite cancer screening  Chart review shows that this patient is due/due soon for the following Mammogram  Summary:    Patient is due/failing the following:   BP CHECK, LDL, MAMMOGRAM and PHQ9    Action needed:   Patient needs office visit for mammogram, PHQ9.    Type of outreach:    Sent letter.    Questions for provider review:    None                                                                                                                                    Deanna Herndon LPN       Chart routed to closed   .

## 2019-12-12 NOTE — LETTER
December 12, 2019      Maricarmen Dotson  [address] [id number]        Dear Maricarmen,       We care about your health and have reviewed your health plan including your medical conditions, medications, and lab results.  Based on this review, it is recommended that you follow up regarding the following health topic(s):  -Depression  -High Blood Pressure  -Breast Cancer Screening    We recommend you take the following action(s):  -schedule a FOLLOWUP OFFICE APPOINTMENT.  We will perform the following labs:  Lipids (fasting cholesterol - nothing to eat except water and/or meds for 8-10 hours), BMP (basic metabolic panel) and Microablumin.  -schedule a MAMMOGRAM which is due. Please disregard this reminder if you have had this exam elsewhere within the last 1-2 years please let us know so we can update your records.     Please call us at the [facility] - [phone] (or use [facility]) to address the above recommendations.     Thank you for trusting [facility] and we appreciate the opportunity to serve you.  We look forward to supporting your healthcare needs in the future.    Healthy Regards,    Your Health Care Team  [facility]

## 2019-12-16 ENCOUNTER — ALLIED HEALTH/NURSE VISIT (OUTPATIENT)
Dept: EDUCATION SERVICES | Facility: CLINIC | Age: 59
End: 2019-12-16
Payer: COMMERCIAL

## 2019-12-16 DIAGNOSIS — Z79.4 TYPE 2 DIABETES MELLITUS WITH HYPERGLYCEMIA, WITH LONG-TERM CURRENT USE OF INSULIN (H): Primary | ICD-10-CM

## 2019-12-16 DIAGNOSIS — E11.65 TYPE 2 DIABETES MELLITUS WITH HYPERGLYCEMIA, WITH LONG-TERM CURRENT USE OF INSULIN (H): Primary | ICD-10-CM

## 2019-12-16 PROCEDURE — G0108 DIAB MANAGE TRN  PER INDIV: HCPCS

## 2019-12-16 NOTE — PROGRESS NOTES
Diabetes Self-Management Education & Support    Diabetes Self-Management Education & Support - Insulin Pump/CGM Review    SUBJECTIVE/OBJECTIVE  Presents for: Individual review  Accompanied by: Self and daughter  Diabetes education in the past 24mo: Yes  Focus of Visit: CGM, Insulin Pump  Diabetes type: Type 2  Disease course: Improving  Diabetes management related comments/concerns: need to change my programs on meter, doing better and not binge eating as much.   Transportation concerns: No  Other concerns:: Cognitive impairment  Cultural Influences/Ethnic Background:  American      Patient seen today for Insulin Pump CGM  Review:    Reports:                      Insulin Pump Information  Insulin Pump Type: OmniPod    Insulin Pump Review  Insulin Pump Type: OmniPod  Problems taking diabetes medications regularly?: No  Diabetes medication side effects?: No  Patient would benefit from: Change in basal rate(s), Use of a temporary basal rate  Changes made to pump settings: Basal rate    Statistics/Data Evaluation:         Healthy Eating  Healthy Eating Assessed Today: Yes  Cultural/Rastafari diet restrictions?: No  Patient on a regular basis: (does not have a specific meal plan, does binge eat with increase stress/anxietyand pain.)  Meal planning: Avoiding sweets, Carbohydrate counting, Heart healthy, Low salt, Smaller portions  Meals include: Breakfast, Lunch, Dinner, Snacks  Beverages: Water, Tea  Has patient met with a dietitian in the past?: No    Being Active  Being Active Assessed Today: Yes  Exercise:: Yes  How intense was your typical exercise? : Moderate (like brisk walking)  Barrier to exercise: Safety, Physical limitation    Monitoring  Blood Glucose Meter: FreeStyle, CGM  Home Glucose (Sugar) Monitorin+ times per day  Blood glucose trend: Fluctuating dramtically    Taking Medications  Diabetes Medication(s)     Insulin       INSULIN PUMP - OUTPATIENT    INSULIN PUMP - OUTPATIENT  Date last updated:  9/9/19 Omnipod   BASAL RATES and times:   Basal 1:   12am: 1.60 --6 AM: 1.45 --12pm: 1.3 -- 6pm: 1.45   HIGH 2:   12am: 1.70 --  6 AM: 1.55 -- 12pm: 1.4 -- 6pm: 1.55   High 3:   12am: 1.85 -- 6 AM: 1.65 --12pm:1.5--6pm: 1.85  High 4:   12am: 1.90 -- 6 AM: 1.65 --6pm: 2.0  High 5:   12 AM: 2.0 -- 6 AM: 1.80--8 PM: 2.0  CARB RATIO: 12am-12am: 6   Corection Factor (Sensitivity): 12AM (midnight): 23 -- 5 AM: 25   Target blood glucose: 100-120  Active insulin time: 4 hrs          Taking Medication Assessed Today: Yes  Current Treatments: Diet, Insulin Pump  Given by: Patient  Injection/Infusion sites: Abdomen, Buttocks  Problems taking diabetes medications regularly?: No  Diabetes medication side effects?: No  Treatment Compliance: Most of the time    Problem Solving  Problem Solving Assessed Today: Yes  Hypoglycemia Frequency: Weekly  Hypoglycemia Treatment: Glucose (tablets or gel), Juice  Patient carries a carbohydrate source: Yes  Medical alert: No  Severe weather/disaster plan for diabetes management?: Yes  DKA prevention plan?: No  Sick day plan for diabetes management?: (P) Yes         Hypoglycemia Complications  Blackouts: No  Hospitalization: No  Nocturnal hypoglycemia: Yes  Required assistance: No  Required glucagon injection: No  Seizures: No    Reducing Risks  Reducing Risks Assessed Today: Yes  Diabetes Risks: Age over 45 years  CAD Risks: Diabetes Mellitus, Stress, Obesity  Has dilated eye exam at least once a year?: Yes  Sees dentist every 6 months?: Yes  Sees podiatrist (foot doctor)?: Yes(PCP checks feet)    Healthy Coping  Healthy Coping Assessed Today: Yes  Emotional response to diabetes: Ready to learn  Informal Support system:: Family  Stage of change: ACTION (Actively working towards change)  Patient Activation Measure Survey Score:  JEFFERY Score (Last Two) 6/2/2011 10/18/2013   JEFFERY Raw Score 50 42   Activation Score 86.3 66   JEFFERY Level 4 3         ASSESSMENT  Patient has found some relief with anxiety  and not binge eating as often.  Her glucose is improving with some hypoglycemia.  She admits to recently underestimating carbohydrate due to hypoglycemia occurring. She has 5 basal patterns set in her pump but did not feel comfortable self adjusting.  She was concerned about the dosing in the patterns.     She would benefit from a change in basal pattern from High 5 to --> high 4.  The patterns are set with 4 time blocks in patterns 1-3 and only 3 time blocks in patterns 4 and 5.  This was confusing to her.  For her understanding and ability to change between patterns will change pattern 4 and 5 to also have 4 time blocks.      Encourage good carbohydrate counting and entering all carbohydrates into the pump.      Basal High 4: -Activated in clinic today  12-6am: 1.9   6am-6pm: 1.65 --> 6am-12pm: 1.65           12pm-6pm: 1.75  6pm- 12am: 2.0    Basal High 5:  12am-6am: 2.0  6am-2pm: 1.65  2pm-12am: 2.2 --> 2pm-6pm: 2.1            6pm-12am: 2.2        INTERVENTION:   Diabetes knowledge and skills assessment:     Patient is knowledgeable in diabetes management concepts related to: Healthy Eating, Being Active, Monitoring, Taking Medication, Problem Solving, Reducing Risks and Healthy Coping    Patient needs further education on the following diabetes management concepts: Healthy Eating and Taking Medication, and insulin pump management    Based on learning assessment above, most appropriate setting for further diabetes education would be: Individual setting.    Education provided today on:  AADE Self-Care Behaviors:  Healthy Eating: review of consistency in amount, composition, and timing of food intake  Monitoring: log and interpret results, individual blood glucose targets and frequency of monitoring  Taking Medication: action of prescribed medication, when to take medications and dosing  Problem Solving: review of high blood glucose - causes, signs/symptoms, treatment and prevention, low blood glucose - causes,  signs/symptoms, treatment and prevention and when to call health care provider  Reducing Risks: major complications of diabetes and prevention, early diagnostic measures and treatment of complications    Education specific to insulin pump provided today on:   pump button pressing- changing between different basal programs, how and when to use a temporary basal rate, importance of counting carbohydrates accurately       Opportunities for ongoing education and support in diabetes-self management were discussed.    Pt verbalized understanding of concepts discussed and recommendations provided today.       Education Materials Provided:  No new materials provided today      PLAN  See Patient Instructions for co-developed, patient-stated behavior change goals.  AVS printed and provided to patient today. See Follow-Up section for recommended follow-up.    Deanna Bolton RN,CDE   Time Spent: 60 minutes  Encounter Type: Individual    Any diabetes medication dose changes were made via the CDE Protocol and Collaborative Practice Agreement with the patient's referring provider. A copy of this encounter was shared with the provider.

## 2019-12-16 NOTE — LETTER
12/16/2019         RE: Maricarmen Dotson  [address] [place] [address]        Dear Colleague,    Thank you for referring your patient, Maricarmen Dotson, to the [facility]. Please see a copy of my visit note below.    Diabetes Self-Management Education & Support    Diabetes Self-Management Education & Support - Insulin Pump/CGM Review    SUBJECTIVE/OBJECTIVE  Presents for: Individual review  Accompanied by: Self and daughter  Diabetes education in the past 24mo: Yes  Focus of Visit: CGM, Insulin Pump  Diabetes type: Type 2  Disease course: Improving  Diabetes management related comments/concerns: need to change my programs on meter, doing better and not binge eating as much.   Transportation concerns: No  Other concerns:: Cognitive impairment  Cultural Influences/Ethnic Background:  American      Patient seen today for Insulin Pump CGM  Review:    Reports:                      Insulin Pump Information  Insulin Pump Type: OmniPod    Insulin Pump Review  Insulin Pump Type: OmniPod  Problems taking diabetes medications regularly?: No  Diabetes medication side effects?: No  Patient would benefit from: Change in basal rate(s), Use of a temporary basal rate  Changes made to pump settings: Basal rate    Statistics/Data Evaluation:         Healthy Eating  Healthy Eating Assessed Today: Yes  Cultural/Protestant diet restrictions?: No  Patient on a regular basis: (does not have a specific meal plan, does binge eat with increase stress/anxietyand pain.)  Meal planning: Avoiding sweets, Carbohydrate counting, Heart healthy, Low salt, Smaller portions  Meals include: Breakfast, Lunch, Dinner, Snacks  Beverages: Water, Tea  Has patient met with a dietitian in the past?: No    Being Active  Being Active Assessed Today: Yes  Exercise:: Yes  How intense was your typical exercise? : Moderate (like brisk walking)  Barrier to exercise: Safety, Physical limitation    Monitoring  Blood Glucose  Meter: FreeStyle, CGM  Home Glucose (Sugar) Monitorin+ times per day  Blood glucose trend: Fluctuating dramtically    Taking Medications  Diabetes Medication(s)     Insulin       INSULIN PUMP - OUTPATIENT    INSULIN PUMP - OUTPATIENT  Date last updated: 19 Omnipod   BASAL RATES and times:   Basal 1:   12am: 1.60 --6 AM: 1.45 --12pm: 1.3 -- 6pm: 1.45   HIGH 2:   12am: 1.70 --  6 AM: 1.55 -- 12pm: 1.4 -- 6pm: 1.55   High 3:   12am: 1.85 -- 6 AM: 1.65 --12pm:1.5--6pm: 1.85  High 4:   12am: 1.90 -- 6 AM: 1.65 --6pm: 2.0  High 5:   12 AM: 2.0 -- 6 AM: 1.80--8 PM: 2.0  CARB RATIO: 12am-12am: 6   Corection Factor (Sensitivity): 12AM (midnight): 23 -- 5 AM: 25   Target blood glucose: 100-120  Active insulin time: 4 hrs          Taking Medication Assessed Today: Yes  Current Treatments: Diet, Insulin Pump  Given by: Patient  Injection/Infusion sites: Abdomen, Buttocks  Problems taking diabetes medications regularly?: No  Diabetes medication side effects?: No  Treatment Compliance: Most of the time    Problem Solving  Problem Solving Assessed Today: Yes  Hypoglycemia Frequency: Weekly  Hypoglycemia Treatment: Glucose (tablets or gel), Juice  Patient carries a carbohydrate source: Yes  Medical alert: No  Severe weather/disaster plan for diabetes management?: Yes  DKA prevention plan?: No  Sick day plan for diabetes management?: (P) Yes         Hypoglycemia Complications  Blackouts: No  Hospitalization: No  Nocturnal hypoglycemia: Yes  Required assistance: No  Required glucagon injection: No  Seizures: No    Reducing Risks  Reducing Risks Assessed Today: Yes  Diabetes Risks: Age over 45 years  CAD Risks: Diabetes Mellitus, Stress, Obesity  Has dilated eye exam at least once a year?: Yes  Sees dentist every 6 months?: Yes  Sees podiatrist (foot doctor)?: Yes(PCP checks feet)    Healthy Coping  Healthy Coping Assessed Today: Yes  Emotional response to diabetes: Ready to learn  Informal Support system:: Family  Stage of  change: ACTION (Actively working towards change)  Patient Activation Measure Survey Score:  JEFFERY Score (Last Two) 6/2/2011 10/18/2013   JEFFERY Raw Score 50 42   Activation Score 86.3 66   JEFFERY Level 4 3         ASSESSMENT  Patient has found some relief with anxiety and not binge eating as often.  Her glucose is improving with some hypoglycemia.  She admits to recently underestimating carbohydrate due to hypoglycemia occurring. She has 5 basal patterns set in her pump but did not feel comfortable self adjusting.  She was concerned about the dosing in the patterns.     She would benefit from a change in basal pattern from High 5 to --> high 4.  The patterns are set with 4 time blocks in patterns 1-3 and only 3 time blocks in patterns 4 and 5.  This was confusing to her.  For her understanding and ability to change between patterns will change pattern 4 and 5 to also have 4 time blocks.      Encourage good carbohydrate counting and entering all carbohydrates into the pump.      Basal High 4: -Activated in clinic today  12-6am: 1.9   6am-6pm: 1.65 --> 6am-12pm: 1.65           12pm-6pm: 1.75  6pm- 12am: 2.0    Basal High 5:  12am-6am: 2.0  6am-2pm: 1.65  2pm-12am: 2.2 --> 2pm-6pm: 2.1            6pm-12am: 2.2        INTERVENTION:   Diabetes knowledge and skills assessment:     Patient is knowledgeable in diabetes management concepts related to: Healthy Eating, Being Active, Monitoring, Taking Medication, Problem Solving, Reducing Risks and Healthy Coping    Patient needs further education on the following diabetes management concepts: Healthy Eating and Taking Medication, and insulin pump management    Based on learning assessment above, most appropriate setting for further diabetes education would be: Individual setting.    Education provided today on:  AADE Self-Care Behaviors:  Healthy Eating: review of consistency in amount, composition, and timing of food intake  Monitoring: log and interpret results, individual blood  glucose targets and frequency of monitoring  Taking Medication: action of prescribed medication, when to take medications and dosing  Problem Solving: review of high blood glucose - causes, signs/symptoms, treatment and prevention, low blood glucose - causes, signs/symptoms, treatment and prevention and when to call health care provider  Reducing Risks: major complications of diabetes and prevention, early diagnostic measures and treatment of complications    Education specific to insulin pump provided today on:   pump button pressing- changing between different basal programs, how and when to use a temporary basal rate, importance of counting carbohydrates accurately       Opportunities for ongoing education and support in diabetes-self management were discussed.    Pt verbalized understanding of concepts discussed and recommendations provided today.       Education Materials Provided:  No new materials provided today      PLAN  See Patient Instructions for co-developed, patient-stated behavior change goals.  AVS printed and provided to patient today. See Follow-Up section for recommended follow-up.    Deanna Bolton RN,CDE   Time Spent: 60 minutes  Encounter Type: Individual    Any diabetes medication dose changes were made via the CDE Protocol and Collaborative Practice Agreement with the patient's referring provider. A copy of this encounter was shared with the provider.

## 2019-12-16 NOTE — TELEPHONE ENCOUNTER
Reason for call:  Form   Our goal is to have forms completed within 72 hours, however some forms may require a visit or additional information.     Who is the form from? DMV  (if other please explain)  Where did the form come from? Patient or family brought in     What clinic location was the form placed at? [place]  Where was the form placed? [facility] Box/Folder  What number is listed as a contact on the form? [phone]     Phone call message - patient request for a letter, form or note:     Date needed: as soon as possible  Patient will  at the clinic when completed  Has the patient signed a consent form for release of information? Not Applicable    Additional comments:     Type of letter, form or note: DMV    Phone number to reach patient:  Cell number on file:    Telephone Information:   Mobile [phone]       Best Time:  Any    Can we leave a detailed message on this number?  NO

## 2019-12-18 NOTE — TELEPHONE ENCOUNTER
Form and additional copy at  for .  Called patient and left a general message.  Also sent the following [employer] message:    Alex Hernadez,    Your insulin treated diabetes form for the DMV is ready for .  I put the original and an additional copy in an envelope at the  here at the [facility] for you to  when you have time.  Let us know if you have any questions.    Thank you,  Linda Holder M.A. on behalf of Emily Phan NP  Endocrinology  [facility]  [phone] [facility]       Linda Holder CMA on 12/18/2019 at 1:32 PM

## 2019-12-31 DIAGNOSIS — E11.65 TYPE 2 DIABETES MELLITUS WITH HYPERGLYCEMIA, WITH LONG-TERM CURRENT USE OF INSULIN (H): ICD-10-CM

## 2019-12-31 DIAGNOSIS — Z79.4 TYPE 2 DIABETES MELLITUS WITH HYPERGLYCEMIA, WITH LONG-TERM CURRENT USE OF INSULIN (H): ICD-10-CM

## 2019-12-31 NOTE — TELEPHONE ENCOUNTER
"Prescription approved per [facility], [facility] or [facility] refill protocol.  Annamaria MARTINEZ - Registered Nurse  [facility]  Acute and Diagnostic Services    Requested Prescriptions   Pending Prescriptions Disp Refills     blood glucose (FREESTYLE TEST STRIPS) test strip [Pharmacy Med Name: FREESTYLE TEST  STRP] 200 each 11     Sig: USE TO TEST BLOOD SUGAR SIX TIMES DAILY       Diabetic Supplies Protocol Passed - 12/31/2019  9:12 AM        Passed - Medication is active on med list        Passed - Patient is 18 years of age or older        Passed - Recent (6 mo) or future (30 days) visit within the authorizing provider's specialty     Patient had office visit in the last 6 months or has a visit in the next 30 days with authorizing provider.  See \"Patient Info\" tab in inbasket, or \"Choose Columns\" in Meds & Orders section of the refill encounter.              "

## 2020-01-02 ENCOUNTER — TRANSFERRED RECORDS (OUTPATIENT)
Dept: HEALTH INFORMATION MANAGEMENT | Facility: CLINIC | Age: 60
End: 2020-01-02

## 2020-01-24 DIAGNOSIS — Z79.4 TYPE 2 DIABETES MELLITUS WITH HYPERGLYCEMIA, WITH LONG-TERM CURRENT USE OF INSULIN (H): ICD-10-CM

## 2020-01-24 DIAGNOSIS — G47.00 INSOMNIA, UNSPECIFIED TYPE: ICD-10-CM

## 2020-01-24 DIAGNOSIS — F41.9 ANXIETY: ICD-10-CM

## 2020-01-24 DIAGNOSIS — I10 HYPERTENSION GOAL BP (BLOOD PRESSURE) < 140/90: ICD-10-CM

## 2020-01-24 DIAGNOSIS — E11.65 TYPE 2 DIABETES MELLITUS WITH HYPERGLYCEMIA, WITH LONG-TERM CURRENT USE OF INSULIN (H): ICD-10-CM

## 2020-01-24 RX ORDER — LORAZEPAM 1 MG/1
TABLET ORAL
Qty: 15 TABLET | Refills: 0 | Status: SHIPPED | OUTPATIENT
Start: 2020-01-24 | End: 2020-02-25

## 2020-01-24 RX ORDER — LOSARTAN POTASSIUM 50 MG/1
TABLET ORAL
Qty: 60 TABLET | Refills: 0 | Status: SHIPPED | OUTPATIENT
Start: 2020-01-24 | End: 2020-02-25

## 2020-01-24 NOTE — TELEPHONE ENCOUNTER
14 day Freddie sensors were discontinued.  Not [facility] med.  Sent to provider.  Please advise.  Elsy Crespo RN

## 2020-01-27 RX ORDER — FLASH GLUCOSE SENSOR
KIT MISCELLANEOUS
Qty: 2 EACH | Refills: 11 | Status: SHIPPED | OUTPATIENT
Start: 2020-01-27 | End: 2022-09-23

## 2020-02-04 ENCOUNTER — OFFICE VISIT (OUTPATIENT)
Dept: DERMATOLOGY | Facility: CLINIC | Age: 60
End: 2020-02-04
Payer: COMMERCIAL

## 2020-02-04 DIAGNOSIS — L82.1 SEBORRHEIC KERATOSIS: ICD-10-CM

## 2020-02-04 DIAGNOSIS — L91.8 SKIN TAG: ICD-10-CM

## 2020-02-04 DIAGNOSIS — D22.9 MULTIPLE NEVI: ICD-10-CM

## 2020-02-04 DIAGNOSIS — B07.9 FILIFORM WART: Primary | ICD-10-CM

## 2020-02-04 PROCEDURE — 99243 OFF/OP CNSLTJ NEW/EST LOW 30: CPT | Mod: 25 | Performed by: DERMATOLOGY

## 2020-02-04 PROCEDURE — 17110 DESTRUCTION B9 LES UP TO 14: CPT | Performed by: DERMATOLOGY

## 2020-02-04 NOTE — LETTER
2/4/2020      RE: Maricarmen Dotson  [address]  [place] [address]       Service Date: 02/04/2020      CHIEF COMPLAINT:  Skin check.      DERMATOLOGY PROBLEM LIST:   1.  Verruca vulgaris.   2.  Skin tags.   3.  Seborrheic keratoses.   4.  Benign pigmented nevi.      ASSESSMENT/PLAN:   1.  Verruca vulgaris involving the right cheek and right lower eyelid.  Filiform.  Treated with 10-second freeze/thaw cycle with cold forceps.  Discussed use of Vaseline should blistering develop.  Shave removal could be considered but would defer to response to freezing.   2.  Skin tags.  Discussed that these are benign and removal may not be covered by insurance.  I gave insurance codes for diagnosis and removal so the patient may contact insurance to determine coverage.   3.  Seborrheic keratoses.  Benign, hyperkeratotic papules.  No treatment advised.   4.  Benign pigmented nevi.  No lesions of concern today.  ABCDEs of melanoma discussed.      The patient to return to Dermatology Clinic as needed.      Thank you for this consultation.      Jazmyn Perez MD  Dermatology Staff    _______________________________________  _______________________________________       HISTORY OF PRESENT ILLNESS:  Ms. Dotson is a 59-year-old female presenting to Dermatology Clinic for evaluation of several skin concerns, seen at the request of [facility].  She notes that she has developed warts on the right side of her face that are now spreading to the right lower eyelid.  These have been present for over a year.  In addition, she has skin tags under the arms and on her neck that are bothersome and get caught on clothes and jewelry.  She requests removal today.  She also notes that she would like a mole check.  No past history of skin cancer.      No areas that are bleeding or hurting.      Patient Active Problem List   Diagnosis     Irritable bowel syndrome     Diverticulosis of large intestine     Insomnia     Chronic pain syndrome      Hyperlipidemia LDL goal <100     Dyspepsia     Type 2 diabetes, HbA1c goal < 7% (H)     Hepatic injury     Moderate major depression (H)     Overweight     Post concussion syndrome     Plantar fasciitis     Pain in joint, ankle and foot     Migraine headache     Anxiety     Panic attacks     TBI (traumatic brain injury) (H)     Pain in thoracic spine     Nonallopathic lesion of cervical region     Cervicalgia     Lumbago     Statin intolerance     Type 2 diabetes mellitus with hyperglycemia (H)     Fibromyalgia     Cervical pain     Carotid atherosclerosis, bilateral     Insulin pump in place     Hypertension goal BP (blood pressure) < 140/90     Posttraumatic stress disorder     Hypertensive urgency     MIESHA (obstructive sleep apnea)       Allergies   Allergen Reactions     Amoxicillin Anaphylaxis     Bee Anaphylaxis     Bee sting       Contrast Dye Anaphylaxis     Iodine Anaphylaxis     Severe anaphalatic shock       Sulfa Drugs Anaphylaxis     Tetanus-Diphtheria Toxoids      Rxn was to Tdap-whole body joint pain and fever.  Had similar reaction to Td vaccine 7/28/2017     Metoprolol Other (See Comments)     Fatigue, joint pain, throat tightness     Zithromax [Azithromycin Dihydrate] Nausea and Vomiting     Amlodipine      Neuropathic type pain in hands/feet     Atorvastatin      myalgias     Catapres [Clonidine]      Crestor [Rosuvastatin]      myalgias     Cyproheptadine      Severe headache, cardiac issues, and dizziness     Humalog [Insulin Lispro]      Causes pancreatitis -      Hydrochlorothiazide      numbness     Latex      Skin burn and can't breathe       Lisinopril      Joint aches     Metformin      Nifedipine      Onglyza [Saxagliptin Hydrochloride]      Fibromyalgia flare     Phenergan [Promethazine]      Prochlorperazine      Altered mental status, hallucinations     Reglan [Metoclopramide]      Sumatriptan      Causes severe depression     Tetracycline Nausea and Vomiting, Hives and Difficulty  breathing     Pt called to update today after the visit that she is allergic to the tetracycline-added to her list     Sulindac Anxiety     Panic attacks         Current Outpatient Medications   Medication     blood glucose (FREESTYLE TEST STRIPS) test strip     Continuous Blood Gluc  (FREESTYLE RADHA 14 DAY READER) MARIA C     Continuous Blood Gluc Sensor (FREESTYLE RADHA 14 DAY SENSOR) MISC     diclofenac (VOLTAREN) 1 % topical gel     EPINEPHrine (EPIPEN) 0.3 MG/0.3ML injection     HEMP OIL OR EXTRACT OR OTHER CBD CANNABINOID, NOT MEDICAL CANNABIS,     INSULIN PUMP - OUTPATIENT     LORazepam (ATIVAN) 1 MG tablet     losartan (COZAAR) 50 MG tablet     MELATONIN PO     meloxicam (MOBIC) 15 MG tablet     polyethylene glycol (MIRALAX/GLYCOLAX) packet     traMADol (ULTRAM) 50 MG tablet     ASPIRIN NOT PRESCRIBED (INTENTIONAL)     Cholecalciferol (VITAMIN D3 PO)     Cyanocobalamin (VITAMIN B12 PO)     STATIN NOT PRESCRIBED, INTENTIONAL,     No current facility-administered medications for this visit.         REVIEW OF SYSTEMS:  A 10-point review of systems collected and was negative.      SOCIAL HISTORY:  The patient is retired.      FAMILY HISTORY:  No family history of skin cancer.      PHYSICAL EXAMINATION:   [facility] 01/01/1999     GENERAL:  The patient is a healthy-appearing 59-year-old female in no distress.   HEENT:  Conjunctivae clear.  Glasses in place.   PULMONARY:  Breathing comfortably on room air.   ABDOMEN:  No abdominal distention.   NEUROLOGIC:  Alert and oriented x3.   MUSCULOSKELETAL:  No joint deformity.   PSYCHIATRIC:  Normal mood and affect.   SKIN:  Exam today included the scalp, face, neck, chest, abdomen, back, arms, legs, hands, feet and buttocks.  Skin exam was normal except for as follows:   -- Examination of the face shows four 3 mm filiform papules on the right cheek with two 2 mm filiform papules on the right lower eyelid.   -- Scattered waxy, hyperkeratotic papules on the upper and lower  back with a few 4-6 mm medium brown macules with reticulate pigment network on the upper and lower back.   -- Onychorrhexis of the toenails bilaterally.   -- Fleshy pedunculated papules on the lateral neck and the axillary vaults.   -- The remainder of skin exam is unremarkable.      cc:   Annamaria Erickson MD   [facility]   [address] [place]  [address]            Jazmyn Perez MD

## 2020-02-04 NOTE — PATIENT INSTRUCTIONS
Pediatric Dermatology  [facility]  [address]  1st [place]  [address]  Phone: [phone]    Pediatric & Adult Dermatology  [facility]  [address]   2nd Floor  [facility] [address]  [phone]                  General information: Dr. Jazmyn Perez is a board-certified dermatologist with subspecialty certification in pediatric dermatology.     Scheduling and Nurse Triage: Dr. Perez sees pediatric patients on Mondays in [place] and adult and pediatric patients on Tuesdays in [place]. The remainder of the week she practices at the [facility]. Please call the above phone numbers to schedule or to talk to a nurse.     -For scheduling at the [place] or [place] locations, or to talk to the triage nurse please call the above phone number at the clinic where you were seen.     -For medication refills, please call your pharmacy.           Please call insurance to determine coverage:    Skin tag codes:  Diagnosis: L91.8  Removal:  [id number]

## 2020-02-05 ENCOUNTER — OFFICE VISIT (OUTPATIENT)
Dept: PEDIATRICS | Facility: CLINIC | Age: 60
End: 2020-02-05
Payer: COMMERCIAL

## 2020-02-05 ENCOUNTER — TELEPHONE (OUTPATIENT)
Dept: PALLIATIVE MEDICINE | Facility: CLINIC | Age: 60
End: 2020-02-05

## 2020-02-05 VITALS
HEART RATE: 68 BPM | BODY MASS INDEX: 34.91 KG/M2 | SYSTOLIC BLOOD PRESSURE: 116 MMHG | OXYGEN SATURATION: 97 % | TEMPERATURE: 98.6 F | WEIGHT: 204.5 LBS | RESPIRATION RATE: 18 BRPM | HEIGHT: 64 IN | DIASTOLIC BLOOD PRESSURE: 66 MMHG

## 2020-02-05 DIAGNOSIS — F41.9 ANXIETY: Primary | ICD-10-CM

## 2020-02-05 DIAGNOSIS — N39.3 FEMALE STRESS INCONTINENCE: ICD-10-CM

## 2020-02-05 DIAGNOSIS — Z80.3 FAMILY HISTORY OF MALIGNANT NEOPLASM OF BREAST: ICD-10-CM

## 2020-02-05 DIAGNOSIS — M79.7 FIBROMYALGIA: ICD-10-CM

## 2020-02-05 DIAGNOSIS — F32.1 MODERATE MAJOR DEPRESSION (H): ICD-10-CM

## 2020-02-05 DIAGNOSIS — E11.9 TYPE 2 DIABETES, HBA1C GOAL < 7% (H): ICD-10-CM

## 2020-02-05 PROCEDURE — 99214 OFFICE O/P EST MOD 30 MIN: CPT | Mod: GC | Performed by: STUDENT IN AN ORGANIZED HEALTH CARE EDUCATION/TRAINING PROGRAM

## 2020-02-05 PROCEDURE — 96127 BRIEF EMOTIONAL/BEHAV ASSMT: CPT | Mod: 59 | Performed by: STUDENT IN AN ORGANIZED HEALTH CARE EDUCATION/TRAINING PROGRAM

## 2020-02-05 ASSESSMENT — ANXIETY QUESTIONNAIRES
7. FEELING AFRAID AS IF SOMETHING AWFUL MIGHT HAPPEN: SEVERAL DAYS
3. WORRYING TOO MUCH ABOUT DIFFERENT THINGS: MORE THAN HALF THE DAYS
2. NOT BEING ABLE TO STOP OR CONTROL WORRYING: MORE THAN HALF THE DAYS
1. FEELING NERVOUS, ANXIOUS, OR ON EDGE: NEARLY EVERY DAY
GAD7 TOTAL SCORE: 13
IF YOU CHECKED OFF ANY PROBLEMS ON THIS QUESTIONNAIRE, HOW DIFFICULT HAVE THESE PROBLEMS MADE IT FOR YOU TO DO YOUR WORK, TAKE CARE OF THINGS AT HOME, OR GET ALONG WITH OTHER PEOPLE: SOMEWHAT DIFFICULT
6. BECOMING EASILY ANNOYED OR IRRITABLE: MORE THAN HALF THE DAYS
5. BEING SO RESTLESS THAT IT IS HARD TO SIT STILL: SEVERAL DAYS

## 2020-02-05 ASSESSMENT — PATIENT HEALTH QUESTIONNAIRE - PHQ9: 5. POOR APPETITE OR OVEREATING: MORE THAN HALF THE DAYS

## 2020-02-05 ASSESSMENT — MIFFLIN-ST. JEOR: SCORE: 1492.61

## 2020-02-05 NOTE — PROGRESS NOTES
Service Date: 02/04/2020      CHIEF COMPLAINT:  Skin check.      DERMATOLOGY PROBLEM LIST:   1.  Verruca vulgaris.   2.  Skin tags.   3.  Seborrheic keratoses.   4.  Benign pigmented nevi.      ASSESSMENT/PLAN:   1.  Verruca vulgaris involving the right cheek and right lower eyelid.  Filiform.  Treated with 10-second freeze/thaw cycle with cold forceps.  Discussed use of Vaseline should blistering develop.  Shave removal could be considered but would defer to response to freezing.   2.  Skin tags.  Discussed that these are benign and removal may not be covered by insurance.  I gave insurance codes for diagnosis and removal so the patient may contact insurance to determine coverage.   3.  Seborrheic keratoses.  Benign, hyperkeratotic papules.  No treatment advised.   4.  Benign pigmented nevi.  No lesions of concern today.  ABCDEs of melanoma discussed.      The patient to return to Dermatology Clinic as needed.      Thank you for this consultation.      Jazmyn Perez MD  Dermatology Staff    _______________________________________  _______________________________________       HISTORY OF PRESENT ILLNESS:  Ms. Dotson is a 59-year-old female presenting to Dermatology Clinic for evaluation of several skin concerns, seen at the request of Annamaria Erickson.  She notes that she has developed warts on the right side of her face that are now spreading to the right lower eyelid.  These have been present for over a year.  In addition, she has skin tags under the arms and on her neck that are bothersome and get caught on clothes and jewelry.  She requests removal today.  She also notes that she would like a mole check.  No past history of skin cancer.      No areas that are bleeding or hurting.      Patient Active Problem List   Diagnosis     Irritable bowel syndrome     Diverticulosis of large intestine     Insomnia     Chronic pain syndrome     Hyperlipidemia LDL goal <100     Dyspepsia     Type 2 diabetes, HbA1c goal < 7%  (H)     Hepatic injury     Moderate major depression (H)     Overweight     Post concussion syndrome     Plantar fasciitis     Pain in joint, ankle and foot     Migraine headache     Anxiety     Panic attacks     TBI (traumatic brain injury) (H)     Pain in thoracic spine     Nonallopathic lesion of cervical region     Cervicalgia     Lumbago     Statin intolerance     Type 2 diabetes mellitus with hyperglycemia (H)     Fibromyalgia     Cervical pain     Carotid atherosclerosis, bilateral     Insulin pump in place     Hypertension goal BP (blood pressure) < 140/90     Posttraumatic stress disorder     Hypertensive urgency     MIESHA (obstructive sleep apnea)       Allergies   Allergen Reactions     Amoxicillin Anaphylaxis     Bee Anaphylaxis     Bee sting       Contrast Dye Anaphylaxis     Iodine Anaphylaxis     Severe anaphalatic shock       Sulfa Drugs Anaphylaxis     Tetanus-Diphtheria Toxoids      Rxn was to Tdap-whole body joint pain and fever.  Had similar reaction to Td vaccine 7/28/2017     Metoprolol Other (See Comments)     Fatigue, joint pain, throat tightness     Zithromax [Azithromycin Dihydrate] Nausea and Vomiting     Amlodipine      Neuropathic type pain in hands/feet     Atorvastatin      myalgias     Catapres [Clonidine]      Crestor [Rosuvastatin]      myalgias     Cyproheptadine      Severe headache, cardiac issues, and dizziness     Humalog [Insulin Lispro]      Causes pancreatitis -      Hydrochlorothiazide      numbness     Latex      Skin burn and can't breathe       Lisinopril      Joint aches     Metformin      Nifedipine      Onglyza [Saxagliptin Hydrochloride]      Fibromyalgia flare     Phenergan [Promethazine]      Prochlorperazine      Altered mental status, hallucinations     Reglan [Metoclopramide]      Sumatriptan      Causes severe depression     Tetracycline Nausea and Vomiting, Hives and Difficulty breathing     Pt called to update today after the visit that she is allergic to the  tetracycline-added to her list     Sulindac Anxiety     Panic attacks         Current Outpatient Medications   Medication     blood glucose (FREESTYLE TEST STRIPS) test strip     Continuous Blood Gluc  (FREESTYLE RADHA 14 DAY READER) MARIA C     Continuous Blood Gluc Sensor (FREESTYLE RADHA 14 DAY SENSOR) MISC     diclofenac (VOLTAREN) 1 % topical gel     EPINEPHrine (EPIPEN) 0.3 MG/0.3ML injection     HEMP OIL OR EXTRACT OR OTHER CBD CANNABINOID, NOT MEDICAL CANNABIS,     INSULIN PUMP - OUTPATIENT     LORazepam (ATIVAN) 1 MG tablet     losartan (COZAAR) 50 MG tablet     MELATONIN PO     meloxicam (MOBIC) 15 MG tablet     polyethylene glycol (MIRALAX/GLYCOLAX) packet     traMADol (ULTRAM) 50 MG tablet     ASPIRIN NOT PRESCRIBED (INTENTIONAL)     Cholecalciferol (VITAMIN D3 PO)     Cyanocobalamin (VITAMIN B12 PO)     STATIN NOT PRESCRIBED, INTENTIONAL,     No current facility-administered medications for this visit.         REVIEW OF SYSTEMS:  A 10-point review of systems collected and was negative.      SOCIAL HISTORY:  The patient is retired.      FAMILY HISTORY:  No family history of skin cancer.      PHYSICAL EXAMINATION:   [facility] 01/01/1999     GENERAL:  The patient is a healthy-appearing 59-year-old female in no distress.   HEENT:  Conjunctivae clear.  Glasses in place.   PULMONARY:  Breathing comfortably on room air.   ABDOMEN:  No abdominal distention.   NEUROLOGIC:  Alert and oriented x3.   MUSCULOSKELETAL:  No joint deformity.   PSYCHIATRIC:  Normal mood and affect.   SKIN:  Exam today included the scalp, face, neck, chest, abdomen, back, arms, legs, hands, feet and buttocks.  Skin exam was normal except for as follows:   -- Examination of the face shows four 3 mm filiform papules on the right cheek with two 2 mm filiform papules on the right lower eyelid.   -- Scattered waxy, hyperkeratotic papules on the upper and lower back with a few 4-6 mm medium brown macules with reticulate pigment network on the  upper and lower back.   -- Onychorrhexis of the toenails bilaterally.   -- Fleshy pedunculated papules on the lateral neck and the axillary vaults.   -- The remainder of skin exam is unremarkable.      cc:   Annamaria Erickson MD   [address] [place]  [address]

## 2020-02-05 NOTE — PROGRESS NOTES
"Subjective   Maricarmen Dotson is a 59 year old female who presents to clinic today for the following health issues:    HPI   Hypertension Follow-up      Do you check your blood pressure regularly outside of the clinic? Yes     Are you following a low salt diet? Yes    Are your blood pressures ever more than 140 on the top number (systolic) OR more   than 90 on the bottom number (diastolic), for example 140/90? Yes    Cardiologist does not want to continue follow up at this time, she remains on losartan. Blood pressure is well controlled.      Anxiety Follow-Up    How are you doing with your anxiety since your last visit? Improved     Are you having other symptoms that might be associated with anxiety? Yes:  weight gain    Have you had a significant life event? OTHER: disability     Are you feeling depressed? Yes:  but anxiety worse than depression    Do you have any concerns with your use of alcohol or other drugs? No    Anxiety and binge eating. Would like therapist for referral.   Currently on lorazepam 15 pills a month. Might take 2-3 pills \"In a row\" during the day. Has been using CBD oil which has been helpful for her binge eating.  Has tried various medications in the past and it has not helped.   No SI/HI. GA score today is 13 (was 17 in 4/2019 as noted below). PHQ 9 is 14, which remains stable from 4/2019.     Social History     Tobacco Use     Smoking status: Never Smoker     Smokeless tobacco: Never Used   Substance Use Topics     Alcohol use: Yes     Alcohol/week: 0.0 standard drinks     Comment: maybe once a month     Drug use: No     WILLIAMS-7 SCORE 6/29/2018 12/6/2018 4/29/2019   Total Score - - -   Total Score 11 8 17   Some encounter information is confidential and restricted. Go to Review Flowsheets activity to see all data.     PHQ 6/29/2018 12/6/2018 4/29/2019   PHQ-9 Total Score 11 10 14   Q9: Thoughts of better off dead/self-harm past 2 weeks Not at all Not at all Several days     Last PHQ-9 " 4/29/2019   1.  Little interest or pleasure in doing things 1   2.  Feeling down, depressed, or hopeless 1   3.  Trouble falling or staying asleep, or sleeping too much 3   4.  Feeling tired or having little energy 3   5.  Poor appetite or overeating 1   6.  Feeling bad about yourself 1   7.  Trouble concentrating 2   8.  Moving slowly or restless 1   Q9: Thoughts of better off dead/self-harm past 2 weeks 1   PHQ-9 Total Score 14   Difficulty at work, home, or with people Very difficult     WILLIAMS-7  4/29/2019   1. Feeling nervous, anxious, or on edge 3   2. Not being able to stop or control worrying 2   3. Worrying too much about different things 2   4. Trouble relaxing 3   5. Being so restless that it is hard to sit still 3   6. Becoming easily annoyed or irritable 2   7. Feeling afraid, as if something awful might happen 2   WILLIAMS-7 Total Score 17   If you checked any problems, how difficult have they made it for you to do your work, take care of things at home, or get along with other people? Very difficult       Chronic Pain Follow-Up       Type / Location of Pain: joints, large muscle groups  Analgesia/pain control:       Recent changes:  worse      Overall control: Tolerable with discomfort  Activity level/function:      Daily activities:  Can do most things most days, with some rest    Work:  Unable to work  Adverse effects:  No  Adherance    Taking medication as directed?  Yes    Participating in other treatments: yes  Risk Factors:    Sleep:  Poor    Mood/anxiety:  improved    Recent family or social stressors:  job change/loss    Other aggravating factors: repetitive activities - motion3 and doing too much     Pain clinic ([facility]) - for fibromyalgia. Pain clinic recommendations was not able to do some things because of insurance reasons, for the last several years.Fibromyalgia pain is on-going.  Follows with rheumatology-- has been following with Dr. Mcadams for a long period of time and she knows  "him well.     PHQ-9 SCORE 2018   PHQ-9 Total Score - - -   PHQ-9 Total Score 11 10 14   Some encounter information is confidential and restricted. Go to Review Flowsheets activity to see all data.     WILLIAMS-7 SCORE 2018   Total Score - - -   Total Score 11 8 17   Some encounter information is confidential and restricted. Go to Review Flowsheets activity to see all data.     Encounter-Level CSA:    There are no encounter-level csa.     Patient-Level CSA:    There are no patient-level csa.         How many servings of fruits and vegetables do you eat daily?  4 or more    On average, how many sweetened beverages do you drink each day (Examples: soda, juice, sweet tea, etc.  Do NOT count diet or artificially sweetened beverages)?   0    How many days per week do you exercise enough to make your heart beat faster? 4    How many minutes a day do you exercise enough to make your heart beat faster? 30 - 60    How many days per week do you miss taking your medication? 0    #stress incontinence  No increase in frequency/dysurgency/of urination.  Wears a pad. Does note that it happens when coughing. Currently kegels &yoga.   Currently would like to wait until referral for urology.     #maintenance  Has gotten mammograms in the past and it would \"knock her out\", because it affect her cysts in her breast. She does state that she has a sister that  of breast cancer, and that there has been some genetic testing that has gotten done. She is amenable to following up with hem/onc doctor regarding further testing -- last mammogram was in . In regards to vaccinations (PPSV23) or flu shot, patient is not interested in vaccinations today.    Patient Active Problem List   Diagnosis     Irritable bowel syndrome     Diverticulosis of large intestine     Insomnia     Chronic pain syndrome     Hyperlipidemia LDL goal <100     Dyspepsia     Type 2 diabetes, HbA1c goal < 7% (H)     Hepatic " injury     Moderate major depression (H)     Overweight     Post concussion syndrome     Plantar fasciitis     Pain in joint, ankle and foot     Migraine headache     Anxiety     Panic attacks     TBI (traumatic brain injury) (H)     Pain in thoracic spine     Nonallopathic lesion of cervical region     Cervicalgia     Lumbago     Statin intolerance     Type 2 diabetes mellitus with hyperglycemia (H)     Fibromyalgia     Cervical pain     Carotid atherosclerosis, bilateral     Insulin pump in place     Hypertension goal BP (blood pressure) < 140/90     Posttraumatic stress disorder     Hypertensive urgency     MIESHA (obstructive sleep apnea)     Past Surgical History:   Procedure Laterality Date     BACK SURGERY      fusion  and removal of hardware 2004     C SPINAL ORTHOSIS,NOS  2002    lumbar surgery     CHOLECYSTECTOMY  2011     choley  2011     ENT SURGERY  1986    septoplasty     HYSTERECTOMY, CERVIX STATUS UNKNOWN      TVH/BSO     ORTHOPEDIC SURGERY  2005    left ankle       Social History     Tobacco Use     Smoking status: Never Smoker     Smokeless tobacco: Never Used   Substance Use Topics     Alcohol use: Yes     Alcohol/week: 0.0 standard drinks     Comment: maybe once a month     Family History   Problem Relation Age of Onset     Diabetes Mother         type 2     Hypertension Mother      Cerebrovascular Disease Mother          stroke age 57     Ovarian Cancer Mother 43     Cancer Mother         cervix     Diabetes Father         type 2     Hypertension Father      Gastrointestinal Disease Father         colon polyps     Sleep Apnea Brother      Cancer Sister      Ovarian Cancer Sister 46     Ovarian Cancer Maternal Grandmother 72     Cancer Maternal Grandmother         cervix         Current Outpatient Medications   Medication Sig Dispense Refill     ASPIRIN NOT PRESCRIBED (INTENTIONAL) Please choose reason not prescribed, below 0 each 0     blood glucose (FREESTYLE TEST STRIPS) test strip  USE TO TEST BLOOD SUGAR SIX TIMES DAILY 200 each 3     Cholecalciferol (VITAMIN D3 PO) Take by mouth daily       Continuous Blood Gluc  (FREESTYLE RADHA 14 DAY READER) MARIA C USE TO CHECK BLOOD SUGARS AS DIRECTED 1 Device 0     Continuous Blood Gluc Sensor (FREESTYLE RADHA 14 DAY SENSOR) [facility] USE TO TEST BLOOD SUGARS AS DIRECTED AND CHANGE EVERY 14 DAYS 2 each 11     Cyanocobalamin (VITAMIN B12 PO)        diclofenac (VOLTAREN) 1 % topical gel Apply topically nightly as needed for moderate pain Apply 4 grams to knees or 2 grams to hands four times daily using enclosed dosing card. 100 g 1     EPINEPHrine (EPIPEN) 0.3 MG/0.3ML injection Inject 0.3 mLs (0.3 mg) into the muscle once as needed for anaphylaxis 2 each 0     HEMP OIL OR EXTRACT OR OTHER CBD CANNABINOID, NOT MEDICAL CANNABIS,        INSULIN PUMP - OUTPATIENT INSULIN PUMP - OUTPATIENT  Date last updated: 9/9/19 Omnipod   BASAL RATES and times:   Basal 1:   12am: 1.60 --6 AM: 1.45 --12pm: 1.3 -- 6pm: 1.45   HIGH 2:   12am: 1.70 --  6 AM: 1.55 -- 12pm: 1.4 -- 6pm: 1.55   High 3:   12am: 1.85 -- 6 AM: 1.65 --12pm:1.5--6pm: 1.85  High 4:   12am: 1.90 -- 6 AM: 1.65 --6pm: 2.0  High 5:   12 AM: 2.0 -- 6 AM: 1.80--8 PM: 2.0  CARB RATIO: 12am-12am: 6   Corection Factor (Sensitivity): 12AM (midnight): 23 -- 5 AM: 25   Target blood glucose: 100-120  Active insulin time: 4 hrs       LORazepam (ATIVAN) 1 MG tablet TAKE ONE-HALF TO ONE TABLET BY MOUTH EVERY NIGHT AT BEDTIME FOR ANXIETY OR SLEEP 15 tablet 0     losartan (COZAAR) 50 MG tablet TAKE TWO TABLETS BY MOUTH ONCE DAILY 60 tablet 0     MELATONIN PO        meloxicam (MOBIC) 15 MG tablet Take 15 mg by mouth At Bedtime        polyethylene glycol (MIRALAX/GLYCOLAX) packet Take 17 g by mouth At Bedtime       STATIN NOT PRESCRIBED, INTENTIONAL, Statin not prescribed intentionally due to Intolerance 0 each 0     traMADol (ULTRAM) 50 MG tablet Take 100 mg by mouth At Bedtime        Allergies   Allergen Reactions      "Amoxicillin Anaphylaxis     Bee Anaphylaxis     Bee sting       Contrast Dye Anaphylaxis     Iodine Anaphylaxis     Severe anaphalatic shock       Sulfa Drugs Anaphylaxis     Tetanus-Diphtheria Toxoids      Rxn was to Tdap-whole body joint pain and fever.  Had similar reaction to Td vaccine 7/28/2017     Metoprolol Other (See Comments)     Fatigue, joint pain, throat tightness     Zithromax [Azithromycin Dihydrate] Nausea and Vomiting     Amlodipine      Neuropathic type pain in hands/feet     Atorvastatin      myalgias     Catapres [Clonidine]      Crestor [Rosuvastatin]      myalgias     Cyproheptadine      Severe headache, cardiac issues, and dizziness     Humalog [Insulin Lispro]      Causes pancreatitis -      Hydrochlorothiazide      numbness     Latex      Skin burn and can't breathe       Lisinopril      Joint aches     Metformin      Nifedipine      Onglyza [Saxagliptin Hydrochloride]      Fibromyalgia flare     Phenergan [Promethazine]      Prochlorperazine      Altered mental status, hallucinations     Reglan [Metoclopramide]      Sumatriptan      Causes severe depression     Tetracycline Nausea and Vomiting, Hives and Difficulty breathing     Pt called to update today after the visit that she is allergic to the tetracycline-added to her list     Sulindac Anxiety     Panic attacks       Reviewed and updated as needed this visit by Provider    Review of Systems   ROS COMP: Constitutional, HEENT, cardiovascular, pulmonary, gi and gu systems are negative, except as otherwise noted.      Objective    /66 (BP Location: Right arm, Patient Position: Sitting, Cuff Size: Adult Large)   Pulse 68   Temp 98.6  F (37  C) (Tympanic)   Resp 18   Ht 1.626 m (5' 4\")   Wt 92.8 kg (204 lb 8 oz)   LMP 01/01/1999   SpO2 97%   BMI 35.10 kg/m    Body mass index is 35.1 kg/m .  Physical Exam     GENERAL: healthy, alert and no distress  EYES: Eyes grossly normal to inspection, PERRL and conjunctivae and sclerae " normal  HENT: nose and mouth without ulcers or lesions  NECK: no adenopathy, no asymmetry, masses, or scars  RESP: lungs clear to auscultation - no rales, rhonchi or wheezes  CV: regular rate and rhythm, normal S1 S2, no S3 or S4, no murmur, click or rub, no peripheral edema and peripheral pulses strong  ABDOMEN: soft, nontender, no hepatosplenomegaly, no masses and bowel sounds normal  MS: no gross musculoskeletal defects noted, no edema  SKIN: no suspicious lesions or rashes  NEURO: Normal strength and tone, mentation intact and speech normal  PSYCH: mentation appears normal, affect normal/bright    Diagnostic Test Results:  Labs reviewed in Epic    Assessment & Plan   Maricarmen is a 58 yo female with chronic fibromyalgia pain, anxiety, stress incontinence, coming in today to discuss referrals and follow up.     1. Anxiety  2. Moderate major depression (H)    WILLIAMS score 13, PHQ9 14 today, no new symptoms and stable. Has tried several medications in the past and is currently on 15 tablets of lorazepam/mo per previous PCP Dr. Erickson. Would like mental health referral; insurance policies changed recently and she would like to see if she can get more support.  No SI/HI today.   - MENTAL HEALTH REFERRAL  - Adult; Outpatient Treatment; Individual/Couples/Family/Group Therapy/Health Psychology; [facility]: [facility] [phone]; We will contact you to schedule the appointment or please call with any questions    3. Fibromyalgia  Chronic. Sees Dr. Mcadams of Rheumatology. Has seen pain clinic in the past but because of insurance reasons had to stop. Would like to see if she can continue follow up.   - PAIN MANAGEMENT REFERRAL    4. Type 2 diabetes, HbA1c goal < 7% (H)  Follows with endocrinology. No recent CMP in chart; she does have a follow up scheduled with them on 2/27/2020 for HgbA1c and other routine tests. Pt is currently not fasting and will complete lipids at next blood draw.   - Comprehensive metabolic  panel (BMP + Alb, Alk Phos, ALT, AST, Total. Bili, TP)  - Lipid panel reflex to direct LDL Fasting; Future    5. Family history of malignant neoplasm of breast  Sister with hx of breast CA. Pt used to get mammograms but 2/2 fibromyalgia, other pains, she has not continued getting mammograms (last in 2012 per chart). She is interested in getting recommendations on what kind of monitoring or follow up she should have -- she mentions that she has gotten genetic testing in the past. As the patient also has 2 daughter, she would like some guidance on what kind of surveillance or testing she should undergo. She is agreeable to hem/onc referral to discuss these options.   - Oncology/Hematology Adult Referral; Future     6. Healthcare Maintenance  Discussed with patient that we would recommend PPSV23 vaccination 2/2 T2DM but she declines immunizations at this time. She also is not interested in the flu shot.     7. Stress incontinence  Doing kegels and yoga exercises. No dysuria/hematuria. Notices when she sneezes. Currently manageable, she will let us know if she wants a urology referral for further investigations for any intervention.     See Patient Instructions    This patient was seen and discussed with Dr. Arnoldo Babcock MD  [facility] [place]    I have seen and discussed the patient with Dr. Babcock and agree with the jointly developed plan as documented above.    Madyson Mclaughlin MD  Internal Medicine-Pediatrics

## 2020-02-05 NOTE — PATIENT INSTRUCTIONS
1) Pain clinic referral placed    2) Mental health referral placed    3) CMP + Lipids (ordered as future)     4) Let us know if you would like a urology referral for your stress incontinence.     5) Hem/Onc referral placed

## 2020-02-06 NOTE — TELEPHONE ENCOUNTER
ONCOLOGY INTAKE: Records Information      APPT INFORMATION:  Referring provider:  Casie Babcock  Referring provider s clinic:  [facility]  Reason for visit/diagnosis:  Family history breast cancer  Has patient been notified of appointment date and time?: yes    RECORDS INFORMATION:  Were the records received with the referral (via [employer])? No, internal referral    Has patient been seen for any external appt for this diagnosis? no    If yes, where? na    Has patient had any imaging or procedures outside of [place] for this condition? no      If Yes, where? na    ADDITIONAL INFORMATION:       X Size Of Lesion In Cm: 0

## 2020-02-11 ENCOUNTER — OFFICE VISIT (OUTPATIENT)
Dept: SLEEP MEDICINE | Facility: CLINIC | Age: 60
End: 2020-02-11
Payer: COMMERCIAL

## 2020-02-11 VITALS
SYSTOLIC BLOOD PRESSURE: 144 MMHG | WEIGHT: 205 LBS | HEART RATE: 66 BPM | HEIGHT: 64 IN | OXYGEN SATURATION: 96 % | BODY MASS INDEX: 35 KG/M2 | DIASTOLIC BLOOD PRESSURE: 70 MMHG

## 2020-02-11 DIAGNOSIS — G47.33 OSA (OBSTRUCTIVE SLEEP APNEA): ICD-10-CM

## 2020-02-11 DIAGNOSIS — F51.04 CHRONIC INSOMNIA: Primary | ICD-10-CM

## 2020-02-11 PROCEDURE — 90791 PSYCH DIAGNOSTIC EVALUATION: CPT | Performed by: PSYCHOLOGIST

## 2020-02-11 ASSESSMENT — ANXIETY QUESTIONNAIRES
IF YOU CHECKED OFF ANY PROBLEMS ON THIS QUESTIONNAIRE, HOW DIFFICULT HAVE THESE PROBLEMS MADE IT FOR YOU TO DO YOUR WORK, TAKE CARE OF THINGS AT HOME, OR GET ALONG WITH OTHER PEOPLE: SOMEWHAT DIFFICULT
5. BEING SO RESTLESS THAT IT IS HARD TO SIT STILL: SEVERAL DAYS
2. NOT BEING ABLE TO STOP OR CONTROL WORRYING: SEVERAL DAYS
3. WORRYING TOO MUCH ABOUT DIFFERENT THINGS: MORE THAN HALF THE DAYS
1. FEELING NERVOUS, ANXIOUS, OR ON EDGE: MORE THAN HALF THE DAYS
GAD7 TOTAL SCORE: 10
7. FEELING AFRAID AS IF SOMETHING AWFUL MIGHT HAPPEN: SEVERAL DAYS
6. BECOMING EASILY ANNOYED OR IRRITABLE: MORE THAN HALF THE DAYS

## 2020-02-11 ASSESSMENT — PATIENT HEALTH QUESTIONNAIRE - PHQ9
5. POOR APPETITE OR OVEREATING: SEVERAL DAYS
SUM OF ALL RESPONSES TO PHQ QUESTIONS 1-9: 12

## 2020-02-11 ASSESSMENT — MIFFLIN-ST. JEOR: SCORE: 1492

## 2020-02-11 NOTE — PATIENT INSTRUCTIONS
Cognitive Behavioral Therapy for Insomnia (CBT-I)    Sleep Strengthening    Now that you understand a bit more about how sleep works and what causes insomnia, you are ready to move on to the core of CBT-I:  Sleep Strengthening.  This process involves five key strategies that will:    ? Strengthen your sleep drive and biological sleep clock  ? Strengthen the link between your bed and sleep    Core Sleep Strengthening Strategies     You are now ready to start the process of strengthening your sleep. Much like physical therapy strengthens muscles, studies show these five sleep strategies are the key to achieving stronger, healthier sleep.     1. Reduce your  Time In Bed    Spending extra time in bed trying to get more sleep actually can cause more sleep disruption and weaken sleep efficiency. Sleep efficiency is simply the percentage of time a person spends asleep while in bed. Normal sleep efficiency is thought to be 85% or greater.  By reducing your time in bed, you will be awake longer leading to quicker and deeper sleep. This strategy does not reduce the amount of sleep you get, just the time you are awake in bed:                                       Your Sleep Schedule Prescription    During the first step of sleep strengthening, you will reduce your time in bed following a Sleep Schedule Prescription. Your prescription is determined by the average nightly amount of sleep you got over the past two weeks plus 30 minutes. It also takes into account the best time for you to sleep. The least amount of time prescribed is 6 hours in bed unless a sleep specialist recommends otherwise.     Total Time in Bed:  7 hours  Bedtime:  No earlier than  1 AM  Wake-up Time:  Out of bed every day by  8 AM     2. Don't go to bed until you feel sleepy (not tired or fatigued)     This helps increase your sleep drive by keeping you awake longer.  If you go to bed when you're not sleepy, it gives your brain the wrong message and can  lead to frustration.     If you feel sleepy before your prescribed bedtime, find activities that can help you stay awake until it is time for you to go to bed. Even brief naps in the evening can be very disruptive of sleep at night.     3. Don't stay in bed unless you are sleeping      If you are unable to fall asleep or return to sleep after about 30 minutes, get out of bed. This helps train your brain that the bed is for sleep. It is harmful to your sleep when you are worried or frustrated in bed. Do not read, eat, worry, think about sleep, use mobile phones or tablets, or watch TV in bed. Do not watch the clock during the night.     Go to another room and do something relaxing. Plan things you can do when you get out of bed. Avoid use of mobile devices or computers when out of bed.    Return to bed only when you feel sleepy again.  Repeat as often as needed throughout the night.     4. Get out of bed at the same time every day    Getting up at the same time helps set your biological clock. It is important to stick to your wake time no matter how much sleep you got the night before or how you feel in the morning.    Varying your wake can have the same effect as jet lag.  It disrupts your biological clock and makes you feel more tired and exhausted.  Trying to  catch up  on sleep on the weekends only makes things worse and sets you up for a cycle of poor sleep the following weekdays.      Make sure you set an alarm and keep it away from the bed to prevent looking at the clock during the night.      5. Avoid daytime napping    Avoid daytime napping if possible. Napping partially fulfills your need for sleep and weakens your sleep drive at night.    However, if you find yourself sleepy (not just tired) you can take a brief 15-30 minute nap during the day if needed.  Naps within 7-8 hours of your prescribed wake time are less likely to affect your sleep the coming night.  Sleepy people make more mistakes and may hurt  themselves or others. Therefore, safety trumps all other sleep prescriptions.  Never drive or operate equipment if drowsy or sleepy.     Changing Your Sleep Schedule Prescription    After one week, you can adjust your sleep schedule prescription based on how well you are sleeping. Use the following guidelines to change your prescribed time in bed based on information from your Sleep Diary, Mobile Sleep Diary Sunil or Wearable Device:          Increase Time in Bed by 15 Minutes IF:      You are falling asleep in less than 30 minutes AND awake less than 30 minutes during the night.       OR your CBT-I  pp, Mobile Sunil or Wearable Device indicates your sleep efficiency has been greater than 90%.               Change is Challenging     Research shows that making significant changes in sleep habits improves sleep quality and how you feel. Improvement takes time and is not always steady. Change is challenging and can be stressful.  This is especially true if old habits - like spending more time in bed -- were a way to avoid worry about getting enough sleep.            Cognitive Behavioral Therapy for Insomnia (CBT-I)    Developing Healthy Sleep Thoughts    Insomnia is often is triggered by stressful events such as a change in employment, a separation, medical illness, or loss.  How you handle your sleep problem, mainly your thoughts and behaviors, determines in large part whether your sleeplessness is short -term or develops into chronic insomnia well after the stressful event is over.     This step of your program involves learning a set of skills to change negative and worrisome thoughts about sleep.   Insomnia is more likely to persist if you interpret the onset as a threat or loss of control.  Worry, fears and untrue beliefs about insomnia can become a vicious cycle.  Negative sleep thoughts activate the physical and emotional systems of your body.  This strengthens wakefulness and weakens your sleep system.  This  in turn produces increased stress and pressure to sleep.  We call this unhelpful sleep effort.     Changing How You Think About Insomnia    We now know that our thoughts and attitudes affect our stress response.  Negative sleep thoughts can worsen your insomnia. They can lead to greater fear or anxiety about sleep, which in turn can aggravate your sleep problem. Thinking more positively and realistically about your sleep promotes its health and healing.     Myths about Sleep    ? People need 8 hours of sleep     This is untrue.  Sleep needs vary from person to person.  Most people need between 6-8 hours to feel alert during the day.  People who sleep 7 hours live longer than those who sleep 8 hours.  Research also suggests people who sleep 5 hours live longer than those who sleep 9 hours    ? Insomnia is the same as sleep deprivation    Sleep deprivation is lack of sleep due to not allowing enough time for sleep.  This is typically not true for insomnia where people have enough time for sleep and even extend their sleep time trying to get more sleep.  Most people with insomnia get about the same amount of sleep as normal sleepers.  The difference is that with insomnia the sleep is fragmented and less consolidated.    You are Getting More Sleep than You Think    Research using objective sleep tests reveal that people with insomnia get an average of one hour more sleep than they think. This is due in part because the brain misperceives Stage 1 and 2 sleep as being awake.  In addition, stress and arousal while awake in bed changes the brain's perception of time awake.    Examples of Unhealthy Sleep Thoughts    Negative sleep thoughts can have a profound impact on your ability to get a good night's sleep. Below are some examples of negative thoughts associated with insomnia that may sound familiar to you:    ? I must get 8 hours of sleep to function during the day.  ? I won't get to sleep tonight.  ? Insomnia is going  to cause health problems.  ? I am dreading going to bed.  ? I woke up early again.  I know I won't get back to sleep.  ? The reason I feel terrible today is because of my insomnia.  ? I've totally lost control of my sleep.  ? I can't sleep without a sleeping pill.    Negative thoughts usually occur automatically and feel like a knee-jerk reaction.  They are often untrue or distorted, especially late in the evening as you become increasingly tired and others are asleep.      Changing Unhealthy Sleep Thoughts    Now that you understand the impact that negative thoughts can have on your sleep, you are ready to change these unhelpful thoughts.  The strategy is powerful and simple:  By recognizing and replacing your negative thoughts about sleep with more accurate, positive thoughts, you will be less anxious and frustrated about your sleep. A more realistic and positive attitude about sleep will allow you to relax and sleep more easily through the night.           There are several important steps involved in changing your unhelpful and negative thoughts about sleep:            Examples of Healthy Sleep Thoughts    ? My work will not suffer much if I have a poor night's sleep.    ? I'm probably getting more sleep than I think.    ? Other things than my sleep affect my daytime functioning.    ? Because I didn't sleep well last night, I am more likely to sleep well tonight because of increased sleep drive that leads to deeper sleep.    ? Sleep requirements vary from one person to another.    ? If I don't sleep well, most of the time the worst that can happen is that my mood might not be as bright the next day.    ? If I awaken after about 5 hours of sleep, I have gotten the core sleep I need for the day.    ? I'm more likely to fall asleep the longer I've been awake    ? I'm more likely to fall asleep as my body temperature begins to decrease through the night.    ? My body's wakefulness system takes charge during the day  to promote daytime functioning.    ? These sleep skills have worked for others, and they can work for me.    Other Recommendations for Changing Beliefs and Attitudes   About Your Sleep    ? Keep Realistic Expectations     There is a widespread belief that 8 hours of sleep is necessary to feel refreshed and function well during the day. Many believe that good sleep means never waking up at night.  Others come to expect that they should always wake up in the morning feeling full of energy.  Concerns may arise when your actual sleep falls short of these expectations. Try to avoid placing undue pressure on yourself to achieve certain sleep levels.  It only increases anxiety about sleeping.  Focus on quality sleep not quantity of sleep.    ? Revise Your Thoughts about the Causes of Insomnia      There is a natural tendency to attribute our sleep problems completely to external factors such as a chemical imbalance, pain, aging or things over which we may have little control.  Although these factors may contribute to your insomnia, research shows CBT-I is beneficial even if they are present.    ? Don't Blame Insomnia for All Daytime Impairments      Many individuals blame insomnia for every symptom or concern they experience during the day from fatigue to lack of concentration. Though poor sleep may produce some of these symptoms, it us usually untrue that all daytime impairment is attributable to insomnia.  It is more often that other factors such as stress and co-occurring medical problems contribute more to how you feel during the day.      ? Don't Catastrophize      Catastrophic thinking means making a sleep mountain out of a molehill.  Some people believe poor sleep will have catastrophic consequences to their physical health, mental health and appearance. Others see insomnia as a complete loss of control.  These perceived consequences of insomnia often prompt people to seek medication or other treatment.  Keep in  mind insomnia can be very unpleasant but for the most part is not dangerous.    ? Don't Focus on Sleep     Some people reduce their activity level because of poor sleep.  Although sleep is a necessary part of life, don't make it the focus of your thoughts and concern. Trust that if you engage in health sleep habits, your body will give you the sleep it needs.  Make sure you continue your normal activities despite your insomnia.    ? Never Try to Sleep      Of all the habits the most important one is this:  Never try to force yourself to sleep.  Doing so usually backfires and makes things worse. Instead, focus on keeping to your prescribed sleep schedule and practicing your five core         30  Minute nap OK after lunch

## 2020-02-11 NOTE — NURSING NOTE
"Chief Complaint   Patient presents with     Sleep Problem     Insomnia       Initial BP (!) 144/70   Pulse 66   Ht 1.621 m (5' 3.82\")   Wt 93 kg (205 lb)   LMP 01/01/1999   SpO2 96%   BMI 35.39 kg/m   Estimated body mass index is 35.39 kg/m  as calculated from the following:    Height as of this encounter: 1.621 m (5' 3.82\").    Weight as of this encounter: 93 kg (205 lb).    Medication Reconciliation: complete    ESS 9    Priyanka Martinez MA      "

## 2020-02-11 NOTE — PROGRESS NOTES
SLEEP MEDICINE CONSULTATION  Sleep Psychology    Name: Maricarmen Dotson MRN# [id number]   Age: 59 year old YOB: 1960     Date of Consultation: Feb 11, 2020  Consultation is requested by: Bennett Ezra Goltz, PA-C  [address] [address]  Primary care provider: Annamaria Erickson    Reason for Sleep Consultation     Maricarmen Dotson is a 59 year old female seen today for a behavioral sleep medicine consultation because of chronic insomnia.      Assessment and Plan     Sleep Diagnoses/Recommendations:    1. Chronic insomnia    History of chronic insomnia appears multi factored, associated with chronic pain, depression, anxiety.  There are targets for behavioral treatment including markedly varying sleep schedule and inadequate sleep hygiene.  Patient also has a history of reported posttraumatic stress disorder with significant reported anxiety likely also contributing to poor quality sleep.   Patient request to proceed with a cognitive behavioral therapy for insomnia.  Given history of psychiatric condition and elevated anxiety, prognosis for benefiting from CBT-I is somewhat guarded.  Patient does indicate motivation though has insight she may have difficulty following through because of anxiety.  We will begin by establishing a consistent 7-hour sleep window between 1 AM- 8 AM.  She will follow-up with her psychiatrist about use of any sleep medications including serve Rx sent, melatonin.  Follow-up in 3 weeks.    Patient advised to follow closely with her psychiatrist.  She is currently seeking psychotherapy.  We did discuss using the psychology today search engine to find a provider and discussed strategies for screening and choosing a therapist.  See patient instructions for initial treatment recommendations and behavioral sleep plan.    Summary Counseling:      Ric was provided information about the pathophysiology of insomnia and psychophysiological factors contributing to  the onset and maintenance of insomnia associated with PTSD, depression, anxiety, chronic pain.  Treatment option were discussed including component of cognitive-behavioral therapy for insomnia. The benefits and potential early side effects of treatment including increased daytime sleepiness were discussed. She was advised to seek medical advise and consultation around use of or changes to prescription sleep medication, Patient was counseled on the importance of avoiding driving if drowsy.        Follow-up: 3 weeks    2. MIESHA (obstructive sleep apnea)  Continue treatment and follow-up with Bennett Goltz, PA-C at the [facility].      See patient instructions for initial treatment recommendations and behavioral sleep plan.    Summary Counseling:      Maricarmen was provided information about the pathophysiology of insomnia and psychophysiological factors contributing to the onset and maintenance of insomnia associated with depression, sleep apnea, PTSD.  Treatment option were discussed including component of cognitive-behavioral therapy for insomnia. The benefits and potential early side effects of treatment including increased daytime sleepiness were discussed. She was advised to seek medical advise and consultation around use of or changes to prescription sleep medication, Patient was counseled on the importance of avoiding driving if drowsy.        Follow-up: 3 weeks     History of Present Sleep Complaint     Maricarmen Dotson is a 59 year old year old female  who reports longstanding difficulty of both initiating and maintaining sleep.  Her sleep issues are associated with a longstanding history of PTSD, major depression and chronic pain.  She also reports a prior history of concussions.  Patient sleep schedule is rather varied.  She goes to bed anywhere between 8 PM- 2 AM in the morning.  She will get out of bed for the day anywhere between 3 AM- 7:30 AM.  Sleep latency can vary between 10 minutes to 2  hours.  She usually wakes up 1-2 times a night.  Estimated time awake in the middle of the night is between 30 minutes - 1 hour.  She does experience early morning awakening and may be awake anywhere between 1-2 hours.  Average estimated total sleep time is between 3- 6 hours.  She does not have a history of shiftwork.    Patient does carry diagnosis of sleep apnea, largely positional   Patient has had difficulty adapting to CPAP due to anxiety. She does not report parasomnias such as sleepwalking, sleep talking or other irregular behavior of sleep.  She does not report dream enactment behavior.  She does have a history of intermittent nightmares however these are not persistent.  She does not report episodes of drowsy driving or accidents related to drowsiness.    Benita reports her sleep worsened after a cuncussion in 2012 and again in 2017.    Patient drinks 1 caffeinated beverage in the morning.  She does not smoke, use recreational drugs or drink alcohol.    Her bed is comfortable though not always entirely dark.    She does report sleep specific worry and concern.  She may wake up feeling worried.  She reports she does have a generally relaxing bedtime routine but is using screens and electronic devices in the evening, well into the later evening.  She does not typically get out of bed if she wakes up and cannot fall back to sleep.    Patient is currently followed by psychiatry for treatment of PTSD, depression and anxiety.    She does believe that chronic pain affects sleep quality.    Patient uses Melatonin, CBD oil and diphenhydramine at bedtime for insomnia and anxiety.  Prior trials of trazodone, zolpidem, eszopiclone.        Previous Sleep Studies:    Sleep study completed on 7/16/2019 revealed a combined apnea hypotony index of 26.5% with apnea in the moderate range.        Substance Use:    Tobacco: None reported   Caffeine: 0-1 caffeinated beverages in the morning.   Alcohol: None  "reported  Recreational Drugs: None reported      Screening          [place] Sleepiness Scale  Total score - [place]: 9 .    ALESSANDRA Total Score: 18       PHQ-9 SCORE 2/11/2020   PHQ-9 Total Score -   PHQ-9 Total Score 12   Some encounter information is confidential and restricted. Go to Review Flowsheets activity to see all data.       WILLIAMS-7 SCORE 4/29/2019 2/5/2020 2/11/2020   Total Score - - -   Total Score 17 13 10   Some encounter information is confidential and restricted. Go to Review Flowsheets activity to see all data.       Patient Activation Score     JEFFERY Score (Last Two) 6/2/2011 10/18/2013   JEFFERY Raw Score 50 42   Activation Score 86.3 66   JEFFERY Level 4 3         Vitals     BP (!) 144/70   Pulse 66   Ht 1.621 m (5' 3.82\")   Wt 93 kg (205 lb)   LMP 01/01/1999   SpO2 96%   BMI 35.39 kg/m       Medical History     Patient Active Problem List   Diagnosis     Irritable bowel syndrome     Diverticulosis of large intestine     Insomnia     Chronic pain syndrome     Hyperlipidemia LDL goal <100     Dyspepsia     Type 2 diabetes, HbA1c goal < 7% (H)     Hepatic injury     Moderate major depression (H)     Overweight     Post concussion syndrome     Plantar fasciitis     Pain in joint, ankle and foot     Migraine headache     Anxiety     Panic attacks     TBI (traumatic brain injury) (H)     Pain in thoracic spine     Nonallopathic lesion of cervical region     Cervicalgia     Lumbago     Statin intolerance     Type 2 diabetes mellitus with hyperglycemia (H)     Fibromyalgia     Cervical pain     Carotid atherosclerosis, bilateral     Insulin pump in place     Hypertension goal BP (blood pressure) < 140/90     Posttraumatic stress disorder     Hypertensive urgency     MIESHA (obstructive sleep apnea)        Current Outpatient Medications   Medication Sig Dispense Refill     ASPIRIN NOT PRESCRIBED (INTENTIONAL) Please choose reason not prescribed, below 0 each 0     blood glucose (FREESTYLE TEST STRIPS) test strip USE " TO TEST BLOOD SUGAR SIX TIMES DAILY 200 each 3     Cholecalciferol (VITAMIN D3 PO) Take by mouth daily       Continuous Blood Gluc  (FREESTYLE RADHA 14 DAY READER) MARIA C USE TO CHECK BLOOD SUGARS AS DIRECTED 1 Device 0     Continuous Blood Gluc Sensor (FREESTYLE RADHA 14 DAY SENSOR) MISC USE TO TEST BLOOD SUGARS AS DIRECTED AND CHANGE EVERY 14 DAYS 2 each 11     Cyanocobalamin (VITAMIN B12 PO)        diclofenac (VOLTAREN) 1 % topical gel Apply topically nightly as needed for moderate pain Apply 4 grams to knees or 2 grams to hands four times daily using enclosed dosing card. 100 g 1     EPINEPHrine (EPIPEN) 0.3 MG/0.3ML injection Inject 0.3 mLs (0.3 mg) into the muscle once as needed for anaphylaxis 2 each 0     HEMP OIL OR EXTRACT OR OTHER CBD CANNABINOID, NOT MEDICAL CANNABIS,        INSULIN PUMP - OUTPATIENT INSULIN PUMP - OUTPATIENT  Date last updated: 9/9/19 Omnipod   BASAL RATES and times:   Basal 1:   12am: 1.60 --6 AM: 1.45 --12pm: 1.3 -- 6pm: 1.45   HIGH 2:   12am: 1.70 --  6 AM: 1.55 -- 12pm: 1.4 -- 6pm: 1.55   High 3:   12am: 1.85 -- 6 AM: 1.65 --12pm:1.5--6pm: 1.85  High 4:   12am: 1.90 -- 6 AM: 1.65 --6pm: 2.0  High 5:   12 AM: 2.0 -- 6 AM: 1.80--8 PM: 2.0  CARB RATIO: 12am-12am: 6   Corection Factor (Sensitivity): 12AM (midnight): 23 -- 5 AM: 25   Target blood glucose: 100-120  Active insulin time: 4 hrs       LORazepam (ATIVAN) 1 MG tablet TAKE ONE-HALF TO ONE TABLET BY MOUTH EVERY NIGHT AT BEDTIME FOR ANXIETY OR SLEEP 15 tablet 0     losartan (COZAAR) 50 MG tablet TAKE TWO TABLETS BY MOUTH ONCE DAILY 60 tablet 0     MELATONIN PO        meloxicam (MOBIC) 15 MG tablet Take 15 mg by mouth At Bedtime        polyethylene glycol (MIRALAX/GLYCOLAX) packet Take 17 g by mouth At Bedtime       STATIN NOT PRESCRIBED, INTENTIONAL, Statin not prescribed intentionally due to Intolerance 0 each 0     traMADol (ULTRAM) 50 MG tablet Take 100 mg by mouth At Bedtime          Past Surgical History:   Procedure  Laterality Date     BACK SURGERY      fusion 1994 and removal of hardware 2004     C SPINAL ORTHOSIS,NOS  2002    lumbar surgery     CHOLECYSTECTOMY  2011     choley  2011     ENT SURGERY  1986    septoplasty     HYSTERECTOMY, CERVIX STATUS UNKNOWN  2000    TVH/BSO     ORTHOPEDIC SURGERY  2005    left ankle          Allergies   Allergen Reactions     Amoxicillin Anaphylaxis     Bee Anaphylaxis     Bee sting       Contrast Dye Anaphylaxis     Iodine Anaphylaxis     Severe anaphalatic shock       Sulfa Drugs Anaphylaxis     Tetanus-Diphtheria Toxoids      Rxn was to Tdap-whole body joint pain and fever.  Had similar reaction to Td vaccine 7/28/2017     Metoprolol Other (See Comments)     Fatigue, joint pain, throat tightness     Zithromax [Azithromycin Dihydrate] Nausea and Vomiting     Amlodipine      Neuropathic type pain in hands/feet     Atorvastatin      myalgias     Catapres [Clonidine]      Crestor [Rosuvastatin]      myalgias     Cyproheptadine      Severe headache, cardiac issues, and dizziness     Humalog [Insulin Lispro]      Causes pancreatitis -      Hydrochlorothiazide      numbness     Latex      Skin burn and can't breathe       Lisinopril      Joint aches     Metformin      Nifedipine      Onglyza [Saxagliptin Hydrochloride]      Fibromyalgia flare     Phenergan [Promethazine]      Prochlorperazine      Altered mental status, hallucinations     Reglan [Metoclopramide]      Sumatriptan      Causes severe depression     Tetracycline Nausea and Vomiting, Hives and Difficulty breathing     Pt called to update today after the visit that she is allergic to the tetracycline-added to her list     Sulindac Anxiety     Panic attacks         Psychiatric History     Prior Psychiatric Diagnoses: yes, major depressive disorder, posttraumatic stress disorder   Psychiatric Hospitalizations: yes, per medical record for depression   Use of Psychotropics yes, Lorazepam reported      Chemical Use     Prior Chemical  Dependency Treatment: none         Family History     Family History   Problem Relation Age of Onset     Diabetes Mother         type 2     Hypertension Mother      Cerebrovascular Disease Mother          stroke age 57     Ovarian Cancer Mother 43     Cancer Mother         cervix     Diabetes Father         type 2     Hypertension Father      Gastrointestinal Disease Father         colon polyps     Sleep Apnea Brother      Cancer Sister      Ovarian Cancer Sister 46     Ovarian Cancer Maternal Grandmother 72     Cancer Maternal Grandmother         cervix       Sleep Disorders: None reported    Social History     Social History     Socioeconomic History     Marital status:      Spouse name: None     Number of children: 3     Years of education: None     Highest education level: None   Occupational History     Occupation: xr technician     Employer: [employer]   Social Needs     Financial resource strain: None     Food insecurity:     Worry: None     Inability: None     Transportation needs:     Medical: None     Non-medical: None   Tobacco Use     Smoking status: Never Smoker     Smokeless tobacco: Never Used   Substance and Sexual Activity     Alcohol use: Yes     Alcohol/week: 0.0 standard drinks     Comment: maybe once a month     Drug use: No     Sexual activity: Yes     Partners: Male     Birth control/protection: Surgical   Lifestyle     Physical activity:     Days per week: None     Minutes per session: None     Stress: None   Relationships     Social connections:     Talks on phone: None     Gets together: None     Attends Sabianism service: None     Active member of club or organization: None     Attends meetings of clubs or organizations: None     Relationship status: None     Intimate partner violence:     Fear of current or ex partner: None     Emotionally abused: None     Physically abused: None     Forced sexual activity: None   Other Topics Concern     Parent/sibling w/  CABG, MI or angioplasty before 65F 55M? Not Asked   Social History Narrative     None            Mental Status Examination     Maricarmen presented as appropriately dressed and groomed and was oriented X3 with speech language intact.  The patient was cooperative throughout the evaluation with no signs of hallucinations, delusions, loosening of associations or other thought disturbance.  Mood was anxious.  Affect was congruent with mood. Insight and judgement we intact.  Memory was intact for recent and remote elements.  There was no report of suicidal ideation, intention or plan. Attention and concentration were within normal.      Time Spent:  50 minutes    Copy:   Serum, Heather Johnson Bennett Ezra Goltz, PA-C  [address]  [place] [address]    Sanjeev Flores PsyD, LP, DBSM  Diplomate, Behavioral Sleep Medicine  [facility]  [place] [facility] [place]

## 2020-02-12 ASSESSMENT — PATIENT HEALTH QUESTIONNAIRE - PHQ9: SUM OF ALL RESPONSES TO PHQ QUESTIONS 1-9: 14

## 2020-02-12 ASSESSMENT — ANXIETY QUESTIONNAIRES: GAD7 TOTAL SCORE: 10

## 2020-02-12 NOTE — TELEPHONE ENCOUNTER
Pain Management Center Referral      1. Confirmed address with patient? Yes  2. Confirmed phone number with patient? Yes  3. Confirmed referring provider? Yes  4. Is the PCP the same as the referring provider? Yes  5. Has the patient been to any previous pain clinics? Yes  (If yes, send DEVIKA with welcome letter)  6. Which insurance are we to bill for this appointment?  [employer]    7. Informed pt of cancellation (48 hour) policy? Yes    REGARDING OPIOID MEDICATIONS: We will always address appropriateness of opioid pain medications, but we generally will not automatically take on a prescribing role. When we do take on prescribing of opioids for chronic pain, it is in collaboration with the referring physician for an intermediate period of time (months), with an expectation that the primary physician or provider will assume the prescribing role if medications are effective at stable doses with demonstrated compliance. Therefore, please do not assume that your prescribing responsibilities end on the day of pain clinic consultation.  8. Informed pt of prescribing policy? Yes    9.Please be aware that once you are established with a pain provider and location, you will need to continue have all future visits with that provider and location. It is best to determine what location is the most convenient for you and schedule with that one.    ** PATIENT INFORMED OF THIS POLICY Yes      9. Referring Provider: Dr. Arnoldo WARD    [facility]

## 2020-02-13 ENCOUNTER — TRANSFERRED RECORDS (OUTPATIENT)
Dept: HEALTH INFORMATION MANAGEMENT | Facility: CLINIC | Age: 60
End: 2020-02-13

## 2020-02-13 ASSESSMENT — ANXIETY QUESTIONNAIRES: GAD7 TOTAL SCORE: 13

## 2020-02-13 NOTE — PROGRESS NOTES
[facility] Pain Management     Date of visit: 2/17/2020    Chief complaint:   Chief Complaint   Patient presents with     Pain Management     all over pain, muscles, joints, spine since 1996, dx with fibromyalgia       Interval history:  Maricarmen Dotson is a 59 year old female last seen by me on 1/30/18.      Recommendations/plan at the last visit included:     1.  Pain Physical Therapy:  YES   Schedule first visit with CHARBEL Licea.              2.  Pain Psychologist to address relaxation, behavioral change, coping style, and other factors important to improvement.  YES  Schedule first visit with Dr. Hannah.               3.  Medication Management:                           1. We discussed that Maricarmen has tried numerous medications in the past for treatment of fibromyalgia with very little success and ultimately, we may find that medications may not be helpful for her pain management.                           2. We discussed that I do not recommend opioids as a long term option for pain management when other options are available and that opioids are not indicated for fibromyalgia. We reviewed the development of tolerance, significant risks and side effects, and opioid induced hyperalgesia. She verbalized understanding. States she may be interested in tapering off of tramadol in the future, will continue with rheumatologist for now.                          3.  Continue current medication regimen.                          4.  We discussed low dose naltrexone as a possible option. She will consider low dose naltrexone as a possible medication to trial for fibromyalgia, discuss at next visit.               4.  Follow up with MARICRUZ Patel CNP in 4-6 weeks.     Since her last visit, Maricarmen Dotson reports:  -Her pain is about the same as it was at last visit.   -She presents with her daughter.   -She hasn't been seen in clinic since January of 2018. She had x1 visit with Anuja Hernández PT but did not  continue as she was told her insurance would not cover if she only utilized one resource. She wonders if it was because she was already seeing a psychologist, no longer is.   -She was hopeful that pain physical therapy was going to be helpful and was upset that she had to stop. She has continued to practice some stretches and exercises with benefit.   -She is very interested in restarting participation in our pain program.   -She continues Tramadol 2-3 tabs a day with benefit, prescribed by rheumatologist Dr. Mcadams ([facility]). She tried Mobic for a while with benefit but it eventually caused stomach upset so she stopped.   -She started taking hemp CBD oil in September of 2019 (in capsule form) for pain. She does feel like it takes away some of the anxiety she has with pain, is somewhat helpful.  -She does use a TENS unit with some benefit. Does find that it can make her more anxious at times.      Pain Information:   Pain quality: aching, throbbing, sharp, shooting    Pain timing: constant     Pain rating: intensity ranges from 7/10 to 9/10, and averages 7/10 on a 0-10 scale.   Aggravating factors include: weather, activity   Relieving factors include: moving, medication    Current pain medications:               Tramadol 50mg 2-3 tabs/day- H, SE stomach upset, somewhat sedated   Hemp CBD oil capsule              Voltaren gel- H, variable relief               Biofreeze- [facility]    Current MME: 15    Review of [place] Prescription Monitoring Program (): No concern for abuse or misuse of controlled medications based on this report.     Annual Controlled Substance Agreement/UDS due date: NA    Past pain treatments:  1. Previous Pain Relevant Medications:  (H--helped; HI--Helped initially; [facility]--Somewhat helpful; NH--No help; W--worse; SE--side effects; ?--Unsure if helpful)   NOTE: This medication information taken from patient's intake form, not medical records.               Opiates: T3-  "SE, nausea/vomiting, hydrocodone- SE, nausea, trouble walking, morphine- SE, nausea/vomiting, oxycodone- SE, nausea/vomiting, tramadol- H, SE, stomach upset, sedated              NSAIDS: Ibuprofen- NH, Aleve- SE, \"javad me up,\" Toradol- SE, GI upset, meloxicam- SE, stomach upset, Sulindac- HI, SE, stomach upset              Muscle Relaxants: Flexeril- SE \"jacked me up, crawling up to the ceiling,\" Zanaflex- NH, Norflex- NH, caused muscle spasms, clonazepam- W, caused muscle spasms              Anti-migraine mediations: no              Anti-depressants: amitriptyline- SE, worsened anxiety, Wellbutrin- SE, worsened anxiety, Celexa- SE, worsened anxiety, SI, despiramine- SE, worsened anxiety, doxepin- SE, worsened anxiety, \"really bad, more depressed,\" Cymbalta- NH, SE, worsened anxiety, insomnia, Remeron- SE, worsened anxiety, insomnia, nortriptyline- SE, worsened anxiety, Zoloft- NH, SE, worsened anxiety, trazodone- SE, worsened anxiety, Effexor- SE, worsened anxiety, insomnia, hallucinating, Lexapro- SE, worsened anxiety, Mirapex- SE, worsened anxiety, depression               Sleep aids: Lunesta- W, insomnia, Ambien- W, insomnia,  temazepam- W, insomnia              Anxiolytics: Valium- W, Klonopin- W              Neuropathics: gabapentin- HI, SE, loss of coordination, weakness?, tegretol- NH, SE, cognitive issues, sedated depakote- NH, SE cognitive issues, lyrica- NH, SE, topamax- NH, SE                     Topicals: Biofreeze- H              Other medications not covered above: Tylenol- NH     2. Physical Therapy: yes- NH, x1 visit with pain physical therapy- ?/H  3. Pain Psychology: no  4. Surgery: no  5. Injections: right knee steroid injection- NH  6. Chiropractic: yes- H  7. Acupuncture: yes- H  8. TENS Unit: yes- H, variable, continues    Medications:  Current Outpatient Medications   Medication Sig Dispense Refill     ASPIRIN NOT PRESCRIBED (INTENTIONAL) Please choose reason not prescribed, below 0 each 0 "     blood glucose (FREESTYLE TEST STRIPS) test strip USE TO TEST BLOOD SUGAR SIX TIMES DAILY 200 each 3     Continuous Blood Gluc  (FREESTYLE RADHA 14 DAY READER) MARIA C USE TO CHECK BLOOD SUGARS AS DIRECTED 1 Device 0     Continuous Blood Gluc Sensor (FREESTYLE RADHA 14 DAY SENSOR) [facility] [place], [place] USE TO TEST BLOOD SUGARS AS DIRECTED AND CHANGE EVERY 14 DAYS 2 each 11     diclofenac (VOLTAREN) 1 % topical gel Apply topically nightly as needed for moderate pain Apply 4 grams to knees or 2 grams to hands four times daily using enclosed dosing card. 100 g 1     EPINEPHrine (EPIPEN) 0.3 MG/0.3ML injection Inject 0.3 mLs (0.3 mg) into the muscle once as needed for anaphylaxis 2 each 0     HEMP OIL OR EXTRACT OR OTHER CBD CANNABINOID, NOT MEDICAL CANNABIS,        INSULIN PUMP - OUTPATIENT INSULIN PUMP - OUTPATIENT  Date last updated: 9/9/19 Omnipod   BASAL RATES and times:   Basal 1:   12am: 1.60 --6 AM: 1.45 --12pm: 1.3 -- 6pm: 1.45   HIGH 2:   12am: 1.70 --  6 AM: 1.55 -- 12pm: 1.4 -- 6pm: 1.55   High 3:   12am: 1.85 -- 6 AM: 1.65 --12pm:1.5--6pm: 1.85  High 4:   12am: 1.90 -- 6 AM: 1.65 --6pm: 2.0  High 5:   12 AM: 2.0 -- 6 AM: 1.80--8 PM: 2.0  CARB RATIO: 12am-12am: 6   Corection Factor (Sensitivity): 12AM (midnight): 23 -- 5 AM: 25   Target blood glucose: 100-120  Active insulin time: 4 hrs       LORazepam (ATIVAN) 1 MG tablet TAKE ONE-HALF TO ONE TABLET BY MOUTH EVERY NIGHT AT BEDTIME FOR ANXIETY OR SLEEP 15 tablet 0     losartan (COZAAR) 50 MG tablet TAKE TWO TABLETS BY MOUTH ONCE DAILY 60 tablet 0     MELATONIN PO        polyethylene glycol (MIRALAX/GLYCOLAX) packet Take 17 g by mouth At Bedtime       STATIN NOT PRESCRIBED, INTENTIONAL, Statin not prescribed intentionally due to Intolerance 0 each 0     traMADol (ULTRAM) 50 MG tablet Take 100 mg by mouth At Bedtime        Cholecalciferol (VITAMIN D3 PO) Take by mouth daily       Cyanocobalamin (VITAMIN B12 PO)          Medical History: any changes in medical history  "since they were last seen? No    Review of Systems:  The 14 system ROS was reviewed from the intake questionnaire, and is positive for: fatigue, malaise, weight gain, dizziness, ear ache, ringing in ears, HTN, chest pain, abdominal pain, diarrhea, constipation, diabetes, joint pain, stiffness, back pain, neck pain, incontinence, weakness, numbness and tingling, memory loss, anxiety, stress  Any bowel or bladder problems: denies  Mood: \"more encouraged\"     Physical Exam:  Blood pressure 128/82, pulse 67, height 1.621 m (5' 3.82\"), weight 93.4 kg (206 lb), last menstrual period 01/01/1999, SpO2 96 %, not currently breastfeeding.  General: A&O x4, no signs of distress. Overweight.   Gait: Normal.  Neuro exam: 5/5 upper and lower extremity strength.    Imaging:  MRI of brain was completed on 9/22/17 and shows:  FINDINGS: There is no evidence of hemorrhage, mass, acute infarct, or  anomaly. The brain parenchyma, ventricles and subarachnoid spaces  appear normal. There are no gadolinium enhancing lesions.     The facial structures appear normal. The arteries at the base of the  brain and the dural venous sinuses appear patent.     Assessment:   Maricarmen Dotson is a 57 year old female with a past medical history significant for IBS, fibromyalgia, diabetes mellitus type 2, and depression who presents with complaints of widespread pain.      1. Widespread pain- etiology likely fibromyalgia.   2. Mental Health - the patient's mental health concerns, specifically depression, affect her experience of pain and contribute to her clinically significant distress.     1. Fibromyalgia    2. Chronic pain syndrome        Plan:     1.  Pain Physical Therapy:     YES   Maricarmen is very interested in participating in our pain program again. It is not quite clear why, in 2018, she was told that she could not only utilize one part of the pain program. We reviewed that it is appropriate to participate in part or all of the pain program. We will " start fresh with new orders for pain physical therapy and psychology. Schedule first follow up visit with Anuja Hernández, PT.   2.  Pain Psychologist to address relaxation, behavioral change, coping style, and other factors important to improvement.  YES   Schedule first visit with Alecia Rowland PsyD, LP.   3.  Medication Management:     1. Continue current medication regimen- Tramadol, hemp CBD oil and Voltaren gel. We again reviewed that given numerous medications she has tried for management of chronic pain in the past with substantial side effects, medications will not play a significant role in our treatment plan. May consider a trial of low dose naltrexone, she would like to research.   4.  Potential procedures: NO.   5.  Referrals: continue TENS unit and ice.    6.  Follow up with MARICRUZ Patel CNP in 4-6 weeks.         I spent 30 minutes of time face to face with the patient.  Greater than 50% of this time was spent in patient counseling and/or coordination of care.    Diamond ALCANTARA CNP  [facility] Pain Management

## 2020-02-17 ENCOUNTER — OFFICE VISIT (OUTPATIENT)
Dept: PALLIATIVE MEDICINE | Facility: CLINIC | Age: 60
End: 2020-02-17
Payer: COMMERCIAL

## 2020-02-17 VITALS
OXYGEN SATURATION: 96 % | HEIGHT: 64 IN | DIASTOLIC BLOOD PRESSURE: 82 MMHG | HEART RATE: 67 BPM | BODY MASS INDEX: 35.17 KG/M2 | SYSTOLIC BLOOD PRESSURE: 128 MMHG | WEIGHT: 206 LBS

## 2020-02-17 DIAGNOSIS — M79.7 FIBROMYALGIA: Primary | ICD-10-CM

## 2020-02-17 DIAGNOSIS — G89.4 CHRONIC PAIN SYNDROME: ICD-10-CM

## 2020-02-17 PROCEDURE — 99214 OFFICE O/P EST MOD 30 MIN: CPT | Performed by: NURSE PRACTITIONER

## 2020-02-17 ASSESSMENT — MIFFLIN-ST. JEOR: SCORE: 1496.55

## 2020-02-17 NOTE — PATIENT INSTRUCTIONS
1.  Pain Physical Therapy:     YES   Schedule first follow up visit with Anuja Hernández PT.   2.  Pain Psychologist to address relaxation, behavioral change, coping style, and other factors important to improvement.  YES   Schedule first visit with Alecia Rowland PsyD, LP.   3.  Medication Management:     1. Continue current medication regimen- Tramadol, hemp CBD oil and Voltaren gel.    4.  Potential procedures: not at this time.     5.  Referrals: continue TENS unit and ice.    6.  Follow up with MARICRUZ Patel CNP in 4-6 weeks.       ----------------------------------------------------------------  Clinic Number:  [phone]     Call with any questions about your care and for scheduling assistance.     Calls are returned Monday through Friday between 8 AM and 4:30 PM. We usually get back to you within 2 business days depending on the issue/request.    If we are prescribing your medications:    For opioid medication refills, call the clinic or send a [facility] message 7 days in advance.  Please include:    Name of requested medication    Name of the pharmacy.    For non-opioid medications, call your pharmacy directly to request a refill. Please allow 3-4 days to be processed.     Per [place] State Law:    All controlled substance prescriptions must be filled within 30 days of being written.      For those controlled substances allowing refills, pickup must occur within 30 days of last fill.      We believe regular attendance is key to your success in our program!      Any time you are unable to keep your appointment we ask that you call us at least 24 hours in advance to cancel.This will allow us to offer the appointment time to another patient.     Multiple missed appointments may lead to dismissal from the clinic.

## 2020-02-18 ENCOUNTER — OFFICE VISIT (OUTPATIENT)
Dept: PALLIATIVE MEDICINE | Facility: CLINIC | Age: 60
End: 2020-02-18
Payer: COMMERCIAL

## 2020-02-18 DIAGNOSIS — M79.7 FIBROMYALGIA: Primary | ICD-10-CM

## 2020-02-18 DIAGNOSIS — G89.4 CHRONIC PAIN SYNDROME: ICD-10-CM

## 2020-02-18 PROCEDURE — 97162 PT EVAL MOD COMPLEX 30 MIN: CPT | Mod: GP | Performed by: PHYSICAL THERAPIST

## 2020-02-18 PROCEDURE — 97530 THERAPEUTIC ACTIVITIES: CPT | Mod: GP | Performed by: PHYSICAL THERAPIST

## 2020-02-18 NOTE — PROGRESS NOTES
"PHYSICAL THERAPY INITIAL EVALUATION and PLAN OF CARE    Patient Name: Maricarmen Dotson     : 1960    MRN: [id number]   Pain Management Provider:  MARICRUZ Solitario CNP    Diagnosis:    Fibromyalgia  Chronic pain syndrome    SUBJECTIVE:    PRESENTATION AND ETIOLOGY    Chief Complaint: Widespread spread \"everywhere\" ; walks daily, performs contract/relax stretching, yoga poses  Onset / Etiology:  related to trauma, had trouble walking at the time  Pattern Since Onset: Unchanged  Pain is described as sharp, burning, scraping, aching, shooting, raw   Frequency: Constant  Intensity: Average 7-9/10  Fatigue Level: (scale 0-10)  10/10 average    LEVEL OF FUNCTION AT START OF CARE  Walking tolerance: 40-45'  Sitting tolerance: 30'  Standing tolerance: 30'  Housework tolerance: 2 hours active; 30' rest  Sleep: 4-5 hours consistently  Current Aggrevating Activities / Functional Limitations: activity more than 2-4 hours or non-activity half hour    CURRENT / PREVIOUS INTERVENTION(S):   Relieving Activities / Self Care: movement, ice  Previous / Current therapies for current chief complaint: PT, biofeedback, opioids, meditation, Pain Clinic in early , [facility] , ice, TENS    DEMOGRAPHICS  Employment Status: disability due to anxiety  Social Support: lives in a house with  and 2 daughters  Pertinent Medical  / Surgical History: Epic Snapshot Reviewed, See provider's note    Patient's goals for physical therapy: to learn how to handle spasm/pain    ===============================================================  OBJECTIVE:  POSTURE:  Observation: Patient demonstrates forward head, protracted shoulders and thoracic kyphosis in standing and sitting posture.   GAIT, LOCOMOTION, and BALANCE:  Gait and Locomotion: normal velocity, gait pattern  Balance: able to perform tree pose bilaterally  RANGE OF MOTION:   Cervical: WFL  Lumbar: WFL  Shoulders: WFL  Hips: WFL  MUSCLE PERFORMANCE: "   Strength: demonstrates core instability  Flexibility: tightness noted in cervical psp  FUNCTIONAL TESTING/OBSERVATION: indep chair and bed mobility  ===============================================================  Today's Treatment:  Initial evaluation  Therapeutic Activity:   For 30 minutes including Patient was educated about the function of the sympathetic nervous system and how it is impacted by persistent input/pain, as well as the importance of self-care techniques useful in calming the sympathetic nervous system. Patient was educated about the relationship between tissue injury and pain, and that pain is an output by the brain.  This includes the principle that tissue injury does not have to hurt, and that you can have pain without having tissue injury, especially with hypersensitivity in the nervous system.  Focus next session: pacing, self cares, self MFR  ===============================================================  ASSESSMENT:  Physical Therapy Diagnosis:Impaired Posture and Impaired Muscle Performance    Patient requires PT intervention for the following impairments: Limited knowledge of condition and / or self care - inability to control symptoms and Pain    Anticipated Goals and Expected Outcomes:  8 weeks  Patient will report the use of 2 self care practices during their day.  Patient will report the participation in 30 minutes of aerobic activity daily and practicing stretching daily.  Patient will demonstrate the ability to find core strength in neutral posture.  Patient will demonstrate the ability to relax muscle group before stretching.  16 weeks  Patient will be independent with a home exercise program.  Patient will be independent with posture correction.  Patient will report independence with a self care/flare management program.  Patient will demonstrate improved functional strength and endurance as reports by increased tolerance for IADLs and more consistent participation in daily  activity.     Rehab potential for achieving goals: fair.    ===============================================================  PLAN:   Patient will benefit from skilled physical therapy consisting of:  neuromuscular reeducation of: kinesthetic sense and posture for sitting and/or standing activities, education in self care / home management training to include instruction in: symptom control techniques, therapeutic activities to achieve improved functional performance in: daily actvities and therapeutic exercise to develop: strength and endurance, flexibility and core stability    Assessment will be ongoing with changes in treatment as indicated.  Benefits/risks/alternatives to treatment have been reviewed and the patient has been instructed to contact this office if they have any questions or concerns.  This plan of care has been discussed with the patient and the patient is in agreement.     Frequency / Duration:  Patient will be seen for a total of 2-4 visits; 8 weeks    Total Visit Time: 45  minutes            Anuja Hernández, PT                                     Date:  2/18/2020      =====================================================  **  Referring Provider Certification: Referring Provider reviewing certifies that the above treatment / plan of care is required and authorized, and that the patient's plan will be reviewed every thirty (30) days **.   ======================================================     PRESENT:  NA    MULTIDISCIPLINARY PATIENT / FAMILY EDUCATION RECORD  Department:  Physical Therapy    Readiness to Learn: Ability to understand verbal instructions, Ability to understand written instructions, Knowledge of educational needs / treatment plan  Specific Barriers to Learning: None  Referrals: None  Learning Needs: Rehabilitation techniques to improve functional independence Pain management education to improve daily activity tolerance.  Who: Patient  How: Demonstration, Verbal  instructions, Written instructions  Response: Appropriate verbal response, Asked questions, Demonstrated ability, Verbalized recall / understanding

## 2020-02-20 ENCOUNTER — PRE VISIT (OUTPATIENT)
Dept: ONCOLOGY | Facility: CLINIC | Age: 60
End: 2020-02-20

## 2020-02-20 ENCOUNTER — OFFICE VISIT (OUTPATIENT)
Dept: ONCOLOGY | Facility: CLINIC | Age: 60
End: 2020-02-20
Attending: INTERNAL MEDICINE
Payer: COMMERCIAL

## 2020-02-20 DIAGNOSIS — Z80.3 FAMILY HISTORY OF MALIGNANT NEOPLASM OF BREAST: ICD-10-CM

## 2020-02-20 PROCEDURE — 96040 ZZH GENETIC COUNSELING, EACH 30 MINUTES: CPT | Mod: ZF | Performed by: GENETIC COUNSELOR, MS

## 2020-02-20 PROCEDURE — 40000114 ZZH STATISTIC NO CHARGE CLINIC VISIT

## 2020-02-20 NOTE — Clinical Note
2/20/2020       RE: Maricarmen Dotson  [address]  [place] [address]     Dear Colleague,    Thank you for referring your patient, Maricarmen Dotson, to the [facility]. Please see a copy of my visit note below.    2/20/2020    Referring Provider: Casie Babcock MD    Presenting Information:   I met with Maricarmen Dotson today for genetic counseling at the Cancer Risk Management Program at the [facility] to discuss her family history of cancer.  She is here today to review this history, cancer screening recommendations, and available genetic testing options.    Personal History:  Maricarmen is a 59 year old female. She was diagnosed with ***; treatment included ***  / does not have any personal history of cancer.    ***She had her first menstrual period at age ***, her first child at age ***, and is ***.  ***Maricarmen has her ovaries, fallopian tubes and uterus in place, and she has had *** ovarian cancer screening to date. She reports that she *** hormone replacement therapy.      She has *** clinical breast exams and mammograms; her most recent mammogram in *** was ***. *** Maricarmen began having colonoscopies at the age of ***/Maricarmen has not had a colonoscopy. Her most recent colonoscopy in *** was *** and follow-up was recommended in ***. *** She does not regularly do any other cancer screening at this time. Maricarmen reported *** tobacco use and *** alcohol use.    Family History: (Please see scanned pedigree for detailed family history information)    ***    ***    Her maternal ethnicity is ***. Her paternal ethnicity is ***.  There is no known Ashkenazi Jehovah's witness ancestry on either side of her family. There is no reported consanguinity.    Discussion:    Maricarmen's personal and family history of *** is suggestive of a hereditary cancer syndrome.    We reviewed the features of sporadic, familial, and hereditary cancers. In looking at Maricarmen's family history, it is possible that a cancer susceptibility gene is  present due to ***.    We discussed the natural history and genetics of ***. A detailed handout regarding *** and the information we discussed was provided to Maricarmen at the end of our appointment today and can be found in the after visit summary. Topics included: inheritance pattern, cancer risks, cancer screening recommendations, and also risks, benefits and limitations of testing.    ***    Based on her personal and family history, Maricarmen meets current National Comprehensive Cancer Network (NCCN) criteria for genetic testing of ***.      We discussed that there are additional genes that could cause increased risk for *** cancer. As many of these genes present with overlapping features in a family and accurate cancer risk cannot always be established based upon the pedigree analysis alone, it would be reasonable for Maricarmen to consider panel genetic testing to analyze multiple genes at once.    ***    Genetic testing is available for: ***.      Medical Management: For Maricarmen, we reviewed that the information from genetic testing may determine:    ***surgery to treat Maricarmen's active cancer diagnosis (i.e. lumpectomy versus bilateral mastectomy, partial versus total colectomy, etc.***),    additional cancer screening for which Maricarmen may qualify (i.e. mammogram and breast MRI, more frequent colonoscopies, more frequent dermatologic exams, etc.***),    options for risk reducing surgeries Maricarmen could consider (i.e. bilateral mastectomy, surgery to remove her ovaries and/or uterus, etc.***),      and targeted chemotherapies for Maricarmen's *** active cancer, or ***if she were to develop certain cancers in the future (i.e. immunotherapy for individuals with Carlos syndrome, PARP inhibitors, etc.***).     These recommendations and possible targeted chemotherapies will be discussed in detail once genetic testing is completed.     Plan:  1) Today Maricarmen elected to proceed with ***.  2) This information should be available in 4-6*** weeks.  3)  Maricarmen will return to clinic to discuss the results.    Face to face time: *** minutes    ***      Again, thank you for allowing me to participate in the care of your patient.      Sincerely,    Krystal Varela GC

## 2020-02-24 ENCOUNTER — OFFICE VISIT (OUTPATIENT)
Dept: PALLIATIVE MEDICINE | Facility: CLINIC | Age: 60
End: 2020-02-24
Payer: COMMERCIAL

## 2020-02-24 DIAGNOSIS — G89.4 CHRONIC PAIN SYNDROME: ICD-10-CM

## 2020-02-24 DIAGNOSIS — M79.7 FIBROMYALGIA: Primary | ICD-10-CM

## 2020-02-24 PROCEDURE — 96156 HLTH BHV ASSMT/REASSESSMENT: CPT | Performed by: PSYCHOLOGIST
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
## 2020-02-25 ENCOUNTER — OFFICE VISIT (OUTPATIENT)
Dept: SLEEP MEDICINE | Facility: CLINIC | Age: 60
End: 2020-02-25
Payer: COMMERCIAL

## 2020-02-25 VITALS
BODY MASS INDEX: 35 KG/M2 | DIASTOLIC BLOOD PRESSURE: 72 MMHG | SYSTOLIC BLOOD PRESSURE: 110 MMHG | HEART RATE: 58 BPM | HEIGHT: 64 IN | WEIGHT: 205 LBS | OXYGEN SATURATION: 96 %

## 2020-02-25 DIAGNOSIS — F51.04 CHRONIC INSOMNIA: Primary | ICD-10-CM

## 2020-02-25 DIAGNOSIS — F41.9 ANXIETY: ICD-10-CM

## 2020-02-25 DIAGNOSIS — G47.00 INSOMNIA, UNSPECIFIED TYPE: ICD-10-CM

## 2020-02-25 PROCEDURE — 90832 PSYTX W PT 30 MINUTES: CPT | Performed by: PSYCHOLOGIST

## 2020-02-25 RX ORDER — LORAZEPAM 1 MG/1
TABLET ORAL
Qty: 15 TABLET | Refills: 0 | Status: SHIPPED | OUTPATIENT
Start: 2020-02-25 | End: 2020-03-27

## 2020-02-25 ASSESSMENT — MIFFLIN-ST. JEOR: SCORE: 1494.87

## 2020-02-25 NOTE — PATIENT INSTRUCTIONS
"Use reminder for a \"check in  \" time about your level of activation to help you know to wind down or when you use PRN medication as prescribed.  Try a check in at 1 PM    Continue to use CBTI     Consider using CBTI  tools.          Cognitive Behavioral Therapy for Insomnia (CBT-I)    Sleep Strengthening    Now that you understand a bit more about how sleep works and what causes insomnia, you are ready to move on to the core of CBT-I:  Sleep Strengthening.  This process involves five key strategies that will:    ? Strengthen your sleep drive and biological sleep clock  ? Strengthen the link between your bed and sleep    Core Sleep Strengthening Strategies     You are now ready to start the process of strengthening your sleep. Much like physical therapy strengthens muscles, studies show these five sleep strategies are the key to achieving stronger, healthier sleep.     1. Reduce your  Time In Bed    Spending extra time in bed trying to get more sleep actually can cause more sleep disruption and weaken sleep efficiency. Sleep efficiency is simply the percentage of time a person spends asleep while in bed. Normal sleep efficiency is thought to be 85% or greater.  By reducing your time in bed, you will be awake longer leading to quicker and deeper sleep. This strategy does not reduce the amount of sleep you get, just the time you are awake in bed:                                       Your Sleep Schedule Prescription    During the first step of sleep strengthening, you will reduce your time in bed following a Sleep Schedule Prescription. Your prescription is determined by the average nightly amount of sleep you got over the past two weeks plus 30 minutes. It also takes into account the best time for you to sleep. The least amount of time prescribed is 6 hours in bed unless a sleep specialist recommends otherwise.     Total Time in Bed:  7 hours  Bedtime:  No earlier than  1 AM  Wake-up Time:  Out of bed every " day by  8 AM     2. Don't go to bed until you feel sleepy (not tired or fatigued)     This helps increase your sleep drive by keeping you awake longer.  If you go to bed when you're not sleepy, it gives your brain the wrong message and can lead to frustration.     If you feel sleepy before your prescribed bedtime, find activities that can help you stay awake until it is time for you to go to bed. Even brief naps in the evening can be very disruptive of sleep at night.     3. Don't stay in bed unless you are sleeping      If you are unable to fall asleep or return to sleep after about 30 minutes, get out of bed. This helps train your brain that the bed is for sleep. It is harmful to your sleep when you are worried or frustrated in bed. Do not read, eat, worry, think about sleep, use mobile phones or tablets, or watch TV in bed. Do not watch the clock during the night.     Go to another room and do something relaxing. Plan things you can do when you get out of bed. Avoid use of mobile devices or computers when out of bed.    Return to bed only when you feel sleepy again.  Repeat as often as needed throughout the night.     4. Get out of bed at the same time every day    Getting up at the same time helps set your biological clock. It is important to stick to your wake time no matter how much sleep you got the night before or how you feel in the morning.    Varying your wake can have the same effect as jet lag.  It disrupts your biological clock and makes you feel more tired and exhausted.  Trying to  catch up  on sleep on the weekends only makes things worse and sets you up for a cycle of poor sleep the following weekdays.      Make sure you set an alarm and keep it away from the bed to prevent looking at the clock during the night.      5. Avoid daytime napping    Avoid daytime napping if possible. Napping partially fulfills your need for sleep and weakens your sleep drive at night.    However, if you find yourself  sleepy (not just tired) you can take a brief 15-30 minute nap during the day if needed.  Naps within 7-8 hours of your prescribed wake time are less likely to affect your sleep the coming night.  Sleepy people make more mistakes and may hurt themselves or others. Therefore, safety trumps all other sleep prescriptions.  Never drive or operate equipment if drowsy or sleepy.     Changing Your Sleep Schedule Prescription    After one week, you can adjust your sleep schedule prescription based on how well you are sleeping. Use the following guidelines to change your prescribed time in bed based on information from your Sleep Diary, Mobile Sleep Diary Sunil or Wearable Device:          Increase Time in Bed by 15 Minutes IF:      You are falling asleep in less than 30 minutes AND awake less than 30 minutes during the night.       OR your CBT-I  pp, Mobile Sunil or Wearable Device indicates your sleep efficiency has been greater than 90%.              Decrease Time in Bed by 15 Minutes IF:      It is taking longer than 30 minutes to fall asleep OR you are awake more than 30 minutes during the night.        OR your CBT-I  Sunil, Mobile Sunil or Wearable Device indicates your sleep efficiency has been less than 80%.      DO NOT decrease your sleep schedule below 6 hours unless instructed by your provider.    Change is Challenging     Research shows that making significant changes in sleep habits improves sleep quality and how you feel. Improvement takes time and is not always steady. Change is challenging and can be stressful.  This is especially true if old habits - like spending more time in bed -- were a way to avoid worry about getting enough sleep.

## 2020-02-25 NOTE — TELEPHONE ENCOUNTER
As per : Last 3 refills were on 1/24, 11/25 & 10/21 for 15# each.     Ativan 1 mg      Last Written Prescription Date:  1/24/2020  Last Fill Quantity: 15,   # refills: 0  Last Office Visit: 2/5/2020 with (preceptor Dr. Mclaughlin)  Future Office visit:       Routing refill request to provider for review/approval because:  Drug not on the [facility], [facility] or [facility] refill protocol or controlled substance    Clme, RN  Triage Nurse

## 2020-02-26 ENCOUNTER — TRANSFERRED RECORDS (OUTPATIENT)
Dept: HEALTH INFORMATION MANAGEMENT | Facility: CLINIC | Age: 60
End: 2020-02-26

## 2020-02-27 ENCOUNTER — OFFICE VISIT (OUTPATIENT)
Dept: ENDOCRINOLOGY | Facility: CLINIC | Age: 60
End: 2020-02-27
Payer: COMMERCIAL

## 2020-02-27 VITALS
HEART RATE: 70 BPM | TEMPERATURE: 98.4 F | OXYGEN SATURATION: 95 % | WEIGHT: 203 LBS | BODY MASS INDEX: 34.84 KG/M2 | DIASTOLIC BLOOD PRESSURE: 72 MMHG | SYSTOLIC BLOOD PRESSURE: 138 MMHG

## 2020-02-27 DIAGNOSIS — E11.9 TYPE 2 DIABETES, HBA1C GOAL < 7% (H): Primary | ICD-10-CM

## 2020-02-27 DIAGNOSIS — Z96.41 INSULIN PUMP IN PLACE: ICD-10-CM

## 2020-02-27 LAB — HBA1C MFR BLD: 8 % (ref 0–5.6)

## 2020-02-27 PROCEDURE — 99214 OFFICE O/P EST MOD 30 MIN: CPT | Performed by: CLINICAL NURSE SPECIALIST

## 2020-02-27 PROCEDURE — 36415 COLL VENOUS BLD VENIPUNCTURE: CPT | Performed by: CLINICAL NURSE SPECIALIST

## 2020-02-27 PROCEDURE — 83036 HEMOGLOBIN GLYCOSYLATED A1C: CPT | Performed by: CLINICAL NURSE SPECIALIST

## 2020-02-27 NOTE — PROGRESS NOTES
"SLEEP MEDICINE PROGRESS NOTE  Sleep Psychology    Patient Name: Maricarmen Dotson  MRN:  [id number]  Date of Service: Feb 25, 2020       Subjective Report     Maricarmen Dotsno returns to the sleep clinic today to discuss progress in implementing behavioral strategies for the management of chronic insomnia.  Maricarmen reports she has made changes that have improved overall sleep quality.  She is managing insomnia within the context of the management of mood/anxiety.  She has been napping occasionally.  She does report fatigue in the late afternoon.  We discussed combining a brief \"power nap\" in the afternoon.  She also will check in with herself in the early afternoon about whether it is been a \"good day or bad day\" to help gauge whether she will need to take extra steps to manage her level of activation and anxiety in the evening.  She will continue to follow with her mental health care team.  She will continue to monitor her sleep using CBT-I .     .     Sleep Data:     Source of Data: CBT-I     Sleep Latency: Less than 30 min.  Times Awakened: 1-2  Wake Time After Sleep Onset: 45 min.  Total Sleep Time: 6 hours 30 min.  Sleep Efficiency: 80-85 %    Total score - [place]: 8 .    ALESSANDRA Total Score: 17       Interventions     Strategies and recommendations including stimulus control were discussed today.       Vital Signs     /72   Pulse 58   Ht 1.626 m (5' 4\")   Wt 93 kg (205 lb)   LMP 01/01/1999   SpO2 96%   BMI 35.19 kg/m       Mental Status     Appearance:  Well kept  Orientation:  X3  Mood:  better  Affect:  Congruent with mood  Speech/Language:  Normal  Thought Process: Intact  Associations:  Normal  Thought Content: Normal    Diagnostic Impressions and Plan       1. Chronic insomnia  Continue with stimulus control and her current sleep prescription.  Add 30-minute nap if needed to manage fatigue/sleepiness.  Introduced relaxation strategies via the CBT-I  kaela and emphasized getting up at the " same time and getting exposure to bright light for about 30 minutes        Follow-up: 2 weeks    Time Spent: 20 minutes      Sanjeev Flores PsyD, TEOFILO, DBSM  Diplomate, Behavioral Sleep Medicine  [facility]

## 2020-02-27 NOTE — PROGRESS NOTES
Name: Maricarmen Dotson  F/u for Diabetes (Last seen 10/28/2019).  HPI:  Maricarmen Dotson is a 59 year old female who presents for the management of:    1. Type 2 DM:  Originally diagnosed: in 1997 after starting Paxil and gaining 32 lbs in one month    Insulin pump start 2/20/2018.    Last diabetes ed12/16/2019  Current Regimen:   Insulin pump - Omnipod  Currently using pattern 3    Standard pattern  Time Rate (U/hr)   0000 1.600   06:00 1.450   12:00 1.300   18:00 1.450     Pattern 2   Time Rate (U/hr)   0000 1.700   06:00 1.550   12:00 1.400   18:00 1.550     Pattern 3 -CURRENT  Time Rate (U/hr)   0000 1.850   06:00 1.650   12:00 1.500   18:00 1.850     Pattern 4   Time Rate (U/hr)   0000 1.900   06:00 1.650   12:00 1.750   18:00 2.000     Pattern 5   Time Rate (U/hr)   0000 2.000   06:00 1.650   12:00 2.000   18:00 2.200     Carbohydrate Ratio -    Time Ratio   0000 6         Sensitivity   00:00  05:00    23  25   Active Insulin Time  4 hours   Basal  58% (40.3 units)   Bolus   42% (29.1 units)   Total Carbohydrates/day 157 g   Total Insulin/day  69.4 units   Average Blood Sugar  BG range  Freddie average 153    149   BS Checks 4.5 times a day   Target range 100-120     Freddie download:  Average glucose 149; 25% above target range, 74% in target range, 1% below range.    REPORTS PREVIOUS ADVERSE REACTION TO HUMALOG.  States she cannot take Humalog because it caused pancreatits.    Was previously treated by endo at [facility] (last seen there 11/6/2015).  She has had adverse reaction to most oral medications including Metformin (abdominal pain), glipizide (allergic type reaction), Avandia (abdominal pain and swelling), Byetta and Onglyza (pancreatitis).  Was previously prescribed Invokana but did not start because she was concerned about possible side effects.        History of eating disorder (binge eating) due to PTSD.  Continues to struggle with a binge eating disorder.      Complications:   Diabetes Complications   Description / Detail    Diabetic Retinopathy   No retinopathy, last exam 2019   CAD / PAD   No   Neuropathy   No   Nephropathy / Microalbuminuria   No   Gastroparesis  No   Hypoglycemia Unawareness  No     2. Intermittent Hypertension: Blood Pressure today:   BP Readings from Last 3 Encounters:   20 138/72   20 110/72   20 128/82   .  Blood pressure medications include no medications.    3. Lipids: Takes no medications for lipid control.        PMH/PSH:  Past Medical History:   Diagnosis Date     Ascending aorta enlargement (H)      Depressive disorder     severe, prior hospitalization     Diabetes mellitus, type 2 (H)      Diverticulosis of colon (without mention of hemorrhage)      Fibromyalgia 2007     Insomnia      Irritable bowel syndrome      Past Surgical History:   Procedure Laterality Date     BACK SURGERY      fusion  and removal of hardware      C SPINAL ORTHOSIS,NOS      lumbar surgery     CHOLECYSTECTOMY       choley  2011     ENT SURGERY  1986    septoplasty     HYSTERECTOMY, CERVIX STATUS UNKNOWN      TVH/BSO     ORTHOPEDIC SURGERY      left ankle     Family Hx:  Family History   Problem Relation Age of Onset     Diabetes Mother         type 2     Hypertension Mother      Cerebrovascular Disease Mother          stroke age 57     Ovarian Cancer Mother 43     Cancer Mother         cervix     Diabetes Father         type 2     Hypertension Father      Gastrointestinal Disease Father         colon polyps     Sleep Apnea Brother      Cancer Sister      Ovarian Cancer Sister 46     Ovarian Cancer Maternal Grandmother 72     Cancer Maternal Grandmother         cervix     Thyroid disease:          DM2: Yes, mother and father         Autoimmune: DM1, SLE, RA, Vitiligo     Social Hx:  Social History     Socioeconomic History     Marital status:      Spouse name: Not on file     Number of children: 3     Years of education: Not on file     Highest  education level: Not on file   Occupational History     Occupation: xr technician     Employer: [employer]   Social Needs     Financial resource strain: Not on file     Food insecurity:     Worry: Not on file     Inability: Not on file     Transportation needs:     Medical: Not on file     Non-medical: Not on file   Tobacco Use     Smoking status: Never Smoker     Smokeless tobacco: Never Used   Substance and Sexual Activity     Alcohol use: Yes     Alcohol/week: 0.0 standard drinks     Comment: maybe once a month     Drug use: No     Sexual activity: Yes     Partners: Male     Birth control/protection: Surgical   Lifestyle     Physical activity:     Days per week: Not on file     Minutes per session: Not on file     Stress: Not on file   Relationships     Social connections:     Talks on phone: Not on file     Gets together: Not on file     Attends Scientologist service: Not on file     Active member of club or organization: Not on file     Attends meetings of clubs or organizations: Not on file     Relationship status: Not on file     Intimate partner violence:     Fear of current or ex partner: Not on file     Emotionally abused: Not on file     Physically abused: Not on file     Forced sexual activity: Not on file   Other Topics Concern     Parent/sibling w/ CABG, MI or angioplasty before 65F 55M? Not Asked   Social History Narrative     Not on file          MEDICATIONS:  has a current medication list which includes the following prescription(s): aspirin not prescribed, blood glucose, freestyle ashleigh 14 day reader, freestyle ashleigh 14 day sensor, voltaren, epinephrine, HEMP OIL OR EXTRACT OR OTHER CBD CANNABINOID, NOT MEDICAL CANNABIS,, insulin pump, lorazepam, losartan, melatonin, polyethylene glycol, statin not prescribed, and tramadol.    ROS     ROS: 10 point ROS neg other than the symptoms noted above in the HPI.    Physical Exam   VS: /72 (BP Location: Left arm, Patient Position: Chair,  Cuff Size: Adult Large)   Pulse 70   Temp 98.4  F (36.9  C) (Oral)   Wt 92.1 kg (203 lb)   LMP 01/01/1999   SpO2 95%   BMI 34.84 kg/m    GENERAL: NAD, well dressed, answering questions appropriately, appears stated age.  HEENT: no exophthalmos, no proptosis, no lig lag, no retraction, no scleral icterus  RESPIRATORY: Normal respiratory effort.  CARDIOVASCULAR: RRR.  EXTREMTIES: No edema  NEUROLOGY: CN grossly intact, no tremors  MSK: Normal gait and station.  PSYCH: Intact judgment and insight. A&OX3 with a cordial affect.    LABS:  A1c:   Component      Latest Ref Rng & Units 6/26/2018 4/18/2019 10/28/2019 2/27/2020   Hemoglobin A1C      0 - 5.6 % 8.7 (H) 8.5 (H) 8.0 (H) 8.0 (H)     Basic Metabolic Panel:  !COMPREHENSIVE Latest Ref Rng & Units 5/13/2019   SODIUM 133 - 144 mmol/L 139   POTASSIUM 3.4 - 5.3 mmol/L 3.7   CHLORIDE 94 - 109 mmol/L 108   BUN 7 - 30 mg/dL 17   Creatinine 0.52 - 1.04 mg/dL    Creatinine 0.52 - 1.04 mg/dL 0.77   Glucose 70 - 99 mg/dL 183 (H)   ANION GAP 3 - 14 mmol/L 4   CALCIUM 8.5 - 10.1 mg/dL 9.2     LFTS/Cholesterol Panel:  !LIPID/HEPATIC Latest Ref Rng & Units 12/13/2018   CHOLESTEROL <200 mg/dL 262 (H)   TRIGLYCERIDES <150 mg/dL 168 (H)   HDL CHOLESTEROL >49 mg/dL 50   LDL CHOLESTEROL DIRECT <100 mg/dL    LDL CHOLESTEROL, CALCULATED <100 mg/dL 178 (H)   VLDL-CHOLESTEROL 0 - 30 mg/dL    NON HDL CHOLESTEROL <130 mg/dL 212 (H)     Thyroid Function:   !THYROID Latest Ref Rng & Units 7/22/2019   TSH 0.40 - 4.00 mU/L 0.95   T4 FREE 0.76 - 1.46 ng/dL 0.87     Urine Microalbumin:   Component      Latest Ref Rng & Units 7/22/2019   Creatinine Urine      mg/dL 160   Albumin Urine mg/L      mg/L 18   Albumin Urine mg/g Cr      0 - 25 mg/g Cr 11.38       Vitamin D Deficiency screening    30 - 75 ug/L 34     All pertinent notes, labs, and images personally reviewed by me.     A/P  Ms.Maricarmen Dotson is a 59 year old here for the management of diabetes:    1. DM2 - Uncontrolled - continues to  improve.  Today's A1c stable since 10/2019 at 8.0% which is the lowest A1c she has had since 2013.  Currently on insulin pump therapy.  .  Blood sugar control fairly reasonable based on the past two week Freddie download with 74% in target range of .  No pump setting changes.today.      Continue to work with diabetes ed weekly for ongoing pump adjustments.      Labs ordered today:   Orders Placed This Encounter   Procedures     Hemoglobin A1c   Urine microalbumin     Radiology/Consults ordered today: None    More than 50% of the time spent with Ms. Dotson on counseling / coordinating her care and discussing the above plan of care. The patient indicates understanding of the above issues and agrees with the plan set forth.  Total face to face time was 25 minutes.        Follow-up:  4 months    Emily Phan NP  Endocrinology  [facility]  CC: Madyson Mclaughlin

## 2020-02-27 NOTE — LETTER
2/27/2020         RE: Maricarmen Dotson  [address]  [place] [address]        Dear Colleague,    Thank you for referring your patient, Maricarmen Dotson, to the [facility]. Please see a copy of my visit note below.    Name: Maricarmen Dotson  F/u for Diabetes (Last seen 10/28/2019).  HPI:  Maricarmen Dotson is a 59 year old female who presents for the management of:    1. Type 2 DM:  Originally diagnosed: in 1997 after starting Paxil and gaining 32 lbs in one month    Insulin pump start 2/20/2018.    Last diabetes ed12/16/2019  Current Regimen:   Insulin pump - Omnipod  Currently using pattern 3    Standard pattern  Time Rate (U/hr)   0000 1.600   06:00 1.450   12:00 1.300   18:00 1.450     Pattern 2   Time Rate (U/hr)   0000 1.700   06:00 1.550   12:00 1.400   18:00 1.550     Pattern 3 -CURRENT  Time Rate (U/hr)   0000 1.850   06:00 1.650   12:00 1.500   18:00 1.850     Pattern 4   Time Rate (U/hr)   0000 1.900   06:00 1.650   12:00 1.750   18:00 2.000     Pattern 5   Time Rate (U/hr)   0000 2.000   06:00 1.650   12:00 2.000   18:00 2.200     Carbohydrate Ratio -    Time Ratio   0000 6         Sensitivity   00:00  05:00    23  25   Active Insulin Time  4 hours   Basal  58% (40.3 units)   Bolus   42% (29.1 units)   Total Carbohydrates/day 157 g   Total Insulin/day  69.4 units   Average Blood Sugar  BG range  Freddie average 153    149   BS Checks 4.5 times a day   Target range 100-120     Freddie download:  Average glucose 149; 25% above target range, 74% in target range, 1% below range.    REPORTS PREVIOUS ADVERSE REACTION TO HUMALOG.  States she cannot take Humalog because it caused pancreatits.    Was previously treated by endo at [facility] (last seen there 11/6/2015).  She has had adverse reaction to most oral medications including Metformin (abdominal pain), glipizide (allergic type reaction), Avandia (abdominal pain and swelling), Byetta and Onglyza (pancreatitis).  Was previously  prescribed Invokana but did not start because she was concerned about possible side effects.        History of eating disorder (binge eating) due to PTSD.  Continues to struggle with a binge eating disorder.      Complications:   Diabetes Complications  Description / Detail    Diabetic Retinopathy   No retinopathy, last exam 2019   CAD / PAD   No   Neuropathy   No   Nephropathy / Microalbuminuria   No   Gastroparesis  No   Hypoglycemia Unawareness  No     2. Intermittent Hypertension: Blood Pressure today:   BP Readings from Last 3 Encounters:   20 138/72   20 110/72   20 128/82   .  Blood pressure medications include no medications.    3. Lipids: Takes no medications for lipid control.        PMH/PSH:  Past Medical History:   Diagnosis Date     Ascending aorta enlargement (H)      Depressive disorder     severe, prior hospitalization     Diabetes mellitus, type 2 (H)      Diverticulosis of colon (without mention of hemorrhage)      Fibromyalgia 2007     Insomnia      Irritable bowel syndrome      Past Surgical History:   Procedure Laterality Date     BACK SURGERY      fusion  and removal of hardware      C SPINAL ORTHOSIS,NOS      lumbar surgery     CHOLECYSTECTOMY       choley       ENT SURGERY      septoplasty     HYSTERECTOMY, CERVIX STATUS UNKNOWN      TVH/BSO     ORTHOPEDIC SURGERY      left ankle     Family Hx:  Family History   Problem Relation Age of Onset     Diabetes Mother         type 2     Hypertension Mother      Cerebrovascular Disease Mother          stroke age 57     Ovarian Cancer Mother 43     Cancer Mother         cervix     Diabetes Father         type 2     Hypertension Father      Gastrointestinal Disease Father         colon polyps     Sleep Apnea Brother      Cancer Sister      Ovarian Cancer Sister 46     Ovarian Cancer Maternal Grandmother 72     Cancer Maternal Grandmother         cervix     Thyroid disease:          DM2:  Yes, mother and father         Autoimmune: DM1, SLE, RA, Vitiligo     Social Hx:  Social History     Socioeconomic History     Marital status:      Spouse name: Not on file     Number of children: 3     Years of education: Not on file     Highest education level: Not on file   Occupational History     Occupation: xr technician     Employer: [employer]   Social Needs     Financial resource strain: Not on file     Food insecurity:     Worry: Not on file     Inability: Not on file     Transportation needs:     Medical: Not on file     Non-medical: Not on file   Tobacco Use     Smoking status: Never Smoker     Smokeless tobacco: Never Used   Substance and Sexual Activity     Alcohol use: Yes     Alcohol/week: 0.0 standard drinks     Comment: maybe once a month     Drug use: No     Sexual activity: Yes     Partners: Male     Birth control/protection: Surgical   Lifestyle     Physical activity:     Days per week: Not on file     Minutes per session: Not on file     Stress: Not on file   Relationships     Social connections:     Talks on phone: Not on file     Gets together: Not on file     Attends Sikh service: Not on file     Active member of club or organization: Not on file     Attends meetings of clubs or organizations: Not on file     Relationship status: Not on file     Intimate partner violence:     Fear of current or ex partner: Not on file     Emotionally abused: Not on file     Physically abused: Not on file     Forced sexual activity: Not on file   Other Topics Concern     Parent/sibling w/ CABG, MI or angioplasty before 65F 55M? Not Asked   Social History Narrative     Not on file          MEDICATIONS:  has a current medication list which includes the following prescription(s): aspirin not prescribed, blood glucose, freestyle ashleigh 14 day reader, freestyle ashleigh 14 day sensor, voltaren, epinephrine, HEMP OIL OR EXTRACT OR OTHER CBD CANNABINOID, NOT MEDICAL CANNABIS,, insulin pump,  lorazepam, losartan, melatonin, polyethylene glycol, statin not prescribed, and tramadol.    ROS     ROS: 10 point ROS neg other than the symptoms noted above in the HPI.    Physical Exam   VS: /72 (BP Location: Left arm, Patient Position: Chair, Cuff Size: Adult Large)   Pulse 70   Temp 98.4  F (36.9  C) (Oral)   Wt 92.1 kg (203 lb)   LMP 01/01/1999   SpO2 95%   BMI 34.84 kg/m     GENERAL: NAD, well dressed, answering questions appropriately, appears stated age.  HEENT: no exophthalmos, no proptosis, no lig lag, no retraction, no scleral icterus  RESPIRATORY: Normal respiratory effort.  CARDIOVASCULAR: RRR.  EXTREMTIES: No edema  NEUROLOGY: CN grossly intact, no tremors  MSK: Normal gait and station.  PSYCH: Intact judgment and insight. A&OX3 with a cordial affect.    LABS:  A1c:   Component      Latest Ref Rng & Units 6/26/2018 4/18/2019 10/28/2019 2/27/2020   Hemoglobin A1C      0 - 5.6 % 8.7 (H) 8.5 (H) 8.0 (H) 8.0 (H)     Basic Metabolic Panel:  !COMPREHENSIVE Latest Ref Rng & Units 5/13/2019   SODIUM 133 - 144 mmol/L 139   POTASSIUM 3.4 - 5.3 mmol/L 3.7   CHLORIDE 94 - 109 mmol/L 108   BUN 7 - 30 mg/dL 17   Creatinine 0.52 - 1.04 mg/dL    Creatinine 0.52 - 1.04 mg/dL 0.77   Glucose 70 - 99 mg/dL 183 (H)   ANION GAP 3 - 14 mmol/L 4   CALCIUM 8.5 - 10.1 mg/dL 9.2     LFTS/Cholesterol Panel:  !LIPID/HEPATIC Latest Ref Rng & Units 12/13/2018   CHOLESTEROL <200 mg/dL 262 (H)   TRIGLYCERIDES <150 mg/dL 168 (H)   HDL CHOLESTEROL >49 mg/dL 50   LDL CHOLESTEROL DIRECT <100 mg/dL    LDL CHOLESTEROL, CALCULATED <100 mg/dL 178 (H)   VLDL-CHOLESTEROL 0 - 30 mg/dL    NON HDL CHOLESTEROL <130 mg/dL 212 (H)     Thyroid Function:   !THYROID Latest Ref Rng & Units 7/22/2019   TSH 0.40 - 4.00 mU/L 0.95   T4 FREE 0.76 - 1.46 ng/dL 0.87     Urine Microalbumin:   Component      Latest Ref Rng & Units 7/22/2019   Creatinine Urine      mg/dL 160   Albumin Urine mg/L      mg/L 18   Albumin Urine mg/g Cr      0 - 25 mg/g Cr  11.38       Vitamin D Deficiency screening    30 - 75 ug/L 34     All pertinent notes, labs, and images personally reviewed by me.     A/P  Ms.Anna ROXANNE Dotson is a 59 year old here for the management of diabetes:    1. DM2 - Uncontrolled - continues to improve.  Today's A1c stable since 10/2019 at 8.0% which is the lowest A1c she has had since 2013.  Currently on insulin pump therapy.  .  Blood sugar control fairly reasonable based on the past two week Freddie download with 74% in target range of .  No pump setting changes.today.      Continue to work with diabetes ed weekly for ongoing pump adjustments.      Labs ordered today:   Orders Placed This Encounter   Procedures     Hemoglobin A1c   Urine microalbumin     Radiology/Consults ordered today: None    More than 50% of the time spent with Ms. Dotson on counseling / coordinating her care and discussing the above plan of care. The patient indicates understanding of the above issues and agrees with the plan set forth.  Total face to face time was 25 minutes.        Follow-up:  4 months    Emily Phan NP  Endocrinology  [facility]  CC: Madyson Mclaughlin                   Again, thank you for allowing me to participate in the care of your patient.        Sincerely,        MARICRUZ Castillo CNP

## 2020-03-02 ENCOUNTER — OFFICE VISIT (OUTPATIENT)
Dept: PALLIATIVE MEDICINE | Facility: CLINIC | Age: 60
End: 2020-03-02
Payer: COMMERCIAL

## 2020-03-02 ENCOUNTER — PATIENT OUTREACH (OUTPATIENT)
Dept: PEDIATRICS | Facility: CLINIC | Age: 60
End: 2020-03-02

## 2020-03-02 DIAGNOSIS — G89.4 CHRONIC PAIN SYNDROME: ICD-10-CM

## 2020-03-02 DIAGNOSIS — M79.7 FIBROMYALGIA: Primary | ICD-10-CM

## 2020-03-02 PROCEDURE — 97530 THERAPEUTIC ACTIVITIES: CPT | Mod: GP | Performed by: PHYSICAL THERAPIST

## 2020-03-02 PROCEDURE — 96159 HLTH BHV IVNTJ INDIV EA ADDL: CPT | Performed by: PSYCHOLOGIST

## 2020-03-02 PROCEDURE — 96158 HLTH BHV IVNTJ INDIV 1ST 30: CPT | Performed by: PSYCHOLOGIST

## 2020-03-02 NOTE — PROGRESS NOTES
[facility]   [facility], [place]  Behavioral Medicine Visit    Patient Name: Maricarmen Dotson     YOB: 1960   Medical Record Number: [id number]  Date: 3/2/2020    Length of Visit: 60 minutes           Pain Diagnoses per pain provider:   Fibromyalgia, chronic pain syndrome        DATA: Patient reports her pain is greatly worse. Patient's mood is greatly worse. Activity level is moderately decreased. Stress level is moderately decreased. Sleep hygiene is greatly worse. Patient reports engaging in self-care for her pain once daily. Introduced concept of window of tolerance, and that she likely continues to be hyperaroused/outside the window of tolerance, thus feels pain and anxiety as a result. Provided her with TIP skills sheet and self-soothing through the 5 senses.    ASSESSMENT: Easily engaged and seems open to exploration of new skills.  Progress toward goals: fair.    Pain Status: worsened    Emotional Status: worsened              Medication / chemical use concerns:  none    Current Emotional / Mental Status    Appearance:   Appropriate   Eye Contact:   Fair   Psychomotor Behavior: Normal   Attitude:   Cooperative   Orientation:   All  Speech  Rate / Production:             Normal   Volume:              Normal   Mood:    Anxious   Affect:    Appropriate   Thought Content:  Clear   Thought Form:  Coherent  Goal Directed  Logical   Insight:    Fair     ASSESSMENT:       PLAN:   Next Appointment: Maricarmen Dotson will schedule a follow-up appointment in 1 week(s).  Assignment/Objectives /interventions for next session: Check in on any barriers to use of self-comfort and self-soothing skills, TIP skills; check in on understanding of Window of Tolerance.     Alecia Rowland PsyD LP  Licensed Psychologist  Outpatient Clinic Therapist  [facility] [place]    Disclaimer: This note consists of symbols  derived from keyboarding, dictation and/or voice recognition software. As a result, there may be errors in the script that have gone undetected. Please consider this when interpreting information found in this chart.

## 2020-03-02 NOTE — TELEPHONE ENCOUNTER
Panel Management Review      Patient has the following on her problem list: None      Composite cancer screening  Chart review shows that this patient is due/due soon for the following None  Summary:    Patient is due/failing the following:   PHQ9    Action needed:   Completed at OV 2/11/20    Type of outreach:    none    Questions for provider review:    None                                                                                                                                    Deanna Herndon LPN       Chart routed to closed .

## 2020-03-02 NOTE — PROGRESS NOTES
PAIN PHYSICAL THERAPY PROGRESS NOTE  Patient Name: Maricarmen Dotson      YOB: 1960     Medical Record Number: [id number]  Diagnosis:    Fibromyalgia  Chronic pain syndrome    Visit: 2/2-4    Patient's goals for physical therapy: to learn how to handle spasm/pain    Subjective: Patient reports she gets stabbing pain in her joints and than muscles spasm.  Pt reports pain and joints locking occurs 30-60' following an activity.  Self Care  HEP: next  Walking/Aerobic Activity: next  Posture: next  Breathing/Relaxation: next  Heat/Ice: ice  Mini Breaks: next  Pacing: next  Objective Findings:   POSTURE:  Observation: Patient demonstrates forward head, protracted shoulders and thoracic kyphosis in standing and sitting posture.   GAIT, LOCOMOTION, and BALANCE:  Gait and Locomotion: normal velocity, gait pattern  Balance: able to perform tree pose bilaterally  RANGE OF MOTION:   Cervical: WFL  Lumbar: WFL  Shoulders: WFL  Hips: WFL  MUSCLE PERFORMANCE:   Strength: demonstrates core instability  Flexibility: tightness noted in cervical psp  FUNCTIONAL TESTING/OBSERVATION: indep chair and bed mobility    Pt demonstrates generalized hypermobility with reports of pain at tendon attachments and joints.  Instructed in use of soft, hard and spiky balls for self MFR to trunk and extremities.    Treatment Interventions:  Therapeutic Activity:   For 45 minutes as above.  __________________________________________    Assessment:  Ongoing Functional Limitations Include:  Patient tolerated/responded well to treatment    Intensity Level: 3 (1=low intensity; 5=high intensity)  Demonstrates/Verbalizes Technique: 3 (1= poor technique-difficulty performing exercises,significant cues required; 5= good technique-performs exercises without cues)  Body Awareness: 2 (1=low awareness; 5=high awareness)  Posture/Stability: 3 (1= poor posture, stability; 5= good posture, stability)  Motivational Level: Cooperative and Distracted,  anxious  Response to Teaching: cooperative  Factors that affect learning: None    _______________________________________________________________________  Plan of Care  Continue PT to support reactivation and integration of self regulation pain management skills;  Continue with prescribed plan of care - progress as tolerated.  Focus next session will be on: self cares, body scan, rotation on roller, piriformis MFR roller, focus on kinesthetic/skeletal awareness to avoid overuse at joints, muscles, tendons     Present:  YUKI     Total Visit Time:  45 minutes    Therapist: Anuja Hernández, PT             Date: 3/2/2020

## 2020-03-09 ENCOUNTER — OFFICE VISIT (OUTPATIENT)
Dept: PALLIATIVE MEDICINE | Facility: CLINIC | Age: 60
End: 2020-03-09
Payer: COMMERCIAL

## 2020-03-09 DIAGNOSIS — M79.7 FIBROMYALGIA: Primary | ICD-10-CM

## 2020-03-09 DIAGNOSIS — G89.4 CHRONIC PAIN SYNDROME: ICD-10-CM

## 2020-03-09 PROCEDURE — 97530 THERAPEUTIC ACTIVITIES: CPT | Mod: GP | Performed by: PHYSICAL THERAPIST

## 2020-03-09 PROCEDURE — 96158 HLTH BHV IVNTJ INDIV 1ST 30: CPT | Performed by: PSYCHOLOGIST

## 2020-03-09 PROCEDURE — 96159 HLTH BHV IVNTJ INDIV EA ADDL: CPT | Performed by: PSYCHOLOGIST

## 2020-03-09 NOTE — PROGRESS NOTES
[facility]   [facility], [place]  Behavioral Medicine Visit    Patient Name: Maricarmen Dotson     YOB: 1960   Medical Record Number: [id number]  Date: 3/9/2020    Length of Visit: 45 minutes            Pain Diagnoses per pain provider:   Fibromyalgia      Chronic pain syndrome           DATA: Patient reports she arrived at 9:50, however there were multiple issues with the Epic upgrade to check her in. As a result, she did not complete return paperwork until about 10 minutes after appointment start time. Patient reports her pain is the same. Patient's mood is the same. Activity level is the same. Stress level is mildly worse - worried about dog and lack of sleep. Sleep hygiene is mildly worse - states she is dealing with insomnia right now - worried about her dog - states her medications are not helping aide sleep. Patient reports engaging in self-care for her pain 2-3 times per day. She retyped the TIP and self-soothing with the 5 senses handouts with the specific skills that she believes will be most useful.     ASSESSMENT: Patient seems to be engaged and working on incorporating skills in a way that works for her.  Progress toward goals: good.    Pain Status: unchanged    Emotional Status: unchanged              Medication / chemical use concerns:  None    Current Emotional / Mental Status    Appearance:   Appropriate   Eye Contact:   Fair   Psychomotor Behavior: Normal   Attitude:   Cooperative   Orientation:   All  Speech  Rate / Production:             Normal   Volume:              Normal   Mood:    Normal  Affect:    Lethargic  Subdued   Thought Content:  Clear   Thought Form:  Coherent  Goal Directed  Logical   Insight:    Good     ASSESSMENT:       PLAN:   Next Appointment: Maricarmen Dotson will schedule a follow-up appointment in 1 week(s).  Assignment/Objectives /interventions for next session: Focus on sleep  hygiene tips.    Alecia Rowland PsyD LP  Licensed Psychologist  Outpatient Clinic Therapist  [facility]    Disclaimer: This note consists of symbols derived from keyboarding, dictation and/or voice recognition software. As a result, there may be errors in the script that have gone undetected. Please consider this when interpreting information found in this chart.

## 2020-03-09 NOTE — PROGRESS NOTES
PAIN PHYSICAL THERAPY PROGRESS NOTE  Patient Name: Maricarmen Dotson      YOB: 1960     Medical Record Number: [id number]  Diagnosis:    Fibromyalgia  Chronic pain syndrome    Visit: 3/2-4    Patient's goals for physical therapy: to learn how to handle spasm/pain    Subjective: Pt reports stroking or rubbing softly with hands or soft ball is helpful with calming muscle spasms.  Learning to self soothe vs reacting to pain/spasms.  Previous: Patient reports she gets stabbing pain in her joints and than muscles spasm.  Pt reports pain and joints locking occurs 30-60' following an activity.  Self Care  HEP: indep  Walking/Aerobic Activity: indep 45' daily  Posture: indep  Breathing/Relaxation: next  Heat/Ice: ice  Mini Breaks: issue handout next  Pacing: begin instruction using spoon theory, issue handout next  Objective Findings:   POSTURE:  Observation: Patient demonstrates forward head, protracted shoulders and thoracic kyphosis in standing and sitting posture.   GAIT, LOCOMOTION, and BALANCE:  Gait and Locomotion: normal velocity, gait pattern  Balance: able to perform tree pose bilaterally  RANGE OF MOTION:   Cervical: WFL  Lumbar: WFL  Shoulders: WFL  Hips: WFL  MUSCLE PERFORMANCE:   Strength: demonstrates core instability  Flexibility: tightness noted in cervical psp  FUNCTIONAL TESTING/OBSERVATION: indep chair and bed mobility    Last:  Pt demonstrates generalized hypermobility with reports of pain at tendon attachments and joints.  Instructed in use of soft, hard and spiky balls for self MFR to trunk and extremities.  Today:   Instructed in forearm stretches  MFR to plantar aspect of feet using 1/2 tennis ball  Instruction in supine rotation on roller with rolling fists.    Treatment Interventions:  Therapeutic Activity:   For 45 minutes as above.  __________________________________________    Assessment:  Ongoing Functional Limitations Include:  Patient tolerated/responded well to  treatment    Intensity Level: 3 (1=low intensity; 5=high intensity)  Demonstrates/Verbalizes Technique: 3 (1= poor technique-difficulty performing exercises,significant cues required; 5= good technique-performs exercises without cues)  Body Awareness: 2 (1=low awareness; 5=high awareness)  Posture/Stability: 3 (1= poor posture, stability; 5= good posture, stability)  Motivational Level: Cooperative and Distracted, anxious  Response to Teaching: cooperative  Factors that affect learning: None    _______________________________________________________________________  Plan of Care  Continue PT to support reactivation and integration of self regulation pain management skills;  Continue with prescribed plan of care - progress as tolerated.  Focus next session will be on: self cares, piriformis ARGENTINAR roller, consider C-T elongation on roller     Present:  NA     Total Visit Time:  45 minutes    Therapist: Anuja Hernández, PT             Date: 3/9/2020

## 2020-03-10 ENCOUNTER — OFFICE VISIT (OUTPATIENT)
Dept: SLEEP MEDICINE | Facility: CLINIC | Age: 60
End: 2020-03-10
Payer: COMMERCIAL

## 2020-03-10 VITALS
SYSTOLIC BLOOD PRESSURE: 164 MMHG | HEIGHT: 64 IN | WEIGHT: 205 LBS | OXYGEN SATURATION: 96 % | DIASTOLIC BLOOD PRESSURE: 86 MMHG | HEART RATE: 67 BPM | BODY MASS INDEX: 35 KG/M2

## 2020-03-10 DIAGNOSIS — F51.04 CHRONIC INSOMNIA: Primary | ICD-10-CM

## 2020-03-10 PROCEDURE — 90832 PSYTX W PT 30 MINUTES: CPT | Performed by: PSYCHOLOGIST

## 2020-03-10 ASSESSMENT — MIFFLIN-ST. JEOR: SCORE: 1494.87

## 2020-03-10 NOTE — PROGRESS NOTES
"SLEEP MEDICINE PROGRESS NOTE  Sleep Psychology    Patient Name: Maricarmen Dotson  MRN:  [id number]  Date of Service: Mar 10, 2020       Subjective Report     Maricarmen Dotson returns to the sleep clinic today to discuss progress in implementing behavioral strategies for the management of chronic insomnia associated with concussion, prior history of mood disorder, fibromyalgia.  Maricarmen reports her sleep diaries generally good adherence to behavioral sleep plan.  In particular, variation in sleep offset has been reduced.  She is reporting a sleep efficiency of around 90%.  She does occasionally have very poor night sleep which triggers sleep specific anxiety.  Discussed strategies to manage he is in terms of de-escalating catastrophic thinking.  She currently is taking 40 mg melatonin about an hour before bedtime.  She has follow-up by her physician around use of lorazepam for anxiety which she takes in the late afternoon.    Patient revisited need for psychological care.  We discussed a referral to the [place] [facility] for psychotherapy.  Areas of focus include anxiety management and reported history of PTSD..     .     Sleep Data:     Source of Data: CBT-I     Sleep Latency: Less than 30 min.  Times Awakened: 1  Wake Time After Sleep Onset: 45 min.  Total Sleep Time: 7 hours 0 min.  Sleep Efficiency: 90 %    Total score - [place]: 14 .    ALESASNDRA Total Score: 15       Interventions     Strategies and recommendations including maintenance of stimulus control, cognitive restructuring, addressing catastrophic sleep thoughts were discussed today.       Vital Signs     BP (!) 164/86   Pulse 67   Ht 1.626 m (5' 4\")   Wt 93 kg (205 lb)   LMP 01/01/1999   SpO2 96%   BMI 35.19 kg/m       Mental Status     Appearance:  Well kept  Orientation:  X3  Mood:  better  Affect:  Congruent with mood  Speech/Language:  Normal  Thought Process: Intact  Associations:  Normal  Thought Content: Normal    Diagnostic " Impressions and Plan       1. Chronic insomnia  Reduced sleep latency, and proved consistency and sleep schedule and improved overall sleep efficiency.  Co-occurring clinically significant anxiety present and also affecting sleep.  Patient was referred to [place] [facility] for treatment of anxiety/mood and a reported history of PTSD.        Follow-up:   as needed    Time Spent: 30 minutes      Sanjeev Flores PsyD, TEOFILO, MARILEE  Diplomate, Behavioral Sleep Medicine  [facility] [place] [facility] [place]

## 2020-03-10 NOTE — PATIENT INSTRUCTIONS
Recommend you consider treatment of mental health concerns and anxiety.    Consider contacting [facility]    Consider EMDR    There is psychiatry liaison care with [facility]    Continue with consistent sleep schedule.    Avoid driving if drowsy.          Cognitive Behavioral Therapy for Insomnia (CBT-I)    Developing Healthy Sleep Thoughts    Insomnia is often is triggered by stressful events such as a change in employment, a separation, medical illness, or loss.  How you handle your sleep problem, mainly your thoughts and behaviors, determines in large part whether your sleeplessness is short -term or develops into chronic insomnia well after the stressful event is over.     This step of your program involves learning a set of skills to change negative and worrisome thoughts about sleep.   Insomnia is more likely to persist if you interpret the onset as a threat or loss of control.  Worry, fears and untrue beliefs about insomnia can become a vicious cycle.  Negative sleep thoughts activate the physical and emotional systems of your body.  This strengthens wakefulness and weakens your sleep system.  This in turn produces increased stress and pressure to sleep.  We call this unhelpful sleep effort.     Changing How You Think About Insomnia    We now know that our thoughts and attitudes affect our stress response.  Negative sleep thoughts can worsen your insomnia. They can lead to greater fear or anxiety about sleep, which in turn can aggravate your sleep problem. Thinking more positively and realistically about your sleep promotes its health and healing.     Myths about Sleep    ? People need 8 hours of sleep     This is untrue.  Sleep needs vary from person to person.  Most people need between 6-8 hours to feel alert during the day.  People who sleep 7 hours live longer than those who sleep 8 hours.  Research also suggests people who sleep 5 hours live longer than those who sleep 9  hours    ? Insomnia is the same as sleep deprivation    Sleep deprivation is lack of sleep due to not allowing enough time for sleep.  This is typically not true for insomnia where people have enough time for sleep and even extend their sleep time trying to get more sleep.  Most people with insomnia get about the same amount of sleep as normal sleepers.  The difference is that with insomnia the sleep is fragmented and less consolidated.    You are Getting More Sleep than You Think    Research using objective sleep tests reveal that people with insomnia get an average of one hour more sleep than they think. This is due in part because the brain misperceives Stage 1 and 2 sleep as being awake.  In addition, stress and arousal while awake in bed changes the brain's perception of time awake.    Examples of Unhealthy Sleep Thoughts    Negative sleep thoughts can have a profound impact on your ability to get a good night's sleep. Below are some examples of negative thoughts associated with insomnia that may sound familiar to you:    ? I must get 8 hours of sleep to function during the day.  ? I won't get to sleep tonight.  ? Insomnia is going to cause health problems.  ? I am dreading going to bed.  ? I woke up early again.  I know I won't get back to sleep.  ? The reason I feel terrible today is because of my insomnia.  ? I've totally lost control of my sleep.  ? I can't sleep without a sleeping pill.    Negative thoughts usually occur automatically and feel like a knee-jerk reaction.  They are often untrue or distorted, especially late in the evening as you become increasingly tired and others are asleep.      Changing Unhealthy Sleep Thoughts    Now that you understand the impact that negative thoughts can have on your sleep, you are ready to change these unhelpful thoughts.  The strategy is powerful and simple:  By recognizing and replacing your negative thoughts about sleep with more accurate, positive thoughts, you  will be less anxious and frustrated about your sleep. A more realistic and positive attitude about sleep will allow you to relax and sleep more easily through the night.           There are several important steps involved in changing your unhelpful and negative thoughts about sleep:            Examples of Healthy Sleep Thoughts    ? My work will not suffer much if I have a poor night's sleep.    ? I'm probably getting more sleep than I think.    ? Other things than my sleep affect my daytime functioning.    ? Because I didn't sleep well last night, I am more likely to sleep well tonight because of increased sleep drive that leads to deeper sleep.    ? Sleep requirements vary from one person to another.    ? If I don't sleep well, most of the time the worst that can happen is that my mood might not be as bright the next day.    ? If I awaken after about 5 hours of sleep, I have gotten the core sleep I need for the day.    ? I'm more likely to fall asleep the longer I've been awake    ? I'm more likely to fall asleep as my body temperature begins to decrease through the night.    ? My body's wakefulness system takes charge during the day to promote daytime functioning.    ? These sleep skills have worked for others, and they can work for me.    Other Recommendations for Changing Beliefs and Attitudes   About Your Sleep    ? Keep Realistic Expectations     There is a widespread belief that 8 hours of sleep is necessary to feel refreshed and function well during the day. Many believe that good sleep means never waking up at night.  Others come to expect that they should always wake up in the morning feeling full of energy.  Concerns may arise when your actual sleep falls short of these expectations. Try to avoid placing undue pressure on yourself to achieve certain sleep levels.  It only increases anxiety about sleeping.  Focus on quality sleep not quantity of sleep.    ? Revise Your Thoughts about the Causes of  Insomnia      There is a natural tendency to attribute our sleep problems completely to external factors such as a chemical imbalance, pain, aging or things over which we may have little control.  Although these factors may contribute to your insomnia, research shows CBT-I is beneficial even if they are present.    ? Don't Blame Insomnia for All Daytime Impairments      Many individuals blame insomnia for every symptom or concern they experience during the day from fatigue to lack of concentration. Though poor sleep may produce some of these symptoms, it us usually untrue that all daytime impairment is attributable to insomnia.  It is more often that other factors such as stress and co-occurring medical problems contribute more to how you feel during the day.      ? Don't Catastrophize      Catastrophic thinking means making a sleep mountain out of a molehill.  Some people believe poor sleep will have catastrophic consequences to their physical health, mental health and appearance. Others see insomnia as a complete loss of control.  These perceived consequences of insomnia often prompt people to seek medication or other treatment.  Keep in mind insomnia can be very unpleasant but for the most part is not dangerous.    ? Don't Focus on Sleep     Some people reduce their activity level because of poor sleep.  Although sleep is a necessary part of life, don't make it the focus of your thoughts and concern. Trust that if you engage in health sleep habits, your body will give you the sleep it needs.  Make sure you continue your normal activities despite your insomnia.    ? Never Try to Sleep      Of all the habits the most important one is this:  Never try to force yourself to sleep.  Doing so usually backfires and makes things worse. Instead, focus on keeping to your prescribed sleep schedule and practicing your five core       Continue to follow with your doctor about use of lorazepam and metonin.

## 2020-03-14 NOTE — PROGRESS NOTES
2/20/2020    Referring Provider: Casie Babcock MD    Presenting Information:   I met with Maricarmen Dotson today for genetic counseling at the [facility] at the [facility] to discuss her family history of cancer.  She is here today to review this history, cancer screening recommendations, and available genetic testing options.    Face to face time: 45 minutes    Krystal Varela MS, [facility]  Licensed Genetic Counselor  Office: [phone]

## 2020-03-15 ENCOUNTER — HEALTH MAINTENANCE LETTER (OUTPATIENT)
Age: 60
End: 2020-03-15

## 2020-03-18 ENCOUNTER — TELEPHONE (OUTPATIENT)
Dept: PALLIATIVE MEDICINE | Facility: CLINIC | Age: 60
End: 2020-03-18

## 2020-03-18 NOTE — TELEPHONE ENCOUNTER
Called Maricarmen. She stated her usual co pay is around $50.00. If she does a telephone visit it is usually around $40.00-$85.00 and she can't afford that. She thinks the telephone visit is not completely covered either. Advised I spoke to Diamond Henry and she would make an exception to see her in clinic as long as she does understand the risk for coming in with the COVID-19 pandemic.  She said she would like to cancel her appointment with Diamond Henry for 03/19/2020.  She will call back in a few weeks weeks to possibly get back onto iDamond's schedule but does understand that when she calls back appointments may not be being offered yet.     Pau Gomez RN  Care Coordinator  [facility]

## 2020-03-18 NOTE — TELEPHONE ENCOUNTER
Patient's return visit will be converted to Telephone Visit due to COVID-19.    Called patient and explained:    We re taking every precaution to prevent the spread of COVID-19. Our top priority is to protect and care for our patients.    After review, we are changing your visit to a telephone appointment.    Your visit is at 3/26 at 10 a.m.      Patient has decided to cancel her appointment as she states her insurance will not cover telephone visits at this time.    Alecia Rowland on 3/18/2020 at 11:06 AM

## 2020-03-18 NOTE — TELEPHONE ENCOUNTER
Spoke to patient.     Patient informed that her appointment will be  converted to a Telephone Visit due to COVID-19.    Called patient and explained:    We re taking every precaution to prevent the spread of COVID-19. Our top priority is to protect and care for our patients.    After review by your provider, we are changing your visit to a telephone appointment.      If you have concerns about this plan, please let us know, and we will send a message to the nurses to further discuss your concerns.    Patient states that her insurance does not cover telephone visits, and will need to cancel all upcoming appointments with Didi as well. She is very frustrated with the situation, but states she understands the need.     Maame Jones, CMA on 3/18/2020 at 9:55 AM

## 2020-03-18 NOTE — TELEPHONE ENCOUNTER
Patient called and stated that she had just missed a call but is having trouble retrieving VM. Do not see any notes in chart and told her it might be to offer a phone visit for tomorrow but was not positive. She states that she cannot do phone visits as her insurance will not cover those and she is not been around anyone sick and will go through screening if needed but would like to come in the office for her appointment.     Informed her that again I was not sure that was the reason but will send a message to the care team to call again if needed.        Ruby GALICIA    [facility]

## 2020-03-19 ENCOUNTER — TELEPHONE (OUTPATIENT)
Dept: PALLIATIVE MEDICINE | Facility: CLINIC | Age: 60
End: 2020-03-19

## 2020-03-19 NOTE — TELEPHONE ENCOUNTER
Called patient and left message to explain:    We re taking every precaution to prevent the spread of COVID-19. Our top priority is to protect and care for our patients and, with that in mind, will be cancelling your physical therapy appointments.     We will not be rescheduling at this time but will reach out as soon as we know more. Please note we are anticipating that the reschedules could be as far out as 30 to 60 days.     We appreciate your flexibility regarding this unforeseeable change.     If you have any questions or concerns, please call the clinic at [phone].      Anuja Hernández, PT on 3/19/2020 at 1:09 PM

## 2020-03-26 ENCOUNTER — VIRTUAL VISIT (OUTPATIENT)
Dept: PALLIATIVE MEDICINE | Facility: CLINIC | Age: 60
End: 2020-03-26
Payer: COMMERCIAL

## 2020-03-26 DIAGNOSIS — I10 HYPERTENSION GOAL BP (BLOOD PRESSURE) < 140/90: ICD-10-CM

## 2020-03-26 DIAGNOSIS — F41.9 ANXIETY: ICD-10-CM

## 2020-03-26 DIAGNOSIS — M79.7 FIBROMYALGIA: Primary | ICD-10-CM

## 2020-03-26 DIAGNOSIS — G47.00 INSOMNIA, UNSPECIFIED TYPE: ICD-10-CM

## 2020-03-26 PROCEDURE — 98968 PH1 ASSMT&MGMT NQHP 21-30: CPT | Performed by: PSYCHOLOGIST

## 2020-03-26 RX ORDER — LOSARTAN POTASSIUM 50 MG/1
TABLET ORAL
Qty: 60 TABLET | Refills: 0 | Status: SHIPPED | OUTPATIENT
Start: 2020-03-26 | End: 2020-04-29

## 2020-03-26 NOTE — PROGRESS NOTES
"Maricarmen Dotson is a 59 year old female who is being evaluated via a billable telephone visit.      The patient has been notified of following:     \"This telephone visit will be conducted via a call between you and your physician/provider. We have found that certain health care needs can be provided without the need for a physical exam.  This service lets us provide the care you need with a short phone conversation.  If a prescription is necessary we can send it directly to your pharmacy.  If lab work is needed we can place an order for that and you can then stop by our lab to have the test done at a later time.    If during the course of the call the physician/provider feels a telephone visit is not appropriate, you will not be charged for this service.\"     Maricarmen Dotson complains of    Chief Complaint   Patient presents with     Pain       I have reviewed and updated the patient's Past Medical History, Social History, Family History and Medication List.    ALLERGIES  Amoxicillin; Bee; Contrast dye; Iodine; Sulfa drugs; Tetanus-diphtheria toxoids; Metoprolol; Zithromax [azithromycin dihydrate]; Amlodipine; Atorvastatin; Catapres [clonidine]; Crestor [rosuvastatin]; Cyproheptadine; Humalog [insulin lispro]; Hydrochlorothiazide; Latex; Lisinopril; Metformin; Nifedipine; Onglyza [saxagliptin hydrochloride]; Phenergan [promethazine]; Prochlorperazine; Reglan [metoclopramide]; Sumatriptan; Tetracycline; and Sulindac    Additional provider notes:   -Her pain is about the same as it was at last visit.  -She feels that she has learned more ways to approach and manage her pain has been beneficial.   -She had had x4 visits with Alecia Rowland PsyD, LP and x3 visits with Anuja Hernández PT. She thinks that both of these resources have been helpful and would like to continue. She will continue telephone visits with Aleica for now due to COVID-19, will pause pain PT. She notes she has learned to be more accepting of her " chronic pain.  -She researched low dose naltrexone some and has considered this medication. She would be interested in a trial of low dose naltrexone, understands she would need to pause Tramadol to do this.   -She plans to establish with a primary therapist, Alecia has been helpful looking for this.  -She increased hemp CBD oil and adjusted to only at bedtime with some benefit for anxiety and pain, is taking 60mg. She continues Tramadol with some benefit but doesn't want her dose increased.    Assessment:   Maricarmen Dotson is a 59 year old female with a past medical history significant for IBS, fibromyalgia, diabetes mellitus type 2, and depression who presents with complaints of widespread pain.      1. Widespread pain- etiology likely fibromyalgia.   2. Mental Health - the patient's mental health concerns, specifically depression, affect her experience of pain and contribute to her clinically significant distress.     1. Fibromyalgia        Plan:     1.  Pain Physical Therapy:     YES   Pain physical therapy has been very helpful. Will have to pause due to restrictions due to COVID-19 but recommend she continue practicing the stretches and exercises she has learned. She will restart when able.    2.  Pain Psychologist to address relaxation, behavioral change, coping style, and other factors important to improvement.     YES  Pain psychology has been very helpful. Maricarmen plans to continue with telephone visits due to COVID-19 restrictions.    3.  Medication Management:     1. We have discussed low dose naltrexone for a while as an option for pain management and Maricarmen has decided she is interested in trying it. She will discontinue Tramadol for 24-48 hours and then start low dose naltrexone 1mg/ml, 1ml daily for 7 days. She will titrate weekly until max dose of 6 ml. She will call to update her prescribing provider, her rheumatologist of this trial.      2. Reasonable to continue hemp CBD oil at bedtime as this has  been helpful for sleep and pain.    4.  Referrals: continue TENS and ice.    5.  Follow up with MARICRUZ Patel CNP in 4 weeks.       Phone call duration: 28 minutes    MARICRUZ Solitario CNP

## 2020-03-26 NOTE — TELEPHONE ENCOUNTER
"Requested Prescriptions   Pending Prescriptions Disp Refills     losartan (COZAAR) 50 MG tablet [Pharmacy Med Name: LOSARTAN POTASSIUM 50MG TABS] 60 tablet 0     Sig: TAKE TWO TABLETS BY MOUTH ONCE DAILY       Angiotensin-II Receptors Failed - 3/26/2020 10:57 AM        Failed - Last blood pressure under 140/90 in past 12 months     BP Readings from Last 3 Encounters:   03/10/20 (!) 164/86   02/27/20 138/72   02/25/20 110/72                 Passed - Recent (12 mo) or future (30 days) visit within the authorizing provider's specialty     Patient has had an office visit with the authorizing provider or a provider within the authorizing providers department within the previous 12 mos or has a future within next 30 days. See \"Patient Info\" tab in inbasket, or \"Choose Columns\" in Meds & Orders section of the refill encounter.              Passed - Medication is active on med list        Passed - Patient is age 18 or older        Passed - No active pregnancy on record        Passed - Normal serum creatinine on file in past 12 months     Recent Labs   Lab Test 05/13/19  0959  03/23/12  1204   CR 0.77   < >  --    CREAT  --   --  0.7    < > = values in this interval not displayed.       Ok to refill medication if creatinine is low          Passed - Normal serum potassium on file in past 12 months     Recent Labs   Lab Test 05/13/19  0959   POTASSIUM 3.7                    Passed - No positive pregnancy test in past 12 months             "

## 2020-03-26 NOTE — PROGRESS NOTES
"Maricarmen Dotson is a 59 year old female who is being evaluated via a billable telephone visit.      The patient has been notified of following:     \"This telephone visit will be conducted via a call between you and Alecia Rowland PsyD LP. We have found that certain health care needs can be provided without the need for an in-person session.  This service lets us provide the care you need with a phone conversation.       If during the course of the call I feel a telephone visit is not appropriate, you will not be charged for this service.\"      Reviewed that patient is in a quiet private place, no recording without permission, all apps and notifications of any devices are turned off.    Length of Visit: 50 minutes           Phone call contact time  Call Started at 10:00 am  Call Ended at 10:50 am    Pain Diagnoses per pain provider:   fibromyalgia        DATA: Patient reports her pain is the same. Fatigue has been more problematic. Working on reframing her negative thoughts when she cannot accomplish all the tasks she'd like to. Patient's mood is mildly worse due to sleep issues. Working on reframing negative thoughts that fuel low mood. Activity level is about the same to mildly increasing. Stress level is starting to improve with improved sleep. Sleep hygiene is varied still. Patient reports engaging in self-care for her pain 2 times daily. Continues to work on pacing. Unable to tolerate a new ibuprofen based medication prescribed by rheumatologist due to nausea. Questions about naltrexone - encouraged her to set up telephone visit with MARICRUZ Patel CNP to discuss this. Discussed referrals for trauma work - [facility], [place] [facility].    ASSESSMENT: Continues to seem to work on reframing negative self-talk, some continued struggles related to pacing activity however seems to be working on this.  Progress toward goals: good.    Pain Status: remained stable    Emotional Status: worsened     "          Medication / chemical use concerns:  Questions about naltrexone - encouraged to schedule telephone visit with MARICRUZ Patel CNP to discuss.    PLAN:   Next Appointment: Maricarmen Dotson's next appointment is scheduled for 4/9 at 11:00 am.  Assignment/Objectives /interventions for next session: Pacing activity, continued work on reframing expectations of self and negative thoughts.    I have reviewed the note as documented above.  This accurately captures the substance of my conversation with the patient.    Alecia Rowland PsyD LP  Licensed Psychologist  Outpatient Clinic Therapist  [facility] [place]    Disclaimer: This note consists of symbols derived from keyboarding, dictation and/or voice recognition software. As a result, there may be errors in the script that have gone undetected. Please consider this when interpreting information found in this chart.

## 2020-03-27 ENCOUNTER — VIRTUAL VISIT (OUTPATIENT)
Dept: PALLIATIVE MEDICINE | Facility: CLINIC | Age: 60
End: 2020-03-27
Payer: COMMERCIAL

## 2020-03-27 DIAGNOSIS — M79.7 FIBROMYALGIA: Primary | ICD-10-CM

## 2020-03-27 DIAGNOSIS — G89.4 CHRONIC PAIN SYNDROME: ICD-10-CM

## 2020-03-27 PROCEDURE — 99213 OFFICE O/P EST LOW 20 MIN: CPT | Mod: TEL | Performed by: NURSE PRACTITIONER

## 2020-03-27 RX ORDER — LORAZEPAM 1 MG/1
TABLET ORAL
Qty: 15 TABLET | Refills: 0 | Status: SHIPPED | OUTPATIENT
Start: 2020-03-27 | End: 2020-05-04

## 2020-04-01 ENCOUNTER — MYC MEDICAL ADVICE (OUTPATIENT)
Dept: EDUCATION SERVICES | Facility: CLINIC | Age: 60
End: 2020-04-01

## 2020-04-01 DIAGNOSIS — Z79.4 TYPE 2 DIABETES MELLITUS WITH HYPERGLYCEMIA, WITH LONG-TERM CURRENT USE OF INSULIN (H): ICD-10-CM

## 2020-04-01 DIAGNOSIS — E11.65 TYPE 2 DIABETES MELLITUS WITH HYPERGLYCEMIA, WITH LONG-TERM CURRENT USE OF INSULIN (H): ICD-10-CM

## 2020-04-01 NOTE — TELEPHONE ENCOUNTER
Diabetes Self-Management Training: Insulin Pump Telephone Visit    Current Diabetes Management per Patient:  Comments/concerns:   Dear Deanna,   I hope you and yours are well and safe.  I have sent you an update of my glucose readings.  I am using program #2 and still getting low.   Could you please help me reprogram my OmniPod to lower insulin in the afternoons and overnight?     Thank you,  Maricarmen Dotson    Insulin Pump Type: OmniPod    Taking other diabetes medications? no    Current Pump report:                Assessment/Plan:  Call to patient, she states she is concerned about the lows occurring, especially in the afternoon. She would like a change made to her pump settings. She states she notices she is low in the afternoon and sometimes overnight. She will drink juice and eat glucose tablets when she is concerned about low sugars.   Also states she still does some binge eating and not entering the carbohydrates into the pump, but not as often.  Notices lower readings when she is not binge eating. She has started walking 2 miles before [facility].      No specific pattern other than she has had some lower readings in the afternoon. May be due to increase activity before lunch. She changed basal patterns on 3/27/20 from basal High 3 to basal high 2.   Recommend a small decrease to basal from 12pm-6pm on High 2 pattern to prevent hypoglycemia. Also encouraged her to use her temp basal.  Also set up new temp basal in pump- decrease 50% for 1 hours, recommend she use this for exercise and when concerned about lows.       Changes made to pump settings:  basal pattern- High 2  12pm-6pm: 1.4 --> 1.3      Deanna Bolton RN,CDE     Any diabetes medication dose changes were made via the CDE Protocol and Collaborative Practice Agreement with the patient's referring provider. A copy of this encounter was shared with the provider.

## 2020-04-03 ENCOUNTER — VIRTUAL VISIT (OUTPATIENT)
Dept: FAMILY MEDICINE | Facility: OTHER | Age: 60
End: 2020-04-03

## 2020-04-03 NOTE — PROGRESS NOTES
"Date: 2020 12:24:26  Clinician: Karime Calhoun  Clinician NPI: [id number]  Patient: Maricarmen Dotson  Patient : 1960  Patient Address: [address] [place] [address]  Patient Phone: [phone]  Visit Protocol: URI  Patient Summary:  Maricramen is a 59 year old ( : 1960 ) female who initiated a Visit for COVID-19 (Coronavirus) evaluation and screening. When asked the question \"Please sign me up to receive news, health information and promotions. \", Maricarmen responded \"No\".    Maricarmen states her symptoms started 1-2 days ago.   Her symptoms consist of nausea, a headache, a sore throat, a cough, nasal congestion, malaise, ear pain, enlarged lymph nodes, chills, rhinitis, and myalgia. She is experiencing mild difficulty breathing with activities but can speak normally in full sentences. Maricarmen also feels feverish.   Symptom details     Nasal secretions: The color of her mucus is white.    Cough: Maricarmen coughs a few times an hour and her cough is more bothersome at night. Phlegm does not come into her throat when she coughs. She does not believe her cough is caused by post-nasal drip.     Sore throat: Maricarmen reports having moderate throat pain (4-6 on a 10 point pain scale), does not have exudate on her tonsils, and can swallow liquids. The lymph nodes in her neck are enlarged. A rash has not appeared on the skin since the sore throat started.     Temperature: Her current temperature is 99.1 degrees Fahrenheit.     Headache: She states the headache is moderate (4-6 on a 10 point pain scale).      Maricarmen denies having diarrhea, vomiting, teeth pain, wheezing, and facial pain or pressure. She also denies having a sinus infection within the past year, taking antibiotic medication for the symptoms, and having recent facial or sinus surgery in the past 60 days.   Precipitating events  Maricarmen is not sure if she has been exposed to someone with strep throat. She has recently been exposed to someone with influenza. Maricarmen has " been in close contact with the following high risk individuals: immunocompromised people, adults 65 or older, and people with asthma, heart disease or diabetes.   Pertinent COVID-19 (Coronavirus) information  Maricarmen has not traveled internationally or to the areas where COVID-19 (Coronavirus) is widespread, including cruise ship travel in the last 14 days before the start of her symptoms.   Maricarmen has not had a close contact with a laboratory-confirmed COVID-19 patient within 14 days of symptom onset. She has had a close contact with a suspected COVID-19 patient within 14 days of symptom onset. Additional information about contact with COVID-19 (Coronavirus) patient as reported by the patient (free text): My daughter, Emilee Hernadez does not work or volunteer as healthcare worker or a  and does not work or volunteer in a healthcare facility. She lives with a healthcare worker.   Pertinent medical history  Maricarmen typically gets a yeast infection when she takes antibiotics. She has used fluconazole (Diflucan) to treat previous yeast infections. 2 doses of fluconazole (Diflucan) has typically been needed for symptoms to resolve in the past.  Maricarmen does not need a return to work/school note.   Weight: 200 lbs   Maricarmen does not smoke or use smokeless tobacco.   Additional information as reported by the patient (free text): Insulin pump dependent,  fibromyalgia,  high blood pressure.   Weight: 200 lbs    MEDICATIONS: naltrexone oral, lorazepam oral, losartan oral, Novolog U-100 Insulin aspart subcutaneous, ALLERGIES: Sulfa (Sulfonamide Antibiotics), Penicillins, venom-wasp, Iodinated Contrast Media, Latex, Natural Rubber  Clinician Response:  Dear Maricarmen,   Based on the information you have provided, you do have symptoms that are consistent with Coronavirus (COVID-19).  The coronavirus causes mild to severe respiratory illness with the most common symptoms including fever, cough and difficulty breathing.  Unfortunately, many viruses cause similar symptoms and it can be difficult to distinguish between viruses, especially in mild cases, so we are presuming that anyone with cough or fever has coronavirus at this time.  Coronavirus/COVID-19 has reached the point of community spread in [place], meaning that we are finding the virus in people with no known exposure risk for spencer the virus. Given the increasing commonness of coronavirus in the community we are no longer testing patients who are not critically ill.  If you are a health care worker, you should refer to your employee health office for instructions about testing and returning to work.  For everyone else who has cough or fever, you should assume you are infected with coronavirus. Since you will not be tested but have symptoms that may be consistent with coronavirus, the CDC recommends you stay in self-isolation until these three things have happened:    You have had no fever for at least 72 hours (that is three full days of no fever without the use of medicine that reduces fevers)    AND   Other symptoms have improved (for example, when your cough or shortness of breath have improved)   AND   At least 7 days have passed since your symptoms first appeared.   How to Isolate:   Isolate yourself at home.  Do Not allow any visitors  Do Not go to work or school  Do Not go to [facility],  centers, shopping, or other public places.  Do Not shake hands.  Avoid close contact with others (hugging, kissing).   Protect Others:   Cover Your Mouth and Nose with a mask, disposable tissue or wash cloth to avoid spreading germs to others.  Wash your hands and face frequently with soap and water.   We know it can be scary to hear that you might have COVID-19. Our team can help track your symptoms and make sure you are doing ok over the next two weeks using a program called [employer] to keep in touch. When you receive an email from [employer], please consider  enrolling in our monitoring program. There is no cost to you for monitoring. Here is a URL where you can learn more: [email or web address]  Managing Symptoms:   At this time, we primarily recommend Tylenol (Acetaminophen) for fever or pain. If you have liver or kidney problems, contact your primary care provider for instructions on use of tylenol. Adults can take 650 mg (two 325 mg pills) by mouth every 4-6 hours as needed OR 1,000 mg (two 500 mg pills) every 8 hours as needed. MAXIMUM DAILY DOSE: 3,000mg. For children, refer to dosing on bottle based on age or weight.   If you develop significant shortness of breath that prevents you from doing normal activities, please call 911 or proceed to the nearest emergency room and alert them immediately that you have been in self-isolation for possible coronavirus.  If you have a higher risk medical condition such as cancer, heart failure, end stage renal disease on dialysis or have a transplant, please reach out to your specialist's clinic to advise them of your [facility] visit should you not improve within the next two days.   For more information about COVID19 and options for caring for yourself at home, please visit the CDC website at https://www.cdc.gov/coronavirus/2019-ncov/about/steps-when-sick.htmlFor more options for care at [facility], please visit our website at [email or web address]    Diagnosis: Cough  Diagnosis ICD: R05

## 2020-04-04 ENCOUNTER — MYC MEDICAL ADVICE (OUTPATIENT)
Dept: PEDIATRICS | Facility: CLINIC | Age: 60
End: 2020-04-04

## 2020-04-05 ENCOUNTER — MYC MEDICAL ADVICE (OUTPATIENT)
Dept: PEDIATRICS | Facility: CLINIC | Age: 60
End: 2020-04-05
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
## 2020-04-06 NOTE — TELEPHONE ENCOUNTER
This writer called and spoke with patient.  Patient is scheduled for a telephone visit with Dr. Mclaughlin 04/07/20 at 10:00 am.    Amanda Khan

## 2020-04-07 ENCOUNTER — VIRTUAL VISIT (OUTPATIENT)
Dept: PEDIATRICS | Facility: CLINIC | Age: 60
End: 2020-04-07
Payer: COMMERCIAL

## 2020-04-07 DIAGNOSIS — F32.1 MODERATE MAJOR DEPRESSION (H): Primary | ICD-10-CM

## 2020-04-07 DIAGNOSIS — F43.10 POSTTRAUMATIC STRESS DISORDER: ICD-10-CM

## 2020-04-07 DIAGNOSIS — F41.9 ANXIETY: ICD-10-CM

## 2020-04-07 DIAGNOSIS — G47.00 INSOMNIA, UNSPECIFIED TYPE: ICD-10-CM

## 2020-04-07 PROCEDURE — 99214 OFFICE O/P EST MOD 30 MIN: CPT | Mod: TEL | Performed by: INTERNAL MEDICINE

## 2020-04-07 ASSESSMENT — ANXIETY QUESTIONNAIRES
5. BEING SO RESTLESS THAT IT IS HARD TO SIT STILL: MORE THAN HALF THE DAYS
7. FEELING AFRAID AS IF SOMETHING AWFUL MIGHT HAPPEN: NEARLY EVERY DAY
6. BECOMING EASILY ANNOYED OR IRRITABLE: MORE THAN HALF THE DAYS
1. FEELING NERVOUS, ANXIOUS, OR ON EDGE: NEARLY EVERY DAY
IF YOU CHECKED OFF ANY PROBLEMS ON THIS QUESTIONNAIRE, HOW DIFFICULT HAVE THESE PROBLEMS MADE IT FOR YOU TO DO YOUR WORK, TAKE CARE OF THINGS AT HOME, OR GET ALONG WITH OTHER PEOPLE: VERY DIFFICULT
GAD7 TOTAL SCORE: 19
2. NOT BEING ABLE TO STOP OR CONTROL WORRYING: NEARLY EVERY DAY
3. WORRYING TOO MUCH ABOUT DIFFERENT THINGS: NEARLY EVERY DAY

## 2020-04-07 ASSESSMENT — PATIENT HEALTH QUESTIONNAIRE - PHQ9: 5. POOR APPETITE OR OVEREATING: NEARLY EVERY DAY
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
## 2020-04-08 ENCOUNTER — MYC MEDICAL ADVICE (OUTPATIENT)
Dept: PEDIATRICS | Facility: CLINIC | Age: 60
End: 2020-04-08

## 2020-04-08 ENCOUNTER — E-VISIT (OUTPATIENT)
Dept: PEDIATRICS | Facility: CLINIC | Age: 60
End: 2020-04-08
Payer: COMMERCIAL

## 2020-04-08 DIAGNOSIS — G47.00 INSOMNIA, UNSPECIFIED TYPE: ICD-10-CM

## 2020-04-08 DIAGNOSIS — F43.10 POSTTRAUMATIC STRESS DISORDER: ICD-10-CM

## 2020-04-08 DIAGNOSIS — F41.9 ANXIETY: ICD-10-CM

## 2020-04-08 DIAGNOSIS — F32.1 MODERATE MAJOR DEPRESSION (H): Primary | ICD-10-CM

## 2020-04-08 DIAGNOSIS — Z53.9 ERRONEOUS ENCOUNTER--DISREGARD: Primary | ICD-10-CM

## 2020-04-08 ASSESSMENT — ANXIETY QUESTIONNAIRES
7. FEELING AFRAID AS IF SOMETHING AWFUL MIGHT HAPPEN: NEARLY EVERY DAY
2. NOT BEING ABLE TO STOP OR CONTROL WORRYING: NEARLY EVERY DAY
4. TROUBLE RELAXING: NEARLY EVERY DAY
3. WORRYING TOO MUCH ABOUT DIFFERENT THINGS: NEARLY EVERY DAY
GAD7 TOTAL SCORE: 21
GAD7 TOTAL SCORE: 21
GAD7 TOTAL SCORE: 19
GAD7 TOTAL SCORE: 21
1. FEELING NERVOUS, ANXIOUS, OR ON EDGE: NEARLY EVERY DAY
5. BEING SO RESTLESS THAT IT IS HARD TO SIT STILL: NEARLY EVERY DAY
6. BECOMING EASILY ANNOYED OR IRRITABLE: NEARLY EVERY DAY
7. FEELING AFRAID AS IF SOMETHING AWFUL MIGHT HAPPEN: NEARLY EVERY DAY

## 2020-04-08 ASSESSMENT — PATIENT HEALTH QUESTIONNAIRE - PHQ9
SUM OF ALL RESPONSES TO PHQ QUESTIONS 1-9: 21
10. IF YOU CHECKED OFF ANY PROBLEMS, HOW DIFFICULT HAVE THESE PROBLEMS MADE IT FOR YOU TO DO YOUR WORK, TAKE CARE OF THINGS AT HOME, OR GET ALONG WITH OTHER PEOPLE: VERY DIFFICULT
SUM OF ALL RESPONSES TO PHQ QUESTIONS 1-9: 21

## 2020-04-09 ENCOUNTER — VIRTUAL VISIT (OUTPATIENT)
Dept: PALLIATIVE MEDICINE | Facility: CLINIC | Age: 60
End: 2020-04-09
Payer: COMMERCIAL

## 2020-04-09 DIAGNOSIS — M79.7 FIBROMYALGIA: Primary | ICD-10-CM

## 2020-04-09 DIAGNOSIS — G89.4 CHRONIC PAIN SYNDROME: ICD-10-CM

## 2020-04-09 PROCEDURE — 96158 HLTH BHV IVNTJ INDIV 1ST 30: CPT | Mod: TEL | Performed by: PSYCHOLOGIST

## 2020-04-09 PROCEDURE — 96159 HLTH BHV IVNTJ INDIV EA ADDL: CPT | Mod: TEL | Performed by: PSYCHOLOGIST

## 2020-04-09 RX ORDER — ESCITALOPRAM OXALATE 5 MG/1
5 TABLET ORAL DAILY
Qty: 90 TABLET | Refills: 0 | Status: SHIPPED | OUTPATIENT
Start: 2020-04-09 | End: 2020-04-15

## 2020-04-09 ASSESSMENT — ANXIETY QUESTIONNAIRES: GAD7 TOTAL SCORE: 21

## 2020-04-09 ASSESSMENT — PATIENT HEALTH QUESTIONNAIRE - PHQ9: SUM OF ALL RESPONSES TO PHQ QUESTIONS 1-9: 21

## 2020-04-09 NOTE — PROGRESS NOTES
"Maricarmen Dotson is a 59 year old female who is being evaluated via a billable telephone visit.      The patient has been notified of following:     \"This telephone visit will be conducted via a call between you and Alecia Rowland PsyD LP. We have found that certain health care needs can be provided without the need for an in-person session.  This service lets us provide the care you need with a phone conversation.       If during the course of the call I feel a telephone visit is not appropriate, you will not be charged for this service.\"      Reviewed that patient is in a quiet private place, no recording without permission, all apps and notifications of any devices are turned off. Began the session by talking about the risks and benefits of telehealth, what to do if there is a break in the connection, reviewed the safety protocol for during and after sessions. The purpose of using telehealth for this session was due to the COVID-19 pandemic and was to reduce the spread of the disease and protect both the clinician and client.  Due to COVID-19, clinician's location was in a private space with a closed door in clincian's home; patient was in a private space in their home.             Phone call contact time  Call Started at 11:00 am  Call Ended at 11:53 am  Total 53 minutes     Pain Diagnoses per pain provider:   Fibromyalgia      Chronic pain syndrome           DATA: Patient reports her pain is the same - still reports burning joints and residual pain - wonders if some of the continued pain and worsening mood coupled with insomnia could be due to withdrawal from tramadol. Patient's mood is greatly worse - she has been experiencing increased anxiety and panic attacks. Discussed that it looks like a referral has been placed for both psychiatric medication management and therapy through [place] - discussed this would be quite beneficial for her. Activity level is mildly increased - states she had some SOB which she " believes could have been bronchitis related, possibly COVID-19 related. Did seek medical support for this, and feels it is resolving. Stress level is mildly improved, however, feels this greatly increases at night when she is struggling with falling asleep. Sleep hygiene is greatly worse - only obtaining about 1/2 to one hour at a time. Has been struggling with intense anxiety at the prospect of going to bed. Has identified a plan for herself to manage her anxiety and insomnia. Patient reports engaging in self-care for her pain 2-3 times per day. Has started low dose naltrexone. Actively using TIP skill and self-soothing throughout the day and as needed with pain and anxiety flares. Patient reports she is struggling with binge eating and scratching herself again; she has been working on incorporating previously learned CBT strategies to manage this. Reports she has had thoughts of self-harm, however, states she is working hard to remind herself she has positive skills and works hard to engage in them when she has thoughts. Patient reports she finds it helpful to remind herself that being hospitalized would not be helpful for her and this helps her not engage in self-harm behaviors. Working on being patient and compassionate towards herself.     ASSESSMENT: Easily engaged and readily utilizes skills discussed in sessions.  Progress toward goals: as expected.    Pain Status: worsened    Emotional Status: worsened              Medication / chemical use concerns:  None    PLAN:   Next Appointment: Maricarmen ROXANNE Cannontoribio's next appointment is scheduled for 4/23 at 1:00. Patient reports she is not comfortable trying to learn how to navigate a video visit, prefers to keep her sessions as telephone visits.  Assignment/Objectives /interventions for next session: Continue to use TIP and self-soothing skills, utilize them earlier in the evening in effort to pro-actively work on insomnia.     I have reviewed the note as documented  above.  This accurately captures the substance of my conversation with the patient.    Alecia Rowland PsyD LP  Licensed Psychologist  Outpatient Clinic Therapist  [facility]    Disclaimer: This note consists of symbols derived from keyboarding, dictation and/or voice recognition software. As a result, there may be errors in the script that have gone undetected. Please consider this when interpreting information found in this chart.

## 2020-04-09 NOTE — TELEPHONE ENCOUNTER
Addressing through patient call encounter/VersionEyehart message. Please cancel Evisit.    Madyson Mclaughlin MD  Internal Medicine-Pediatrics

## 2020-04-15 ENCOUNTER — MYC MEDICAL ADVICE (OUTPATIENT)
Dept: PEDIATRICS | Facility: CLINIC | Age: 60
End: 2020-04-15

## 2020-04-15 DIAGNOSIS — F32.1 MODERATE MAJOR DEPRESSION (H): Primary | ICD-10-CM

## 2020-04-15 RX ORDER — LAMOTRIGINE 25 MG/1
25 TABLET ORAL AT BEDTIME
Qty: 30 TABLET | Refills: 0 | Status: SHIPPED | OUTPATIENT
Start: 2020-04-15 | End: 2020-04-23
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
## 2020-04-15 NOTE — TELEPHONE ENCOUNTER
Called pt back. Pt says that she has tried both zoloft & buspar in late 90s. Zoloft made her hyper & buspar caused neuropathy. She wants to know whether she is eligible to try a low dose of lamotrigine?     Please advise.    Clem, RN  Triage Nurse

## 2020-04-15 NOTE — TELEPHONE ENCOUNTER
Madyson has had long discussions with her regarding medications tried and failed. She knows what could potentially work for her. She states zoloft or buspar. Has she taken these before and what were her SE? Taking ativan every day is not appropriate.   Michael Adams PA-C

## 2020-04-15 NOTE — TELEPHONE ENCOUNTER
OK to try lamotrigene (Lamictal)  Rx for 25 mg at bedtime sent to [facility] pharmacy.  She should f/u with SM in 1-2 weeks (phone or video visit)

## 2020-04-15 NOTE — TELEPHONE ENCOUNTER
Called pt back & advised as below. Pt agrees to the plan. Will f/u in 1-2 weeks.    Clem, RN  Triage Nurse

## 2020-04-21 ENCOUNTER — HOSPITAL ENCOUNTER (EMERGENCY)
Facility: CLINIC | Age: 60
Discharge: HOME | End: 2020-04-21
Attending: EMERGENCY MEDICINE | Admitting: EMERGENCY MEDICINE
Payer: COMMERCIAL

## 2020-04-21 ENCOUNTER — NURSE TRIAGE (OUTPATIENT)
Dept: PEDIATRICS | Facility: CLINIC | Age: 60
End: 2020-04-21

## 2020-04-21 ENCOUNTER — MYC MEDICAL ADVICE (OUTPATIENT)
Dept: PEDIATRICS | Facility: CLINIC | Age: 60
End: 2020-04-21

## 2020-04-21 ENCOUNTER — APPOINTMENT (OUTPATIENT)
Dept: GENERAL RADIOLOGY | Facility: CLINIC | Age: 60
End: 2020-04-21
Attending: EMERGENCY MEDICINE
Payer: COMMERCIAL

## 2020-04-21 VITALS
RESPIRATION RATE: 20 BRPM | BODY MASS INDEX: 34.15 KG/M2 | OXYGEN SATURATION: 96 % | SYSTOLIC BLOOD PRESSURE: 149 MMHG | HEIGHT: 64 IN | HEART RATE: 66 BPM | TEMPERATURE: 98.2 F | DIASTOLIC BLOOD PRESSURE: 85 MMHG | WEIGHT: 200 LBS

## 2020-04-21 DIAGNOSIS — R07.9 CHEST PAIN, UNSPECIFIED TYPE: ICD-10-CM

## 2020-04-21 DIAGNOSIS — R06.02 SHORTNESS OF BREATH: ICD-10-CM

## 2020-04-21 LAB
ANION GAP SERPL CALCULATED.3IONS-SCNC: 7 MMOL/L (ref 3–14)
BASOPHILS # BLD AUTO: 0 10E9/L (ref 0–0.2)
BASOPHILS NFR BLD AUTO: 0.4 %
BUN SERPL-MCNC: 10 MG/DL (ref 7–30)
CALCIUM SERPL-MCNC: 9.3 MG/DL (ref 8.5–10.1)
CHLORIDE SERPL-SCNC: 108 MMOL/L (ref 94–109)
CO2 SERPL-SCNC: 24 MMOL/L (ref 20–32)
CREAT SERPL-MCNC: 0.67 MG/DL (ref 0.52–1.04)
D DIMER PPP FEU-MCNC: <0.3 UG/ML FEU (ref 0–0.5)
DIFFERENTIAL METHOD BLD: NORMAL
EOSINOPHIL # BLD AUTO: 0.3 10E9/L (ref 0–0.7)
EOSINOPHIL NFR BLD AUTO: 2.9 %
ERYTHROCYTE [DISTWIDTH] IN BLOOD BY AUTOMATED COUNT: 12.5 % (ref 10–15)
GFR SERPL CREATININE-BSD FRML MDRD: >90 ML/MIN/{1.73_M2}
GLUCOSE SERPL-MCNC: 189 MG/DL (ref 70–99)
HCT VFR BLD AUTO: 38.5 % (ref 35–47)
HGB BLD-MCNC: 13.2 G/DL (ref 11.7–15.7)
IMM GRANULOCYTES # BLD: 0 10E9/L (ref 0–0.4)
IMM GRANULOCYTES NFR BLD: 0.2 %
INTERPRETATION ECG - MUSE: NORMAL
LYMPHOCYTES # BLD AUTO: 3.3 10E9/L (ref 0.8–5.3)
LYMPHOCYTES NFR BLD AUTO: 32.6 %
MCH RBC QN AUTO: 30.2 PG (ref 26.5–33)
MCHC RBC AUTO-ENTMCNC: 34.3 G/DL (ref 31.5–36.5)
MCV RBC AUTO: 88 FL (ref 78–100)
MONOCYTES # BLD AUTO: 0.5 10E9/L (ref 0–1.3)
MONOCYTES NFR BLD AUTO: 4.7 %
NEUTROPHILS # BLD AUTO: 6 10E9/L (ref 1.6–8.3)
NEUTROPHILS NFR BLD AUTO: 59.2 %
NRBC # BLD AUTO: 0 10*3/UL
NRBC BLD AUTO-RTO: 0 /100
PLATELET # BLD AUTO: 279 10E9/L (ref 150–450)
POTASSIUM SERPL-SCNC: 3.7 MMOL/L (ref 3.4–5.3)
RBC # BLD AUTO: 4.37 10E12/L (ref 3.8–5.2)
SODIUM SERPL-SCNC: 139 MMOL/L (ref 133–144)
TROPONIN I SERPL-MCNC: <0.015 UG/L (ref 0–0.04)
WBC # BLD AUTO: 10.1 10E9/L (ref 4–11)

## 2020-04-21 PROCEDURE — 84484 ASSAY OF TROPONIN QUANT: CPT | Performed by: EMERGENCY MEDICINE

## 2020-04-21 PROCEDURE — 71045 X-RAY EXAM CHEST 1 VIEW: CPT

## 2020-04-21 PROCEDURE — 80048 BASIC METABOLIC PNL TOTAL CA: CPT | Performed by: EMERGENCY MEDICINE

## 2020-04-21 PROCEDURE — 85379 FIBRIN DEGRADATION QUANT: CPT | Performed by: EMERGENCY MEDICINE

## 2020-04-21 PROCEDURE — 99285 EMERGENCY DEPT VISIT HI MDM: CPT

## 2020-04-21 PROCEDURE — 85025 COMPLETE CBC W/AUTO DIFF WBC: CPT | Performed by: EMERGENCY MEDICINE

## 2020-04-21 PROCEDURE — 93005 ELECTROCARDIOGRAM TRACING: CPT

## 2020-04-21 ASSESSMENT — MIFFLIN-ST. JEOR: SCORE: 1472.19

## 2020-04-21 NOTE — ED TRIAGE NOTES
Presents to ED with complaints of CP, SOB, and panic attacks. Pt states 4 nights ago, panic attacks started and last night pt developed left-sided chest pain and SOB on exertion. At beginning of April, pt had fever/runny nose and was told to self quarantine for 14 days. ABCs intact. A&OX4.

## 2020-04-21 NOTE — ED AVS SNAPSHOT
[facility] Emergency Department  [address]  [facility] [address]  Phone:  [phone]  Fax:  [phone]                                    Maricarmen Dotson   MRN: [id number]    Department:  [facility] Emergency Department   Date of Visit:  4/21/2020           After Visit Summary Signature Page    I have received my discharge instructions, and my questions have been answered. I have discussed any challenges I see with this plan with the nurse or doctor.    ..........................................................................................................................................  Patient/Patient Representative Signature      ..........................................................................................................................................  Patient Representative Print Name and Relationship to Patient    ..................................................               ................................................  Date                                   Time    ..........................................................................................................................................  Reviewed by Signature/Title    ...................................................              ..............................................  Date                                               Time          22EPIC Rev 08/18

## 2020-04-21 NOTE — ED PROVIDER NOTES
History     Chief Complaint:  Chest Pain; Shortness of Breath; Cough; and Fever       HPI   Maricarmen Dotson is a 59 year old female who presents with chest pain.  Patient has had decreased sleep for the past 4 nights that she is worried about the virus and her son potentially having it.  She is also had fevers for the past month which range from 100-101.  She was told she likely has a virus of some type.  Her chest pain started last night that was on her left side and she felt very short of breath.  And then around 5 AM she started to have constant chest pain and shortness of breath.  This is never happened before.  She thinks it could be stress.  She is a diabetic.  She has had a stress test before.  She reports a dry cough.    Allergies:  Amoxicillin  Bee  Contrast Dye  Iodine  Sulfa Drugs  Tetanus-Diphtheria Toxoids  Metoprolol  Zithromax [Azithromycin Dihydrate]  Amlodipine  Atorvastatin  Catapres [Clonidine]  Crestor [Rosuvastatin]  Cyproheptadine  Humalog [Insulin Lispro]  Hydrochlorothiazide  Latex  Lisinopril  Metformin  Nifedipine  Onglyza [Saxagliptin Hydrochloride]  Phenergan [Promethazine]  Prochlorperazine  Reglan [Metoclopramide]  Sumatriptan  Tetracycline  Sulindac     Medications:    insulin aspart   lamictal   Ativan   losartan     Past Medical History:    Ascending aorta enlargement (H)   Depressive disorder   Diabetes mellitus, type 2 (H)   Diverticulosis of colon (without mention of hemorrhage)   Fibromyalgia   Insomnia   Irritable bowel syndrome  TBI  Chronic pain syndrome   PTSD  MIESHA  Migraine  Anxiety   Hyperlipidemia      Past Surgical History:    Back surgery   Lumbar surgery   Cholecystectomy   Septoplasty   Hysterectomy   Left ankle surgery     Family History:    Diabetes   Hypertension   Stroke   Cancer   GI disease  Sleep apnea      Social History:  Smoking Status: Never Smoker  Smokeless Tobacco: Never Used  Alcohol Use: Yes  Drug Use: No  PCP: Madyson Mclaughlin     Review of Systems  "  Constitutional: Positive for fever.   Respiratory: Positive for shortness of breath.    Cardiovascular: Positive for chest pain.   All other systems reviewed and are negative.    Physical Exam     Patient Vitals for the past 24 hrs:   BP Temp Temp src Pulse Heart Rate Resp SpO2 Height Weight   04/21/20 1020 (!) 149/85 -- -- -- 84 20 96 % -- --   04/21/20 1000 (!) 146/76 -- -- 66 60 25 94 % -- --   04/21/20 0945 135/76 -- -- 73 68 15 96 % -- --   04/21/20 0930 (!) 149/75 -- -- 73 69 -- 95 % -- --   04/21/20 0915 (!) 131/94 -- -- 80 64 -- 95 % -- --   04/21/20 0900 (!) 154/101 -- -- 69 72 22 96 % -- --   04/21/20 0845 (!) 166/92 -- -- 70 73 12 94 % -- --   04/21/20 0830 (!) 167/89 -- -- 68 70 14 96 % -- --   04/21/20 0819 (!) 174/90 98.2  F (36.8  C) Oral 70 70 20 95 % 1.626 m (5' 4\") 90.7 kg (200 lb)   04/21/20 0812 -- 97.2  F (36.2  C) Temporal -- -- -- 97 % -- --        Physical Exam  General: Patient is alert and interactive when I enter the room  Head:  The scalp, face, and head appear normal  Eyes:  Conjunctivae are normal  ENT:    The nose is normal    Pinnae are normal    External acoustic canals are normal  Neck:  Trachea midline  CV:  Pulses are normal, RRR.    Resp:  No respiratory distress, CTAB    Abdomen:      Soft, non-tender, non-distended  Musc:  Normal muscular tone    No major joint effusions    No asymmetric leg swelling  Skin:  No rash or lesions noted  Neuro:  Speech is normal and fluent. Face is symmetric.     Moving all extremities well.   Psych: Awake. Alert.  Normal affect.  Appropriate interactions. Anxious mood.     Emergency Department Course   ECG:  ECG taken at 0819  Sinus rhythm  Minimal voltage criteria for LVH, may be normal variant  Nonspecific T wave abnormality  Abnormal ECG  Vent. rate 73 BPM  NH interval 154 ms  QRS duration 94 ms  QT/QTc 440/484 ms  P-R-T axes 29 -14 -16    Imaging:  Radiology findings were communicated with the patient who voiced understanding of the " findings.    XR Chest Port 1 View  Final Result  IMPRESSION: Mild curvature of the thoracic spine. Otherwise negative  chest.  NELY WELCH MD  Reading per radiology     Laboratory:  Laboratory findings were communicated with the patient who voiced understanding of the findings.    CBC:  WBC 10.1, HGB 13.2, , o/w WNL     BMP: Glucose 189 (H), o/w WNL (Creatinine: 0.67)     Troponin(0827): <0.015      D dimer quantitative: <0.3      Emergency Department Course:  Past medical records, nursing notes, and vitals reviewed.    0813 I performed an exam of the patient as documented above.    IV was inserted and blood was drawn for laboratory testing, results above.      1020 Patient rechecked and updated.       Findings and plan explained to the Patient. Patient discharged home with instructions regarding supportive care, medications, and reasons to return. The importance of close follow-up was reviewed.       Impression & Plan     Medical Decision Making:  Maricarmen Dotson is a 59 year old female who presented with chest pain. Patient admits to decreased sleep and increased stress. EKG here was unchanged. Troponin done after several hours of constant pain. Patient had a negative stress test a year ago. D-dimer negative. Patient was very anxious to leave after negative blood work here She believes this related to stress and lack of sleep. Advised PCP follow-up.     Discharge Diagnosis:    ICD-10-CM    1. Chest pain, unspecified type  R07.9    2. Shortness of breath  R06.02        Disposition:  Discharged to home.    Scribe Disclosure:  Robin LUIS, am serving as a scribe at 8:13 AM on 4/21/2020 to document services personally performed by Rosa Elena Jin MD based on my observations and the provider's statements to me.      4/21/2020   Rosa Elena Jin MD Robinson, Haley, MD  04/24/20 7132

## 2020-04-21 NOTE — TELEPHONE ENCOUNTER
Patient called in.     Has been having panic attacks through the night for the past 4 nights.   Currently has high amount of anxiety per report.     4th night in a row. Mainly happens at night.   Minimal sleep during the night for the past 4 nights.   Difficult to calm down, shaky. Symptoms persist during most of the day.   Symptoms wane during the day, but then return every night.     Has been taking medications as ordered, does not seem to be helping.   None of her coping strategies have helped symptoms.     Chest pain. Started last night. Constant. 8-9/10 intensity. Tightness in the chest with a dull ache, difficulty breathing.   Has not subsided since last night, and is getting worse.     Advised patient to have a friend or close family member drive her to the ED immediately.   The patient verbalized agreement with plan of care.     Advised patient to call back with any needs, questions, or concerns.     Jaxson Shepherd RN on 4/21/2020 at 7:23 AM

## 2020-04-22 ASSESSMENT — ANXIETY QUESTIONNAIRES
2. NOT BEING ABLE TO STOP OR CONTROL WORRYING: MORE THAN HALF THE DAYS
GAD7 TOTAL SCORE: 17
3. WORRYING TOO MUCH ABOUT DIFFERENT THINGS: MORE THAN HALF THE DAYS
1. FEELING NERVOUS, ANXIOUS, OR ON EDGE: NEARLY EVERY DAY
7. FEELING AFRAID AS IF SOMETHING AWFUL MIGHT HAPPEN: MORE THAN HALF THE DAYS
6. BECOMING EASILY ANNOYED OR IRRITABLE: MORE THAN HALF THE DAYS
GAD7 TOTAL SCORE: 17
4. TROUBLE RELAXING: NEARLY EVERY DAY
5. BEING SO RESTLESS THAT IT IS HARD TO SIT STILL: NEARLY EVERY DAY
7. FEELING AFRAID AS IF SOMETHING AWFUL MIGHT HAPPEN: MORE THAN HALF THE DAYS
GAD7 TOTAL SCORE: 17

## 2020-04-23 ENCOUNTER — VIRTUAL VISIT (OUTPATIENT)
Dept: PALLIATIVE MEDICINE | Facility: CLINIC | Age: 60
End: 2020-04-23
Payer: COMMERCIAL

## 2020-04-23 ENCOUNTER — VIRTUAL VISIT (OUTPATIENT)
Dept: PEDIATRICS | Facility: CLINIC | Age: 60
End: 2020-04-23
Payer: COMMERCIAL

## 2020-04-23 DIAGNOSIS — F41.9 ANXIETY: ICD-10-CM

## 2020-04-23 DIAGNOSIS — G47.00 INSOMNIA, UNSPECIFIED TYPE: ICD-10-CM

## 2020-04-23 DIAGNOSIS — M79.7 FIBROMYALGIA: Primary | ICD-10-CM

## 2020-04-23 DIAGNOSIS — G89.4 CHRONIC PAIN SYNDROME: ICD-10-CM

## 2020-04-23 PROCEDURE — 96158 HLTH BHV IVNTJ INDIV 1ST 30: CPT | Mod: 95 | Performed by: PSYCHOLOGIST

## 2020-04-23 PROCEDURE — 99214 OFFICE O/P EST MOD 30 MIN: CPT | Mod: 95 | Performed by: INTERNAL MEDICINE

## 2020-04-23 RX ORDER — CLONAZEPAM 1 MG/1
1 TABLET ORAL
Qty: 20 TABLET | Refills: 0 | Status: SHIPPED | OUTPATIENT
Start: 2020-04-23 | End: 2020-05-07

## 2020-04-23 RX ORDER — LORAZEPAM 1 MG/1
TABLET ORAL
Qty: 15 TABLET | Refills: 0 | Status: CANCELLED | OUTPATIENT
Start: 2020-04-23

## 2020-04-23 ASSESSMENT — ANXIETY QUESTIONNAIRES
3. WORRYING TOO MUCH ABOUT DIFFERENT THINGS: NOT AT ALL
7. FEELING AFRAID AS IF SOMETHING AWFUL MIGHT HAPPEN: NEARLY EVERY DAY
5. BEING SO RESTLESS THAT IT IS HARD TO SIT STILL: NEARLY EVERY DAY
GAD7 TOTAL SCORE: 13
2. NOT BEING ABLE TO STOP OR CONTROL WORRYING: NOT AT ALL
6. BECOMING EASILY ANNOYED OR IRRITABLE: SEVERAL DAYS
1. FEELING NERVOUS, ANXIOUS, OR ON EDGE: NEARLY EVERY DAY
IF YOU CHECKED OFF ANY PROBLEMS ON THIS QUESTIONNAIRE, HOW DIFFICULT HAVE THESE PROBLEMS MADE IT FOR YOU TO DO YOUR WORK, TAKE CARE OF THINGS AT HOME, OR GET ALONG WITH OTHER PEOPLE: SOMEWHAT DIFFICULT

## 2020-04-23 ASSESSMENT — PATIENT HEALTH QUESTIONNAIRE - PHQ9: 5. POOR APPETITE OR OVEREATING: NEARLY EVERY DAY
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
## 2020-04-23 NOTE — PROGRESS NOTES
Called pt and assisted in scheduling appointment for 05/07/20.     Anette Kevin MA 11:06 AM 4/23/2020

## 2020-04-23 NOTE — PROGRESS NOTES
"Maricarmen Dotson is a 59 year old female who is being evaluated via a billable telephone visit.      The patient has been notified of following:     \"This telephone visit will be conducted via a call between you and your physician/provider. We have found that certain health care needs can be provided without the need for a physical exam.  This service lets us provide the care you need with a short phone conversation.  If a prescription is necessary we can send it directly to your pharmacy.  If lab work is needed we can place an order for that and you can then stop by our lab to have the test done at a later time.    Telephone visits are billed at different rates depending on your insurance coverage. During this emergency period, for some insurers they may be billed the same as an in-person visit.  Please reach out to your insurance provider with any questions.    If during the course of the call the physician/provider feels a telephone visit is not appropriate, you will not be charged for this service.\"    Patient has given verbal consent for Telephone visit?  Yes    How would you like to obtain your AVS? [facility]  [place]      Additional provider notes:  -Her pain is about the same as it was compared to last visit.   -She started low dose naltrexone and increased it up to 2mg daily. She noticed pain improvement with this but as she continued the increase she developed increased anxiety, restlessness, and difficulty sleeping. She tried adjusting the timing with most success taking low dose naltrexone first thing in the morning.   -She may start low dose naltrexone again in the future given pain benefit.   -She stopped taking low dose naltrexone on 4/20/20, denies significant improvement in symptoms. She understands that likely other factors are contributing to increased anxiety.   -She had a follow up visit with her primary care provider regarding " "ongoing anxiety and restlessness yesterday. She was given a prescription for clonazepam to manage her symptoms for a period of time. She notes this is somewhat helpful but continues to struggle.   -She continues to take Tylenol, ibuprofen, and topical NSAID with some benefit.   -She has continued regular sessions with Alecia Rowland PsyD, LP with very good benefit. States this is going well, \"she is on the right track.\" She understands her hx of trauma is likely contributing to increased pain levels and wants to work on this. She had previously had a visit with a primary therapist but was not able to continue due to COVID-19 restrictions. Ultimately she would like to establish with a long term provider but would like to work with Alecia Rowland PsyD, LP in the interim.      Assessment:   Maricarmen Dotson is a 59 year old female with a past medical history significant for IBS, fibromyalgia, diabetes mellitus type 2, and depression who presents with complaints of widespread pain.      1. Widespread pain- etiology likely fibromyalgia.   2. Mental Health - the patient's mental health concerns, specifically depression, affect her experience of pain and contribute to her clinically significant distress.     1. Fibromyalgia    2. Chronic pain syndrome         Plan:     1.  Pain Physical Therapy:     YES   Encouraged Maricarmen continue regular gentle physical activity but also practice pacing  and mini breaks. She is still interested in additional pain physical therapy when able (COVID-19 restrictions).    2.  Pain Psychologist to address relaxation, behavioral change, coping style, and other factors important to improvement.     YES  Pain psychology has been very helpful. I have reviewed Alecia Rowland PsyD, LP's notes and it looks as though there is a clear trauma component that is contributing to increased pain levels. I agree that ongoing sessions would likely be of significant benefit.  Of course long term goal would be to " establish with primary therapist. I did also advise Maricarmen go to appointment with psychiatrist next month.   3.  Medication Management:     1. I am not convinced that increased anxiety and insomnia are entirely due to low dose naltrexone. She has been off of this medication since 4/20/20 with little improvement in symptoms. Though anxiety can be a common side effect, it should definitely subside within 24 hours. That being said, I recommend we do not restart low dose naltrexone for now. We may consider restarting this medication at a lower dose and with a slower titration in the future.    2. Continue ibuprofen, Tylenol, topical ibuprofen, and ice as needed.     4.  Potential procedures: no    5.  Follow up with MARICRUZ Patel CNP in early June.     Phone call duration: 19 minutes    MARICRUZ Solitario CNP

## 2020-04-23 NOTE — PROGRESS NOTES
"Maricarmen Dotson is a 59 year old female who is being evaluated via a billable telephone visit.      The patient has been notified of following:     \"This telephone visit will be conducted via a call between you and Alecia Rowland PsyD LP. We have found that certain health care needs can be provided without the need for an in-person session.  This service lets us provide the care you need with a phone conversation.       If during the course of the call I feel a telephone visit is not appropriate, you will not be charged for this service.\"      Reviewed that patient is in a quiet private place, no recording without permission, all apps and notifications of any devices are turned off. Began the session by talking about the risks and benefits of telehealth, what to do if there is a break in the connection, reviewed the safety protocol for during and after sessions. The purpose of using telehealth for this session was due to the COVID-19 pandemic and was to reduce the spread of the disease and protect both the clinician and client.  Due to COVID-19, clinician's location was in a private space with a closed door in clincian's home; patient was in a private space in their home.            Patient has given verbal consent for telephone visit? YES    Phone call contact time  Call Started at 1:00 PM  Call Ended at 1:37 PM  Total 37 minutes    Pain Diagnoses per pain provider:   Fibromyalgia, chronic pain syndrome        DATA: Patient reports her pain is greatly worse - she has stopped Naltrexone as she did not feel it was working and was causing significant insomnia. Patient's mood continues to be highly anxious with break through panic attacks. Discussed some additional strategies to manage her anxiety and to continue to engage in these activities daily. Activity level is mildly increased - walking dog, yardwork, cleaning around the house. Working on letting some things go especially if her energy level or pain level is " increasing. Stress level is greatly worse - she had an ER visit this week due to likely panic attack. Sleep hygiene continues to be poor - she reports struggle to fall asleep due to perception she is 'not safe' - she and her PCP spent considerable time discussing her sleep and how her anxiety interferes and is caused by her insomnia. Patient reports engaging in self-care for her pain 2-3 times per day. Having some issues with her medications and panic attacks at night primarily. Stopped her Naltrexone because it wasn't seeming to help and she believes was inducing insomnia, which increases her anxiety significantly. She had significant enough chest pain to warrant a trip to the ER. Reviewed virtual visit suggestions from her PCP and safety plan they developed as well. [facility]. She is agreeable to this suggestion.     Patient requested to end session at 1:37 p.m. due to IBS symptoms which have been exacerbated by her pain and mood this past week.    ASSESSMENT: Easily engaged and readily utilizes skills discussed in sessions. Patient has good verbal understanding of how her pain and mood are interconnected, some awareness of trauma impacting this as well. She would likely benefit from referral to [place] with one of their therapists who has completed sensory motor trauma therapy; she is amenable to this.   Progress toward goals: as expected.    Pain Status: worsened    Emotional Status: worsened              Medication / chemical use concerns:  Stopped Naltrexone as she believes it was causing insomnia and contributing to increased anxiety and panic attacks as a result.    PLAN:   Next Appointment: Maricarmen Dotson's next appointment is scheduled for 4/30 at 2:00.  Assignment/Objectives /interventions for next session: Referral to [facility] for individual trauma skills work if available; continue to use TIP and  "self-soothing skills.    Discussed the importance of adherence to safety plan as outlined at her visit with her PCP Madyson Mclaughlin MD and per review of Dr. Mclaughlin's 4/23/2020 note: 'Despite self-injurious behavior and active suicidal ideation most nights, she declines to be evaluated for inpatient admission as she is concerned that she is \"not safe\" inpatient. She currently has a safety plan in place to wake a family member up if she feels like hurting herself, and both she and her daughter feel comfortable with this plan. I did strongly recommend that she consider ED psych assessment, she declines at this time but we discussed that if she continues to have self-injurious impulses or her plan does not feel like it is working that she will call 911 or go to the ED.'    I have reviewed the note as documented above.  This accurately captures the substance of my conversation with the patient.    Alecia Rowland PsyD LP  Licensed Psychologist  Outpatient Clinic Therapist  [facility]    Disclaimer: This note consists of symbols derived from keyboarding, dictation and/or voice recognition software. As a result, there may be errors in the script that have gone undetected. Please consider this when interpreting information found in this chart.          "

## 2020-04-24 ENCOUNTER — VIRTUAL VISIT (OUTPATIENT)
Dept: PALLIATIVE MEDICINE | Facility: CLINIC | Age: 60
End: 2020-04-24
Payer: COMMERCIAL

## 2020-04-24 DIAGNOSIS — M79.7 FIBROMYALGIA: Primary | ICD-10-CM

## 2020-04-24 DIAGNOSIS — G89.4 CHRONIC PAIN SYNDROME: ICD-10-CM

## 2020-04-24 PROCEDURE — 99213 OFFICE O/P EST LOW 20 MIN: CPT | Mod: 95 | Performed by: NURSE PRACTITIONER

## 2020-04-24 ASSESSMENT — ENCOUNTER SYMPTOMS
SHORTNESS OF BREATH: 1
FEVER: 1

## 2020-04-24 ASSESSMENT — PAIN SCALES - GENERAL: PAINLEVEL: EXTREME PAIN (8)

## 2020-04-24 NOTE — PATIENT INSTRUCTIONS
1.  Pain Physical Therapy:     YES   Restart sessions with Anuja Hernández PT when able. Continue regular gentle physical activity but also pace yourself and take mini breaks throughout the day.    2.  Pain Psychologist to address relaxation, behavioral change, coping style, and other factors important to improvement.     YES  Continue regular sessions with Alecia Rowland PsyD, LP per her recommendation. Go to appointment with psychiatrist next month. Long term goal would be to establish with primary therapist .   3.  Medication Management:     1. Do not restart low dose naltrexone. We may consider restarting this medication at a lower dose in the future.    2. Continue ibuprofen, Tylenol, topical ibuprofen, and ice as needed.     4.  Potential procedures: no    5.  Follow up with MARICRUZ Patel CNP in early June.

## 2020-04-27 ENCOUNTER — MYC MEDICAL ADVICE (OUTPATIENT)
Dept: PALLIATIVE MEDICINE | Facility: CLINIC | Age: 60
End: 2020-04-27

## 2020-04-27 ENCOUNTER — MYC MEDICAL ADVICE (OUTPATIENT)
Dept: PEDIATRICS | Facility: CLINIC | Age: 60
End: 2020-04-27

## 2020-04-27 DIAGNOSIS — F41.9 ANXIETY: Primary | ICD-10-CM

## 2020-04-27 DIAGNOSIS — F32.1 MODERATE MAJOR DEPRESSION (H): ICD-10-CM

## 2020-04-27 DIAGNOSIS — I10 HYPERTENSION GOAL BP (BLOOD PRESSURE) < 140/90: ICD-10-CM

## 2020-04-27 DIAGNOSIS — G47.00 INSOMNIA, UNSPECIFIED TYPE: ICD-10-CM

## 2020-04-27 NOTE — TELEPHONE ENCOUNTER
Recommend having scheduling the patient for a follow up with Diamond Henry CNP when she returns next week.  Telephone/virtual return appointment.     Namita Mack MD

## 2020-04-27 NOTE — TELEPHONE ENCOUNTER
Karlee below sent to patient. Still wtg on pt reply    Chris Hernadez,     I just wanted to follow up again and let you know that as Diamond is out of the office this week a provider covering for her reviewed our correspondence. She would like you to schedule an appointment with Diamond next week when she returns so that you can discuss your medications, what is working and what is not. This would be a virtual appointment. Thanks    [phone]    Luba MIGUEL, RN Care Coordinator  [facility]  Pain Management

## 2020-04-27 NOTE — TELEPHONE ENCOUNTER
Routing to provider pool to review.     Karlee below sent to patient  Chris Hernadez,     It looks like Diamond wanted to see you back in early June. You can scheduling this by calling [phone].  I do not see that Diamond Henry intended for you to continue taking the Tramadol once she prescribed the low dose naltrexone.  She noted that you should stop the tramadol in the 03/27/20 visit.  I can see it is noted that you stopped the low dose naltrexone as well as you thought it was contributing to your anxiety.  When you discussed the clonazepam with your primary care did you mention that you were taking or thinking of taking Tramadol as well? These are medication that when taken together can caused increased risk of respiratory depression and overdose.     Luba MIGUEL, RN Care Coordinator  [facility]  Pain Management    -----------below from patient---------------  Dear Diamond,  The clonazepam Dr Mclaughlin prescribed is helping with the anxiety.   I had to start thr tramadol last night due to my severe pain.  Could you please let me know when is the next time you want a follow  up appointment.   Thank you,  Maricarmen Dotson      Per OV 03/27/20:     1. We have discussed low dose naltrexone for a while as an option for pain management and Maricarmen has decided she is interested in trying it. She will discontinue Tramadol for 24-48 hours and then start low dose naltrexone 1mg/ml, 1ml daily for 7 days. She will titrate weekly until max dose of 6 ml. She will call to update her prescribing provider, her rheumatologist of this trial.     04/24/20 OV:   Medication Management:                           1. I am not convinced that increased anxiety and insomnia are entirely due to low dose naltrexone. She has been off of this medication since 4/20/20 with little improvement in symptoms. Though anxiety can be a common side effect, it should definitely subside within 24 hours. That being said, I recommend we do not restart low  dose naltrexone for now. We may consider restarting this medication at a lower dose and with a slower titration in the future.                          2. Continue ibuprofen, Tylenol, topical ibuprofen, and ice as needed.        Luba MIGUEL, RN Care Coordinator  [facility]  Pain Management

## 2020-04-28 RX ORDER — QUETIAPINE FUMARATE 25 MG/1
25 TABLET, FILM COATED ORAL 2 TIMES DAILY
Qty: 30 TABLET | Refills: 1 | Status: SHIPPED | OUTPATIENT
Start: 2020-04-28 | End: 2020-04-28

## 2020-04-28 RX ORDER — QUETIAPINE FUMARATE 25 MG/1
25 TABLET, FILM COATED ORAL AT BEDTIME
Qty: 30 TABLET | Refills: 1 | Status: SHIPPED | OUTPATIENT
Start: 2020-04-28 | End: 2020-05-07

## 2020-04-29 ENCOUNTER — MYC MEDICAL ADVICE (OUTPATIENT)
Dept: PEDIATRICS | Facility: CLINIC | Age: 60
End: 2020-04-29

## 2020-04-29 RX ORDER — LOSARTAN POTASSIUM 50 MG/1
TABLET ORAL
Qty: 60 TABLET | Refills: 0 | Status: SHIPPED | OUTPATIENT
Start: 2020-04-29 | End: 2020-06-02

## 2020-04-29 NOTE — TELEPHONE ENCOUNTER
Dr. Mclaughlin-    See [facility] message. I know you had been discussing the medications with her. Here is her update. Madalyn Abdalla RN on 4/29/2020 at 1:21 PM

## 2020-04-29 NOTE — TELEPHONE ENCOUNTER
Patient has an appointment in 1 week with provider, 1 refill given to get patient to appt.  FYI to provider to refill at appointment.  Prescription approved per [facility] Refill Protocol.

## 2020-04-30 ENCOUNTER — VIRTUAL VISIT (OUTPATIENT)
Dept: PALLIATIVE MEDICINE | Facility: CLINIC | Age: 60
End: 2020-04-30
Payer: COMMERCIAL

## 2020-04-30 DIAGNOSIS — M79.7 FIBROMYALGIA: Primary | ICD-10-CM

## 2020-04-30 DIAGNOSIS — G89.4 CHRONIC PAIN SYNDROME: ICD-10-CM

## 2020-04-30 PROCEDURE — 96158 HLTH BHV IVNTJ INDIV 1ST 30: CPT | Mod: 95 | Performed by: PSYCHOLOGIST

## 2020-04-30 PROCEDURE — 96159 HLTH BHV IVNTJ INDIV EA ADDL: CPT | Mod: 95 | Performed by: PSYCHOLOGIST

## 2020-04-30 NOTE — PROGRESS NOTES
"Maricarmen Dotson is a 59 year old female who is being evaluated via a billable telephone visit.      The patient has been notified of following:     \"This telephone visit will be conducted via a call between you and Alecia Rowland PsyD LP. We have found that certain health care needs can be provided without the need for an in-person session.  This service lets us provide the care you need with a phone conversation.       If during the course of the call I feel a telephone visit is not appropriate, you will not be charged for this service.\"      Reviewed that patient is in a quiet private place, no recording without permission, all apps and notifications of any devices are turned off. Began the session by talking about the risks and benefits of telehealth, what to do if there is a break in the connection, reviewed the safety protocol for during and after sessions. The purpose of using telehealth for this session was due to the COVID-19 pandemic and was to reduce the spread of the disease and protect both the clinician and client.  Due to COVID-19, clinician's location was in a private space with a closed door in clincian's home; patient was in a private space in their home.            Patient has given verbal consent for telephone visit? YES    Phone call contact time  Call Started at 2:00 p.m.  Call Ended at 2:58 PM    Total 58 minutes     Pain Diagnoses per pain provider:   Fibromyalgia      Chronic pain syndrome         DATA: Patient reports her pain is greatly worse. Patient's mood continues to be very highly anxious. She is feeling somewhat hopeless about medications to manage or address both her pain and anxiety, as well as insomnia. Activity level is variable - discussed the importance of pacing her activity, as at times she seems to push through pain and fatigue despite having some awareness this negatively impacts her pain and mood. Stress level is 'not great' but has had periods of escalation. Sleep hygiene " continues to be bothersome. Patient reports she continues to use [place] and other wholesome movies of this ilk to provide comfort for herself. Patient reports engaging in self-care for her pain 2-3 times per day. Patient reports some confusion regarding whether she should schedule an appointment with MARICRUZ Patel CNP or not; My Chart notes seem to indicate she was encouraged to set up a follow-up with MARICRUZ Patel CNP next week. However, patient declined to do so, as she does not believe this is the plan that was relayed to her. Patient reports a plan to 'experiment' with trying OTC CBD oil, which she has found beneficial in the past. Working on identifying how best to pace herself. Feels her joints are 'on fire' with increased blood sugar, which she attributes to her medications being prescribed for her anxiety and insomnia. Seems to be comforted by reminding herself she has a safety plan with her family.    ASSESSMENT: Patient's trauma history continues to be primary driving force for her anxiety and panic attacks. She is hesitant to have the psychiatry appointment at the end of the month, however was able to agree to have an open mind about this. She seems to struggle with triggers related to sleep, and is aware on a certain level that she is caught up in a negative cycle, yet struggles to break out of it. She does seem to regularly use skills as discussed, and is open to modification and earlier engagement vs waiting for the crisis to peak.  Progress toward goals: fair.    Pain Status: worsened    Emotional Status: worsened              Medication / chemical use concerns:  none    PLAN:   Next Appointment: Maricarmen Dotson's next appointment is scheduled for 5/8 at 1:30 p.m.  Assignment/Objectives /interventions for next session: Referral information for Chinyere ALLAN through [facility] sent to patient via My Chart message so that Maricarmen can check whether her insurance  will cover services there. Encouraged use of skills much earlier in the night so as to manage antcipatory anxiety.    I have reviewed the note as documented above.  This accurately captures the substance of my conversation with the patient.    Alecia Rowland PsyD LP  Licensed Psychologist  Outpatient Clinic Therapist  [facility] [place]    Disclaimer: This note consists of symbols derived from keyboarding, dictation and/or voice recognition software. As a result, there may be errors in the script that have gone undetected. Please consider this when interpreting information found in this chart.

## 2020-05-01 NOTE — TELEPHONE ENCOUNTER
[employer] message from 04/27/2020 has not been read.    Pau Gomez RN  Care Coordinator  [facility]

## 2020-05-01 NOTE — TELEPHONE ENCOUNTER
Called Sanjana Antony asking her to contact our clinic to set up a follow up with Diamond Henry for next week.    Pau Gomez RN  Care Coordinator  [facility]

## 2020-05-04 ENCOUNTER — MYC MEDICAL ADVICE (OUTPATIENT)
Dept: PEDIATRICS | Facility: CLINIC | Age: 60
End: 2020-05-04

## 2020-05-04 DIAGNOSIS — F41.9 ANXIETY: ICD-10-CM

## 2020-05-04 DIAGNOSIS — G47.00 INSOMNIA, UNSPECIFIED TYPE: ICD-10-CM

## 2020-05-04 RX ORDER — LORAZEPAM 1 MG/1
TABLET ORAL
Qty: 15 TABLET | Refills: 0 | Status: SHIPPED | OUTPATIENT
Start: 2020-05-04 | End: 2020-05-04

## 2020-05-04 RX ORDER — LORAZEPAM 1 MG/1
TABLET ORAL
Qty: 15 TABLET | Refills: 0 | Status: SHIPPED | OUTPATIENT
Start: 2020-05-04 | End: 2020-06-14

## 2020-05-06 ASSESSMENT — ANXIETY QUESTIONNAIRES
3. WORRYING TOO MUCH ABOUT DIFFERENT THINGS: NEARLY EVERY DAY
GAD7 TOTAL SCORE: 21
2. NOT BEING ABLE TO STOP OR CONTROL WORRYING: NEARLY EVERY DAY
GAD7 TOTAL SCORE: 21
1. FEELING NERVOUS, ANXIOUS, OR ON EDGE: NEARLY EVERY DAY
4. TROUBLE RELAXING: NEARLY EVERY DAY
GAD7 TOTAL SCORE: 21
7. FEELING AFRAID AS IF SOMETHING AWFUL MIGHT HAPPEN: NEARLY EVERY DAY
5. BEING SO RESTLESS THAT IT IS HARD TO SIT STILL: NEARLY EVERY DAY
7. FEELING AFRAID AS IF SOMETHING AWFUL MIGHT HAPPEN: NEARLY EVERY DAY
6. BECOMING EASILY ANNOYED OR IRRITABLE: NEARLY EVERY DAY

## 2020-05-06 ASSESSMENT — PATIENT HEALTH QUESTIONNAIRE - PHQ9
SUM OF ALL RESPONSES TO PHQ QUESTIONS 1-9: 15
SUM OF ALL RESPONSES TO PHQ QUESTIONS 1-9: 15
10. IF YOU CHECKED OFF ANY PROBLEMS, HOW DIFFICULT HAVE THESE PROBLEMS MADE IT FOR YOU TO DO YOUR WORK, TAKE CARE OF THINGS AT HOME, OR GET ALONG WITH OTHER PEOPLE: SOMEWHAT DIFFICULT

## 2020-05-07 ENCOUNTER — VIRTUAL VISIT (OUTPATIENT)
Dept: PEDIATRICS | Facility: CLINIC | Age: 60
End: 2020-05-07
Payer: COMMERCIAL

## 2020-05-07 ENCOUNTER — TELEPHONE (OUTPATIENT)
Dept: PALLIATIVE MEDICINE | Facility: CLINIC | Age: 60
End: 2020-05-07

## 2020-05-07 DIAGNOSIS — F41.9 ANXIETY: Primary | ICD-10-CM

## 2020-05-07 DIAGNOSIS — G47.00 INSOMNIA, UNSPECIFIED TYPE: ICD-10-CM

## 2020-05-07 DIAGNOSIS — F32.1 MODERATE MAJOR DEPRESSION (H): ICD-10-CM

## 2020-05-07 PROCEDURE — 99214 OFFICE O/P EST MOD 30 MIN: CPT | Mod: 95 | Performed by: INTERNAL MEDICINE

## 2020-05-07 PROCEDURE — 96127 BRIEF EMOTIONAL/BEHAV ASSMT: CPT | Performed by: INTERNAL MEDICINE

## 2020-05-07 RX ORDER — TRAMADOL HYDROCHLORIDE 50 MG/1
50-100 TABLET ORAL EVERY 6 HOURS PRN
COMMUNITY
End: 2021-05-17

## 2020-05-07 ASSESSMENT — ANXIETY QUESTIONNAIRES
2. NOT BEING ABLE TO STOP OR CONTROL WORRYING: MORE THAN HALF THE DAYS
5. BEING SO RESTLESS THAT IT IS HARD TO SIT STILL: MORE THAN HALF THE DAYS
GAD7 TOTAL SCORE: 15
1. FEELING NERVOUS, ANXIOUS, OR ON EDGE: NEARLY EVERY DAY
IF YOU CHECKED OFF ANY PROBLEMS ON THIS QUESTIONNAIRE, HOW DIFFICULT HAVE THESE PROBLEMS MADE IT FOR YOU TO DO YOUR WORK, TAKE CARE OF THINGS AT HOME, OR GET ALONG WITH OTHER PEOPLE: VERY DIFFICULT
7. FEELING AFRAID AS IF SOMETHING AWFUL MIGHT HAPPEN: MORE THAN HALF THE DAYS
6. BECOMING EASILY ANNOYED OR IRRITABLE: MORE THAN HALF THE DAYS
3. WORRYING TOO MUCH ABOUT DIFFERENT THINGS: MORE THAN HALF THE DAYS

## 2020-05-07 ASSESSMENT — PATIENT HEALTH QUESTIONNAIRE - PHQ9: 5. POOR APPETITE OR OVEREATING: MORE THAN HALF THE DAYS

## 2020-05-08 ENCOUNTER — VIRTUAL VISIT (OUTPATIENT)
Dept: PALLIATIVE MEDICINE | Facility: CLINIC | Age: 60
End: 2020-05-08
Payer: COMMERCIAL

## 2020-05-08 ENCOUNTER — MYC MEDICAL ADVICE (OUTPATIENT)
Dept: PALLIATIVE MEDICINE | Facility: CLINIC | Age: 60
End: 2020-05-08

## 2020-05-08 DIAGNOSIS — Z53.9 ERRONEOUS ENCOUNTER--DISREGARD: Primary | ICD-10-CM

## 2020-05-08 NOTE — PROGRESS NOTES
"Maricarmen Dotson is a 59 year old female who is being evaluated via a billable telephone visit.      The patient has been notified of following:     \"This telephone visit will be conducted via a call between you and Alecia Rowland PsyD LP. We have found that certain health care needs can be provided without the need for an in-person session.  This service lets us provide the care you need with a phone conversation.       If during the course of the call I feel a telephone visit is not appropriate, you will not be charged for this service.\"      Reviewed that patient is in a quiet private place, no recording without permission, all apps and notifications of any devices are turned off. Began the session by talking about the risks and benefits of telehealth, what to do if there is a break in the connection, reviewed the safety protocol for during and after sessions. The purpose of using telehealth for this session was due to the COVID-19 pandemic and was to reduce the spread of the disease and protect both the clinician and client.             Patient has given verbal consent for telephone visit? YES    Phone call contact time  Call Started at 1:30 PM  Call Ended at 1:39 PM  Total 9 minutes    Pain Diagnoses per pain provider:        DATA: Patient reports her pain is unable to assess. Patient's mood seems greatly worse - she seemed to be crying but stated, \"I am safe\" and identified she was frustrated by her discussion about her medications with her PCP yesterday. Activity level is unable to assess. Stress level is unable to assess. Sleep hygiene is unable to assess. Patient reports engaging in self-care for her pain unable to assess.     Patient ended the phone call abruptly, stating she couldn't talk to anyone else today and she would be reaching out soon to set up a follow up appointment.    Due to the conversation being abruptly ended, patient will not be charged for this visit.    ASSESSMENT: Patient's mental " health concerns tend to disrupt her ability to focus on her pain management skill use. She reported feeling upset about her PCP deciding she would no longer prescribe certain medications and documented her specific concerns in her note. Patient reports she is 'safe' and has verbally identified a safety plan.     At this point in time, patient would seem better suited to explore long-term support in the form of either outpatient partial hospitalization program or DBT to better manage her mental health needs.    Progress toward goals: poor.    Pain Status: unable to assess    Emotional Status: worsened              Medication / chemical use concerns:  Please see Madyson Mclaughlin MD note dated 5/7/2020.    PLAN:   Next Appointment: Maricarmen Dotson's next appointment is scheduled for TBD - patient mentioned she will contact scheduling.  Assignment/Objectives /interventions for next session: N/A    Telephone-Visit Details    Type of service:  Telephone Visit    Originating Location (pt. Location): [place]    Distant Location (provider location):  [place] [facility]     Mode of Communication:  Telephone    I have reviewed the note as documented above.  This accurately captures the substance of my conversation with the patient.    Alecia Rowland PsyD LP  Licensed Psychologist  Outpatient Clinic Therapist  [facility]    Disclaimer: This note consists of symbols derived from keyboarding, dictation and/or voice recognition software. As a result, there may be errors in the script that have gone undetected. Please consider this when interpreting information found in this chart.

## 2020-05-08 NOTE — TELEPHONE ENCOUNTER
Will leave encounter open for patient response/chart review by nursing.     Chris Hernadez,     I would recommend that you make a follow up appointment with Diamond to discuss your pain management options. I am glad that you talked to your primary care about your medications as mentioned before, there is risk in taking them together.   At this time our appointments are virtual or telephone, you can call to make this appointment at [phone].    Luba MIGUEL, RN Care Coordinator  [facility]  Pain Management    ----------------    Dear Geri WONG, APRN CNP,  Having talked to Dr Mclaughlin about her concerns about me taking tramadol and lorazepam within the same night has her concerned that I may overdose.  So I would still like to stop the tramadol, but by the second or third night I am in so much pain, nothing over the counter helps. May I  try the medical marijuana we talked about. I believe this will help me to stop taking the tramadol.   Thank you,  Maricarmen Dotson

## 2020-05-08 NOTE — PROGRESS NOTES
"Maricarmen Dotson is a 59 year old female who is being evaluated via a billable telephone visit.      The patient has been notified of following:     \"This telephone visit will be conducted via a call between you and your physician/provider. We have found that certain health care needs can be provided without the need for a physical exam.  This service lets us provide the care you need with a short phone conversation.  If a prescription is necessary we can send it directly to your pharmacy.  If lab work is needed we can place an order for that and you can then stop by our lab to have the test done at a later time.    Telephone visits are billed at different rates depending on your insurance coverage. During this emergency period, for some insurers they may be billed the same as an in-person visit.  Please reach out to your insurance provider with any questions.    If during the course of the call the physician/provider feels a telephone visit is not appropriate, you will not be charged for this service.\"    Patient has given verbal consent for Telephone visit?  Yes    What phone number would you like to be contacted at? [phone]    How would you like to obtain your AVS? Courtney    Recommendations at last vist on 4/24/2020:             1.  Pain Physical Therapy:                YES   Encouraged Maricarmen continue regular gentle physical activity but also practice pacing  and mini breaks. She is still interested in additional pain physical therapy when able (COVID-19 restrictions).               2.  Pain Psychologist to address relaxation, behavioral change, coping style, and other factors important to improvement.                YES  Pain psychology has been very helpful. I have reviewed Alecia Rowland PsyD, LP's notes and it looks as though there is a clear trauma component that is contributing to increased pain levels. I agree that ongoing sessions would likely be of significant benefit.  Of course long term goal would " "be to establish with primary therapist. I did also advise Maricarmen go to appointment with psychiatrist next month.              3.  Medication Management:                           1. I am not convinced that increased anxiety and insomnia are entirely due to low dose naltrexone. She has been off of this medication since 4/20/20 with little improvement in symptoms. Though anxiety can be a common side effect, it should definitely subside within 24 hours. That being said, I recommend we do not restart low dose naltrexone for now. We may consider restarting this medication at a lower dose and with a slower titration in the future.                          2. Continue ibuprofen, Tylenol, topical ibuprofen, and ice as needed.                4.  Potential procedures: no               5.  Follow up with MARICRUZ Patel CNP in early June.     Additional provider notes:   -Her pain is about the same as it was at last visit.   -She is very interested in tapering off of Tramadol. She notes today that her rheumatologist would like her to stop taking Tramadol and apparently has recommended this for some time.   -She states she is taking Tramadol and lorazepam concurrently but that she has been doing this for years. She takes Tramadol at dinner time prn and then every night at bedtime. She reports she takes lorazepam in the afternoon and then in the evening prn. She had a recent visit with her primary care provider on 5/27 where it was recognized that she has been taking these medications at this time (Tramadol is prescribed by rheumatologist and was not listed on med list). The risk of the combination of Tramadol and Ativan was discussed and Maricarmen was informed she would not be given additional refills of Ativan. She reports frustration with this. Maricarmen states she plans to reduce her use of Ativan but continue to use from a home supply of Ativan for severe events.   -She states her mental health is \"fine\" and \"non self harming.\" "   -She liked low dose naltrexone and is still interested in trying this in the future. She thought the adjustable dose was a good option for her. She again notes that it seemed to cause insomnia, in the morning (when she had minimal pain) it was more tolerable.    -She is interested in medical cannabis, hopes that this will be helpful for pain and allow her to eliminate Tramadol.     Assessment:   Maricarmen Dotson is a 59 year old female with a past medical history significant for IBS, fibromyalgia, diabetes mellitus type 2, and depression who presents with complaints of widespread pain.      1. Widespread pain- etiology likely fibromyalgia.   2. Mental Health - the patient's mental health concerns, specifically depression, affect her experience of pain and contribute to her clinically significant distress.    1. Fibromyalgia    2. Chronic pain syndrome         Plan:     1.  Pain Physical Therapy:    Again we discussed that I recommend follow up with Anuja Hernández PT. She will schedule virtual visits with Anuja Hernández PT.   2.  Pain Psychologist to address relaxation, behavioral change, coping style, and other factors important to improvement.     Encouraged Maricarmen continue to work with Alecia Rowland PsyD, LP in the interim and/or  Establish with with primary therapist (through resources from Alecia). We discussed that I also agree with appointment with psychiatry. This appointment was cancelled but she will schedule as soon as able.    3.  Medication Management:     1. Maricarmen expressed interest in medical cannabis, hopes to be able to use for pain/anxiety/sleep. We discussed the same potential for side effects as she has had with most medications. She would still like to try. Maricarmen meets the criteria for the diagnosis intractable pain, that is the cause of her pain cannot be easily removed and she has tried most means of traditional pain management, and is a candidate for medical cannabis. For this reason, I have  certified her through the [place] department of health website for medical cannabis. She will be contacted for next steps.     2. We discussed again today that I do not recommend the use of opioids for chronic pain management.  Once she starts medical cannabis, I recommended discontinuing Tramadol. It may be reasonable to continue taking Tramadol in the interim but we discussed this is up to her rheumatologist and she should not take within 4 hours of lorazepam.      3. Discuss medication options with psychiatry as able.   4.  Follow up with MARICRUZ Patel CNP 4 weeks after starting medical cannabis.     Phone call duration: 27 minutes    MARICRUZ Solitario CNP

## 2020-05-11 ENCOUNTER — VIRTUAL VISIT (OUTPATIENT)
Dept: PALLIATIVE MEDICINE | Facility: CLINIC | Age: 60
End: 2020-05-11
Payer: COMMERCIAL

## 2020-05-11 DIAGNOSIS — G89.4 CHRONIC PAIN SYNDROME: ICD-10-CM

## 2020-05-11 DIAGNOSIS — M79.7 FIBROMYALGIA: Primary | ICD-10-CM

## 2020-05-11 PROCEDURE — 99214 OFFICE O/P EST MOD 30 MIN: CPT | Mod: 95 | Performed by: NURSE PRACTITIONER

## 2020-05-11 ASSESSMENT — PAIN SCALES - GENERAL: PAINLEVEL: EXTREME PAIN (8)

## 2020-05-11 NOTE — PATIENT INSTRUCTIONS
1.  Pain Physical Therapy:    I recommend making a follow up virtual visit with Anuja Hernández PT. The  has reached out to schedule, I will ask them to again.    2.  Pain Psychologist to address relaxation, behavioral change, coping style, and other factors important to improvement.     Continue to work with Alecia Rowland PsyD, LP in the interim but highly recommend establishing with with primary therapy (from resources from Alecia). Agree with appointment with psychiatry as soon as able.    3.  Medication Management:     1. You have been certified for medical cannabis. They will send you an email with next steps. Monitor effect on pain/mood/sleep.    2. Once you start medical cannabis I recommend discontinuing Tramadol. It may be reasonable to continue taking Tramadol in the interim but this is up to your rheumatologist.     3. Agree with Dr. Mclaughlin that Tramadol and Ativan not be taken together. Discuss medication options with psychiatry as able.   4.  Follow up with MARICRUZ Patel CNP 4 weeks after starting medical cannabis.

## 2020-05-13 NOTE — TELEPHONE ENCOUNTER
Follow up scheduled 06/22/2020 with Diamond Henry.    Pau Gomez RN  Care Coordinator  [facility] Pain [facility]

## 2020-05-19 ENCOUNTER — TELEPHONE (OUTPATIENT)
Dept: EDUCATION SERVICES | Facility: CLINIC | Age: 60
End: 2020-05-19

## 2020-05-27 ENCOUNTER — MYC MEDICAL ADVICE (OUTPATIENT)
Dept: PALLIATIVE MEDICINE | Facility: CLINIC | Age: 60
End: 2020-05-27

## 2020-05-27 NOTE — TELEPHONE ENCOUNTER
Endorse.me message from patient on 05/27/2020 at 1357:  Dear DIZA Fields need an updated summery of our last visit to bring to cannabis appointment.   How do I get this information?  How can I copying this off my chart?  Sincerely,   Maricarmen Dotson  __________________________________________________    Mychart sent to patient:  Good Afternoon Maricarmen,    Here is your after visit summary :  Instructions                 1.  Pain Physical Therapy:               I recommend making a follow up virtual visit with Anuja Hernández PT. The  has reached out to schedule, I will ask them to again.               2.  Pain Psychologist to address relaxation, behavioral change, coping style, and other factors important to improvement.                Continue to work with Alecia Rowland PsyD, LP in the interim but highly recommend establishing with with primary therapy (from resources from Alecia). Agree with appointment with psychiatry as soon as able.               3.  Medication Management:                           1. You have been certified for medical cannabis. They will send you an email with next steps. Monitor effect on pain/mood/sleep.                          2. Once you start medical cannabis I recommend discontinuing Tramadol. It may be reasonable to continue taking Tramadol in the interim but this is up to your rheumatologist.                           3. Agree with Dr. Mclaughlin that Tramadol and Ativan not be taken together. Discuss medication options with psychiatry as able.              4.  Follow up with MARICRUZ Patel CNP 4 weeks after starting medical cannabis.       If you log into your Endorse.me, go to Visits on the top of the screen. It looks like a calendar.  Patten that icon and click on appointments and visits.  You can go to the appointment date and click on view after visit summary. You should than be able to print that off of your computer.  To print it you should be able to push the control button  (lower left side of the key board) and the P button at the same time. That will allow you to print your document.  I hope this helps.    Take Care,    Pau Gomez RN  Care Coordinator  [facility] Pain [facility]

## 2020-05-28 ENCOUNTER — VIRTUAL VISIT (OUTPATIENT)
Dept: ENDOCRINOLOGY | Facility: CLINIC | Age: 60
End: 2020-05-28
Payer: COMMERCIAL

## 2020-05-28 DIAGNOSIS — Z96.41 INSULIN PUMP IN PLACE: ICD-10-CM

## 2020-05-28 DIAGNOSIS — E11.29 TYPE 2 DIABETES MELLITUS WITH MICROALBUMINURIA, WITH LONG-TERM CURRENT USE OF INSULIN (H): Primary | ICD-10-CM

## 2020-05-28 DIAGNOSIS — Z79.4 TYPE 2 DIABETES MELLITUS WITH MICROALBUMINURIA, WITH LONG-TERM CURRENT USE OF INSULIN (H): Primary | ICD-10-CM

## 2020-05-28 DIAGNOSIS — R80.9 TYPE 2 DIABETES MELLITUS WITH MICROALBUMINURIA, WITH LONG-TERM CURRENT USE OF INSULIN (H): Primary | ICD-10-CM

## 2020-05-28 PROCEDURE — 99214 OFFICE O/P EST MOD 30 MIN: CPT | Mod: 95 | Performed by: CLINICAL NURSE SPECIALIST

## 2020-05-28 NOTE — LETTER
"    5/28/2020         RE: Maricarmen Dotson  [address]  [place] [address]        Dear Colleague,    Thank you for referring your patient, Maricarmen Dotson, to the [facility]. Please see a copy of my visit note below.    Maricarmen Dotson is a 59 year old female who is being evaluated via a billable telephone visit due to the COVID-19 pandemic.      The patient has been notified of following:     \"This telephone visit will be conducted via a call between you and your physician/provider. We have found that certain health care needs can be provided without the need for a physical exam.  This service lets us provide the care you need with a short phone conversation.  If a prescription is necessary we can send it directly to your pharmacy.  If lab work is needed we can place an order for that and you can then stop by our lab to have the test done at a later time.    Telephone visits are billed at different rates depending on your insurance coverage. During this emergency period, for some insurers they may be billed the same as an in-person visit.  Please reach out to your insurance provider with any questions.    If during the course of the call the physician/provider feels a telephone visit is not appropriate, you will not be charged for this service.\"    Patient has given verbal consent for Telephone visit?  Yes    What phone number would you like to be contacted at? [phone]    How would you like to obtain your AVS? MyChart    Subjective     Maricarmen Dotson is a 59 year old female who presents via phone visit today for the following health issues:    Name: Maricarmen Dotson  F/u for Diabetes (Last seen 2/27/2020).  HPI:  Maricarmen Dotson is a 59 year old female who presents via phone visit for the management of:    1. Type 2 DM:  Originally diagnosed: in 1997 after starting Paxil and gaining 32 lbs in one month    Insulin pump start 2/20/2018.    Last diabetes ed 4/1/2020    Current Regimen: "   Insulin pump - Omnipod  Currently using pattern 3    Pattern 3 -CURRENT  Time Rate (U/hr)   0000 1.850   06:00 1.650   12:00 1.500   18:00 1.850     Standard pattern  Time Rate (U/hr)   0000 1.600   06:00 1.450   12:00 1.300   18:00 1.450     Pattern 2   Time Rate (U/hr)   0000 1.700   06:00 1.550   12:00 1.300   18:00 1.550     Pattern 4   Time Rate (U/hr)   0000 1.900   06:00 1.650   12:00 1.750   18:00 2.000     Pattern 5   Time Rate (U/hr)   0000 2.000   06:00 1.650   12:00 2.000   18:00 2.200     Carbohydrate Ratio -    Time Ratio   0000 6         Sensitivity   00:00  05:00    23  25   Active Insulin Time  4 hours   Basal  53% (39.5 units)   Bolus   47% (35.1 units)   Total Carbohydrates/day 151.8 g   Total Insulin/day  74.5 units   Average Blood Sugar  BG range  Freddie average 187    172   BS Checks 4.5 times a day   Target range 100-120     Freddie download:  Average glucose 172; 39% above target range, 61% in target range, 0% below range. Estimated A1c = 7.4%    REPORTS PREVIOUS ADVERSE REACTION TO HUMALOG.  States she cannot take Humalog because it caused pancreatits.    Was previously treated by endo at [facility] (last seen there 11/6/2015).  She has had adverse reaction to most oral medications including Metformin (abdominal pain), glipizide (allergic type reaction), Avandia (abdominal pain and swelling), Byetta and Onglyza (pancreatitis).  Was previously prescribed Invokana but did not start because she was concerned about possible side effects.        History of eating disorder (binge eating) due to PTSD.  Continues to struggle with a binge eating disorder.      Complications:   Diabetes Complications  Description / Detail    Diabetic Retinopathy   No retinopathy   CAD / PAD   No   Neuropathy   No   Nephropathy / Microalbuminuria   No   Gastroparesis  No   Hypoglycemia Unawareness  No     2. Intermittent Hypertension: Blood Pressure:   BP Readings from Last 3 Encounters:   04/21/20 (!) 149/85   03/10/20  (!) 164/86   20 138/72   .  Blood pressure medications include no medications.    3. Lipids: Takes no medications for lipid control.        PMH/PSH:  Past Medical History:   Diagnosis Date     Ascending aorta enlargement (H)      Depressive disorder     severe, prior hospitalization     Diabetes mellitus, type 2 (H)      Diverticulosis of colon (without mention of hemorrhage)      Fibromyalgia 2007     Insomnia      Irritable bowel syndrome      Past Surgical History:   Procedure Laterality Date     BACK SURGERY      fusion  and removal of hardware      C SPINAL ORTHOSIS,NOS      lumbar surgery     CHOLECYSTECTOMY       choley  2011     ENT SURGERY      septoplasty     HYSTERECTOMY, CERVIX STATUS UNKNOWN      TVH/BSO     ORTHOPEDIC SURGERY      left ankle     Family Hx:  Family History   Problem Relation Age of Onset     Diabetes Mother         type 2     Hypertension Mother      Cerebrovascular Disease Mother          stroke age 57     Ovarian Cancer Mother 43     Cancer Mother         cervix     Diabetes Father         type 2     Hypertension Father      Gastrointestinal Disease Father         colon polyps     Sleep Apnea Brother      Cancer Sister      Ovarian Cancer Sister 46     Ovarian Cancer Maternal Grandmother 72     Cancer Maternal Grandmother         cervix     Thyroid disease:          DM2: Yes, mother and father         Autoimmune: DM1, SLE, RA, Vitiligo     Social Hx:  Social History     Socioeconomic History     Marital status:      Spouse name: Not on file     Number of children: 3     Years of education: Not on file     Highest education level: Not on file   Occupational History     Occupation: xr technician     Employer: [employer]   Social Needs     Financial resource strain: Not on file     Food insecurity     Worry: Not on file     Inability: Not on file     Transportation needs     Medical: Not on file     Non-medical: Not on  file   Tobacco Use     Smoking status: Never Smoker     Smokeless tobacco: Never Used   Substance and Sexual Activity     Alcohol use: Yes     Alcohol/week: 0.0 standard drinks     Comment: maybe once a month     Drug use: No     Sexual activity: Yes     Partners: Male     Birth control/protection: Surgical   Lifestyle     Physical activity     Days per week: Not on file     Minutes per session: Not on file     Stress: Not on file   Relationships     Social connections     Talks on phone: Not on file     Gets together: Not on file     Attends Alevism service: Not on file     Active member of club or organization: Not on file     Attends meetings of clubs or organizations: Not on file     Relationship status: Not on file     Intimate partner violence     Fear of current or ex partner: Not on file     Emotionally abused: Not on file     Physically abused: Not on file     Forced sexual activity: Not on file   Other Topics Concern     Parent/sibling w/ CABG, MI or angioplasty before 65F 55M? Not Asked   Social History Narrative     Not on file          MEDICATIONS:  has a current medication list which includes the following prescription(s): aspirin not prescribed, blood glucose, freestyle ashleigh 14 day reader, freestyle ashleigh 14 day sensor, voltaren, epinephrine, HEMP OIL OR EXTRACT OR OTHER CBD CANNABINOID, NOT MEDICAL CANNABIS,, insulin aspart, insulin pump, lorazepam, losartan, melatonin, naloxone, polyethylene glycol, statin not prescribed, and tramadol.    ROS     ROS: 10 point ROS neg other than the symptoms noted above in the HPI.    Objective   Reported vitals:  [facility] 01/01/1999    PSYCH: Alert and oriented times 3; coherent speech, normal   rate and volume, able to articulate logical thoughts, able   to abstract reason, no tangential thoughts, no hallucinations   or delusions  Her affect is normal and pleasant  RESP: No cough, no audible wheezing, able to talk in full sentences  Remainder of exam unable to be  completed due to telephone visits    LABS:  A1c:   Component      Latest Ref Rng & Units 6/26/2018 4/18/2019 10/28/2019 2/27/2020   Hemoglobin A1C      0 - 5.6 % 8.7 (H) 8.5 (H) 8.0 (H) 8.0 (H)     Basic Metabolic Panel:  !COMPREHENSIVE Latest Ref Rng & Units 4/21/2020   SODIUM 133 - 144 mmol/L 139   POTASSIUM 3.4 - 5.3 mmol/L 3.7   CHLORIDE 94 - 109 mmol/L 108   BUN 7 - 30 mg/dL 10   Creatinine 0.52 - 1.04 mg/dL    Creatinine 0.52 - 1.04 mg/dL 0.67   Glucose 70 - 99 mg/dL 189 (H)   ANION GAP 3 - 14 mmol/L 7   CALCIUM 8.5 - 10.1 mg/dL 9.3     LFTS/Cholesterol Panel:  !LIPID/HEPATIC Latest Ref Rng & Units 12/13/2018   CHOLESTEROL <200 mg/dL 262 (H)   TRIGLYCERIDES <150 mg/dL 168 (H)   HDL CHOLESTEROL >49 mg/dL 50   LDL CHOLESTEROL DIRECT <100 mg/dL    LDL CHOLESTEROL, CALCULATED <100 mg/dL 178 (H)   VLDL-CHOLESTEROL 0 - 30 mg/dL    NON HDL CHOLESTEROL <130 mg/dL 212 (H)     Thyroid Function:   !THYROID Latest Ref Rng & Units 7/22/2019   TSH 0.40 - 4.00 mU/L 0.95   T4 FREE 0.76 - 1.46 ng/dL 0.87     Urine Microalbumin:   Component      Latest Ref Rng & Units 7/22/2019   Creatinine Urine      mg/dL 160   Albumin Urine mg/L      mg/L 18   Albumin Urine mg/g Cr      0 - 25 mg/g Cr 11.38       Vitamin D Deficiency screening    30 - 75 ug/L 34     All pertinent notes, labs, and images personally reviewed by me.     A/P  Ms.Anna ROXANNE Dotson is a 59 year old being evaluated via phone visit for the management of diabetes:    1. DM2 - Uncontrolled - continues to improve. Recent A1c 10/2019 and 2/2020 stable at 8.0% which is the lowest A1c she has had since 2013.  The recent 2 week Freddie download estimates an a1c of 7.4%, which is even better.  Currently on insulin pump therapy.  .  Pattern of glucose elevation between 8 pm and 7am - she reports fear of nocturnal hypoglycemia and increased pain are likely responsible.  She does not want to make any pump setting changes at this time.  No pump setting changes.today.      Continue  to work with diabetes ed weekly for ongoing pump adjustments.      Labs ordered today:   No orders of the defined types were placed in this encounter.  Urine microalbumin     Radiology/Consults ordered today: None        Follow-up:  4 months    Emily Phan NP  Endocrinology  [facility]  CC: Madyson Mclaughlin           Phone call duration:  27 minutes.  Phone call start time: 2:40 pm; call end time: 3:07 pm        Again, thank you for allowing me to participate in the care of your patient.        Sincerely,        MARICRUZ Castillo CNP

## 2020-05-28 NOTE — PROGRESS NOTES
"Maricarmen Dotson is a 59 year old female who is being evaluated via a billable telephone visit due to the COVID-19 pandemic.      The patient has been notified of following:     \"This telephone visit will be conducted via a call between you and your physician/provider. We have found that certain health care needs can be provided without the need for a physical exam.  This service lets us provide the care you need with a short phone conversation.  If a prescription is necessary we can send it directly to your pharmacy.  If lab work is needed we can place an order for that and you can then stop by our lab to have the test done at a later time.    Telephone visits are billed at different rates depending on your insurance coverage. During this emergency period, for some insurers they may be billed the same as an in-person visit.  Please reach out to your insurance provider with any questions.    If during the course of the call the physician/provider feels a telephone visit is not appropriate, you will not be charged for this service.\"    Patient has given verbal consent for Telephone visit?  Yes    What phone number would you like to be contacted at? [phone]    How would you like to obtain your AVS? MyChart    Subjective     Maricarmen Dotson is a 59 year old female who presents via phone visit today for the following health issues:    Name: Maricarmen Dotson  F/u for Diabetes (Last seen 2/27/2020).  HPI:  Maricarmen Dotson is a 59 year old female who presents via phone visit for the management of:    1. Type 2 DM:  Originally diagnosed: in 1997 after starting Paxil and gaining 32 lbs in one month    Insulin pump start 2/20/2018.    Last diabetes ed 4/1/2020    Current Regimen:   Insulin pump - Omnipod  Currently using pattern 3    Pattern 3 -CURRENT  Time Rate (U/hr)   0000 1.850   06:00 1.650   12:00 1.500   18:00 1.850     Standard pattern  Time Rate (U/hr)   0000 1.600   06:00 1.450   12:00 1.300   18:00 1.450 "     Pattern 2   Time Rate (U/hr)   0000 1.700   06:00 1.550   12:00 1.300   18:00 1.550     Pattern 4   Time Rate (U/hr)   0000 1.900   06:00 1.650   12:00 1.750   18:00 2.000     Pattern 5   Time Rate (U/hr)   0000 2.000   06:00 1.650   12:00 2.000   18:00 2.200     Carbohydrate Ratio -    Time Ratio   0000 6         Sensitivity   00:00  05:00    23  25   Active Insulin Time  4 hours   Basal  53% (39.5 units)   Bolus   47% (35.1 units)   Total Carbohydrates/day 151.8 g   Total Insulin/day  74.5 units   Average Blood Sugar  BG range  Freddie average 187    172   BS Checks 4.5 times a day   Target range 100-120     Freddie download:  Average glucose 172; 39% above target range, 61% in target range, 0% below range. Estimated A1c = 7.4%    REPORTS PREVIOUS ADVERSE REACTION TO HUMALOG.  States she cannot take Humalog because it caused pancreatits.    Was previously treated by endo at [facility] (last seen there 11/6/2015).  She has had adverse reaction to most oral medications including Metformin (abdominal pain), glipizide (allergic type reaction), Avandia (abdominal pain and swelling), Byetta and Onglyza (pancreatitis).  Was previously prescribed Invokana but did not start because she was concerned about possible side effects.        History of eating disorder (binge eating) due to PTSD.  Continues to struggle with a binge eating disorder.      Complications:   Diabetes Complications  Description / Detail    Diabetic Retinopathy   No retinopathy   CAD / PAD   No   Neuropathy   No   Nephropathy / Microalbuminuria   No   Gastroparesis  No   Hypoglycemia Unawareness  No     2. Intermittent Hypertension: Blood Pressure:   BP Readings from Last 3 Encounters:   04/21/20 (!) 149/85   03/10/20 (!) 164/86   02/27/20 138/72   .  Blood pressure medications include no medications.    3. Lipids: Takes no medications for lipid control.        PMH/PSH:  Past Medical History:   Diagnosis Date     Ascending aorta enlargement (H)       Depressive disorder     severe, prior hospitalization     Diabetes mellitus, type 2 (H)      Diverticulosis of colon (without mention of hemorrhage)      Fibromyalgia 2007     Insomnia      Irritable bowel syndrome      Past Surgical History:   Procedure Laterality Date     BACK SURGERY      fusion  and removal of hardware      C SPINAL ORTHOSIS,NOS  2002    lumbar surgery     CHOLECYSTECTOMY  2011     choley  2011     ENT SURGERY  1986    septoplasty     HYSTERECTOMY, CERVIX STATUS UNKNOWN      TVH/BSO     ORTHOPEDIC SURGERY  2005    left ankle     Family Hx:  Family History   Problem Relation Age of Onset     Diabetes Mother         type 2     Hypertension Mother      Cerebrovascular Disease Mother          stroke age 57     Ovarian Cancer Mother 43     Cancer Mother         cervix     Diabetes Father         type 2     Hypertension Father      Gastrointestinal Disease Father         colon polyps     Sleep Apnea Brother      Cancer Sister      Ovarian Cancer Sister 46     Ovarian Cancer Maternal Grandmother 72     Cancer Maternal Grandmother         cervix     Thyroid disease:          DM2: Yes, mother and father         Autoimmune: DM1, SLE, RA, Vitiligo     Social Hx:  Social History     Socioeconomic History     Marital status:      Spouse name: Not on file     Number of children: 3     Years of education: Not on file     Highest education level: Not on file   Occupational History     Occupation: xr technician     Employer: [employer]   Social Needs     Financial resource strain: Not on file     Food insecurity     Worry: Not on file     Inability: Not on file     Transportation needs     Medical: Not on file     Non-medical: Not on file   Tobacco Use     Smoking status: Never Smoker     Smokeless tobacco: Never Used   Substance and Sexual Activity     Alcohol use: Yes     Alcohol/week: 0.0 standard drinks     Comment: maybe once a month     Drug use: No     Sexual  activity: Yes     Partners: Male     Birth control/protection: Surgical   Lifestyle     Physical activity     Days per week: Not on file     Minutes per session: Not on file     Stress: Not on file   Relationships     Social connections     Talks on phone: Not on file     Gets together: Not on file     Attends Islam service: Not on file     Active member of club or organization: Not on file     Attends meetings of clubs or organizations: Not on file     Relationship status: Not on file     Intimate partner violence     Fear of current or ex partner: Not on file     Emotionally abused: Not on file     Physically abused: Not on file     Forced sexual activity: Not on file   Other Topics Concern     Parent/sibling w/ CABG, MI or angioplasty before 65F 55M? Not Asked   Social History Narrative     Not on file          MEDICATIONS:  has a current medication list which includes the following prescription(s): aspirin not prescribed, blood glucose, freestyle ashleigh 14 day reader, freestyle ashleigh 14 day sensor, voltaren, epinephrine, HEMP OIL OR EXTRACT OR OTHER CBD CANNABINOID, NOT MEDICAL CANNABIS,, insulin aspart, insulin pump, lorazepam, losartan, melatonin, naloxone, polyethylene glycol, statin not prescribed, and tramadol.    ROS     ROS: 10 point ROS neg other than the symptoms noted above in the HPI.    Objective   Reported vitals:  LMP 01/01/1999    PSYCH: Alert and oriented times 3; coherent speech, normal   rate and volume, able to articulate logical thoughts, able   to abstract reason, no tangential thoughts, no hallucinations   or delusions  Her affect is normal and pleasant  RESP: No cough, no audible wheezing, able to talk in full sentences  Remainder of exam unable to be completed due to telephone visits    LABS:  A1c:   Component      Latest Ref Rng & Units 6/26/2018 4/18/2019 10/28/2019 2/27/2020   Hemoglobin A1C      0 - 5.6 % 8.7 (H) 8.5 (H) 8.0 (H) 8.0 (H)     Basic Metabolic Panel:  !COMPREHENSIVE  Latest Ref Rng & Units 4/21/2020   SODIUM 133 - 144 mmol/L 139   POTASSIUM 3.4 - 5.3 mmol/L 3.7   CHLORIDE 94 - 109 mmol/L 108   BUN 7 - 30 mg/dL 10   Creatinine 0.52 - 1.04 mg/dL    Creatinine 0.52 - 1.04 mg/dL 0.67   Glucose 70 - 99 mg/dL 189 (H)   ANION GAP 3 - 14 mmol/L 7   CALCIUM 8.5 - 10.1 mg/dL 9.3     LFTS/Cholesterol Panel:  !LIPID/HEPATIC Latest Ref Rng & Units 12/13/2018   CHOLESTEROL <200 mg/dL 262 (H)   TRIGLYCERIDES <150 mg/dL 168 (H)   HDL CHOLESTEROL >49 mg/dL 50   LDL CHOLESTEROL DIRECT <100 mg/dL    LDL CHOLESTEROL, CALCULATED <100 mg/dL 178 (H)   VLDL-CHOLESTEROL 0 - 30 mg/dL    NON HDL CHOLESTEROL <130 mg/dL 212 (H)     Thyroid Function:   !THYROID Latest Ref Rng & Units 7/22/2019   TSH 0.40 - 4.00 mU/L 0.95   T4 FREE 0.76 - 1.46 ng/dL 0.87     Urine Microalbumin:   Component      Latest Ref Rng & Units 7/22/2019   Creatinine Urine      mg/dL 160   Albumin Urine mg/L      mg/L 18   Albumin Urine mg/g Cr      0 - 25 mg/g Cr 11.38       Vitamin D Deficiency screening    30 - 75 ug/L 34     All pertinent notes, labs, and images personally reviewed by me.     A/P  Ms.Anna ROXANNE Dotson is a 59 year old being evaluated via phone visit for the management of diabetes:    1. DM2 - Uncontrolled - continues to improve. Recent A1c 10/2019 and 2/2020 stable at 8.0% which is the lowest A1c she has had since 2013.  The recent 2 week Freddie download estimates an a1c of 7.4%, which is even better.  Currently on insulin pump therapy.  .  Pattern of glucose elevation between 8 pm and 7am - she reports fear of nocturnal hypoglycemia and increased pain are likely responsible.  She does not want to make any pump setting changes at this time.  No pump setting changes.today.      Continue to work with diabetes ed weekly for ongoing pump adjustments.      Labs ordered today:   No orders of the defined types were placed in this encounter.  Urine microalbumin     Radiology/Consults ordered today: None        Follow-up:  4  huan Phan NP  Endocrinology  [facility]  CC: Madyson Mclaughlin           Phone call duration:  27 minutes.  Phone call start time: 2:40 pm; call end time: 3:07 pm

## 2020-05-28 NOTE — TELEPHONE ENCOUNTER
Pt was seen on 5/11 as well and next visit scheduled 6/22.    MYRIAM Zamarripa, RN-BC  Patient Care Supervisor  [facility] [place]

## 2020-06-04 NOTE — PROGRESS NOTES
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 sooner if needed for acute medical concerns.    Call the psychiatric nurse line with medication questions or concerns at [phone].    VBOX may be used to communicate with your provider, but this is not intended to be used for emergencies.    Patient Education:  ***    Community Resources:    National Suicide Prevention Lifeline: 407.366.3892 (TTY: 815.244.2510). Call anytime for help.  (www.suicidepreventionlifeline.org)  National [place] on Mental Illness (www.nina.org): 333-667-7925 or 740-110-7600.   Mental Health Association (www.mentalhealth.org): 786.293.2615 or 216-280-6907.  [place] Crisis Text Line: Text [place] to 901404  Suicide LifeLine Chat: [employer].org/chat    Administrative Billing:   Phone Call Duration:  Minutes  Start: ***  Stop: ***    [facility]:  [facility]    Signed:   Rin Cain DO  [facility]

## 2020-06-05 ENCOUNTER — TELEPHONE (OUTPATIENT)
Dept: PEDIATRICS | Facility: CLINIC | Age: 60
End: 2020-06-05

## 2020-06-05 NOTE — TELEPHONE ENCOUNTER
Call placed to pt to get more information.    She reports back spasms starting last night. Pain is in her right hip and lower lumbar spine.  Last week, pt injured her left knee and was limping    Pt has FM  Cannot take muscle relaxants, make her too anxious and jumpy    Pt is able to get up and walk around  Is using ice, tramadol and tyl have not been helpful.  Will try ibuprofen    Advised pt to continue what she is doing ice, rest, anti inflammatory medications.  If sx do not subside, pt needs to be seen for eval    Pt verbalized understanding and agrees to the plan.    Aranza Sawyer RN on 6/5/2020 at 11:07 AM

## 2020-06-05 NOTE — TELEPHONE ENCOUNTER
Reason for call:  Symptom   Symptom or request: Back spasms    Duration (how long have symptoms been present): since last night 06/04/2020  Have you been treated for this before? For fibromyalgia- pt not sure if related    Additional comments: Pt started having back spasms since last night, not getting better, wont stop. Pt took tramadol, did not help at all. Only relief is to lay on a lot of ice. Req a cb to discuss what she can do.    Phone number to reach patient:  Cell number on file:    Telephone Information:   Mobile [phone]       Best Time:  any    Can we leave a detailed message on this number?  YES    Travel screening: Not Applicable

## 2020-06-08 ENCOUNTER — VIRTUAL VISIT (OUTPATIENT)
Dept: PSYCHIATRY | Facility: OTHER | Age: 60
End: 2020-06-08
Payer: COMMERCIAL

## 2020-06-08 DIAGNOSIS — F41.1 GAD (GENERALIZED ANXIETY DISORDER): Primary | ICD-10-CM

## 2020-06-08 DIAGNOSIS — E11.9 TYPE 2 DIABETES, HBA1C GOAL < 7% (H): ICD-10-CM

## 2020-06-08 DIAGNOSIS — F39 MOOD DISORDER (H): ICD-10-CM

## 2020-06-08 DIAGNOSIS — F60.3 BORDERLINE PERSONALITY DISORDER (H): ICD-10-CM

## 2020-06-08 DIAGNOSIS — F43.10 PTSD (POST-TRAUMATIC STRESS DISORDER): ICD-10-CM

## 2020-06-08 PROCEDURE — 90792 PSYCH DIAG EVAL W/MED SRVCS: CPT | Mod: 95 | Performed by: PSYCHIATRY & NEUROLOGY

## 2020-06-08 RX ORDER — LORAZEPAM 1 MG/1
1 TABLET ORAL DAILY PRN
Qty: 15 TABLET | Refills: 0 | Status: SHIPPED | OUTPATIENT
Start: 2020-06-08 | End: 2020-06-30

## 2020-06-08 ASSESSMENT — PATIENT HEALTH QUESTIONNAIRE - PHQ9
10. IF YOU CHECKED OFF ANY PROBLEMS, HOW DIFFICULT HAVE THESE PROBLEMS MADE IT FOR YOU TO DO YOUR WORK, TAKE CARE OF THINGS AT HOME, OR GET ALONG WITH OTHER PEOPLE: EXTREMELY DIFFICULT
SUM OF ALL RESPONSES TO PHQ QUESTIONS 1-9: 16
SUM OF ALL RESPONSES TO PHQ QUESTIONS 1-9: 16

## 2020-06-08 ASSESSMENT — ANXIETY QUESTIONNAIRES
GAD7 TOTAL SCORE: 18
3. WORRYING TOO MUCH ABOUT DIFFERENT THINGS: MORE THAN HALF THE DAYS
1. FEELING NERVOUS, ANXIOUS, OR ON EDGE: NEARLY EVERY DAY
7. FEELING AFRAID AS IF SOMETHING AWFUL MIGHT HAPPEN: MORE THAN HALF THE DAYS
6. BECOMING EASILY ANNOYED OR IRRITABLE: NEARLY EVERY DAY
2. NOT BEING ABLE TO STOP OR CONTROL WORRYING: NEARLY EVERY DAY
GAD7 TOTAL SCORE: 18
7. FEELING AFRAID AS IF SOMETHING AWFUL MIGHT HAPPEN: MORE THAN HALF THE DAYS
4. TROUBLE RELAXING: MORE THAN HALF THE DAYS
GAD7 TOTAL SCORE: 18
5. BEING SO RESTLESS THAT IT IS HARD TO SIT STILL: NEARLY EVERY DAY

## 2020-06-09 ASSESSMENT — PATIENT HEALTH QUESTIONNAIRE - PHQ9: SUM OF ALL RESPONSES TO PHQ QUESTIONS 1-9: 16

## 2020-06-09 ASSESSMENT — ANXIETY QUESTIONNAIRES: GAD7 TOTAL SCORE: 18

## 2020-06-09 NOTE — TELEPHONE ENCOUNTER
Routing refill request to provider for review/approval because:  Labs out of range:  Elevated A1c over 3 months ago    Pt does have OV in 2 months    OK for RF?    Rissa Goss, RN

## 2020-06-10 ENCOUNTER — MYC MEDICAL ADVICE (OUTPATIENT)
Dept: PALLIATIVE MEDICINE | Facility: CLINIC | Age: 60
End: 2020-06-10

## 2020-06-10 NOTE — TELEPHONE ENCOUNTER
Sxbbm message from patient on 06/10/2020 at 1053:  Dear Diamond,   I have used 3 different cannabis products and had to stop due to multiple reactions.   They were: high glucose readings,  coughing,  sneezing,  major increase in anxiety and insomnia,  major increase in joint pain and instability.   I am currently back on tramadol and ibproph.   Please let me know if you have any other ideas for pain management.     Sincerely,   Maricarmen Dotson  ________________________________________    Sxbbm message sent to patient:  Good Morning Maricarmen,    I will forward your message to Diamond Henry to review.  Once she has responded we will reach back out to you with her recommendations.    Take Care,    Pau Gomez RN  Care Coordinator  [facility] Pain Management     Routing to provider to review

## 2020-06-11 ENCOUNTER — MYC REFILL (OUTPATIENT)
Dept: ENDOCRINOLOGY | Facility: CLINIC | Age: 60
End: 2020-06-11

## 2020-06-11 ENCOUNTER — MYC MEDICAL ADVICE (OUTPATIENT)
Dept: ENDOCRINOLOGY | Facility: CLINIC | Age: 60
End: 2020-06-11

## 2020-06-11 DIAGNOSIS — E11.9 TYPE 2 DIABETES, HBA1C GOAL < 7% (H): ICD-10-CM

## 2020-06-11 NOTE — TELEPHONE ENCOUNTER
I am sorry to hear she has had side effects with the medical cannabis.    Unfortunately, as we have discussed, she has tried most medications indicated for chronic pain management with intolerable side effects. Though restarting low dose naltrexone at a very low dose and increasing at a specially slow rate may be an option at some point, as we have discussed I am hesitant to do that while her mental health is not well managed. That being said, I do not recommend Tramadol for long term management of chronic pain. We may not be able to find an alternative medication and she may need to slowly taper off of this.    MARICRUZ Patel [facility] Pain Management

## 2020-06-11 NOTE — TELEPHONE ENCOUNTER
Jent below sent to patient, Will leave encounter open for patient response/chart review by nursing.     Chris Hernadez     Diamond was sorry to hear that you have had side effects with the medical cannabis.  Unfortunately, she advised that you have tried most medications indicated for chronic pain management with intolerable side effects. Though restarting low dose naltrexone at a very low dose and increasing at a specially slow rate may be an option at some point, she advised that she was hesitant to do that until your mental health is well managed. That being said, she does not recommend Tramadol for long term management of chronic pain. While we are hopeful to find an alternative, we may not be able to find an alternative medication and you may need to slowly taper off of this. I see you have an appointment upcoming (06/22).  This would be a good time to talk about what other options/next steps that Diamond may have for you. Thanks    Luba MIGUEL, RN Care Coordinator  [facility]  Pain Management

## 2020-06-14 NOTE — PATIENT INSTRUCTIONS
Treatment Plan:    Continue Ativan 1 mg up to once daily as needed for severe anxiety/panic. 15 tabs to last 30 days.     Caution with any agents that might prolong QTc as last QTc during .     Strongly recommend DBT therapy.     Strongly recommend intensive trauma therapy.     Strongly recommend establishing long-term care with a psychiatrist who can continue to get to know you over the years. Referral placed.     Continue all other medications as reviewed per electronic medical record today.     Safety plan reviewed. To the Emergency Department as needed or call after hours crisis line at [phone] or [phone]. [place] Crisis Text Line: Text [place] to 936851  or  Suicide LifeLine Chat: suicidepreventionlifeline.org/chat    Schedule an appointment with me in 4-6 weeks or sooner as needed. I can continue to bridge care until you can establish with community provider or the [facility] clinic. Call [facility] at [phone] to schedule.    Follow up with primary care provider as planned or sooner if needed for acute medical concerns.    Call the psychiatric nurse line with medication questions or concerns at [phone].    MyOtherDrivet may be used to communicate with your provider, but this is not intended to be used for emergencies.    Patient Education:  Discussed risks of benzodiazepine (Ativan, Xanax, Klonopin, Valium, etc) use including, but not limited to, sedation, tolerance, risk for addiction/dependence. Pt told not to drink alcohol while taking benzodiazepines due to risk of trouble breathing and potential death. Discussed not driving or operating heavy machinery until it is known how the drug affects the patient. Also instructed to discuss with physician or pharmacist before ever taking a benzodiazepine with a narcotic/opioid pain medication.     Community Resources:    National Suicide Prevention Lifeline: 903.894.1822 (TTY: 913.398.9251). Call anytime for help.   (www.suicidepreventionlifeline.org)  National [place] on Mental Illness (www.nina.org): 639-982-8817 or 642-411-6155.   Mental Health Association (www.mentalhealth.org): 953.728.3286 or 624-605-4918.  [place] Crisis Text Line: Text [place] to 599165  Suicide LifeLine Chat: [email or web address] Kid Kentline.[email or web address]/chat
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
## 2020-06-17 ENCOUNTER — TRANSFERRED RECORDS (OUTPATIENT)
Dept: HEALTH INFORMATION MANAGEMENT | Facility: CLINIC | Age: 60
End: 2020-06-17

## 2020-06-17 LAB — RETINOPATHY: NEGATIVE

## 2020-06-18 ENCOUNTER — TELEPHONE (OUTPATIENT)
Dept: PEDIATRICS | Facility: CLINIC | Age: 60
End: 2020-06-18

## 2020-06-18 PROCEDURE — 36415 COLL VENOUS BLD VENIPUNCTURE: CPT | Performed by: FAMILY MEDICINE

## 2020-06-18 PROCEDURE — 86769 SARS-COV-2 COVID-19 ANTIBODY: CPT | Performed by: FAMILY MEDICINE

## 2020-06-18 NOTE — TELEPHONE ENCOUNTER
Please abstract the following data from this visit with this patient into the appropriate field in Epic:    Tests that can be patient reported without a hard copy:    Eye exam with ophthalmology on this date: 06/17/2020 (hard copy sent to abstracting).      BARRY JAVIER MA on 6/18/2020 at 11:54 AM

## 2020-06-18 NOTE — LETTER
June 19, 2020        Maricarmen Dotson  [address] [place] [place] [address]      COVID-19 Antibody, IgG   Date Value Ref Range Status   06/18/2020 Negative NEG^Negative Final     Comment:     Negative results do not rule out SARS-CoV-2 infection, particularly in those   who have been in contact with the virus.  Follow-up testing with a molecular   diagnostic should be considered to rule out infection in these individuals.  Results from antibody testing should not be used as the sole basis to diagnose   or exclude SARS-CoV-2 infection or to inform infection status.           You have tested NEGATIVE for COVID-19 antibodies. This suggests you have not had or been exposed to COVID-19. But it does not mean that for sure.     The test finds antibodies in most people 10 days after they get sick. For some people, it takes longer than 10 days for antibodies to show up. Others may never show antibodies against COVID-19, especially if they have weak immune systems.    If you have COVID-19 symptoms now, please stay home and away from others.     What is antibody testing?    This is a kind of blood test. We take a small sample of your blood, and then test it for something called  antibodies.      Your body makes antibodies to fight infection. If your blood has antibodies for a certain germ, it means you ve been infected with that germ in the past.     Sometimes, antibodies stay in your body for years after you ve had the infection. They can be there even if the germ didn t make you sick. They are a sign that your body fought off the infection.    Will this test find antibodies in everyone who s had COVID-19?    No. The test finds antibodies in most people 10 days after they get sick. For some people, it takes longer than 10 days for antibodies to show up. Others may never show antibodies against COVID-19, especially if they have weak immune systems.    What does it mean if the test finds COVID-19 antibodies?    If we find  these antibodies, it suggests:     This person has had the virus.     Their body s immune system fought the virus.     We don t know if this will help protect someone from getting COVID-19 again. Scientists are still learning about this.    What are the signs of COVID-19?    Signs of COVID-19 can appear from 2 to 14 days (up to 2 weeks) after you re infected. Some people have no symptoms or only mild symptoms. Others get very sick. The most common symptoms are:          Cough    Shortness of breath or trouble breathing  Or at least 2 of these symptoms:    Fever    Chills    Repeated shaking with chills    Muscle pain    Headache    Sore throat    Losing your sense of taste or smell    You may have other symptoms. Please contact your doctor or clinic for any symptoms that worry you.    Where can I get more information?     To learn the [facility] guidelines for staying home, please visit the [place] Department of Health website at https://www.health.[facility].[place].us/diseases/coronavirus/basics.html    To learn more about COVID-19 and how to care for yourself at home, please visit the CDC website at https://www.cdc.gov/coronavirus/2019-ncov/about/steps-when-sick.html    For more options for care at [facility], please visit our website at [email or web address]    Formerly [facility], [facility] ([facility]) COVID-19 Hotline:  [phone]

## 2020-06-19 LAB
COVID-19 ANTIBODY IGG: NEGATIVE
LAB TEST METHOD: NORMAL

## 2020-06-19 NOTE — PROGRESS NOTES
"Maricarmen Dotson is a 59 year old female who is being evaluated via a billable telephone visit.      The patient has been notified of following:     \"This telephone visit will be conducted via a call between you and your physician/provider. We have found that certain health care needs can be provided without the need for a physical exam.  This service lets us provide the care you need with a short phone conversation.  If a prescription is necessary we can send it directly to your pharmacy.  If lab work is needed we can place an order for that and you can then stop by our lab to have the test done at a later time.    Telephone visits are billed at different rates depending on your insurance coverage. During this emergency period, for some insurers they may be billed the same as an in-person visit.  Please reach out to your insurance provider with any questions.    If during the course of the call the physician/provider feels a telephone visit is not appropriate, you will not be charged for this service.\"    Patient has given verbal consent for Telephone visit?  Yes    What phone number would you like to be contacted at? [phone]    How would you like to obtain your AVS? Mail a copy     [facility]  [place]    Recommendations at last vist on 5/11/2020:              1.  Pain Physical Therapy:               Again we discussed that I recommend follow up with Anuja Hernández PT. She will schedule virtual visits with Anuja Hernández PT.              2.  Pain Psychologist to address relaxation, behavioral change, coping style, and other factors important to improvement.                Dalton Hernadez continue to work with Alecia Rowland PsyD, LP in the interim and/or  Establish with with primary therapist (through resources from Alecia). We discussed that I also agree with appointment with psychiatry. This appointment was cancelled but she will schedule as soon as able. "               3.  Medication Management:                           1. Maricarmen expressed interest in medical cannabis, hopes to be able to use for pain/anxiety/sleep. We discussed the same potential for side effects as she has had with most medications. She would still like to try. Maricarmen meets the criteria for the diagnosis intractable pain, that is the cause of her pain cannot be easily removed and she has tried most means of traditional pain management, and is a candidate for medical cannabis. For this reason, I have certified her through the [place] department of health website for medical cannabis. She will be contacted for next steps.                           2. We discussed again today that I do not recommend the use of opioids for chronic pain management.  Once she starts medical cannabis, I recommended discontinuing Tramadol. It may be reasonable to continue taking Tramadol in the interim but we discussed this is up to her rheumatologist and she should not take within 4 hours of lorazepam.                            3. Discuss medication options with psychiatry as able.              4.  Follow up with MARICRUZ Patel CNP 4 weeks after starting medical cannabis.     Additional provider notes:  -Her pain is about the same as it was at last visit.   -She continues to struggle with pain flares and mental health.   -She started medical cannabis later on in May. She tried a THC/CBD oral capsule and oral suspension but developed elevated blood sugars, coughing, sneezing, increased anxiety and insomnia, and an increase in pain. She tried decreasing the doses but still experienced side effects. She also tried a topical cream but still had insomnia and anxiety so stopped.    -She doesn't feel as though additional sessions with Anuja Hernández PT would be helpful. She states she already knows the techniques and strategies that are recommended.   -She continues to take hemp CBD (from the [employer]) chewables and  topical with some improvement in anxiety. She also reports some pain benefit with the topical and is hopeful.   -She continues to take Tramadol, 1 tablet BID, as prescribed by her rheumatologist- Dr. Mcadams. She states she has tried to taper off in the past but had an anxiety attack so is skeptical. She understands she should not be on both Tramadol and Ativan but states both of these medications work well for her. She states waits 4-6 hours between any overlap of Ativan and Tramadol.   -She had a visit with psychiatry, Dr. Cain. The plan was for her to continue to prescribe Ativan (15 tabs to last 30 days) until she is able to establish with a provider in the community. She highly recommended she establish with a EMDR therapist and so she is looking for such a provider. She is not at all interested in trying any other mental health medications.       Assessment:   Maricarmen Dotson is a 59 year old female with a past medical history significant for IBS, fibromyalgia, diabetes mellitus type 2, and depression who presents with complaints of widespread pain.      1. Widespread pain- etiology likely fibromyalgia.   2. Mental Health - the patient's mental health concerns, specifically depression, affect her experience of pain and contribute to her clinically significant distress.       1. Fibromyalgia    2. Chronic pain syndrome         Plan:                1.  Pain Physical Therapy:               We discussed follow up sessions with Anuja Hernández PT and ongoing participation in our program. Maricarmen is not interested in this, feels as though she already is aware of the concepts and techniques that are recommended for pain management and practices these on a regular basis. Though I think she could benefit, as she is not interested, sdditional sessions not needed.               2.  Pain Psychologist to address relaxation, behavioral change, coping style, and other factors important to improvement.                 Follow  up with Dr. Cain and establish with a therapist as soon as able- we discussed that these appointments should be her first priority.               3.  Medication Management:                           1. We discussed again today- at length- that I do not recommend the use of opioids for chronic pain management and the reasons why. We reviewed the development of tolerance over time, opioid induced hyperalgesia, sedation and cognitive impairment, sleep disordered breathing, and risk of unintentional overdose and death. I highlighted the increased risk when combined with Ativan. That being said, her rheumatologist, Dr. Mcadams, prescribes her Tramadol and so ultimately the decision to continue to prescribe is up to him. As we discussed, I would recommend a gradual taper of off Tramadol. If she is now taking 1 tablet twice a day, I would recommend attempting to decrease by 10 tablets a month (for 10 days of that month she would only take 1 tab/day or for 20 days she would take only 1.5 tabs/day), then by another 10 tablets a month and so on until off. It may be okay to pause the taper for a month but I do not recommend increasing the dose. I recommend you consider this plan and discuss with your primary care provider.                            2. Discuss ongoing use of Ativan with your psychiatrist Dr. Cain.               4.  Follow up with MARICRUZ Patel if interested in restarting participation in our pain program.     Phone call duration: 30 minutes    MARICRUZ Solitario CNP

## 2020-06-22 ENCOUNTER — VIRTUAL VISIT (OUTPATIENT)
Dept: PALLIATIVE MEDICINE | Facility: CLINIC | Age: 60
End: 2020-06-22
Payer: COMMERCIAL

## 2020-06-22 DIAGNOSIS — G89.4 CHRONIC PAIN SYNDROME: ICD-10-CM

## 2020-06-22 DIAGNOSIS — M79.7 FIBROMYALGIA: Primary | ICD-10-CM

## 2020-06-22 PROCEDURE — 99214 OFFICE O/P EST MOD 30 MIN: CPT | Mod: 95 | Performed by: NURSE PRACTITIONER

## 2020-06-22 ASSESSMENT — PAIN SCALES - GENERAL: PAINLEVEL: EXTREME PAIN (9)

## 2020-06-22 NOTE — PATIENT INSTRUCTIONS
1.  Pain Physical Therapy:               No- you do not need to follow up with pain psychology.              2.  Pain Psychologist to address relaxation, behavioral change, coping style, and other factors important to improvement.                 Follow up with Dr. Cain and establish with a therapist as soon as able- these should be your first priority.               3.  Medication Management:                           1. As we discussed, I would recommend a gradual taper of off Tramadol. If you are now taking 1 tablet twice a day, I would recommend attempting to decrease by 10 tablets a month (for 10 days of that month you would only take 1 tab/day or for 20 days you would take only 1.5 tabs/day), then by another 10 tablets a month and so on until off. It may be okay to pause the taper for a month but I do not recommend increasing the dose. I recommend you consider this plan and discuss with your primary care provider.                            2. Discuss ongoing use of Ativan with your psychiatrist Dr. Cain.               4.  Follow up with MARICRUZ Patel if interested in restarting participation in our pain program.

## 2020-06-30 DIAGNOSIS — F41.1 GAD (GENERALIZED ANXIETY DISORDER): ICD-10-CM

## 2020-06-30 RX ORDER — LORAZEPAM 1 MG/1
1 TABLET ORAL DAILY PRN
Qty: 15 TABLET | Refills: 0 | Status: SHIPPED | OUTPATIENT
Start: 2020-07-07 | End: 2021-05-17

## 2020-06-30 NOTE — TELEPHONE ENCOUNTER
Pt requesting early refill. Treatment plan instructions at last visit state 15 tabs to last 30 days. Rx sent for next refill available July 7th to start July 8th since last filled June 8th.

## 2020-07-30 ENCOUNTER — TELEPHONE (OUTPATIENT)
Dept: PSYCHIATRY | Facility: CLINIC | Age: 60
End: 2020-07-30

## 2020-07-30 DIAGNOSIS — F41.1 GAD (GENERALIZED ANXIETY DISORDER): ICD-10-CM

## 2020-07-30 RX ORDER — LORAZEPAM 1 MG/1
1 TABLET ORAL DAILY PRN
Qty: 15 TABLET | Refills: 0 | OUTPATIENT
Start: 2020-07-30

## 2020-07-30 NOTE — TELEPHONE ENCOUNTER
Patient does not appear to have followed through with setting up long term psychiatry although she was left a message on 6.9 with call back info. This writer called patient again today, left another message and up dated her info in [email or web address] system with the directive that if/reece she calls back and is scheduled, this encounter gets updated with accurate info re her appt date/ time

## 2020-07-30 NOTE — TELEPHONE ENCOUNTER
Need to ensure pt has scheduled long-term community psychiatry appointment as she was referred early June. I agreed to bridge care if she has appointment scheduled. She was to follow-up with me in 4-6 weeks from her early June appt. I am willing to refill Ativan if she gets follow-up scheduled with me and also appt scheduled for establishing long-term care.

## 2020-08-04 NOTE — TELEPHONE ENCOUNTER
Noted.   Will await this appointment for refill to be pended and routed to provider for consideration.     Encounter can be re-opened if patient returns call.     Anabell Ayala RN    Nurse Liaison  [facility]

## 2020-08-09 ENCOUNTER — MYC MEDICAL ADVICE (OUTPATIENT)
Dept: EDUCATION SERVICES | Facility: CLINIC | Age: 60
End: 2020-08-09

## 2020-08-09 DIAGNOSIS — E11.65 TYPE 2 DIABETES MELLITUS WITH HYPERGLYCEMIA, WITH LONG-TERM CURRENT USE OF INSULIN (H): ICD-10-CM

## 2020-08-09 DIAGNOSIS — Z79.4 TYPE 2 DIABETES MELLITUS WITH HYPERGLYCEMIA, WITH LONG-TERM CURRENT USE OF INSULIN (H): ICD-10-CM

## 2020-08-10 NOTE — TELEPHONE ENCOUNTER
Diabetes Self-Management Training: Insulin Pump Telephone Visit    Current Diabetes Management per Patient:  Comments/concerns:   Dear Deanna,  I have sent you my glucose readings.  Can you please call me to help re program my pda?  I am on my last Basel program #1.  I have 5 Basel programs, 2 -5 are programmed to high for me. I have kept getting to many low readings.   My phone number:[phone].  Thank you,  Maricarmen Dotson    Insulin Pump Type: OmniPod    Taking other diabetes medications? no    Current Pump report:                        Assessment/Plan:    Call to patient regarding mychart message and pump upload. She states she has had issues with the pump battery not indicating it's low then all of a sudden it dies.    She has been working on diet and not binge eating. She has noticed low glucose since she is not longer binge eating . She has reduced to basal pattern 1 which she feels is ok for now.  She would like a lower basal pattern set up just in case she has lows again.     Glucose pattern improved since she is no longer binge eating.  Will change basal settings to allow a lower basal 1 pattern, and basal 2-5 will be her current basal 1-4 patterns.      Assisted patient with the following settings.  She did activate basal 2.     BASAL RATES and times:   Basal 1: 12am: 1.50 --6 AM: 1.35 --12pm: 1.2 -- 6pm: 1.35  Basal 2: 12am: 1.60 --6 AM: 1.45 --12pm: 1.3 -- 6pm: 1.45   Basal 3: 12am: 1.70 -- 6 AM: 1.55 -- 12pm: 1.3 -- 6pm: 1.55   Basal 4: 12am: 1.85 -- 6 AM: 1.65 --12pm:1.5--6pm: 1.85  Basal 5: 12am: 1.90 -- 6 AM: 1.65 --12pm: 1.75--6pm: 2.0      Instructed patient to call [place] helpline regarding the battery issue.      Deanna Bolton RN, [facility]      Any diabetes medication dose changes were made via the CDE Protocol and Collaborative Practice Agreement with the patient's referring provider. A copy of this encounter was shared with the provider.

## 2020-08-20 ENCOUNTER — TELEPHONE (OUTPATIENT)
Dept: ENDOCRINOLOGY | Facility: CLINIC | Age: 60
End: 2020-08-20

## 2020-08-20 ENCOUNTER — MYC MEDICAL ADVICE (OUTPATIENT)
Dept: ENDOCRINOLOGY | Facility: CLINIC | Age: 60
End: 2020-08-20

## 2020-08-20 DIAGNOSIS — E11.65 TYPE 2 DIABETES MELLITUS WITH HYPERGLYCEMIA, WITH LONG-TERM CURRENT USE OF INSULIN (H): Primary | ICD-10-CM

## 2020-08-20 DIAGNOSIS — Z79.4 TYPE 2 DIABETES MELLITUS WITH HYPERGLYCEMIA, WITH LONG-TERM CURRENT USE OF INSULIN (H): Primary | ICD-10-CM

## 2020-08-20 RX ORDER — INSULIN PUMP CONTROLLER
1 EACH MISCELLANEOUS
Qty: 45 EACH | Refills: 3 | Status: SHIPPED | OUTPATIENT
Start: 2020-08-20 | End: 2021-06-30

## 2020-08-20 RX ORDER — INSULIN PUMP CONTROLLER
1 EACH MISCELLANEOUS
Qty: 45 EACH | Refills: 3 | Status: SHIPPED | OUTPATIENT
Start: 2020-08-20 | End: 2020-08-20

## 2020-08-20 NOTE — TELEPHONE ENCOUNTER
Prescription for Omnipod Dash PDM and pods provided.  Please fax to requested pharmacy.  Emily Phan NP  Endocrinology

## 2020-08-20 NOTE — TELEPHONE ENCOUNTER
"The following fax received from [facility].  (see below)  Rxn form on Emily Phan's desk.    PROVIDER:  1.  PLEASE WRITE A RXN FOR THE OMNIPOD-DASH AND PRINT.  See the directions listed under \"A\" as outlined below.  2.  Complete Rxn form on your desk.   Linda Holder CMA on 8/20/2020 at 10:36 AM      "

## 2020-08-20 NOTE — TELEPHONE ENCOUNTER
Faxed the completed Rx form to [facility] at fax# [phone].  Faxed the printed prescription for the Omnipod DASH pods to [facility] at fax# [phone].  Patient informed via [employer].        Alex Hernadez,    I wanted to let you know that Emily Phan NP received a fax from [employer] regarding your request to transition to the Omnipod DASH Insulin System.  Emily Phan filled out the prescription form and it was faxed back to [facility].  In addition to that, per their request Emily Phan also wrote out a prescription for the Omnipod DASH pods and that was faxed to [facility].   Please reach out to [employer] if you have any questions on the status of coverage or shipment as they are the experts and initiate all of the necessary forms.  Take care.    Thank you,  Linda Holder M.A.  [facility]  [phone] [facility]

## 2020-08-20 NOTE — TELEPHONE ENCOUNTER
Received a fax from [employer] stating that they received a request from the patient requesting to transition from the Omnipod to the DASH.    Reached out to the Omnipod Rep - Amina.  She will research to see if patient is eligible for the DASH with her insurance coverage.  Reply pending.  Linda Holder CMA on 8/20/2020 at 9:26 AM      Amina the Omnipod rep called back and states that it appears this patient does have coverage for the DASH.  Linda Holder CMA on 8/20/2020 at 10:27 AM   02-[birth year]-2018

## 2020-08-24 ENCOUNTER — MYC MEDICAL ADVICE (OUTPATIENT)
Dept: ENDOCRINOLOGY | Facility: CLINIC | Age: 60
End: 2020-08-24

## 2020-08-24 DIAGNOSIS — Z79.4 TYPE 2 DIABETES MELLITUS WITH HYPERGLYCEMIA, WITH LONG-TERM CURRENT USE OF INSULIN (H): Primary | ICD-10-CM

## 2020-08-24 DIAGNOSIS — E78.5 HYPERLIPIDEMIA LDL GOAL <100: ICD-10-CM

## 2020-08-24 DIAGNOSIS — E11.65 TYPE 2 DIABETES MELLITUS WITH HYPERGLYCEMIA, WITH LONG-TERM CURRENT USE OF INSULIN (H): Primary | ICD-10-CM

## 2020-08-24 NOTE — TELEPHONE ENCOUNTER
Emily- do you want labs before appt?  Please advise and I can order.  Route back and I will also advise of her appt date and time after I know if you want labs first.  Elsy Crespo RN

## 2020-08-27 ENCOUNTER — MYC MEDICAL ADVICE (OUTPATIENT)
Dept: ENDOCRINOLOGY | Facility: CLINIC | Age: 60
End: 2020-08-27

## 2020-09-01 ENCOUNTER — ALLIED HEALTH/NURSE VISIT (OUTPATIENT)
Dept: EDUCATION SERVICES | Facility: CLINIC | Age: 60
End: 2020-09-01
Payer: COMMERCIAL

## 2020-09-01 DIAGNOSIS — E11.65 TYPE 2 DIABETES MELLITUS WITH HYPERGLYCEMIA, WITH LONG-TERM CURRENT USE OF INSULIN (H): Primary | ICD-10-CM

## 2020-09-01 DIAGNOSIS — Z79.4 TYPE 2 DIABETES MELLITUS WITH HYPERGLYCEMIA, WITH LONG-TERM CURRENT USE OF INSULIN (H): Primary | ICD-10-CM

## 2020-09-01 PROCEDURE — G0108 DIAB MANAGE TRN  PER INDIV: HCPCS | Performed by: DIETITIAN, REGISTERED

## 2020-09-01 NOTE — PROGRESS NOTES
"Diabetes Self-Management Education & Support    Presents for: Insulin Pump and CGM Review    Patient verbally consented to the telephone visit service today: yes    SUBJECTIVE/OBJECTIVE:  Presents for: Insulin Pump and CGM Review  Accompanied by: Self  Diabetes education in the past 24mo: Yes  Focus of Visit: CGM, Insulin Pump  Diabetes type: Type 2  Disease course: Getting harder to manage  Diabetes management related comments/concerns: need to change my basal insulin, I'm having more lows,  doing better and no longer binge eating  Transportation concerns: No  Other concerns:: Cognitive impairment  Cultural Influences/Ethnic Background:  American    Diabetes Symptoms & Complications:  Fatigue: Yes  Neuropathy: Yes  Polydipsia: No  Polyphagia: No  Polyuria: No  Visual change: No  Slow healing wounds: No  Autonomic neuropathy: Yes  CVA: No  Heart disease: No  Nephropathy: No  Peripheral neuropathy: Yes  Foot ulcerations: No  Peripheral Vascular Disease: No  Retinopathy: No  Sexual dysfunction: Yes    Patient Problem List and Family Medical History reviewed for relevant medical history, current medical status, and diabetes risk factors.    Vitals:  LMP 01/01/1999   Estimated body mass index is 34.33 kg/m  as calculated from the following:    Height as of 4/21/20: 1.626 m (5' 4\").    Weight as of 4/21/20: 90.7 kg (200 lb).   Last 3 BP:   BP Readings from Last 3 Encounters:   04/21/20 (!) 149/85   03/10/20 (!) 164/86   02/27/20 138/72       History   Smoking Status     Never Smoker   Smokeless Tobacco     Never Used       Labs:  Lab Results   Component Value Date    A1C 8.0 02/27/2020     Lab Results   Component Value Date     04/21/2020     Lab Results   Component Value Date     12/13/2018     HDL Cholesterol   Date Value Ref Range Status   12/13/2018 50 >49 mg/dL Final   ]  GFR Estimate   Date Value Ref Range Status   04/21/2020 >90 >60 mL/min/[1.73_m2] Final     Comment:     Non  GFR " Calc  Starting 12/18/2018, serum creatinine based estimated GFR (eGFR) will be   calculated using the Chronic Kidney Disease Epidemiology Collaboration   (CKD-EPI) equation.       GFR Estimate If Black   Date Value Ref Range Status   04/21/2020 >90 >60 mL/min/[1.73_m2] Final     Comment:      GFR Calc  Starting 12/18/2018, serum creatinine based estimated GFR (eGFR) will be   calculated using the Chronic Kidney Disease Epidemiology Collaboration   (CKD-EPI) equation.       Lab Results   Component Value Date    CR 0.67 04/21/2020     No results found for: MICROALBUMIN    Healthy Eating:  Healthy Eating Assessed Today: No  Cultural/Methodist diet restrictions?: No  Beverages: Water, Tea  Has patient met with a dietitian in the past?: No    Being Active:  Being Active Assessed Today: No  Exercise:: Yes  How intense was your typical exercise? : Moderate (like brisk walking)  Barrier to exercise: Safety, Physical limitation    Monitoring:  Monitoring Assessed Today: Yes  Blood Glucose Meter: FreeStyle, CGM    Taking Medications:  Diabetes Medication(s)     Insulin       insulin aspart (NOVOLOG VIAL) 100 UNITS/ML vial    USE IN INSULIN PUMP, TOTAL DAILY DOSE 100 UNITS.     INSULIN PUMP - OUTPATIENT    INSULIN PUMP - OUTPATIENT  Date last updated: 8/10/2020 Omn[facility]od   BASAL RATES and times:   Basal 1: 12am: 1.50 --6 AM: 1.35 --12pm: 1.2 -- 6pm: 1.35  Basal 2: 12am: 1.60 --6 AM: 1.45 --12pm: 1.3 -- 6pm: 1.45   Basal 3: 12am: 1.70 --  6 AM: 1.55 -- 12pm: 1.3 -- 6pm: 1.55   Basal 4: 12am: 1.85 -- 6 AM: 1.65 --12pm:1.5--6pm: 1.85  Basal 5: 12am: 1.90 -- 6 AM: 1.65 --12pm: 1.75--6pm: 2.0  CARB RATIO: 12am-12am: 6   Corection Factor (Sensitivity): 12AM (midnight): 23 -- 5 AM: 25   Target blood glucose: 100-120  Active insulin time: 4 hrs          Taking Medication Assessed Today: Yes  Current Treatments: Diet, Insulin Pump  Given by: Patient  Injection/Infusion sites: Abdomen, Buttocks  Problems taking diabetes  medications regularly?: No  Diabetes medication side effects?: No    Problem Solving:  Problem Solving Assessed Today: Yes  Is the patient at risk for hypoglycemia?: Yes  Hypoglycemia Frequency: Weekly  Hypoglycemia Treatment: Glucose (tablets or gel), Juice  Patient carries a carbohydrate source: Yes  Medical ID: No  Does patient have DKA prevention plan?: No  Does patient have severe weather/disaster plan for diabetes management?: Yes  Does patient have sick day plan for diabetes management?: Yes    Hypoglycemia Complications  Blackouts: No  Hospitalization: No  Nocturnal hypoglycemia: Yes  Required assistance: No  Required glucagon injection: No  Seizures: No    Reducing Risks:  Reducing Risks Assessed Today: Yes  Diabetes Risks: Age over 45 years  CAD Risks: Diabetes Mellitus, Stress, Obesity  Has dilated eye exam at least once a year?: Yes  Sees dentist every 6 months?: Yes  Feet checked by healthcare provider in the last year?: Yes(PCP checks feet)    Healthy Coping:  Healthy Coping Assessed Today: Yes  Emotional response to diabetes: Ready to learn  Informal Support system:: Family  Stage of change: ACTION (Actively working towards change)  Patient Activation Measure Survey Score:  JEFFERY Score (Last Two) 10/18/2013 4/22/2020   JEFFERY Raw Score 42 28   Activation Score 66 50   JEFFERY Level 3 2       Diabetes knowledge and skills assessment:   Patient is knowledgeable in diabetes management concepts related to: Healthy Eating, Being Active, Monitoring, Taking Medication, Problem Solving, Reducing Risks and Healthy Coping    Patient needs further education on the following diabetes management concepts: Problem Solving    Based on learning assessment above, most appropriate setting for further diabetes education would be: Individual setting.      INTERVENTION:    REPORTS:                                        Insulin Pump Information  Insulin Pump Brand: OmniPod    Education provided today on:  AADE Self-Care  Behaviors:  Problem Solving: low blood glucose - causes, signs/symptoms, treatment and prevention    Education specific to insulin pump provided today on:   how to do basal rate testing     Opportunities for ongoing education and support in diabetes-self management were discussed.    Pt verbalized understanding of concepts discussed and recommendations provided today.       Education Materials Provided:  No new materials provided today    ASSESSMENT  Glucose Patterns & Trends:  Over-night downward trend with risk for hypoglycemia by early am, also several hypoglycemic episodes in the afternoon when meals are delayed.     Reports no longer binge eating, thus feels her insulin needs have decreased significantly. Patient would benefit from further reduction in basal settings by 15%. Pt also requesting we delete some of her basal setting profiles, as she no longer uses them.    Changes made to pump settings:  Currently using basal #1. Delete basal patterns 2 thru 5.   Change basal #1 to basal #3.  Set up new basal #2 with a 5-10% decrease.  Set up new basal #1 with 10-15% decrease from current.   Pt has new DASH pdm, however prefers to revise current [place] and will call [employer] for assistance to set up DASH.     Assisted patient with making the following changes:    Basal #1 --> changed to Basal #3    Time Rate (units/hr)   12 am 1.50   6 am 1.35   12 pm 1.20   6 pm 1.35   Total unit(s)/d 32.40       New Basal #2   Time Rate (units/hr)   12 am 1.40   6 am 1.25   12 pm 1.10   6 pm 1.25   Total unit(s)/d   30.0         New Basal #1 (current):  Time Rate (units/hr)   12 am 1.50 --> 1.35   6 am 1.35 --> 1.20   12  pm 1.20 --> 1.05   6 pm 1.35 --> 1.20   Total unit(s)/d 32.4 --> 28.8       PLAN  See Patient Instructions for co-developed, patient-stated behavior change goals.  AVS printed and provided to patient today. See Follow-Up section for recommended follow-up.    Allie Matias RD, CDE  Diabetes Education  Specialist    Time Spent: 60 minutes  Encounter Type: Individual    Any diabetes medication dose changes were made via the CDE Protocol and Collaborative Practice Agreement with the patient's endocrinology provider. A copy of this encounter was shared with the provider.

## 2020-09-01 NOTE — LETTER
"    9/1/2020         RE: Maricarmen Dotson  [address] [place] [address]        Dear Colleague,    Thank you for referring your patient, Maricarmen Dotson, to the [facility]. Please see a copy of my visit note below.    Diabetes Self-Management Education & Support    Presents for: Insulin Pump and CGM Review    Patient verbally consented to the telephone visit service today: yes    SUBJECTIVE/OBJECTIVE:  Presents for: Insulin Pump and CGM Review  Accompanied by: Self  Diabetes education in the past 24mo: Yes  Focus of Visit: CGM, Insulin Pump  Diabetes type: Type 2  Disease course: Getting harder to manage  Diabetes management related comments/concerns: need to change my basal insulin, I'm having more lows,  doing better and no longer binge eating  Transportation concerns: No  Other concerns:: Cognitive impairment  Cultural Influences/Ethnic Background:  American    Diabetes Symptoms & Complications:  Fatigue: Yes  Neuropathy: Yes  Polydipsia: No  Polyphagia: No  Polyuria: No  Visual change: No  Slow healing wounds: No  Autonomic neuropathy: Yes  CVA: No  Heart disease: No  Nephropathy: No  Peripheral neuropathy: Yes  Foot ulcerations: No  Peripheral Vascular Disease: No  Retinopathy: No  Sexual dysfunction: Yes    Patient Problem List and Family Medical History reviewed for relevant medical history, current medical status, and diabetes risk factors.    Vitals:  LMP 01/01/1999   Estimated body mass index is 34.33 kg/m  as calculated from the following:    Height as of 4/21/20: 1.626 m (5' 4\").    Weight as of 4/21/20: 90.7 kg (200 lb).   Last 3 BP:   BP Readings from Last 3 Encounters:   04/21/20 (!) 149/85   03/10/20 (!) 164/86   02/27/20 138/72       History   Smoking Status     Never Smoker   Smokeless Tobacco     Never Used       Labs:  Lab Results   Component Value Date    A1C 8.0 02/27/2020     Lab Results   Component Value Date     04/21/2020     Lab Results "   Component Value Date     12/13/2018     HDL Cholesterol   Date Value Ref Range Status   12/13/2018 50 >49 mg/dL Final   ]  GFR Estimate   Date Value Ref Range Status   04/21/2020 >90 >60 mL/min/[1.73_m2] Final     Comment:     Non  GFR Calc  Starting 12/18/2018, serum creatinine based estimated GFR (eGFR) will be   calculated using the Chronic Kidney Disease Epidemiology Collaboration   (CKD-EPI) equation.       GFR Estimate If Black   Date Value Ref Range Status   04/21/2020 >90 >60 mL/min/[1.73_m2] Final     Comment:      GFR Calc  Starting 12/18/2018, serum creatinine based estimated GFR (eGFR) will be   calculated using the Chronic Kidney Disease Epidemiology Collaboration   (CKD-EPI) equation.       Lab Results   Component Value Date    CR 0.67 04/21/2020     No results found for: MICROALBUMIN    Healthy Eating:  Healthy Eating Assessed Today: No  Cultural/Hoahaoism diet restrictions?: No  Beverages: Water, Tea  Has patient met with a dietitian in the past?: No    Being Active:  Being Active Assessed Today: No  Exercise:: Yes  How intense was your typical exercise? : Moderate (like brisk walking)  Barrier to exercise: Safety, Physical limitation    Monitoring:  Monitoring Assessed Today: Yes  Blood Glucose Meter: FreeStyle, CGM    Taking Medications:  Diabetes Medication(s)     Insulin       insulin aspart (NOVOLOG VIAL) 100 UNITS/ML vial    USE IN INSULIN PUMP, TOTAL DAILY DOSE 100 UNITS.     INSULIN PUMP - OUTPATIENT    INSULIN PUMP - OUTPATIENT  Date last updated: 8/10/2020 Omnipod   BASAL RATES and times:   Basal 1: 12am: 1.50 --6 AM: 1.35 --12pm: 1.2 -- 6pm: 1.35  Basal 2: 12am: 1.60 --6 AM: 1.45 --12pm: 1.3 -- 6pm: 1.45   Basal 3: 12am: 1.70 --  6 AM: 1.55 -- 12pm: 1.3 -- 6pm: 1.55   Basal 4: 12am: 1.85 -- 6 AM: 1.65 --12pm:1.5--6pm: 1.85  Basal 5: 12am: 1.90 -- 6 AM: 1.65 --12pm: 1.75--6pm: 2.0  CARB RATIO: 12am-12am: 6   Corection Factor (Sensitivity): 12AM  (midnight): 23 -- 5 AM: 25   Target blood glucose: 100-120  Active insulin time: 4 hrs          Taking Medication Assessed Today: Yes  Current Treatments: Diet, Insulin Pump  Given by: Patient  Injection/Infusion sites: Abdomen, Buttocks  Problems taking diabetes medications regularly?: No  Diabetes medication side effects?: No    Problem Solving:  Problem Solving Assessed Today: Yes  Is the patient at risk for hypoglycemia?: Yes  Hypoglycemia Frequency: Weekly  Hypoglycemia Treatment: Glucose (tablets or gel), Juice  Patient carries a carbohydrate source: Yes  Medical ID: No  Does patient have DKA prevention plan?: No  Does patient have severe weather/disaster plan for diabetes management?: Yes  Does patient have sick day plan for diabetes management?: Yes    Hypoglycemia Complications  Blackouts: No  Hospitalization: No  Nocturnal hypoglycemia: Yes  Required assistance: No  Required glucagon injection: No  Seizures: No    Reducing Risks:  Reducing Risks Assessed Today: Yes  Diabetes Risks: Age over 45 years  CAD Risks: Diabetes Mellitus, Stress, Obesity  Has dilated eye exam at least once a year?: Yes  Sees dentist every 6 months?: Yes  Feet checked by healthcare provider in the last year?: Yes(PCP checks feet)    Healthy Coping:  Healthy Coping Assessed Today: Yes  Emotional response to diabetes: Ready to learn  Informal Support system:: Family  Stage of change: ACTION (Actively working towards change)  Patient Activation Measure Survey Score:  JEFFERY Score (Last Two) 10/18/2013 4/22/2020   JEFFERY Raw Score 42 28   Activation Score 66 50   JEFFERY Level 3 2       Diabetes knowledge and skills assessment:   Patient is knowledgeable in diabetes management concepts related to: Healthy Eating, Being Active, Monitoring, Taking Medication, Problem Solving, Reducing Risks and Healthy Coping    Patient needs further education on the following diabetes management concepts: Problem Solving    Based on learning assessment above, most  appropriate setting for further diabetes education would be: Individual setting.      INTERVENTION:    REPORTS:                                        Insulin Pump Information  Insulin Pump Brand: OmniPod    Education provided today on:  AADE Self-Care Behaviors:  Problem Solving: low blood glucose - causes, signs/symptoms, treatment and prevention    Education specific to insulin pump provided today on:   how to do basal rate testing     Opportunities for ongoing education and support in diabetes-self management were discussed.    Pt verbalized understanding of concepts discussed and recommendations provided today.       Education Materials Provided:  No new materials provided today    ASSESSMENT  Glucose Patterns & Trends:  Over-night downward trend with risk for hypoglycemia by early am, also several hypoglycemic episodes in the afternoon when meals are delayed.     Reports no longer binge eating, thus feels her insulin needs have decreased significantly. Patient would benefit from further reduction in basal settings by 15%. Pt also requesting we delete some of her basal setting profiles, as she no longer uses them.    Changes made to pump settings:  Currently using basal #1. Delete basal patterns 2 thru 5.   Change basal #1 to basal #3.  Set up new basal #2 with a 5-10% decrease.  Set up new basal #1 with 10-15% decrease from current.   Pt has new DASH pdm, however prefers to revise current [place] and will call [employer] for assistance to set up DASH.     Assisted patient with making the following changes:    Basal #1 --> changed to Basal #3    Time Rate (units/hr)   12 am 1.50   6 am 1.35   12 pm 1.20   6 pm 1.35   Total unit(s)/d 32.40       New Basal #2   Time Rate (units/hr)   12 am 1.40   6 am 1.25   12 pm 1.10   6 pm 1.25   Total unit(s)/d   30.0         New Basal #1 (current):  Time Rate (units/hr)   12 am 1.50 --> 1.35   6 am 1.35 --> 1.20   12  pm 1.20 --> 1.05   6 pm 1.35 --> 1.20   Total unit(s)/d 32.4  --> 28.8       PLAN  See Patient Instructions for co-developed, patient-stated behavior change goals.  AVS printed and provided to patient today. See Follow-Up section for recommended follow-up.    Allie Matias RD, CDE  Diabetes     Time Spent: 60 minutes  Encounter Type: Individual    Any diabetes medication dose changes were made via the CDE Protocol and Collaborative Practice Agreement with the patient's endocrinology provider. A copy of this encounter was shared with the provider.

## 2020-09-01 NOTE — PATIENT INSTRUCTIONS
Call [employer] to set up new DASH pdm. They can also assist you with your [employer] account for wireless pump uploads. If you need the pro-connect code again, it is [employer].     Changes made during today's visit:    Basal patterns 2-5 deleted.    Basal #1 --> changed to Basal #3    Time Rate (units/hr)   12 am 1.50   6 am 1.35   12 pm 1.20   6 pm 1.35   Total unit(s)/d 32.40       New Basal #2   Time Rate (units/hr)   12 am 1.40   6 am 1.25   12 pm 1.10   6 pm 1.25   Total unit(s)/d   30.0         New Basal #1 (current):  Time Rate (units/hr)   12 am 1.50 --> 1.35   6 am 1.35 --> 1.20   12  pm 1.20 --> 1.05   6 pm 1.35 --> 1.20   Total unit(s)/d 32.4 --> 28.8

## 2020-09-01 NOTE — Clinical Note
Alex Diaz,  Just fyi-- reduced basal profiles due to increase in hypoglycemia, is doing less binge eating.    Thanks!  Allie

## 2020-09-03 ENCOUNTER — VIRTUAL VISIT (OUTPATIENT)
Dept: ENDOCRINOLOGY | Facility: CLINIC | Age: 60
End: 2020-09-03
Payer: COMMERCIAL

## 2020-09-03 DIAGNOSIS — Z96.41 INSULIN PUMP IN PLACE: ICD-10-CM

## 2020-09-03 DIAGNOSIS — Z79.4 TYPE 2 DIABETES MELLITUS WITH HYPERGLYCEMIA, WITH LONG-TERM CURRENT USE OF INSULIN (H): Primary | ICD-10-CM

## 2020-09-03 DIAGNOSIS — E11.65 TYPE 2 DIABETES MELLITUS WITH HYPERGLYCEMIA, WITH LONG-TERM CURRENT USE OF INSULIN (H): Primary | ICD-10-CM

## 2020-09-03 PROCEDURE — 99215 OFFICE O/P EST HI 40 MIN: CPT | Mod: 95 | Performed by: CLINICAL NURSE SPECIALIST

## 2020-09-03 NOTE — LETTER
"    9/3/2020         RE: Maricarmen Dotson  [address]  [place] [address]        Dear Colleague,    Thank you for referring your patient, Maricarmen Dotson, to the [facility]. Please see a copy of my visit note below.      Maricarmen Dotson is a 59 year old female who is being evaluated via a billable telephone visit due to the COVID-19 pandemic.      The patient has been notified of following:     \"This telephone visit will be conducted via a call between you and your physician/provider. We have found that certain health care needs can be provided without the need for a physical exam.  This service lets us provide the care you need with a short phone conversation.  If a prescription is necessary we can send it directly to your pharmacy.  If lab work is needed we can place an order for that and you can then stop by our lab to have the test done at a later time.    Telephone visits are billed at different rates depending on your insurance coverage. During this emergency period, for some insurers they may be billed the same as an in-person visit.  Please reach out to your insurance provider with any questions.    If during the course of the call the physician/provider feels a telephone visit is not appropriate, you will not be charged for this service.\"    Patient has given verbal consent for Telephone visit?  Yes    What phone number would you like to be contacted at? [phone]    How would you like to obtain your AVS? MyChart      Name: Maricarmen Dotson  F/u for Diabetes (Last seen 5/28/2020).  HPI:  Maricarmen Dotson is a 59 year old female who presents via phone visit for the management of:    1. Type 2 DM:  Originally diagnosed: in 1997     Insulin pump start 2/20/2018.  Will be upgrading to Omnipod DASH    Last diabetes ed 9/1/2020    Current Regimen:   Insulin pump - Omnipod    Basal 1 -CURRENT  Time Rate (U/hr)   0000 1.500   06:00 1.350   12:00 1.200   18:00 1.350     Basal 2  Time Rate " (U/hr)   0000 1.600   06:00 1.450   12:00 1.300   18:00 1.450     Basal 3   Time Rate (U/hr)   0000 1.700   06:00 1.550   12:00 1.300   18:00 1.550     Basal 4   Time Rate (U/hr)   0000 1.850   06:00 1.650   12:00 1.500   18:00 1.850     Basal 5   Time Rate (U/hr)   0000 1.900   06:00 1.650   12:00 1.750   18:00 2.200     Carbohydrate Ratio -    Time Ratio   0000 6         Sensitivity   00:00  05:00    23  25   Active Insulin Time  4 hours   Basal  61% (30.9 units)   Bolus   39% (20.1 units)   Total Carbohydrates/day 99 grams   Total Insulin/day  51 units   Average Blood Sugar  BG range  Freddie average 147    136   BS Checks 3.8 times a day   Target range 100-120     Freddie download:  Average glucose 136; 16% above target range, 82% in target range, 2% below range. Estimated A1c = 6.6%    REPORTS PREVIOUS ADVERSE REACTION TO HUMALOG.  States she cannot take Humalog because it caused pancreatits.    Was previously treated by endo at [facility] (last seen there 11/6/2015).  She has had adverse reaction to most oral medications including Metformin (abdominal pain), glipizide (allergic type reaction), Avandia (abdominal pain and swelling), Byetta and Onglyza (pancreatitis).          History of eating disorder (binge eating) due to PTSD.  Has a binge eating disorder - currently doing much better, she has not been binge eating.      Complications:   Diabetes Complications  Description / Detail    Diabetic Retinopathy   No retinopathy.  Last exam 6/17/2020   CAD / PAD   No   Neuropathy   No   Nephropathy / Microalbuminuria   No   Gastroparesis  No   Hypoglycemia Unawareness  No     2. Intermittent Hypertension: Blood Pressure:   BP Readings from Last 3 Encounters:   04/21/20 (!) 149/85   03/10/20 (!) 164/86   02/27/20 138/72   .  Blood pressure medications include no medications.    3. Lipids: Takes no medications for lipid control.        PMH/PSH:  Past Medical History:   Diagnosis Date     Ascending aorta enlargement (H)       Depressive disorder     severe, prior hospitalization     Diabetes mellitus, type 2 (H)      Diverticulosis of colon (without mention of hemorrhage)      Fibromyalgia 2007     Insomnia      Irritable bowel syndrome      Past Surgical History:   Procedure Laterality Date     BACK SURGERY      fusion  and removal of hardware      C SPINAL ORTHOSIS,NOS  2002    lumbar surgery     CHOLECYSTECTOMY  2011     choley  2011     ENT SURGERY  1986    septoplasty     HYSTERECTOMY, CERVIX STATUS UNKNOWN      TVH/BSO     ORTHOPEDIC SURGERY  2005    left ankle     Family Hx:  Family History   Problem Relation Age of Onset     Diabetes Mother         type 2     Hypertension Mother      Cerebrovascular Disease Mother          stroke age 57     Ovarian Cancer Mother 43     Cancer Mother         cervix     Diabetes Father         type 2     Hypertension Father      Gastrointestinal Disease Father         colon polyps     Sleep Apnea Brother      Cancer Sister      Ovarian Cancer Sister 46     Ovarian Cancer Maternal Grandmother 72     Cancer Maternal Grandmother         cervix     Thyroid disease:          DM2: Yes, mother and father         Autoimmune: DM1, SLE, RA, Vitiligo     Social Hx:  Social History     Socioeconomic History     Marital status:      Spouse name: Not on file     Number of children: 3     Years of education: Not on file     Highest education level: Not on file   Occupational History     Occupation: xr technician     Employer: [employer]   Social Needs     Financial resource strain: Not on file     Food insecurity     Worry: Not on file     Inability: Not on file     Transportation needs     Medical: Not on file     Non-medical: Not on file   Tobacco Use     Smoking status: Never Smoker     Smokeless tobacco: Never Used   Substance and Sexual Activity     Alcohol use: Yes     Alcohol/week: 0.0 standard drinks     Comment: maybe once a month     Drug use: No      Sexual activity: Yes     Partners: Male     Birth control/protection: Surgical   Lifestyle     Physical activity     Days per week: Not on file     Minutes per session: Not on file     Stress: Not on file   Relationships     Social connections     Talks on phone: Not on file     Gets together: Not on file     Attends Jewish service: Not on file     Active member of club or organization: Not on file     Attends meetings of clubs or organizations: Not on file     Relationship status: Not on file     Intimate partner violence     Fear of current or ex partner: Not on file     Emotionally abused: Not on file     Physically abused: Not on file     Forced sexual activity: Not on file   Other Topics Concern     Parent/sibling w/ CABG, MI or angioplasty before 65F 55M? Not Asked   Social History Narrative     Not on file          MEDICATIONS:  has a current medication list which includes the following prescription(s): aspirin not prescribed, blood glucose, freestyle ashleigh 14 day reader, freestyle ashleigh 14 day sensor, voltaren, epinephrine, HEMP OIL OR EXTRACT OR OTHER CBD CANNABINOID, NOT MEDICAL CANNABIS,, insulin aspart, omnipod dash 5 pack pods, insulin pump, lorazepam, losartan, melatonin, naloxone, polyethylene glycol, statin not prescribed, and tramadol.    ROS     ROS: 10 point ROS neg other than the symptoms noted above in the HPI.    Objective   Reported vitals:  LMP 01/01/1999    PSYCH: Alert and oriented times 3; coherent speech, normal   rate and volume, able to articulate logical thoughts, able   to abstract reason, no tangential thoughts, no hallucinations   or delusions  Her affect is normal and pleasant  RESP: No cough, no audible wheezing, able to talk in full sentences  Remainder of exam unable to be completed due to telephone visits    LABS:  A1c:   Component      Latest Ref Rng & Units 6/26/2018 4/18/2019 10/28/2019 2/27/2020   Hemoglobin A1C      0 - 5.6 % 8.7 (H) 8.5 (H) 8.0 (H) 8.0 (H)     Basic  Metabolic Panel:  !COMPREHENSIVE Latest Ref Rng & Units 4/21/2020   SODIUM 133 - 144 mmol/L 139   POTASSIUM 3.4 - 5.3 mmol/L 3.7   CHLORIDE 94 - 109 mmol/L 108   BUN 7 - 30 mg/dL 10   Creatinine 0.52 - 1.04 mg/dL    Creatinine 0.52 - 1.04 mg/dL 0.67   Glucose 70 - 99 mg/dL 189 (H)   ANION GAP 3 - 14 mmol/L 7   CALCIUM 8.5 - 10.1 mg/dL 9.3     LFTS/Cholesterol Panel:  !LIPID/HEPATIC Latest Ref Rng & Units 12/13/2018   CHOLESTEROL <200 mg/dL 262 (H)   TRIGLYCERIDES <150 mg/dL 168 (H)   HDL CHOLESTEROL >49 mg/dL 50   LDL CHOLESTEROL DIRECT <100 mg/dL    LDL CHOLESTEROL, CALCULATED <100 mg/dL 178 (H)   VLDL-CHOLESTEROL 0 - 30 mg/dL    NON HDL CHOLESTEROL <130 mg/dL 212 (H)     Thyroid Function:   !THYROID Latest Ref Rng & Units 7/22/2019   TSH 0.40 - 4.00 mU/L 0.95   T4 FREE 0.76 - 1.46 ng/dL 0.87     Urine Microalbumin:   Component      Latest Ref Rng & Units 7/22/2019   Creatinine Urine      mg/dL 160   Albumin Urine mg/L      mg/L 18   Albumin Urine mg/g Cr      0 - 25 mg/g Cr 11.38       Vitamin D Deficiency screening    30 - 75 ug/L 34     All pertinent notes, labs, and images personally reviewed by me.     A/P  Ms.Anna ROXANNE Dotson is a 59 year old being evaluated via phone visit for the management of diabetes:    1. DM2 - Controlled  No known diabetic complications.  Currently on insulin pump therapy, Omnipod.  Plans to start Omnipod Dash soon.    She is monitoring her glucose continuously via Freddie.    She makes frequent insulin dose adjustments based on her glucose readings.  Upgrade to Freddie 2.  I think she would benefit from the high and low glucose alarms.  No pump setting changes.today.  Upcoming lab appointment for comprehensive fasting labs.    Continue to work with diabetes ed weekly for ongoing pump adjustments.      Labs ordered today:   No orders of the defined types were placed in this encounter.  Urine microalbumin     Radiology/Consults ordered today: None        Follow-up:  4 months    Emily Phan  NP  Endocrinology  [facility]  CC: Madyson Mclaughlin             Phone call duration:  40 minutes.  Phone call start time: 2:09 pm; call end time: 2:49 pm      Again, thank you for allowing me to participate in the care of your patient.        Sincerely,        MARCIRUZ Castillo CNP

## 2020-09-03 NOTE — PATIENT INSTRUCTIONS
Maricarmen,  Here are your current pump settings:    Basal 1 -CURRENT  Time Rate (U/hr)   0000 1.500   06:00 1.350   12:00 1.200   18:00 1.350     Basal 2  Time Rate (U/hr)   0000 1.600   06:00 1.450   12:00 1.300   18:00 1.450     Basal 3   Time Rate (U/hr)   0000 1.700   06:00 1.550   12:00 1.300   18:00 1.550     Basal 4   Time Rate (U/hr)   0000 1.850   06:00 1.650   12:00 1.500   18:00 1.850     Basal 5   Time Rate (U/hr)   0000 1.900   06:00 1.650   12:00 1.750   18:00 2.200     Carbohydrate Ratio -    Time Ratio   0000 6         Sensitivity   00:00  05:00    23  25   Active Insulin Time  4 hours

## 2020-09-03 NOTE — PROGRESS NOTES
"  Maricarmen Dotson is a 59 year old female who is being evaluated via a billable telephone visit due to the COVID-19 pandemic.      The patient has been notified of following:     \"This telephone visit will be conducted via a call between you and your physician/provider. We have found that certain health care needs can be provided without the need for a physical exam.  This service lets us provide the care you need with a short phone conversation.  If a prescription is necessary we can send it directly to your pharmacy.  If lab work is needed we can place an order for that and you can then stop by our lab to have the test done at a later time.    Telephone visits are billed at different rates depending on your insurance coverage. During this emergency period, for some insurers they may be billed the same as an in-person visit.  Please reach out to your insurance provider with any questions.    If during the course of the call the physician/provider feels a telephone visit is not appropriate, you will not be charged for this service.\"    Patient has given verbal consent for Telephone visit?  Yes    What phone number would you like to be contacted at? [phone]    How would you like to obtain your AVS? MyChart      Name: Maricarmen Dotson  F/u for Diabetes (Last seen 5/28/2020).  HPI:  Maricarmen Dotson is a 59 year old female who presents via phone visit for the management of:    1. Type 2 DM:  Originally diagnosed: in 1997     Insulin pump start 2/20/2018.  Will be upgrading to Omnipod DASH    Last diabetes ed 9/1/2020    Current Regimen:   Insulin pump - Omnipod    Basal 1 -CURRENT  Time Rate (U/hr)   0000 1.500   06:00 1.350   12:00 1.200   18:00 1.350     Basal 2  Time Rate (U/hr)   0000 1.600   06:00 1.450   12:00 1.300   18:00 1.450     Basal 3   Time Rate (U/hr)   0000 1.700   06:00 1.550   12:00 1.300   18:00 1.550     Basal 4   Time Rate (U/hr)   0000 1.850   06:00 1.650   12:00 1.500   18:00 1.850     Basal 5 "   Time Rate (U/hr)   0000 1.900   06:00 1.650   12:00 1.750   18:00 2.200     Carbohydrate Ratio -    Time Ratio   0000 6         Sensitivity   00:00  05:00    23  25   Active Insulin Time  4 hours   Basal  61% (30.9 units)   Bolus   39% (20.1 units)   Total Carbohydrates/day 99 grams   Total Insulin/day  51 units   Average Blood Sugar  BG range  Freddie average 147    136   BS Checks 3.8 times a day   Target range 100-120     Freddie download:  Average glucose 136; 16% above target range, 82% in target range, 2% below range. Estimated A1c = 6.6%    REPORTS PREVIOUS ADVERSE REACTION TO HUMALOG.  States she cannot take Humalog because it caused pancreatits.    Was previously treated by endo at [facility] (last seen there 11/6/2015).  She has had adverse reaction to most oral medications including Metformin (abdominal pain), glipizide (allergic type reaction), Avandia (abdominal pain and swelling), Byetta and Onglyza (pancreatitis).          History of eating disorder (binge eating) due to PTSD.  Has a binge eating disorder - currently doing much better, she has not been binge eating.      Complications:   Diabetes Complications  Description / Detail    Diabetic Retinopathy   No retinopathy.  Last exam 6/17/2020   CAD / PAD   No   Neuropathy   No   Nephropathy / Microalbuminuria   No   Gastroparesis  No   Hypoglycemia Unawareness  No     2. Intermittent Hypertension: Blood Pressure:   BP Readings from Last 3 Encounters:   04/21/20 (!) 149/85   03/10/20 (!) 164/86   02/27/20 138/72   .  Blood pressure medications include no medications.    3. Lipids: Takes no medications for lipid control.        PMH/PSH:  Past Medical History:   Diagnosis Date     Ascending aorta enlargement (H)      Depressive disorder     severe, prior hospitalization     Diabetes mellitus, type 2 (H)      Diverticulosis of colon (without mention of hemorrhage)      Fibromyalgia 6/26/2007     Insomnia      Irritable bowel syndrome      Past Surgical  History:   Procedure Laterality Date     BACK SURGERY      fusion  and removal of hardware 2004     C SPINAL ORTHOSIS,NOS  2002    lumbar surgery     CHOLECYSTECTOMY  2011     choley  2011     ENT SURGERY  1986    septoplasty     HYSTERECTOMY, CERVIX STATUS UNKNOWN      TVH/BSO     ORTHOPEDIC SURGERY  2005    left ankle     Family Hx:  Family History   Problem Relation Age of Onset     Diabetes Mother         type 2     Hypertension Mother      Cerebrovascular Disease Mother          stroke age 57     Ovarian Cancer Mother 43     Cancer Mother         cervix     Diabetes Father         type 2     Hypertension Father      Gastrointestinal Disease Father         colon polyps     Sleep Apnea Brother      Cancer Sister      Ovarian Cancer Sister 46     Ovarian Cancer Maternal Grandmother 72     Cancer Maternal Grandmother         cervix     Thyroid disease:          DM2: Yes, mother and father         Autoimmune: DM1, SLE, RA, Vitiligo     Social Hx:  Social History     Socioeconomic History     Marital status:      Spouse name: Not on file     Number of children: 3     Years of education: Not on file     Highest education level: Not on file   Occupational History     Occupation: xr technician     Employer: [employer]   Social Needs     Financial resource strain: Not on file     Food insecurity     Worry: Not on file     Inability: Not on file     Transportation needs     Medical: Not on file     Non-medical: Not on file   Tobacco Use     Smoking status: Never Smoker     Smokeless tobacco: Never Used   Substance and Sexual Activity     Alcohol use: Yes     Alcohol/week: 0.0 standard drinks     Comment: maybe once a month     Drug use: No     Sexual activity: Yes     Partners: Male     Birth control/protection: Surgical   Lifestyle     Physical activity     Days per week: Not on file     Minutes per session: Not on file     Stress: Not on file   Relationships     Social connections      Talks on phone: Not on file     Gets together: Not on file     Attends Hinduism service: Not on file     Active member of club or organization: Not on file     Attends meetings of clubs or organizations: Not on file     Relationship status: Not on file     Intimate partner violence     Fear of current or ex partner: Not on file     Emotionally abused: Not on file     Physically abused: Not on file     Forced sexual activity: Not on file   Other Topics Concern     Parent/sibling w/ CABG, MI or angioplasty before 65F 55M? Not Asked   Social History Narrative     Not on file          MEDICATIONS:  has a current medication list which includes the following prescription(s): aspirin not prescribed, blood glucose, freestyle ashleigh 14 day reader, freestyle ashleigh 14 day sensor, voltaren, epinephrine, HEMP OIL OR EXTRACT OR OTHER CBD CANNABINOID, NOT MEDICAL CANNABIS,, insulin aspart, omnipod dash 5 pack pods, insulin pump, lorazepam, losartan, melatonin, naloxone, polyethylene glycol, statin not prescribed, and tramadol.    ROS     ROS: 10 point ROS neg other than the symptoms noted above in the HPI.    Objective   Reported vitals:  [facility] 01/01/1999    PSYCH: Alert and oriented times 3; coherent speech, normal   rate and volume, able to articulate logical thoughts, able   to abstract reason, no tangential thoughts, no hallucinations   or delusions  Her affect is normal and pleasant  RESP: No cough, no audible wheezing, able to talk in full sentences  Remainder of exam unable to be completed due to telephone visits    LABS:  A1c:   Component      Latest Ref Rng & Units 6/26/2018 4/18/2019 10/28/2019 2/27/2020   Hemoglobin A1C      0 - 5.6 % 8.7 (H) 8.5 (H) 8.0 (H) 8.0 (H)     Basic Metabolic Panel:  !COMPREHENSIVE Latest Ref Rng & Units 4/21/2020   SODIUM 133 - 144 mmol/L 139   POTASSIUM 3.4 - 5.3 mmol/L 3.7   CHLORIDE 94 - 109 mmol/L 108   BUN 7 - 30 mg/dL 10   Creatinine 0.52 - 1.04 mg/dL    Creatinine 0.52 - 1.04 mg/dL 0.67    Glucose 70 - 99 mg/dL 189 (H)   ANION GAP 3 - 14 mmol/L 7   CALCIUM 8.5 - 10.1 mg/dL 9.3     LFTS/Cholesterol Panel:  !LIPID/HEPATIC Latest Ref Rng & Units 12/13/2018   CHOLESTEROL <200 mg/dL 262 (H)   TRIGLYCERIDES <150 mg/dL 168 (H)   HDL CHOLESTEROL >49 mg/dL 50   LDL CHOLESTEROL DIRECT <100 mg/dL    LDL CHOLESTEROL, CALCULATED <100 mg/dL 178 (H)   VLDL-CHOLESTEROL 0 - 30 mg/dL    NON HDL CHOLESTEROL <130 mg/dL 212 (H)     Thyroid Function:   !THYROID Latest Ref Rng & Units 7/22/2019   TSH 0.40 - 4.00 mU/L 0.95   T4 FREE 0.76 - 1.46 ng/dL 0.87     Urine Microalbumin:   Component      Latest Ref Rng & Units 7/22/2019   Creatinine Urine      mg/dL 160   Albumin Urine mg/L      mg/L 18   Albumin Urine mg/g Cr      0 - 25 mg/g Cr 11.38       Vitamin D Deficiency screening    30 - 75 ug/L 34     All pertinent notes, labs, and images personally reviewed by me.     A/P  Ms.Anna ROXANNE Dotson is a 59 year old being evaluated via phone visit for the management of diabetes:    1. DM2 - Controlled  No known diabetic complications.  Currently on insulin pump therapy, Omnipod.  Plans to start Omnipod Dash soon.    She is monitoring her glucose continuously via Freddie.    She makes frequent insulin dose adjustments based on her glucose readings.  Upgrade to Freddie 2.  I think she would benefit from the high and low glucose alarms.  No pump setting changes.today.  Upcoming lab appointment for comprehensive fasting labs.    Continue to work with diabetes ed weekly for ongoing pump adjustments.      Labs ordered today:   No orders of the defined types were placed in this encounter.  Urine microalbumin     Radiology/Consults ordered today: None        Follow-up:  4 months    Emily Phan NP  Endocrinology  [facility] [place]  CC: Madyson Mclaughlin             Phone call duration:  40 minutes.  Phone call start time: 2:09 pm; call end time: 2:49 pm

## 2020-09-18 DIAGNOSIS — E78.5 HYPERLIPIDEMIA LDL GOAL <100: ICD-10-CM

## 2020-09-18 DIAGNOSIS — E11.65 TYPE 2 DIABETES MELLITUS WITH HYPERGLYCEMIA, WITH LONG-TERM CURRENT USE OF INSULIN (H): ICD-10-CM

## 2020-09-18 DIAGNOSIS — Z79.4 TYPE 2 DIABETES MELLITUS WITH HYPERGLYCEMIA, WITH LONG-TERM CURRENT USE OF INSULIN (H): ICD-10-CM

## 2020-09-18 LAB
CHOLEST SERPL-MCNC: 221 MG/DL
HBA1C MFR BLD: 7.4 % (ref 0–5.6)
HDLC SERPL-MCNC: 51 MG/DL
LDLC SERPL CALC-MCNC: 143 MG/DL
NONHDLC SERPL-MCNC: 170 MG/DL
TRIGL SERPL-MCNC: 137 MG/DL
TSH SERPL DL<=0.005 MIU/L-ACNC: 1.19 MU/L (ref 0.4–4)

## 2020-09-18 PROCEDURE — 83036 HEMOGLOBIN GLYCOSYLATED A1C: CPT | Performed by: CLINICAL NURSE SPECIALIST

## 2020-09-18 PROCEDURE — 84443 ASSAY THYROID STIM HORMONE: CPT | Performed by: CLINICAL NURSE SPECIALIST

## 2020-09-18 PROCEDURE — 82043 UR ALBUMIN QUANTITATIVE: CPT | Performed by: CLINICAL NURSE SPECIALIST

## 2020-09-18 PROCEDURE — 80061 LIPID PANEL: CPT | Performed by: CLINICAL NURSE SPECIALIST

## 2020-09-18 PROCEDURE — 36415 COLL VENOUS BLD VENIPUNCTURE: CPT | Performed by: CLINICAL NURSE SPECIALIST

## 2020-09-19 LAB
CREAT UR-MCNC: 102 MG/DL
MICROALBUMIN UR-MCNC: 29 MG/L
MICROALBUMIN/CREAT UR: 28.63 MG/G CR (ref 0–25)

## 2020-09-19 NOTE — RESULT ENCOUNTER NOTE
Maricarmen,  Your A1c has improved to 7.4%.  Your cholesterol is still elevated but it has improved.  Your total cholesterol decreased from 262 to 221 and your LDL cholesterol has improved from 178 to 143.  Here;s a copy of the results for your records.  Emily Phan NP  Endocrinology

## 2020-09-22 NOTE — RESULT ENCOUNTER NOTE
Maricarmen,  Your urine protein was a little elevated.  This is not entirely knew, it was also elevated in 2017.  We'll continue to monitor it.  Here's a copy for your records.  Emily Phan NP  Endocrinology

## 2020-10-16 ENCOUNTER — MYC MEDICAL ADVICE (OUTPATIENT)
Dept: EDUCATION SERVICES | Facility: CLINIC | Age: 60
End: 2020-10-16

## 2020-10-16 DIAGNOSIS — E11.65 TYPE 2 DIABETES MELLITUS WITH HYPERGLYCEMIA, WITH LONG-TERM CURRENT USE OF INSULIN (H): ICD-10-CM

## 2020-10-16 DIAGNOSIS — Z79.4 TYPE 2 DIABETES MELLITUS WITH HYPERGLYCEMIA, WITH LONG-TERM CURRENT USE OF INSULIN (H): ICD-10-CM

## 2020-10-19 NOTE — TELEPHONE ENCOUNTER
Diabetes Self-Management Training: Insulin Pump Telephone Visit    Current Diabetes Management per Patient:  Comments/concerns: see mychart message    Insulin Pump Type: OmniPod    Taking other diabetes medications? no    Current Pump report:                                Assessment/Plan:  Call to patient to review pump report and her concerns.  She states she is having a lot of low sugars, she is not entering all carbohydrates into the pump and suspending at time to keep from going low.  She had some positive changes that is helping such as weight loss ( down to 191lbs from 214lbs), anxiety is down, walking more, brain injury has improved and no longer binge eating. She wants to decrease insulin doses but does not want to have high BG's.    Based on patient improved lifestyle and weight loss, her insulin needs have decreased and experiencing hypoglycemia, most often overnight and morning.  . Based on TDD: 44.8 units. (50% basal = 22.4u, rule of 450 = 1:10 and rule of 1800 = 1:40)  Based on these calculation would be pretty dramatic and she may experience hyperglycemia which she would like to avoid.  Recommend the following changes.     Basal 1: 12am: 1.35 --6 AM: 1.20 --12pm: 1.05 -- 6pm: 1.20 change to --> 12am: 1.15 --6AM:1.00 --12PM: 0.95 -- 6 PM 1.10    Carbohydrate ratio: 12am: 6  -->increase to  8    Sensitivity: 12am 23  -- 5am: 25  Increase to 12am 23 --28  -- 5am: 25-- 30    Patient is in agreement, assisted her with making changes to her settings, and she was able to verify and read settings back to educator. Recommend she upload pump and sensor again in 1 week or sooner if she continues to experience hypoglycemia. Patient states understanding.    Deanna Bolton RN, [facility]        Any diabetes medication dose changes were made via the CDE Protocol and Collaborative Practice Agreement with the patient's referring provider. A copy of this encounter was shared with the provider.

## 2020-10-23 ENCOUNTER — MYC MEDICAL ADVICE (OUTPATIENT)
Dept: EDUCATION SERVICES | Facility: CLINIC | Age: 60
End: 2020-10-23

## 2020-10-28 ENCOUNTER — MYC MEDICAL ADVICE (OUTPATIENT)
Dept: EDUCATION SERVICES | Facility: CLINIC | Age: 60
End: 2020-10-28

## 2020-10-29 ENCOUNTER — VIRTUAL VISIT (OUTPATIENT)
Dept: EDUCATION SERVICES | Facility: CLINIC | Age: 60
End: 2020-10-29
Payer: COMMERCIAL

## 2020-10-29 DIAGNOSIS — E11.65 TYPE 2 DIABETES MELLITUS WITH HYPERGLYCEMIA, WITH LONG-TERM CURRENT USE OF INSULIN (H): ICD-10-CM

## 2020-10-29 DIAGNOSIS — Z79.4 TYPE 2 DIABETES MELLITUS WITH HYPERGLYCEMIA, WITH LONG-TERM CURRENT USE OF INSULIN (H): ICD-10-CM

## 2020-10-29 PROCEDURE — 98966 PH1 ASSMT&MGMT NQHP 5-10: CPT | Mod: 95 | Performed by: DIETITIAN, REGISTERED

## 2020-10-29 NOTE — LETTER
10/29/2020         RE: Maricarmen Dotson  [address]  [place] [address]        Dear Colleague,    Thank you for referring your patient, Maricarmen Dotson, to the [facility]. Please see a copy of my visit note below.    Diabetes Self-Management Training: Pump and CGM Follow up    Last date of CDE contact: 10/16/20    Current Diabetes Management:     Diabetes Medication(s)     Insulin       insulin aspart (NOVOLOG VIAL) 100 UNITS/ML vial    USE IN INSULIN PUMP, TOTAL DAILY DOSE 100 UNITS.     INSULIN PUMP - OUTPATIENT    INSULIN PUMP - OUTPATIENT  Date last updated: 9/1/2020 Omnipod   BASAL RATES and times:   Basal 1: 12am: 1.15 --6 AM: 1.00 --12pm: 0.95 -- 6pm: 1.10  Basal 2: 12am: 1.40 --6 AM: 1.25 --12pm: 1.10 -- 6pm: 1.25   Basal 3: 12am: 1.50 --  6 AM: 1.35 -- 12pm: 1.2 -- 6pm: 1.25   CARB RATIO: 12am-12am: 8  Correction Factor (Sensitivity): 12AM (midnight): 28 -- 5 AM: 30  Target blood glucose: 100-120  Active insulin time: 4 hrs          CGM and Pump Reports:    unavailable    Assessment/ Plan:    Glucose trends:  Pt reports recent noc hypoglycemia, would like assistance reducing over-night and morning basal rate, as well as adjusting all basal profiles. Difficult to assess current needs without CGM data, however adjusted per pt request:     Basal #1 (current):  12 am: reduce from 1.15 --> 1.05  6 am: reduce from 1.0 --> .90  12 pm: continue 0.95  6 pm: continue 1.10    Basal #2:  12 am: reduce from 1.4 --> 1.3  6 am: reduce from 1.25 --> 1.15  12 pm: reduce from 1.10 --> 1.0  6 pm: reduce from 1.25 --> 1.15    Basal #3:  12 am: reduce from 1.5 --> 1.4  6 am: reduce from 1.35 --> 1.25  12 pm: reduce from 1.2 --> 1.1  6 pm: reduce from 1.25 --> 1.15    Follow-up:  Pt was in a hurry, is leaving to go out of town tomorrow. Suggest follow up after returns for additional fine tuning of settings and CGM report review.     Allie Matias RD, [facility]  Diabetes Education  Specialist    Encounter Type: Telephone  Time Spent: 10 min    Any diabetes medication dose changes were made via the CDE Protocol and Collaborative Practice Agreement with the patient's endocrinology provider. A copy of this encounter was shared with the provider.

## 2020-10-29 NOTE — PROGRESS NOTES
Diabetes Self-Management Training: Pump and CGM Follow up    Last date of CDE contact: 10/16/20    Current Diabetes Management:     Diabetes Medication(s)     Insulin       insulin aspart (NOVOLOG VIAL) 100 UNITS/ML vial    USE IN INSULIN PUMP, TOTAL DAILY DOSE 100 UNITS.     INSULIN PUMP - OUTPATIENT    INSULIN PUMP - OUTPATIENT  Date last updated: 9/1/2020 Omnipod   BASAL RATES and times:   Basal 1: 12am: 1.15 --6 AM: 1.00 --12pm: 0.95 -- 6pm: 1.10  Basal 2: 12am: 1.40 --6 AM: 1.25 --12pm: 1.10 -- 6pm: 1.25   Basal 3: 12am: 1.50 --  6 AM: 1.35 -- 12pm: 1.2 -- 6pm: 1.25   CARB RATIO: 12am-12am: 8  Correction Factor (Sensitivity): 12AM (midnight): 28 -- 5 AM: 30  Target blood glucose: 100-120  Active insulin time: 4 hrs          CGM and Pump Reports:    Unavailable- pt did not upload    Assessment/ Plan:    Glucose trends:  Pt reports recent noc hypoglycemia, would like assistance reducing over-night and morning basal rate, as well as adjusting all basal profiles. Difficult to assess current needs without CGM data, but did reduce basal insulin per pt request due to safety concerns.     Basal #1 (current):  12 am: reduce from 1.15 --> 1.05  6 am: reduce from 1.0 --> .90  12 pm: continue 0.95  6 pm: continue 1.10    Basal #2:  12 am: reduce from 1.4 --> 1.3  6 am: reduce from 1.25 --> 1.15  12 pm: reduce from 1.10 --> 1.0  6 pm: reduce from 1.25 --> 1.15    Basal #3:  12 am: reduce from 1.5 --> 1.4  6 am: reduce from 1.35 --> 1.25  12 pm: reduce from 1.2 --> 1.1  6 pm: reduce from 1.25 --> 1.15    Follow-up:  Pt was in a hurry, is leaving to go out of town tomorrow. Suggest follow up after returns, for additional fine tuning of settings and CGM report review.     Allie Matias RD, [facility]  Diabetes     Encounter Type: Telephone  Time Spent: 10 min    Any diabetes medication dose changes were made via the CDE Protocol and Collaborative Practice Agreement with the patient's endocrinology provider. A  copy of this encounter was shared with the provider.

## 2020-10-30 NOTE — PATIENT INSTRUCTIONS
Adjustments made today:    Basal #1 (current):  12 am: reduce from 1.15 --> 1.05  6 am: reduce from 1.0 --> .90  12 pm: continue 0.95  6 pm: continue 1.10    Basal #2:  12 am: reduce from 1.4 --> 1.3  6 am: reduce from 1.25 --> 1.15  12 pm: reduce from 1.10 --> 1.0  6 pm: reduce from 1.25 --> 1.15    Basal #3:  12 am: reduce from 1.5 --> 1.4  6 am: reduce from 1.35 --> 1.25  12 pm: reduce from 1.2 --> 1.1  6 pm: reduce from 1.25 --> 1.15    Please let us know if any additional concerns with low blood sugars.    Allie Matias RD, [facility]  Diabetes

## 2020-11-11 ENCOUNTER — VIRTUAL VISIT (OUTPATIENT)
Dept: EDUCATION SERVICES | Facility: CLINIC | Age: 60
End: 2020-11-11
Payer: COMMERCIAL

## 2020-11-11 DIAGNOSIS — E11.65 TYPE 2 DIABETES MELLITUS WITH HYPERGLYCEMIA, WITH LONG-TERM CURRENT USE OF INSULIN (H): Primary | ICD-10-CM

## 2020-11-11 DIAGNOSIS — Z79.4 TYPE 2 DIABETES MELLITUS WITH HYPERGLYCEMIA, WITH LONG-TERM CURRENT USE OF INSULIN (H): Primary | ICD-10-CM

## 2020-11-11 PROCEDURE — 98967 PH1 ASSMT&MGMT NQHP 11-20: CPT | Mod: 95 | Performed by: DIETITIAN, REGISTERED

## 2020-11-11 NOTE — PATIENT INSTRUCTIONS
Changes made today:    Basal 1: (for use during stress or other times requiring higher insulin use):    Time Rate (units/hr)   12 am 1.05   6 am 0.90   12 PM 0.95   6 PM 1.10     Basal 2:     Time Rate (units/hr)   12 AM 0.95   6 AM 0.80   12 PM 0.85   6 PM 1.00     Basal 3:    Time Rate (units/hr)   12 AM 0.90   6 AM 0.75   12 PM 0.80   6 PM 0.95     Use reduced basal pattern 2 or 3 to decrease risk of hypoglycemia.  Carb Ratio changed from 8 --> to 12.  ISF changed from 28 --> to 31 at 12 am, and from 30 --> to 33 at 5 am.  Monitor closely.     Send MyChart update in 1-2 weeks.    Allie Matias RD, [facility]  Diabetes

## 2020-11-11 NOTE — Clinical Note
Alex Diaz,  Just nunu Hernadez is really reducing her insulin needs, her diet is in much better control.    Thanks!  Allie Matias RD, [facility]  Diabetes

## 2020-11-11 NOTE — PROGRESS NOTES
Diabetes Self-Management Education and Training:  Pump and CGM Telephone Visit    Last date of CDE contact: 10/29/2020    Patient verbally consented to the telephone visit service today: yes    Pt sent [employer] message requesting assistance with reducing her basal rate patterns.     Current Diabetes Management:        Diabetes Medication(s)     Insulin       INSULIN PUMP - OUTPATIENT    INSULIN PUMP - OUTPATIENT  Date last updated: 10/30/2020 Omnipod   BASAL RATES and times:   Basal 1: 12am: 1.05 --6 AM: 0.90 --12pm: 0.95 -- 6pm: 1.10 (currently using Basal 1)  Basal 2: 12am: 1.3 --6 AM: 1.15 --12pm: 1.0 -- 6pm: 1.15   Basal 3: 12am: 1.40 --  6 AM: 1.25 -- 12pm: 1.1 -- 6pm: 1.15   CARB RATIO: 12am-12am: 8  Correction Factor (Sensitivity): 12AM (midnight): 28 -- 5 AM: 30  Target blood glucose: 100-120  Active insulin time: 4 hrs     insulin aspart (NOVOLOG VIAL) 100 UNITS/ML vial    USE IN INSULIN PUMP, TOTAL DAILY DOSE 100 UNITS.            CGM Report:                      Pump Report:                           Assessment/ Intervention:    Pt  having significant hypoglycemia, followed by some BG variability. Pt has been using temp basal, suspending pump, entering only 1/2 carbs eaten, etc in order for pump to deliver less insulin. Pt is no longer struggling with binge eating, thus insulin needs continue to decrease.    Is currently using Basal Pattern 1 which pt is requesting to keep as is to use during high stress times, would then like to reduce Basal 2 and 3. Agree with pt request.   Would also recommend weaken I:C and ISF. Based on current TDD and 1800/450 rule, calculated ISF is 56 and I:C is 14.    Plan/ Follow-up:    Basal 1: (for use during stress or other times requiring higher insulin use):    Time Rate (units/hr)   12 am 1.05   6 am 0.90   12 PM 0.95   6 PM 1.10     Basal 2:     Time Rate (units/hr)   12 AM 0.95   6 AM 0.80   12 PM 0.85   6 PM 1.00     Basal 3:    Time Rate (units/hr)   12 AM 0.90   6 AM  0.75   12 PM 0.80   6 PM 0.95     Use reduced basal 2 or 3 to decrease risk of hypoglycemia.  Carb Ratio changed from 8 --> to 12  ISF changed from 28 --> 31 at 12 am, 30 --> 33 at 5 am  Monitor closely, may need additional weakening of ratios.     Send MyChart update in 1-2 weeks.    Allie Matias RD, [facility]  Diabetes     Encounter Type: Telephone  Time Spent: 15 min    Any diabetes medication dose changes were made via the CDE Protocol and Collaborative Practice Agreement with the patient's endocrinology provider. A copy of this encounter was shared with the provider.

## 2020-11-11 NOTE — LETTER
11/11/2020         RE: Maricarmen Dotson  [address]  [place] [address]        Dear Colleague,    Thank you for referring your patient, Maricarmen Dotson, to the [facility]. Please see a copy of my visit note below.    Diabetes Self-Management Education and Training:  Pump and CGM Telephone Visit    Last date of CDE contact: 10/29/2020    Patient verbally consented to the telephone visit service today: yes    Pt sent [employer] message requesting assistance with reducing her basal rate patterns.     Current Diabetes Management:        Diabetes Medication(s)     Insulin       INSULIN PUMP - OUTPATIENT    INSULIN PUMP - OUTPATIENT  Date last updated: 10/30/2020 Omnipod   BASAL RATES and times:   Basal 1: 12am: 1.05 --6 AM: 0.90 --12pm: 0.95 -- 6pm: 1.10 (currently using Basal 1)  Basal 2: 12am: 1.3 --6 AM: 1.15 --12pm: 1.0 -- 6pm: 1.15   Basal 3: 12am: 1.40 --  6 AM: 1.25 -- 12pm: 1.1 -- 6pm: 1.15   CARB RATIO: 12am-12am: 8  Correction Factor (Sensitivity): 12AM (midnight): 28 -- 5 AM: 30  Target blood glucose: 100-120  Active insulin time: 4 hrs     insulin aspart (NOVOLOG VIAL) 100 UNITS/ML vial    USE IN INSULIN PUMP, TOTAL DAILY DOSE 100 UNITS.            CGM Report:                      Pump Report:                           Assessment/ Intervention:    Pt  having significant hypoglycemia, followed by some BG variability. Pt has been using temp basal, suspending pump, entering only 1/2 carbs eaten, etc in order for pump to deliver less insulin. Pt is no longer struggling with binge eating, thus insulin needs continue to decrease.    Is currently using Basal Pattern 1 which pt is requesting to keep as is to use during high stress times, would then like to reduce Basal 2 and 3. Agree with pt request.   Would also recommend weaken I:C and ISF. Based on current TDD and 1800/450 rule, calculated ISF is 56 and I:C is 14.    Plan/ Follow-up:    Basal 1: (for use during stress or other  times requiring higher insulin use):    Time Rate (units/hr)   12 am 1.05   6 am 0.90   12 PM 0.95   6 PM 1.10     Basal 2:     Time Rate (units/hr)   12 AM 0.95   6 AM 0.80   12 PM 0.85   6 PM 1.00     Basal 3:    Time Rate (units/hr)   12 AM 0.90   6 AM 0.75   12 PM 0.80   6 PM 0.95     Use reduced basal 2 or 3 to decrease risk of hypoglycemia.  Carb Ratio changed from 8 --> to 12  ISF changed from 28 --> 31 at 12 am, 30 --> 33 at 5 am  Monitor closely, may need additional weakening of ratios.     Send MyChart update in 1-2 weeks.    Allie Matias RD, [facility]  Diabetes     Encounter Type: Telephone  Time Spent: 15 min    Any diabetes medication dose changes were made via the CDE Protocol and Collaborative Practice Agreement with the patient's endocrinology provider. A copy of this encounter was shared with the provider.

## 2021-01-05 ENCOUNTER — MYC MEDICAL ADVICE (OUTPATIENT)
Dept: PEDIATRICS | Facility: CLINIC | Age: 61
End: 2021-01-05

## 2021-01-09 ENCOUNTER — HEALTH MAINTENANCE LETTER (OUTPATIENT)
Age: 61
End: 2021-01-09

## 2021-01-20 ENCOUNTER — APPOINTMENT (OUTPATIENT)
Dept: GENERAL RADIOLOGY | Facility: CLINIC | Age: 61
End: 2021-01-20
Attending: PHYSICIAN ASSISTANT
Payer: COMMERCIAL

## 2021-01-20 ENCOUNTER — NURSE TRIAGE (OUTPATIENT)
Dept: NURSING | Facility: CLINIC | Age: 61
End: 2021-01-20

## 2021-01-20 ENCOUNTER — HOSPITAL ENCOUNTER (EMERGENCY)
Facility: CLINIC | Age: 61
Discharge: HOME | End: 2021-01-20
Attending: PHYSICIAN ASSISTANT | Admitting: PHYSICIAN ASSISTANT
Payer: COMMERCIAL

## 2021-01-20 VITALS
WEIGHT: 180 LBS | DIASTOLIC BLOOD PRESSURE: 79 MMHG | BODY MASS INDEX: 30.73 KG/M2 | RESPIRATION RATE: 16 BRPM | OXYGEN SATURATION: 98 % | HEART RATE: 50 BPM | HEIGHT: 64 IN | TEMPERATURE: 98.2 F | SYSTOLIC BLOOD PRESSURE: 178 MMHG

## 2021-01-20 DIAGNOSIS — B34.9 VIRAL SYNDROME: ICD-10-CM

## 2021-01-20 DIAGNOSIS — J20.9 ACUTE BRONCHITIS: ICD-10-CM

## 2021-01-20 DIAGNOSIS — R07.9 CHEST PAIN: ICD-10-CM

## 2021-01-20 LAB
ALBUMIN SERPL-MCNC: 3.7 G/DL (ref 3.4–5)
ALP SERPL-CCNC: 98 U/L (ref 40–150)
ALT SERPL W P-5'-P-CCNC: 22 U/L (ref 0–50)
ANION GAP SERPL CALCULATED.3IONS-SCNC: 3 MMOL/L (ref 3–14)
AST SERPL W P-5'-P-CCNC: 22 U/L (ref 0–45)
BASOPHILS # BLD AUTO: 0 10E9/L (ref 0–0.2)
BASOPHILS NFR BLD AUTO: 0.4 %
BILIRUB SERPL-MCNC: 0.3 MG/DL (ref 0.2–1.3)
BUN SERPL-MCNC: 10 MG/DL (ref 7–30)
CALCIUM SERPL-MCNC: 9.1 MG/DL (ref 8.5–10.1)
CHLORIDE SERPL-SCNC: 107 MMOL/L (ref 94–109)
CO2 SERPL-SCNC: 30 MMOL/L (ref 20–32)
CREAT SERPL-MCNC: 0.77 MG/DL (ref 0.52–1.04)
D DIMER PPP FEU-MCNC: 0.4 UG/ML FEU (ref 0–0.5)
DIFFERENTIAL METHOD BLD: NORMAL
EOSINOPHIL # BLD AUTO: 0.2 10E9/L (ref 0–0.7)
EOSINOPHIL NFR BLD AUTO: 2.5 %
ERYTHROCYTE [DISTWIDTH] IN BLOOD BY AUTOMATED COUNT: 12.1 % (ref 10–15)
FLUAV RNA RESP QL NAA+PROBE: NEGATIVE
FLUBV RNA RESP QL NAA+PROBE: NEGATIVE
GFR SERPL CREATININE-BSD FRML MDRD: 84 ML/MIN/{1.73_M2}
GLUCOSE SERPL-MCNC: 197 MG/DL (ref 70–99)
HCT VFR BLD AUTO: 39.8 % (ref 35–47)
HGB BLD-MCNC: 13.4 G/DL (ref 11.7–15.7)
IMM GRANULOCYTES # BLD: 0 10E9/L (ref 0–0.4)
IMM GRANULOCYTES NFR BLD: 0.3 %
INTERPRETATION ECG - MUSE: NORMAL
LABORATORY COMMENT REPORT: NORMAL
LYMPHOCYTES # BLD AUTO: 2.5 10E9/L (ref 0.8–5.3)
LYMPHOCYTES NFR BLD AUTO: 32.5 %
MCH RBC QN AUTO: 29.8 PG (ref 26.5–33)
MCHC RBC AUTO-ENTMCNC: 33.7 G/DL (ref 31.5–36.5)
MCV RBC AUTO: 89 FL (ref 78–100)
MONOCYTES # BLD AUTO: 0.4 10E9/L (ref 0–1.3)
MONOCYTES NFR BLD AUTO: 5.7 %
NEUTROPHILS # BLD AUTO: 4.4 10E9/L (ref 1.6–8.3)
NEUTROPHILS NFR BLD AUTO: 58.6 %
NRBC # BLD AUTO: 0 10*3/UL
NRBC BLD AUTO-RTO: 0 /100
PLATELET # BLD AUTO: 254 10E9/L (ref 150–450)
POTASSIUM SERPL-SCNC: 3.7 MMOL/L (ref 3.4–5.3)
PROT SERPL-MCNC: 7.4 G/DL (ref 6.8–8.8)
RBC # BLD AUTO: 4.49 10E12/L (ref 3.8–5.2)
RSV RNA SPEC QL NAA+PROBE: NORMAL
SARS-COV-2 RNA RESP QL NAA+PROBE: NEGATIVE
SODIUM SERPL-SCNC: 140 MMOL/L (ref 133–144)
SPECIMEN SOURCE: NORMAL
TROPONIN I SERPL-MCNC: <0.015 UG/L (ref 0–0.04)
WBC # BLD AUTO: 7.5 10E9/L (ref 4–11)

## 2021-01-20 PROCEDURE — 85379 FIBRIN DEGRADATION QUANT: CPT | Performed by: PHYSICIAN ASSISTANT

## 2021-01-20 PROCEDURE — 99285 EMERGENCY DEPT VISIT HI MDM: CPT | Mod: 25

## 2021-01-20 PROCEDURE — 93005 ELECTROCARDIOGRAM TRACING: CPT

## 2021-01-20 PROCEDURE — 80053 COMPREHEN METABOLIC PANEL: CPT | Performed by: PHYSICIAN ASSISTANT

## 2021-01-20 PROCEDURE — 85025 COMPLETE CBC W/AUTO DIFF WBC: CPT | Performed by: PHYSICIAN ASSISTANT

## 2021-01-20 PROCEDURE — 84484 ASSAY OF TROPONIN QUANT: CPT | Performed by: PHYSICIAN ASSISTANT

## 2021-01-20 PROCEDURE — C9803 HOPD COVID-19 SPEC COLLECT: HCPCS

## 2021-01-20 PROCEDURE — 71045 X-RAY EXAM CHEST 1 VIEW: CPT

## 2021-01-20 PROCEDURE — 87636 SARSCOV2 & INF A&B AMP PRB: CPT | Performed by: PHYSICIAN ASSISTANT

## 2021-01-20 RX ORDER — ALBUTEROL SULFATE 90 UG/1
2 AEROSOL, METERED RESPIRATORY (INHALATION) EVERY 4 HOURS PRN
Qty: 1 INHALER | Refills: 0 | Status: SHIPPED | OUTPATIENT
Start: 2021-01-20 | End: 2021-02-10 | Stop reason: SINTOL

## 2021-01-20 ASSESSMENT — ENCOUNTER SYMPTOMS
SORE THROAT: 1
MYALGIAS: 1
NAUSEA: 1
DIARRHEA: 1
CHILLS: 1
FEVER: 0

## 2021-01-20 ASSESSMENT — MIFFLIN-ST. JEOR: SCORE: 1376.47

## 2021-01-20 NOTE — ED PROVIDER NOTES
History   Chief Complaint:  Suspected Covid     HPI   Maricarmen Dotson is a 60 year old female with a history of diabetes mellitus II, hyperlipidemia, anxiety and chronic pain syndrome who presents for evaluation of suspected COVID-19 with a constellation of symptoms including sore throat, myalgias, chills, nausea, diarrhea and chest pain. Notes subjective fever but no temps higher than 99 at home. She is noted to be hypertensive at 208/88.  She notes chest pain is worse with breathing in and does not radiate or worsen with exertion.  Symptoms started around 8 pm last night and have been continuous.  No recent surgery or imobilization.  No vomiting.  No hemoptysis.  No know sick contacts.  No dysuria, hematuria, abdominal pain or flank pain.  No rashes.  No heart valve problems or IV drug use.    Review of Systems   Constitutional: Positive for chills. Negative for fever.   HENT: Positive for sore throat.    Cardiovascular: Positive for chest pain.   Gastrointestinal: Positive for diarrhea and nausea.   Musculoskeletal: Positive for myalgias.   All other systems reviewed and are negative.    Allergies:  Amoxicillin  Bee  Contrast Dye  Iodine  Sulfa Drugs  Tetanus-Diphtheria Toxoids  Metoprolol  Zithromax [Azithromycin Dihydrate]  Amlodipine  Atorvastatin  Catapres [Clonidine]  Crestor [Rosuvastatin]  Cyproheptadine  Humalog [Insulin Lispro]  Hydrochlorothiazide  Latex  Lisinopril  Metformin  Naltrexone  Nifedipine  Onglyza [Saxagliptin Hydrochloride]  Phenergan [Promethazine]  Prochlorperazine  Reglan [Metoclopramide]  Sumatriptan  Tetracycline  Sulindac    Medications:  Insulin aspart  Lamictal  Ativan  Losartan    Past Medical History:    Ascending aorta enlargement  Depression  Diabetes mellitus, type II   Diverticulosis of colon  Fibromyalgia   Insomnia  IBS  TIB   Chronic pain syndrome  PTSD  MIESHA  Migraine  Anxiety   Hyperlipidemia      Past Surgical History:    Back surgery   Lumbar surgery   Cholecystectomy  "  Septoplasty   Hysterectomy/BSO  Left ankle surgery      Family History:    Diabetes - mother, father  Hypertension - mother, father  Cerebrovascular disease - mother   Ovarian cancer - mother, sister  Cervical cancer - mother   Colon polyps - father   Sleep apnea - brother     Social History:  The patient arrived to the ED Alone via private car.  PCP: Madyson Mendoza  Smoking Status: Never Smoker  Smokeless Tobacco: Never Used  Alcohol Use: Positive    Physical Exam     Patient Vitals for the past 24 hrs:   BP Temp Temp src Pulse Resp SpO2 Height Weight   01/20/21 1630 (!) 178/79 -- -- 50 16 98 % -- --   01/20/21 1510 (!) 156/82 -- -- (!) 48 -- 95 % -- --   01/20/21 1410 (!) 185/82 -- -- 57 -- 99 % -- --   01/20/21 1324 -- -- -- (!) 49 -- -- -- --   01/20/21 1307 (!) 208/88 98.2  F (36.8  C) Oral -- 18 96 % 1.626 m (5' 4\") 81.6 kg (180 lb)       Physical Exam  General: Alert and cooperative with exam. Resting comfortably on gurney  Head:  Scalp is NC/AT  Eyes:  Conjunctiva normal, PERRL  ENT:  The external nose and ears are normal.     The oropharynx is normal and without erythema or tonsillar swelling. Uvula midline, no submandibular swelling, sublingual swelling, trismus.  TM's normal BL, no mastoid swelling or tenderness  Neck:  Normal range of motion without rigidity.  CV:  Regular rate and rhythm    No pathologic murmur, rubs, or gallops.  Resp:  Breath sounds are clear bilaterally.  No crackles, wheezes, rhonchi, stridor.    Non-labored, no retractions or accessory muscle use  Abdomen: Abdomen is soft, no distension, no tenderness, no masses. No peritoneal signs. No CVA tenderness.  MS:  No lower extremity edema or asymmetric calf swelling. Normal ROM in all joints without effusions.    No midline cervical, thoracic, or lumbar tenderness  Skin:  Warm and dry, No rash or lesions noted. 2+ peripheral pulses in all extremities  Neuro:  Alert and oriented x3.  No gross motor deficits.  No facial asymmetry.  5/5 " strength BL in UE and LE, normal sensation.  Cranial nerves 2-12 intact.  Normal finger to nose and heel to shin testing.  Gait normal  Psych: Awake. Alert. Normal affect. Appropriate interactions.    Emergency Department Course     ECG:  ECG taken at 1321, ECG read at 1322  Sinus bradycardia  Septal infarct, age undetermined   Abnormal ECG  Rate 50 bpm. MO interval 160 ms. QRS duration 98 ms. QT/QTc 480/437 ms. P-R-T axes 48 -5 -43.    Imaging:  XR Chest Port 1 View  Unremarkable single view of the chest.     Reading per radiology.    Laboratory:  CBC: WBC: 7.5, HGB: 13.4, PLT: 254    CMP: Glucose 197 (H), o/w WNL (Creatinine: 0.77)    Troponin (1436): <0.015    D-dimer: 0.4    Symptomatic Influenza A/B antigen & COVID-19 PCR: negative    Emergency Department Course:    Reviewed:  I reviewed the patient's nursing notes, vitals and past medical history.     Assessments:  1427 I performed an exam of the patient in room ED as documented above.    Disposition:  The patient was discharged to home.     Impression & Plan   Medical Decision Making:  Maricarmen Dotson is a 60 year old female who presents to the emergency department today for evaluation of chest pain, myalgias subjective fever, diarrhea.   Broad differential considered.  She is hypertensive but otherwise well appearing with normal vitals. EKG shows old septal infarct no significant changes and troponin neg despite ongoing symptoms >6 hours.  Pt is HEART score of 3 ([id number]) and doubt ACS.  D-dimer negative and doubt PE.  Chest xray clear, no high risk factors to raise concern for aortic dissection.  Does have some diarrhea but no blood, fevers, or abdominal tenderness and no indication for stool testing or abdominal imaging.  She is afebrile with no measured fevers at home, normal WBC, HR and no signs of sepsis or serious bacterial infection such as meningitis, deep space infection of head/neck, UTI, endocarditis, cellulitis, etc.  COVID and flui negative.   Suspect acute viral illness most likely given constellation of symptoms.  Follow-up with pcp within 3 days.  Return for temp >100.4, worsening pain, sob, or other new or worsening symptoms. BP elevated though patient notes this commonly occurs at visits due to anxiety.  Will follow-up with pcp for re-check.  No evidence of hypertensive emergency.    Covid-19  Maricarmen Dotson was evaluated during a global COVID-19 pandemic, which necessitated consideration that the patient might be at risk for infection with the SARS-CoV-2 virus that causes COVID-19.   Applicable protocols for evaluation were followed during the patient's care.   COVID-19 was considered as part of the patient's evaluation. The plan for testing is:  a test was obtained during this visit.    Diagnosis:    ICD-10-CM    1. Chest pain  R07.9    2. Viral syndrome  B34.9    3. Acute bronchitis  J20.9        Discharge Medication List as of 1/20/2021  4:17 PM      START taking these medications    Details   albuterol (PROAIR HFA) 108 (90 Base) MCG/ACT inhaler Inhale 2 puffs into the lungs every 4 hours as needed for shortness of breath / dyspnea, Disp-1 Inhaler, R-0, E-Prescribe             Scribe Disclosure:  I, Annamaria Collins, am serving as a scribe at 2:27 PM on 1/20/2021 to document services personally performed by Bernabe Newberry PA-C based on my observations and the provider's statements to me.     This note was completed in part using Dragon voice recognition software. Although reviewed after completion, some word and grammatical errors may occur.      Bernabe Newberry PA-C  01/21/21 1105

## 2021-01-20 NOTE — TELEPHONE ENCOUNTER
She has had sinus issues since January 14th. She states she has pain with breathing, all the time. She has shortness of breath, a cough and fever, loss of taste and loss of smell. She has blood sugars at 180- to 200 to 300 recently when she usually runs . She will go to the ER for evaluation now.  Dorothy Carreon RN  [facility]      Reason for Disposition    SEVERE or constant chest pain or pressure (Exception: mild central chest pain, present only when coughing)    Additional Information    Negative: SEVERE difficulty breathing (e.g., struggling for each breath, speaks in single words)    Negative: Difficult to awaken or acting confused (e.g., disoriented, slurred speech)    Negative: Bluish (or gray) lips or face now    Negative: Shock suspected (e.g., cold/pale/clammy skin, too weak to stand, low BP, rapid pulse)    Negative: Sounds like a life-threatening emergency to the triager    Negative: [1] COVID-19 exposure AND [2] no symptoms    Negative: [1] Lives with someone known to have influenza (flu test positive) AND [2] flu-like symptoms (e.g., cough, runny nose, sore throat, SOB; with or without fever)    Negative: [1] Adult with possible COVID-19 symptoms AND [2] triager concerned about severity of symptoms or other causes    Negative: Immunization reaction suspected (e.g., fever, headache, muscle aches occurring during days 1-3 days after immunization)    Negative: COVID-19 and breastfeeding, questions about    Protocols used: CORONAVIRUS (COVID-19) DIAGNOSED OR [id number] 12.1

## 2021-01-20 NOTE — ED TRIAGE NOTES
Pt arrives to ED with multiple complaints. Sore throat body aches chills low grade fever chest pain symptoms started apporx 2000 last night. Pt reports nausea and diarrhea. Pt is hypertensive not currently taking BP meds. ABC's intact. A/ox 4.

## 2021-01-20 NOTE — DISCHARGE INSTRUCTIONS
Discharge Instructions  Chest Pain    You have been seen today for chest pain or discomfort.  At this time, your provider has found no signs that your chest pain is due to a serious or life-threatening condition, (or you have declined more testing and/or admission to the hospital). However, sometimes there is a serious problem that does not show up right away. Your evaluation today may not be complete and you may need further testing and evaluation.     Generally, every Emergency Department visit should have a follow-up clinic visit with either a primary or a specialty clinic/provider. Please follow-up as instructed by your emergency provider today.  Return to the Emergency Department if:  Your chest pain changes, gets worse, starts to happen more often, or comes with less activity.  You are newly short of breath.  You get very weak or tired.  You pass out or faint.  You have any new symptoms, like fever, cough, numb legs, or you cough up blood.  You have anything else that worries you.    Until you follow-up with your regular provider, please do the following:  Take one aspirin daily unless you have an allergy or are told not to by your provider.  If a stress test appointment has been made, go to the appointment.  If you have questions, contact your regular provider.  Follow-up with your regular provider/clinic as directed; this is very important.    If you were given a prescription for medicine here today, be sure to read all of the information (including the package insert) that comes with your prescription.  This will include important information about the medicine, its side effects, and any warnings that you need to know about.  The pharmacist who fills the prescription can provide more information and answer questions you may have about the medicine.  If you have questions or concerns that the pharmacist cannot address, please call or return to the Emergency Department.       Remember that you can always come  back to the Emergency Department if you are not able to see your regular provider in the amount of time listed above, if you get any new symptoms, or if there is anything that worries you.  Discharge Instructions  Bronchitis, Pneumonia, Bronchospasm    You were seen today for a chest infection or inflammation. If your provider decided this was due to a bacterial infection, you may need an antibiotic. Sometimes these are caused by a virus, and then an antibiotic will not help.     Generally, every Emergency Department visit should have a follow-up clinic visit with either a primary or a specialty clinic/provider. Please follow-up as instructed by your emergency provider today.    Return to the Emergency Department if:  Your breathing gets much worse.  You are very weak, or feel much more ill.  You develop new symptoms, such as chest pain.  You cough up blood.  You are vomiting (throwing up) enough that you cannot keep fluids or your medicine down.    What can I do to help myself?  Fill any prescriptions the provider gave you and take them right away--especially antibiotics. Be sure to finish the whole antibiotic prescription.  You may be given a prescription for an inhaler, which can help loosen tight air passages.  Use this as needed, but not more often than directed. Inhalers work much better when used with a spacer.   You may be given a prescription for a steroid to reduce inflammation. Used long-term, these can have side effects, but for short-term use they are safe. You may notice restlessness or increased appetite.      You may use non-prescription cough or cold medicines. Cough medicines may help, but don t make the cough go away completely.   Avoid smoke, because this can make your symptoms worse. If you smoke, this may be a good time to quit! Consider using nicotine lozenges, gum, or patches to reduce cravings.   If you have a fever, Tylenol  (acetaminophen), Motrin  (ibuprofen), or Advil  (ibuprofen) may  help bring fever down and may help you feel more comfortable. Be sure to read and follow the package directions, and ask your provider if you have questions.  Be sure to get your flu shot each year.  For certain ages, the pneumonia shot can help prevent pneumonia.  If you were given a prescription for medicine here today, be sure to read all of the information (including the package insert) that comes with your prescription.  This will include important information about the medicine, its side effects, and any warnings that you need to know about.  The pharmacist who fills the prescription can provide more information and answer questions you may have about the medicine.  If you have questions or concerns that the pharmacist cannot address, please call or return to the Emergency Department.     Remember that you can always come back to the Emergency Department if you are not able to see your regular provider in the amount of time listed above, if you get any new symptoms, or if there is anything that worries you.

## 2021-02-10 ENCOUNTER — VIRTUAL VISIT (OUTPATIENT)
Dept: ENDOCRINOLOGY | Facility: CLINIC | Age: 61
End: 2021-02-10
Payer: COMMERCIAL

## 2021-02-10 DIAGNOSIS — Z79.4 TYPE 2 DIABETES MELLITUS WITH HYPERGLYCEMIA, WITH LONG-TERM CURRENT USE OF INSULIN (H): Primary | ICD-10-CM

## 2021-02-10 DIAGNOSIS — E11.65 TYPE 2 DIABETES MELLITUS WITH HYPERGLYCEMIA, WITH LONG-TERM CURRENT USE OF INSULIN (H): Primary | ICD-10-CM

## 2021-02-10 DIAGNOSIS — E78.5 HYPERLIPIDEMIA LDL GOAL <100: ICD-10-CM

## 2021-02-10 PROCEDURE — 99204 OFFICE O/P NEW MOD 45 MIN: CPT | Mod: 95 | Performed by: INTERNAL MEDICINE

## 2021-02-10 PROCEDURE — 95251 CONT GLUC MNTR ANALYSIS I&R: CPT | Performed by: INTERNAL MEDICINE

## 2021-02-10 NOTE — PROGRESS NOTES
Freddie arroyoBacilio Hernadez is a 60 year old who is being evaluated via a billable telephone visit.      What phone number would you like to be contacted at? [phone]   How would you like to obtain your AVS? Mail a copy    Elizabeth Porter CMA  Adult Endocrinology  [facility]

## 2021-02-10 NOTE — PROGRESS NOTES
Due to the COVID 19 pandemic this visit was converted to a telephone visit in order to help prevent spread of infection in this patient and the general population.     Phone call start time: 9:15 am   Phone call end time: 10:02 am     HPI:   Ms. Dotson is a 60 year old female with a past medical history significant for fibromyalgia, eating disorder, depression, insomnia and IBS here for follow up of type 2 diabetes.  She was previously seen by Dr. Rich and Diane Childress (last seen at [facility] in 2015). Her PCP recently retired and she wants to reestablish endocrine care.    Maricarmen was diagnosed with type 2 diabetes in 1997, after having gestational diabetes with her last 2 pregnancies.  Prior notes document intolerance to multiple medications.  She states she cannot take Humalog because it causes pancreatitis.  She developed abdominal pain from Metformin, and allergic reaction from glipizide (abdominal pain and swelling), pancreatitis from Byetta and Onglyza.  Invokana was prescribed in the past and the patient remembers having upper GI distress.  In February 2018, she was started on OmniPod insulin pump.  WILLIAMS 65 antibodies, anti-insulin antibodies were negative, in 2009.  Average A1c over the recent years has been around 8 to 8.5%.  Most recent A1c was 7.4%, in September 2020.     In a regular day, she has 3 meals and snack around bedtime. The snacks consists of cheese and crackers, fruits (requires this for tramadol and medical marihuana).  Intermittently, the patient reports experiencing episodes of binge eating, especially in the afternoon or at night.  She binge eating over the last few months.  She has been dealing with situational depression, as her sister passed away in November 2020.  The hypoglycemic episodes have been rare, occurring mainly before dinner, if she takes too much insulin for lunch.  On average, she has been experiencing hypoglycemic episodes once a month.    Diabetes complications:  Last eye  "exam: 6/2020, no DR  Urine microalbumin intermittently positive, most recently in September 2020, GFR >90; 100 mg losartan was prescribed but the patient didn't take it as it lower her BG. She has been checking her bP at home and reports numbers of 130-118/90-72  No numbness or tingling sensation in her feet  Diet: \"healthy\" - tries to avoid carbs.   One episode of DKA in 2006 ?   Hypoglycemia: she gets nauseated and shaky when BG is in the low 70s - upper 60s. If lower, she developed dizziness. Corrects the lows by having glucose tablets or sweetened juice. The lowest BG number she remembers experiencing was in the 20s.  Denies prior episodes of LOC due to hypoglycemia.  She doesn't have glucagon at home - gets nauseated from it.   Exercise: walking up to 5 miles every other day - not recently (she has been working in the house). Walks the dogs and practices yoga.   Lipid panel:   9/20: HDL cholesterol 51, LDL cholesterol 143, triglycerides 137  She has not been able to tolerate statins in the past: She remembers taking atorvastin, simvastatin and experiencing tachycardia, joint and muscle pain.      I reviewed the [facility] insulin records.  Last time the pump was downloaded was on November 10, 2020.  Basal rate:  12 AM 0.9 units/h  6 AM 0.75 units/h  12 PM 0.8 units/h  6 PM 0.95 units/h  She also has 2 other settings (basal 2 and basal 3), which she has not been using.  Insulin to carbohydrate ratio 12  Sensitivity 31-33   Active insulin time 4 hours  Blood glucose target 100  Blood glucose correction threshold 120    Average glucose is 160, with a standard deviation of 53.  68% of the glucose numbers are within target of , 7% are above 250 and there are no hypoglycemic documented by the meter.  On average, she checks her blood glucose 4 times daily.  Total daily insulin dose is 39 units of which 62% is basal insulin.  Daily carbohydrate intake is around 81 g.    The infusion set is changed every 3 to 4 " days.    On the CGM, the average glucose is 150, with a glucose variability of 30, corresponding to a GMI of 6.9%. The BG has a tendency to decrease during the night, from 12 am to 6 am.  From 9 PM to 12 AM, the sensor fails to record the glucose numbers.    Past Medical History:   Diagnosis Date     Ascending aorta enlargement (H)      Depressive disorder     severe, prior hospitalization     Diabetes mellitus, type 2 (H)      Diverticulosis of colon (without mention of hemorrhage)      Fibromyalgia 2007     Insomnia      Irritable bowel syndrome    Posttraumatic stress syndrome  Eating disorder  Scoliosis  Degenerative changes of the spine  ? MARIA ELENA on the US from   MIESHA 2005 - cannot us eth CPAP     Past Surgical History:   Procedure Laterality Date     BACK SURGERY      fusion  and removal of hardware      C SPINAL ORTHOSIS,NOS      lumbar surgery     CHOLECYSTECTOMY       choley       ENT SURGERY      septoplasty     HYSTERECTOMY, CERVIX STATUS UNKNOWN      TVH/BSO     ORTHOPEDIC SURGERY      left ankle       Family History   Problem Relation Age of Onset     Diabetes Mother         type 2     Hypertension Mother      Cerebrovascular Disease Mother          stroke age 57     Ovarian Cancer Mother 43     Cancer Mother         cervix     Diabetes Father         type 2     Hypertension Father      Gastrointestinal Disease Father         colon polyps     Sleep Apnea Brother      Cancer Sister      Ovarian Cancer Sister 46     Ovarian Cancer Maternal Grandmother 72     Cancer Maternal Grandmother         cervix   Mother - thyroid disease.      Social history  , she has 3 children. 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  Denies smoking, drinking alcohol or using illicit drugs. Occupation: on disability for PTSD.      Physical Exam  LMP 1999   Objective:  Psych: Alert and oriented times 3; coherent speech, normal rate and volume. The affect is  depressed.    Systemic symptoms: fatigue - longstanding; weight variable, up and down ~ 175-180 lbs  Eye symptoms: No eye symptoms.  Otolaryngeal symptoms: coughing since October, dry cough   Breast symptoms: No breast symptoms.  Cardiovascular symptoms: No cardiovascular symptoms.    Pulmonary symptoms: No pulmonary symptoms.  Gastrointestinal symptoms: alternating episodes of diarrhea and constipation   Genitourinary symptoms: urinates 0-1 times a night   Endocrine symptoms: hot flashes for the last 2-3 weeks - mainly when injecting a bolus of 3-4 U    Hematologic symptoms: No hematologic symptoms.  Musculoskeletal symptoms: diffuse muscle and joint pain   Neurological symptoms: headaches which she relates to TMJ, no tremor, no dizziness   Psychological symptoms: longstanding anxiety and depression   Skin symptoms: No skin symptoms.    RESULTS  I reviewed prior lab results documented in EPIC.  Lab Results   Component Value Date    A1C 7.4 (H) 09/18/2020    A1C 8.0 (H) 02/27/2020    A1C 8.0 (H) 10/28/2019    A1C 8.2 (H) 07/22/2019    A1C 8.5 (H) 04/18/2019    HEMOGLOBINA1 11.0 (A) 06/08/2015       Hemoglobin   Date Value Ref Range Status   01/20/2021 13.4 11.7 - 15.7 g/dL Final     Hematocrit   Date Value Ref Range Status   01/20/2021 39.8 35.0 - 47.0 % Final     Cholesterol   Date Value Ref Range Status   09/18/2020 221 (H) <200 mg/dL Final     Comment:     Desirable:       <200 mg/dl     Cholesterol/HDL Ratio   Date Value Ref Range Status   02/21/2015 5.0 0.0 - 5.0 Final     HDL Cholesterol   Date Value Ref Range Status   09/18/2020 51 >49 mg/dL Final     LDL Cholesterol Calculated   Date Value Ref Range Status   09/18/2020 143 (H) <100 mg/dL Final     Comment:     Above desirable:  100-129 mg/dl  Borderline High:  130-159 mg/dL  High:             160-189 mg/dL  Very high:       >189 mg/dl       VLDL-Cholesterol   Date Value Ref Range Status   02/21/2015 54 (H) 0 - 30 mg/dL Final     Triglycerides   Date Value  Ref Range Status   09/18/2020 137 <150 mg/dL Final     Comment:     Fasting specimen     Albumin Urine mg/L   Date Value Ref Range Status   09/18/2020 29 mg/L Final     TSH   Date Value Ref Range Status   09/18/2020 1.19 0.40 - 4.00 mU/L Final         Last Basic Metabolic Panel:    Sodium   Date Value Ref Range Status   01/20/2021 140 133 - 144 mmol/L Final     Potassium   Date Value Ref Range Status   01/20/2021 3.7 3.4 - 5.3 mmol/L Final     Chloride   Date Value Ref Range Status   01/20/2021 107 94 - 109 mmol/L Final     Calcium   Date Value Ref Range Status   01/20/2021 9.1 8.5 - 10.1 mg/dL Final     Carbon Dioxide   Date Value Ref Range Status   01/20/2021 30 20 - 32 mmol/L Final     Urea Nitrogen   Date Value Ref Range Status   01/20/2021 10 7 - 30 mg/dL Final     Creatinine   Date Value Ref Range Status   01/20/2021 0.77 0.52 - 1.04 mg/dL Final     GFR Estimate   Date Value Ref Range Status   01/20/2021 84 >60 mL/min/[1.73_m2] Final     Comment:     Non  GFR Calc  Starting 12/18/2018, serum creatinine based estimated GFR (eGFR) will be   calculated using the Chronic Kidney Disease Epidemiology Collaboration   (CKD-EPI) equation.       Glucose   Date Value Ref Range Status   01/20/2021 197 (H) 70 - 99 mg/dL Final       AST   Date Value Ref Range Status   01/20/2021 22 0 - 45 U/L Final     ALT   Date Value Ref Range Status   01/20/2021 22 0 - 50 U/L Final     Albumin Urine mg/g Cr   Date Value Ref Range Status   09/18/2020 28.63 (H) 0 - 25 mg/g Cr Final     Assessment/Plan:     1.  Type 2 diabetes  Overall, she maintains a fairly good blood sugar control.  Multiple antidiabetic medications were tried in the past and the patient was not able to tolerate them.  Recommendations:  Try to scan the sensor at bedtime and early morning.  I reminded the patient that if she does not scan for a period longer than 8 hours, the sensor fails to track the blood sugar numbers.  She prefers not to scan the blood  sugar at night as this triggers anxiety.  Her blood sugar tends to be elevated at 12 midnight.  She might require more insulin for the bedtime snack.  The patient is going to try to have the pump downloaded for our review.    2.  Hypercholesterolemia  She has not been able to tolerate statins.    Orders Placed This Encounter   Procedures     Continuous Glucose Monitoring >=72 hours PHYS INTERP     AMBULATORY ADULT DIABETES EDUCATOR REFERRAL

## 2021-02-10 NOTE — LETTER
2/10/2021         RE: Maricarmen Dotson  [address] [facility] [address]        Dear Colleague,    Thank you for referring your patient, Maricarmen Dotson, to the [facility]. Please see a copy of my visit note below.    Due to the COVID 19 pandemic this visit was converted to a telephone visit in order to help prevent spread of infection in this patient and the general population.     Phone call start time: 9:15 am   Phone call end time: 10:02 am     HPI:   Ms. Dotson is a 60 year old female with a past medical history significant for fibromyalgia, eating disorder, depression, insomnia and IBS here for follow up of type 2 diabetes.  She was previously seen by Dr. Rich and Diane Childress (last seen at [facility] in 2015). Her PCP recently retired and she wants to reestablish endocrine care.    Maricarmen was diagnosed with type 2 diabetes in 1997, after having gestational diabetes with her last 2 pregnancies.  Prior notes document intolerance to multiple medications.  She states she cannot take Humalog because it causes pancreatitis.  She developed abdominal pain from Metformin, and allergic reaction from glipizide (abdominal pain and swelling), pancreatitis from Byetta and Onglyza.  Invokana was prescribed in the past and the patient remembers having upper GI distress.  In February 2018, she was started on OmniPod insulin pump.  WILLIAMS 65 antibodies, anti-insulin antibodies were negative, in 2009.  Average A1c over the recent years has been around 8 to 8.5%.  Most recent A1c was 7.4%, in September 2020.     In a regular day, she has 3 meals and snack around bedtime. The snacks consists of cheese and crackers, fruits (requires this for tramadol and medical marihuana).  Intermittently, the patient reports experiencing episodes of binge eating, especially in the afternoon or at night.  She binge eating over the last few months.  She has been dealing with situational depression, as her sister passed  "away in November 2020.  The hypoglycemic episodes have been rare, occurring mainly before dinner, if she takes too much insulin for lunch.  On average, she has been experiencing hypoglycemic episodes once a month.    Diabetes complications:  Last eye exam: 6/2020, no DR  Urine microalbumin intermittently positive, most recently in September 2020, GFR >90; 100 mg losartan was prescribed but the patient didn't take it as it lower her BG. She has been checking her bP at home and reports numbers of 130-118/90-72  No numbness or tingling sensation in her feet  Diet: \"healthy\" - tries to avoid carbs.   One episode of DKA in 2006 ?   Hypoglycemia: she gets nauseated and shaky when BG is in the low 70s - upper 60s. If lower, she developed dizziness. Corrects the lows by having glucose tablets or sweetened juice. The lowest BG number she remembers experiencing was in the 20s.  Denies prior episodes of LOC due to hypoglycemia.  She doesn't have glucagon at home - gets nauseated from it.   Exercise: walking up to 5 miles every other day - not recently (she has been working in the house). Walks the dogs and practices yoga.   Lipid panel:   9/20: HDL cholesterol 51, LDL cholesterol 143, triglycerides 137  She has not been able to tolerate statins in the past: She remembers taking atorvastin, simvastatin and experiencing tachycardia, joint and muscle pain.      I reviewed the [facility] insulin records.  Last time the pump was downloaded was on November 10, 2020.  Basal rate:  12 AM 0.9 units/h  6 AM 0.75 units/h  12 PM 0.8 units/h  6 PM 0.95 units/h  She also has 2 other settings (basal 2 and basal 3), which she has not been using.  Insulin to carbohydrate ratio 12  Sensitivity 31-33   Active insulin time 4 hours  Blood glucose target 100  Blood glucose correction threshold 120    Average glucose is 160, with a standard deviation of 53.  68% of the glucose numbers are within target of , 7% are above 250 and there are no " hypoglycemic documented by the meter.  On average, she checks her blood glucose 4 times daily.  Total daily insulin dose is 39 units of which 62% is basal insulin.  Daily carbohydrate intake is around 81 g.    The infusion set is changed every 3 to 4 days.    On the CGM, the average glucose is 150, with a glucose variability of 30, corresponding to a GMI of 6.9%. The BG has a tendency to decrease during the night, from 12 am to 6 am.  From 9 PM to 12 AM, the sensor fails to record the glucose numbers.    Past Medical History:   Diagnosis Date     Ascending aorta enlargement (H)      Depressive disorder     severe, prior hospitalization     Diabetes mellitus, type 2 (H)      Diverticulosis of colon (without mention of hemorrhage)      Fibromyalgia 2007     Insomnia      Irritable bowel syndrome    Posttraumatic stress syndrome  Eating disorder  Scoliosis  Degenerative changes of the spine  ? MARIA ELENA on the US from   MIESHA 2005 - cannot us eth CPAP     Past Surgical History:   Procedure Laterality Date     BACK SURGERY      fusion  and removal of hardware      C SPINAL ORTHOSIS,NOS      lumbar surgery     CHOLECYSTECTOMY       choley       ENT SURGERY      septoplasty     HYSTERECTOMY, CERVIX STATUS UNKNOWN      TVH/BSO     ORTHOPEDIC SURGERY      left ankle       Family History   Problem Relation Age of Onset     Diabetes Mother         type 2     Hypertension Mother      Cerebrovascular Disease Mother          stroke age 57     Ovarian Cancer Mother 43     Cancer Mother         cervix     Diabetes Father         type 2     Hypertension Father      Gastrointestinal Disease Father         colon polyps     Sleep Apnea Brother      Cancer Sister      Ovarian Cancer Sister 46     Ovarian Cancer Maternal Grandmother 72     Cancer Maternal Grandmother         cervix   Mother - thyroid disease.      Social history  , she has 3 children. 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  Denies smoking, drinking alcohol or using illicit drugs. Occupation: on disability for PTSD.      Physical Exam  LMP 01/01/1999   Objective:  Psych: Alert and oriented times 3; coherent speech, normal rate and volume. The affect is depressed.    Systemic symptoms: fatigue - longstanding; weight variable, up and down ~ 175-180 lbs  Eye symptoms: No eye symptoms.  Otolaryngeal symptoms: coughing since October, dry cough   Breast symptoms: No breast symptoms.  Cardiovascular symptoms: No cardiovascular symptoms.    Pulmonary symptoms: No pulmonary symptoms.  Gastrointestinal symptoms: alternating episodes of diarrhea and constipation   Genitourinary symptoms: urinates 0-1 times a night   Endocrine symptoms: hot flashes for the last 2-3 weeks - mainly when injecting a bolus of 3-4 U    Hematologic symptoms: No hematologic symptoms.  Musculoskeletal symptoms: diffuse muscle and joint pain   Neurological symptoms: headaches which she relates to TMJ, no tremor, no dizziness   Psychological symptoms: longstanding anxiety and depression   Skin symptoms: No skin symptoms.    RESULTS  I reviewed prior lab results documented in EPIC.  Lab Results   Component Value Date    A1C 7.4 (H) 09/18/2020    A1C 8.0 (H) 02/27/2020    A1C 8.0 (H) 10/28/2019    A1C 8.2 (H) 07/22/2019    A1C 8.5 (H) 04/18/2019    HEMOGLOBINA1 11.0 (A) 06/08/2015       Hemoglobin   Date Value Ref Range Status   01/20/2021 13.4 11.7 - 15.7 g/dL Final     Hematocrit   Date Value Ref Range Status   01/20/2021 39.8 35.0 - 47.0 % Final     Cholesterol   Date Value Ref Range Status   09/18/2020 221 (H) <200 mg/dL Final     Comment:     Desirable:       <200 mg/dl     Cholesterol/HDL Ratio   Date Value Ref Range Status   02/21/2015 5.0 0.0 - 5.0 Final     HDL Cholesterol   Date Value Ref Range Status   09/18/2020 51 >49 mg/dL Final     LDL Cholesterol Calculated   Date Value Ref Range Status   09/18/2020 143 (H) <100 mg/dL Final     Comment:      Above desirable:  100-129 mg/dl  Borderline High:  130-159 mg/dL  High:             160-189 mg/dL  Very high:       >189 mg/dl       VLDL-Cholesterol   Date Value Ref Range Status   02/21/2015 54 (H) 0 - 30 mg/dL Final     Triglycerides   Date Value Ref Range Status   09/18/2020 137 <150 mg/dL Final     Comment:     Fasting specimen     Albumin Urine mg/L   Date Value Ref Range Status   09/18/2020 29 mg/L Final     TSH   Date Value Ref Range Status   09/18/2020 1.19 0.40 - 4.00 mU/L Final         Last Basic Metabolic Panel:    Sodium   Date Value Ref Range Status   01/20/2021 140 133 - 144 mmol/L Final     Potassium   Date Value Ref Range Status   01/20/2021 3.7 3.4 - 5.3 mmol/L Final     Chloride   Date Value Ref Range Status   01/20/2021 107 94 - 109 mmol/L Final     Calcium   Date Value Ref Range Status   01/20/2021 9.1 8.5 - 10.1 mg/dL Final     Carbon Dioxide   Date Value Ref Range Status   01/20/2021 30 20 - 32 mmol/L Final     Urea Nitrogen   Date Value Ref Range Status   01/20/2021 10 7 - 30 mg/dL Final     Creatinine   Date Value Ref Range Status   01/20/2021 0.77 0.52 - 1.04 mg/dL Final     GFR Estimate   Date Value Ref Range Status   01/20/2021 84 >60 mL/min/[1.73_m2] Final     Comment:     Non  GFR Calc  Starting 12/18/2018, serum creatinine based estimated GFR (eGFR) will be   calculated using the Chronic Kidney Disease Epidemiology Collaboration   (CKD-EPI) equation.       Glucose   Date Value Ref Range Status   01/20/2021 197 (H) 70 - 99 mg/dL Final       AST   Date Value Ref Range Status   01/20/2021 22 0 - 45 U/L Final     ALT   Date Value Ref Range Status   01/20/2021 22 0 - 50 U/L Final     Albumin Urine mg/g Cr   Date Value Ref Range Status   09/18/2020 28.63 (H) 0 - 25 mg/g Cr Final     Assessment/Plan:     1.  Type 2 diabetes  Overall, she maintains a fairly good blood sugar control.  Multiple antidiabetic medications were tried in the past and the patient was not able to  tolerate them.  Recommendations:  Try to scan the sensor at bedtime and early morning.  I reminded the patient that if she does not scan for a period longer than 8 hours, the sensor fails to track the blood sugar numbers.  She prefers not to scan the blood sugar at night as this triggers anxiety.  Her blood sugar tends to be elevated at 12 midnight.  She might require more insulin for the bedtime snack.  The patient is going to try to have the pump downloaded for our review.    2.  Hypercholesterolemia  She has not been able to tolerate statins.    Orders Placed This Encounter   Procedures     Continuous Glucose Monitoring >=72 hours PHYS [facility]     AMBULATORY ADULT DIABETES EDUCATOR REFERRAL                 Freddie linked.  Maricarmen is a 60 year old who is being evaluated via a billable telephone visit.      What phone number would you like to be contacted at? [phone]   How would you like to obtain your AVS? Mail a copy    Elizabeth Porter CMA  Adult Endocrinology  [facility]        Again, thank you for allowing me to participate in the care of your patient.        Sincerely,        Rissa Schwab MD

## 2021-03-24 ENCOUNTER — MYC MEDICAL ADVICE (OUTPATIENT)
Dept: PEDIATRICS | Facility: CLINIC | Age: 61
End: 2021-03-24

## 2021-04-02 ENCOUNTER — TRANSFERRED RECORDS (OUTPATIENT)
Dept: HEALTH INFORMATION MANAGEMENT | Facility: CLINIC | Age: 61
End: 2021-04-02

## 2021-04-05 ENCOUNTER — MYC MEDICAL ADVICE (OUTPATIENT)
Dept: PEDIATRICS | Facility: CLINIC | Age: 61
End: 2021-04-05

## 2021-04-05 ENCOUNTER — TELEPHONE (OUTPATIENT)
Dept: PEDIATRICS | Facility: CLINIC | Age: 61
End: 2021-04-05

## 2021-04-05 NOTE — TELEPHONE ENCOUNTER
Patient says that they will need MRI for Mammogram. Patient requesting MRI orders along with some form of sedation due to claustrophobia. Patient also requesting Duc and Duc single dose COVID vaccine due to Hx of reactions to vaccinations. Patient aslo wanting to know if they will need to bring epi pen to vaccination.      Per appointment on 2/05/2020    5. Family history of malignant neoplasm of breast  Sister with hx of breast CA. Pt used to get mammograms but 2/2 fibromyalgia, other pains, she has not continued getting mammograms (last in 2012 per chart). She is interested in getting recommendations on what kind of monitoring or follow up she should have -- she mentions that she has gotten genetic testing in the past. As the patient also has 2 daughter, she would like some guidance on what kind of surveillance or testing she should undergo. She is agreeable to hem/onc referral to discuss these options.   - Oncology/Hematology Adult Referral; Future    CHG will send patient [employer] message informing them to contact their oncologist for information on HEATHER and scheduling.

## 2021-04-05 NOTE — TELEPHONE ENCOUNTER
I would recommend discussing risks/benefits of mammogram vs breast MRI at an annual exam (it appears she hasn't been seen in almost a year). I likely would recommend that she follow-up with Heme/Onc as referred last year to discuss what screening options are most appropriate for her, and if she should consider genetic testing. She does appear to take tramadol chronically, so any sedation medication would need to be prescribed thoughtfully due to risk for interaction.    I would recommend that she get whatever vaccine is first available to her, unless she has a history of allergy to a component of these vaccinations. She can certainly bring her Epipen with her to the vaccination, and I would recommend that she wait for at least 30 minutes following the vaccine to make sure she does not have any issues.    Madyson Mclaughlin MD  Internal Medicine-Pediatrics

## 2021-04-13 DIAGNOSIS — I10 HYPERTENSION GOAL BP (BLOOD PRESSURE) < 140/90: ICD-10-CM

## 2021-04-14 ENCOUNTER — MYC MEDICAL ADVICE (OUTPATIENT)
Dept: PEDIATRICS | Facility: CLINIC | Age: 61
End: 2021-04-14

## 2021-04-14 RX ORDER — LOSARTAN POTASSIUM 50 MG/1
TABLET ORAL
Qty: 180 TABLET | Refills: 0 | Status: SHIPPED | OUTPATIENT
Start: 2021-04-14 | End: 2021-05-17

## 2021-04-14 NOTE — TELEPHONE ENCOUNTER
Routing refill request to provider for review/approval because:  Drug interaction/allergy warning  A break in medication  Failing bp    Elsy Crespo RN

## 2021-04-14 NOTE — TELEPHONE ENCOUNTER
Refilled x1. Patient due for appointment and BP recheck prior to additional fills. Please contact her to schedule, thanks!    Madyson Mclaughlin MD  Internal Medicine-Pediatrics

## 2021-04-16 ENCOUNTER — TELEPHONE (OUTPATIENT)
Dept: PEDIATRICS | Facility: CLINIC | Age: 61
End: 2021-04-16

## 2021-04-16 NOTE — TELEPHONE ENCOUNTER
Received call from pt  She went to the [facility] today to have her first covid vaccine  They refused to do it because she had an anaphylactic reaction to the flu vaccine.    Pt does not want to schedule thru [facility]   Provided pt with number for scheduling a Covid vaccine, pt wants to have it done at a hospital site    Thank you  Aranza Sawyer RN on 4/16/2021 at 1:10 PM

## 2021-04-16 NOTE — TELEPHONE ENCOUNTER
Patient informed of need for an appointment for continued refills via [employer] message. Patient responded saying they will schedule visit. CHG will continue to monitor.    Bhavesh Tafoya at 10:43 AM on 4/16/2021  [facility]  Phone [phone]

## 2021-04-19 ENCOUNTER — VIRTUAL VISIT (OUTPATIENT)
Dept: ENDOCRINOLOGY | Facility: CLINIC | Age: 61
End: 2021-04-19
Payer: COMMERCIAL

## 2021-04-19 DIAGNOSIS — Z96.41 INSULIN PUMP IN PLACE: ICD-10-CM

## 2021-04-19 DIAGNOSIS — Z79.4 TYPE 2 DIABETES MELLITUS WITH HYPERGLYCEMIA, WITH LONG-TERM CURRENT USE OF INSULIN (H): Primary | ICD-10-CM

## 2021-04-19 DIAGNOSIS — E11.65 TYPE 2 DIABETES MELLITUS WITH HYPERGLYCEMIA, WITH LONG-TERM CURRENT USE OF INSULIN (H): Primary | ICD-10-CM

## 2021-04-19 PROCEDURE — 99215 OFFICE O/P EST HI 40 MIN: CPT | Mod: 95 | Performed by: INTERNAL MEDICINE

## 2021-04-19 PROCEDURE — 99417 PROLNG OP E/M EACH 15 MIN: CPT | Performed by: INTERNAL MEDICINE

## 2021-04-19 NOTE — PATIENT INSTRUCTIONS
[facility] & [place] locations   Dr Salgado, Endocrinology Department      [facility]   [address]  [place] [address]  Appointment [phone]  Fax: [phone]  [place]: Monday and Tuesday         [facility]   [address]  [place] [address]  Appointment [phone]  Fax: [phone]  [place]: Wednesday and Thursday            Please check the cost coverage and copay with insurance before recommended tests, services and medications (especially if new medications are prescribed).     If ordered, please get blood work done 1 week prior to your next appointment so they will be available to Dr. Salgado at your visit.    To provide the best diabetic care, please bring your blood glucose meter to each and every visit with your  Endocrinologist. Your blood glucose meter/insulin pump will be downloaded at every appointment.  Please arrive 15 minutes before your scheduled appointment. This will allow for your blood glucose meter/insulin pump  to be downloaded.  If you are wearing DEXCOM please bring  or sharing code from the Dexcom Clarity Appt so that it can be downloaded.  If you are using freestyle ashleigh personal sensors please bring the reader.  If you are using TANDEM insulin pump please have your username and password to get info from [employer]andem website.    Increase basal rate form midnight till 0600 AM: take 1.3 units/hr.  Rest setting same.  Labs needed.  Follow up with endocrinology in 3 months.  Follow up with Diabetic educator in 2-3 weeks for BG review and titration.  Your provider has referred you to Diabetes Education: For all [facility]:  Phone [phone]; Fax [phone]  Please call and make the appointment.    Recommend checking blood sugars before meals and at bedtime.    If Blood glucose are low more often-> 2-3 times/week- give us a call.  The patient is  advised to Make better food choices: reduce carbs, Reduce portion size, weight loss and exercise 3-4 times a week.  Discussed hypoglycemia signs and symptoms as well as management in detail.

## 2021-04-19 NOTE — PROGRESS NOTES
"THIS IS A VIDEO VISIT:    Phone call visit/virtual visit encounter:  New pt to me.  PITO from Emily Phan.    Name of patient: Maricarmen Dotson    Date of encounter: 4/19/2021    Time of start of video visit: 10:00    Video started: 10:19    Video ended: 10:40    Time visit video ended: 5:04    Provider location: working from [place]/ [facility]    Patient location: patients home.    Mode of transmission: video/ Doximity    Verbal consent: obtained before starting visit. Pt is agreeable.      The patient has been notified of following:      \"This VIDEO visit will be conducted via a call between you and your physician/provider. We have found that certain health care needs can be provided without the need for a physical exam.  This service lets us provide the care you need with a short phone conversation.  If a prescription is necessary we can send it directly to your pharmacy.  If lab work is needed we can place an order for that and you can then stop by our lab to have the test done at a later time.     With new updates with corona virus patient might be billed as clinic visit.     If during the course of the call the physician/provider feels a telephone visit is not appropriate, you will not be charged for this service.\"      Past medical history, social history, family history, allergy and medications were reviewed and updated as appropriate.  Reviewed pertinent labs, notes, imaging studies personally.  In addition to clinic viist > 32 min spent in chart review and reviewing complex medical history.    Name: Maricarmen Dotson  F/u for Diabetes.  HPI:  Maricarmen Dotson is a 59 year old female who presents via phone visit for the management of above.   has a past medical history of Ascending aorta enlargement (H), Depressive disorder, Diabetes mellitus, type 2 (H), Diverticulosis of colon (without mention of hemorrhage), Fibromyalgia (6/26/2007), Insomnia, and Irritable bowel syndrome.     Was seen by Emily Phan and " was seen by  at [facility] 2/2021.  She was previously seen by Dr. Rich and Diane Childress.    Using Omnipod DASH insulin pump.  Using Frestyle freddie.  Using Novolog in insulin pump.    REPORTS PREVIOUS ADVERSE REACTION TO HUMALOG.  States she cannot take Humalog because it caused pancreatits ?.  Also reports that she use freddie sensors for 14 days then takes a break ( for 8-10 days) before using new site. In that time she is using fingerstick.    H/o eating disorder due to PTSD.  Also has anxiety disorder and sometimes she is struggling with overeating.    Mostly 3 meals/day- trying low carb meals.  Has bedtime snack on some days as a part of coping for anxiety.      1. Type 2 DM:  Originally diagnosed: in 1997. H/o gestational diabetes with 2 pregnancies.      Insulin pump start 2/20/2018.      She cannot take Humalog because it caused pancreatitis.    Metformin--Abdominal pain.  Glipizide- Allergic reaction- (abdominal pain and swelling),   Byetta and Onglyza-- Pancreatitis.  Invokana -- had upper GI distress.    WILLIAMS 65 antibodies, anti-insulin antibodies were negative, in 2009.      Current Regimen:   Insulin pump - Omnipod  Basal 44%- 19.5 units  Bolus 56%-24.6 units    Basal 1 -CURRENT  Time Rate (U/hr)   0000 0.900   06:00 0.750   12:00 0.800   18:00 0.950       Carbohydrate Ratio -    Time Ratio   0000 6         Sensitivity   00:00  05:00    23  25   Active Insulin Time  4 hours   Basal  44%-19.5 units   Bolus   56% - 24.6 units   Total Carbohydrates/day    Total Insulin/day   44.1 units   Average Blood Sugar  BG range  Freddie average    BS Checks 3.8 times a day   Target range 100-120     Freddie download:  Blood glucose data reviewed personally. See nursing note from this encounter for details.         Complications:   Diabetes Complications  Description / Detail    Diabetic Retinopathy   No retinopathy.  Last exam 6/17/2020   CAD / PAD   No   Neuropathy   No   Nephropathy / Microalbuminuria   intermittently positive, losartan was prescribed but the patient didn't take it as it lower her BG.    Gastroparesis  No   Hypoglycemia Unawareness  No          PMH/PSH:  Past Medical History:   Diagnosis Date     Ascending aorta enlargement (H)      Depressive disorder     severe, prior hospitalization     Diabetes mellitus, type 2 (H)      Diverticulosis of colon (without mention of hemorrhage)      Fibromyalgia 2007     Insomnia      Irritable bowel syndrome      Past Surgical History:   Procedure Laterality Date     BACK SURGERY      fusion  and removal of hardware      C SPINAL ORTHOSIS,NOS      lumbar surgery     CHOLECYSTECTOMY       choley       ENT SURGERY      septoplasty     HYSTERECTOMY, CERVIX STATUS UNKNOWN      TVH/BSO     ORTHOPEDIC SURGERY  2005    left ankle     Family Hx:  Family History   Problem Relation Age of Onset     Diabetes Mother         type 2     Hypertension Mother      Cerebrovascular Disease Mother          stroke age 57     Ovarian Cancer Mother 43     Cancer Mother         cervix     Diabetes Father         type 2     Hypertension Father      Gastrointestinal Disease Father         colon polyps     Sleep Apnea Brother      Cancer Sister      Ovarian Cancer Sister 46     Ovarian Cancer Maternal Grandmother 72     Cancer Maternal Grandmother         cervix     Thyroid disease:          DM2: Yes, mother and father         Autoimmune: DM1, SLE, RA, Vitiligo     Social Hx:  Social History     Socioeconomic History     Marital status:      Spouse name: Not on file     Number of children: 3     Years of education: Not on file     Highest education level: Not on file   Occupational History     Occupation: xr technician     Employer: [employer]   Social Needs     Financial resource strain: Not on file     Food insecurity     Worry: Not on file     Inability: Not on file     Transportation needs     Medical: Not on file      Non-medical: Not on file   Tobacco Use     Smoking status: Never Smoker     Smokeless tobacco: Never Used   Substance and Sexual Activity     Alcohol use: Yes     Alcohol/week: 0.0 standard drinks     Comment: maybe once a month     Drug use: No     Sexual activity: Yes     Partners: Male     Birth control/protection: Surgical   Lifestyle     Physical activity     Days per week: Not on file     Minutes per session: Not on file     Stress: Not on file   Relationships     Social connections     Talks on phone: Not on file     Gets together: Not on file     Attends Moravian service: Not on file     Active member of club or organization: Not on file     Attends meetings of clubs or organizations: Not on file     Relationship status: Not on file     Intimate partner violence     Fear of current or ex partner: Not on file     Emotionally abused: Not on file     Physically abused: Not on file     Forced sexual activity: Not on file   Other Topics Concern     Parent/sibling w/ CABG, MI or angioplasty before 65F 55M? Not Asked   Social History Narrative     Not on file          MEDICATIONS:  has a current medication list which includes the following prescription(s): aspirin not prescribed, freestyle ashleigh 14 day reader, freestyle ashleigh 14 day sensor, freestyle ashleigh 2 sensor systm, voltaren, epinephrine, HEMP OIL OR EXTRACT OR OTHER CBD CANNABINOID, NOT MEDICAL CANNABIS,, insulin aspart, omnipod dash 5 pack pods, insulin pump, lorazepam, losartan, melatonin, naloxone, polyethylene glycol, statin not prescribed, and tramadol.    ROS     ROS: 10 point ROS neg other than the symptoms noted above in the HPI.    PE:  LMP 01/01/1999   Breastfeeding No   GENERAL: healthy, alert and no distress  EYES: Eyes grossly normal to inspection, conjunctivae and sclerae normal  ENT: no nose swelling, nasal discharge.  Thyroid: no apparent thyroid nodules  RESP: no audible wheeze, cough, or visible cyanosis.  No visible retractions or  increased work of breathing.  Able to speak fully in complete sentences.  ABDO: not evaluated.  EXTREMITIES: no hand tremors.  NEURO: Cranial nerves grossly intact, mentation intact and speech normal  SKIN: No apparent skin lesions, rash or edema seen   PSYCH: mentation appears normal, affect normal/bright, judgement and insight intact, normal speech and appearance well-groomed    LABS:  A1c:  Lab Results   Component Value Date    A1C 7.4 09/18/2020    A1C 8.0 02/27/2020    A1C 8.0 10/28/2019    A1C 8.2 07/22/2019    A1C 8.5 04/18/2019       Basic Metabolic Panel:  Creatinine   Date Value Ref Range Status   01/20/2021 0.77 0.52 - 1.04 mg/dL Final       Lipid Panel:  Recent Labs   Lab Test 09/18/20  0852 12/13/18  0832 02/21/15  0923 02/21/15  0923 07/08/14  0841   CHOL 221* 262*   < > 263* 266*   HDL 51 50   < > 53 41*   * 178*   < > 156* 173*   TRIG 137 168*   < > 269* 263*   CHOLHDLRATIO  --   --   --  5.0 6.5*    < > = values in this interval not displayed.       Thyroid Function:  END[facility] THYROID LABS-[facility] Latest Ref Rng & Units 9/18/2020   TSH 0.40 - 4.00 mU/L 1.19     Urine Microalbumin:   Lab Results   Component Value Date    UMALCR 28.63 09/18/2020        All pertinent notes, labs, and images personally reviewed by me.     A/P  Ms.Anna ROXANNE Dotson is a 60 year old being evaluated via phone visit for the management of diabetes:    1. DM2 - Controlled.  A1c 7.4%.  No known complications from diabetes.  Historically diabetes control has been variable.  History of intermittent microalbuminuria.  Currently she is on losartan 100 mg/day.  Currently on insulin pump therapy, Omnipod as well as continuous glucose monitor freestyle ashleigh.  She makes frequent insulin dose adjustments based on her glucose readings.  History of eating disorder complicate diabetes management as on the days when she is having binge eating episodes blood sugar tend to rise higher.  In general based on her recent blood sugar data it  appears that blood sugars are mostly in higher range especially at night.  Plan to increase basal rate at night as noted below.  Rest of the pump settings will remain the same.  Will recommend follow-up with diabetic educator in 2 to 3 weeks for blood sugar review and titration-she might need basal 2 rate based on blood sugar data for the days when she is having binge eating episodes. (Earlier when she was seen by Emily Phan she had 4 different basal rates).  Due for labs and follow-up with endocrinology in 3 months.  New basal rates as noted below:    Time Rate (U/hr)   0000 0.900-->1.3   06:00 0.750   12:00 0.800   18:00 0.950        2.  Hypercholesterolemia  She has not been able to tolerate statins.    DM Prevention:  Flu Shot- had allergic reaction to it.  Opthalmology- Encouraged follow up with ophthalmology yearly  ASA- 81 mg  Smoking- No    Most Recent Immunizations   Administered Date(s) Administered     Flu, Unspecified 11/04/2008     HepB-Adult 09/17/2004     Influenza (H1N1) 01/06/2010     Influenza (IIV3) PF 10/13/2014     Influenza Vaccine IM > 6 months Valent IIV4 11/11/2016     Influenza Vaccine, 6+MO IM (QUADRIVALENT W/PRESERVATIVES) 09/30/2015     MMR 03/01/1977     Mantoux Tuberculin Skin Test 06/19/2017     TD (ADULT, 7+) 07/28/2017     TDAP Vaccine (Adacel) 06/26/2007         Follow-up:  3 months    Terri Salgado MD  Endocrinology   [facility]  April 19, 2021  CC: Madyson Mclaughlin

## 2021-04-19 NOTE — LETTER
"    4/19/2021         RE: Maricarmen Dotson  [address]  [place] [address]        Dear Colleague,    Thank you for referring your patient, Maricarmen Dotsno, to the [facility]. Please see a copy of my visit note below.    THIS IS A VIDEO VISIT:    Phone call visit/virtual visit encounter:  New pt to me.  PITO from Emily Phan.    Name of patient: Maricarmen Dotson    Date of encounter: 4/19/2021    Time of start of video visit: 10:00    Video started: 10:19    Video ended: 10:40    Time visit video ended: 5:04    Provider location: working from [place]/ [facility]    Patient location: patients home.    Mode of transmission: video/ Doximity    Verbal consent: obtained before starting visit. Pt is agreeable.      The patient has been notified of following:      \"This VIDEO visit will be conducted via a call between you and your physician/provider. We have found that certain health care needs can be provided without the need for a physical exam.  This service lets us provide the care you need with a short phone conversation.  If a prescription is necessary we can send it directly to your pharmacy.  If lab work is needed we can place an order for that and you can then stop by our lab to have the test done at a later time.     With new updates with corona virus patient might be billed as clinic visit.     If during the course of the call the physician/provider feels a telephone visit is not appropriate, you will not be charged for this service.\"      Past medical history, social history, family history, allergy and medications were reviewed and updated as appropriate.  Reviewed pertinent labs, notes, imaging studies personally.  In addition to clinic viist > 32 min spent in chart review and reviewing complex medical history.    Name: Maricarmen Dotson  F/u for Diabetes.  HPI:  Maricarmen Dotson is a 59 year old female who presents via phone visit for the management of above.   has a past medical " history of Ascending aorta enlargement (H), Depressive disorder, Diabetes mellitus, type 2 (H), Diverticulosis of colon (without mention of hemorrhage), Fibromyalgia (6/26/2007), Insomnia, and Irritable bowel syndrome.     Was seen by Emily Phan and was seen by  at [facility] 2/2021.  She was previously seen by Dr. Rich and Diane Childress.    Using Omnipod DASH insulin pump.  Using Frestyle freddie.  Using Novolog in insulin pump.    REPORTS PREVIOUS ADVERSE REACTION TO HUMALOG.  States she cannot take Humalog because it caused pancreatits ?.  Also reports that she use freddie sensors for 14 days then takes a break ( for 8-10 days) before using new site. In that time she is using fingerstick.    H/o eating disorder due to PTSD.  Also has anxiety disorder and sometimes she is struggling with overeating.    Mostly 3 meals/day- trying low carb meals.  Has bedtime snack on some days as a part of coping for anxiety.      1. Type 2 DM:  Originally diagnosed: in 1997. H/o gestational diabetes with 2 pregnancies.      Insulin pump start 2/20/2018.      She cannot take Humalog because it caused pancreatitis.    Metformin--Abdominal pain.  Glipizide- Allergic reaction- (abdominal pain and swelling),   Byetta and Onglyza-- Pancreatitis.  Invokana -- had upper GI distress.    WILLIAMS 65 antibodies, anti-insulin antibodies were negative, in 2009.      Current Regimen:   Insulin pump - Omnipod  Basal 44%- 19.5 units  Bolus 56%-24.6 units    Basal 1 -CURRENT  Time Rate (U/hr)   0000 0.900   06:00 0.750   12:00 0.800   18:00 0.950       Carbohydrate Ratio -    Time Ratio   0000 6         Sensitivity   00:00  05:00    23  25   Active Insulin Time  4 hours   Basal  44%-19.5 units   Bolus   56% - 24.6 units   Total Carbohydrates/day    Total Insulin/day   44.1 units   Average Blood Sugar  BG range  Freddie average    BS Checks 3.8 times a day   Target range 100-120     Freddie download:  Blood glucose data reviewed personally. See nursing  note from this encounter for details.         Complications:   Diabetes Complications  Description / Detail    Diabetic Retinopathy   No retinopathy.  Last exam 2020   CAD / PAD   No   Neuropathy   No   Nephropathy / Microalbuminuria  intermittently positive, losartan was prescribed but the patient didn't take it as it lower her BG.    Gastroparesis  No   Hypoglycemia Unawareness  No          PMH/PSH:  Past Medical History:   Diagnosis Date     Ascending aorta enlargement (H)      Depressive disorder     severe, prior hospitalization     Diabetes mellitus, type 2 (H)      Diverticulosis of colon (without mention of hemorrhage)      Fibromyalgia 2007     Insomnia      Irritable bowel syndrome      Past Surgical History:   Procedure Laterality Date     BACK SURGERY      fusion  and removal of hardware      C SPINAL ORTHOSIS,NOS      lumbar surgery     CHOLECYSTECTOMY       choley       ENT SURGERY      septoplasty     HYSTERECTOMY, CERVIX STATUS UNKNOWN      TVH/BSO     ORTHOPEDIC SURGERY      left ankle     Family Hx:  Family History   Problem Relation Age of Onset     Diabetes Mother         type 2     Hypertension Mother      Cerebrovascular Disease Mother          stroke age 57     Ovarian Cancer Mother 43     Cancer Mother         cervix     Diabetes Father         type 2     Hypertension Father      Gastrointestinal Disease Father         colon polyps     Sleep Apnea Brother      Cancer Sister      Ovarian Cancer Sister 46     Ovarian Cancer Maternal Grandmother 72     Cancer Maternal Grandmother         cervix     Thyroid disease:          DM2: Yes, mother and father         Autoimmune: DM1, SLE, RA, Vitiligo     Social Hx:  Social History     Socioeconomic History     Marital status:      Spouse name: Not on file     Number of children: 3     Years of education: Not on file     Highest education level: Not on file   Occupational History     Occupation: xr  technician     Employer: [employer]   Social Needs     Financial resource strain: Not on file     Food insecurity     Worry: Not on file     Inability: Not on file     Transportation needs     Medical: Not on file     Non-medical: Not on file   Tobacco Use     Smoking status: Never Smoker     Smokeless tobacco: Never Used   Substance and Sexual Activity     Alcohol use: Yes     Alcohol/week: 0.0 standard drinks     Comment: maybe once a month     Drug use: No     Sexual activity: Yes     Partners: Male     Birth control/protection: Surgical   Lifestyle     Physical activity     Days per week: Not on file     Minutes per session: Not on file     Stress: Not on file   Relationships     Social connections     Talks on phone: Not on file     Gets together: Not on file     Attends Uatsdin service: Not on file     Active member of club or organization: Not on file     Attends meetings of clubs or organizations: Not on file     Relationship status: Not on file     Intimate partner violence     Fear of current or ex partner: Not on file     Emotionally abused: Not on file     Physically abused: Not on file     Forced sexual activity: Not on file   Other Topics Concern     Parent/sibling w/ CABG, MI or angioplasty before 65F 55M? Not Asked   Social History Narrative     Not on file          MEDICATIONS:  has a current medication list which includes the following prescription(s): aspirin not prescribed, freestyle ashleigh 14 day reader, freestyle ashleigh 14 day sensor, freestyle ashleigh 2 sensor systm, voltaren, epinephrine, HEMP OIL OR EXTRACT OR OTHER CBD CANNABINOID, NOT MEDICAL CANNABIS,, insulin aspart, omnipod dash 5 pack pods, insulin pump, lorazepam, losartan, melatonin, naloxone, polyethylene glycol, statin not prescribed, and tramadol.    ROS     ROS: 10 point ROS neg other than the symptoms noted above in the HPI.    PE:  LMP 01/01/1999   Breastfeeding No   GENERAL: healthy, alert and no  distress  EYES: Eyes grossly normal to inspection, conjunctivae and sclerae normal  ENT: no nose swelling, nasal discharge.  Thyroid: no apparent thyroid nodules  RESP: no audible wheeze, cough, or visible cyanosis.  No visible retractions or increased work of breathing.  Able to speak fully in complete sentences.  ABDO: not evaluated.  EXTREMITIES: no hand tremors.  NEURO: Cranial nerves grossly intact, mentation intact and speech normal  SKIN: No apparent skin lesions, rash or edema seen   PSYCH: mentation appears normal, affect normal/bright, judgement and insight intact, normal speech and appearance well-groomed    LABS:  A1c:  Lab Results   Component Value Date    A1C 7.4 09/18/2020    A1C 8.0 02/27/2020    A1C 8.0 10/28/2019    A1C 8.2 07/22/2019    A1C 8.5 04/18/2019       Basic Metabolic Panel:  Creatinine   Date Value Ref Range Status   01/20/2021 0.77 0.52 - 1.04 mg/dL Final       Lipid Panel:  Recent Labs   Lab Test 09/18/20  0852 12/13/18  0832 02/21/15  0923 02/21/15  0923 07/08/14  0841   CHOL 221* 262*   < > 263* 266*   HDL 51 50   < > 53 41*   * 178*   < > 156* 173*   TRIG 137 168*   < > 269* 263*   CHOLHDLRATIO  --   --   --  5.0 6.5*    < > = values in this interval not displayed.       Thyroid Function:  ENDO THYROID LABS-[facility] Latest Ref Rng & Units 9/18/2020   TSH 0.40 - 4.00 mU/L 1.19     Urine Microalbumin:   Lab Results   Component Value Date    UMALCR 28.63 09/18/2020        All pertinent notes, labs, and images personally reviewed by me.     A/P  Ms.Anna ROXANNE Dotson is a 60 year old being evaluated via phone visit for the management of diabetes:    1. DM2 - Controlled.  A1c 7.4%.  No known complications from diabetes.  Historically diabetes control has been variable.  History of intermittent microalbuminuria.  Currently she is on losartan 100 mg/day.  Currently on insulin pump therapy, Omnipod as well as continuous glucose monitor freestyle ashleigh.  She makes frequent insulin dose  adjustments based on her glucose readings.  History of eating disorder complicate diabetes management as on the days when she is having binge eating episodes blood sugar tend to rise higher.  In general based on her recent blood sugar data it appears that blood sugars are mostly in higher range especially at night.  Plan to increase basal rate at night as noted below.  Rest of the pump settings will remain the same.  Will recommend follow-up with diabetic educator in 2 to 3 weeks for blood sugar review and titration-she might need basal 2 rate based on blood sugar data for the days when she is having binge eating episodes. (Earlier when she was seen by Emily Phan she had 4 different basal rates).  Due for labs and follow-up with endocrinology in 3 months.  New basal rates as noted below:    Time Rate (U/hr)   0000 0.900-->1.3   06:00 0.750   12:00 0.800   18:00 0.950        2.  Hypercholesterolemia  She has not been able to tolerate statins.    DM Prevention:  Flu Shot- had allergic reaction to it.  Opthalmology- Encouraged follow up with ophthalmology yearly  ASA- 81 mg  Smoking- No    Most Recent Immunizations   Administered Date(s) Administered     Flu, Unspecified 11/04/2008     HepB-Adult 09/17/2004     Influenza (H1N1) 01/06/2010     Influenza (IIV3) PF 10/13/2014     Influenza Vaccine IM > 6 months Valent IIV4 11/11/2016     Influenza Vaccine, 6+MO IM (QUADRIVALENT W/PRESERVATIVES) 09/30/2015     MMR 03/01/1977     Mantoux Tuberculin Skin Test 06/19/2017     TD (ADULT, 7+) 07/28/2017     TDAP Vaccine (Adacel) 06/26/2007         Follow-up:  3 months    Terri Salgado MD  Endocrinology   [facility]  April 19, 2021  CC: Madyson Mclaughlin                 Again, thank you for allowing me to participate in the care of your patient.        Sincerely,        Terri Salgado MD

## 2021-04-20 ENCOUNTER — NURSE TRIAGE (OUTPATIENT)
Dept: NURSING | Facility: CLINIC | Age: 61
End: 2021-04-20

## 2021-04-20 ENCOUNTER — VIRTUAL VISIT (OUTPATIENT)
Dept: INTERNAL MEDICINE | Facility: CLINIC | Age: 61
End: 2021-04-20
Payer: COMMERCIAL

## 2021-04-20 DIAGNOSIS — Z20.822 SUSPECTED COVID-19 VIRUS INFECTION: Primary | ICD-10-CM

## 2021-04-20 PROCEDURE — 99214 OFFICE O/P EST MOD 30 MIN: CPT | Mod: 95 | Performed by: INTERNAL MEDICINE

## 2021-04-20 NOTE — PROGRESS NOTES
"Maricarmen is a 60 year old who is being evaluated via a billable telephone visit.      What phone number would you like to be contacted at? [phone]  How would you like to obtain your AVS? MyChart    Assessment & Plan     Cough, fever, chest tightness  Suspected COVID-19 virus infection  Get tested today  Symptomatic care  If negative- in-person eval for other causes  If positive- with good o2 sats , outpt care ok. She would be candidate , if rx done soon, for monoclonal antibody rx.   - Symptomatic COVID-19 Virus (Coronavirus) by PCR; Future             BMI:   Estimated body mass index is 30.9 kg/m  as calculated from the following:    Height as of 1/20/21: 1.626 m (5' 4\").    Weight as of 1/20/21: 81.6 kg (180 lb).           No follow-ups on file.    Lj Morales MD  [facility]    Subjective   Maricarmen is a 60 year old who presents for the following health issues     HPI       Concern for COVID-19  About how many days ago did these symptoms start? 1+ week  Is this your first visit for this illness? Yes  In the 14 days before your symptoms started, have you had close contact with someone with COVID-19 (Coronavirus)? No  Do you have a fever or chills? Yes, the highest temperature was 101 orally  Are you having new or worsening difficulty breathing? Yes   Please describe what kind of difficulty you are having breathing:Mild dyspnea (able to do ADLs without difficulty, mild shortness of breath with activities such as climbing one or two flights of stairs or walking briskly)  Do you have new or worsening cough? Yes, it's a dry cough.   Have you had any new or unexplained body aches? No    Have you experienced any of the following NEW symptoms?    Headache: YES    Sore throat: No    Loss of taste or smell: YES    Chest pain: YES    Diarrhea: No    Rash: No  What treatments have you tried? Tylenol with some improvement  Who do you live with? Spouse, 2 children  Are you, or a household " "member, a healthcare worker or a ? 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 No  Do you have a way to get food/medications if quarantined? Yes, I have a friend or family member who can help me.              Review of Systems         Objective           Vitals:  No vitals were obtained today due to virtual visit.    Physical Exam   alert and no distress  Self reported o2 sats at rest 98% , with ambulation 96%  PSYCH: Alert and oriented times 3; coherent speech, normal   rate and volume, able to articulate logical thoughts, able   to abstract reason, no tangential thoughts, no hallucinations   or delusions  Her affect is normal  RESP: No cough, no audible wheezing, able to talk in full sentences  Remainder of exam unable to be completed due to telephone visits                Phone call duration: 19 minutes    "

## 2021-04-20 NOTE — TELEPHONE ENCOUNTER
"Pt calls to report symptoms x 72 hours:  - \"Chest feels tight.\"  - \"Painful to breathe.\"  - \"Mid-to-upper sternal pain when walking my dog.\"  - \"Low fevers at night.\"  - \"Mild cough\"    \"Temp 101 (orally) at onset of fever 72 hours ago.\"  Occasional chills.\"  Relieved with Tylenol.  No dizzy spells.  No severe symptoms at this time.    Pt agrees to telephone provider visit to request order for covid testing.  Discussed steps to take if chest pain and/or breathing symptoms worsen.  Also discussed supportive self-care measures for fever/chills.    Ashley [facility] Nurse Advisor     Reason for Disposition    MILD difficulty breathing (e.g., minimal/no SOB at rest, SOB with walking, pulse <100)    Chest pain or pressure    Additional Information    Negative: SEVERE difficulty breathing (e.g., struggling for each breath, speaks in single words)    Negative: Difficult to awaken or acting confused (e.g., disoriented, slurred speech)    Negative: Bluish (or gray) lips or face now    Negative: Shock suspected (e.g., cold/pale/clammy skin, too weak to stand, low BP, rapid pulse)    Negative: Sounds like a life-threatening emergency to the triager    Negative: [1] COVID-19 exposure AND [2] no symptoms    Negative: [1] Lives with someone known to have influenza (flu test positive) AND [2] flu-like symptoms (e.g., cough, runny nose, sore throat, SOB; with or without fever)    Negative: [1] Adult with possible COVID-19 symptoms AND [2] triager concerned about severity of symptoms or other causes    Negative: COVID-19 vaccine reaction suspected (e.g., fever, headache, muscle aches) occurring during days 1-3 after getting vaccine    Negative: COVID-19 vaccine, questions about    Negative: COVID-19 and breastfeeding, questions about    Negative: SEVERE or constant chest pain or pressure (Exception: mild central chest pain, present only when coughing)    Negative: MODERATE difficulty breathing (e.g., speaks in phrases, SOB even " at rest, pulse 100-120)    Negative: [1] Headache AND [2] stiff neck (can't touch chin to chest)    [facility] Specific Disposition  - [facility] Specific Patient Instructions  COVID 19 Nurse Triage Plan/Patient Instructions    Please be aware that novel coronavirus (COVID-19) may be circulating in the community. If you develop symptoms such as fever, cough, or SOB or if you have concerns about the presence of another infection including coronavirus (COVID-19), please contact your health care provider or visit [email or web address]      Virtual Visit with provider recommended. Reference Visit Selection Guide.    Thank you for taking steps to prevent the spread of this virus.  Limit your contact with others.  Wear a simple mask to cover your cough.  Wash your hands well and often.    Resources  [facility]: About COVID-19: [email or web address]  CDC: What to Do If You're Sick: www.cdc.gov/coronavirus/2019-ncov/about/steps-when-sick.html  CDC: Ending Home Isolation: www.cdc.gov/coronavirus/2019-ncov/hcp/disposition-in-home-patients.html   CDC: Caring for Someone: www.cdc.gov/coronavirus/2019-ncov/if-you-are-sick/care-for-someone.html   [facility]: Interim Guidance for Hospital Discharge to Home: www.health.[facility].[place]/diseases/coronavirus/hcp/hospdischarge.pdf  [facility] clinical trials (COVID-19 research studies): clinicalaffairs.[facility]-clinical-trials   Below are the COVID-19 hotlines at the [facility] ([facility]). Interpreters are available.   For health questions: Call 073-790-3517 or 1-498.141.7014 (7 a.m. to 7 p.m.)  For questions about schools and childcare: Call 660-288-7635 or 1-702.436.8930 (7 a.m. to 7 p.m.)    Protocols used: CORONAVIRUS (COVID-19) DIAGNOSED OR [id number] 1.3

## 2021-04-21 DIAGNOSIS — Z20.822 SUSPECTED COVID-19 VIRUS INFECTION: ICD-10-CM

## 2021-04-21 LAB
LABORATORY COMMENT REPORT: NORMAL
SARS-COV-2 RNA RESP QL NAA+PROBE: NEGATIVE
SARS-COV-2 RNA RESP QL NAA+PROBE: NORMAL
SPECIMEN SOURCE: NORMAL
SPECIMEN SOURCE: NORMAL

## 2021-04-21 PROCEDURE — U0003 INFECTIOUS AGENT DETECTION BY NUCLEIC ACID (DNA OR RNA); SEVERE ACUTE RESPIRATORY SYNDROME CORONAVIRUS 2 (SARS-COV-2) (CORONAVIRUS DISEASE [COVID-19]), AMPLIFIED PROBE TECHNIQUE, MAKING USE OF HIGH THROUGHPUT TECHNOLOGIES AS DESCRIBED BY CMS-2020-01-R: HCPCS | Performed by: INTERNAL MEDICINE

## 2021-04-21 PROCEDURE — U0005 INFEC AGEN DETEC AMPLI PROBE: HCPCS | Performed by: INTERNAL MEDICINE

## 2021-04-22 ENCOUNTER — MYC MEDICAL ADVICE (OUTPATIENT)
Dept: PEDIATRICS | Facility: CLINIC | Age: 61
End: 2021-04-22

## 2021-04-22 NOTE — TELEPHONE ENCOUNTER
I would recommend patient be seen sooner than next week if possible. Any chance she can be seen by extender tomorrow?    Madyson Mclaughlin MD  Internal Medicine-Pediatrics

## 2021-04-22 NOTE — TELEPHONE ENCOUNTER
Per Dr. Morales's virtual visit note on 4/20/21:  Cough, fever, chest tightness   Suspected COVID-19 virus infection   Get tested today   Symptomatic care   If negative- in-person eval for other causes   If positive- with good o2 sats , outpt care ok. She would be candidate , if rx done soon, for monoclonal antibody rx.     Jolynn Marroquin RN on 4/22/2021 at 1:37 PM

## 2021-04-23 ENCOUNTER — OFFICE VISIT (OUTPATIENT)
Dept: PEDIATRICS | Facility: CLINIC | Age: 61
End: 2021-04-23
Payer: COMMERCIAL

## 2021-04-23 ENCOUNTER — ANCILLARY PROCEDURE (OUTPATIENT)
Dept: GENERAL RADIOLOGY | Facility: CLINIC | Age: 61
End: 2021-04-23
Attending: NURSE PRACTITIONER
Payer: COMMERCIAL

## 2021-04-23 VITALS
SYSTOLIC BLOOD PRESSURE: 160 MMHG | RESPIRATION RATE: 20 BRPM | HEIGHT: 63 IN | TEMPERATURE: 98.3 F | BODY MASS INDEX: 33.42 KG/M2 | HEART RATE: 74 BPM | WEIGHT: 188.6 LBS | OXYGEN SATURATION: 96 % | DIASTOLIC BLOOD PRESSURE: 80 MMHG

## 2021-04-23 DIAGNOSIS — J34.89 SINUS PAIN: ICD-10-CM

## 2021-04-23 DIAGNOSIS — R07.9 CHEST PAIN, UNSPECIFIED TYPE: ICD-10-CM

## 2021-04-23 DIAGNOSIS — R05.9 COUGH: ICD-10-CM

## 2021-04-23 DIAGNOSIS — R73.09 ELEVATED GLUCOSE: ICD-10-CM

## 2021-04-23 DIAGNOSIS — E11.9 TYPE 2 DIABETES, HBA1C GOAL < 7% (H): ICD-10-CM

## 2021-04-23 DIAGNOSIS — R06.09 DYSPNEA ON EXERTION: ICD-10-CM

## 2021-04-23 DIAGNOSIS — R07.89 FEELING OF CHEST TIGHTNESS: ICD-10-CM

## 2021-04-23 DIAGNOSIS — R05.9 COUGH: Primary | ICD-10-CM

## 2021-04-23 DIAGNOSIS — I10 HYPERTENSION GOAL BP (BLOOD PRESSURE) < 140/90: ICD-10-CM

## 2021-04-23 PROCEDURE — 71046 X-RAY EXAM CHEST 2 VIEWS: CPT | Performed by: RADIOLOGY

## 2021-04-23 PROCEDURE — 93000 ELECTROCARDIOGRAM COMPLETE: CPT | Performed by: NURSE PRACTITIONER

## 2021-04-23 PROCEDURE — 99214 OFFICE O/P EST MOD 30 MIN: CPT | Performed by: NURSE PRACTITIONER

## 2021-04-23 RX ORDER — ALBUTEROL SULFATE 90 UG/1
2 AEROSOL, METERED RESPIRATORY (INHALATION) EVERY 6 HOURS
Qty: 18 G | Refills: 0 | Status: SHIPPED | OUTPATIENT
Start: 2021-04-23 | End: 2021-05-17

## 2021-04-23 ASSESSMENT — MIFFLIN-ST. JEOR: SCORE: 1403.57

## 2021-04-23 NOTE — PATIENT INSTRUCTIONS
Chest x-ray looks good.    Use your albuterol inhaler scheduled every 4 -6 hours for the next 2-3 days, then use every 4-6 hours as needed for cough, wheeze, or shortness of breath.    OK to take over-the-counter claritin or zyrtec. Other option is to use flonase (fluticasone) nasal spray.    Push fluids.    If you have worsening shortness of breath, chest pain, or fevers     Keep an eye on your sugars    Update me next week on your symptoms.

## 2021-04-23 NOTE — PROGRESS NOTES
Assessment & Plan   Cough  Dyspnea on exertion  Chest pain, unspecified type  Sinus pain  Feeling of chest tightness  Normal chest x-ray. EKG with sinus willian, similar to previous readings. Normal stress test done in 2019. Pt has had similar pain like this with bronchitis. Low suspicion for PE, PERC criteria score 1. Will trial albuterol inhaler. Also discussed with her we could do abx for sinusitis/duration of symptoms but she declines. OK to monitor for now. If ongoing symptoms would have her seen in clinic next week to discuss next steps discussed s/s to watch for in the mean time.  - XR Chest 2 Views; Future  - EKG 12-lead complete w/read - Clinics  - albuterol (PROAIR HFA/PROVENTIL HFA/VENTOLIN HFA) 108 (90 Base) MCG/ACT inhaler; Inhale 2 puffs into the lungs every 6 hours    Elevated glucose  Type 2 diabetes, HbA1c goal < 7% (H)  Continue to monitor sugars    Hypertension goal BP (blood pressure) < 140/90  Continue to monitor at home        Patient Instructions   Chest x-ray looks good.    Use your albuterol inhaler scheduled every 4 -6 hours for the next 2-3 days, then use every 4-6 hours as needed for cough, wheeze, or shortness of breath.    OK to take over-the-counter claritin or zyrtec. Other option is to use flonase (fluticasone) nasal spray.    Push fluids.    If you have worsening shortness of breath, chest pain, or fevers     Keep an eye on your sugars    Update me next week on your symptoms.       Return in about 3 weeks (around 5/14/2021), or if symptoms worsen or fail to improve.    Jory Hinds NP  [facility] Hernadez is a 60 year old who presents for the following health issues {ACCOMPANIED     HPI     Acute Illness  Acute illness concerns: Having cough and chest tightness,Negative COVID PCR on 4-21-21,   Onset/Duration: Tues last week  Symptoms:  Fever: YES- 101 highest, worse at night (Sunday and Wednesday 101F, last night 100F)  Chills/Sweats: YES  Headache  "(location?): YES  Sinus Pressure: YES  Conjunctivitis:  no  Ear Pain: YES: bilateral  Rhinorrhea: YES  Congestion: YES  Sore Throat: no  Cough: YES-non-productive, painful, hard to breathe, more AM/PM.  Wheeze: no  Decreased Appetite: no  Nausea: YES  Vomiting: no  Diarrhea: no  Dysuria/Freq.: no  Dysuria or Hematuria: no  Fatigue/Achiness: YES-both  Sick/Strep Exposure: no  Therapies tried and outcome: Tylenol only due to HTN and DM.    Blood sugar as high as in the 300s, unusual for her. Struggling to keep her sugars under 180, usually under 100s.  Also has high blood pressure.  Feels tight in her chest, all over. Describes as burning to anterior chest, sides, and back. Has had similar pain with bronchitis in the past. No hx of asthma.  Also feels more short of breath with exertion.  Symptoms wax and wane, worst in the mornings.  Neg COVID test 4/21.    Review of Systems   Constitutional, HEENT, cardiovascular, pulmonary, gi and gu systems are negative, except as otherwise noted.      Objective    BP (!) 160/80   Pulse 74   Temp 98.3  F (36.8  C) (Tympanic)   Resp 20   Ht 1.607 m (5' 3.25\")   Wt 85.5 kg (188 lb 9.6 oz)   LMP 01/01/1999   SpO2 96%   BMI 33.15 kg/m    Body mass index is 33.15 kg/m .  Physical Exam   GENERAL: healthy, alert and no distress  EYES: Eyes grossly normal to inspection  HENT: ear canals and TM's normal, nose and mouth without ulcers or lesions  NECK: no adenopathy  RESP: lungs clear to auscultation - no rales, rhonchi or wheezes  CV: regular rate and rhythm, normal S1 S2, no S3 or S4, no murmur, click or rub, no peripheral edema and peripheral pulses strong    EKG - Reviewed and interpreted by me sinus bradycardia, no LVH by voltage criteria, unchanged from previous tracings  No results found. However, due to the size of the patient record, not all encounters were searched. Please check Results Review for a complete set of results.   chest x-ray:  my independent review shows no " abnormality, awaiting radiologist final review.

## 2021-04-26 ENCOUNTER — TELEPHONE (OUTPATIENT)
Dept: PEDIATRICS | Facility: CLINIC | Age: 61
End: 2021-04-26

## 2021-04-26 NOTE — TELEPHONE ENCOUNTER
Please call pt or send [employer] message to check on pt/how her symptoms are doing/if inhaler is helping-thanks    Jory Hinds APRN, CNP

## 2021-04-27 NOTE — TELEPHONE ENCOUNTER
[facility] call to check on Sx and ask if inhaler is helping. No answer, LVM asking to call back on [facility] direct extension.    Bhavesh Tafoya at 9:17 AM on 4/27/2021  [facility] Health Guide  Phone [phone]

## 2021-05-03 ENCOUNTER — TELEPHONE (OUTPATIENT)
Dept: ENDOCRINOLOGY | Facility: CLINIC | Age: 61
End: 2021-05-03

## 2021-05-03 DIAGNOSIS — E11.9 TYPE 2 DIABETES, HBA1C GOAL < 7% (H): Primary | ICD-10-CM

## 2021-05-04 RX ORDER — LANCETS 28 GAUGE
EACH MISCELLANEOUS
Qty: 100 EACH | Refills: 6 | Status: SHIPPED | OUTPATIENT
Start: 2021-05-04 | End: 2023-10-30

## 2021-05-04 RX ORDER — GLUCOSAMINE HCL/CHONDROITIN SU 500-400 MG
CAPSULE ORAL
Qty: 100 EACH | Refills: 3 | Status: SHIPPED | OUTPATIENT
Start: 2021-05-04 | End: 2023-10-30

## 2021-05-04 RX ORDER — BLOOD-GLUCOSE METER
KIT MISCELLANEOUS
Qty: 1 KIT | Refills: 0 | Status: SHIPPED | OUTPATIENT
Start: 2021-05-04

## 2021-05-04 RX ORDER — BLOOD-GLUCOSE METER
KIT MISCELLANEOUS
Qty: 100 STRIP | Refills: 6 | Status: SHIPPED | OUTPATIENT
Start: 2021-05-04 | End: 2023-10-30

## 2021-05-04 RX ORDER — BLOOD-GLUCOSE CONTROL, NORMAL
EACH MISCELLANEOUS
Qty: 1 EACH | Refills: 1 | Status: SHIPPED | OUTPATIENT
Start: 2021-05-04 | End: 2022-04-06

## 2021-05-04 NOTE — TELEPHONE ENCOUNTER
Routing to Dr. Salgado to review. New prescription, RN cannot sign. Marta pended the supplies.     Kaylene Morales RN   [facility] -- Triage Nurse

## 2021-05-08 ENCOUNTER — HEALTH MAINTENANCE LETTER (OUTPATIENT)
Age: 61
End: 2021-05-08

## 2021-05-13 ENCOUNTER — VIRTUAL VISIT (OUTPATIENT)
Dept: EDUCATION SERVICES | Facility: CLINIC | Age: 61
End: 2021-05-13
Attending: INTERNAL MEDICINE
Payer: COMMERCIAL

## 2021-05-13 DIAGNOSIS — E11.65 TYPE 2 DIABETES MELLITUS WITH HYPERGLYCEMIA, WITH LONG-TERM CURRENT USE OF INSULIN (H): ICD-10-CM

## 2021-05-13 DIAGNOSIS — Z96.41 INSULIN PUMP IN PLACE: ICD-10-CM

## 2021-05-13 DIAGNOSIS — Z79.4 TYPE 2 DIABETES MELLITUS WITH HYPERGLYCEMIA, WITH LONG-TERM CURRENT USE OF INSULIN (H): ICD-10-CM

## 2021-05-13 PROCEDURE — 98967 PH1 ASSMT&MGMT NQHP 11-20: CPT | Performed by: DIETITIAN, REGISTERED

## 2021-05-13 NOTE — PATIENT INSTRUCTIONS
Changes made to pump settings:  carb ratio:  strengthen from 12 to 10    Follow-up appointment scheduled on June 4th at 8:30 am.    Allie Matias RD, [facility]  Diabetes

## 2021-05-13 NOTE — PROGRESS NOTES
Diabetes Self-Management Training: Pump/ CGM Follow up    Type of Service: Telephone Visit    How would patient like to obtain AVS? Courtney    Last date of CDE visit: 11/11/20    Current Diabetes Management:       Diabetes Medication(s)     Insulin       insulin aspart (NOVOLOG VIAL) 100 UNITS/ML vial    USE IN INSULIN PUMP, TOTAL DAILY DOSE 100 UNITS.            CGM Report:                    Pump Report:                         Assessment/ Intervention:    Glucose trends mostly hyperglycemia. Reports recent struggle with returning binge eating disorder, though does make an effort to consistently enter carbs. Sometimes however, is not sure how much she will eat and thus waits to bolus until afterward.     Pt would benefit from strengthening the carb ratio. Current ratio at 12. Based on current TDD, ratio calculated using 450 rule closer to 9- 10.     Plan/ Follow-up:  Changes made to pump settings:  carb ratio:  strengthen from 12 to 10    Follow-up appointment scheduled on June 4th at 8:30 am.   Chart routed to referring provider.    Allie Matias RD, [facility]  Diabetes       Encounter Type: Telephone  Time Spent: 20 min    Any diabetes medication dose changes were made via the CDE Protocol and Collaborative Practice Agreement with the patient's primary care provider and endocrinology provider. A copy of this encounter was shared with the provider.

## 2021-05-13 NOTE — LETTER
5/13/2021         RE: Maricarmen Dotson  [address] [place] [address]        Dear Colleague,    Thank you for referring your patient, Maricarmen Dotson, to the [facility]. Please see a copy of my visit note below.    Diabetes Self-Management Training: Pump/ CGM Follow up    Type of Service: Telephone Visit    How would patient like to obtain AVS? MyChart    Last date of CDE visit: 11/11/20    Current Diabetes Management:       Diabetes Medication(s)     Insulin       insulin aspart (NOVOLOG VIAL) 100 UNITS/ML vial    USE IN INSULIN PUMP, TOTAL DAILY DOSE 100 UNITS.            CGM Report:                    Pump Report:                         Assessment/ Intervention:    Glucose trends mostly hyperglycemia. Reports recent struggle with returning binge eating disorder, though does make an effort to consistently enter carbs. Sometimes however, is not sure how much she will eat and thus waits to bolus until afterward.     Pt would benefit from strengthening the carb ratio. Current ratio at 12. Based on current TDD, ratio calculated using 450 rule closer to 9- 10.     Plan/ Follow-up:  Changes made to pump settings:  carb ratio:  strengthen from 12 to 10    Follow-up appointment scheduled on June 4th at 8:30 am.   Chart routed to referring provider.    Allie Matias RD, [facility]  Diabetes       Encounter Type: Telephone  Time Spent: 20 min    Any diabetes medication dose changes were made via the CDE Protocol and Collaborative Practice Agreement with the patient's {diabetes education provider list:[id number]}. A copy of this encounter was shared with the provider.

## 2021-05-17 ENCOUNTER — VIRTUAL VISIT (OUTPATIENT)
Dept: PEDIATRICS | Facility: CLINIC | Age: 61
End: 2021-05-17
Payer: COMMERCIAL

## 2021-05-17 DIAGNOSIS — G89.4 CHRONIC PAIN SYNDROME: ICD-10-CM

## 2021-05-17 DIAGNOSIS — I10 HYPERTENSION GOAL BP (BLOOD PRESSURE) < 140/90: Primary | ICD-10-CM

## 2021-05-17 DIAGNOSIS — Z23 HIGH PRIORITY FOR 2019-NCOV VACCINE: ICD-10-CM

## 2021-05-17 DIAGNOSIS — R05.9 COUGH: ICD-10-CM

## 2021-05-17 PROCEDURE — 99214 OFFICE O/P EST MOD 30 MIN: CPT | Mod: 95 | Performed by: INTERNAL MEDICINE

## 2021-05-17 RX ORDER — CETIRIZINE HYDROCHLORIDE 10 MG/1
10 TABLET ORAL DAILY
COMMUNITY
End: 2022-07-20

## 2021-05-17 RX ORDER — LOSARTAN POTASSIUM 50 MG/1
TABLET ORAL
Qty: 180 TABLET | Refills: 3 | Status: SHIPPED | OUTPATIENT
Start: 2021-05-17 | End: 2021-06-18

## 2021-05-17 NOTE — PROGRESS NOTES
Sent patient mycharHALFPOPS message on how to schedule Duc & Duc vaccine, as this writer is unable to schedule.  Vaccine can be scheduled via patient's mychart, or by calling [phone].    Amanda Khan

## 2021-05-17 NOTE — PROGRESS NOTES
"Maricarmen is a 60 year old who is being evaluated via a billable telephone visit.      What phone number would you like to be contacted at? [phone]  How would you like to obtain your AVS? Mail a copy    Assessment & Plan     Hypertension goal BP (blood pressure) < 140/90  Will refill losartan at dose patient is currently taking. Follow-up BP check at next visit. Patient actively working on weight loss as well.   - losartan (COZAAR) 50 MG tablet; TAKE TWO TABLETS BY MOUTH ONCE DAILY    High priority for 2019-nCoV vaccine  Encouraged patient to proceed with Duc and Duc vaccination for COVID, she will call to schedule appointment in pharmacy.     Cough  Will try increasing cetirizine to 2 tabs daily given side effects with albuterol and poor candidate for Singulair with black box warning.     Chronic pain syndrome  Doing well on medical marijuana.           BMI:   Estimated body mass index is 33.15 kg/m  as calculated from the following:    Height as of 4/23/21: 1.607 m (5' 3.25\").    Weight as of 4/23/21: 85.5 kg (188 lb 9.6 oz).       No follow-ups on file.    Madyson Mendoza MD  [facility]    Subjective   Maricarmen is a 60 year old who presents for the following health issues     HPI     Diabetes: Working closely with Endocrinology team for management. Still dealing with grief, has been binge eating more. Has had some weight gain as well.     Has an appointment with a therapist for EMDR and trauma on Tuesday.     Needs to come in for Tdap and Shingles vaccines.     Had anaphylaxis to a flu vaccine in 2015 or 2016. Was declined the Pfizer vaccine due to this. Wondering if she can get the Duc and Duc vaccine.    Thinks that her BP is elevated again due to weight gain. Has an automated BP cuff, has been running 140-160s/80s. Currently taking 2 tabs of losartan (50mg) for a total of 100mg daily. Has been walking 2-5 miles per day.     Is currently on medical marijuana. No longer taking " lorazepam or tramadol at this time.     Has had a cough and burning in her lungs since January. Can't take albuterol as it makes her BP high, more anxious and gives her palpitations. Previously has tried inhaled steroids, but this causes her blood sugars to go up to the 600s.     Review of Systems   Constitutional, HEENT, cardiovascular, pulmonary, gi and gu systems are negative, except as otherwise noted.      Objective           Vitals:  No vitals were obtained today due to virtual visit.    Physical Exam     PSYCH: Alert and oriented times 3; coherent speech, normal   rate and volume, able to articulate logical thoughts, able   to abstract reason, no tangential thoughts, no hallucinations   or delusions  Her affect is pleasant  RESP: No cough, no audible wheezing, able to talk in full sentences  Remainder of exam unable to be completed due to telephone visits                Phone call duration: 26 minutes

## 2021-05-20 ENCOUNTER — IMMUNIZATION (OUTPATIENT)
Dept: LAB | Facility: CLINIC | Age: 61
End: 2021-05-20
Payer: COMMERCIAL

## 2021-06-03 ENCOUNTER — MYC MEDICAL ADVICE (OUTPATIENT)
Dept: PEDIATRICS | Facility: CLINIC | Age: 61
End: 2021-06-03

## 2021-06-04 ENCOUNTER — VIRTUAL VISIT (OUTPATIENT)
Dept: EDUCATION SERVICES | Facility: CLINIC | Age: 61
End: 2021-06-04
Payer: COMMERCIAL

## 2021-06-04 DIAGNOSIS — Z79.4 TYPE 2 DIABETES MELLITUS WITH HYPERGLYCEMIA, WITH LONG-TERM CURRENT USE OF INSULIN (H): ICD-10-CM

## 2021-06-04 DIAGNOSIS — E11.65 TYPE 2 DIABETES MELLITUS WITH HYPERGLYCEMIA, WITH LONG-TERM CURRENT USE OF INSULIN (H): ICD-10-CM

## 2021-06-04 NOTE — LETTER
"    6/4/2021         RE: Maricarmen Dotosn  [address] [place] [address]        Dear Colleague,    Thank you for referring your patient, Maricarmen Dotson, to the [facility]. Please see a copy of my visit note below.    Diabetes Self-Management Training: CGM Telephone Visit    Last date of CDE contact: 5/13/21    Current Diabetes Management:     Diabetes Medication(s)     Insulin       insulin aspart (NOVOLOG VIAL) 100 UNITS/ML vial    USE IN INSULIN PUMP, TOTAL DAILY DOSE 100 UNITS.              CGM Report:            Pump Report:                           Assessment/ Plan:    Glucose trends:  Post meal hyperglycemia  Pt however, reports \"getting better\", less insulin resistant, binge eating is getting under control.  Patient would benefit from strengthening the carbohydrate ratio, at least temporarily- however pt requesting no changes at this time. Pt agreed to send [employer] message if setting adjustment within the next 2 weeks. CDE follow up scheduled 6/18.    Allie Matias RD, [facility]  Diabetes     Encounter Type: Telephone  No charge visit    Any diabetes medication dose changes were made via the CDE Protocol and Collaborative Practice Agreement with the patient's primary care provider and endocrinology provider. A copy of this encounter was shared with the provider.          "

## 2021-06-04 NOTE — PROGRESS NOTES
"Diabetes Self-Management Training: CGM Telephone Visit    Last date of CDE contact: 5/13/21    Current Diabetes Management:     Diabetes Medication(s)     Insulin       insulin aspart (NOVOLOG VIAL) 100 UNITS/ML vial    USE IN INSULIN PUMP, TOTAL DAILY DOSE 100 UNITS.              CGM Report:            Pump Report:                           Assessment/ Plan:    Glucose trends:  Post meal hyperglycemia  Pt however, reports \"getting better\", less insulin resistant, binge eating is getting under control.  Patient would benefit from strengthening the carbohydrate ratio, at least temporarily- however pt requesting no changes at this time. Pt agreed to send [employer] message if setting adjustment within the next 2 weeks. CDE follow up scheduled 6/18.    Allie Matias RD, [facility]  Diabetes     Encounter Type: Telephone  No charge visit    Any diabetes medication dose changes were made via the CDE Protocol and Collaborative Practice Agreement with the patient's primary care provider and endocrinology provider. A copy of this encounter was shared with the provider.    "

## 2021-06-04 NOTE — PATIENT INSTRUCTIONS
No pump setting changes today, however please let us know if post meal blood sugars continue to be elevated.    Telephone follow up scheduled June 18th at 11:30 am.    Allie Matias RD, [facility]  Diabetes

## 2021-06-07 ENCOUNTER — TRANSFERRED RECORDS (OUTPATIENT)
Dept: HEALTH INFORMATION MANAGEMENT | Facility: CLINIC | Age: 61
End: 2021-06-07

## 2021-06-18 ENCOUNTER — VIRTUAL VISIT (OUTPATIENT)
Dept: EDUCATION SERVICES | Facility: CLINIC | Age: 61
End: 2021-06-18
Payer: COMMERCIAL

## 2021-06-18 ENCOUNTER — OFFICE VISIT (OUTPATIENT)
Dept: PEDIATRICS | Facility: CLINIC | Age: 61
End: 2021-06-18
Payer: COMMERCIAL

## 2021-06-18 ENCOUNTER — NURSE TRIAGE (OUTPATIENT)
Dept: NURSING | Facility: CLINIC | Age: 61
End: 2021-06-18

## 2021-06-18 VITALS
SYSTOLIC BLOOD PRESSURE: 142 MMHG | BODY MASS INDEX: 33.99 KG/M2 | TEMPERATURE: 97.2 F | HEART RATE: 62 BPM | DIASTOLIC BLOOD PRESSURE: 80 MMHG | WEIGHT: 193.4 LBS | OXYGEN SATURATION: 97 %

## 2021-06-18 DIAGNOSIS — M25.552 HIP PAIN, LEFT: Primary | ICD-10-CM

## 2021-06-18 DIAGNOSIS — F43.21 GRIEF REACTION: ICD-10-CM

## 2021-06-18 DIAGNOSIS — I10 HYPERTENSION GOAL BP (BLOOD PRESSURE) < 140/90: ICD-10-CM

## 2021-06-18 DIAGNOSIS — Z79.4 TYPE 2 DIABETES MELLITUS WITH HYPERGLYCEMIA, WITH LONG-TERM CURRENT USE OF INSULIN (H): Primary | ICD-10-CM

## 2021-06-18 DIAGNOSIS — E11.65 TYPE 2 DIABETES MELLITUS WITH HYPERGLYCEMIA, WITH LONG-TERM CURRENT USE OF INSULIN (H): Primary | ICD-10-CM

## 2021-06-18 PROCEDURE — 99214 OFFICE O/P EST MOD 30 MIN: CPT | Performed by: PEDIATRICS

## 2021-06-18 PROCEDURE — 98967 PH1 ASSMT&MGMT NQHP 11-20: CPT | Performed by: DIETITIAN, REGISTERED

## 2021-06-18 ASSESSMENT — ANXIETY QUESTIONNAIRES
GAD7 TOTAL SCORE: 9
2. NOT BEING ABLE TO STOP OR CONTROL WORRYING: SEVERAL DAYS
GAD7 TOTAL SCORE: 9
7. FEELING AFRAID AS IF SOMETHING AWFUL MIGHT HAPPEN: SEVERAL DAYS
1. FEELING NERVOUS, ANXIOUS, OR ON EDGE: SEVERAL DAYS
3. WORRYING TOO MUCH ABOUT DIFFERENT THINGS: SEVERAL DAYS
GAD7 TOTAL SCORE: 9
7. FEELING AFRAID AS IF SOMETHING AWFUL MIGHT HAPPEN: SEVERAL DAYS
6. BECOMING EASILY ANNOYED OR IRRITABLE: MORE THAN HALF THE DAYS
5. BEING SO RESTLESS THAT IT IS HARD TO SIT STILL: SEVERAL DAYS
4. TROUBLE RELAXING: MORE THAN HALF THE DAYS

## 2021-06-18 ASSESSMENT — PATIENT HEALTH QUESTIONNAIRE - PHQ9
10. IF YOU CHECKED OFF ANY PROBLEMS, HOW DIFFICULT HAVE THESE PROBLEMS MADE IT FOR YOU TO DO YOUR WORK, TAKE CARE OF THINGS AT HOME, OR GET ALONG WITH OTHER PEOPLE: SOMEWHAT DIFFICULT
SUM OF ALL RESPONSES TO PHQ QUESTIONS 1-9: 9
SUM OF ALL RESPONSES TO PHQ QUESTIONS 1-9: 9

## 2021-06-18 NOTE — PATIENT INSTRUCTIONS
You will be called to schedule MRI. If you don't hear from someone in 1-2 days, please call radiology department at [phone] to schedule your test at [facility].     If normal, then will start physical therapy.    Bessie Mcfadden will call you to start therapy.    I will talk with Dr. Mclaughlin regarding referral for your blood pressure.

## 2021-06-18 NOTE — Clinical Note
Alex Coughlin,    Patient BP elevated.  She stopped her losartan due to severe muscle pain - states you are aware.  Looks like very limited options due to allergies. I could have her trial hydralazine, but wanted to check in with you since you just was her.  -- Rimma FLORES

## 2021-06-18 NOTE — Clinical Note
Alex La - patient having a hard time finding therapists taking new patients - needs help with recent grief.  Thanks!

## 2021-06-18 NOTE — PROGRESS NOTES
"    Assessment & Plan     Hip pain, left  Acute in nature, patient can hardly walk, exam very difficult.  No fall, so doubt any bony injury.  Patient did hear tear, so discussed MRI before trial of physical therapy.  With current state patient would not be able to participate in physical therapy.   - MR Hip Left w/o Contrast; Future    Hypertension goal BP (blood pressure) < 140/90  Uncontrolled.  Patient stopped losartan - states PCP aware.  Reviewed allergy list - patient allergic to most classes of medication - will discuss with PCP.     Grief reaction  Will have patient talk with Bessie Mcfadden. Patient's sister just passed away - patient was her caregiver.      BMI:   Estimated body mass index is 33.99 kg/m  as calculated from the following:    Height as of 4/23/21: 1.607 m (5' 3.25\").    Weight as of this encounter: 87.7 kg (193 lb 6.4 oz).       Patient Instructions   You will be called to schedule MRI. If you don't hear from someone in 1-2 days, please call radiology department at [phone] to schedule your test at [facility].     If normal, then will start physical therapy.    Bessie Mcfadden will call you to start therapy.    I will talk with Dr. Mclaughlin regarding referral for your blood pressure.          Return in about 3 months (around 9/18/2021) for Routine preventive with PCP.    Rimma Ceballos MD  [facility] VALE Hernadez is a 60 year old who presents for the following health issues hip pain- Left heard a rip- tylenol ice and movement.    Last night patient was walking and when she moved forward with right leg, felt rip on left anterior flexer.     History of Present Illness       She eats 2-3 servings of fruits and vegetables daily.She consumes 0 sweetened beverage(s) daily.She exercises with enough effort to increase her heart rate 60 or more minutes per day.  She exercises with enough effort to increase her heart rate 4 days per week.   She is taking " medications regularly.     Patient felt like losartan giving her myalgias.    Answers for HPI/ROS submitted by the patient on 6/18/2021   Chronic problems general questions HPI Form  If you checked off any problems, how difficult have these problems made it for you to do your work, take care of things at home, or get along with other people?: Somewhat difficult  PHQ9 TOTAL SCORE: 9  WILLIAMS 7 TOTAL SCORE: 9      Social History     Tobacco Use     Smoking status: Never Smoker     Smokeless tobacco: Never Used   Substance Use Topics     Alcohol use: Yes     Alcohol/week: 0.0 standard drinks     Comment: maybe once a month     Drug use: No     PHQ 6/8/2020 6/8/2020 6/18/2021   PHQ-9 Total Score 16 16 9   Q9: Thoughts of better off dead/self-harm past 2 weeks Several days Several days Not at all   F/U: Thoughts of suicide or self-harm No No -   F/U: Self harm-plan - - -   F/U: Self-harm action - - -   F/U: Safety concerns No No -     WILLIAMS-7 SCORE 6/8/2020 6/8/2020 6/18/2021   Total Score - - -   Total Score - 18 (severe anxiety) 9 (mild anxiety)   Total Score 18 18 9   Some encounter information is confidential and restricted. Go to Review Flowsheets activity to see all data.     Last PHQ-9 6/18/2021   1.  Little interest or pleasure in doing things 0   2.  Feeling down, depressed, or hopeless 0   3.  Trouble falling or staying asleep, or sleeping too much 3   4.  Feeling tired or having little energy 2   5.  Poor appetite or overeating 2   6.  Feeling bad about yourself 1   7.  Trouble concentrating 1   8.  Moving slowly or restless 0   Q9: Thoughts of better off dead/self-harm past 2 weeks 0   PHQ-9 Total Score 9   Difficulty at work, home, or with people -   In the past two weeks have you had thoughts of suicide or self harm? -   Do you have concerns about your personal safety or the safety of others? -   In the past 2 weeks have you thought about a plan or had intention to harm yourself? -   In the past 2 weeks have you  acted on these thoughts in any way? -     WILLIAMS-7  6/18/2021   1. Feeling nervous, anxious, or on edge 1   2. Not being able to stop or control worrying 1   3. Worrying too much about different things 1   4. Trouble relaxing 2   5. Being so restless that it is hard to sit still 1   6. Becoming easily annoyed or irritable 2   7. Feeling afraid, as if something awful might happen 1   WILLIAMS-7 Total Score 9   If you checked any problems, how difficult have they made it for you to do your work, take care of things at home, or get along with other people? -       Suicide Assessment Five-step Evaluation and Treatment (SAFE-T)            Review of Systems   Constitutional, HEENT, cardiovascular, pulmonary, gi and gu systems are negative, except as otherwise noted.      Objective    BP (!) 142/80 (BP Location: Right arm, Patient Position: Right side, Cuff Size: Adult Regular)   Pulse 62   Temp 97.2  F (36.2  C) (Temporal)   Wt 87.7 kg (193 lb 6.4 oz)   LMP 01/01/1999   SpO2 97%   BMI 33.99 kg/m    Body mass index is 33.99 kg/m .  Physical Exam   Ortho: right hip full range of motion.  No tenderness, anterior flexor strength 5/5.  Left hip - no lateral tenderness, +anterior tenderness, pain with any movement

## 2021-06-18 NOTE — PROGRESS NOTES
Diabetes Follow-up    Subjective/Objective:  Type of Service: Telephone Visit    How would patient like to obtain AVS? Courtney    Last date of communication was: 6/4/21.    Diabetes Medications   Diabetes Medication(s)     Insulin       insulin aspart (NOVOLOG VIAL) 100 UNITS/ML vial    USE IN INSULIN PUMP, TOTAL DAILY DOSE 100 UNITS.          CGM Report:                      Pump Report:                           Assessment/ Intervention:    BG values are significantly elevated. Pt reports returning struggles with stress and binge eating tendencies. Pt is requesting a second basal profile to use during these times, as she often has difficulty matching a bolus with binge episodes.       Plan/ Follow-up:  Changes made to pump settings:  basal rate: set up basal profile #2  12 am: 1.3 --> 1.4  6 am: 0.75 --> 0.85  12 pm: 0.8 --> 0.9  6 pm: 0.95 --> 1.05    Follow-up appointment scheduled on July 6th at 10:30 am.    Allie Matias RD, [facility]  Diabetes     Encounter Type: Telephone  Time Spent: 20 min    Any diabetes medication dose changes were made via the CDE Protocol and Collaborative Practice Agreement with the patient's endocrinology provider. A copy of this encounter was shared with the provider.

## 2021-06-18 NOTE — LETTER
6/18/2021         RE: Maricarmen Dotson  [address] [place] [address]        Dear Colleague,    Thank you for referring your patient, Maricarmen Dotson, to the [facility]. Please see a copy of my visit note below.    Diabetes Follow-up    Subjective/Objective:  Type of Service: Telephone Visit    How would patient like to obtain AVS? MyChart    Last date of communication was: 6/4/21.    Diabetes Medications   Diabetes Medication(s)     Insulin       insulin aspart (NOVOLOG VIAL) 100 UNITS/ML vial    USE IN INSULIN PUMP, TOTAL DAILY DOSE 100 UNITS.          CGM Report:                      Pump Report:                           Assessment/ Intervention:    BG values are significantly elevated. Pt reports returning struggles with stress and binge eating tendencies. Pt is requesting a second basal profile to use during these times, as she often has difficulty matching a bolus with binge episodes.       Plan/ Follow-up:  Changes made to pump settings:  basal rate: set up basal profile #2  12 am: 1.3 --> 1.4  6 am: 0.75 --> 0.85  12 pm: 0.8 --> 0.9  6 pm: 0.95 --> 1.05    Follow-up appointment scheduled on July 6th at 10:30 am.    Allie Matias RD, [facility]  Diabetes     Encounter Type: Telephone  Time Spent: 20 min    Any diabetes medication dose changes were made via the CDE Protocol and Collaborative Practice Agreement with the patient's endocrinology provider. A copy of this encounter was shared with the provider.

## 2021-06-18 NOTE — TELEPHONE ENCOUNTER
Maricarmen has tight thighs for a few days and then yesterday had heard a pop near her thigh hip area on the right and then has had some pain with the hip.  More when she raised the leg higher.  She has had similar injuries before.She would like to make an appointment.  She may wait to see if it does better after a few days and make an appointment later that she can keep or cancel depending on how she is feeling.   Up to Maricarmen. Patient given home care advice and when to call back for further help.  Patient agrees to this plan. Sent  To scheduling.     Reason for Disposition    Patient wants to be seen    Additional Information    Negative: Numbness in a leg or foot (i.e., loss of sensation)    Negative: Looks like a broken bone or dislocated joint (e.g., crooked or deformed)    Negative: Sounds like a life-threatening emergency to the triager    Negative: Followed a hip injury    Negative: Leg pain is main symptom    Negative: Back pain radiating (shooting) into hip    Negative: SEVERE pain (e.g., excruciating, unable to do any normal activities) and fever    Negative: Can't stand (bear weight) or walk    Negative: Fever and red area (or area very tender to touch)    Negative: Patient sounds very sick or weak to the triager    Protocols used: HIP PAIN-A-OH

## 2021-06-19 ASSESSMENT — PATIENT HEALTH QUESTIONNAIRE - PHQ9: SUM OF ALL RESPONSES TO PHQ QUESTIONS 1-9: 9

## 2021-06-19 ASSESSMENT — ANXIETY QUESTIONNAIRES: GAD7 TOTAL SCORE: 9

## 2021-06-22 NOTE — PATIENT INSTRUCTIONS
Changes made to pump settings:    Added basal rate: set up basal profile #2 (use this during high stress, increased binging)    12 am: 1.3 --> 1.4  6 am: 0.75 --> 0.85  12 pm: 0.8 --> 0.9  6 pm: 0.95 --> 1.05    Follow up telephone visit July 6th at 10:30.    Allie Matias RD, [facility]  Diabetes

## 2021-06-23 ENCOUNTER — TELEPHONE (OUTPATIENT)
Dept: BEHAVIORAL HEALTH | Facility: CLINIC | Age: 61
End: 2021-06-23
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
## 2021-06-24 ENCOUNTER — TRANSFERRED RECORDS (OUTPATIENT)
Dept: HEALTH INFORMATION MANAGEMENT | Facility: CLINIC | Age: 61
End: 2021-06-24

## 2021-06-29 ENCOUNTER — MYC MEDICAL ADVICE (OUTPATIENT)
Dept: ENDOCRINOLOGY | Facility: CLINIC | Age: 61
End: 2021-06-29

## 2021-06-29 ENCOUNTER — MYC MEDICAL ADVICE (OUTPATIENT)
Dept: PEDIATRICS | Facility: CLINIC | Age: 61
End: 2021-06-29

## 2021-06-29 DIAGNOSIS — Z79.4 TYPE 2 DIABETES MELLITUS WITH HYPERGLYCEMIA, WITH LONG-TERM CURRENT USE OF INSULIN (H): ICD-10-CM

## 2021-06-29 DIAGNOSIS — E11.65 TYPE 2 DIABETES MELLITUS WITH HYPERGLYCEMIA, WITH LONG-TERM CURRENT USE OF INSULIN (H): ICD-10-CM

## 2021-06-29 DIAGNOSIS — E11.9 TYPE 2 DIABETES, HBA1C GOAL < 7% (H): ICD-10-CM

## 2021-06-29 DIAGNOSIS — I10 HYPERTENSION GOAL BP (BLOOD PRESSURE) < 140/90: Primary | ICD-10-CM

## 2021-06-29 NOTE — TELEPHONE ENCOUNTER
Please review patient message. Stopping Losartan has helped to improve symptoms    Patient does have question/concerns about her BP, reports she does not tolerate BP medications.    BP Readings from Last 6 Encounters:   06/18/21 (!) 142/80   04/23/21 (!) 160/80   01/20/21 (!) 178/79   04/21/20 (!) 149/85   03/10/20 (!) 164/86   02/27/20 138/72     Last OV 6/18:    Hypertension goal BP (blood pressure) < 140/90  Uncontrolled.  Patient stopped losartan - states PCP aware.  Reviewed allergy list - patient allergic to most classes of medication - will discuss with PCP.

## 2021-06-30 RX ORDER — INSULIN PUMP CONTROLLER
1 EACH MISCELLANEOUS
Qty: 45 EACH | Refills: 3 | Status: SHIPPED | OUTPATIENT
Start: 2021-06-30 | End: 2022-08-29

## 2021-07-01 ENCOUNTER — TRANSFERRED RECORDS (OUTPATIENT)
Dept: HEALTH INFORMATION MANAGEMENT | Facility: CLINIC | Age: 61
End: 2021-07-01
Payer: COMMERCIAL

## 2021-07-01 LAB — RETINOPATHY: POSITIVE

## 2021-07-06 ENCOUNTER — VIRTUAL VISIT (OUTPATIENT)
Dept: EDUCATION SERVICES | Facility: CLINIC | Age: 61
End: 2021-07-06
Payer: COMMERCIAL

## 2021-07-06 DIAGNOSIS — E11.65 TYPE 2 DIABETES MELLITUS WITH HYPERGLYCEMIA, WITH LONG-TERM CURRENT USE OF INSULIN (H): Primary | ICD-10-CM

## 2021-07-06 DIAGNOSIS — Z79.4 TYPE 2 DIABETES MELLITUS WITH HYPERGLYCEMIA, WITH LONG-TERM CURRENT USE OF INSULIN (H): Primary | ICD-10-CM

## 2021-07-06 PROCEDURE — 99207 PR NO CHARGE LOS: CPT | Performed by: DIETITIAN, REGISTERED

## 2021-07-06 NOTE — PROGRESS NOTES
"Diabetes Follow-up    Subjective/Objective:    Type of Service: Telephone Visit    How would patient like to obtain AVS? Courtney     Last date of communication was: 6/18/21.    Diabetes is being managed with OmniPod pump.    Diabetes Medication(s)     Insulin       insulin aspart (NOVOLOG VIAL) 100 UNITS/ML vial    USE IN INSULIN PUMP, TOTAL DAILY DOSE 100 UNITS.          CGM Report:                  Pump Report:                  Assessment:    Over-all glucose avg improved since last visit. Pt is using \"higher stress\" basal profile consistently. Pt requesting no further changes today, feels she is making some progress with getting a hold of binge eating tendencies. Pt trying to find mental health counselor. Offered encouragement and support.     Plan/Response:  No changes in the patient's current treatment plan.  Follow up scheduled 1 month.    Allie Matias RD, [facility]  Diabetes   No charge visit    Any diabetes medication dose changes were made via the CDE Protocol and Collaborative Practice Agreement with the patient's primary care provider. A copy of this encounter was shared with the provider.      "

## 2021-07-06 NOTE — PATIENT INSTRUCTIONS
No changes made today. Continue current efforts.   Telephone follow up scheduled Tues, Aug 3rd at 10:30 am.    Allie Matias RD, [facility]  Diabetes

## 2021-07-07 ENCOUNTER — NURSE TRIAGE (OUTPATIENT)
Dept: NURSING | Facility: CLINIC | Age: 61
End: 2021-07-07

## 2021-07-07 NOTE — TELEPHONE ENCOUNTER
"Patient calling and is asking where she should go . She reports \" I threw my back out, and I do not want to go to the ER.\"    Patient advised to go to [facility] in [place], and she was given address and directions.    Alyssa Hodgson, RN RN  [facility] Triage/refill nurse        Reason for Disposition    SEVERE back pain (e.g., excruciating, unable to do any normal activities) and not improved after pain medicine and CARE ADVICE    Protocols used: BACK PAIN-A-OH      "

## 2021-07-13 ENCOUNTER — TRANSFERRED RECORDS (OUTPATIENT)
Dept: HEALTH INFORMATION MANAGEMENT | Facility: CLINIC | Age: 61
End: 2021-07-13

## 2021-07-13 ENCOUNTER — NURSE TRIAGE (OUTPATIENT)
Dept: NURSING | Facility: CLINIC | Age: 61
End: 2021-07-13

## 2021-07-14 RX ORDER — SPIRONOLACTONE 50 MG/1
50 TABLET, FILM COATED ORAL DAILY
Qty: 30 TABLET | Refills: 1 | Status: SHIPPED | OUTPATIENT
Start: 2021-07-14 | End: 2021-08-04

## 2021-07-14 NOTE — TELEPHONE ENCOUNTER
"    Reason for Disposition    Patient sounds very sick or weak to the triager    Additional Information    Negative: [1] Life-threatening reaction (anaphylaxis) in the past to sting AND [2] < 2 hours since sting    Negative: Attacked by swarm of bees now    Negative: Passed out (i.e., lost consciousness, collapsed and was not responding)    Negative: Wheezing, stridor, or difficulty breathing    Negative: [1] Hoarseness or cough AND [2] sudden onset following sting    Negative: [1] Tightness in the throat or chest AND [2] sudden onset following sting    Negative: [1] Swollen tongue or difficulty swallowing AND [2] sudden onset following sting    Negative: Sounds like a life-threatening emergency to the triager    Negative: Sting on eyeball (e.g., cornea)    Negative: Sting inside the mouth    Negative: [1] Hives, itching, or swelling elsewhere on body (i.e., not a site of sting) AND [2] started within 2 hours of sting (Exception: only at site of sting)    Negative: [1] Vomiting or abdominal cramps AND [2] started within 2 hours of sting    Negative: [1] Gave epinephrine shot AND [2] no symptoms now    Negative: Not a bee, wasp, hornet, or yellow jacket sting    Negative: Ring stuck on swollen finger (or toe) following bee sting    Protocols used: BEE OR YELLOW JACKET STING-A-AH    \"I was stung or bitten by something about 5:30 pm tonight. My throat felt \"weird\" so I took 2 Benadryl. I actually thought I was allergic to bees, but I was tested and they said I am not.   It was the lower right leg. I see one puncture hawa, no stinger. I took Benadryl and used ice, but the pain is a 10/10 and it's going up to my hip. It's red , swollen so painful I am limping.\"   Denies other sx   Triaged and advised ER  Melany Alvarado RN [facility]      "

## 2021-07-14 NOTE — TELEPHONE ENCOUNTER
Can we call patient in 2 weeks to follow-up on home BP readings and make sure she has come in for BP recheck and labs after starting spironolactone? Thanks!    Madyson Mclaughlin MD  Internal Medicine-Pediatrics

## 2021-07-18 ENCOUNTER — TRANSFERRED RECORDS (OUTPATIENT)
Dept: HEALTH INFORMATION MANAGEMENT | Facility: CLINIC | Age: 61
End: 2021-07-18

## 2021-07-23 ENCOUNTER — MYC MEDICAL ADVICE (OUTPATIENT)
Dept: PEDIATRICS | Facility: CLINIC | Age: 61
End: 2021-07-23

## 2021-07-23 DIAGNOSIS — I10 HYPERTENSION GOAL BP (BLOOD PRESSURE) < 140/90: Primary | ICD-10-CM

## 2021-07-26 ENCOUNTER — HOSPITAL ENCOUNTER (EMERGENCY)
Facility: CLINIC | Age: 61
Discharge: LEFT BEFORE DISCHARGE | End: 2021-07-26
Payer: COMMERCIAL

## 2021-07-26 VITALS
HEART RATE: 78 BPM | SYSTOLIC BLOOD PRESSURE: 206 MMHG | TEMPERATURE: 98.6 F | DIASTOLIC BLOOD PRESSURE: 104 MMHG | OXYGEN SATURATION: 99 % | RESPIRATION RATE: 16 BRPM

## 2021-07-26 NOTE — TELEPHONE ENCOUNTER
Cardiology referral placed, patient will be called to schedule.    Madyson Mclaughlin MD  Internal Medicine-Pediatrics

## 2021-07-26 NOTE — ED TRIAGE NOTES
"Patient presents with complaints of hypertension. Patient does have history of this and has not been taking her medication. Denies any SOB, or chest pain but states \"palpitations\". ABC intact without need for intervention at this time.     "

## 2021-07-26 NOTE — TELEPHONE ENCOUNTER
She plans to go to ED now. She would like to try and see someone else in Cardiology at some point. Does she need another referral? She said she didn't think they would touch her because they gave her a medication that caused paralysis before. Madalyn Abdalla RN on 7/26/2021 at 12:07 PM

## 2021-07-26 NOTE — TELEPHONE ENCOUNTER
Unfortunately I don't have any other oral treatment options given her significant medication intolerances. I would recommend that she be seen in the ED if her SBP is greater than 180. If it is stably less than that, we can try to have her see nephrology or cardiology ASAP, as they may be able to offer other medication options, although I'm not sure how much more they will be able to do.    Madyson Mclaughlin MD  Internal Medicine-Pediatrics

## 2021-07-26 NOTE — TELEPHONE ENCOUNTER
Dr. Mclaughlin-    Concern for elevated BP.     She stopped Spironolactone last Wednesday due to severe joint and muscle pain. Pain has improved. She has no sxs of chest pain, blurred vision, SOB or headache. Her BPs since then have been running 180/ 95 to 170/90.    Checked it now and it is 203/98 Pulse 56.     She is concerned about sitting and waiting at the ER, having them give her a bunch of medications she will have bad reactions to and deal with that for days.     Is there anything you would recommend she do other than ER? Your last  message said if she was still having BPs over 200 she should go, but she has not been taking anything since last Wednesday.     She has also been dealing with a painful wasp sting.     Madalyn Abdalla RN on 7/26/2021 at 11:08 AM

## 2021-07-27 ENCOUNTER — MYC MEDICAL ADVICE (OUTPATIENT)
Dept: PEDIATRICS | Facility: CLINIC | Age: 61
End: 2021-07-27

## 2021-07-27 DIAGNOSIS — I10 HYPERTENSION GOAL BP (BLOOD PRESSURE) < 140/90: Primary | ICD-10-CM

## 2021-07-27 LAB
ATRIAL RATE - MUSE: 51 BPM
DIASTOLIC BLOOD PRESSURE - MUSE: NORMAL MMHG
INTERPRETATION ECG - MUSE: NORMAL
P AXIS - MUSE: 43 DEGREES
PR INTERVAL - MUSE: 162 MS
QRS DURATION - MUSE: 96 MS
QT - MUSE: 448 MS
QTC - MUSE: 412 MS
R AXIS - MUSE: -3 DEGREES
SYSTOLIC BLOOD PRESSURE - MUSE: NORMAL MMHG
T AXIS - MUSE: -26 DEGREES
VENTRICULAR RATE- MUSE: 51 BPM

## 2021-07-28 ENCOUNTER — TRANSFERRED RECORDS (OUTPATIENT)
Dept: HEALTH INFORMATION MANAGEMENT | Facility: CLINIC | Age: 61
End: 2021-07-28

## 2021-08-02 ENCOUNTER — OFFICE VISIT (OUTPATIENT)
Dept: URGENT CARE | Facility: URGENT CARE | Age: 61
End: 2021-08-02
Payer: COMMERCIAL

## 2021-08-02 VITALS
HEART RATE: 53 BPM | TEMPERATURE: 98.5 F | OXYGEN SATURATION: 98 % | BODY MASS INDEX: 31.28 KG/M2 | DIASTOLIC BLOOD PRESSURE: 76 MMHG | WEIGHT: 178 LBS | SYSTOLIC BLOOD PRESSURE: 146 MMHG

## 2021-08-02 DIAGNOSIS — B35.9 RINGWORM: Primary | ICD-10-CM

## 2021-08-02 PROCEDURE — 99213 OFFICE O/P EST LOW 20 MIN: CPT | Performed by: FAMILY MEDICINE

## 2021-08-02 RX ORDER — KETOCONAZOLE 20 MG/G
CREAM TOPICAL DAILY
Qty: 30 G | Refills: 1 | Status: SHIPPED | OUTPATIENT
Start: 2021-08-02 | End: 2022-04-06

## 2021-08-02 RX ORDER — FUROSEMIDE 20 MG
20 TABLET ORAL
COMMUNITY
Start: 2021-07-26 | End: 2021-08-04

## 2021-08-02 NOTE — PROGRESS NOTES
S: Very pleasant 60-year-old female, who was recently emergency room last night with anaphylaxis due to 4 wasp stings, presents with a round red lesion in her left SI region that has an pale annular center.  She first noticed it yesterday.  ROS: Negative for pruritus.  Positive for recent anaphylaxis.  No current wheezing.    Meds: EpiPen, insulin pump, spironolactone, Zyrtec, Lasix, hemp oil, medical cannabis, melatonin, MiraLAX    O: Blood pressure 146/76, temperature 98.5, pulse 53  Examination of the left SI region shows a 2 cm round annular lesion with pale center consistent with ringworm    A: Ringworm    P: Nizoral cream to be applied twice daily x30 days  If not improving follow-up with her primary doctor for evaluation and possible oral medication

## 2021-08-03 ENCOUNTER — VIRTUAL VISIT (OUTPATIENT)
Dept: EDUCATION SERVICES | Facility: CLINIC | Age: 61
End: 2021-08-03
Payer: COMMERCIAL

## 2021-08-03 DIAGNOSIS — E11.65 TYPE 2 DIABETES MELLITUS WITH HYPERGLYCEMIA, WITH LONG-TERM CURRENT USE OF INSULIN (H): Primary | ICD-10-CM

## 2021-08-03 DIAGNOSIS — Z79.4 TYPE 2 DIABETES MELLITUS WITH HYPERGLYCEMIA, WITH LONG-TERM CURRENT USE OF INSULIN (H): Primary | ICD-10-CM

## 2021-08-03 PROCEDURE — 98966 PH1 ASSMT&MGMT NQHP 5-10: CPT

## 2021-08-03 NOTE — PATIENT INSTRUCTIONS
No pump setting changes made today.    Keep up the work with more consistent bolusing in the evening.    Telephone follow up Oct. 5th at 10:30 am.    Allie Matias RD, [facility]  Diabetes

## 2021-08-03 NOTE — PROGRESS NOTES
Diabetes Follow-up    Subjective/Objective:    Type of Service: Telephone Visit    How would patient like to obtain AVS? MyChart     Last date of communication was: 7/6/21.    Diabetes is being managed with     Diabetes Medication(s)     Insulin       insulin aspart (NOVOLOG VIAL) 100 UNITS/ML vial    USE IN INSULIN PUMP, TOTAL DAILY DOSE 100 UNITS.              Reports:                                    Assessment:    Pt reports recent hyperglycemia following ER visit due to bee hive attack, and subsequent steroid treatment. Is using higher basal rate and correction boluses, pt had preferred not to use higher temp basal.   Pt reports doing better with managing blood glucose in the evening during binge eating tendencies, is entering carbs ahead of time and feels this is helping her contain the issue much more than previously. Commended pt on progress, discussed mindfulness and problem solving. No pump setting changes made today.    Plan/Response:  Follow up telephone appointment Oct. 5th at 10:30. docTrackr messaging in the interim as needed.    Allie Matias RD, [facility]  Diabetes   Time spent: 10 min    Any diabetes medication dose changes were made via the CDE Protocol and Collaborative Practice Agreement with the patient's primary care provider. A copy of this encounter was shared with the provider.

## 2021-08-04 ENCOUNTER — OFFICE VISIT (OUTPATIENT)
Dept: PEDIATRICS | Facility: CLINIC | Age: 61
End: 2021-08-04
Payer: COMMERCIAL

## 2021-08-04 VITALS
TEMPERATURE: 98.5 F | DIASTOLIC BLOOD PRESSURE: 78 MMHG | HEART RATE: 66 BPM | OXYGEN SATURATION: 98 % | SYSTOLIC BLOOD PRESSURE: 148 MMHG | WEIGHT: 190 LBS | BODY MASS INDEX: 33.39 KG/M2

## 2021-08-04 DIAGNOSIS — I10 HYPERTENSION GOAL BP (BLOOD PRESSURE) < 140/90: ICD-10-CM

## 2021-08-04 DIAGNOSIS — T78.2XXD ANAPHYLAXIS, SUBSEQUENT ENCOUNTER: ICD-10-CM

## 2021-08-04 DIAGNOSIS — Z09 HOSPITAL DISCHARGE FOLLOW-UP: Primary | ICD-10-CM

## 2021-08-04 PROCEDURE — 80048 BASIC METABOLIC PNL TOTAL CA: CPT | Performed by: STUDENT IN AN ORGANIZED HEALTH CARE EDUCATION/TRAINING PROGRAM

## 2021-08-04 PROCEDURE — 99213 OFFICE O/P EST LOW 20 MIN: CPT | Mod: GE | Performed by: STUDENT IN AN ORGANIZED HEALTH CARE EDUCATION/TRAINING PROGRAM

## 2021-08-04 PROCEDURE — 36415 COLL VENOUS BLD VENIPUNCTURE: CPT | Performed by: STUDENT IN AN ORGANIZED HEALTH CARE EDUCATION/TRAINING PROGRAM

## 2021-08-04 RX ORDER — FUROSEMIDE 20 MG
20 TABLET ORAL DAILY
Qty: 90 TABLET | Refills: 3 | Status: SHIPPED | OUTPATIENT
Start: 2021-08-04 | End: 2022-04-06

## 2021-08-04 NOTE — PROGRESS NOTES
"Assessment & Plan     Hospital discharge follow-up  Anaphylaxis, subsequent encounter  Recent visit to [facility] for wasp sting - second visit in last month for same issue. Fortunately her symptoms improved quickly w/ epi, famotidine, prednisone. Finishing prednisone taper now - BGs a little high on prednisone but getting better. Has returned to baseline - no throat swelling, no GI symptoms.  has filled in wasp nests since this happened and she plans to avoid the garden. Has Epi pen at home in case this happens again.     Hypertension goal BP (blood pressure) < 140/90  Significant history of med allergies including many anti-hypertensive agents. Was given Lasix in urgency room when she recently presented w/ hypertensive urgency and tolerated it fine, so was sent out w/ prescription for Lasix. Continues to tolerate well, no allergic symptoms. /78 today - not ideal but much better than prior. Has referral to cardiology w/ visit scheduled for next month to discuss other options for hypertension management. For now will continue Lasix 20 mg daily (told her she can take an extra 20 mg if her BP is consistently >160 systolic, should notify us if BP consistently >180 systolic), will check BMP today, and have her continue take BP daily.  - furosemide (LASIX) 20 MG tablet; Take 1 tablet (20 mg) by mouth daily  - Basic metabolic panel  (Ca, Cl, CO2, Creat, Gluc, K, Na, BUN)  956}     BMI:   Estimated body mass index is 33.39 kg/m  as calculated from the following:    Height as of 4/23/21: 1.607 m (5' 3.25\").    Weight as of this encounter: 86.2 kg (190 lb).   Weight management plan: Patient was referred to their PCP to discuss a diet and exercise plan.    See Patient Instructions.    Return in about 3 months (around 11/4/2021) for Follow up with PCP.    Delilah Monae MD  [facility]    I have reviewed and agree with the documentation from Dr. Monae and discussed the findings with Dr. Monae. I " was immediately available to furnish services during the entire visit. . Her progress note reflects our joint assessment and plan.     Shravan Larios M.D.  Internal Medicine-Pediatrics      Subjective   Maricarmen is a 60 year old who presents for the following health issues: hospital follow up, hypertension.  History of Present Illness       Hypertension: She presents for follow up of hypertension.  She does check blood pressure  regularly outside of the clinic. Outside blood pressures have been over 140/90. She follows a low salt diet.     She eats 2-3 servings of fruits and vegetables daily.She consumes 0 sweetened beverage(s) daily.She exercises with enough effort to increase her heart rate 30 to 60 minutes per day.  She exercises with enough effort to increase her heart rate 4 days per week.   She is taking medications regularly.       ED/UC Followup:    Facility:  [facility]   Date of visit: 08/01/2021  Reason for visit: high blood   Current Status: no improvement      Several recent urgent care visits  7/18 - wasp sting  7/26 - hypertensive urgency (196/111)  8/1 - wasp sting x 4    Presented after wasp sting (hand, ankle, buttocks). Had sensation of lump in her throat, tightness. Also had explosive diarrhea. Took two Benadryl and went to the ER. Got epinephrine in ED as well as famotidine and prednisone 40 mg. Since then has been taking prednisone taper - down to 10 mg now. Checking blood pressures at home - this AM was 167/89 after medications. Yesterday it was 165/63. Thinks Lasix is helping a lot, previous BPs were above 200 systolic. Trying to follow a low salt diet. No longer taking spironolactone - gave her joint pain.    Then with ringworm, had urgent care visit for this two days ago. Given a topical anti-fungal, no change yet.    Review of Systems   Constitutional, HEENT, cardiovascular, pulmonary, gi and gu systems are negative, except as otherwise noted.      Objective    BP (!) 148/78 (BP  Location: Right arm, Cuff Size: Adult Large)   Pulse 66   Temp 98.5  F (36.9  C) (Tympanic)   Wt 86.2 kg (190 lb)   LMP 01/01/1999   SpO2 98%   BMI 33.39 kg/m    Body mass index is 33.39 kg/m .  Physical Exam   GENERAL: healthy, alert and no distress  NECK: no adenopathy, no asymmetry  RESP: lungs clear to auscultation - no rales, rhonchi or wheezes  CV: regular rate and rhythm, normal S1 S2, no S3 or S4, no murmur  ABDOMEN: soft, nontender, no hepatosplenomegaly, no masses   MS: no gross musculoskeletal defects noted, no edema    No results found for any visits on 08/04/21.

## 2021-08-04 NOTE — PATIENT INSTRUCTIONS
Stay on Lasix 20 mg daily for now until you follow up with cardiology next month on September 14. If your blood pressure is consistently over 180, please let us know before then!    Will let you know the results of your kidney function and electrolytes.

## 2021-08-05 ENCOUNTER — NURSE TRIAGE (OUTPATIENT)
Dept: PEDIATRICS | Facility: CLINIC | Age: 61
End: 2021-08-05

## 2021-08-05 ENCOUNTER — TRANSFERRED RECORDS (OUTPATIENT)
Dept: HEALTH INFORMATION MANAGEMENT | Facility: CLINIC | Age: 61
End: 2021-08-05

## 2021-08-05 LAB
ANION GAP SERPL CALCULATED.3IONS-SCNC: 6 MMOL/L (ref 3–14)
BUN SERPL-MCNC: 21 MG/DL (ref 7–30)
CALCIUM SERPL-MCNC: 9.6 MG/DL (ref 8.5–10.1)
CHLORIDE BLD-SCNC: 104 MMOL/L (ref 94–109)
CO2 SERPL-SCNC: 27 MMOL/L (ref 20–32)
CREAT SERPL-MCNC: 1.02 MG/DL (ref 0.52–1.04)
GFR SERPL CREATININE-BSD FRML MDRD: 60 ML/MIN/1.73M2
GLUCOSE BLD-MCNC: 278 MG/DL (ref 70–99)
POTASSIUM BLD-SCNC: 3.9 MMOL/L (ref 3.4–5.3)
SODIUM SERPL-SCNC: 137 MMOL/L (ref 133–144)

## 2021-08-05 NOTE — TELEPHONE ENCOUNTER
"The Pt had an EPI pen at home. She will administer it now. Declined to call 911, will have her  bring her to the ER now. They are less than 5 miles away from the ER. Advised 911 if they cannot get in car now and go.     She will administer EPI now.     Kaylene Morales RN   [facility] -- Triage Nurse      Reason for Disposition    Wheezing or difficulty breathing    Answer Assessment - Initial Assessment Questions  1. TYPE: \"What type of sting was it?\" (bee, yellow jacket, etc.)       Bee, unknown what kind.   2. ONSET: \"When did it occur?\"       About 15 minutes ago.   3. LOCATION: \"Where is the sting located?\"  \"How many stings?\"      Unknown. Sent the Pt to ER before asking.   4. SWELLING SIZE: \"How big is the swelling?\" (inches or centimeters)      Mild swelling.   5. REDNESS: \"Is the area red or pink?\" If so, ask \"What size is area of redness?\" (inches or cm). \"When did the redness start?\"      Unknown.   6. PAIN: \"Is there any pain?\" If so, ask: \"How bad is it?\"  (Scale 1-10; or mild, moderate, severe)      Yes painful.   7. ITCHING: \"Is there any itching?\" If so, ask: \"How bad is it?\"       Yes.   8. RESPIRATORY DISTRESS: \"Describe your breathing.\"      Tightness in the chest.   9. PRIOR REACTIONS: \"Have you had any severe allergic reactions to stings in the past?\" if yes, ask: \"What happened?\"      Yes, recently in ER for anaphylaxis less than a week ago for a bee sting.   10. OTHER SYMPTOMS: \"Do you have any other symptoms?\" (e.g., face or tongue swelling, new rash elsewhere, abdominal pain, vomiting)        Very nauseated.   11. PREGNANCY: \"Is there any chance you are pregnant?\" \"When was your last menstrual period?\"        No    Protocols used: BEE STING-A-OH    "

## 2021-08-16 ENCOUNTER — TELEPHONE (OUTPATIENT)
Dept: PEDIATRICS | Facility: CLINIC | Age: 61
End: 2021-08-16

## 2021-08-16 NOTE — TELEPHONE ENCOUNTER
Patient called stating that the ketoconazole (NIZORAL) 2 % external cream is not working. The ringworm is still present but has not gotten worse. She was told she could switch to oral medication if cream does not work.     Per 08/02 notes, it states to schedule an OV with PCP but pt refused stating she just wants the alternative medication. Notified pt this message will be passed on to PCP.    Anette Kevin MA 4:15 PM 8/16/2021

## 2021-08-17 ENCOUNTER — MYC MEDICAL ADVICE (OUTPATIENT)
Dept: PEDIATRICS | Facility: CLINIC | Age: 61
End: 2021-08-17

## 2021-08-17 NOTE — TELEPHONE ENCOUNTER
Called to let her know. She agrees. Will call back as needed.Madalyn Abdalla RN on 8/17/2021 at 9:12 AM

## 2021-08-17 NOTE — TELEPHONE ENCOUNTER
I would recommend that she use ketoconazole daily for at least 3-4 weeks; if not improving then she should come into clinic for KOH/skin scraping to confirm diagnosis of ringworm before starting oral medication. She has a hx of sensitivity to medication, and most oral antifungals can be associated with significant side effects, so diagnosis should be confirmed before oral medication is initiated.    Madyson Mclaughlin MD  Internal Medicine-Pediatrics

## 2021-08-28 ENCOUNTER — HEALTH MAINTENANCE LETTER (OUTPATIENT)
Age: 61
End: 2021-08-28

## 2021-09-07 DIAGNOSIS — Z96.41 INSULIN PUMP IN PLACE: ICD-10-CM

## 2021-09-07 DIAGNOSIS — E11.65 TYPE 2 DIABETES MELLITUS WITH HYPERGLYCEMIA, WITH LONG-TERM CURRENT USE OF INSULIN (H): ICD-10-CM

## 2021-09-07 DIAGNOSIS — Z79.4 TYPE 2 DIABETES MELLITUS WITH HYPERGLYCEMIA, WITH LONG-TERM CURRENT USE OF INSULIN (H): ICD-10-CM

## 2021-09-08 ENCOUNTER — MYC MEDICAL ADVICE (OUTPATIENT)
Dept: PEDIATRICS | Facility: CLINIC | Age: 61
End: 2021-09-08

## 2021-09-08 DIAGNOSIS — Z91.030 BEE ALLERGY STATUS: ICD-10-CM

## 2021-09-08 RX ORDER — EPINEPHRINE 0.3 MG/.3ML
0.3 INJECTION SUBCUTANEOUS
Qty: 2 EACH | Refills: 1 | Status: SHIPPED | OUTPATIENT
Start: 2021-09-08

## 2021-09-08 RX ORDER — FLASH GLUCOSE SENSOR
KIT MISCELLANEOUS
Qty: 6 EACH | Refills: 3 | Status: SHIPPED | OUTPATIENT
Start: 2021-09-08 | End: 2022-04-06

## 2021-09-08 NOTE — TELEPHONE ENCOUNTER
Routing refill request to provider for review/approval because:  Drug not on the [facility] refill protocol     Leo HUYNH RN

## 2021-09-14 ENCOUNTER — OFFICE VISIT (OUTPATIENT)
Dept: CARDIOLOGY | Facility: CLINIC | Age: 61
End: 2021-09-14
Payer: COMMERCIAL

## 2021-09-14 VITALS
HEIGHT: 64 IN | BODY MASS INDEX: 32.56 KG/M2 | WEIGHT: 190.7 LBS | HEART RATE: 51 BPM | SYSTOLIC BLOOD PRESSURE: 148 MMHG | DIASTOLIC BLOOD PRESSURE: 70 MMHG | OXYGEN SATURATION: 98 %

## 2021-09-14 DIAGNOSIS — I10 HYPERTENSION GOAL BP (BLOOD PRESSURE) < 140/90: ICD-10-CM

## 2021-09-14 PROCEDURE — 99214 OFFICE O/P EST MOD 30 MIN: CPT | Performed by: INTERNAL MEDICINE

## 2021-09-14 RX ORDER — HYDRALAZINE HYDROCHLORIDE 25 MG/1
25 TABLET, FILM COATED ORAL 3 TIMES DAILY
Qty: 90 TABLET | Refills: 11 | Status: SHIPPED | OUTPATIENT
Start: 2021-09-14 | End: 2021-11-02

## 2021-09-14 RX ORDER — FAMOTIDINE 20 MG/1
20 TABLET, FILM COATED ORAL DAILY
COMMUNITY
End: 2022-07-20

## 2021-09-14 ASSESSMENT — MIFFLIN-ST. JEOR: SCORE: 1425.01

## 2021-09-14 NOTE — PROGRESS NOTES
HISTORY:    Maricarmen Dotson is a pleasant 60-year-old female accompanied by her  today.  She has a history of type 2 diabetes dating back more than 2 decades, obstructive sleep apnea intolerant of CPAP, fibromyalgia with very severe symptomatology, with a full orthopedic issues, moderate obesity, irritable bowel syndrome, and hypertension which has been very difficult to control.  She has innumerable medication intolerances.  He is here today for assistance in management of her hypertension.    Maricamren reports that she does not use alcohol.  She is her sizes regularly walking 5 or 6 miles most days.  She tries to avoid nonsteroidal agents when possible.  She uses ibuprofen occasionally if her aches and pains become intolerable.  As stated, she has a history of sleep apnea but has not been tolerant of CPAP.  She reports that she sleeps poorly at night.    Medication intolerances are prolonged.  The reader is referred to our chart for exact details.  She has not been able to take statins despite her history of diabetes.  She has been intolerant of virtually all antihypertensive classes including losartan, metoprolol, amlodipine, Catapres, hydrochlorothiazide, lisinopril, nifedipine.  Many of these were discussed in detail with her today.    Maricarmen checks her own blood pressure at home and brought in those readings.  She has never brought in her home blood pressure machine to assure its accuracy.  Her home blood pressures range from 140 to the mid 180s, with most values being at the upper end of that range.      ASSESSMENT/PLAN:    1.  Hypertension, not well controlled, complicated by multiple medication intolerances.  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  She takes the medication and finds new symptoms she stops it and then restarts it to make sure it was the medication causing those symptoms.  On review of her wrist and note that  she has never tried hydralazine and I suspect that it may be a tolerable medication for her.  I gave her a prescription for 25 mg 3 times daily.  If tolerated we will see how well her blood pressure responds and titrate if possible.  The other thing that I would like to try is Benicar as our next step if hydralazine is not tolerated.  We might also try spironolactone as another future attempted agent.    Thank you for inviting me to participate in your patient's care.  Please do not hesitate to call if I can be of further assistance.  I spent over an hour today reviewing the chart, interviewing and examining the patient, and documenting our visit.     Chart documentation was completed, in part, with [employer] voice-recognition software. Even though reviewed, some grammatical, spelling, and word errors may remain.       Orders Placed This Encounter   Procedures     Follow-Up with Cardiologist     Orders Placed This Encounter   Medications     famotidine (PEPCID) 20 MG tablet     Sig: Take 20 mg by mouth 2 times daily     hydrALAZINE (APRESOLINE) 25 MG tablet     Sig: Take 1 tablet (25 mg) by mouth 3 times daily     Dispense:  90 tablet     Refill:  11     There are no discontinued medications.    10 year ASCVD risk: The 10-year ASCVD risk score (Vicenta DC Jr., et al., 2013) is: 8.5%    Values used to calculate the score:      Age: 60 years      Sex: Female      Is Non- : No      Diabetic: Yes      Tobacco smoker: No      Systolic Blood Pressure: 148 mmHg      Is BP treated: No      HDL Cholesterol: 51 mg/dL      Total Cholesterol: 221 mg/dL    Encounter Diagnosis   Name Primary?     Hypertension goal BP (blood pressure) < 140/90        CURRENT MEDICATIONS:  Current Outpatient Medications   Medication Sig Dispense Refill     cetirizine (ZYRTEC) 10 MG tablet Take 10 mg by mouth daily       famotidine (PEPCID) 20 MG tablet Take 20 mg by mouth 2 times daily       furosemide (LASIX) 20 MG tablet Take 1  tablet (20 mg) by mouth daily 90 tablet 3     hydrALAZINE (APRESOLINE) 25 MG tablet Take 1 tablet (25 mg) by mouth 3 times daily 90 tablet 11     insulin aspart (NOVOLOG VIAL) 100 UNITS/ML vial USE IN INSULIN PUMP, TOTAL DAILY DOSE 100 UNITS. 90 mL 3     medical cannabis (Patient's own supply) See Admin Instructions (The purpose of this order is to document that the patient reports taking medical cannabis.  This is not a prescription, and is not used to certify that the patient has a qualifying medical condition.)       MELATONIN PO        alcohol swab prep pads Use to swab area of injection/cyhna as directed. 100 each 3     ASPIRIN NOT PRESCRIBED (INTENTIONAL) Please choose reason not prescribed, below 0 each 0     blood glucose (FREESTYLE LITE) test strip Use to test blood sugar 6-7 times daily. 100 strip 6     blood glucose (NO BRAND SPECIFIED) test strip Use to test blood sugar 6-7 times daily or as directed. 300 strip 3     blood glucose calibration (FREESTYLE CONTROL) solution Use to calibrate blood glucose monitor as directed. 1 each 1     blood glucose monitoring (FREESTYLE LITE) meter device kit Use to test blood sugar 6-7 times daily. 1 kit 0     blood glucose monitoring (FREESTYLE) lancets Use to test blood sugar 6-7 times daily. 100 each 6     Continuous Blood Gluc  (FREESTYLE RADHA 14 DAY READER) MARIA C USE TO CHECK BLOOD SUGARS AS DIRECTED 1 Device 0     Continuous Blood Gluc Sensor (FREESTYLE RADHA 14 DAY SENSOR) MISC APPLY 1 SENSOR AND CHANGE EVERY 14 DAYS AS DIRECTED 6 each 3     Continuous Blood Gluc Sensor (FREESTYLE RADHA 14 DAY SENSOR) MISC USE TO TEST BLOOD SUGARS AS DIRECTED AND CHANGE EVERY 14 DAYS 2 each 11     diclofenac (VOLTAREN) 1 % topical gel Apply topically nightly as needed for moderate pain Apply 4 grams to knees or 2 grams to hands four times daily using enclosed dosing card. (Patient not taking: Reported on 9/14/2021) 100 g 1     diphenhydrAMINE HCl (BENADRYL PO) Take 25 mg  by mouth (Patient not taking: Reported on 9/14/2021)       EPINEPHrine (ANY BX GENERIC EQUIV) 0.3 MG/0.3ML injection 2-pack Inject 0.3 mLs (0.3 mg) into the muscle once as needed for anaphylaxis 2 each 1     HEMP OIL OR EXTRACT OR OTHER CBD CANNABINOID, NOT MEDICAL CANNABIS,  (Patient not taking: Reported on 9/14/2021)       Insulin Disposable Pump (OMNIPOD DASH 5 PACK PODS) MISC 1 pod every 48 hours Dispense # 45 pods or #9 5-packs (Patient not taking: Reported on 9/14/2021) 45 each 3     INSULIN PUMP - OUTPATIENT INSULIN PUMP - OUTPATIENT  Date last updated: 6/4/21 Omnipod DASH  BASAL RATES and times:   12am: 1.3, 6 am: 0.75, 12 pm: 0.8, 6 pm: 0.95   CARB RATIO: 12am-12am: 10  Correction Factor (Sensitivity): 12AM (midnight): 31 -- 5 AM: 33  Target blood glucose: 100-120  Active insulin time: 4 hrs       ketoconazole (NIZORAL) 2 % external cream Apply topically daily 30 g 1     polyethylene glycol (MIRALAX/GLYCOLAX) packet Take 17 g by mouth At Bedtime (Patient not taking: Reported on 9/14/2021)       STATIN NOT PRESCRIBED, INTENTIONAL, Statin not prescribed intentionally due to Intolerance (Patient not taking: Reported on 8/2/2021) 0 each 0       ALLERGIES     Allergies   Allergen Reactions     Amoxicillin Anaphylaxis     Bee Anaphylaxis     Wasp        Contrast Dye Anaphylaxis     Iodine Anaphylaxis     Severe anaphalatic shock       Losartan Muscle Pain (Myalgia)     Sulfa Drugs Anaphylaxis     Tetanus-Diphtheria Toxoids      Rxn was to Tdap-whole body joint pain and fever.  Had similar reaction to Td vaccine 7/28/2017     Metoprolol Other (See Comments)     Fatigue, joint pain, throat tightness     Zithromax [Azithromycin Dihydrate] Nausea and Vomiting     Amlodipine      Neuropathic type pain in hands/feet     Atorvastatin      myalgias     Catapres [Clonidine]      Crestor [Rosuvastatin]      myalgias     Cyproheptadine      Severe headache, cardiac issues, and dizziness     Humalog [Insulin Lispro]       Causes pancreatitis -      Hydrochlorothiazide      numbness     Latex      Skin burn and can't breathe       Lisinopril      Joint aches     Metformin      Naltrexone      Nifedipine      Onglyza [Saxagliptin Hydrochloride]      Fibromyalgia flare     Phenergan [Promethazine]      Prochlorperazine      Altered mental status, hallucinations     Reglan [Metoclopramide]      Sumatriptan      Causes severe depression     Tetracycline Nausea and Vomiting, Hives and Difficulty breathing     Pt called to update today after the visit that she is allergic to the tetracycline-added to her list     Sulindac Anxiety     Panic attacks       PAST MEDICAL HISTORY:  Past Medical History:   Diagnosis Date     Ascending aorta enlargement (H)      Depressive disorder     severe, prior hospitalization     Diabetes mellitus, type 2 (H)      Diverticulosis of colon (without mention of hemorrhage)      Fibromyalgia 2007     Insomnia      Irritable bowel syndrome        PAST SURGICAL HISTORY:  Past Surgical History:   Procedure Laterality Date     BACK SURGERY      fusion  and removal of hardware      C SPINAL ORTHOSIS,NOS  2002    lumbar surgery     CHOLECYSTECTOMY  2011     choley  2011     ENT SURGERY  1986    septoplasty     HYSTERECTOMY, CERVIX STATUS UNKNOWN      TVH/BSO     ORTHOPEDIC SURGERY      left ankle       FAMILY HISTORY:  Family History   Problem Relation Age of Onset     Diabetes Mother         type 2     Hypertension Mother      Cerebrovascular Disease Mother          stroke age 57     Ovarian Cancer Mother 43     Cancer Mother         cervix     Diabetes Father         type 2     Hypertension Father      Gastrointestinal Disease Father         colon polyps     Sleep Apnea Brother      Cancer Sister      Ovarian Cancer Sister 46     Ovarian Cancer Maternal Grandmother 72     Cancer Maternal Grandmother         cervix       SOCIAL HISTORY:  Social History     Socioeconomic History     Marital  status:      Spouse name: None     Number of children: 3     Years of education: None     Highest education level: None   Occupational History     Occupation: xr technician     Employer: [employer]   Tobacco Use     Smoking status: Never Smoker     Smokeless tobacco: Never Used   Substance and Sexual Activity     Alcohol use: Not Currently     Alcohol/week: 0.0 standard drinks     Comment: maybe once a month     Drug use: No     Sexual activity: Yes     Partners: Male     Birth control/protection: Surgical   Other Topics Concern     Parent/sibling w/ CABG, MI or angioplasty before 65F 55M? Not Asked   Social History Narrative     None     Social Determinants of Health     Financial Resource Strain:      Difficulty of Paying Living Expenses:    Food Insecurity:      Worried About Running Out of Food in the Last Year:      Ran Out of Food in the Last Year:    Transportation Needs:      Lack of Transportation (Medical):      Lack of Transportation (Non-Medical):    Physical Activity:      Days of Exercise per Week:      Minutes of Exercise per Session:    Stress:      Feeling of Stress :    Social Connections:      Frequency of Communication with Friends and Family:      Frequency of Social Gatherings with Friends and Family:      Attends Christian Services:      Active Member of Clubs or Organizations:      Attends Club or Organization Meetings:      Marital Status:    Intimate Partner Violence:      Fear of Current or Ex-Partner:      Emotionally Abused:      Physically Abused:      Sexually Abused:        Review of Systems:  Skin:  Negative for pigmentation   Eyes:  Positive for glasses  ENT:  Positive for tinnitus  Respiratory:  Positive for dyspnea on exertion;cough;wheezing  Cardiovascular:    palpitations;Positive for;edema;fatigue;lightheadedness;dizziness;chest pain  Gastroenterology: Positive for nausea;heartburn  Genitourinary:  Negative for    Musculoskeletal:  Positive for back  "pain;neck pain;joint pain;fibromyalgia  Neurologic:  Positive for numbness or tingling of hands;numbness or tingling of feet  Psychiatric:  Positive for sleep disturbances;anxiety  Heme/Lymph/Imm:  Positive for allergies  Endocrine:  Positive for diabetes    Physical Exam:  Vitals: BP (!) 148/70 (BP Location: Right arm, Patient Position: Sitting, Cuff Size: Adult Large)   Pulse 51   Ht 1.626 m (5' 4\")   Wt 86.5 kg (190 lb 11.2 oz)   LMP 01/01/1999   SpO2 98%   BMI 32.73 kg/m      Constitutional:  cooperative, alert and oriented, well developed, well nourished, in no acute distress overweight      Skin:  warm and dry to the touch, no apparent skin lesions or masses noted        Head:  normocephalic        Eyes:  sclera white;no xanthalasma;no nystagmus        ENT:  no pallor or cyanosis        Neck:  carotid pulses are full and equal bilaterally        Chest:  normal breath sounds, clear to auscultation, normal A-P diameter, normal symmetry, normal respiratory excursion, no use of accessory muscles        Cardiac: regular rhythm, normal S1/S2, no S3 or S4, apical impulse not displaced, no murmurs, gallops or rubs                  Abdomen:  abdomen soft;no bruits        Vascular: pulses full and equal                                      Extremities and Muscular Skeletal:  no edema           Neurological:  no gross motor deficits        Psych:  affect appropriate, oriented to time, person and place     Recent Lab Results:  LIPID RESULTS:  Lab Results   Component Value Date    CHOL 221 (H) 09/18/2020    HDL 51 09/18/2020     (H) 09/18/2020    TRIG 137 09/18/2020    CHOLHDLRATIO 5.0 02/21/2015       LIVER ENZYME RESULTS:  Lab Results   Component Value Date    AST 22 01/20/2021    ALT 22 01/20/2021       CBC RESULTS:  Lab Results   Component Value Date    WBC 7.5 01/20/2021    RBC 4.49 01/20/2021    HGB 13.4 01/20/2021    HCT 39.8 01/20/2021    MCV 89 01/20/2021    MCH 29.8 01/20/2021    MCHC 33.7 01/20/2021 "    RDW 12.1 01/20/2021     01/20/2021       BMP RESULTS:  Lab Results   Component Value Date     08/04/2021     01/20/2021    POTASSIUM 3.9 08/04/2021    POTASSIUM 3.7 01/20/2021    CHLORIDE 104 08/04/2021    CHLORIDE 107 01/20/2021    CO2 27 08/04/2021    CO2 30 01/20/2021    ANIONGAP 6 08/04/2021    ANIONGAP 3 01/20/2021     (H) 08/04/2021     (H) 01/20/2021    BUN 21 08/04/2021    BUN 10 01/20/2021    CR 1.02 08/04/2021    CR 0.77 01/20/2021    GFRESTIMATED 60 (L) 08/04/2021    GFRESTIMATED 84 01/20/2021    GFRESTBLACK >90 01/20/2021    KARIN 9.6 08/04/2021    KARIN 9.1 01/20/2021        A1C RESULTS:  Lab Results   Component Value Date    A1C 7.4 (H) 09/18/2020       INR RESULTS:  Lab Results   Component Value Date    INR 1.01 05/23/2011         Sanjeev Payan MD, FACC    CC  Madyson Mclaughlin MD  [address] [place] [address]

## 2021-09-14 NOTE — LETTER
9/14/2021    Madyson Mendoza MD  [address] [place] [address]    RE: Maricarmen Dotson       Dear Colleague,    I had the pleasure of seeing Maricarmen Dotson in the [facility].    HISTORY:    Maricarmen Dotson is a pleasant 60-year-old female accompanied by her  today.  She has a history of type 2 diabetes dating back more than 2 decades, obstructive sleep apnea intolerant of CPAP, fibromyalgia with very severe symptomatology, with a full orthopedic issues, moderate obesity, irritable bowel syndrome, and hypertension which has been very difficult to control.  She has innumerable medication intolerances.  He is here today for assistance in management of her hypertension.    Maricarmen reports that she does not use alcohol.  She is her sizes regularly walking 5 or 6 miles most days.  She tries to avoid nonsteroidal agents when possible.  She uses ibuprofen occasionally if her aches and pains become intolerable.  As stated, she has a history of sleep apnea but has not been tolerant of CPAP.  She reports that she sleeps poorly at night.    Medication intolerances are prolonged.  The reader is referred to our chart for exact details.  She has not been able to take statins despite her history of diabetes.  She has been intolerant of virtually all antihypertensive classes including losartan, metoprolol, amlodipine, Catapres, hydrochlorothiazide, lisinopril, nifedipine.  Many of these were discussed in detail with her today.    Maricramen checks her own blood pressure at home and brought in those readings.  She has never brought in her home blood pressure machine to assure its accuracy.  Her home blood pressures range from 140 to the mid 180s, with most values being at the upper end of that range.      ASSESSMENT/PLAN:    1.  Hypertension, not well controlled, complicated by multiple medication intolerances.  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  She takes the medication and finds new symptoms she stops it and then restarts it to make sure it was the medication causing those symptoms.  On review of her wrist and note that she has never tried hydralazine and I suspect that it may be a tolerable medication for her.  I gave her a prescription for 25 mg 3 times daily.  If tolerated we will see how well her blood pressure responds and titrate if possible.  The other thing that I would like to try is Benicar as our next step if hydralazine is not tolerated.  We might also try spironolactone as another future attempted agent.    Thank you for inviting me to participate in your patient's care.  Please do not hesitate to call if I can be of further assistance.  I spent over an hour today reviewing the chart, interviewing and examining the patient, and documenting our visit.     Chart documentation was completed, in part, with [employer] voice-recognition software. Even though reviewed, some grammatical, spelling, and word errors may remain.       Orders Placed This Encounter   Procedures     Follow-Up with Cardiologist     Orders Placed This Encounter   Medications     famotidine (PEPCID) 20 MG tablet     Sig: Take 20 mg by mouth 2 times daily     hydrALAZINE (APRESOLINE) 25 MG tablet     Sig: Take 1 tablet (25 mg) by mouth 3 times daily     Dispense:  90 tablet     Refill:  11     There are no discontinued medications.    10 year ASCVD risk: The 10-year ASCVD risk score (Vicenta JAGDISH Thorpe., et al., 2013) is: 8.5%    Values used to calculate the score:      Age: 60 years      Sex: Female      Is Non- : No      Diabetic: Yes      Tobacco smoker: No      Systolic Blood Pressure: 148 mmHg      Is BP treated: No      HDL Cholesterol: 51 mg/dL      Total Cholesterol: 221 mg/dL    Encounter Diagnosis   Name Primary?     Hypertension goal BP (blood pressure) < 140/90        CURRENT  MEDICATIONS:  Current Outpatient Medications   Medication Sig Dispense Refill     cetirizine (ZYRTEC) 10 MG tablet Take 10 mg by mouth daily       famotidine (PEPCID) 20 MG tablet Take 20 mg by mouth 2 times daily       furosemide (LASIX) 20 MG tablet Take 1 tablet (20 mg) by mouth daily 90 tablet 3     hydrALAZINE (APRESOLINE) 25 MG tablet Take 1 tablet (25 mg) by mouth 3 times daily 90 tablet 11     insulin aspart (NOVOLOG VIAL) 100 UNITS/ML vial USE IN INSULIN PUMP, TOTAL DAILY DOSE 100 UNITS. 90 mL 3     medical cannabis (Patient's own supply) See Admin Instructions (The purpose of this order is to document that the patient reports taking medical cannabis.  This is not a prescription, and is not used to certify that the patient has a qualifying medical condition.)       MELATONIN PO        alcohol swab prep pads Use to swab area of injection/chyna as directed. 100 each 3     ASPIRIN NOT PRESCRIBED (INTENTIONAL) Please choose reason not prescribed, below 0 each 0     blood glucose (FREESTYLE LITE) test strip Use to test blood sugar 6-7 times daily. 100 strip 6     blood glucose (NO BRAND SPECIFIED) test strip Use to test blood sugar 6-7 times daily or as directed. 300 strip 3     blood glucose calibration (FREESTYLE CONTROL) solution Use to calibrate blood glucose monitor as directed. 1 each 1     blood glucose monitoring (FREESTYLE LITE) meter device kit Use to test blood sugar 6-7 times daily. 1 kit 0     blood glucose monitoring (FREESTYLE) lancets Use to test blood sugar 6-7 times daily. 100 each 6     Continuous Blood Gluc  (FREESTYLE RADHA 14 DAY READER) MARIA C USE TO CHECK BLOOD SUGARS AS DIRECTED 1 Device 0     Continuous Blood Gluc Sensor (FREESTYLE RADHA 14 DAY SENSOR) [facility] APPLY 1 SENSOR AND CHANGE EVERY 14 DAYS AS DIRECTED 6 each 3     Continuous Blood Gluc Sensor (FREESTYLE RADHA 14 DAY SENSOR) [facility] USE TO TEST BLOOD SUGARS AS DIRECTED AND CHANGE EVERY 14 DAYS 2 each 11     diclofenac  (VOLTAREN) 1 % topical gel Apply topically nightly as needed for moderate pain Apply 4 grams to knees or 2 grams to hands four times daily using enclosed dosing card. (Patient not taking: Reported on 9/14/2021) 100 g 1     diphenhydrAMINE HCl (BENADRYL PO) Take 25 mg by mouth (Patient not taking: Reported on 9/14/2021)       EPINEPHrine (ANY BX GENERIC EQUIV) 0.3 MG/0.3ML injection 2-pack Inject 0.3 mLs (0.3 mg) into the muscle once as needed for anaphylaxis 2 each 1     HEMP OIL OR EXTRACT OR OTHER CBD CANNABINOID, NOT MEDICAL CANNABIS,  (Patient not taking: Reported on 9/14/2021)       Insulin Disposable Pump (OMNIPOD DASH 5 PACK PODS) MISC 1 pod every 48 hours Dispense # 45 pods or #9 5-packs (Patient not taking: Reported on 9/14/2021) 45 each 3     INSULIN PUMP - OUTPATIENT INSULIN PUMP - OUTPATIENT  Date last updated: 6/4/21 Omnipod DASH  BASAL RATES and times:   12am: 1.3, 6 am: 0.75, 12 pm: 0.8, 6 pm: 0.95   CARB RATIO: 12am-12am: 10  Correction Factor (Sensitivity): 12AM (midnight): 31 -- 5 AM: 33  Target blood glucose: 100-120  Active insulin time: 4 hrs       ketoconazole (NIZORAL) 2 % external cream Apply topically daily 30 g 1     polyethylene glycol (MIRALAX/GLYCOLAX) packet Take 17 g by mouth At Bedtime (Patient not taking: Reported on 9/14/2021)       STATIN NOT PRESCRIBED, INTENTIONAL, Statin not prescribed intentionally due to Intolerance (Patient not taking: Reported on 8/2/2021) 0 each 0       ALLERGIES     Allergies   Allergen Reactions     Amoxicillin Anaphylaxis     Bee Anaphylaxis     Wasp        Contrast Dye Anaphylaxis     Iodine Anaphylaxis     Severe anaphalatic shock       Losartan Muscle Pain (Myalgia)     Sulfa Drugs Anaphylaxis     Tetanus-Diphtheria Toxoids      Rxn was to Tdap-whole body joint pain and fever.  Had similar reaction to Td vaccine 7/28/2017     Metoprolol Other (See Comments)     Fatigue, joint pain, throat tightness     Zithromax [Azithromycin Dihydrate] Nausea and  Vomiting     Amlodipine      Neuropathic type pain in hands/feet     Atorvastatin      myalgias     Catapres [Clonidine]      Crestor [Rosuvastatin]      myalgias     Cyproheptadine      Severe headache, cardiac issues, and dizziness     Humalog [Insulin Lispro]      Causes pancreatitis -      Hydrochlorothiazide      numbness     Latex      Skin burn and can't breathe       Lisinopril      Joint aches     Metformin      Naltrexone      Nifedipine      Onglyza [Saxagliptin Hydrochloride]      Fibromyalgia flare     Phenergan [Promethazine]      Prochlorperazine      Altered mental status, hallucinations     Reglan [Metoclopramide]      Sumatriptan      Causes severe depression     Tetracycline Nausea and Vomiting, Hives and Difficulty breathing     Pt called to update today after the visit that she is allergic to the tetracycline-added to her list     Sulindac Anxiety     Panic attacks       PAST MEDICAL HISTORY:  Past Medical History:   Diagnosis Date     Ascending aorta enlargement (H)      Depressive disorder     severe, prior hospitalization     Diabetes mellitus, type 2 (H)      Diverticulosis of colon (without mention of hemorrhage)      Fibromyalgia 2007     Insomnia      Irritable bowel syndrome        PAST SURGICAL HISTORY:  Past Surgical History:   Procedure Laterality Date     BACK SURGERY      fusion  and removal of hardware      C SPINAL ORTHOSIS,NOS      lumbar surgery     CHOLECYSTECTOMY  2011     choley  2011     ENT SURGERY  1986    septoplasty     HYSTERECTOMY, CERVIX STATUS UNKNOWN      TVH/BSO     ORTHOPEDIC SURGERY      left ankle       FAMILY HISTORY:  Family History   Problem Relation Age of Onset     Diabetes Mother         type 2     Hypertension Mother      Cerebrovascular Disease Mother          stroke age 57     Ovarian Cancer Mother 43     Cancer Mother         cervix     Diabetes Father         type 2     Hypertension Father      Gastrointestinal Disease  Father         colon polyps     Sleep Apnea Brother      Cancer Sister      Ovarian Cancer Sister 46     Ovarian Cancer Maternal Grandmother 72     Cancer Maternal Grandmother         cervix       SOCIAL HISTORY:  Social History     Socioeconomic History     Marital status:      Spouse name: None     Number of children: 3     Years of education: None     Highest education level: None   Occupational History     Occupation: xr technician     Employer: [employer]   Tobacco Use     Smoking status: Never Smoker     Smokeless tobacco: Never Used   Substance and Sexual Activity     Alcohol use: Not Currently     Alcohol/week: 0.0 standard drinks     Comment: maybe once a month     Drug use: No     Sexual activity: Yes     Partners: Male     Birth control/protection: Surgical   Other Topics Concern     Parent/sibling w/ CABG, MI or angioplasty before 65F 55M? Not Asked   Social History Narrative     None     Social Determinants of Health     Financial Resource Strain:      Difficulty of Paying Living Expenses:    Food Insecurity:      Worried About Running Out of Food in the Last Year:      Ran Out of Food in the Last Year:    Transportation Needs:      Lack of Transportation (Medical):      Lack of Transportation (Non-Medical):    Physical Activity:      Days of Exercise per Week:      Minutes of Exercise per Session:    Stress:      Feeling of Stress :    Social Connections:      Frequency of Communication with Friends and Family:      Frequency of Social Gatherings with Friends and Family:      Attends [employer] Services:      Active Member of Clubs or Organizations:      Attends Club or Organization Meetings:      Marital Status:    Intimate Partner Violence:      Fear of Current or Ex-Partner:      Emotionally Abused:      Physically Abused:      Sexually Abused:        Review of Systems:  Skin:  Negative for pigmentation   Eyes:  Positive for glasses  ENT:  Positive for tinnitus  Respiratory:   "Positive for dyspnea on exertion;cough;wheezing  Cardiovascular:    palpitations;Positive for;edema;fatigue;lightheadedness;dizziness;chest pain  Gastroenterology: Positive for nausea;heartburn  Genitourinary:  Negative for    Musculoskeletal:  Positive for back pain;neck pain;joint pain;fibromyalgia  Neurologic:  Positive for numbness or tingling of hands;numbness or tingling of feet  Psychiatric:  Positive for sleep disturbances;anxiety  Heme/Lymph/Imm:  Positive for allergies  Endocrine:  Positive for diabetes    Physical Exam:  Vitals: BP (!) 148/70 (BP Location: Right arm, Patient Position: Sitting, Cuff Size: Adult Large)   Pulse 51   Ht 1.626 m (5' 4\")   Wt 86.5 kg (190 lb 11.2 oz)   LMP 01/01/1999   SpO2 98%   BMI 32.73 kg/m      Constitutional:  cooperative, alert and oriented, well developed, well nourished, in no acute distress overweight      Skin:  warm and dry to the touch, no apparent skin lesions or masses noted        Head:  normocephalic        Eyes:  sclera white;no xanthalasma;no nystagmus        ENT:  no pallor or cyanosis        Neck:  carotid pulses are full and equal bilaterally        Chest:  normal breath sounds, clear to auscultation, normal A-P diameter, normal symmetry, normal respiratory excursion, no use of accessory muscles        Cardiac: regular rhythm, normal S1/S2, no S3 or S4, apical impulse not displaced, no murmurs, gallops or rubs                  Abdomen:  abdomen soft;no bruits        Vascular: pulses full and equal                                      Extremities and Muscular Skeletal:  no edema           Neurological:  no gross motor deficits        Psych:  affect appropriate, oriented to time, person and place     Recent Lab Results:  LIPID RESULTS:  Lab Results   Component Value Date    CHOL 221 (H) 09/18/2020    HDL 51 09/18/2020     (H) 09/18/2020    TRIG 137 09/18/2020    CHOLHDLRATIO 5.0 02/21/2015       LIVER ENZYME RESULTS:  Lab Results   Component " Value Date    AST 22 01/20/2021    ALT 22 01/20/2021       CBC RESULTS:  Lab Results   Component Value Date    WBC 7.5 01/20/2021    RBC 4.49 01/20/2021    HGB 13.4 01/20/2021    HCT 39.8 01/20/2021    MCV 89 01/20/2021    MCH 29.8 01/20/2021    MCHC 33.7 01/20/2021    RDW 12.1 01/20/2021     01/20/2021       BMP RESULTS:  Lab Results   Component Value Date     08/04/2021     01/20/2021    POTASSIUM 3.9 08/04/2021    POTASSIUM 3.7 01/20/2021    CHLORIDE 104 08/04/2021    CHLORIDE 107 01/20/2021    CO2 27 08/04/2021    CO2 30 01/20/2021    ANIONGAP 6 08/04/2021    ANIONGAP 3 01/20/2021     (H) 08/04/2021     (H) 01/20/2021    BUN 21 08/04/2021    BUN 10 01/20/2021    CR 1.02 08/04/2021    CR 0.77 01/20/2021    GFRESTIMATED 60 (L) 08/04/2021    GFRESTIMATED 84 01/20/2021    GFRESTBLACK >90 01/20/2021    KARIN 9.6 08/04/2021    KARIN 9.1 01/20/2021        A1C RESULTS:  Lab Results   Component Value Date    A1C 7.4 (H) 09/18/2020       INR RESULTS:  Lab Results   Component Value Date    INR 1.01 05/23/2011         Sanjeev Payan MD, FACC    CC  Madyson Mclaughlin MD  [address] [place] [address] [place] [address]                      Thank you for allowing me to participate in the care of your patient.      Sincerely,     Sanjeev Payan MD     [facility]  cc:   Madyson Mclaughlin MD  [address] [place] [address] [place] [address]

## 2021-10-05 ENCOUNTER — VIRTUAL VISIT (OUTPATIENT)
Dept: EDUCATION SERVICES | Facility: CLINIC | Age: 61
End: 2021-10-05
Payer: COMMERCIAL

## 2021-10-05 DIAGNOSIS — E11.65 TYPE 2 DIABETES MELLITUS WITH HYPERGLYCEMIA, WITH LONG-TERM CURRENT USE OF INSULIN (H): Primary | ICD-10-CM

## 2021-10-05 DIAGNOSIS — Z79.4 TYPE 2 DIABETES MELLITUS WITH HYPERGLYCEMIA, WITH LONG-TERM CURRENT USE OF INSULIN (H): Primary | ICD-10-CM

## 2021-10-05 PROCEDURE — 98967 PH1 ASSMT&MGMT NQHP 11-20: CPT

## 2021-10-05 NOTE — PROGRESS NOTES
"Diabetes Self-Management Education & Support    Presents for: Insulin Pump and CGM Review    SUBJECTIVE/OBJECTIVE:  Presents for: Insulin Pump and CGM Review  Accompanied by: Self  Diabetes education in the past 24mo: Yes  Focus of Visit: CGM, Insulin Pump  Diabetes type: Type 2  Disease course: Worsening  Diabetes management related comments/concerns: I'm struggling with my blood sugars, I would like to add a 3rd higher basal rate profile  Transportation concerns: No  Difficulty affording diabetes medication?: Sometimes  Difficulty affording diabetes testing supplies?: Sometimes  Other concerns:: Cognitive impairment  Cultural Influences/Ethnic Background:  American    Diabetes Symptoms & Complications:  Fatigue: Yes  Neuropathy: Yes  Polydipsia: No  Polyphagia: No  Polyuria: No  Visual change: No  Slow healing wounds: No  Autonomic neuropathy: Yes  CVA: No  Heart disease: No  Nephropathy: No  Peripheral neuropathy: Yes  Foot ulcerations: No  Peripheral Vascular Disease: No  Retinopathy: No  Sexual dysfunction: Yes    Patient Problem List and Family Medical History reviewed for relevant medical history, current medical status, and diabetes risk factors.    Vitals:  [facility] 01/01/1999   Estimated body mass index is 32.73 kg/m  as calculated from the following:    Height as of 9/14/21: 1.626 m (5' 4\").    Weight as of 9/14/21: 86.5 kg (190 lb 11.2 oz).   Last 3 BP:   BP Readings from Last 3 Encounters:   09/14/21 (!) 148/70   08/04/21 (!) 148/78   08/02/21 (!) 146/76       History   Smoking Status     Never Smoker   Smokeless Tobacco     Never Used       Labs:  Lab Results   Component Value Date    A1C 7.4 09/18/2020     Lab Results   Component Value Date     08/04/2021     01/20/2021     Lab Results   Component Value Date     09/18/2020     HDL Cholesterol   Date Value Ref Range Status   09/18/2020 51 >49 mg/dL Final   ]  GFR Estimate   Date Value Ref Range Status   08/04/2021 60 (L) >60 " mL/min/1.73m2 Final     Comment:     As of July 11, 2021, eGFR is calculated by the CKD-EPI creatinine equation, without race adjustment. eGFR can be influenced by muscle mass, exercise, and diet. The reported eGFR is an estimation only and is only applicable if the renal function is stable.   01/20/2021 84 >60 mL/min/[1.73_m2] Final     Comment:     Non  GFR Calc  Starting 12/18/2018, serum creatinine based estimated GFR (eGFR) will be   calculated using the Chronic Kidney Disease Epidemiology Collaboration   (CKD-EPI) equation.       GFR Estimate If Black   Date Value Ref Range Status   01/20/2021 >90 >60 mL/min/[1.73_m2] Final     Comment:      GFR Calc  Starting 12/18/2018, serum creatinine based estimated GFR (eGFR) will be   calculated using the Chronic Kidney Disease Epidemiology Collaboration   (CKD-EPI) equation.       Lab Results   Component Value Date    CR 1.02 08/04/2021    CR 0.77 01/20/2021     No results found for: MICROALBUMIN    Healthy Eating:  Healthy Eating Assessed Today: (P) No  Cultural/Methodist diet restrictions?: (P) No  Beverages: (P) Water, Tea  Has patient met with a dietitian in the past?: (P) No    Being Active:  Being Active Assessed Today: (P) No  Exercise:: (P) Yes  How intense was your typical exercise? : (P) Moderate (like brisk walking)  Barrier to exercise: (P) Safety, Physical limitation    Monitoring:  Monitoring Assessed Today: (P) Yes  Blood Glucose Meter: (P) FreeStyle, CGM    Taking Medications:  Diabetes Medication(s)     Insulin       insulin aspart (NOVOLOG VIAL) 100 UNITS/ML vial    USE IN INSULIN PUMP, TOTAL DAILY DOSE 100 UNITS.          Taking Medication Assessed Today: (P) Yes  Current Treatments: (P) Diet, Insulin Pump  Given by: (P) Patient  Injection/Infusion sites: (P) Abdomen, Buttocks  Problems taking diabetes medications regularly?: (P) No  Diabetes medication side effects?: (P) No    Problem Solving:  Problem Solving  Assessed Today: (P) Yes  Is the patient at risk for hypoglycemia?: (P) Yes  Hypoglycemia Frequency: (P) Weekly  Hypoglycemia Treatment: (P) Glucose (tablets or gel), Juice  Patient carries a carbohydrate source: (P) Yes  Medical ID: (P) No  Does patient have DKA prevention plan?: (P) No  Does patient have severe weather/disaster plan for diabetes management?: (P) Yes  Does patient have sick day plan for diabetes management?: (P) Yes    Hypoglycemia Complications  Blackouts: (P) No  Hospitalization: (P) No  Nocturnal hypoglycemia: (P) Yes  Required assistance: (P) No  Required glucagon injection: (P) No  Seizures: (P) No    Reducing Risks:  Reducing Risks Assessed Today: (P) Yes  Diabetes Risks: (P) Age over 45 years  CAD Risks: (P) Diabetes Mellitus, Stress, Obesity  Has dilated eye exam at least once a year?: (P) Yes  Sees dentist every 6 months?: (P) Yes  Feet checked by healthcare provider in the last year?: (P) Yes (PCP checks feet)    Healthy Coping:  Healthy Coping Assessed Today: (P) Yes  Emotional response to diabetes: (P) Ready to learn  Informal Support system:: (P) Family  Stage of change: (P) ACTION (Actively working towards change)  Patient Activation Measure Survey Score:  JEFFERY Score (Last Two) 10/18/2013 4/22/2020   JEFFERY Raw Score 42 28   Activation Score 66 50   JEFFERY Level 3 2       Diabetes knowledge and skills assessment:   Patient is knowledgeable in diabetes management concepts related to: Healthy Eating, Being Active, Monitoring, Taking Medication, Problem Solving, Reducing Risks and Healthy Coping    Patient needs further education on the following diabetes management concepts: Problem Solving    Based on learning assessment above, most appropriate setting for further diabetes education would be: Individual setting.      INTERVENTION:    REPORTS:                      Insulin Pump Information  Insulin Pump Brand: (P) OmniPod                        Education provided today on:  AADE Self-Care  Behaviors:  Problem Solving: high blood glucose - causes, signs/symptoms, treatment and prevention and when to call health care provider    Education specific to insulin pump provided today on:   importance of bolusing before meals, steps to take when blood glucose is about 250 mg/dL and how to do basal rate testing     Opportunities for ongoing education and support in diabetes-self management were discussed.    Pt verbalized understanding of concepts discussed and recommendations provided today.       Education Materials Provided:  No new materials provided today      ASSESSMENT  Glucose Patterns & Trends: Hyperglycemia with variability, pt reports struggling with her eating, would like to add a 3rd basal profile to help bring her blood sugars down. Offered support. Pt will continue to work on consistent use of bolus calculator, as well.     Patient would benefit from increase in basal rate, add 3rd profile for use during days with higher glucose trends.     Current basal settings:     Time Basal Rate #1 (units/hr) Basal Rate #1 (units/hr)   12 am 1.3 1.4   6 am 0.75 0.85   12 pm 0.8 0.9   6 pm 0.95 1.05   TDD 22.8 25.2       Changes made to pump settings (add 3rd profile):    Time Basal Rate #3 (units/hr)   12 am 1.4   6 am 0.95   12 pm 1.0   6 pm 1.15   Total 27.6 unit(s)/d    PLAN  See Patient Instructions for co-developed, patient-stated behavior change goals.  AVS printed and provided to patient today. See Follow-Up section for recommended follow-up.    Allie Matias RD, [facility]  Diabetes     Time Spent: 20 minutes  Encounter Type: Individual    Any diabetes medication dose changes were made via the CDE Protocol and Collaborative Practice Agreement with the patient's primary care provider. A copy of this encounter was shared with the provider.

## 2021-10-05 NOTE — PATIENT INSTRUCTIONS
Changes made to pump settings (added 3rd profile):    Time Basal Rate #3 (units/hr)   12 am 1.4   6 am 0.95   12 pm 1.0   6 pm 1.15   Total 27.6 unit(s)/d    Telephone follow up scheduled Tues, 12/7 at 8:30 am.    Allie Matias RD, [facility]  Diabetes

## 2021-10-23 ENCOUNTER — HEALTH MAINTENANCE LETTER (OUTPATIENT)
Age: 61
End: 2021-10-23

## 2021-11-01 ENCOUNTER — MYC MEDICAL ADVICE (OUTPATIENT)
Dept: CARDIOLOGY | Facility: CLINIC | Age: 61
End: 2021-11-01

## 2021-11-01 DIAGNOSIS — I10 HYPERTENSION GOAL BP (BLOOD PRESSURE) < 140/90: ICD-10-CM

## 2021-11-01 NOTE — TELEPHONE ENCOUNTER
Contacted patient and on 10-30-21 went to [facility] room for elevated BP and headache.  Vitals:  Patient Vitals for the past 24 hrs:  BP Temp Pulse Resp SpO2   10/30/21 1211 (!) 176/103 -- 68 20 97 %   10/30/21 0956 (!) 180/103 -- -- -- --   10/30/21 0941 (!) 197/91 98.9  F (37.2  C) 60 16 97 %     Patient was last seen by Dr. Payan 9-14-21    Hypertension, not well controlled, complicated by multiple medication intolerances.  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  She takes the medication and finds new symptoms she stops it and then restarts it to make sure it was the medication causing those symptoms.  On review of her wrist and note that she has never tried hydralazine and I suspect that it may be a tolerable medication for her.  I gave her a prescription for 25 mg 3 times daily.  If tolerated we will see how well her blood pressure responds and titrate if possible.  The other thing that I would like to try is Benicar as our next step if hydralazine is not tolerated.  We might also try spironolactone as another future attempted agent.      Will forward to Dr. Payan to review and advise. Patient is taking hydralazine 25mg TID Blood pressure the last few days systolic 170-190 range and diastolic  range complaint of headache at the end of the day.

## 2021-11-02 RX ORDER — HYDRALAZINE HYDROCHLORIDE 50 MG/1
25 TABLET, FILM COATED ORAL 3 TIMES DAILY
Qty: 90 TABLET | Refills: 3 | Status: SHIPPED | OUTPATIENT
Start: 2021-11-02 | End: 2021-11-10

## 2021-11-03 ENCOUNTER — MYC MEDICAL ADVICE (OUTPATIENT)
Dept: CARDIOLOGY | Facility: CLINIC | Age: 61
End: 2021-11-03
Payer: COMMERCIAL

## 2021-11-03 ENCOUNTER — MYC MEDICAL ADVICE (OUTPATIENT)
Dept: CARDIOLOGY | Facility: CLINIC | Age: 61
End: 2021-11-03

## 2021-11-03 DIAGNOSIS — I10 HYPERTENSION GOAL BP (BLOOD PRESSURE) < 140/90: Primary | ICD-10-CM

## 2021-11-03 DIAGNOSIS — I10 HYPERTENSION GOAL BP (BLOOD PRESSURE) < 140/90: ICD-10-CM

## 2021-11-03 RX ORDER — HYDRALAZINE HYDROCHLORIDE 50 MG/1
50 TABLET, FILM COATED ORAL EVERY 8 HOURS
Qty: 90 TABLET | Refills: 3 | Status: SHIPPED | OUTPATIENT
Start: 2021-11-03 | End: 2021-11-16

## 2021-11-03 RX ORDER — HYDRALAZINE HYDROCHLORIDE 50 MG/1
50 TABLET, FILM COATED ORAL 3 TIMES DAILY
Qty: 90 TABLET | Refills: 3 | Status: SHIPPED | OUTPATIENT
Start: 2021-11-03 | End: 2021-11-03

## 2021-11-10 ENCOUNTER — OFFICE VISIT (OUTPATIENT)
Dept: PEDIATRICS | Facility: CLINIC | Age: 61
End: 2021-11-10
Payer: COMMERCIAL

## 2021-11-10 VITALS
DIASTOLIC BLOOD PRESSURE: 85 MMHG | WEIGHT: 191.7 LBS | SYSTOLIC BLOOD PRESSURE: 160 MMHG | TEMPERATURE: 98.4 F | BODY MASS INDEX: 33.96 KG/M2 | HEIGHT: 63 IN | HEART RATE: 70 BPM | OXYGEN SATURATION: 96 %

## 2021-11-10 DIAGNOSIS — H91.90 HEARING DIFFICULTY, UNSPECIFIED LATERALITY: ICD-10-CM

## 2021-11-10 DIAGNOSIS — Z00.00 ROUTINE GENERAL MEDICAL EXAMINATION AT A HEALTH CARE FACILITY: Primary | ICD-10-CM

## 2021-11-10 DIAGNOSIS — I10 HYPERTENSION GOAL BP (BLOOD PRESSURE) < 140/90: ICD-10-CM

## 2021-11-10 DIAGNOSIS — Z12.31 ENCOUNTER FOR SCREENING MAMMOGRAM FOR BREAST CANCER: ICD-10-CM

## 2021-11-10 DIAGNOSIS — Z79.4 TYPE 2 DIABETES MELLITUS WITH HYPERGLYCEMIA, WITH LONG-TERM CURRENT USE OF INSULIN (H): ICD-10-CM

## 2021-11-10 DIAGNOSIS — Z13.220 SCREENING FOR HYPERLIPIDEMIA: ICD-10-CM

## 2021-11-10 DIAGNOSIS — E11.65 TYPE 2 DIABETES MELLITUS WITH HYPERGLYCEMIA, WITH LONG-TERM CURRENT USE OF INSULIN (H): ICD-10-CM

## 2021-11-10 PROCEDURE — 99396 PREV VISIT EST AGE 40-64: CPT | Mod: GC | Performed by: STUDENT IN AN ORGANIZED HEALTH CARE EDUCATION/TRAINING PROGRAM

## 2021-11-10 PROCEDURE — 82043 UR ALBUMIN QUANTITATIVE: CPT | Performed by: STUDENT IN AN ORGANIZED HEALTH CARE EDUCATION/TRAINING PROGRAM

## 2021-11-10 PROCEDURE — 99207 PR FOOT EXAM NO CHARGE: CPT | Mod: 25 | Performed by: STUDENT IN AN ORGANIZED HEALTH CARE EDUCATION/TRAINING PROGRAM

## 2021-11-10 ASSESSMENT — ENCOUNTER SYMPTOMS
CHILLS: 1
HEADACHES: 1
DIZZINESS: 1
FREQUENCY: 0
COUGH: 1
EYE PAIN: 0
ARTHRALGIAS: 1
PALPITATIONS: 1
WEAKNESS: 1
HEMATURIA: 0
NERVOUS/ANXIOUS: 1
BREAST MASS: 1
PARESTHESIAS: 0
SHORTNESS OF BREATH: 1
ABDOMINAL PAIN: 1
SORE THROAT: 0
JOINT SWELLING: 1
FEVER: 1
NAUSEA: 1
HEMATOCHEZIA: 0
DYSURIA: 0
MYALGIAS: 1
HEARTBURN: 0
DIARRHEA: 1
CONSTIPATION: 0

## 2021-11-10 ASSESSMENT — MIFFLIN-ST. JEOR: SCORE: 1416.68

## 2021-11-10 ASSESSMENT — ACTIVITIES OF DAILY LIVING (ADL): CURRENT_FUNCTION: NO ASSISTANCE NEEDED
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
## 2021-11-10 NOTE — PATIENT INSTRUCTIONS
Great meeting you today.    We will work on doing a couple health maintenance things including a mammogram    We will check your urine today     We will send you to ENT to check your eara  We are glad you are following up with your cardiologist  Preventive Health Recommendations  Female Ages 50 - 64    Yearly exam: See your health care provider every year in order to  o Review health changes.   o Discuss preventive care.    o Review your medicines if your doctor has prescribed any.      Get a Pap test every three years (unless you have an abnormal result and your provider advises testing more often).    If you get Pap tests with HPV test, you only need to test every 5 years, unless you have an abnormal result.     You do not need a Pap test if your uterus was removed (hysterectomy) and you have not had cancer.    You should be tested each year for STDs (sexually transmitted diseases) if you're at risk.     Have a mammogram every 1 to 2 years.    Have a colonoscopy at age 50, or have a yearly FIT test (stool test). These exams screen for colon cancer.      Have a cholesterol test every 5 years, or more often if advised.    Have a diabetes test (fasting glucose) every three years. If you are at risk for diabetes, you should have this test more often.     If you are at risk for osteoporosis (brittle bone disease), think about having a bone density scan (DEXA).    Shots: Get a flu shot each year. Get a tetanus shot every 10 years.    Nutrition:     Eat at least 5 servings of fruits and vegetables each day.    Eat whole-grain bread, whole-wheat pasta and brown rice instead of white grains and rice.    Get adequate Calcium and Vitamin D.     Lifestyle    Exercise at least 150 minutes a week (30 minutes a day, 5 days a week). This will help you control your weight and prevent disease.    Limit alcohol to one drink per day.    No smoking.     Wear sunscreen to prevent skin cancer.     See your dentist every six months for  an exam and cleaning.    See your eye doctor every 1 to 2 years.    Patient Education   Personalized Prevention Plan  You are due for the preventive services outlined below.  Your care team is available to assist you in scheduling these services.  If you have already completed any of these items, please share that information with your care team to update in your medical record.  Health Maintenance Due   Topic Date Due     URINE DRUG SCREEN  Never done     ANNUAL REVIEW OF HM ORDERS  Never done     Discuss Advance Care Planning  Never done     HIV Screening  Never done     Annual Wellness Visit  Never done     Hepatitis C Screening  Never done     Zoster (Shingles) Vaccine (1 of 2) Never done     Mammogram  10/14/2017     A1C Lab  12/18/2020     Cholesterol Lab  12/18/2020     Eye Exam  06/17/2021     COVID-19 Vaccine (2 - Booster for Reji series) 07/15/2021     Your Health Risk Assessment indicates you feel you are not in good health    A healthy lifestyle helps keep the body fit and the mind alert. It helps protect you from disease, helps you fight disease, and helps prevent chronic disease (disease that doesn't go away) from getting worse. This is important as you get older and begin to notice twinges in muscles and joints and a decline in the strength and stamina you once took for granted. A healthy lifestyle includes good healthcare, good nutrition, weight control, recreation, and regular exercise. Avoid harmful substances and do what you can to keep safe. Another part of a healthy lifestyle is stay mentally active and socially involved.    Good healthcare     Have a wellness visit every year.     If you have new symptoms, let us know right away. Don't wait until the next checkup.     Take medicines exactly as prescribed and keep your medicines in a safe place. Tell us if your medicine causes problems.   Healthy diet and weight control     Eat 3 or 4 small, nutritious, low-fat, high-fiber meals a day.  Include a variety of fruits, vegetables, and whole-grain foods.     Make sure you get enough calcium in your diet. Calcium, vitamin D, and exercise help prevent osteoporosis (bone thinning).     If you live alone, try eating with others when you can. That way you get a good meal and have company while you eat it.     Try to keep a healthy weight. If you eat more calories than your body uses for energy, it will be stored as fat and you will gain weight.     Recreation   Recreation is not limited to sports and team events. It includes any activity that provides relaxation, interest, enjoyment, and exercise. Recreation provides an outlet for physical, mental, and social energy. It can give a sense of worth and achievement. It can help you stay healthy.    Mental Exercise and Social Involvement  Mental and emotional health is as important as physical health. Keep in touch with friends and family. Stay as active as possible. Continue to learn and challenge yourself.   Things you can do to stay mentally active are:    Learn something new, like a foreign language or musical instrument.     Play SCRABBLE or do crossword puzzles. If you cannot find people to play these games with you at home, you can play them with others on your computer through the Internet.     Join a games club--anything from card games to chess or checkers or lawn bowling.     Start a new hobby.     Go back to school.     Volunteer.     Read.   Keep up with world events.    Signs of Hearing Loss      Hearing much better with one ear can be a sign of hearing loss.   Hearing loss is a problem shared by many people. In fact, it is one of the most common health problems, particularly as people age. Most people age 65 and older have some hearing loss. By age 80, almost everyone does. Hearing loss often occurs slowly over the years. So you may not realize your hearing has gotten worse.  Have your hearing checked  Call your healthcare provider if you:    Have  to strain to hear normal conversation    Have to watch other people s faces very carefully to follow what they re saying    Need to ask people to repeat what they ve said    Often misunderstand what people are saying    Turn the volume of the television or radio up so high that others complain    Feel that people are mumbling when they re talking to you    Find that the effort to hear leaves you feeling tired and irritated    Notice, when using the phone, that you hear better with one ear than the other  [employer] last reviewed this educational content on 1/1/2020 2000-2021 [employer]. All rights reserved. This information is not intended as a substitute for professional medical care. Always follow your healthcare professional's instructions.          Urinary Incontinence, Female (Adult)   Urinary incontinence means loss of bladder control. This problem affects many women, especially as they get older. If you have incontinence, you may be embarrassed to ask for help. But know that this problem can be treated.   Types of Incontinence  There are different types of incontinence. Two of the main types are described here. You can have more than one type.     Stress incontinence. With this type, urine leaks when pressure (stress) is put on the bladder. This may happen when you cough, sneeze, or laugh. Stress incontinence most often occurs because the pelvic floor muscles that support the bladder and urethra are weak. This can happen after pregnancy and vaginal childbirth or a hysterectomy. It can also be due to excess body weight or hormone changes.    Urge incontinence (also called overactive bladder). With this type, a sudden urge to urinate is felt often. This may happen even though there may not be much urine in the bladder. The need to urinate often during the night is common. Urge incontinence most often occurs because of bladder spasms. This may be due to bladder irritation or infection. Damage to  bladder nerves or pelvic muscles, constipation, and certain medicines can also lead to urge incontinence.  Treatment depends on the cause. Further evaluation is needed to find the type you have. This will likely include an exam and certain tests. Based on the results, you and your healthcare provider can then plan treatment. Until a diagnosis is made, the home care tips below can help ease symptoms.   Home care    Do pelvic floor muscle exercises, if they are prescribed. The pelvic floor muscles help support the bladder and urethra. Many women find that their symptoms improve when doing special exercises that strengthen these muscles. To do the exercises, contract the muscles you would use to stop your stream of urine. But do this when you re not urinating. Hold for 10 seconds, then relax. Repeat 10 to 20 times in a row, at least 3 times a day. Your healthcare provider may give you other instructions for how to do the exercises and how often.    Keep a bladder diary. This helps track how often and how much you urinate over a set period of time. Bring this diary with you to your next visit with the provider. The information can help your provider learn more about your bladder problem.    Lose weight, if advised to by your provider. Extra weight puts pressure on the bladder. Your provider can help you create a weight-loss plan that s right for you. This may include exercising more and making certain diet changes.    Don't have foods and drinks that may irritate the bladder. These can include alcohol and caffeinated drinks.    Quit smoking. Smoking and other tobacco use can lead to a long-term (chronic) cough that strains the pelvic floor muscles. Smoking may also damage the bladder and urethra. Talk with your provider about treatments or methods you can use to quit smoking.    If drinking large amounts of fluid makes you have symptoms, you may be advised to limit your fluid intake. You may also be advised to drink  most of your fluids during the day and to limit fluids at night.    If you re worried about urine leakage or accidents, you may wear absorbent pads to catch urine. Change the pads often. This helps reduce discomfort. It may also reduce the risk of skin or bladder infections.    Follow-up care  Follow up with your healthcare provider, or as directed. It may take some to find the right treatment for your problem. But healthy lifestyle changes can be made right away. These include such things as exercising on a regular basis, eating a healthy diet, losing weight (if needed), and quitting smoking. Your treatment plan may include special therapies or medicines. Certain procedures or surgery may also be options. Talk about any questions you have with your provider.   When to seek medical advice  Call the healthcare provider right away if any of these occur:    Fever of 100.4 F (38 C) or higher, or as directed by your provider    Bladder pain or fullness    Belly swelling    Nausea or vomiting    Back pain    Weakness, dizziness, or fainting  [employer] last reviewed this educational content on 1/1/2020 2000-2021 [employer]. All rights reserved. This information is not intended as a substitute for professional medical care. Always follow your healthcare professional's instructions.        Your Health Risk Assessment indicates you feel you are not in good emotional health.    Recreation   Recreation is not limited to sports and team events. It includes any activity that provides relaxation, interest, enjoyment, and exercise. Recreation provides an outlet for physical, mental, and social energy. It can give a sense of worth and achievement. It can help you stay healthy.    Mental Exercise and Social Involvement  Mental and emotional health is as important as physical health. Keep in touch with friends and family. Stay as active as possible. Continue to learn and challenge yourself.   Things you can do to stay  mentally active are:    Learn something new, like a foreign language or musical instrument.     Play SCRABBLE or do crossword puzzles. If you cannot find people to play these games with you at home, you can play them with others on your computer through the Internet.     Join a games club--anything from card games to chess or checkers or lawn bowling.     Start a new hobby.     Go back to school.     Volunteer.     Read.   Keep up with world events.

## 2021-11-10 NOTE — NURSING NOTE
Patient identified using two patient identifiers.  Ear exam showing wax occlusion completed by MA.  H202/H20 was placed in the right ear(s) via irrigation tool: elephant ear     Cerumen partially removed PCP removed    Ynes Lopez MA 4:04 PM 11/10/2021

## 2021-11-11 LAB
CREAT UR-MCNC: 55 MG/DL
MICROALBUMIN UR-MCNC: 17 MG/L
MICROALBUMIN/CREAT UR: 30.91 MG/G CR (ref 0–25)

## 2021-11-15 ENCOUNTER — MYC MEDICAL ADVICE (OUTPATIENT)
Dept: PEDIATRICS | Facility: CLINIC | Age: 61
End: 2021-11-15

## 2021-11-15 ENCOUNTER — NURSE TRIAGE (OUTPATIENT)
Dept: PEDIATRICS | Facility: CLINIC | Age: 61
End: 2021-11-15
Payer: COMMERCIAL

## 2021-11-15 NOTE — TELEPHONE ENCOUNTER
Called pt back & advised her to come in to be seen in UC today to listen to her lungs & needed tests. No one is on the UC schedule yet, so advised her to come in early to be seen when the UC is open.    Pt doesn't drive. She will see whether she can find a ride to come in today. If no ride available, she is scheduled to see Mary Ann tomorrow. If pt comes in to be see in UC, she will cancel her appointment with Mary Ann tomorrow.     Clem RN  Patient Advocate [facility]ason (PAL)  [facility]

## 2021-11-15 NOTE — TELEPHONE ENCOUNTER
"Dr. Mclaughlin-  Patient calling thinking she needs to be seen. She has been sick for about 5 days. Fever started 3 days ago, has been running about 100-101. She says it is very painful when she coughs and taking a deep breath is painful. Pulse ox is 92. Very sore throat, ear pain. She is pretty miserable. No openings in the clinic.  Routing to PCP and PAL for review.Madalyn Abdalla RN on 11/15/2021 at 8:47 AM       Reason for Disposition    Fever > 100.0 F (37.8 C) and has diabetes mellitus or a weak immune system (e.g., HIV positive, cancer chemotherapy, organ transplant, splenectomy, chronic steroids)    Additional Information    Negative: Severe difficulty breathing (e.g., struggling for each breath, speaks in single words)    Negative: Very weak (can't stand)    Negative: Sounds like a life-threatening emergency to the triager    Negative: Runny nose is caused by pollen or other allergies    Negative: Cough is the main symptom    Negative: Sore throat is the main symptom    Negative: Patient sounds very sick or weak to the triager    Negative: Fever > 101 F (38.3 C) and over 60 years of age    Negative: Fever > 103 F (39.4 C)    Answer Assessment - Initial Assessment Questions  1. ONSET: \"When did the nasal discharge start?\"       5 days ago  2. AMOUNT: \"How much discharge is there?\"       clear  3. COUGH: \"Do you have a cough?\" If yes, ask: \"Describe the color of your sputum\" (clear, white, yellow, green)      Yes, dry cough, painful  4. RESPIRATORY DISTRESS: \"Describe your breathing.\"       painfu  5. FEVER: \"Do you have a fever?\" If so, ask: \"What is your temperature, how was it measured, and when did it start?\"      100-101, 3 days ago  6. SEVERITY: \"Overall, how bad are you feeling right now?\" (e.g., doesn't interfere with normal activities, staying home from school/work, staying in bed)       Staying home  7. OTHER SYMPTOMS: \"Do you have any other symptoms?\" (e.g., sore throat, earache, wheezing, vomiting)   " "   Sore throat, wheezing, ear pain  8. PREGNANCY: \"Is there any chance you are pregnant?\" \"When was your last menstrual period?\"      n/a    Protocols used: COMMON COLD-A-OH      "

## 2021-11-15 NOTE — PROGRESS NOTES
The patient was provided with suggestions to help her develop a healthy physical lifestyle.  The patient was provided with written information regarding signs of hearing loss.  Information on urinary incontinence and treatment options given to patient.  The patient was provided with suggestions to help her develop a healthy emotional lifestyle.

## 2021-11-15 NOTE — TELEPHONE ENCOUNTER
Looks like pt came in to be seen in UC. Vitals & swabs were done and UC advised pt to wait in her car till its time to see the provider.     Because of pt doesn't want to wait, they cancelled the lab orders(swabs).     Pt has an appointment with Mary Ann tomorrow at 9:10 am. Advised her to keep the appointment for tomorrow.     Clem RN  Patient Advocate Liason (PAL)  [facility]

## 2021-11-16 ENCOUNTER — VIRTUAL VISIT (OUTPATIENT)
Dept: PEDIATRICS | Facility: CLINIC | Age: 61
End: 2021-11-16
Payer: COMMERCIAL

## 2021-11-16 VITALS
OXYGEN SATURATION: 97 % | SYSTOLIC BLOOD PRESSURE: 140 MMHG | TEMPERATURE: 97.4 F | RESPIRATION RATE: 16 BRPM | HEART RATE: 60 BPM | WEIGHT: 185 LBS | BODY MASS INDEX: 32.58 KG/M2 | DIASTOLIC BLOOD PRESSURE: 80 MMHG

## 2021-11-16 DIAGNOSIS — J01.00 ACUTE NON-RECURRENT MAXILLARY SINUSITIS: ICD-10-CM

## 2021-11-16 DIAGNOSIS — R50.9 FEVER OF UNKNOWN ORIGIN: Primary | ICD-10-CM

## 2021-11-16 DIAGNOSIS — R05.9 COUGH: ICD-10-CM

## 2021-11-16 DIAGNOSIS — R11.0 NAUSEA: ICD-10-CM

## 2021-11-16 LAB
DEPRECATED S PYO AG THROAT QL EIA: NEGATIVE
FLUAV AG SPEC QL IA: NEGATIVE
FLUBV AG SPEC QL IA: NEGATIVE
GROUP A STREP BY PCR: NOT DETECTED
SARS-COV-2 RNA RESP QL NAA+PROBE: NEGATIVE

## 2021-11-16 PROCEDURE — U0005 INFEC AGEN DETEC AMPLI PROBE: HCPCS | Performed by: PHYSICIAN ASSISTANT

## 2021-11-16 PROCEDURE — 87651 STREP A DNA AMP PROBE: CPT | Performed by: PHYSICIAN ASSISTANT

## 2021-11-16 PROCEDURE — U0003 INFECTIOUS AGENT DETECTION BY NUCLEIC ACID (DNA OR RNA); SEVERE ACUTE RESPIRATORY SYNDROME CORONAVIRUS 2 (SARS-COV-2) (CORONAVIRUS DISEASE [COVID-19]), AMPLIFIED PROBE TECHNIQUE, MAKING USE OF HIGH THROUGHPUT TECHNOLOGIES AS DESCRIBED BY CMS-2020-01-R: HCPCS | Performed by: PHYSICIAN ASSISTANT

## 2021-11-16 PROCEDURE — 87804 INFLUENZA ASSAY W/OPTIC: CPT | Performed by: PHYSICIAN ASSISTANT

## 2021-11-16 PROCEDURE — 99214 OFFICE O/P EST MOD 30 MIN: CPT | Mod: 95 | Performed by: PHYSICIAN ASSISTANT

## 2021-11-16 RX ORDER — ONDANSETRON 4 MG/1
4 TABLET, ORALLY DISINTEGRATING ORAL EVERY 8 HOURS PRN
Qty: 8 TABLET | Refills: 0 | Status: SHIPPED | OUTPATIENT
Start: 2021-11-16 | End: 2022-07-20

## 2021-11-16 RX ORDER — CODEINE PHOSPHATE AND GUAIFENESIN 10; 100 MG/5ML; MG/5ML
1-2 SOLUTION ORAL
Qty: 60 ML | Refills: 0 | Status: SHIPPED | OUTPATIENT
Start: 2021-11-16 | End: 2022-04-06

## 2021-11-16 RX ORDER — CEFDINIR 300 MG/1
300 CAPSULE ORAL 2 TIMES DAILY
Qty: 14 CAPSULE | Refills: 0 | Status: SHIPPED | OUTPATIENT
Start: 2021-11-16 | End: 2022-04-06

## 2021-11-16 RX ORDER — HYDRALAZINE HYDROCHLORIDE 50 MG/1
75 TABLET, FILM COATED ORAL 3 TIMES DAILY
Qty: 90 TABLET | Refills: 3 | Status: SHIPPED | OUTPATIENT
Start: 2021-11-16 | End: 2022-02-16

## 2021-11-16 NOTE — PROGRESS NOTES
(R50.9) Fever of unknown origin  (primary encounter diagnosis)  Comment: swabs pending.  Plan: Streptococcus A Rapid Screen w/Reflex to PCR -         Clinic Collect, Influenza A/B antigen,         Symptomatic COVID-19 Virus (Coronavirus) by PCR        Nose, Group A Streptococcus PCR Throat Swab          (J01.00) Acute non-recurrent maxillary sinusitis  Comment: begin antibiotics.   Plan: cefdinir (OMNICEF) 300 MG capsule          (R05.9) Cough  Comment:   Plan: guaiFENesin-codeine (ROBITUSSIN AC) 100-10         MG/5ML solution          (R11.0) Nausea  Comment:   Plan: ondansetron (ZOFRAN-ODT) 4 MG ODT tab          Michael Adams PA-C      Subjective   Maricarmen is a 60 year old who presents for the following health issues:  HPI   Acute Illness  Acute illness concerns:  Onset/Duration: 7 days  Symptoms:  Fever: YES--100-101. No fevers today  Chills/Sweats: YES  Headache (location?): YES  Sinus Pressure: YES  Conjunctivitis:  YES  Ear Pain: YES: bilateral  Rhinorrhea: YES  Congestion: YES  Sore Throat: YES  Cough: YES-non-productive  Cough--intermittent   Sob--rest and with activity  Wheeze: YES  Decreased Appetite: YES  Nausea: YES  Vomiting: YES--recent  Diarrhea: YES-recent  Dysuria/Freq.: no  Dysuria or Hematuria: no  Fatigue/Achiness: YES--achy  Sick/Strep Exposure: no  Therapies tried and outcome: Robitussin DM, Mucinex, Cough drops and tylenol  Nephew sick one week ago.   No work outside of home.  covid vaccinated.    Review of Systems   Constitutional, HEENT, cardiovascular, pulmonary, gi and gu systems are negative, except as otherwise noted.      Objective    BP (!) 140/80   Pulse 60   Temp 97.4  F (36.3  C) (Tympanic)   Resp 16   Wt 83.9 kg (185 lb)   LMP 01/01/1999   SpO2 97%   BMI 32.58 kg/m    Body mass index is 32.58 kg/m .  Physical Exam   GENERAL: alert and no distress  Patient left after swabs and phone call made.     Results for orders placed or performed in visit on 11/16/21   Streptococcus A  Rapid Screen w/Reflex to PCR - Clinic Collect     Status: Normal    Specimen: Throat; Swab   Result Value Ref Range    Group A Strep antigen Negative Negative   Influenza A/B antigen     Status: Normal    Specimen: Nose; Swab   Result Value Ref Range    Influenza A antigen Negative Negative    Influenza B antigen Negative Negative    Narrative    Test results must be correlated with clinical data. If necessary, results should be confirmed by a molecular assay or viral culture.

## 2021-11-18 ENCOUNTER — MYC MEDICAL ADVICE (OUTPATIENT)
Dept: PEDIATRICS | Facility: CLINIC | Age: 61
End: 2021-11-18
Payer: COMMERCIAL

## 2021-11-18 ENCOUNTER — MYC MEDICAL ADVICE (OUTPATIENT)
Dept: EDUCATION SERVICES | Facility: CLINIC | Age: 61
End: 2021-11-18
Payer: COMMERCIAL

## 2021-11-19 NOTE — TELEPHONE ENCOUNTER
Diabetes Follow-up    Subjective/Objective:    Maricarmen Dotson sent in blood glucose log for review. Last date of communication was: 10/5/21.    Diabetes is being managed with   Diabetes Medications   Diabetes Medication(s)     Insulin       insulin aspart (NOVOLOG VIAL) 100 UNITS/ML vial    USE IN INSULIN PUMP, TOTAL DAILY DOSE 100 UNITS.          BG/Food Log:       Assessment:  Unable to review omni pod data due to no up to date.  Patient is having lows.       Plan/Response:  Confirm patient can take meal boluses for carbohydrates eaten.  IF so ok to go without pod.    Would recommend uploading/syncing pdm to [facility] for review of insulin taken the past 2 days.     Alecia Land MS, RD, LD, CDE      Any diabetes medication dose changes were made via the CDE Protocol and Collaborative Practice Agreement with the patient's primary care provider. A copy of this encounter was shared with the provider.

## 2021-11-23 ENCOUNTER — LAB (OUTPATIENT)
Dept: LAB | Facility: CLINIC | Age: 61
End: 2021-11-23
Payer: COMMERCIAL

## 2021-11-23 DIAGNOSIS — Z79.4 TYPE 2 DIABETES MELLITUS WITH HYPERGLYCEMIA, WITH LONG-TERM CURRENT USE OF INSULIN (H): ICD-10-CM

## 2021-11-23 DIAGNOSIS — E11.65 TYPE 2 DIABETES MELLITUS WITH HYPERGLYCEMIA, WITH LONG-TERM CURRENT USE OF INSULIN (H): ICD-10-CM

## 2021-11-23 DIAGNOSIS — Z00.00 ROUTINE GENERAL MEDICAL EXAMINATION AT A HEALTH CARE FACILITY: ICD-10-CM

## 2021-11-23 LAB
CHOLEST SERPL-MCNC: 182 MG/DL
FASTING STATUS PATIENT QL REPORTED: YES
HBA1C MFR BLD: 8.2 % (ref 0–5.6)
HDLC SERPL-MCNC: 47 MG/DL
LDLC SERPL CALC-MCNC: 108 MG/DL
NONHDLC SERPL-MCNC: 135 MG/DL
TRIGL SERPL-MCNC: 136 MG/DL

## 2021-11-23 PROCEDURE — 83036 HEMOGLOBIN GLYCOSYLATED A1C: CPT

## 2021-11-23 PROCEDURE — 80061 LIPID PANEL: CPT

## 2021-11-23 PROCEDURE — 36415 COLL VENOUS BLD VENIPUNCTURE: CPT

## 2021-11-29 ENCOUNTER — MYC MEDICAL ADVICE (OUTPATIENT)
Dept: PEDIATRICS | Facility: CLINIC | Age: 61
End: 2021-11-29
Payer: COMMERCIAL

## 2021-11-29 ENCOUNTER — APPOINTMENT (OUTPATIENT)
Dept: URGENT CARE | Facility: CLINIC | Age: 61
End: 2021-11-29
Payer: COMMERCIAL

## 2021-11-29 DIAGNOSIS — B37.31 VAGINAL CANDIDIASIS: ICD-10-CM

## 2021-11-29 RX ORDER — FLUCONAZOLE 150 MG/1
TABLET ORAL
Qty: 2 TABLET | Refills: 0 | Status: SHIPPED | OUTPATIENT
Start: 2021-11-29 | End: 2022-04-06

## 2021-11-29 NOTE — TELEPHONE ENCOUNTER
Please review pt's  message.     Pt had a VV with Mary Ann on 11/16/21 for URI sx's & treated with omnicef 300 mg bid x 7 days. I see that pt has been treated with fluconazole 150 mg 1 & 3 doses in the past(10/5/16, 10/9/14, 5/19/14, 5/16/14 & 7/10/11.     Pended med(3 tabs), please review & sign, if appropriate. Thanks.    Clem RN  Patient Advocate Liason (PAL)  [facility]

## 2021-12-06 ENCOUNTER — ALLIED HEALTH/NURSE VISIT (OUTPATIENT)
Dept: EDUCATION SERVICES | Facility: CLINIC | Age: 61
End: 2021-12-06
Payer: COMMERCIAL

## 2021-12-06 DIAGNOSIS — Z79.4 TYPE 2 DIABETES MELLITUS WITH HYPERGLYCEMIA, WITH LONG-TERM CURRENT USE OF INSULIN (H): Primary | ICD-10-CM

## 2021-12-06 DIAGNOSIS — E11.65 TYPE 2 DIABETES MELLITUS WITH HYPERGLYCEMIA, WITH LONG-TERM CURRENT USE OF INSULIN (H): Primary | ICD-10-CM

## 2021-12-06 PROCEDURE — G0108 DIAB MANAGE TRN  PER INDIV: HCPCS

## 2021-12-06 NOTE — PROGRESS NOTES
"Diabetes Self-Management Education & Support    Presents for: Insulin Pump and CGM Review    SUBJECTIVE/OBJECTIVE:  Presents for: Insulin Pump and CGM Review  Accompanied by: Self,Daughter  Diabetes education in the past 24mo: Yes  Focus of Visit: CGM,Insulin Pump  Type of Pump visit: Pump Review  Type of CGM visit: Personal CGM Follow-up  Diabetes type: Type 2  Disease course: Worsening  Diabetes management related comments/concerns: would like to add more basal rates  Transportation concerns: No  Difficulty affording diabetes medication?: Sometimes  Difficulty affording diabetes testing supplies?: Sometimes  Other concerns:: Cognitive impairment  Cultural Influences/Ethnic Background:   /     Diabetes Symptoms & Complications:  Fatigue: Yes  Neuropathy: Yes  Polydipsia: No  Polyphagia: No  Polyuria: No  Visual change: No  Slow healing wounds: No  Complications assessed today?: No  Autonomic neuropathy: Yes  CVA: No  Heart disease: No  Nephropathy: No  Peripheral neuropathy: Yes  Foot ulcerations: No  Peripheral Vascular Disease: No  Retinopathy: No  Sexual dysfunction: Yes    Patient Problem List and Family Medical History reviewed for relevant medical history, current medical status, and diabetes risk factors.    Vitals:  [facility] 01/01/1999   Estimated body mass index is 32.58 kg/m  as calculated from the following:    Height as of 11/10/21: 1.605 m (5' 3.19\").    Weight as of 11/16/21: 83.9 kg (185 lb).   Last 3 BP:   BP Readings from Last 3 Encounters:   11/16/21 (!) 140/80   11/15/21 (!) 159/80   11/10/21 (!) 160/85       History   Smoking Status     Never Smoker   Smokeless Tobacco     Never Used       Labs:  Lab Results   Component Value Date    A1C 8.2 11/23/2021    A1C 7.4 09/18/2020     Lab Results   Component Value Date     08/04/2021     01/20/2021     Lab Results   Component Value Date     11/23/2021     09/18/2020     HDL Cholesterol   Date Value Ref Range Status "   09/18/2020 51 >49 mg/dL Final     Direct Measure HDL   Date Value Ref Range Status   11/23/2021 47 (L) >=50 mg/dL Final   ]  GFR Estimate   Date Value Ref Range Status   08/04/2021 60 (L) >60 mL/min/1.73m2 Final     Comment:     As of July 11, 2021, eGFR is calculated by the CKD-EPI creatinine equation, without race adjustment. eGFR can be influenced by muscle mass, exercise, and diet. The reported eGFR is an estimation only and is only applicable if the renal function is stable.   01/20/2021 84 >60 mL/min/[1.73_m2] Final     Comment:     Non  GFR Calc  Starting 12/18/2018, serum creatinine based estimated GFR (eGFR) will be   calculated using the Chronic Kidney Disease Epidemiology Collaboration   (CKD-EPI) equation.       GFR Estimate If Black   Date Value Ref Range Status   01/20/2021 >90 >60 mL/min/[1.73_m2] Final     Comment:      GFR Calc  Starting 12/18/2018, serum creatinine based estimated GFR (eGFR) will be   calculated using the Chronic Kidney Disease Epidemiology Collaboration   (CKD-EPI) equation.       Lab Results   Component Value Date    CR 1.02 08/04/2021    CR 0.77 01/20/2021     No results found for: MICROALBUMIN    Healthy Eating:  Healthy Eating Assessed Today: No  Cultural/Moravian diet restrictions?: No    Being Active:  Being Active Assessed Today: No    Monitoring:  Monitoring Assessed Today: Yes    Taking Medications:  Diabetes Medication(s)     Insulin       insulin aspart (NOVOLOG VIAL) 100 UNITS/ML vial    USE IN INSULIN PUMP, TOTAL DAILY DOSE 100 UNITS.          Taking Medication Assessed Today: Yes  Current Treatments: Insulin Pump  Given by: Patient  Injection/Infusion sites: Abdomen  Problems taking diabetes medications regularly?: No  Diabetes medication side effects?: No    Problem Solving:  Problem Solving Assessed Today: Yes  Hypoglycemia Frequency: Other    Reducing Risks:  Reducing Risks Assessed Today: No    Healthy Coping:  Healthy Coping  Assessed Today: Yes  Emotional response to diabetes: Other  Patient Activation Measure Survey Score:  JEFFERY Score (Last Two) 10/18/2013 4/22/2020   JEFFERY Raw Score 42 28   Activation Score 66 50   JEFFERY Level 3 2       Diabetes knowledge and skills assessment:   Patient is knowledgeable in diabetes management concepts related to: na    Patient needs further education on the following diabetes management concepts: Insulin Pump Concepts Balancing glucose and insulin    Based on learning assessment above, most appropriate setting for further diabetes education would be: Individual setting.      INTERVENTIONS:  Reports:                          Education provided today on:  AADE Self-Care Behaviors:  Monitoring: frequency of monitoring  Taking Medication: when to take medications    Education specific to insulin pump provided today on:   how to use a temporary basal rate, importance of bolusing before meals and cahnging basal patterns    Opportunities for ongoing education and support in diabetes-self management were discussed.    Pt verbalized understanding of concepts discussed and recommendations provided today.       Education Materials Provided:  No new materials provided today    ASSESSMENT  Review of CGM report. Glucose overall average 210mg/dl,  in target 39%, above target 61% and below target 0% of the time.   Patient reports struggles with binge eating, she does not always bolus when eating. She states she is working on it. She requests additional basal programs to use as needed.      Changes made to pump settings: added 2 basal programs and renumbered to keep in order from lowest basal rates to highest basal rates.    Basal 1:  12am:1.05  6am: 0.60  12pm: 0.65  6pm: 0.75    Basal 2:  12am:1.3  6am: 0.75  12pm: 0.80  6pm: 0.95    Basal 3:  12am:1.4  6am: 0.85  12pm: 0.9  6pm: 1.05    Basal 4:  12am:1.5  6am: 0.95  12pm: 1.00  6pm: 1.15    Basal 5:  12am:1.6  6am: 1.05  12pm: 1.1  6pm: 1.25      PLAN  See Patient  Instructions for co-developed, patient-stated behavior change goals.  AVS printed and provided to patient today. See Follow-Up section for recommended follow-up.    Deanna Bolton RN, [facility]    Time Spent: 30 minutes  Encounter Type: Individual    Any diabetes medication dose changes were made via the CDE Protocol and Collaborative Practice Agreement with the patient's referring provider. A copy of this encounter was shared with the provider.

## 2021-12-06 NOTE — LETTER
"    12/6/2021         RE: Maricarmen Dotson  [address]  [place] [address]        Dear Colleague,    Thank you for referring your patient, Maricarmen Dotson, to the [facility]. Please see a copy of my visit note below.    Diabetes Self-Management Education & Support    Presents for: Insulin Pump and CGM Review    SUBJECTIVE/OBJECTIVE:  Presents for: Insulin Pump and CGM Review  Accompanied by: Self,Daughter  Diabetes education in the past 24mo: Yes  Focus of Visit: CGM,Insulin Pump  Type of Pump visit: Pump Review  Type of CGM visit: Personal CGM Follow-up  Diabetes type: Type 2  Disease course: Worsening  Diabetes management related comments/concerns: would like to add more basal rates  Transportation concerns: No  Difficulty affording diabetes medication?: Sometimes  Difficulty affording diabetes testing supplies?: Sometimes  Other concerns:: Cognitive impairment  Cultural Influences/Ethnic Background:   /     Diabetes Symptoms & Complications:  Fatigue: Yes  Neuropathy: Yes  Polydipsia: No  Polyphagia: No  Polyuria: No  Visual change: No  Slow healing wounds: No  Complications assessed today?: No  Autonomic neuropathy: Yes  CVA: No  Heart disease: No  Nephropathy: No  Peripheral neuropathy: Yes  Foot ulcerations: No  Peripheral Vascular Disease: No  Retinopathy: No  Sexual dysfunction: Yes    Patient Problem List and Family Medical History reviewed for relevant medical history, current medical status, and diabetes risk factors.    Vitals:  [facility] 01/01/1999   Estimated body mass index is 32.58 kg/m  as calculated from the following:    Height as of 11/10/21: 1.605 m (5' 3.19\").    Weight as of 11/16/21: 83.9 kg (185 lb).   Last 3 BP:   BP Readings from Last 3 Encounters:   11/16/21 (!) 140/80   11/15/21 (!) 159/80   11/10/21 (!) 160/85       History   Smoking Status     Never Smoker   Smokeless Tobacco     Never Used       Labs:  Lab Results   Component Value Date    A1C " 8.2 11/23/2021    A1C 7.4 09/18/2020     Lab Results   Component Value Date     08/04/2021     01/20/2021     Lab Results   Component Value Date     11/23/2021     09/18/2020     HDL Cholesterol   Date Value Ref Range Status   09/18/2020 51 >49 mg/dL Final     Direct Measure HDL   Date Value Ref Range Status   11/23/2021 47 (L) >=50 mg/dL Final   ]  GFR Estimate   Date Value Ref Range Status   08/04/2021 60 (L) >60 mL/min/1.73m2 Final     Comment:     As of July 11, 2021, eGFR is calculated by the CKD-EPI creatinine equation, without race adjustment. eGFR can be influenced by muscle mass, exercise, and diet. The reported eGFR is an estimation only and is only applicable if the renal function is stable.   01/20/2021 84 >60 mL/min/[1.73_m2] Final     Comment:     Non  GFR Calc  Starting 12/18/2018, serum creatinine based estimated GFR (eGFR) will be   calculated using the Chronic Kidney Disease Epidemiology Collaboration   (CKD-EPI) equation.       GFR Estimate If Black   Date Value Ref Range Status   01/20/2021 >90 >60 mL/min/[1.73_m2] Final     Comment:      GFR Calc  Starting 12/18/2018, serum creatinine based estimated GFR (eGFR) will be   calculated using the Chronic Kidney Disease Epidemiology Collaboration   (CKD-EPI) equation.       Lab Results   Component Value Date    CR 1.02 08/04/2021    CR 0.77 01/20/2021     No results found for: MICROALBUMIN    Healthy Eating:  Healthy Eating Assessed Today: No  Cultural/Oriental orthodox diet restrictions?: No    Being Active:  Being Active Assessed Today: No    Monitoring:  Monitoring Assessed Today: Yes    Taking Medications:  Diabetes Medication(s)     Insulin       insulin aspart (NOVOLOG VIAL) 100 UNITS/ML vial    USE IN INSULIN PUMP, TOTAL DAILY DOSE 100 UNITS.          Taking Medication Assessed Today: Yes  Current Treatments: Insulin Pump  Given by: Patient  Injection/Infusion sites: Abdomen  Problems taking  diabetes medications regularly?: No  Diabetes medication side effects?: No    Problem Solving:  Problem Solving Assessed Today: Yes  Hypoglycemia Frequency: Other    Reducing Risks:  Reducing Risks Assessed Today: No    Healthy Coping:  Healthy Coping Assessed Today: Yes  Emotional response to diabetes: Other  Patient Activation Measure Survey Score:  JEFFERY Score (Last Two) 10/18/2013 4/22/2020   JEFFERY Raw Score 42 28   Activation Score 66 50   JEFFERY Level 3 2       Diabetes knowledge and skills assessment:   Patient is knowledgeable in diabetes management concepts related to: na    Patient needs further education on the following diabetes management concepts: Insulin Pump Concepts Balancing glucose and insulin    Based on learning assessment above, most appropriate setting for further diabetes education would be: Individual setting.      INTERVENTIONS:  Reports:                          Education provided today on:  AADE Self-Care Behaviors:  Monitoring: frequency of monitoring  Taking Medication: when to take medications    Education specific to insulin pump provided today on:   how to use a temporary basal rate, importance of bolusing before meals and cahnging basal patterns    Opportunities for ongoing education and support in diabetes-self management were discussed.    Pt verbalized understanding of concepts discussed and recommendations provided today.       Education Materials Provided:  No new materials provided today    ASSESSMENT  Review of CGM report. Glucose overall average 210mg/dl,  in target 39%, above target 61% and below target 0% of the time.   Patient reports struggles with binge eating, she does not always bolus when eating. She states she is working on it. She requests additional basal programs to use as needed.      Changes made to pump settings: added 2 basal programs and renumbered to keep in order from lowest basal rates to highest basal rates.    Basal 1:  12am:1.05  6am: 0.60  12pm: 0.65  6pm:  0.75    Basal 2:  12am:1.3  6am: 0.75  12pm: 0.80  6pm: 0.95    Basal 3:  12am:1.4  6am: 0.85  12pm: 0.9  6pm: 1.05    Basal 4:  12am:1.5  6am: 0.95  12pm: 1.00  6pm: 1.15    Basal 5:  12am:1.6  6am: 1.05  12pm: 1.1  6pm: 1.25      PLAN  See Patient Instructions for co-developed, patient-stated behavior change goals.  AVS printed and provided to patient today. See Follow-Up section for recommended follow-up.    Deanna Bolton RN, [facility]    Time Spent: 30 minutes  Encounter Type: Individual    Any diabetes medication dose changes were made via the CDE Protocol and Collaborative Practice Agreement with the patient's referring provider. A copy of this encounter was shared with the provider.

## 2021-12-20 ENCOUNTER — MYC MEDICAL ADVICE (OUTPATIENT)
Dept: CARDIOLOGY | Facility: CLINIC | Age: 61
End: 2021-12-20
Payer: COMMERCIAL

## 2022-01-07 ENCOUNTER — E-VISIT (OUTPATIENT)
Dept: PEDIATRICS | Facility: CLINIC | Age: 62
End: 2022-01-07
Payer: COMMERCIAL

## 2022-01-07 ENCOUNTER — OFFICE VISIT (OUTPATIENT)
Dept: PEDIATRICS | Facility: CLINIC | Age: 62
End: 2022-01-07
Payer: COMMERCIAL

## 2022-01-07 VITALS
DIASTOLIC BLOOD PRESSURE: 80 MMHG | RESPIRATION RATE: 16 BRPM | WEIGHT: 180 LBS | HEART RATE: 71 BPM | SYSTOLIC BLOOD PRESSURE: 142 MMHG | BODY MASS INDEX: 31.89 KG/M2 | OXYGEN SATURATION: 96 % | TEMPERATURE: 97.5 F | HEIGHT: 63 IN

## 2022-01-07 DIAGNOSIS — R06.09 DYSPNEA ON EXERTION: ICD-10-CM

## 2022-01-07 DIAGNOSIS — R09.81 NASAL CONGESTION: ICD-10-CM

## 2022-01-07 DIAGNOSIS — I10 HYPERTENSION GOAL BP (BLOOD PRESSURE) < 140/90: ICD-10-CM

## 2022-01-07 DIAGNOSIS — Z79.4 TYPE 2 DIABETES MELLITUS WITH HYPERGLYCEMIA, WITH LONG-TERM CURRENT USE OF INSULIN (H): ICD-10-CM

## 2022-01-07 DIAGNOSIS — J02.9 SORE THROAT: ICD-10-CM

## 2022-01-07 DIAGNOSIS — R05.9 COUGH: Primary | ICD-10-CM

## 2022-01-07 DIAGNOSIS — H93.8X3 PLUGGED FEELING IN EAR, BILATERAL: ICD-10-CM

## 2022-01-07 DIAGNOSIS — E11.9 TYPE 2 DIABETES, HBA1C GOAL < 7% (H): ICD-10-CM

## 2022-01-07 DIAGNOSIS — J32.9 RECURRENT SINUS INFECTIONS: ICD-10-CM

## 2022-01-07 DIAGNOSIS — E11.65 TYPE 2 DIABETES MELLITUS WITH HYPERGLYCEMIA, WITH LONG-TERM CURRENT USE OF INSULIN (H): ICD-10-CM

## 2022-01-07 LAB
DEPRECATED S PYO AG THROAT QL EIA: NEGATIVE
FLUAV AG SPEC QL IA: NEGATIVE
FLUBV AG SPEC QL IA: NEGATIVE
GROUP A STREP BY PCR: NOT DETECTED

## 2022-01-07 PROCEDURE — 99214 OFFICE O/P EST MOD 30 MIN: CPT | Performed by: NURSE PRACTITIONER

## 2022-01-07 PROCEDURE — U0005 INFEC AGEN DETEC AMPLI PROBE: HCPCS | Performed by: NURSE PRACTITIONER

## 2022-01-07 PROCEDURE — 87804 INFLUENZA ASSAY W/OPTIC: CPT | Performed by: NURSE PRACTITIONER

## 2022-01-07 PROCEDURE — 99207 PR NO BILLABLE SERVICE THIS VISIT: CPT | Performed by: INTERNAL MEDICINE

## 2022-01-07 PROCEDURE — 87651 STREP A DNA AMP PROBE: CPT | Performed by: NURSE PRACTITIONER

## 2022-01-07 PROCEDURE — U0003 INFECTIOUS AGENT DETECTION BY NUCLEIC ACID (DNA OR RNA); SEVERE ACUTE RESPIRATORY SYNDROME CORONAVIRUS 2 (SARS-COV-2) (CORONAVIRUS DISEASE [COVID-19]), AMPLIFIED PROBE TECHNIQUE, MAKING USE OF HIGH THROUGHPUT TECHNOLOGIES AS DESCRIBED BY CMS-2020-01-R: HCPCS | Performed by: NURSE PRACTITIONER

## 2022-01-07 RX ORDER — ALBUTEROL SULFATE 90 UG/1
2 AEROSOL, METERED RESPIRATORY (INHALATION) EVERY 6 HOURS
Qty: 18 G | Refills: 1 | Status: SHIPPED | OUTPATIENT
Start: 2022-01-07 | End: 2022-07-20

## 2022-01-07 ASSESSMENT — MIFFLIN-ST. JEOR: SCORE: 1358.61

## 2022-01-07 NOTE — TELEPHONE ENCOUNTER
Called pt at [phone].     Pt has URI sx's for 5-6 days, getting worse. Has wet cough with green phlegm, tiredness, SOB with exertion(walking or any small activities), ears(bilateral) are full and ringing, mild chest tightness & sinus pressure in her bilateral cheeks, forehead & teeth. Her O2 sat has been > 94%.     Covid test on 1/3 came back negative, denies any known covid exposure. Baby sits granddaughter weekly who goes to . Didn't babysit granddaughter this week because of she is sick with RSV.     Denies fever, chills, trouble breathing/swallowing, hives, dizziness or resp distress. No SOB while resting.     Scheduled an OV with Jory Hinds for an eval.     Clem RN  Patient Advocate Liason (PAL)  [facility]

## 2022-01-07 NOTE — PROGRESS NOTES
Assessment & Plan   Cough  Dyspnea on exertion  Sore throat  Nasal congestion  Had negative at home COVID test but will repeat PCR testing to r/o as cause. Albuterol has been helpful in the past, will trial this again. If she continues to have bronchitis/wheeze/chest tightness to consider daily steroid inhaler but she is hesitant to do this right now. Reassured lungs are clear, O2 sats stable, and afebrile-suspect viral and abx not indicated.  - Influenza A & B Antigen - Clinic Collect  - Symptomatic; Yes COVID-19 Virus (Coronavirus) by PCR; Future  - Symptomatic; Yes COVID-19 Virus (Coronavirus) by PCR Nose  - albuterol (PROAIR HFA/PROVENTIL HFA/VENTOLIN HFA) 108 (90 Base) MCG/ACT inhaler; Inhale 2 puffs into the lungs every 6 hours    Type 2 diabetes, HbA1c goal < 7% (H)  Blood sugars are stable with illness.    Hypertension goal BP (blood pressure) < 140/90  Slightly elevated, suspect secondary to illness.    Recurrent sinus infections  Plugged feeling in ear, bilateral  Already has referral to ENT for this, given information again.      Patient Instructions   [facility]  [email or web address]  [address] [place] [address]   ~10.5 mi  [phone]    Use your albuterol inhaler scheduled every 4 -6 hours for the next 2-3 days, then use every 4-6 hours as needed for cough, wheeze, or shortness of breath.      Discharge Instructions for COVID-19 Patients  You have--or may have--COVID-19. Please follow the instructions listed below.   If you have a weakened immune system, discuss with your doctor any other actions you need to take.  How can I protect others?  If you have symptoms (fever, cough, body aches or trouble breathing):    Stay home and away from others (self-isolate) until:  ? Your other symptoms have resolved (gotten better). And   ? You've had no fever--and no medicine that reduces fever--for 1 full day (24 hours). And   ? At least 10 days have passed since your symptoms started.  "(You may need to wait 20 days. Follow the advice of your care team.)  If you don't show symptoms, but testing showed that you have COVID-19:    Stay home and away from others (self-isolate) until at least 10 days have passed since the date of your first positive COVID-19 test.  During this time    Stay in your own room, even for meals. Use your own bathroom if you can.    Stay away from others in your home. No hugging, kissing or shaking hands. No visitors.    Don't go to work, school or anywhere else.    Clean \"high touch\" surfaces often (doorknobs, counters, handles). Use household cleaning spray or wipes.    You'll find a full list of  on the EPA website: www.epa.gov/pesticide-registration/list-n-disinfectants-use-against-sars-cov-2.    Cover your mouth and nose with a mask or other face covering to avoid spreading germs.    Wash your hands and face often. Use soap and water.    Caregivers in these groups are at risk for severe illness due to COVID-19:  ? People 65 years and older  ? People who live in a nursing home or long-term care facility  ? People with chronic disease (lung, heart, cancer, diabetes, kidney, liver, immunologic)  ? People who have a weakened immune system, including those who:    Are in cancer treatment    Take medicine that weakens the immune system, such as corticosteroids    Had a bone marrow or organ transplant    Have an immune deficiency    Have poorly controlled HIV or AIDS    Are obese (body mass index of 40 or higher)    Smoke regularly    Caregivers should wear gloves while washing dishes, handling laundry and cleaning bedrooms and bathrooms.    Use caution when washing and drying laundry: Don't shake dirty laundry and use the warmest water setting that you can.    For more tips on managing your health at home, go to www.cdc.gov/coronavirus/2019-ncov/downloads/10Things.pdf.  How can I take care of myself at home?  1. Get lots of rest. Drink extra fluids (unless a doctor has " told you not to).  2. Take Tylenol (acetaminophen) for fever or pain. If you have liver or kidney problems, ask your family doctor if it's okay to take Tylenol.   Adults can take either:   ? 650 mg (two 325 mg pills) every 4 to 6 hours, or   ? 1,000 mg (two 500 mg pills) every 8 hours as needed.  ? Note: Don't take more than 3,000 mg in one day. Acetaminophen is found in many medicines (both prescribed and over-the-counter medicines). Read all labels to be sure you don't take too much.   For children, check the Tylenol bottle for the right dose. The dose is based on the child's age or weight.  3. If you have other health problems (like cancer, heart failure, an organ transplant or severe kidney disease): Call your specialty clinic if you don't feel better in the next 2 days.  4. Know when to call 911. Emergency warning signs include:  ? Trouble breathing or shortness of breath  ? Pain or pressure in the chest that doesn't go away  ? Feeling confused like you haven't felt before, or not being able to wake up  ? Bluish-colored lips or face  5. Your doctor may have prescribed a blood thinner medicine. Follow their instructions.  Where can I get more information?    [facility] - About COVID-19:   [email or web address]    CDC - What to Do If You're Sick: www.cdc.gov/coronavirus/2019-ncov/about/steps-when-sick.html    CDC - Ending Home Isolation: www.cdc.gov/coronavirus/2019-ncov/hcp/disposition-in-home-patients.html    CDC - Caring for Someone: www.cdc.gov/coronavirus/2019-ncov/if-you-are-sick/care-for-someone.html    [facility] - Interim Guidance for Hospital Discharge to Home: www.health.[facility].[place].us/diseases/coronavirus/hcp/hospdischarge.pdf    Below are the COVID-19 hotlines at the [facility] ([facility]). Interpreters are available.  ? For health questions: Call 740-213-0085 or 1-428.128.5662 (7 a.m. to 7 p.m.)  ? For questions about schools and childcare: Call 123-306-3490 or  "1-344.720.4822 (7 a.m. to 7 p.m.)    For informational purposes only. Not to replace the advice of your health care provider. Clinically reviewed by Dr. Kvng Hart.   Copyright   2020 [facility]. All rights reserved. [employer] [id number] - REV 01/05/21.          Return in about 3 days (around 1/10/2022), or if symptoms worsen or fail to improve.    Jory Hinds NP  [facility]    3rd infection this year    Subjective   Maricarmen is a 61 year old who presents for the following health issues     HPI   Acute Illness  Acute illness concerns: cough  Onset/Duration: 6 days   Symptoms:  Fever: YES- low grade at night   Chills/Sweats: YES  Headache (location?): YES  Sinus Pressure: YES  Conjunctivitis:  YES  Ear Pain: YES: both  Rhinorrhea: YES  Congestion: YES  Sore Throat: no  Cough: YES-non-productive  Wheeze: no  Decreased Appetite: YES  Nausea: no  Vomiting: no  Diarrhea: no  Dysuria/Freq.: no  Dysuria or Hematuria: no  Fatigue/Achiness: YES  Sick/Strep Exposure: YES- grandson has RSV   Therapies tried and outcome: otc meds help a bit     At home COVID test negative 1/4/21  Started as sore throat then developed cough and nasal congestion  Both ears painful and plugged  Notes chest feels tight, some dyspnea on exertion-has had similar symptoms with bronchitis in past  Has had x3 sinus infections in the past 1 year and x3 episodes of bronchitis with chest tightness    Review of Systems   Constitutional, HEENT, cardiovascular, pulmonary, gi and gu systems are negative, except as otherwise noted.      Objective    BP (!) 142/80 (BP Location: Right arm, Patient Position: Chair, Cuff Size: Adult Regular)   Pulse 71   Temp 97.5  F (36.4  C) (Tympanic)   Resp 16   Ht 1.605 m (5' 3.19\")   Wt 81.6 kg (180 lb)   LMP 01/01/1999   SpO2 96%   BMI 31.69 kg/m    Body mass index is 31.69 kg/m .  Physical Exam   GENERAL: healthy, alert and no distress  EYES: Eyes grossly normal to " inspection  HENT: right ear: clear effusion, left ear: normal: no effusions, no erythema, normal landmarks, nose without ulcers or lesions, oropharynx clear and oral mucous membranes moist. Right tonsils with scattered pinpoint areas of exudate  RESP: lungs clear to auscultation - no rales, rhonchi or wheezes. Infrequent dry cough.  CV: regular rate and rhythm, normal S1 S2, no S3 or S4, no murmur, click or rub    No results found. However, due to the size of the patient record, not all encounters were searched. Please check Results Review for a complete set of results.

## 2022-01-07 NOTE — TELEPHONE ENCOUNTER
Please call and triage patient. Based on her reports of significant shortness of breath she needs triage and likely a virtual or in person visit depending on triage assessment. No charge for Evisit.    Madyson Mclaughlin MD  Internal Medicine-Pediatrics

## 2022-01-07 NOTE — PATIENT INSTRUCTIONS
"[facility]  [email or web address]  [address], [place] [address]   ~10.5 mi  [phone]    Use your albuterol inhaler scheduled every 4 -6 hours for the next 2-3 days, then use every 4-6 hours as needed for cough, wheeze, or shortness of breath.      Discharge Instructions for COVID-19 Patients  You have--or may have--COVID-19. Please follow the instructions listed below.   If you have a weakened immune system, discuss with your doctor any other actions you need to take.  How can I protect others?  If you have symptoms (fever, cough, body aches or trouble breathing):    Stay home and away from others (self-isolate) until:  ? Your other symptoms have resolved (gotten better). And   ? You've had no fever--and no medicine that reduces fever--for 1 full day (24 hours). And   ? At least 10 days have passed since your symptoms started. (You may need to wait 20 days. Follow the advice of your care team.)  If you don't show symptoms, but testing showed that you have COVID-19:    Stay home and away from others (self-isolate) until at least 10 days have passed since the date of your first positive COVID-19 test.  During this time    Stay in your own room, even for meals. Use your own bathroom if you can.    Stay away from others in your home. No hugging, kissing or shaking hands. No visitors.    Don't go to work, school or anywhere else.    Clean \"high touch\" surfaces often (doorknobs, counters, handles). Use household cleaning spray or wipes.    You'll find a full list of  on the EPA website: www.epa.gov/pesticide-registration/list-n-disinfectants-use-against-sars-cov-2.    Cover your mouth and nose with a mask or other face covering to avoid spreading germs.    Wash your hands and face often. Use soap and water.    Caregivers in these groups are at risk for severe illness due to COVID-19:  ? People 65 years and older  ? People who live in a nursing home or long-term care facility  ? People " with chronic disease (lung, heart, cancer, diabetes, kidney, liver, immunologic)  ? People who have a weakened immune system, including those who:    Are in cancer treatment    Take medicine that weakens the immune system, such as corticosteroids    Had a bone marrow or organ transplant    Have an immune deficiency    Have poorly controlled HIV or AIDS    Are obese (body mass index of 40 or higher)    Smoke regularly    Caregivers should wear gloves while washing dishes, handling laundry and cleaning bedrooms and bathrooms.    Use caution when washing and drying laundry: Don't shake dirty laundry and use the warmest water setting that you can.    For more tips on managing your health at home, go to www.cdc.gov/coronavirus/2019-ncov/downloads/10Things.pdf.  How can I take care of myself at home?  1. Get lots of rest. Drink extra fluids (unless a doctor has told you not to).  2. Take Tylenol (acetaminophen) for fever or pain. If you have liver or kidney problems, ask your family doctor if it's okay to take Tylenol.   Adults can take either:   ? 650 mg (two 325 mg pills) every 4 to 6 hours, or   ? 1,000 mg (two 500 mg pills) every 8 hours as needed.  ? Note: Don't take more than 3,000 mg in one day. Acetaminophen is found in many medicines (both prescribed and over-the-counter medicines). Read all labels to be sure you don't take too much.   For children, check the Tylenol bottle for the right dose. The dose is based on the child's age or weight.  3. If you have other health problems (like cancer, heart failure, an organ transplant or severe kidney disease): Call your specialty clinic if you don't feel better in the next 2 days.  4. Know when to call 911. Emergency warning signs include:  ? Trouble breathing or shortness of breath  ? Pain or pressure in the chest that doesn't go away  ? Feeling confused like you haven't felt before, or not being able to wake up  ? Bluish-colored lips or face  5. Your doctor may have  prescribed a blood thinner medicine. Follow their instructions.  Where can I get more information?    [facility] - About COVID-19:   [email or web address]    CDC - What to Do If You're Sick: www.cdc.gov/coronavirus/2019-ncov/about/steps-when-sick.html    CDC - Ending Home Isolation: www.cdc.gov/coronavirus/2019-ncov/hcp/disposition-in-home-patients.html    [facility] - Caring for Someone: www.cdc.gov/coronavirus/2019-ncov/if-you-are-sick/care-for-someone.html    [facility] - Interim Guidance for Hospital Discharge to Home: www.health.[facility].mn./diseases/coronavirus/hcp/hospdischarge.pdf    Below are the COVID-19 hotlines at the [place] [facility] ([facility]). Interpreters are available.  ? For health questions: Call 999-153-7586 or 1-159.955.3887 (7 a.m. to 7 p.m.)  ? For questions about schools and childcare: Call 078-531-8026 or 1-479.241.3760 (7 a.m. to 7 p.m.)    For informational purposes only. Not to replace the advice of your health care provider. Clinically reviewed by Dr. Kvng Hart.   Copyright   2020 [employer]. All rights reserved. [employer] [id number] - REV 01/05/21.

## 2022-01-08 LAB — SARS-COV-2 RNA RESP QL NAA+PROBE: NEGATIVE

## 2022-01-10 ENCOUNTER — E-VISIT (OUTPATIENT)
Dept: PEDIATRICS | Facility: CLINIC | Age: 62
End: 2022-01-10
Payer: COMMERCIAL

## 2022-01-10 DIAGNOSIS — H10.33 ACUTE CONJUNCTIVITIS OF BOTH EYES, UNSPECIFIED ACUTE CONJUNCTIVITIS TYPE: Primary | ICD-10-CM

## 2022-01-10 DIAGNOSIS — R05.9 COUGH: ICD-10-CM

## 2022-01-10 PROCEDURE — 99421 OL DIG E/M SVC 5-10 MIN: CPT | Performed by: INTERNAL MEDICINE

## 2022-01-10 RX ORDER — BENZONATATE 200 MG/1
200 CAPSULE ORAL 3 TIMES DAILY PRN
Qty: 30 CAPSULE | Refills: 0 | Status: SHIPPED | OUTPATIENT
Start: 2022-01-10 | End: 2022-07-20

## 2022-01-10 RX ORDER — POLYMYXIN B SULFATE AND TRIMETHOPRIM 1; 10000 MG/ML; [USP'U]/ML
1-2 SOLUTION OPHTHALMIC 4 TIMES DAILY
Qty: 10 ML | Refills: 0 | Status: SHIPPED | OUTPATIENT
Start: 2022-01-10 | End: 2022-04-06

## 2022-01-25 ENCOUNTER — HOSPITAL ENCOUNTER (OUTPATIENT)
Dept: MAMMOGRAPHY | Facility: CLINIC | Age: 62
Discharge: HOME | End: 2022-01-25
Attending: INTERNAL MEDICINE | Admitting: INTERNAL MEDICINE
Payer: COMMERCIAL

## 2022-01-25 ENCOUNTER — TRANSFERRED RECORDS (OUTPATIENT)
Dept: HEALTH INFORMATION MANAGEMENT | Facility: CLINIC | Age: 62
End: 2022-01-25

## 2022-01-25 DIAGNOSIS — Z12.31 ENCOUNTER FOR SCREENING MAMMOGRAM FOR BREAST CANCER: ICD-10-CM

## 2022-01-25 LAB — RETINOPATHY: POSITIVE

## 2022-01-25 PROCEDURE — 77067 SCR MAMMO BI INCL CAD: CPT

## 2022-02-08 ENCOUNTER — VIRTUAL VISIT (OUTPATIENT)
Dept: BEHAVIORAL HEALTH | Facility: CLINIC | Age: 62
End: 2022-02-08
Payer: COMMERCIAL

## 2022-02-08 DIAGNOSIS — F43.10 POSTTRAUMATIC STRESS DISORDER: ICD-10-CM

## 2022-02-08 DIAGNOSIS — F33.1 MODERATE EPISODE OF RECURRENT MAJOR DEPRESSIVE DISORDER (H): Primary | ICD-10-CM

## 2022-02-08 PROCEDURE — 90837 PSYTX W PT 60 MINUTES: CPT | Mod: 95 | Performed by: SOCIAL WORKER

## 2022-02-08 ASSESSMENT — PATIENT HEALTH QUESTIONNAIRE - PHQ9
10. IF YOU CHECKED OFF ANY PROBLEMS, HOW DIFFICULT HAVE THESE PROBLEMS MADE IT FOR YOU TO DO YOUR WORK, TAKE CARE OF THINGS AT HOME, OR GET ALONG WITH OTHER PEOPLE: VERY DIFFICULT
SUM OF ALL RESPONSES TO PHQ QUESTIONS 1-9: 15
SUM OF ALL RESPONSES TO PHQ QUESTIONS 1-9: 15

## 2022-02-08 ASSESSMENT — COLUMBIA-SUICIDE SEVERITY RATING SCALE - C-SSRS
5. HAVE YOU STARTED TO WORK OUT OR WORKED OUT THE DETAILS OF HOW TO KILL YOURSELF? DO YOU INTEND TO CARRY OUT THIS PLAN?: NO
ATTEMPT PAST THREE MONTHS: NO
2. HAVE YOU ACTUALLY HAD ANY THOUGHTS OF KILLING YOURSELF?: NO
4. HAVE YOU HAD THESE THOUGHTS AND HAD SOME INTENTION OF ACTING ON THEM?: NO
ATTEMPT LIFETIME: NO
TOTAL  NUMBER OF ABORTED OR SELF INTERRUPTED ATTEMPTS PAST 3 MONTHS: NO
3. HAVE YOU BEEN THINKING ABOUT HOW YOU MIGHT KILL YOURSELF?: NO
6. HAVE YOU EVER DONE ANYTHING, STARTED TO DO ANYTHING, OR PREPARED TO DO ANYTHING TO END YOUR LIFE?: NO
5. HAVE YOU STARTED TO WORK OUT OR WORKED OUT THE DETAILS OF HOW TO KILL YOURSELF? DO YOU INTEND TO CARRY OUT THIS PLAN?: YES
4. HAVE YOU HAD THESE THOUGHTS AND HAD SOME INTENTION OF ACTING ON THEM?: NO
TOTAL  NUMBER OF INTERRUPTED ATTEMPTS LIFETIME: NO
1. IN THE PAST MONTH, HAVE YOU WISHED YOU WERE DEAD OR WISHED YOU COULD GO TO SLEEP AND NOT WAKE UP?: YES
2. HAVE YOU ACTUALLY HAD ANY THOUGHTS OF KILLING YOURSELF LIFETIME?: YES
TOTAL  NUMBER OF ABORTED OR SELF INTERRUPTED ATTEMPTS PAST LIFETIME: NO
1. IN THE PAST MONTH, HAVE YOU WISHED YOU WERE DEAD OR WISHED YOU COULD GO TO SLEEP AND NOT WAKE UP?: YES
TOTAL  NUMBER OF INTERRUPTED ATTEMPTS PAST 3 MONTHS: NO
6. HAVE YOU EVER DONE ANYTHING, STARTED TO DO ANYTHING, OR PREPARED TO DO ANYTHING TO END YOUR LIFE?: NO
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
## 2022-02-09 ASSESSMENT — PATIENT HEALTH QUESTIONNAIRE - PHQ9: SUM OF ALL RESPONSES TO PHQ QUESTIONS 1-9: 15

## 2022-02-14 ENCOUNTER — VIRTUAL VISIT (OUTPATIENT)
Dept: BEHAVIORAL HEALTH | Facility: CLINIC | Age: 62
End: 2022-02-14
Payer: COMMERCIAL

## 2022-02-14 ENCOUNTER — MYC MEDICAL ADVICE (OUTPATIENT)
Dept: CARDIOLOGY | Facility: CLINIC | Age: 62
End: 2022-02-14
Payer: COMMERCIAL

## 2022-02-14 DIAGNOSIS — F43.10 POSTTRAUMATIC STRESS DISORDER: ICD-10-CM

## 2022-02-14 DIAGNOSIS — I10 HYPERTENSION GOAL BP (BLOOD PRESSURE) < 140/90: ICD-10-CM

## 2022-02-14 DIAGNOSIS — F33.1 MODERATE EPISODE OF RECURRENT MAJOR DEPRESSIVE DISORDER (H): Primary | ICD-10-CM

## 2022-02-14 NOTE — PROGRESS NOTES
Telemedicine Visit: The patient's condition can be safely assessed and treated via synchronous audio and visual telemedicine encounter.      Reason for Telemedicine Visit: Services only offered telehealth    Originating Site (Patient Location): Patient's home    Distant Site (Provider Location): [facility]: [place]    Consent:  The patient/guardian has verbally consented to: the potential risks and benefits of telemedicine (video visit) versus in person care; bill my insurance or make self-payment for services provided; and responsibility for payment of non-covered services.     Mode of Communication:  Video Conference via [employer]    As the provider I attest to compliance with applicable laws and regulations related to telemedicine.    [facility] [place] [facility]: Integrated Behavioral Health  February 14, 2022    Behavioral Health Clinician Progress Note    Patient Name: Maricarmen Dotson           Service Type:  Individual      Service Location:   [place] / Email (patient reached)     Session Start Time: 14:00  Session End Time: 14:07      Session Length: 7 minutes     Attendees: Patient    Visit Activities (Refresh list every visit): [facility] Only    Diagnostic Assessment Date: Pending  Treatment Plan Review Date: Pending  See Flowsheets for today's PHQ-9 and WILLIAMS-7 results  Previous PHQ-9:   PHQ-9 SCORE 6/8/2020 6/18/2021 2/8/2022   PHQ-9 Total Score - - -   PHQ-9 Total Score MyChart 16 (Moderately severe depression) 9 (Mild depression) 15 (Moderately severe depression)   PHQ-9 Total Score 16 9 15   Some encounter information is confidential and restricted. Go to Review Flowsheets activity to see all data.     Previous WILLIAMS-7:   WILLIAMS-7 SCORE 6/8/2020 6/8/2020 6/18/2021   Total Score - - -   Total Score - 18 (severe anxiety) 9 (mild anxiety)   Total Score 18 18 9   Some encounter information is confidential and restricted. Go to Review Flowsheets activity to see all data.       JEFFERY  LEVEL:  JEFFERY Score (Last Two) 10/18/2013 4/22/2020   JEFFERY Raw Score 42 28   Activation Score 66 50   JEFFERY Level 3 2       DATA  Extended Session (60+ minutes): No  Interactive Complexity: No  Crisis: No  [facility] Patient: No    Treatment Objective(s) Addressed in This Session:  Target Behavior(s): disease management/lifestyle changes related to depression and anxiety    Depressed Mood: Increase interest, engagement, and pleasure in doing things  Decrease frequency and intensity of feeling down, depressed, hopeless  Improve diet, appetite, mindful eating, and / or meal planning  Anxiety: will develop more effective coping skills to manage anxiety symptoms and will develop healthy cognitive patterns and beliefs    Current Stressors / Issues:  Patient presents with a quiet affect and reports she is not feeling well today. Patient reports she did call several different therapists and most of them have a 3-6 month wait. Patient did not feel any were the right fit and plans to continue looking. Patient reports she continues to use her same interventions and feels the same. Patient denies any other needs at this time.  [facility] praised patient for making those calls and encouraged her to continue trying to find the right therapist to address her needs. [facility] encouraged patient to get on the wait lists. [facility] offered to assist in the interim but patient declined and ended the session.    Progress on Treatment Objective(s) / Homework:  N/A- Patient did make progress with reaching out to different therapists to get scheduled    Motivational Interviewing    MI Intervention: Co-Developed Goal: Improving acute depression and anxiety, Expressed Empathy/Understanding, Permission to raise concern or advise, Open-ended questions and Reflections: simple and complex     Change Talk Expressed by the Patient: Desire to change Reasons to change Need to change    Provider Response to Change Talk: E - Evoked more info from patient about behavior change and  A - Affirmed patient's thoughts, decisions, or attempts at behavior change    Also provided psychoeducation about behavioral health condition, symptoms, and treatment options    Care Plan review completed: No    Medication Review:  No changes to current psychiatric medication(s)    Medication Compliance:  Yes    Changes in Health Issues:   Yes: Sleep disturbance, No Psychological Distress  Weight / dietary issues, Associated Psychological Distress    Chemical Use Review:   Substance Use: Chemical use reviewed, no active concerns identified      Tobacco Use: No current tobacco use.      Assessment: Current Emotional / Mental Status (status of significant symptoms):  Risk status (Self / Other harm or suicidal ideation)  Patient has had a history of suicidal ideation: Patient did not want to provide details but reports she has had suicidal thoughts on and off throughout the last several years and suicide attempts: Patient did not want to provide details but reports prior hospitalizations due to suicidality intensity.  Patient denies current fears or concerns for personal safety.  Patient reports the following current or recent suicidal ideation or behaviors: Patient reports she has been having suicidal thoughts but denies she has a plan or intent.  Patient does feel safe.  Patient denies current or recent homicidal ideation or behaviors.  Patient reports current or recent self injurious behavior or ideation including Patient feels her binge eating disorder is a form of self-harm and she is working to try and regulate and set limits.  Patient denies other safety concerns.  A safety and risk management plan has not been developed at this time, however patient was encouraged to call [facility] should there be a change in any of these risk factors.  Patient has created her own safety plan with her family and was educated about accessing crisis resources.    Appearance:   Appropriate   Eye Contact:   Good    Psychomotor Behavior: Normal   Attitude:   Pleasant  Orientation:   All  Speech   Rate / Production: Normal    Volume:  Normal   Mood:    Anxious  Depressed   Affect:    Flat   Thought Content:  Clear   Thought Form:  Coherent  Logical   Insight:    Good     Diagnoses:  1. Moderate episode of recurrent major depressive disorder (H)    2. Posttraumatic stress disorder      Collateral Reports Completed:  Not Applicable    Plan: (Homework, other): Patient plans to continue looking for a trauma therapist.  Patient was given information about behavioral services and encouraged to schedule a follow up appointment with the clinic [facility] as needed.  She was also given information about mental health symptoms and Patient will contact a therapist to establish long-term services for acute and trauma needs.  CD Recommendations: No indications of CD issues.      Bessie Mcfadden, LIDIASW  February 14, 2022

## 2022-02-15 ENCOUNTER — MYC MEDICAL ADVICE (OUTPATIENT)
Dept: PEDIATRICS | Facility: CLINIC | Age: 62
End: 2022-02-15

## 2022-02-15 DIAGNOSIS — J32.9 RECURRENT SINUS INFECTIONS: Primary | ICD-10-CM

## 2022-02-15 NOTE — TELEPHONE ENCOUNTER
Please review pt's [facility] message. Can we refer pt to f/u with ENT or CT of the sinuses for the recurrent sinusitis?     Pended both referrals. Please advise.     Notes from 1/7/22:  At home COVID test negative 1/4/21  Started as sore throat then developed cough and nasal congestion  Both ears painful and plugged  Notes chest feels tight, some dyspnea on exertion-has had similar symptoms with bronchitis in past  Has had x3 sinus infections in the past 1 year and x3 episodes of bronchitis with chest tightness    Clem, RN  Patient Advocate Liason (PAL)  [facility]

## 2022-02-16 RX ORDER — HYDRALAZINE HYDROCHLORIDE 100 MG/1
100 TABLET, FILM COATED ORAL 3 TIMES DAILY
COMMUNITY
Start: 2022-02-16 | End: 2022-02-21

## 2022-02-16 NOTE — TELEPHONE ENCOUNTER
Sent ENT referral info to katty Nj RN  Patient Advocate Liason (PAL)  [facility]

## 2022-02-16 NOTE — CONFIDENTIAL NOTE
Agree with ENT referral. This is signed, please notify patient.    Madyson Mclaughlin MD  Internal Medicine-Pediatrics

## 2022-02-21 DIAGNOSIS — I10 HYPERTENSION GOAL BP (BLOOD PRESSURE) < 140/90: ICD-10-CM

## 2022-02-21 RX ORDER — HYDRALAZINE HYDROCHLORIDE 100 MG/1
100 TABLET, FILM COATED ORAL 3 TIMES DAILY
Qty: 90 TABLET | Refills: 4 | Status: SHIPPED | OUTPATIENT
Start: 2022-02-21 | End: 2022-08-05

## 2022-03-04 ENCOUNTER — MYC MEDICAL ADVICE (OUTPATIENT)
Dept: CARDIOLOGY | Facility: CLINIC | Age: 62
End: 2022-03-04
Payer: COMMERCIAL

## 2022-03-04 NOTE — TELEPHONE ENCOUNTER
She has had hypertension for years.  She has 2 visits scheduled in the next month for this.  Seeing me 5 days earlier will not be helpful.

## 2022-03-04 NOTE — TELEPHONE ENCOUNTER
Routed to pt via [employer] recommendations from Dr. Payan to keep follow up as scheduled. Also advised pt to check BP max 1-2 times per day, and ideally should be 1-2 hours post Hydralazine dose. Connie Rey RN on 3/4/2022 at 3:59 PM

## 2022-03-13 NOTE — PROGRESS NOTES
Cardiology Clinic Progress Note    Service Date: March 14, 2022    Primary Cardiologist: Dr. Payan      Reason for Visit: Follow up of medication changes    HPI:   I had the pleasure of meeting Ms. Maricarmen Dotson in the clinic today. She is a very pleasant 61 year old female with a past medical history notable for type 2 diabetes dating back more than 20 years, obstructive sleep apnea intolerant of CPAP, fibromyalgia with very severe symptoms, obesity, irritable bowel syndrome, and hypertension which has been very difficult to control due to multiple medication intolerances.     She has tried an not tolerated losartan, metoprolol, amlodipine, clonidine, hydrochlorothiazide, lisinopril, and nifedipine with somewhat unusual side effects. She also has a history of hyperlipidemia, for which she has tried atorvastatin, rosuvastatin, and simvastatin. She has not tolerated any of these and is not currently on any cholesterol medication. Most recent lipid panel showed in 11/2021 showed total cholesterol of 182, HDL 47, , and triglycerides at 136.     Ms. Dotson previously met with Dr. Jensen, and more recently with Dr. Payan in 09/202,  in Cardiology consultation for help with difficult to manage hypertension. Her home blood pressures range from 140 to the mid 180s systolic, with most values being at the upper end of that range. He recommended starting hydralazine at 25 mg TID. This fortunately has been tolerated well, and has been gradually increased to 100 mg TID.     Of note, that patient has also had issues with atypical chest pain chronically over the past few years and has been evaluated in the Emergency Department on several occasions for this in the setting of her poorly controlled hypertension. Most recent ischemic evaluation was with a Lexiscan nuclear stress test in 04/2019 which showed no evidence of  ischemia or infarction. She was most recently evaluated in the Emergency Department in 10/2021.  Serial troponin checks were negative and EKG appeared similar to a prior study with no acute ischemic changes at that time.     Today, Maricarmen presents to the clinic in follow up for reassessment of her blood pressure after starting and gradually increasing her hydralazine as outlined above. She continues to tolerate this well without concerns. She notes that her blood pressure unfortunately continues to run quite high in her checks at home around 140-180 systolic. It was been somewhat more frequently in the lower end of this range since starting hydralazine. She notes occasional episodes of chest pain which seems to come and go without much of a pattern. She exercises regularly, walking up to 5-6 miles several times a week and feels that she has been tolerating this okay without exertional symptoms. She otherwise denies palpitations.     ASSESSMENT AND PLAN:  1. Hypertension   - Poorly controlled with BP primarily around 140-180 systolic with intolerances to numerous antihypertensive medications she has previously tried.  - Currently on hydralazine 100 TID and tolerating this fine. Discussed options for management that haven't been tried yet, including carvedilol or diltiazem. Bystolic may be another option that has a more favorable side effect profile.  - After discussion, patient was very interested in referral to meet with an [facility] pharmacist as we discussed that I have had some success with patient's with multiple medication intolerances being referred for this and trying a compounded form of amlodipine without additives.     2. Atypical chest pain  - Symptoms have been long-standing over several years. They are non-exertional and atypical for angina. Possible this is musculoskeletal and related to her fibromyalgia and certainly periods of significantly elevated blood pressure could be contributing at times.  - Previous nuclear stress test in 04/2019 negative for ischemia and workup in the ED in 10/2021 negative for  ACS. Could consider coronary CTA in the future for further evaluation and risk stratification. If CAD noted, would be more aggressive in consideration for possible PCSK9 inhibitor therapy for management of her hyperlipidemia in the setting of type 2 diabetes mellitus.    3. Hyperlipidemia  - Untreated due to intolerances to multiple statins.     4. Type 2 diabetes mellitus    5. Fibromyalgia    Thank you for the opportunity to participate in this pleasant patient's care. She is already scheduled to see Dr. Payan in follow up in about 1 month so we will keep this appointment as planned. Hopefully, she will be able to meet with an [facility] pharmacist by that time and possibly be started on compounded amlodipine for further blood pressure control. We would be happy to see her sooner if needed for any concerns in the meantime.     45 total minutes was spent today including chart review, precharting, history and exam, post visit documentation, and reviewing studies as outlined above.     MARICRUZ Hudson, CNP   Nurse Practitioner  [facility]  Pager: [phone]  Text Page  (8am - 5pm, M-F)    Orders this Visit:  Orders Placed This Encounter   Procedures     Med Therapy Management Referral     No orders of the defined types were placed in this encounter.    There are no discontinued medications.  Encounter Diagnosis   Name Primary?     Hypertension goal BP (blood pressure) < 140/90      CURRENT MEDICATIONS:  Current Outpatient Medications   Medication Sig Dispense Refill     alcohol swab prep pads Use to swab area of injection/chyna as directed. 100 each 3     blood glucose calibration (FREESTYLE CONTROL) solution Use to calibrate blood glucose monitor as directed. 1 each 1     blood glucose monitoring (FREESTYLE LITE) meter device kit Use to test blood sugar 6-7 times daily. 1 kit 0     blood glucose monitoring (FREESTYLE) lancets Use to test blood sugar 6-7 times daily. 100 each 6     cetirizine  "(ZYRTEC) 10 MG tablet Take 10 mg by mouth daily as needed       Continuous Blood Gluc  (FREESTYLE RADHA 14 DAY READER) MARIA C USE TO CHECK BLOOD SUGARS AS DIRECTED 1 Device 0     EPINEPHrine (ANY BX GENERIC EQUIV) 0.3 MG/0.3ML injection 2-pack Inject 0.3 mLs (0.3 mg) into the muscle once as needed for anaphylaxis 2 each 1     furosemide (LASIX) 20 MG tablet Take 1 tablet (20 mg) by mouth daily 90 tablet 3     HEMP OIL OR EXTRACT OR OTHER CBD CANNABINOID, NOT MEDICAL CANNABIS, daily        hydrALAZINE (APRESOLINE) 100 MG tablet Take 1 tablet (100 mg) by mouth 3 times daily 90 tablet 4     insulin aspart (NOVOLOG VIAL) 100 UNITS/ML vial USE IN INSULIN PUMP, TOTAL DAILY DOSE 100 UNITS. 90 mL 3     Insulin Disposable Pump (OMNIPOD DASH 5 PACK PODS) MISC 1 pod every 48 hours Dispense # 45 pods or #9 5-packs 45 each 3     INSULIN PUMP - OUTPATIENT INSULIN PUMP - OUTPATIENT  Date last updated: 6/4/21 "[employer]ipod DASH  BASAL RATES and times:   12am: 1.3, 6 am: 0.75, 12 pm: 0.8, 6 pm: 0.95   CARB RATIO: 12am-12am: 10  Correction Factor (Sensitivity): 12AM (midnight): 31 -- 5 AM: 33  Target blood glucose: 100-120  Active insulin time: 4 hrs       insulin syringe-needle U-100 (31G X 5/16\" 0.3 ML) 31G X 5/16\" 0.3 ML miscellaneous Use 3 syringes daily or as directed, in the event of pump failure. 100 each 3     MELATONIN PO Take by mouth At Bedtime        polyethylene glycol (MIRALAX/GLYCOLAX) packet Take 17 g by mouth At Bedtime        albuterol (PROAIR HFA/PROVENTIL HFA/VENTOLIN HFA) 108 (90 Base) MCG/ACT inhaler Inhale 2 puffs into the lungs every 6 hours 18 g 1     ASPIRIN NOT PRESCRIBED (INTENTIONAL) Please choose reason not prescribed, below 0 each 0     benzonatate (TESSALON) 200 MG capsule Take 1 capsule (200 mg) by mouth 3 times daily as needed for cough 30 capsule 0     blood glucose (FREESTYLE LITE) test strip Use to test blood sugar 6-7 times daily. 100 strip 6     blood glucose (NO BRAND SPECIFIED) test strip Use " to test blood sugar 6-7 times daily or as directed. 300 strip 3     cefdinir (OMNICEF) 300 MG capsule Take 1 capsule (300 mg) by mouth 2 times daily 14 capsule 0     Continuous Blood Gluc Sensor (FREESTYLE RADHA 14 DAY SENSOR) MISC APPLY 1 SENSOR AND CHANGE EVERY 14 DAYS AS DIRECTED 6 each 3     Continuous Blood Gluc Sensor (FREESTYLE RADHA 14 DAY SENSOR) MIS USE TO TEST BLOOD SUGARS AS DIRECTED AND CHANGE EVERY 14 DAYS 2 each 11     diclofenac (VOLTAREN) 1 % topical gel Apply topically nightly as needed for moderate pain Apply 4 grams to knees or 2 grams to hands four times daily using enclosed dosing card. 100 g 1     famotidine (PEPCID) 20 MG tablet Take 20 mg by mouth 2 times daily as needed       fluconazole (DIFLUCAN) 150 MG tablet Take one tablet every three days. 2 tablet 0     guaiFENesin-codeine (ROBITUSSIN AC) 100-10 MG/5ML solution Take 5-10 mLs by mouth nightly as needed for cough 60 mL 0     ketoconazole (NIZORAL) 2 % external cream Apply topically daily 30 g 1     medical cannabis (Patient's own supply) See Admin Instructions (The purpose of this order is to document that the patient reports taking medical cannabis.  This is not a prescription, and is not used to certify that the patient has a qualifying medical condition.) (Patient not taking: Reported on 3/14/2022)       ondansetron (ZOFRAN-ODT) 4 MG ODT tab Take 1 tablet (4 mg) by mouth every 8 hours as needed for nausea 8 tablet 0     STATIN NOT PRESCRIBED, INTENTIONAL, Statin not prescribed intentionally due to Intolerance 0 each 0     trimethoprim-polymyxin b (POLYTRIM) 83554-5.1 UNIT/ML-% ophthalmic solution Place 1-2 drops into both eyes 4 times daily 10 mL 0     ALLERGIES  Allergies   Allergen Reactions     Amoxicillin Anaphylaxis     Bee Anaphylaxis     Wasp        Contrast Dye Anaphylaxis     Iodine Anaphylaxis     Severe anaphalatic shock       Losartan Muscle Pain (Myalgia)     Sulfa Drugs Anaphylaxis     Tetanus-Diphtheria Toxoids       Rxn was to Tdap-whole body joint pain and fever.  Had similar reaction to Td vaccine 7/28/2017     Metoprolol Other (See Comments)     Fatigue, joint pain, throat tightness     Zithromax [Azithromycin Dihydrate] Nausea and Vomiting     Amlodipine      Neuropathic type pain in hands/feet     Atorvastatin      myalgias     Catapres [Clonidine]      Crestor [Rosuvastatin]      myalgias     Cyproheptadine      Severe headache, cardiac issues, and dizziness     Humalog [Insulin Lispro]      Causes pancreatitis -      Hydrochlorothiazide      numbness     Latex      Skin burn and can't breathe       Lisinopril      Joint aches     Metformin      Naltrexone      Nifedipine      Onglyza [Saxagliptin Hydrochloride]      Fibromyalgia flare     Phenergan [Promethazine]      Prochlorperazine      Altered mental status, hallucinations     Reglan [Metoclopramide]      Sumatriptan      Causes severe depression     Tetracycline Nausea and Vomiting, Hives and Difficulty breathing     Pt called to update today after the visit that she is allergic to the tetracycline-added to her list     Sulindac Anxiety     Panic attacks     PAST MEDICAL, SURGICAL, FAMILY HISTORY:  History was reviewed and updated as needed, see medical record.    SOCIAL HISTORY:  Social History     Socioeconomic History     Marital status:      Spouse name: Not on file     Number of children: 3     Years of education: Not on file     Highest education level: Not on file   Occupational History     Occupation: xr technician     Employer: [employer]   Tobacco Use     Smoking status: Never Smoker     Smokeless tobacco: Never Used   Substance and Sexual Activity     Alcohol use: Not Currently     Alcohol/week: 0.0 standard drinks     Comment: maybe once a month     Drug use: No     Sexual activity: Yes     Partners: Male     Birth control/protection: Surgical   Other Topics Concern     Parent/sibling w/ CABG, MI or angioplasty before 65F 55M?  "Not Asked   Social History Narrative     Not on file     Social Determinants of Health     Financial Resource Strain: Not on file   Food Insecurity: Not on file   Transportation Needs: Not on file   Physical Activity: Not on file   Stress: Not on file   Social Connections: Not on file   Intimate Partner Violence: Not on file   Housing Stability: Not on file     Review of Systems:  Skin:  Positive for pigmentation   Eyes:  Positive for glasses  ENT:  Positive for tinnitus  Respiratory:  Positive for dyspnea on exertion;cough  Cardiovascular:    palpitations;Positive for;edema;fatigue;lightheadedness;dizziness;chest pain  Gastroenterology: Positive for nausea;heartburn  Genitourinary:  Negative    Musculoskeletal:  Positive for back pain;neck pain;joint pain;fibromyalgia;muscular weakness  Neurologic:  Positive for numbness or tingling of hands;numbness or tingling of feet  Psychiatric:  Positive for sleep disturbances;anxiety  Heme/Lymph/Imm:  Positive for allergies  Endocrine:  Positive for diabetes     Physical Exam:  Vitals: BP (!) 160/66 (BP Location: Right arm, Patient Position: Sitting, Cuff Size: Adult Regular)   Pulse 88   Ht 1.607 m (5' 3.25\")   Wt 83.7 kg (184 lb 8 oz)   LMP 01/01/1999   SpO2 97%   BMI 32.42 kg/m     Wt Readings from Last 4 Encounters:   03/14/22 83.7 kg (184 lb 8 oz)   01/07/22 81.6 kg (180 lb)   11/16/21 83.9 kg (185 lb)   11/10/21 87 kg (191 lb 11.2 oz)     CONSTITUTIONAL: Appears her stated age, well nourished, and in no acute distress.  HEENT: Pupils equal, round. Sclerae nonicteric.    NECK: Supple, no masses or JVD appreciated.   C/V:  Regular rate and rhythm, normal S1 and S2, no S3 or S4, no murmur, rub or gallop.   RESP: Respirations are unlabored. Lungs are clear to auscultation bilaterally without wheezing, rales, or rhonchi.  EXTREM: No clubbing, cyanosis, or lower extremity edema bilaterally.   NEURO: Alert and oriented, cooperative. Gait steady. No gross focal deficits. "   PSYCH: Affect appropriate. Mentation normal. Responds to questions appropriately.  SKIN: Warm and dry. No apparent rashes or bruising.     Recent Lab Results:  LIPID RESULTS:  Lab Results   Component Value Date    CHOL 182 11/23/2021    CHOL 221 (H) 09/18/2020    HDL 47 (L) 11/23/2021    HDL 51 09/18/2020     (H) 11/23/2021     (H) 09/18/2020    TRIG 136 11/23/2021    TRIG 137 09/18/2020    CHOLHDLRATIO 5.0 02/21/2015     LIVER ENZYME RESULTS:  Lab Results   Component Value Date    AST 22 01/20/2021    ALT 22 01/20/2021     CBC RESULTS:  Lab Results   Component Value Date    WBC 7.5 01/20/2021    RBC 4.49 01/20/2021    HGB 13.4 01/20/2021    HCT 39.8 01/20/2021    MCV 89 01/20/2021    MCH 29.8 01/20/2021    MCHC 33.7 01/20/2021    RDW 12.1 01/20/2021     01/20/2021     BMP RESULTS:  Lab Results   Component Value Date     08/04/2021     01/20/2021    POTASSIUM 3.9 08/04/2021    POTASSIUM 3.7 01/20/2021    CHLORIDE 104 08/04/2021    CHLORIDE 107 01/20/2021    CO2 27 08/04/2021    CO2 30 01/20/2021    ANIONGAP 6 08/04/2021    ANIONGAP 3 01/20/2021     (H) 08/04/2021     (H) 01/20/2021    BUN 21 08/04/2021    BUN 10 01/20/2021    CR 1.02 08/04/2021    CR 0.77 01/20/2021    GFRESTIMATED 60 (L) 08/04/2021    GFRESTIMATED 84 01/20/2021    GFRESTBLACK >90 01/20/2021    KARIN 9.6 08/04/2021    KARIN 9.1 01/20/2021      A1C RESULTS:  Lab Results   Component Value Date    A1C 8.2 (H) 11/23/2021    A1C 7.4 (H) 09/18/2020     INR RESULTS:  Lab Results   Component Value Date    INR 1.01 05/23/2011       CC  Sanjeev Payan MD  [address] [place] [id number]    This note was completed in part using Dragon voice recognition software. Although reviewed after completion, some word and grammatical errors may occur.

## 2022-03-13 NOTE — PATIENT INSTRUCTIONS
Thank you for your visit with the [facility] today.    Today's plan:   1. Follow up with the [facility] pharmacist to see if we can try compounded formulations of blood pressure medications without additives to see if you tolerate that for better blood pressure control.   2. Follow up with Dr. Payan in April as planned.    If you have questions or concerns, please do not hesitate to call my nurse team at [phone].     Scheduling phone number: [phone]    It was a pleasure seeing you today!     MARICRUZ Hudson, CNP  Nurse Practitioner  [facility]  March 14, 2022  ________________________________________________________

## 2022-03-14 ENCOUNTER — OFFICE VISIT (OUTPATIENT)
Dept: CARDIOLOGY | Facility: CLINIC | Age: 62
End: 2022-03-14
Attending: INTERNAL MEDICINE
Payer: COMMERCIAL

## 2022-03-14 VITALS
BODY MASS INDEX: 32.69 KG/M2 | HEART RATE: 88 BPM | WEIGHT: 184.5 LBS | SYSTOLIC BLOOD PRESSURE: 160 MMHG | HEIGHT: 63 IN | DIASTOLIC BLOOD PRESSURE: 66 MMHG | OXYGEN SATURATION: 97 %

## 2022-03-14 DIAGNOSIS — Z78.9 STATIN INTOLERANCE: ICD-10-CM

## 2022-03-14 DIAGNOSIS — G89.4 CHRONIC PAIN SYNDROME: ICD-10-CM

## 2022-03-14 DIAGNOSIS — E78.5 HYPERLIPIDEMIA LDL GOAL <100: ICD-10-CM

## 2022-03-14 DIAGNOSIS — E11.65 TYPE 2 DIABETES MELLITUS WITH HYPERGLYCEMIA, WITH LONG-TERM CURRENT USE OF INSULIN (H): ICD-10-CM

## 2022-03-14 DIAGNOSIS — I10 HYPERTENSION GOAL BP (BLOOD PRESSURE) < 140/90: Primary | ICD-10-CM

## 2022-03-14 DIAGNOSIS — Z79.4 TYPE 2 DIABETES MELLITUS WITH HYPERGLYCEMIA, WITH LONG-TERM CURRENT USE OF INSULIN (H): ICD-10-CM

## 2022-03-14 PROCEDURE — 99215 OFFICE O/P EST HI 40 MIN: CPT | Performed by: NURSE PRACTITIONER

## 2022-03-14 NOTE — LETTER
3/14/2022    Madyson Mendoza MD  [address] [place] [address]    RE: Maricarmen Dotson       Dear Colleague,     I had the pleasure of seeing Maricarmen Dotson in the [facility].    Cardiology Clinic Progress Note    Service Date: March 14, 2022    Primary Cardiologist: Dr. Payan      Reason for Visit: Follow up of medication changes    HPI:   I had the pleasure of meeting Ms. Maricarmen Dotson in the clinic today. She is a very pleasant 61 year old female with a past medical history notable for type 2 diabetes dating back more than 20 years, obstructive sleep apnea intolerant of CPAP, fibromyalgia with very severe symptoms, obesity, irritable bowel syndrome, and hypertension which has been very difficult to control due to multiple medication intolerances.     She has tried an not tolerated losartan, metoprolol, amlodipine, clonidine, hydrochlorothiazide, lisinopril, and nifedipine with somewhat unusual side effects. She also has a history of hyperlipidemia, for which she has tried atorvastatin, rosuvastatin, and simvastatin. She has not tolerated any of these and is not currently on any cholesterol medication. Most recent lipid panel showed in 11/2021 showed total cholesterol of 182, HDL 47, , and triglycerides at 136.     Ms. Dotson previously met with Dr. Jensen, and more recently with Dr. Payan in 09/202,  in Cardiology consultation for help with difficult to manage hypertension. Her home blood pressures range from 140 to the mid 180s systolic, with most values being at the upper end of that range. He recommended starting hydralazine at 25 mg TID. This fortunately has been tolerated well, and has been gradually increased to 100 mg TID.     Of note, that patient has also had issues with atypical chest pain chronically over the past few years and has been evaluated in the Emergency Department on several occasions for this in the setting of her poorly controlled  hypertension. Most recent ischemic evaluation was with a Lexiscan nuclear stress test in 04/2019 which showed no evidence of  ischemia or infarction. She was most recently evaluated in the Emergency Department in 10/2021. Serial troponin checks were negative and EKG appeared similar to a prior study with no acute ischemic changes at that time.     Today, Maricarmen presents to the clinic in follow up for reassessment of her blood pressure after starting and gradually increasing her hydralazine as outlined above. She continues to tolerate this well without concerns. She notes that her blood pressure unfortunately continues to run quite high in her checks at home around 140-180 systolic. It was been somewhat more frequently in the lower end of this range since starting hydralazine. She notes occasional episodes of chest pain which seems to come and go without much of a pattern. She exercises regularly, walking up to 5-6 miles several times a week and feels that she has been tolerating this okay without exertional symptoms. She otherwise denies palpitations.     ASSESSMENT AND PLAN:  1. Hypertension   - Poorly controlled with BP primarily around 140-180 systolic with intolerances to numerous antihypertensive medications she has previously tried.  - Currently on hydralazine 100 TID and tolerating this fine. Discussed options for management that haven't been tried yet, including carvedilol or diltiazem. Bystolic may be another option that has a more favorable side effect profile.  - After discussion, patient was very interested in referral to meet with an [facility] pharmacist as we discussed that I have had some success with patient's with multiple medication intolerances being referred for this and trying a compounded form of amlodipine without additives.     2. Atypical chest pain  - Symptoms have been long-standing over several years. They are non-exertional and atypical for angina. Possible this is musculoskeletal and related to  her fibromyalgia and certainly periods of significantly elevated blood pressure could be contributing at times.  - Previous nuclear stress test in 04/2019 negative for ischemia and workup in the ED in 10/2021 negative for ACS. Could consider coronary CTA in the future for further evaluation and risk stratification. If CAD noted, would be more aggressive in consideration for possible PCSK9 inhibitor therapy for management of her hyperlipidemia in the setting of type 2 diabetes mellitus.    3. Hyperlipidemia  - Untreated due to intolerances to multiple statins.     4. Type 2 diabetes mellitus    5. Fibromyalgia    Thank you for the opportunity to participate in this pleasant patient's care. She is already scheduled to see Dr. Payan in follow up in about 1 month so we will keep this appointment as planned. Hopefully, she will be able to meet with an [facility] pharmacist by that time and possibly be started on compounded amlodipine for further blood pressure control. We would be happy to see her sooner if needed for any concerns in the meantime.     45 total minutes was spent today including chart review, precharting, history and exam, post visit documentation, and reviewing studies as outlined above.     MARICRUZ Hudson, CNP   Nurse Practitioner  [facility]  Pager: [phone]  Text Page  (8am - 5pm, M-F)    Orders this Visit:  Orders Placed This Encounter   Procedures     Med Therapy Management Referral     No orders of the defined types were placed in this encounter.    There are no discontinued medications.  Encounter Diagnosis   Name Primary?     Hypertension goal BP (blood pressure) < 140/90      CURRENT MEDICATIONS:  Current Outpatient Medications   Medication Sig Dispense Refill     alcohol swab prep pads Use to swab area of injection/chyna as directed. 100 each 3     blood glucose calibration (FREESTYLE CONTROL) solution Use to calibrate blood glucose monitor as directed. 1 each 1     blood  "glucose monitoring (FREESTYLE LITE) meter device kit Use to test blood sugar 6-7 times daily. 1 kit 0     blood glucose monitoring (FREESTYLE) lancets Use to test blood sugar 6-7 times daily. 100 each 6     cetirizine (ZYRTEC) 10 MG tablet Take 10 mg by mouth daily as needed       Continuous Blood Gluc  (FREESTYLE RADHA 14 DAY READER) MARIA C USE TO CHECK BLOOD SUGARS AS DIRECTED 1 Device 0     EPINEPHrine (ANY BX GENERIC EQUIV) 0.3 MG/0.3ML injection 2-pack Inject 0.3 mLs (0.3 mg) into the muscle once as needed for anaphylaxis 2 each 1     furosemide (LASIX) 20 MG tablet Take 1 tablet (20 mg) by mouth daily 90 tablet 3     HEMP OIL OR EXTRACT OR OTHER CBD CANNABINOID, NOT MEDICAL CANNABIS, daily        hydrALAZINE (APRESOLINE) 100 MG tablet Take 1 tablet (100 mg) by mouth 3 times daily 90 tablet 4     insulin aspart (NOVOLOG VIAL) 100 UNITS/ML vial USE IN INSULIN PUMP, TOTAL DAILY DOSE 100 UNITS. 90 mL 3     Insulin Disposable Pump (OMNIPOD DASH 5 PACK PODS) MISC 1 pod every 48 hours Dispense # 45 pods or #9 5-packs 45 each 3     INSULIN PUMP - OUTPATIENT INSULIN PUMP - OUTPATIENT  Date last updated: 6/4/21 Share0ipod DASH  BASAL RATES and times:   12am: 1.3, 6 am: 0.75, 12 pm: 0.8, 6 pm: 0.95   CARB RATIO: 12am-12am: 10  Correction Factor (Sensitivity): 12AM (midnight): 31 -- 5 AM: 33  Target blood glucose: 100-120  Active insulin time: 4 hrs       insulin syringe-needle U-100 (31G X 5/16\" 0.3 ML) 31G X 5/16\" 0.3 ML miscellaneous Use 3 syringes daily or as directed, in the event of pump failure. 100 each 3     MELATONIN PO Take by mouth At Bedtime        polyethylene glycol (MIRALAX/GLYCOLAX) packet Take 17 g by mouth At Bedtime        albuterol (PROAIR HFA/PROVENTIL HFA/VENTOLIN HFA) 108 (90 Base) MCG/ACT inhaler Inhale 2 puffs into the lungs every 6 hours 18 g 1     ASPIRIN NOT PRESCRIBED (INTENTIONAL) Please choose reason not prescribed, below 0 each 0     benzonatate (TESSALON) 200 MG capsule Take 1 capsule " (200 mg) by mouth 3 times daily as needed for cough 30 capsule 0     blood glucose (FREESTYLE LITE) test strip Use to test blood sugar 6-7 times daily. 100 strip 6     blood glucose (NO BRAND SPECIFIED) test strip Use to test blood sugar 6-7 times daily or as directed. 300 strip 3     cefdinir (OMNICEF) 300 MG capsule Take 1 capsule (300 mg) by mouth 2 times daily 14 capsule 0     Continuous Blood Gluc Sensor (FREESTYLE RADHA 14 DAY SENSOR) MISC APPLY 1 SENSOR AND CHANGE EVERY 14 DAYS AS DIRECTED 6 each 3     Continuous Blood Gluc Sensor (FREESTYLE RADHA 14 DAY SENSOR) MISC USE TO TEST BLOOD SUGARS AS DIRECTED AND CHANGE EVERY 14 DAYS 2 each 11     diclofenac (VOLTAREN) 1 % topical gel Apply topically nightly as needed for moderate pain Apply 4 grams to knees or 2 grams to hands four times daily using enclosed dosing card. 100 g 1     famotidine (PEPCID) 20 MG tablet Take 20 mg by mouth 2 times daily as needed       fluconazole (DIFLUCAN) 150 MG tablet Take one tablet every three days. 2 tablet 0     guaiFENesin-codeine (ROBITUSSIN AC) 100-10 MG/5ML solution Take 5-10 mLs by mouth nightly as needed for cough 60 mL 0     ketoconazole (NIZORAL) 2 % external cream Apply topically daily 30 g 1     medical cannabis (Patient's own supply) See Admin Instructions (The purpose of this order is to document that the patient reports taking medical cannabis.  This is not a prescription, and is not used to certify that the patient has a qualifying medical condition.) (Patient not taking: Reported on 3/14/2022)       ondansetron (ZOFRAN-ODT) 4 MG ODT tab Take 1 tablet (4 mg) by mouth every 8 hours as needed for nausea 8 tablet 0     STATIN NOT PRESCRIBED, INTENTIONAL, Statin not prescribed intentionally due to Intolerance 0 each 0     trimethoprim-polymyxin b (POLYTRIM) 35591-6.1 UNIT/ML-% ophthalmic solution Place 1-2 drops into both eyes 4 times daily 10 mL 0     ALLERGIES  Allergies   Allergen Reactions     Amoxicillin  Anaphylaxis     Bee Anaphylaxis     Wasp        Contrast Dye Anaphylaxis     Iodine Anaphylaxis     Severe anaphalatic shock       Losartan Muscle Pain (Myalgia)     Sulfa Drugs Anaphylaxis     Tetanus-Diphtheria Toxoids      Rxn was to Tdap-whole body joint pain and fever.  Had similar reaction to Td vaccine 7/28/2017     Metoprolol Other (See Comments)     Fatigue, joint pain, throat tightness     Zithromax [Azithromycin Dihydrate] Nausea and Vomiting     Amlodipine      Neuropathic type pain in hands/feet     Atorvastatin      myalgias     Catapres [Clonidine]      Crestor [Rosuvastatin]      myalgias     Cyproheptadine      Severe headache, cardiac issues, and dizziness     Humalog [Insulin Lispro]      Causes pancreatitis -      Hydrochlorothiazide      numbness     Latex      Skin burn and can't breathe       Lisinopril      Joint aches     Metformin      Naltrexone      Nifedipine      Onglyza [Saxagliptin Hydrochloride]      Fibromyalgia flare     Phenergan [Promethazine]      Prochlorperazine      Altered mental status, hallucinations     Reglan [Metoclopramide]      Sumatriptan      Causes severe depression     Tetracycline Nausea and Vomiting, Hives and Difficulty breathing     Pt called to update today after the visit that she is allergic to the tetracycline-added to her list     Sulindac Anxiety     Panic attacks     PAST MEDICAL, SURGICAL, FAMILY HISTORY:  History was reviewed and updated as needed, see medical record.    SOCIAL HISTORY:  Social History     Socioeconomic History     Marital status:      Spouse name: Not on file     Number of children: 3     Years of education: Not on file     Highest education level: Not on file   Occupational History     Occupation: xr technician     Employer: [employer]   Tobacco Use     Smoking status: Never Smoker     Smokeless tobacco: Never Used   Substance and Sexual Activity     Alcohol use: Not Currently     Alcohol/week: 0.0 standard  "drinks     Comment: maybe once a month     Drug use: No     Sexual activity: Yes     Partners: Male     Birth control/protection: Surgical   Other Topics Concern     Parent/sibling w/ CABG, MI or angioplasty before 65F 55M? Not Asked   Social History Narrative     Not on file     Social Determinants of Health     Financial Resource Strain: Not on file   Food Insecurity: Not on file   Transportation Needs: Not on file   Physical Activity: Not on file   Stress: Not on file   Social Connections: Not on file   Intimate Partner Violence: Not on file   Housing Stability: Not on file     Review of Systems:  Skin:  Positive for pigmentation   Eyes:  Positive for glasses  ENT:  Positive for tinnitus  Respiratory:  Positive for dyspnea on exertion;cough  Cardiovascular:    palpitations;Positive for;edema;fatigue;lightheadedness;dizziness;chest pain  Gastroenterology: Positive for nausea;heartburn  Genitourinary:  Negative    Musculoskeletal:  Positive for back pain;neck pain;joint pain;fibromyalgia;muscular weakness  Neurologic:  Positive for numbness or tingling of hands;numbness or tingling of feet  Psychiatric:  Positive for sleep disturbances;anxiety  Heme/Lymph/Imm:  Positive for allergies  Endocrine:  Positive for diabetes     Physical Exam:  Vitals: BP (!) 160/66 (BP Location: Right arm, Patient Position: Sitting, Cuff Size: Adult Regular)   Pulse 88   Ht 1.607 m (5' 3.25\")   Wt 83.7 kg (184 lb 8 oz)   LMP 01/01/1999   SpO2 97%   BMI 32.42 kg/m     Wt Readings from Last 4 Encounters:   03/14/22 83.7 kg (184 lb 8 oz)   01/07/22 81.6 kg (180 lb)   11/16/21 83.9 kg (185 lb)   11/10/21 87 kg (191 lb 11.2 oz)     CONSTITUTIONAL: Appears her stated age, well nourished, and in no acute distress.  HEENT: Pupils equal, round. Sclerae nonicteric.    NECK: Supple, no masses or JVD appreciated.   C/V:  Regular rate and rhythm, normal S1 and S2, no S3 or S4, no murmur, rub or gallop.   RESP: Respirations are unlabored. Lungs " are clear to auscultation bilaterally without wheezing, rales, or rhonchi.  EXTREM: No clubbing, cyanosis, or lower extremity edema bilaterally.   NEURO: Alert and oriented, cooperative. Gait steady. No gross focal deficits.   PSYCH: Affect appropriate. Mentation normal. Responds to questions appropriately.  SKIN: Warm and dry. No apparent rashes or bruising.     Recent Lab Results:  LIPID RESULTS:  Lab Results   Component Value Date    CHOL 182 11/23/2021    CHOL 221 (H) 09/18/2020    HDL 47 (L) 11/23/2021    HDL 51 09/18/2020     (H) 11/23/2021     (H) 09/18/2020    TRIG 136 11/23/2021    TRIG 137 09/18/2020    CHOLHDLRATIO 5.0 02/21/2015     LIVER ENZYME RESULTS:  Lab Results   Component Value Date    AST 22 01/20/2021    ALT 22 01/20/2021     CBC RESULTS:  Lab Results   Component Value Date    WBC 7.5 01/20/2021    RBC 4.49 01/20/2021    HGB 13.4 01/20/2021    HCT 39.8 01/20/2021    MCV 89 01/20/2021    MCH 29.8 01/20/2021    MCHC 33.7 01/20/2021    RDW 12.1 01/20/2021     01/20/2021     BMP RESULTS:  Lab Results   Component Value Date     08/04/2021     01/20/2021    POTASSIUM 3.9 08/04/2021    POTASSIUM 3.7 01/20/2021    CHLORIDE 104 08/04/2021    CHLORIDE 107 01/20/2021    CO2 27 08/04/2021    CO2 30 01/20/2021    ANIONGAP 6 08/04/2021    ANIONGAP 3 01/20/2021     (H) 08/04/2021     (H) 01/20/2021    BUN 21 08/04/2021    BUN 10 01/20/2021    CR 1.02 08/04/2021    CR 0.77 01/20/2021    GFRESTIMATED 60 (L) 08/04/2021    GFRESTIMATED 84 01/20/2021    GFRESTBLACK >90 01/20/2021    KARIN 9.6 08/04/2021    KARIN 9.1 01/20/2021      A1C RESULTS:  Lab Results   Component Value Date    A1C 8.2 (H) 11/23/2021    A1C 7.4 (H) 09/18/2020     INR RESULTS:  Lab Results   Component Value Date    INR 1.01 05/23/2011       CC  Sanjeev Payan MD  [address] [place] [id number]    This note was completed in part using Dragon voice recognition software. Although  reviewed after completion, some word and grammatical errors may occur.      Thank you for allowing me to participate in the care of your patient.      Sincerely,     Jules Ocampo NP     [facility]  cc:   Sanjeev Payan MD  [address] [place] [id number]

## 2022-04-06 ENCOUNTER — VIRTUAL VISIT (OUTPATIENT)
Dept: PHARMACY | Facility: CLINIC | Age: 62
End: 2022-04-06
Payer: COMMERCIAL

## 2022-04-06 DIAGNOSIS — E11.65 TYPE 2 DIABETES MELLITUS WITH HYPERGLYCEMIA, WITH LONG-TERM CURRENT USE OF INSULIN (H): ICD-10-CM

## 2022-04-06 DIAGNOSIS — Z78.9 TAKES DIETARY SUPPLEMENTS: ICD-10-CM

## 2022-04-06 DIAGNOSIS — I10 HYPERTENSION GOAL BP (BLOOD PRESSURE) < 140/90: Primary | ICD-10-CM

## 2022-04-06 DIAGNOSIS — F41.9 ANXIETY: ICD-10-CM

## 2022-04-06 DIAGNOSIS — G47.00 INSOMNIA, UNSPECIFIED TYPE: ICD-10-CM

## 2022-04-06 DIAGNOSIS — J30.2 SEASONAL ALLERGIC RHINITIS, UNSPECIFIED TRIGGER: ICD-10-CM

## 2022-04-06 DIAGNOSIS — K21.00 GASTROESOPHAGEAL REFLUX DISEASE WITH ESOPHAGITIS, UNSPECIFIED WHETHER HEMORRHAGE: ICD-10-CM

## 2022-04-06 DIAGNOSIS — F33.1 MODERATE EPISODE OF RECURRENT MAJOR DEPRESSIVE DISORDER (H): ICD-10-CM

## 2022-04-06 DIAGNOSIS — E78.5 HYPERLIPIDEMIA LDL GOAL <100: ICD-10-CM

## 2022-04-06 DIAGNOSIS — M79.7 FIBROMYALGIA: ICD-10-CM

## 2022-04-06 DIAGNOSIS — Z79.4 TYPE 2 DIABETES MELLITUS WITH HYPERGLYCEMIA, WITH LONG-TERM CURRENT USE OF INSULIN (H): ICD-10-CM

## 2022-04-06 DIAGNOSIS — G47.33 OSA (OBSTRUCTIVE SLEEP APNEA): ICD-10-CM

## 2022-04-06 DIAGNOSIS — R07.89 ATYPICAL CHEST PAIN: ICD-10-CM

## 2022-04-06 DIAGNOSIS — K58.1 IRRITABLE BOWEL SYNDROME WITH CONSTIPATION: ICD-10-CM

## 2022-04-06 DIAGNOSIS — F43.10 POSTTRAUMATIC STRESS DISORDER: ICD-10-CM

## 2022-04-06 PROCEDURE — 99605 MTMS BY PHARM NP 15 MIN: CPT | Performed by: PHARMACIST

## 2022-04-06 PROCEDURE — 99607 MTMS BY PHARM ADDL 15 MIN: CPT | Performed by: PHARMACIST

## 2022-04-06 RX ORDER — MULTIVIT-MIN/IRON/FOLIC ACID/K 18-600-40
500 CAPSULE ORAL DAILY
COMMUNITY
End: 2022-07-20

## 2022-04-06 RX ORDER — ACETAMINOPHEN 500 MG
500-1000 TABLET ORAL EVERY 8 HOURS PRN
COMMUNITY
End: 2022-07-20
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
## 2022-04-06 NOTE — PATIENT INSTRUCTIONS
"Recommendations from today's MTM visit:                                                      1. Please decrease vitamin C to 500mg daily (due to possible interaction with the ashleigh system).    2. Check you email for an invitation to share you ashleigh report with me    3. Change the timing of hydralazine. Keep morning at 730-830a, move lunch dose to 2-3p, and move dinner to ~10pm. Make some notes to help remind yourself the change in timing. Staying consistent is helpful.  If you forget to take mid-day dose, as long as you take it before 4 hours of the next dose at 10p, that is ok.     4. Try to take a photo of your comprehensive list of tried medications and allergies for me to review and follow-up on next visit.    5. I will reach out colleague to see if there are any other trauma therapist they know of within distance.    Follow-up: 4/20 call at 11am (45 min appointment time)     To schedule another [facility] appointment, please call the clinic directly or you may call the MTM scheduling line at [phone] or toll-free at [phone]. Or, you can also set up a follow-up appointment directly from [facility] by going to Schedule an Appointment and choose \"Medication Therapy Management\".    My Clinical Pharmacist's contact information:                                                      Please feel free to contact me with any questions or concerns you have.      Shi Sheikh, PharmD  Medication Therapy Management Pharmacist      It was great to speak with you today. You may receive a survey via email or text message in the next few days. I value your experience and would be very thankful for your time with providing feedback on our clinic survey.     "

## 2022-04-09 ENCOUNTER — HEALTH MAINTENANCE LETTER (OUTPATIENT)
Age: 62
End: 2022-04-09
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
## 2022-04-20 ENCOUNTER — VIRTUAL VISIT (OUTPATIENT)
Dept: PHARMACY | Facility: CLINIC | Age: 62
End: 2022-04-20
Payer: COMMERCIAL

## 2022-04-20 DIAGNOSIS — F41.9 ANXIETY: ICD-10-CM

## 2022-04-20 DIAGNOSIS — R07.89 ATYPICAL CHEST PAIN: ICD-10-CM

## 2022-04-20 DIAGNOSIS — F33.1 MODERATE EPISODE OF RECURRENT MAJOR DEPRESSIVE DISORDER (H): ICD-10-CM

## 2022-04-20 DIAGNOSIS — I10 HYPERTENSION GOAL BP (BLOOD PRESSURE) < 140/90: Primary | ICD-10-CM

## 2022-04-20 DIAGNOSIS — E11.65 TYPE 2 DIABETES MELLITUS WITH HYPERGLYCEMIA, WITH LONG-TERM CURRENT USE OF INSULIN (H): ICD-10-CM

## 2022-04-20 DIAGNOSIS — F43.10 POSTTRAUMATIC STRESS DISORDER: ICD-10-CM

## 2022-04-20 DIAGNOSIS — Z79.4 TYPE 2 DIABETES MELLITUS WITH HYPERGLYCEMIA, WITH LONG-TERM CURRENT USE OF INSULIN (H): ICD-10-CM

## 2022-04-20 PROCEDURE — 99606 MTMS BY PHARM EST 15 MIN: CPT | Performed by: PHARMACIST

## 2022-04-20 PROCEDURE — 99607 MTMS BY PHARM ADDL 15 MIN: CPT | Performed by: PHARMACIST

## 2022-04-20 RX ORDER — OLMESARTAN MEDOXOMIL 5 MG/1
5 TABLET ORAL DAILY
Qty: 30 TABLET | Refills: 0 | Status: SHIPPED | OUTPATIENT
Start: 2022-04-20 | End: 2022-05-04 | Stop reason: DRUGHIGH

## 2022-04-20 RX ORDER — VALSARTAN 40 MG/1
40 TABLET ORAL DAILY
Status: CANCELLED | OUTPATIENT
Start: 2022-04-20

## 2022-04-20 NOTE — PATIENT INSTRUCTIONS
"Recommendations from today's MTM visit:                                                       Start olmesartan 5mg once daily.     The genetic testing is called [facility]      Behavioral Access at [phone] to see if our scheduling department can find someone for mental health     Move hydralazine 3p dose back to 12-1p slot instead to help with adherence     Follow-up: may 4th call     It was great speaking with you today.  I value your experience and would be very thankful for your time in providing feedback in our clinic survey. In the next few days, you may receive an email or text message from [employer] with a link to a survey related to your  clinical pharmacist.\"      To schedule another MTM appointment, please call the clinic directly or you may call the MTM scheduling line at [phone] or toll-free at [phone].      My Clinical Pharmacist's contact information:                                                       Please feel free to contact me with any questions or concerns you have.      Shi Sheikh, PharmD  Medication Therapy Management Pharmacist  "

## 2022-05-02 ENCOUNTER — VIRTUAL VISIT (OUTPATIENT)
Dept: PEDIATRICS | Facility: CLINIC | Age: 62
End: 2022-05-02
Payer: COMMERCIAL

## 2022-05-02 DIAGNOSIS — F33.1 MODERATE EPISODE OF RECURRENT MAJOR DEPRESSIVE DISORDER (H): ICD-10-CM

## 2022-05-02 DIAGNOSIS — F41.9 ANXIETY: ICD-10-CM

## 2022-05-02 DIAGNOSIS — R45.89 AT RISK FOR INTENTIONAL SELF-HARM: ICD-10-CM

## 2022-05-02 DIAGNOSIS — N18.31 CHRONIC KIDNEY DISEASE, STAGE 3A (H): ICD-10-CM

## 2022-05-02 DIAGNOSIS — F43.10 POSTTRAUMATIC STRESS DISORDER: ICD-10-CM

## 2022-05-02 DIAGNOSIS — F43.21 GRIEF REACTION: Primary | ICD-10-CM

## 2022-05-02 DIAGNOSIS — F41.0 PANIC ATTACKS: ICD-10-CM

## 2022-05-02 PROCEDURE — 99215 OFFICE O/P EST HI 40 MIN: CPT | Mod: 95 | Performed by: INTERNAL MEDICINE

## 2022-05-02 ASSESSMENT — ANXIETY QUESTIONNAIRES
3. WORRYING TOO MUCH ABOUT DIFFERENT THINGS: NEARLY EVERY DAY
7. FEELING AFRAID AS IF SOMETHING AWFUL MIGHT HAPPEN: NEARLY EVERY DAY
GAD7 TOTAL SCORE: 17
GAD7 TOTAL SCORE: 17
5. BEING SO RESTLESS THAT IT IS HARD TO SIT STILL: SEVERAL DAYS
1. FEELING NERVOUS, ANXIOUS, OR ON EDGE: NEARLY EVERY DAY
GAD7 TOTAL SCORE: 17
4. TROUBLE RELAXING: NEARLY EVERY DAY
2. NOT BEING ABLE TO STOP OR CONTROL WORRYING: NEARLY EVERY DAY
7. FEELING AFRAID AS IF SOMETHING AWFUL MIGHT HAPPEN: NEARLY EVERY DAY
6. BECOMING EASILY ANNOYED OR IRRITABLE: SEVERAL DAYS

## 2022-05-02 ASSESSMENT — PATIENT HEALTH QUESTIONNAIRE - PHQ9
SUM OF ALL RESPONSES TO PHQ QUESTIONS 1-9: 19
SUM OF ALL RESPONSES TO PHQ QUESTIONS 1-9: 19
10. IF YOU CHECKED OFF ANY PROBLEMS, HOW DIFFICULT HAVE THESE PROBLEMS MADE IT FOR YOU TO DO YOUR WORK, TAKE CARE OF THINGS AT HOME, OR GET ALONG WITH OTHER PEOPLE: SOMEWHAT DIFFICULT
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
## 2022-05-03 ASSESSMENT — ANXIETY QUESTIONNAIRES: GAD7 TOTAL SCORE: 17

## 2022-05-03 ASSESSMENT — PATIENT HEALTH QUESTIONNAIRE - PHQ9: SUM OF ALL RESPONSES TO PHQ QUESTIONS 1-9: 19

## 2022-05-04 ENCOUNTER — VIRTUAL VISIT (OUTPATIENT)
Dept: PHARMACY | Facility: CLINIC | Age: 62
End: 2022-05-04
Payer: COMMERCIAL

## 2022-05-04 DIAGNOSIS — E11.9 TYPE 2 DIABETES, HBA1C GOAL < 7% (H): ICD-10-CM

## 2022-05-04 DIAGNOSIS — F41.9 ANXIETY: ICD-10-CM

## 2022-05-04 DIAGNOSIS — F43.10 POSTTRAUMATIC STRESS DISORDER: ICD-10-CM

## 2022-05-04 DIAGNOSIS — I10 HYPERTENSION GOAL BP (BLOOD PRESSURE) < 140/90: Primary | ICD-10-CM

## 2022-05-04 DIAGNOSIS — R07.89 ATYPICAL CHEST PAIN: ICD-10-CM

## 2022-05-04 DIAGNOSIS — F33.1 MODERATE EPISODE OF RECURRENT MAJOR DEPRESSIVE DISORDER (H): ICD-10-CM

## 2022-05-04 PROCEDURE — 99607 MTMS BY PHARM ADDL 15 MIN: CPT | Performed by: PHARMACIST

## 2022-05-04 PROCEDURE — 99606 MTMS BY PHARM EST 15 MIN: CPT | Performed by: PHARMACIST

## 2022-05-04 RX ORDER — OLMESARTAN MEDOXOMIL 5 MG/1
10 TABLET ORAL DAILY
Qty: 60 TABLET | Refills: 0 | Status: SHIPPED | OUTPATIENT
Start: 2022-05-04 | End: 2022-06-08

## 2022-05-04 NOTE — PATIENT INSTRUCTIONS
"Recommendations from today's MTM visit:                                                      Change hydralazine 100 mg 3 times daily back to 730-830a, 12-1p, and then 530-6p.    Take additional olmesartan 5mg starting tonight 5/4.  Tomorrow 5/5 no morning dose but take 10mg at bedtime/evening.   Just to note: olmesartan comes as 5mg, 20mg to 40mg tablets only so you will need to continue on 5 mg tablets just fyi.    Lab appointment for basic metabolic panel and a1c on 5/19 at [facility].    5/24 follow-up with cardiologist.     Shi to send you a reminder on [employer] to start blood pressure check daily and record. If home blood pressure over 140 on top or 90 on the bottom, rest for 5 minutes and recheck. Repeat again for total 3 checks.     Follow-up: 6/8 follow-up call with Shi.     It was great speaking with you today.  I value your experience and would be very thankful for your time in providing feedback in our clinic survey. In the next few days, you may receive an email or text message from [employer] with a link to a survey related to your  clinical pharmacist.\"     To schedule another MTM appointment, please call the clinic directly or you may call the MTM scheduling line at [phone] or toll-free at [phone].     My Clinical Pharmacist's contact information:                                                      Please feel free to contact me with any questions or concerns you have.     Shi Sheikh, PharmD  Medication Therapy Management Pharmacist    "

## 2022-05-04 NOTE — PROGRESS NOTES
Medication Therapy Management (MTM) Encounter    ASSESSMENT:                            Medication Adherence/Access: see below    Hypertension/atypical chest pain: patient to continue to titrate ARB, will need bmp repeat. Per patient preference may change hydralazine back to previous timing to help with adherence. Patient has follow-up with cardiology in a few weeks.    Type 2 Diabetes:  No change. Work on improving mental health as patient reports worsened mood triggering binge eating.     PTSD/Depression/Anxiety: no changes. Patient does benefit from working with therapist and psychiatry.     PLAN:                            Change hydralazine 100 mg 3 times daily back to 730-830a, 12-1p, and then 530-6p.    Take additional olmesartan 5mg starting tonight 5/4.  Tomorrow 5/5 no morning dose but take 10mg at bedtime/evening.   Just to note: olmesartan comes as 5mg, 20mg to 40mg tablets only so you will need to continue on 5 mg tablets just fyi.    Lab appointment for basic metabolic panel and a1c on 5/19 at [facility].    5/24 follow-up with cardiologist.     Shi to send you a reminder on [employer] to start blood pressure check daily and record. If home blood pressure over 140 on top or 90 on the bottom, rest for 5 minutes and recheck. Repeat again for total 3 checks.     Follow-up: 6/8 follow-up call with Shi.     SUBJECTIVE/OBJECTIVE:                          Maricarmen Dotson is a 61 year old female called for a follow-up visit. She was referred to me from cardiology, Jules Ocampo NP. PCP is Dr. Mclaughlin. Today is follow-up from our visit on 4/20/22.    Reason for visit:  Follow-up on olmesartan.    Allergies/ADRs: Reviewed in chart - numerous drug intolerance and has EpiPen for emergency  Past Medical History: Reviewed in chart  Tobacco: She reports that she has never smoked. She has never used smokeless tobacco.  Alcohol: not currently using    Medication Adherence/Access:   Patient uses pill box(es),  which has been helpful for.  helps.  Patient takes medications 1-2 time(s) may miss her afternoon hydralazine.  The patient fills medications at [facility]: YES.   *See MTM note 4/20/22 for screen shot of patient's intolerance med list.       Hypertension/atypical CP: history of atypical chest pain. No ischemia per Lexiscan stress test in 4/2019. 10/2021 ED negative troponin and unremarkable EKG. Met with Jules Ocampo NP on 3/14/22, discussed options such as carvedilol, diltiazem, Bystolic, possible compounded amlodipine. Her CP was also noted by specialist to possibly be from musculoskeletal or fibromyalgia related or hypertensive state.    Yesterday later in the day 133/78. However, 153/83 this morning before medications. She reports tolerating the olmesartan well so far. However, she feels hydralazine is contributing to insomnia and prefers to go back to previous timing.  Medications:  - hydralazine 100 mg 3 times daily with her meals (730-830a, 12-1p, and then 9-10p) - feels this is effecting her   - albuterol HFA as needed for SOB not using regularly  - olmesartan 5 mg daily in the morning started 4/20.  Patient does self-monitor blood pressure. Home BP monitoring still in the 180's may lower a little when recheck 5 minutes later but minimally. HR 70-80's.  Previously tried:  - ARB: losartan (myalgia when increased dose, stopped 4/2019 - 6/2021)  - ACEI: lisinopril (joint aches ~ 2012?)  - CCB: amlodipine (neuropathy and heart palpitations), nifedipine (numbness as well 5/2019)  - alpha agonist: Clonidine  - diuretic: hydrochlorothiazide (numbness), spironolactone (joint pain), furosemide (had incontinence, but reports being told by cardiologist that not effective for blood pressure)  - BB: metoprolol (joint pain, fatigue)  BP Readings from Last 3 Encounters:   03/14/22 (!) 160/66   01/07/22 (!) 142/80   11/16/21 (!) 140/80     Type 2 Diabetes:  Currently taking insulin via insulin pump.   Blood sugar  monitoring: Continuous Glucose Monitor - ashleigh, she does share it with CDE/Endo team. She needs to connect from home still, was not able to yet.  Patient sent this to me BUT is from December 6 2021.    Eye exam: up to date  Foot exam: up to date  Diet/Exercise: has an eating disorder.  She is enrolled at [facility] but not working with anyone yet but awaiting possible provider. She wants to work with a trauma therapist.   Aspirin: Not taking  Statin: No   ACEi/ARB: No.   Specialty: needs to find a new endocrinology  Urine Albumin:   Lab Results   Component Value Date    UMALCR 30.91 (H) 11/10/2021      Lab Results   Component Value Date    A1C 8.2 11/23/2021    A1C 7.4 09/18/2020    A1C 8.0 02/27/2020    A1C 8.0 10/28/2019    A1C 8.2 07/22/2019    A1C 8.5 04/18/2019     PTSD/Depression/Anxiety: she has an appointment with a therapist next week. Since our last visit patient did have a visit on 5/2/2022 to discuss worsen depression, anxiety, grief.   Current medications include: medical cannabis - cbd gummies helps with her anxiety and panic attacks. She does feel these agents work. She does not take any other maintenance medication. She did meet with [facility] but reports history of trauma and has been search for 2-3 years now for mental health specialist/therapist that specializes in trauma.   Safety plan: feels safe with being able to walk  PHQ-9 SCORE 6/18/2021 2/8/2022 5/2/2022   PHQ-9 Total Score - - -   PHQ-9 Total Score MyChart 9 (Mild depression) 15 (Moderately severe depression) 19 (Moderately severe depression)   PHQ-9 Total Score 9 15 19   Some encounter information is confidential and restricted. Go to Review Flowsheets activity to see all data.     Today's Vitals: LMP 01/01/1999   ----------------      I spent 35 minutes with this patient today. All changes were made via collaborative practice agreement with Madyson Mendoza MD. A copy of the visit note was provided to the patient's provider(s).    The  patient was sent via [employer] a summary of these recommendations.     Shi Sheikh, PharmD  Medication Therapy Management Pharmacist    Telemedicine Visit Details  Type of service:  Telephone visit  Start Time: 10:30am  End Time: 11:05 am  Originating Location (patient location): Home  Distant Location (provider location):  [facility]     Medication Therapy Recommendations  Hypertension goal BP (blood pressure) < 140/90    Current Medication: hydrALAZINE (APRESOLINE) 100 MG tablet   Rationale: Patient forgets to take - Adherence - Adherence   Recommendation: Provide Adherence Intervention   Status: Patient Agreed - Adherence/Education          Current Medication: olmesartan (BENICAR) 5 MG tablet   Rationale: Dose too low - Dosage too low - Effectiveness   Recommendation: Increase Dose   Status: Accepted per CPA

## 2022-05-05 ENCOUNTER — PATIENT OUTREACH (OUTPATIENT)
Dept: NURSING | Facility: CLINIC | Age: 62
End: 2022-05-05
Attending: INTERNAL MEDICINE
Payer: COMMERCIAL

## 2022-05-05 DIAGNOSIS — F41.9 ANXIETY: ICD-10-CM

## 2022-05-05 DIAGNOSIS — R45.89 AT RISK FOR INTENTIONAL SELF-HARM: ICD-10-CM

## 2022-05-05 DIAGNOSIS — F43.10 POSTTRAUMATIC STRESS DISORDER: ICD-10-CM

## 2022-05-05 DIAGNOSIS — F33.1 MODERATE EPISODE OF RECURRENT MAJOR DEPRESSIVE DISORDER (H): ICD-10-CM

## 2022-05-05 DIAGNOSIS — F43.21 GRIEF REACTION: ICD-10-CM

## 2022-05-05 DIAGNOSIS — F41.0 PANIC ATTACKS: ICD-10-CM

## 2022-05-05 ASSESSMENT — ACTIVITIES OF DAILY LIVING (ADL): DEPENDENT_IADLS:: INDEPENDENT

## 2022-05-05 NOTE — PROGRESS NOTES
Clinic Care Coordination Contact    Clinic Care Coordination Contact  OUTREACH    Referral Information:  Referral Source: PCP    Primary Diagnosis: Behavioral Health    Chief Complaint   Patient presents with     Clinic Care Coordination - Initial     Introduction to Care Coordination        Universal Utilization:   Utilization    Hospital Admissions  0             ED Visits  1             No Show Count (past year)  3                Current as of: 5/4/2022  1:07 PM              Clinical Concerns:  Current Medical Concerns:    Patient Active Problem List   Diagnosis     Irritable bowel syndrome     Diverticulosis of large intestine     Insomnia     Chronic pain syndrome     Hyperlipidemia LDL goal <100     Dyspepsia     Type 2 diabetes, HbA1c goal < 7% (H)     Hepatic injury     Moderate major depression (H)     Overweight     Post concussion syndrome     Plantar fasciitis     Pain in joint, ankle and foot     Migraine headache     Anxiety     Panic attacks     History of traumatic brain injury     Pain in thoracic spine     Nonallopathic lesion of cervical region     Cervicalgia     Lumbago     Statin intolerance     Type 2 diabetes mellitus with hyperglycemia (H)     Fibromyalgia     Cervical pain     Carotid atherosclerosis, bilateral     Insulin pump in place     Hypertension goal BP (blood pressure) < 140/90     Posttraumatic stress disorder     Hypertensive urgency     MIESHA (obstructive sleep apnea)     Moderate episode of recurrent major depressive disorder (H)     Chronic kidney disease, stage 3a (H)       [facility] outreached to pt on this date per referral and request from PCP. Pt has long history of MH concerns with additional compounding medical conditions which further complicate or exacerbate her MH conditions. Pt has expressed her reasoning for declining assistance in the past of which a number of those reasons are related to PTSD/Grief.     Pt and CC reviewed Adult Mental Health Referral which was placed  by PCP. Pt states she missed a call from someone and was confused by writer's call thinking we were one and the same. CC explained Care Coordination vs Mental Health Referral. Pt appreciated the clarification. Since pt already has an intake appt for individual therapy, pt shouldn't need to follow up with Adult Mental Health Referral, however, CC encouraged pt hold on to 1-800 number they provided for scheduling and explained they too can help find both [facility] and community partner available therapy appts. Pt expressed understanding.     Pt added that she does have an appointment schedule with [facility] in [place] and is scheduled with a Gianna Xavier, PhD for 5/12/2022. [facility] expressed appreciation for having appointment so soon as there have been long waits with [facility] and various community partners. Pt agreed. [facility] inquired if this provider was specialized in EMDR or trauma based therapy. Pt stated she wasn't for certain, but she had requested a provider with those qualifications initially when requesting to schedule with David Macario, so she is hoping so.     Pt declined needing a psychiatrist or group therapy or support groups. Pt denied needing any other supports at this time but appreciated the call. Pt states she continues to contract for safety with her family. Pt stated she would outreach to clinic/care team or Care Coordination if she is needing any alternate provider options should this provider with which she is scheduled is not a good match.       Current Behavioral Concerns: PTSD, Anxiety, Panic Attacks, Depression, Grief.       Education Provided to patient: MH referral, CC referral.       Health Maintenance Reviewed: Due/Overdue   Health Maintenance Due   Topic Date Due     URINE DRUG SCREEN  Never done     ADVANCE CARE PLANNING  Never done     HIV SCREENING  Never done     HEPATITIS C SCREENING  Never done     ZOSTER IMMUNIZATION (1 of 2) Never done     DTAP/TDAP/TD IMMUNIZATION (1 -  Tdap) 07/28/2017     A1C  02/23/2022     LIPID  02/23/2022     COVID-19 Vaccine (3 - Booster for Reji series) 03/23/2022     HEMOGLOBIN  01/20/2022     COLORECTAL CANCER SCREENING  03/23/2022       Clinical Pathway: None    Medication Management:  Medication review status: last reviewed with provider 5/2/2022.      Functional Status:  Dependent ADLs:: Independent  Dependent IADLs:: Independent  Mobility Status: Independent    Living Situation:  Current living arrangement:: I live in a private home with spouse  Type of residence:: Private home - stairs    Lifestyle & Psychosocial Needs:  Social Determinants of Health     Tobacco Use: Low Risk      Smoking Tobacco Use: Never Smoker     Smokeless Tobacco Use: Never Used   Alcohol Use: Not on file   Financial Resource Strain: Not on file   Food Insecurity: Not on file   Transportation Needs: Not on file   Physical Activity: Not on file   Stress: Not on file   Social Connections: Not on file   Intimate Partner Violence: Not on file   Depression: Not at risk     PHQ-2 Score: 2   Housing Stability: Not on file           Evangelical or spiritual beliefs that impact treatment:: No  Mental health DX:: Yes  Mental health DX how managed:: None  Mental health management concern (GOAL):: Yes  Informal Support system:: Family     Care Coordinator has reviewed patient's Social Determinants of Health (SDoH) on this date. Upon review, changes were not  made.        Resources and Interventions:  Current Resources: Scheduled therapy for 5/12/2022.      Community Resources: None  Supplies Currently Used at Home: None  Equipment Currently Used at Home: none     Advance Care Plan/Directive  Advanced Care Plans/Directives on file:: No    Referrals Placed: None     Patient/Caregiver understanding: Pt reports understanding and denies any additional questions or concerns at this times. [facility] CC engaged in AIDET communication during encounter.     Future Appointments              In 2 weeks EA  LAB [facility] EA    In 2 weeks Sanjeev Payan MD [facility] CLIN    In 1 month Shi Sheikh [facility] [place]          Plan: Pt to follow up with 5/12/2022 therapy intake. Pt to continue to contract for safety and outreach to clinic, care team or care coordination for any new needs or concerns. Pt declined ongoing care coordination support at this time. No further outreaches will be made at this time unless a new referral is made or a change in the pt's status occurs. Patient was provided with this writer's contact information and encouraged to call with any questions or concerns.      Reymundo Archer, [facility]  Clinic Care Coordinator  [facility]  [facility]  [phone]  [email or web address]

## 2022-05-12 DIAGNOSIS — E11.9 TYPE 2 DIABETES, HBA1C GOAL < 7% (H): ICD-10-CM

## 2022-05-12 RX ORDER — BLOOD-GLUCOSE METER
KIT MISCELLANEOUS
Qty: 300 STRIP | Refills: 3 | OUTPATIENT
Start: 2022-05-12

## 2022-05-17 ENCOUNTER — MYC MEDICAL ADVICE (OUTPATIENT)
Dept: PEDIATRICS | Facility: CLINIC | Age: 62
End: 2022-05-17
Payer: COMMERCIAL

## 2022-05-17 DIAGNOSIS — E11.9 TYPE 2 DIABETES, HBA1C GOAL < 7% (H): Primary | ICD-10-CM

## 2022-05-20 ENCOUNTER — LAB (OUTPATIENT)
Dept: LAB | Facility: CLINIC | Age: 62
End: 2022-05-20
Payer: COMMERCIAL

## 2022-05-20 DIAGNOSIS — E11.9 TYPE 2 DIABETES, HBA1C GOAL < 7% (H): ICD-10-CM

## 2022-05-20 DIAGNOSIS — I10 HYPERTENSION GOAL BP (BLOOD PRESSURE) < 140/90: ICD-10-CM

## 2022-05-20 LAB — HBA1C MFR BLD: 8.7 % (ref 0–5.6)

## 2022-05-20 PROCEDURE — 83036 HEMOGLOBIN GLYCOSYLATED A1C: CPT

## 2022-05-20 PROCEDURE — 80048 BASIC METABOLIC PNL TOTAL CA: CPT

## 2022-05-20 PROCEDURE — 36415 COLL VENOUS BLD VENIPUNCTURE: CPT

## 2022-05-20 RX ORDER — BLOOD-GLUCOSE METER
KIT MISCELLANEOUS
Qty: 200 STRIP | Refills: 11 | Status: SHIPPED | OUTPATIENT
Start: 2022-05-20 | End: 2023-07-31

## 2022-05-21 LAB
ANION GAP SERPL CALCULATED.3IONS-SCNC: 11 MMOL/L (ref 3–14)
BUN SERPL-MCNC: 14 MG/DL (ref 7–30)
CALCIUM SERPL-MCNC: 9.6 MG/DL (ref 8.5–10.1)
CHLORIDE BLD-SCNC: 106 MMOL/L (ref 94–109)
CO2 SERPL-SCNC: 24 MMOL/L (ref 20–32)
CREAT SERPL-MCNC: 0.8 MG/DL (ref 0.52–1.04)
GFR SERPL CREATININE-BSD FRML MDRD: 84 ML/MIN/1.73M2
GLUCOSE BLD-MCNC: 240 MG/DL (ref 70–99)
POTASSIUM BLD-SCNC: 4.1 MMOL/L (ref 3.4–5.3)
SODIUM SERPL-SCNC: 141 MMOL/L (ref 133–144)

## 2022-06-07 ENCOUNTER — OFFICE VISIT (OUTPATIENT)
Dept: CARDIOLOGY | Facility: CLINIC | Age: 62
End: 2022-06-07
Attending: INTERNAL MEDICINE
Payer: COMMERCIAL

## 2022-06-07 VITALS
WEIGHT: 190.8 LBS | HEART RATE: 64 BPM | OXYGEN SATURATION: 97 % | HEIGHT: 63 IN | DIASTOLIC BLOOD PRESSURE: 70 MMHG | SYSTOLIC BLOOD PRESSURE: 168 MMHG | BODY MASS INDEX: 33.81 KG/M2

## 2022-06-07 DIAGNOSIS — I10 HYPERTENSION GOAL BP (BLOOD PRESSURE) < 140/90: ICD-10-CM

## 2022-06-07 PROCEDURE — 99215 OFFICE O/P EST HI 40 MIN: CPT | Performed by: INTERNAL MEDICINE

## 2022-06-07 RX ORDER — OLMESARTAN MEDOXOMIL 20 MG/1
20 TABLET ORAL DAILY
Qty: 90 TABLET | Refills: 3 | Status: SHIPPED | OUTPATIENT
Start: 2022-06-07 | End: 2022-07-08

## 2022-06-07 NOTE — LETTER
6/7/2022    Madyson Mendoza MD  [address] [place] [address]    RE: Maricarmen Dotson       Dear Colleague,     I had the pleasure of seeing Maricarmen Dotson in the [facility].  HISTORY:    Maricarmen Dotson is a pleasant 61-year-old female with a 20+ year history of type 2 diabetes, obstructive sleep apnea intolerant of CPAP, fibromyalgia with severe symptomatology, mild obesity, irritable bowel syndrome, and hypertension which has been very difficult to control.  She has a large number of medication intolerances and has been seen on a regular basis for management of her hypertension.    Since our last visit Maricarmen was started on hydralazine and this was titrated up.  She is tolerating this well but her blood pressure remains only marginally controlled.  Previously she had a lot of readings in the 180s range and the list she brings in today is mostly between 140 and 160 systolic.  She was also started on olmesartan first at 5 mg daily and now increased to 10 mg daily.  She is tolerating this well.    Recently Maricarmen developed COVID-19.  Her  tested positive and the patient developed the same symptoms consisting of aching muscles fever headaches, profound fatigue, and various areas of numbness that she uses as a barometer for intolerance, most notably her left heel.  She has not yet recovered completely from this, even after a month.  The patient herself states that she tested several times using different tests and always tested negative for COVID-19 but I assured her that she almost certainly had the virus considering the situation and her symptoms.      ASSESSMENT/PLAN:    1.  Hypertension.  Control is a little bit better with maximal dose hydralazine and has improved further with the use of olmesartan.  She is still on a fairly low dose and I explained that this has a long half-life and is no benefit to using it twice daily.  We had a long discussion about 10 embolism, excretion,  half-life of medications since she had some questions about this.  I have asked her to increase her dose to 20 mg daily but she would like to try 15 mg daily for a week or so and I certainly have no objections to that.  I would plan on titrating her to a full 40 mg and then consider adding reformulated amlodipine as our next step.  I warned her that if we move to that she may develop some peripheral edema but this is harmless and only needs to be addressed if it bothers her significantly.  I appreciate the assistance of MTROXANNE in the management of her multiple intolerances and antihypertensive agents.    Thank you for inviting me to participate in the care of your patient.  Please don't hesitate to call if I can be of further assistance.  40 minutes were spent today reviewing the chart and other records, interviewing and examining the patient, and documenting our visit.    Chart documentation was completed, in part, with [employer] voice-recognition software. Even though reviewed, some grammatical, spelling, and word errors may remain.       No orders of the defined types were placed in this encounter.    Orders Placed This Encounter   Medications     olmesartan (BENICAR) 20 MG tablet     Sig: Take 1 tablet (20 mg) by mouth daily     Dispense:  90 tablet     Refill:  3     There are no discontinued medications.    10 year ASCVD risk: The 10-year ASCVD risk score (Vicenta JAGDISH Jr., et al., 2013) is: 14.5%    Values used to calculate the score:      Age: 61 years      Sex: Female      Is Non- : No      Diabetic: Yes      Tobacco smoker: No      Systolic Blood Pressure: 168 mmHg      Is BP treated: Yes      HDL Cholesterol: 47 mg/dL      Total Cholesterol: 182 mg/dL    Encounter Diagnosis   Name Primary?     Hypertension goal BP (blood pressure) < 140/90        CURRENT MEDICATIONS:  Current Outpatient Medications   Medication Sig Dispense Refill     acetaminophen (TYLENOL) 500 MG tablet Take 500-1,000 mg by  mouth every 8 hours as needed for mild pain       albuterol (PROAIR HFA/PROVENTIL HFA/VENTOLIN HFA) 108 (90 Base) MCG/ACT inhaler Inhale 2 puffs into the lungs every 6 hours 18 g 1     alcohol swab prep pads Use to swab area of injection/chyna as directed. 100 each 3     Ascorbic Acid (VITAMIN C) 500 MG CAPS Take 500 mg by mouth daily       ASPIRIN NOT PRESCRIBED (INTENTIONAL) Please choose reason not prescribed, below 0 each 0     benzonatate (TESSALON) 200 MG capsule Take 1 capsule (200 mg) by mouth 3 times daily as needed for cough 30 capsule 0     blood glucose (FREESTYLE LITE) test strip Use to test blood sugar 4-7 times daily or as directed. 200 strip 11     blood glucose (FREESTYLE LITE) test strip Use to test blood sugar 6-7 times daily. 100 strip 6     blood glucose monitoring (FREESTYLE LITE) meter device kit Use to test blood sugar 6-7 times daily. 1 kit 0     blood glucose monitoring (FREESTYLE) lancets Use to test blood sugar 6-7 times daily. 100 each 6     cetirizine (ZYRTEC) 10 MG tablet Take 10 mg by mouth daily OTC       Continuous Blood Gluc  (FREESTYLE RADHA 14 DAY READER) MARIA C USE TO CHECK BLOOD SUGARS AS DIRECTED 1 Device 0     Continuous Blood Gluc Sensor (FREESTYLE RADHA 14 DAY SENSOR) MISC USE TO TEST BLOOD SUGARS AS DIRECTED AND CHANGE EVERY 14 DAYS 2 each 11     diclofenac (VOLTAREN) 1 % topical gel Apply topically nightly as needed for moderate pain Apply 4 grams to knees or 2 grams to hands.  g 1     EPINEPHrine (ANY BX GENERIC EQUIV) 0.3 MG/0.3ML injection 2-pack Inject 0.3 mLs (0.3 mg) into the muscle once as needed for anaphylaxis 2 each 1     famotidine (PEPCID) 20 MG tablet Take 20 mg by mouth daily OTC Pepcid AC       HEMP OIL OR EXTRACT OR OTHER CBD CANNABINOID, NOT MEDICAL CANNABIS, daily CBD gummies as needed from [employer]       hydrALAZINE (APRESOLINE) 100 MG tablet Take 1 tablet (100 mg) by mouth 3 times daily 90 tablet 4     insulin aspart (NOVOLOG VIAL)  "100 UNITS/ML vial USE IN INSULIN PUMP, TOTAL DAILY DOSE 100 UNITS. 90 mL 3     Insulin Disposable Pump (OMNIPOD DASH 5 PACK PODS) MISC 1 pod every 48 hours Dispense # 45 pods or #9 5-packs 45 each 3     INSULIN PUMP - OUTPATIENT INSULIN PUMP - OUTPATIENT  Date last updated: 6/4/21 Omnipod DASH  BASAL RATES and times:   12am: 1.3, 6 am: 0.75, 12 pm: 0.8, 6 pm: 0.95   CARB RATIO: 12am-12am: 10  Correction Factor (Sensitivity): 12AM (midnight): 31 -- 5 AM: 33  Target blood glucose: 100-120  Active insulin time: 4 hrs       insulin syringe-needle U-100 (31G X 5/16\" 0.3 ML) 31G X 5/16\" 0.3 ML miscellaneous Use 3 syringes daily or as directed, in the event of pump failure. 100 each 3     medical cannabis (Patient's own supply) See Admin Instructions (The purpose of this order is to document that the patient reports taking medical cannabis.  This is not a prescription, and is not used to certify that the patient has a qualifying medical condition.)    Taking up to 40 mg but usually 10-20 mg caplet from [facility].       MELATONIN PO Take 20 mg by mouth At Bedtime        olmesartan (BENICAR) 20 MG tablet Take 1 tablet (20 mg) by mouth daily 90 tablet 3     olmesartan (BENICAR) 5 MG tablet Take 2 tablets (10 mg) by mouth daily 60 tablet 0     ondansetron (ZOFRAN-ODT) 4 MG ODT tab Take 1 tablet (4 mg) by mouth every 8 hours as needed for nausea 8 tablet 0     polyethylene glycol (MIRALAX/GLYCOLAX) packet Take 17 g by mouth At Bedtime        STATIN NOT PRESCRIBED, INTENTIONAL, Statin not prescribed intentionally due to Intolerance 0 each 0       ALLERGIES     Allergies   Allergen Reactions     Amoxicillin Anaphylaxis     Bee Anaphylaxis     Wasp        Contrast Dye Anaphylaxis     Iodine Anaphylaxis     Severe anaphalatic shock       Losartan Muscle Pain (Myalgia)     Sulfa Drugs Anaphylaxis     Tetanus-Diphtheria Toxoids      Rxn was to Tdap-whole body joint pain and fever.  Had similar reaction to Td vaccine 7/28/2017     " Metoprolol Other (See Comments)     Fatigue, joint pain, throat tightness     Zithromax [Azithromycin Dihydrate] Nausea and Vomiting     Amlodipine      Neuropathic type pain in hands/feet     Atorvastatin      myalgias     Catapres [Clonidine]      Crestor [Rosuvastatin]      myalgias     Cyproheptadine      Severe headache, cardiac issues, and dizziness     Humalog [Insulin Lispro]      Causes pancreatitis -      Hydrochlorothiazide      numbness     Latex      Skin burn and can't breathe       Lisinopril      Joint aches     Metformin      Naltrexone      Nifedipine      Onglyza [Saxagliptin Hydrochloride]      Fibromyalgia flare     Phenergan [Promethazine]      Prochlorperazine      Altered mental status, hallucinations     Reglan [Metoclopramide]      Sumatriptan      Causes severe depression     Tetracycline Nausea and Vomiting, Hives and Difficulty breathing     Pt called to update today after the visit that she is allergic to the tetracycline-added to her list     Sulindac Anxiety     Panic attacks       PAST MEDICAL HISTORY:  Past Medical History:   Diagnosis Date     Ascending aorta enlargement (H)      Depressive disorder     severe, prior hospitalization     Diabetes mellitus, type 2 (H)      Diverticulosis of colon (without mention of hemorrhage)      Fibromyalgia 2007     Insomnia      Irritable bowel syndrome        PAST SURGICAL HISTORY:  Past Surgical History:   Procedure Laterality Date     BACK SURGERY      fusion  and removal of hardware      C SPINAL ORTHOSIS,NOS      lumbar surgery     CHOLECYSTECTOMY       choley  2011     ENT SURGERY      septoplasty     HYSTERECTOMY, CERVIX STATUS UNKNOWN      TVH/BSO     ORTHOPEDIC SURGERY      left ankle       FAMILY HISTORY:  Family History   Problem Relation Age of Onset     Diabetes Mother         type 2     Hypertension Mother      Cerebrovascular Disease Mother          stroke age 57     Ovarian Cancer Mother 43  "    Cancer Mother         cervix     Diabetes Father         type 2     Hypertension Father      Gastrointestinal Disease Father         colon polyps     Sleep Apnea Brother      Cancer Sister      Ovarian Cancer Sister 46     Ovarian Cancer Maternal Grandmother 72     Cancer Maternal Grandmother         cervix       SOCIAL HISTORY:  Social History     Socioeconomic History     Marital status:      Number of children: 3   Occupational History     Occupation: xr technician     Employer: [employer]   Tobacco Use     Smoking status: Never Smoker     Smokeless tobacco: Never Used   Substance and Sexual Activity     Alcohol use: Not Currently     Alcohol/week: 0.0 standard drinks     Comment: maybe once a month     Drug use: No     Sexual activity: Yes     Partners: Male     Birth control/protection: Surgical       Review of Systems:  Skin:        Eyes:       ENT:       Respiratory:  Positive for dyspnea on exertion;cough;mucoid expectorant  Cardiovascular:    palpitations;Positive for;edema;fatigue;lightheadedness;dizziness;chest pain;exercise intolerance  Gastroenterology:      Genitourinary:       Musculoskeletal:       Neurologic:       Psychiatric:       Heme/Lymph/Imm:       Endocrine:         Physical Exam:  Vitals: BP (!) 168/70   Pulse 64   Ht 1.607 m (5' 3.25\")   Wt 86.5 kg (190 lb 12.8 oz)   LMP 01/01/1999   SpO2 97%   BMI 33.53 kg/m      Constitutional:           Skin:           Head:           Eyes:           ENT:           Neck:           Chest:           Cardiac:                    Abdomen:           Vascular:                                        Extremities and Muscular Skeletal:              Neurological:           Psych:        Recent Lab Results:  LIPID RESULTS:  Lab Results   Component Value Date    CHOL 182 11/23/2021    CHOL 221 (H) 09/18/2020    HDL 47 (L) 11/23/2021    HDL 51 09/18/2020     (H) 11/23/2021     (H) 09/18/2020    TRIG 136 11/23/2021    " TRIG 137 09/18/2020    CHOLHDLRATIO 5.0 02/21/2015       LIVER ENZYME RESULTS:  Lab Results   Component Value Date    AST 22 01/20/2021    ALT 22 01/20/2021       CBC RESULTS:  Lab Results   Component Value Date    WBC 7.5 01/20/2021    RBC 4.49 01/20/2021    HGB 13.4 01/20/2021    HCT 39.8 01/20/2021    MCV 89 01/20/2021    MCH 29.8 01/20/2021    MCHC 33.7 01/20/2021    RDW 12.1 01/20/2021     01/20/2021       BMP RESULTS:  Lab Results   Component Value Date     05/20/2022     01/20/2021    POTASSIUM 4.1 05/20/2022    POTASSIUM 3.7 01/20/2021    CHLORIDE 106 05/20/2022    CHLORIDE 107 01/20/2021    CO2 24 05/20/2022    CO2 30 01/20/2021    ANIONGAP 11 05/20/2022    ANIONGAP 3 01/20/2021     (H) 05/20/2022     (H) 01/20/2021    BUN 14 05/20/2022    BUN 10 01/20/2021    CR 0.80 05/20/2022    CR 0.77 01/20/2021    GFRESTIMATED 84 05/20/2022    GFRESTIMATED 84 01/20/2021    GFRESTBLACK >90 01/20/2021    KARIN 9.6 05/20/2022    KARIN 9.1 01/20/2021      A1C RESULTS:  Lab Results   Component Value Date    A1C 8.7 (H) 05/20/2022    A1C 7.4 (H) 09/18/2020     INR RESULTS:  Lab Results   Component Value Date    INR 1.01 05/23/2011       Sanjeev Payan MD, FACC    CC  Sanjeev Payan MD  [address] [place] [id number]    Thank you for allowing me to participate in the care of your patient.      Sincerely,     Sanjeev Payan MD     [facility]

## 2022-06-07 NOTE — PROGRESS NOTES
HISTORY:    Maricarmen Dotson is a pleasant 61-year-old female with a 20+ year history of type 2 diabetes, obstructive sleep apnea intolerant of CPAP, fibromyalgia with severe symptomatology, mild obesity, irritable bowel syndrome, and hypertension which has been very difficult to control.  She has a large number of medication intolerances and has been seen on a regular basis for management of her hypertension.    Since our last visit Maricarmen was started on hydralazine and this was titrated up.  She is tolerating this well but her blood pressure remains only marginally controlled.  Previously she had a lot of readings in the 180s range and the list she brings in today is mostly between 140 and 160 systolic.  She was also started on olmesartan first at 5 mg daily and now increased to 10 mg daily.  She is tolerating this well.    Recently Maricarmen developed COVID-19.  Her  tested positive and the patient developed the same symptoms consisting of aching muscles fever headaches, profound fatigue, and various areas of numbness that she uses as a barometer for intolerance, most notably her left heel.  She has not yet recovered completely from this, even after a month.  The patient herself states that she tested several times using different tests and always tested negative for COVID-19 but I assured her that she almost certainly had the virus considering the situation and her symptoms.      ASSESSMENT/PLAN:    1.  Hypertension.  Control is a little bit better with maximal dose hydralazine and has improved further with the use of olmesartan.  She is still on a fairly low dose and I explained that this has a long half-life and is no benefit to using it twice daily.  We had a long discussion about 10 embolism, excretion, half-life of medications since she had some questions about this.  I have asked her to increase her dose to 20 mg daily but she would like to try 15 mg daily for a week or so and I certainly have no objections  to that.  I would plan on titrating her to a full 40 mg and then consider adding reformulated amlodipine as our next step.  I warned her that if we move to that she may develop some peripheral edema but this is harmless and only needs to be addressed if it bothers her significantly.  I appreciate the assistance of DURGA in the management of her multiple intolerances and antihypertensive agents.    Thank you for inviting me to participate in the care of your patient.  Please don't hesitate to call if I can be of further assistance.  40 minutes were spent today reviewing the chart and other records, interviewing and examining the patient, and documenting our visit.    Chart documentation was completed, in part, with [employer] voice-recognition software. Even though reviewed, some grammatical, spelling, and word errors may remain.       No orders of the defined types were placed in this encounter.    Orders Placed This Encounter   Medications     olmesartan (BENICAR) 20 MG tablet     Sig: Take 1 tablet (20 mg) by mouth daily     Dispense:  90 tablet     Refill:  3     There are no discontinued medications.    10 year ASCVD risk: The 10-year ASCVD risk score ([place] DC Jr., et al., 2013) is: 14.5%    Values used to calculate the score:      Age: 61 years      Sex: Female      Is Non- : No      Diabetic: Yes      Tobacco smoker: No      Systolic Blood Pressure: 168 mmHg      Is BP treated: Yes      HDL Cholesterol: 47 mg/dL      Total Cholesterol: 182 mg/dL    Encounter Diagnosis   Name Primary?     Hypertension goal BP (blood pressure) < 140/90        CURRENT MEDICATIONS:  Current Outpatient Medications   Medication Sig Dispense Refill     acetaminophen (TYLENOL) 500 MG tablet Take 500-1,000 mg by mouth every 8 hours as needed for mild pain       albuterol (PROAIR HFA/PROVENTIL HFA/VENTOLIN HFA) 108 (90 Base) MCG/ACT inhaler Inhale 2 puffs into the lungs every 6 hours 18 g 1     alcohol swab prep pads  Use to swab area of injection/chyna as directed. 100 each 3     Ascorbic Acid (VITAMIN C) 500 MG CAPS Take 500 mg by mouth daily       ASPIRIN NOT PRESCRIBED (INTENTIONAL) Please choose reason not prescribed, below 0 each 0     benzonatate (TESSALON) 200 MG capsule Take 1 capsule (200 mg) by mouth 3 times daily as needed for cough 30 capsule 0     blood glucose (FREESTYLE LITE) test strip Use to test blood sugar 4-7 times daily or as directed. 200 strip 11     blood glucose (FREESTYLE LITE) test strip Use to test blood sugar 6-7 times daily. 100 strip 6     blood glucose monitoring (FREESTYLE LITE) meter device kit Use to test blood sugar 6-7 times daily. 1 kit 0     blood glucose monitoring (FREESTYLE) lancets Use to test blood sugar 6-7 times daily. 100 each 6     cetirizine (ZYRTEC) 10 MG tablet Take 10 mg by mouth daily OTC       Continuous Blood Gluc  (FREESTYLE RADHA 14 DAY READER) MARIA C USE TO CHECK BLOOD SUGARS AS DIRECTED 1 Device 0     Continuous Blood Gluc Sensor (FREESTYLE RADHA 14 DAY SENSOR) MISC USE TO TEST BLOOD SUGARS AS DIRECTED AND CHANGE EVERY 14 DAYS 2 each 11     diclofenac (VOLTAREN) 1 % topical gel Apply topically nightly as needed for moderate pain Apply 4 grams to knees or 2 grams to hands.  g 1     EPINEPHrine (ANY BX GENERIC EQUIV) 0.3 MG/0.3ML injection 2-pack Inject 0.3 mLs (0.3 mg) into the muscle once as needed for anaphylaxis 2 each 1     famotidine (PEPCID) 20 MG tablet Take 20 mg by mouth daily OTC Pepcid AC       HEMP OIL OR EXTRACT OR OTHER CBD CANNABINOID, NOT MEDICAL CANNABIS, daily CBD gummies as needed from [employer] [employer] [facility] [employer]       hydrALAZINE (APRESOLINE) 100 MG tablet Take 1 tablet (100 mg) by mouth 3 times daily 90 tablet 4     insulin aspart (NOVOLOG VIAL) 100 UNITS/ML vial USE IN INSULIN PUMP, TOTAL DAILY DOSE 100 UNITS. 90 mL 3     Insulin Disposable Pump (OMNIPOD DASH 5 PACK PODS) MISC 1 pod every 48 hours Dispense # 45 pods or #9 5-packs 45 each 3      "INSULIN PUMP - OUTPATIENT INSULIN PUMP - OUTPATIENT  Date last updated: 6/4/21 Omnipod DASH  BASAL RATES and times:   12am: 1.3, 6 am: 0.75, 12 pm: 0.8, 6 pm: 0.95   CARB RATIO: 12am-12am: 10  Correction Factor (Sensitivity): 12AM (midnight): 31 -- 5 AM: 33  Target blood glucose: 100-120  Active insulin time: 4 hrs       insulin syringe-needle U-100 (31G X 5/16\" 0.3 ML) 31G X 5/16\" 0.3 ML miscellaneous Use 3 syringes daily or as directed, in the event of pump failure. 100 each 3     medical cannabis (Patient's own supply) See Admin Instructions (The purpose of this order is to document that the patient reports taking medical cannabis.  This is not a prescription, and is not used to certify that the patient has a qualifying medical condition.)    Taking up to 40 mg but usually 10-20 mg caplet from [facility].       MELATONIN PO Take 20 mg by mouth At Bedtime        olmesartan (BENICAR) 20 MG tablet Take 1 tablet (20 mg) by mouth daily 90 tablet 3     olmesartan (BENICAR) 5 MG tablet Take 2 tablets (10 mg) by mouth daily 60 tablet 0     ondansetron (ZOFRAN-ODT) 4 MG ODT tab Take 1 tablet (4 mg) by mouth every 8 hours as needed for nausea 8 tablet 0     polyethylene glycol (MIRALAX/GLYCOLAX) packet Take 17 g by mouth At Bedtime        STATIN NOT PRESCRIBED, INTENTIONAL, Statin not prescribed intentionally due to Intolerance 0 each 0       ALLERGIES     Allergies   Allergen Reactions     Amoxicillin Anaphylaxis     Bee Anaphylaxis     Wasp        Contrast Dye Anaphylaxis     Iodine Anaphylaxis     Severe anaphalatic shock       Losartan Muscle Pain (Myalgia)     Sulfa Drugs Anaphylaxis     Tetanus-Diphtheria Toxoids      Rxn was to Tdap-whole body joint pain and fever.  Had similar reaction to Td vaccine 7/28/2017     Metoprolol Other (See Comments)     Fatigue, joint pain, throat tightness     Zithromax [Azithromycin Dihydrate] Nausea and Vomiting     Amlodipine      Neuropathic type pain in hands/feet     Atorvastatin  "     myalgias     Catapres [Clonidine]      Crestor [Rosuvastatin]      myalgias     Cyproheptadine      Severe headache, cardiac issues, and dizziness     Humalog [Insulin Lispro]      Causes pancreatitis -      Hydrochlorothiazide      numbness     Latex      Skin burn and can't breathe       Lisinopril      Joint aches     Metformin      Naltrexone      Nifedipine      Onglyza [Saxagliptin Hydrochloride]      Fibromyalgia flare     Phenergan [Promethazine]      Prochlorperazine      Altered mental status, hallucinations     Reglan [Metoclopramide]      Sumatriptan      Causes severe depression     Tetracycline Nausea and Vomiting, Hives and Difficulty breathing     Pt called to update today after the visit that she is allergic to the tetracycline-added to her list     Sulindac Anxiety     Panic attacks       PAST MEDICAL HISTORY:  Past Medical History:   Diagnosis Date     Ascending aorta enlargement (H)      Depressive disorder     severe, prior hospitalization     Diabetes mellitus, type 2 (H)      Diverticulosis of colon (without mention of hemorrhage)      Fibromyalgia 2007     Insomnia      Irritable bowel syndrome        PAST SURGICAL HISTORY:  Past Surgical History:   Procedure Laterality Date     BACK SURGERY      fusion  and removal of hardware      C SPINAL ORTHOSIS,NOS      lumbar surgery     CHOLECYSTECTOMY  2011     choley  2011     ENT SURGERY  1986    septoplasty     HYSTERECTOMY, CERVIX STATUS UNKNOWN      TVH/BSO     ORTHOPEDIC SURGERY      left ankle       FAMILY HISTORY:  Family History   Problem Relation Age of Onset     Diabetes Mother         type 2     Hypertension Mother      Cerebrovascular Disease Mother          stroke age 57     Ovarian Cancer Mother 43     Cancer Mother         cervix     Diabetes Father         type 2     Hypertension Father      Gastrointestinal Disease Father         colon polyps     Sleep Apnea Brother      Cancer Sister      Ovarian  "Cancer Sister 46     Ovarian Cancer Maternal Grandmother 72     Cancer Maternal Grandmother         cervix       SOCIAL HISTORY:  Social History     Socioeconomic History     Marital status:      Number of children: 3   Occupational History     Occupation: xr technician     Employer: [employer]   Tobacco Use     Smoking status: Never Smoker     Smokeless tobacco: Never Used   Substance and Sexual Activity     Alcohol use: Not Currently     Alcohol/week: 0.0 standard drinks     Comment: maybe once a month     Drug use: No     Sexual activity: Yes     Partners: Male     Birth control/protection: Surgical       Review of Systems:  Skin:        Eyes:       ENT:       Respiratory:  Positive for dyspnea on exertion;cough;mucoid expectorant  Cardiovascular:    palpitations;Positive for;edema;fatigue;lightheadedness;dizziness;chest pain;exercise intolerance  Gastroenterology:      Genitourinary:       Musculoskeletal:       Neurologic:       Psychiatric:       Heme/Lymph/Imm:       Endocrine:         Physical Exam:  Vitals: BP (!) 168/70   Pulse 64   Ht 1.607 m (5' 3.25\")   Wt 86.5 kg (190 lb 12.8 oz)   LMP 01/01/1999   SpO2 97%   BMI 33.53 kg/m      Constitutional:           Skin:           Head:           Eyes:           ENT:           Neck:           Chest:           Cardiac:                    Abdomen:           Vascular:                                        Extremities and Muscular Skeletal:              Neurological:           Psych:        Recent Lab Results:  LIPID RESULTS:  Lab Results   Component Value Date    CHOL 182 11/23/2021    CHOL 221 (H) 09/18/2020    HDL 47 (L) 11/23/2021    HDL 51 09/18/2020     (H) 11/23/2021     (H) 09/18/2020    TRIG 136 11/23/2021    TRIG 137 09/18/2020    CHOLHDLRATIO 5.0 02/21/2015       LIVER ENZYME RESULTS:  Lab Results   Component Value Date    AST 22 01/20/2021    ALT 22 01/20/2021       CBC RESULTS:  Lab Results   Component Value " Date    WBC 7.5 01/20/2021    RBC 4.49 01/20/2021    HGB 13.4 01/20/2021    HCT 39.8 01/20/2021    MCV 89 01/20/2021    MCH 29.8 01/20/2021    MCHC 33.7 01/20/2021    RDW 12.1 01/20/2021     01/20/2021       BMP RESULTS:  Lab Results   Component Value Date     05/20/2022     01/20/2021    POTASSIUM 4.1 05/20/2022    POTASSIUM 3.7 01/20/2021    CHLORIDE 106 05/20/2022    CHLORIDE 107 01/20/2021    CO2 24 05/20/2022    CO2 30 01/20/2021    ANIONGAP 11 05/20/2022    ANIONGAP 3 01/20/2021     (H) 05/20/2022     (H) 01/20/2021    BUN 14 05/20/2022    BUN 10 01/20/2021    CR 0.80 05/20/2022    CR 0.77 01/20/2021    GFRESTIMATED 84 05/20/2022    GFRESTIMATED 84 01/20/2021    GFRESTBLACK >90 01/20/2021    KARIN 9.6 05/20/2022    KARIN 9.1 01/20/2021        A1C RESULTS:  Lab Results   Component Value Date    A1C 8.7 (H) 05/20/2022    A1C 7.4 (H) 09/18/2020       INR RESULTS:  Lab Results   Component Value Date    INR 1.01 05/23/2011         Sanjeev Payan MD, FACC    CC  Sanjeev Payan MD  [address] [place] [id number]

## 2022-06-08 ENCOUNTER — VIRTUAL VISIT (OUTPATIENT)
Dept: PHARMACY | Facility: CLINIC | Age: 62
End: 2022-06-08
Payer: COMMERCIAL

## 2022-06-08 DIAGNOSIS — Z79.4 TYPE 2 DIABETES MELLITUS WITH HYPERGLYCEMIA, WITH LONG-TERM CURRENT USE OF INSULIN (H): ICD-10-CM

## 2022-06-08 DIAGNOSIS — I10 HYPERTENSION GOAL BP (BLOOD PRESSURE) < 140/90: Primary | ICD-10-CM

## 2022-06-08 DIAGNOSIS — R07.89 ATYPICAL CHEST PAIN: ICD-10-CM

## 2022-06-08 DIAGNOSIS — E11.65 TYPE 2 DIABETES MELLITUS WITH HYPERGLYCEMIA, WITH LONG-TERM CURRENT USE OF INSULIN (H): ICD-10-CM

## 2022-06-08 PROCEDURE — 99607 MTMS BY PHARM ADDL 15 MIN: CPT | Performed by: PHARMACIST

## 2022-06-08 PROCEDURE — 99606 MTMS BY PHARM EST 15 MIN: CPT | Performed by: PHARMACIST

## 2022-06-08 NOTE — PROGRESS NOTES
Medication Therapy Management (MTM) Encounter    ASSESSMENT:                            Medication Adherence/Access: No issues identified    Hypertension/atypical chest pain: patient to continue to titrate ARB to 40mg daily eventually, and plan for bmp recheck after titration. Continue to slow increase due to history of numerous intolerance to medications. Blood pressure is not at goal < 130/80.     Type 2 Diabetes:  No change. Work on improving mental health as patient reports worsened mood triggering binge eating. Continue working with therapist.    PLAN:                            Increase olmesartan to 20mg daily, plan for bmp check prior to next call with me.    Agreed patient to check blood pressure 1 hour after taking medications is very reasonable.     Follow-up: Return in about 1 month (around 7/8/2022) for Medication Therapy Telephone Visit.    SUBJECTIVE/OBJECTIVE:                          Maricarmen Dotson is a 61 year old female called for a follow-up visit.  Today's visit is a follow-up MTM visit from 5/4/2022.     Reason for visit: she reports exposed to covid recently but reports testing negative but has the symptoms. Hypertension management.     Allergies/ADRs: Reviewed in chart - numerous drug intolerance and has EpiPen for emergency  Past Medical History: Reviewed in chart  Tobacco: She reports that she has never smoked. She has never used smokeless tobacco.  Alcohol: not currently using    Medication Adherence/Access:   Patient uses pill box(es), which has been helpful for.  helps.  Patient takes medications 1-2 time(s) may miss her afternoon hydralazine.  The patient fills medications at [facility]: YES.   *See MTM note 4/20/22 for screen shot of patient's intolerance med list.     Hypertension/atypical CP: history of atypical chest pain. No ischemia per Lexiscan stress test in 4/2019. 10/2021 ED negative troponin and unremarkable EKG. Met with Jules Ocampo NP on 3/14/22, discussed options  such as carvedilol, diltiazem, Bystolic, possible compounded amlodipine. Her CP was also noted by specialist to possibly be from musculoskeletal or fibromyalgia related or hypertensive state.    Lowest blood pressure are 150/70-80's.  Highest blood pressure are 170/90's (if she forgets to take a medication or before she takes the blood pressure reading).    She was seen on 6/7/22 cardiologist and recommend increase in olmesartan to 20mg daily. She reports Dr. Payan suggest waiting 1 hours after taking medication to monitor blood pressure. Experiencing debilitately fatigue currently not able to go on her 5 mile walk. Having chest pains and headaches, sinus respiratory issues with constant cough with production.    Medications:  - hydralazine 100 mg 3 times daily with her meals (730-830a, 12-1p, and then 530-6p)  - albuterol HFA as needed for SOB not using regularly  - olmesartan 10 mg daily not yet increase, but was thinking of increasing to 15mg for a few days then up to 20 mg daily 8:30-9pm  Patient does self-monitor blood pressure. Home BP monitoring still in the 180's may lower a little when recheck 5 minutes later but minimally. HR 70-80's.  Previously tried:  - ARB: losartan (myalgia when increased dose, stopped 4/2019 - 6/2021)  - ACEI: lisinopril (joint aches ~ 2012?)  - CCB: amlodipine (neuropathy and heart palpitations), nifedipine (numbness as well 5/2019)  - alpha agonist: Clonidine  - diuretic: hydrochlorothiazide (numbness), spironolactone (joint pain), furosemide (had incontinence, but reports being told by cardiologist that not effective for blood pressure)  - BB: metoprolol (joint pain, fatigue)  BP Readings from Last 3 Encounters:   06/07/22 (!) 168/70   03/14/22 (!) 160/66   01/07/22 (!) 142/80     Type 2 Diabetes:  Currently taking insulin via insulin pump. Blood sugar monitoring: Continuous Glucose Monitor - ashleigh, she does share it with CDE/Endo team.  Highest during the day 172. She prefers  blood sugar  typically. Her main concern is the evening binge eating that causes her hyperglycemia. She does not want to give insulin prior to eating at night due to concern it may endorse the the binge eating. She is working with a trauma and binge eating disorder therapist now.   Eye exam: up to date  Foot exam: up to date  Diet/Exercise: has an eating disorder.  She is enrolled at [facility] but not working with anyone yet but awaiting possible provider. She wants to work with a trauma therapist.   Aspirin: Not taking  Statin: No   ACEi/ARB: No.   Specialty: needs to find a new endocrinology  Urine Albumin:   Lab Results   Component Value Date    UMALCR 30.91 (H) 11/10/2021      Lab Results   Component Value Date    A1C 8.7 05/20/2022    A1C 8.2 11/23/2021    A1C 7.4 09/18/2020    A1C 8.0 02/27/2020    A1C 8.0 10/28/2019    A1C 8.2 07/22/2019    A1C 8.5 04/18/2019       Today's Vitals: LMP 01/01/1999   ----------------      I spent 21 minutes with this patient today. All changes were made via collaborative practice agreement with Madyson Mendoza MD. A copy of the visit note was provided to the patient's provider(s).    The patient was sent via [employer] a summary of these recommendations.     Shi Sheikh, PharmD  Medication Therapy Management Pharmacist    Telemedicine Visit Details  Type of service:  Telephone visit  Start Time: 10:31 AM  End Time: 10:52 AM  Originating Location (patient location): Home  Distant Location (provider location):  [facility]     Medication Therapy Recommendations  Hypertension goal BP (blood pressure) < 140/90    Current Medication: olmesartan (BENICAR) 20 MG tablet   Rationale: Medication requires monitoring - Needs additional monitoring   Recommendation: Order Lab   Status: Accepted per CPA

## 2022-06-30 ENCOUNTER — MYC MEDICAL ADVICE (OUTPATIENT)
Dept: PEDIATRICS | Facility: CLINIC | Age: 62
End: 2022-06-30

## 2022-07-06 ENCOUNTER — LAB (OUTPATIENT)
Dept: LAB | Facility: CLINIC | Age: 62
End: 2022-07-06

## 2022-07-06 DIAGNOSIS — I10 HYPERTENSION GOAL BP (BLOOD PRESSURE) < 140/90: ICD-10-CM

## 2022-07-06 PROCEDURE — 80048 BASIC METABOLIC PNL TOTAL CA: CPT

## 2022-07-06 PROCEDURE — 36415 COLL VENOUS BLD VENIPUNCTURE: CPT

## 2022-07-07 LAB
ANION GAP SERPL CALCULATED.3IONS-SCNC: 5 MMOL/L (ref 3–14)
BUN SERPL-MCNC: 19 MG/DL (ref 7–30)
CALCIUM SERPL-MCNC: 9.5 MG/DL (ref 8.5–10.1)
CHLORIDE BLD-SCNC: 107 MMOL/L (ref 94–109)
CO2 SERPL-SCNC: 28 MMOL/L (ref 20–32)
CREAT SERPL-MCNC: 0.98 MG/DL (ref 0.52–1.04)
GFR SERPL CREATININE-BSD FRML MDRD: 66 ML/MIN/1.73M2
GLUCOSE BLD-MCNC: 172 MG/DL (ref 70–99)
POTASSIUM BLD-SCNC: 3.8 MMOL/L (ref 3.4–5.3)
SODIUM SERPL-SCNC: 140 MMOL/L (ref 133–144)

## 2022-07-08 ENCOUNTER — VIRTUAL VISIT (OUTPATIENT)
Dept: PHARMACY | Facility: CLINIC | Age: 62
End: 2022-07-08
Payer: COMMERCIAL

## 2022-07-08 DIAGNOSIS — R07.89 ATYPICAL CHEST PAIN: ICD-10-CM

## 2022-07-08 DIAGNOSIS — I10 HYPERTENSION GOAL BP (BLOOD PRESSURE) < 140/90: Primary | ICD-10-CM

## 2022-07-08 PROCEDURE — 99606 MTMS BY PHARM EST 15 MIN: CPT | Performed by: PHARMACIST

## 2022-07-08 PROCEDURE — 99607 MTMS BY PHARM ADDL 15 MIN: CPT | Performed by: PHARMACIST

## 2022-07-08 RX ORDER — FELODIPINE 5 MG/1
5 TABLET, EXTENDED RELEASE ORAL DAILY
Qty: 30 TABLET | Refills: 0 | Status: SHIPPED | OUTPATIENT
Start: 2022-07-08 | End: 2022-08-05

## 2022-07-08 NOTE — PATIENT INSTRUCTIONS
"Recommendations from today's MTM visit:                                                      After 2-3 days, start felodipine 5 mg daily at 830-9p (at bedtime) time.    Monitor blood pressure 3 times in a row with 5 min rest in between.     Follow-up: Return in about 4 weeks (around 8/5/2022) for Medication Therapy Telephone Visit.    It was great speaking with you today.  I value your experience and would be very thankful for your time in providing feedback in our clinic survey. In the next few days, you may receive an email or text message from [employer] with a link to a survey related to your  clinical pharmacist.\"     To schedule another MTM appointment, please call the clinic directly or you may call the MTM scheduling line at [phone] or toll-free at [phone].     My Clinical Pharmacist's contact information:                                                      Please feel free to contact me with any questions or concerns you have.      Shi Sheikh, PharmD  Medication Therapy Management Pharmacist     "

## 2022-07-08 NOTE — PROGRESS NOTES
"Medication Therapy Management (MTM) Encounter    ASSESSMENT:                            Medication Adherence/Access: See below for considerations    Hypertension/atypical CP: home blood pressure much improved on olmesartan. However, given patient preference to stop olmesartan completely vs tapering dose due to side effects. Discussed trial of felodipine as her adverse drug reaction to amlodipine and nifedipine have been intolerances and unlikely to have cross reactions for true allergies. However, if she continues not tolerate the felodipine it may be worth exploring a compounded formulation as next step due to her concern for reaction to inactive ingredients in tablets/capsules. We did discuss felodipine time to reach steady and if onset of anxiety occurs right after starting unlikely to be from felodipine and would reinforce she continue therapy for longer as symptoms may improve.    PLAN:                            After 2-3 days off olmesartan, patient to start felodipine 5 mg daily.   Monitor blood pressure 3 times in a row with 5 min rest in between.     Follow-up: Return in about 4 weeks (around 8/5/2022) for Medication Therapy Telephone Visit.    SUBJECTIVE/OBJECTIVE:                          Maricarmen Dotson is a 61 year old female called for a follow-up visit.  Today's visit is a follow-up MTM visit from 5/4/2022     Reason for visit: She reports \"struggling\" today which she thinks is from the olmesartan.     Allergies/ADRs: Reviewed in chart - numerous drug intolerance and has EpiPen for emergency  Past Medical History: Reviewed in chart  Tobacco: She reports that she has never smoked. She has never used smokeless tobacco.  Alcohol: not currently using    Medication Adherence/Access:   Patient uses pill box(es), which has been helpful for.  helps.  Patient takes medications 1-2 time(s) may miss her afternoon hydralazine.  The patient fills medications at [place]: YES.   *See MTM note 4/20/22 for " screen shot of patient's intolerance med list.     Hypertension/atypical CP: history of atypical chest pain. No ischemia per Lexiscan stress test in 4/2019. 10/2021 ED negative troponin and unremarkable EKG. Met with Jules Ocampo NP on 3/14/22, discussed options such as carvedilol, diltiazem, Bystolic, possible compounded amlodipine. Her CP was also noted by specialist to possibly be from musculoskeletal or fibromyalgia related or hypertensive state. She has also met with Dr. Payan on 6/7/22 cardiologist and recommend continue on olmesartan.    Today she reports symptoms of joint muscle aches and cramps, dry mouth and coughing, stiffness after movement. Pain is waking her up. She feels these symptoms have been going on since she started this medication and been getting worse to an intolerable state. She tells me she is stopping the medication tonight. She reports interest in trying amlodipine but further discussion of her previous trial of amlodipine is not interested in rechallenging she reports severely worsened anxiety with on it. She also wonders about inactive ingredient side effects.     Home blood pressure:  7/5 @ 7am 153/81 HR 55, @ 1215p 157/87 HR 67, @ 115p 142/78 HR 66  7/6 @ 330p 156/90 HR 61, @ 430p 147/83 HR 64  7/8 @5am 150/78 HR 72, @ 6am 136/82 HR 68    Medications:  - hydralazine 100 mg 3 times daily with her meals (730-830a, 12-1p, and then 530-6p)  - albuterol HFA as needed for SOB not using regularly  - olmesartan 20 mg daily 8:30-9pm *but patient plans to stop tonight.    Previously tried:  - ARB: losartan (myalgia when increased dose, stopped 4/2019 - 6/2021)  - ACEI: lisinopril (joint aches ~ 2012?)  - CCB: amlodipine (neuropathy and heart palpitations - *however patient does not recall taking this medication), nifedipine (numbness as well 5/2019)  - alpha agonist: Clonidine  - diuretic: hydrochlorothiazide (numbness), spironolactone (joint pain), furosemide (had incontinence, but  reports being told by cardiologist that not effective for blood pressure)  - BB: metoprolol (joint pain, fatigue)  BP Readings from Last 3 Encounters:   06/07/22 (!) 168/70   03/14/22 (!) 160/66   01/07/22 (!) 142/80       Today's Vitals: LMP 01/01/1999   ----------------      I spent 27 minutes with this patient today. All changes were made via collaborative practice agreement with Madyson Mendoza MD. A copy of the visit note was provided to the patient's provider(s).    The patient was sent via [employer] a summary of these recommendations.     Shi Sheikh, PharmD  Medication Therapy Management Pharmacist    Telemedicine Visit Details  Type of service:  Telephone visit  Start Time: 9:31am  End Time: 9:58 AM  Originating Location (patient location): [place]  Distant Location (provider location):  [facility]     Medication Therapy Recommendations  Hypertension goal BP (blood pressure) < 140/90    Current Medication: olmesartan (BENICAR) 20 MG tablet (Discontinued)   Rationale: Patient prefers not to take - Adherence - Adherence   Recommendation: Change Medication - FELODIPINE   Status: Accepted per CPA

## 2022-07-20 ENCOUNTER — VIRTUAL VISIT (OUTPATIENT)
Dept: FAMILY MEDICINE | Facility: CLINIC | Age: 62
End: 2022-07-20
Payer: COMMERCIAL

## 2022-07-20 DIAGNOSIS — U07.1 COVID-19 VIRUS INFECTION: Primary | ICD-10-CM

## 2022-07-20 DIAGNOSIS — R11.2 NON-INTRACTABLE VOMITING WITH NAUSEA, UNSPECIFIED VOMITING TYPE: ICD-10-CM

## 2022-07-20 DIAGNOSIS — R05.9 COUGH: ICD-10-CM

## 2022-07-20 PROCEDURE — 99213 OFFICE O/P EST LOW 20 MIN: CPT | Mod: CS | Performed by: PHYSICIAN ASSISTANT

## 2022-07-20 RX ORDER — BENZONATATE 100 MG/1
100 CAPSULE ORAL 3 TIMES DAILY PRN
Qty: 50 CAPSULE | Refills: 0 | Status: SHIPPED | OUTPATIENT
Start: 2022-07-20 | End: 2023-07-31

## 2022-07-20 RX ORDER — LEVOCETIRIZINE DIHYDROCHLORIDE 5 MG/1
5 TABLET, FILM COATED ORAL EVERY EVENING
COMMUNITY
End: 2023-07-31

## 2022-07-20 RX ORDER — ONDANSETRON 4 MG/1
4 TABLET, ORALLY DISINTEGRATING ORAL EVERY 8 HOURS PRN
Qty: 10 TABLET | Refills: 0 | Status: SHIPPED | OUTPATIENT
Start: 2022-07-20 | End: 2023-07-31

## 2022-07-20 NOTE — PROGRESS NOTES
Maricarmen is a 61 year old who is being evaluated via a billable telephone visit.      What phone number would you like to be contacted at? [phone]  How would you like to obtain your AVS? [facility]    Assessment & Plan   Problem List Items Addressed This Visit    None     Visit Diagnoses     COVID-19 virus infection    -  Primary    Relevant Medications    nirmatrelvir and ritonavir (PAXLOVID) therapy pack    ondansetron (ZOFRAN ODT) 4 MG ODT tab    benzonatate (TESSALON) 100 MG capsule    Cough        Relevant Medications    levocetirizine (XYZAL) 5 MG tablet    benzonatate (TESSALON) 100 MG capsule    Non-intractable vomiting with nausea, unspecified vomiting type        Relevant Medications    ondansetron (ZOFRAN ODT) 4 MG ODT tab            Impression is COVID-19. Positive home test. Sounds well and non-toxic and I have low suspicion for impending airway obstruction or respiratory distress. Fully vaccinated and boosted.  Given her shortness of breath and chest discomfort with deep breaths, we introduced shared medical decision making.  She declined urgent care/emergency room evaluation today and understands that this could lead to delayed diagnosis and treatment. She instead will push p.o. fluids, use over-the-counter meds for symptoms, benzonatate, complete a course of Paxlovid after discussing risks/benefits, and follow-up with us in 1-2 weeks if not improving or urgent care/the ER if symptoms worsen/change or if she changes her mind about ER/urgent care evaluation at any time.      DDx and Dx discussed with and explained to the pt to their satisfaction.  All questions were answered at this time. Pt expressed understanding of and agreement with this dx, tx, and plan. No further workup warranted and standard medication warnings given. I have given the patient a list of pertinent indications for re-evaluation. Will go to the Emergency Department if symptoms worsen or new concerning symptoms arise. Patient left the  "call in no apparent distress.     23 minutes spent on the date of the encounter doing chart review, history and exam, documentation and further activities per the note     BMI:   Estimated body mass index is 33.53 kg/m  as calculated from the following:    Height as of 6/7/22: 1.607 m (5' 3.25\").    Weight as of 6/7/22: 86.5 kg (190 lb 12.8 oz).     See Patient Instructions  COVID-19 positive patient.  Encounter for consideration of medication intervention. Patient does qualify for a prescription. Full discussion with patient including medication options, risks and benefits. Potential drug interactions reviewed with patient.     Treatment Planned Paxlovid, Rx sent to [place] pharmacy  [facility] [phone] [address] , [address]  [place] [address]    Hours:  Mon-Fri: 8:00am - 6:00p  Sat: 9:00a - 12:30p     Drive Thru available  Temporary change to home medications:  None     Estimated body mass index is 33.53 kg/m  as calculated from the following:    Height as of 6/7/22: 1.607 m (5' 3.25\").    Weight as of 6/7/22: 86.5 kg (190 lb 12.8 oz).  GFR Estimate   Date Value Ref Range Status   07/06/2022 66 >60 mL/min/1.73m2 Final     Comment:     Effective December 21, 2021 eGFRcr in adults is calculated using the 2021 CKD-EPI creatinine equation which includes age and gender (Jose E et al., NEJM, DOI: 10.1056/BLMBop4719091)   01/20/2021 84 >60 mL/min/[1.73_m2] Final     Comment:     Non  GFR Calc  Starting 12/18/2018, serum creatinine based estimated GFR (eGFR) will be   calculated using the Chronic Kidney Disease Epidemiology Collaboration   (CKD-EPI) equation.       Lab Results   Component Value Date    PTFLZ26QIF Negative 01/07/2022       Return in about 1 week (around 7/27/2022) for a recheck of your symptoms if not improving, or call 911/go to an ER anytime if worsening.    MAURICIO Hernandez  [facility] RADHA Hernadez is a 61 year old, " presenting for the following health issues:  Covid Concern    HPI     COVID-19 Symptom Review  How many days ago did these symptoms start? Yesterday, home test came back positive this morning.  States she was exposed on Saturday. Started feeling ill yesterday with diarrhea and cough.    Are any of the following symptoms significant for you?  New or worsening difficulty breathing? Yes    Please describe what kind of difficulty you are having breathing: Moderate dyspnea (short of breath with ADLs, shortness of breath that limits the ability to walk up one flight of stairs without needing to rest)    Worsening cough? Yes, I am coughing up mucus this mornning by more dry in the afternoon.      Fever or chills? Yes, the highest temperature was 102    Headache: YES- front & ringing in ears, feeling unsteady on her feet    Sore throat: YES- started with sore throat    Chest pain: YES    Diarrhea: YES- nausea, vomitting and diarrhea/ incontinence     Body aches? YES    What treatments has patient tried? Acetaminophen and Coricidin  (but she is unable to keep down)  Does patient live in a nursing home, group home, or shelter? No  Does patient have a way to get food/medications during quarantined? Yes, I have a friend or family member who can help me.    Patient is requesting prescription of zofran & tessalon    Review of Systems   Constitutional, HEENT, cardiovascular, pulmonary, gi and gu systems are negative, except as otherwise noted.      Objective           Vitals:  No vitals were obtained today due to virtual visit.    Physical Exam   healthy, alert and no distress  PSYCH: Alert and oriented times 3; coherent speech, normal   rate and volume, able to articulate logical thoughts, able   to abstract reason, no tangential thoughts, no hallucinations   or delusions  Her affect is normal and pleasant  RESP: No cough, no audible wheezing, able to talk in full sentences  Remainder of exam unable to be completed due to  telephone visits     Phone call duration: 15 minutes    .  ..

## 2022-07-20 NOTE — PATIENT INSTRUCTIONS
Kallie Hernadez,     Thank you for allowing [facility] to manage your care.     If you develop worsening/changing symptoms at any time or you change your mind about an emergency room/urgent car evaluation, please call 911 or go to the emergency department for evaluation.      I sent your prescriptions to your pharmacy.     For cough not controlled by over the counter medications, please use benzonatate as prescribed. Do not use this medication while driving, operating machinery, with other sedating medications, or while drinking alcohol as it will make you drowsy.     Drink 8-10 glasses of fluid daily to stay well-hydrated.     If you have any questions or concerns, please feel free to call us at [phone]     Sincerely,     Camacho Pelayo PA-C     Did you know?       You can schedule a video visit for follow-up appointments as well as future appointments for certain conditions.  Please see the below link.      [email or web address]     If you have not already done so,  I encourage you to sign up for [facility] ([email or web address]).  This will allow you to review your results, securely communicate with a provider, and schedule virtual visits as well.

## 2022-07-21 ENCOUNTER — MYC MEDICAL ADVICE (OUTPATIENT)
Dept: PEDIATRICS | Facility: CLINIC | Age: 62
End: 2022-07-21

## 2022-08-05 ENCOUNTER — VIRTUAL VISIT (OUTPATIENT)
Dept: PHARMACY | Facility: CLINIC | Age: 62
End: 2022-08-05
Payer: COMMERCIAL

## 2022-08-05 DIAGNOSIS — I10 HYPERTENSION GOAL BP (BLOOD PRESSURE) < 140/90: Primary | ICD-10-CM

## 2022-08-05 PROCEDURE — 99607 MTMS BY PHARM ADDL 15 MIN: CPT | Performed by: PHARMACIST

## 2022-08-05 PROCEDURE — 99606 MTMS BY PHARM EST 15 MIN: CPT | Performed by: PHARMACIST

## 2022-08-05 RX ORDER — VALSARTAN 4 MG/ML
SOLUTION ORAL
Status: CANCELLED | OUTPATIENT
Start: 2022-08-05

## 2022-08-05 NOTE — PATIENT INSTRUCTIONS
"Recommendations from today's MTM visit:                                                      Start amlodipine 2.5 mg once daily - compounded formulation.     Follow-up: Return in about 1 month (around 9/5/2022) for f/u on compounded Rx, Medication Therapy Telephone Visit.    It was great speaking with you today.  I value your experience and would be very thankful for your time in providing feedback in our clinic survey. In the next few days, you may receive an email or text message from [employer] with a link to a survey related to your  clinical pharmacist.\"     To schedule another MTM appointment, please call the clinic directly or you may call the MTM scheduling line at [phone] or toll-free at [phone].     My Clinical Pharmacist's contact information:                                                      Please feel free to contact me with any questions or concerns you have.      Shi Sheikh, PharmD  Medication Therapy Management Pharmacist     "

## 2022-08-05 NOTE — PROGRESS NOTES
Medication Therapy Management (MTM) Encounter    ASSESSMENT:                            Medication Adherence/Access: See below for considerations    Hypertension/atypical CP: at point would consider compounded Rx to rule out sensitivity/reaction to excipients/inactive ingredient. Unfortunately I am unable to identify which ingredient she may react to due to the various agents tried and reacted with similar myalgia/joint pain related reactions.     Would consider ARB classes but the compounds available would include rechallenging olmesartan again which she was recently on or losartan that was noted to be very severe in the chart. Therefore will trial CCB-amlodipine.  Other options available for compound: Enalapril, captopril; Hydralazine as liquid from powder.     PLAN:                            Start amlodipine 2.5 mg once daily - compounded formulation. Sent patient [employer].    Follow-up: Return in about 1 month (around 9/5/2022) for f/u on compounded Rx, Medication Therapy Telephone Visit.    SUBJECTIVE/OBJECTIVE:                          Maricarmen Dotson is a 61 year old female called for a follow-up visit.  Today's visit is a follow-up MTM visit from 7/8/2022     Reason for visit: blood pressure     Allergies/ADRs: Reviewed in chart - numerous drug intolerance and has EpiPen for emergency  Past Medical History: Reviewed in chart  Tobacco: She reports that she has never smoked. She has never used smokeless tobacco.  Alcohol: not currently using    Medication Adherence/Access:   Patient uses pill box(es), which has been helpful for.  helps.   The patient fills medications at [place]: YES.   *See MTM note 4/20/22 for screen shot of patient's intolerance med list.     Hypertension/atypical CP: she reported covid and stopped all hypertension agents on 7/21/2022. She had severe point where standing was challenging. significiant improvement after she stopped both agents. She has history of untreated sleep apnea  but does not want to pursue due to sleep apnea due to claustrophobia history of ptsd (triggers). Currently she denies any need for pain control and feels she has it well managed when off these agents. However she does want to improve blood pressure control and willing to consider alternates.    Home blood pressure: 5 min intervals  7/29 930a 178/83 (45), 157/90 (52), 153/87 (50)  7/31 12p 118/73 (65), 119/78 (58), 123/77 (50)  8/3 630p 166/94 (50), 151/82 (51), 137/77 (51)  8/5 940a over the phone 168/88 (62)  Medications: none currently.    BP Readings from Last 3 Encounters:   06/07/22 (!) 168/70   03/14/22 (!) 160/66   01/07/22 (!) 142/80       Today's Vitals: LMP 01/01/1999   ----------------      I spent 30 minutes with this patient today. All changes were made via collaborative practice agreement with Madyson Mendoza MD. A copy of the visit note was provided to the patient's provider(s).    The patient was sent via [employer] a summary of these recommendations.     Shi Sheikh, PharmD  Medication Therapy Management Pharmacist    Telemedicine Visit Details  Type of service:  Telephone visit  Start Time: 9:35am  End Time: 10:05 AM  Originating Location (patient location): Home  Distant Location (provider location):  [facility]     Medication Therapy Recommendations  Hypertension goal BP (blood pressure) < 140/90    Current Medication: COMPOUNDED NON-CONTROLLED SUBSTANCE (CMPD RX) - PHARMACY TO MIX COMPOUNDED MEDICATION   Rationale: Untreated condition - Needs additional medication therapy - Indication   Recommendation: Start Medication   Status: Accepted per CPA

## 2022-08-19 DIAGNOSIS — R03.0 ELEVATED BLOOD-PRESSURE READING, WITHOUT DIAGNOSIS OF HYPERTENSION: ICD-10-CM

## 2022-08-19 DIAGNOSIS — I10 HYPERTENSION GOAL BP (BLOOD PRESSURE) < 140/90: Primary | ICD-10-CM

## 2022-08-19 NOTE — PROGRESS NOTES
Home BP monitoring ordered as patient reporting variable BPs, down as low as 130s SBP without BP medications.    Madyson Mclaughlin MD  Internal Medicine-Pediatrics

## 2022-08-26 DIAGNOSIS — Z79.4 TYPE 2 DIABETES MELLITUS WITH HYPERGLYCEMIA, WITH LONG-TERM CURRENT USE OF INSULIN (H): ICD-10-CM

## 2022-08-26 DIAGNOSIS — E11.9 TYPE 2 DIABETES, HBA1C GOAL < 7% (H): ICD-10-CM

## 2022-08-26 DIAGNOSIS — E11.65 TYPE 2 DIABETES MELLITUS WITH HYPERGLYCEMIA, WITH LONG-TERM CURRENT USE OF INSULIN (H): ICD-10-CM

## 2022-08-27 ENCOUNTER — MYC MEDICAL ADVICE (OUTPATIENT)
Dept: PHARMACY | Facility: CLINIC | Age: 62
End: 2022-08-27

## 2022-08-29 RX ORDER — INSULIN ASPART 100 [IU]/ML
INJECTION, SOLUTION INTRAVENOUS; SUBCUTANEOUS
Qty: 90 ML | Refills: 0 | Status: SHIPPED | OUTPATIENT
Start: 2022-08-29 | End: 2022-12-01

## 2022-08-29 RX ORDER — INSULIN PUMP CONTROLLER
EACH MISCELLANEOUS
Qty: 45 EACH | Refills: 0 | Status: SHIPPED | OUTPATIENT
Start: 2022-08-29 | End: 2023-03-08

## 2022-08-29 NOTE — TELEPHONE ENCOUNTER
See pt's MC message. 24 hrs BP monitor has been ordered on 8/19/22.     Sent cardiology scheduling number([phone]) to pt through MC to call & schedule for the monitor placement.     Clem RN  Patient Advocate Liason (PAL)  [facility]

## 2022-08-29 NOTE — TELEPHONE ENCOUNTER
Called cardiology scheduling at [phone] & requested them to reach out to pt at [phone] to help scheduling for the 24 hrs BP monitoring. They placed the referral in their work queue.     Clem RN   Patient Advocate Liason (PAL)  [facility]

## 2022-08-30 NOTE — TELEPHONE ENCOUNTER
I see that pt is scheduled for 24 hrs BP monitor placement on 9/15.    Clem RN  Patient Advocate Liason (PAL)  [facility]

## 2022-09-01 ENCOUNTER — LAB (OUTPATIENT)
Dept: LAB | Facility: CLINIC | Age: 62
End: 2022-09-01
Payer: COMMERCIAL

## 2022-09-01 DIAGNOSIS — E11.9 TYPE 2 DIABETES, HBA1C GOAL < 7% (H): ICD-10-CM

## 2022-09-01 DIAGNOSIS — E11.9 TYPE 2 DIABETES, HBA1C GOAL < 7% (H): Primary | ICD-10-CM

## 2022-09-01 LAB — HBA1C MFR BLD: 7.8 % (ref 0–5.6)

## 2022-09-01 PROCEDURE — 83036 HEMOGLOBIN GLYCOSYLATED A1C: CPT

## 2022-09-01 PROCEDURE — 36415 COLL VENOUS BLD VENIPUNCTURE: CPT

## 2022-09-01 PROCEDURE — 80061 LIPID PANEL: CPT

## 2022-09-02 LAB
CHOLEST SERPL-MCNC: 209 MG/DL
FASTING STATUS PATIENT QL REPORTED: YES
HDLC SERPL-MCNC: 52 MG/DL
LDLC SERPL CALC-MCNC: 118 MG/DL
NONHDLC SERPL-MCNC: 157 MG/DL
TRIGL SERPL-MCNC: 196 MG/DL

## 2022-09-15 ENCOUNTER — HOSPITAL ENCOUNTER (OUTPATIENT)
Dept: CARDIOLOGY | Facility: CLINIC | Age: 62
Discharge: HOME | End: 2022-09-15
Attending: INTERNAL MEDICINE | Admitting: INTERNAL MEDICINE
Payer: COMMERCIAL

## 2022-09-15 DIAGNOSIS — R03.0 ELEVATED BLOOD-PRESSURE READING, WITHOUT DIAGNOSIS OF HYPERTENSION: ICD-10-CM

## 2022-09-15 DIAGNOSIS — I10 HYPERTENSION GOAL BP (BLOOD PRESSURE) < 140/90: ICD-10-CM

## 2022-09-15 PROCEDURE — 93788 AMBL BP MNTR W/SW A/R: CPT

## 2022-09-15 PROCEDURE — 93790 AMBL BP MNTR W/SW I&R: CPT | Performed by: INTERNAL MEDICINE

## 2022-09-15 PROCEDURE — 93786 AMBL BP MNTR W/SW REC ONLY: CPT

## 2022-09-21 ENCOUNTER — MYC MEDICAL ADVICE (OUTPATIENT)
Dept: PEDIATRICS | Facility: CLINIC | Age: 62
End: 2022-09-21

## 2022-09-21 DIAGNOSIS — M79.89 SWELLING OF RIGHT HAND: ICD-10-CM

## 2022-09-21 DIAGNOSIS — M79.641 PAIN OF RIGHT HAND: Primary | ICD-10-CM

## 2022-09-21 NOTE — TELEPHONE ENCOUNTER
Discussed patient's symptoms and concerns with Dr. Mclaughlin, and she gave me a verbal order to place a referral for hand therapy.  Order placed and patient notified thru My Chart.  KYLEE Kraft RN

## 2022-09-21 NOTE — TELEPHONE ENCOUNTER
Per lab result note of Lipid panel drawn on 09/01/22-Your cholesterol is stable (very little change) from last check, no changes needed at this time.   Patient is not on a statin.  Patient notifying us she was not fasting for lab draw.  KYLEE Kraft RN

## 2022-09-22 DIAGNOSIS — E11.65 TYPE 2 DIABETES MELLITUS WITH HYPERGLYCEMIA, WITH LONG-TERM CURRENT USE OF INSULIN (H): ICD-10-CM

## 2022-09-22 DIAGNOSIS — Z79.4 TYPE 2 DIABETES MELLITUS WITH HYPERGLYCEMIA, WITH LONG-TERM CURRENT USE OF INSULIN (H): ICD-10-CM

## 2022-09-23 RX ORDER — FLASH GLUCOSE SENSOR
KIT MISCELLANEOUS
Qty: 6 EACH | Refills: 3 | Status: SHIPPED | OUTPATIENT
Start: 2022-09-23 | End: 2023-01-19

## 2022-09-29 ENCOUNTER — OFFICE VISIT (OUTPATIENT)
Dept: PHARMACY | Facility: CLINIC | Age: 62
End: 2022-09-29
Payer: COMMERCIAL

## 2022-09-29 VITALS
HEART RATE: 70 BPM | BODY MASS INDEX: 32.78 KG/M2 | WEIGHT: 186.5 LBS | DIASTOLIC BLOOD PRESSURE: 80 MMHG | SYSTOLIC BLOOD PRESSURE: 156 MMHG | OXYGEN SATURATION: 97 %

## 2022-09-29 DIAGNOSIS — I10 HYPERTENSION GOAL BP (BLOOD PRESSURE) < 140/90: Primary | ICD-10-CM

## 2022-09-29 PROCEDURE — 99607 MTMS BY PHARM ADDL 15 MIN: CPT | Performed by: PHARMACIST

## 2022-09-29 PROCEDURE — 99606 MTMS BY PHARM EST 15 MIN: CPT | Performed by: PHARMACIST

## 2022-09-29 RX ORDER — TERAZOSIN 1 MG/1
1 CAPSULE ORAL EVERY EVENING
Qty: 90 CAPSULE | Refills: 0 | Status: SHIPPED | OUTPATIENT
Start: 2022-09-29 | End: 2022-10-19

## 2022-09-29 NOTE — PATIENT INSTRUCTIONS
"Recommendations from today's MTM visit:                                                      Start terazosin 1 mg daily in the evening - dinner.     Follow-up: 2 weeks    It was great speaking with you today.  I value your experience and would be very thankful for your time in providing feedback in our clinic survey. In the next few days, you may receive an email or text message from [facility] [employer] with a link to a survey related to your  clinical pharmacist.\"     To schedule another MTM appointment, please call the clinic directly or you may call the MTM scheduling line at [phone] or toll-free at [phone].     My Clinical Pharmacist's contact information:                                                      Please feel free to contact me with any questions or concerns you have.      Shi Sheikh, PharmD  Medication Therapy Management Pharmacist    "

## 2022-09-29 NOTE — PROGRESS NOTES
Medication Therapy Management (MTM) Encounter    ASSESSMENT:                            Medication Adherence/Access: See below for considerations    Hypertension: patient's blood pressure is not at goal, suggest we start pharmacotherapy. She has failed numerous classes of medications such as ARB/ACEI, CCB, beta-blockers, vasodilator - hydralazine, clonidine. She has not been on any alpha blockers yet.     PLAN:                            Start terazosin for hypertension. Advised to start low dose and prefer to go slow. Patient to outreach if side effects.     Follow-up: Return in about 15 days (around 10/14/2022) for Medication Therapy Management Visit.    SUBJECTIVE/OBJECTIVE:                          Maricarmen Dotson is a 61 year old female coming in for a follow-up visit.  Today's visit is a follow-up MTM visit from 8/5/2022.    Reason for visit: blood pressure follow-up     Allergies/ADRs: Reviewed in chart - numerous drug intolerance and has EpiPen for emergency  Past Medical History: Reviewed in chart  Tobacco: She reports that she has never smoked. She has never used smokeless tobacco.  Alcohol: not currently using    Medication Adherence/Access:   Patient uses pill box(es), which has been helpful for.  helps.   The patient fills medications at [place]: YES.   *See MTM note 4/20/22 for screen shot of patient's intolerance med list.     Hypertension: patient completed 24 hour blood pressure monitor her avg blood pressure is > 130/80 mm Hg with blood pressure during the day is higher. She was able to associate blood pressure elevations with panic attacks that she had at that time on the 24 hour blood pressure report.    See previous note of details of failed agents. Most recently she failed compounded amlodipine. She does have home blood pressure monitor. No readings today. Currently on no blood pressure agent.  Along hypertension we have discussed previously, she has untreated MIESHA but cannot tolerate  devices (trigger for PTSD) and anxiety/depression/ptsd. She is working with a therapist, recently started working with her so far. No medication mental health.  BP Readings from Last 3 Encounters:   09/29/22 (!) 156/80   06/07/22 (!) 168/70   03/14/22 (!) 160/66       Today's Vitals: BP (!) 156/80   Pulse 70   Wt 186 lb 8 oz (84.6 kg)   LMP 01/01/1999   SpO2 97%   BMI 32.78 kg/m    ----------------      I spent 35 minutes with this patient today. All changes were made via collaborative practice agreement with Madyson Mendoza MD. A copy of the visit note was provided to the patient's provider(s).    The patient was sent via [employer] a summary of these recommendations.     Shi Sheikh, PharmD  Medication Therapy Management Pharmacist       Medication Therapy Recommendations  Hypertension goal BP (blood pressure) < 140/90    Current Medication: terazosin (HYTRIN) 1 MG capsule   Rationale: Untreated condition - Needs additional medication therapy - Indication   Recommendation: Start Medication   Status: Accepted per CPA

## 2022-10-05 NOTE — PROGRESS NOTES
Name: Maricarmen Dotson is a 61 year old woman, self referred for evaluation of     Chief Complaint   Patient presents with     Diabetes       HPI:  Recent issues:  Here for evaluation of diabetes.  Has previously seen many endocrinologists, most recently Dr. CARLO Salgado in 4/2021, no regular PCP eval for several years  Reviewed medical history from patient and Epic chart record        History of GDM with 2 pregnancies, diet management  1996. Diagnosis of diabetes mellitus  Recalls starting a diabetes pill  Had taken metformin, also Januvia but developed pancreatitis    Has seen endocrinology providers CAMILA Da Silva, AMINA Angeles, CAMILA Rich, MARAH York, MARAH Childress, KRISTA Mejia  Has tried several diabetes medications previously, records indicate side effects or med issues:  Humalog- apparent concerns with the pancreatitis    Metformin--Abdominal pain.  Glipizide- Allergic reaction- (abdominal pain and swelling),   Byetta and Onglyza-- pancreatitis.  Invokana -- had upper GI distress.    ~2006. Started treatment with insulin medication  Endocrinology evaluations with Emily Phan CNP/[facility]  ~2017. Started Omnipod insulin pump use.  Subsequently saw Rosie Schwab, and CARLO Salgado for endocrinology evaluations.  Using Freestyle Freddie CGM    Previous FV hgbA1c trends include:     Lab Test 09/01/22  1017 05/20/22  0841 11/23/21  0859 09/18/20  0852 02/27/20  0954   A1C 7.8* 8.7* 8.2* 7.4* 8.0*     2/21/06 C-Peptide 2.5  3/4/09 WILLIAMS-65 Ab <1.0    Began use of Omnipod Dash insulin pump:   Novolog pump    Uses ICR method for bolusing    Blood glucose (BG) meter  Uses Freestyle Freddie CGM with smartphone   Scans 5-8x/day    Current Omnipod Dash pump settings:        Recent Omnipod pump data:      Recent Frestyle Freddie data:        Recent [facility] labs include:  Lab Results   Component Value Date    A1C 7.8 (H) 09/01/2022     07/06/2022    POTASSIUM 3.8 07/06/2022    CHLORIDE 107  2022    CO2 28 2022    ANIONGAP 5 2022     (H) 2022    BUN 19 2022    CR 0.98 2022    GFRESTIMATED 66 2022    GFRESTBLACK >90 2021    KARIN 9.5 2022    CHOL 209 (H) 2022    TRIG 196 (H) 2022    HDL 52 2022     (H) 2022    NHDL 157 (H) 2022    UCRR 55 11/10/2021    MICROL 17 11/10/2021    UMALCR 30.91 (H) 11/10/2021    TSH 1.19 2020    T4 0.87 2019     DM Complications:   Nephropathy:    Microalbuminuria        Lives in [place], [place]  Sees Dr. Madyson Mclaughlin/[facility] for general medicine evaluations.    PMH/PSH:  Past Medical History:   Diagnosis Date     Ascending aorta enlargement (H)      Depressive disorder     severe, prior hospitalization     Diverticulosis of colon (without mention of hemorrhage)      Fibromyalgia 2007     Insomnia      Irritable bowel syndrome      Osteopenia      Type 2 diabetes mellitus with complications (H)     microalbuminuria     Past Surgical History:   Procedure Laterality Date     BACK SURGERY      fusion  and removal of hardware      C SPINAL ORTHOSIS,NOS      lumbar surgery     CHOLECYSTECTOMY  2011     choley  2011     ENT SURGERY  1986    septoplasty     HYSTERECTOMY, CERVIX STATUS UNKNOWN      TVH/BSO     ORTHOPEDIC SURGERY      left ankle       Family Hx:  Family History   Problem Relation Age of Onset     Diabetes Mother         type 2     Hypertension Mother      Cerebrovascular Disease Mother          stroke age 57     Ovarian Cancer Mother 43     Cancer Mother         cervix     Diabetes Father         type 2     Hypertension Father      Gastrointestinal Disease Father         colon polyps     Sleep Apnea Brother      Cancer Sister      Ovarian Cancer Sister 46     Ovarian Cancer Maternal Grandmother 72     Cancer Maternal Grandmother         cervix         Social Hx:  Social History     Socioeconomic History     Marital status:       Spouse name: Not on file     Number of children: 3     Years of education: Not on file     Highest education level: Not on file   Occupational History     Occupation: xr technician     Employer: [employer]   Tobacco Use     Smoking status: Never Smoker     Smokeless tobacco: Never Used   Substance and Sexual Activity     Alcohol use: Not Currently     Alcohol/week: 0.0 standard drinks     Comment: maybe once a month     Drug use: No     Sexual activity: Yes     Partners: Male     Birth control/protection: Surgical   Other Topics Concern     Parent/sibling w/ CABG, MI or angioplasty before 65F 55M? Not Asked   Social History Narrative     Not on file     Social Determinants of Health     Financial Resource Strain: Not on file   Food Insecurity: Not on file   Transportation Needs: Not on file   Physical Activity: Not on file   Stress: Not on file   Social Connections: Not on file   Intimate Partner Violence: Not on file   Housing Stability: Not on file          MEDICATIONS:  has a current medication list which includes the following prescription(s): freestyle ashleigh 14 day sensor, voltaren, HEMP OIL OR EXTRACT OR OTHER CBD CANNABINOID, NOT MEDICAL CANNABIS,, medical cannabis, melatonin, novolog vial, polyethylene glycol, alcohol swab, benzonatate, freestyle lite, freestyle lite, blood glucose monitoring, blood glucose monitoring, compounded non-controlled substance, freestyle ashleigh 14 day reader, epinephrine, omnipod dash pods (gen 4), insulin pump, insulin syringe-needle u-100, levocetirizine, ondansetron, statin not prescribed, and terazosin.    ROS:     ROS: 10 point ROS neg other than the symptoms noted above in the HPI.    GENERAL: some fatigue, wt stable; denies fevers, chills, night sweats.   HEENT: no dysphagia, odonophagia, diplopia, neck pain  THYROID:  no apparent hyper or hypothyroid symptoms  CV: no chest pain, pressure, palpitations  LUNGS: no SOB, VASQUEZ, cough, wheezing  "  ABDOMEN: no diarrhea, constipation, abdominal pain  EXTREMITIES: no rashes, ulcers, edema  NEUROLOGY: no headaches, denies changes in vision, tingling, extremitiy numbness   MSK: no muscle aches or pains, weakness  SKIN: no rashes or lesions  : no menses  PSYCH:  stable mood, no significant anxiety or depression  ENDOCRINE: no heat or cold intolerance    Physical Exam   VS: BP (!) 174/75   Pulse 53   Ht 1.607 m (5' 3.25\")   Wt 83.2 kg (183 lb 6.4 oz)   LMP 01/01/1999   BMI 32.23 kg/m    GENERAL: AXOX3, NAD, well dressed, answering questions appropriately, appears stated age.  THYROID:  normal gland, no apparent nodules or goiter  HEENT: neck non-tender, no exopthalmous, no proptosis, EOMI  CV: RRR, no rubs, gallops, no murmurs  LUNGS: CTAB, no wheezes, rales, or ronchi  ABDOMEN: soft, obese, nontender, nondistended  EXTREMITIES: no edema, +pedal pulses R/L PT 3/3, no lesions  NEUROLOGY: CN grossly intact, + DTR lower extremity, no tremors  MSK: grossly intact  SKIN: scar at left ankle, mild hyperpigmentation near scarline; no rashes, no lesions    LABS:    All pertinent notes, labs, and images personally reviewed by me.     A/P:  Encounter Diagnoses   Name Primary?     Type 2 diabetes mellitus with microalbuminuria, with long-term current use of insulin (H) Yes     Insulin pump status      Hypertension, unspecified type        Comments:  Reviewed health history and diabetes issues.  Recent postprandial hyperglycemia may relate to timing or missing mealtime boluses  Details of her previous medication intolerances or allergies unclear  High BP with hypertension, needs more aggressive treatment    Plan:  Reviewed the overall T2DM management and insulin pump use.  Discussed optimal BG testing to assess glucose trends.  We reviewed insulin pump settings, basal rate and bolus dosing  Use of automated pump bolus dosing for meal/snack carb & correction dosing  Reviewed the Omnipod insulin pump report data " today  Reviewed recent Freestyle Freddie CGM glucose trend data, in detail    Recommend:  Continue the Omnipod Dash insulin pump management plan.  No pump setting changes at this time    Avoid delays or missing evening premeal boluses  Consider stronger suppertime ICR  Future treatment options include adding a SGLT2-I such as Jardiance  Consider change to DexcomG6 CGM and also Omnipod 5   Reviewed these options, pamphlets given  Plan to see one of the [facility] Diabetes Educators, such as Akila or [place]  Needs repeat BP testing, PCP followup after starting the antihypertensive med Hytrin  Arrange annual dilated eye exam, fasting lipid panel testing  Discussed importance of regular endocrinology evaluations every 3-4 months.    Addressed patient's questions today.    There are no Patient Instructions on file for this visit.    Future labs ordered today:   Orders Placed This Encounter   Procedures     GLUCOSE MONITOR, 72 HOUR, PHYS INTERP     Amb Adult Diabetes Educator Referral     Radiology/Consults ordered today: AMBULATORY ADULT DIABETES EDUCATOR REFERRAL    Total time spent in with the patient evaluation:  25 min  Additional time spent reviewing pertinent lab tests and chart notes, and documentation:  20 min    Follow-up:  12/2022 or 1/2023, Keara Hightower MD, MS  Endocrinology  [facility]    CC: АННА Mclaughlin and MAMIE Sheikh

## 2022-10-06 ENCOUNTER — OFFICE VISIT (OUTPATIENT)
Dept: ENDOCRINOLOGY | Facility: CLINIC | Age: 62
End: 2022-10-06
Payer: COMMERCIAL

## 2022-10-06 VITALS
WEIGHT: 183.4 LBS | HEART RATE: 53 BPM | BODY MASS INDEX: 32.5 KG/M2 | HEIGHT: 63 IN | DIASTOLIC BLOOD PRESSURE: 75 MMHG | SYSTOLIC BLOOD PRESSURE: 174 MMHG

## 2022-10-06 DIAGNOSIS — E11.29 TYPE 2 DIABETES MELLITUS WITH MICROALBUMINURIA, WITH LONG-TERM CURRENT USE OF INSULIN (H): Primary | ICD-10-CM

## 2022-10-06 DIAGNOSIS — Z96.41 INSULIN PUMP STATUS: ICD-10-CM

## 2022-10-06 DIAGNOSIS — I10 HYPERTENSION, UNSPECIFIED TYPE: ICD-10-CM

## 2022-10-06 DIAGNOSIS — Z79.4 TYPE 2 DIABETES MELLITUS WITH MICROALBUMINURIA, WITH LONG-TERM CURRENT USE OF INSULIN (H): Primary | ICD-10-CM

## 2022-10-06 DIAGNOSIS — R80.9 TYPE 2 DIABETES MELLITUS WITH MICROALBUMINURIA, WITH LONG-TERM CURRENT USE OF INSULIN (H): Primary | ICD-10-CM

## 2022-10-06 PROCEDURE — 99215 OFFICE O/P EST HI 40 MIN: CPT | Performed by: INTERNAL MEDICINE

## 2022-10-06 PROCEDURE — 95251 CONT GLUC MNTR ANALYSIS I&R: CPT | Performed by: INTERNAL MEDICINE

## 2022-10-06 NOTE — LETTER
10/6/2022         RE: Maricarmen Dotson  [address] [place] [address]        Dear Colleague,    Thank you for referring your patient, Maricarmen Dotson, to the [facility] [place]. Please see a copy of my visit note below.    Name: Maricarmen Dotson is a 61 year old woman, self referred for evaluation of     Chief Complaint   Patient presents with     Diabetes       HPI:  Recent issues:  Here for evaluation of diabetes.  Has previously seen many endocrinologists, most recently Dr. CARLO Salgado in 4/2021, no regular PCP eval for several years  Reviewed medical history from patient and Epic chart record        History of GDM with 2 pregnancies, diet management  1996. Diagnosis of diabetes mellitus  Recalls starting a diabetes pill  Had taken metformin, also Januvia but developed pancreatitis    Has seen endocrinology providers CAMILA Da Silva, AMINA Angeles, CAMILA Rich, MARAH York, MARAH Childress, KRISTA Mejia  Has tried several diabetes medications previously, records indicate side effects or med issues:  Humalog- apparent concerns with the pancreatitis    Metformin--Abdominal pain.  Glipizide- Allergic reaction- (abdominal pain and swelling),   Byetta and Onglyza-- pancreatitis.  Invokana -- had upper GI distress.    ~2006. Started treatment with insulin medication  Endocrinology evaluations with Emily Phan CNP/[facility]  ~2017. Started Omnipod insulin pump use.  Subsequently saw Rosie Schwab, and CARLO Salgado for endocrinology evaluations.  Using Freestyle Freddie CGM    Previous  hgbA1c trends include:     Lab Test 09/01/22  1017 05/20/22  0841 11/23/21  0859 09/18/20  0852 02/27/20  0954   A1C 7.8* 8.7* 8.2* 7.4* 8.0*     2/21/06 C-Peptide 2.5  3/4/09 WILLIAMS-65 Ab <1.0    Began use of Omnipod Dash insulin pump:   Novolog pump    Uses ICR method for bolusing    Blood glucose (BG) meter  Uses Freestyle Freddie CGM with smartphone   Scans  5-8x/day    Current Omnipod Dash pump settings:        Recent Omnipod pump data:      Recent Frestyle Freddie data:        Recent [facility] labs include:  Lab Results   Component Value Date    A1C 7.8 (H) 2022     2022    POTASSIUM 3.8 2022    CHLORIDE 107 2022    CO2 28 2022    ANIONGAP 5 2022     (H) 2022    BUN 19 2022    CR 0.98 2022    GFRESTIMATED 66 2022    GFRESTBLACK >90 2021    KARIN 9.5 2022    CHOL 209 (H) 2022    TRIG 196 (H) 2022    HDL 52 2022     (H) 2022    NHDL 157 (H) 2022    UCRR 55 11/10/2021    MICROL 17 11/10/2021    UMALCR 30.91 (H) 11/10/2021    TSH 1.19 2020    T4 0.87 2019     DM Complications:   Nephropathy:    Microalbuminuria        Lives in [place], [place]  Sees Dr. Madyson Mclaughlin/[facility] for general medicine evaluations.    PMH/PSH:  Past Medical History:   Diagnosis Date     Ascending aorta enlargement (H)      Depressive disorder     severe, prior hospitalization     Diverticulosis of colon (without mention of hemorrhage)      Fibromyalgia 2007     Insomnia      Irritable bowel syndrome      Osteopenia      Type 2 diabetes mellitus with complications (H)     microalbuminuria     Past Surgical History:   Procedure Laterality Date     BACK SURGERY      fusion  and removal of hardware      C SPINAL ORTHOSIS,NOS      lumbar surgery     CHOLECYSTECTOMY  2011     choley  2011     ENT SURGERY  1986    septoplasty     HYSTERECTOMY, CERVIX STATUS UNKNOWN      TVH/BSO     ORTHOPEDIC SURGERY  2005    left ankle       Family Hx:  Family History   Problem Relation Age of Onset     Diabetes Mother         type 2     Hypertension Mother      Cerebrovascular Disease Mother          stroke age 57     Ovarian Cancer Mother 43     Cancer Mother         cervix     Diabetes Father         type 2     Hypertension Father      Gastrointestinal Disease  Father         colon polyps     Sleep Apnea Brother      Cancer Sister      Ovarian Cancer Sister 46     Ovarian Cancer Maternal Grandmother 72     Cancer Maternal Grandmother         cervix         Social Hx:  Social History     Socioeconomic History     Marital status:      Spouse name: Not on file     Number of children: 3     Years of education: Not on file     Highest education level: Not on file   Occupational History     Occupation: xr technician     Employer: [employer]   Tobacco Use     Smoking status: Never Smoker     Smokeless tobacco: Never Used   Substance and Sexual Activity     Alcohol use: Not Currently     Alcohol/week: 0.0 standard drinks     Comment: maybe once a month     Drug use: No     Sexual activity: Yes     Partners: Male     Birth control/protection: Surgical   Other Topics Concern     Parent/sibling w/ CABG, MI or angioplasty before 65F 55M? Not Asked   Social History Narrative     Not on file     Social Determinants of Health     Financial Resource Strain: Not on file   Food Insecurity: Not on file   Transportation Needs: Not on file   Physical Activity: Not on file   Stress: Not on file   Social Connections: Not on file   Intimate Partner Violence: Not on file   Housing Stability: Not on file          MEDICATIONS:  has a current medication list which includes the following prescription(s): freestyle ashleigh 14 day sensor, voltaren, HEMP OIL OR EXTRACT OR OTHER CBD CANNABINOID, NOT MEDICAL CANNABIS,, medical cannabis, melatonin, novolog vial, polyethylene glycol, alcohol swab, benzonatate, freestyle lite, freestyle lite, blood glucose monitoring, blood glucose monitoring, compounded non-controlled substance, freestyle ashleigh 14 day reader, epinephrine, omnipod dash pods (gen 4), insulin pump, insulin syringe-needle u-100, levocetirizine, ondansetron, statin not prescribed, and terazosin.    ROS:     ROS: 10 point ROS neg other than the symptoms noted above in the  "HPI.    GENERAL: some fatigue, wt stable; denies fevers, chills, night sweats.   HEENT: no dysphagia, odonophagia, diplopia, neck pain  THYROID:  no apparent hyper or hypothyroid symptoms  CV: no chest pain, pressure, palpitations  LUNGS: no SOB, VASQUEZ, cough, wheezing   ABDOMEN: no diarrhea, constipation, abdominal pain  EXTREMITIES: no rashes, ulcers, edema  NEUROLOGY: no headaches, denies changes in vision, tingling, extremitiy numbness   MSK: no muscle aches or pains, weakness  SKIN: no rashes or lesions  : no menses  PSYCH:  stable mood, no significant anxiety or depression  ENDOCRINE: no heat or cold intolerance    Physical Exam   VS: BP (!) 174/75   Pulse 53   Ht 1.607 m (5' 3.25\")   Wt 83.2 kg (183 lb 6.4 oz)   LMP 01/01/1999   BMI 32.23 kg/m    GENERAL: AXOX3, NAD, well dressed, answering questions appropriately, appears stated age.  THYROID:  normal gland, no apparent nodules or goiter  HEENT: neck non-tender, no exopthalmous, no proptosis, EOMI  CV: RRR, no rubs, gallops, no murmurs  LUNGS: CTAB, no wheezes, rales, or ronchi  ABDOMEN: soft, obese, nontender, nondistended  EXTREMITIES: no edema, +pedal pulses R/L PT 3/3, no lesions  NEUROLOGY: CN grossly intact, + DTR lower extremity, no tremors  MSK: grossly intact  SKIN: scar at left ankle, mild hyperpigmentation near scarline; no rashes, no lesions    LABS:    All pertinent notes, labs, and images personally reviewed by me.     A/P:  Encounter Diagnoses   Name Primary?     Type 2 diabetes mellitus with microalbuminuria, with long-term current use of insulin (H) Yes     Insulin pump status      Hypertension, unspecified type        Comments:  Reviewed health history and diabetes issues.  Recent postprandial hyperglycemia may relate to timing or missing mealtime boluses  Details of her previous medication intolerances or allergies unclear  High BP with hypertension, needs more aggressive treatment    Plan:  Reviewed the overall T2DM management and " insulin pump use.  Discussed optimal BG testing to assess glucose trends.  We reviewed insulin pump settings, basal rate and bolus dosing  Use of automated pump bolus dosing for meal/snack carb & correction dosing  Reviewed the Omnipod insulin pump report data today  Reviewed recent Freestyle Freddie CGM glucose trend data, in detail    Recommend:  Continue the Omnipod Dash insulin pump management plan.  No pump setting changes at this time    Avoid delays or missing evening premeal boluses  Consider stronger suppertime ICR  Future treatment options include adding a SGLT2-I such as Jardiance  Consider change to DexcomG6 CGM and also Omnipod 5   Reviewed these options, pamphlets given  Plan to see one of the [facility] Diabetes Educators, such as [place]ila or [place]  Needs repeat BP testing, PCP followup after starting the antihypertensive med Hytrin  Arrange annual dilated eye exam, fasting lipid panel testing  Discussed importance of regular endocrinology evaluations every 3-4 months.    Addressed patient's questions today.    There are no Patient Instructions on file for this visit.    Future labs ordered today:   Orders Placed This Encounter   Procedures     GLUCOSE MONITOR, 72 HOUR, PHYS INTERP     Amb Adult Diabetes Educator Referral     Radiology/Consults ordered today: AMBULATORY ADULT DIABETES EDUCATOR REFERRAL    Total time spent in with the patient evaluation:  25 min  Additional time spent reviewing pertinent lab tests and chart notes, and documentation:  20 min    Follow-up:  12/2022 or 1/2023, Keara Hightower MD, MS  Endocrinology  [facility]    CC: АННА Mclaughlin and MAMIE Sheikh                  Again, thank you for allowing me to participate in the care of your patient.        Sincerely,        Chaitanya Hightower MD

## 2022-10-09 ENCOUNTER — HEALTH MAINTENANCE LETTER (OUTPATIENT)
Age: 62
End: 2022-10-09

## 2022-10-12 ENCOUNTER — THERAPY VISIT (OUTPATIENT)
Dept: OCCUPATIONAL THERAPY | Facility: CLINIC | Age: 62
End: 2022-10-12
Attending: INTERNAL MEDICINE
Payer: COMMERCIAL

## 2022-10-12 DIAGNOSIS — M79.641 PAIN OF RIGHT HAND: ICD-10-CM

## 2022-10-12 DIAGNOSIS — M79.601 PAIN OF RIGHT UPPER EXTREMITY: ICD-10-CM

## 2022-10-12 DIAGNOSIS — M79.89 SWELLING OF RIGHT HAND: ICD-10-CM

## 2022-10-12 PROCEDURE — 97167 OT EVAL HIGH COMPLEX 60 MIN: CPT | Mod: GO | Performed by: OCCUPATIONAL THERAPIST

## 2022-10-12 PROCEDURE — 97112 NEUROMUSCULAR REEDUCATION: CPT | Mod: GO | Performed by: OCCUPATIONAL THERAPIST

## 2022-10-12 NOTE — PROGRESS NOTES
Hand Therapy Initial Evaluation    Current Date:  10/12/2022    Diagnosis: Fibromyalgia  DOI: Chronic   Post:  1990    Subjective:  Maricarmen Dotson is a 61 year old female.    Answers for HPI/ROS submitted by the patient on 10/12/2022  Reason for Visit:: Right hand pain, numness, tingling,  swelling.  When problem began:: 10/1/2022  How problem occurred:: Normal use of right hand  Number scale: 7/10  General health as reported by patient: poor  Please check all that apply to your current or past medical history: concussions/dizziness, depression, diabetes, fibromyalgia, high blood pressure, mental illness, numbness/tingling, overweight, pain at night/rest, weakness  Medical allergies: latex, adhesives  Surgeries: orthopedic surgery  Medications you are currently taking: high blood pressure medication, pain medication, sleep medication  Occupation:: Retired  What are your primary job tasks: driving, lifting/carrying, prolonged sitting, pushing/pulling, repetitive tasks      Occupational Profile Information:  I have had fibromyalgia since the 90's, I like to draw, paint, garden and I can hardly even use my right side. The past couple of years, the last year in a half it has gotten worse.     Right hand dominant  Prior functional level:  no limitations  Patient reports symptoms of pain, stiffness/loss of motion, weakness/loss of strength, edema, numbness and tingling   Special tests:  EMG mild carpal tunnel on the right    Previous treatment: Acupuncture helps, ice, rest, splints   Barriers include:none  Mobility: No difficulty hikes   Transportation: drives  Leisure activities/hobbies: horse back riding, hiking    Functional Outcome Measure:   39/80    Objective:  Pain Level (Scale 0-10)   10/12/2022   At Rest 8/10   With Use 8/10     Pain Description  Date 10/12/2022   Location Traces median nerve distribution    Pain Quality Aching, Burning, Dull, Gnawing, Miserable, Nagging, Numb, Penetrating, Pressure, Sharp,  Shooting, Stabbing, Tender, Throbbing, Tingling, Tiring and Unbearable   Frequency intermittent     Pain is worst  with overuse   Exacerbated by  heavy use    Relieved by cold and IBprophen jell    Progression Becoming worse     Edema  Moderate over dorsal wrist volar forearm     Sensation   Decreased Median Nerve distribution per pt report    ROM   WNL    Strength   (Measured in pounds)  Pain Report: - none  + mild    ++ moderate    +++ severe    10/12/2022 10/12/2022   Trials R L   1  2  3 57 75   Average 57 75     Lat Pinch 10/12/2022 10/12/2022   Trials R L   1  2  3 12 18   Average 12 18     3 Pt Pinch 10/12/2022 10/12/2022   Trials R L   1  2  3 12 18   Average 12 18           Assessment:  Patient presents with symptoms consistent with diagnosis of bilateral elbow, wrist and hand  Fibromyalgia Right greater than Left,  with conservative intervention.     Patient's limitations or Problem List includes:  Pain, Decreased ROM/motion, Increased edema, Weakness, Sensory disturbance, Adherent scarring, Decreased stability, Decreased , Decreased pinch, Decreased coordination, Decreased dexterity, Tightness in musculature and Adherence in connective tissue of the bilateral elbow, wrist and hand fibromyalgia which interferes with the patient's ability to perform Self Care Tasks (dressing, hygiene/toileting), Work Tasks, Sleep Patterns, Recreational Activities, Household Chores and Driving  as compared to previous level of function.    Rehab Potential:  Good - Return to full activity, some limitations    Patient will benefit from skilled Occupational Therapy to increase overall strength,  strength, pinch strength, forearm strength and stability of wrist to return to previous activity level and resume normal daily tasks and to reach their rehab potential.    Barriers to Learning:  No barrier    Communication Issues:  Patient appears to be able to clearly communicate and understand verbal and written  communication and follow directions correctly.    Chart Review: Chart Review    Identified Performance Deficits: dressing, hygiene and grooming, care of others, care of pets, home establishment and management, meal preparation and cleanup, shopping, sleep and leisure activities    Assessment of Occupational Performance:  5 or more Performance Deficits    Clinical Decision Making (Complexity): High Complexity    Treatment Explanation:  The following has been discussed with the patient:  RX ordered/plan of care  Anticipated outcomes  Possible risks and side effects    Plan:  Frequency:  1 X week, once daily  Duration:  for 2 months    Treatment Plan:   Modalities:  US  Therapeutic Exercise:  Stabilization  Neuromuscular re-education:  Nerve Gliding, Posture, Kinesiotaping, Isometrics and Stabilization  Manual Techniques:  Myofascial release and Manual edema mobilization    Discharge Plan:  Achieve all LTG.  Independent in home treatment program.  Reach maximal therapeutic benefit.    Home Exercise Program:  Trial Kinesotape for lymph flow Dorsal wrist-Extensor wad, pronator Origin Insertion and flexor wad proximal wrist to elbow. Apply on full composite stretch position.    Next Visit:  Start median nerve glide, progress slowly due to history of fibromyalgia and possible reactive response to treatment.

## 2022-10-12 NOTE — PROGRESS NOTES
[facility]    OUTPATIENT Occupational Therapy ORTHOPEDIC EVALUATION  PLAN OF TREATMENT FOR OUTPATIENT REHABILITATION  (COMPLETE FOR INITIAL CLAIMS ONLY)  Patient's Last Name, First Name, M.I.  YOB: 1960  Maricarmen Dotson    Provider s Name:  [facility]   Medical Record No.  [id number]   Start of Care Date:  10/12/22   Onset Date:   10/12/90   Treatment Diagnosis:  Right Arm pain Medical Diagnosis:     Pain of right hand  Swelling of right hand  Pain of right upper extremity       Goals:     10/12/22 0500   Goal #1   Goal #1 household chores   Previous Performance Level Independent   Current Functional Task Lift   Current Performance Level Quite a bit of difficulty   STG Target Perfomance Pot to sink to drain water   STG Target Perform Level A little bit of difficulty   Due Date 12/12/22   LTG Target Task/Performance No Difficulty   Due Date 01/12/23       Therapy Frequency:  1x per week  Predicted Duration of Therapy Intervention:  3 moths    Liliana Jorgensen OT                 I CERTIFY THE NEED FOR THESE SERVICES FURNISHED UNDER        THIS PLAN OF TREATMENT AND WHILE UNDER MY CARE .             Physician Signature               Date    X_____________________________________________________                         Certification Date From:  10/12/22   Certification Date To:  01/12/22    Referring Provider:  Madyson Mclaughlin    Initial Assessment        See Epic Evaluation SOC Date: 10/12/22

## 2022-10-14 ENCOUNTER — VIRTUAL VISIT (OUTPATIENT)
Dept: PHARMACY | Facility: CLINIC | Age: 62
End: 2022-10-14
Payer: COMMERCIAL

## 2022-10-14 DIAGNOSIS — I10 HYPERTENSION GOAL BP (BLOOD PRESSURE) < 140/90: Primary | ICD-10-CM

## 2022-10-14 PROCEDURE — 99607 MTMS BY PHARM ADDL 15 MIN: CPT | Performed by: PHARMACIST

## 2022-10-14 PROCEDURE — 99606 MTMS BY PHARM EST 15 MIN: CPT | Performed by: PHARMACIST

## 2022-10-14 NOTE — PROGRESS NOTES
Medication Therapy Management (MTM) Encounter    ASSESSMENT:                            Medication Adherence/Access: See below for considerations    Hypertension: patient's blood pressure is not at goal, given her intolerance to the alpha blocker terazosin will rechallenge ACEI specially the enalapril due to short half-life given she has intolerances of myalgias. I also continues to suspect causes are also due to poorly control anxiety, MIESHA and pain.      PLAN:                            Replace terazosin with Enalapril 2.5 mg at bedtime     Continue magnesium 500 mg tablet just in the morning     Follow-up: Return in about 5 weeks (around 11/18/2022) for Medication Therapy Telephone Visit.    SUBJECTIVE/OBJECTIVE:                          Maricarmen Dotson is a 61 year old female coming in for a follow-up visit.  Today's visit is a follow-up MTM visit from 8/5/2022.    Reason for visit: blood pressure follow-up     Allergies/ADRs: Reviewed in chart - numerous drug intolerance and has EpiPen for emergency  Past Medical History: Reviewed in chart  Tobacco: She reports that she has never smoked. She has never used smokeless tobacco.  Alcohol: not currently using    Medication Adherence/Access:   Patient uses pill box(es), which has been helpful for.  helps.   The patient fills medications at [facility]: YES.   *See MTM note 4/20/22 for screen shot of patient's intolerance med list.     Hypertension: patient completed 24 hour blood pressure monitor her avg blood pressure is > 130/80 mm Hg with blood pressure during the day is higher. She was able to associate blood pressure elevations with panic attacks that she had at that time on the 24 hour blood pressure report.    See previous note of details of failed agents. Most recently she failed compounded amlodipine. She tried taking the terazosin but would like to stop taking. She was experiencing tingling in her toes and stiffness. Started a couple of days and getting  more severe. She started over-the-counter magnesium for her muscle pains.  Home blood pressure:  10/10 181/88, 171/99, 147/91 pulse 70     Along hypertension we have discussed previously, she has untreated MIESHA and cannot tolerate devices (triggers PTSD) and anxiety/depression. She is working with a therapist, recently started working with her so far. No medications mental health.  BP Readings from Last 3 Encounters:   10/06/22 (!) 174/75   09/29/22 (!) 156/80   06/07/22 (!) 168/70     ----------------      I spent 25 minutes with this patient today. All changes were made via collaborative practice agreement with Madyson Mendoza MD. A copy of the visit note was provided to the patient's provider(s).    The patient was sent via [employer] a summary of these recommendations.     Shi Sheikh, PharmD  Medication Therapy Management Pharmacist    Telemedicine Visit Details  Type of service:  Telephone visit  Start Time: 12:02p  End Time: 12:27pm  Originating Location (pt. Location): Home  Distant Location (provider location):  On-site  Provider has received verbal consent for a visit from the patient? Yes     Medication Therapy Recommendations  Hypertension goal BP (blood pressure) < 140/90    Current Medication: terazosin (HYTRIN) 1 MG capsule (Discontinued)   Rationale: Undesirable effect - Adverse medication event - Safety   Recommendation: Change Medication - ENALAPRIL MALEATE PO   Status: Accepted per CPA

## 2022-10-19 RX ORDER — ENALAPRIL MALEATE 2.5 MG/1
2.5 TABLET ORAL DAILY
Qty: 90 TABLET | Refills: 0 | Status: SHIPPED | OUTPATIENT
Start: 2022-10-19 | End: 2022-10-28 | Stop reason: SINTOL

## 2022-10-19 NOTE — PATIENT INSTRUCTIONS
"Recommendations from today's MTM visit:                                                      Replace terazosin with Enalapril 2.5 mg at bedtime  - enalapril has a very short-half life. The typical starting dose to 10mg increase to 40 mg.   - this is a blockade on vessels. It works at the kidney level to help blood pressure control. This medication is advantageous in protecting the kidneys in particular with history of diabetes.    Continue magnesium 500 mg tablet just in the morning     Follow-up: Return in about 5 weeks (around 11/18/2022) for Medication Therapy Telephone Visit.    It was great speaking with you today.  I value your experience and would be very thankful for your time in providing feedback in our clinic survey. In the next few days, you may receive an email or text message from [employer] with a link to a survey related to your  clinical pharmacist.\"     To schedule another MTM appointment, please call the clinic directly or you may call the MTM scheduling line at [phone] or toll-free at [phone].     My Clinical Pharmacist's contact information:                                                      Please feel free to contact me with any questions or concerns you have.      Shi Sheikh, PharmD  Medication Therapy Management Pharmacist     "

## 2022-10-20 ENCOUNTER — ALLIED HEALTH/NURSE VISIT (OUTPATIENT)
Dept: EDUCATION SERVICES | Facility: CLINIC | Age: 62
End: 2022-10-20
Payer: COMMERCIAL

## 2022-10-20 DIAGNOSIS — E11.65 TYPE 2 DIABETES MELLITUS WITH HYPERGLYCEMIA, WITH LONG-TERM CURRENT USE OF INSULIN (H): Primary | ICD-10-CM

## 2022-10-20 DIAGNOSIS — Z79.4 TYPE 2 DIABETES MELLITUS WITH HYPERGLYCEMIA, WITH LONG-TERM CURRENT USE OF INSULIN (H): Primary | ICD-10-CM

## 2022-10-20 PROCEDURE — 99207 PR DROP WITH A PROCEDURE: CPT

## 2022-10-20 PROCEDURE — G0108 DIAB MANAGE TRN  PER INDIV: HCPCS | Mod: 95

## 2022-10-20 NOTE — PROGRESS NOTES
Diabetes Self-Management Education & Support    Presents for: Insulin Pump and CGM Review    Type of Service: In Person Visit    Assessment Type:   REPORTS:                              Insulin Pump Information  Insulin Pump Brand: OmniPod    Education specific to insulin pump provided today on:   importance of bolusing before meals    Opportunities for ongoing education and support in diabetes-self management were discussed.    Pt verbalized understanding of concepts discussed and recommendations provided today.       Continue education with the following diabetes management concepts: Healthy Eating, Monitoring, Taking Medication, Problem Solving and Healthy Coping    Pump Education Materials: No new materials.    ASSESSMENT  I met with Maricarmen today for review of pump and sensor data. We discussed several pump changes to help improve her time in target range. The pump data would not load for this past week, but did give us data from the previous week. The ashleigh CGM was able to give us data for the full past 2 weeks. We reviewed options for downloading her pump at home, but she may continue to run into issues that she previously did. Maricarmen shared her diabetes history with me and we discussed ways to work together to help her diabetes management.  Glucose Patterns & Trends:  BG corrections not bringing down BG's once they are high. Average  with little variability. 24% of BG's in target range. No low BG's noted.    Patient would benefit from increase in correction/sensitivity, decrease in insulin action time and bolusing before meals.    Changes made to pump settings:  - Insulin active insulin from 4 hrs to 3 hrs  -Correction factors 12am 31 -> 28, 5am 33 -> 30    Changes made to sensor settings:   No changes.    PLAN  - Insulin active insulin from 4 hrs to 3 hrs  -Correction factors 12am 31 -> 28, 5am 33 -> 30  -Try to upload pump from home before next visit.  Topics to cover at upcoming visits: Healthy Eating,  "Taking Medication, Problem Solving and Healthy Coping    Follow-up: November 15th, 2022.    See Care Plan for co-developed, patient-state behavior change goals.  AVS provided for patient today.    Education Materials Provided:  No new materials provided today      SUBJECTIVE/OBJECTIVE:  Presents for: Insulin Pump and CGM Review  Accompanied by: Self  Diabetes education in the past 24mo: No  Focus of Visit: Assistance w/ making life changes, CGM, Insulin Pump  Type of Pump visit: Pump Review  Type of CGM visit: Personal CGM Follow-up  Diabetes type: Type 2  Disease course: Worsening  How confident are you filling out medical forms by yourself:: Somewhat  Diabetes management related comments/concerns: help lower A1C  Cultural Influences/Ethnic Background:   or       Diabetes Symptoms & Complications:  Fatigue: Yes  Neuropathy: Yes  Polydipsia: Yes  Polyphagia: Sometimes  Polyuria: Yes  Visual change: Yes  Slow healing wounds: Yes  Complications assessed today?: No  Autonomic neuropathy: Yes  CVA: No  Heart disease: Yes  Nephropathy: Yes  Peripheral neuropathy: Yes  Peripheral Vascular Disease: No  Retinopathy: No  Sexual dysfunction: Yes    Patient Problem List and Family Medical History reviewed for relevant medical history, current medical status, and diabetes risk factors.    Vitals:  [facility] 01/01/1999   Estimated body mass index is 32.23 kg/m  as calculated from the following:    Height as of 10/6/22: 1.607 m (5' 3.25\").    Weight as of 10/6/22: 83.2 kg (183 lb 6.4 oz).   Last 3 BP:   BP Readings from Last 3 Encounters:   10/06/22 (!) 174/75   09/29/22 (!) 156/80   06/07/22 (!) 168/70       History   Smoking Status     Never   Smokeless Tobacco     Never       Labs:  Lab Results   Component Value Date    A1C 7.8 09/01/2022    A1C 7.4 09/18/2020    HEMOGLOBINA1 11.0 06/08/2015     Lab Results   Component Value Date     07/06/2022     01/20/2021     Lab Results   Component Value Date    LDL " 118 09/01/2022     09/18/2020     HDL Cholesterol   Date Value Ref Range Status   09/18/2020 51 >49 mg/dL Final     Direct Measure HDL   Date Value Ref Range Status   09/01/2022 52 >=50 mg/dL Final   ]  GFR Estimate   Date Value Ref Range Status   07/06/2022 66 >60 mL/min/1.73m2 Final     Comment:     Effective December 21, 2021 eGFRcr in adults is calculated using the 2021 CKD-EPI creatinine equation which includes age and gender (Jose E et al., NEJ, DOI: 10.1056/TLIWzn6632638)   01/20/2021 84 >60 mL/min/[1.73_m2] Final     Comment:     Non  GFR Calc  Starting 12/18/2018, serum creatinine based estimated GFR (eGFR) will be   calculated using the Chronic Kidney Disease Epidemiology Collaboration   (CKD-EPI) equation.       GFR Estimate If Black   Date Value Ref Range Status   01/20/2021 >90 >60 mL/min/[1.73_m2] Final     Comment:      GFR Calc  Starting 12/18/2018, serum creatinine based estimated GFR (eGFR) will be   calculated using the Chronic Kidney Disease Epidemiology Collaboration   (CKD-EPI) equation.       Lab Results   Component Value Date    CR 0.98 07/06/2022    CR 0.77 01/20/2021     No results found for: MICROALBUMIN    Healthy Eating:  Healthy Eating Assessed Today: Yes  Cultural/Worship diet restrictions?: No  Meal planning/habits: Avoiding sweets, Carb counting, Low carb, Heart healthy, Low salt, Smaller portions (Pt reports binge eating disorder, usually happens at night.)  How many times a week on average do you eat food made away from home (restaurant/take-out)?: 2  Meals include: Breakfast, Lunch, Dinner, Evening Snack  Beverages: Water  Has patient met with a dietitian in the past?: Yes    Being Active:  Being Active Assessed Today: Yes  Exercise:: Yes  Days per week of moderate to strenuous exercise (like a brisk walk): 6  On average, minutes per day of exercise at this level: 150 or greater  How intense was your typical exercise? : Moderate (like brisk  walking)  Exercise Minutes per Week: 900  Barrier to exercise: Time, Physical limitation    Monitoring:  Monitoring Assessed Today: Yes  Did patient bring glucose meter to appointment? : Yes  Blood Glucose Meter: CGM  Times checking blood sugar at home (number): 5+  Times checking blood sugar at home (per): Day  Blood glucose trend: Increasing        Taking Medications:  Diabetes Medication(s)     Insulin       NOVOLOG VIAL 100 UNIT/ML soln    USE IN INSULIN PUMP, TOTAL DAILY DOSE  UNITS     Taking Medication Assessed Today: Yes  Current Treatments: Diet, Insulin Pump    Problem Solving:  Problem Solving Assessed Today: Yes  Is the patient at risk for hypoglycemia?: Yes  Hypoglycemia Frequency: Rarely              Reducing Risks:  Reducing Risks Assessed Today: No  CAD Risks: Diabetes Mellitus, Dyslipidemia, Family history, Hypertension, Obesity, Post-menopausal, Stress  Has dilated eye exam at least once a year?: Yes  Sees dentist every 6 months?: Yes  Feet checked by healthcare provider in the last year?: No    Healthy Coping:  Healthy Coping Assessed Today: Yes  Emotional response to diabetes: Ready to learn, Concern for health and well-being  Informal Support system:: Family, Spouse  Stage of change: PREPARATION (Decided to change - considering how)  Patient Activation Measure Survey Score:  JEFFERY Score (Last Two) 10/18/2013 4/22/2020   JEFFERY Raw Score 42 28   Activation Score 66 50   JEFFERY Level 3 2         Care Plan and Education Provided:  Care Plan: Diabetes   Updates made by Ashley Emery RN since 10/30/2022 12:00 AM      Problem: HbA1C Not In Goal       Goal: Establish Regular Follow-Ups with PCP       Task: Discuss with PCP the recommended timing for patient's next follow up visit(s)    Responsible User: Ashley Emery RN      Task: Discuss schedule for PCP visits with patient    Responsible User: Ashley Emery RN      Goal: Get HbA1C Level in Goal    This Visit's Progress: 0%   Note:    Maricarmen lozada  get her A1C under 7%.     Task: Educate patient on diabetes education self-management topics Completed 10/30/2022   Responsible User: Ashley Emery RN      Task: Educate patient on benefits of regular glucose monitoring    Responsible User: Ashley Emery RN      Task: Refer patient to appropriate extended care team member, as needed (Medication Therapy Management, Behavioral Health, Physical Therapy, etc.) Completed 10/30/2022   Responsible User: Ashley Emery RN      Task: Discuss diabetes treatment plan with patient Completed 10/30/2022   Responsible User: Ashley Emery RN      Problem: Diabetes Self-Management Education Needed to Optimize Self-Care Behaviors       Goal: Understand diabetes pathophysiology and disease progression       Task: Provide education on diabetes pathophysiology and disease progression specfic to patient's diabetes type    Responsible User: Ashley Emery RN      Goal: Healthy Eating - follow a healthy eating pattern for diabetes       Task: Provide education on portion control and consistency in amount, composition and timing of food intake    Responsible User: Ashley Emery RN      Task: Provide education on managing carbohydrate intake (carbohydrate counting, plate planning method, etc.)    Responsible User: Ashley Emery RN      Task: Provide education on weight management    Responsible User: Ashley Emery RN      Task: Provide education on heart healthy eating    Responsible User: Ashlye Emery RN      Task: Provide education on eating out    Responsible User: Ashley Emery RN      Task: Develop individualized healthy eating plan with patient    Responsible User: Ashley Emery RN      Goal: Being Active - get regular physical activity, working up to at least 150 minutes per week       Task: Provide education on relationship of activity to glucose and precautions to take if at risk for low glucose Completed 10/30/2022   Responsible User: Ashley Emery RN       Task: Discuss barriers to physical activity with patient    Responsible User: Ashley Emery RN      Task: Develop physical activity plan with patient    Responsible User: Ashley Emery RN      Task: Explore community resources including walking groups, assistance programs, and home videos    Responsible User: Ashley Emery RN      Goal: Monitoring - monitor glucose and ketones as directed       Task: Provide education on blood glucose monitoring (purpose, proper technique, frequency, glucose targets, interpreting results, when to use glucose control solution, sharps disposal)    Responsible User: Ashley Emery RN      Task: Provide education on continuous glucose monitoring (sensor placement, use of kaela or /reader, understanding glucose trends, alerts and alarms, differences between sensor glucose and blood glucose)    Responsible User: Ashley Emery RN      Task: Provide education on ketone monitoring (when to monitor, frequency, etc.)    Responsible User: Ashley Emery RN      Goal: Taking Medication - patient is consistently taking medications as directed       Task: Provide education on action of prescribed medication, including when to take and possible side effects Completed 10/30/2022   Responsible User: Ashley Emery RN      Task: Provide education on insulin and injectable diabetes medications, including administration, storage, site selection and rotation for injection sites    Responsible User: Ashley Emery RN      Task: Discuss barriers to medication adherence with patient and provide management technique ideas as appropriate    Responsible User: Ashley Emery RN      Task: Provide education on frequency and refill details of medications    Responsible User: Ashley Emery RN      Goal: Problem Solving - know how to prevent and manage short-term diabetes complications       Task: Provide education on high blood glucose - causes, signs/symptoms, prevention and treatment     Responsible User: Ashley Emery RN      Task: Provide education on low blood glucose - causes, signs/symptoms, prevention, treatment, carrying a carbohydrate source at all times, and medical identification    Responsible User: Ashley Emery RN      Task: Provide education on safe travel with diabetes    Responsible User: Ashley Emery RN      Task: Provide education on how to care for diabetes on sick days    Responsible User: Ashley Emery RN      Task: Provide education on when to call a health care provider    Responsible User: Ashley Emery RN      Goal: Reducing Risks - know how to prevent and treat long-term diabetes complications       Task: Provide education on major complications of diabetes, prevention, early diagnostic measures and treatment of complications    Responsible User: Ashley Emery RN      Task: Provide education on recommended care for dental, eye and foot health    Responsible User: Ashley Emery RN      Task: Provide education on Hemoglobin A1c - goals and relationship to blood glucose levels Completed 10/30/2022   Responsible User: Ashley Emery RN      Task: Provide education on recommendations for heart health - lipid levels and goals, blood pressure and goals, and aspirin therapy, if indicated    Responsible User: Ashley Emery RN      Task: Provide education on tobacco cessation    Responsible User: Ashley Emery RN      Goal: Healthy Coping - use available resources to cope with the challenges of managing diabetes       Task: Discuss recognizing feelings about having diabetes Completed 10/30/2022   Responsible User: Ashley Emery RN      Task: Provide education on the benefits of making appropriate lifestyle changes Completed 10/30/2022   Responsible User: Ashley Emery RN      Task: Provide education on benefits of utilizing support systems    Responsible User: Ashley Emery RN      Task: Discuss methods for coping with stress    Responsible User: Yuly  PALOMA Ramirez      Task: Provide education on when to seek professional counseling    Responsible User: Ashley Emery, PALOMA Emery RN, [facility]      Time Spent: 60 minutes  Encounter Type: Individual    Any diabetes medication dose changes were made via the CDE Protocol per the patient's referring provider and endocrinology provider. A copy of this encounter was shared with the provider.

## 2022-10-20 NOTE — LETTER
10/20/2022         RE: Maricarmen Dotson  [address]  [place] [address]        Dear Colleague,    Thank you for referring your patient, Maricarmen Dotson, to [facility]. Please see a copy of my visit note below.    Lab Results   Component Value Date    A1C 7.8 09/01/2022    A1C 8.7 05/20/2022    A1C 8.2 11/23/2021    A1C 7.4 09/18/2020    A1C 8.0 02/27/2020    A1C 8.0 10/28/2019    A1C 8.2 07/22/2019    A1C 8.5 04/18/2019     Diabetes Medication(s)     Insulin       NOVOLOG VIAL 100 UNIT/ML soln    USE IN INSULIN PUMP, TOTAL DAILY DOSE  UNITS        omnipod    Maxx kenney last week for hypertension.    Binge eating disorder- eating at night

## 2022-10-20 NOTE — PATIENT INSTRUCTIONS
Work on trying upload pump from home before next visit and send Ashley a [email or web address] message is you would like her to review.    Pump changes from today:  - Insulin active insulin from 4 hrs to 3 hrs  -Correction factors 12am 31 -> 28, 5am 33 -> 30    Follow-up appt: Tuesday November 15th at 1pm at [facility]

## 2022-10-25 ENCOUNTER — THERAPY VISIT (OUTPATIENT)
Dept: OCCUPATIONAL THERAPY | Facility: CLINIC | Age: 62
End: 2022-10-25
Payer: COMMERCIAL

## 2022-10-25 DIAGNOSIS — M79.641 PAIN OF RIGHT HAND: Primary | ICD-10-CM

## 2022-10-25 PROCEDURE — 97112 NEUROMUSCULAR REEDUCATION: CPT | Mod: GO | Performed by: OCCUPATIONAL THERAPIST

## 2022-10-26 PROBLEM — M79.641 PAIN OF RIGHT HAND: Status: ACTIVE | Noted: 2022-10-12

## 2022-11-01 ENCOUNTER — THERAPY VISIT (OUTPATIENT)
Dept: OCCUPATIONAL THERAPY | Facility: CLINIC | Age: 62
End: 2022-11-01
Payer: COMMERCIAL

## 2022-11-01 DIAGNOSIS — M79.641 PAIN OF RIGHT HAND: Primary | ICD-10-CM

## 2022-11-01 PROCEDURE — 97112 NEUROMUSCULAR REEDUCATION: CPT | Mod: GO | Performed by: OCCUPATIONAL THERAPIST

## 2022-11-07 NOTE — PROGRESS NOTES
SOAP note objective information for 11/8/2022.      Objective:  Pain Level (Scale 0-10)   10/12/2022    At Rest 8/10    With Use 8/10      Pain Description  Date 10/12/2022    Location Traces median nerve distribution     Pain Quality Aching, Burning, Dull, Gnawing, Miserable, Nagging, Numb, Penetrating, Pressure, Sharp, Shooting, Stabbing, Tender, Throbbing, Tingling, Tiring and Unbearable    Frequency intermittent      Pain is worst  with overuse    Exacerbated by  heavy use     Relieved by cold and IBprophen jell     Progression Becoming worse      Edema  Moderate over dorsal wrist volar forearm     Sensation   Decreased Median Nerve distribution per pt report    ROM   WNL    Strength   (Measured in pounds)  Pain Report: - none  + mild    ++ moderate    +++ severe    10/12/2022 10/12/2022   Trials R L   1  2  3 57 75   Average 57 75     Lat Pinch 10/12/2022 10/12/2022   Trials R L   1  2  3 12 18   Average 12 18     3 Pt Pinch 10/12/2022 10/12/2022   Trials R L   1  2  3 12 18   Average 12 18     Treatment Plan:   Modalities:  US  Therapeutic Exercise:  Stabilization  Neuromuscular re-education:  Nerve Gliding, Posture, Kinesiotaping, Isometrics and Stabilization  Manual Techniques:  Myofascial release and Manual edema mobilization    Discharge Plan:  Achieve all LTG.  Independent in home treatment program.  Reach maximal therapeutic benefit.    Home Exercise Program:  Trial Kinesotape for lymph flow Dorsal wrist-Extensor wad, pronator Origin Insertion and flexor wad proximal wrist to elbow. Apply on full composite stretch position.    11/8/2022  Exercise Name: Graded Motor Imagery  Exercise Name: Education Sheet General - Sessions: 2 X/DAY, EMR Notes: Modified median nerve glide: Arm resting down at side. ONLY QUARTER TURN of palm rotation away from leg.  SLOW!!! NO PAIN    Next Visit:  Very slow passive nerve glide

## 2022-11-08 ENCOUNTER — THERAPY VISIT (OUTPATIENT)
Dept: OCCUPATIONAL THERAPY | Facility: CLINIC | Age: 62
End: 2022-11-08
Payer: COMMERCIAL

## 2022-11-08 DIAGNOSIS — M79.641 PAIN OF RIGHT HAND: Primary | ICD-10-CM

## 2022-11-08 PROCEDURE — 97112 NEUROMUSCULAR REEDUCATION: CPT | Mod: GO | Performed by: OCCUPATIONAL THERAPIST

## 2022-11-08 PROCEDURE — 97140 MANUAL THERAPY 1/> REGIONS: CPT | Mod: GO | Performed by: OCCUPATIONAL THERAPIST

## 2022-11-14 NOTE — PROGRESS NOTES
SOAP note objective information for 11/15/2022.      Objective:  Pain Level (Scale 0-10)   10/12/2022 11/15/2022   At Rest 8/10 R: 8 pre tx       6 post tx     With Use 8/10      Pain Description  Date 10/12/2022 11/15/2022   Location Traces median nerve distribution     Pain Quality Aching, Burning, Dull, Gnawing, Miserable, Nagging, Numb, Penetrating, Pressure, Sharp, Shooting, Stabbing, Tender, Throbbing, Tingling, Tiring and Unbearable    Frequency intermittent      Pain is worst  with overuse    Exacerbated by  heavy use     Relieved by cold and IBprophen jell     Progression Becoming worse unchanged     Edema  Moderate over dorsal wrist volar forearm     Sensation   Decreased Median Nerve distribution per pt report    ROM   WNL    Strength   (Measured in pounds)  Pain Report: - none  + mild    ++ moderate    +++ severe    10/12/2022 10/12/2022   Trials R L   1  2  3 57 75   Average 57 75     Lat Pinch 10/12/2022 10/12/2022   Trials R L   1  2  3 12 18   Average 12 18     3 Pt Pinch 10/12/2022 10/12/2022   Trials R L   1  2  3 12 18   Average 12 18     Treatment Plan:   Modalities:  US  Therapeutic Exercise:  Stabilization  Neuromuscular re-education:  Nerve Gliding, Posture, Kinesiotaping, Isometrics and Stabilization  Manual Techniques:  Myofascial release and Manual edema mobilization    Discharge Plan:  Achieve all LTG.  Independent in home treatment program.  Reach maximal therapeutic benefit.    Home Exercise Program:  Trial Kinesotape for lymph flow Dorsal wrist-Extensor wad, pronator Origin Insertion and flexor wad proximal wrist to elbow. Apply on full composite stretch position.    11/8/2022  Exercise Name: Graded Motor Imagery  Exercise Name: Education Sheet General - Sessions: 2 X/DAY, EMR Notes: Modified median nerve glide: Arm resting down at side. ONLY QUARTER TURN of palm rotation away from leg.  SLOW!!! NO PAIN    11/15/2022  Discontinue nerve glide  Pt to continue to complete making own  mirror box for home    Next Visit:  Very slow passive nerve glide

## 2022-11-15 ENCOUNTER — THERAPY VISIT (OUTPATIENT)
Dept: OCCUPATIONAL THERAPY | Facility: CLINIC | Age: 62
End: 2022-11-15
Payer: COMMERCIAL

## 2022-11-15 DIAGNOSIS — M79.641 PAIN OF RIGHT HAND: Primary | ICD-10-CM

## 2022-11-15 PROCEDURE — 97112 NEUROMUSCULAR REEDUCATION: CPT | Mod: GO | Performed by: OCCUPATIONAL THERAPIST

## 2022-11-17 ENCOUNTER — IMMUNIZATION (OUTPATIENT)
Dept: FAMILY MEDICINE | Facility: CLINIC | Age: 62
End: 2022-11-17
Payer: COMMERCIAL

## 2022-11-17 DIAGNOSIS — Z23 NEED FOR COVID-19 VACCINE: Primary | ICD-10-CM

## 2022-11-17 PROCEDURE — 0124A COVID-19,PF,PFIZER BOOSTER BIVALENT: CPT

## 2022-11-17 PROCEDURE — 99207 PR NO CHARGE NURSE ONLY: CPT

## 2022-11-17 PROCEDURE — 91312 COVID-19,PF,PFIZER BOOSTER BIVALENT: CPT

## 2022-11-17 NOTE — TELEPHONE ENCOUNTER
Left message for patient to call back.  Need to discuss current BP readings since taking BP medication.    Annamaria PICHARDO RN - Triage  [facility]     risks, benefits, alternatives, general anesthesia as a backup discussed with the patient and she agrees to proceed as planned

## 2022-11-21 ENCOUNTER — OFFICE VISIT (OUTPATIENT)
Dept: PEDIATRICS | Facility: CLINIC | Age: 62
End: 2022-11-21
Payer: COMMERCIAL

## 2022-11-21 ENCOUNTER — NURSE TRIAGE (OUTPATIENT)
Dept: PEDIATRICS | Facility: CLINIC | Age: 62
End: 2022-11-21

## 2022-11-21 ENCOUNTER — MYC MEDICAL ADVICE (OUTPATIENT)
Dept: PEDIATRICS | Facility: CLINIC | Age: 62
End: 2022-11-21

## 2022-11-21 VITALS
WEIGHT: 188.1 LBS | RESPIRATION RATE: 16 BRPM | HEART RATE: 51 BPM | HEIGHT: 64 IN | SYSTOLIC BLOOD PRESSURE: 136 MMHG | OXYGEN SATURATION: 96 % | TEMPERATURE: 98.4 F | BODY MASS INDEX: 32.11 KG/M2 | DIASTOLIC BLOOD PRESSURE: 80 MMHG

## 2022-11-21 DIAGNOSIS — I10 ESSENTIAL HYPERTENSION: Primary | ICD-10-CM

## 2022-11-21 PROCEDURE — 99214 OFFICE O/P EST MOD 30 MIN: CPT | Performed by: INTERNAL MEDICINE

## 2022-11-21 ASSESSMENT — PATIENT HEALTH QUESTIONNAIRE - PHQ9
10. IF YOU CHECKED OFF ANY PROBLEMS, HOW DIFFICULT HAVE THESE PROBLEMS MADE IT FOR YOU TO DO YOUR WORK, TAKE CARE OF THINGS AT HOME, OR GET ALONG WITH OTHER PEOPLE: SOMEWHAT DIFFICULT
SUM OF ALL RESPONSES TO PHQ QUESTIONS 1-9: 11
SUM OF ALL RESPONSES TO PHQ QUESTIONS 1-9: 11

## 2022-11-21 ASSESSMENT — PAIN SCALES - GENERAL: PAINLEVEL: EXTREME PAIN (8)

## 2022-11-21 NOTE — TELEPHONE ENCOUNTER
"Went to the [facility] in [place]     Nurse Triage SBAR     Is this a 2nd Level Triage? YES, LICENSED PRACTITIONER REVIEW IS REQUIRED    Situation: Patient sent a USINE IO message (See 11/21/2022 [employer] Encounter).     Background:   - Patient states that following the third COVID vaccine she has been experiencing high blood pressure readings   - Patient received the COVID Pfizer Bivalent booster vaccine on 11/17/2022   - Patient states that she went into the Urgent Room yesterday (11/20/2022) and blood work and an EKG were completed, patient states no abnormal labs or EKG resulted    - Patient states that she was discharged to home   - Patient reports continued high blood pressure readings (215/100, 170/90)   - Patient reports headaches, muscle cramping and pain, chest pain, palpitations, blurred vision   - Patient states that her last blood pressure reading was 170/90, patient states that she has a wrist BP cuff at home and an arm BP cuff at home (statest that following an abnormal reading she waits 5 minutes and rechecks, reports no significant decrease in BP when rechecked)   - Patient states that she uses both the wrist and arm cuff to measure her blood pressure    - Patient previously taking enalapril, discontinued on 10/28/2022 by [facility] due to side effects     enalapril (VASOTEC) 2.5 MG tablet (Discontinued) 90 tablet 0 10/19/2022 10/28/2022 --   Sig - Route: Take 1 tablet (2.5 mg) by mouth daily - Oral     Assessment:   -  Protocol disposition states \"Go to ED now\"  - Informed the patient of the disposition   - Patient refused and declines going to the ED   - Scheduled patient for an appointment with Dr. Mclaughlin today (11/21/2022) at 11 AM   - Informed the patient that ED is advised and encouraged     Protocol Recommended Disposition:   Go to ED Now    Recommendation:   - Informed the patient of the disposition   - Patient refused and declines going to the ED   - Scheduled patient for an " "appointment with Dr. Mclaughlin today (11/21/2022) at 11 AM   - Informed the patient that ED is advised and encouraged     Routed to provider    Does the patient meet one of the following criteria for ADS visit consideration? 16+ years old, with an [facility] PCP     TIP  Providers, please consider if this condition is appropriate for management at one of our Acute and Diagnostic Services sites.     If patient is a good candidate, please use dotphrase <dot>triageresponse and select Refer to ADS to document.    Reason for Disposition    Systolic BP >= 160 OR Diastolic >= 100, and any cardiac or neurologic symptoms (e.g., chest pain, difficulty breathing, unsteady gait, blurred vision)    Additional Information    Negative: Symptom is main concern (e.g., headache, chest pain)    Negative: Low blood pressure is main concern    Answer Assessment - Initial Assessment Questions  1. BLOOD PRESSURE: \"What is the blood pressure?\" \"Did you take at least two measurements 5 minutes apart?\"      Last night, 177/90, states that she rechecks it 5 minutes later, may drop, but not a significant decrease   2. ONSET: \"When did you take your blood pressure?\"      Last night (11/20/2022)  3. HOW: \"How did you obtain the blood pressure?\" (e.g., visiting nurse, automatic home BP monitor)      Home BP monitor cuff, wrist one, states that she is using both the wrist cuff and the arm cuff   4. HISTORY: \"Do you have a history of high blood pressure?\"      Yes, uncontrolled   5. MEDICATIONS: \"Are you taking any medications for blood pressure?\" \"Have you missed any doses recently?\"      Patient states that she has been on blood pressure medications in the past, but due to the side effects she has not continued on them   6. OTHER SYMPTOMS: \"Do you have any symptoms?\" (e.g., headache, chest pain, blurred vision, difficulty breathing, weakness)      Headache, muscle cramping, chest tightness (aching), palpitations, blurred vision   7. PREGNANCY: \"Is " "there any chance you are pregnant?\" \"When was your last menstrual period?\"      N/A    Protocols used: BLOOD PRESSURE - HIGH-A-OH    Dr. Mclaughlin, please review and advise.     Kat REECE RN   Patient Advocate Liaison (PAL)  [facility]   "

## 2022-11-21 NOTE — TELEPHONE ENCOUNTER
"See patient's [employer] message   - Patient states that following the third COVID vaccine she has been experiencing high blood pressure readings   - Patient received the COVID Pfizer Bivalent booster vaccine on 11/17/2022   - Patient states that she went into the Urgent Room yesterday (11/20/2022) and blood work and an EKG were completed, patient states no abnormal labs or EKG resulted   - Patient states that she was discharged to home   - Patient reports continued high blood pressure readings (215/100, 170/90)   - Patient reports headaches, muscle cramping and pain, chest pain, palpitations     Called the patient at [phone]   - See 11/21/2022 Nurse Triage Encounter   - Nurse Triage disposition   - Protocol disposition states \"Go to ED now\"  - Informed the patient of the disposition   - Patient refused and declines going to the ED   - Scheduled patient for an appointment with Dr. Mclaughlin today (11/21/2022) at 11 AM   - Informed the patient that ED is advised and encouraged     Routed 11/21/2022 Nurse Triage Encounter to Dr. Mclaughlin to review and advise and for second level triage     Kat REECE RN   Patient Advocate Liaison (PAL)  [facility]      "

## 2022-11-21 NOTE — PROGRESS NOTES
"  Assessment & Plan     Essential hypertension  Long discussion today with patient and her daughter regarding her difficult to manage hypertension. Her blood pressures are and have been quite labile; of note, has had a home BP cuff in the past which does confirm diagnosis of hypertension. She has had significant difficulty with medication intolerance which has compounded the difficulty in managing her BP.    As her BP has returned to normal ranges as of today's measurement in clinic, I don't think an acute intervention is needed and we can continue ongoing discussions with MTM regarding alternative medications (potentially torsemide as next option to trial). We did discuss extensively that I think she has a very strong mind-body connection, and I think that her ongoing struggles with anxiety, PTSD and her trauma history play a significant role in her blood pressures. I also think that when she feels ill, her fibromyalgia is flaring, or she has something else that makes her feel unwell her BP does rise markedly in response to this. We discussed considering using acetaminophen in a more regular fashion (650-1000mg every 8 hrs when pain is flaring, for example), and considering other complementary or alternative medical options for management such as aromatherapy, acupuncture or other (unfortunately I have little experience in this area but would very much support her trial of this). She and her daughter are in agreement and we have follow-up scheduled mid-next month and will discuss more at that time.         35 minutes spent on the date of the encounter doing chart review, history and exam, documentation and further activities per the note       BMI:   Estimated body mass index is 32.29 kg/m  as calculated from the following:    Height as of this encounter: 1.626 m (5' 4\").    Weight as of this encounter: 85.3 kg (188 lb 1.6 oz).           No follow-ups on file.    Madyson Mendoza MD  [facility] " "[facility] [place]   Maricarmen is a 61 year old accompanied by her daughter, presenting for the following health issues:  Hypertension      History of Present Illness       Reason for visit:  High blood pressure after 3rd covid vaccine    She eats 2-3 servings of fruits and vegetables daily.She consumes 0 sweetened beverage(s) daily.She exercises with enough effort to increase her heart rate 30 to 60 minutes per day.  She exercises with enough effort to increase her heart rate 5 days per week.   She is taking medications regularly.    Today's PHQ-9         PHQ-9 Total Score: 11    PHQ-9 Q9 Thoughts of better off dead/self-harm past 2 weeks :   Not at all    How difficult have these problems made it for you to do your work, take care of things at home, or get along with other people: Somewhat difficult       Maricarmen comes in with her daughter for discussion of her blood pressure. Reports that she felt terrible after receiving the third COVID vaccine and her blood pressure was in the 210s/100s. Had a terrible headache and body aches. Was seen in [facility] and had normal ECG and reassuring labs, but BP was still elevated at this time. She is now about 3 days out from the vaccination. BP at home today was elevated prompting recommendation that she come in. She thinks that she is starting to feel better. Of note, has multiple medication intolerances and has been working closely with [facility] regarding management of her hypertension. At this time, still with a headache somewhat but no chest pain.     Review of Systems   Constitutional, HEENT, cardiovascular, pulmonary, gi and gu systems are negative, except as otherwise noted.      Objective    /80 (BP Location: Left arm, Patient Position: Sitting, Cuff Size: Adult Regular)   Pulse 51   Temp 98.4  F (36.9  C) (Tympanic)   Resp 16   Ht 1.626 m (5' 4\")   Wt 85.3 kg (188 lb 1.6 oz)   LMP 01/01/1999   SpO2 96%   BMI 32.29 kg/m    Body mass index is 32.29 kg/m .  Physical " Exam   GENERAL: healthy, alert and no distress

## 2022-11-22 ENCOUNTER — THERAPY VISIT (OUTPATIENT)
Dept: OCCUPATIONAL THERAPY | Facility: CLINIC | Age: 62
End: 2022-11-22
Payer: COMMERCIAL

## 2022-11-22 DIAGNOSIS — M79.641 PAIN OF RIGHT HAND: Primary | ICD-10-CM

## 2022-11-22 PROCEDURE — 97112 NEUROMUSCULAR REEDUCATION: CPT | Mod: GO | Performed by: OCCUPATIONAL THERAPIST

## 2022-11-28 ENCOUNTER — THERAPY VISIT (OUTPATIENT)
Dept: OCCUPATIONAL THERAPY | Facility: CLINIC | Age: 62
End: 2022-11-28
Payer: COMMERCIAL

## 2022-11-28 DIAGNOSIS — M79.641 PAIN OF RIGHT HAND: Primary | ICD-10-CM

## 2022-11-28 PROCEDURE — 97112 NEUROMUSCULAR REEDUCATION: CPT | Mod: GO | Performed by: OCCUPATIONAL THERAPIST

## 2022-12-01 DIAGNOSIS — Z79.4 TYPE 2 DIABETES MELLITUS WITH HYPERGLYCEMIA, WITH LONG-TERM CURRENT USE OF INSULIN (H): ICD-10-CM

## 2022-12-01 DIAGNOSIS — E11.65 TYPE 2 DIABETES MELLITUS WITH HYPERGLYCEMIA, WITH LONG-TERM CURRENT USE OF INSULIN (H): ICD-10-CM

## 2022-12-01 DIAGNOSIS — E11.9 TYPE 2 DIABETES, HBA1C GOAL < 7% (H): ICD-10-CM

## 2022-12-01 RX ORDER — INSULIN ASPART 100 [IU]/ML
INJECTION, SOLUTION INTRAVENOUS; SUBCUTANEOUS
Qty: 90 ML | Refills: 0 | Status: SHIPPED | OUTPATIENT
Start: 2022-12-01 | End: 2023-04-11

## 2022-12-01 NOTE — TELEPHONE ENCOUNTER
"Last Written Prescription Date: 8/29/22  Last Fill Quantity: 90,  # refills: 0   Last office visit:10/6/22 with Dr. Hightower  Future Office Visit: 12/20/22        Requested Prescriptions   Pending Prescriptions Disp Refills     NOVOLOG VIAL 100 UNIT/ML soln [Pharmacy Med Name: NOVOLOG VIAL 10ML 100U/ML] 90 mL 3     Sig: USE IN INSULIN PUMP, TOTAL DAILY DOSE  UNITS       Short Acting Insulin Protocol Passed - 12/1/2022 11:08 AM        Passed - Serum creatinine on file in past 12 months     Recent Labs   Lab Test 07/06/22  1545   CR 0.98       Ok to refill medication if creatinine is low          Passed - HgbA1C in past 3 or 6 months     If HgbA1C is 8 or greater, it needs to be on file within the past 3 months.  If less than 8, must be on file within the past 6 months.     Recent Labs   Lab Test 09/01/22  1017 01/08/16  1411 06/08/15  0000   A1C 7.8*   < >  --    HEMOGLOBINA1  --   --  11.0*    < > = values in this interval not displayed.             Passed - Medication is active on med list        Passed - Patient is age 18 or older        Passed - Recent (6 mo) or future (30 days) visit within the authorizing provider's specialty     Patient had office visit in the last 6 months or has a visit in the next 30 days with authorizing provider or within the authorizing provider's specialty.  See \"Patient Info\" tab in inbasket, or \"Choose Columns\" in Meds & Orders section of the refill encounter.               Refill sent  Kylah Foster RN    "

## 2022-12-05 ENCOUNTER — THERAPY VISIT (OUTPATIENT)
Dept: OCCUPATIONAL THERAPY | Facility: CLINIC | Age: 62
End: 2022-12-05
Payer: COMMERCIAL

## 2022-12-05 DIAGNOSIS — M79.641 PAIN OF RIGHT HAND: Primary | ICD-10-CM

## 2022-12-05 PROCEDURE — 97110 THERAPEUTIC EXERCISES: CPT | Mod: GO

## 2022-12-05 NOTE — PROGRESS NOTES
Hand Therapy Discharge Note    Current Date:  12/5/2022  Diagnosis: Fibromyalgia  DOI: Chronic   Post: 1990    Reporting period is 10/12/2022 to 12/5/2022    Subjective:   Subjective changes noted by patient:  I don't know if I overused it this weekend with cooking, vacuuming, or taking care of the dogs.  I think I need more treatment that hand therapy. I have an appointment with my medical doctor on Thursday and I'm going to ask about other treatment options. I really have only seen an improvement with how long I can go before getting pain when doing an activity. I still get intense pain, numbness, tingling, and it's difficult for me to do most activities.   Functional changes noted by patient:  Improvement in lasting longer during tasks before feeling symptoms.   Patient has noted adverse reaction to:  None    Functional Outcome Measure:  Upper Extremity Functional Index Score:  SCORE:   Column Totals: /80: 36   (A lower score indicates greater disability.)    Objective:  Pain Level (Scale 0-10)   10/12/2022 12/5/2022   At Rest 8/10 7/10   With Use 8/10 9/10     Pain Description  Date 10/12/2022 12/5/2022   Location Traces median nerve distribution  Traces median nerve distribution    Pain Quality Aching, Burning, Dull, Gnawing, Miserable, Nagging, Numb, Penetrating, Pressure, Sharp, Shooting, Stabbing, Tender, Throbbing, Tingling, Tiring and Unbearable Aching, Burning, Dull, Gnawing, Miserable, Nagging, Numb, Penetrating, Pressure, Sharp, Shooting, Stabbing, Tender, Throbbing, Tingling, Tiring and Unbearable   Frequency intermittent   intermittent   Pain is worst  with overuse With use   Exacerbated by  heavy use  heavy use    Relieved by cold and IBprophen jell  Icing, immobilization   Progression Becoming worse Unchanged     Edema  10/12/2022: Moderate over dorsal wrist volar forearm   12/5/2022: Moderate over dorsal wrist volar forearm     Sensation   10/12/2022: Decreased Median Nerve distribution per pt  report    ROM   WNL    Strength   (Measured in pounds)  Pain Report: - none  + mild    ++ moderate    +++ severe    10/12/2022 10/12/2022 12/5/2022   Trials R L R   1  2  3 57 75 57   Average 57 75 57     Lat Pinch 10/12/2022 10/12/2022 12/5/2022   Trials R L R   1  2  3 12 18 13   Average 12 18 13     3 Pt Pinch 10/12/2022 10/12/2022 12/5/2022   Trials R L R   1  2  3 12 18 10   Average 12 18 10     Assessment:  Response to therapy has been lack of progress in:  Strength:   and pinch  Edema:  unchanged  Pain:  Intensity is unchanged  Paresthesias:  Median nerve - unchanged    Overall Assessment:  No change of symptoms has been noted.  Patient would benefit from further evaluation of symptoms with their referring provider.  STG/LTG:  STGoals have been reviewed and progress or achievement has occurred;  see goal sheet for details and updates.    Plan:  Patient reports they would like to discharge from hand therapy to discuss other treatment options with their referring provider. They will independently manage their HEP.    Home Exercise Program:  Graded Motor Imagery  Education Sheet General  Arm resting down at side. ONLY QUARTER TURN of palm rotation away from leg. SLOW!!! NO PAIN

## 2022-12-06 ENCOUNTER — ALLIED HEALTH/NURSE VISIT (OUTPATIENT)
Dept: EDUCATION SERVICES | Facility: CLINIC | Age: 62
End: 2022-12-06
Payer: COMMERCIAL

## 2022-12-06 DIAGNOSIS — E11.65 TYPE 2 DIABETES MELLITUS WITH HYPERGLYCEMIA, WITH LONG-TERM CURRENT USE OF INSULIN (H): Primary | ICD-10-CM

## 2022-12-06 DIAGNOSIS — Z79.4 TYPE 2 DIABETES MELLITUS WITH HYPERGLYCEMIA, WITH LONG-TERM CURRENT USE OF INSULIN (H): Primary | ICD-10-CM

## 2022-12-06 PROCEDURE — G0108 DIAB MANAGE TRN  PER INDIV: HCPCS

## 2022-12-06 NOTE — TELEPHONE ENCOUNTER
Maricarmen Medrano and I were able to discuss the omnipod 5 system in more detail today and she would like to move forward with it at this time. I have pended the Rx's for you to approve if you agree with plan.    Thanks so much!    Ashley Emery RN, [facility]

## 2022-12-06 NOTE — PROGRESS NOTES
Diabetes Self-Management Education & Support    Presents for: Insulin Pump and CGM Review    Type of Service: In Person Visit    Assessment Type:   REPORTS:                      Insulin Pump Information  Insulin Pump Brand: OmniPod    Education specific to insulin pump provided today on:   Upgrading to the OmniPod 5 Insulin Pump and DexCom G6 system.    Opportunities for ongoing education and support in diabetes-self management were discussed.    Pt verbalized understanding of concepts discussed and recommendations provided today.       Continue education with the following diabetes management concepts: Monitoring, Taking Medication, Problem Solving and Reducing Risks    Pump Education Materials: OmniPod 5 system instructions for what to do when the intro kit arrives.    ASSESSMENT  I met with Maricarmen today to review her pump and CGM data and discuss upgrading to the omnipod 5 system. She still struggles with binge eating late at night. She also notes that she sometimes puts in more carbs to get more insulin. She has put in up to twice the amount she is actually eating and has still not seemed like enough. We will try a crab ratio adjustment today. She is ready to move forward with the omnipod 5 system at this time and plans to donate her extra omnipod dash pods. We reviewed the benefits the omnipod 5 system will afford her, especially with basal insulin.  Glucose Patterns & Trends:  BG average has risen since our last visit. High after eating with difficulty bringing it down after.    Patient would benefit from increase in basal rate all day/night to BASAL pattern #3 and decrease in carbohydrate ratio.    Changes made to pump settings:  basal rate: Basal Pattern #3 activated  carb ratio: 10 -> 8    Changes made to sensor settings:   None    PLAN  Ashley will request the prescriptions for the omnipod 5 system and dexcom G6 from Dr. Hightower today.   Can complete self training program through OmniPod and/or meet with  "a pump . Follow-up planned with Ashley.  Switch to basal program 3  Carb ratio-  12am 10 -> 8    Topics to cover at upcoming visits: Monitoring, Taking Medication, Problem Solving and Reducing Risks    Follow-up: January 19, 2023    See Care Plan for co-developed, patient-state behavior change goals.  AVS provided for patient today.    Education Materials Provided:  Instruction sheet for what to do when you get your omnipod 5 intro kit.      SUBJECTIVE/OBJECTIVE:  Presents for: Insulin Pump and CGM Review  Accompanied by: Self  Diabetes education in the past 24mo: Yes  Focus of Visit: Assistance w/ making life changes, CGM, Insulin Pump  Type of Pump visit: Pump Review  Type of CGM visit: Personal CGM Follow-up  Diabetes type: Type 2  Disease course: Getting harder to manage  How confident are you filling out medical forms by yourself:: Somewhat  Diabetes management related comments/concerns: help lower A1C  Other concerns:: Cognitive impairment  Cultural Influences/Ethnic Background:   or       Diabetes Symptoms & Complications:  Fatigue: Yes  Neuropathy: Yes  Polydipsia: Yes  Polyphagia: Sometimes  Polyuria: Yes  Visual change: Yes  Slow healing wounds: Yes  Complications assessed today?: No  Autonomic neuropathy: Yes  CVA: No  Heart disease: Yes  Nephropathy: Yes  Peripheral neuropathy: Yes  Peripheral Vascular Disease: No  Retinopathy: No  Sexual dysfunction: Yes    Patient Problem List and Family Medical History reviewed for relevant medical history, current medical status, and diabetes risk factors.    Vitals:  [facility] 01/01/1999   Estimated body mass index is 32.29 kg/m  as calculated from the following:    Height as of 11/21/22: 1.626 m (5' 4\").    Weight as of 11/21/22: 85.3 kg (188 lb 1.6 oz).   Last 3 BP:   BP Readings from Last 3 Encounters:   11/21/22 136/80   10/06/22 (!) 174/75   09/29/22 (!) 156/80       History   Smoking Status     Never   Smokeless Tobacco     Never "       Labs:  Lab Results   Component Value Date    A1C 7.8 09/01/2022    A1C 7.4 09/18/2020    HEMOGLOBINA1 11.0 06/08/2015     Lab Results   Component Value Date     07/06/2022     01/20/2021     Lab Results   Component Value Date     09/01/2022     09/18/2020     HDL Cholesterol   Date Value Ref Range Status   09/18/2020 51 >49 mg/dL Final     Direct Measure HDL   Date Value Ref Range Status   09/01/2022 52 >=50 mg/dL Final   ]  GFR Estimate   Date Value Ref Range Status   07/06/2022 66 >60 mL/min/1.73m2 Final     Comment:     Effective December 21, 2021 eGFRcr in adults is calculated using the 2021 CKD-EPI creatinine equation which includes age and gender (Jose E et al., NEJM, DOI: 10.1056/UBKEaw7417282)   01/20/2021 84 >60 mL/min/[1.73_m2] Final     Comment:     Non  GFR Calc  Starting 12/18/2018, serum creatinine based estimated GFR (eGFR) will be   calculated using the Chronic Kidney Disease Epidemiology Collaboration   (CKD-EPI) equation.       GFR Estimate If Black   Date Value Ref Range Status   01/20/2021 >90 >60 mL/min/[1.73_m2] Final     Comment:      GFR Calc  Starting 12/18/2018, serum creatinine based estimated GFR (eGFR) will be   calculated using the Chronic Kidney Disease Epidemiology Collaboration   (CKD-EPI) equation.       Lab Results   Component Value Date    CR 0.98 07/06/2022    CR 0.77 01/20/2021     No results found for: MICROALBUMIN    Healthy Eating:  Healthy Eating Assessed Today: Yes (Pt reports still struggling with binge eating late at night.)  Cultural/Islam diet restrictions?: No  Meal planning/habits: Avoiding sweets, Carb counting, Low carb, Heart healthy, Low salt, Smaller portions (Pt reports binge eating disorder, usually happens at night.)  How many times a week on average do you eat food made away from home (restaurant/take-out)?: 2  Meals include: Breakfast, Lunch, Dinner, Evening Snack  Beverages: Water  Has  patient met with a dietitian in the past?: Yes    Being Active:  Being Active Assessed Today: No  Exercise:: Yes  Days per week of moderate to strenuous exercise (like a brisk walk): 6  On average, minutes per day of exercise at this level: 150 or greater  How intense was your typical exercise? : Moderate (like brisk walking)  Exercise Minutes per Week: 900  Barrier to exercise: Time, Physical limitation    Monitoring:  Monitoring Assessed Today: Yes  Did patient bring glucose meter to appointment? : Yes  Blood Glucose Meter: CGM  Times checking blood sugar at home (number): 5+  Times checking blood sugar at home (per): Day  Blood glucose trend: Fluctuating        Taking Medications:  Diabetes Medication(s)     Insulin       NOVOLOG VIAL 100 UNIT/ML soln    USE IN INSULIN PUMP, TOTAL DAILY DOSE  UNITS    Sodium-Glucose Co-Transporter 2 (SGLT2) Inhibitors       dapagliflozin (FARXIGA) 5 MG TABS tablet    Take 1 tablet (5 mg) by mouth daily          Taking Medication Assessed Today: Yes  Current Treatments: Diet, Insulin Pump, Oral Medication (taken by mouth)  Problems taking diabetes medications regularly?: No  Diabetes medication side effects?: No    Problem Solving:  Problem Solving Assessed Today: No  Is the patient at risk for hypoglycemia?: Yes  Hypoglycemia Frequency: Rarely              Reducing Risks:  Reducing Risks Assessed Today: No  CAD Risks: Diabetes Mellitus, Dyslipidemia, Family history, Hypertension, Obesity, Post-menopausal, Stress  Has dilated eye exam at least once a year?: Yes  Sees dentist every 6 months?: Yes  Feet checked by healthcare provider in the last year?: No    Healthy Coping:  Healthy Coping Assessed Today: Yes  Emotional response to diabetes: Ready to learn, Concern for health and well-being  Informal Support system:: Family, Spouse  Stage of change: ACTION (Actively working towards change)  Patient Activation Measure Survey Score:  JEFFERY Score (Last Two) 10/18/2013 4/22/2020    JEFFERY Raw Score 42 28   Activation Score 66 50   JEFFERY Level 3 2         Care Plan and Education Provided:  Care Plan: Diabetes   Updates made by Ashley Emery RN since 12/6/2022 12:00 AM      Problem: HbA1C Not In Goal       Goal: Get HbA1C Level in Goal    This Visit's Progress: 0%   Recent Progress: 0%   Note:    Maricarmen will get her A1C under 7%.     Problem: Diabetes Self-Management Education Needed to Optimize Self-Care Behaviors       Goal: Taking Medication - patient is consistently taking medications as directed       Task: Discuss barriers to medication adherence with patient and provide management technique ideas as appropriate Completed 12/6/2022   Responsible User: Ashley Emery, PALOMA Emery RN, [facility]      Time Spent: 60 minutes  Encounter Type: Individual    Any diabetes medication dose changes were made via the CDE Protocol per the patient's endocrinology provider. A copy of this encounter was shared with the provider.

## 2022-12-06 NOTE — PATIENT INSTRUCTIONS
Donation options for Pods-  [email or web address]  Insulin for Life      Ashley will request the prescriptions for the omnipod 5 system from Dr. Hightower today. I will send the prescriptions to [facility]/Mail order pharmacy. If you have not hear from [facility] in 1 week please reach out to them at [phone] to check on the status.    Plan:  Switch to basal program 3  Carb ratio-  12am 10 -> 8

## 2022-12-06 NOTE — LETTER
12/6/2022         RE: Maricarmen Dotson  [address]  [place] [address]        Dear Colleague,    Thank you for referring your patient, Maricarmen Dotson, to the [facility]. Please see a copy of my visit note below.      Diabetes Self-Management Education & Support    Presents for: Insulin Pump and CGM Review    Type of Service: In Person Visit    Assessment Type:   REPORTS:                      Insulin Pump Information  Insulin Pump Brand: OmniPod    Education specific to insulin pump provided today on:   Upgrading to the OmniPod 5 Insulin Pump and DexCom G6 system.    Opportunities for ongoing education and support in diabetes-self management were discussed.    Pt verbalized understanding of concepts discussed and recommendations provided today.       Continue education with the following diabetes management concepts: Monitoring, Taking Medication, Problem Solving and Reducing Risks    Pump Education Materials: OmniPod 5 system instructions for what to do when the intro kit arrives.    ASSESSMENT  I met with Maricarmen escalante to review her pump and CGM data and discuss upgrading to the omnipod 5 system. She still struggles with binge eating late at night. She also notes that she sometimes puts in more carbs to get more insulin. She has put in up to twice the amount she is actually eating and has still not seemed like enough. We will try a crab ratio adjustment today. She is ready to move forward with the omnipod 5 system at this time and plans to donate her extra omnipod dash pods. We reviewed the benefits the omnipod 5 system will afford her, especially with basal insulin.  Glucose Patterns & Trends:  BG average has risen since our last visit. High after eating with difficulty bringing it down after.    Patient would benefit from increase in basal rate all day/night to BASAL pattern #3 and decrease in carbohydrate ratio.    Changes made to pump settings:  basal rate: Basal Pattern #3  activated  carb ratio: 10 -> 8    Changes made to sensor settings:   None    PLAN  Ashley will request the prescriptions for the omnipod 5 system and dexcom G6 from Dr. Hightower today.   Can complete self training program through OmniPod and/or meet with a pump . Follow-up planned with Ashley.  Switch to basal program 3  Carb ratio-  12am 10 -> 8    Topics to cover at upcoming visits: Monitoring, Taking Medication, Problem Solving and Reducing Risks    Follow-up: January 19, 2023    See Care Plan for co-developed, patient-state behavior change goals.  AVS provided for patient today.    Education Materials Provided:  Instruction sheet for what to do when you get your omnipod 5 intro kit.      SUBJECTIVE/OBJECTIVE:  Presents for: Insulin Pump and CGM Review  Accompanied by: Self  Diabetes education in the past 24mo: Yes  Focus of Visit: Assistance w/ making life changes, CGM, Insulin Pump  Type of Pump visit: Pump Review  Type of CGM visit: Personal CGM Follow-up  Diabetes type: Type 2  Disease course: Getting harder to manage  How confident are you filling out medical forms by yourself:: Somewhat  Diabetes management related comments/concerns: help lower A1C  Other concerns:: Cognitive impairment  Cultural Influences/Ethnic Background:   or       Diabetes Symptoms & Complications:  Fatigue: Yes  Neuropathy: Yes  Polydipsia: Yes  Polyphagia: Sometimes  Polyuria: Yes  Visual change: Yes  Slow healing wounds: Yes  Complications assessed today?: No  Autonomic neuropathy: Yes  CVA: No  Heart disease: Yes  Nephropathy: Yes  Peripheral neuropathy: Yes  Peripheral Vascular Disease: No  Retinopathy: No  Sexual dysfunction: Yes    Patient Problem List and Family Medical History reviewed for relevant medical history, current medical status, and diabetes risk factors.    Vitals:  [facility] 01/01/1999   Estimated body mass index is 32.29 kg/m  as calculated from the following:    Height as of 11/21/22: 1.626 m (5'  "4\").    Weight as of 11/21/22: 85.3 kg (188 lb 1.6 oz).   Last 3 BP:   BP Readings from Last 3 Encounters:   11/21/22 136/80   10/06/22 (!) 174/75   09/29/22 (!) 156/80       History   Smoking Status     Never   Smokeless Tobacco     Never       Labs:  Lab Results   Component Value Date    A1C 7.8 09/01/2022    A1C 7.4 09/18/2020    HEMOGLOBINA1 11.0 06/08/2015     Lab Results   Component Value Date     07/06/2022     01/20/2021     Lab Results   Component Value Date     09/01/2022     09/18/2020     HDL Cholesterol   Date Value Ref Range Status   09/18/2020 51 >49 mg/dL Final     Direct Measure HDL   Date Value Ref Range Status   09/01/2022 52 >=50 mg/dL Final   ]  GFR Estimate   Date Value Ref Range Status   07/06/2022 66 >60 mL/min/1.73m2 Final     Comment:     Effective December 21, 2021 eGFRcr in adults is calculated using the 2021 CKD-EPI creatinine equation which includes age and gender (Jose E et al., NEJM, DOI: 10.1056/OQKCpq9746649)   01/20/2021 84 >60 mL/min/[1.73_m2] Final     Comment:     Non  GFR Calc  Starting 12/18/2018, serum creatinine based estimated GFR (eGFR) will be   calculated using the Chronic Kidney Disease Epidemiology Collaboration   (CKD-EPI) equation.       GFR Estimate If Black   Date Value Ref Range Status   01/20/2021 >90 >60 mL/min/[1.73_m2] Final     Comment:      GFR Calc  Starting 12/18/2018, serum creatinine based estimated GFR (eGFR) will be   calculated using the Chronic Kidney Disease Epidemiology Collaboration   (CKD-EPI) equation.       Lab Results   Component Value Date    CR 0.98 07/06/2022    CR 0.77 01/20/2021     No results found for: MICROALBUMIN    Healthy Eating:  Healthy Eating Assessed Today: Yes (Pt reports still struggling with binge eating late at night.)  Cultural/Lutheran diet restrictions?: No  Meal planning/habits: Avoiding sweets, Carb counting, Low carb, Heart healthy, Low salt, Smaller portions " (Pt reports binge eating disorder, usually happens at night.)  How many times a week on average do you eat food made away from home (restaurant/take-out)?: 2  Meals include: Breakfast, Lunch, Dinner, Evening Snack  Beverages: Water  Has patient met with a dietitian in the past?: Yes    Being Active:  Being Active Assessed Today: No  Exercise:: Yes  Days per week of moderate to strenuous exercise (like a brisk walk): 6  On average, minutes per day of exercise at this level: 150 or greater  How intense was your typical exercise? : Moderate (like brisk walking)  Exercise Minutes per Week: 900  Barrier to exercise: Time, Physical limitation    Monitoring:  Monitoring Assessed Today: Yes  Did patient bring glucose meter to appointment? : Yes  Blood Glucose Meter: CGM  Times checking blood sugar at home (number): 5+  Times checking blood sugar at home (per): Day  Blood glucose trend: Fluctuating        Taking Medications:  Diabetes Medication(s)     Insulin       NOVOLOG VIAL 100 UNIT/ML soln    USE IN INSULIN PUMP, TOTAL DAILY DOSE  UNITS    Sodium-Glucose Co-Transporter 2 (SGLT2) Inhibitors       dapagliflozin (FARXIGA) 5 MG TABS tablet    Take 1 tablet (5 mg) by mouth daily          Taking Medication Assessed Today: Yes  Current Treatments: Diet, Insulin Pump, Oral Medication (taken by mouth)  Problems taking diabetes medications regularly?: No  Diabetes medication side effects?: No    Problem Solving:  Problem Solving Assessed Today: No  Is the patient at risk for hypoglycemia?: Yes  Hypoglycemia Frequency: Rarely              Reducing Risks:  Reducing Risks Assessed Today: No  CAD Risks: Diabetes Mellitus, Dyslipidemia, Family history, Hypertension, Obesity, Post-menopausal, Stress  Has dilated eye exam at least once a year?: Yes  Sees dentist every 6 months?: Yes  Feet checked by healthcare provider in the last year?: No    Healthy Coping:  Healthy Coping Assessed Today: Yes  Emotional response to diabetes:  Ready to learn, Concern for health and well-being  Informal Support system:: Family, Spouse  Stage of change: ACTION (Actively working towards change)  Patient Activation Measure Survey Score:  JEFFERY Score (Last Two) 10/18/2013 4/22/2020   JEFFERY Raw Score 42 28   Activation Score 66 50   JEFFERY Level 3 2         Care Plan and Education Provided:  Care Plan: Diabetes   Updates made by Ashley Emery RN since 12/6/2022 12:00 AM      Problem: HbA1C Not In Goal       Goal: Get HbA1C Level in Goal    This Visit's Progress: 0%   Recent Progress: 0%   Note:    Maricarmen will get her A1C under 7%.     Problem: Diabetes Self-Management Education Needed to Optimize Self-Care Behaviors       Goal: Taking Medication - patient is consistently taking medications as directed       Task: Discuss barriers to medication adherence with patient and provide management technique ideas as appropriate Completed 12/6/2022   Responsible User: Ashley Emery, PALOMA Emery RN, [facility]      Time Spent: 60 minutes  Encounter Type: Individual    Any diabetes medication dose changes were made via the CDE Protocol per the patient's endocrinology provider. A copy of this encounter was shared with the provider.                 Intake appointment on Tues. 10/17/17 at 10:30AM with Elif Wagner LMSW

## 2022-12-06 NOTE — LETTER
12/6/2022         RE: Maricarmen Dotson  [address]  [place] [address]        Dear Colleague,    Thank you for referring your patient, Maricarmen Dotson, to the [facility]. Please see a copy of my visit note below.    Lab Results   Component Value Date    A1C 7.8 09/01/2022    A1C 8.7 05/20/2022    A1C 8.2 11/23/2021    A1C 7.4 09/18/2020    A1C 8.0 02/27/2020    A1C 8.0 10/28/2019    A1C 8.2 07/22/2019    A1C 8.5 04/18/2019     Diabetes Medication(s)     Insulin       NOVOLOG VIAL 100 UNIT/ML soln    USE IN INSULIN PUMP, TOTAL DAILY DOSE  UNITS    Sodium-Glucose Co-Transporter 2 (SGLT2) Inhibitors       dapagliflozin (FARXIGA) 5 MG TABS tablet    Take 1 tablet (5 mg) by mouth daily        omnipod 5??    Stocked up on omnipod supply ( has 165 pods left at home)    Pend prescriptions for omnipod 5 and dexcom G6    Diabetes Self-Management Education & Support    Presents for: Insulin Pump and CGM Review    Type of Service: In Person Visit    Assessment Type:   REPORTS:                      Insulin Pump Information  Insulin Pump Brand: OmniPod    Education specific to insulin pump provided today on:   Upgrading to the OmniPod 5 Insulin Pump and DexCom G6 system.    Opportunities for ongoing education and support in diabetes-self management were discussed.    Pt verbalized understanding of concepts discussed and recommendations provided today.       Continue education with the following diabetes management concepts: Monitoring, Taking Medication, Problem Solving and Reducing Risks    Pump Education Materials: OmniPod 5 system instructions for what to do when the intro kit arrives.    ASSESSMENT  I met with Maricarmen boris to review her pump and CGM data and discuss upgrading to the omnipod 5 system. She still struggles with binge eating late at night. She also notes that she sometimes puts in more carbs to get more insulin. She has put in up to twice the amount she is actually eating and  "has still not seemed like enough. We will try a crab ratio adjustment today. She is ready to move forward with the omnipod 5 system at this time and plans to donate her extra omnipod dash pods.  Glucose Patterns & Trends:  ***    Patient would benefit from {change to pump settings:096841}.    Changes made to pump settings:  {pump setting changes:044449}    Changes made to sensor settings:   {Glucose Sensor Settings:356536}    PLAN    ***  Topics to cover at upcoming visits: {AADE 7:012093}    Follow-up: ***    See Care Plan for co-developed, patient-state behavior change goals.  AVS provided for patient today.    Education Materials Provided:  {CDE EDUCATION MATERIALS:942192}      SUBJECTIVE/OBJECTIVE:  Presents for: Insulin Pump and CGM Review  Accompanied by: Self  Diabetes education in the past 24mo: Yes  Focus of Visit: Assistance w/ making life changes, CGM, Insulin Pump  Type of Pump visit: Pump Review  Type of CGM visit: Personal CGM Follow-up  Diabetes type: Type 2  Disease course: Getting harder to manage  How confident are you filling out medical forms by yourself:: Somewhat  Diabetes management related comments/concerns: help lower A1C  Other concerns:: Cognitive impairment  Cultural Influences/Ethnic Background:   or   ***    Diabetes Symptoms & Complications:  Fatigue: Yes  Neuropathy: Yes  Polydipsia: Yes  Polyphagia: Sometimes  Polyuria: Yes  Visual change: Yes  Slow healing wounds: Yes  Complications assessed today?: No  Autonomic neuropathy: Yes  CVA: No  Heart disease: Yes  Nephropathy: Yes  Peripheral neuropathy: Yes  Peripheral Vascular Disease: No  Retinopathy: No  Sexual dysfunction: Yes    Patient Problem List and Family Medical History reviewed for relevant medical history, current medical status, and diabetes risk factors.    Vitals:  LMP 01/01/1999   Estimated body mass index is 32.29 kg/m  as calculated from the following:    Height as of 11/21/22: 1.626 m (5' 4\").    Weight " as of 11/21/22: 85.3 kg (188 lb 1.6 oz).   Last 3 BP:   BP Readings from Last 3 Encounters:   11/21/22 136/80   10/06/22 (!) 174/75   09/29/22 (!) 156/80       History   Smoking Status     Never   Smokeless Tobacco     Never       Labs:  Lab Results   Component Value Date    A1C 7.8 09/01/2022    A1C 7.4 09/18/2020    HEMOGLOBINA1 11.0 06/08/2015     Lab Results   Component Value Date     07/06/2022     01/20/2021     Lab Results   Component Value Date     09/01/2022     09/18/2020     HDL Cholesterol   Date Value Ref Range Status   09/18/2020 51 >49 mg/dL Final     Direct Measure HDL   Date Value Ref Range Status   09/01/2022 52 >=50 mg/dL Final   ]  GFR Estimate   Date Value Ref Range Status   07/06/2022 66 >60 mL/min/1.73m2 Final     Comment:     Effective December 21, 2021 eGFRcr in adults is calculated using the 2021 CKD-EPI creatinine equation which includes age and gender (Jose E thompson al., NEJ, DOI: 10.1056/PUPFtg8934313)   01/20/2021 84 >60 mL/min/[1.73_m2] Final     Comment:     Non  GFR Calc  Starting 12/18/2018, serum creatinine based estimated GFR (eGFR) will be   calculated using the Chronic Kidney Disease Epidemiology Collaboration   (CKD-EPI) equation.       GFR Estimate If Black   Date Value Ref Range Status   01/20/2021 >90 >60 mL/min/[1.73_m2] Final     Comment:      GFR Calc  Starting 12/18/2018, serum creatinine based estimated GFR (eGFR) will be   calculated using the Chronic Kidney Disease Epidemiology Collaboration   (CKD-EPI) equation.       Lab Results   Component Value Date    CR 0.98 07/06/2022    CR 0.77 01/20/2021     No results found for: MICROALBUMIN    Healthy Eating:  Healthy Eating Assessed Today: Yes (Pt reports still struggling with binge eating late at night.)  Cultural/[employer] diet restrictions?: No  Meal planning/habits: Avoiding sweets, Carb counting, Low carb, Heart healthy, Low salt, Smaller portions (Pt reports  binge eating disorder, usually happens at night.)  How many times a week on average do you eat food made away from home (restaurant/take-out)?: 2  Meals include: Breakfast, Lunch, Dinner, Evening Snack  Beverages: Water  Has patient met with a dietitian in the past?: Yes    Being Active:  Being Active Assessed Today: No  Exercise:: Yes  Days per week of moderate to strenuous exercise (like a brisk walk): 6  On average, minutes per day of exercise at this level: 150 or greater  How intense was your typical exercise? : Moderate (like brisk walking)  Exercise Minutes per Week: 900  Barrier to exercise: Time, Physical limitation    Monitoring:  Monitoring Assessed Today: Yes  Did patient bring glucose meter to appointment? : Yes  Blood Glucose Meter: CGM  Times checking blood sugar at home (number): 5+  Times checking blood sugar at home (per): Day  Blood glucose trend: Fluctuating    ***    Taking Medications:  Diabetes Medication(s)     Insulin       NOVOLOG VIAL 100 UNIT/ML soln    USE IN INSULIN PUMP, TOTAL DAILY DOSE  UNITS    Sodium-Glucose Co-Transporter 2 (SGLT2) Inhibitors       dapagliflozin (FARXIGA) 5 MG TABS tablet    Take 1 tablet (5 mg) by mouth daily          Taking Medication Assessed Today: Yes  Current Treatments: Diet, Insulin Pump, Oral Medication (taken by mouth)  Problems taking diabetes medications regularly?: No  Diabetes medication side effects?: No    Problem Solving:  Problem Solving Assessed Today: No  Is the patient at risk for hypoglycemia?: Yes  Hypoglycemia Frequency: Rarely              Reducing Risks:  Reducing Risks Assessed Today: No  CAD Risks: Diabetes Mellitus, Dyslipidemia, Family history, Hypertension, Obesity, Post-menopausal, Stress  Has dilated eye exam at least once a year?: Yes  Sees dentist every 6 months?: Yes  Feet checked by healthcare provider in the last year?: No    Healthy Coping:  Healthy Coping Assessed Today: Yes  Emotional response to diabetes: Ready to  learn, Concern for health and well-being  Informal Support system:: Family, Spouse  Stage of change: ACTION (Actively working towards change)  Patient Activation Measure Survey Score:  JEFFERY Score (Last Two) 10/18/2013 4/22/2020   JEFFERY Raw Score 42 28   Activation Score 66 50   JEFFERY Level 3 2         Care Plan and Education Provided:  Care Plan: Diabetes   Updates made by Ashley Emery RN since 12/6/2022 12:00 AM      Problem: HbA1C Not In Goal       Goal: Get HbA1C Level in Goal    This Visit's Progress: 0%   Recent Progress: 0%   Note:    Maricarmen will get her A1C under 7%.     Problem: Diabetes Self-Management Education Needed to Optimize Self-Care Behaviors       Goal: Taking Medication - patient is consistently taking medications as directed       Task: Discuss barriers to medication adherence with patient and provide management technique ideas as appropriate Completed 12/6/2022   Responsible User: Ashley Emery, PALOMA Emery RN, [facility]      Time Spent: 60 minutes  Encounter Type: Individual    Any diabetes medication dose changes were made via the CDE Protocol per the patient's endocrinology provider. A copy of this encounter was shared with the provider.

## 2022-12-07 RX ORDER — INSULIN PMP CART,AUT,G6/7,CNTR
1 EACH SUBCUTANEOUS ONCE
Qty: 1 KIT | Refills: 0 | Status: SHIPPED | OUTPATIENT
Start: 2022-12-07 | End: 2022-12-07

## 2022-12-07 RX ORDER — PROCHLORPERAZINE 25 MG/1
SUPPOSITORY RECTAL
Qty: 9 EACH | Refills: 3 | Status: SHIPPED | OUTPATIENT
Start: 2022-12-07 | End: 2023-03-08

## 2022-12-07 RX ORDER — INSULIN PMP CART,AUT,G6/7,CNTR
1 EACH SUBCUTANEOUS
Qty: 45 EACH | Refills: 3 | Status: SHIPPED | OUTPATIENT
Start: 2022-12-07 | End: 2023-03-08

## 2022-12-07 RX ORDER — PROCHLORPERAZINE 25 MG/1
SUPPOSITORY RECTAL
Qty: 1 EACH | Refills: 3 | Status: SHIPPED | OUTPATIENT
Start: 2022-12-07 | End: 2023-03-08

## 2022-12-08 ENCOUNTER — OFFICE VISIT (OUTPATIENT)
Dept: PEDIATRICS | Facility: CLINIC | Age: 62
End: 2022-12-08
Payer: COMMERCIAL

## 2022-12-08 VITALS
HEIGHT: 64 IN | OXYGEN SATURATION: 97 % | WEIGHT: 188 LBS | RESPIRATION RATE: 14 BRPM | SYSTOLIC BLOOD PRESSURE: 140 MMHG | HEART RATE: 57 BPM | DIASTOLIC BLOOD PRESSURE: 60 MMHG | BODY MASS INDEX: 32.1 KG/M2

## 2022-12-08 DIAGNOSIS — M79.641 PAIN OF RIGHT HAND: Primary | ICD-10-CM

## 2022-12-08 DIAGNOSIS — I10 HYPERTENSION GOAL BP (BLOOD PRESSURE) < 140/90: ICD-10-CM

## 2022-12-08 PROCEDURE — 99215 OFFICE O/P EST HI 40 MIN: CPT | Performed by: INTERNAL MEDICINE

## 2022-12-08 RX ORDER — BACLOFEN 20 MG
1 TABLET ORAL 2 TIMES DAILY
COMMUNITY
End: 2023-07-31

## 2022-12-08 ASSESSMENT — ANXIETY QUESTIONNAIRES
1. FEELING NERVOUS, ANXIOUS, OR ON EDGE: NEARLY EVERY DAY
7. FEELING AFRAID AS IF SOMETHING AWFUL MIGHT HAPPEN: MORE THAN HALF THE DAYS
GAD7 TOTAL SCORE: 16
6. BECOMING EASILY ANNOYED OR IRRITABLE: NEARLY EVERY DAY
8. IF YOU CHECKED OFF ANY PROBLEMS, HOW DIFFICULT HAVE THESE MADE IT FOR YOU TO DO YOUR WORK, TAKE CARE OF THINGS AT HOME, OR GET ALONG WITH OTHER PEOPLE?: SOMEWHAT DIFFICULT
GAD7 TOTAL SCORE: 16
2. NOT BEING ABLE TO STOP OR CONTROL WORRYING: MORE THAN HALF THE DAYS
5. BEING SO RESTLESS THAT IT IS HARD TO SIT STILL: MORE THAN HALF THE DAYS
GAD7 TOTAL SCORE: 16
3. WORRYING TOO MUCH ABOUT DIFFERENT THINGS: MORE THAN HALF THE DAYS
IF YOU CHECKED OFF ANY PROBLEMS ON THIS QUESTIONNAIRE, HOW DIFFICULT HAVE THESE PROBLEMS MADE IT FOR YOU TO DO YOUR WORK, TAKE CARE OF THINGS AT HOME, OR GET ALONG WITH OTHER PEOPLE: SOMEWHAT DIFFICULT
7. FEELING AFRAID AS IF SOMETHING AWFUL MIGHT HAPPEN: MORE THAN HALF THE DAYS
4. TROUBLE RELAXING: MORE THAN HALF THE DAYS

## 2022-12-08 ASSESSMENT — PATIENT HEALTH QUESTIONNAIRE - PHQ9
SUM OF ALL RESPONSES TO PHQ QUESTIONS 1-9: 17
SUM OF ALL RESPONSES TO PHQ QUESTIONS 1-9: 17
10. IF YOU CHECKED OFF ANY PROBLEMS, HOW DIFFICULT HAVE THESE PROBLEMS MADE IT FOR YOU TO DO YOUR WORK, TAKE CARE OF THINGS AT HOME, OR GET ALONG WITH OTHER PEOPLE: VERY DIFFICULT

## 2022-12-08 NOTE — PATIENT INSTRUCTIONS
Follow-up with neurology to discuss possible EMG of the right hand - I wonder of some of your symptoms are nerve related.    Consider taking CBD or medical marijuana the morning after a bad night to see if this helps you ease into the day. Also ask pharmacist about hips pain with the medical marijuana.

## 2022-12-08 NOTE — TELEPHONE ENCOUNTER
Message noted.  I agree with plan for patient to start the Omnipod 5 and DexcomG6.  If her insulin requirements are high, she should also consider future option using off-label Humalog U200 insulin (from pens) in the pods.    I have signed/sent these Omnipod 5 and DexcomG6 Rx's to [facility].  Thanks.    JEAN PIERRE Hightower MD, MS  Endocrinology  [facility]

## 2022-12-08 NOTE — TELEPHONE ENCOUNTER
Thanks so much Dr. Hightower! Sounds like a plan.    Ashley Emery RN, [facility]

## 2022-12-08 NOTE — PROGRESS NOTES
"  Assessment & Plan     Pain of right hand  Seems somewhat neuropathic in nature. Unclear that this is a carpal tunnel picture as all fingers and her hand are affected, but description of numbness and pain seem nerve related. Will refer to neuro and consider EMG.   - Adult Neurology  Referral; Future    Hypertension goal BP (blood pressure) < 140/90  Long discussion with patient and her daughter today. It does seem that her BP is quite labile and is affected by multiple contributing factors, including her severe anxiety, pain (from multiple causes), insomnia, stress, medication side effects, and her underlying hypertension. Notably, she does have frequent BP readings in normal range when all other factors are well managed and her BP escalates with escalating symptoms. We did have a long discussion regarding trying to manage her BP without additional medications and instead managing symptoms through anxiety treatment (still looking into insurance approval for TCMS), insomnia (with medical marijuana and CBD gummies - discussed talking to dispensary about this and trying to keep dosing a bit more consistent so there are less peaks and troughs), and pain management with acetaminophen as needed. They are in agreement with this plan.       40 minutes spent on the date of the encounter doing chart review, history and exam, documentation and further activities per the note       BMI:   Estimated body mass index is 32.27 kg/m  as calculated from the following:    Height as of this encounter: 1.626 m (5' 4\").    Weight as of this encounter: 85.3 kg (188 lb).       Depression Screening Follow Up    PHQ 12/8/2022   PHQ-9 Total Score 17   Q9: Thoughts of better off dead/self-harm past 2 weeks Several days   F/U: Thoughts of suicide or self-harm Yes   F/U: Self harm-plan Yes   F/U: Self-harm action No   F/U: Safety concerns No             Patient Instructions   Follow-up with neurology to discuss possible EMG of the " right hand - I wonder of some of your symptoms are nerve related.    Consider taking CBD or medical marijuana the morning after a bad night to see if this helps you ease into the day. Also ask pharmacist about hips pain with the medical marijuana.       Return in about 3 months (around 3/8/2023).    Madyson Mendoza MD  [facility] Hernadez is a 62 year old accompanied by her daughter, presenting for the following health issues:  Wrist Pain      History of Present Illness       Mental Health Follow-up:  Patient presents to follow-up on Depression & Anxiety.Patient's depression since last visit has been:  No change  The patient is having other symptoms associated with depression.  Patient's anxiety since last visit has been:  No change  The patient is having other symptoms associated with anxiety.  Any significant life events: health concerns  Patient is feeling anxious or having panic attacks.  Patient has no concerns about alcohol or drug use.    Hypertension: She presents for follow up of hypertension.  She does check blood pressure  regularly outside of the clinic. Outside blood pressures have been over 140/90. She follows a low salt diet.      Today's PHQ-9         PHQ-9 Total Score: 17    PHQ-9 Q9 Thoughts of better off dead/self-harm past 2 weeks :   Several days  Thoughts of suicide or self harm: (P) Yes  Self-harm Plan:   (P) Yes  Self-harm Action:     (P) No  Safety concerns for self or others: (P) No    How difficult have these problems made it for you to do your work, take care of things at home, or get along with other people: Very difficult  Today's WILLIAMS-7 Score: 16       Pain History:  otice your pain? - Acute Pain   Have you seen anyone else for your pain? No  Where in your body do you have pain? Musculoskeletal problem/pain  Onset/Duration: years   Description  Location: right wrist   Joint Swelling: YES  Redness: YES  Pain: YES  Warmth: No  Intensity:   "moderate  Progression of Symptoms:  Same, no improvement   Accompanying signs and symptoms:   Fevers: No  Numbness/tingling/weakness: YES  History  Trauma to the area: No  Recent illness:  No  Previous similar problem: No  Previous evaluation:  NO, has been to physical therapy.   Precipitating or alleviating factors:  Aggravating factors include: motor work/overuse   Therapies tried and outcome: mirror box therapy     Maricarmen comes in with her daughter for discussion of her blood pressure. This is pretty variable. Seems to be worse of she is under a lot of stress/anxiety or if she took a larger amount of medical marijuana the night before to help with insomnia. BP is better when she is less anxious and worried. Using acetaminophen to help manage her pain also seems to help with both pain and BP.    Continues to have ongoing issues with her R hand intermittently feeling painful and numb. Whole hand is affected from about mid arm on down. Comes and goes, has some weakness as well. OT has been useful with the mirror box but still with symptoms.     Review of Systems   Constitutional, HEENT, cardiovascular, pulmonary, gi and gu systems are negative, except as otherwise noted.      Objective    BP (!) 140/60 (BP Location: Right arm, Patient Position: Sitting, Cuff Size: Adult Large)   Pulse 57   Resp 14   Ht 1.626 m (5' 4\")   Wt 85.3 kg (188 lb)   LMP 01/01/1999   SpO2 97%   BMI 32.27 kg/m    Body mass index is 32.27 kg/m .  Physical Exam   GENERAL: healthy, alert and no distress  PSYCH: mentation appears normal, affect slightly anxious                "

## 2022-12-19 NOTE — TELEPHONE ENCOUNTER
"Shi Sheikh, [facility]  Madyson Mclaughlin MD 4 weeks ago     TC  I would consider initiating chlorthalidone 12.5-25 mg daily or torsemide 10-20 mg daily. Previous mychart I had with patient she wanted time to \"recover\" vs starting alternative therapy.     Shi Sheikh, PharmD   Medication Therapy Management Pharmacist      See 11/21/2022 Office Visit with Dr. Mclaughlin.     Closing this encounter as it was addressed in the 11/21/2022 Office Visit with Dr. Mclaughlin.     Kat REECE RN   Patient Advocate Liaison (PAL)  [facility]   "

## 2022-12-20 ENCOUNTER — VIRTUAL VISIT (OUTPATIENT)
Dept: ENDOCRINOLOGY | Facility: CLINIC | Age: 62
End: 2022-12-20
Payer: COMMERCIAL

## 2022-12-20 DIAGNOSIS — E11.29 TYPE 2 DIABETES MELLITUS WITH MICROALBUMINURIA, WITH LONG-TERM CURRENT USE OF INSULIN (H): Primary | ICD-10-CM

## 2022-12-20 DIAGNOSIS — R80.9 TYPE 2 DIABETES MELLITUS WITH MICROALBUMINURIA, WITH LONG-TERM CURRENT USE OF INSULIN (H): Primary | ICD-10-CM

## 2022-12-20 DIAGNOSIS — Z96.41 INSULIN PUMP STATUS: ICD-10-CM

## 2022-12-20 DIAGNOSIS — Z79.4 TYPE 2 DIABETES MELLITUS WITH MICROALBUMINURIA, WITH LONG-TERM CURRENT USE OF INSULIN (H): Primary | ICD-10-CM

## 2022-12-20 PROCEDURE — 95251 CONT GLUC MNTR ANALYSIS I&R: CPT | Performed by: INTERNAL MEDICINE

## 2022-12-20 PROCEDURE — 99214 OFFICE O/P EST MOD 30 MIN: CPT | Mod: 95 | Performed by: INTERNAL MEDICINE

## 2022-12-20 NOTE — PROGRESS NOTES
Patient is being evaluated via a billable video visit.      How would you like to obtain your AVS? Verbally Reviewed  If the video visit is dropped, the invitation should be resent by: Cellphone  Will anyone else be joining your video visit? No        Video-Visit Details    Video Start Time: 2:35 pm    Type of service:  Video Visit    Video End Time:  2:55 pm    Originating Location (pt. Location):  Home    PROVIDER LOCATION On-site vs Off-site    Distant Location (provider location):  Home    [facility] Visit[facility]        Recent issues:  Diabetes follow-up evaluation, with her  [facility] today.  Patient interested in switching to the Omnipod 5 with the DexcomG6 CGM        History of GDM with 2 pregnancies, diet management  1996. Diagnosis of diabetes mellitus  Recalls starting a diabetes pill  Had taken metformin, also Januvia but developed pancreatitis    Has seen endocrinology providers CAMILA Da Silva M. Schultz, CAMILA Rich, MARAH York, MARAH Childress, KRISTA Mejia  Has tried several diabetes medications previously, records indicate side effects or med issues:  Humalog- apparent concerns with the pancreatitis    Metformin--Abdominal pain.  Glipizide- Allergic reaction- (abdominal pain and swelling),   Byetta and Onglyza-- pancreatitis.  Invokana -- had upper GI distress.    ~2006. Started treatment with insulin medication  Endocrinology evaluations with Emily Phan CNP/[facility]  ~2017. Started Omnipod insulin pump use.  Subsequently saw Rosie Schwab, and CARLO Salgado for endocrinology evaluations.  Using Freestyle Freddie CGM    Previous  hgbA1c trends include:  2/21/06 C-Peptide 2.5  3/4/09 WILLIAMS-65 Ab <1.0     Lab Test 09/01/22  1017 05/20/22  0841 11/23/21  0859 09/18/20  0852 02/27/20  0954   A1C 7.8* 8.7* 8.2* 7.4* 8.0*           10/6/22. Initial diabetes evaluation with me at [place]  Reviewed health history and diabetes issues  Using Omnipod Dash insulin  pump:   Novolog pump    Uses ICR method for bolusing  Blood glucose (BG) meter  Uses Freestyle Freddie CGM with smartphone   Scans 5-8x/day    Typically  Boluses postmeal, not premeal  Current Omnipod Dash pump settings:        Recent Omnipod pump data:        Recent Frestyle Freddie data:          Recent FV labs include:  Lab Results   Component Value Date    A1C 7.8 (H) 09/01/2022     07/06/2022    POTASSIUM 3.8 07/06/2022    CHLORIDE 107 07/06/2022    CO2 28 07/06/2022    ANIONGAP 5 07/06/2022     (H) 07/06/2022    BUN 19 07/06/2022    CR 0.98 07/06/2022    GFRESTIMATED 66 07/06/2022    GFRESTBLACK >90 01/20/2021    KARIN 9.5 07/06/2022    CHOL 209 (H) 09/01/2022    TRIG 196 (H) 09/01/2022    HDL 52 09/01/2022     (H) 09/01/2022    NHDL 157 (H) 09/01/2022    UCRR 55 11/10/2021    MICROL 17 11/10/2021    UMALCR 30.91 (H) 11/10/2021    TSH 1.19 09/18/2020    T4 0.87 07/22/2019     DM Complications:   Nephropathy:    Microalbuminuria        Lives in [place], [place]  Sees Dr. Madyson Mclaughlin/[facility] for general medicine evaluations.    PMH/PSH:  Past Medical History:   Diagnosis Date     Ascending aorta enlargement (H)      Depressive disorder     severe, prior hospitalization     Diverticulosis of colon (without mention of hemorrhage)      Fibromyalgia 06/26/2007     Insomnia      Irritable bowel syndrome      Osteopenia      Type 2 diabetes mellitus with complications (H)     microalbuminuria     Past Surgical History:   Procedure Laterality Date     BACK SURGERY      fusion 1994 and removal of hardware 2004     C SPINAL ORTHOSIS,NOS  2002    lumbar surgery     CHOLECYSTECTOMY  2011     choley  2011     ENT SURGERY  1986    septoplasty     HYSTERECTOMY, CERVIX STATUS UNKNOWN  2000    TVH/BSO     ORTHOPEDIC SURGERY  2005    left ankle       Family Hx:  Family History   Problem Relation Age of Onset     Diabetes Mother         type 2     Hypertension Mother      Cerebrovascular Disease Mother           stroke age 57     Ovarian Cancer Mother 43     Cancer Mother         cervix     Diabetes Father         type 2     Hypertension Father      Gastrointestinal Disease Father         colon polyps     Sleep Apnea Brother      Cancer Sister      Ovarian Cancer Sister 46     Ovarian Cancer Maternal Grandmother 72     Cancer Maternal Grandmother         cervix         Social Hx:  Social History     Socioeconomic History     Marital status:      Spouse name: Not on file     Number of children: 3     Years of education: Not on file     Highest education level: Not on file   Occupational History     Occupation: xr technician     Employer: [employer]   Tobacco Use     Smoking status: Never     Smokeless tobacco: Never   Substance and Sexual Activity     Alcohol use: Not Currently     Alcohol/week: 0.0 standard drinks     Comment: maybe once a month     Drug use: No     Sexual activity: Yes     Partners: Male     Birth control/protection: Surgical   Other Topics Concern     Parent/sibling w/ CABG, MI or angioplasty before 65F 55M? Not Asked   Social History Narrative     Not on file     Social Determinants of Health     Financial Resource Strain: Not on file   Food Insecurity: Not on file   Transportation Needs: Not on file   Physical Activity: Not on file   Stress: Not on file   Social Connections: Not on file   Intimate Partner Violence: Not on file   Housing Stability: Not on file          MEDICATIONS:  has a current medication list which includes the following prescription(s): alcohol swab, benzonatate, freestyle lite, freestyle lite, blood glucose monitoring, blood glucose monitoring, compounded non-controlled substance, freestyle ashleigh 14 day reader, dexcom g6 sensor, freestyle ashleigh 14 day sensor, dexcom g6 transmitter, dapagliflozin, voltaren, epinephrine, HEMP OIL OR EXTRACT OR OTHER CBD CANNABINOID, NOT MEDICAL CANNABIS,, omnipod 5 g6 pod (gen 5), omnipod dash pods (gen 4), insulin pump,  insulin syringe-needle u-100, levocetirizine, magnesium oxide, medical cannabis, melatonin, novolog vial, ondansetron, polyethylene glycol, and statin not prescribed.    ROS:     ROS: 10 point ROS neg other than the symptoms noted above in the HPI.    GENERAL: some fatigue, wt stable; denies fevers, chills, night sweats.   HEENT: no dysphagia, odonophagia, diplopia, neck pain  THYROID:  no apparent hyper or hypothyroid symptoms  CV: no chest pain, pressure, palpitations  LUNGS: no SOB, VASQUEZ, cough, wheezing   ABDOMEN: no diarrhea, constipation, abdominal pain  EXTREMITIES: no rashes, ulcers, edema  NEUROLOGY: no headaches, denies changes in vision, tingling, extremitiy numbness   MSK: no muscle aches or pains, weakness  SKIN: no rashes or lesions  : no menses  PSYCH:  stable mood, no significant anxiety or depression  ENDOCRINE: no heat or cold intolerance    Physical Exam (visual exam)  VS:  no vital signs taken for video visit  CONSTITUTIONAL: healthy, alert and NAD, well dressed, answering questions appropriately  ENT: no nose swelling or nasal discharge, mouth redness or gum changes.  EYES: eyes grossly normal to inspection, conjunctivae and sclerae normal, no exophthalmos or proptosis  THYROID:  no apparent nodules or goiter  LUNGS: no audible wheeze, cough or visible cyanosis, no visible retractions or increased work of breathing  ABDOMEN: abdomen not evaluated  EXTREMITIES: no hand tremors, limited exam  NEUROLOGY: CN grossly intact, mentation intact and speech normal   SKIN:  no apparent skin lesions, rash, or edema with visualized skin appearance  PSYCH: mentation appears normal, affect normal/bright, judgement and insight intact,   normal speech and appearance well groomed      LABS:    All pertinent notes, labs, and images personally reviewed by me.     A/P:  Encounter Diagnoses   Name Primary?     Type 2 diabetes mellitus with microalbuminuria, with long-term current use of insulin (H) Yes     Insulin  pump status      Comments:  Reviewed health history and diabetes issues.  Details of her previous medication intolerances or allergies unclear  Reviewed and interpreted tests that I previously ordered.   Ordered appropriate tests for the endocrinology disease management.    Management options discussed and implemented after shared medical decision making with the patient.  Diabetes problem is chronic-stable    Plan:  Reviewed the overall T2DM management and insulin pump use.  Discussed optimal BG testing to assess glucose trends.  We reviewed insulin pump settings, basal rate and bolus dosing  Use of automated pump bolus dosing for meal/snack carb & correction dosing  Reviewed the Omnipod insulin pump report data today  Reviewed recent Freestyle Freddie CGM glucose trend data, in detail    Recommend:  Continue the Omnipod Dash insulin pump management plan.  No pump setting changes at this time    Reviewed the ideal premeal bolus routine and her more conservative postmeal bolus habits   Avoid delays or missing evening premeal boluses  Future treatment options include adding a SGLT2-I such as Jardiance  Reviewed plan for change to the Omnipod 5 with the DexcomG6 CGM   Discussed these Rx's sent to [facility]   Encouraged her to call that pharmacy if shipping questions  Keep the follow-up evaluation with the [facility] Diabetes Educator  Needs repeat BP testing with PCP  Arrange annual dilated eye exam, fasting lipid panel testing  Discussed importance of regular endocrinology evaluations every 3-4 months.    Addressed patient's questions today.    There are no Patient Instructions on file for this visit.    Future labs ordered today:   Orders Placed This Encounter   Procedures     GLUCOSE MONITOR, 72 HOUR, PHYS INTERP     Radiology/Consults ordered today: None    Total time spent on day of encounter:  31 min    Follow-up:  1/2023 or 2/2023, SINA Hightower MD, MS  Endocrinology  [facility]    CC: MARVIN Emery

## 2022-12-20 NOTE — LETTER
12/20/2022         RE: Maricarmen Dotson  [address] [place] [address]        Dear Colleague,    Thank you for referring your patient, Maricarmen Dotson, to the [facility] [place]. Please see a copy of my visit note below.    Patient is being evaluated via a billable video visit.      How would you like to obtain your AVS? Verbally Reviewed  If the video visit is dropped, the invitation should be resent by: Cellphone  Will anyone else be joining your video visit? No        Video-Visit Details    Video Start Time: 2:35 pm    Type of service:  Video Visit    Video End Time:  2:55 pm    Originating Location (pt. Location):  Home    PROVIDER LOCATION On-site vs Off-site    Distant Location (provider location):  Home    Platform used for Video Visit: Robbie        Recent issues:  Diabetes follow-up evaluation, with her  [facility] today.  Patient interested in switching to the Omnipod 5 with the DexcomG6 CGM        History of GDM with 2 pregnancies, diet management  1996. Diagnosis of diabetes mellitus  Recalls starting a diabetes pill  Had taken metformin, also Januvia but developed pancreatitis    Has seen endocrinology providers CAMILA Da Sliva M. Schultz, CAMILA Rich, MARAH York, MARAH Childress, KRISTA Mejia  Has tried several diabetes medications previously, records indicate side effects or med issues:  Humalog- apparent concerns with the pancreatitis    Metformin--Abdominal pain.  Glipizide- Allergic reaction- (abdominal pain and swelling),   Byetta and Onglyza-- pancreatitis.  Invokana -- had upper GI distress.    ~2006. Started treatment with insulin medication  Endocrinology evaluations with Emily Phan CNP/[facility]  ~2017. Started Omnipod insulin pump use.  Subsequently saw Rosie Schwab, and CARLO Salgado for endocrinology evaluations.  Using Freestyle Freddie CGM    Previous FV hgbA1c trends include:  2/21/06 C-Peptide 2.5  3/4/09 WILLIAMS-65 Ab <1.0     Lab  Test 09/01/22  1017 05/20/22  0841 11/23/21  0859 09/18/20  0852 02/27/20  0954   A1C 7.8* 8.7* 8.2* 7.4* 8.0*           10/6/22. Initial diabetes evaluation with me at [place]  Reviewed health history and diabetes issues  Using Omnipod Dash insulin pump:   Novolog pump    Uses ICR method for bolusing  Blood glucose (BG) meter  Uses Freestyle Freddie CGM with smartphone   Scans 5-8x/day    Typically  Boluses postmeal, not premeal  Current Omnipod Dash pump settings:        Recent Omnipod pump data:        Recent Frestyle Freddie data:          Recent FV labs include:  Lab Results   Component Value Date    A1C 7.8 (H) 09/01/2022     07/06/2022    POTASSIUM 3.8 07/06/2022    CHLORIDE 107 07/06/2022    CO2 28 07/06/2022    ANIONGAP 5 07/06/2022     (H) 07/06/2022    BUN 19 07/06/2022    CR 0.98 07/06/2022    GFRESTIMATED 66 07/06/2022    GFRESTBLACK >90 01/20/2021    KARIN 9.5 07/06/2022    CHOL 209 (H) 09/01/2022    TRIG 196 (H) 09/01/2022    HDL 52 09/01/2022     (H) 09/01/2022    NHDL 157 (H) 09/01/2022    UCRR 55 11/10/2021    MICROL 17 11/10/2021    UMALCR 30.91 (H) 11/10/2021    TSH 1.19 09/18/2020    T4 0.87 07/22/2019     DM Complications:   Nephropathy:    Microalbuminuria        Lives in [place], [place]  Sees Dr. Madyson Mclaughlin/[facility] for general medicine evaluations.    PMH/PSH:  Past Medical History:   Diagnosis Date     Ascending aorta enlargement (H)      Depressive disorder     severe, prior hospitalization     Diverticulosis of colon (without mention of hemorrhage)      Fibromyalgia 06/26/2007     Insomnia      Irritable bowel syndrome      Osteopenia      Type 2 diabetes mellitus with complications (H)     microalbuminuria     Past Surgical History:   Procedure Laterality Date     BACK SURGERY      fusion 1994 and removal of hardware 2004     C SPINAL ORTHOSIS,NOS  2002    lumbar surgery     CHOLECYSTECTOMY  2011     choley  2011     ENT SURGERY  1986    septoplasty      HYSTERECTOMY, CERVIX STATUS UNKNOWN      TVH/BSO     ORTHOPEDIC SURGERY  2005    left ankle       Family Hx:  Family History   Problem Relation Age of Onset     Diabetes Mother         type 2     Hypertension Mother      Cerebrovascular Disease Mother          stroke age 57     Ovarian Cancer Mother 43     Cancer Mother         cervix     Diabetes Father         type 2     Hypertension Father      Gastrointestinal Disease Father         colon polyps     Sleep Apnea Brother      Cancer Sister      Ovarian Cancer Sister 46     Ovarian Cancer Maternal Grandmother 72     Cancer Maternal Grandmother         cervix         Social Hx:  Social History     Socioeconomic History     Marital status:      Spouse name: Not on file     Number of children: 3     Years of education: Not on file     Highest education level: Not on file   Occupational History     Occupation: xr technician     Employer: [employer]   Tobacco Use     Smoking status: Never     Smokeless tobacco: Never   Substance and Sexual Activity     Alcohol use: Not Currently     Alcohol/week: 0.0 standard drinks     Comment: maybe once a month     Drug use: No     Sexual activity: Yes     Partners: Male     Birth control/protection: Surgical   Other Topics Concern     Parent/sibling w/ CABG, MI or angioplasty before 65F 55M? Not Asked   Social History Narrative     Not on file     Social Determinants of Health     Financial Resource Strain: Not on file   Food Insecurity: Not on file   Transportation Needs: Not on file   Physical Activity: Not on file   Stress: Not on file   Social Connections: Not on file   Intimate Partner Violence: Not on file   Housing Stability: Not on file          MEDICATIONS:  has a current medication list which includes the following prescription(s): alcohol swab, benzonatate, freestyle lite, freestyle lite, blood glucose monitoring, blood glucose monitoring, compounded non-controlled substance, freestyle  ashleigh 14 day reader, dexcom g6 sensor, freestyle ashleigh 14 day sensor, dexcom g6 transmitter, dapagliflozin, voltaren, epinephrine, HEMP OIL OR EXTRACT OR OTHER CBD CANNABINOID, NOT MEDICAL CANNABIS,, omnipod 5 g6 pod (gen 5), omnipod dash pods (gen 4), insulin pump, insulin syringe-needle u-100, levocetirizine, magnesium oxide, medical cannabis, melatonin, novolog vial, ondansetron, polyethylene glycol, and statin not prescribed.    ROS:     ROS: 10 point ROS neg other than the symptoms noted above in the HPI.    GENERAL: some fatigue, wt stable; denies fevers, chills, night sweats.   HEENT: no dysphagia, odonophagia, diplopia, neck pain  THYROID:  no apparent hyper or hypothyroid symptoms  CV: no chest pain, pressure, palpitations  LUNGS: no SOB, VASQUEZ, cough, wheezing   ABDOMEN: no diarrhea, constipation, abdominal pain  EXTREMITIES: no rashes, ulcers, edema  NEUROLOGY: no headaches, denies changes in vision, tingling, extremitiy numbness   MSK: no muscle aches or pains, weakness  SKIN: no rashes or lesions  : no menses  PSYCH:  stable mood, no significant anxiety or depression  ENDOCRINE: no heat or cold intolerance    Physical Exam (visual exam)  VS:  no vital signs taken for video visit  CONSTITUTIONAL: healthy, alert and NAD, well dressed, answering questions appropriately  ENT: no nose swelling or nasal discharge, mouth redness or gum changes.  EYES: eyes grossly normal to inspection, conjunctivae and sclerae normal, no exophthalmos or proptosis  THYROID:  no apparent nodules or goiter  LUNGS: no audible wheeze, cough or visible cyanosis, no visible retractions or increased work of breathing  ABDOMEN: abdomen not evaluated  EXTREMITIES: no hand tremors, limited exam  NEUROLOGY: CN grossly intact, mentation intact and speech normal   SKIN:  no apparent skin lesions, rash, or edema with visualized skin appearance  PSYCH: mentation appears normal, affect normal/bright, judgement and insight intact,   normal  speech and appearance well groomed      LABS:    All pertinent notes, labs, and images personally reviewed by me.     A/P:  Encounter Diagnoses   Name Primary?     Type 2 diabetes mellitus with microalbuminuria, with long-term current use of insulin (H) Yes     Insulin pump status      Comments:  Reviewed health history and diabetes issues.  Details of her previous medication intolerances or allergies unclear  Reviewed and interpreted tests that I previously ordered.   Ordered appropriate tests for the endocrinology disease management.    Management options discussed and implemented after shared medical decision making with the patient.  Diabetes problem is chronic-stable    Plan:  Reviewed the overall T2DM management and insulin pump use.  Discussed optimal BG testing to assess glucose trends.  We reviewed insulin pump settings, basal rate and bolus dosing  Use of automated pump bolus dosing for meal/snack carb & correction dosing  Reviewed the Omnipod insulin pump report data today  Reviewed recent Freestyle Freddie CGM glucose trend data, in detail    Recommend:  Continue the Omnipod Dash insulin pump management plan.  No pump setting changes at this time    Reviewed the ideal premeal bolus routine and her more conservative postmeal bolus habits   Avoid delays or missing evening premeal boluses  Future treatment options include adding a SGLT2-I such as Jardiance  Reviewed plan for change to the Omnipod 5 with the DexcomG6 CGM   Discussed these Rx's sent to [facility]   Encouraged her to call that pharmacy if shipping questions  Keep the follow-up evaluation with the [facility] Diabetes Educator  Needs repeat BP testing with PCP  Arrange annual dilated eye exam, fasting lipid panel testing  Discussed importance of regular endocrinology evaluations every 3-4 months.    Addressed patient's questions today.    There are no Patient Instructions on file for this visit.    Future labs ordered today:   Orders Placed This Encounter    Procedures     GLUCOSE MONITOR, 72 HOUR, PHYS INTERP     Radiology/Consults ordered today: None    Total time spent on day of encounter:  31 min    Follow-up:  1/2023 or 2/2023, SINA Hightower MD, MS  Endocrinology  [facility]    CC: MARVIN Emery                  Again, thank you for allowing me to participate in the care of your patient.        Sincerely,        Chaitanya Hightower MD

## 2022-12-22 ENCOUNTER — TELEPHONE (OUTPATIENT)
Dept: ENDOCRINOLOGY | Facility: CLINIC | Age: 62
End: 2022-12-22

## 2022-12-22 NOTE — TELEPHONE ENCOUNTER
Prior Authorization Not Needed per Insurance    Medication: Insulin Disposable Pump (OMNIPOD 5 G6 INTRO, GEN 5,) KIT--NO PA NEEDED  Insurance Company: [employer] - Phone [phone] Fax [phone]  Expected CoPay:      Pharmacy Filling the Rx: [facility] - [place], [place] - [facility] [address]  Pharmacy Notified: Yes  Patient Notified: Yes **Instructed pharmacy to notify patient when script is ready to /ship.**

## 2022-12-22 NOTE — TELEPHONE ENCOUNTER
Prior Authorization Not Needed per Insurance    Medication: Insulin Disposable Pump (OMNIPOD 5 G6 POD, GEN 5,) MISC--NO PA NEEDED  Insurance Company: [employer] - Phone [phone] Fax [phone]  Expected CoPay:      Pharmacy Filling the Rx: [facility] - [place], [place] - [address]  Pharmacy Notified: Yes  Patient Notified: Yes **Instructed pharmacy to notify patient when script is ready to /ship.**

## 2023-01-02 ENCOUNTER — MYC MEDICAL ADVICE (OUTPATIENT)
Dept: EDUCATION SERVICES | Facility: CLINIC | Age: 63
End: 2023-01-02

## 2023-01-02 NOTE — CONFIDENTIAL NOTE
"Courtney response:    Alex Hernadez,    I would be happy to help you start the dexcom G6 CGM, but for the Omnipod 5 you will need to meet with a certified omnipod . In your omnipod 5 starter kit there should be something labeled \"getting started.\" Once you complete the steps on that card then it prompts omnipod to reach out to you. Since there have been a few issues with this this process I would like to give you the contact info for our local omnipod , Ashish OLIVEIRA. Her info is:  [email or web address] or cell [phone]. I do believe she is out of the office today though for the holiday.    It looks like we have an appt scheduled for January 19th. I can help set-up the dexcom G6 at that time if you would like? You will need to start that before getting trained on the omnipod 5 system Ashish. We could follow-up again a week or 2 after you get started on the omnipod 5 system.    Let me know what you think and if you have any questions. Happy New Year!    Ashley Emery RN, [facility]          "

## 2023-01-19 ENCOUNTER — MYC MEDICAL ADVICE (OUTPATIENT)
Dept: EDUCATION SERVICES | Facility: CLINIC | Age: 63
End: 2023-01-19

## 2023-01-19 DIAGNOSIS — E11.65 TYPE 2 DIABETES MELLITUS WITH HYPERGLYCEMIA, WITH LONG-TERM CURRENT USE OF INSULIN (H): ICD-10-CM

## 2023-01-19 DIAGNOSIS — Z79.4 TYPE 2 DIABETES MELLITUS WITH HYPERGLYCEMIA, WITH LONG-TERM CURRENT USE OF INSULIN (H): ICD-10-CM

## 2023-01-19 RX ORDER — FLASH GLUCOSE SENSOR
KIT MISCELLANEOUS
Qty: 2 EACH | Refills: 3 | Status: SHIPPED | OUTPATIENT
Start: 2023-01-19 | End: 2023-07-31

## 2023-01-24 ENCOUNTER — NURSE TRIAGE (OUTPATIENT)
Dept: PEDIATRICS | Facility: CLINIC | Age: 63
End: 2023-01-24
Payer: MEDICARE

## 2023-01-24 ENCOUNTER — TRANSFERRED RECORDS (OUTPATIENT)
Dept: HEALTH INFORMATION MANAGEMENT | Facility: CLINIC | Age: 63
End: 2023-01-24

## 2023-01-24 LAB — TSH SERPL-ACNC: 0.7 MIU/L (ref 0.4–4.5)

## 2023-01-24 NOTE — TELEPHONE ENCOUNTER
"Pt calls, see triage note, stubbed big toe, agrees to triage guidelines, pt has warm sensation to toe, in good alignment, discussed [facility] orthopedic urgent care as option if concerns, pt agrees to follow up with appointment 24 hours if needed    Reason for Disposition    Injury and pain has not improved after 3 days    Additional Information    Negative: Major bleeding (actively dripping or spurting) that can't be stopped    Negative: Amputation of toe    Negative: Sounds like a life-threatening emergency to the triager    Negative: Looks infected (e.g., spreading redness, red streak, pus)    Negative: High pressure injection injury (e.g., from paint gun, usually work-related    Negative: Looks like a broken bone or dislocated joint (e.g., crooked or deformed)    Negative: Skin is split open or gaping (length > 1/2 inch or 12 mm)    Negative: Bleeding won't stop after 10 minutes of direct pressure (using correct technique)    Negative: Dirt in the wound and not removed after 15 minutes of scrubbing    Negative: Sounds like a serious injury to the triager    Negative: Looks infected (e.g., spreading redness, red streak, pus)    Negative: Toenail is completely torn off    Negative: Base of toenail has popped out from under skin fold    Negative: SEVERE pain (e.g., excruciating)    Negative: MODERATE-SEVERE pain and blood present under the nail (usually > 50% of nail bed)    Negative: No prior tetanus shots (or is not fully vaccinated) and any wound (e.g., cut or scrape)    Negative: HIV positive or severe immunodeficiency (severely weak immune system) and DIRTY cut or scrape    Negative: Last tetanus shot > 5 years ago and DIRTY cut or scrape    Negative: Last tetanus shot > 10 years ago and CLEAN cut or scrape    Negative: Bad limp or can't wear shoes/sandals    Negative: Patient wants to be seen    Negative: Injury interferes with work or school    Answer Assessment - Initial Assessment Questions  1. MECHANISM: \"How " "did the injury happen?\"       Fell down stairs 2 days ago  2. ONSET: \"When did the injury happen?\" (Minutes or hours ago)       2 days  3. LOCATION: \"What part of the toe is injured?\" \"Is the nail damaged?\"       Right big toe, no nail damage  4. APPEARANCE of TOE INJURY: \"What does the injury look like?\"       Red, swollen, bruised  5. SEVERITY: \"Can you use the foot normally?\" \"Can you walk?\"       Yes, yes   6. SIZE: For cuts, bruises, or swelling, ask: \"How large is it?\" (e.g., inches or centimeters;  entire toe)       Bruising, other toes bruised now and under toe   7. PAIN: \"Is there pain?\" If Yes, ask: \"How bad is the pain?\"   (e.g., Scale 1-10; or mild, moderate, severe)      Moderate, using ibuprofen and tylenol  8. TETANUS: For any breaks in the skin, ask: \"When was the last tetanus booster?\"      n/a  9. DIABETES: \"Do you have a history of diabetes or poor circulation in the feet?\"      Yes, no, has good circulation   10. OTHER SYMPTOMS: \"Do you have any other symptoms?\"         Feeling in toe, denies numbness, not cold, warm to touch   11. PREGNANCY: \"Is there any chance you are pregnant?\" \"When was your last menstrual period?\"        n/a    Protocols used: TOE INJURY-A-OH    Allie Handy RN, BSN  [facility]    "

## 2023-01-26 ENCOUNTER — TRANSFERRED RECORDS (OUTPATIENT)
Dept: HEALTH INFORMATION MANAGEMENT | Facility: CLINIC | Age: 63
End: 2023-01-26

## 2023-01-31 ENCOUNTER — MYC MEDICAL ADVICE (OUTPATIENT)
Dept: ENDOCRINOLOGY | Facility: CLINIC | Age: 63
End: 2023-01-31
Payer: MEDICARE

## 2023-01-31 ENCOUNTER — TELEPHONE (OUTPATIENT)
Dept: EDUCATION SERVICES | Facility: CLINIC | Age: 63
End: 2023-01-31
Payer: MEDICARE

## 2023-01-31 NOTE — TELEPHONE ENCOUNTER
I completed the omnipod 5 insulin pump start order form and emailed to Dr. Hightower for signature and date. Once completed it can be faxed to Ashish Tillman with omn[employer] at [phone]. Ashish plans to meet with Maricarmen in-person after February 9th (when I will help her start the dexcom G6) to transfer her from the omnipod DASH system to the omnipod 5 system.    Ashley Emery RN, [facility]

## 2023-02-01 ENCOUNTER — MEDICAL CORRESPONDENCE (OUTPATIENT)
Dept: HEALTH INFORMATION MANAGEMENT | Facility: CLINIC | Age: 63
End: 2023-02-01

## 2023-02-02 ENCOUNTER — TRANSFERRED RECORDS (OUTPATIENT)
Dept: HEALTH INFORMATION MANAGEMENT | Facility: CLINIC | Age: 63
End: 2023-02-02

## 2023-02-02 ENCOUNTER — DOCUMENTATION ONLY (OUTPATIENT)
Dept: ENDOCRINOLOGY | Facility: CLINIC | Age: 63
End: 2023-02-02
Payer: MEDICARE

## 2023-02-02 ENCOUNTER — MYC MEDICAL ADVICE (OUTPATIENT)
Dept: PEDIATRICS | Facility: CLINIC | Age: 63
End: 2023-02-02
Payer: MEDICARE

## 2023-02-02 DIAGNOSIS — G56.01 CARPAL TUNNEL SYNDROME OF RIGHT WRIST: Primary | ICD-10-CM

## 2023-02-02 NOTE — PROGRESS NOTES
Omnipod 5 Automated Insulin Delivery System/Pump Therapy Order form  Form(s) have been completed faxed.  Faxed or mailed to: number listed on form.  Form(s) in accordion file for 1 month then to scan.    Eden Simon MA

## 2023-02-03 NOTE — TELEPHONE ENCOUNTER
Messages reviewed, appreciated.  The Omnipod pump order form was sent by our office staff to the [place] CDE (MERA) earlier today, SAMANTHA Hightower MD, MS  Endocrinology  [facility]

## 2023-02-07 ENCOUNTER — VIRTUAL VISIT (OUTPATIENT)
Dept: ENDOCRINOLOGY | Facility: CLINIC | Age: 63
End: 2023-02-07
Payer: COMMERCIAL

## 2023-02-07 DIAGNOSIS — Z96.41 INSULIN PUMP STATUS: ICD-10-CM

## 2023-02-07 DIAGNOSIS — Z79.4 TYPE 2 DIABETES MELLITUS WITH MICROALBUMINURIA, WITH LONG-TERM CURRENT USE OF INSULIN (H): Primary | ICD-10-CM

## 2023-02-07 DIAGNOSIS — R80.9 TYPE 2 DIABETES MELLITUS WITH MICROALBUMINURIA, WITH LONG-TERM CURRENT USE OF INSULIN (H): Primary | ICD-10-CM

## 2023-02-07 DIAGNOSIS — E11.29 TYPE 2 DIABETES MELLITUS WITH MICROALBUMINURIA, WITH LONG-TERM CURRENT USE OF INSULIN (H): Primary | ICD-10-CM

## 2023-02-07 PROCEDURE — 95251 CONT GLUC MNTR ANALYSIS I&R: CPT | Performed by: INTERNAL MEDICINE

## 2023-02-07 PROCEDURE — 99214 OFFICE O/P EST MOD 30 MIN: CPT | Mod: 95 | Performed by: INTERNAL MEDICINE

## 2023-02-07 NOTE — LETTER
"    2/7/2023         RE: Maricarmen Dotson  [address] [place] [address]        Dear Colleague,    Thank you for referring your patient, Maricarmen Dotson, to the [facility] [place]. Please see a copy of my visit note below.    Video-Visit Details    Type of service:  Video Visit    Video Start Time (time video started): 12:45 pm    Video End Time (time video stopped): 1:10 pm    Originating Location (pt. Location): Home        Distant Location (provider location):  Off site    Mode of Communication:  Video Conference via [employer]          Recent issues:  Diabetes follow-up evaluation  Patient interested in switching to the Omnipod 5 with the DexcomG6 CGM  She has acquired the Omnipod 5 kit & pods, also the DexcomG6 supplies but hasn't started yet  Still doing pump bolusing postmeal, also habit of late night \"binge\" snacking         History of GDM with 2 pregnancies, diet management  1996. Diagnosis of diabetes mellitus  Recalls starting a diabetes pill  Had taken metformin, also Januvia but developed pancreatitis    Has seen endocrinology providers CAMILA Da Silva M. Schultz, CAMILA Rich, MARAH York, MARAH Childress, KRISTA Mejia  Has tried several diabetes medications previously, records indicate side effects or med issues:  Humalog- apparent concerns with the pancreatitis    Metformin--Abdominal pain.  Glipizide- Allergic reaction- (abdominal pain and swelling),   Byetta and Onglyza-- pancreatitis.  Invokana -- had upper GI distress.    ~2006. Started treatment with insulin medication  Endocrinology evaluations with Emily Phan CNP/[facility]  ~2017. Started Omnipod insulin pump use.  Subsequently saw Rosie Schwab, and CARLO Salgado for endocrinology evaluations.  Using Freestyle Freddie CGM    Previous [facility] hgbA1c trends include:  2/21/06 C-Peptide 2.5  3/4/09 WILLIAMS-65 Ab <1.0     Lab Test 09/01/22  1017 05/20/22  0841 11/23/21  0859 09/18/20  0852 02/27/20  0954 "   A1C 7.8* 8.7* 8.2* 7.4* 8.0*           10/6/22. Initial diabetes evaluation with me at [facility]  Reviewed health history and diabetes issues  Using Omnipod Dash insulin pump:   Novolog pump    Uses ICR method for bolusing  Blood glucose (BG) meter  Uses Freestyle Freddie CGM with smartphone   Scans 5-8x/day    Typically  Boluses postmeal, not premeal  Current Omnipod Dash pump settings: Info not available  Recent Omnipod pump data:   Info not available today    Recent Frestyle Freddie data:            Recent [facility] labs include:  Lab Results   Component Value Date    A1C 7.8 (H) 09/01/2022     07/06/2022    POTASSIUM 3.8 07/06/2022    CHLORIDE 107 07/06/2022    CO2 28 07/06/2022    ANIONGAP 5 07/06/2022     (H) 07/06/2022    BUN 19 07/06/2022    CR 0.98 07/06/2022    GFRESTIMATED 66 07/06/2022    GFRESTBLACK >90 01/20/2021    KARIN 9.5 07/06/2022    CHOL 209 (H) 09/01/2022    TRIG 196 (H) 09/01/2022    HDL 52 09/01/2022     (H) 09/01/2022    NHDL 157 (H) 09/01/2022    UCRR 55 11/10/2021    MICROL 17 11/10/2021    UMALCR 30.91 (H) 11/10/2021    TSH 1.19 09/18/2020    T4 0.87 07/22/2019     DM Complications:   Nephropathy:    Microalbuminuria        Lives in [place], [place]  Sees Dr. Madyson Mclaughlin/[facility] for general medicine evaluations.    PMH/PSH:  Past Medical History:   Diagnosis Date     Ascending aorta enlargement (H)      Depressive disorder     severe, prior hospitalization     Diverticulosis of colon (without mention of hemorrhage)      Fibromyalgia 06/26/2007     Insomnia      Irritable bowel syndrome      Osteopenia      Type 2 diabetes mellitus with complications (H)     microalbuminuria     Past Surgical History:   Procedure Laterality Date     BACK SURGERY      fusion 1994 and removal of hardware 2004     C SPINAL ORTHOSIS,NOS  2002    lumbar surgery     CHOLECYSTECTOMY  2011     choley  2011     ENT SURGERY  1986    septoplasty     HYSTERECTOMY, CERVIX STATUS UNKNOWN  2000    TVH/BSO      ORTHOPEDIC SURGERY  2005    left ankle       Family Hx:  Family History   Problem Relation Age of Onset     Diabetes Mother         type 2     Hypertension Mother      Cerebrovascular Disease Mother          stroke age 57     Ovarian Cancer Mother 43     Cancer Mother         cervix     Diabetes Father         type 2     Hypertension Father      Gastrointestinal Disease Father         colon polyps     Sleep Apnea Brother      Cancer Sister      Ovarian Cancer Sister 46     Ovarian Cancer Maternal Grandmother 72     Cancer Maternal Grandmother         cervix         Social Hx:  Social History     Socioeconomic History     Marital status:      Spouse name: Not on file     Number of children: 3     Years of education: Not on file     Highest education level: Not on file   Occupational History     Occupation: xr technician     Employer: [employer]   Tobacco Use     Smoking status: Never     Smokeless tobacco: Never   Substance and Sexual Activity     Alcohol use: Not Currently     Alcohol/week: 0.0 standard drinks     Comment: maybe once a month     Drug use: No     Sexual activity: Yes     Partners: Male     Birth control/protection: Surgical   Other Topics Concern     Parent/sibling w/ CABG, MI or angioplasty before 65F 55M? Not Asked   Social History Narrative     Not on file     Social Determinants of Health     Financial Resource Strain: Not on file   Food Insecurity: Not on file   Transportation Needs: Not on file   Physical Activity: Not on file   Stress: Not on file   Social Connections: Not on file   Intimate Partner Violence: Not on file   Housing Stability: Not on file          MEDICATIONS:  has a current medication list which includes the following prescription(s): freestyle ashleigh 14 day sensor, voltaren, HEMP OIL OR EXTRACT OR OTHER CBD CANNABINOID, NOT MEDICAL CANNABIS,, omnipod dash pods (gen 4), melatonin, novolog vial, polyethylene glycol, alcohol swab, benzonatate,  freestyle lite, freestyle lite, blood glucose monitoring, blood glucose monitoring, compounded non-controlled substance, freestyle ashleigh 14 day reader, dexcom g6 sensor, dexcom g6 transmitter, dapagliflozin, epinephrine, omnipod 5 g6 pod (gen 5), insulin pump, insulin syringe-needle u-100, levocetirizine, magnesium oxide, medical cannabis, ondansetron, and statin not prescribed.    ROS:     ROS: 10 point ROS neg other than the symptoms noted above in the HPI.    GENERAL: some fatigue, wt stable; denies fevers, chills, night sweats.   HEENT: no dysphagia, odonophagia, diplopia, neck pain  THYROID:  no apparent hyper or hypothyroid symptoms  CV: no chest pain, pressure, palpitations  LUNGS: no SOB, VASQUEZ, cough, wheezing   ABDOMEN: no diarrhea, constipation, abdominal pain  EXTREMITIES: no rashes, ulcers, edema  NEUROLOGY: no headaches, denies changes in vision, tingling, extremitiy numbness   MSK: no muscle aches or pains, weakness  SKIN: no rashes or lesions  : no menses  PSYCH:  stable mood, no significant anxiety or depression  ENDOCRINE: no heat or cold intolerance    Physical Exam (visual exam)  VS:  no vital signs taken for video visit  CONSTITUTIONAL: healthy, alert and NAD, well dressed, answering questions appropriately  ENT: no nose swelling or nasal discharge, mouth redness or gum changes.  EYES: eyes grossly normal to inspection, conjunctivae and sclerae normal, no exophthalmos or proptosis  THYROID:  no apparent nodules or goiter  LUNGS: no audible wheeze, cough or visible cyanosis, no visible retractions or increased work of breathing  ABDOMEN: abdomen not evaluated  EXTREMITIES: no hand tremors, limited exam  NEUROLOGY: CN grossly intact, mentation intact and speech normal   SKIN:  no apparent skin lesions, rash, or edema with visualized skin appearance  PSYCH: mentation appears normal, affect normal/bright, judgement and insight intact,   normal speech and appearance well groomed      LABS:    All  pertinent notes, labs, and images personally reviewed by me.     A/P:  Encounter Diagnoses   Name Primary?     Type 2 diabetes mellitus with microalbuminuria, with long-term current use of insulin (H) Yes     Insulin pump status        Comments:  Reviewed health history and diabetes issues.  Details of her previous medication intolerances or allergies unclear  Often high glucose trends related to frequent postmeal bolusing  Reviewed and interpreted tests that I previously ordered.   Ordered appropriate tests for the endocrinology disease management.    Management options discussed and implemented after shared medical decision making with the patient.  Diabetes problem is chronic-stable    Plan:  Reviewed the overall T2DM management and insulin pump use.  Discussed optimal BG testing to assess glucose trends.  We reviewed insulin pump settings, basal rate and bolus dosing  Use of automated pump bolus dosing for meal/snack carb & correction dosing  Reviewed the Omnipod insulin pump report data today  Reviewed recent Freestyle Freddie CGM glucose trend data, in detail    Recommend:  Continue the Omnipod Dash insulin pump management plan.  No pump setting changes at this time    Reviewed the ideal premeal bolus routine and her more conservative postmeal bolus habits   Avoid delays or missing evening premeal boluses  Future treatment options include adding a SGLT2-I such as Jardiance  Reviewed plan for change to the Omnipod 5 with the DexcomG6 CGM soon   Keep the planned [facility] Diab Ed appt later this week, focus on DexcomG6 CGM start   Work with [facility] Diab Educator to set up an in-person Omnipod  appt foon  Repeat labs in 2-3 mo   Needs lab order placed  Needs repeat BP testing with PCP  Arrange annual dilated eye exam, fasting lipid panel testing  Discussed importance of regular endocrinology evaluations every 3-4 months.    Addressed patient's questions today.    There are no Patient Instructions on file for this  visit.    Future labs ordered today:   Orders Placed This Encounter   Procedures     GLUCOSE MONITOR, 72 HOUR, PHYS INTERP     Hemoglobin A1c     Basic metabolic panel     ALT     Radiology/Consults ordered today: None    Total time spent on day of encounter:  35 min    Follow-up:  3/8/23 at 2:30pm, Keara Hightower MD, MS  Endocrinology  [facility]    CC: MARVIN Eemry                  Again, thank you for allowing me to participate in the care of your patient.        Sincerely,        Chaitanya Hightower MD

## 2023-02-07 NOTE — PROGRESS NOTES
"Video-Visit Details    Type of service:  Video Visit    Video Start Time (time video started): 12:45 pm    Video End Time (time video stopped): 1:10 pm    Originating Location (pt. Location): Home        Distant Location (provider location):  Off site    Mode of Communication:  Video Conference via Dexcom          Recent issues:  Diabetes follow-up evaluation  Patient interested in switching to the Omnipod 5 with the DexcomG6 CGM  She has acquired the Omnipod 5 kit & pods, also the DexcomG6 supplies but hasn't started yet  Still doing pump bolusing postmeal, also habit of late night \"binge\" snacking         History of GDM with 2 pregnancies, diet management  1996. Diagnosis of diabetes mellitus  Recalls starting a diabetes pill  Had taken metformin, also Januvia but developed pancreatitis    Has seen endocrinology providers CAMILA Da Silva M. Schultz, CAMILA Rich, MARAH York, MARAH Childress, KRISTA Mejia  Has tried several diabetes medications previously, records indicate side effects or med issues:  Humalog- apparent concerns with the pancreatitis    Metformin--Abdominal pain.  Glipizide- Allergic reaction- (abdominal pain and swelling),   Byetta and Onglyza-- pancreatitis.  Invokana -- had upper GI distress.    ~2006. Started treatment with insulin medication  Endocrinology evaluations with Emily Phan CNP/[facility]  ~2017. Started Omnipod insulin pump use.  Subsequently saw Rosie Schwab, and CARLO Salgado for endocrinology evaluations.  Using Freestyle Freddie CGM    Previous  hgbA1c trends include:  2/21/06 C-Peptide 2.5  3/4/09 WILLIAMS-65 Ab <1.0     Lab Test 09/01/22  1017 05/20/22  0841 11/23/21  0859 09/18/20  0852 02/27/20  0954   A1C 7.8* 8.7* 8.2* 7.4* 8.0*           10/6/22. Initial diabetes evaluation with me at [facility]  Reviewed health history and diabetes issues  Using Omnipod Dash insulin pump:   Novolog pump    Uses ICR method for bolusing  Blood glucose (BG) meter  Uses " Freestyle Freddie CGM with smartphone   Scans 5-8x/day    Typically  Boluses postmeal, not premeal  Current Omnipod Dash pump settings: Info not available  Recent Omnipod pump data:   Info not available today    Recent Frestyle Freddie data:            Recent [facility] labs include:  Lab Results   Component Value Date    A1C 7.8 (H) 2022     2022    POTASSIUM 3.8 2022    CHLORIDE 107 2022    CO2 28 2022    ANIONGAP 5 2022     (H) 2022    BUN 19 2022    CR 0.98 2022    GFRESTIMATED 66 2022    GFRESTBLACK >90 2021    KARIN 9.5 2022    CHOL 209 (H) 2022    TRIG 196 (H) 2022    HDL 52 2022     (H) 2022    NHDL 157 (H) 2022    UCRR 55 11/10/2021    MICROL 17 11/10/2021    UMALCR 30.91 (H) 11/10/2021    TSH 1.19 2020    T4 0.87 2019     DM Complications:   Nephropathy:    Microalbuminuria        Lives in [place], [place]  Sees Dr. Madyson Mclaughlin/[facility] for general medicine evaluations.    PMH/PSH:  Past Medical History:   Diagnosis Date     Ascending aorta enlargement (H)      Depressive disorder     severe, prior hospitalization     Diverticulosis of colon (without mention of hemorrhage)      Fibromyalgia 2007     Insomnia      Irritable bowel syndrome      Osteopenia      Type 2 diabetes mellitus with complications (H)     microalbuminuria     Past Surgical History:   Procedure Laterality Date     BACK SURGERY      fusion  and removal of hardware 2004     C SPINAL ORTHOSIS,NOS  2002    lumbar surgery     CHOLECYSTECTOMY  2011     choley  2011     ENT SURGERY  1986    septoplasty     HYSTERECTOMY, CERVIX STATUS UNKNOWN      TVH/BSO     ORTHOPEDIC SURGERY  2005    left ankle       Family Hx:  Family History   Problem Relation Age of Onset     Diabetes Mother         type 2     Hypertension Mother      Cerebrovascular Disease Mother          stroke age 57     Ovarian Cancer Mother  43     Cancer Mother         cervix     Diabetes Father         type 2     Hypertension Father      Gastrointestinal Disease Father         colon polyps     Sleep Apnea Brother      Cancer Sister      Ovarian Cancer Sister 46     Ovarian Cancer Maternal Grandmother 72     Cancer Maternal Grandmother         cervix         Social Hx:  Social History     Socioeconomic History     Marital status:      Spouse name: Not on file     Number of children: 3     Years of education: Not on file     Highest education level: Not on file   Occupational History     Occupation: xr technician     Employer: [employer]   Tobacco Use     Smoking status: Never     Smokeless tobacco: Never   Substance and Sexual Activity     Alcohol use: Not Currently     Alcohol/week: 0.0 standard drinks     Comment: maybe once a month     Drug use: No     Sexual activity: Yes     Partners: Male     Birth control/protection: Surgical   Other Topics Concern     Parent/sibling w/ CABG, MI or angioplasty before 65F 55M? Not Asked   Social History Narrative     Not on file     Social Determinants of Health     Financial Resource Strain: Not on file   Food Insecurity: Not on file   Transportation Needs: Not on file   Physical Activity: Not on file   Stress: Not on file   Social Connections: Not on file   Intimate Partner Violence: Not on file   Housing Stability: Not on file          MEDICATIONS:  has a current medication list which includes the following prescription(s): freestyle ashleigh 14 day sensor, voltaren, HEMP OIL OR EXTRACT OR OTHER CBD CANNABINOID, NOT MEDICAL CANNABIS,, omnipod dash pods (gen 4), melatonin, novolog vial, polyethylene glycol, alcohol swab, benzonatate, freestyle lite, freestyle lite, blood glucose monitoring, blood glucose monitoring, compounded non-controlled substance, freestyle ashleigh 14 day reader, dexcom g6 sensor, dexcom g6 transmitter, dapagliflozin, epinephrine, omnipod 5 g6 pod (gen 5), insulin pump,  insulin syringe-needle u-100, levocetirizine, magnesium oxide, medical cannabis, ondansetron, and statin not prescribed.    ROS:     ROS: 10 point ROS neg other than the symptoms noted above in the HPI.    GENERAL: some fatigue, wt stable; denies fevers, chills, night sweats.   HEENT: no dysphagia, odonophagia, diplopia, neck pain  THYROID:  no apparent hyper or hypothyroid symptoms  CV: no chest pain, pressure, palpitations  LUNGS: no SOB, VASQUEZ, cough, wheezing   ABDOMEN: no diarrhea, constipation, abdominal pain  EXTREMITIES: no rashes, ulcers, edema  NEUROLOGY: no headaches, denies changes in vision, tingling, extremitiy numbness   MSK: no muscle aches or pains, weakness  SKIN: no rashes or lesions  : no menses  PSYCH:  stable mood, no significant anxiety or depression  ENDOCRINE: no heat or cold intolerance    Physical Exam (visual exam)  VS:  no vital signs taken for video visit  CONSTITUTIONAL: healthy, alert and NAD, well dressed, answering questions appropriately  ENT: no nose swelling or nasal discharge, mouth redness or gum changes.  EYES: eyes grossly normal to inspection, conjunctivae and sclerae normal, no exophthalmos or proptosis  THYROID:  no apparent nodules or goiter  LUNGS: no audible wheeze, cough or visible cyanosis, no visible retractions or increased work of breathing  ABDOMEN: abdomen not evaluated  EXTREMITIES: no hand tremors, limited exam  NEUROLOGY: CN grossly intact, mentation intact and speech normal   SKIN:  no apparent skin lesions, rash, or edema with visualized skin appearance  PSYCH: mentation appears normal, affect normal/bright, judgement and insight intact,   normal speech and appearance well groomed      LABS:    All pertinent notes, labs, and images personally reviewed by me.     A/P:  Encounter Diagnoses   Name Primary?     Type 2 diabetes mellitus with microalbuminuria, with long-term current use of insulin (H) Yes     Insulin pump status        Comments:  Reviewed health  history and diabetes issues.  Details of her previous medication intolerances or allergies unclear  Often high glucose trends related to frequent postmeal bolusing  Reviewed and interpreted tests that I previously ordered.   Ordered appropriate tests for the endocrinology disease management.    Management options discussed and implemented after shared medical decision making with the patient.  Diabetes problem is chronic-stable    Plan:  Reviewed the overall T2DM management and insulin pump use.  Discussed optimal BG testing to assess glucose trends.  We reviewed insulin pump settings, basal rate and bolus dosing  Use of automated pump bolus dosing for meal/snack carb & correction dosing  Reviewed the Omnipod insulin pump report data today  Reviewed recent Freestyle Freddie CGM glucose trend data, in detail    Recommend:  Continue the Omnipod Dash insulin pump management plan.  No pump setting changes at this time    Reviewed the ideal premeal bolus routine and her more conservative postmeal bolus habits   Avoid delays or missing evening premeal boluses  Future treatment options include adding a SGLT2-I such as Jardiance  Reviewed plan for change to the Omnipod 5 with the DexcomG6 CGM soon   Keep the planned [facility] Diab Ed appt later this week, focus on DexcomG6 CGM start   Work with [facility] Diab Educator to set up an in-person Omnipod  appt foon  Repeat labs in 2-3 mo   Needs lab order placed  Needs repeat BP testing with PCP  Arrange annual dilated eye exam, fasting lipid panel testing  Discussed importance of regular endocrinology evaluations every 3-4 months.    Addressed patient's questions today.    There are no Patient Instructions on file for this visit.    Future labs ordered today:   Orders Placed This Encounter   Procedures     GLUCOSE MONITOR, 72 HOUR, PHYS INTERP     Hemoglobin A1c     Basic metabolic panel     ALT     Radiology/Consults ordered today: None    Total time spent on day of encounter:  35  min    Follow-up:  3/8/23 at 2:30pm, Keara Hightower MD, MS  Endocrinology  [facility]    CC: MARVIN Emery

## 2023-02-08 NOTE — TELEPHONE ENCOUNTER
Routing to referrals for approval. Patient requesting referral to these orthopedic doctors for carpal tunnel:    Dr Allie Cruz    Anticipate this will be ok as patient is on Medicare through [facility].    Madyson Mclaughlin MD  Internal Medicine-Pediatrics

## 2023-02-09 ENCOUNTER — ALLIED HEALTH/NURSE VISIT (OUTPATIENT)
Dept: EDUCATION SERVICES | Facility: CLINIC | Age: 63
End: 2023-02-09
Payer: COMMERCIAL

## 2023-02-09 DIAGNOSIS — E11.65 TYPE 2 DIABETES MELLITUS WITH HYPERGLYCEMIA, WITH LONG-TERM CURRENT USE OF INSULIN (H): Primary | ICD-10-CM

## 2023-02-09 DIAGNOSIS — Z79.4 TYPE 2 DIABETES MELLITUS WITH HYPERGLYCEMIA, WITH LONG-TERM CURRENT USE OF INSULIN (H): Primary | ICD-10-CM

## 2023-02-09 PROCEDURE — G0108 DIAB MANAGE TRN  PER INDIV: HCPCS

## 2023-02-09 PROCEDURE — 99207 PR DROP WITH A PROCEDURE: CPT

## 2023-02-09 NOTE — LETTER
2/9/2023         RE: Maricarmen Dotson  [address]  [place] [address]        Dear Colleague,    Thank you for referring your patient, Maricarmen Dotson, to the [facility]. Please see a copy of my visit note below.    Diabetes Self-Management Education & Support    Presents for: Personal CGM Start    Type of Service: In Person Visit    Assessment Type:   Sensor started:: Started today in clinic  CGM Initial Settings: Dexcom  Dexcom Initial Settings: Low Glucose Alarm: On (fixed at 55 mg/dL and can't be changed), High Glucose Alert, Out of Range Alert, Low Glucose Alert    Sensor was inserted with no resistance or bleeding at insertion site.    CGM-specific education:   Dexcom sensor: insertion technique, sensor site location and rotation, insulin administration in relation to sensor placement, Dexcom : setting alerts/alarms, Use of trends and graphs for pattern management and problem solving, Dosing insulin based on sensor glucose results, Dexcom Mobile kaela and Dexcom Clarity software         ASSESSMENT:  Maricarmen is typically not comfortable bolusing before meals. States she will continue to work at bolusing before meals before she starts the Omnipod 5.     Pt verbalized understanding of concepts discussed and recommendations provided today.    Continue education with the following diabetes management concepts: Monitoring, Taking Medication, Problem Solving, Reducing Risks and Healthy Coping    PLAN  Refer to phone compatibility for Dexcom if getting a new phone.  Plan for Ominpod 5 pump start with Janeth at the [place] [facility] in March    Topics to cover at upcoming visits: Taking Medication, Problem Solving, Reducing Risks and Healthy Coping    See Care Plan for co-developed, patient-state behavior change goals.  AVS provided for patient today.    Education Materials Provided:  No new materials provided today      SUBJECTIVE/OBJECTIVE:  Presents for: Personal CGM  "Start  Accompanied by: Self  Diabetes education in the past 24mo: Yes  Focus of Visit: Assistance w/ making life changes, CGM, Insulin Pump  Type of Pump visit: Pump Review  Type of CGM visit: Personal CGM Follow-up  Diabetes type: Type 2  Disease course: Getting harder to manage  How confident are you filling out medical forms by yourself:: Somewhat  Diabetes management related comments/concerns: help lower A1C  Other concerns:: Cognitive impairment  Cultural Influences/Ethnic Background:   or     Diabetes Symptoms & Complications:  Fatigue: Yes  Neuropathy: Yes  Polydipsia: Yes  Polyphagia: Sometimes  Polyuria: Yes  Visual change: Yes  Slow healing wounds: Yes  Complications assessed today?: No  Autonomic neuropathy: Yes  CVA: No  Heart disease: Yes  Nephropathy: Yes  Peripheral neuropathy: Yes  Peripheral Vascular Disease: No  Retinopathy: No  Sexual dysfunction: Yes    Patient Problem List and Family Medical History reviewed for relevant medical history, current medical status, and diabetes risk factors.    Vitals:  [facility] 01/01/1999   Estimated body mass index is 32.27 kg/m  as calculated from the following:    Height as of 12/8/22: 1.626 m (5' 4\").    Weight as of 12/8/22: 85.3 kg (188 lb).   Last 3 BP:   BP Readings from Last 3 Encounters:   12/08/22 (!) 140/60   11/21/22 136/80   10/06/22 (!) 174/75       History   Smoking Status     Never   Smokeless Tobacco     Never       Labs:  Lab Results   Component Value Date    A1C 7.8 09/01/2022    A1C 7.4 09/18/2020    HEMOGLOBINA1 11.0 06/08/2015     Lab Results   Component Value Date     07/06/2022     01/20/2021     Lab Results   Component Value Date     09/01/2022     09/18/2020     HDL Cholesterol   Date Value Ref Range Status   09/18/2020 51 >49 mg/dL Final     Direct Measure HDL   Date Value Ref Range Status   09/01/2022 52 >=50 mg/dL Final   ]  GFR Estimate   Date Value Ref Range Status   07/06/2022 66 >60 " mL/min/1.73m2 Final     Comment:     Effective December 21, 2021 eGFRcr in adults is calculated using the 2021 CKD-EPI creatinine equation which includes age and gender (Jose E et al., [place], DOI: 10.1056/VZPZkm7132060)   01/20/2021 84 >60 mL/min/[1.73_m2] Final     Comment:     Non  GFR Calc  Starting 12/18/2018, serum creatinine based estimated GFR (eGFR) will be   calculated using the Chronic Kidney Disease Epidemiology Collaboration   (CKD-EPI) equation.       GFR Estimate If Black   Date Value Ref Range Status   01/20/2021 >90 >60 mL/min/[1.73_m2] Final     Comment:      GFR Calc  Starting 12/18/2018, serum creatinine based estimated GFR (eGFR) will be   calculated using the Chronic Kidney Disease Epidemiology Collaboration   (CKD-EPI) equation.       Lab Results   Component Value Date    CR 0.98 07/06/2022    CR 0.77 01/20/2021     No results found for: MICROALBUMIN    Healthy Eating:  Healthy Eating Assessed Today: Yes (Pt reports still struggling with binge eating late at night.)  Cultural/Alevism diet restrictions?: No  Meal planning/habits: Avoiding sweets, Carb counting, Low carb, Heart healthy, Low salt, Smaller portions (Pt reports binge eating disorder, usually happens at night.)  How many times a week on average do you eat food made away from home (restaurant/take-out)?: 2  Meals include: Breakfast, Lunch, Dinner, Evening Snack  Beverages: Water  Has patient met with a dietitian in the past?: Yes    Being Active:  Being Active Assessed Today: No  Exercise:: Yes  Days per week of moderate to strenuous exercise (like a brisk walk): 6  On average, minutes per day of exercise at this level: 150 or greater  How intense was your typical exercise? : Moderate (like brisk walking)  Exercise Minutes per Week: 900  Barrier to exercise: Time, Physical limitation    Monitoring:  Monitoring Assessed Today: Yes  Did patient bring glucose meter to appointment? : Yes  Blood Glucose  Meter: CGM  Times checking blood sugar at home (number): 5+  Times checking blood sugar at home (per): Day  Blood glucose trend: Fluctuating        Taking Medications:  Diabetes Medication(s)     Insulin       NOVOLOG VIAL 100 UNIT/ML soln    USE IN INSULIN PUMP, TOTAL DAILY DOSE  UNITS    Sodium-Glucose Co-Transporter 2 (SGLT2) Inhibitors       dapagliflozin (FARXIGA) 5 MG TABS tablet    Take 1 tablet (5 mg) by mouth daily          Taking Medication Assessed Today: Yes  Current Treatments: Diet, Insulin Pump, Oral Medication (taken by mouth)  Problems taking diabetes medications regularly?: No  Diabetes medication side effects?: No              Problem Solving:  Problem Solving Assessed Today: Yes  Is the patient at risk for hypoglycemia?: Yes  Hypoglycemia Frequency: Rarely      Reducing Risks:  Reducing Risks Assessed Today: No  CAD Risks: Diabetes Mellitus, Dyslipidemia, Family history, Hypertension, Obesity, Post-menopausal, Stress  Has dilated eye exam at least once a year?: Yes  Sees dentist every 6 months?: Yes  Feet checked by healthcare provider in the last year?: No    Healthy Coping:  Healthy Coping Assessed Today: Yes  Emotional response to diabetes: Ready to learn, Concern for health and well-being  Informal Support system:: Family, Spouse  Stage of change: ACTION (Actively working towards change)  Patient Activation Measure Survey Score:  JEFFERY Score (Last Two) 10/18/2013 4/22/2020   JEFFERY Raw Score 42 28   Activation Score 66 50   JEFFERY Level 3 2       Care Plan and Education Provided:  Care Plan: Diabetes   Updates made by Luba España since 2/9/2023 12:00 AM      Problem: HbA1C Not In Goal       Goal: Get HbA1C Level in Goal    Start Date: 2/9/2023   This Visit's Progress: 50%   Recent Progress: 0%   Note:    Maricarmen will get her A1C under 7%.     Problem: Diabetes Self-Management Education Needed to Optimize Self-Care Behaviors       Goal: Monitoring - monitor glucose and ketones as directed        Task: Provide education on continuous glucose monitoring (sensor placement, use of kaela or /reader, understanding glucose trends, alerts and alarms, differences between sensor glucose and blood glucose) Completed 2/9/2023   Responsible User: Ashley Emery RN      Goal: Problem Solving - know how to prevent and manage short-term diabetes complications       Task: Provide education on when to call a health care provider Completed 2/9/2023   Responsible User: Ashley Emery RN      Goal: Healthy Coping - use available resources to cope with the challenges of managing diabetes       Task: Provide education on benefits of utilizing support systems Completed 2/9/2023   Responsible User: Ashley Emery, PALOMA Emery RN, [facility] [place]    Time Spent: 60 minutes  Encounter Type: Individual    Any diabetes medication dose changes were made via the CDE Protocol per the patient's referring provider and endocrinology provider. A copy of this encounter was shared with the provider.

## 2023-02-09 NOTE — PROGRESS NOTES
Diabetes Self-Management Education & Support    Presents for: Personal CGM Start    Type of Service: In Person Visit    Assessment Type:   Sensor started:: Started today in clinic  CGM Initial Settings: Dexcom  Dexcom Initial Settings: Low Glucose Alarm: On (fixed at 55 mg/dL and can't be changed), High Glucose Alert, Out of Range Alert, Low Glucose Alert    Sensor was inserted with no resistance or bleeding at insertion site.    CGM-specific education:   Dexcom sensor: insertion technique, sensor site location and rotation, insulin administration in relation to sensor placement, Dexcom : setting alerts/alarms, Use of trends and graphs for pattern management and problem solving, Dosing insulin based on sensor glucose results, Dexcom Mobile kaela and Dexcom Clarity software         ASSESSMENT:  Maricarmen is typically not comfortable bolusing before meals. States she will continue to work at bolusing before meals before she starts the Omnipod 5.     Pt verbalized understanding of concepts discussed and recommendations provided today.    Continue education with the following diabetes management concepts: Monitoring, Taking Medication, Problem Solving, Reducing Risks and Healthy Coping    PLAN  Refer to phone compatibility for Dexcom if getting a new phone.  Plan for Ominpod 5 pump start with Janeth at the [facility] in March    Topics to cover at upcoming visits: Taking Medication, Problem Solving, Reducing Risks and Healthy Coping    See Care Plan for co-developed, patient-state behavior change goals.  AVS provided for patient today.    Education Materials Provided:  No new materials provided today      SUBJECTIVE/OBJECTIVE:  Presents for: Personal CGM Start  Accompanied by: Self  Diabetes education in the past 24mo: Yes  Focus of Visit: Assistance w/ making life changes, CGM, Insulin Pump  Type of Pump visit: Pump Review  Type of CGM visit: Personal CGM Follow-up  Diabetes type: Type 2  Disease course:  "Getting harder to manage  How confident are you filling out medical forms by yourself:: Somewhat  Diabetes management related comments/concerns: help lower A1C  Other concerns:: Cognitive impairment  Cultural Influences/Ethnic Background:   or     Diabetes Symptoms & Complications:  Fatigue: Yes  Neuropathy: Yes  Polydipsia: Yes  Polyphagia: Sometimes  Polyuria: Yes  Visual change: Yes  Slow healing wounds: Yes  Complications assessed today?: No  Autonomic neuropathy: Yes  CVA: No  Heart disease: Yes  Nephropathy: Yes  Peripheral neuropathy: Yes  Peripheral Vascular Disease: No  Retinopathy: No  Sexual dysfunction: Yes    Patient Problem List and Family Medical History reviewed for relevant medical history, current medical status, and diabetes risk factors.    Vitals:  LMP 01/01/1999   Estimated body mass index is 32.27 kg/m  as calculated from the following:    Height as of 12/8/22: 1.626 m (5' 4\").    Weight as of 12/8/22: 85.3 kg (188 lb).   Last 3 BP:   BP Readings from Last 3 Encounters:   12/08/22 (!) 140/60   11/21/22 136/80   10/06/22 (!) 174/75       History   Smoking Status     Never   Smokeless Tobacco     Never       Labs:  Lab Results   Component Value Date    A1C 7.8 09/01/2022    A1C 7.4 09/18/2020    HEMOGLOBINA1 11.0 06/08/2015     Lab Results   Component Value Date     07/06/2022     01/20/2021     Lab Results   Component Value Date     09/01/2022     09/18/2020     HDL Cholesterol   Date Value Ref Range Status   09/18/2020 51 >49 mg/dL Final     Direct Measure HDL   Date Value Ref Range Status   09/01/2022 52 >=50 mg/dL Final   ]  GFR Estimate   Date Value Ref Range Status   07/06/2022 66 >60 mL/min/1.73m2 Final     Comment:     Effective December 21, 2021 eGFRcr in adults is calculated using the 2021 CKD-EPI creatinine equation which includes age and gender (Jose E thompson al., NEJM, DOI: 10.1056/SGZLbp5097321)   01/20/2021 84 >60 mL/min/[1.73_m2] Final     " Comment:     Non  GFR Calc  Starting 12/18/2018, serum creatinine based estimated GFR (eGFR) will be   calculated using the Chronic Kidney Disease Epidemiology Collaboration   (CKD-EPI) equation.       GFR Estimate If Black   Date Value Ref Range Status   01/20/2021 >90 >60 mL/min/[1.73_m2] Final     Comment:      GFR Calc  Starting 12/18/2018, serum creatinine based estimated GFR (eGFR) will be   calculated using the Chronic Kidney Disease Epidemiology Collaboration   (CKD-EPI) equation.       Lab Results   Component Value Date    CR 0.98 07/06/2022    CR 0.77 01/20/2021     No results found for: MICROALBUMIN    Healthy Eating:  Healthy Eating Assessed Today: Yes (Pt reports still struggling with binge eating late at night.)  Cultural/Voodoo diet restrictions?: No  Meal planning/habits: Avoiding sweets, Carb counting, Low carb, Heart healthy, Low salt, Smaller portions (Pt reports binge eating disorder, usually happens at night.)  How many times a week on average do you eat food made away from home (restaurant/take-out)?: 2  Meals include: Breakfast, Lunch, Dinner, Evening Snack  Beverages: Water  Has patient met with a dietitian in the past?: Yes    Being Active:  Being Active Assessed Today: No  Exercise:: Yes  Days per week of moderate to strenuous exercise (like a brisk walk): 6  On average, minutes per day of exercise at this level: 150 or greater  How intense was your typical exercise? : Moderate (like brisk walking)  Exercise Minutes per Week: 900  Barrier to exercise: Time, Physical limitation    Monitoring:  Monitoring Assessed Today: Yes  Did patient bring glucose meter to appointment? : Yes  Blood Glucose Meter: CGM  Times checking blood sugar at home (number): 5+  Times checking blood sugar at home (per): Day  Blood glucose trend: Fluctuating        Taking Medications:  Diabetes Medication(s)     Insulin       NOVOLOG VIAL 100 UNIT/ML soln    USE IN INSULIN PUMP, TOTAL  DAILY DOSE  UNITS    Sodium-Glucose Co-Transporter 2 (SGLT2) Inhibitors       dapagliflozin (FARXIGA) 5 MG TABS tablet    Take 1 tablet (5 mg) by mouth daily          Taking Medication Assessed Today: Yes  Current Treatments: Diet, Insulin Pump, Oral Medication (taken by mouth)  Problems taking diabetes medications regularly?: No  Diabetes medication side effects?: No              Problem Solving:  Problem Solving Assessed Today: Yes  Is the patient at risk for hypoglycemia?: Yes  Hypoglycemia Frequency: Rarely      Reducing Risks:  Reducing Risks Assessed Today: No  CAD Risks: Diabetes Mellitus, Dyslipidemia, Family history, Hypertension, Obesity, Post-menopausal, Stress  Has dilated eye exam at least once a year?: Yes  Sees dentist every 6 months?: Yes  Feet checked by healthcare provider in the last year?: No    Healthy Coping:  Healthy Coping Assessed Today: Yes  Emotional response to diabetes: Ready to learn, Concern for health and well-being  Informal Support system:: Family, Spouse  Stage of change: ACTION (Actively working towards change)  Patient Activation Measure Survey Score:  JEFFERY Score (Last Two) 10/18/2013 4/22/2020   JEFFERY Raw Score 42 28   Activation Score 66 50   JEFFERY Level 3 2       Care Plan and Education Provided:  Care Plan: Diabetes   Updates made by Luba España since 2/9/2023 12:00 AM      Problem: HbA1C Not In Goal       Goal: Get HbA1C Level in Goal    Start Date: 2/9/2023   This Visit's Progress: 50%   Recent Progress: 0%   Note:    Maricarmen will get her A1C under 7%.     Problem: Diabetes Self-Management Education Needed to Optimize Self-Care Behaviors       Goal: Monitoring - monitor glucose and ketones as directed       Task: Provide education on continuous glucose monitoring (sensor placement, use of kaela or /reader, understanding glucose trends, alerts and alarms, differences between sensor glucose and blood glucose) Completed 2/9/2023   Responsible User: Ashley Emery RN       Goal: Problem Solving - know how to prevent and manage short-term diabetes complications       Task: Provide education on when to call a health care provider Completed 2/9/2023   Responsible User: Ashley Emery RN      Goal: Healthy Coping - use available resources to cope with the challenges of managing diabetes       Task: Provide education on benefits of utilizing support systems Completed 2/9/2023   Responsible User: Ashley Emery, RN        Ashley Emery RN, [facility]    Time Spent: 60 minutes  Encounter Type: Individual    Any diabetes medication dose changes were made via the CDE Protocol per the patient's referring provider and endocrinology provider. A copy of this encounter was shared with the provider.

## 2023-02-09 NOTE — PATIENT INSTRUCTIONS
It was great visiting with you today. Here is the resource to see if a new phone is compatible with the Dexcom. Please call if you have any questions.     [email or web address]    Ashley will connect with you once she knows the time and date to schedule the Omnipod 5 training with Tesfaye

## 2023-02-10 ENCOUNTER — MYC MEDICAL ADVICE (OUTPATIENT)
Dept: EDUCATION SERVICES | Facility: CLINIC | Age: 63
End: 2023-02-10
Payer: MEDICARE

## 2023-02-10 NOTE — CONFIDENTIAL NOTE
Sent pt Fundera message about pump start appt for next month.    Ashley Emery RN, [facility]

## 2023-02-12 ENCOUNTER — HEALTH MAINTENANCE LETTER (OUTPATIENT)
Age: 63
End: 2023-02-12

## 2023-02-16 NOTE — CONFIDENTIAL NOTE
Sent second message to pt about her pump start.    Ashley Emery RN, [facility]

## 2023-02-24 ENCOUNTER — TELEPHONE (OUTPATIENT)
Dept: EDUCATION SERVICES | Facility: CLINIC | Age: 63
End: 2023-02-24
Payer: MEDICARE

## 2023-02-24 NOTE — CONFIDENTIAL NOTE
Called pt today and spoke with her. See encounter from 2/24/23.    Ashley Emery RN, [facility]

## 2023-02-24 NOTE — TELEPHONE ENCOUNTER
I see that Maricarmen has not read her Return Path message about the pump start. I called to let her know it was set-up for March 9th at 1pm. No further questions.    Ashley Emery RN, [facility]

## 2023-03-01 NOTE — TELEPHONE ENCOUNTER
Requested Prescriptions   Pending Prescriptions Disp Refills     Continuous Blood Gluc  (FREESTYLE RADHA 14 DAY READER) MARIA C [Pharmacy Med Name: FREESTYLE RADHA 14 DAY READE  MARIA C]  0     Sig: USE TO CHECK BLOOD SUGARS AS DIRECTED       There is no refill protocol information for this order              Last Written Prescription Date:  12/31/18  Last Fill Quantity: 1,   # refills: 0  Last Office Visit: 6/10/19  Future Office visit:    Next 5 appointments (look out 90 days)    Aug 14, 2019  4:30 PM CDT  Telephone Visit with Bennett Ezra Goltz, PA-C  [facility] ([facility]) [address]  [facility] [place] [address]  [phone]   Aug 26, 2019  9:45 AM CDT  Return Visit with Nyla Jensen DO  [facility] ([facility]) [address]  [place] [id number] [phone]   Oct 25, 2019 10:00 AM CDT  (Arrive by 9:45 AM)  Return Visit with MARICRUZ Castillo CNP  [facility] ([facility]) [address]  [facility] [address] [phone]           Routing refill request to provider for review/approval because:  Drug not on the [facility], [facility] or [facility] refill protocol or controlled substance    
rx sent    Annamaria Erickson MD  Internal Medicine/Pediatrics    
No

## 2023-03-08 ENCOUNTER — MYC MEDICAL ADVICE (OUTPATIENT)
Dept: EDUCATION SERVICES | Facility: CLINIC | Age: 63
End: 2023-03-08

## 2023-03-08 ENCOUNTER — VIRTUAL VISIT (OUTPATIENT)
Dept: ENDOCRINOLOGY | Facility: CLINIC | Age: 63
End: 2023-03-08
Payer: COMMERCIAL

## 2023-03-08 DIAGNOSIS — E11.29 TYPE 2 DIABETES MELLITUS WITH MICROALBUMINURIA, WITH LONG-TERM CURRENT USE OF INSULIN (H): Primary | ICD-10-CM

## 2023-03-08 DIAGNOSIS — Z79.4 TYPE 2 DIABETES MELLITUS WITH MICROALBUMINURIA, WITH LONG-TERM CURRENT USE OF INSULIN (H): Primary | ICD-10-CM

## 2023-03-08 DIAGNOSIS — Z96.41 INSULIN PUMP STATUS: ICD-10-CM

## 2023-03-08 DIAGNOSIS — R80.9 TYPE 2 DIABETES MELLITUS WITH MICROALBUMINURIA, WITH LONG-TERM CURRENT USE OF INSULIN (H): Primary | ICD-10-CM

## 2023-03-08 PROCEDURE — 99214 OFFICE O/P EST MOD 30 MIN: CPT | Mod: VID | Performed by: INTERNAL MEDICINE

## 2023-03-08 RX ORDER — INSULIN PUMP CONTROLLER
1 EACH MISCELLANEOUS CONTINUOUS PRN
Qty: 40 EACH | Refills: 3 | Status: SHIPPED | OUTPATIENT
Start: 2023-03-08 | End: 2023-04-14

## 2023-03-08 NOTE — LETTER
3/8/2023         RE: Maricarmen Dotson  [address] [place] [address]        Dear Colleague,    Thank you for referring your patient, Maricarmen Dotson, to the [facility] [place]. Please see a copy of my visit note below.    Video-Visit Details    Type of service:  Video Visit    Video Start Time (time video started): 2:40 pm    Video End Time (time video stopped): 2:55 pm    Originating Location (pt. Location):  Home        Distant Location (provider location): Off-site    Mode of Communication:  Video Conference via [id number]        Recent issues:  Diabetes follow-up evaluation  Has used the Omnipod Dash, with plan to change to Omnipod 5 kit & pods, also the DexcomG6   She learned that the DexcomG6 and Omnipod 5 expenses much higher however,    decided to stay on Omnipod Dash with Freddie         History of GDM with 2 pregnancies, diet management  1996. Diagnosis of diabetes mellitus  Recalls starting a diabetes pill  Had taken metformin, also Januvia but developed pancreatitis    Has seen endocrinology providers CAMILA Da Silva M. Schultz, CAMILA Rich, MARAH York, MARAH Childress, KRISTA Mejia  Has tried several diabetes medications previously, records indicate side effects or med issues:  Humalog- apparent concerns with the pancreatitis    Metformin--Abdominal pain.  Glipizide- Allergic reaction- (abdominal pain and swelling),   Byetta and Onglyza-- pancreatitis.  Invokana -- had upper GI distress.    ~2006. Started treatment with insulin medication  Endocrinology evaluations with Emily Phan CNP/[facility]  ~2017. Started Omnipod insulin pump use.  Subsequently saw Rosie Schwab, and CARLO Salgado for endocrinology evaluations.  Using Freestyle Freddie CGM    Previous [facility] hgbA1c trends include:  2/21/06 C-Peptide 2.5  3/4/09 WILLIAMS-65 Ab <1.0     Lab Test 09/01/22  1017 05/20/22  0841 11/23/21  0859 09/18/20  0852 02/27/20  0954   A1C 7.8* 8.7* 8.2* 7.4* 8.0*            10/6/22. Initial diabetes evaluation with me at [place]  Reviewed health history and diabetes issues  Using Omnipod Dash insulin pump:   Novolog pump    Uses ICR method for bolusing  Blood glucose (BG) meter  Uses Freestyle Freddie CGM with smartphone   Scans 5-8x/day    Typically  Boluses postmeal, not premeal    Current Omnipod Dash settings: Info not available today  Recent Omnipod pump data:  Info not available today  Recent Frestyle Freddie data:  Info not available today    Recent [facility] labs include:  Lab Results   Component Value Date    A1C 7.8 (H) 09/01/2022     07/06/2022    POTASSIUM 3.8 07/06/2022    CHLORIDE 107 07/06/2022    CO2 28 07/06/2022    ANIONGAP 5 07/06/2022     (H) 07/06/2022    BUN 19 07/06/2022    CR 0.98 07/06/2022    GFRESTIMATED 66 07/06/2022    GFRESTBLACK >90 01/20/2021    KARIN 9.5 07/06/2022    CHOL 209 (H) 09/01/2022    TRIG 196 (H) 09/01/2022    HDL 52 09/01/2022     (H) 09/01/2022    NHDL 157 (H) 09/01/2022    UCRR 55 11/10/2021    MICROL 17 11/10/2021    UMALCR 30.91 (H) 11/10/2021    TSH 1.19 09/18/2020    T4 0.87 07/22/2019     DM Complications:   Nephropathy:    Microalbuminuria        Lives in [place], [place]  Sees Dr. Madyson Mclaughlin/[facility] for general medicine evaluations.    PMH/PSH:  Past Medical History:   Diagnosis Date     Ascending aorta enlargement (H)      Depressive disorder     severe, prior hospitalization     Diverticulosis of colon (without mention of hemorrhage)      Fibromyalgia 06/26/2007     Insomnia      Irritable bowel syndrome      Osteopenia      Type 2 diabetes mellitus with complications (H)     microalbuminuria     Past Surgical History:   Procedure Laterality Date     BACK SURGERY      fusion 1994 and removal of hardware 2004     C SPINAL ORTHOSIS,NOS  2002    lumbar surgery     CHOLECYSTECTOMY  2011     choley  2011     ENT SURGERY  1986    septoplasty     HYSTERECTOMY, CERVIX STATUS UNKNOWN  2000    TVH/BSO     ORTHOPEDIC  SURGERY  2005    left ankle       Family Hx:  Family History   Problem Relation Age of Onset     Diabetes Mother         type 2     Hypertension Mother      Cerebrovascular Disease Mother          stroke age 57     Ovarian Cancer Mother 43     Cancer Mother         cervix     Diabetes Father         type 2     Hypertension Father      Gastrointestinal Disease Father         colon polyps     Sleep Apnea Brother      Cancer Sister      Ovarian Cancer Sister 46     Ovarian Cancer Maternal Grandmother 72     Cancer Maternal Grandmother         cervix         Social Hx:  Social History     Socioeconomic History     Marital status:      Spouse name: Not on file     Number of children: 3     Years of education: Not on file     Highest education level: Not on file   Occupational History     Occupation: xr technician     Employer: [employer]   Tobacco Use     Smoking status: Never     Smokeless tobacco: Never   Substance and Sexual Activity     Alcohol use: Not Currently     Alcohol/week: 0.0 standard drinks     Comment: maybe once a month     Drug use: No     Sexual activity: Yes     Partners: Male     Birth control/protection: Surgical   Other Topics Concern     Parent/sibling w/ CABG, MI or angioplasty before 65F 55M? Not Asked   Social History Narrative     Not on file     Social Determinants of Health     Financial Resource Strain: Not on file   Food Insecurity: Not on file   Transportation Needs: Not on file   Physical Activity: Not on file   Stress: Not on file   Social Connections: Not on file   Intimate Partner Violence: Not on file   Housing Stability: Not on file          MEDICATIONS:  has a current medication list which includes the following prescription(s): omnipod dash pods (gen 4), alcohol swab, benzonatate, freestyle lite, freestyle lite, blood glucose monitoring, blood glucose monitoring, compounded non-controlled substance, freestyle ashleigh 14 day reader, freestyle ashleigh 14 day  sensor, dapagliflozin, voltaren, epinephrine, HEMP OIL OR EXTRACT OR OTHER CBD CANNABINOID, NOT MEDICAL CANNABIS,, insulin pump, insulin syringe-needle u-100, levocetirizine, magnesium oxide, medical cannabis, melatonin, novolog vial, ondansetron, polyethylene glycol, and statin not prescribed.    ROS:     ROS: 10 point ROS neg other than the symptoms noted above in the HPI.    GENERAL: some fatigue, wt stable; denies fevers, chills, night sweats.   HEENT: no dysphagia, odonophagia, diplopia, neck pain  THYROID:  no apparent hyper or hypothyroid symptoms  CV: no chest pain, pressure, palpitations  LUNGS: no SOB, VASQUEZ, cough, wheezing   ABDOMEN: no diarrhea, constipation, abdominal pain  EXTREMITIES: no rashes, ulcers, edema  NEUROLOGY: no headaches, denies changes in vision, tingling, extremitiy numbness   MSK: no muscle aches or pains, weakness  SKIN: no rashes or lesions  : no menses  PSYCH:  stable mood, no significant anxiety or depression  ENDOCRINE: no heat or cold intolerance    Physical Exam (visual exam)  VS:  no vital signs taken for video visit  CONSTITUTIONAL: healthy, alert and NAD, well dressed, answering questions appropriately  ENT: no nose swelling or nasal discharge, mouth redness or gum changes.  EYES: eyes grossly normal to inspection, conjunctivae and sclerae normal, no exophthalmos or proptosis  THYROID:  no apparent nodules or goiter  LUNGS: no audible wheeze, cough or visible cyanosis, no visible retractions or increased work of breathing  ABDOMEN: abdomen not evaluated  EXTREMITIES: no hand tremors, limited exam  NEUROLOGY: CN grossly intact, mentation intact and speech normal   SKIN:  no apparent skin lesions, rash, or edema with visualized skin appearance  PSYCH: mentation appears normal, affect normal/bright, judgement and insight intact,   normal speech and appearance well groomed      LABS:    All pertinent notes, labs, and images personally reviewed by me.     A/P:  Encounter  Diagnoses   Name Primary?     Type 2 diabetes mellitus with microalbuminuria, with long-term current use of insulin (H) Yes     Insulin pump status        Comments:  Reviewed health history and diabetes issues.  Details of her previous medication intolerances or allergies unclear  Difficult to assess glycemic control without recent glucose trend or pump data  Reviewed and interpreted tests that I previously ordered.   Ordered appropriate tests for the endocrinology disease management.    Management options discussed and implemented after shared medical decision making with the patient.  T2DM problem is chronic-stable    Plan:  Reviewed the overall T2DM management and insulin pump use.  Discussed optimal BG testing to assess glucose trends.  We reviewed insulin pump settings, basal rate and bolus dosing  Use of automated pump bolus dosing for meal/snack carb & correction dosing  Reviewed use of the Omnipod Dash insulin pump reports  Reviewed use of the Freestyle Freddie CGM glucose sensor    Recommend:  Since the new Omnipod 5 and DexcomG6 option very expensive for patient, I would not pursue this plan  Continue the current Omnipod Dash insulin pump management  No pump setting changes at this time    Encouraged her to call [facility] to inquire about refund for her Omnipod 5 kit/pods  Reviewed the ideal premeal bolus routine and her more conservative postmeal bolus habits   Avoid delays or missing evening premeal boluses  Future treatment options include adding a SGLT2-I such as Jardiance  Keep the scheduled [facility] appt tomorrow   Download Omnipod Dash and Freddie CGM devices   Review future options to remotely upload these devices for future appts  Repeat non-fasting labs soon   Testing at [facility] clinic   Lab orders available  Needs repeat BP testing with PCP  Arrange annual dilated eye exam, fasting lipid panel testing  Discussed importance of regular endocrinology evaluations every 3-4 months.    Addressed  patient's questions today.    There are no Patient Instructions on file for this visit.    Future labs ordered today: No orders of the defined types were placed in this encounter.    Radiology/Consults ordered today: None    Total time spent on day of encounter:  33 min    Follow-up:  6/8/23 at 1pm, Keara Hightower MD, MS  Endocrinology  [facility]    CC: MARVIN Emery                            Again, thank you for allowing me to participate in the care of your patient.        Sincerely,        Chaitanya Hightower MD

## 2023-03-08 NOTE — CONFIDENTIAL NOTE
Plan has changed for tomorrow and Maricarmen would like to use the ashleigh CGM with her current DASH PDM instead of switching over to the omnipod 5 system due to cost.    We will keep our appt tomorrow in order to upload her pump and review data.    Ashley Emery RN, [facility]

## 2023-03-08 NOTE — CONFIDENTIAL NOTE
Socialize message sent to Maricarmen about pump training appt tomorrow and trying to connect with Aure with omnipod before the appt to switch from the DASH to the omnipod 5. I resent the pump start (transfer) orders to Ashish OLIVEIRA, so she has those for tomorrow. I will also be meeting attending the training with Maricarmen.

## 2023-03-08 NOTE — PROGRESS NOTES
Video-Visit Details    Type of service:  Video Visit    Video Start Time (time video started): 2:40 pm    Video End Time (time video stopped): 2:55 pm    Originating Location (pt. Location):  Home        Distant Location (provider location): Off-site    Mode of Communication:  Video Conference via [employer]        Recent issues:  Diabetes follow-up evaluation  Has used the Omnipod Dash, with plan to change to Omnipod 5 kit & pods, also the DexcomG6   She learned that the DexcomG6 and Omnipod 5 expenses much higher however,    decided to stay on Omnipod Dash with Freddie         History of GDM with 2 pregnancies, diet management  1996. Diagnosis of diabetes mellitus  Recalls starting a diabetes pill  Had taken metformin, also Januvia but developed pancreatitis    Has seen endocrinology providers CAMILA Da Silva M. Schultz, CAMILA Rich, MARAH York, MARAH Childress, KRISTA Mejia  Has tried several diabetes medications previously, records indicate side effects or med issues:  Humalog- apparent concerns with the pancreatitis    Metformin--Abdominal pain.  Glipizide- Allergic reaction- (abdominal pain and swelling),   Byetta and Onglyza-- pancreatitis.  Invokana -- had upper GI distress.    ~2006. Started treatment with insulin medication  Endocrinology evaluations with Emily Phan CNP/[facility]  ~2017. Started Omnipod insulin pump use.  Subsequently saw Rosie Schwab, and CARLO Salgado for endocrinology evaluations.  Using Freestyle Freddie CGM    Previous  hgbA1c trends include:  2/21/06 C-Peptide 2.5  3/4/09 WILLIAMS-65 Ab <1.0     Lab Test 09/01/22  1017 05/20/22  0841 11/23/21  0859 09/18/20  0852 02/27/20  0954   A1C 7.8* 8.7* 8.2* 7.4* 8.0*           10/6/22. Initial diabetes evaluation with me at [facility]  Reviewed health history and diabetes issues  Using Omnipod Dash insulin pump:   Novolog pump    Uses ICR method for bolusing  Blood glucose (BG) meter  Uses Freestyle Freddie CGM with  smartphone   Scans 5-8x/day    Typically  Boluses postmeal, not premeal    Current Omnipod Dash settings: Info not available today  Recent Omnipod pump data:  Info not available today  Recent Frestyle Freddie data:  Info not available today    Recent [facility] labs include:  Lab Results   Component Value Date    A1C 7.8 (H) 2022     2022    POTASSIUM 3.8 2022    CHLORIDE 107 2022    CO2 28 2022    ANIONGAP 5 2022     (H) 2022    BUN 19 2022    CR 0.98 2022    GFRESTIMATED 66 2022    GFRESTBLACK >90 2021    KARIN 9.5 2022    CHOL 209 (H) 2022    TRIG 196 (H) 2022    HDL 52 2022     (H) 2022    NHDL 157 (H) 2022    UCRR 55 11/10/2021    MICROL 17 11/10/2021    UMALCR 30.91 (H) 11/10/2021    TSH 1.19 2020    T4 0.87 2019     DM Complications:   Nephropathy:    Microalbuminuria        Lives in [place], [place]  Sees Dr. Madyson Mcluaghlin/[facility] for general medicine evaluations.    PMH/PSH:  Past Medical History:   Diagnosis Date     Ascending aorta enlargement (H)      Depressive disorder     severe, prior hospitalization     Diverticulosis of colon (without mention of hemorrhage)      Fibromyalgia 2007     Insomnia      Irritable bowel syndrome      Osteopenia      Type 2 diabetes mellitus with complications (H)     microalbuminuria     Past Surgical History:   Procedure Laterality Date     BACK SURGERY      fusion  and removal of hardware 2004     C SPINAL ORTHOSIS,NOS  2002    lumbar surgery     CHOLECYSTECTOMY  2011     choley  2011     ENT SURGERY  1986    septoplasty     HYSTERECTOMY, CERVIX STATUS UNKNOWN      TVH/BSO     ORTHOPEDIC SURGERY  2005    left ankle       Family Hx:  Family History   Problem Relation Age of Onset     Diabetes Mother         type 2     Hypertension Mother      Cerebrovascular Disease Mother          stroke age 57     Ovarian Cancer Mother 43      Cancer Mother         cervix     Diabetes Father         type 2     Hypertension Father      Gastrointestinal Disease Father         colon polyps     Sleep Apnea Brother      Cancer Sister      Ovarian Cancer Sister 46     Ovarian Cancer Maternal Grandmother 72     Cancer Maternal Grandmother         cervix         Social Hx:  Social History     Socioeconomic History     Marital status:      Spouse name: Not on file     Number of children: 3     Years of education: Not on file     Highest education level: Not on file   Occupational History     Occupation: xr technician     Employer: [employer]   Tobacco Use     Smoking status: Never     Smokeless tobacco: Never   Substance and Sexual Activity     Alcohol use: Not Currently     Alcohol/week: 0.0 standard drinks     Comment: maybe once a month     Drug use: No     Sexual activity: Yes     Partners: Male     Birth control/protection: Surgical   Other Topics Concern     Parent/sibling w/ CABG, MI or angioplasty before 65F 55M? Not Asked   Social History Narrative     Not on file     Social Determinants of Health     Financial Resource Strain: Not on file   Food Insecurity: Not on file   Transportation Needs: Not on file   Physical Activity: Not on file   Stress: Not on file   Social Connections: Not on file   Intimate Partner Violence: Not on file   Housing Stability: Not on file          MEDICATIONS:  has a current medication list which includes the following prescription(s): omnipod dash pods (gen 4), alcohol swab, benzonatate, freestyle lite, freestyle lite, blood glucose monitoring, blood glucose monitoring, compounded non-controlled substance, freestyle ashleigh 14 day reader, freestyle ashleigh 14 day sensor, dapagliflozin, voltaren, epinephrine, HEMP OIL OR EXTRACT OR OTHER CBD CANNABINOID, NOT MEDICAL CANNABIS,, insulin pump, insulin syringe-needle u-100, levocetirizine, magnesium oxide, medical cannabis, melatonin, novolog vial, ondansetron,  polyethylene glycol, and statin not prescribed.    ROS:     ROS: 10 point ROS neg other than the symptoms noted above in the HPI.    GENERAL: some fatigue, wt stable; denies fevers, chills, night sweats.   HEENT: no dysphagia, odonophagia, diplopia, neck pain  THYROID:  no apparent hyper or hypothyroid symptoms  CV: no chest pain, pressure, palpitations  LUNGS: no SOB, VASQUEZ, cough, wheezing   ABDOMEN: no diarrhea, constipation, abdominal pain  EXTREMITIES: no rashes, ulcers, edema  NEUROLOGY: no headaches, denies changes in vision, tingling, extremitiy numbness   MSK: no muscle aches or pains, weakness  SKIN: no rashes or lesions  : no menses  PSYCH:  stable mood, no significant anxiety or depression  ENDOCRINE: no heat or cold intolerance    Physical Exam (visual exam)  VS:  no vital signs taken for video visit  CONSTITUTIONAL: healthy, alert and NAD, well dressed, answering questions appropriately  ENT: no nose swelling or nasal discharge, mouth redness or gum changes.  EYES: eyes grossly normal to inspection, conjunctivae and sclerae normal, no exophthalmos or proptosis  THYROID:  no apparent nodules or goiter  LUNGS: no audible wheeze, cough or visible cyanosis, no visible retractions or increased work of breathing  ABDOMEN: abdomen not evaluated  EXTREMITIES: no hand tremors, limited exam  NEUROLOGY: CN grossly intact, mentation intact and speech normal   SKIN:  no apparent skin lesions, rash, or edema with visualized skin appearance  PSYCH: mentation appears normal, affect normal/bright, judgement and insight intact,   normal speech and appearance well groomed      LABS:    All pertinent notes, labs, and images personally reviewed by me.     A/P:  Encounter Diagnoses   Name Primary?     Type 2 diabetes mellitus with microalbuminuria, with long-term current use of insulin (H) Yes     Insulin pump status        Comments:  Reviewed health history and diabetes issues.  Details of her previous medication  intolerances or allergies unclear  Difficult to assess glycemic control without recent glucose trend or pump data  Reviewed and interpreted tests that I previously ordered.   Ordered appropriate tests for the endocrinology disease management.    Management options discussed and implemented after shared medical decision making with the patient.  T2DM problem is chronic-stable    Plan:  Reviewed the overall T2DM management and insulin pump use.  Discussed optimal BG testing to assess glucose trends.  We reviewed insulin pump settings, basal rate and bolus dosing  Use of automated pump bolus dosing for meal/snack carb & correction dosing  Reviewed use of the Omnipod Dash insulin pump reports  Reviewed use of the Freestyle Freddie CGM glucose sensor    Recommend:  Since the new Omnipod 5 and DexcomG6 option very expensive for patient, I would not pursue this plan  Continue the current Omnipod Dash insulin pump management  No pump setting changes at this time    Encouraged her to call [facility] to inquire about refund for her Omnipod 5 kit/pods  Reviewed the ideal premeal bolus routine and her more conservative postmeal bolus habits   Avoid delays or missing evening premeal boluses  Future treatment options include adding a SGLT2-I such as Jardiance  Keep the scheduled [facility] appt tomorrow   Download Omnipod Dash and Freddie CGM devices   Review future options to remotely upload these devices for future appts  Repeat non-fasting labs soon   Testing at [facility]   Lab orders available  Needs repeat BP testing with PCP  Arrange annual dilated eye exam, fasting lipid panel testing  Discussed importance of regular endocrinology evaluations every 3-4 months.    Addressed patient's questions today.    There are no Patient Instructions on file for this visit.    Future labs ordered today: No orders of the defined types were placed in this encounter.    Radiology/Consults ordered today: None    Total time spent on day of  encounter:  33 min    Follow-up:  6/8/23 at 1pm, Keara Hightower MD, MS  Endocrinology  [facility]    CC: MARVIN Emery

## 2023-03-14 ENCOUNTER — ALLIED HEALTH/NURSE VISIT (OUTPATIENT)
Dept: EDUCATION SERVICES | Facility: CLINIC | Age: 63
End: 2023-03-14
Payer: COMMERCIAL

## 2023-03-14 DIAGNOSIS — E11.65 TYPE 2 DIABETES MELLITUS WITH HYPERGLYCEMIA, WITH LONG-TERM CURRENT USE OF INSULIN (H): Primary | ICD-10-CM

## 2023-03-14 DIAGNOSIS — Z79.4 TYPE 2 DIABETES MELLITUS WITH HYPERGLYCEMIA, WITH LONG-TERM CURRENT USE OF INSULIN (H): Primary | ICD-10-CM

## 2023-03-14 PROCEDURE — 99207 PR DROP WITH A PROCEDURE: CPT

## 2023-03-14 PROCEDURE — G0108 DIAB MANAGE TRN  PER INDIV: HCPCS

## 2023-03-14 NOTE — LETTER
3/14/2023         RE: Maricarmen Dotson  [address]  [place] [address]        Dear Colleague,    Thank you for referring your patient, Maricarmen Dotson, to the [facility]. Please see a copy of my visit note below.    Diabetes Self-Management Education & Support    Presents for: Insulin Pump and CGM Review    Type of Service: In Person Visit    Assessment Type:   REPORTS:                      Insulin Pump Information  Insulin Pump Brand: OmniPod    Education specific to insulin pump provided today on:   Connecting her Omnipod DASH to our clinic [facility] for remote downloading for appointments.    Opportunities for ongoing education and support in diabetes-self management were discussed.    Pt verbalized understanding of concepts discussed and recommendations provided today.       Continue education with the following diabetes management concepts: Monitoring, Taking Medication and Problem Solving    Pump Education Materials: No new materials.    ASSESSMENT  I met with Maricarmen today to upload her pump and help her get set-up to upload remotely. Since we last met she saw Dr. Hightower and has decided no to move forward with the omnipod 5 system due to cost. She has gone back to using her ashleigh CGM instead of the dexcom G6 we started at last visit. Much of our visit today was spent getting her connected to upload at home to our clinic [employer] account through her OmniPod DASH PDM. I believe it should be connected, but we will need to wait 24 hours to check. I uploaded her PDM in clinic today and we reviewed. She is recovering from a cold virus.  Glucose Patterns & Trends:  Higher BG's throughout most of day and night.    Patient would benefit from increase in basal rates.    Changes made to pump settings:  basal rate: Change from basal #3 to #4.    Changes made to sensor settings:   None    PLAN  -Contact Ashley once connection to [facility] with [employer] is established (should take about 24  "hours).  -Let Ashley know if the Zeppelin 3 kaela will work on your phone and if you would like a prescription for those sensors sent to [facility].    Topics to cover at upcoming visits: Monitoring, Taking Medication, Problem Solving and Reducing Risks    Follow-up: None planned at this time.    See Care Plan for co-developed, patient-state behavior change goals.  AVS provided for patient today.    Education Materials Provided:  No new materials provided today      SUBJECTIVE/OBJECTIVE:  Presents for: Insulin Pump and CGM Review  Accompanied by: Self  Diabetes education in the past 24mo: Yes  Focus of Visit: Assistance w/ making life changes, CGM, Insulin Pump  Type of Pump visit: Pump Review  Type of CGM visit: Personal CGM Follow-up  Diabetes type: Type 2  Disease course: Getting harder to manage  How confident are you filling out medical forms by yourself:: Somewhat  Other concerns:: Cognitive impairment  Cultural Influences/Ethnic Background:   or       Diabetes Symptoms & Complications:  Fatigue: Yes  Neuropathy: Yes  Polydipsia: Yes  Polyphagia: Sometimes  Polyuria: Yes  Visual change: Yes  Slow healing wounds: Yes  Complications assessed today?: No  Autonomic neuropathy: Yes  CVA: No  Heart disease: Yes  Nephropathy: Yes  Peripheral neuropathy: Yes  Peripheral Vascular Disease: No  Retinopathy: No  Sexual dysfunction: Yes    Patient Problem List and Family Medical History reviewed for relevant medical history, current medical status, and diabetes risk factors.    Vitals:  [facility] 01/01/1999   Estimated body mass index is 32.27 kg/m  as calculated from the following:    Height as of 12/8/22: 1.626 m (5' 4\").    Weight as of 12/8/22: 85.3 kg (188 lb).   Last 3 BP:   BP Readings from Last 3 Encounters:   12/08/22 (!) 140/60   11/21/22 136/80   10/06/22 (!) 174/75       History   Smoking Status     Never   Smokeless Tobacco     Never       Labs:  Lab Results   Component Value Date "    A1C 7.8 09/01/2022    A1C 7.4 09/18/2020    HEMOGLOBINA1 11.0 06/08/2015     Lab Results   Component Value Date     07/06/2022     01/20/2021     Lab Results   Component Value Date     09/01/2022     09/18/2020     HDL Cholesterol   Date Value Ref Range Status   09/18/2020 51 >49 mg/dL Final     Direct Measure HDL   Date Value Ref Range Status   09/01/2022 52 >=50 mg/dL Final   ]  GFR Estimate   Date Value Ref Range Status   07/06/2022 66 >60 mL/min/1.73m2 Final     Comment:     Effective December 21, 2021 eGFRcr in adults is calculated using the 2021 CKD-EPI creatinine equation which includes age and gender (Jose E et al., NE, DOI: 10.1056/PNEFqz9140995)   01/20/2021 84 >60 mL/min/[1.73_m2] Final     Comment:     Non  GFR Calc  Starting 12/18/2018, serum creatinine based estimated GFR (eGFR) will be   calculated using the Chronic Kidney Disease Epidemiology Collaboration   (CKD-EPI) equation.       GFR Estimate If Black   Date Value Ref Range Status   01/20/2021 >90 >60 mL/min/[1.73_m2] Final     Comment:      GFR Calc  Starting 12/18/2018, serum creatinine based estimated GFR (eGFR) will be   calculated using the Chronic Kidney Disease Epidemiology Collaboration   (CKD-EPI) equation.       Lab Results   Component Value Date    CR 0.98 07/06/2022    CR 0.77 01/20/2021     No results found for: MICROALBUMIN    Healthy Eating:  Healthy Eating Assessed Today: No    Being Active:  Being Active Assessed Today: No    Monitoring:  Monitoring Assessed Today: Yes  Did patient bring glucose meter to appointment? : Yes  Blood Glucose Meter: CGM  Blood glucose trend: Fluctuating        Taking Medications:  Diabetes Medication(s)     Insulin       NOVOLOG VIAL 100 UNIT/ML soln    USE IN INSULIN PUMP, TOTAL DAILY DOSE  UNITS    Sodium-Glucose Co-Transporter 2 (SGLT2) Inhibitors       dapagliflozin (FARXIGA) 5 MG TABS tablet    Take 1 tablet (5 mg) by mouth  daily          Taking Medication Assessed Today: Yes  Current Treatments: Diet, Insulin Pump, Oral Medication (taken by mouth)    Problem Solving:  Problem Solving Assessed Today: No              Reducing Risks:  Reducing Risks Assessed Today: No    Healthy Coping:  Healthy Coping Assessed Today: Yes  Emotional response to diabetes: Ready to learn, Concern for health and well-being  Informal Support system:: Family, Spouse  Stage of change: ACTION (Actively working towards change)  Patient Activation Measure Survey Score:  JEFFERY Score (Last Two) 10/18/2013 4/22/2020   JEFFERY Raw Score 42 28   Activation Score 66 50   JEFFERY Level 3 2         Care Plan and Education Provided:  Care Plan: Diabetes   Updates made by Ashley Emery RN since 3/14/2023 12:00 AM      Problem: HbA1C Not In Goal       Goal: Get HbA1C Level in Goal    Start Date: 2/9/2023   This Visit's Progress: 50%   Recent Progress: 50%   Note:    Maricarmen will get her A1C under 7%.         Ashley Emery RN, [facility]      Time Spent: 60 minutes  Encounter Type: Individual    Any diabetes medication dose changes were made via the CDE Protocol per the patient's referring provider and endocrinology provider. A copy of this encounter was shared with the provider.

## 2023-03-14 NOTE — PROGRESS NOTES
Diabetes Self-Management Education & Support    Presents for: Insulin Pump and CGM Review    Type of Service: In Person Visit    Assessment Type:   REPORTS:                      Insulin Pump Information  Insulin Pump Brand: OmniPod    Education specific to insulin pump provided today on:   Connecting her Omnipod DASH to our clinic [facility] for remote downloading for appointments.    Opportunities for ongoing education and support in diabetes-self management were discussed.    Pt verbalized understanding of concepts discussed and recommendations provided today.       Continue education with the following diabetes management concepts: Monitoring, Taking Medication and Problem Solving    Pump Education Materials: No new materials.    ASSESSMENT  I met with Maricarmen today to upload her pump and help her get set-up to upload remotely. Since we last met she saw Dr. Hightower and has decided no to move forward with the omnipod 5 system due to cost. She has gone back to using her ashleigh CGM instead of the dexcom G6 we started at last visit. Much of our visit today was spent getting her connected to upload at home to our clinic [employer] account through her OmniPod DASH PDM. I believe it should be connected, but we will need to wait 24 hours to check. I uploaded her PDM in clinic today and we reviewed. She is recovering from a cold virus.  Glucose Patterns & Trends:  Higher BG's throughout most of day and night.    Patient would benefit from increase in basal rates.    Changes made to pump settings:  basal rate: Change from basal #3 to #4.    Changes made to sensor settings:   None    PLAN  -Contact Ashley once connection to [facility] [place] with Omnipod is established (should take about 24 hours).  -Let Ashley know if the [employer] 3 kaela will work on your phone and if you would like a prescription for those sensors sent to [facility].    Topics to cover at upcoming visits: Monitoring, Taking Medication, Problem  "Solving and Reducing Risks    Follow-up: None planned at this time.    See Care Plan for co-developed, patient-state behavior change goals.  AVS provided for patient today.    Education Materials Provided:  No new materials provided today      SUBJECTIVE/OBJECTIVE:  Presents for: Insulin Pump and CGM Review  Accompanied by: Self  Diabetes education in the past 24mo: Yes  Focus of Visit: Assistance w/ making life changes, CGM, Insulin Pump  Type of Pump visit: Pump Review  Type of CGM visit: Personal CGM Follow-up  Diabetes type: Type 2  Disease course: Getting harder to manage  How confident are you filling out medical forms by yourself:: Somewhat  Other concerns:: Cognitive impairment  Cultural Influences/Ethnic Background:   or       Diabetes Symptoms & Complications:  Fatigue: Yes  Neuropathy: Yes  Polydipsia: Yes  Polyphagia: Sometimes  Polyuria: Yes  Visual change: Yes  Slow healing wounds: Yes  Complications assessed today?: No  Autonomic neuropathy: Yes  CVA: No  Heart disease: Yes  Nephropathy: Yes  Peripheral neuropathy: Yes  Peripheral Vascular Disease: No  Retinopathy: No  Sexual dysfunction: Yes    Patient Problem List and Family Medical History reviewed for relevant medical history, current medical status, and diabetes risk factors.    Vitals:  LMP 01/01/1999   Estimated body mass index is 32.27 kg/m  as calculated from the following:    Height as of 12/8/22: 1.626 m (5' 4\").    Weight as of 12/8/22: 85.3 kg (188 lb).   Last 3 BP:   BP Readings from Last 3 Encounters:   12/08/22 (!) 140/60   11/21/22 136/80   10/06/22 (!) 174/75       History   Smoking Status     Never   Smokeless Tobacco     Never       Labs:  Lab Results   Component Value Date    A1C 7.8 09/01/2022    A1C 7.4 09/18/2020    HEMOGLOBINA1 11.0 06/08/2015     Lab Results   Component Value Date     07/06/2022     01/20/2021     Lab Results   Component Value Date     09/01/2022     09/18/2020 "     HDL Cholesterol   Date Value Ref Range Status   09/18/2020 51 >49 mg/dL Final     Direct Measure HDL   Date Value Ref Range Status   09/01/2022 52 >=50 mg/dL Final   ]  GFR Estimate   Date Value Ref Range Status   07/06/2022 66 >60 mL/min/1.73m2 Final     Comment:     Effective December 21, 2021 eGFRcr in adults is calculated using the 2021 CKD-EPI creatinine equation which includes age and gender (Jose E et al., NEJ, DOI: 10.[facility]6/[id number])   01/20/2021 84 >60 mL/min/[1.73_m2] Final     Comment:     Non  GFR Calc  Starting 12/18/2018, serum creatinine based estimated GFR (eGFR) will be   calculated using the Chronic Kidney Disease Epidemiology Collaboration   (CKD-EPI) equation.       GFR Estimate If Black   Date Value Ref Range Status   01/20/2021 >90 >60 mL/min/[1.73_m2] Final     Comment:      GFR Calc  Starting 12/18/2018, serum creatinine based estimated GFR (eGFR) will be   calculated using the Chronic Kidney Disease Epidemiology Collaboration   (CKD-EPI) equation.       Lab Results   Component Value Date    CR 0.98 07/06/2022    CR 0.77 01/20/2021     No results found for: MICROALBUMIN    Healthy Eating:  Healthy Eating Assessed Today: No    Being Active:  Being Active Assessed Today: No    Monitoring:  Monitoring Assessed Today: Yes  Did patient bring glucose meter to appointment? : Yes  Blood Glucose Meter: CGM  Blood glucose trend: Fluctuating        Taking Medications:  Diabetes Medication(s)     Insulin       NOVOLOG VIAL 100 UNIT/ML soln    USE IN INSULIN PUMP, TOTAL DAILY DOSE  UNITS    Sodium-Glucose Co-Transporter 2 (SGLT2) Inhibitors       dapagliflozin (FARXIGA) 5 MG TABS tablet    Take 1 tablet (5 mg) by mouth daily          Taking Medication Assessed Today: Yes  Current Treatments: Diet, Insulin Pump, Oral Medication (taken by mouth)    Problem Solving:  Problem Solving Assessed Today: No              Reducing Risks:  Reducing Risks Assessed Today:  No    Healthy Coping:  Healthy Coping Assessed Today: Yes  Emotional response to diabetes: Ready to learn, Concern for health and well-being  Informal Support system:: Family, Spouse  Stage of change: ACTION (Actively working towards change)  Patient Activation Measure Survey Score:  JEFFERY Score (Last Two) 10/18/2013 4/22/2020   JEFFERY Raw Score 42 28   Activation Score 66 50   JEFFERY Level 3 2         Care Plan and Education Provided:  Care Plan: Diabetes   Updates made by Ashley Emery RN since 3/14/2023 12:00 AM      Problem: HbA1C Not In Goal       Goal: Get HbA1C Level in Goal    Start Date: 2/9/2023   This Visit's Progress: 50%   Recent Progress: 50%   Note:    Maricarmen will get her A1C under 7%.         Ashley Emery RN, [facility]      Time Spent: 60 minutes  Encounter Type: Individual    Any diabetes medication dose changes were made via the CDE Protocol per the patient's referring provider and endocrinology provider. A copy of this encounter was shared with the provider.

## 2023-03-14 NOTE — PATIENT INSTRUCTIONS
Change from basal #3 to basal #4. Can try #5 if needed.    Contact Ashley once connection to  [employer] [place] with [employer] is established (should take about 24 hours).    Let Ashley know if the "[employer] 3 kaela will work on your phone and if you would like a prescription for those sensors sent to [facility].

## 2023-03-15 ENCOUNTER — TRANSFERRED RECORDS (OUTPATIENT)
Dept: SURGERY | Facility: CLINIC | Age: 63
End: 2023-03-15
Payer: MEDICARE

## 2023-03-15 ENCOUNTER — MYC MEDICAL ADVICE (OUTPATIENT)
Dept: EDUCATION SERVICES | Facility: CLINIC | Age: 63
End: 2023-03-15
Payer: MEDICARE

## 2023-03-15 ENCOUNTER — TRANSFERRED RECORDS (OUTPATIENT)
Dept: HEALTH INFORMATION MANAGEMENT | Facility: CLINIC | Age: 63
End: 2023-03-15

## 2023-03-15 DIAGNOSIS — Z79.4 TYPE 2 DIABETES MELLITUS WITH HYPERGLYCEMIA, WITH LONG-TERM CURRENT USE OF INSULIN (H): Primary | ICD-10-CM

## 2023-03-15 DIAGNOSIS — E11.65 TYPE 2 DIABETES MELLITUS WITH HYPERGLYCEMIA, WITH LONG-TERM CURRENT USE OF INSULIN (H): Primary | ICD-10-CM

## 2023-03-15 RX ORDER — BLOOD-GLUCOSE SENSOR
1 EACH MISCELLANEOUS
Qty: 2 EACH | Refills: 6 | Status: SHIPPED | OUTPATIENT
Start: 2023-03-15 | End: 2023-07-31

## 2023-03-15 NOTE — CONFIDENTIAL NOTE
Responded to [facility] MyChart.    Ashley Emery RN, [facility]

## 2023-03-16 ENCOUNTER — TELEPHONE (OUTPATIENT)
Dept: PEDIATRICS | Facility: CLINIC | Age: 63
End: 2023-03-16
Payer: MEDICARE

## 2023-03-16 ASSESSMENT — ANXIETY QUESTIONNAIRES
3. WORRYING TOO MUCH ABOUT DIFFERENT THINGS: NEARLY EVERY DAY
4. TROUBLE RELAXING: NEARLY EVERY DAY
2. NOT BEING ABLE TO STOP OR CONTROL WORRYING: NEARLY EVERY DAY
7. FEELING AFRAID AS IF SOMETHING AWFUL MIGHT HAPPEN: NEARLY EVERY DAY
IF YOU CHECKED OFF ANY PROBLEMS ON THIS QUESTIONNAIRE, HOW DIFFICULT HAVE THESE PROBLEMS MADE IT FOR YOU TO DO YOUR WORK, TAKE CARE OF THINGS AT HOME, OR GET ALONG WITH OTHER PEOPLE: EXTREMELY DIFFICULT
5. BEING SO RESTLESS THAT IT IS HARD TO SIT STILL: NEARLY EVERY DAY
1. FEELING NERVOUS, ANXIOUS, OR ON EDGE: NEARLY EVERY DAY
GAD7 TOTAL SCORE: 21
6. BECOMING EASILY ANNOYED OR IRRITABLE: NEARLY EVERY DAY

## 2023-03-16 ASSESSMENT — PATIENT HEALTH QUESTIONNAIRE - PHQ9: SUM OF ALL RESPONSES TO PHQ QUESTIONS 1-9: 27

## 2023-03-16 NOTE — TELEPHONE ENCOUNTER
I am one of Dr. Mclaughlin's partners and am covering for her while she is out of the office.    BPA that patient's PHQ9 may reflect suicidal ideation. When rescheduling this patient she should be high priority for a visit with Dr. Arnoldo DOMINGUEZ, or be rescheduled with a different provider.    Desiree Jasso MD  Internal Medicine & Pediatrics  [facility]  She/her

## 2023-03-17 NOTE — TELEPHONE ENCOUNTER
Outgoing call spoke with patient.    Reschedule March 22nd appointment, Dr. Mclaughlin out.    Rescheduled to March 29th arrive at 11:10am with Dr. Mclaughlin.     Patient also requesting a referral for a surgeon to remove her parathyroid. It is elevated to 82.    Okay to send her a ncyclo message with referral information.    Thank you  Anmol HAWK

## 2023-03-17 NOTE — CONFIDENTIAL NOTE
It appears we were not able to get pt connected to upload her PDM to our [employer] account remotely. Pt is done trying at this point, but I may reach out to omnipod to see if they can see why it still isn't working.    Ashley Emery RN, [facility]

## 2023-03-20 ENCOUNTER — LAB (OUTPATIENT)
Dept: LAB | Facility: CLINIC | Age: 63
End: 2023-03-20
Payer: COMMERCIAL

## 2023-03-20 ENCOUNTER — MYC MEDICAL ADVICE (OUTPATIENT)
Dept: PEDIATRICS | Facility: CLINIC | Age: 63
End: 2023-03-20
Payer: MEDICARE

## 2023-03-20 ENCOUNTER — TELEPHONE (OUTPATIENT)
Dept: BEHAVIORAL HEALTH | Facility: CLINIC | Age: 63
End: 2023-03-20
Payer: MEDICARE

## 2023-03-20 DIAGNOSIS — R80.9 TYPE 2 DIABETES MELLITUS WITH MICROALBUMINURIA, WITH LONG-TERM CURRENT USE OF INSULIN (H): ICD-10-CM

## 2023-03-20 DIAGNOSIS — E21.5 PARATHYROID ABNORMALITY (H): Primary | ICD-10-CM

## 2023-03-20 DIAGNOSIS — Z96.41 INSULIN PUMP STATUS: ICD-10-CM

## 2023-03-20 DIAGNOSIS — E11.29 TYPE 2 DIABETES MELLITUS WITH MICROALBUMINURIA, WITH LONG-TERM CURRENT USE OF INSULIN (H): ICD-10-CM

## 2023-03-20 DIAGNOSIS — Z79.4 TYPE 2 DIABETES MELLITUS WITH MICROALBUMINURIA, WITH LONG-TERM CURRENT USE OF INSULIN (H): ICD-10-CM

## 2023-03-20 LAB
ALT SERPL W P-5'-P-CCNC: 19 U/L (ref 10–35)
ANION GAP SERPL CALCULATED.3IONS-SCNC: 11 MMOL/L (ref 7–15)
BUN SERPL-MCNC: 14.2 MG/DL (ref 8–23)
CALCIUM SERPL-MCNC: 9.6 MG/DL (ref 8.8–10.2)
CHLORIDE SERPL-SCNC: 102 MMOL/L (ref 98–107)
CREAT SERPL-MCNC: 0.82 MG/DL (ref 0.51–0.95)
DEPRECATED HCO3 PLAS-SCNC: 27 MMOL/L (ref 22–29)
GFR SERPL CREATININE-BSD FRML MDRD: 81 ML/MIN/1.73M2
GLUCOSE SERPL-MCNC: 288 MG/DL (ref 70–99)
HBA1C MFR BLD: 8.6 % (ref 0–5.6)
POTASSIUM SERPL-SCNC: 3.7 MMOL/L (ref 3.4–5.3)
SODIUM SERPL-SCNC: 140 MMOL/L (ref 136–145)

## 2023-03-20 PROCEDURE — 36415 COLL VENOUS BLD VENIPUNCTURE: CPT

## 2023-03-20 PROCEDURE — 83036 HEMOGLOBIN GLYCOSYLATED A1C: CPT

## 2023-03-20 PROCEDURE — 84460 ALANINE AMINO (ALT) (SGPT): CPT

## 2023-03-20 PROCEDURE — 80048 BASIC METABOLIC PNL TOTAL CA: CPT

## 2023-03-20 NOTE — TELEPHONE ENCOUNTER
Please see the  message from pt. Pt has an OV with  on 3/29 for 3 months f/u.     Pt f/u with [facility]([phone]), she saw Dr. Joanne Mtz on 3/15. Requested OV notes with lab results faxed to us at [phone] by leaving a detailed message to 's care team. Will await for the fax.     Can her referral request to f/u with a surgeon wait till 3/29? Pended surgical consultants referral as well. Please advise.    PALOMA Rosa  Patient Advocate Liason (PAL)  [facility]  Ph. [phone] / Fax. [phone]

## 2023-03-20 NOTE — TELEPHONE ENCOUNTER
"[facility]  Behavioral Health Clinician Triage Service    [facility] PHQ-9 Responses:  [facility] Follow-up to PHQ 12/8/2022 3/16/2023 3/16/2023   PHQ-9 9. Suicide Ideation past 2 weeks Several days Nearly every day Nearly every day   Thoughts of suicide or self harm in past 2 weeks Yes - Yes   Thoughts of suicide or self harm in past 2 weeks Yes Yes Yes   PHQ-9 Self harm plan? Yes - Yes   PHQ-9 Self harm action? No - Yes   PHQ-9 Safety concerns? No - No   PHQ-9 Self harm plan? Yes Yes Yes   PHQ-9 Self harm action? No Yes Yes   PHQ-9 Safety concerns? No No No        1st Outreach Date: March 20, 2023 Time: 12:38 p.m.  Outcome: Completed phone conversation / triage service.  See assessment and disposition below.  .This note has been reviewed and I agree with the plan of care. This note is co-signed by JULISSA Bill, [facility], Clinical Supervisor, on: 3/20/2023      Address/Location of patient: home  Have any supportive people near them? , two daughters.      Risk Assessment:  Patient has had a history of suicide attempts: 8-12 times, the last time being 1998.    Patient reports the following current or recent suicidal ideation or behaviors: chronic SI most of her life.  Patient denies current or recent homicidal ideation or behaviors.  Patient reports current or recent self injurious behavior or ideation including in past- decades ago.  Patient denies other safety concerns.  Patient reports there are firearms in the house. The firearms are secured in a locked space  Protective Factors Children in the home , Sense of responsibility to family, Reality testing ability and Positive social support   Risk Factors History of attempted suicide    ASQ Assessment:  1. In the past few weeks, have you wished you were dead?  Yes: chronic \"I've been like this since I was in my 30's\"  2. In the past few weeks, have you felt that you or your family would be better off if you         were dead?  No  3. In the past " "week, have you been having thoughts about killing yourself?  Yes: chronic, unchanged  4. Have you ever tried to kill yourself?  Yes: in the 1990's    Disposition:    - Recommendations / Safety Plan: A safety and risk management plan has not been developed at this time, however patient was encouraged to call [facility] / 911 should there be a change in any of these risk factors.  Pt declines creation of safety plan- writer has sent brief and crisis resources.    Pt reports she uses medical THC and CDB chews and \"there is no way I can go to treatment.\"  Pt notes both of her children are therapists and she knows of the [facility] number.  Pt notes she has verbal contracts with her daughters and will not harm herself.  Pt notes she already has a safety plan and doesn't want to create another one. She has the crisis numbers that Agnes Mcfadden sent her last year.  Pt states \"all your staff wants to do is put my on medications that's not going to work.\"      "

## 2023-03-20 NOTE — TELEPHONE ENCOUNTER
Signed the order for gen surg, however we still need recods AND if she wants to see us for consult for perceived parathyroid symptoms, she needs to schedule a visit for exam and potential econsult to endo.

## 2023-03-21 NOTE — TELEPHONE ENCOUNTER
We have received the records from the neurologist office. Placed in 's in-basket to review for the upcoming visit.     PALOMA Rosa  Patient Advocate Liason (PAL)  [facility]  Ph. [phone] / Fax. [phone]

## 2023-03-23 DIAGNOSIS — E21.3 HYPERPARATHYROIDISM, UNSPECIFIED (H): Primary | ICD-10-CM

## 2023-03-24 ENCOUNTER — DOCUMENTATION ONLY (OUTPATIENT)
Dept: LAB | Facility: CLINIC | Age: 63
End: 2023-03-24
Payer: MEDICARE

## 2023-03-24 NOTE — PROGRESS NOTES
The add-on test PTHI and Vitamin D that were requested on 3/24/2023 are unable to be completed due to age of sample. Future order is available for collection. If labs are needed before next appointment, please arrange for collection.

## 2023-03-26 ENCOUNTER — MYC MEDICAL ADVICE (OUTPATIENT)
Dept: ENDOCRINOLOGY | Facility: CLINIC | Age: 63
End: 2023-03-26
Payer: MEDICARE

## 2023-03-27 ASSESSMENT — ANXIETY QUESTIONNAIRES
GAD7 TOTAL SCORE: 21
7. FEELING AFRAID AS IF SOMETHING AWFUL MIGHT HAPPEN: NEARLY EVERY DAY
7. FEELING AFRAID AS IF SOMETHING AWFUL MIGHT HAPPEN: NEARLY EVERY DAY
3. WORRYING TOO MUCH ABOUT DIFFERENT THINGS: NEARLY EVERY DAY
2. NOT BEING ABLE TO STOP OR CONTROL WORRYING: NEARLY EVERY DAY
1. FEELING NERVOUS, ANXIOUS, OR ON EDGE: NEARLY EVERY DAY
8. IF YOU CHECKED OFF ANY PROBLEMS, HOW DIFFICULT HAVE THESE MADE IT FOR YOU TO DO YOUR WORK, TAKE CARE OF THINGS AT HOME, OR GET ALONG WITH OTHER PEOPLE?: EXTREMELY DIFFICULT
GAD7 TOTAL SCORE: 21
IF YOU CHECKED OFF ANY PROBLEMS ON THIS QUESTIONNAIRE, HOW DIFFICULT HAVE THESE PROBLEMS MADE IT FOR YOU TO DO YOUR WORK, TAKE CARE OF THINGS AT HOME, OR GET ALONG WITH OTHER PEOPLE: EXTREMELY DIFFICULT
4. TROUBLE RELAXING: NEARLY EVERY DAY
5. BEING SO RESTLESS THAT IT IS HARD TO SIT STILL: NEARLY EVERY DAY
6. BECOMING EASILY ANNOYED OR IRRITABLE: NEARLY EVERY DAY
GAD7 TOTAL SCORE: 21

## 2023-03-27 ASSESSMENT — PATIENT HEALTH QUESTIONNAIRE - PHQ9
SUM OF ALL RESPONSES TO PHQ QUESTIONS 1-9: 27
10. IF YOU CHECKED OFF ANY PROBLEMS, HOW DIFFICULT HAVE THESE PROBLEMS MADE IT FOR YOU TO DO YOUR WORK, TAKE CARE OF THINGS AT HOME, OR GET ALONG WITH OTHER PEOPLE: EXTREMELY DIFFICULT
SUM OF ALL RESPONSES TO PHQ QUESTIONS 1-9: 27

## 2023-03-29 ENCOUNTER — OFFICE VISIT (OUTPATIENT)
Dept: PEDIATRICS | Facility: CLINIC | Age: 63
End: 2023-03-29
Payer: COMMERCIAL

## 2023-03-29 VITALS
SYSTOLIC BLOOD PRESSURE: 130 MMHG | OXYGEN SATURATION: 97 % | BODY MASS INDEX: 33.57 KG/M2 | DIASTOLIC BLOOD PRESSURE: 74 MMHG | RESPIRATION RATE: 20 BRPM | HEART RATE: 61 BPM | WEIGHT: 195.6 LBS | TEMPERATURE: 97.7 F

## 2023-03-29 DIAGNOSIS — E21.3 HYPERPARATHYROIDISM, UNSPECIFIED (H): ICD-10-CM

## 2023-03-29 DIAGNOSIS — R79.89 ELEVATED PARATHYROID HORMONE: ICD-10-CM

## 2023-03-29 DIAGNOSIS — E21.3 PARATHYROID HORMONE EXCESS (H): ICD-10-CM

## 2023-03-29 DIAGNOSIS — R53.83 OTHER FATIGUE: ICD-10-CM

## 2023-03-29 DIAGNOSIS — N18.31 CHRONIC KIDNEY DISEASE, STAGE 3A (H): ICD-10-CM

## 2023-03-29 DIAGNOSIS — M79.10 MYALGIA: ICD-10-CM

## 2023-03-29 DIAGNOSIS — Z79.4 TYPE 2 DIABETES MELLITUS WITH HYPERGLYCEMIA, WITH LONG-TERM CURRENT USE OF INSULIN (H): ICD-10-CM

## 2023-03-29 DIAGNOSIS — E11.65 TYPE 2 DIABETES MELLITUS WITH HYPERGLYCEMIA, WITH LONG-TERM CURRENT USE OF INSULIN (H): ICD-10-CM

## 2023-03-29 DIAGNOSIS — B07.9 FILIFORM WART: Primary | ICD-10-CM

## 2023-03-29 DIAGNOSIS — F33.1 MODERATE EPISODE OF RECURRENT MAJOR DEPRESSIVE DISORDER (H): ICD-10-CM

## 2023-03-29 LAB
CRP SERPL-MCNC: 3.04 MG/L
ERYTHROCYTE [SEDIMENTATION RATE] IN BLOOD BY WESTERGREN METHOD: 12 MM/HR (ref 0–30)
PTH-INTACT SERPL-MCNC: 41 PG/ML (ref 15–65)

## 2023-03-29 PROCEDURE — 86140 C-REACTIVE PROTEIN: CPT | Performed by: INTERNAL MEDICINE

## 2023-03-29 PROCEDURE — 36415 COLL VENOUS BLD VENIPUNCTURE: CPT | Performed by: INTERNAL MEDICINE

## 2023-03-29 PROCEDURE — 83970 ASSAY OF PARATHORMONE: CPT | Performed by: INTERNAL MEDICINE

## 2023-03-29 PROCEDURE — 82306 VITAMIN D 25 HYDROXY: CPT | Performed by: INTERNAL MEDICINE

## 2023-03-29 PROCEDURE — 99214 OFFICE O/P EST MOD 30 MIN: CPT | Performed by: INTERNAL MEDICINE

## 2023-03-29 PROCEDURE — 82310 ASSAY OF CALCIUM: CPT | Performed by: INTERNAL MEDICINE

## 2023-03-29 PROCEDURE — 85652 RBC SED RATE AUTOMATED: CPT | Performed by: INTERNAL MEDICINE

## 2023-03-29 ASSESSMENT — PATIENT HEALTH QUESTIONNAIRE - PHQ9
SUM OF ALL RESPONSES TO PHQ QUESTIONS 1-9: 27
10. IF YOU CHECKED OFF ANY PROBLEMS, HOW DIFFICULT HAVE THESE PROBLEMS MADE IT FOR YOU TO DO YOUR WORK, TAKE CARE OF THINGS AT HOME, OR GET ALONG WITH OTHER PEOPLE: EXTREMELY DIFFICULT

## 2023-03-29 ASSESSMENT — ANXIETY QUESTIONNAIRES: GAD7 TOTAL SCORE: 21

## 2023-03-29 NOTE — PATIENT INSTRUCTIONS
Labs today.    Follow-up with Dr. Conte as scheduled.    Follow-up with your magnetic pulse doctor to see about restarting treatments even while waiting on treatment plan for hyperparathyroidism.

## 2023-03-29 NOTE — PROGRESS NOTES
"  Assessment & Plan     Filiform wart  Referral placed to dermatology; discussed that given location on waterr line of her lower lid this should be removed/treated by specialist.  - Adult Dermatology Referral; Future    Elevated parathyroid hormone  Myalgia  Other fatigue  Discussed with patient and her daughter extensively. I am less suspicious that her symptoms are related to hyperparathyroidism as her calcium level is in the normal range, however, she is quite convinced that this is the cause of her worsening muscle aches, weakness, fatigue and worsening mood symptoms. She has an appointment to discuss possible parathyroidectomy. I think this is less likely to help her symptoms and that the risk/benefit may be high, however will defer to her surgeon to discuss this more with her (of note, she reports that previously she had a hysterectomy with significant improvement in mood symptoms), in the meantime will screen for PMR with ESR and CRP today.   - ESR: Erythrocyte sedimentation rate  - CRP, inflammation    Type 2 diabetes mellitus with hyperglycemia, with long-term current use of insulin (H)  Follows with endocrinology for management.     Chronic kidney disease, stage 3a (H)  Stable and monitoring.     Severe treatment resistant depression  Encourage patient to follow-up with transcranial magnetic stimulation specialist as she was getting some significant benefits from therapy. Would encourage her to do this even while awaiting decision with surgery regarding appropriateness of parathyroidectomy for treatment of above issues.     45 minutes spent by me on the date of the encounter doing chart review, history and exam, documentation and further activities per the note       BMI:   Estimated body mass index is 33.57 kg/m  as calculated from the following:    Height as of 12/8/22: 1.626 m (5' 4\").    Weight as of this encounter: 88.7 kg (195 lb 9.6 oz).     Patient Instructions   Labs today.    Follow-up with  " Dg as scheduled.    Follow-up with your magnetic pulse doctor to see about restarting treatments even while waiting on treatment plan for hyperparathyroidism.       Madyson Mendoza MD  [facility] VALE Hernadez is a 62 year old, presenting for the following health issues:  Recheck Medication  No flowsheet data found.  History of Present Illness       Mental Health Follow-up:  Patient presents to follow-up on Depression & Anxiety.Patient's depression since last visit has been:  Worse  The patient is having other symptoms associated with depression.  Patient's anxiety since last visit has been:  Worse  The patient is having other symptoms associated with anxiety.  Any significant life events: health concerns  Patient is feeling anxious or having panic attacks.  Patient has no concerns about alcohol or drug use.    Hypertension: She presents for follow up of hypertension.  She does not check blood pressure  regularly outside of the clinic. Outside blood pressures have been over 140/90. She follows a low salt diet.     Reason for visit:  Elevated parathyroid level is 82. Increase anxiety ans depression.  Symptom onset:  More than a month  Symptoms include:  Severe joint and muscle pain. Insomnia.  Increase anxiety and depression.  Increase binge eating.  Symptom intensity:  Severe  Symptom progression:  Worsening  Had these symptoms before:  Yes  Has tried/received treatment for these symptoms:  Yes  Previous treatment was successful:  No  What makes it worse:  To much movement.  Not sleeping.  Mild trauma.  What makes it better:  Walking,  medical marijuana and CBD.    She eats 0-1 servings of fruits and vegetables daily.She consumes 0 sweetened beverage(s) daily. She exercises with enough effort to increase her heart rate 3 or less days per week.   She is taking medications regularly.    Today's PHQ-9         PHQ-9 Total Score: 27    PHQ-9 Q9 Thoughts of better off dead/self-harm past 2  weeks :   Nearly every day  Thoughts of suicide or self harm: (P) Yes  Self-harm Plan:   (P) Yes  Self-harm Action:     (P) Yes  Safety concerns for self or others: (P) No    How difficult have these problems made it for you to do your work, take care of things at home, or get along with other people: Extremely difficult  Today's WILLIAMS-7 Score: 21       Fatigue, worsening mood symptoms, muscle aches/joint pains.     Maricarmen comes in with her daughter for follow-up. She reports that she had been doing magnetic pulse therapy for her depression - this was actually reasonably helpful at least initially (and she also noted benefits to incontinence, blood pressure and carpal tunnel syndrome), however, she subsequently developed worsening mood symptoms including escalating depression and anxiety symptoms, insomnia and increased fatigue as well as diffuse body aches and weakness. Saw her neurologist who did some testing and found that her PTHi was in the 80s (although calcium level at that time was normal). She attributes much of her symptom worsening to hyperparathyroidism and is interested in pursuing parathyroidectomy. Previously saw her endocrinologist, who felt that this was not likely the source of her symptoms. She has an appointment scheduled with surgery to discuss whether she would be a candidate for removal at this time.      Review of Systems   Constitutional, HEENT, cardiovascular, pulmonary, gi and gu systems are negative, except as otherwise noted.      Objective    /74   Pulse 61   Temp 97.7  F (36.5  C) (Temporal)   Resp 20   Wt 88.7 kg (195 lb 9.6 oz)   LMP 01/01/1999   SpO2 97%   BMI 33.57 kg/m    Body mass index is 33.57 kg/m .  Physical Exam   GENERAL: healthy, alert and no distress  PSYCH: mentation appears normal, affect normal/bright but slightly anxious  DERM: small filiform wart noted on lower R eyelid.

## 2023-03-30 ENCOUNTER — TRANSFERRED RECORDS (OUTPATIENT)
Dept: HEALTH INFORMATION MANAGEMENT | Facility: CLINIC | Age: 63
End: 2023-03-30
Payer: MEDICARE

## 2023-03-30 LAB — DEPRECATED CALCIDIOL+CALCIFEROL SERPL-MC: 22 UG/L (ref 20–75)

## 2023-03-31 LAB — CALCIUM SERPL-MCNC: 9.7 MG/DL (ref 8.8–10.2)

## 2023-04-11 DIAGNOSIS — E11.65 TYPE 2 DIABETES MELLITUS WITH HYPERGLYCEMIA, WITH LONG-TERM CURRENT USE OF INSULIN (H): ICD-10-CM

## 2023-04-11 DIAGNOSIS — Z79.4 TYPE 2 DIABETES MELLITUS WITH HYPERGLYCEMIA, WITH LONG-TERM CURRENT USE OF INSULIN (H): ICD-10-CM

## 2023-04-11 DIAGNOSIS — E11.9 TYPE 2 DIABETES, HBA1C GOAL < 7% (H): ICD-10-CM

## 2023-04-11 RX ORDER — INSULIN ASPART 100 [IU]/ML
INJECTION, SOLUTION INTRAVENOUS; SUBCUTANEOUS
Qty: 90 ML | Refills: 0 | Status: SHIPPED | OUTPATIENT
Start: 2023-04-11 | End: 2023-04-14

## 2023-04-11 NOTE — TELEPHONE ENCOUNTER
"Last Written Prescription Date:  12/1/22  Last Fill Quantity: 90,  # refills: 0   Last office visit: 3/8/23 with Dr. Hightower  Future Office Visit: 6/8/23  Next 5 appointments (look out 90 days)    Jul 05, 2023 10:30 AM  (Arrive by 10:10 AM)  Provider Visit with Madyson Mclaughlin MD  [facility] ([facility] ) [address] [place] [address]  [address]  [facility] [id number] [phone]               Requested Prescriptions   Pending Prescriptions Disp Refills     NOVOLOG VIAL 100 UNIT/ML soln [Pharmacy Med Name: NOVOLOG VIAL 10ML 100U/ML] 90 mL 3     Sig: USE IN INSULIN PUMP, TOTAL DAILY DOSE  UNITS       Short Acting Insulin Protocol Passed - 4/11/2023  3:59 PM        Passed - Serum creatinine on file in past 12 months     Recent Labs   Lab Test 03/20/23  1352   CR 0.82       Ok to refill medication if creatinine is low          Passed - HgbA1C in past 3 or 6 months     If HgbA1C is 8 or greater, it needs to be on file within the past 3 months.  If less than 8, must be on file within the past 6 months.     Recent Labs   Lab Test 03/20/23  1352 01/08/16  1411 06/08/15  0000   A1C 8.6*   < >  --    HEMOGLOBINA1  --   --  11.0*    < > = values in this interval not displayed.             Passed - Medication is active on med list        Passed - Patient is age 18 or older        Passed - Recent (6 mo) or future (30 days) visit within the authorizing provider's specialty     Patient had office visit in the last 6 months or has a visit in the next 30 days with authorizing provider or within the authorizing provider's specialty.  See \"Patient Info\" tab in inbasket, or \"Choose Columns\" in Meds & Orders section of the refill encounter.               Refill sent  Kylah Foster RN    "

## 2023-04-14 ENCOUNTER — MYC MEDICAL ADVICE (OUTPATIENT)
Dept: EDUCATION SERVICES | Facility: CLINIC | Age: 63
End: 2023-04-14
Payer: MEDICARE

## 2023-04-14 DIAGNOSIS — R80.9 TYPE 2 DIABETES MELLITUS WITH MICROALBUMINURIA, WITH LONG-TERM CURRENT USE OF INSULIN (H): ICD-10-CM

## 2023-04-14 DIAGNOSIS — Z96.41 INSULIN PUMP STATUS: ICD-10-CM

## 2023-04-14 DIAGNOSIS — E11.29 TYPE 2 DIABETES MELLITUS WITH MICROALBUMINURIA, WITH LONG-TERM CURRENT USE OF INSULIN (H): ICD-10-CM

## 2023-04-14 DIAGNOSIS — Z79.4 TYPE 2 DIABETES MELLITUS WITH MICROALBUMINURIA, WITH LONG-TERM CURRENT USE OF INSULIN (H): ICD-10-CM

## 2023-04-14 RX ORDER — INSULIN PUMP CONTROLLER
1 EACH MISCELLANEOUS CONTINUOUS PRN
Qty: 40 EACH | Refills: 3 | Status: SHIPPED | OUTPATIENT
Start: 2023-04-14 | End: 2024-04-22

## 2023-04-14 NOTE — TELEPHONE ENCOUNTER
[facility].  Novolog was sent on 4/11/23.    Aleica Land MS, RD, LD, CDE

## 2023-04-18 ENCOUNTER — ALLIED HEALTH/NURSE VISIT (OUTPATIENT)
Dept: EDUCATION SERVICES | Facility: CLINIC | Age: 63
End: 2023-04-18
Payer: COMMERCIAL

## 2023-04-18 DIAGNOSIS — R80.9 TYPE 2 DIABETES MELLITUS WITH MICROALBUMINURIA, WITH LONG-TERM CURRENT USE OF INSULIN (H): Primary | ICD-10-CM

## 2023-04-18 DIAGNOSIS — Z79.4 TYPE 2 DIABETES MELLITUS WITH MICROALBUMINURIA, WITH LONG-TERM CURRENT USE OF INSULIN (H): Primary | ICD-10-CM

## 2023-04-18 DIAGNOSIS — E11.29 TYPE 2 DIABETES MELLITUS WITH MICROALBUMINURIA, WITH LONG-TERM CURRENT USE OF INSULIN (H): Primary | ICD-10-CM

## 2023-04-18 PROCEDURE — G0108 DIAB MANAGE TRN  PER INDIV: HCPCS

## 2023-04-18 NOTE — PATIENT INSTRUCTIONS
-Try uploading DASH PDM to the cloud when on home Wi-Fi  -Try uploading to [employer] via your computer  - Let Ashley know if either of these work and you would like her to review data before your upcoming appt with Dr. Hightower.      Info from [employer]:  Please have the user do the following...  Verify that the PDM is connected to wi-fi successfully and use the below steps if not:  The PDM must be connected to a secure Wi-Fi network to successfully upload data to [employer]. For the best user experience, the PDM should be connected to Wi-Fi every night at midnight.  How to enable Wi-Fi:  On the Home Screen tap the [employer] menu in upper left  Under Settings, tap PDM Device  Turn Wi-Fi on by tapping the toggle button  Under Wi-Fi tap Disconnected  Tap your network  Type in password  Tap Connect  On the upper right of screen, tap on the three dots  Tap Advanced  Tap Keep Wi-Fi on During Sleep  Tap Always     Per [employer] - Please note: For security reasons, the PDM cannot connect to a Wi-Fi network that requires the user to accept any terms and conditions through an Internet browser. In some scenarios (e.g. using a hotel Wi-Fi network) it may appear that the PDM is connected to Wi-Fi, however, the connection is not established because the user is unable to accept the necessary terms and conditions. To mitigate this issue, check if PDM data is flowing to the [employer] cloud with these steps:  Tap the [employer] Menu in top left  Scroll Down to About Screen  Check that the Last Sync to Cloud Date is now/recent     a. If it is not, try turning Airplane mode on and off: - Menu - > Settings -> PDM Device -> turn Airplane Mode ON and then Turn OFF  b. If they are still not connected, perform a manual sync of the DASH, and if data still does not populate following next midnight sync contact [employer] Support     How to perform manual sync of DASH: How do I upload an [employer] DASH  using my computer?

## 2023-04-18 NOTE — PROGRESS NOTES
"Diabetes Self-Management Education & Support    Presents for: Insulin Pump and CGM Review    Type of Service: In Person Visit    Assessment Type:   REPORTS:                          Insulin Pump Information  Insulin Pump Brand: OmniPod    Education specific to insulin pump provided today on:   Uploading from home.     Opportunities for ongoing education and support in diabetes-self management were discussed.    Pt verbalized understanding of concepts discussed and recommendations provided today.       Continue education with the following diabetes management concepts: Monitoring, Taking Medication and Problem Solving    Pump Education Materials: No new materials.    ASSESSMENT  I met with Maricarmen today to see if I could help her upload her omnipod DASH from home. I completed the steps from [employer]ip[employer] that they suggested for it to upload to the cloud, but it did not work here in the office. I was notified by omnipod that they no longer support uploading from a phone, so Maricarmen's other option is to upload from her computer at home. We uploaded her PDM in clinic today and looked at her ashleigh data as well. Maricarmen said she is needing to trick the pump with \"false carb amounts\" so it will give her a correction dose. She was not interested in changing any settings today as her eating schedule has been off.   Glucose Patterns & Trends:  Continued variability, but no hypoglycemia noted.    Patient would benefit from: No changes made to pump settings today as pt would like to continue her current plan and will work on being able to upload from home..      PLAN  -Try uploading DASH PDM to the cloud when on home Wi-Fi  -Try uploading to [employer] via your computer  - Let Ashley know if either of these work and you would like her to review data before your upcoming appt with Dr. Hightower.    Topics to cover at upcoming visits: Monitoring, Taking Medication and Problem Solving    Follow-up: June 6 for pump upload (if not able to do from home) " "for Dr. Hightower appt on 6/8.    See Care Plan for co-developed, patient-state behavior change goals.  AVS provided for patient today.    Education Materials Provided:  No new materials provided today      SUBJECTIVE/OBJECTIVE:  Presents for: Insulin Pump and CGM Review  Accompanied by: Self  Diabetes education in the past 24mo: Yes  Focus of Visit: Assistance w/ making life changes, CGM, Insulin Pump  Type of Pump visit: Pump Review  Type of CGM visit: Personal CGM Follow-up  Diabetes type: Type 2  Disease course: Getting harder to manage  How confident are you filling out medical forms by yourself:: Somewhat  Other concerns:: Cognitive impairment  Cultural Influences/Ethnic Background:   or       Diabetes Symptoms & Complications:  Fatigue: Yes  Neuropathy: Yes  Polydipsia: Yes  Polyphagia: Sometimes  Polyuria: Yes  Visual change: Yes  Slow healing wounds: Yes  Complications assessed today?: No  Autonomic neuropathy: Yes  CVA: No  Heart disease: Yes  Nephropathy: Yes  Peripheral neuropathy: Yes  Peripheral Vascular Disease: No  Retinopathy: No  Sexual dysfunction: Yes    Patient Problem List and Family Medical History reviewed for relevant medical history, current medical status, and diabetes risk factors.    Vitals:  LMP 01/01/1999   Estimated body mass index is 33.57 kg/m  as calculated from the following:    Height as of 12/8/22: 1.626 m (5' 4\").    Weight as of 3/29/23: 88.7 kg (195 lb 9.6 oz).   Last 3 BP:   BP Readings from Last 3 Encounters:   03/29/23 130/74   12/08/22 (!) 140/60   11/21/22 136/80       History   Smoking Status     Never   Smokeless Tobacco     Never       Labs:  Lab Results   Component Value Date    A1C 8.6 03/20/2023    A1C 7.4 09/18/2020    HEMOGLOBINA1 11.0 06/08/2015     Lab Results   Component Value Date     03/20/2023     07/06/2022     01/20/2021     Lab Results   Component Value Date     09/01/2022     09/18/2020     HDL Cholesterol "   Date Value Ref Range Status   09/18/2020 51 >49 mg/dL Final     Direct Measure HDL   Date Value Ref Range Status   09/01/2022 52 >=50 mg/dL Final   ]  GFR Estimate   Date Value Ref Range Status   03/20/2023 81 >60 mL/min/1.73m2 Final     Comment:     eGFR calculated using 2021 CKD-EPI equation.   01/20/2021 84 >60 mL/min/[1.73_m2] Final     Comment:     Non  GFR Calc  Starting 12/18/2018, serum creatinine based estimated GFR (eGFR) will be   calculated using the Chronic Kidney Disease Epidemiology Collaboration   (CKD-EPI) equation.       GFR Estimate If Black   Date Value Ref Range Status   01/20/2021 >90 >60 mL/min/[1.73_m2] Final     Comment:      GFR Calc  Starting 12/18/2018, serum creatinine based estimated GFR (eGFR) will be   calculated using the Chronic Kidney Disease Epidemiology Collaboration   (CKD-EPI) equation.       Lab Results   Component Value Date    CR 0.82 03/20/2023    CR 0.77 01/20/2021     No results found for: MICROALBUMIN    Healthy Eating:  Healthy Eating Assessed Today: No    Being Active:  Being Active Assessed Today: No    Monitoring:  Monitoring Assessed Today: Yes  Did patient bring glucose meter to appointment? : Yes  Blood Glucose Meter: CGM  Blood glucose trend: Fluctuating        Taking Medications:  Diabetes Medication(s)     Insulin       insulin aspart (NOVOLOG VIAL) 100 UNITS/ML vial    USE IN INSULIN PUMP, TOTAL DAILY DOSE  UNITS    Sodium-Glucose Co-Transporter 2 (SGLT2) Inhibitors       dapagliflozin (FARXIGA) 5 MG TABS tablet    Take 1 tablet (5 mg) by mouth daily          Taking Medication Assessed Today: Yes  Current Treatments: Diet, Insulin Pump, Oral Medication (taken by mouth)    Problem Solving:  Problem Solving Assessed Today: No              Reducing Risks:  Reducing Risks Assessed Today: No    Healthy Coping:  Healthy Coping Assessed Today: Yes  Emotional response to diabetes: Ready to learn, Concern for health and  well-being  Informal Support system:: Family, Spouse  Stage of change: ACTION (Actively working towards change)  Patient Activation Measure Survey Score:      3/16/2023    11:31 AM 3/27/2023    12:00 PM   JEFFERY Score (Last Two)   JEFFERY Raw Score 27    27 27   Activation Score 47.4    47.4 47.4   JEFFERY Level 2    2 2         Care Plan and Education Provided:  Care Plan: Diabetes   Updates made by Ashley Emery RN since 4/18/2023 12:00 AM      Problem: HbA1C Not In Goal       Goal: Get HbA1C Level in Goal    Start Date: 2/9/2023   This Visit's Progress: 0%   Recent Progress: 50%   Note:    Maricarmen will get her A1C under 7%.         Ashley Emery RN, [facility]      Time Spent: 30 minutes  Encounter Type: Individual    Any diabetes medication dose changes were made via the CDE Protocol per the patient's primary care provider and endocrinology provider. A copy of this encounter was shared with the provider.

## 2023-04-18 NOTE — LETTER
"    4/18/2023         RE: Maricarmen Dotson  [address]  [place] [address]        Dear Colleague,    Thank you for referring your patient, Maricarmen Dotson, to the [facility]. Please see a copy of my visit note below.    Diabetes Self-Management Education & Support    Presents for: Insulin Pump and CGM Review    Type of Service: In Person Visit    Assessment Type:   REPORTS:                          Insulin Pump Information  Insulin Pump Brand: OmniPod    Education specific to insulin pump provided today on:   Uploading from home.     Opportunities for ongoing education and support in diabetes-self management were discussed.    Pt verbalized understanding of concepts discussed and recommendations provided today.       Continue education with the following diabetes management concepts: Monitoring, Taking Medication and Problem Solving    Pump Education Materials: No new materials.    ASSESSMENT  I met with Maricarmen today to see if I could help her upload her omnipod DASH from home. I completed the steps from omnipod that they suggested for it to upload to the cloud, but it did not work here in the office. I was notified by omnipod that they no longer support uploading from a phone, so Maricarmen's other option is to upload from her computer at home. We uploaded her PDM in clinic today and looked at her ashleigh data as well. Maricarmen said she is needing to trick the pump with \"false carb amounts\" so it will give her a correction dose. She was not interested in changing any settings today as her eating schedule has been off.   Glucose Patterns & Trends:  Continued variability, but no hypoglycemia noted.    Patient would benefit from: No changes made to pump settings today as pt would like to continue her current plan and will work on being able to upload from home..      PLAN  -Try uploading DASH PDM to the cloud when on home Wi-Fi  -Try uploading to [employer] via your computer  - Let Ashley know if either of " "these work and you would like her to review data before your upcoming appt with Dr. Hightower.    Topics to cover at upcoming visits: Monitoring, Taking Medication and Problem Solving    Follow-up: June 6 for pump upload (if not able to do from home) for Dr. Hightower appt on 6/8.    See Care Plan for co-developed, patient-state behavior change goals.  AVS provided for patient today.    Education Materials Provided:  No new materials provided today      SUBJECTIVE/OBJECTIVE:  Presents for: Insulin Pump and CGM Review  Accompanied by: Self  Diabetes education in the past 24mo: Yes  Focus of Visit: Assistance w/ making life changes, CGM, Insulin Pump  Type of Pump visit: Pump Review  Type of CGM visit: Personal CGM Follow-up  Diabetes type: Type 2  Disease course: Getting harder to manage  How confident are you filling out medical forms by yourself:: Somewhat  Other concerns:: Cognitive impairment  Cultural Influences/Ethnic Background:   or       Diabetes Symptoms & Complications:  Fatigue: Yes  Neuropathy: Yes  Polydipsia: Yes  Polyphagia: Sometimes  Polyuria: Yes  Visual change: Yes  Slow healing wounds: Yes  Complications assessed today?: No  Autonomic neuropathy: Yes  CVA: No  Heart disease: Yes  Nephropathy: Yes  Peripheral neuropathy: Yes  Peripheral Vascular Disease: No  Retinopathy: No  Sexual dysfunction: Yes    Patient Problem List and Family Medical History reviewed for relevant medical history, current medical status, and diabetes risk factors.    Vitals:  LMP 01/01/1999   Estimated body mass index is 33.57 kg/m  as calculated from the following:    Height as of 12/8/22: 1.626 m (5' 4\").    Weight as of 3/29/23: 88.7 kg (195 lb 9.6 oz).   Last 3 BP:   BP Readings from Last 3 Encounters:   03/29/23 130/74   12/08/22 (!) 140/60   11/21/22 136/80       History   Smoking Status     Never   Smokeless Tobacco     Never       Labs:  Lab Results   Component Value Date    A1C 8.6 03/20/2023    A1C 7.4 " 09/18/2020    HEMOGLOBINA1 11.0 06/08/2015     Lab Results   Component Value Date     03/20/2023     07/06/2022     01/20/2021     Lab Results   Component Value Date     09/01/2022     09/18/2020     HDL Cholesterol   Date Value Ref Range Status   09/18/2020 51 >49 mg/dL Final     Direct Measure HDL   Date Value Ref Range Status   09/01/2022 52 >=50 mg/dL Final   ]  GFR Estimate   Date Value Ref Range Status   03/20/2023 81 >60 mL/min/1.73m2 Final     Comment:     eGFR calculated using 2021 CKD-EPI equation.   01/20/2021 84 >60 mL/min/[1.73_m2] Final     Comment:     Non  GFR Calc  Starting 12/18/2018, serum creatinine based estimated GFR (eGFR) will be   calculated using the Chronic Kidney Disease Epidemiology Collaboration   (CKD-EPI) equation.       GFR Estimate If Black   Date Value Ref Range Status   01/20/2021 >90 >60 mL/min/[1.73_m2] Final     Comment:      GFR Calc  Starting 12/18/2018, serum creatinine based estimated GFR (eGFR) will be   calculated using the Chronic Kidney Disease Epidemiology Collaboration   (CKD-EPI) equation.       Lab Results   Component Value Date    CR 0.82 03/20/2023    CR 0.77 01/20/2021     No results found for: MICROALBUMIN    Healthy Eating:  Healthy Eating Assessed Today: No    Being Active:  Being Active Assessed Today: No    Monitoring:  Monitoring Assessed Today: Yes  Did patient bring glucose meter to appointment? : Yes  Blood Glucose Meter: CGM  Blood glucose trend: Fluctuating        Taking Medications:  Diabetes Medication(s)     Insulin       insulin aspart (NOVOLOG VIAL) 100 UNITS/ML vial    USE IN INSULIN PUMP, TOTAL DAILY DOSE  UNITS    Sodium-Glucose Co-Transporter 2 (SGLT2) Inhibitors       dapagliflozin (FARXIGA) 5 MG TABS tablet    Take 1 tablet (5 mg) by mouth daily          Taking Medication Assessed Today: Yes  Current Treatments: Diet, Insulin Pump, Oral Medication (taken by  mouth)    Problem Solving:  Problem Solving Assessed Today: No              Reducing Risks:  Reducing Risks Assessed Today: No    Healthy Coping:  Healthy Coping Assessed Today: Yes  Emotional response to diabetes: Ready to learn, Concern for health and well-being  Informal Support system:: Family, Spouse  Stage of change: ACTION (Actively working towards change)  Patient Activation Measure Survey Score:      3/16/2023    11:31 AM 3/27/2023    12:00 PM   JEFFERY Score (Last Two)   JEFFERY Raw Score 27    27 27   Activation Score 47.4    47.4 47.4   JEFFERY Level 2    2 2         Care Plan and Education Provided:  Care Plan: Diabetes   Updates made by Ashley Emery RN since 4/18/2023 12:00 AM      Problem: HbA1C Not In Goal       Goal: Get HbA1C Level in Goal    Start Date: 2/9/2023   This Visit's Progress: 0%   Recent Progress: 50%   Note:    Maricarmen will get her A1C under 7%.         Ashley Emery RN, [facility]      Time Spent: 30 minutes  Encounter Type: Individual    Any diabetes medication dose changes were made via the CDE Protocol per the patient's primary care provider and endocrinology provider. A copy of this encounter was shared with the provider.

## 2023-05-18 ENCOUNTER — TRANSFERRED RECORDS (OUTPATIENT)
Dept: HEALTH INFORMATION MANAGEMENT | Facility: CLINIC | Age: 63
End: 2023-05-18
Payer: MEDICARE

## 2023-06-05 ENCOUNTER — MYC MEDICAL ADVICE (OUTPATIENT)
Dept: PEDIATRICS | Facility: CLINIC | Age: 63
End: 2023-06-05
Payer: MEDICARE

## 2023-06-05 NOTE — TELEPHONE ENCOUNTER
Please review pt's MC message & advise. Thanks.    PALOMA Rosa  Patient Advocate Liason (PAL)  [facility]  Ph. [phone] / Fax. [phone]

## 2023-06-06 ENCOUNTER — ALLIED HEALTH/NURSE VISIT (OUTPATIENT)
Dept: EDUCATION SERVICES | Facility: CLINIC | Age: 63
End: 2023-06-06
Payer: COMMERCIAL

## 2023-06-06 DIAGNOSIS — Z79.4 TYPE 2 DIABETES MELLITUS WITH MICROALBUMINURIA, WITH LONG-TERM CURRENT USE OF INSULIN (H): Primary | ICD-10-CM

## 2023-06-06 DIAGNOSIS — E11.29 TYPE 2 DIABETES MELLITUS WITH MICROALBUMINURIA, WITH LONG-TERM CURRENT USE OF INSULIN (H): Primary | ICD-10-CM

## 2023-06-06 DIAGNOSIS — R80.9 TYPE 2 DIABETES MELLITUS WITH MICROALBUMINURIA, WITH LONG-TERM CURRENT USE OF INSULIN (H): Primary | ICD-10-CM

## 2023-06-06 PROCEDURE — 99207 PR NO CHARGE LOS: CPT

## 2023-06-06 NOTE — LETTER
6/6/2023         RE: Maricarmen Dotson  [address]  [place] [address]        Dear Colleague,    Thank you for referring your patient, Maricarmen Dotson, to the [facility]. Please see a copy of my visit note below.    Reports (sent full reports to Dr. Hightower for appt on 6/8):                Maricarmen and her daughter came to have her omnipod DASH uploaded for her appt with Dr. Hightower on 6/8. Reviewed that she can upload on a computer at home, but they don't have one that will work. She used to upload via her phone, but omnipod doesn't have that option for her phone anymore. She will schedule with me again before her next appt with Dr. Hightower. Reviewed with daughter that she is welcome to come with to appts if okay with her mom and they can see me as needed.    No charge for today's visit.    Ashley Emery RN, [facility]

## 2023-06-06 NOTE — PROGRESS NOTES
Reports (sent full reports to Dr. Hightower for appt on 6/8):                Maricarmen and her daughter came to have her omnipod DASH uploaded for her appt with Dr. Hightower on 6/8. Reviewed that she can upload on a computer at home, but they don't have one that will work. She used to upload via her phone, but omnipod doesn't have that option for her phone anymore. She will schedule with me again before her next appt with Dr. Hightower. Reviewed with daughter that she is welcome to come with to appts if okay with her mom and they can see me as needed.    No charge for today's visit.    Ashley Emery RN, [facility]

## 2023-06-08 ENCOUNTER — VIRTUAL VISIT (OUTPATIENT)
Dept: ENDOCRINOLOGY | Facility: CLINIC | Age: 63
End: 2023-06-08
Payer: COMMERCIAL

## 2023-06-08 DIAGNOSIS — Z96.41 INSULIN PUMP STATUS: ICD-10-CM

## 2023-06-08 DIAGNOSIS — E11.29 TYPE 2 DIABETES MELLITUS WITH MICROALBUMINURIA, WITH LONG-TERM CURRENT USE OF INSULIN (H): Primary | ICD-10-CM

## 2023-06-08 DIAGNOSIS — E21.3 HYPERPARATHYROIDISM (H): ICD-10-CM

## 2023-06-08 DIAGNOSIS — Z79.4 TYPE 2 DIABETES MELLITUS WITH MICROALBUMINURIA, WITH LONG-TERM CURRENT USE OF INSULIN (H): Primary | ICD-10-CM

## 2023-06-08 DIAGNOSIS — R80.9 TYPE 2 DIABETES MELLITUS WITH MICROALBUMINURIA, WITH LONG-TERM CURRENT USE OF INSULIN (H): Primary | ICD-10-CM

## 2023-06-08 PROCEDURE — 95251 CONT GLUC MNTR ANALYSIS I&R: CPT | Performed by: INTERNAL MEDICINE

## 2023-06-08 PROCEDURE — 99215 OFFICE O/P EST HI 40 MIN: CPT | Mod: VID | Performed by: INTERNAL MEDICINE

## 2023-06-08 NOTE — LETTER
6/8/2023         RE: Maricarmen Dotson  [address] [place] [address]        Dear Colleague,    Thank you for referring your patient, Maricarmen Dotson, to the [facility] [place]. Please see a copy of my visit note below.    Virtual Visit Details    Type of service:  Video Visit   Video Start Time: 1:10 PM  Video End Time:1:35 PM    Originating Location (pt. Location): Home  Distant Location (provider location):  Off-site  Platform used for Video Visit: shaun Avalos        Recent issues:  Diabetes follow-up evaluation  Has used the Omnipod Dash, planned to change to Omnipod 5 kit & pods, also the DexcomG6    Omnipod 5 expenses much higher however, decided to stay on Omnipod Dash with Freddie  Trying to avoid the binge-eating (disorder) habit  Previous concerns by neurologist Dr. Mtz about an elevated parathyroid hormone level         Diabetes:  History of GDM with 2 pregnancies, diet management  1996. Diagnosis of diabetes mellitus  Recalls starting a diabetes pill  Had taken metformin, also Januvia but developed pancreatitis    Has seen endocrinology providers CAMILA Da Silva, AMINA Angeles, CAMILA Rich, MARAH York, MARAH Childress, KRISTA Mejia  Has tried several diabetes medications previously, records indicate side effects or med issues:  Humalog- apparent concerns with the pancreatitis    Metformin--Abdominal pain.  Glipizide- Allergic reaction- (abdominal pain and swelling),   Byetta and Onglyza-- pancreatitis.  Invokana -- had upper GI distress.    ~2006. Started treatment with insulin medication  Endocrinology evaluations with Emily Phan CNP/[facility]  ~2017. Started Omnipod insulin pump use.  Subsequently saw Rosie Schwab, and CARLO Salgado for endocrinology evaluations.  Using Freestyle Freddie CGM    Previous FV hgbA1c trends include:  2/21/06 C-Peptide 2.5  3/4/09 WILLIAMS-65 Ab <1.0     Lab Test 09/01/22  1017 05/20/22  0841 11/23/21  0859 09/18/20  0852  02/27/20  0954   A1C 7.8* 8.7* 8.2* 7.4* 8.0*           10/6/22. Initial diabetes evaluation with me at [facility]  Reviewed health history and diabetes issues  Using Omnipod Dash insulin pump:   Novolog pump    Uses ICR method for bolusing  Blood glucose (BG) meter  Uses Freestyle Freddie CGM with smartphone   Scans 5-8x/day    Typically  Boluses postmeal, not premeal    Current Omnipod Dash settings:       Recent Omnipod pump data:             ~3/2023. Changed from Freestyle Libre2 to the Libre3 CGM  Current Libre3 CGM data              Recent [facility] labs include:  Lab Results   Component Value Date    A1C 8.6 (H) 03/20/2023     03/20/2023    POTASSIUM 3.7 03/20/2023    CHLORIDE 102 03/20/2023    CO2 27 03/20/2023    ANIONGAP 11 03/20/2023     (H) 03/20/2023    BUN 14.2 03/20/2023    CR 0.82 03/20/2023    GFRESTIMATED 81 03/20/2023    GFRESTBLACK >90 01/20/2021    KARIN 9.7 03/29/2023    CHOL 209 (H) 09/01/2022    TRIG 196 (H) 09/01/2022    HDL 52 09/01/2022     (H) 09/01/2022    NHDL 157 (H) 09/01/2022    UCRR 55 11/10/2021    MICROL 17 11/10/2021    UMALCR 30.91 (H) 11/10/2021    TSH 1.19 09/18/2020    T4 0.87 07/22/2019     DM Complications:   Nephropathy:    Microalbuminuria      Parathyroid:  Patient has seen Dr. Joanne Mtz/[facility] for neurology evaluations  Spring '23, Patient reports discussion of symptoms with neurologist including anterior throat discomfort, difficulty swallowing, cough  Lab test reportedly showed elevated PTH 82  General Surgery consultation appointment with Dr. Radha Conte/[facility]   Appointment subsequently cancelled  Patient had repeat PTH testing at [facility], result reportedly normal    Previous [facility] labs include:     Latest Reference Range & Units 02/03/15 11:05 03/29/23 12:32   Vitamin D Deficiency screening 20 - 75 ug/L 34 22      Latest Reference Range & Units 03/29/23 12:32   Parathyroid Hormone Intact 15 - 65 pg/mL 41     Additional  history:  Previous fractures: Wrist, fingers  Kidney stones: None  Vitamin D def:  unknown  Last DEXA scan: ~  Supplements:  Calcium- none, vit D- none        Lives in [place], [place]  Sees Dr. Madyson Mclaughlin/[facility] for general medicine evaluations.  Also sees Dr Joanne Mtz[facility]    PMH/PSH:  Past Medical History:   Diagnosis Date     Ascending aorta enlargement (H)      Depressive disorder     severe, prior hospitalization     Diverticulosis of colon (without mention of hemorrhage)      Fibromyalgia 2007     Insomnia      Irritable bowel syndrome      Osteopenia      Type 2 diabetes mellitus with complications (H)     microalbuminuria     Past Surgical History:   Procedure Laterality Date     BACK SURGERY      fusion  and removal of hardware      C SPINAL ORTHOSIS,NOS      lumbar surgery     CHOLECYSTECTOMY       choley  2011     ENT SURGERY      septoplasty     HYSTERECTOMY, CERVIX STATUS UNKNOWN      TVH/BSO     ORTHOPEDIC SURGERY      left ankle       Family Hx:  Family History   Problem Relation Age of Onset     Diabetes Mother         type 2     Hypertension Mother      Cerebrovascular Disease Mother          stroke age 57     Ovarian Cancer Mother 43     Cancer Mother         cervix     Diabetes Father         type 2     Hypertension Father      Gastrointestinal Disease Father         colon polyps     Sleep Apnea Brother      Cancer Sister      Ovarian Cancer Sister 46     Ovarian Cancer Maternal Grandmother 72     Cancer Maternal Grandmother         cervix         Social Hx:  Social History     Socioeconomic History     Marital status:      Spouse name: Not on file     Number of children: 3     Years of education: Not on file     Highest education level: Not on file   Occupational History     Occupation: xr technician     Employer: [employer]   Tobacco Use     Smoking status: Never     Smokeless tobacco: Never    Vaping Use     Vaping status: Not on file   Substance and Sexual Activity     Alcohol use: Not Currently     Alcohol/week: 0.0 standard drinks of alcohol     Comment: maybe once a month     Drug use: No     Sexual activity: Yes     Partners: Male     Birth control/protection: Surgical   Other Topics Concern     Parent/sibling w/ CABG, MI or angioplasty before 65F 55M? Not Asked   Social History Narrative     Not on file     Social Determinants of Health     Financial Resource Strain: Not on file   Food Insecurity: Not on file   Transportation Needs: Not on file   Physical Activity: Not on file   Stress: Not on file   Social Connections: Not on file   Intimate Partner Violence: Not on file   Housing Stability: Not on file          MEDICATIONS:  has a current medication list which includes the following prescription(s): freestyle ashleigh 3 sensor, HEMP OIL OR EXTRACT OR OTHER CBD CANNABINOID, NOT MEDICAL CANNABIS,, insulin aspart, omnipod dash pods (gen 4), medical cannabis, melatonin, polyethylene glycol, alcohol swab, benzonatate, freestyle lite, freestyle lite, blood glucose monitoring, blood glucose monitoring, compounded non-controlled substance, freestyle ashleigh 14 day reader, freestyle ashleigh 14 day sensor, dapagliflozin, voltaren, epinephrine, insulin pump, insulin syringe-needle u-100, levocetirizine, magnesium oxide -mg supplement, ondansetron, and statin not prescribed.    ROS:     ROS: 10 point ROS neg other than the symptoms noted above in the HPI.    GENERAL: some fatigue, wt stable; denies fevers, chills, night sweats.   HEENT: no dysphagia, odonophagia, diplopia, neck pain  THYROID:  no apparent hyper or hypothyroid symptoms  CV: no chest pain, pressure, palpitations  LUNGS: no SOB, VASQUEZ, cough, wheezing   ABDOMEN: no diarrhea, constipation, abdominal pain  EXTREMITIES: no rashes, ulcers, edema  NEUROLOGY: no headaches, denies changes in vision, tingling, extremitiy numbness   MSK: no muscle aches or  pains, weakness  SKIN: no rashes or lesions  : no menses  PSYCH:  stable mood, no significant anxiety or depression  ENDOCRINE: no heat or cold intolerance    Physical Exam (visual exam)  VS:  no vital signs taken for video visit  CONSTITUTIONAL: healthy, alert and NAD, well dressed, answering questions appropriately  ENT: no nose swelling or nasal discharge, mouth redness or gum changes.  EYES: eyes grossly normal to inspection, conjunctivae and sclerae normal, no exophthalmos or proptosis  THYROID:  no apparent nodules or goiter  LUNGS: no audible wheeze, cough or visible cyanosis, no visible retractions or increased work of breathing  ABDOMEN: abdomen not evaluated  EXTREMITIES: no hand tremors, limited exam  NEUROLOGY: CN grossly intact, mentation intact and speech normal   SKIN:  no apparent skin lesions, rash, or edema with visualized skin appearance  PSYCH: mentation appears normal, affect normal/bright, judgement and insight intact,   normal speech and appearance well groomed      LABS:    All pertinent notes, labs, and images personally reviewed by me.     A/P:  Encounter Diagnoses   Name Primary?     Type 2 diabetes mellitus with microalbuminuria, with long-term current use of insulin (H) Yes     Insulin pump status      Hyperparathyroidism (H)        Comments:  Reviewed health history and diabetes issues.  Details of her previous medication intolerances or allergies unclear  Difficult to assess glycemic control without recent glucose trend or pump data  Reviewed and interpreted tests that I previously ordered.   Ordered appropriate tests for the endocrinology disease management.    Management options discussed and implemented after shared medical decision making with the patient.  T2DM problem is chronic-stable    Plan:  Reviewed the overall T2DM management and insulin pump use.  Discussed optimal BG testing to assess glucose trends.  We reviewed insulin pump settings, basal rate and bolus dosing  Use  of automated pump bolus dosing for meal/snack carb & correction dosing  Reviewed use of the Omnipod Dash insulin pump reports  Reviewed the recent Freestyle Freddie CGM glucose sensor data, in detail    Reviewed general issues with possible hyperparathyroidism (HPT) diagnosis  Discussed lab tests used to assess patient parathyroid gland function    Recommend:  Since the new Omnipod 5 and DexcomG6 option very expensive for patient, I would not pursue this plan  Continue the current Omnipod Dash insulin pump management  No pump setting changes at this time but consider intensifying the supper ICR from 8 to 7    Reviewed the ideal premeal (not postmeal) bolus routine   Future treatment options include adding a SGLT2-I such as Jardiance  Continue use of the Freestyle Libre3 CGM sensor  Repeat non-fasting labs soon   Testing at [facility] clinic   Lab orders available  Needs repeat BP testing with PCP  Arrange annual dilated eye exam, fasting lipid panel testing  Discussed importance of regular endocrinology evaluations every 3-4 months.    Her previous neck and swallowing discomfort symptoms are not characteristic of parathyroid (or thyroid) disease  Previous mildly elevated PTH may relate to low dietary calcium or vitamin D levels  I agree that the parathyroid surgery consultation appointment not needed  Patient overdue for DEXA testing... none for nearly 20-years   Encouraged her to review the DEXA plan, order with her PCP soon  Plan additional lab tests for parathyroid   Check calcium and vitamin D levels   Lab orders placed  Would be wise to add a calcium and vitamin D supplement with daily dosing    Addressed patient's questions today.    There are no Patient Instructions on file for this visit.    Future labs ordered today:   Orders Placed This Encounter   Procedures     GLUCOSE MONITOR, 72 HOUR, PHYS INTERP     Hemoglobin A1c     Albumin Random Urine Quantitative with Creat Ratio     Vitamin D Deficiency     Basic  metabolic panel     Radiology/Consults ordered today: None    Total time spent on day of encounter:  45 min    Follow-up:  9/25/23 at 3pm, VVChintan Hightower MD, MS  Endocrinology  [facility]    CC: АННА Mclaughlin Sharif, Berger, M                            Again, thank you for allowing me to participate in the care of your patient.        Sincerely,        Chaitanya Hightower MD

## 2023-06-08 NOTE — PROGRESS NOTES
Virtual Visit Details    Type of service:  Video Visit   Video Start Time: 1:10 PM  Video End Time:1:35 PM    Originating Location (pt. Location): Home  Distant Location (provider location):  Off-site  Platform used for Video Visit: shaun Avalos        Recent issues:  Diabetes follow-up evaluation  Has used the Omnipod Dash, planned to change to Omnipod 5 kit & pods, also the DexcomG6    Omnipod 5 expenses much higher however, decided to stay on Omnipod Dash with Freddie  Trying to avoid the binge-eating (disorder) habit  Previous concerns by neurologist Dr. Mtz about an elevated parathyroid hormone level         Diabetes:  History of GDM with 2 pregnancies, diet management  1996. Diagnosis of diabetes mellitus  Recalls starting a diabetes pill  Had taken metformin, also Januvia but developed pancreatitis    Has seen endocrinology providers CAMILA Da Silva M. Schultz, CAMILA Rich, MARAH York, MARAH Childress, KRISTA Mejia  Has tried several diabetes medications previously, records indicate side effects or med issues:  Humalog- apparent concerns with the pancreatitis    Metformin--Abdominal pain.  Glipizide- Allergic reaction- (abdominal pain and swelling),   Byetta and Onglyza-- pancreatitis.  Invokana -- had upper GI distress.    ~2006. Started treatment with insulin medication  Endocrinology evaluations with Emily Phan CNP/[facility]  ~2017. Started Omnipod insulin pump use.  Subsequently saw Rosie Schwab, and CARLO Salgado for endocrinology evaluations.  Using Freestyle Freddie CGM    Previous  hgbA1c trends include:  2/21/06 C-Peptide 2.5  3/4/09 WILLIAMS-65 Ab <1.0     Lab Test 09/01/22  1017 05/20/22  0841 11/23/21  0859 09/18/20  0852 02/27/20  0954   A1C 7.8* 8.7* 8.2* 7.4* 8.0*           10/6/22. Initial diabetes evaluation with me at [facility]  Reviewed health history and diabetes issues  Using Omnipod Dash insulin pump:   Novolog pump    Uses ICR method for bolusing  Blood glucose  (BG) meter  Uses Freestyle Freddie CGM with smartphone   Scans 5-8x/day    Typically  Boluses postmeal, not premeal    Current Omnipod Dash settings:       Recent Omnipod pump data:             ~3/2023. Changed from Freestyle Libre2 to the Libre3 CGM  Current Libre3 CGM data              Recent [facility] labs include:  Lab Results   Component Value Date    A1C 8.6 (H) 03/20/2023     03/20/2023    POTASSIUM 3.7 03/20/2023    CHLORIDE 102 03/20/2023    CO2 27 03/20/2023    ANIONGAP 11 03/20/2023     (H) 03/20/2023    BUN 14.2 03/20/2023    CR 0.82 03/20/2023    GFRESTIMATED 81 03/20/2023    GFRESTBLACK >90 01/20/2021    KARIN 9.7 03/29/2023    CHOL 209 (H) 09/01/2022    TRIG 196 (H) 09/01/2022    HDL 52 09/01/2022     (H) 09/01/2022    NHDL 157 (H) 09/01/2022    UCRR 55 11/10/2021    MICROL 17 11/10/2021    UMALCR 30.91 (H) 11/10/2021    TSH 1.19 09/18/2020    T4 0.87 07/22/2019     DM Complications:   Nephropathy:    Microalbuminuria      Parathyroid:  Patient has seen Dr. Joanne Mtz/[facility] for neurology evaluations  Spring '23, Patient reports discussion of symptoms with neurologist including anterior throat discomfort, difficulty swallowing, cough  Lab test reportedly showed elevated PTH 82  General Surgery consultation appointment with Dr. Radha Conte/[facility] [place]   Appointment subsequently cancelled  Patient had repeat PTH testing at [facility], result reportedly normal    Previous [facility] labs include:     Latest Reference Range & Units 02/03/15 11:05 03/29/23 12:32   Vitamin D Deficiency screening 20 - 75 ug/L 34 22      Latest Reference Range & Units 03/29/23 12:32   Parathyroid Hormone Intact 15 - 65 pg/mL 41     Additional history:  Previous fractures: Wrist, fingers  Kidney stones: None  Vitamin D def:  unknown  Last DEXA scan: ~1995  Supplements:  Calcium- none, vit D- none        Lives in [place], [place]  Sees Dr. Madyson Mclaughlin/[facility] for general medicine  evaluations.  Also sees Dr Joanne Mtz/[facility]    PMH/PSH:  Past Medical History:   Diagnosis Date     Ascending aorta enlargement (H)      Depressive disorder     severe, prior hospitalization     Diverticulosis of colon (without mention of hemorrhage)      Fibromyalgia 2007     Insomnia      Irritable bowel syndrome      Osteopenia      Type 2 diabetes mellitus with complications (H)     microalbuminuria     Past Surgical History:   Procedure Laterality Date     BACK SURGERY      fusion  and removal of hardware      C SPINAL ORTHOSIS,NOS      lumbar surgery     CHOLECYSTECTOMY  2011     choley  2011     ENT SURGERY      septoplasty     HYSTERECTOMY, CERVIX STATUS UNKNOWN      TVH/BSO     ORTHOPEDIC SURGERY  2005    left ankle       Family Hx:  Family History   Problem Relation Age of Onset     Diabetes Mother         type 2     Hypertension Mother      Cerebrovascular Disease Mother          stroke age 57     Ovarian Cancer Mother 43     Cancer Mother         cervix     Diabetes Father         type 2     Hypertension Father      Gastrointestinal Disease Father         colon polyps     Sleep Apnea Brother      Cancer Sister      Ovarian Cancer Sister 46     Ovarian Cancer Maternal Grandmother 72     Cancer Maternal Grandmother         cervix         Social Hx:  Social History     Socioeconomic History     Marital status:      Spouse name: Not on file     Number of children: 3     Years of education: Not on file     Highest education level: Not on file   Occupational History     Occupation: xr technician     Employer: [employer]   Tobacco Use     Smoking status: Never     Smokeless tobacco: Never   Vaping Use     Vaping status: Not on file   Substance and Sexual Activity     Alcohol use: Not Currently     Alcohol/week: 0.0 standard drinks of alcohol     Comment: maybe once a month     Drug use: No     Sexual activity: Yes     Partners: Male      Birth control/protection: Surgical   Other Topics Concern     Parent/sibling w/ CABG, MI or angioplasty before 65F 55M? Not Asked   Social History Narrative     Not on file     Social Determinants of Health     Financial Resource Strain: Not on file   Food Insecurity: Not on file   Transportation Needs: Not on file   Physical Activity: Not on file   Stress: Not on file   Social Connections: Not on file   Intimate Partner Violence: Not on file   Housing Stability: Not on file          MEDICATIONS:  has a current medication list which includes the following prescription(s): freestyle ashleigh 3 sensor, HEMP OIL OR EXTRACT OR OTHER CBD CANNABINOID, NOT MEDICAL CANNABIS,, insulin aspart, omnipod dash pods (gen 4), medical cannabis, melatonin, polyethylene glycol, alcohol swab, benzonatate, freestyle lite, freestyle lite, blood glucose monitoring, blood glucose monitoring, compounded non-controlled substance, freestyle ashleigh 14 day reader, freestyle ashleigh 14 day sensor, dapagliflozin, voltaren, epinephrine, insulin pump, insulin syringe-needle u-100, levocetirizine, magnesium oxide -mg supplement, ondansetron, and statin not prescribed.    ROS:     ROS: 10 point ROS neg other than the symptoms noted above in the HPI.    GENERAL: some fatigue, wt stable; denies fevers, chills, night sweats.   HEENT: no dysphagia, odonophagia, diplopia, neck pain  THYROID:  no apparent hyper or hypothyroid symptoms  CV: no chest pain, pressure, palpitations  LUNGS: no SOB, VASQUEZ, cough, wheezing   ABDOMEN: no diarrhea, constipation, abdominal pain  EXTREMITIES: no rashes, ulcers, edema  NEUROLOGY: no headaches, denies changes in vision, tingling, extremitiy numbness   MSK: no muscle aches or pains, weakness  SKIN: no rashes or lesions  : no menses  PSYCH:  stable mood, no significant anxiety or depression  ENDOCRINE: no heat or cold intolerance    Physical Exam (visual exam)  VS:  no vital signs taken for video visit  CONSTITUTIONAL: healthy,  alert and NAD, well dressed, answering questions appropriately  ENT: no nose swelling or nasal discharge, mouth redness or gum changes.  EYES: eyes grossly normal to inspection, conjunctivae and sclerae normal, no exophthalmos or proptosis  THYROID:  no apparent nodules or goiter  LUNGS: no audible wheeze, cough or visible cyanosis, no visible retractions or increased work of breathing  ABDOMEN: abdomen not evaluated  EXTREMITIES: no hand tremors, limited exam  NEUROLOGY: CN grossly intact, mentation intact and speech normal   SKIN:  no apparent skin lesions, rash, or edema with visualized skin appearance  PSYCH: mentation appears normal, affect normal/bright, judgement and insight intact,   normal speech and appearance well groomed      LABS:    All pertinent notes, labs, and images personally reviewed by me.     A/P:  Encounter Diagnoses   Name Primary?     Type 2 diabetes mellitus with microalbuminuria, with long-term current use of insulin (H) Yes     Insulin pump status      Hyperparathyroidism (H)        Comments:  Reviewed health history and diabetes issues.  Details of her previous medication intolerances or allergies unclear  Difficult to assess glycemic control without recent glucose trend or pump data  Reviewed and interpreted tests that I previously ordered.   Ordered appropriate tests for the endocrinology disease management.    Management options discussed and implemented after shared medical decision making with the patient.  T2DM problem is chronic-stable    Plan:  Reviewed the overall T2DM management and insulin pump use.  Discussed optimal BG testing to assess glucose trends.  We reviewed insulin pump settings, basal rate and bolus dosing  Use of automated pump bolus dosing for meal/snack carb & correction dosing  Reviewed use of the Omnipod Dash insulin pump reports  Reviewed the recent Freestyle Freddie CGM glucose sensor data, in detail    Reviewed general issues with possible hyperparathyroidism  (HPT) diagnosis  Discussed lab tests used to assess patient parathyroid gland function    Recommend:  Since the new Omnipod 5 and DexcomG6 option very expensive for patient, I would not pursue this plan  Continue the current Omnipod Dash insulin pump management  No pump setting changes at this time but consider intensifying the supper ICR from 8 to 7    Reviewed the ideal premeal (not postmeal) bolus routine   Future treatment options include adding a SGLT2-I such as Jardiance  Continue use of the Freestyle Libre3 CGM sensor  Repeat non-fasting labs soon   Testing at [facility] clinic   Lab orders available  Needs repeat BP testing with PCP  Arrange annual dilated eye exam, fasting lipid panel testing  Discussed importance of regular endocrinology evaluations every 3-4 months.    Her previous neck and swallowing discomfort symptoms are not characteristic of parathyroid (or thyroid) disease  Previous mildly elevated PTH may relate to low dietary calcium or vitamin D levels  I agree that the parathyroid surgery consultation appointment not needed  Patient overdue for DEXA testing... none for nearly 20-years   Encouraged her to review the DEXA plan, order with her PCP soon  Plan additional lab tests for parathyroid   Check calcium and vitamin D levels   Lab orders placed  Would be wise to add a calcium and vitamin D supplement with daily dosing    Addressed patient's questions today.    There are no Patient Instructions on file for this visit.    Future labs ordered today:   Orders Placed This Encounter   Procedures     GLUCOSE MONITOR, 72 HOUR, PHYS INTERP     Hemoglobin A1c     Albumin Random Urine Quantitative with Creat Ratio     Vitamin D Deficiency     Basic metabolic panel     Radiology/Consults ordered today: None    Total time spent on day of encounter:  45 min    Follow-up:  9/25/23 at 3pm, Keara Hightower MD, MS  Endocrinology  [facility]    CC: АННА Mclaughlin Sharif, Berger,  M

## 2023-07-05 ENCOUNTER — OFFICE VISIT (OUTPATIENT)
Dept: PEDIATRICS | Facility: CLINIC | Age: 63
End: 2023-07-05
Payer: COMMERCIAL

## 2023-07-05 VITALS
DIASTOLIC BLOOD PRESSURE: 72 MMHG | BODY MASS INDEX: 31.98 KG/M2 | TEMPERATURE: 97.3 F | RESPIRATION RATE: 20 BRPM | WEIGHT: 186.3 LBS | OXYGEN SATURATION: 99 % | HEART RATE: 49 BPM | SYSTOLIC BLOOD PRESSURE: 130 MMHG

## 2023-07-05 DIAGNOSIS — N39.46 MIXED STRESS AND URGE URINARY INCONTINENCE: ICD-10-CM

## 2023-07-05 DIAGNOSIS — Z96.41 INSULIN PUMP STATUS: ICD-10-CM

## 2023-07-05 DIAGNOSIS — E11.65 TYPE 2 DIABETES MELLITUS WITH HYPERGLYCEMIA, WITH LONG-TERM CURRENT USE OF INSULIN (H): ICD-10-CM

## 2023-07-05 DIAGNOSIS — Z23 NEED FOR SHINGLES VACCINE: ICD-10-CM

## 2023-07-05 DIAGNOSIS — R80.9 TYPE 2 DIABETES MELLITUS WITH MICROALBUMINURIA, WITH LONG-TERM CURRENT USE OF INSULIN (H): ICD-10-CM

## 2023-07-05 DIAGNOSIS — E21.3 HYPERPARATHYROIDISM (H): ICD-10-CM

## 2023-07-05 DIAGNOSIS — Z79.4 TYPE 2 DIABETES MELLITUS WITH MICROALBUMINURIA, WITH LONG-TERM CURRENT USE OF INSULIN (H): ICD-10-CM

## 2023-07-05 DIAGNOSIS — M79.7 FIBROMYALGIA: ICD-10-CM

## 2023-07-05 DIAGNOSIS — M25.551 BILATERAL HIP PAIN: Primary | ICD-10-CM

## 2023-07-05 DIAGNOSIS — Z78.0 ASYMPTOMATIC MENOPAUSAL STATE: ICD-10-CM

## 2023-07-05 DIAGNOSIS — Z79.4 TYPE 2 DIABETES MELLITUS WITH HYPERGLYCEMIA, WITH LONG-TERM CURRENT USE OF INSULIN (H): ICD-10-CM

## 2023-07-05 DIAGNOSIS — M25.552 BILATERAL HIP PAIN: Primary | ICD-10-CM

## 2023-07-05 DIAGNOSIS — E11.29 TYPE 2 DIABETES MELLITUS WITH MICROALBUMINURIA, WITH LONG-TERM CURRENT USE OF INSULIN (H): ICD-10-CM

## 2023-07-05 DIAGNOSIS — N18.31 CHRONIC KIDNEY DISEASE, STAGE 3A (H): ICD-10-CM

## 2023-07-05 LAB
ANION GAP SERPL CALCULATED.3IONS-SCNC: 12 MMOL/L (ref 7–15)
BUN SERPL-MCNC: 18.2 MG/DL (ref 8–23)
CALCIUM SERPL-MCNC: 9.6 MG/DL (ref 8.8–10.2)
CHLORIDE SERPL-SCNC: 107 MMOL/L (ref 98–107)
CHOLEST SERPL-MCNC: 210 MG/DL
CREAT SERPL-MCNC: 0.8 MG/DL (ref 0.51–0.95)
CREAT UR-MCNC: 55.7 MG/DL
DEPRECATED HCO3 PLAS-SCNC: 25 MMOL/L (ref 22–29)
GFR SERPL CREATININE-BSD FRML MDRD: 83 ML/MIN/1.73M2
GLUCOSE SERPL-MCNC: 204 MG/DL (ref 70–99)
HBA1C MFR BLD: 8 % (ref 0–5.6)
HDLC SERPL-MCNC: 57 MG/DL
HGB BLD-MCNC: 13.7 G/DL (ref 11.7–15.7)
LDLC SERPL CALC-MCNC: 133 MG/DL
MICROALBUMIN UR-MCNC: <12 MG/L
MICROALBUMIN/CREAT UR: NORMAL MG/G{CREAT}
NONHDLC SERPL-MCNC: 153 MG/DL
POTASSIUM SERPL-SCNC: 4 MMOL/L (ref 3.4–5.3)
SODIUM SERPL-SCNC: 144 MMOL/L (ref 136–145)
TRIGL SERPL-MCNC: 102 MG/DL

## 2023-07-05 PROCEDURE — 80061 LIPID PANEL: CPT | Performed by: INTERNAL MEDICINE

## 2023-07-05 PROCEDURE — 82306 VITAMIN D 25 HYDROXY: CPT | Performed by: INTERNAL MEDICINE

## 2023-07-05 PROCEDURE — 82043 UR ALBUMIN QUANTITATIVE: CPT | Performed by: INTERNAL MEDICINE

## 2023-07-05 PROCEDURE — 99207 PR FOOT EXAM NO CHARGE: CPT | Performed by: INTERNAL MEDICINE

## 2023-07-05 PROCEDURE — 99215 OFFICE O/P EST HI 40 MIN: CPT | Performed by: INTERNAL MEDICINE

## 2023-07-05 PROCEDURE — 82570 ASSAY OF URINE CREATININE: CPT | Performed by: INTERNAL MEDICINE

## 2023-07-05 PROCEDURE — 85018 HEMOGLOBIN: CPT | Performed by: INTERNAL MEDICINE

## 2023-07-05 PROCEDURE — 80048 BASIC METABOLIC PNL TOTAL CA: CPT | Performed by: INTERNAL MEDICINE

## 2023-07-05 PROCEDURE — 83036 HEMOGLOBIN GLYCOSYLATED A1C: CPT | Performed by: INTERNAL MEDICINE

## 2023-07-05 PROCEDURE — 36415 COLL VENOUS BLD VENIPUNCTURE: CPT | Performed by: INTERNAL MEDICINE

## 2023-07-05 RX ORDER — METHOCARBAMOL 500 MG/1
500 TABLET, FILM COATED ORAL 2 TIMES DAILY PRN
Qty: 60 TABLET | Refills: 0 | Status: SHIPPED | OUTPATIENT
Start: 2023-07-05 | End: 2023-10-30

## 2023-07-05 ASSESSMENT — PATIENT HEALTH QUESTIONNAIRE - PHQ9
SUM OF ALL RESPONSES TO PHQ QUESTIONS 1-9: 20
10. IF YOU CHECKED OFF ANY PROBLEMS, HOW DIFFICULT HAVE THESE PROBLEMS MADE IT FOR YOU TO DO YOUR WORK, TAKE CARE OF THINGS AT HOME, OR GET ALONG WITH OTHER PEOPLE: VERY DIFFICULT
SUM OF ALL RESPONSES TO PHQ QUESTIONS 1-9: 20

## 2023-07-05 ASSESSMENT — PAIN SCALES - GENERAL: PAINLEVEL: NO PAIN (0)

## 2023-07-05 NOTE — PROGRESS NOTES
"  Assessment & Plan     Bilateral hip pain  Fibromyalgia  On exam, concerning for possible hip OA vs fibromyalgia flare. Will obtain radiographs and have her start muscle relaxant if tolerated (patient has multiple medication intolerances). Encouraged trial of pool therapy as well.  - methocarbamol (ROBAXIN) 500 MG tablet; Take 1 tablet (500 mg) by mouth 2 times daily as needed for muscle spasms  - XR Pelvis and Hip Bilateral 2 Views; Future    Chronic kidney disease, stage 3a (H)  Stable, due for labs.   - Hemoglobin    Type 2 diabetes mellitus with hyperglycemia, with long-term current use of insulin (H)  Due for labs, follows with endocrinology.   - FOOT EXAM  - Lipid panel reflex to direct LDL Non-fasting  - Hemoglobin A1c  - Albumin Random Urine Quantitative with Creat Ratio    Mixed stress and urge urinary incontinence  Patient is hesitant to try medications, will refer   - Physical Therapy Referral; Future    Asymptomatic menopausal state  Due for bone density scan.   - DX Hip/Pelvis/Spine; Future    Need for shingles vaccine  Patient will get at the pharmacy.       40 minutes spent by me on the date of the encounter doing chart review, history and exam, documentation and further activities per the note       BMI:   Estimated body mass index is 31.98 kg/m  as calculated from the following:    Height as of 12/8/22: 1.626 m (5' 4\").    Weight as of this encounter: 84.5 kg (186 lb 4.8 oz).       Depression Screening Follow Up        7/5/2023    10:05 AM   PHQ   PHQ-9 Total Score 20   Q9: Thoughts of better off dead/self-harm past 2 weeks More than half the days   F/U: Thoughts of suicide or self-harm Yes   F/U: Self harm-plan No   F/U: Self-harm action No   F/U: Safety concerns No           Mental health is stable and slightly improved from prior - has established with a new therapist and has plan in place to stay safe at home.     Discussed the following ways the patient can remain in a safe environment:  be " around others  Patient Instructions   OK to try methocarbamol twice daily as needed - can make you groggy! If you want to try 1/2 tab to start. I would love for you to also try some pool therapies.     Keep up with therapy!    Ok to try taking a dose of Xyzal in the evening as well.     Think about pelvic floor physical therapy for incontinence.     Schedule bone density and hip X-rays.    Shingles vaccine at pharmacy.          Madyson Mendoza MD  [facility] VALE Hernadez is a 62 year old, presenting for the following health issues:  Recheck Medication        7/5/2023    10:07 AM   Additional Questions   Roomed by [facility]   Accompanied by self         7/5/2023    10:07 AM   Patient Reported Additional Medications   Patient reports taking the following new medications none     History of Present Illness       Reason for visit:  Following up on anxiety,  depression,  PTSD. Both hip joint pain, left one is worse.    She eats 2-3 servings of fruits and vegetables daily.She consumes 0 sweetened beverage(s) daily.She exercises with enough effort to increase her heart rate 60 or more minutes per day.  She exercises with enough effort to increase her heart rate 4 days per week.   She is taking medications regularly.    Today's PHQ-9         PHQ-9 Total Score: 20    PHQ-9 Q9 Thoughts of better off dead/self-harm past 2 weeks :   More than half the days  Thoughts of suicide or self harm: (P) Yes  Self-harm Plan:   (P) No  Self-harm Action:     (P) No  Safety concerns for self or others: (P) No    How difficult have these problems made it for you to do your work, take care of things at home, or get along with other people: Very difficult         Has had to modify some of her exercising due to worsening hip pain. L hip is worse than the R. Pain is somewhat in the groin, but radiates down muscles of her legs.     Switched medical cannabis Formlabs to Cameron which has been much more helpful.     Has  found a new therapist who his very helpful for her - has experience in both grief and trauma care.     Wondering if she can try levocetirizine twice daily.    Has been having quite a bit of incontinence, weight loss helps with this as does wiltons.     Review of Systems   Constitutional, HEENT, cardiovascular, pulmonary, gi and gu systems are negative, except as otherwise noted.      Objective    /72 (BP Location: Right arm, Patient Position: Sitting, Cuff Size: Adult Large)   Pulse (!) 49   Temp 97.3  F (36.3  C) (Temporal)   Resp 20   Wt 84.5 kg (186 lb 4.8 oz)   LMP 01/01/1999   SpO2 99%   BMI 31.98 kg/m    Body mass index is 31.98 kg/m .  Physical Exam   GENERAL: healthy, alert and no distress  MSK: localizes hip pain along thigh flexor muscles  PSYCH: mentation appears normal, affect normal/bright but slightly anxious

## 2023-07-05 NOTE — PATIENT INSTRUCTIONS
OK to try methocarbamol twice daily as needed - can make you groggy! If you want to try 1/2 tab to start. I would love for you to also try some pool therapies.     Keep up with therapy!    Ok to try taking a dose of Xyzal in the evening as well.     Think about pelvic floor physical therapy for incontinence.     Schedule bone density and hip X-rays.    Shingles vaccine at pharmacy.

## 2023-07-06 LAB — DEPRECATED CALCIDIOL+CALCIFEROL SERPL-MC: 37 UG/L (ref 20–75)

## 2023-07-11 ENCOUNTER — ANCILLARY PROCEDURE (OUTPATIENT)
Dept: GENERAL RADIOLOGY | Facility: CLINIC | Age: 63
End: 2023-07-11
Attending: INTERNAL MEDICINE
Payer: COMMERCIAL

## 2023-07-11 ENCOUNTER — ANCILLARY PROCEDURE (OUTPATIENT)
Dept: BONE DENSITY | Facility: CLINIC | Age: 63
End: 2023-07-11
Attending: INTERNAL MEDICINE
Payer: COMMERCIAL

## 2023-07-11 DIAGNOSIS — Z78.0 ASYMPTOMATIC MENOPAUSAL STATE: ICD-10-CM

## 2023-07-11 DIAGNOSIS — M25.552 BILATERAL HIP PAIN: ICD-10-CM

## 2023-07-11 DIAGNOSIS — M25.551 BILATERAL HIP PAIN: ICD-10-CM

## 2023-07-11 PROCEDURE — 73522 X-RAY EXAM HIPS BI 3-4 VIEWS: CPT | Mod: TC | Performed by: RADIOLOGY

## 2023-07-11 PROCEDURE — 77080 DXA BONE DENSITY AXIAL: CPT | Performed by: INTERNAL MEDICINE

## 2023-07-13 ENCOUNTER — MYC MEDICAL ADVICE (OUTPATIENT)
Dept: PEDIATRICS | Facility: CLINIC | Age: 63
End: 2023-07-13
Payer: MEDICARE

## 2023-07-13 DIAGNOSIS — M25.552 BILATERAL HIP PAIN: Primary | ICD-10-CM

## 2023-07-13 DIAGNOSIS — M25.551 BILATERAL HIP PAIN: Primary | ICD-10-CM

## 2023-07-13 DIAGNOSIS — M51.369 DDD (DEGENERATIVE DISC DISEASE), LUMBAR: ICD-10-CM

## 2023-07-13 DIAGNOSIS — M46.1 DEGENERATIVE JOINT DISEASE OF SACROILIAC JOINT (H): ICD-10-CM

## 2023-07-14 NOTE — TELEPHONE ENCOUNTER
Dr.McIntire Kaila weiner is requesting ortho referral. Pended both ortho and spine referral, please review & sign the appropriate referral. Thanks.     X-ray result notes from 7/13:  Here are your recent X-ray results. It does look like you have some mild arthritic changes in your hips but somewhat more degenerative changes in your sacroiliac joints and lumbar spine, which could certainly be contributing to your symptoms. Based on this, I may recommend having you see an orthopedic specialist and/or a spine specialist. Any interest in doing this? [facility] has a relatively small group of orthopedic providers in [place], otherwise you're also more than welcome to see someone at [facility] or [facility] in [place].    PALOMA Rosa  Patient Advocate Liason (PAL)  [facility]  Ph. [phone] / Fax. [phone]

## 2023-07-15 PROBLEM — I16.0 HYPERTENSIVE URGENCY: Status: RESOLVED | Noted: 2019-04-19 | Resolved: 2023-07-15

## 2023-07-17 PROBLEM — M85.89 OSTEOPENIA OF MULTIPLE SITES: Status: ACTIVE | Noted: 2023-07-17

## 2023-07-28 NOTE — PROGRESS NOTES
ASSESSMENT: Maricarmen Dotson is a 62 year old female with past medical history significant for chronic pain syndrome, postconcussion syndrome, migraine, history of TBI, fibromyalgia, obstructive sleep apnea, irritable bowel syndrome, hyperlipidemia, type 2 diabetes mellitus, hypertension, osteopenia, PTSD, depression, chronic kidney disease stage III, anxiety, insomnia who presents today for new patient evaluation of chronic bilateral hip pain.  Patient has a history of an L5-S1 fusion in 1994 with hardware removal in 2000.  My review of an x-ray pelvis and hips show severe degenerative change of the lower lumbar spine.  There are mild to moderate SI joint degenerative changes and mild bilateral hip degenerative changes.  Pain appears to be related to pathology intrinsic to the hips.  She does not have radicular type pain.  She is neurologically intact other than a subjective sensory deficit left heel which is chronic and stable.  Fibromyalgia may be playing a role.  She also has significant joint hypermobility which is likely playing a role.    PLAN:  A shared decision making model was used.  The patient's values and choices were respected.  The following represents what was discussed and decided upon by the physician assistant and the patient.      1.  DIAGNOSTIC TESTS:  - I reviewed the x-ray pelvis and hips.  - No additional diagnostic test were ordered.      2.  PHYSICAL THERAPY: Patient reports that she has had multiple rounds of physical therapy over the years.  She is going to try chiropractic treatment now.  We could consider pool therapy down the road if needed.    3.  MEDICATIONS: No changes are made to the patient's medications.  - Patient uses medical marijuana.  - Patient takes methocarbamol as needed but sparingly because this causes insomnia  - Patient is ibuprofen sparingly due to elevated blood pressure but it is helpful  - Patient takes Tylenol as needed which provides short-term relief.  - Patient  reports that she generally does not do well with medications.  She has many allergies.  She would like to avoid any additional medications if possible.  - Patient also uses Voltaren gel as needed.    4.  INTERVENTIONS:  No interventions were ordered today.  Patient is going to trial chiropractic treatment first.  - If symptoms persist, I would recommend starting with a diagnostic left hip joint injection under ultrasound guidance to hopefully on and on the etiology of her pain.  Patient reports that she does not tolerate steroid injections.    5.  PATIENT EDUCATION:  - Patient asked if she could trial chiropractic treatment for this pain.  She has had a positive response to chiropractic treatment in the past.  She states that she has a chiropractor she has used for years and they take precaution regarding her lumbar fusion and joint hypermobility.  -I told the patient that if she had a positive response to the diagnostic hip joint injection I would recommend a referral to orthopedics.  - If she failed the diagnostic left hip joint injection I would get an MRI lumbar spine.    6.  FOLLOW-UP:   Patient is going to follow-up as needed.  If she has questions or concerns, she should not hesitate to call.      SUBJECTIVE:  Maricarmen Dotson  Is a 62 year old female who presents today in consultation at request of Dr. Mclaughlin for new patient evaluation of bilateral hip pain.  Patient has a long history of spine problems.  In 1994 she was wallpapering her room and then sneezed and had a lumbar disc herniation.  She had an L5-S1 fusion at [facility].  She did well after the fusion although the hardware caused pain.  In 2000 she had surgery to remove hardware but she states they left some wires in place.  She states that since then she has tried to stay very aware of her low back.  She does yoga and core/pelvis strengthening exercises to try to protect her lower back.  In March 2022 she began to  experience bilateral hip pain when she was walking slowly behind her sister.  Pain flared up again in March 2023 and is persisting.  Last year the hip pain improved with walking but this year it is persisting.    Patient complains of bilateral groin pain.  Pain is worse on the left than the right.  Pain radiates down the anterior thigh.  She denies pain distal to the knees.  She denies any low back pain.  She does have mild pain in the bilateral sacroiliac joint region but she feels that it starts in the groin and then extends back.  She denies any numbness or tingling except for when she has fibro flares, and then she will have some numbness and tingling in the area of her fibro pain.  She feels a weak sensation in her legs as if they give way.  She has had falls because of this.  She states that her hips feel tight and stiff.  Pain is aggravated with sitting, getting in and out of the car, too much activity.  She states that she feels good while she is walking but then afterwards she is sore and has to take time to recover.  Sitting crosslegged it alleviates her pain and stretching alleviates her pain.    Treatment to date:  - L5-S1 fusion 1994  - Surgery to remove hardware 2000  - Multiple rounds of physical therapy, most recently 2005  - Chiropractic treatment which has been helpful for other pain, but she has not tried yet for this pain  - No spine injections (patient states she cannot have steroid injections due to allergy)  - Medical marijuana which is somewhat helpful  - Methocarbamol is somewhat helpful but causes insomnia  - Ibuprofen is somewhat helpful but affects blood pressure  - Tylenol temporary relief  - Diclofenac gel somewhat helpful for fibromyalgia pain    Current Outpatient Medications   Medication    alcohol swab prep pads    benzonatate (TESSALON) 100 MG capsule    blood glucose (FREESTYLE LITE) test strip    blood glucose (FREESTYLE LITE) test strip    blood glucose monitoring (FREESTYLE  "LITE) meter device kit    blood glucose monitoring (FREESTYLE) lancets    COMPOUNDED NON-CONTROLLED SUBSTANCE (CMPD RX) - PHARMACY TO MIX COMPOUNDED MEDICATION    Continuous Blood Gluc  (FREESTYLE RADHA 14 DAY READER) MARIA C    Continuous Blood Gluc Sensor (FREESTYLE RADHA 14 DAY SENSOR) MISC    Continuous Blood Gluc Sensor (FREESTYLE RADHA 3 SENSOR) MISC    dapagliflozin (FARXIGA) 5 MG TABS tablet    diclofenac (VOLTAREN) 1 % topical gel    EPINEPHrine (ANY BX GENERIC EQUIV) 0.3 MG/0.3ML injection 2-pack    HEMP OIL OR EXTRACT OR OTHER CBD CANNABINOID, NOT MEDICAL CANNABIS,    insulin aspart (NOVOLOG VIAL) 100 UNITS/ML vial    Insulin Disposable Pump (OMNIPOD DASH PODS, GEN 4,) MISC    INSULIN PUMP - OUTPATIENT    insulin syringe-needle U-100 (31G X 5/16\" 0.3 ML) 31G X 5/16\" 0.3 ML miscellaneous    levocetirizine (XYZAL) 5 MG tablet    Magnesium Oxide 500 MG TABS    medical cannabis (Patient's own supply)    MELATONIN PO    methocarbamol (ROBAXIN) 500 MG tablet    ondansetron (ZOFRAN ODT) 4 MG ODT tab    polyethylene glycol (MIRALAX/GLYCOLAX) packet    STATIN NOT PRESCRIBED, INTENTIONAL,     No current facility-administered medications for this visit.       Allergies   Allergen Reactions    Amoxicillin Anaphylaxis    Bee Anaphylaxis     Wasp       Contrast Dye Anaphylaxis    Diatrizoate Anaphylaxis    Iodine Anaphylaxis     Severe anaphalatic shock      Losartan Muscle Pain (Myalgia)    Sulfa Antibiotics Anaphylaxis    Tetanus-Diphtheria Toxoids Td      Rxn was to Tdap-whole body joint pain and fever.  Had similar reaction to Td vaccine 7/28/2017    Metoprolol Other (See Comments)     Fatigue, joint pain, throat tightness    Zithromax [Azithromycin Dihydrate] Nausea and Vomiting    Amlodipine      Neuropathic type pain in hands/feet    Atorvastatin      myalgias    Catapres [Clonidine]     Crestor [Rosuvastatin]      myalgias    Cyproheptadine      Severe headache, cardiac issues, and dizziness    Humalog " [Insulin Lispro]      Causes pancreatitis -     Hydrochlorothiazide      numbness    Latex      Skin burn and can't breathe      Lisinopril      Joint aches    Metformin     Naltrexone     Nifedipine     Olmesartan      Joint pain, stiffness, cough intermittent    Onglyza [Saxagliptin Hydrochloride]      Fibromyalgia flare    Phenergan [Promethazine]     Prochlorperazine      Altered mental status, hallucinations    Reglan [Metoclopramide]     Sumatriptan      Causes severe depression    Terazosin      Tingling in toes and muscle stiffness    Tetracycline Nausea and Vomiting, Hives and Difficulty breathing     Pt called to update today after the visit that she is allergic to the tetracycline-added to her list    Sulindac Anxiety     Panic attacks       Past Medical History:   Diagnosis Date    Ascending aorta enlargement (H)     Depressive disorder     severe, prior hospitalization    Diverticulosis of colon (without mention of hemorrhage)     Fibromyalgia 06/26/2007    Insomnia     Irritable bowel syndrome     Osteopenia     Type 2 diabetes mellitus with complications (H)     microalbuminuria        Patient Active Problem List   Diagnosis    Irritable bowel syndrome    Insomnia    Chronic pain syndrome    Hyperlipidemia LDL goal <100    Dyspepsia    Type 2 diabetes, HbA1c goal < 7% (H)    Hepatic injury    Overweight    Post concussion syndrome    Plantar fasciitis    Pain in joint, ankle and foot    Migraine headache    Anxiety    Panic attacks    History of traumatic brain injury    Pain in thoracic spine    Nonallopathic lesion of cervical region    Cervicalgia    Lumbago    Statin intolerance    Type 2 diabetes mellitus with hyperglycemia (H)    Fibromyalgia    Cervical pain    Carotid atherosclerosis, bilateral    Insulin pump in place    Hypertension goal BP (blood pressure) < 140/90    Posttraumatic stress disorder    MIESHA (obstructive sleep apnea)    Moderate episode of recurrent major depressive disorder  (H)    Chronic kidney disease, stage 3a (H)    Pain of right hand    Osteopenia of multiple sites       Past Surgical History:   Procedure Laterality Date    BACK SURGERY      fusion  and removal of hardware 2004    C SPINAL ORTHOSIS,NOS  2002    lumbar surgery    CHOLECYSTECTOMY  2011    choley  2011    ENT SURGERY  1986    septoplasty    HYSTERECTOMY, CERVIX STATUS UNKNOWN      TVH/BSO    ORTHOPEDIC SURGERY  2005    left ankle       Family History   Problem Relation Age of Onset    Diabetes Mother         type 2    Hypertension Mother     Cerebrovascular Disease Mother          stroke age 57    Ovarian Cancer Mother 43    Cancer Mother         cervix    Diabetes Father         type 2    Hypertension Father     Gastrointestinal Disease Father         colon polyps    Sleep Apnea Brother     Cancer Sister     Ovarian Cancer Sister 46    Ovarian Cancer Maternal Grandmother 72    Cancer Maternal Grandmother         cervix       Social history: Patient is .  She does not work outside the home.  She denies tobacco, alcohol, illicit drug use.      Answers submitted by the patient for this visit:  Symptoms you have experienced in the last 30 days (Submitted on 2023)  General Symptoms: No  Skin Symptoms: No  HENT Symptoms: No  EYE SYMPTOMS: No  HEART SYMPTOMS: Yes  LUNG SYMPTOMS: Yes  INTESTINAL SYMPTOMS: Yes  URINARY SYMPTOMS: Yes  GYNECOLOGIC SYMPTOMS: No  BREAST SYMPTOMS: No  SKELETAL SYMPTOMS: Yes  BLOOD SYMPTOMS: No  NERVOUS SYSTEM SYMPTOMS: Yes  MENTAL HEALTH SYMPTOMS: Yes  Please answer the questions below to tell us what condition you are experiencing: (Submitted on 2023)  Chest pain or pressure: Yes  Fast or irregular heartbeat: Yes  Pain in legs with walking: Yes  Trouble breathing while lying down: No  Fingers or toes appear blue: No  High blood pressure: Yes  Low blood pressure: No  Fainting: No  Murmurs: Yes  Pacemaker: No  Varicose veins: No  Edema or swelling: No  Wake up at night  with shortness of breath: No  Light-headedness: Yes  Exercise intolerance: Yes  Please answer the questions below to tell us what condition you are experiencing: (Submitted on 7/30/2023)  Cough: Yes  Sputum or phlegm: No  Coughing up blood: No  Difficulty breating or shortness of breath: Yes  Snoring: No  Wheezing: No  Difficulty breathing on exertion: Yes  Nighttime Cough: Yes  Difficulty breathing when lying flat: No  Please answer the questions below to tell us what conditions you are experiencing: (Submitted on 7/30/2023)  Heart burn or indigestion: Yes  Nausea: Yes  Vomiting: No  Abdominal pain: Yes  Bloating: Yes  Constipation: Yes  Diarrhea: Yes  Blood in stool: No  Black stools: No  Rectal or Anal pain: Yes  Fecal incontinence: Yes  Yellowing of skin or eyes: No  Vomit with blood: No  Change in stools: No  Please answer the questions below to tell us what condition you are experiencing: (Submitted on 7/30/2023)  Trouble holding urine or incontinence: Yes  Pain or burning: No  Trouble starting or stopping: Yes  Increased frequency of urination: No  Blood in urine: No  Decreased frequency of urination: Yes  Frequent nighttime urination: No  Flank pain: Yes  Difficulty emptying bladder: Yes  Please answer the questions below to tell us what condition you are experiencing: (Submitted on 7/30/2023)  Back pain: Yes  Muscle aches: Yes  Neck pain: Yes  Swollen joints: Yes  Joint pain: Yes  Bone pain: Yes  Muscle cramps: Yes  Muscle weakness: Yes  Joint stiffness: Yes  Bone fracture: Yes  Please answer the questions below to tell us what condition you are experiencing: (Submitted on 7/30/2023)  Trouble with coordination: Yes  Dizziness or trouble with balance: Yes  Fainting or black-out spells: No  Memory loss: No  Headache: Yes  Seizures: No  Speech problems: No  Tingling: Yes  Tremor: No  Weakness: Yes  Difficulty walking: Yes  Paralysis: No  Numbness: Yes  Please answer the questions below to tell us what  condition you are experiencing: (Submitted on 7/30/2023)  Nervous or Anxious: Yes  Depression: Yes  Trouble sleeping: Yes  Trouble thinking or concentrating: Yes  Mood changes: Yes  Panic attacks: Yes        OBJECTIVE:  PHYSICAL EXAMINATION:    CONSTITUTIONAL:  Vital signs as above.  No acute distress.  The patient is well nourished and well groomed.  PSYCHIATRIC:  The patient is awake, alert, oriented to person, place, time and answering questions appropriately with clear speech.    HEENT: Normocephalic, atraumatic.  Sclera clear.  Neck is supple.  SKIN:  Skin over the face, bilateral lower extremities, and posterior torso is clean, dry, intact without rashes.    GAIT:  Gait is antalgic, favoring the left.  The patient is able to heel and toe walk without significant difficulty.    STANDING EXAMINATION: No tenderness palpation bilateral lumbar paraspinous muscles.  Tender to palpation bilateral sacroiliac joints.  Tender to palpation bilateral greater trochanters.  Lumbar flexion is full.  Lumbar extension is full.  Patient appears hypermobile with thoracolumbar range of motion testing.  MUSCLE STRENGTH:  The patient has 5/5 strength for the bilateral hip flexors, knee flexors/extensors, ankle dorsiflexors/plantar flexors, great toe extensors.  NEUROLOGICAL: 1+ patellar, and achilles reflexes bilaterally.  Negative Babinski's bilaterally.  No ankle clonus bilaterally.  Diminished sensation left heel (chronic, stable, related to an allergy from a blood pressure medication)  VASCULAR:  2/4 dorsalis pedis pulses bilaterally.  Bilateral lower extremities are warm.  There is no pitting edema of the bilateral lower extremities.  ABDOMINAL:  Soft, non-distended, non-tender throughout all quadrants.  No pulsatile mass palpated in the left lower quadrant.  LYMPH NODES:  No palpable or tender inguinal lymph nodes.  MUSCULOSKELETAL: Straight leg raise is negative bilaterally.  Range of motion of both hips is  full/hypermobile    RESULTS: I reviewed the x-ray pelvis and hips from [facility] dated July 11, 2023.  This shows severe degenerative changes lower lumbar spine.  There is mild to moderate degenerative change of the sacroiliac joints.  There are mild bilateral hip degenerative changes.

## 2023-07-30 ASSESSMENT — ENCOUNTER SYMPTOMS
DEPRESSION: 1
STIFFNESS: 1
NERVOUS/ANXIOUS: 1
INSOMNIA: 1
WEAKNESS: 1
CONSTIPATION: 1
BLOOD IN STOOL: 0
BLOATING: 1
PARALYSIS: 0
RECTAL PAIN: 1
MUSCLE CRAMPS: 1
ORTHOPNEA: 0
MEMORY LOSS: 0
SPEECH CHANGE: 0
SEIZURES: 0
FLANK PAIN: 1
DIZZINESS: 1
NUMBNESS: 1
HYPOTENSION: 0
COUGH: 1
LEG PAIN: 1
MUSCLE WEAKNESS: 1
SPUTUM PRODUCTION: 0
PANIC: 1
WHEEZING: 0
LOSS OF CONSCIOUSNESS: 0
DIARRHEA: 1
HEMOPTYSIS: 0
DIFFICULTY URINATING: 1
LIGHT-HEADEDNESS: 1
JAUNDICE: 0
DISTURBANCES IN COORDINATION: 1
COUGH DISTURBING SLEEP: 1
NECK PAIN: 1
SYNCOPE: 0
VOMITING: 0
BACK PAIN: 1
HEMATURIA: 0
POSTURAL DYSPNEA: 0
SHORTNESS OF BREATH: 1
ARTHRALGIAS: 1
HEARTBURN: 1
SNORES LOUDLY: 0
JOINT SWELLING: 1
SLEEP DISTURBANCES DUE TO BREATHING: 0
DYSURIA: 0
BOWEL INCONTINENCE: 1
HEADACHES: 1
MYALGIAS: 1
DYSPNEA ON EXERTION: 1
NAUSEA: 1
HYPERTENSION: 1
DECREASED CONCENTRATION: 1
ABDOMINAL PAIN: 1
PALPITATIONS: 1
TINGLING: 1
TREMORS: 0
EXERCISE INTOLERANCE: 1

## 2023-07-31 ENCOUNTER — OFFICE VISIT (OUTPATIENT)
Dept: PHYSICAL MEDICINE AND REHAB | Facility: CLINIC | Age: 63
End: 2023-07-31
Attending: INTERNAL MEDICINE
Payer: COMMERCIAL

## 2023-07-31 VITALS
HEIGHT: 64 IN | HEART RATE: 49 BPM | OXYGEN SATURATION: 98 % | WEIGHT: 178.9 LBS | SYSTOLIC BLOOD PRESSURE: 156 MMHG | BODY MASS INDEX: 30.54 KG/M2 | DIASTOLIC BLOOD PRESSURE: 82 MMHG

## 2023-07-31 DIAGNOSIS — M25.552 BILATERAL HIP PAIN: ICD-10-CM

## 2023-07-31 DIAGNOSIS — M25.551 BILATERAL HIP PAIN: ICD-10-CM

## 2023-07-31 PROCEDURE — 99204 OFFICE O/P NEW MOD 45 MIN: CPT | Performed by: PHYSICIAN ASSISTANT

## 2023-07-31 ASSESSMENT — PAIN SCALES - GENERAL: PAINLEVEL: SEVERE PAIN (6)

## 2023-07-31 NOTE — LETTER
7/31/2023         RE: Maricarmen Dotson  [address]  [place] [address]        Dear Colleague,    Thank you for referring your patient, Maricarmen Dotson, to the [facility]. Please see a copy of my visit note below.    ASSESSMENT: Maricarmen Dotson is a 62 year old female with past medical history significant for chronic pain syndrome, postconcussion syndrome, migraine, history of TBI, fibromyalgia, obstructive sleep apnea, irritable bowel syndrome, hyperlipidemia, type 2 diabetes mellitus, hypertension, osteopenia, PTSD, depression, chronic kidney disease stage III, anxiety, insomnia who presents today for new patient evaluation of chronic bilateral hip pain.  Patient has a history of an L5-S1 fusion in 1994 with hardware removal in 2000.  My review of an x-ray pelvis and hips show severe degenerative change of the lower lumbar spine.  There are mild to moderate SI joint degenerative changes and mild bilateral hip degenerative changes.  Pain appears to be related to pathology intrinsic to the hips.  She does not have radicular type pain.  She is neurologically intact other than a subjective sensory deficit left heel which is chronic and stable.  Fibromyalgia may be playing a role.  She also has significant joint hypermobility which is likely playing a role.    PLAN:  A shared decision making model was used.  The patient's values and choices were respected.  The following represents what was discussed and decided upon by the physician assistant and the patient.      1.  DIAGNOSTIC TESTS:  - I reviewed the x-ray pelvis and hips.  - No additional diagnostic test were ordered.      2.  PHYSICAL THERAPY: Patient reports that she has had multiple rounds of physical therapy over the years.  She is going to try chiropractic treatment now.  We could consider pool therapy down the road if needed.    3.  MEDICATIONS: No changes are made to the patient's medications.  - Patient uses medical  marijuana.  - Patient takes methocarbamol as needed but sparingly because this causes insomnia  - Patient is ibuprofen sparingly due to elevated blood pressure but it is helpful  - Patient takes Tylenol as needed which provides short-term relief.  - Patient reports that she generally does not do well with medications.  She has many allergies.  She would like to avoid any additional medications if possible.  - Patient also uses Voltaren gel as needed.    4.  INTERVENTIONS:  No interventions were ordered today.  Patient is going to trial chiropractic treatment first.  - If symptoms persist, I would recommend starting with a diagnostic left hip joint injection under ultrasound guidance to hopefully on and on the etiology of her pain.  Patient reports that she does not tolerate steroid injections.    5.  PATIENT EDUCATION:  - Patient asked if she could trial chiropractic treatment for this pain.  She has had a positive response to chiropractic treatment in the past.  She states that she has a chiropractor she has used for years and they take precaution regarding her lumbar fusion and joint hypermobility.  -I told the patient that if she had a positive response to the diagnostic hip joint injection I would recommend a referral to orthopedics.  - If she failed the diagnostic left hip joint injection I would get an MRI lumbar spine.    6.  FOLLOW-UP:   Patient is going to follow-up as needed.  If she has questions or concerns, she should not hesitate to call.      SUBJECTIVE:  Maricarmen Dotson  Is a 62 year old female who presents today in consultation at request of Dr. Mclaughlin for new patient evaluation of bilateral hip pain.  Patient has a long history of spine problems.  In 1994 she was wallpapering her room and then sneezed and had a lumbar disc herniation.  She had an L5-S1 fusion at [facility].  She did well after the fusion although the hardware caused pain.  In 2000 she had surgery to remove  hardware but she states they left some wires in place.  She states that since then she has tried to stay very aware of her low back.  She does yoga and core/pelvis strengthening exercises to try to protect her lower back.  In March 2022 she began to experience bilateral hip pain when she was walking slowly behind her sister.  Pain flared up again in March 2023 and is persisting.  Last year the hip pain improved with walking but this year it is persisting.    Patient complains of bilateral groin pain.  Pain is worse on the left than the right.  Pain radiates down the anterior thigh.  She denies pain distal to the knees.  She denies any low back pain.  She does have mild pain in the bilateral sacroiliac joint region but she feels that it starts in the groin and then extends back.  She denies any numbness or tingling except for when she has fibro flares, and then she will have some numbness and tingling in the area of her fibro pain.  She feels a weak sensation in her legs as if they give way.  She has had falls because of this.  She states that her hips feel tight and stiff.  Pain is aggravated with sitting, getting in and out of the car, too much activity.  She states that she feels good while she is walking but then afterwards she is sore and has to take time to recover.  Sitting crosslegged it alleviates her pain and stretching alleviates her pain.    Treatment to date:  - L5-S1 fusion 1994  - Surgery to remove hardware 2000  - Multiple rounds of physical therapy, most recently 2005  - Chiropractic treatment which has been helpful for other pain, but she has not tried yet for this pain  - No spine injections (patient states she cannot have steroid injections due to allergy)  - Medical marijuana which is somewhat helpful  - Methocarbamol is somewhat helpful but causes insomnia  - Ibuprofen is somewhat helpful but affects blood pressure  - Tylenol temporary relief  - Diclofenac gel somewhat helpful for fibromyalgia  "pain    Current Outpatient Medications   Medication     alcohol swab prep pads     benzonatate (TESSALON) 100 MG capsule     blood glucose (FREESTYLE LITE) test strip     blood glucose (FREESTYLE LITE) test strip     blood glucose monitoring (FREESTYLE LITE) meter device kit     blood glucose monitoring (FREESTYLE) lancets     COMPOUNDED NON-CONTROLLED SUBSTANCE (CMPD RX) - PHARMACY TO MIX COMPOUNDED MEDICATION     Continuous Blood Gluc  (FREESTYLE RDAHA 14 DAY READER) MARIA C     Continuous Blood Gluc Sensor (FREESTYLE RADHA 14 DAY SENSOR) MISC     Continuous Blood Gluc Sensor (FREESTYLE RADHA 3 SENSOR) [facility]     dapagliflozin (FARXIGA) 5 MG TABS tablet     diclofenac (VOLTAREN) 1 % topical gel     EPINEPHrine (ANY BX GENERIC EQUIV) 0.3 MG/0.3ML injection 2-pack     HEMP OIL OR EXTRACT OR OTHER CBD CANNABINOID, NOT MEDICAL CANNABIS,     insulin aspart (NOVOLOG VIAL) 100 UNITS/ML vial     Insulin Disposable Pump (OMNIPOD DASH PODS, GEN 4,) MISC     INSULIN PUMP - OUTPATIENT     insulin syringe-needle U-100 (31G X 5/16\" 0.3 ML) 31G X 5/16\" 0.3 ML miscellaneous     levocetirizine (XYZAL) 5 MG tablet     Magnesium Oxide 500 MG TABS     medical cannabis (Patient's own supply)     MELATONIN PO     methocarbamol (ROBAXIN) 500 MG tablet     ondansetron (ZOFRAN ODT) 4 MG ODT tab     polyethylene glycol (MIRALAX/GLYCOLAX) packet     STATIN NOT PRESCRIBED, INTENTIONAL,     No current facility-administered medications for this visit.       Allergies   Allergen Reactions     Amoxicillin Anaphylaxis     Bee Anaphylaxis     Wasp        Contrast Dye Anaphylaxis     Diatrizoate Anaphylaxis     Iodine Anaphylaxis     Severe anaphalatic shock       Losartan Muscle Pain (Myalgia)     Sulfa Antibiotics Anaphylaxis     Tetanus-Diphtheria Toxoids Td      Rxn was to Tdap-whole body joint pain and fever.  Had similar reaction to Td vaccine 7/28/2017     Metoprolol Other (See Comments)     Fatigue, joint pain, throat tightness     " Zithromax [Azithromycin Dihydrate] Nausea and Vomiting     Amlodipine      Neuropathic type pain in hands/feet     Atorvastatin      myalgias     Catapres [Clonidine]      Crestor [Rosuvastatin]      myalgias     Cyproheptadine      Severe headache, cardiac issues, and dizziness     Humalog [Insulin Lispro]      Causes pancreatitis -      Hydrochlorothiazide      numbness     Latex      Skin burn and can't breathe       Lisinopril      Joint aches     Metformin      Naltrexone      Nifedipine      Olmesartan      Joint pain, stiffness, cough intermittent     Onglyza [Saxagliptin Hydrochloride]      Fibromyalgia flare     Phenergan [Promethazine]      Prochlorperazine      Altered mental status, hallucinations     Reglan [Metoclopramide]      Sumatriptan      Causes severe depression     Terazosin      Tingling in toes and muscle stiffness     Tetracycline Nausea and Vomiting, Hives and Difficulty breathing     Pt called to update today after the visit that she is allergic to the tetracycline-added to her list     Sulindac Anxiety     Panic attacks       Past Medical History:   Diagnosis Date     Ascending aorta enlargement (H)      Depressive disorder     severe, prior hospitalization     Diverticulosis of colon (without mention of hemorrhage)      Fibromyalgia 06/26/2007     Insomnia      Irritable bowel syndrome      Osteopenia      Type 2 diabetes mellitus with complications (H)     microalbuminuria        Patient Active Problem List   Diagnosis     Irritable bowel syndrome     Insomnia     Chronic pain syndrome     Hyperlipidemia LDL goal <100     Dyspepsia     Type 2 diabetes, HbA1c goal < 7% (H)     Hepatic injury     Overweight     Post concussion syndrome     Plantar fasciitis     Pain in joint, ankle and foot     Migraine headache     Anxiety     Panic attacks     History of traumatic brain injury     Pain in thoracic spine     Nonallopathic lesion of cervical region     Cervicalgia     Lumbago     Statin  intolerance     Type 2 diabetes mellitus with hyperglycemia (H)     Fibromyalgia     Cervical pain     Carotid atherosclerosis, bilateral     Insulin pump in place     Hypertension goal BP (blood pressure) < 140/90     Posttraumatic stress disorder     MIESHA (obstructive sleep apnea)     Moderate episode of recurrent major depressive disorder (H)     Chronic kidney disease, stage 3a (H)     Pain of right hand     Osteopenia of multiple sites       Past Surgical History:   Procedure Laterality Date     BACK SURGERY      fusion  and removal of hardware      C SPINAL ORTHOSIS,NOS  2002    lumbar surgery     CHOLECYSTECTOMY       choley  2011     ENT SURGERY      septoplasty     HYSTERECTOMY, CERVIX STATUS UNKNOWN      TVH/BSO     ORTHOPEDIC SURGERY  2005    left ankle       Family History   Problem Relation Age of Onset     Diabetes Mother         type 2     Hypertension Mother      Cerebrovascular Disease Mother          stroke age 57     Ovarian Cancer Mother 43     Cancer Mother         cervix     Diabetes Father         type 2     Hypertension Father      Gastrointestinal Disease Father         colon polyps     Sleep Apnea Brother      Cancer Sister      Ovarian Cancer Sister 46     Ovarian Cancer Maternal Grandmother 72     Cancer Maternal Grandmother         cervix       Social history: Patient is .  She does not work outside the home.  She denies tobacco, alcohol, illicit drug use.      Answers submitted by the patient for this visit:  Symptoms you have experienced in the last 30 days (Submitted on 2023)  General Symptoms: No  Skin Symptoms: No  HENT Symptoms: No  EYE SYMPTOMS: No  HEART SYMPTOMS: Yes  LUNG SYMPTOMS: Yes  INTESTINAL SYMPTOMS: Yes  URINARY SYMPTOMS: Yes  GYNECOLOGIC SYMPTOMS: No  BREAST SYMPTOMS: No  SKELETAL SYMPTOMS: Yes  BLOOD SYMPTOMS: No  NERVOUS SYSTEM SYMPTOMS: Yes  MENTAL HEALTH SYMPTOMS: Yes  Please answer the questions below to tell us what condition  you are experiencing: (Submitted on 7/30/2023)  Chest pain or pressure: Yes  Fast or irregular heartbeat: Yes  Pain in legs with walking: Yes  Trouble breathing while lying down: No  Fingers or toes appear blue: No  High blood pressure: Yes  Low blood pressure: No  Fainting: No  Murmurs: Yes  Pacemaker: No  Varicose veins: No  Edema or swelling: No  Wake up at night with shortness of breath: No  Light-headedness: Yes  Exercise intolerance: Yes  Please answer the questions below to tell us what condition you are experiencing: (Submitted on 7/30/2023)  Cough: Yes  Sputum or phlegm: No  Coughing up blood: No  Difficulty breating or shortness of breath: Yes  Snoring: No  Wheezing: No  Difficulty breathing on exertion: Yes  Nighttime Cough: Yes  Difficulty breathing when lying flat: No  Please answer the questions below to tell us what conditions you are experiencing: (Submitted on 7/30/2023)  Heart burn or indigestion: Yes  Nausea: Yes  Vomiting: No  Abdominal pain: Yes  Bloating: Yes  Constipation: Yes  Diarrhea: Yes  Blood in stool: No  Black stools: No  Rectal or Anal pain: Yes  Fecal incontinence: Yes  Yellowing of skin or eyes: No  Vomit with blood: No  Change in stools: No  Please answer the questions below to tell us what condition you are experiencing: (Submitted on 7/30/2023)  Trouble holding urine or incontinence: Yes  Pain or burning: No  Trouble starting or stopping: Yes  Increased frequency of urination: No  Blood in urine: No  Decreased frequency of urination: Yes  Frequent nighttime urination: No  Flank pain: Yes  Difficulty emptying bladder: Yes  Please answer the questions below to tell us what condition you are experiencing: (Submitted on 7/30/2023)  Back pain: Yes  Muscle aches: Yes  Neck pain: Yes  Swollen joints: Yes  Joint pain: Yes  Bone pain: Yes  Muscle cramps: Yes  Muscle weakness: Yes  Joint stiffness: Yes  Bone fracture: Yes  Please answer the questions below to tell us what condition you are  experiencing: (Submitted on 7/30/2023)  Trouble with coordination: Yes  Dizziness or trouble with balance: Yes  Fainting or black-out spells: No  Memory loss: No  Headache: Yes  Seizures: No  Speech problems: No  Tingling: Yes  Tremor: No  Weakness: Yes  Difficulty walking: Yes  Paralysis: No  Numbness: Yes  Please answer the questions below to tell us what condition you are experiencing: (Submitted on 7/30/2023)  Nervous or Anxious: Yes  Depression: Yes  Trouble sleeping: Yes  Trouble thinking or concentrating: Yes  Mood changes: Yes  Panic attacks: Yes        OBJECTIVE:  PHYSICAL EXAMINATION:    CONSTITUTIONAL:  Vital signs as above.  No acute distress.  The patient is well nourished and well groomed.  PSYCHIATRIC:  The patient is awake, alert, oriented to person, place, time and answering questions appropriately with clear speech.    HEENT: Normocephalic, atraumatic.  Sclera clear.  Neck is supple.  SKIN:  Skin over the face, bilateral lower extremities, and posterior torso is clean, dry, intact without rashes.    GAIT:  Gait is antalgic, favoring the left.  The patient is able to heel and toe walk without significant difficulty.    STANDING EXAMINATION: No tenderness palpation bilateral lumbar paraspinous muscles.  Tender to palpation bilateral sacroiliac joints.  Tender to palpation bilateral greater trochanters.  Lumbar flexion is full.  Lumbar extension is full.  Patient appears hypermobile with thoracolumbar range of motion testing.  MUSCLE STRENGTH:  The patient has 5/5 strength for the bilateral hip flexors, knee flexors/extensors, ankle dorsiflexors/plantar flexors, great toe extensors.  NEUROLOGICAL: 1+ patellar, and achilles reflexes bilaterally.  Negative Babinski's bilaterally.  No ankle clonus bilaterally.  Diminished sensation left heel (chronic, stable, related to an allergy from a blood pressure medication)  VASCULAR:  2/4 dorsalis pedis pulses bilaterally.  Bilateral lower extremities are warm.   There is no pitting edema of the bilateral lower extremities.  ABDOMINAL:  Soft, non-distended, non-tender throughout all quadrants.  No pulsatile mass palpated in the left lower quadrant.  LYMPH NODES:  No palpable or tender inguinal lymph nodes.  MUSCULOSKELETAL: Straight leg raise is negative bilaterally.  Range of motion of both hips is full/hypermobile    RESULTS: I reviewed the x-ray pelvis and hips from [facility] dated July 11, 2023.  This shows severe degenerative changes lower lumbar spine.  There is mild to moderate degenerative change of the sacroiliac joints.  There are mild bilateral hip degenerative changes.      Again, thank you for allowing me to participate in the care of your patient.        Sincerely,        Suma Hanson PA-C

## 2023-07-31 NOTE — PATIENT INSTRUCTIONS
In my opinion your pain seems more likely related to your hips than your spine.  I think it is reasonable to try chiropractic treatment for this as long as they take precautions for your lumbar fusion and hypermobility.    We discussed doing a diagnostic hip joint injection (with local anesthetic only) as the next step if your pain does not improve with chiropractic treatment. This would help us figure out if your pain is in fact coming from your hip or your back.  If you had a positive response to the diagnostic injection I would recommend a referral to a hip specialist with orthopedics.  If you did not have a positive response to the diagnostic injection I would recommend an MRI lumbar spine for further evaluation.    Please send me a [employer] message or call my nurse at [phone] if your pain does not improve.

## 2023-08-14 DIAGNOSIS — R80.9 TYPE 2 DIABETES MELLITUS WITH MICROALBUMINURIA, WITH LONG-TERM CURRENT USE OF INSULIN (H): Primary | ICD-10-CM

## 2023-08-14 DIAGNOSIS — Z79.4 TYPE 2 DIABETES MELLITUS WITH MICROALBUMINURIA, WITH LONG-TERM CURRENT USE OF INSULIN (H): Primary | ICD-10-CM

## 2023-08-14 DIAGNOSIS — E11.29 TYPE 2 DIABETES MELLITUS WITH MICROALBUMINURIA, WITH LONG-TERM CURRENT USE OF INSULIN (H): Primary | ICD-10-CM

## 2023-08-14 RX ORDER — BLOOD-GLUCOSE SENSOR
EACH MISCELLANEOUS
OUTPATIENT
Start: 2023-08-14

## 2023-08-14 RX ORDER — BLOOD-GLUCOSE SENSOR
1 EACH MISCELLANEOUS CONTINUOUS PRN
Qty: 2 EACH | Refills: 5 | Status: SHIPPED | OUTPATIENT
Start: 2023-08-14 | End: 2023-08-15

## 2023-08-15 DIAGNOSIS — R80.9 TYPE 2 DIABETES MELLITUS WITH MICROALBUMINURIA, WITH LONG-TERM CURRENT USE OF INSULIN (H): ICD-10-CM

## 2023-08-15 DIAGNOSIS — Z79.4 TYPE 2 DIABETES MELLITUS WITH MICROALBUMINURIA, WITH LONG-TERM CURRENT USE OF INSULIN (H): ICD-10-CM

## 2023-08-15 DIAGNOSIS — E11.29 TYPE 2 DIABETES MELLITUS WITH MICROALBUMINURIA, WITH LONG-TERM CURRENT USE OF INSULIN (H): ICD-10-CM

## 2023-08-15 RX ORDER — BLOOD-GLUCOSE SENSOR
1 EACH MISCELLANEOUS CONTINUOUS PRN
Qty: 2 EACH | Refills: 5 | Status: SHIPPED | OUTPATIENT
Start: 2023-08-15 | End: 2023-08-23

## 2023-08-15 NOTE — TELEPHONE ENCOUNTER
Hello this was denied due to duplicate but I do not see another encounter with this one open can we please get this one sent on over please and thank you

## 2023-08-15 NOTE — TELEPHONE ENCOUNTER
Last office visit: 6/8/2023 with prescribing provider:  Dr. Hightower   Future Office Visit: 9/25/23  Next 5 appointments (look out 90 days)      Oct 30, 2023  2:00 PM  (Arrive by 1:40 PM)  Provider Visit with Madyson Mclaughlin MD  [facility] ([facility] ) [address] [place] [id number] [phone]           Refill sent  Kylah Foster RN

## 2023-08-28 ENCOUNTER — TELEPHONE (OUTPATIENT)
Dept: PSYCHIATRY | Facility: CLINIC | Age: 63
End: 2023-08-28

## 2023-09-05 ENCOUNTER — NURSE TRIAGE (OUTPATIENT)
Dept: NURSING | Facility: CLINIC | Age: 63
End: 2023-09-05
Payer: MEDICARE

## 2023-09-05 NOTE — TELEPHONE ENCOUNTER
Triage call:    Patient calling to report symptoms of a persistent worsening dry cough.    Of note, patient reports having allergies & thought her symptoms were due to them.  However, patient reports this past weekend, her cough has worsened & patient reports having SOB at rest and a burning in her chest.    Patient reports avoiding to cough due to the pain she feels in her chest.  Patient reports when she is not coughing she can still feel a burn in her chest.    Patient reports her spO2 is 97%.  Patient denies having a fever or nasal drainage.    Per protocol, it is advised that the patient go to ED now.  Patient reports she will try going to [facility] as she dislikes  ED.    Chrissy Lopez RN on 9/5/2023 at 8:27 AM     Reason for Disposition   MODERATE difficulty breathing (e.g., speaks in phrases, SOB even at rest, pulse 100-120) and still present when not coughing    Additional Information   Negative: Bluish (or gray) lips or face   Negative: SEVERE difficulty breathing (e.g., struggling for each breath, speaks in single words)   Negative: Coughing started suddenly after medicine, an allergic food or bee sting   Negative: Difficulty breathing after exposure to flames, smoke, or fumes   Negative: Sounds like a life-threatening emergency to the triager   Negative: Rapid onset of cough and has hives    Protocols used: Cough-A-OH

## 2023-09-12 ENCOUNTER — MYC MEDICAL ADVICE (OUTPATIENT)
Dept: PEDIATRICS | Facility: CLINIC | Age: 63
End: 2023-09-12
Payer: MEDICARE

## 2023-09-12 ENCOUNTER — MYC MEDICAL ADVICE (OUTPATIENT)
Dept: ENDOCRINOLOGY | Facility: CLINIC | Age: 63
End: 2023-09-12
Payer: MEDICARE

## 2023-09-12 DIAGNOSIS — R05.1 ACUTE COUGH: Primary | ICD-10-CM

## 2023-09-12 DIAGNOSIS — E11.65 TYPE 2 DIABETES MELLITUS WITH HYPERGLYCEMIA, WITH LONG-TERM CURRENT USE OF INSULIN (H): ICD-10-CM

## 2023-09-12 DIAGNOSIS — Z79.4 TYPE 2 DIABETES MELLITUS WITH HYPERGLYCEMIA, WITH LONG-TERM CURRENT USE OF INSULIN (H): ICD-10-CM

## 2023-09-12 DIAGNOSIS — E11.9 TYPE 2 DIABETES, HBA1C GOAL < 7% (H): ICD-10-CM

## 2023-09-12 RX ORDER — INSULIN ASPART 100 [IU]/ML
INJECTION, SOLUTION INTRAVENOUS; SUBCUTANEOUS
Qty: 90 ML | Refills: 0 | Status: SHIPPED | OUTPATIENT
Start: 2023-09-12 | End: 2023-11-14

## 2023-09-12 RX ORDER — BENZONATATE 200 MG/1
200 CAPSULE ORAL 3 TIMES DAILY PRN
Qty: 20 CAPSULE | Refills: 0 | Status: SHIPPED | OUTPATIENT
Start: 2023-09-12 | End: 2023-10-30

## 2023-09-12 RX ORDER — BENZONATATE 100 MG/1
100 CAPSULE ORAL 3 TIMES DAILY PRN
Status: CANCELLED | OUTPATIENT
Start: 2023-09-12

## 2023-09-12 NOTE — TELEPHONE ENCOUNTER
Last Written Prescription Date:  4/14/23  Last Fill Quantity: 90,  # refills: 0   Last office visit:  6/8/2023 with prescribing provider:  Dr. Hightower   Future Office Visit: 9/25/23  Next 5 appointments (look out 90 days)      Oct 30, 2023  2:00 PM  (Arrive by 1:40 PM)  Provider Visit with Madyson Mclaughlin MD  [facility] ([facility] ) [address] [place] [id number] [phone]                 Requested Prescriptions   Pending Prescriptions Disp Refills    insulin aspart (NOVOLOG VIAL) 100 UNITS/ML vial 90 mL 0     Sig: USE IN INSULIN PUMP, TOTAL DAILY DOSE  UNITS       There is no refill protocol information for this order

## 2023-09-12 NOTE — TELEPHONE ENCOUNTER
See pt's  message. We have prescribed albuterol inhaler for her in the past(1/20/21, 4/23/21 & 1/7/22). Called pt at [phone] to get details.    Pt says that she was seen in [facility] last night for cough & URI sx's, chest x-ray ruled out pneumonia, did neb treatment at [facility], sent home with rx for albuterol inhaler and prednisone.     Pt haven't started prednisone yet(she doesn't want to take it if not needed). She used albuterol inhaler last night & experienced palpitations, shakiness, jittery & moderate SOB. She also mentions that she experienced same SE in the past as well. Denies severe SOB, wheezing, trouble breathing/swallowing, throat closing, dizziness, hives or chest pain/tightness.     Pt was at the [employer] office with her dog when I talked to her, her voice was clear, doesn't sound like she is in resp distress. She wasn't confused. She says that she is planning to try albuterol inhaler when she get home one more time, if she experience the same sx's, she is planing to discontinue albuterol inhaler and start prednisone.      - pt is requesting rx for tessalon caps which helped in the past for her on-going dry cough. Please review & sign, if appropriate. Thanks.    Went over the sx's/reasons for ED visit. Pt agrees to monitor closely, will notify us with any concerns or go to ED depending on the severity of the sx's.     PALOMA Rosa  Patient Advocate Liason (PAL)  [facility]  Ph. [phone] / Fax. [phone]

## 2023-09-12 NOTE — TELEPHONE ENCOUNTER
Notified ptBacilio Rosa RN  Patient Advocate Liason (PAL)  [facility]  Ph. [phone] / Fax. [phone]

## 2023-09-25 ENCOUNTER — VIRTUAL VISIT (OUTPATIENT)
Dept: ENDOCRINOLOGY | Facility: CLINIC | Age: 63
End: 2023-09-25
Payer: COMMERCIAL

## 2023-09-25 DIAGNOSIS — R80.9 TYPE 2 DIABETES MELLITUS WITH MICROALBUMINURIA, WITH LONG-TERM CURRENT USE OF INSULIN (H): Primary | ICD-10-CM

## 2023-09-25 DIAGNOSIS — Z79.4 TYPE 2 DIABETES MELLITUS WITH MICROALBUMINURIA, WITH LONG-TERM CURRENT USE OF INSULIN (H): Primary | ICD-10-CM

## 2023-09-25 DIAGNOSIS — Z96.41 INSULIN PUMP STATUS: ICD-10-CM

## 2023-09-25 DIAGNOSIS — E11.29 TYPE 2 DIABETES MELLITUS WITH MICROALBUMINURIA, WITH LONG-TERM CURRENT USE OF INSULIN (H): Primary | ICD-10-CM

## 2023-09-25 PROCEDURE — 99214 OFFICE O/P EST MOD 30 MIN: CPT | Mod: 95 | Performed by: INTERNAL MEDICINE

## 2023-09-25 PROCEDURE — 95251 CONT GLUC MNTR ANALYSIS I&R: CPT | Performed by: INTERNAL MEDICINE

## 2023-09-25 NOTE — Clinical Note
9/25/2023         RE: Maricarmen Dotson  [address] [place] [address]        Dear Colleague,    Thank you for referring your patient, Maricarmen Dotson, to the [facility] [place]. Please see a copy of my visit note below.    Virtual Visit Details    Type of service:  Video Visit   Video Start Time:  3:05 PM  Video End Time: 3:25 PM    Originating Location (pt. Location): Home  Distant Location (provider location):  Off-site  Platform used for Video Visit: Robbie      Recent issues:  Diabetes follow-up evaluation  Has used the Omnipod Dash, planned to change to Omnipod 5 kit & pods, also the DexcomG6    Omnipod 5 expenses much higher however... decided to stay on Omnipod Dash with Freddie  Recent health issues with URI and then food poisoning illnesses  She reports paying alot for insulin ($496/3 mo...9 vials?) and pods ($536), [employer] insurance, but Libre3's free due to 's prior [employer] job         Diabetes:  History of GDM with 2 pregnancies, diet management  1996. Diagnosis of diabetes mellitus  Recalls starting a diabetes pill  Had taken metformin, also Januvia but developed pancreatitis    Has seen endocrinology providers CAMILA Da Silva M. Schultz, CAMILA Rich, MARAH York, MARAH Childress, KRISTA Mejia  Has tried several diabetes medications previously, records indicate side effects or med issues:  Humalog- apparent concerns with the pancreatitis    Metformin--Abdominal pain.  Glipizide- Allergic reaction- (abdominal pain and swelling),   Byetta and Onglyza-- pancreatitis.  Invokana -- had upper GI distress.    ~2006. Started treatment with insulin medication  Endocrinology evaluations with Emily Phan, [facility]  ~2017. Started Omnipod insulin pump use.  Subsequently saw Rosie Schwab, and CARLO Salgado for endocrinology evaluations.  Using Freestyle Freddie CGM    Previous FV hgbA1c trends include:  2/21/06 C-Peptide 2.5  3/4/09 WILLIAMS-65 Ab <1.0     Lab  Test 09/01/22  1017 05/20/22  0841 11/23/21  0859 09/18/20  0852 02/27/20  0954   A1C 7.8* 8.7* 8.2* 7.4* 8.0*           10/6/22. Initial diabetes evaluation with me at [place]  Reviewed health history and diabetes issues  Using Omnipod Dash insulin pump:   Novolog pump    Uses ICR method for bolusing  Blood glucose (BG) meter  Uses Freestyle Freddie CGM with smartphone   Scans 5-8x/day    Typically  Boluses postmeal, not premeal    Previous Omnipod Dash settings:     Recent Omnipod pump data:  Information not available    ~3/2023. Changed from Freestyle Libre2 to the Libre3 CGM  Current Libre3 CGM data              Recent [facility] labs include:  Lab Results   Component Value Date    A1C 8.0 (H) 07/05/2023     07/05/2023    POTASSIUM 4.0 07/05/2023    CHLORIDE 107 07/05/2023    CO2 25 07/05/2023    ANIONGAP 12 07/05/2023     (H) 07/05/2023    BUN 18.2 07/05/2023    CR 0.80 07/05/2023    GFRESTIMATED 83 07/05/2023    GFRESTBLACK >90 01/20/2021    KARIN 9.6 07/05/2023    CHOL 210 (H) 07/05/2023    TRIG 102 07/05/2023    HDL 57 07/05/2023     (H) 07/05/2023    NHDL 153 (H) 07/05/2023    UCRR 55.7 07/05/2023    MICROL <12.0 07/05/2023    UMALCR  07/05/2023      Comment:      Unable to calculate, urine albumin and/or urine creatinine is outside detectable limits.  Microalbuminuria is defined as an albumin:creatinine ratio of 17 to 299 for males and 25 to 299 for females. A ratio of albumin:creatinine of 300 or higher is indicative of overt proteinuria.  Due to biologic variability, positive results should be confirmed by a second, first-morning random or 24-hour timed urine specimen. If there is discrepancy, a third specimen is recommended. When 2 out of 3 results are in the microalbuminuria range, this is evidence for incipient nephropathy and warrants increased efforts at glucose control, blood pressure control, and institution of therapy with an angiotensin-converting-enzyme (ACE) inhibitor (if the patient  can tolerate it).      TSH 1.19 09/18/2020    T4 0.87 07/22/2019     DM Complications:   Nephropathy:    Microalbuminuria      Parathyroid:  Patient has seen Dr. Joanne Mtz/[facility] for neurology evaluations  Spring '23, Patient reports discussion of symptoms with neurologist including anterior throat discomfort, difficulty swallowing, cough  Lab test reportedly showed elevated PTH 82  General Surgery consultation appointment with Dr. Radha Conte/[facility] [place]   Appointment subsequently cancelled  Patient had repeat PTH testing at [facility], result reportedly normal    Previous  labs include:     Latest Reference Range & Units 02/03/15 11:05 03/29/23 12:32   Vitamin D Deficiency screening 20 - 75 ug/L 34 22      Latest Reference Range & Units 03/29/23 12:32   Parathyroid Hormone Intact 15 - 65 pg/mL 41     Additional history:  Previous fractures: Wrist, fingers  Kidney stones: None  Vitamin D def:  unknown  Last DEXA scan: ~1995  Supplements:  Calcium- none, vit D- none        Lives in [place], [place]  Sees Dr. Madyson Mclaughlin/[facility] for general medicine evaluations.  Also sees Dr Joanne Mtz/[facility]    PMH/PSH:  Past Medical History:   Diagnosis Date    Ascending aorta enlargement (H)     Depressive disorder     severe, prior hospitalization    Diverticulosis of colon (without mention of hemorrhage)     Fibromyalgia 06/26/2007    Insomnia     Irritable bowel syndrome     Osteopenia     Type 2 diabetes mellitus with complications (H)     microalbuminuria     Past Surgical History:   Procedure Laterality Date    BACK SURGERY      fusion 1994 and removal of hardware 2004    C SPINAL ORTHOSIS,NOS  2002    lumbar surgery    CHOLECYSTECTOMY  2011    choley  2011    ENT SURGERY  1986    septoplasty    HYSTERECTOMY, CERVIX STATUS UNKNOWN  2000    TVH/BSO    ORTHOPEDIC SURGERY  2005    left ankle       Family Hx:  Family History   Problem Relation Age of Onset    Diabetes  Mother         type 2    Hypertension Mother     Cerebrovascular Disease Mother          stroke age 57    Ovarian Cancer Mother 43    Cancer Mother         cervix    Diabetes Father         type 2    Hypertension Father     Gastrointestinal Disease Father         colon polyps    Sleep Apnea Brother     Cancer Sister     Ovarian Cancer Sister 46    Ovarian Cancer Maternal Grandmother 72    Cancer Maternal Grandmother         cervix         Social Hx:  Social History     Socioeconomic History    Marital status:      Spouse name: Not on file    Number of children: 3    Years of education: Not on file    Highest education level: Not on file   Occupational History    Occupation: xr technician     Employer: [employer]   Tobacco Use    Smoking status: Never    Smokeless tobacco: Never   Vaping Use    Vaping Use: Former   Substance and Sexual Activity    Alcohol use: Not Currently     Alcohol/week: 0.0 standard drinks of alcohol     Comment: maybe once a month    Drug use: No    Sexual activity: Yes     Partners: Male     Birth control/protection: Surgical   Other Topics Concern    Parent/sibling w/ CABG, MI or angioplasty before 65F 55M? Not Asked   Social History Narrative    Not on file     Social Determinants of Health     Financial Resource Strain: Not on file   Food Insecurity: Not on file   Transportation Needs: Not on file   Physical Activity: Not on file   Stress: Not on file   Social Connections: Not on file   Interpersonal Safety: Not on file   Housing Stability: Not on file          MEDICATIONS:  has a current medication list which includes the following prescription(s): blood glucose monitoring, freestyle ashleigh 14 day reader, freestyle ashleigh 3 sensor, epinephrine, HEMP OIL OR EXTRACT OR OTHER CBD CANNABINOID, NOT MEDICAL CANNABIS,, insulin aspart, omnipod dash pods (gen 4), insulin syringe-needle u-100, medical cannabis, melatonin, methocarbamol, polyethylene glycol, statin not  prescribed, alcohol swab, benzonatate, freestyle lite, blood glucose monitoring, and insulin pump.    ROS:     ROS: 10 point ROS neg other than the symptoms noted above in the HPI.    GENERAL: some fatigue, wt stable; denies fevers, chills, night sweats.   HEENT: some head congestion; no dysphagia, odonophagia, diplopia, neck pain  THYROID:  no apparent hyper or hypothyroid symptoms  CV: no chest pain, pressure, palpitations  LUNGS: no SOB, VASQUEZ, cough, wheezing   ABDOMEN: some indigestion; no diarrhea, constipation, abdominal pain  EXTREMITIES: no rashes, ulcers, edema  NEUROLOGY: no headaches, denies changes in vision, tingling, extremitiy numbness   MSK: no muscle aches or pains, weakness  SKIN: no rashes or lesions  : no menses  PSYCH:  stable mood, no significant anxiety or depression  ENDOCRINE: no heat or cold intolerance    Physical Exam (visual exam)  VS:  no vital signs taken for video visit  CONSTITUTIONAL: healthy, alert and NAD, well dressed, answering questions appropriately  ENT: no nose swelling or nasal discharge, mouth redness or gum changes.  EYES: eyes grossly normal to inspection, conjunctivae and sclerae normal, no exophthalmos or proptosis  THYROID:  no apparent nodules or goiter  LUNGS: no audible wheeze, cough or visible cyanosis, no visible retractions or increased work of breathing  ABDOMEN: abdomen not evaluated  EXTREMITIES: no hand tremors, limited exam  NEUROLOGY: CN grossly intact, mentation intact and speech normal   SKIN:  no apparent skin lesions, rash, or edema with visualized skin appearance  PSYCH: mentation appears normal, affect normal/bright, judgement and insight intact,   normal speech and appearance well groomed      LABS:    All pertinent notes, labs, and images personally reviewed by me.     A/P:  Encounter Diagnoses   Name Primary?    Type 2 diabetes mellitus with microalbuminuria, with long-term current use of insulin (H) Yes    Insulin pump status       Comments:  Reviewed health history and diabetes issues.  Details of her previous medication intolerances or allergies unclear  Reviewed and interpreted tests that I previously ordered.   Ordered appropriate tests for the endocrinology disease management.    Management options discussed and implemented after shared medical decision making with the patient.  T2DM problem is chronic-stable    Plan:  Reviewed the overall T2DM management and insulin pump use.  Discussed optimal BG testing to assess glucose trends.  We reviewed insulin pump settings, basal rate and bolus dosing  Use of automated pump bolus dosing for meal/snack carb & correction dosing  Reviewed use of the Omnipod Dash insulin pump reports  Reviewed the recent Freestyle Freddie CGM glucose sensor data, in detail    Reviewed general issues with possible hyperparathyroidism (HPT) diagnosis  Discussed lab tests used to assess patient parathyroid gland function    Recommend:  Since the new Omnipod 5 and DexcomG6 option very expensive for patient, I would not pursue this plan  Continue the current Omnipod Dash insulin pump management  No pump setting changes at this time but consider intensifying the mealtime ICR settings    Reviewed the ideal premeal (not postmeal) bolus routine   Future treatment options include adding a SGLT2-I such as Jardiance  Continue use of the Freestyle Libre3 CGM sensor, since inexpensive/free  Repeat non-fasting labs soon   Testing at [facility] clinic   Lab orders available  Needs repeat BP testing with PCP  Arrange annual dilated eye exam, fasting lipid panel testing  Discussed importance of regular endocrinology evaluations every 3-4 months.    Addressed patient's questions today.    There are no Patient Instructions on file for this visit.    Future labs ordered today:   Orders Placed This Encounter   Procedures    Hemoglobin A1c    TSH    Basic metabolic panel     Radiology/Consults ordered today: None    Total time spent on  day of encounter:  ***    Follow-up:  12/11/23    JEAN PIERRE Hightower MD, MS  Endocrinology  [facility]    CC: АННА Mclaughlin,                        Virtual Visit Details    Type of service:  Video Visit   Video Start Time:  3:05 PM  Video End Time: 3:25 PM    Originating Location (pt. Location): Home  Distant Location (provider location):  Off-site  Platform used for Video Visit: Robbie      Recent issues:  Diabetes follow-up evaluation  Has used the Omnipod Dash, planned to change to Omnipod 5 kit & pods, also the DexcomG6    Omnipod 5 expenses much higher however... decided to stay on Omnipod Dash with Freddie  Recent health issues with URI and then food poisoning illnesses  She reports paying alot for insulin ($496/3 mo...9 vials?) and pods ($536), [employer] insurance, but Libre3's free due to 's prior [employer] job         Diabetes:  History of GDM with 2 pregnancies, diet management  1996. Diagnosis of diabetes mellitus  Recalls starting a diabetes pill  Had taken metformin, also Januvia but developed pancreatitis    Has seen endocrinology providers CAMILA Da Silva M. Schultz, CAMILA Rich, MARAH York, MARAH Childress, KRISTA Mejia  Has tried several diabetes medications previously, records indicate side effects or med issues:  Humalog- apparent concerns with the pancreatitis    Metformin--Abdominal pain.  Glipizide- Allergic reaction- (abdominal pain and swelling),   Byetta and Onglyza-- pancreatitis.  Invokana -- had upper GI distress.    ~2006. Started treatment with insulin medication  Endocrinology evaluations with Emily Phan CNP/[facility]  ~2017. Started Omnipod insulin pump use.  Subsequently saw Rosie Schwab, and CARLO Salgado for endocrinology evaluations.  Using Freestyle Freddie CGM    Previous  hgbA1c trends include:  2/21/06 C-Peptide 2.5  3/4/09 WILLIAMS-65 Ab <1.0     Lab Test 09/01/22  1017 05/20/22  0841 11/23/21  0859 09/18/20  0852 02/27/20  0954   A1C 7.8* 8.7* 8.2* 7.4* 8.0*            10/6/22. Initial diabetes evaluation with me at [place]  Reviewed health history and diabetes issues  Using Omnipod Dash insulin pump:   Novolog pump    Uses ICR method for bolusing  Blood glucose (BG) meter  Uses Freestyle Freddie CGM with smartphone   Scans 5-8x/day    Typically  Boluses postmeal, not premeal    Previous Omnipod Dash settings:     Recent Omnipod pump data:  Information not available    ~3/2023. Changed from Freestyle Libre2 to the Libre3 CGM  Current Libre3 CGM data              Recent [facility] labs include:  Lab Results   Component Value Date    A1C 8.0 (H) 07/05/2023     07/05/2023    POTASSIUM 4.0 07/05/2023    CHLORIDE 107 07/05/2023    CO2 25 07/05/2023    ANIONGAP 12 07/05/2023     (H) 07/05/2023    BUN 18.2 07/05/2023    CR 0.80 07/05/2023    GFRESTIMATED 83 07/05/2023    GFRESTBLACK >90 01/20/2021    KARIN 9.6 07/05/2023    CHOL 210 (H) 07/05/2023    TRIG 102 07/05/2023    HDL 57 07/05/2023     (H) 07/05/2023    NHDL 153 (H) 07/05/2023    UCRR 55.7 07/05/2023    MICROL <12.0 07/05/2023    UMALCR  07/05/2023      Comment:      Unable to calculate, urine albumin and/or urine creatinine is outside detectable limits.  Microalbuminuria is defined as an albumin:creatinine ratio of 17 to 299 for males and 25 to 299 for females. A ratio of albumin:creatinine of 300 or higher is indicative of overt proteinuria.  Due to biologic variability, positive results should be confirmed by a second, first-morning random or 24-hour timed urine specimen. If there is discrepancy, a third specimen is recommended. When 2 out of 3 results are in the microalbuminuria range, this is evidence for incipient nephropathy and warrants increased efforts at glucose control, blood pressure control, and institution of therapy with an angiotensin-converting-enzyme (ACE) inhibitor (if the patient can tolerate it).      TSH 1.19 09/18/2020    T4 0.87 07/22/2019     DM  Complications:   Nephropathy:    Microalbuminuria      Parathyroid:  Patient has seen Dr. Joanne Mtz[facility] for [facility]y evaluations  Spring '23, Patient reports discussion of symptoms with neurologist including anterior throat discomfort, difficulty swallowing, cough  Lab test reportedly showed elevated PTH 82  General Surgery consultation appointment with Dr. Radha Conte/[facility] [place]   Appointment subsequently cancelled  Patient had repeat PTH testing at [facility], result reportedly normal    Previous  labs include:     Latest Reference Range & Units 02/03/15 11:05 03/29/23 12:32   Vitamin D Deficiency screening 20 - 75 ug/L 34 22      Latest Reference Range & Units 03/29/23 12:32   Parathyroid Hormone Intact 15 - 65 pg/mL 41     Additional history:  Previous fractures: Wrist, fingers  Kidney stones: None  Vitamin D def:  unknown  Last DEXA scan: ~1995  Supplements:  Calcium- none, vit D- none        Lives in [place], [place]  Sees Dr. Madyson Mclaughlin/[facility] for general medicine evaluations.  Also sees Dr Joanne Mtz/[facility]    PMH/PSH:  Past Medical History:   Diagnosis Date     Ascending aorta enlargement (H)      Depressive disorder     severe, prior hospitalization     Diverticulosis of colon (without mention of hemorrhage)      Fibromyalgia 06/26/2007     Insomnia      Irritable bowel syndrome      Osteopenia      Type 2 diabetes mellitus with complications (H)     microalbuminuria     Past Surgical History:   Procedure Laterality Date     BACK SURGERY      fusion 1994 and removal of hardware 2004     C SPINAL ORTHOSIS,NOS  2002    lumbar surgery     CHOLECYSTECTOMY  2011     choley  2011     ENT SURGERY  1986    septoplasty     HYSTERECTOMY, CERVIX STATUS UNKNOWN  2000    TVH/BSO     ORTHOPEDIC SURGERY  2005    left ankle       Family Hx:  Family History   Problem Relation Age of Onset     Diabetes Mother         type 2     Hypertension Mother       Cerebrovascular Disease Mother          stroke age 57     Ovarian Cancer Mother 43     Cancer Mother         cervix     Diabetes Father         type 2     Hypertension Father      Gastrointestinal Disease Father         colon polyps     Sleep Apnea Brother      Cancer Sister      Ovarian Cancer Sister 46     Ovarian Cancer Maternal Grandmother 72     Cancer Maternal Grandmother         cervix         Social Hx:  Social History     Socioeconomic History     Marital status:      Spouse name: Not on file     Number of children: 3     Years of education: Not on file     Highest education level: Not on file   Occupational History     Occupation: xr technician     Employer: [employer]   Tobacco Use     Smoking status: Never     Smokeless tobacco: Never   Vaping Use     Vaping Use: Former   Substance and Sexual Activity     Alcohol use: Not Currently     Alcohol/week: 0.0 standard drinks of alcohol     Comment: maybe once a month     Drug use: No     Sexual activity: Yes     Partners: Male     Birth control/protection: Surgical   Other Topics Concern     Parent/sibling w/ CABG, MI or angioplasty before 65F 55M? Not Asked   Social History Narrative     Not on file     Social Determinants of Health     Financial Resource Strain: Not on file   Food Insecurity: Not on file   Transportation Needs: Not on file   Physical Activity: Not on file   Stress: Not on file   Social Connections: Not on file   Interpersonal Safety: Not on file   Housing Stability: Not on file          MEDICATIONS:  has a current medication list which includes the following prescription(s): blood glucose monitoring, freestyle ashleigh 14 day reader, freestyle ashleigh 3 sensor, epinephrine, HEMP OIL OR EXTRACT OR OTHER CBD CANNABINOID, NOT MEDICAL CANNABIS,, insulin aspart, omnipod dash pods (gen 4), insulin syringe-needle u-100, medical cannabis, melatonin, methocarbamol, polyethylene glycol, statin not prescribed, alcohol swab,  benzonatate, freestyle lite, blood glucose monitoring, and insulin pump.    ROS:     ROS: 10 point ROS neg other than the symptoms noted above in the HPI.    GENERAL: some fatigue, wt stable; denies fevers, chills, night sweats.   HEENT: some head congestion; no dysphagia, odonophagia, diplopia, neck pain  THYROID:  no apparent hyper or hypothyroid symptoms  CV: no chest pain, pressure, palpitations  LUNGS: no SOB, VASQUEZ, cough, wheezing   ABDOMEN: some indigestion; no diarrhea, constipation, abdominal pain  EXTREMITIES: no rashes, ulcers, edema  NEUROLOGY: no headaches, denies changes in vision, tingling, extremitiy numbness   MSK: no muscle aches or pains, weakness  SKIN: no rashes or lesions  : no menses  PSYCH:  stable mood, no significant anxiety or depression  ENDOCRINE: no heat or cold intolerance    Physical Exam (visual exam)  VS:  no vital signs taken for video visit  CONSTITUTIONAL: healthy, alert and NAD, well dressed, answering questions appropriately  ENT: no nose swelling or nasal discharge, mouth redness or gum changes.  EYES: eyes grossly normal to inspection, conjunctivae and sclerae normal, no exophthalmos or proptosis  THYROID:  no apparent nodules or goiter  LUNGS: no audible wheeze, cough or visible cyanosis, no visible retractions or increased work of breathing  ABDOMEN: abdomen not evaluated  EXTREMITIES: no hand tremors, limited exam  NEUROLOGY: CN grossly intact, mentation intact and speech normal   SKIN:  no apparent skin lesions, rash, or edema with visualized skin appearance  PSYCH: mentation appears normal, affect normal/bright, judgement and insight intact,   normal speech and appearance well groomed      LABS:    All pertinent notes, labs, and images personally reviewed by me.     A/P:  Encounter Diagnoses   Name Primary?     Type 2 diabetes mellitus with microalbuminuria, with long-term current use of insulin (H) Yes     Insulin pump status      Comments:  Reviewed health history and  diabetes issues.  Details of her previous medication intolerances or allergies unclear  Reviewed and interpreted tests that I previously ordered.   Ordered appropriate tests for the endocrinology disease management.    Management options discussed and implemented after shared medical decision making with the patient.  T2DM problem is chronic-stable    Plan:  Reviewed the overall T2DM management and insulin pump use.  Discussed optimal BG testing to assess glucose trends.  We reviewed insulin pump settings, basal rate and bolus dosing  Use of automated pump bolus dosing for meal/snack carb & correction dosing  Reviewed use of the Omnipod Dash insulin pump reports  Reviewed the recent Freestyle Freddie CGM glucose sensor data, in detail    Reviewed general issues with possible hyperparathyroidism (HPT) diagnosis  Discussed lab tests used to assess patient parathyroid gland function    Recommend:  Since the new Omnipod 5 and DexcomG6 option very expensive for patient, I would not pursue this plan  Continue the current Omnipod Dash insulin pump management  No pump setting changes at this time but consider intensifying the mealtime ICR settings    Reviewed the ideal premeal (not postmeal) bolus routine   Future treatment options include adding a SGLT2-I such as Jardiance  Continue use of the Freestyle Libre3 CGM sensor, since inexpensive/free  Repeat non-fasting labs soon   Testing at [facility]   Lab orders available  Needs repeat BP testing with PCP  Arrange annual dilated eye exam, fasting lipid panel testing  Discussed importance of regular endocrinology evaluations every 3-4 months.    Addressed patient's questions today.    There are no Patient Instructions on file for this visit.    Future labs ordered today:   Orders Placed This Encounter   Procedures     Hemoglobin A1c     TSH     Basic metabolic panel     Radiology/Consults ordered today: None    Total time spent on day of encounter:  25 min    Follow-up:   12/11/23    JEAN PIERRE Hightower MD, MS  Endocrinology  [facility]    CC: АННА Mclaughlin,                          Again, thank you for allowing me to participate in the care of your patient.        Sincerely,        Chaitanya Hightower MD

## 2023-09-25 NOTE — PROGRESS NOTES
Virtual Visit Details    Type of service:  Video Visit   Video Start Time:  3:05 PM  Video End Time: 3:25 PM    Originating Location (pt. Location): Home  Distant Location (provider location):  Off-site  Platform used for Video Visit: Robbie      Recent issues:  Diabetes follow-up evaluation  Has used the Omnipod Dash, planned to change to Omnipod 5 kit & pods, also the DexcomG6    Omnipod 5 expenses much higher however... decided to stay on Omnipod Dash with Freddie  Recent health issues with URI and then food poisoning illnesses  She reports paying alot for insulin ($496/3 mo...9 vials?) and pods ($536), [employer] insurance, but Libre3's free due to 's prior [employer] job         Diabetes:  History of GDM with 2 pregnancies, diet management  1996. Diagnosis of diabetes mellitus  Recalls starting a diabetes pill  Had taken metformin, also Januvia but developed pancreatitis    Has seen endocrinology providers CAMILA Da Silva M. Schultz, CAMILA Rich, MARAH York, MARAH Childress, KRISTA Mejia  Has tried several diabetes medications previously, records indicate side effects or med issues:  Humalog- apparent concerns with the pancreatitis    Metformin--Abdominal pain.  Glipizide- Allergic reaction- (abdominal pain and swelling),   Byetta and Onglyza-- pancreatitis.  Invokana -- had upper GI distress.    ~2006. Started treatment with insulin medication  Endocrinology evaluations with Emily Phan CNP/[facility]  ~2017. Started Omnipod insulin pump use.  Subsequently saw Rosie Schwab, and CARLO Salgado for endocrinology evaluations.  Using Freestyle Freddie CGM    Previous  hgbA1c trends include:  2/21/06 C-Peptide 2.5  3/4/09 WILLIAMS-65 Ab <1.0     Lab Test 09/01/22  1017 05/20/22  0841 11/23/21  0859 09/18/20  0852 02/27/20  0954   A1C 7.8* 8.7* 8.2* 7.4* 8.0*           10/6/22. Initial diabetes evaluation with me at [place]  Reviewed health history and diabetes issues  Using Omnipod Dash insulin  pump:   Novolog pump    Uses ICR method for bolusing  Blood glucose (BG) meter  Uses Freestyle Freddie CGM with smartphone   Scans 5-8x/day    Typically  Boluses postmeal, not premeal    Previous Omnipod Dash settings:     Recent Omnipod pump data:  Information not available    ~3/2023. Changed from Freestyle Libre2 to the Libre3 CGM  Current Libre3 CGM data              Recent [facility] labs include:  Lab Results   Component Value Date    A1C 8.0 (H) 07/05/2023     07/05/2023    POTASSIUM 4.0 07/05/2023    CHLORIDE 107 07/05/2023    CO2 25 07/05/2023    ANIONGAP 12 07/05/2023     (H) 07/05/2023    BUN 18.2 07/05/2023    CR 0.80 07/05/2023    GFRESTIMATED 83 07/05/2023    GFRESTBLACK >90 01/20/2021    KARIN 9.6 07/05/2023    CHOL 210 (H) 07/05/2023    TRIG 102 07/05/2023    HDL 57 07/05/2023     (H) 07/05/2023    NHDL 153 (H) 07/05/2023    UCRR 55.7 07/05/2023    MICROL <12.0 07/05/2023    UMALCR  07/05/2023      Comment:      Unable to calculate, urine albumin and/or urine creatinine is outside detectable limits.  Microalbuminuria is defined as an albumin:creatinine ratio of 17 to 299 for males and 25 to 299 for females. A ratio of albumin:creatinine of 300 or higher is indicative of overt proteinuria.  Due to biologic variability, positive results should be confirmed by a second, first-morning random or 24-hour timed urine specimen. If there is discrepancy, a third specimen is recommended. When 2 out of 3 results are in the microalbuminuria range, this is evidence for incipient nephropathy and warrants increased efforts at glucose control, blood pressure control, and institution of therapy with an angiotensin-converting-enzyme (ACE) inhibitor (if the patient can tolerate it).      TSH 1.19 09/18/2020    T4 0.87 07/22/2019     DM Complications:   Nephropathy:    Microalbuminuria      Parathyroid:  Patient has seen Dr. Joanne Mtz/[facility] for neurology evaluations  Spring '23, Patient reports  discussion of symptoms with neurologist including anterior throat discomfort, difficulty swallowing, cough  Lab test reportedly showed elevated PTH 82  General Surgery consultation appointment with Dr. Radha Conte/[facility]   Appointment subsequently cancelled  Patient had repeat PTH testing at [facility], result reportedly normal    Previous  labs include:     Latest Reference Range & Units 02/03/15 11:05 23 12:32   Vitamin D Deficiency screening 20 - 75 ug/L 34 22      Latest Reference Range & Units 23 12:32   Parathyroid Hormone Intact 15 - 65 pg/mL 41     Additional history:  Previous fractures: Wrist, fingers  Kidney stones: None  Vitamin D def:  unknown  Last DEXA scan: ~  Supplements:  Calcium- none, vit D- none        Lives in [place], [place]  Sees Dr. Madyson Mclaughlin/[facility] for general medicine evaluations.  Also sees Dr Joanne [facility]tz[facility]    PMH/PSH:  Past Medical History:   Diagnosis Date    Ascending aorta enlargement (H)     Depressive disorder     severe, prior hospitalization    Diverticulosis of colon (without mention of hemorrhage)     Fibromyalgia 2007    Insomnia     Irritable bowel syndrome     Osteopenia     Type 2 diabetes mellitus with complications (H)     microalbuminuria     Past Surgical History:   Procedure Laterality Date    BACK SURGERY      fusion  and removal of hardware     C SPINAL ORTHOSIS,NOS      lumbar surgery    CHOLECYSTECTOMY      choley  2011    ENT SURGERY  1986    septoplasty    HYSTERECTOMY, CERVIX STATUS UNKNOWN      TVH/BSO    ORTHOPEDIC SURGERY      left ankle       Family Hx:  Family History   Problem Relation Age of Onset    Diabetes Mother         type 2    Hypertension Mother     Cerebrovascular Disease Mother          stroke age 57    Ovarian Cancer Mother 43    Cancer Mother         cervix    Diabetes Father         type 2    Hypertension Father     Gastrointestinal  Disease Father         colon polyps    Sleep Apnea Brother     Cancer Sister     Ovarian Cancer Sister 46    Ovarian Cancer Maternal Grandmother 72    Cancer Maternal Grandmother         cervix         Social Hx:  Social History     Socioeconomic History    Marital status:      Spouse name: Not on file    Number of children: 3    Years of education: Not on file    Highest education level: Not on file   Occupational History    Occupation: xr technician     Employer: [employer]   Tobacco Use    Smoking status: Never    Smokeless tobacco: Never   Vaping Use    Vaping Use: Former   Substance and Sexual Activity    Alcohol use: Not Currently     Alcohol/week: 0.0 standard drinks of alcohol     Comment: maybe once a month    Drug use: No    Sexual activity: Yes     Partners: Male     Birth control/protection: Surgical   Other Topics Concern    Parent/sibling w/ CABG, MI or angioplasty before 65F 55M? Not Asked   Social History Narrative    Not on file     Social Determinants of Health     Financial Resource Strain: Not on file   Food Insecurity: Not on file   Transportation Needs: Not on file   Physical Activity: Not on file   Stress: Not on file   Social Connections: Not on file   Interpersonal Safety: Not on file   Housing Stability: Not on file          MEDICATIONS:  has a current medication list which includes the following prescription(s): blood glucose monitoring, freestyle ashleigh 14 day reader, freestyle ashleigh 3 sensor, epinephrine, HEMP OIL OR EXTRACT OR OTHER CBD CANNABINOID, NOT MEDICAL CANNABIS,, insulin aspart, omnipod dash pods (gen 4), insulin syringe-needle u-100, medical cannabis, melatonin, methocarbamol, polyethylene glycol, statin not prescribed, alcohol swab, benzonatate, freestyle lite, blood glucose monitoring, and insulin pump.    ROS:     ROS: 10 point ROS neg other than the symptoms noted above in the HPI.    GENERAL: some fatigue, wt stable; denies fevers, chills, night  sweats.   HEENT: some head congestion; no dysphagia, odonophagia, diplopia, neck pain  THYROID:  no apparent hyper or hypothyroid symptoms  CV: no chest pain, pressure, palpitations  LUNGS: no SOB, VASQUEZ, cough, wheezing   ABDOMEN: some indigestion; no diarrhea, constipation, abdominal pain  EXTREMITIES: no rashes, ulcers, edema  NEUROLOGY: no headaches, denies changes in vision, tingling, extremitiy numbness   MSK: no muscle aches or pains, weakness  SKIN: no rashes or lesions  : no menses  PSYCH:  stable mood, no significant anxiety or depression  ENDOCRINE: no heat or cold intolerance    Physical Exam (visual exam)  VS:  no vital signs taken for video visit  CONSTITUTIONAL: healthy, alert and NAD, well dressed, answering questions appropriately  ENT: no nose swelling or nasal discharge, mouth redness or gum changes.  EYES: eyes grossly normal to inspection, conjunctivae and sclerae normal, no exophthalmos or proptosis  THYROID:  no apparent nodules or goiter  LUNGS: no audible wheeze, cough or visible cyanosis, no visible retractions or increased work of breathing  ABDOMEN: abdomen not evaluated  EXTREMITIES: no hand tremors, limited exam  NEUROLOGY: CN grossly intact, mentation intact and speech normal   SKIN:  no apparent skin lesions, rash, or edema with visualized skin appearance  PSYCH: mentation appears normal, affect normal/bright, judgement and insight intact,   normal speech and appearance well groomed      LABS:    All pertinent notes, labs, and images personally reviewed by me.     A/P:  Encounter Diagnoses   Name Primary?    Type 2 diabetes mellitus with microalbuminuria, with long-term current use of insulin (H) Yes    Insulin pump status      Comments:  Reviewed health history and diabetes issues.  Details of her previous medication intolerances or allergies unclear, higher cost of insulin also unclear  Reviewed and interpreted tests that I previously ordered.   Ordered appropriate tests for the  endocrinology disease management.    Management options discussed and implemented after shared medical decision making with the patient.  T2DM problem is chronic-stable    Plan:  Reviewed the overall T2DM management and insulin pump use.  Discussed optimal BG testing to assess glucose trends.  We reviewed insulin pump settings, basal rate and bolus dosing  Use of automated pump bolus dosing for meal/snack carb & correction dosing  Reviewed use of the Omnipod Dash insulin pump reports  Reviewed the recent Freestyle Freddie CGM glucose sensor data, in detail    Reviewed general issues with possible hyperparathyroidism (HPT) diagnosis  Discussed lab tests used to assess patient parathyroid gland function    Recommend:  Since the new Omnipod 5 and DexcomG6 option very expensive for patient, I would not pursue this plan  Continue the current Omnipod Dash insulin pump management  No pump setting changes at this time but consider intensifying the mealtime ICR settings    Reviewed the ideal premeal (not postmeal) bolus routine   Future treatment options include adding a SGLT2-I such as Jardiance  Continue use of the Freestyle Libre3 CGM sensor, since inexpensive/free  Repeat non-fasting labs soon   Testing at [facility]   Lab orders available  Needs repeat BP testing with PCP  Arrange annual dilated eye exam, fasting lipid panel testing  Discussed importance of regular endocrinology evaluations every 3-4 months.    Addressed patient's questions today.    There are no Patient Instructions on file for this visit.    Future labs ordered today:   Orders Placed This Encounter   Procedures    Hemoglobin A1c    TSH    Basic metabolic panel     Radiology/Consults ordered today: None    Total time spent on day of encounter:  25 min    Follow-up:  12/11/23    JEAN PIERRE Hightower MD, MS  Endocrinology  [facility]    CC: АННА Mclaughlin

## 2023-10-28 ENCOUNTER — HEALTH MAINTENANCE LETTER (OUTPATIENT)
Age: 63
End: 2023-10-28

## 2023-10-29 ASSESSMENT — ANXIETY QUESTIONNAIRES
3. WORRYING TOO MUCH ABOUT DIFFERENT THINGS: NEARLY EVERY DAY
6. BECOMING EASILY ANNOYED OR IRRITABLE: NEARLY EVERY DAY
4. TROUBLE RELAXING: MORE THAN HALF THE DAYS
2. NOT BEING ABLE TO STOP OR CONTROL WORRYING: NEARLY EVERY DAY
1. FEELING NERVOUS, ANXIOUS, OR ON EDGE: MORE THAN HALF THE DAYS
7. FEELING AFRAID AS IF SOMETHING AWFUL MIGHT HAPPEN: NEARLY EVERY DAY
5. BEING SO RESTLESS THAT IT IS HARD TO SIT STILL: MORE THAN HALF THE DAYS
IF YOU CHECKED OFF ANY PROBLEMS ON THIS QUESTIONNAIRE, HOW DIFFICULT HAVE THESE PROBLEMS MADE IT FOR YOU TO DO YOUR WORK, TAKE CARE OF THINGS AT HOME, OR GET ALONG WITH OTHER PEOPLE: VERY DIFFICULT
GAD7 TOTAL SCORE: 18

## 2023-10-29 ASSESSMENT — PATIENT HEALTH QUESTIONNAIRE - PHQ9: SUM OF ALL RESPONSES TO PHQ QUESTIONS 1-9: 18

## 2023-10-30 ENCOUNTER — VIRTUAL VISIT (OUTPATIENT)
Dept: PEDIATRICS | Facility: CLINIC | Age: 63
End: 2023-10-30
Payer: COMMERCIAL

## 2023-10-30 DIAGNOSIS — Z79.4 TYPE 2 DIABETES MELLITUS WITH HYPERGLYCEMIA, WITH LONG-TERM CURRENT USE OF INSULIN (H): Primary | ICD-10-CM

## 2023-10-30 DIAGNOSIS — E11.65 TYPE 2 DIABETES MELLITUS WITH HYPERGLYCEMIA, WITH LONG-TERM CURRENT USE OF INSULIN (H): Primary | ICD-10-CM

## 2023-10-30 DIAGNOSIS — M79.7 FIBROMYALGIA: ICD-10-CM

## 2023-10-30 PROCEDURE — 99213 OFFICE O/P EST LOW 20 MIN: CPT | Mod: VID | Performed by: INTERNAL MEDICINE

## 2023-10-30 RX ORDER — METHOCARBAMOL 500 MG/1
500 TABLET, FILM COATED ORAL 2 TIMES DAILY PRN
Qty: 60 TABLET | Refills: 1 | Status: SHIPPED | OUTPATIENT
Start: 2023-10-30 | End: 2023-11-08

## 2023-10-30 ASSESSMENT — PATIENT HEALTH QUESTIONNAIRE - PHQ9
SUM OF ALL RESPONSES TO PHQ QUESTIONS 1-9: 18
10. IF YOU CHECKED OFF ANY PROBLEMS, HOW DIFFICULT HAVE THESE PROBLEMS MADE IT FOR YOU TO DO YOUR WORK, TAKE CARE OF THINGS AT HOME, OR GET ALONG WITH OTHER PEOPLE: VERY DIFFICULT

## 2023-10-30 ASSESSMENT — ANXIETY QUESTIONNAIRES: GAD7 TOTAL SCORE: 18

## 2023-10-30 NOTE — PATIENT INSTRUCTIONS
The patient is here for Nexplanon removal.  It had been inserted in 2016.  The reason for the removal is : other:  Weight gain.    The Nexplanon implant was palpated in the upper, inner Left arm.  The area was prepped with Betadine and draped.  1 mL of 1% xylocaine was injected underneath the distal end of the implant and a 3 mm incision made over this area.    Pressure was placed on the proximal end of the implant until the distal end could be grasped with curved clamps and removed.  It was inspected and found to be intact.  The incision was then closed with Mastasol, a Steri-strip and an adhesive bandage.  A pressure bandage was then applied.      There were no complications, and the patient tolerated the procedure well.  She was advised to call if she experienced any significant pain, redness or swelling of the removal site.  Post op instructions were given    Contraception from this point on will be condoms.         1. Plan to wear the LibrePro sensor for 14 days. It is okay to shower, bathe, and swim (up to 3 feet deep for 30 minutes)    2. Continue with your usual diabetes care plan - check blood sugars and take medicines, as prescribed.    3. Keep a log of what you eat and drink, when you take your medications and how much you take, and exercise you do while you are wearing the sensor.    3. Do not cover the sensor with extra adhesive (the small hole in the center of the sensor must remain uncovered)    4. Use a little extra care, especially when getting dressed or exercising, to avoid accidentally loosening or removing the sensor.     5. Remove the sensor if you need to have an MRI or CT scan.     Return the sensor to the [facility] on 2/1/18.    [place] [facility] for the [facility] Area:  For Your Diabetes Education and Nutrition Appointments Call:  [phone]   For Diabetes Education or Nutrition Related Questions:   Phone: [phone]  E-mail: [email or web address]  Fax: [phone]   If you need a medication refill please contact your pharmacy. Please allow 3 business days for your refills to be completed.    Instructions for emailing the Diabetes Educators    If you need to communicate a non-urgent message to a Diabetes Educator via email, please send to [email or web address].    Please follow the following email guidelines:    Subject line: Secure: your clinic name (example: Secure: [facility])  In the email please include: First name, middle initial, last name and date of birth.    We will be in touch with you within one (1) business day.

## 2023-10-30 NOTE — PROGRESS NOTES
"Maricarmen is a 62 year old who is being evaluated via a billable video visit.      How would you like to obtain your AVS? MyChart  If the video visit is dropped, the invitation should be resent by: Text to cell phone: [phone]  Will anyone else be joining your video visit? No          Assessment & Plan     Fibromyalgia  Some improvement in fibromyalgia symptoms, and is able to sleep better when taking methocarbamol at night time. Tolerating at this time without side effects; will go ahead and refill medication for now. Follow-up as needed.   - methocarbamol (ROBAXIN) 500 MG tablet; Take 1 tablet (500 mg) by mouth 2 times daily as needed for muscle spasms    Type 2 diabetes mellitus with hyperglycemia, with long-term current use of insulin (H)  Patient needs form signed for driving, has had no issues with driving since starting insulin years prior.            BMI:   Estimated body mass index is 30.71 kg/m  as calculated from the following:    Height as of 7/31/23: 1.626 m (5' 4\").    Weight as of 7/31/23: 81.1 kg (178 lb 14.4 oz).     Blood sugar testing frequency justification:  Uncontrolled diabetes        Madyson Mendoza MD  [facility]    Mario Alberto Hernadez is a 62 year old, presenting for the following health issues:  No chief complaint on file.      HPI       Maricarmen calls in for follow-up.    She has been using methocarbamol as needed at night - can't take it every night. Hip pain is still present but learning to live with it. Saw provider at [facility] who wanted to try steroid injections but she does not want to do this. Does get improved sleep and relief from the methocarbamol.     Needs a driving form signed saying she is ok to drive with insulin.     Mental health is stable, therapist is on vacation right now. Doesn't want to discuss this in detail at today's visit.       Review of Systems   Constitutional, SMK systems are negative, except as otherwise noted.      Objective           Vitals:  No " vitals were obtained today due to virtual visit.    Physical Exam   GENERAL: Healthy, alert and no distress  EYES: Eyes grossly normal to inspection.  No discharge or erythema, or obvious scleral/conjunctival abnormalities.  RESP: No audible wheeze, cough, or visible cyanosis.  No visible retractions or increased work of breathing.    SKIN: Visible skin clear. No significant rash, abnormal pigmentation or lesions.  NEURO: Cranial nerves grossly intact.  Mentation and speech appropriate for age.  PSYCH: Mentation appears normal, affect normal/bright, judgement and insight intact, normal speech and appearance well-groomed.                Video-Visit Details    Type of service:  Video Visit   Video Start Time:  2:08  Video End Time: 2:19    Originating Location (pt. Location): Home    Distant Location (provider location):  On-site  Platform used for Video Visit: [employer]

## 2023-11-05 ENCOUNTER — MYC MEDICAL ADVICE (OUTPATIENT)
Dept: ENDOCRINOLOGY | Facility: CLINIC | Age: 63
End: 2023-11-05
Payer: MEDICARE

## 2023-11-07 ENCOUNTER — MYC MEDICAL ADVICE (OUTPATIENT)
Dept: PEDIATRICS | Facility: CLINIC | Age: 63
End: 2023-11-07
Payer: MEDICARE

## 2023-11-07 DIAGNOSIS — M25.552 BILATERAL HIP PAIN: Primary | ICD-10-CM

## 2023-11-07 DIAGNOSIS — M25.551 BILATERAL HIP PAIN: Primary | ICD-10-CM

## 2023-11-10 ENCOUNTER — NURSE TRIAGE (OUTPATIENT)
Dept: PEDIATRICS | Facility: CLINIC | Age: 63
End: 2023-11-10
Payer: MEDICARE

## 2023-11-10 NOTE — TELEPHONE ENCOUNTER
"Reason for Disposition   Sounds like a severe, unusual reaction to the triager    Additional Information   Negative: Difficulty breathing or swallowing and starts within 2 hours after injection   Negative: Sounds like a life-threatening emergency to the triager   Negative: Symptoms of COVID-19 (e.g., cough, fever, SOB, or others) and within 14 days of COVID-19 EXPOSURE   Negative: Typical COVID-19 symptoms (e.g., cough, difficulty breathing, loss of taste or smell, runny nose, sore throat) that are NOT expected from vaccine   Negative: COVID-19 EXPOSURE and no symptoms, or symptoms not typical of COVID-19   Negative: Fever > 104 F (40 C)    Answer Assessment - Initial Assessment Questions  1. MAIN CONCERN OR SYMPTOM:  \"What is your main concern right now?\" \"What question do you have?\" \"What's the main symptom you're worried about?\" (e.g., fever, pain, redness, swelling)      Tuesday had COVID shot.  Sores on tongue and down throat- having trouble talking, cough, chest hurts, body aches, joint pain, could barely walk first 24 hours after COVID shot, blurred vision, elevated glucose  2. VACCINE: \"What vaccination did you receive?\" (e.g., none; AstraZeneca, J&J, Moderna, Pfizer, other)       See chart  3. SYMPTOM ONSET: \"When did the symptoms begin?\" (e.g., not relevant; hours, days)       2 hours after   4. SYMPTOM SEVERITY: \"How bad is it?\"       Severe  5. FEVER: \"Is there a fever?\" If Yes, ask: \"What is it, how was it measured, and when did it start?\"       Mild fever and chills and hot flashes  6. PAST REACTIONS: \"Have you reacted to immunizations before?\" If Yes, ask: \"What happened?\"      Yes, but not like this  7. OTHER SYMPTOMS: \"Do you have any other symptoms?\" (e.g., fatigue, headache, joint or muscle pain)      All of above  8. PREGNANCY: \"Is there any chance you are pregnant?\" \"When was your last menstrual period?\"      N/a    Protocols used: Coronavirus (COVID-19) Vaccine Questions and Nxsjihphd-F-OH    "

## 2023-11-13 ENCOUNTER — MYC MEDICAL ADVICE (OUTPATIENT)
Dept: ENDOCRINOLOGY | Facility: CLINIC | Age: 63
End: 2023-11-13
Payer: MEDICARE

## 2023-11-13 DIAGNOSIS — R39.9 URINARY SYMPTOM OR SIGN: Primary | ICD-10-CM

## 2023-11-13 NOTE — TELEPHONE ENCOUNTER
Mary Ann - Does pt need a VV or E-Visit? Pt had a VV for DM f/up with  on 10/30/23. Please advise.     See the  conversation between pt & endo team. Pt has a VV with Dr. Hightower on 12/11/23. Also has lab-only appointment at [facility] on 11/21/23 for her upcoming appointment(A1c, TSH & BMP).     PALOMA Rosa  Patient Advocate Liason (PAL)  [facility]  Ph. [phone] / Fax. [phone]

## 2023-11-13 NOTE — TELEPHONE ENCOUNTER
Yes, we checked her urine cr/albumin in July. This is typically done once yearly.   Michael Adams PA-C

## 2023-11-14 ENCOUNTER — MYC REFILL (OUTPATIENT)
Dept: ENDOCRINOLOGY | Facility: CLINIC | Age: 63
End: 2023-11-14
Payer: MEDICARE

## 2023-11-14 DIAGNOSIS — Z79.4 TYPE 2 DIABETES MELLITUS WITH HYPERGLYCEMIA, WITH LONG-TERM CURRENT USE OF INSULIN (H): ICD-10-CM

## 2023-11-14 DIAGNOSIS — E11.9 TYPE 2 DIABETES, HBA1C GOAL < 7% (H): ICD-10-CM

## 2023-11-14 DIAGNOSIS — E11.65 TYPE 2 DIABETES MELLITUS WITH HYPERGLYCEMIA, WITH LONG-TERM CURRENT USE OF INSULIN (H): ICD-10-CM

## 2023-11-14 RX ORDER — INSULIN ASPART 100 [IU]/ML
INJECTION, SOLUTION INTRAVENOUS; SUBCUTANEOUS
Qty: 90 ML | Refills: 0 | Status: SHIPPED | OUTPATIENT
Start: 2023-11-14 | End: 2024-07-11

## 2023-11-14 NOTE — TELEPHONE ENCOUNTER
"Last Written Prescription Date:  9/12/23  Last Fill Quantity: 90,  # refills: 0   Last office visit:  9/25/2023 with prescribing provider:  Dr. Hightower   Future Office Visit: 12/11/23         Requested Prescriptions   Pending Prescriptions Disp Refills    insulin aspart (NOVOLOG VIAL) 100 UNITS/ML vial 90 mL 0     Sig: USE IN INSULIN PUMP, TOTAL DAILY DOSE  UNITS       Short Acting Insulin Protocol Passed - 11/14/2023  2:08 PM        Passed - Serum creatinine on file in past 12 months     Recent Labs   Lab Test 07/05/23  1135   CR 0.80       Ok to refill medication if creatinine is low          Passed - HgbA1C in past 3 or 6 months     If HgbA1C is 8 or greater, it needs to be on file within the past 3 months.  If less than 8, must be on file within the past 6 months.     Recent Labs   Lab Test 07/05/23  1135   A1C 8.0*             Passed - Medication is active on med list        Passed - Patient is age 18 or older        Passed - Recent (6 mo) or future (30 days) visit within the authorizing provider's specialty     Patient had office visit in the last 6 months or has a visit in the next 30 days with authorizing provider or within the authorizing provider's specialty.  See \"Patient Info\" tab in inbasket, or \"Choose Columns\" in Meds & Orders section of the refill encounter.               Refills sent  Kylah Foster RN    "

## 2023-11-14 NOTE — TELEPHONE ENCOUNTER
"Mary Ann - pt is requesting UA, not microalbumin. Her reason for UA request is \"My bladder never fully empties and I experience kidney aria pain\". She denies UTI sx's in her  message.     PALOMA Rosa  Patient Advocate Liason (PAL)  [facility]  Ph. [phone] / Fax. [phone]        "

## 2023-11-16 ENCOUNTER — TELEPHONE (OUTPATIENT)
Dept: PEDIATRICS | Facility: CLINIC | Age: 63
End: 2023-11-16
Payer: MEDICARE

## 2023-11-16 NOTE — TELEPHONE ENCOUNTER
Forms/Letter Request    Type of form/letter: Loss of Consciousness of Voluntary Control    Have you been seen for this request: Yes     Do we have the form/letter: Yes: Dr Mclaughlin    Who is the form from? Patient    Where did/will the form come from? Patient or family brought in       When is form/letter needed by: asap    How would you like the form/letter returned:     Patient Notified form requests are processed in 3-5 business days:Yes    Could we send this information to you in [employer] or would you prefer to receive a phone call?:   Patient would prefer a phone call   Okay to leave a detailed message?: Yes at Cell number on file:  Or spouse at [phone]  Telephone Information:   Mobile [phone]

## 2023-11-17 NOTE — TELEPHONE ENCOUNTER
Spoke with pts spouse and he will pu forms from urgent care.      Kathy Andrea on 11/17/2023 at 9:19 AM

## 2023-11-21 ENCOUNTER — LAB (OUTPATIENT)
Dept: LAB | Facility: CLINIC | Age: 63
End: 2023-11-21
Payer: COMMERCIAL

## 2023-11-21 DIAGNOSIS — E11.29 TYPE 2 DIABETES MELLITUS WITH MICROALBUMINURIA, WITH LONG-TERM CURRENT USE OF INSULIN (H): ICD-10-CM

## 2023-11-21 DIAGNOSIS — Z79.4 TYPE 2 DIABETES MELLITUS WITH MICROALBUMINURIA, WITH LONG-TERM CURRENT USE OF INSULIN (H): ICD-10-CM

## 2023-11-21 DIAGNOSIS — R80.9 TYPE 2 DIABETES MELLITUS WITH MICROALBUMINURIA, WITH LONG-TERM CURRENT USE OF INSULIN (H): ICD-10-CM

## 2023-11-21 DIAGNOSIS — R39.9 URINARY SYMPTOM OR SIGN: ICD-10-CM

## 2023-11-21 DIAGNOSIS — Z96.41 INSULIN PUMP STATUS: ICD-10-CM

## 2023-11-21 LAB
ALBUMIN UR-MCNC: NEGATIVE MG/DL
ANION GAP SERPL CALCULATED.3IONS-SCNC: 9 MMOL/L (ref 7–15)
APPEARANCE UR: CLEAR
BILIRUB UR QL STRIP: NEGATIVE
BUN SERPL-MCNC: 12.5 MG/DL (ref 8–23)
CALCIUM SERPL-MCNC: 9.4 MG/DL (ref 8.8–10.2)
CHLORIDE SERPL-SCNC: 105 MMOL/L (ref 98–107)
COLOR UR AUTO: YELLOW
CREAT SERPL-MCNC: 0.85 MG/DL (ref 0.51–0.95)
DEPRECATED HCO3 PLAS-SCNC: 27 MMOL/L (ref 22–29)
EGFRCR SERPLBLD CKD-EPI 2021: 78 ML/MIN/1.73M2
GLUCOSE SERPL-MCNC: 160 MG/DL (ref 70–99)
GLUCOSE UR STRIP-MCNC: NEGATIVE MG/DL
HBA1C MFR BLD: 7.6 % (ref 0–5.6)
HGB UR QL STRIP: NEGATIVE
KETONES UR STRIP-MCNC: NEGATIVE MG/DL
LEUKOCYTE ESTERASE UR QL STRIP: NEGATIVE
NITRATE UR QL: NEGATIVE
PH UR STRIP: 5.5 [PH] (ref 5–7)
POTASSIUM SERPL-SCNC: 4 MMOL/L (ref 3.4–5.3)
SODIUM SERPL-SCNC: 141 MMOL/L (ref 135–145)
SP GR UR STRIP: 1.02 (ref 1–1.03)
TSH SERPL DL<=0.005 MIU/L-ACNC: 0.73 UIU/ML (ref 0.3–4.2)
UROBILINOGEN UR STRIP-ACNC: 0.2 E.U./DL

## 2023-11-21 PROCEDURE — 80048 BASIC METABOLIC PNL TOTAL CA: CPT

## 2023-11-21 PROCEDURE — 81003 URINALYSIS AUTO W/O SCOPE: CPT

## 2023-11-21 PROCEDURE — 83036 HEMOGLOBIN GLYCOSYLATED A1C: CPT

## 2023-11-21 PROCEDURE — 36415 COLL VENOUS BLD VENIPUNCTURE: CPT

## 2023-11-21 PROCEDURE — 84443 ASSAY THYROID STIM HORMONE: CPT

## 2023-11-28 ASSESSMENT — ACTIVITIES OF DAILY LIVING (ADL)
HOS_ADL_SCORE(%): 69.12
DEEP SQUATTING: NO DIFFICULTY AT ALL
ROLLING OVER IN BED: SLIGHT DIFFICULTY
GOING DOWN 1 FLIGHT OF STAIRS: SLIGHT DIFFICULTY
STEPPING UP AND DOWN CURBS: SLIGHT DIFFICULTY
SITTING FOR 15 MINUTES: SLIGHT DIFFICULTY
WALKING_UP_STEEP_HILLS: SLIGHT DIFFICULTY
WALKING_APPROXIMATELY_10_MINUTES: MODERATE DIFFICULTY
STANDING FOR 15 MINUTES: SLIGHT DIFFICULTY
HOS_ADL_ITEM_SCORE_TOTAL: 47
WALKING_INITIALLY: SLIGHT DIFFICULTY
PUTTING ON SOCKS AND SHOES: MODERATE DIFFICULTY
LIGHT_TO_MODERATE_WORK: MODERATE DIFFICULTY
GOING UP 1 FLIGHT OF STAIRS: SLIGHT DIFFICULTY
WALKING_15_MINUTES_OR_GREATER: SLIGHT DIFFICULTY
TWISTING/PIVOTING ON INVOLVED LEG: SLIGHT DIFFICULTY
GETTING_INTO_AND_OUT_OF_A_BATHTUB: MODERATE DIFFICULTY
HOS_ADL_HIGHEST_POTENTIAL_SCORE: 68
GETTING INTO AND OUT OF AN AVERAGE CAR: SLIGHT DIFFICULTY
WALKING_DOWN_STEEP_HILLS: EXTREME DIFFICULTY
HEAVY_WORK: SLIGHT DIFFICULTY
HOW_WOULD_YOU_RATE_YOUR_CURRENT_LEVEL_OF_FUNCTION_DURING_YOUR_USUAL_ACTIVITIES_OF_DAILY_LIVING_FROM_0_TO_100_WITH_100_BEING_YOUR_LEVEL_OF_FUNCTION_PRIOR_TO_YOUR_HIP_PROBLEM_AND_0_BEING_THE_INABILITY_TO_PERFORM_ANY_OF_YOUR_USUAL_DAILY_ACTIVITIES?: 50
RECREATIONAL ACTIVITIES: SLIGHT DIFFICULTY

## 2023-11-29 ENCOUNTER — THERAPY VISIT (OUTPATIENT)
Dept: PHYSICAL THERAPY | Facility: CLINIC | Age: 63
End: 2023-11-29
Attending: INTERNAL MEDICINE
Payer: COMMERCIAL

## 2023-11-29 DIAGNOSIS — M25.552 BILATERAL HIP PAIN: ICD-10-CM

## 2023-11-29 DIAGNOSIS — M25.551 BILATERAL HIP PAIN: ICD-10-CM

## 2023-11-29 PROCEDURE — 97161 PT EVAL LOW COMPLEX 20 MIN: CPT | Mod: GP | Performed by: PHYSICAL THERAPIST

## 2023-11-29 PROCEDURE — 97110 THERAPEUTIC EXERCISES: CPT | Mod: GP | Performed by: PHYSICAL THERAPIST

## 2023-11-29 NOTE — PROGRESS NOTES
PHYSICAL THERAPY EVALUATION  Type of Visit: Evaluation    See electronic medical record for Abuse and Falls Screening details.    Subjective       Presenting condition or subjective complaint: Difficulty with left hip for 2 years. Usage is difficult. Pain is severe.  Had left hip xray, was normal.    The patient presents to therapy with a chief complaint of bilateral hip pain. The patient has been experiencing this pain for several years without improvement . They have the most pain with activities including hiking, walking and pain is relieved by chiro treatments which help temporarily but don't resolve. Their primary goal in therapy is to improve pain and strength. Historically PT has made pain worse and chiro has helped, but for now has taken a break from chiro.     Xray:   No acute fracture or malalignment. Mild bilateral hip joint degenerative changes. Mild to moderate degenerative changes of the sacroiliac joints. Severe degenerative changes of the lower lumbar spine. Osteopenia.     Date of onset: 11/09/23 (MD order)    Relevant medical history: Diabetes; Fibromyalgia   Dates & types of surgery: See my chart.    Prior diagnostic imaging/testing results: X-ray     Prior therapy history for the same diagnosis, illness or injury: No      Living Environment  Social support: With family members   Type of home: House   Stairs to enter the home: Yes 2 Is there a railing: Yes   Ramp: No   Stairs inside the home: Yes 2 Is there a railing: Yes   Help at home: None  Equipment owned:       Employment: No    Hobbies/Interests: Hiking. 2 to 6 miles. Gardening. Artist.    Patient goals for therapy: Yoga positions.  No hip pain after hiking 2 to 6 miles.    Pain assessment:      Objective     HIP:    Lumbar Screen: flexion reaches floor-hypermobile, extension signifcant bend backwards-hypermobile     Gait: mild antalgia     PROM: (* indicates patient's pain)   L  R   Flexion >130 deg  >130 deg    Extension     Abduction      IR (supine 90-90) Excessive mobilit y Excessive mobility    ER (supine 90-90)       Strength:   L R   HIP     Flex 4+ P+ 5   Ext 4+ 4+   Add (0-45-90 degrees: supine) 5 5   Abd 4+ 4+   KNEE     Flex 4 4   Ext 4 4     Special tests:   L R   Grazyna's     Chaitanya     SHANAE neg neg   FADIR neg neg   SLR     Hamstring 90-90     Log Roll     SIJ Compression         Palpation: non tender to palpation       Assessment & Plan   CLINICAL IMPRESSIONS  Medical Diagnosis: Bilateral Hip Pain    Treatment Diagnosis: Bilateral Hip Pain   Impression/Assessment: Patient is a 62 year old female with hip complaints.  The following significant findings have been identified: Pain, Decreased ROM/flexibility, Decreased joint mobility, Decreased strength, Inflammation, Impaired gait, Impaired muscle performance, and Decreased activity tolerance. These impairments interfere with their ability to perform self care tasks, work tasks, recreational activities, household chores, driving , household mobility, and community mobility as compared to previous level of function.     Clinical Decision Making (Complexity):  Clinical Presentation: Stable/Uncomplicated  Clinical Presentation Rationale: based on medical and personal factors listed in PT evaluation  Clinical Decision Making (Complexity): Low complexity    PLAN OF CARE  Treatment Interventions:  Modalities: Cryotherapy, E-stim, Hot Pack  Interventions: Gait Training, Manual Therapy, Neuromuscular Re-education, Therapeutic Activity, Therapeutic Exercise, Self-Care/Home Management    Long Term Goals     PT Goal 1  Goal Identifier: Ambulation  Goal Description: The patient will be able to ambulate x30 minutes with pain <3/10.  Rationale: to maximize safety and independence with performance of ADLs and functional tasks;to maximize safety and independence within the home;to maximize safety and independence within the community  Target Date: 01/24/24  PT Goal 2  Goal Identifier: Stairs  Goal  Description: The patient will be able to negotiate a flight of stairs with reciprocol gait pattern and pain <3/10.  Rationale: to maximize safety and independence with performance of ADLs and functional tasks;to maximize safety and independence within the home;to maximize safety and independence within the community  Target Date: 01/24/24      Frequency of Treatment: 1x/week  Duration of Treatment: 8 weeks    Recommended Referrals to Other Professionals:   Education Assessment:   Learner/Method: Patient  Education Comments: Eager to participate in therapy    Risks and benefits of evaluation/treatment have been explained.   Patient/Family/caregiver agrees with Plan of Care.     Evaluation Time:     PT Eval, Low Complexity Minutes (50745): 25       Signing Clinician: JOSE MIGUEL YEE, CHARBEL OLIVEIRA [facility]                                                                                   OUTPATIENT PHYSICAL THERAPY      PLAN OF TREATMENT FOR OUTPATIENT REHABILITATION   Patient's Last Name, First Name, ROXANNEBacilioJANELLEBacilio  MauriMaricarmen OLIVEIRA YOB: 1960   Provider's Name   [facility]   Medical Record No.  [id number]     Onset Date: 11/09/23 (MD order)  Start of Care Date: 11/29/23     Medical Diagnosis:  Bilateral Hip Pain      PT Treatment Diagnosis:  Bilateral Hip Pain Plan of Treatment  Frequency/Duration: 1x/week/ 8 weeks    Certification date from 11/29/23 to 01/24/24         See note for plan of treatment details and functional goals     JOSE MIGUEL YEE, PT                         I CERTIFY THE NEED FOR THESE SERVICES FURNISHED UNDER        THIS PLAN OF TREATMENT AND WHILE UNDER MY CARE     (Physician attestation of this document indicates review and certification of the therapy plan).              Referring Provider:  Madyson Mclaughlin    Initial Assessment  See Epic Evaluation- Start of Care Date: 11/29/23

## 2023-12-06 ENCOUNTER — THERAPY VISIT (OUTPATIENT)
Dept: PHYSICAL THERAPY | Facility: CLINIC | Age: 63
End: 2023-12-06
Attending: INTERNAL MEDICINE
Payer: COMMERCIAL

## 2023-12-06 DIAGNOSIS — M25.551 BILATERAL HIP PAIN: Primary | ICD-10-CM

## 2023-12-06 DIAGNOSIS — M25.552 BILATERAL HIP PAIN: Primary | ICD-10-CM

## 2023-12-06 PROCEDURE — 97110 THERAPEUTIC EXERCISES: CPT | Mod: GP | Performed by: PHYSICAL THERAPIST

## 2023-12-06 PROCEDURE — 97112 NEUROMUSCULAR REEDUCATION: CPT | Mod: GP | Performed by: PHYSICAL THERAPIST

## 2023-12-11 ENCOUNTER — VIRTUAL VISIT (OUTPATIENT)
Dept: ENDOCRINOLOGY | Facility: CLINIC | Age: 63
End: 2023-12-11
Payer: COMMERCIAL

## 2023-12-11 DIAGNOSIS — R80.9 TYPE 2 DIABETES MELLITUS WITH MICROALBUMINURIA, WITH LONG-TERM CURRENT USE OF INSULIN (H): Primary | ICD-10-CM

## 2023-12-11 DIAGNOSIS — Z96.41 INSULIN PUMP STATUS: ICD-10-CM

## 2023-12-11 DIAGNOSIS — E11.29 TYPE 2 DIABETES MELLITUS WITH MICROALBUMINURIA, WITH LONG-TERM CURRENT USE OF INSULIN (H): Primary | ICD-10-CM

## 2023-12-11 DIAGNOSIS — Z79.4 TYPE 2 DIABETES MELLITUS WITH MICROALBUMINURIA, WITH LONG-TERM CURRENT USE OF INSULIN (H): Primary | ICD-10-CM

## 2023-12-11 PROCEDURE — 95251 CONT GLUC MNTR ANALYSIS I&R: CPT | Performed by: INTERNAL MEDICINE

## 2023-12-11 PROCEDURE — 99214 OFFICE O/P EST MOD 30 MIN: CPT | Mod: 95 | Performed by: INTERNAL MEDICINE

## 2023-12-11 NOTE — PROGRESS NOTES
"Virtual Visit Details    Type of service:  Video Visit   Video Start Time:  2:38 PM  Video End Time:3:10 PM    Originating Location (pt. Location): Home  Distant Location (provider location):  Off-site  Platform used for Video Visit: Robbie      Recent issues:  Diabetes follow-up evaluation, with daughter Emilee  Has used the Omnipod Dash, planned to change to Omnipod 5 kit & pods, also the DexcomG6    Omnipod 5 expenses much higher however... decided to stay on Omnipod Dash with Freddie  She reports paying alot for insulin ($496/3 mo...9 vials?) and pods ($536), [employer] insurance, but Libre3's free due to 's prior [employer] job  Stressors with anxiety and PTSD, but now sees \"trauma specialist\" counselor which is helpful  Expects insurance change to [facility] in 2024, expects cheaper insulin cost         Diabetes:  History of GDM with 2 pregnancies, diet management  1996. Diagnosis of diabetes mellitus  Recalls starting a diabetes pill  Had taken metformin, also Januvia but developed pancreatitis    Has seen endocrinology providers CAMILA Da Silva M. Schultz, CAMILA Rich, MARAH York, MARAH Childress, KRISTA Mejia  Has tried several diabetes medications previously, records indicate side effects or med issues:  Humalog- apparent concerns with the pancreatitis    Metformin--Abdominal pain.  Glipizide- Allergic reaction- (abdominal pain and swelling),   Byetta and Onglyza-- pancreatitis.  Invokana -- had upper GI distress.    ~2006. Started treatment with insulin medication  Endocrinology evaluations with Emily Phan CNP/[facility]  ~2017. Started Omnipod insulin pump use.  Subsequently saw Rosie Schwab, and CARLO Salgado for endocrinology evaluations.  Using Freestyle Freddie CGM    Previous [facility] hgbA1c trends include:  2/21/06 C-Peptide 2.5  3/4/09 WILLIAMS-65 Ab <1.0     Lab Test 09/01/22  1017 05/20/22  0841 11/23/21  0859 09/18/20  0852 02/27/20  0954   A1C 7.8* 8.7* 8.2* 7.4* 8.0*         10/6/22. Initial " diabetes evaluation with me at [facility]  Reviewed health history and diabetes issues  Using Omnipod Dash insulin pump:   Novolog pump    Uses ICR method for bolusing  Blood glucose (BG) meter  Uses Freestyle Freddie CGM with smartphone   Scans 5-8x/day    Typically boluses postmeal, not premeal    Previous Omnipod Dash settings:     Recent Omnipod pump data:  Information not available    ~3/2023. Changed from Freestyle Libre2 to the Libre3 CGM  Current Libre3 CGM data              Recent [facility] labs include:  Lab Results   Component Value Date    A1C 7.6 (H) 11/21/2023     11/21/2023    POTASSIUM 4.0 11/21/2023    CHLORIDE 105 11/21/2023    CO2 27 11/21/2023    ANIONGAP 9 11/21/2023     (H) 11/21/2023    BUN 12.5 11/21/2023    CR 0.85 11/21/2023    GFRESTIMATED 78 11/21/2023    GFRESTBLACK >90 01/20/2021    KARIN 9.4 11/21/2023    CHOL 210 (H) 07/05/2023    TRIG 102 07/05/2023    HDL 57 07/05/2023     (H) 07/05/2023    NHDL 153 (H) 07/05/2023    UCRR 55.7 07/05/2023    MICROL <12.0 07/05/2023    UMALCR  07/05/2023      Comment:      Unable to calculate, urine albumin and/or urine creatinine is outside detectable limits.  Microalbuminuria is defined as an albumin:creatinine ratio of 17 to 299 for males and 25 to 299 for females. A ratio of albumin:creatinine of 300 or higher is indicative of overt proteinuria.  Due to biologic variability, positive results should be confirmed by a second, first-morning random or 24-hour timed urine specimen. If there is discrepancy, a third specimen is recommended. When 2 out of 3 results are in the microalbuminuria range, this is evidence for incipient nephropathy and warrants increased efforts at glucose control, blood pressure control, and institution of therapy with an angiotensin-converting-enzyme (ACE) inhibitor (if the patient can tolerate it).      TSH 0.73 11/21/2023    T4 0.87 07/22/2019     DM  Complications:   Nephropathy:    Microalbuminuria      Parathyroid:  Patient has seen Dr. Joanne Mtz/[facility] for neurology evaluations  Spring '23, Patient reports discussion of symptoms with neurologist including anterior throat discomfort, difficulty swallowing, cough  Lab test reportedly showed elevated PTH 82  General Surgery consultation appointment with Dr. Radha Conte/[facility] [place]   Appointment subsequently cancelled  Patient had repeat PTH testing at [facility], result reportedly normal    Additional history:  Previous fractures: Wrist, fingers  Kidney stones: None  Vitamin D def:  unknown  Last DEXA scan: ~1995  Supplements:  Calcium- none, vit D- none  Previous [facility] labs include:     Latest Reference Range & Units 02/03/15 11:05 03/29/23 12:32   Vitamin D Deficiency screening 20 - 75 ug/L 34 22      Latest Reference Range & Units 03/29/23 12:32   Parathyroid Hormone Intact 15 - 65 pg/mL 41         Lives in [place], [place]  Sees Dr. Madyson Mclaughlin/[facility] for general medicine evaluations.  Also sees Dr Joanne Mtz[facility]    PMH/PSH:  Past Medical History:   Diagnosis Date    Ascending aorta enlargement (H24)     Depressive disorder     severe, prior hospitalization    Diverticulosis of colon (without mention of hemorrhage)     Fibromyalgia 06/26/2007    Insomnia     Irritable bowel syndrome     Osteopenia     Type 2 diabetes mellitus with complications (H)     microalbuminuria     Past Surgical History:   Procedure Laterality Date    BACK SURGERY      fusion 1994 and removal of hardware 2004    C SPINAL ORTHOSIS,NOS  2002    lumbar surgery    CHOLECYSTECTOMY  2011    choley  2011    ENT SURGERY  1986    septoplasty    HYSTERECTOMY, CERVIX STATUS UNKNOWN  2000    TVH/BSO    ORTHOPEDIC SURGERY  2005    left ankle       Family Hx:  Family History   Problem Relation Age of Onset    Diabetes Mother         type 2    Hypertension Mother     Cerebrovascular Disease  Mother          stroke age 57    Ovarian Cancer Mother 43    Cancer Mother         cervix    Diabetes Father         type 2    Hypertension Father     Gastrointestinal Disease Father         colon polyps    Sleep Apnea Brother     Cancer Sister     Ovarian Cancer Sister 46    Ovarian Cancer Maternal Grandmother 72    Cancer Maternal Grandmother         cervix         Social Hx:  Social History     Socioeconomic History    Marital status:      Spouse name: Not on file    Number of children: 3    Years of education: Not on file    Highest education level: Not on file   Occupational History    Occupation: xr technician     Employer: [employer]   Tobacco Use    Smoking status: Never    Smokeless tobacco: Never   Vaping Use    Vaping Use: Former   Substance and Sexual Activity    Alcohol use: Not Currently     Alcohol/week: 0.0 standard drinks of alcohol     Comment: maybe once a month    Drug use: No    Sexual activity: Yes     Partners: Male     Birth control/protection: Surgical   Other Topics Concern    Parent/sibling w/ CABG, MI or angioplasty before 65F 55M? Not Asked   Social History Narrative    Not on file     Social Determinants of Health     Financial Resource Strain: Low Risk  (10/29/2023)    Financial Resource Strain     Within the past 12 months, have you or your family members you live with been unable to get utilities (heat, electricity) when it was really needed?: No   Food Insecurity: Low Risk  (10/29/2023)    Food Insecurity     Within the past 12 months, did you worry that your food would run out before you got money to buy more?: No     Within the past 12 months, did the food you bought just not last and you didn t have money to get more?: No   Transportation Needs: Low Risk  (10/29/2023)    Transportation Needs     Within the past 12 months, has lack of transportation kept you from medical appointments, getting your medicines, non-medical meetings or appointments, work,  or from getting things that you need?: No   Physical Activity: Not on file   Stress: Not on file   Social Connections: Not on file   Interpersonal Safety: Not on file   Housing Stability: Low Risk  (10/29/2023)    Housing Stability     Do you have housing? : Yes     Are you worried about losing your housing?: No          MEDICATIONS:  has a current medication list which includes the following prescription(s): freestyle ashleigh 14 day reader, freestyle ashleigh 3 sensor, HEMP OIL OR EXTRACT OR OTHER CBD CANNABINOID, NOT MEDICAL CANNABIS,, insulin aspart, omnipod dash pods (gen 4), medical cannabis, melatonin, polyethylene glycol, blood glucose monitoring, epinephrine, insulin pump, insulin syringe-needle u-100, and statin not prescribed.    ROS:     ROS: 10 point ROS neg other than the symptoms noted above in the HPI.    GENERAL: some fatigue, wt stable; denies fevers, chills, night sweats.   HEENT: some head congestion; no dysphagia, odonophagia, diplopia, neck pain  THYROID:  no apparent hyper or hypothyroid symptoms  CV: no chest pain, pressure, palpitations  LUNGS: no SOB, VASQUEZ, cough, wheezing   ABDOMEN: some indigestion; no diarrhea, constipation, abdominal pain  EXTREMITIES: no rashes, ulcers, edema  NEUROLOGY: no headaches, denies changes in vision, tingling, extremitiy numbness   MSK: no muscle aches or pains, weakness  SKIN: no rashes or lesions  : no menses  PSYCH:  PTSD issues though recent stable mood  ENDOCRINE: no heat or cold intolerance    Physical Exam (visual exam)  VS:  no vital signs taken for video visit  CONSTITUTIONAL: healthy, alert and NAD, well dressed, answering questions appropriately  ENT: no nose swelling or nasal discharge, mouth redness or gum changes.  EYES: eyes grossly normal to inspection, conjunctivae and sclerae normal, no exophthalmos or proptosis  THYROID:  no apparent nodules or goiter  LUNGS: no audible wheeze, cough or visible cyanosis, no visible retractions or increased work  of breathing  ABDOMEN: abdomen not evaluated  EXTREMITIES: no hand tremors, limited exam  NEUROLOGY: CN grossly intact, mentation intact and speech normal   SKIN:  no apparent skin lesions, rash, or edema with visualized skin appearance  PSYCH: mentation appears normal, affect normal/bright, judgement and insight intact,   normal speech and appearance well groomed      LABS:    All pertinent notes, labs, and images personally reviewed by me.     A/P:  Encounter Diagnoses   Name Primary?    Type 2 diabetes mellitus with microalbuminuria, with long-term current use of insulin (H) Yes    Insulin pump status        Comments:  Reviewed health history and diabetes issues.  Details of her previous medication intolerances or allergies unclear, higher cost of insulin also unclear  Reviewed and interpreted tests that I previously ordered.   Ordered appropriate tests for the endocrinology disease management.    Management options discussed and implemented after shared medical decision making with the patient.  T2DM problem is chronic-stable    Plan:  Reviewed the overall T2DM management and insulin pump use.  Discussed optimal BG testing to assess glucose trends.  We reviewed insulin pump settings, basal rate and bolus dosing  Use of automated pump bolus dosing for meal/snack carb & correction dosing  Reviewed use of the Omnipod Dash insulin pump reports  Reviewed the recent Freestyle Freddie CGM glucose sensor data, in detail    Reviewed general issues with possible hyperparathyroidism (HPT) diagnosis  Discussed lab tests used to assess patient parathyroid gland function    Recommend:  Since the new Omnipod 5 and DexcomG6 option very expensive for patient, I would not pursue this plan  Continue the current Omnipod Dash insulin pump management  No pump setting changes at this time but consider intensifying the mealtime ICR settings    Discussed advantages of premeal bolusing, entering at least 50% of meal/snack carb estimate  premeal  Would not use a GLP1RA medication with her history of pancreatitis  Future treatment options include adding a SGLT2-I such as Jardiance  Continue use of the Freestyle Libre3 CGM sensor, since inexpensive/free   Consider future upgrade to Omnipod 5 when it is compatatable with Freddie CGM  Arrange a follow-up appointment with [facility]   Updated Referral placed  Repeat non-fasting labs soon   Testing at [facility]   Lab orders available  Needs repeat BP testing with PCP  Arrange annual dilated eye exam, fasting lipid panel testing  Discussed importance of regular endocrinology evaluations every 3-4 months.    Addressed patient's questions today.    There are no Patient Instructions on file for this visit.    Future labs ordered today:   Orders Placed This Encounter   Procedures    GLUCOSE MONITOR, 72 HOUR, PHYS INTERP    Amb Adult Diabetes Educator Referral     Radiology/Consults ordered today: AMBULATORY ADULT DIABETES EDUCATOR REFERRAL    Total time spent on day of encounter:  38 min    Follow-up:  2/13/24 at 12 noon, Return    JEAN PIERRE Hightower MD, MS  Endocrinology  [facility]    CC: АННА Mclaughlin and Care Team

## 2023-12-11 NOTE — Clinical Note
"    12/11/2023         RE: Maricarmen Dotson  [address] [place] [address]        Dear Colleague,    Thank you for referring your patient, Maricarmen Dotson, to the [facility] [place]. Please see a copy of my visit note below.    Virtual Visit Details    Type of service:  Video Visit   Video Start Time:  2:38 PM  Video End Time:3:10 PM    Originating Location (pt. Location): Home  Distant Location (provider location):  Off-site  Platform used for Video Visit: Robbie      Recent issues:  Diabetes follow-up evaluation, with daughter Emilee  Has used the Omnipod Dash, planned to change to Omnipod 5 kit & pods, also the DexcomG6    Omnipod 5 expenses much higher however... decided to stay on Omnipod Dash with Freddie  She reports paying alot for insulin ($496/3 mo...9 vials?) and pods ($536), [employer] insurance, but Libre3's free due to 's prior [employer] job  Stressors with anxiety and PTSD, but now sees \"trauma specialist\" counselor which is helpful  Expects insurance change to [facility] in 2024, expects cheaper insulin cost         Diabetes:  History of GDM with 2 pregnancies, diet management  1996. Diagnosis of diabetes mellitus  Recalls starting a diabetes pill  Had taken metformin, also Januvia but developed pancreatitis    Has seen endocrinology providers CAMILA Da Silva M. Schultz, CAMILA Rich, MARAH York, MARAH Childress, KRISTA Mejia  Has tried several diabetes medications previously, records indicate side effects or med issues:  Humalog- apparent concerns with the pancreatitis    Metformin--Abdominal pain.  Glipizide- Allergic reaction- (abdominal pain and swelling),   Byetta and Onglyza-- pancreatitis.  Invokana -- had upper GI distress.    ~2006. Started treatment with insulin medication  Endocrinology evaluations with Emily Phan, [facility]  ~2017. Started Omnipod insulin pump use.  Subsequently saw Rosie Schwab, and CARLO Salgado for endocrinology " evaluations.  Using Freestyle Freddie CGM    Previous FV hgbA1c trends include:  2/21/06 C-Peptide 2.5  3/4/09 WILLIAMS-65 Ab <1.0     Lab Test 09/01/22  1017 05/20/22  0841 11/23/21  0859 09/18/20  0852 02/27/20  0954   A1C 7.8* 8.7* 8.2* 7.4* 8.0*         10/6/22. Initial diabetes evaluation with me at [place]  Reviewed health history and diabetes issues  Using Omnipod Dash insulin pump:   Novolog pump    Uses ICR method for bolusing  Blood glucose (BG) meter  Uses Freestyle Freddie CGM with smartphone   Scans 5-8x/day    Typically  Boluses postmeal, not premeal    Previous Omnipod Dash settings:     Recent Omnipod pump data:  Information not available    ~3/2023. Changed from Freestyle Libre2 to the Libre3 CGM  Current Libre3 CGM data              Recent FV labs include:  Lab Results   Component Value Date    A1C 7.6 (H) 11/21/2023     11/21/2023    POTASSIUM 4.0 11/21/2023    CHLORIDE 105 11/21/2023    CO2 27 11/21/2023    ANIONGAP 9 11/21/2023     (H) 11/21/2023    BUN 12.5 11/21/2023    CR 0.85 11/21/2023    GFRESTIMATED 78 11/21/2023    GFRESTBLACK >90 01/20/2021    KARIN 9.4 11/21/2023    CHOL 210 (H) 07/05/2023    TRIG 102 07/05/2023    HDL 57 07/05/2023     (H) 07/05/2023    NHDL 153 (H) 07/05/2023    UCRR 55.7 07/05/2023    MICROL <12.0 07/05/2023    UMALCR  07/05/2023      Comment:      Unable to calculate, urine albumin and/or urine creatinine is outside detectable limits.  Microalbuminuria is defined as an albumin:creatinine ratio of 17 to 299 for males and 25 to 299 for females. A ratio of albumin:creatinine of 300 or higher is indicative of overt proteinuria.  Due to biologic variability, positive results should be confirmed by a second, first-morning random or 24-hour timed urine specimen. If there is discrepancy, a third specimen is recommended. When 2 out of 3 results are in the microalbuminuria range, this is evidence for incipient nephropathy and warrants increased efforts at glucose  control, blood pressure control, and institution of therapy with an angiotensin-converting-enzyme (ACE) inhibitor (if the patient can tolerate it).      TSH 0.73 11/21/2023    T4 0.87 07/22/2019     DM Complications:   Nephropathy:    Microalbuminuria      Parathyroid:  Patient has seen Dr. Joanne Mtz/[facility] for neurology evaluations  Spring '23, Patient reports discussion of symptoms with neurologist including anterior throat discomfort, difficulty swallowing, cough  Lab test reportedly showed elevated PTH 82  General Surgery consultation appointment with Dr. Radha Conte/[facility] [place]   Appointment subsequently cancelled  Patient had repeat PTH testing at [facility], result reportedly normal    Additional history:  Previous fractures: Wrist, fingers  Kidney stones: None  Vitamin D def:  unknown  Last DEXA scan: ~1995  Supplements:  Calcium- none, vit D- none  Previous [facility] labs include:     Latest Reference Range & Units 02/03/15 11:05 03/29/23 12:32   Vitamin D Deficiency screening 20 - 75 ug/L 34 22      Latest Reference Range & Units 03/29/23 12:32   Parathyroid Hormone Intact 15 - 65 pg/mL 41         Lives in [place], [place]  Sees Dr. Madyson Mclaughlin/[facility] for general medicine evaluations.  Also sees Dr Joanne Mtz[facility]    PMH/PSH:  Past Medical History:   Diagnosis Date    Ascending aorta enlargement (H24)     Depressive disorder     severe, prior hospitalization    Diverticulosis of colon (without mention of hemorrhage)     Fibromyalgia 06/26/2007    Insomnia     Irritable bowel syndrome     Osteopenia     Type 2 diabetes mellitus with complications (H)     microalbuminuria     Past Surgical History:   Procedure Laterality Date    BACK SURGERY      fusion 1994 and removal of hardware 2004    C SPINAL ORTHOSIS,NOS  2002    lumbar surgery    CHOLECYSTECTOMY  2011    choley  2011    ENT SURGERY  1986    septoplasty    HYSTERECTOMY, CERVIX STATUS UNKNOWN  2000     TVH/BSO    ORTHOPEDIC SURGERY  2005    left ankle       Family Hx:  Family History   Problem Relation Age of Onset    Diabetes Mother         type 2    Hypertension Mother     Cerebrovascular Disease Mother          stroke age 57    Ovarian Cancer Mother 43    Cancer Mother         cervix    Diabetes Father         type 2    Hypertension Father     Gastrointestinal Disease Father         colon polyps    Sleep Apnea Brother     Cancer Sister     Ovarian Cancer Sister 46    Ovarian Cancer Maternal Grandmother 72    Cancer Maternal Grandmother         cervix         Social Hx:  Social History     Socioeconomic History    Marital status:      Spouse name: Not on file    Number of children: 3    Years of education: Not on file    Highest education level: Not on file   Occupational History    Occupation: xr technician     Employer: [employer]   Tobacco Use    Smoking status: Never    Smokeless tobacco: Never   Vaping Use    Vaping Use: Former   Substance and Sexual Activity    Alcohol use: Not Currently     Alcohol/week: 0.0 standard drinks of alcohol     Comment: maybe once a month    Drug use: No    Sexual activity: Yes     Partners: Male     Birth control/protection: Surgical   Other Topics Concern    Parent/sibling w/ CABG, MI or angioplasty before 65F 55M? Not Asked   Social History Narrative    Not on file     Social Determinants of Health     Financial Resource Strain: Low Risk  (10/29/2023)    Financial Resource Strain     Within the past 12 months, have you or your family members you live with been unable to get utilities (heat, electricity) when it was really needed?: No   Food Insecurity: Low Risk  (10/29/2023)    Food Insecurity     Within the past 12 months, did you worry that your food would run out before you got money to buy more?: No     Within the past 12 months, did the food you bought just not last and you didn t have money to get more?: No   Transportation Needs: Low Risk   (10/29/2023)    Transportation Needs     Within the past 12 months, has lack of transportation kept you from medical appointments, getting your medicines, non-medical meetings or appointments, work, or from getting things that you need?: No   Physical Activity: Not on file   Stress: Not on file   Social Connections: Not on file   Interpersonal Safety: Not on file   Housing Stability: Low Risk  (10/29/2023)    Housing Stability     Do you have housing? : Yes     Are you worried about losing your housing?: No          MEDICATIONS:  has a current medication list which includes the following prescription(s): freestyle ashleigh 14 day reader, freestyle ashleigh 3 sensor, HEMP OIL OR EXTRACT OR OTHER CBD CANNABINOID, NOT MEDICAL CANNABIS,, insulin aspart, omnipod dash pods (gen 4), medical cannabis, melatonin, polyethylene glycol, blood glucose monitoring, epinephrine, insulin pump, insulin syringe-needle u-100, and statin not prescribed.    ROS:     ROS: 10 point ROS neg other than the symptoms noted above in the HPI.    GENERAL: some fatigue, wt stable; denies fevers, chills, night sweats.   HEENT: some head congestion; no dysphagia, odonophagia, diplopia, neck pain  THYROID:  no apparent hyper or hypothyroid symptoms  CV: no chest pain, pressure, palpitations  LUNGS: no SOB, VASQUEZ, cough, wheezing   ABDOMEN: some indigestion; no diarrhea, constipation, abdominal pain  EXTREMITIES: no rashes, ulcers, edema  NEUROLOGY: no headaches, denies changes in vision, tingling, extremitiy numbness   MSK: no muscle aches or pains, weakness  SKIN: no rashes or lesions  : no menses  PSYCH:  stable mood, no significant anxiety or depression  ENDOCRINE: no heat or cold intolerance    Physical Exam (visual exam)  VS:  no vital signs taken for video visit  CONSTITUTIONAL: healthy, alert and NAD, well dressed, answering questions appropriately  ENT: no nose swelling or nasal discharge, mouth redness or gum changes.  EYES: eyes grossly normal to  inspection, conjunctivae and sclerae normal, no exophthalmos or proptosis  THYROID:  no apparent nodules or goiter  LUNGS: no audible wheeze, cough or visible cyanosis, no visible retractions or increased work of breathing  ABDOMEN: abdomen not evaluated  EXTREMITIES: no hand tremors, limited exam  NEUROLOGY: CN grossly intact, mentation intact and speech normal   SKIN:  no apparent skin lesions, rash, or edema with visualized skin appearance  PSYCH: mentation appears normal, affect normal/bright, judgement and insight intact,   normal speech and appearance well groomed      LABS:    All pertinent notes, labs, and images personally reviewed by me.     A/P:  Encounter Diagnoses   Name Primary?    Type 2 diabetes mellitus with microalbuminuria, with long-term current use of insulin (H) Yes    Insulin pump status          Comments:  Reviewed health history and diabetes issues.  Details of her previous medication intolerances or allergies unclear, higher cost of insulin also unclear  Reviewed and interpreted tests that I previously ordered.   Ordered appropriate tests for the endocrinology disease management.    Management options discussed and implemented after shared medical decision making with the patient.  T2DM problem is chronic-stable    Plan:  Reviewed the overall T2DM management and insulin pump use.  Discussed optimal BG testing to assess glucose trends.  We reviewed insulin pump settings, basal rate and bolus dosing  Use of automated pump bolus dosing for meal/snack carb & correction dosing  Reviewed use of the Omnipod Dash insulin pump reports  Reviewed the recent Freestyle Freddie CGM glucose sensor data, in detail    Reviewed general issues with possible hyperparathyroidism (HPT) diagnosis  Discussed lab tests used to assess patient parathyroid gland function    Recommend:  Since the new Omnipod 5 and DexcomG6 option very expensive for patient, I would not pursue this plan  Continue the current Omnipod Dash  "insulin pump management  No pump setting changes at this time but consider intensifying the mealtime ICR settings    Enter a 50% carb estimate for meals  Reviewed the ideal premeal (not postmeal) bolus routine     Would not use a GLP1RA medication with her history of pancreatitis  Future treatment options include adding a SGLT2-I such as Jardiance  Continue use of the Freestyle Libre3 CGM sensor, since inexpensive/free  Repeat non-fasting labs soon   Testing at [facility] clinic   Lab orders available  Needs repeat BP testing with PCP  Arrange annual dilated eye exam, fasting lipid panel testing  Discussed importance of regular endocrinology evaluations every 3-4 months.    Addressed patient's questions today.    There are no Patient Instructions on file for this visit.    Future labs ordered today:   Orders Placed This Encounter   Procedures    GLUCOSE MONITOR, 72 HOUR, PHYS INTERP    Amb Adult Diabetes Educator Referral     Radiology/Consults ordered today: AMBULATORY ADULT DIABETES EDUCATOR REFERRAL    Total time spent on day of encounter:  ***    Follow-up:  2/13/24 at 12 noon, Return    JEAN PIERRE Hightower MD, MS  Endocrinology  [facility]    CC: АННА Mclaughlin                        Virtual Visit Details    Type of service:  Video Visit   Video Start Time:  2:38 PM  Video End Time:3:10 PM    Originating Location (pt. Location): Home  Distant Location (provider location):  Off-site  Platform used for Video Visit: Robbie      Recent issues:  Diabetes follow-up evaluation, with daughter Emilee  Has used the Omnipod Dash, planned to change to Omnipod 5 kit & pods, also the DexcomG6    Omnipod 5 expenses much higher however... decided to stay on Omnipod Dash with Freddie  She reports paying alot for insulin ($496/3 mo...9 vials?) and pods ($536), [employer] insurance, but Libre3's free due to 's prior [employer] job  Stressors with anxiety and PTSD, but now sees \"trauma specialist\" counselor which is helpful  Expects " insurance change to [facility] in 2024, expects cheaper insulin cost         Diabetes:  History of GDM with 2 pregnancies, diet management  1996. Diagnosis of diabetes mellitus  Recalls starting a diabetes pill  Had taken metformin, also Januvia but developed pancreatitis    Has seen endocrinology providers CAMILA Da Silva M. Schultz, MARAH Dickens, MARAH Childress, KRISTA Mejia  Has tried several diabetes medications previously, records indicate side effects or med issues:  Humalog- apparent concerns with the pancreatitis    Metformin--Abdominal pain.  Glipizide- Allergic reaction- (abdominal pain and swelling),   Byetta and Onglyza-- pancreatitis.  Invokana -- had upper GI distress.    ~2006. Started treatment with insulin medication  Endocrinology evaluations with Emily Phan CNP/[facility]  ~2017. Started Omnipod insulin pump use.  Subsequently saw Rosie Schwab, and CARLO Salgado for endocrinology evaluations.  Using Freestyle Freddie CGM    Previous FV hgbA1c trends include:  2/21/06 C-Peptide 2.5  3/4/09 WILLIAMS-65 Ab <1.0     Lab Test 09/01/22  1017 05/20/22  0841 11/23/21  0859 09/18/20  0852 02/27/20  0954   A1C 7.8* 8.7* 8.2* 7.4* 8.0*         10/6/22. Initial diabetes evaluation with me at [place]  Reviewed health history and diabetes issues  Using Omnipod Dash insulin pump:   Novolog pump    Uses ICR method for bolusing  Blood glucose (BG) meter  Uses Freestyle Freddie CGM with smartphone   Scans 5-8x/day    Typically boluses postmeal, not premeal    Previous Omnipod Dash settings:     Recent Omnipod pump data:  Information not available    ~3/2023. Changed from Freestyle Libre2 to the Libre3 CGM  Current Libre3 CGM data              Recent FV labs include:  Lab Results   Component Value Date    A1C 7.6 (H) 11/21/2023     11/21/2023    POTASSIUM 4.0 11/21/2023    CHLORIDE 105 11/21/2023    CO2 27 11/21/2023    ANIONGAP 9 11/21/2023     (H) 11/21/2023    BUN 12.5 11/21/2023     CR 0.85 11/21/2023    GFRESTIMATED 78 11/21/2023    GFRESTBLACK >90 01/20/2021    KARIN 9.4 11/21/2023    CHOL 210 (H) 07/05/2023    TRIG 102 07/05/2023    HDL 57 07/05/2023     (H) 07/05/2023    NHDL 153 (H) 07/05/2023    UCRR 55.7 07/05/2023    MICROL <12.0 07/05/2023    UMALCR  07/05/2023      Comment:      Unable to calculate, urine albumin and/or urine creatinine is outside detectable limits.  Microalbuminuria is defined as an albumin:creatinine ratio of 17 to 299 for males and 25 to 299 for females. A ratio of albumin:creatinine of 300 or higher is indicative of overt proteinuria.  Due to biologic variability, positive results should be confirmed by a second, first-morning random or 24-hour timed urine specimen. If there is discrepancy, a third specimen is recommended. When 2 out of 3 results are in the microalbuminuria range, this is evidence for incipient nephropathy and warrants increased efforts at glucose control, blood pressure control, and institution of therapy with an angiotensin-converting-enzyme (ACE) inhibitor (if the patient can tolerate it).      TSH 0.73 11/21/2023    T4 0.87 07/22/2019     DM Complications:   Nephropathy:    Microalbuminuria      Parathyroid:  [facility]  Spring '23, Patient reports discussion of symptoms with neurologist including anterior throat discomfort, difficulty swallowing, cough  Lab test reportedly showed elevated PTH 82  General Surgery consultation appointment with Dr. Radha Conte/[facility] [place]   Appointment subsequently cancelled  Patient had repeat PTH testing at [facility], result reportedly normal    Additional history:  Previous fractures: Wrist, fingers  Kidney stones: None  Vitamin D def:  unknown  Last DEXA scan: ~1995  Supplements:  Calcium- none, vit D- none  Previous [facility] labs include:     Latest Reference Range & Units 02/03/15 11:05 03/29/23 12:32   Vitamin D  Deficiency screening 20 - 75 ug/L 34 22      Latest Reference Range & Units 23 12:32   Parathyroid Hormone Intact 15 - 65 pg/mL 41         Lives in [place], [place]  Sees Dr. Madyson Mclaughlin/[facility] for general medicine evaluations.  Also sees Dr Joanne Mtz[facility]    PMH/PSH:  Past Medical History:   Diagnosis Date     Ascending aorta enlargement (H24)      Depressive disorder     severe, prior hospitalization     Diverticulosis of colon (without mention of hemorrhage)      Fibromyalgia 2007     Insomnia      Irritable bowel syndrome      Osteopenia      Type 2 diabetes mellitus with complications (H)     microalbuminuria     Past Surgical History:   Procedure Laterality Date     BACK SURGERY      fusion  and removal of hardware      C SPINAL ORTHOSIS,NOS      lumbar surgery     CHOLECYSTECTOMY       choley       ENT SURGERY      septoplasty     HYSTERECTOMY, CERVIX STATUS UNKNOWN      TVH/BSO     ORTHOPEDIC SURGERY      left ankle       Family Hx:  Family History   Problem Relation Age of Onset     Diabetes Mother         type 2     Hypertension Mother      Cerebrovascular Disease Mother          stroke age 57     Ovarian Cancer Mother 43     Cancer Mother         cervix     Diabetes Father         type 2     Hypertension Father      Gastrointestinal Disease Father         colon polyps     Sleep Apnea Brother      Cancer Sister      Ovarian Cancer Sister 46     Ovarian Cancer Maternal Grandmother 72     Cancer Maternal Grandmother         cervix         Social Hx:  Social History     Socioeconomic History     Marital status:      Spouse name: Not on file     Number of children: 3     Years of education: Not on file     Highest education level: Not on file   Occupational History     Occupation: xr technician     Employer: [employer]   Tobacco Use     Smoking status: Never     Smokeless tobacco: Never   Vaping Use     Vaping  Use: Former   Substance and Sexual Activity     Alcohol use: Not Currently     Alcohol/week: 0.0 standard drinks of alcohol     Comment: maybe once a month     Drug use: No     Sexual activity: Yes     Partners: Male     Birth control/protection: Surgical   Other Topics Concern     Parent/sibling w/ CABG, MI or angioplasty before 65F 55M? Not Asked   Social History Narrative     Not on file     Social Determinants of Health     Financial Resource Strain: Low Risk  (10/29/2023)    Financial Resource Strain      Within the past 12 months, have you or your family members you live with been unable to get utilities (heat, electricity) when it was really needed?: No   Food Insecurity: Low Risk  (10/29/2023)    Food Insecurity      Within the past 12 months, did you worry that your food would run out before you got money to buy more?: No      Within the past 12 months, did the food you bought just not last and you didn t have money to get more?: No   Transportation Needs: Low Risk  (10/29/2023)    Transportation Needs      Within the past 12 months, has lack of transportation kept you from medical appointments, getting your medicines, non-medical meetings or appointments, work, or from getting things that you need?: No   Physical Activity: Not on file   Stress: Not on file   Social Connections: Not on file   Interpersonal Safety: Not on file   Housing Stability: Low Risk  (10/29/2023)    Housing Stability      Do you have housing? : Yes      Are you worried about losing your housing?: No          MEDICATIONS:  has a current medication list which includes the following prescription(s): freestyle ashleigh 14 day reader, freestyle ashleigh 3 sensor, HEMP OIL OR EXTRACT OR OTHER CBD CANNABINOID, NOT MEDICAL CANNABIS,, insulin aspart, omnipod dash pods (gen 4), medical cannabis, melatonin, polyethylene glycol, blood glucose monitoring, epinephrine, insulin pump, insulin syringe-needle u-100, and statin not prescribed.    ROS:      ROS: 10 point ROS neg other than the symptoms noted above in the HPI.    GENERAL: some fatigue, wt stable; denies fevers, chills, night sweats.   HEENT: some head congestion; no dysphagia, odonophagia, diplopia, neck pain  THYROID:  no apparent hyper or hypothyroid symptoms  CV: no chest pain, pressure, palpitations  LUNGS: no SOB, VASQUEZ, cough, wheezing   ABDOMEN: some indigestion; no diarrhea, constipation, abdominal pain  EXTREMITIES: no rashes, ulcers, edema  NEUROLOGY: no headaches, denies changes in vision, tingling, extremitiy numbness   MSK: no muscle aches or pains, weakness  SKIN: no rashes or lesions  : no menses  PSYCH:  PTSD issues though recent stable mood  ENDOCRINE: no heat or cold intolerance    Physical Exam (visual exam)  VS:  no vital signs taken for video visit  CONSTITUTIONAL: healthy, alert and NAD, well dressed, answering questions appropriately  ENT: no nose swelling or nasal discharge, mouth redness or gum changes.  EYES: eyes grossly normal to inspection, conjunctivae and sclerae normal, no exophthalmos or proptosis  THYROID:  no apparent nodules or goiter  LUNGS: no audible wheeze, cough or visible cyanosis, no visible retractions or increased work of breathing  ABDOMEN: abdomen not evaluated  EXTREMITIES: no hand tremors, limited exam  NEUROLOGY: CN grossly intact, mentation intact and speech normal   SKIN:  no apparent skin lesions, rash, or edema with visualized skin appearance  PSYCH: mentation appears normal, affect normal/bright, judgement and insight intact,   normal speech and appearance well groomed      LABS:    All pertinent notes, labs, and images personally reviewed by me.     A/P:  Encounter Diagnoses   Name Primary?     Type 2 diabetes mellitus with microalbuminuria, with long-term current use of insulin (H) Yes     Insulin pump status        Comments:  Reviewed health history and diabetes issues.  Details of her previous medication intolerances or allergies unclear,  higher cost of insulin also unclear  Reviewed and interpreted tests that I previously ordered.   Ordered appropriate tests for the endocrinology disease management.    Management options discussed and implemented after shared medical decision making with the patient.  T2DM problem is chronic-stable    Plan:  Reviewed the overall T2DM management and insulin pump use.  Discussed optimal BG testing to assess glucose trends.  We reviewed insulin pump settings, basal rate and bolus dosing  Use of automated pump bolus dosing for meal/snack carb & correction dosing  Reviewed use of the Omnipod Dash insulin pump reports  Reviewed the recent Freestyle Freddie CGM glucose sensor data, in detail    Reviewed general issues with possible hyperparathyroidism (HPT) diagnosis  Discussed lab tests used to assess patient parathyroid gland function    Recommend:  Since the new Omnipod 5 and DexcomG6 option very expensive for patient, I would not pursue this plan  Continue the current Omnipod Dash insulin pump management  No pump setting changes at this time but consider intensifying the mealtime ICR settings    Discussed advantages of premeal bolusing, entering at least 50% of meal/snack carb estimate premeal  Would not use a GLP1RA medication with her history of pancreatitis  Future treatment options include adding a SGLT2-I such as Jardiance  Continue use of the Freestyle Libre3 CGM sensor, since inexpensive/free   Consider future upgrade to Omnipod 5 when it is compatatable with Freddie CGM  Arrange a follow-up appointment with [facility]   Updated Referral placed  Repeat non-fasting labs soon   Testing at [facility]   Lab orders available  Needs repeat BP testing with PCP  Arrange annual dilated eye exam, fasting lipid panel testing  Discussed importance of regular endocrinology evaluations every 3-4 months.    Addressed patient's questions today.    There are no Patient Instructions on file for this visit.    Future labs  ordered today:   Orders Placed This Encounter   Procedures     GLUCOSE MONITOR, 72 HOUR, PHYS INTERP     Amb Adult Diabetes Educator Referral     Radiology/Consults ordered today: AMBULATORY ADULT DIABETES EDUCATOR REFERRAL    Total time spent on day of encounter:  38 min    Follow-up:  2/13/24 at 12 noon, Return    JEAN PIERRE Hightower MD, MS  Endocrinology  [facility]    CC: АННА Mclaughlin,                          Again, thank you for allowing me to participate in the care of your patient.        Sincerely,        Chaitanya Hightower MD

## 2023-12-12 ENCOUNTER — TELEPHONE (OUTPATIENT)
Dept: ENDOCRINOLOGY | Facility: CLINIC | Age: 63
End: 2023-12-12
Payer: MEDICARE

## 2023-12-13 NOTE — TELEPHONE ENCOUNTER
"Please transfer to a triage nurse when patient calls back.    Plan at the appointment on 4/29: \"Basic metabolic panel  - continue losartan 50 mg - will change to 50 mg pill  - continue amlodipine 5 mg for now. IF continues to have numbness, could consider switching to a different calcium channel blocker  - will let me know if consistently running > 140/90  - discussed weight loss  - check BMP in 2 weeks as lab visit  - see me in 1-2 months.\"    Patient is reporting blood pressure of  190/120 and chest pain.  Call to patient to discuss. LM for patient to call back and speak with a triage nurse.  Plan was changing medications with symptoms, but patient has reported chest pain so the call was placed.  Routing to PCP as well.    Ela Love RN  Message handled by Nurse Triage.          "
Has been to ED twice for this. Has multiple med allergies. Recommend increase losartan to 100 mg. Can try stopping nifedipine and see if pain improves as this could be making blood pressures higher. Referral placed to Cardiology as has been very difficult to control and multiple allergies. If /120 and having chest pain again needs to go back to ED.    Annamaria Erickson MD  Internal Medicine/Pediatrics    
Spoke with the pt and informed her of 's note.  She states that she will stay on the nifedipine and increase her losartan to 75 mg daily.  She states that she wants to increase slowly.  She will see how it goes over the weekend and update us next week.  Cardiology referral information given to pt, she will call to schedule an appt.  Also informed pt that if chest pain returns and BP remains elevated, she needs to be seen in the ER.    Blanca Reynoso RN - Triage  [facility]      
Accidental fall

## 2023-12-15 ENCOUNTER — TELEPHONE (OUTPATIENT)
Dept: PEDIATRICS | Facility: CLINIC | Age: 63
End: 2023-12-15

## 2023-12-15 NOTE — TELEPHONE ENCOUNTER
Diabetic education referral details:  If you have not heard from the scheduling office within 2 business days, please call [phone].     Notes from Endo VV on 12/11:  Arrange a follow-up appointment with [facility]ab [facility]              Updated Referral placed  Repeat non-fasting labs soon              Testing at [facility]              Lab orders available  Needs repeat BP testing with PCP    BP Readings from Last 4 Encounters:   07/31/23 (!) 156/82   07/05/23 130/72   03/29/23 130/74   12/08/22 (!) 140/60     PALOMA Rosa  Patient Advocate Liason (PAL)  [facility]  Ph. [phone] / Fax. [phone]

## 2023-12-19 NOTE — TELEPHONE ENCOUNTER
Called pt, not available, unable to LM. Will try later.    PALOMA Rosa  Patient Advocate Liason (PAL)  [facility]  Ph. [phone] / Fax. [phone]

## 2023-12-27 NOTE — TELEPHONE ENCOUNTER
Pt says she will get back to us on when she can set up appts. And hung up.      Kathy Andrea on 12/27/2023 at 2:16 PM

## 2023-12-27 NOTE — TELEPHONE ENCOUNTER
Kathy:    Pt is not scheduled for BP nurse-only visit or diabetic educator visit at this time. Please contact pt to help schedule those visits as per her endocrinologist's recommendation. Thanks.    PALOMA Rosa  Patient Advocate Liason (PAL)  [facility]  Ph. [phone] / Fax. [phone]

## 2023-12-28 ENCOUNTER — THERAPY VISIT (OUTPATIENT)
Dept: PHYSICAL THERAPY | Facility: CLINIC | Age: 63
End: 2023-12-28
Payer: COMMERCIAL

## 2023-12-28 DIAGNOSIS — M25.551 BILATERAL HIP PAIN: Primary | ICD-10-CM

## 2023-12-28 DIAGNOSIS — M25.552 BILATERAL HIP PAIN: Primary | ICD-10-CM

## 2023-12-28 PROCEDURE — 97110 THERAPEUTIC EXERCISES: CPT | Mod: GP | Performed by: PHYSICAL THERAPIST

## 2023-12-28 PROCEDURE — 97112 NEUROMUSCULAR REEDUCATION: CPT | Mod: GP | Performed by: PHYSICAL THERAPIST

## 2024-01-18 ENCOUNTER — MYC MEDICAL ADVICE (OUTPATIENT)
Dept: EDUCATION SERVICES | Facility: CLINIC | Age: 64
End: 2024-01-18
Payer: COMMERCIAL

## 2024-01-24 ENCOUNTER — NURSE TRIAGE (OUTPATIENT)
Dept: PEDIATRICS | Facility: CLINIC | Age: 64
End: 2024-01-24
Payer: COMMERCIAL

## 2024-01-24 ENCOUNTER — MYC MEDICAL ADVICE (OUTPATIENT)
Dept: PEDIATRICS | Facility: CLINIC | Age: 64
End: 2024-01-24
Payer: COMMERCIAL

## 2024-01-24 NOTE — TELEPHONE ENCOUNTER
"S-(situation): Patient called back and sent in a my chart message refusing the ER recommendation because she will end up waiting for 8 hours.    B-(background): Triage for Covid earlier today    A-(assessment): Patient calling back refusing to go to the ER, wants an \"antiviral not made by Pfizer\". Patient states that her O2 is between 95-98%.  IF we are unable to do that then she \"will just ride it out.\"    R-(recommendations): Recommended she follow Dr. Mclaughlin's recommendation and be evaluated in the ER for the chest pain and shortness of breath.      Please advise. Thank you    PALOMA Whitley on 1/24/2024 at 3:32 PM      "

## 2024-01-24 NOTE — TELEPHONE ENCOUNTER
Nurse Triage SBAR    Is this a 2nd Level Triage? YES, LICENSED PRACTITIONER REVIEW IS REQUIRED    Situation: Patient calling, tested positive for Covid yesterday.     Background: Patient tested positive for covid yesterday, symptoms started Sunday.     Assessment: Patient experiencing productive cough, joint muscle aches, fatigue, sore throat, headache, nausea, and SOB. Patient reports she feels as though she is breathing shallow, if she breathes too deep it hurts. Patient reports his chest has been killing her. Pulse ox is 95%.     Protocol Recommended Disposition:   Go To ED/UCC Now (Or To Office With PCP Approval)    Recommendation: Second level triage requested: OV or VV w/ provider advised?      Routed to provider    Does the patient meet one of the following criteria for ADS visit consideration? 16+ years old, with an [facility] PCP     TIP  Providers, please consider if this condition is appropriate for management at one of our Acute and Diagnostic Services sites.     If patient is a good candidate, please use dotphrase <dot>triageresponse and select Refer to ADS to document.      Reason for Disposition   Chest pain or pressure  (Exception: MILD central chest pain, present only when coughing.)    Additional Information   Negative: Diagnosed or suspected COVID-19 and symptoms lasting 3 or more weeks   Negative: COVID-19 exposure and no symptoms   Negative: COVID-19 vaccine reaction suspected (e.g., fever, headache, muscle aches) occurring 1 to 3 days after getting vaccine   Negative: COVID-19 vaccine, questions about   Negative: Lives with someone known to have influenza (flu test positive) and flu-like symptoms (e.g., cough, runny nose, sore throat, SOB; with or without fever)   Negative: Possible COVID-19 symptoms and triager concerned about severity of symptoms or other causes   Negative: COVID-19 and breastfeeding, questions about   Negative: SEVERE or constant chest pain or pressure  (Exception: Mild central  chest pain, present only when coughing.)   Negative: MODERATE difficulty breathing (e.g., speaks in phrases, SOB even at rest, pulse 100-120)   Negative: Headache and stiff neck (can't touch chin to chest)   Negative: Oxygen level (e.g., pulse oximetry) 90% or lower   Negative: SEVERE difficulty breathing (e.g., struggling for each breath, speaks in single words)   Negative: Difficult to awaken or acting confused (e.g., disoriented, slurred speech)   Negative: Bluish (or gray) lips or face now   Negative: Shock suspected (e.g., cold/pale/clammy skin, too weak to stand, low BP, rapid pulse)   Negative: Sounds like a life-threatening emergency to the triager    Protocols used: Coronavirus (COVID-19) Diagnosed or [id number] HUYNH RN 1/24/2024 at 2:27 PM

## 2024-01-24 NOTE — TELEPHONE ENCOUNTER
My understanding is that patient has COVID, not influenza. If this is the case, then if she wants an antiviral I would recommend Paxlovid (which is manufactured by Pfizer). I would probably not recommend molnupiravir as it is less effective than Paxlovid at preventing hospitalization and does have some significant side effects, but if she really would like a medication this would be the alternative option. Medications such as remdesivir or convalescent plasma are not really appropriate in her case. Tamiflu or other influenza targeting medications would not work for COVID.    Mdayson Mclaughlin MD  Internal Medicine-Pediatrics

## 2024-01-24 NOTE — TELEPHONE ENCOUNTER
Called and relayed provider recommendation below. Patient verbalized understanding and agreed with plan.     Leo HUYNH RN 1/24/2024 at 2:49 PM

## 2024-01-24 NOTE — TELEPHONE ENCOUNTER
Called pt at [phone] & went over 's recommendations. Pt confirms that she is not in resp distress/SOB/wheezing/troble breathing. Her O2sat is consistently > 95%. Feeling tired, body aches, wet cough & nausea.     She doesn't want to try ANY Pfizer products because of her past experiences(she doesn't want to disclose). She deferred covid treatment at this time. She is planning to rest, tylenol prn, push fluids & monitor closely. Advised pt to to go to ED with any rapid worsening sx's. Pt agrees to the plan.     PALOMA Rosa  Patient Advocate Liason (PAL)  [facility]  Ph. [phone] / Fax. [phone]

## 2024-01-24 NOTE — TELEPHONE ENCOUNTER
Unfortunately with severe chest pain and shortness of breath I would recommend patient be seen in ED to expedite work-up.    Madyson Mclaughlin MD  Internal Medicine-Pediatrics

## 2024-01-28 ASSESSMENT — PATIENT HEALTH QUESTIONNAIRE - PHQ9
SUM OF ALL RESPONSES TO PHQ QUESTIONS 1-9: 13
SUM OF ALL RESPONSES TO PHQ QUESTIONS 1-9: 13
10. IF YOU CHECKED OFF ANY PROBLEMS, HOW DIFFICULT HAVE THESE PROBLEMS MADE IT FOR YOU TO DO YOUR WORK, TAKE CARE OF THINGS AT HOME, OR GET ALONG WITH OTHER PEOPLE: SOMEWHAT DIFFICULT

## 2024-01-29 ENCOUNTER — OFFICE VISIT (OUTPATIENT)
Dept: PEDIATRICS | Facility: CLINIC | Age: 64
End: 2024-01-29
Payer: COMMERCIAL

## 2024-01-29 VITALS
HEART RATE: 55 BPM | SYSTOLIC BLOOD PRESSURE: 166 MMHG | RESPIRATION RATE: 20 BRPM | DIASTOLIC BLOOD PRESSURE: 58 MMHG | WEIGHT: 179.4 LBS | OXYGEN SATURATION: 97 % | BODY MASS INDEX: 30.63 KG/M2 | TEMPERATURE: 98.6 F | HEIGHT: 64 IN

## 2024-01-29 DIAGNOSIS — Z79.4 TYPE 2 DIABETES MELLITUS WITH HYPERGLYCEMIA, WITH LONG-TERM CURRENT USE OF INSULIN (H): ICD-10-CM

## 2024-01-29 DIAGNOSIS — I10 BENIGN ESSENTIAL HYPERTENSION: ICD-10-CM

## 2024-01-29 DIAGNOSIS — U07.1 INFECTION DUE TO 2019 NOVEL CORONAVIRUS: ICD-10-CM

## 2024-01-29 DIAGNOSIS — H26.9 CATARACT OF BOTH EYES, UNSPECIFIED CATARACT TYPE: ICD-10-CM

## 2024-01-29 DIAGNOSIS — E11.65 TYPE 2 DIABETES MELLITUS WITH HYPERGLYCEMIA, WITH LONG-TERM CURRENT USE OF INSULIN (H): ICD-10-CM

## 2024-01-29 DIAGNOSIS — Z01.818 PRE-OP EXAM: Primary | ICD-10-CM

## 2024-01-29 PROCEDURE — 99214 OFFICE O/P EST MOD 30 MIN: CPT | Performed by: INTERNAL MEDICINE

## 2024-01-29 RX ORDER — MULTIVITAMIN WITH IRON
1 TABLET ORAL DAILY
COMMUNITY

## 2024-01-29 ASSESSMENT — PAIN SCALES - GENERAL: PAINLEVEL: NO PAIN (0)

## 2024-01-29 NOTE — PROGRESS NOTES
Preoperative Evaluation  [facility] [place]  [address]  [address]  [place] [address]  Phone: [phone]  Fax: [phone]  Primary Provider: Madyson Magallanes  Pre-op Performing Provider: MADYSON MAGALLANES  Jan 29, 2024   -    Maricarmen is a 63 year old, presenting for the following:  Pre-Op Exam        1/29/2024     9:05 AM   Additional Questions   Roomed by [facility]   Accompanied by self         1/29/2024     9:05 AM   Patient Reported Additional Medications   Patient reports taking the following new medications none     Surgical Information  Surgery/Procedure: cataract surgery right  Surgery Location: [facility]   Surgeon: Dr. Rea   Surgery Date: 02/15/2024  Time of Surgery: TBD  Where patient plans to recover: At home with family  Fax number for surgical facility: [phone]    Assessment & Plan     The proposed surgical procedure is considered LOW risk.    Pre-op exam  Cataract of both eyes, unspecified cataract type  Patient medically optimized to proceed to surgery. BP elevated today likely due to acute stress with illness and medication side effects. See below. She will plan to reschedule surgery if still symptomatic or testing positive from her current COVID infection; if testing negative and symptoms are resolved may proceed with surgery without further work-up.     Type 2 diabetes mellitus with hyperglycemia, with long-term current use of insulin (H)  Patient to continue on basal rate for insulin pump until seen by anesthesia; further management ann-operatively to be determined by anesthesia.     Benign essential hypertension  BP elevated today likely due to acute stress and medication side effects (for management of COVID symptoms). BP often improves to normal levels when not stressed, but normal to see systolic BP in 150-160s related to white coat hypertension symptoms.     COVID infection  See above.       Depression Screening Follow Up        1/28/2024     12:03 PM   PHQ   PHQ-9 Total Score 13   Q9: Thoughts of better off dead/self-harm past 2 weeks Several days   F/U: Thoughts of suicide or self-harm Yes   F/U: Self harm-plan No   F/U: Self-harm action No   F/U: Safety concerns No     Stable and monitoring.      - No identified additional risk factors other than previously addressed    Antiplatelet or Anticoagulation Medication Instructions   - Patient is on no antiplatelet or anticoagulation medications.    Additional Medication Instructions  Patient is to take all scheduled medications on the day of surgery EXCEPT for modifications listed below:   - Insulin pump: Continue basal rate of insulin up until the time of surgery.    Recommendation  APPROVAL GIVEN to proceed with proposed procedure, without further diagnostic evaluation.          Subjective       HPI related to upcoming procedure: patient planning for right cataract surgery.         1/28/2024    12:05 PM   Preop Questions   1. Have you ever had a heart attack or stroke? No   2. Have you ever had surgery on your heart or blood vessels, such as a stent placement, a coronary artery bypass, or surgery on an artery in your head, neck, heart, or legs? No   3. Do you have chest pain with activity? No   4. Do you have a history of  heart failure? No   5. Do you currently have a cold, bronchitis or symptoms of other infection? YES - COVID infection recently - still some cough and discomfort with deep breathing but improving clinically   6. Do you have a cough, shortness of breath, or wheezing? No   7. Do you or anyone in your family have previous history of blood clots? No   8. Do you or does anyone in your family have a serious bleeding problem such as prolonged bleeding following surgeries or cuts? No   9. Have you ever had problems with anemia or been told to take iron pills? No   10. Have you had any abnormal blood loss such as black, tarry or bloody stools, or abnormal vaginal bleeding? No   11. Have you ever  had a blood transfusion? No   12. Are you willing to have a blood transfusion if it is medically needed before, during, or after your surgery? Yes   13. Have you or any of your relatives ever had problems with anesthesia? YES - difficulty waking up from sedation   14. Do you have sleep apnea, excessive snoring or daytime drowsiness? YES - sleep apnea, doesn't tolerate CPAP   14a. Do you have a CPAP machine? No   15. Do you have any artifical heart valves or other implanted medical devices like a pacemaker, defibrillator, or continuous glucose monitor? No   16. Do you have artificial joints? No   17. Are you allergic to latex? YES: avoid latex products.        Health Care Directive  Patient does not have a Health Care Directive or Living Will: patient will bring to clinic.     Preoperative Review of    reviewed - controlled substances reflected in medication list.      Status of Chronic Conditions:  See problem list for active medical problems.  Problems all longstanding and stable, except as noted/documented.  See ROS for pertinent symptoms related to these conditions.    Patient Active Problem List    Diagnosis Date Noted    Bilateral hip pain 11/29/2023     Priority: Medium    Osteopenia of multiple sites 07/17/2023     Priority: Medium    Pain of right hand 10/12/2022     Priority: Medium    Chronic kidney disease, stage 3a (H) 05/02/2022     Priority: Medium    Moderate episode of recurrent major depressive disorder (H) 02/08/2022     Priority: Medium    MIESHA (obstructive sleep apnea) 08/14/2019     Priority: Medium    Posttraumatic stress disorder 12/06/2018     Priority: Medium    Hypertension goal BP (blood pressure) < 140/90 07/23/2018     Priority: Medium    Insulin pump in place 06/27/2018     Priority: Medium    Carotid atherosclerosis, bilateral 04/27/2018     Priority: Medium    Cervical pain 10/23/2017     Priority: Medium    Fibromyalgia 07/30/2017     Priority: Medium    Type 2 diabetes mellitus  with hyperglycemia (H) 10/20/2015     Priority: Medium    Statin intolerance 02/24/2015     Priority: Medium    Pain in thoracic spine 07/11/2014     Priority: Medium    Nonallopathic lesion of cervical region 07/11/2014     Priority: Medium     Problem list name updated by automated process. Provider to review      Cervicalgia 07/11/2014     Priority: Medium    Lumbago 07/11/2014     Priority: Medium    History of traumatic brain injury 01/07/2014     Priority: Medium    Anxiety 11/15/2013     Priority: Medium    Panic attacks 11/15/2013     Priority: Medium    Migraine headache 09/03/2013     Priority: Medium    Pain in joint, ankle and foot 06/19/2013     Priority: Medium    Post concussion syndrome 05/24/2013     Priority: Medium    Plantar fasciitis 05/24/2013     Priority: Medium    Overweight 09/26/2012     Priority: Medium     Problem list name updated by automated process. Provider to review      Hepatic injury 11/09/2011     Priority: Medium    Type 2 diabetes, HbA1c goal < 7% (H) 01/21/2010     Priority: Medium    Dyspepsia 01/07/2010     Priority: Medium    Hyperlipidemia LDL goal <100 09/26/2008     Priority: Medium    Chronic pain syndrome 06/27/2007     Priority: Medium     Patient is followed by DEANA ORTEGA for ongoing prescription of narcotic pain medicine.  Med: Percocet 5/325, zanaflex 2mg .   Maximum use per month:15 (percocet) zanaflex (60)  Expected duration: no end date  Narcotic agreement on file: YES  Clinic visit recommended: Q 3 months        Irritable bowel syndrome      Priority: Medium    Insomnia      Priority: Medium     Problem list name updated by automated process. Provider to review        Past Medical History:   Diagnosis Date    Arthritis 1996    Ascending aorta enlargement (H24)     Depressive disorder     severe, prior hospitalization    Diverticulosis of colon (without mention of hemorrhage)     Fibromyalgia 06/26/2007    Insomnia     Irritable bowel syndrome     Osteopenia      Type 2 diabetes mellitus with complications (H)     microalbuminuria     Past Surgical History:   Procedure Laterality Date    ABDOMEN SURGERY  2012    Gallbladder removal    BACK SURGERY      fusion 1994 and removal of hardware 2004    C SPINAL ORTHOSIS,NOS  2002    lumbar surgery    CHOLECYSTECTOMY  2011    choley  2011    ENT SURGERY  1986    septoplasty    HYSTERECTOMY, CERVIX STATUS UNKNOWN  2000    TVH/BSO    ORTHOPEDIC SURGERY  2005    left ankle     Current Outpatient Medications   Medication Sig Dispense Refill    benzonatate (TESSALON) 200 MG capsule Take 1 capsule (200 mg) by mouth 3 times daily as needed for cough 20 capsule 1    Continuous Blood Gluc  (FREESTYLE RADHA 14 DAY READER) MARIA C USE TO CHECK BLOOD SUGARS AS DIRECTED 1 Device 0    Continuous Blood Gluc Sensor (FREESTYLE RADHA 3 SENSOR) MISC 1 Device continuous prn (change every 14 days) 6 each 1    EPINEPHrine (ANY BX GENERIC EQUIV) 0.3 MG/0.3ML injection 2-pack Inject 0.3 mLs (0.3 mg) into the muscle once as needed for anaphylaxis 2 each 1    insulin aspart (NOVOLOG VIAL) 100 UNITS/ML vial USE IN INSULIN PUMP, TOTAL DAILY DOSE  UNITS 90 mL 0    Insulin Disposable Pump (OMNIPOD DASH PODS, GEN 4,) MISC Inject 1 Device Subcutaneous continuous prn (change pods every 2-3 days as directed) 40 each 3    INSULIN PUMP - OUTPATIENT INSULIN PUMP - OUTPATIENT  Date last updated: 6/4/21 Omnipod DASH  BASAL RATES and times:   12am: 1.3, 6 am: 0.75, 12 pm: 0.8, 6 pm: 0.95   CARB RATIO: 12am-12am: 10  Correction Factor (Sensitivity): 12AM (midnight): 31 -- 5 AM: 33  Target blood glucose: 100-120  Active insulin time: 4 hrs      magnesium 250 MG tablet Take 1 tablet by mouth daily      medical cannabis (Patient's own supply) See Admin Instructions (The purpose of this order is to document that the patient reports taking medical cannabis.  This is not a prescription, and is not used to certify that the patient has a qualifying medical  "condition.)    Taking up to 40 mg but usually 10-20 mg caplet from [facility].      MELATONIN PO Take 20 mg by mouth At Bedtime       ondansetron (ZOFRAN ODT) 4 MG ODT tab Take 1 tablet (4 mg) by mouth every 8 hours as needed for nausea 18 tablet 1    polyethylene glycol (MIRALAX/GLYCOLAX) packet Take 17 g by mouth At Bedtime       blood glucose monitoring (FREESTYLE LITE) meter device kit Use to test blood sugar 6-7 times daily. 1 kit 0    HEMP OIL OR EXTRACT OR OTHER CBD CANNABINOID, NOT MEDICAL CANNABIS, daily CBD gummies as needed from [employer] [facility] [employer]      insulin syringe-needle U-100 (31G X 5/16\" 0.3 ML) 31G X 5/16\" 0.3 ML miscellaneous Use 3 syringes daily or as directed, in the event of pump failure. 100 each 3    STATIN NOT PRESCRIBED, INTENTIONAL, Statin not prescribed intentionally due to Intolerance 0 each 0       Allergies   Allergen Reactions    Amoxicillin Anaphylaxis    Bee Anaphylaxis     Wasp       Contrast Dye Anaphylaxis    Diatrizoate Anaphylaxis    Iodine Anaphylaxis     Severe anaphalatic shock      Losartan Muscle Pain (Myalgia)    Sulfa Antibiotics Anaphylaxis    Tetanus-Diphtheria Toxoids Td      Rxn was to Tdap-whole body joint pain and fever.  Had similar reaction to Td vaccine 7/28/2017    Metoprolol Other (See Comments)     Fatigue, joint pain, throat tightness    Zithromax [Azithromycin Dihydrate] Nausea and Vomiting    Amlodipine      Neuropathic type pain in hands/feet    Atorvastatin      myalgias    Catapres [Clonidine]     Crestor [Rosuvastatin]      myalgias    Cyproheptadine      Severe headache, cardiac issues, and dizziness    Humalog [Insulin Lispro]      Causes pancreatitis -     Hydrochlorothiazide      numbness    Latex      Skin burn and can't breathe      Lisinopril      Joint aches    Metformin     Naltrexone     Nifedipine     Olmesartan      Joint pain, stiffness, cough intermittent    Onglyza [Saxagliptin Hydrochloride]      Fibromyalgia flare    Phenergan " "[Promethazine]     Prochlorperazine      Altered mental status, hallucinations    Reglan [Metoclopramide]     Sumatriptan      Causes severe depression    Terazosin      Tingling in toes and muscle stiffness    Tetracycline Nausea and Vomiting, Hives and Difficulty breathing     Pt called to update today after the visit that she is allergic to the tetracycline-added to her list    Sulindac Anxiety     Panic attacks        Social History     Tobacco Use    Smoking status: Never    Smokeless tobacco: Never   Substance Use Topics    Alcohol use: Not Currently     Comment: maybe once a month       History   Drug Use No         Review of Systems    Review of Systems  +respiratory and GI symptoms related to recent COVID infection, improving. Otherwise constitutional, neuro, ENT, endocrine, pulmonary, cardiac, gastrointestinal, genitourinary, musculoskeletal, integument and psychiatric systems are negative, except as otherwise noted.    Objective    BP (!) 166/78 (BP Location: Right arm, Patient Position: Sitting, Cuff Size: Adult Large)   Pulse 55   Temp 98.6  F (37  C) (Temporal)   Resp 20   Ht 1.626 m (5' 4\")   Wt 81.4 kg (179 lb 6.4 oz)   LMP 01/01/1999   SpO2 97%   BMI 30.79 kg/m     Estimated body mass index is 30.79 kg/m  as calculated from the following:    Height as of this encounter: 1.626 m (5' 4\").    Weight as of this encounter: 81.4 kg (179 lb 6.4 oz).  Physical Exam  GENERAL: alert and no distress  EYES: Eyes grossly normal to inspection, PERRL and conjunctivae and sclerae normal  HENT: ear canals and TM's normal, nose and mouth without ulcers or lesions  NECK: no adenopathy, no asymmetry, masses, or scars  RESP: lungs clear to auscultation - no rales, rhonchi or wheezes  CV: regular rate and rhythm, normal S1 S2, no S3 or S4, no murmur, click or rub, no peripheral edema  ABDOMEN: soft, nontender, no hepatosplenomegaly, no masses and bowel sounds normal  MS: no gross musculoskeletal defects noted, no " edema  SKIN: no suspicious lesions or rashes  NEURO: Normal strength and tone, mentation intact and speech normal  PSYCH: mentation appears normal, affect normal/bright  LYMPH: no cervical, supraclavicular, axillary, or inguinal adenopathy    Recent Labs   Lab Test 11/21/23  1122 07/05/23  1135   HGB  --  13.7    144   POTASSIUM 4.0 4.0   CR 0.85 0.80   A1C 7.6* 8.0*        Diagnostics  No labs were ordered during this visit.   No EKG required for low risk surgery (cataract, skin procedure, breast biopsy, etc).    Revised Cardiac Risk Index (RCRI)  The patient has the following serious cardiovascular risks for perioperative complications:   - No serious cardiac risks = 0 points     RCRI Interpretation: 0 points: Class I (very low risk - 0.4% complication rate)         Signed Electronically by: Madyson Mendoza MD  Copy of this evaluation report is provided to requesting physician.

## 2024-01-29 NOTE — PATIENT INSTRUCTIONS
Continue basal rate on insulin pump until seen by anesthesia and they direct you further - make sure to bring CGM and pump supplies in case you need to replace.     Bring EpiPen in the car with you as well.

## 2024-02-05 DIAGNOSIS — R80.9 TYPE 2 DIABETES MELLITUS WITH MICROALBUMINURIA, WITH LONG-TERM CURRENT USE OF INSULIN (H): ICD-10-CM

## 2024-02-05 DIAGNOSIS — E11.29 TYPE 2 DIABETES MELLITUS WITH MICROALBUMINURIA, WITH LONG-TERM CURRENT USE OF INSULIN (H): ICD-10-CM

## 2024-02-05 DIAGNOSIS — Z79.4 TYPE 2 DIABETES MELLITUS WITH MICROALBUMINURIA, WITH LONG-TERM CURRENT USE OF INSULIN (H): ICD-10-CM

## 2024-02-06 RX ORDER — BLOOD-GLUCOSE SENSOR
EACH MISCELLANEOUS
Qty: 6 EACH | Refills: 3 | Status: SHIPPED | OUTPATIENT
Start: 2024-02-06

## 2024-02-06 NOTE — TELEPHONE ENCOUNTER
Last Written Prescription Date:  8/23/23  Last Fill Quantity: 6,  # refills: 1   Last office visist:12/11/2023 with prescribing provider:  Dr. Hightower   Future Office Visit:  2/13/24    Requested Prescriptions   Pending Prescriptions Disp Refills    Continuous Blood Gluc Sensor (FREESTYLE RADHA 3 SENSOR) [facility] [Pharmacy Med Name: FREESTYLE RADHA 3 SENSOR KIT 14 DAY]  3     Sig: USE 1 SENSOR CONTINUOUSLY AS NEEDED, CHANGE EVERY 14 DAYS       There is no refill protocol information for this order        Refills sent  Kylah Foster RN

## 2024-02-08 ENCOUNTER — ALLIED HEALTH/NURSE VISIT (OUTPATIENT)
Dept: EDUCATION SERVICES | Facility: CLINIC | Age: 64
End: 2024-02-08
Payer: COMMERCIAL

## 2024-02-08 DIAGNOSIS — E11.29 TYPE 2 DIABETES MELLITUS WITH MICROALBUMINURIA, WITH LONG-TERM CURRENT USE OF INSULIN (H): ICD-10-CM

## 2024-02-08 DIAGNOSIS — R80.9 TYPE 2 DIABETES MELLITUS WITH MICROALBUMINURIA, WITH LONG-TERM CURRENT USE OF INSULIN (H): ICD-10-CM

## 2024-02-08 DIAGNOSIS — Z79.4 TYPE 2 DIABETES MELLITUS WITH MICROALBUMINURIA, WITH LONG-TERM CURRENT USE OF INSULIN (H): ICD-10-CM

## 2024-02-08 DIAGNOSIS — Z96.41 INSULIN PUMP STATUS: ICD-10-CM

## 2024-02-08 PROCEDURE — 99207 PR NO CHARGE NURSE ONLY: CPT

## 2024-02-08 PROCEDURE — G0108 DIAB MANAGE TRN  PER INDIV: HCPCS

## 2024-02-08 NOTE — PATIENT INSTRUCTIONS
Pump Changes today:  BG correction 185 -> 140    Basal 4  12-6am 1.3 -> 1.45  6am-noon 0.75 -> 0.85  Noon-6pm 0.8 -> 0.9  6pm-12am 0.95 -> 1.05  Total:25.5    Basal 5  12-6am 1.45 -> 1.6  6am-noon 0.85 -> 0.95  Noon-6pm 0.9 -> 1.0  6pm-12am 1.05 -> 1.20  Total: 28.5

## 2024-02-08 NOTE — LETTER
2/8/2024         RE: Maricarmen Dotson  [address]  [place] [address]        Dear Colleague,    Thank you for referring your patient, Maricarmen Dotson, to the [facility] [place]. Please see a copy of my visit note below.    Diabetes Self-Management Education & Support    Presents for: Insulin Pump and CGM Review    Type of Service: In Person Visit      REPORTS:                      Insulin Pump Information  Insulin Pump Brand: OmniPod  Does patient have an insulin multiple daily injection back-up plan?: Yes    Education specific to insulin pump provided today on:   Settings for 2 increased basal rate profiles per pt request to help with BG's during illnesses/stress. Pod placement to avoid scar tissue.    Opportunities for ongoing education and support in diabetes-self management were discussed.    Pt verbalized understanding of concepts discussed and recommendations provided today.       Continue education with the following diabetes management concepts: Monitoring, Taking Medication, and Problem Solving    Pump Education Materials: No new materials.    ASSESSMENT  I met with Maricarmen today to review her pump and sensor data.  For some reason not all of her OmniPod data is showing up include go when I download her PDM even after multiple tries.  She has been wearing her pump the entire time though.  She has noted an increase in blood sugars lately due to more binge eating in the evening hours.  She is still recovering from a recent COVID-19 infection last month.  She is scheduled for cataract surgery next week.  She notes that corrections do not seem to do anything to her blood sugars and so we discussed today changing the BG correction threshold down as it is pretty high at 185.  She thought this would be a good change.  We also discussed avoiding scar tissue for pod placement to help the insulin absorbed better into her system.  Glucose Patterns & Trends:  Average  with 46% in target range.  "No hypoglycemia.    Patient would benefit from decrease in BG target.    Changes made to pump settings:  BG correction 185 -> 140    Basal 4  12-6am 1.3 -> 1.45  6am-noon 0.75 -> 0.85  Noon-6pm 0.8 -> 0.9  6pm-12am 0.95 -> 1.05  Total:25.5    Basal 5  12-6am 1.45 -> 1.6  6am-noon 0.85 -> 0.95  Noon-6pm 0.9 -> 1.0  6pm-12am 1.05 -> 1.20  Total: 28.5    Changes made to sensor settings:   No changes to sensor settings recommended at this time.    PLAN  Try pump changes made today.  Avoid scar tissue area placement for omnipod.    Topics to cover at upcoming visits: Problem Solving    Follow-up: None planned at this time. Dr. Hightower next week.    See Care Plan for co-developed, patient-state behavior change goals.  AVS provided for patient today.    Education Materials Provided:  No new materials provided today      SUBJECTIVE/OBJECTIVE:  Presents for: Insulin Pump and CGM Review  Accompanied by: Self  Diabetes education in the past 24mo: Yes  Focus of Visit: Insulin Pump, CGM  Type of Pump visit: Pump Review  Type of CGM visit: Personal CGM Follow-up  Diabetes type: Type 2  Disease course: Getting harder to manage  Cultural Influences/Ethnic Background:   or       Diabetes Symptoms & Complications:  Diabetes Related Symptoms: Fatigue, Neuropathy, Polyuria (increased urination), Visual change  Weight trend: Increasing  Symptom course: Worsening  Disease course: Getting harder to manage  Complications assessed today?: No    Patient Problem List and Family Medical History reviewed for relevant medical history, current medical status, and diabetes risk factors.    Vitals:  LMP 01/01/1999   Estimated body mass index is 31.07 kg/m  as calculated from the following:    Height as of 2/13/24: 1.626 m (5' 4\").    Weight as of 2/13/24: 82.1 kg (181 lb).   Last 3 BP:   BP Readings from Last 3 Encounters:   02/13/24 (!) 168/90   02/13/24 (!) 167/80   01/29/24 (!) 166/58       History   Smoking Status     Never " "  Smokeless Tobacco     Never       Labs:  Lab Results   Component Value Date    A1C 7.6 11/21/2023    A1C 7.4 09/18/2020    HEMOGLOBINA1 11.0 06/08/2015     Lab Results   Component Value Date     11/21/2023     07/06/2022     01/20/2021     Lab Results   Component Value Date     07/05/2023     09/18/2020     HDL Cholesterol   Date Value Ref Range Status   09/18/2020 51 >49 mg/dL Final     Direct Measure HDL   Date Value Ref Range Status   07/05/2023 57 >=50 mg/dL Final   ]  GFR Estimate   Date Value Ref Range Status   11/21/2023 78 >60 mL/min/1.73m2 Final   01/20/2021 84 >60 mL/min/[1.73_m2] Final     Comment:     Non  GFR Calc  Starting 12/18/2018, serum creatinine based estimated GFR (eGFR) will be   calculated using the Chronic Kidney Disease Epidemiology Collaboration   (CKD-EPI) equation.       GFR Estimate If Black   Date Value Ref Range Status   01/20/2021 >90 >60 mL/min/[1.73_m2] Final     Comment:      GFR Calc  Starting 12/18/2018, serum creatinine based estimated GFR (eGFR) will be   calculated using the Chronic Kidney Disease Epidemiology Collaboration   (CKD-EPI) equation.       Lab Results   Component Value Date    CR 0.85 11/21/2023    CR 0.77 01/20/2021     No results found for: \"MICROALBUMIN\"    Healthy Eating:  Healthy Eating Assessed Today: Yes  Meal planning/habits: Carb counting, Low salt, Other  Please elaborate:: I have an eating disorder that makes this very difficult for me.  Other: Usually binges in the evenings due to an eating disorder.  Beverages: Water, Tea    Being Active:  Being Active Assessed Today: No    Monitoring:  Monitoring Assessed Today: Yes  Did patient bring glucose meter to appointment? : Yes  Blood Glucose Meter: CGM  Times checking blood sugar at home (number): 5+  Times checking blood sugar at home (per): Day  Blood glucose trend: Increasing      Taking Medications:  Diabetes Medication(s)       Insulin "       insulin aspart (NOVOLOG VIAL) 100 UNITS/ML vial USE IN INSULIN PUMP, TOTAL DAILY DOSE  UNITS            Taking Medication Assessed Today: Yes  Current Treatments: Insulin Pump  Dose schedule: Pre-breakfast, Pre-lunch, Pre-dinner, At bedtime  Given by: Patient  Injection/Infusion sites: Abdomen  Problems taking diabetes medications regularly?: No  Diabetes medication side effects?: No    Problem Solving:  Problem Solving Assessed Today: Yes  Is the patient at risk for hypoglycemia?: Yes  Hypoglycemia Frequency: Rarely  Hypoglycemia Treatment: Glucose (tablets or gel), Juice  Patient carries a carbohydrate source: Yes  Does patient have glucagon emergency kit?: Yes  Does patient have ketone test strips?: Yes  Does patient have DKA prevention plan?: No    Hypoglycemia Symptoms  Hypoglycemia: Confusion, Dizziness/Lightheadedness, Nervousness/Anxiety, Feeling shaky    Hypoglycemia Complications  Hypoglycemia Complications: None    Reducing Risks:  Reducing Risks Assessed Today: No  Has dilated eye exam at least once a year?: Yes  Sees dentist every 6 months?: Yes  Feet checked by healthcare provider in the last year?: No    Healthy Coping:  Healthy Coping Assessed Today: Yes  Emotional response to diabetes: Ready to learn, Confidence diabetes can be controlled, Concern for health and well-being  Informal Support system:: Family, Friends, Spouse  Stage of change: ACTION (Actively working towards change)  Support resources: In-person Offerings  Patient Activation Measure Survey Score:      3/16/2023    11:31 AM 3/27/2023    12:00 PM   JEFFERY Score (Last Two)   JEFFERY Raw Score 27    27 27   Activation Score 47.4    47.4 47.4   JEFFERY Level 2    2 2         Care Plan and Education Provided:  Care Plan: Diabetes   Updates made by Ashley Emery RN since 3/10/2024 12:00 AM        Problem: Diabetes Self-Management Education Needed to Optimize Self-Care Behaviors         Goal: Problem Solving - know how to prevent and  manage short-term diabetes complications         Task: Provide education on high blood glucose - causes, signs/symptoms, prevention and treatment Completed 3/10/2024   Responsible User: Ashley Emery RN Lindsay Nicol RN, [facility]      Time Spent: 60 minutes  Encounter Type: Individual    Any diabetes medication dose changes were made via the [facility] Protocol per the patient's referring provider and endocrinology provider. A copy of this encounter was shared with the provider.

## 2024-02-08 NOTE — PROGRESS NOTES
Diabetes Self-Management Education & Support    Presents for: Insulin Pump and CGM Review    Type of Service: In Person Visit      REPORTS:                      Insulin Pump Information  Insulin Pump Brand: OmniPod  Does patient have an insulin multiple daily injection back-up plan?: Yes    Education specific to insulin pump provided today on:   Settings for 2 increased basal rate profiles per pt request to help with BG's during illnesses/stress. Pod placement to avoid scar tissue.    Opportunities for ongoing education and support in diabetes-self management were discussed.    Pt verbalized understanding of concepts discussed and recommendations provided today.       Continue education with the following diabetes management concepts: Monitoring, Taking Medication, and Problem Solving    Pump Education Materials: No new materials.    ASSESSMENT  I met with [employer] today to review her pump and sensor data.  For some reason not all of her OmniPod data is showing up include go when I download her PDM even after multiple tries.  She has been wearing her pump the entire time though.  She has noted an increase in blood sugars lately due to more binge eating in the evening hours.  She is still recovering from a recent COVID-19 infection last month.  She is scheduled for cataract surgery next week.  She notes that corrections do not seem to do anything to her blood sugars and so we discussed today changing the BG correction threshold down as it is pretty high at 185.  She thought this would be a good change.  We also discussed avoiding scar tissue for pod placement to help the insulin absorbed better into her system.  Glucose Patterns & Trends:  Average  with 46% in target range. No hypoglycemia.    Patient would benefit from decrease in BG target.    Changes made to pump settings:  BG correction 185 -> 140    Basal 4  12-6am 1.3 -> 1.45  6am-noon 0.75 -> 0.85  Noon-6pm 0.8 -> 0.9  6pm-12am 0.95 ->  "1.05  Total:25.5    Basal 5  12-6am 1.45 -> 1.6  6am-noon 0.85 -> 0.95  Noon-6pm 0.9 -> 1.0  6pm-12am 1.05 -> 1.20  Total: 28.5    Changes made to sensor settings:   No changes to sensor settings recommended at this time.    PLAN  Try pump changes made today.  Avoid scar tissue area placement for omnipod.    Topics to cover at upcoming visits: Problem Solving    Follow-up: None planned at this time. Dr. Hightower next week.    See Care Plan for co-developed, patient-state behavior change goals.  AVS provided for patient today.    Education Materials Provided:  No new materials provided today      SUBJECTIVE/OBJECTIVE:  Presents for: Insulin Pump and CGM Review  Accompanied by: Self  Diabetes education in the past 24mo: Yes  Focus of Visit: Insulin Pump, CGM  Type of Pump visit: Pump Review  Type of CGM visit: Personal CGM Follow-up  Diabetes type: Type 2  Disease course: Getting harder to manage  Cultural Influences/Ethnic Background:   or       Diabetes Symptoms & Complications:  Diabetes Related Symptoms: Fatigue, Neuropathy, Polyuria (increased urination), Visual change  Weight trend: Increasing  Symptom course: Worsening  Disease course: Getting harder to manage  Complications assessed today?: No    Patient Problem List and Family Medical History reviewed for relevant medical history, current medical status, and diabetes risk factors.    Vitals:  LMP 01/01/1999   Estimated body mass index is 31.07 kg/m  as calculated from the following:    Height as of 2/13/24: 1.626 m (5' 4\").    Weight as of 2/13/24: 82.1 kg (181 lb).   Last 3 BP:   BP Readings from Last 3 Encounters:   02/13/24 (!) 168/90   02/13/24 (!) 167/80   01/29/24 (!) 166/58       History   Smoking Status    Never   Smokeless Tobacco    Never       Labs:  Lab Results   Component Value Date    A1C 7.6 11/21/2023    A1C 7.4 09/18/2020    HEMOGLOBINA1 11.0 06/08/2015     Lab Results   Component Value Date     11/21/2023     " "07/06/2022     01/20/2021     Lab Results   Component Value Date     07/05/2023     09/18/2020     HDL Cholesterol   Date Value Ref Range Status   09/18/2020 51 >49 mg/dL Final     Direct Measure HDL   Date Value Ref Range Status   07/05/2023 57 >=50 mg/dL Final   ]  GFR Estimate   Date Value Ref Range Status   11/21/2023 78 >60 mL/min/1.73m2 Final   01/20/2021 84 >60 mL/min/[1.73_m2] Final     Comment:     Non  GFR Calc  Starting 12/18/2018, serum creatinine based estimated GFR (eGFR) will be   calculated using the Chronic Kidney Disease Epidemiology Collaboration   (CKD-EPI) equation.       GFR Estimate If Black   Date Value Ref Range Status   01/20/2021 >90 >60 mL/min/[1.73_m2] Final     Comment:      GFR Calc  Starting 12/18/2018, serum creatinine based estimated GFR (eGFR) will be   calculated using the Chronic Kidney Disease Epidemiology Collaboration   (CKD-EPI) equation.       Lab Results   Component Value Date    CR 0.85 11/21/2023    CR 0.77 01/20/2021     No results found for: \"MICROALBUMIN\"    Healthy Eating:  Healthy Eating Assessed Today: Yes  Meal planning/habits: Carb counting, Low salt, Other  Please elaborate:: I have an eating disorder that makes this very difficult for me.  Other: Usually binges in the evenings due to an eating disorder.  Beverages: Water, Tea    Being Active:  Being Active Assessed Today: No    Monitoring:  Monitoring Assessed Today: Yes  Did patient bring glucose meter to appointment? : Yes  Blood Glucose Meter: CGM  Times checking blood sugar at home (number): 5+  Times checking blood sugar at home (per): Day  Blood glucose trend: Increasing      Taking Medications:  Diabetes Medication(s)       Insulin       insulin aspart (NOVOLOG VIAL) 100 UNITS/ML vial USE IN INSULIN PUMP, TOTAL DAILY DOSE  UNITS            Taking Medication Assessed Today: Yes  Current Treatments: Insulin Pump  Dose schedule: Pre-breakfast, " Pre-lunch, Pre-dinner, At bedtime  Given by: Patient  Injection/Infusion sites: Abdomen  Problems taking diabetes medications regularly?: No  Diabetes medication side effects?: No    Problem Solving:  Problem Solving Assessed Today: Yes  Is the patient at risk for hypoglycemia?: Yes  Hypoglycemia Frequency: Rarely  Hypoglycemia Treatment: Glucose (tablets or gel), Juice  Patient carries a carbohydrate source: Yes  Does patient have glucagon emergency kit?: Yes  Does patient have ketone test strips?: Yes  Does patient have DKA prevention plan?: No    Hypoglycemia Symptoms  Hypoglycemia: Confusion, Dizziness/Lightheadedness, Nervousness/Anxiety, Feeling shaky    Hypoglycemia Complications  Hypoglycemia Complications: None    Reducing Risks:  Reducing Risks Assessed Today: No  Has dilated eye exam at least once a year?: Yes  Sees dentist every 6 months?: Yes  Feet checked by healthcare provider in the last year?: No    Healthy Coping:  Healthy Coping Assessed Today: Yes  Emotional response to diabetes: Ready to learn, Confidence diabetes can be controlled, Concern for health and well-being  Informal Support system:: Family, Friends, Spouse  Stage of change: ACTION (Actively working towards change)  Support resources: In-person Offerings  Patient Activation Measure Survey Score:      3/16/2023    11:31 AM 3/27/2023    12:00 PM   JEFFERY Score (Last Two)   JEFFERY Raw Score 27    27 27   Activation Score 47.4    47.4 47.4   JEFFERY Level 2    2 2         Care Plan and Education Provided:  Care Plan: Diabetes   Updates made by Ashley Emery RN since 3/10/2024 12:00 AM        Problem: Diabetes Self-Management Education Needed to Optimize Self-Care Behaviors         Goal: Problem Solving - know how to prevent and manage short-term diabetes complications         Task: Provide education on high blood glucose - causes, signs/symptoms, prevention and treatment Completed 3/10/2024   Responsible User: Ashley Emery RN Lindsay  Yuly DOW, [facility]      Time Spent: 60 minutes  Encounter Type: Individual    Any diabetes medication dose changes were made via the CDE Protocol per the patient's referring provider and endocrinology provider. A copy of this encounter was shared with the provider.

## 2024-02-10 ENCOUNTER — TRANSFERRED RECORDS (OUTPATIENT)
Dept: MULTI SPECIALTY CLINIC | Facility: CLINIC | Age: 64
End: 2024-02-10

## 2024-02-10 LAB — RETINOPATHY: NORMAL

## 2024-02-13 ENCOUNTER — OFFICE VISIT (OUTPATIENT)
Dept: ORTHOPEDICS | Facility: CLINIC | Age: 64
End: 2024-02-13
Payer: COMMERCIAL

## 2024-02-13 ENCOUNTER — OFFICE VISIT (OUTPATIENT)
Dept: ENDOCRINOLOGY | Facility: CLINIC | Age: 64
End: 2024-02-13
Payer: COMMERCIAL

## 2024-02-13 VITALS
DIASTOLIC BLOOD PRESSURE: 80 MMHG | SYSTOLIC BLOOD PRESSURE: 167 MMHG | WEIGHT: 181.8 LBS | HEART RATE: 63 BPM | BODY MASS INDEX: 31.04 KG/M2 | HEIGHT: 64 IN

## 2024-02-13 VITALS
HEIGHT: 64 IN | BODY MASS INDEX: 30.9 KG/M2 | WEIGHT: 181 LBS | SYSTOLIC BLOOD PRESSURE: 168 MMHG | DIASTOLIC BLOOD PRESSURE: 90 MMHG | HEART RATE: 61 BPM

## 2024-02-13 DIAGNOSIS — E11.9 TYPE 2 DIABETES, HBA1C GOAL < 7% (H): ICD-10-CM

## 2024-02-13 DIAGNOSIS — R80.9 TYPE 2 DIABETES MELLITUS WITH MICROALBUMINURIA, WITH LONG-TERM CURRENT USE OF INSULIN (H): Primary | ICD-10-CM

## 2024-02-13 DIAGNOSIS — M25.552 GREATER TROCHANTERIC PAIN SYNDROME OF LEFT LOWER EXTREMITY: Primary | ICD-10-CM

## 2024-02-13 DIAGNOSIS — M76.32 IT BAND SYNDROME, LEFT: ICD-10-CM

## 2024-02-13 DIAGNOSIS — M16.12 PRIMARY OSTEOARTHRITIS OF LEFT HIP: ICD-10-CM

## 2024-02-13 DIAGNOSIS — E11.29 TYPE 2 DIABETES MELLITUS WITH MICROALBUMINURIA, WITH LONG-TERM CURRENT USE OF INSULIN (H): Primary | ICD-10-CM

## 2024-02-13 DIAGNOSIS — N18.31 CHRONIC KIDNEY DISEASE, STAGE 3A (H): ICD-10-CM

## 2024-02-13 DIAGNOSIS — Z79.4 TYPE 2 DIABETES MELLITUS WITH MICROALBUMINURIA, WITH LONG-TERM CURRENT USE OF INSULIN (H): Primary | ICD-10-CM

## 2024-02-13 DIAGNOSIS — Z96.41 INSULIN PUMP STATUS: ICD-10-CM

## 2024-02-13 PROCEDURE — 95251 CONT GLUC MNTR ANALYSIS I&R: CPT | Performed by: INTERNAL MEDICINE

## 2024-02-13 PROCEDURE — 99204 OFFICE O/P NEW MOD 45 MIN: CPT | Performed by: STUDENT IN AN ORGANIZED HEALTH CARE EDUCATION/TRAINING PROGRAM

## 2024-02-13 PROCEDURE — 99214 OFFICE O/P EST MOD 30 MIN: CPT | Performed by: INTERNAL MEDICINE

## 2024-02-13 PROCEDURE — G2211 COMPLEX E/M VISIT ADD ON: HCPCS | Performed by: INTERNAL MEDICINE

## 2024-02-13 NOTE — Clinical Note
"    2/13/2024         RE: Maricarmen Dotson  [address] [place] [address]        Dear Colleague,    Thank you for referring your patient, Maricarmen Dotson, to the [facility] [place]. Please see a copy of my visit note below.      Recent issues:  Diabetes follow-up evaluation, with  Weslye  Has used the Omnipod Dash, planned to change to Omnipod 5 kit & pods, also the DexcomG6    Omnipod 5 expenses much higher however... decided to stay on Omnipod Dash with Freddie  She reports paying alot for insulin ($496/3 mo...9 vials?) and pods ($536), [employer] insurance, but Libre3's free due to 's prior [employer] job  Stressors with anxiety and PTSD, but now sees \"trauma specialist\" counselor which is helpful  Expects insurance change to [facility] in 2024, expects cheaper insulin cost    Cataract surgeries planned in next 3 weeks, per Dr. Marisol Rea/[facility] soon         Diabetes:  History of GDM with 2 pregnancies, diet management  1996. Diagnosis of diabetes mellitus  Recalls starting a diabetes pill  Had taken metformin, also Januvia but developed pancreatitis    Has seen endocrinology providers CAMILA Da Silva, AMINA Angeles, CAMILA Rich, MARAH York, MARAH Childress, KRISTA Mejia  Has tried several diabetes medications previously, records indicate side effects or med issues:  Humalog- apparent concerns with the pancreatitis    Metformin--Abdominal pain.  Glipizide- Allergic reaction- (abdominal pain and swelling),   Byetta and Onglyza-- pancreatitis.  Invokana -- had upper GI distress.    ~2006. Started treatment with insulin medication  Endocrinology evaluations with Emily Phan, [facility]  ~2017. Started Omnipod insulin pump use.  Subsequently saw Rosie Schwab, and CARLO Salgado for endocrinology evaluations.  Using Freestyle Freddie CGM    Previous FV hgbA1c trends include:  2/21/06 C-Peptide 2.5  3/4/09 WILLIAMS-65 Ab <1.0     Lab Test 09/01/22  1017 " 05/20/22  0841 11/23/21  0859 09/18/20  0852 02/27/20  0954   A1C 7.8* 8.7* 8.2* 7.4* 8.0*         10/6/22. Initial diabetes evaluation with me at [place]  Reviewed health history and diabetes issues  Using Omnipod Dash insulin pump:   Novolog pump    Uses ICR method for bolusing  Blood glucose (BG) meter  Uses Freestyle Freddie CGM with smartphone   Scans 5-8x/day    Typically boluses postmeal, not premeal    Current Omnipod Dash settings:     Recent Omnipod pump data:  Information not available    ~3/2023. Changed from Freestyle Libre2 to the Libre3 CGM  Current Libre3 CGM data            Previous FV hgbA1c trends include:  Lab Results   Component Value Date    A1C 7.6 (H) 11/21/2023    A1C 8.0 (H) 07/05/2023    A1C 8.6 (H) 03/20/2023    A1C 7.8 (H) 09/01/2022    A1C 8.7 (H) 05/20/2022      Recent FV labs include:  Lab Results   Component Value Date    A1C 7.6 (H) 11/21/2023     11/21/2023    POTASSIUM 4.0 11/21/2023    CHLORIDE 105 11/21/2023    CO2 27 11/21/2023    ANIONGAP 9 11/21/2023     (H) 11/21/2023    BUN 12.5 11/21/2023    CR 0.85 11/21/2023    GFRESTIMATED 78 11/21/2023    GFRESTBLACK >90 01/20/2021    KARIN 9.4 11/21/2023    CHOL 210 (H) 07/05/2023    TRIG 102 07/05/2023    HDL 57 07/05/2023     (H) 07/05/2023    NHDL 153 (H) 07/05/2023    UCRR 55.7 07/05/2023    MICROL <12.0 07/05/2023    UMALCR  07/05/2023      Comment:      Unable to calculate, urine albumin and/or urine creatinine is outside detectable limits.  Microalbuminuria is defined as an albumin:creatinine ratio of 17 to 299 for males and 25 to 299 for females. A ratio of albumin:creatinine of 300 or higher is indicative of overt proteinuria.  Due to biologic variability, positive results should be confirmed by a second, first-morning random or 24-hour timed urine specimen. If there is discrepancy, a third specimen is recommended. When 2 out of 3 results are in the microalbuminuria range, this is evidence for incipient  nephropathy and warrants increased efforts at glucose control, blood pressure control, and institution of therapy with an angiotensin-converting-enzyme (ACE) inhibitor (if the patient can tolerate it).      TSH 0.73 11/21/2023    T4 0.87 07/22/2019     DM Complications:   Nephropathy:    Microalbuminuria      Parathyroid:  Patient has seen Dr. Joanne Mtz/[facility] for neurology evaluations  Spring '23, Patient reports discussion of symptoms with neurologist including anterior throat discomfort, difficulty swallowing, cough  Lab test reportedly showed elevated PTH 82  General Surgery consultation appointment with Dr. Radha Conte/[facility] [place]   Appointment subsequently cancelled  Patient had repeat PTH testing at [facility], result reportedly normal    Additional history:  Previous fractures: Wrist, fingers  Kidney stones: None  Vitamin D def:  unknown  Last DEXA scan: ~1995  Supplements:  Calcium- none, vit D- none  Previous [facility]V labs include:     Latest Reference Range & Units 02/03/15 11:05 03/29/23 12:32   Vitamin D Deficiency screening 20 - 75 ug/L 34 22      Latest Reference Range & Units 03/29/23 12:32   Parathyroid Hormone Intact 15 - 65 pg/mL 41         Lives in [place], [place]  Sees Dr. Madyson Mclaughlin/[facility] for general medicine evaluations.  Also sees Dr Joanne Mtz/[facility]    PMH/PSH:  Past Medical History:   Diagnosis Date    Arthritis 1996    Ascending aorta enlargement (H24)     Depressive disorder     severe, prior hospitalization    Diverticulosis of colon (without mention of hemorrhage)     Fibromyalgia 06/26/2007    Insomnia     Irritable bowel syndrome     Osteopenia     Type 2 diabetes mellitus with complications (H)     microalbuminuria     Past Surgical History:   Procedure Laterality Date    ABDOMEN SURGERY  2012    Gallbladder removal    BACK SURGERY      fusion 1994 and removal of hardware 2004    C SPINAL ORTHOSIS,NOS  2002    lumbar surgery     CHOLECYSTECTOMY  2011    choley  2011    ENT SURGERY  1986    septoplasty    HYSTERECTOMY, CERVIX STATUS UNKNOWN      TVH/BSO    ORTHOPEDIC SURGERY  2005    left ankle       Family Hx:  Family History   Problem Relation Age of Onset    Diabetes Mother         type 2    Hypertension Mother     Cerebrovascular Disease Mother          stroke age 57    Ovarian Cancer Mother 43    Cancer Mother         cervix    Diabetes Father         type 2    Hypertension Father     Gastrointestinal Disease Father         colon polyps    Sleep Apnea Brother     Cancer Sister     Ovarian Cancer Sister 46    Breast Cancer Sister         Stage 4 breast cancer.    Ovarian Cancer Maternal Grandmother 72    Cancer Maternal Grandmother         cervix         Social Hx:  Social History     Socioeconomic History    Marital status:      Spouse name: Not on file    Number of children: 3    Years of education: Not on file    Highest education level: Not on file   Occupational History    Occupation: xr technician     Employer: [employer]   Tobacco Use    Smoking status: Never    Smokeless tobacco: Never   Vaping Use    Vaping Use: Former   Substance and Sexual Activity    Alcohol use: Not Currently     Comment: maybe once a month    Drug use: No    Sexual activity: Yes     Partners: Male     Birth control/protection: Surgical     Comment: Full hystroectomy in    Other Topics Concern    Parent/sibling w/ CABG, MI or angioplasty before 65F 55M? Not Asked   Social History Narrative    Not on file     Social Determinants of Health     Financial Resource Strain: Low Risk  (2024)    Financial Resource Strain     Within the past 12 months, have you or your family members you live with been unable to get utilities (heat, electricity) when it was really needed?: No   Food Insecurity: Low Risk  (2024)    Food Insecurity     Within the past 12 months, did you worry that your food would run out before you got  "money to buy more?: No     Within the past 12 months, did the food you bought just not last and you didn t have money to get more?: No   Transportation Needs: Low Risk  (1/28/2024)    Transportation Needs     Within the past 12 months, has lack of transportation kept you from medical appointments, getting your medicines, non-medical meetings or appointments, work, or from getting things that you need?: No   Physical Activity: Not on file   Stress: Not on file   Social Connections: Not on file   Interpersonal Safety: Not on file   Housing Stability: Low Risk  (1/28/2024)    Housing Stability     Do you have housing? : Yes     Are you worried about losing your housing?: No          MEDICATIONS:  has a current medication list which includes the following prescription(s): freestyle ashleigh 3 sensor, HEMP OIL OR EXTRACT OR OTHER CBD CANNABINOID, NOT MEDICAL CANNABIS,, insulin aspart, omnipod dash pods (gen 4), magnesium, medical cannabis, benzonatate, blood glucose monitoring, freestyle ashleigh 14 day reader, epinephrine, insulin pump, insulin syringe-needle u-100, melatonin, ondansetron, polyethylene glycol, and statin not prescribed.    ROS:     ROS: 10 point ROS neg other than the symptoms noted above in the HPI.    GENERAL: some fatigue, wt stable; denies fevers, chills, night sweats.   HEENT: some head congestion; no dysphagia, odonophagia, diplopia, neck pain  THYROID:  no apparent hyper or hypothyroid symptoms  CV: no chest pain, pressure, palpitations  LUNGS: no SOB, VASQUEZ, cough, wheezing   ABDOMEN: some indigestion; no diarrhea, constipation, abdominal pain  EXTREMITIES: no rashes, ulcers, edema  NEUROLOGY: no headaches, denies changes in vision, tingling, extremitiy numbness   MSK: no muscle aches or pains, weakness  SKIN: no rashes or lesions  : no menses  PSYCH:  PTSD issues though recent stable mood  ENDOCRINE: no heat or cold intolerance      Physical Exam   VS: BP (!) 187/80   Pulse 63   Ht 1.626 m (5' 4\")  "  Wt 82.5 kg (181 lb 12.8 oz)   LMP 01/01/1999   BMI 31.21 kg/m    GENERAL: AXOX3, NAD, well dressed, answering questions appropriately, appears stated age.  ENT: no nose swelling or nasal discharge, mouth redness or gum changes.  EYES: eyes grossly normal to inspection, conjunctivae and sclerae normal, no exophthalmos or proptosis  THYROID:  no apparent nodules or goiter  LUNGS: no audible wheeze, cough or visible cyanosis, or increased work of breathing  ABDOMEN: abdomen size  EXTREMITIES: no edema noted  NEUROLOGY: CN grossly intact, no tremors  MSK: grossly intact  SKIN:  no apparent skin lesions, rash, or edema with visualized skin appearance  PSYCH: mentation appears normal, affect normal/bright, judgement and insight intact,   normal speech and appearance well groomed        LABS:    All pertinent notes, labs, and images personally reviewed by me.     A/P:  No diagnosis found.      Comments:  Reviewed health history and diabetes issues.  Details of her previous medication intolerances or allergies unclear, higher cost of insulin also unclear  Reviewed and interpreted tests that I previously ordered.   Ordered appropriate tests for the endocrinology disease management.    Management options discussed and implemented after shared medical decision making with the patient.  T2DM problem is chronic-stable    Plan:  Reviewed the overall T2DM management and insulin pump use.  Discussed optimal BG testing to assess glucose trends.  We reviewed insulin pump settings, basal rate and bolus dosing  Use of automated pump bolus dosing for meal/snack carb & correction dosing  Reviewed use of the Omnipod Dash insulin pump reports  Reviewed the recent Freestyle Freddie CGM glucose sensor data, in detail    Reviewed general issues with possible hyperparathyroidism (HPT) diagnosis  Discussed lab tests used to assess patient parathyroid gland function    Recommend:  Since the new Omnipod 5 and DexcomG6 option very expensive for  "patient, I would not pursue this plan  Continue the current Omnipod Dash insulin pump management  Pump setting changes:   Bolus ICR [place] 8 to 7    ISF 28 to 22     BG Correction Threshold 140 to 130 mg/dl    Discussed advantages of premeal bolusing, entering at least 50% of meal/snack carb estimate premeal  Would not use a GLP1RA medication with her history of pancreatitis  Future treatment options include adding a SGLT2-I such as Jardiance  Continue use of the Freestyle Libre3 CGM sensor, since inexpensive/free   Consider future upgrade to Omnipod 5 when it is compatatable with Freddie CGM  Arrange a follow-up appointment with [facility]   Updated Referral placed  Repeat non-fasting labs soon   Testing at [facility]   Lab orders available  Needs repeat BP testing with PCP  Arrange annual dilated eye exam, fasting lipid panel testing  Discussed importance of regular endocrinology evaluations every 3-4 months.    Addressed patient's questions today.    The longitudinal plan of care for the endocrine problem(s) were addressed during this visit.  Due to added complexity of care,   we will continue to support the patient and the subsequent management of this condition with ongoing continuity of care.    There are no Patient Instructions on file for this visit.    Future labs ordered today: No orders of the defined types were placed in this encounter.    Radiology/Consults ordered today: None    Total time spent on day of encounter:  ***    Follow-up:  5/9/24 at 12 noon, Return    JEAN PIERRE Hightower MD, MS  Endocrinology  [facility]    CC: АННА Mclaughlin and Care Team                          Recent issues:  Diabetes follow-up evaluation, with  [facility]  Has used the Omnipod Dash, planned to change to Omnipod 5 kit & pods, also the DexcomG6    Omnipod 5 expenses much higher however... decided to stay on Omnipod Dash with Freddie  Stressors with anxiety and PTSD, but now sees \"trauma specialist\" counselor which is " helpful  Cataract surgeries planned in next 3 weeks, per Dr. Marisol Rea/[facility] soon         Diabetes:  History of GDM with 2 pregnancies, diet management  1996. Diagnosis of diabetes mellitus  Recalls starting a diabetes pill  Had taken metformin, also Januvia but developed pancreatitis    Has seen endocrinology providers CAMILA Da Silva M. Schultz, CAMILA Rich, MARAH York, MARAH Childress, KRISTA Mejia  Tried several diabetes medications previously, records indicate side effects or medication issues:  Humalog- apparent concerns with the pancreatitis    Metformin--Abdominal pain.  Glipizide- Allergic reaction- (abdominal pain and swelling),   Byetta and Onglyza-- pancreatitis.  Invokana -- had upper GI distress.    ~2006. Started treatment with insulin medication  Endocrinology evaluations with Emily Phan CNP/[facility]  ~2017. Started Omnipod insulin pump use.  Subsequently saw Rosie Schwab, and CARLO Salgado for endocrinology evaluations.  Using Freestyle Freddie CGM    Previous  hgbA1c trends include:  2/21/06 C-Peptide 2.5  3/4/09 WILLIAMS-65 Ab <1.0     Lab Test 09/01/22  1017 05/20/22  0841 11/23/21  0859 09/18/20  0852 02/27/20  0954   A1C 7.8* 8.7* 8.2* 7.4* 8.0*         10/6/22. Initial diabetes evaluation with me at [place]  Reviewed health history and diabetes issues  Using Omnipod Dash insulin pump:   Novolog pump    Uses ICR method for bolusing  Blood glucose (BG) meter  Uses Freestyle Freddie CGM with smartphone   Scans 5-8x/day    Typically boluses postmeal, not premeal    Current Omnipod Dash settings:       Recent Omnipod pump data:  Information not available    ~3/2023. Changed from Freestyle Libre2 to the Libre3 CGM  Current Libre3 CGM data            Previous  hgbA1c trends include:  Lab Results   Component Value Date    A1C 7.6 (H) 11/21/2023    A1C 8.0 (H) 07/05/2023    A1C 8.6 (H) 03/20/2023    A1C 7.8 (H) 09/01/2022    A1C 8.7 (H) 05/20/2022      Recent   labs include:  Lab Results   Component Value Date    A1C 7.6 (H) 11/21/2023     11/21/2023    POTASSIUM 4.0 11/21/2023    CHLORIDE 105 11/21/2023    CO2 27 11/21/2023    ANIONGAP 9 11/21/2023     (H) 11/21/2023    BUN 12.5 11/21/2023    CR 0.85 11/21/2023    GFRESTIMATED 78 11/21/2023    GFRESTBLACK >90 01/20/2021    KARIN 9.4 11/21/2023    CHOL 210 (H) 07/05/2023    TRIG 102 07/05/2023    HDL 57 07/05/2023     (H) 07/05/2023    NHDL 153 (H) 07/05/2023    UCRR 55.7 07/05/2023    MICROL <12.0 07/05/2023    UMALCR  07/05/2023      Comment:      Unable to calculate, urine albumin and/or urine creatinine is outside detectable limits.  Microalbuminuria is defined as an albumin:creatinine ratio of 17 to 299 for males and 25 to 299 for females. A ratio of albumin:creatinine of 300 or higher is indicative of overt proteinuria.  Due to biologic variability, positive results should be confirmed by a second, first-morning random or 24-hour timed urine specimen. If there is discrepancy, a third specimen is recommended. When 2 out of 3 results are in the microalbuminuria range, this is evidence for incipient nephropathy and warrants increased efforts at glucose control, blood pressure control, and institution of therapy with an angiotensin-converting-enzyme (ACE) inhibitor (if the patient can tolerate it).      TSH 0.73 11/21/2023    T4 0.87 07/22/2019     DM Complications:   Nephropathy:    Microalbuminuria      Parathyroid:  [facility]  Spring '23, Patient reports discussion of symptoms with neurologist including anterior throat discomfort, difficulty swallowing, cough  Lab test reportedly showed elevated PTH 82  General Surgery consultation appointment with Dr. Radha Conte/[facility] [place]   Appointment subsequently cancelled  Patient had repeat PTH testing at [facility], result reportedly normal    Additional history:  Previous  fractures: Wrist, fingers  Kidney stones: None  Vitamin D def:  unknown  Last DEXA scan: ~  Supplements:  Calcium- none, vit D- none  Previous FV labs include:     Latest Reference Range & Units 02/03/15 11:05 23 12:32   Vitamin D Deficiency screening 20 - 75 ug/L 34 22      Latest Reference Range & Units 23 12:32   Parathyroid Hormone Intact 15 - 65 pg/mL 41         Lives in [place], [place]  Sees Dr. Madyson Mclaughlin/[facility] for general medicine evaluations.  Also sees Dr Joanne Mtz/[facility]    PMH/PSH:  Past Medical History:   Diagnosis Date     Arthritis      Ascending aorta enlargement (H24)      Depressive disorder     severe, prior hospitalization     Diverticulosis of colon (without mention of hemorrhage)      Fibromyalgia 2007     Insomnia      Irritable bowel syndrome      Osteopenia      Type 2 diabetes mellitus with complications (H)     microalbuminuria     Past Surgical History:   Procedure Laterality Date     ABDOMEN SURGERY      Gallbladder removal     BACK SURGERY      fusion  and removal of hardware      C SPINAL ORTHOSIS,NOS      lumbar surgery     CHOLECYSTECTOMY  2011     choley  2011     ENT SURGERY      septoplasty     HYSTERECTOMY, CERVIX STATUS UNKNOWN      TVH/BSO     ORTHOPEDIC SURGERY  2005    left ankle       Family Hx:  Family History   Problem Relation Age of Onset     Diabetes Mother         type 2     Hypertension Mother      Cerebrovascular Disease Mother          stroke age 57     Ovarian Cancer Mother 43     Cancer Mother         cervix     Diabetes Father         type 2     Hypertension Father      Gastrointestinal Disease Father         colon polyps     Sleep Apnea Brother      Cancer Sister      Ovarian Cancer Sister 46     Breast Cancer Sister         Stage 4 breast cancer.     Ovarian Cancer Maternal Grandmother 72     Cancer Maternal Grandmother         cervix         Social Hx:  Social History      Socioeconomic History     Marital status:      Spouse name: Not on file     Number of children: 3     Years of education: Not on file     Highest education level: Not on file   Occupational History     Occupation: xr technician     Employer: [employer]   Tobacco Use     Smoking status: Never     Smokeless tobacco: Never   Vaping Use     Vaping Use: Former   Substance and Sexual Activity     Alcohol use: Not Currently     Comment: maybe once a month     Drug use: No     Sexual activity: Yes     Partners: Male     Birth control/protection: Surgical     Comment: Full hystroectomy in 2000   Other Topics Concern     Parent/sibling w/ CABG, MI or angioplasty before 65F 55M? Not Asked   Social History Narrative     Not on file     Social Determinants of Health     Financial Resource Strain: Low Risk  (1/28/2024)    Financial Resource Strain      Within the past 12 months, have you or your family members you live with been unable to get utilities (heat, electricity) when it was really needed?: No   Food Insecurity: Low Risk  (1/28/2024)    Food Insecurity      Within the past 12 months, did you worry that your food would run out before you got money to buy more?: No      Within the past 12 months, did the food you bought just not last and you didn t have money to get more?: No   Transportation Needs: Low Risk  (1/28/2024)    Transportation Needs      Within the past 12 months, has lack of transportation kept you from medical appointments, getting your medicines, non-medical meetings or appointments, work, or from getting things that you need?: No   Physical Activity: Not on file   Stress: Not on file   Social Connections: Not on file   Interpersonal Safety: Not on file   Housing Stability: Low Risk  (1/28/2024)    Housing Stability      Do you have housing? : Yes      Are you worried about losing your housing?: No          MEDICATIONS:  has a current medication list which includes the following  "prescription(s): freestyle ashleigh 3 sensor, HEMP OIL OR EXTRACT OR OTHER CBD CANNABINOID, NOT MEDICAL CANNABIS,, insulin aspart, omnipod dash pods (gen 4), magnesium, medical cannabis, benzonatate, blood glucose monitoring, freestyle ashleigh 14 day reader, epinephrine, insulin pump, insulin syringe-needle u-100, melatonin, ondansetron, polyethylene glycol, and statin not prescribed.    ROS:     ROS: 10 point ROS neg other than the symptoms noted above in the HPI.    GENERAL: some fatigue, wt stable; denies fevers, chills, night sweats.   HEENT: some head congestion; no dysphagia, odonophagia, diplopia, neck pain  THYROID:  no apparent hyper or hypothyroid symptoms  CV: no chest pain, pressure, palpitations  LUNGS: no SOB, VASQUEZ, cough, wheezing   ABDOMEN: some indigestion; no diarrhea, constipation, abdominal pain  EXTREMITIES: no rashes, ulcers, edema  NEUROLOGY: no headaches, denies changes in vision, tingling, extremitiy numbness   MSK: no muscle aches or pains, weakness  SKIN: no rashes or lesions  : no menses  PSYCH:  PTSD issues though recent stable mood  ENDOCRINE: no heat or cold intolerance      Physical Exam   VS: BP (!) 167/80   Pulse 63   Ht 1.626 m (5' 4\")   Wt 82.5 kg (181 lb 12.8 oz)   LMP 01/01/1999   BMI 31.21 kg/m    GENERAL: AXOX3, NAD, well dressed, answering questions appropriately, appears stated age.  ENT: no nose swelling or nasal discharge, mouth redness or gum changes.  EYES: eyes grossly normal to inspection, conjunctivae and sclerae normal, no exophthalmos or proptosis  THYROID:  no apparent nodules or goiter  LUNGS: no audible wheeze, cough or visible cyanosis, or increased work of breathing  ABDOMEN: abdomen size  EXTREMITIES: no edema noted  NEUROLOGY: CN grossly intact, no tremors  MSK: grossly intact  SKIN:  no apparent skin lesions, rash, or edema with visualized skin appearance  PSYCH: mentation appears normal, affect normal/bright, judgement and insight intact,   normal speech " and appearance well groomed    LABS:    All pertinent notes, labs, and images personally reviewed by me.     A/P:  Encounter Diagnoses   Name Primary?     Type 2 diabetes mellitus with microalbuminuria, with long-term current use of insulin (H) Yes     Insulin pump status        Comments:  Reviewed health history and diabetes issues.  Details of her previous medication intolerances or allergies unclear, higher cost of insulin also unclear  Recent glycemic control improved  Reviewed and interpreted tests that I previously ordered.   Ordered appropriate tests for the endocrinology disease management.    Management options discussed and implemented after shared medical decision making with the patient.  T2DM problem is chronic-stable    Plan:  Reviewed the overall T2DM management and insulin pump use.  Discussed optimal BG testing to assess glucose trends.  We reviewed insulin pump settings, basal rate and bolus dosing  Use of automated pump bolus dosing for meal/snack carb & correction dosing  Reviewed use of the Omnipod Dash insulin pump reports  Reviewed the recent Freestyle Libre3 CGM glucose sensor data, in detail    Reviewed general issues with possible hyperparathyroidism (HPT) diagnosis  Discussed lab tests used to assess patient parathyroid gland function    Recommend:  Since the new Omnipod 5 and DexcomG6 option very expensive for patient, I would not pursue this plan  Continue the current Omnipod Dash insulin pump management  Pump setting changes:   Bolus ICR MN 8 to 7, and ISF 28 to 22    BG Correction Threshold 140 to 130 mg/dl    Discussed advantages of premeal bolusing, entering at least 50% of meal/snack carb estimate premeal  Would not use a GLP1RA medication with her history of pancreatitis  Future treatment options include adding a SGLT2-I such as Jardiance  Continue use of the Freestyle Libre3 CGM sensor, since inexpensive/free   Consider future upgrade to Omnipod 5 when it is compatatable with  Freddie REYES  Consider a follow-up appointment with [facility]  Repeat non-fasting labs in early 52024   Testing at [facility]   Lab orders available  Arrange annual dilated eye exam, fasting lipid panel testing  Discussed importance of regular endocrinology evaluations every 3-4 months.    Patient to follow up with their Primary Care Provider regarding the elevated blood pressure.  Addressed patient's questions today.    The longitudinal plan of care for the endocrine problem(s) were addressed during this visit.  Due to added complexity of care,   we will continue to support the patient and the subsequent management of this condition with ongoing continuity of care.    There are no Patient Instructions on file for this visit.    Future labs ordered today:   Orders Placed This Encounter   Procedures     GLUCOSE MONITOR, 72 HOUR, PHYS INTERP     Hemoglobin A1c     Basic metabolic panel     Radiology/Consults ordered today: None    Total time spent on day of encounter:  35 min    Follow-up:  5/9/24 at 12 noon, Return    JEAN PIERRE Hightower MD, MS  Endocrinology  [facility]    CC: АННА Mclaughlin and Care Team                          Again, thank you for allowing me to participate in the care of your patient.        Sincerely,        Chaitanya Hightower MD

## 2024-02-13 NOTE — PROGRESS NOTES
ASSESSMENT & PLAN    Maricarmen was seen today for pain and pain.    Diagnoses and all orders for this visit:    Greater trochanteric pain syndrome of left lower extremity    It band syndrome, left    Primary osteoarthritis of left hip    Chronic kidney disease, stage 3a (H)    Type 2 diabetes, HbA1c goal < 7% (H)      This issue is chronic and Unchanged.  Maricarmen presents to clinic today to discuss her chronic left hip pain.  Her previous radiographs were reviewed and show overall mild osteoarthritis of the hip.  On examination, the patient has tenderness to palpation over the greater trochanter and pain and weakness with resisted abduction and extension, making gluteal tendinopathy and greater trochanteric pain syndrome likely main  of her symptoms.  She also has some pain down the IT band with tenderness to palpation, so she likely has some contribution from IT band syndrome as well.  Reassuringly, FADIR testing did not seem to bother the patient, and I suspect she has minimal contribution from her overall mild arthritis.  We discussed the above findings and the possible treatment options including pain control with Tylenol, physical therapy, corticosteroid injections, procedural intervention such as needle tenotomy, and referral to the orthopedic surgery team.  Because of the patient's chronic kidney disease, anti-inflammatory options like NSAIDs are not appropriate for her as a method of pain control.  We did discuss the utility of corticosteroid injections for both pain relief and to allow her to better participate physical therapy, however the patient's most recent A1c is 7.6% she reports a strong hypoglycemic reaction to steroid injections, so this is not a safe option for her at this time.  We did discuss that this would be a good option for her in the future once she gets her A1c under better control.  In terms of procedural intervention like a needle tenotomy, the patient would also benefit from better A1c  "control as this would allow her to have a more robust healing response to the procedure.  We determined the following plan:  -Patient will continue with physical therapy as directed by her therapist  -She can continue to use Tylenol, ice, heat for pain relief  -She can return to our clinic when her A1c is under better control and when she is ready to pursue a corticosteroid injection.  At that time we can proceed with a CSI to the left greater trochanteric bursa.        Reggie Ramos DO  [facility] [place]    -----  Chief Complaint   Patient presents with    Left Hip - Pain    Right Hip - Pain       SUBJECTIVE  Maricarmen Dotson is a/an 63 year old female who is seen in consultation at the request of  Madyson Mclaughlin M.D. for evaluation of left hip pain.     The patient is seen with their .  The patient is Right handed    Onset: 2 years(s) ago. Reports insidious onset without acute precipitating event. Pt reports falling occasionally.  Location of Pain: left lateral hip, radiates down leg and into groin area  Worsened by: standing, walking, general use - worse throughout the day  Better with: All treatment has helped temporarily  Treatments tried: rest/activity avoidance, physical therapy (4 visits), epsom salt bath, salon pas, ibuprofen, Tylenol, ice  Associated symptoms: numbness and tingling down lateral leg / IT band, throbbing dull \"tooth ache\", nerve pain, inflammation/swelling    Orthopedic/Surgical history: YES - S1-L5 fusion (1994) / hardware removal (2000), tendon reattachment on L foot (2004), gallbladder removal (2011)  Social History/Occupation: retired      REVIEW OF SYSTEMS:  Review of systems negative unless mentioned in HPI     OBJECTIVE:  BP (!) 168/90   Pulse 61   Ht 1.626 m (5' 4\")   Wt 82.1 kg (181 lb)   LMP 01/01/1999   BMI 31.07 kg/m     General: healthy, alert and in no distress  Skin: no suspicious lesions or rash.  CV: distal perfusion intact "   Resp: normal respiratory effort without conversational dyspnea   Psych: normal mood and affect  Gait: NORMAL  Neuro: Normal light sensory exam of LL extremity     Hip Exam:    Musculoskeletal Exam   Gait Normal     Left Right   Inspection Grossly Normal  Grossly Normal    Palpation     Tenderness over  Greater trochanter  None   Range of Motion     Flexion - Supine  Full to about 90  Full to about 90   ER at 90 of flexion 55 55   IR at 90 of flexion 40 40   Strength 5/5 Flexion  4/5 Abduction in Neutral, painful   3/5 Abduction in Extension, painful     Grossly Nml otherwise  5/5 Flexion  5/5 Abduction in Neutral  5/5 Abduction in Extension    Grossly Nml otherwise    Pain Provocation Tests     FADIR NEG NEG   SHANAE NEG  NEG    Instability/log roll NEG  NEG    Trochanteric Pain Sign pos NEG    Straight leg raise (passive) NEG  NEG    Posterior/Ischiofemoral Impingement Provocation NEG  NEG    Neurologic Intact sensation          RADIOLOGY:  Final results and radiologist's interpretation, available in the [facility] health record.  Images were reviewed with the patient in the office today.  My personal interpretation of the performed imaging: Radiographs from 7/11/2023 were reviewed and show overall mild joint space narrowing of the hip.  There is severe degenerative changes of the lumbar spine seen.

## 2024-02-13 NOTE — LETTER
2/13/2024         RE: Maricarmen Dotson  [address]  [place] [address]        Dear Colleague,    Thank you for referring your patient, Maricarmen Dotson, to the [facility] [place]. Please see a copy of my visit note below.    ASSESSMENT & PLAN    Maricarmen was seen today for pain and pain.    Diagnoses and all orders for this visit:    Greater trochanteric pain syndrome of left lower extremity    It band syndrome, left    Primary osteoarthritis of left hip    Chronic kidney disease, stage 3a (H)    Type 2 diabetes, HbA1c goal < 7% (H)      This issue is chronic and Unchanged.  Maricarmen presents to clinic today to discuss her chronic left hip pain.  Her previous radiographs were reviewed and show overall mild osteoarthritis of the hip.  On examination, the patient has tenderness to palpation over the greater trochanter and pain and weakness with resisted abduction and extension, making gluteal tendinopathy and greater trochanteric pain syndrome likely main  of her symptoms.  She also has some pain down the IT band with tenderness to palpation, so she likely has some contribution from IT band syndrome as well.  Reassuringly, FADIR testing did not seem to bother the patient, and I suspect she has minimal contribution from her overall mild arthritis.  We discussed the above findings and the possible treatment options including pain control with Tylenol, physical therapy, corticosteroid injections, procedural intervention such as needle tenotomy, and referral to the orthopedic surgery team.  Because of the patient's chronic kidney disease, anti-inflammatory options like NSAIDs are not appropriate for her as a method of pain control.  We did discuss the utility of corticosteroid injections for both pain relief and to allow her to better participate physical therapy, however the patient's most recent A1c is 7.6% she reports a strong hypoglycemic reaction to steroid injections, so this  "is not a safe option for her at this time.  We did discuss that this would be a good option for her in the future once she gets her A1c under better control.  In terms of procedural intervention like a needle tenotomy, the patient would also benefit from better A1c control as this would allow her to have a more robust healing response to the procedure.  We determined the following plan:  -Patient will continue with physical therapy as directed by her therapist  -She can continue to use Tylenol, ice, heat for pain relief  -She can return to our clinic when her A1c is under better control and when she is ready to pursue a corticosteroid injection.  At that time we can proceed with a CSI to the left greater trochanteric bursa.        Reggie Ramos [facility] [place]    -----  Chief Complaint   Patient presents with     Left Hip - Pain     Right Hip - Pain       SUBJECTIVE  Maricarmen Dotson is a/an 63 year old female who is seen in consultation at the request of  Madyson Mclaughlin M.D. for evaluation of left hip pain.     The patient is seen with their .  The patient is Right handed    Onset: 2 years(s) ago. Reports insidious onset without acute precipitating event. Pt reports falling occasionally.  Location of Pain: left lateral hip, radiates down leg and into groin area  Worsened by: standing, walking, general use - worse throughout the day  Better with: All treatment has helped temporarily  Treatments tried: rest/activity avoidance, physical therapy (4 visits), epsom salt bath, salon pas, ibuprofen, Tylenol, ice  Associated symptoms: numbness and tingling down lateral leg / IT band, throbbing dull \"tooth ache\", nerve pain, inflammation/swelling    Orthopedic/Surgical history: YES - S1-L5 fusion (1994) / hardware removal (2000), tendon reattachment on L foot (2004), gallbladder removal (2011)  Social History/Occupation: retired      REVIEW OF SYSTEMS:  Review of systems " "negative unless mentioned in HPI     OBJECTIVE:  BP (!) 168/90   Pulse 61   Ht 1.626 m (5' 4\")   Wt 82.1 kg (181 lb)   LMP 01/01/1999   BMI 31.07 kg/m     General: healthy, alert and in no distress  Skin: no suspicious lesions or rash.  CV: distal perfusion intact   Resp: normal respiratory effort without conversational dyspnea   Psych: normal mood and affect  Gait: NORMAL  Neuro: Normal light sensory exam of LL extremity     Hip Exam:    Musculoskeletal Exam   Gait Normal     Left Right   Inspection Grossly Normal  Grossly Normal    Palpation     Tenderness over  Greater trochanter  None   Range of Motion     Flexion - Supine  Full to about 90  Full to about 90   ER at 90 of flexion 55 55   IR at 90 of flexion 40 40   Strength 5/5 Flexion  4/5 Abduction in Neutral, painful   3/5 Abduction in Extension, painful     Grossly Nml otherwise  5/5 Flexion  5/5 Abduction in Neutral  5/5 Abduction in Extension    Grossly Nml otherwise    Pain Provocation Tests     FADIR NEG NEG   SHANAE NEG  NEG    Instability/log roll NEG  NEG    Trochanteric Pain Sign pos NEG    Straight leg raise (passive) NEG  NEG    Posterior/Ischiofemoral Impingement Provocation NEG  NEG    Neurologic Intact sensation          RADIOLOGY:  Final results and radiologist's interpretation, available in the [facility] health record.  Images were reviewed with the patient in the office today.  My personal interpretation of the performed imaging: Radiographs from 7/11/2023 were reviewed and show overall mild joint space narrowing of the hip.  There is severe degenerative changes of the lumbar spine seen.      Again, thank you for allowing me to participate in the care of your patient.        Sincerely,        Reggie Ramos, DO  "

## 2024-02-13 NOTE — PROGRESS NOTES
"  Recent issues:  Diabetes follow-up evaluation, with  [facility]  Has used the Omnipod Dash, planned to change to Omnipod 5 kit & pods, also the DexcomG6    Omnipod 5 expenses much higher however... decided to stay on Omnipod Dash with Freddie  Stressors with anxiety and PTSD, but now sees \"trauma specialist\" counselor which is helpful  Cataract surgeries planned in next 3 weeks, per Dr. Marisol Rea/[facility] soon         Diabetes:  History of GDM with 2 pregnancies, diet management  1996. Diagnosis of diabetes mellitus  Recalls starting a diabetes pill  Had taken metformin, also Januvia but developed pancreatitis    Has seen endocrinology providers CAMILA Da Silva M. Schultz, CAMILA Rich, MARAH York, MARAH Childress, KRISTA Mejia  Tried several diabetes medications previously, records indicate side effects or medication issues:  Humalog- apparent concerns with the pancreatitis    Metformin--Abdominal pain.  Glipizide- Allergic reaction- (abdominal pain and swelling),   Byetta and Onglyza-- pancreatitis.  Invokana -- had upper GI distress.    ~2006. Started treatment with insulin medication  Endocrinology evaluations with Emily Phan CNP/[facility]  ~2017. Started Omnipod insulin pump use.  Subsequently saw Rosie Schwab, and CARLO Salgado for endocrinology evaluations.  Using Freestyle Freddie CGM    Previous  hgbA1c trends include:  2/21/06 C-Peptide 2.5  3/4/09 WILLIAMS-65 Ab <1.0     Lab Test 09/01/22  1017 05/20/22  0841 11/23/21  0859 09/18/20  0852 02/27/20  0954   A1C 7.8* 8.7* 8.2* 7.4* 8.0*         10/6/22. Initial diabetes evaluation with me at [place]  Reviewed health history and diabetes issues  Using Omnipod Dash insulin pump:   Novolog pump    Uses ICR method for bolusing  Blood glucose (BG) meter  Uses Freestyle Freddie CGM with smartphone   Scans 5-8x/day    Typically boluses postmeal, not premeal    Current Omnipod Dash settings:       Recent Omnipod pump data:  " Information not available    ~3/2023. Changed from Freestyle Libre2 to the Libre3 CGM  Current Libre3 CGM data            Previous FV hgbA1c trends include:  Lab Results   Component Value Date    A1C 7.6 (H) 11/21/2023    A1C 8.0 (H) 07/05/2023    A1C 8.6 (H) 03/20/2023    A1C 7.8 (H) 09/01/2022    A1C 8.7 (H) 05/20/2022      Recent [facility] labs include:  Lab Results   Component Value Date    A1C 7.6 (H) 11/21/2023     11/21/2023    POTASSIUM 4.0 11/21/2023    CHLORIDE 105 11/21/2023    CO2 27 11/21/2023    ANIONGAP 9 11/21/2023     (H) 11/21/2023    BUN 12.5 11/21/2023    CR 0.85 11/21/2023    GFRESTIMATED 78 11/21/2023    GFRESTBLACK >90 01/20/2021    KARIN 9.4 11/21/2023    CHOL 210 (H) 07/05/2023    TRIG 102 07/05/2023    HDL 57 07/05/2023     (H) 07/05/2023    NHDL 153 (H) 07/05/2023    UCRR 55.7 07/05/2023    MICROL <12.0 07/05/2023    UMALCR  07/05/2023      Comment:      Unable to calculate, urine albumin and/or urine creatinine is outside detectable limits.  Microalbuminuria is defined as an albumin:creatinine ratio of 17 to 299 for males and 25 to 299 for females. A ratio of albumin:creatinine of 300 or higher is indicative of overt proteinuria.  Due to biologic variability, positive results should be confirmed by a second, first-morning random or 24-hour timed urine specimen. If there is discrepancy, a third specimen is recommended. When 2 out of 3 results are in the microalbuminuria range, this is evidence for incipient nephropathy and warrants increased efforts at glucose control, blood pressure control, and institution of therapy with an angiotensin-converting-enzyme (ACE) inhibitor (if the patient can tolerate it).      TSH 0.73 11/21/2023    T4 0.87 07/22/2019     DM Complications:   Nephropathy:    Microalbuminuria      Parathyroid:  Patient has seen Dr. Joanne Mtz/[facility] for neurology evaluations  Spring '23, Patient reports discussion of symptoms with neurologist including  anterior throat discomfort, difficulty swallowing, cough  Lab test reportedly showed elevated PTH 82  General Surgery consultation appointment with Dr. Radha Conte/[facility] [place]   Appointment subsequently cancelled  Patient had repeat PTH testing at [facility], result reportedly normal    Additional history:  Previous fractures: Wrist, fingers  Kidney stones: None  Vitamin D def:  unknown  Last DEXA scan: ~  Supplements:  Calcium- none, vit D- none  Previous [facility] labs include:     Latest Reference Range & Units 02/03/15 11:05 23 12:32   Vitamin D Deficiency screening 20 - 75 ug/L 34 22      Latest Reference Range & Units 23 12:32   Parathyroid Hormone Intact 15 - 65 pg/mL 41         Lives in [place], [place]  Sees Dr. Madyson Mclaughlin/[facility] for general medicine evaluations.  [facility]    PMH/PSH:  Past Medical History:   Diagnosis Date    Arthritis     Ascending aorta enlargement (H24)     Depressive disorder     severe, prior hospitalization    Diverticulosis of colon (without mention of hemorrhage)     Fibromyalgia 2007    Insomnia     Irritable bowel syndrome     Osteopenia     Type 2 diabetes mellitus with complications (H)     microalbuminuria     Past Surgical History:   Procedure Laterality Date    ABDOMEN SURGERY      Gallbladder removal    BACK SURGERY      fusion  and removal of hardware     C SPINAL ORTHOSIS,NOS      lumbar surgery    CHOLECYSTECTOMY  2011    choley  2011    ENT SURGERY  1986    septoplasty    HYSTERECTOMY, CERVIX STATUS UNKNOWN      TVH/BSO    ORTHOPEDIC SURGERY      left ankle       Family Hx:  Family History   Problem Relation Age of Onset    Diabetes Mother         type 2    Hypertension Mother     Cerebrovascular Disease Mother          stroke age 57    Ovarian Cancer Mother 43    Cancer Mother         cervix    Diabetes Father         type 2    Hypertension Father      Gastrointestinal Disease Father         colon polyps    Sleep Apnea Brother     Cancer Sister     Ovarian Cancer Sister 46    Breast Cancer Sister         Stage 4 breast cancer.    Ovarian Cancer Maternal Grandmother 72    Cancer Maternal Grandmother         cervix         Social Hx:  Social History     Socioeconomic History    Marital status:      Spouse name: Not on file    Number of children: 3    Years of education: Not on file    Highest education level: Not on file   Occupational History    Occupation: xr technician     Employer: [employer]   Tobacco Use    Smoking status: Never    Smokeless tobacco: Never   Vaping Use    Vaping Use: Former   Substance and Sexual Activity    Alcohol use: Not Currently     Comment: maybe once a month    Drug use: No    Sexual activity: Yes     Partners: Male     Birth control/protection: Surgical     Comment: Full hystroectomy in 2000   Other Topics Concern    Parent/sibling w/ CABG, MI or angioplasty before 65F 55M? Not Asked   Social History Narrative    Not on file     Social Determinants of Health     Financial Resource Strain: Low Risk  (1/28/2024)    Financial Resource Strain     Within the past 12 months, have you or your family members you live with been unable to get utilities (heat, electricity) when it was really needed?: No   Food Insecurity: Low Risk  (1/28/2024)    Food Insecurity     Within the past 12 months, did you worry that your food would run out before you got money to buy more?: No     Within the past 12 months, did the food you bought just not last and you didn t have money to get more?: No   Transportation Needs: Low Risk  (1/28/2024)    Transportation Needs     Within the past 12 months, has lack of transportation kept you from medical appointments, getting your medicines, non-medical meetings or appointments, work, or from getting things that you need?: No   Physical Activity: Not on file   Stress: Not on file   Social  "Connections: Not on file   Interpersonal Safety: Not on file   Housing Stability: Low Risk  (1/28/2024)    Housing Stability     Do you have housing? : Yes     Are you worried about losing your housing?: No          MEDICATIONS:  has a current medication list which includes the following prescription(s): freestyle ashleigh 3 sensor, HEMP OIL OR EXTRACT OR OTHER CBD CANNABINOID, NOT MEDICAL CANNABIS,, insulin aspart, omnipod dash pods (gen 4), magnesium, medical cannabis, benzonatate, blood glucose monitoring, freestyle ashleigh 14 day reader, epinephrine, insulin pump, insulin syringe-needle u-100, melatonin, ondansetron, polyethylene glycol, and statin not prescribed.    ROS:     ROS: 10 point ROS neg other than the symptoms noted above in the HPI.    GENERAL: some fatigue, wt stable; denies fevers, chills, night sweats.   HEENT: some head congestion; no dysphagia, odonophagia, diplopia, neck pain  THYROID:  no apparent hyper or hypothyroid symptoms  CV: no chest pain, pressure, palpitations  LUNGS: no SOB, VASQUEZ, cough, wheezing   ABDOMEN: some indigestion; no diarrhea, constipation, abdominal pain  EXTREMITIES: no rashes, ulcers, edema  NEUROLOGY: no headaches, denies changes in vision, tingling, extremitiy numbness   MSK: no muscle aches or pains, weakness  SKIN: no rashes or lesions  : no menses  PSYCH:  PTSD issues though recent stable mood  ENDOCRINE: no heat or cold intolerance      Physical Exam   VS: BP (!) 167/80   Pulse 63   Ht 1.626 m (5' 4\")   Wt 82.5 kg (181 lb 12.8 oz)   LMP 01/01/1999   BMI 31.21 kg/m    GENERAL: AXOX3, NAD, well dressed, answering questions appropriately, appears stated age.  ENT: no nose swelling or nasal discharge, mouth redness or gum changes.  EYES: eyes grossly normal to inspection, conjunctivae and sclerae normal, no exophthalmos or proptosis  THYROID:  no apparent nodules or goiter  LUNGS: no audible wheeze, cough or visible cyanosis, or increased work of breathing  ABDOMEN: " abdomen size  EXTREMITIES: no edema noted  NEUROLOGY: CN grossly intact, no tremors  MSK: grossly intact  SKIN:  no apparent skin lesions, rash, or edema with visualized skin appearance  PSYCH: mentation appears normal, affect normal/bright, judgement and insight intact,   normal speech and appearance well groomed    LABS:    All pertinent notes, labs, and images personally reviewed by me.     A/P:  Encounter Diagnoses   Name Primary?    Type 2 diabetes mellitus with microalbuminuria, with long-term current use of insulin (H) Yes    Insulin pump status        Comments:  Reviewed health history and diabetes issues.  Details of her previous medication intolerances or allergies unclear, higher cost of insulin also unclear  Recent glycemic control improved  Reviewed and interpreted tests that I previously ordered.   Ordered appropriate tests for the endocrinology disease management.    Management options discussed and implemented after shared medical decision making with the patient.  T2DM problem is chronic-stable    Plan:  Reviewed the overall T2DM management and insulin pump use.  Discussed optimal BG testing to assess glucose trends.  We reviewed insulin pump settings, basal rate and bolus dosing  Use of automated pump bolus dosing for meal/snack carb & correction dosing  Reviewed use of the Omnipod Dash insulin pump reports  Reviewed the recent Freestyle Libre3 CGM glucose sensor data, in detail    Reviewed general issues with possible hyperparathyroidism (HPT) diagnosis  Discussed lab tests used to assess patient parathyroid gland function    Recommend:  Since the new Omnipod 5 and DexcomG6 option very expensive for patient, I would not pursue this plan  Continue the current Omnipod Dash insulin pump management  Pump setting changes:   Bolus ICR [place] 8 to 7, and ISF 28 to 22    BG Correction Threshold 140 to 130 mg/dl    Discussed advantages of premeal bolusing, entering at least 50% of meal/snack carb estimate  premeal  Would not use a GLP1RA medication with her history of pancreatitis  Future treatment options include adding a SGLT2-I such as Jardiance  Continue use of the Freestyle Libre3 CGM sensor, since inexpensive/free   Consider future upgrade to Omnipod 5 when it is compatatable with Freddie CGM  Consider a follow-up appointment with [facility]  Repeat non-fasting labs in early 52024   Testing at [facility]   Lab orders available  Arrange annual dilated eye exam, fasting lipid panel testing  Discussed importance of regular endocrinology evaluations every 3-4 months.    Patient to follow up with their Primary Care Provider regarding the elevated blood pressure.  Addressed patient's questions today.    The longitudinal plan of care for the endocrine problem(s) were addressed during this visit.  Due to added complexity of care,   we will continue to support the patient and the subsequent management of this condition with ongoing continuity of care.    There are no Patient Instructions on file for this visit.    Future labs ordered today:   Orders Placed This Encounter   Procedures    GLUCOSE MONITOR, 72 HOUR, PHYS INTERP    Hemoglobin A1c    Basic metabolic panel     Radiology/Consults ordered today: None    Total time spent on day of encounter:  35 min    Follow-up:  5/9/24 at 12 noon, Return    JEAN PIERRE Hightower MD, MS  Endocrinology  [facility]    CC: АННА Mclaughlin and Care Team

## 2024-03-16 ENCOUNTER — HEALTH MAINTENANCE LETTER (OUTPATIENT)
Age: 64
End: 2024-03-16

## 2024-03-21 ENCOUNTER — MYC MEDICAL ADVICE (OUTPATIENT)
Dept: PEDIATRICS | Facility: CLINIC | Age: 64
End: 2024-03-21
Payer: COMMERCIAL

## 2024-03-21 NOTE — TELEPHONE ENCOUNTER
Please see the my-chart message on 2/26/24.  The allergy to eye dilation drops was addressed with another opthalmology office-  Dr. Mclaughlin has called the office and reviewed the significant sensitivities/allergies.  They unfortunately do not have a ton of alternative steroid drops but were going to discuss this with patient when they see her.    Reaction to steroid or dilation eye drops-headaches, eye pain, severe joint and muscle pain, nausea and problems walking.    Patient has an appointment with retinal specialist on 3/26 at 9am.    I called the [facility] at [phone]   LM for the team to call me back to discuss allergy to dilation drops/steroid drops and see if they have alternative eye drops, etc.    I notified the patient.    Waiting call back.      Ela Love RN

## 2024-03-21 NOTE — TELEPHONE ENCOUNTER
COY to Dr. Mclaughlin:    Please see below.    I got a call back from the [facility].    They have noted patient's allergies and sensitivities and the reactions that patient had in the past ( eye pain, severe joint and muscle pain, nausea and problems walking.)    They made a note in her chart not to use dilating drops.  They will discuss the plan with the patient when she comes to the appointment on 3/26.      They will take photos and scans ( they do these anyway with every exam), and may have to do an exam without dilation.    Patient was informed.    Ela Love RN

## 2024-03-26 NOTE — TELEPHONE ENCOUNTER
Called pt at [phone] to get an update. Pt says that during her eye exam, they used drops to dilate her eyes. She didn't want the eye drops because of her hx of SE for eye drops. But she was told that there are no other alternate & they weren't notified of her SE for eye drops. She is very frustrated that the communication from us didn't do much.    She took 4 tabs of benadryl prior to the eye drops, feeling ok.  Has moderate palpitations & tiredness. Denies chest pain/tightness, SOB, dizziness, vision trouble or stroke sx's. BP right after the visit was 198/100, slowly its going down & she is feeling comfortable monitoring at home. Agrees to go to ED with rapid worsening sx's.     PALOMA Rosa  Patient Advocate Liason (PAL)  [facility]  Ph. [phone] / Fax. [phone]

## 2024-04-19 DIAGNOSIS — E11.29 TYPE 2 DIABETES MELLITUS WITH MICROALBUMINURIA, WITH LONG-TERM CURRENT USE OF INSULIN (H): ICD-10-CM

## 2024-04-19 DIAGNOSIS — Z79.4 TYPE 2 DIABETES MELLITUS WITH MICROALBUMINURIA, WITH LONG-TERM CURRENT USE OF INSULIN (H): ICD-10-CM

## 2024-04-19 DIAGNOSIS — R80.9 TYPE 2 DIABETES MELLITUS WITH MICROALBUMINURIA, WITH LONG-TERM CURRENT USE OF INSULIN (H): ICD-10-CM

## 2024-04-19 DIAGNOSIS — Z96.41 INSULIN PUMP STATUS: ICD-10-CM

## 2024-04-22 ENCOUNTER — MYC MEDICAL ADVICE (OUTPATIENT)
Dept: PEDIATRICS | Facility: CLINIC | Age: 64
End: 2024-04-22
Payer: COMMERCIAL

## 2024-04-22 RX ORDER — INSULIN PUMP CONTROLLER
EACH MISCELLANEOUS
Qty: 40 EACH | Refills: 3 | Status: SHIPPED | OUTPATIENT
Start: 2024-04-22

## 2024-04-22 NOTE — TELEPHONE ENCOUNTER
See the  message from pt. LOV was on 1/29/24. Advised an E-visit for consult.     PALOMA Rosa  Patient Advocate Liaison (PAL)  [facility]  Ph. [phone] / Fax. [phone]

## 2024-04-22 NOTE — TELEPHONE ENCOUNTER
Last Written Prescription Date:  4/14/23  Last Fill Quantity: 40,  # refills: 3   Last office visit: 12/11/23   Future Office Visit:  5/9/24    Requested Prescriptions   Pending Prescriptions Disp Refills    Insulin Disposable Pump (OMNIPOD DASH PODS (GEN 4)) MISC [Pharmacy Med Name: OMNIPOD DASH PACK 5'S (GEN 4)]  3     Sig: INJECT 1 POD UNDER THE SKIN CONTINUOUSLY AS NEEDED, CHANGE PODS EVERY 2 TO 3 DAYS AS DIRECTED       There is no refill protocol information for this order        Refills sent  Kylah Foster RN

## 2024-04-24 ENCOUNTER — E-VISIT (OUTPATIENT)
Dept: PEDIATRICS | Facility: CLINIC | Age: 64
End: 2024-04-24
Payer: COMMERCIAL

## 2024-04-24 DIAGNOSIS — G89.4 CHRONIC PAIN SYNDROME: Primary | ICD-10-CM

## 2024-04-24 PROCEDURE — 99207 PR NON-BILLABLE SERV PER CHARTING: CPT | Performed by: PHYSICIAN ASSISTANT

## 2024-04-24 NOTE — TELEPHONE ENCOUNTER
Provider E-Visit time total (minutes): patient needs to be seen in clinic to discuss. Schedule with a PCP.   Michael Adams PA-C

## 2024-04-25 ENCOUNTER — LAB (OUTPATIENT)
Dept: LAB | Facility: CLINIC | Age: 64
End: 2024-04-25
Payer: COMMERCIAL

## 2024-04-25 DIAGNOSIS — Z79.4 TYPE 2 DIABETES MELLITUS WITH MICROALBUMINURIA, WITH LONG-TERM CURRENT USE OF INSULIN (H): ICD-10-CM

## 2024-04-25 DIAGNOSIS — R80.9 TYPE 2 DIABETES MELLITUS WITH MICROALBUMINURIA, WITH LONG-TERM CURRENT USE OF INSULIN (H): ICD-10-CM

## 2024-04-25 DIAGNOSIS — E11.29 TYPE 2 DIABETES MELLITUS WITH MICROALBUMINURIA, WITH LONG-TERM CURRENT USE OF INSULIN (H): ICD-10-CM

## 2024-04-25 DIAGNOSIS — Z96.41 INSULIN PUMP STATUS: ICD-10-CM

## 2024-04-25 LAB — HBA1C MFR BLD: 7.8 % (ref 0–5.6)

## 2024-04-25 PROCEDURE — 80048 BASIC METABOLIC PNL TOTAL CA: CPT

## 2024-04-25 PROCEDURE — 83036 HEMOGLOBIN GLYCOSYLATED A1C: CPT

## 2024-04-25 PROCEDURE — 36415 COLL VENOUS BLD VENIPUNCTURE: CPT

## 2024-04-26 LAB
ANION GAP SERPL CALCULATED.3IONS-SCNC: 11 MMOL/L (ref 7–15)
BUN SERPL-MCNC: 18.9 MG/DL (ref 8–23)
CALCIUM SERPL-MCNC: 9.5 MG/DL (ref 8.8–10.2)
CHLORIDE SERPL-SCNC: 104 MMOL/L (ref 98–107)
CREAT SERPL-MCNC: 0.88 MG/DL (ref 0.51–0.95)
DEPRECATED HCO3 PLAS-SCNC: 27 MMOL/L (ref 22–29)
EGFRCR SERPLBLD CKD-EPI 2021: 74 ML/MIN/1.73M2
GLUCOSE SERPL-MCNC: 165 MG/DL (ref 70–99)
POTASSIUM SERPL-SCNC: 3.8 MMOL/L (ref 3.4–5.3)
SODIUM SERPL-SCNC: 142 MMOL/L (ref 135–145)

## 2024-04-29 ENCOUNTER — OFFICE VISIT (OUTPATIENT)
Dept: PEDIATRICS | Facility: CLINIC | Age: 64
End: 2024-04-29
Payer: COMMERCIAL

## 2024-04-29 VITALS
WEIGHT: 190.1 LBS | RESPIRATION RATE: 20 BRPM | BODY MASS INDEX: 32.46 KG/M2 | DIASTOLIC BLOOD PRESSURE: 90 MMHG | HEART RATE: 56 BPM | OXYGEN SATURATION: 98 % | SYSTOLIC BLOOD PRESSURE: 172 MMHG | TEMPERATURE: 98.3 F | HEIGHT: 64 IN

## 2024-04-29 DIAGNOSIS — G89.4 CHRONIC PAIN SYNDROME: ICD-10-CM

## 2024-04-29 DIAGNOSIS — G47.00 INSOMNIA, UNSPECIFIED TYPE: Primary | ICD-10-CM

## 2024-04-29 DIAGNOSIS — R03.0 ELEVATED BLOOD PRESSURE READING WITHOUT DIAGNOSIS OF HYPERTENSION: ICD-10-CM

## 2024-04-29 PROCEDURE — 99214 OFFICE O/P EST MOD 30 MIN: CPT | Performed by: PHYSICIAN ASSISTANT

## 2024-04-29 RX ORDER — LORAZEPAM 1 MG/1
.5-1 TABLET ORAL
Qty: 15 TABLET | Refills: 0 | Status: SHIPPED | OUTPATIENT
Start: 2024-04-29 | End: 2024-06-05

## 2024-04-29 ASSESSMENT — PATIENT HEALTH QUESTIONNAIRE - PHQ9
SUM OF ALL RESPONSES TO PHQ QUESTIONS 1-9: 18
10. IF YOU CHECKED OFF ANY PROBLEMS, HOW DIFFICULT HAVE THESE PROBLEMS MADE IT FOR YOU TO DO YOUR WORK, TAKE CARE OF THINGS AT HOME, OR GET ALONG WITH OTHER PEOPLE: VERY DIFFICULT
SUM OF ALL RESPONSES TO PHQ QUESTIONS 1-9: 18

## 2024-04-29 ASSESSMENT — PAIN SCALES - GENERAL: PAINLEVEL: SEVERE PAIN (7)

## 2024-04-29 NOTE — PROGRESS NOTES
Assessment & Plan     Insomnia, unspecified type  Decrease melatonin to 10 mg nightly. May add in ativan at bedtime PRN while on trip.     Chronic pain syndrome  - Pain Management  Referral; Future  - LORazepam (ATIVAN) 1 MG tablet; Take 0.5-1 tablets (0.5-1 mg) by mouth nightly as needed for anxiety    Elevated blood pressure reading without diagnosis of hypertension  Asymptomatic.    Mario Alberto Hernadez is a 63 year old, presenting for the following health issues:        4/29/2024    10:38 AM   Additional Questions   Roomed by [facility]   Accompanied by self         4/29/2024    10:38 AM   Patient Reported Additional Medications   Patient reports taking the following new medications none     Via the Health Maintenance questionnaire, the patient has reported the following services have been completed -Eye Exam, this information has been sent to the abstraction team.    History of Present Illness       Reason for visit:  Medicine support    She eats 2-3 servings of fruits and vegetables daily.She consumes 0 sweetened beverage(s) daily.She exercises with enough effort to increase her heart rate 20 to 29 minutes per day.  She exercises with enough effort to increase her heart rate 5 days per week.   She is taking medications regularly.     Patient has a few concerns today.   She is traveling and cannot take her medical marijuana. She would like alternative meds to help her sleep. She takes 60 mg of melatonin at night.   She would like a referral to pain management. Her daughter sees a provider that she likes and patient would like to see her as well.   BPs elevated. Patient has trialed multiple antihypertensives with SE.       Review of Systems  Constitutional, HEENT, cardiovascular, pulmonary, gi and gu systems are negative, except as otherwise noted.      Objective    BP (!) 172/90 (BP Location: Right arm, Patient Position: Sitting, Cuff Size: Adult Large)   Pulse 56   Temp 98.3  F (36.8  C) (Oral)   Resp 20   " Ht 1.626 m (5' 4\")   Wt 86.2 kg (190 lb 1.6 oz)   LMP 01/01/1999   SpO2 98%   BMI 32.63 kg/m    Body mass index is 32.63 kg/m .  Physical Exam   GENERAL: alert and no distress  EYES: Eyes grossly normal to inspection, PERRL and conjunctivae and sclerae normal  Psych:  affect normal/bright        No results found for any visits on 04/29/24.        Signed Electronically by: Michael Adams PA-C    "

## 2024-05-02 ENCOUNTER — MYC MEDICAL ADVICE (OUTPATIENT)
Dept: PEDIATRICS | Facility: CLINIC | Age: 64
End: 2024-05-02
Payer: COMMERCIAL

## 2024-05-02 DIAGNOSIS — G89.4 CHRONIC PAIN SYNDROME: Primary | ICD-10-CM

## 2024-05-03 NOTE — TELEPHONE ENCOUNTER
Sent a [employer] message to the patient   - Informed the patient of Maggie Adams PA-C's comments/recommendations     Kat REECE RN   [facility]

## 2024-05-06 RX ORDER — TRAMADOL HYDROCHLORIDE 50 MG/1
50 TABLET ORAL
Qty: 15 TABLET | Refills: 0 | Status: SHIPPED | OUTPATIENT
Start: 2024-05-06 | End: 2024-05-21

## 2024-05-06 NOTE — TELEPHONE ENCOUNTER
Sent a [employer] message to the patient   - Informed the patient of Maggie Adams PA-C's comments/recommendations   - Informed the patient that Maggie Adams PA-C sent in a prescription for traMADol (ULTRAM) 50 MG [id number] [place], [place] - [address] [facility] today (5/6/2024)   - Informed the patient that this medication is not to be taken with lorazepam     traMADol (ULTRAM) 50 MG tablet 15 tablet 0 5/6/2024 5/21/2024 No   Sig - Route: Take 1 tablet (50 mg) by mouth nightly as needed for severe pain - Oral     Kat REECE RN   [facility]

## 2024-05-09 ENCOUNTER — OFFICE VISIT (OUTPATIENT)
Dept: ENDOCRINOLOGY | Facility: CLINIC | Age: 64
End: 2024-05-09
Payer: COMMERCIAL

## 2024-05-09 VITALS
SYSTOLIC BLOOD PRESSURE: 152 MMHG | WEIGHT: 193.6 LBS | DIASTOLIC BLOOD PRESSURE: 73 MMHG | HEART RATE: 58 BPM | BODY MASS INDEX: 33.23 KG/M2

## 2024-05-09 DIAGNOSIS — E11.29 TYPE 2 DIABETES MELLITUS WITH MICROALBUMINURIA, WITH LONG-TERM CURRENT USE OF INSULIN (H): Primary | ICD-10-CM

## 2024-05-09 DIAGNOSIS — Z96.41 INSULIN PUMP STATUS: ICD-10-CM

## 2024-05-09 DIAGNOSIS — R80.9 TYPE 2 DIABETES MELLITUS WITH MICROALBUMINURIA, WITH LONG-TERM CURRENT USE OF INSULIN (H): Primary | ICD-10-CM

## 2024-05-09 DIAGNOSIS — Z79.4 TYPE 2 DIABETES MELLITUS WITH MICROALBUMINURIA, WITH LONG-TERM CURRENT USE OF INSULIN (H): Primary | ICD-10-CM

## 2024-05-09 PROCEDURE — 99214 OFFICE O/P EST MOD 30 MIN: CPT | Performed by: INTERNAL MEDICINE

## 2024-05-09 PROCEDURE — G2211 COMPLEX E/M VISIT ADD ON: HCPCS | Performed by: INTERNAL MEDICINE

## 2024-05-09 PROCEDURE — 95251 CONT GLUC MNTR ANALYSIS I&R: CPT | Performed by: INTERNAL MEDICINE

## 2024-05-09 NOTE — PROGRESS NOTES
"  Recent issues:  Diabetes follow-up evaluation, with  [facility]  Continues to use Omnipod Dash with Freestyle Freddie CGM  Stressors with anxiety and PTSD, but now sees \"trauma specialist\" counselor which is helpful  Patient concerns today with higher CGM glucose levels, wt gain, night binging, fibromyalgia flare up's         Diabetes:  History of GDM with 2 pregnancies, diet management  1996. Diagnosis of diabetes mellitus  Recalls starting a diabetes pill  Had taken metformin, also Januvia but developed pancreatitis    Has seen endocrinology providers CAMILA Da Silva M. Schultz, CAMILA Rich, MARAH York, MARAH Childress, KRISTA Mejia  Tried several diabetes medications previously, records indicate side effects or medication issues:  Humalog- apparent concerns with the pancreatitis    Metformin--Abdominal pain.  Glipizide- Allergic reaction- (abdominal pain and swelling),   Byetta and Onglyza-- pancreatitis.  Invokana -- had upper GI distress.    ~2006. Started treatment with insulin medication  Endocrinology evaluations with Emily Phan, [facility]  ~2017. Started Omnipod insulin pump use.  Subsequently saw Rosie Schwab, and CARLO Salgado for endocrinology evaluations.  Using Freestyle Freddie CGM    Previous [facility] hgbA1c trends include:  2/21/06 C-Peptide 2.5  3/4/09 WILLIAMS-65 Ab <1.0     Lab Test 09/01/22  1017 05/20/22  0841 11/23/21  0859 09/18/20  0852 02/27/20  0954   A1C 7.8* 8.7* 8.2* 7.4* 8.0*         10/6/22. Initial diabetes evaluation with me at [place]  Reviewed health history and diabetes issues    Has used the Omnipod Dash, planned to change to Omnipod 5 kit & pods, also the DexcomG6    Omnipod 5 expenses much higher however... decided to stay on Omnipod Dash with Freddie  Using Omnipod Dash insulin pump:   Novolog pump    Uses ICR method for bolusing  Blood glucose (BG) meter  Uses Freestyle Freddie CGM with smartphone   Scans 5-8x/day    Typically boluses postmeal in evenings, not " premeal  Current Omnipod Dash settings:       Recent Omnipod pump data:  Info not available    ~3/2023. Changed from Freestyle Libre2 to the Libre3 CGM  Current Libre3 CGM data            Previous [facility] hgbA1c trends include:  Lab Results   Component Value Date    A1C 7.8 (H) 04/25/2024    A1C 7.6 (H) 11/21/2023    A1C 8.0 (H) 07/05/2023    A1C 8.6 (H) 03/20/2023    A1C 7.8 (H) 09/01/2022      Recent [facility] labs include:  Lab Results   Component Value Date    A1C 7.8 (H) 04/25/2024     04/25/2024    POTASSIUM 3.8 04/25/2024    CHLORIDE 104 04/25/2024    CO2 27 04/25/2024    ANIONGAP 11 04/25/2024     (H) 04/25/2024    BUN 18.9 04/25/2024    CR 0.88 04/25/2024    GFRESTIMATED 74 04/25/2024    GFRESTBLACK >90 01/20/2021    KARIN 9.5 04/25/2024    CHOL 210 (H) 07/05/2023    TRIG 102 07/05/2023    HDL 57 07/05/2023     (H) 07/05/2023    NHDL 153 (H) 07/05/2023    UCRR 55.7 07/05/2023    MICROL <12.0 07/05/2023    UMALCR  07/05/2023      Comment:      Unable to calculate, urine albumin and/or urine creatinine is outside detectable limits.  Microalbuminuria is defined as an albumin:creatinine ratio of 17 to 299 for males and 25 to 299 for females. A ratio of albumin:creatinine of 300 or higher is indicative of overt proteinuria.  Due to biologic variability, positive results should be confirmed by a second, first-morning random or 24-hour timed urine specimen. If there is discrepancy, a third specimen is recommended. When 2 out of 3 results are in the microalbuminuria range, this is evidence for incipient nephropathy and warrants increased efforts at glucose control, blood pressure control, and institution of therapy with an angiotensin-converting-enzyme (ACE) inhibitor (if the patient can tolerate it).      TSH 0.73 11/21/2023    T4 0.87 07/22/2019     DM Complications:   Nephropathy:    Microalbuminuria      Parathyroid:  Patient has seen Dr. Joanne Mtz/[facility] for neurology evaluations  Spring '23,  Patient reports discussion of symptoms with neurologist including anterior throat discomfort, difficulty swallowing, cough  Lab test reportedly showed elevated PTH 82  General Surgery consultation appointment with Dr. Radha Conte/[facility]   Appointment subsequently cancelled  Patient had repeat PTH testing at [facility], result reportedly normal    Additional history:  Previous fractures: Wrist, fingers  Kidney stones: None  Vitamin D def:  unknown  Last DEXA scan: ~1995  Supplements:  Calcium- none, vit D- none  Previous [facility] labs include:     Latest Reference Range & Units 02/03/15 11:05 03/29/23 12:32   Vitamin D Deficiency screening 20 - 75 ug/L 34 22      Latest Reference Range & Units 03/29/23 12:32   Parathyroid Hormone Intact 15 - 65 pg/mL 41     Recent [facility] labs include:  Lab Results   Component Value Date     04/25/2024    POTASSIUM 3.8 04/25/2024    CHLORIDE 104 04/25/2024    CO2 27 04/25/2024    ANIONGAP 11 04/25/2024     (H) 04/25/2024    BUN 18.9 04/25/2024    CR 0.88 04/25/2024    GFRESTIMATED 74 04/25/2024    GFRESTBLACK >90 01/20/2021    KARIN 9.5 04/25/2024    TSH 0.73 11/21/2023    VITDT 37 07/05/2023    PTHI 41 03/29/2023         Lives in [place], [place]  Sees Dr. Madyson Mclaughlin/[facility] for general medicine evaluations.  Also sees Dr Joanne Mtz[facility]    PMH/PSH:  Past Medical History:   Diagnosis Date    Arthritis 1996    Ascending aorta enlargement (H24)     Depressive disorder     severe, prior hospitalization    Diverticulosis of colon (without mention of hemorrhage)     Fibromyalgia 06/26/2007    Insomnia     Irritable bowel syndrome     Osteopenia     Type 2 diabetes mellitus with complications (H)     microalbuminuria     Past Surgical History:   Procedure Laterality Date    ABDOMEN SURGERY  2012    Gallbladder removal    BACK SURGERY      fusion 1994 and removal of hardware 2004    C SPINAL ORTHOSIS,NOS  2002    lumbar surgery     CHOLECYSTECTOMY  2011    choley  2011    ENT SURGERY  1986    septoplasty    HYSTERECTOMY, CERVIX STATUS UNKNOWN      TVH/BSO    ORTHOPEDIC SURGERY  2005    left ankle       Family Hx:  Family History   Problem Relation Age of Onset    Diabetes Mother         type 2    Hypertension Mother     Cerebrovascular Disease Mother          stroke age 57    Ovarian Cancer Mother 43    Cancer Mother         cervix    Diabetes Father         type 2    Hypertension Father     Gastrointestinal Disease Father         colon polyps    Sleep Apnea Brother     Cancer Sister     Ovarian Cancer Sister 46    Breast Cancer Sister         Stage 4 breast cancer.    Ovarian Cancer Maternal Grandmother 72    Cancer Maternal Grandmother         cervix         Social Hx:  Social History     Socioeconomic History    Marital status:      Spouse name: Not on file    Number of children: 3    Years of education: Not on file    Highest education level: Not on file   Occupational History    Occupation: xr technician     Employer: [employer]   Tobacco Use    Smoking status: Never     Passive exposure: Never    Smokeless tobacco: Never   Vaping Use    Vaping status: Former   Substance and Sexual Activity    Alcohol use: Not Currently     Comment: maybe once a month    Drug use: No    Sexual activity: Yes     Partners: Male     Birth control/protection: Surgical     Comment: Full hystroectomy in    Other Topics Concern    Parent/sibling w/ CABG, MI or angioplasty before 65F 55M? Not Asked   Social History Narrative    Not on file     Social Determinants of Health     Financial Resource Strain: Low Risk  (2024)    Financial Resource Strain     Within the past 12 months, have you or your family members you live with been unable to get utilities (heat, electricity) when it was really needed?: No   Food Insecurity: Low Risk  (2024)    Food Insecurity     Within the past 12 months, did you worry that your food  would run out before you got money to buy more?: No     Within the past 12 months, did the food you bought just not last and you didn t have money to get more?: No   Transportation Needs: Low Risk  (1/28/2024)    Transportation Needs     Within the past 12 months, has lack of transportation kept you from medical appointments, getting your medicines, non-medical meetings or appointments, work, or from getting things that you need?: No   Physical Activity: Not on file   Stress: Not on file   Social Connections: Not on file   Interpersonal Safety: Low Risk  (4/29/2024)    Interpersonal Safety     Do you feel physically and emotionally safe where you currently live?: Yes     Within the past 12 months, have you been hit, slapped, kicked or otherwise physically hurt by someone?: No     Within the past 12 months, have you been humiliated or emotionally abused in other ways by your partner or ex-partner?: No   Housing Stability: Low Risk  (1/28/2024)    Housing Stability     Do you have housing? : Yes     Are you worried about losing your housing?: No          MEDICATIONS:  has a current medication list which includes the following prescription(s): freestyle ashleigh 3 sensor, insulin aspart, omnipod dash pods (gen 4), magnesium, medical cannabis, melatonin, ondansetron, polyethylene glycol, tramadol, benzonatate, blood glucose monitoring, freestyle ashleigh 14 day reader, epinephrine, HEMP OIL OR EXTRACT OR OTHER CBD CANNABINOID, NOT MEDICAL CANNABIS,, insulin pump, insulin syringe-needle u-100, lorazepam, and statin not prescribed.    ROS:     ROS: 10 point ROS neg other than the symptoms noted above in the HPI.    GENERAL: some fatigue, wt stable; denies fevers, chills, night sweats.   HEENT: some head congestion; no dysphagia, odonophagia, diplopia, neck pain  THYROID:  no apparent hyper or hypothyroid symptoms  CV: no chest pain, pressure, palpitations  LUNGS: no SOB, VASQUEZ, cough, wheezing   ABDOMEN: some indigestion; no  diarrhea, constipation, abdominal pain  EXTREMITIES: no rashes, ulcers, edema  NEUROLOGY: no headaches, denies changes in vision, tingling, extremitiy numbness   MSK: no muscle aches or pains, weakness  SKIN: no rashes or lesions  : no menses  PSYCH:  PTSD issues though recent stable mood  ENDOCRINE: no heat or cold intolerance      Physical Exam   VS: BP (!) 152/73   Pulse 58   Wt 87.8 kg (193 lb 9.6 oz)   LMP 01/01/1999   BMI 33.23 kg/m    GENERAL: AXOX3, NAD, well dressed, answering questions appropriately, appears stated age.  ENT: no nose swelling or nasal discharge, mouth redness or gum changes.  EYES: eyes grossly normal to inspection, conjunctivae and sclerae normal, no exophthalmos or proptosis  THYROID:  no apparent nodules or goiter  LUNGS: no audible wheeze, cough or visible cyanosis, or increased work of breathing  ABDOMEN: abdomen size  EXTREMITIES: no edema noted  NEUROLOGY: CN grossly intact, no tremors  MSK: grossly intact  SKIN:  no apparent skin lesions, rash, or edema with visualized skin appearance  PSYCH: mentation appears normal, affect normal/bright, judgement and insight intact,   normal speech and appearance well groomed    LABS:    All pertinent notes, labs, and images personally reviewed by me.     A/P:  Encounter Diagnoses   Name Primary?    Type 2 diabetes mellitus with microalbuminuria, with long-term current use of insulin (H) Yes    Insulin pump status        Comments:  Reviewed health history and diabetes issues.  Details of her previous medication intolerances or allergies unclear, higher cost of insulin also unclear  Overall glycemic control improved yet still has postprandial hyperglycemia trends  Reviewed and interpreted tests that I previously ordered.   Ordered appropriate tests for the endocrinology disease management.    Management options discussed and implemented after shared medical decision making with the patient.  T2DM problem is chronic-stable    Plan:  Reviewed  the overall T2DM management and insulin pump use.  Discussed optimal BG testing to assess glucose trends.  We reviewed insulin pump settings, basal rate and bolus dosing  Use of automated pump bolus dosing for meal/snack carb & correction dosing  Reviewed use of the Omnipod Dash insulin pump reports  Reviewed the recent Freestyle Libre3 CGM glucose sensor data, in detail    Reviewed general issues with possible hyperparathyroidism (HPT) diagnosis  Discussed lab tests used to assess patient parathyroid gland function    Recommend:  Since the new Omnipod 5 and DexcomG6 option very expensive for patient, I would not pursue this plan  Continue the current Omnipod Dash insulin pump management  Pump setting changes:   Bolus ICR [place] 7 to 6    ISF 24 to 22   Basals 1.3 to 1.2  Delete out other basal profiles  Discussed advantages of premeal bolusing, entering at least 50% of meal/snack carb estimate premeal  Would not use a GLP1RA medication with her history of pancreatitis  Future treatment options include adding a SGLT2-I such as Jardiance  Continue use of the Freestyle Libre3 CGM sensor, since inexpensive/free   Consider future upgrade to Omnipod 5 when it is compatatable with Freddie CGM  Consider a follow-up appointment with [facility]  Arrange annual dilated eye exam, fasting lipid panel testing  Discussed importance of regular endocrinology evaluations every 3-4 months.    Patient to follow up with their Primary Care Provider regarding the elevated blood pressure.  Addressed patient's questions today.    The longitudinal plan of care for the endocrine problem(s) were addressed during this visit.  Due to added complexity of care,   we will continue to support the patient and the subsequent management of this condition with ongoing continuity of care.    There are no Patient Instructions on file for this visit.    Future labs ordered today:   Orders Placed This Encounter   Procedures    GLUCOSE MONITOR, 72 HOUR, PHYS  INTERP     Radiology/Consults ordered today: None    Total time spent on day of encounter:  39 min    Follow-up: 8/29/24 at 11:30 am, Return    JEAN PIERRE Hightower MD, MS  Endocrinology  [facility]    CC: АННА Mclaughlin and Care Team

## 2024-05-09 NOTE — LETTER
"    5/9/2024         RE: Maricarmen Dotson  [address] [place] [address]        Dear Colleague,    Thank you for referring your patient, Maricarmen Dotson, to the [facility] [place]. Please see a copy of my visit note below.      Recent issues:  Diabetes follow-up evaluation, with  [facility]  Continues to use Omnipod Dash with Freestyle Freddie CGM  Stressors with anxiety and PTSD, but now sees \"trauma specialist\" counselor which is helpful  Patient concerns today with higher CGM glucose levels, wt gain, night binging, fibromyalgia flare up's         Diabetes:  History of GDM with 2 pregnancies, diet management  1996. Diagnosis of diabetes mellitus  Recalls starting a diabetes pill  Had taken metformin, also Januvia but developed pancreatitis    Has seen endocrinology providers CAMILA Da Silva, AMINA Angeles, CAMILA Rich, MARAH York, MARAH Childress, KRISTA Mejia  Tried several diabetes medications previously, records indicate side effects or medication issues:  Humalog- apparent concerns with the pancreatitis    Metformin--Abdominal pain.  Glipizide- Allergic reaction- (abdominal pain and swelling),   Byetta and Onglyza-- pancreatitis.  Invokana -- had upper GI distress.    ~2006. Started treatment with insulin medication  Endocrinology evaluations with [facility]an CNP[facility]  ~2017. Started Omnipod insulin pump use.  Subsequently saw Rosie Schwab, and CARLO Salgado for endocrinology evaluations.  Using Freestyle Freddie CGM    Previous [facility] hgbA1c trends include:  2/21/06 C-Peptide 2.5  3/4/09 WILLIAMS-65 Ab <1.0     Lab Test 09/01/22  1017 05/20/22  0841 11/23/21  0859 09/18/20  0852 02/27/20  0954   A1C 7.8* 8.7* 8.2* 7.4* 8.0*         10/6/22. Initial diabetes evaluation with me at [place]  Reviewed health history and diabetes issues    Has used the Omnipod Dash, planned to change to Omnipod 5 kit & pods, also the DexcomG6    Omnipod 5 expenses much higher however... " decided to stay on Omnipod Dash with Freddie  Using Omnipod Dash insulin pump:   Novolog pump    Uses ICR method for bolusing  Blood glucose (BG) meter  Uses Freestyle Freddie CGM with smartphone   Scans 5-8x/day    Typically boluses postmeal in evenings, not premeal  Current Omnipod Dash settings:       Recent Omnipod pump data:  Info not available    ~3/2023. Changed from Freestyle Libre2 to the Libre3 CGM  Current Libre3 CGM data            Previous F[facility] hgbA1c trends include:  Lab Results   Component Value Date    A1C 7.8 (H) 04/25/2024    A1C 7.6 (H) 11/21/2023    A1C 8.0 (H) 07/05/2023    A1C 8.6 (H) 03/20/2023    A1C 7.8 (H) 09/01/2022      Recent [facility] labs include:  Lab Results   Component Value Date    A1C 7.8 (H) 04/25/2024     04/25/2024    POTASSIUM 3.8 04/25/2024    CHLORIDE 104 04/25/2024    CO2 27 04/25/2024    ANIONGAP 11 04/25/2024     (H) 04/25/2024    BUN 18.9 04/25/2024    CR 0.88 04/25/2024    GFRESTIMATED 74 04/25/2024    GFRESTBLACK >90 01/20/2021    KARIN 9.5 04/25/2024    CHOL 210 (H) 07/05/2023    TRIG 102 07/05/2023    HDL 57 07/05/2023     (H) 07/05/2023    NHDL 153 (H) 07/05/2023    UCRR 55.7 07/05/2023    MICROL <12.0 07/05/2023    UMALCR  07/05/2023      Comment:      Unable to calculate, urine albumin and/or urine creatinine is outside detectable limits.  Microalbuminuria is defined as an albumin:creatinine ratio of 17 to 299 for males and 25 to 299 for females. A ratio of albumin:creatinine of 300 or higher is indicative of overt proteinuria.  Due to biologic variability, positive results should be confirmed by a second, first-morning random or 24-hour timed urine specimen. If there is discrepancy, a third specimen is recommended. When 2 out of 3 results are in the microalbuminuria range, this is evidence for incipient nephropathy and warrants increased efforts at glucose control, blood pressure control, and institution of therapy with an angiotensin-converting-enzyme (ACE)  inhibitor (if the patient can tolerate it).      TSH 0.73 11/21/2023    T4 0.87 07/22/2019     DM Complications:   Nephropathy:    Microalbuminuria      Parathyroid:  Patient has seen Dr. Joanne Mtz/[facility] for neurology evaluations  Spring '23, Patient reports discussion of symptoms with neurologist including anterior throat discomfort, difficulty swallowing, cough  Lab test reportedly showed elevated PTH 82  General Surgery consultation appointment with Dr. Radha Conte/[facility]   Appointment subsequently cancelled  Patient had repeat PTH testing at [facility], result reportedly normal    Additional history:  Previous fractures: Wrist, fingers  Kidney stones: None  Vitamin D def:  unknown  Last DEXA scan: ~1995  Supplements:  Calcium- none, vit D- none  Previous [facility] labs include:     Latest Reference Range & Units 02/03/15 11:05 03/29/23 12:32   Vitamin D Deficiency screening 20 - 75 ug/L 34 22      Latest Reference Range & Units 03/29/23 12:32   Parathyroid Hormone Intact 15 - 65 pg/mL 41     Recent [facility] labs include:  Lab Results   Component Value Date     04/25/2024    POTASSIUM 3.8 04/25/2024    CHLORIDE 104 04/25/2024    CO2 27 04/25/2024    ANIONGAP 11 04/25/2024     (H) 04/25/2024    BUN 18.9 04/25/2024    CR 0.88 04/25/2024    GFRESTIMATED 74 04/25/2024    GFRESTBLACK >90 01/20/2021    KARIN 9.5 04/25/2024    TSH 0.73 11/21/2023    VITDT 37 07/05/2023    PTHI 41 03/29/2023         Lives in [place], [place]  Sees Dr. Madyson Mclaughlin/[facility] for general medicine evaluations.  Also sees Dr Joanne Mtz[facility]    PMH/PSH:  Past Medical History:   Diagnosis Date     Arthritis 1996     Ascending aorta enlargement (H24)      Depressive disorder     severe, prior hospitalization     Diverticulosis of colon (without mention of hemorrhage)      Fibromyalgia 06/26/2007     Insomnia      Irritable bowel syndrome      Osteopenia      Type 2 diabetes mellitus with  complications (H)     microalbuminuria     Past Surgical History:   Procedure Laterality Date     ABDOMEN SURGERY  2012    Gallbladder removal     BACK SURGERY      fusion  and removal of hardware 2004     C SPINAL ORTHOSIS,NOS  2002    lumbar surgery     CHOLECYSTECTOMY  2011     choley  2011     ENT SURGERY  1986    septoplasty     HYSTERECTOMY, CERVIX STATUS UNKNOWN      TVH/BSO     ORTHOPEDIC SURGERY  2005    left ankle       Family Hx:  Family History   Problem Relation Age of Onset     Diabetes Mother         type 2     Hypertension Mother      Cerebrovascular Disease Mother          stroke age 57     Ovarian Cancer Mother 43     Cancer Mother         cervix     Diabetes Father         type 2     Hypertension Father      Gastrointestinal Disease Father         colon polyps     Sleep Apnea Brother      Cancer Sister      Ovarian Cancer Sister 46     Breast Cancer Sister         Stage 4 breast cancer.     Ovarian Cancer Maternal Grandmother 72     Cancer Maternal Grandmother         cervix         Social Hx:  Social History     Socioeconomic History     Marital status:      Spouse name: Not on file     Number of children: 3     Years of education: Not on file     Highest education level: Not on file   Occupational History     Occupation: xr technician     Employer: [employer]   Tobacco Use     Smoking status: Never     Passive exposure: Never     Smokeless tobacco: Never   Vaping Use     Vaping status: Former   Substance and Sexual Activity     Alcohol use: Not Currently     Comment: maybe once a month     Drug use: No     Sexual activity: Yes     Partners: Male     Birth control/protection: Surgical     Comment: Full hystroectomy in    Other Topics Concern     Parent/sibling w/ CABG, MI or angioplasty before 65F 55M? Not Asked   Social History Narrative     Not on file     Social Determinants of Health     Financial Resource Strain: Low Risk  (2024)    Financial  Resource Strain      Within the past 12 months, have you or your family members you live with been unable to get utilities (heat, electricity) when it was really needed?: No   Food Insecurity: Low Risk  (1/28/2024)    Food Insecurity      Within the past 12 months, did you worry that your food would run out before you got money to buy more?: No      Within the past 12 months, did the food you bought just not last and you didn t have money to get more?: No   Transportation Needs: Low Risk  (1/28/2024)    Transportation Needs      Within the past 12 months, has lack of transportation kept you from medical appointments, getting your medicines, non-medical meetings or appointments, work, or from getting things that you need?: No   Physical Activity: Not on file   Stress: Not on file   Social Connections: Not on file   Interpersonal Safety: Low Risk  (4/29/2024)    Interpersonal Safety      Do you feel physically and emotionally safe where you currently live?: Yes      Within the past 12 months, have you been hit, slapped, kicked or otherwise physically hurt by someone?: No      Within the past 12 months, have you been humiliated or emotionally abused in other ways by your partner or ex-partner?: No   Housing Stability: Low Risk  (1/28/2024)    Housing Stability      Do you have housing? : Yes      Are you worried about losing your housing?: No          MEDICATIONS:  has a current medication list which includes the following prescription(s): freestyle ashleigh 3 sensor, insulin aspart, omnipod dash pods (gen 4), magnesium, medical cannabis, melatonin, ondansetron, polyethylene glycol, tramadol, benzonatate, blood glucose monitoring, freestyle ashleigh 14 day reader, epinephrine, HEMP OIL OR EXTRACT OR OTHER CBD CANNABINOID, NOT MEDICAL CANNABIS,, insulin pump, insulin syringe-needle u-100, lorazepam, and statin not prescribed.    ROS:     ROS: 10 point ROS neg other than the symptoms noted above in the HPI.    GENERAL: some  fatigue, wt stable; denies fevers, chills, night sweats.   HEENT: some head congestion; no dysphagia, odonophagia, diplopia, neck pain  THYROID:  no apparent hyper or hypothyroid symptoms  CV: no chest pain, pressure, palpitations  LUNGS: no SOB, VASQUEZ, cough, wheezing   ABDOMEN: some indigestion; no diarrhea, constipation, abdominal pain  EXTREMITIES: no rashes, ulcers, edema  NEUROLOGY: no headaches, denies changes in vision, tingling, extremitiy numbness   MSK: no muscle aches or pains, weakness  SKIN: no rashes or lesions  : no menses  PSYCH:  PTSD issues though recent stable mood  ENDOCRINE: no heat or cold intolerance      Physical Exam   VS: BP (!) 152/73   Pulse 58   Wt 87.8 kg (193 lb 9.6 oz)   LMP 01/01/1999   BMI 33.23 kg/m    GENERAL: AXOX3, NAD, well dressed, answering questions appropriately, appears stated age.  ENT: no nose swelling or nasal discharge, mouth redness or gum changes.  EYES: eyes grossly normal to inspection, conjunctivae and sclerae normal, no exophthalmos or proptosis  THYROID:  no apparent nodules or goiter  LUNGS: no audible wheeze, cough or visible cyanosis, or increased work of breathing  ABDOMEN: abdomen size  EXTREMITIES: no edema noted  NEUROLOGY: CN grossly intact, no tremors  MSK: grossly intact  SKIN:  no apparent skin lesions, rash, or edema with visualized skin appearance  PSYCH: mentation appears normal, affect normal/bright, judgement and insight intact,   normal speech and appearance well groomed    LABS:    All pertinent notes, labs, and images personally reviewed by me.     A/P:  Encounter Diagnoses   Name Primary?     Type 2 diabetes mellitus with microalbuminuria, with long-term current use of insulin (H) Yes     Insulin pump status        Comments:  Reviewed health history and diabetes issues.  Details of her previous medication intolerances or allergies unclear, higher cost of insulin also unclear  Overall glycemic control improved yet still has postprandial  hyperglycemia trends  Reviewed and interpreted tests that I previously ordered.   Ordered appropriate tests for the endocrinology disease management.    Management options discussed and implemented after shared medical decision making with the patient.  T2DM problem is chronic-stable    Plan:  Reviewed the overall T2DM management and insulin pump use.  Discussed optimal BG testing to assess glucose trends.  We reviewed insulin pump settings, basal rate and bolus dosing  Use of automated pump bolus dosing for meal/snack carb & correction dosing  Reviewed use of the Omnipod Dash insulin pump reports  Reviewed the recent Freestyle Libre3 CGM glucose sensor data, in detail    Reviewed general issues with possible hyperparathyroidism (HPT) diagnosis  Discussed lab tests used to assess patient parathyroid gland function    Recommend:  Since the new Omnipod 5 and DexcomG6 option very expensive for patient, I would not pursue this plan  Continue the current Omnipod Dash insulin pump management  Pump setting changes:   Bolus ICR [place] 7 to 6    ISF 24 to 22   Basals 1.3 to 1.2  Delete out other basal profiles  Discussed advantages of premeal bolusing, entering at least 50% of meal/snack carb estimate premeal  Would not use a GLP1RA medication with her history of pancreatitis  Future treatment options include adding a SGLT2-I such as Jardiance  Continue use of the Freestyle Libre3 CGM sensor, since inexpensive/free   Consider future upgrade to Omnipod 5 when it is compatatable with Freddie CGM  Consider a follow-up appointment with [facility]  Arrange annual dilated eye exam, fasting lipid panel testing  Discussed importance of regular endocrinology evaluations every 3-4 months.    Patient to follow up with their Primary Care Provider regarding the elevated blood pressure.  Addressed patient's questions today.    The longitudinal plan of care for the endocrine problem(s) were addressed during this visit.  Due to added complexity  of care,   we will continue to support the patient and the subsequent management of this condition with ongoing continuity of care.    There are no Patient Instructions on file for this visit.    Future labs ordered today:   Orders Placed This Encounter   Procedures     GLUCOSE MONITOR, 72 HOUR, PHYS INTERP     Radiology/Consults ordered today: None    Total time spent on day of encounter:  39 min    Follow-up: 8/29/24 at 11:30 am, Return    JEAN PIERRE Hightower MD, MS  Endocrinology  [facility]    CC: АННА Mclaughlin and Care Team                        Again, thank you for allowing me to participate in the care of your patient.        Sincerely,        Chaitanya Hightower MD

## 2024-05-25 ENCOUNTER — HEALTH MAINTENANCE LETTER (OUTPATIENT)
Age: 64
End: 2024-05-25

## 2024-05-29 ASSESSMENT — ANXIETY QUESTIONNAIRES
3. WORRYING TOO MUCH ABOUT DIFFERENT THINGS: NEARLY EVERY DAY
IF YOU CHECKED OFF ANY PROBLEMS ON THIS QUESTIONNAIRE, HOW DIFFICULT HAVE THESE PROBLEMS MADE IT FOR YOU TO DO YOUR WORK, TAKE CARE OF THINGS AT HOME, OR GET ALONG WITH OTHER PEOPLE: VERY DIFFICULT
7. FEELING AFRAID AS IF SOMETHING AWFUL MIGHT HAPPEN: NEARLY EVERY DAY
GAD7 TOTAL SCORE: 19
1. FEELING NERVOUS, ANXIOUS, OR ON EDGE: NEARLY EVERY DAY
4. TROUBLE RELAXING: NEARLY EVERY DAY
2. NOT BEING ABLE TO STOP OR CONTROL WORRYING: SEVERAL DAYS
6. BECOMING EASILY ANNOYED OR IRRITABLE: NEARLY EVERY DAY
7. FEELING AFRAID AS IF SOMETHING AWFUL MIGHT HAPPEN: NEARLY EVERY DAY
GAD7 TOTAL SCORE: 19
5. BEING SO RESTLESS THAT IT IS HARD TO SIT STILL: NEARLY EVERY DAY
8. IF YOU CHECKED OFF ANY PROBLEMS, HOW DIFFICULT HAVE THESE MADE IT FOR YOU TO DO YOUR WORK, TAKE CARE OF THINGS AT HOME, OR GET ALONG WITH OTHER PEOPLE?: VERY DIFFICULT

## 2024-05-29 ASSESSMENT — PAIN SCALES - PAIN ENJOYMENT GENERAL ACTIVITY SCALE (PEG)
PEG_TOTALSCORE: 8
PEG_TOTALSCORE: 8
AVG_PAIN_PASTWEEK: 8
INTERFERED_ENJOYMENT_LIFE: 8
INTERFERED_ENJOYMENT_LIFE: 8
INTERFERED_GENERAL_ACTIVITY: 8
AVG_PAIN_PASTWEEK: 8
INTERFERED_GENERAL_ACTIVITY: 8

## 2024-06-05 ENCOUNTER — OFFICE VISIT (OUTPATIENT)
Dept: PALLIATIVE MEDICINE | Facility: CLINIC | Age: 64
End: 2024-06-05
Attending: PHYSICIAN ASSISTANT
Payer: COMMERCIAL

## 2024-06-05 VITALS — SYSTOLIC BLOOD PRESSURE: 180 MMHG | DIASTOLIC BLOOD PRESSURE: 77 MMHG | OXYGEN SATURATION: 97 % | HEART RATE: 57 BPM

## 2024-06-05 DIAGNOSIS — M25.50 POLYARTHRALGIA: ICD-10-CM

## 2024-06-05 DIAGNOSIS — M79.7 FIBROMYALGIA: ICD-10-CM

## 2024-06-05 DIAGNOSIS — E11.9 TYPE 2 DIABETES, HBA1C GOAL < 7% (H): ICD-10-CM

## 2024-06-05 DIAGNOSIS — F41.9 ANXIETY: ICD-10-CM

## 2024-06-05 DIAGNOSIS — G89.4 CHRONIC PAIN SYNDROME: Primary | ICD-10-CM

## 2024-06-05 DIAGNOSIS — F43.10 POSTTRAUMATIC STRESS DISORDER: ICD-10-CM

## 2024-06-05 PROCEDURE — 99205 OFFICE O/P NEW HI 60 MIN: CPT | Performed by: NURSE PRACTITIONER

## 2024-06-05 PROCEDURE — 99417 PROLNG OP E/M EACH 15 MIN: CPT | Performed by: NURSE PRACTITIONER

## 2024-06-05 RX ORDER — GABAPENTIN 100 MG/1
100 CAPSULE ORAL 3 TIMES DAILY
Qty: 90 CAPSULE | Refills: 2 | Status: SHIPPED | OUTPATIENT
Start: 2024-06-05 | End: 2024-07-08 | Stop reason: SINTOL

## 2024-06-05 ASSESSMENT — PAIN SCALES - GENERAL: PAINLEVEL: SEVERE PAIN (6)

## 2024-06-05 NOTE — PATIENT INSTRUCTIONS
"Medications:   You could try talking to your dispensary pharmacist about topical options.  Trial gabapentin again - start with just 100 mg a day and do not increase for at least 1 week. When you feel ready, you could try to increase to twice a day or taking a second dose every other day.    Naltrexone is a medication originally approved as a treatment for opioid addiction and alcoholism. It is a mu-opioid antagonist that is a combination of two distinct shapes known as isomers. The research suggests that the L isomer binds to opioid receptors, while the D isomer binds to immune cells. Naltrexone binds to opioid receptors on cells in the body. Using naltrexone in low doses causes the receptors to be blocked intermittently and the receptor sites respond with an increased production of endorphins. Endorphins are  natural, endogenous opioids  best known for relieving pain, enhancing a sense of well-being, and regulating the immune system.  Increased endorphin release:  May lessen pain.  Downregulates inflammatory cytokines.  Stimulates tissue healing.  Reduces death of the cells that produce myelin in the brain, thereby protecting the covering of nerves.  Prescription is liquid form. If we used this, I would recommend micro-dosing. (Take 0.5 mg of naltrexone daily for two weeks, then could increase to 1 mg every week.)   Pain Physical Therapy : could consider, it sound like you are doing a good job of pacing and \"watchful waiting\" on symptoms before increasing activities.  Pain Psychology: could consider in the future  Consider community acupuncture, this can be more affordable. [facility] ([facility]) and [facility] ([place]) both offer this model. [facility] also offers lower cost options. [place] have also offered this for low cost through grants - may need to call to check.   Follow up with me in 6-8 weeks to reassess symptoms and response to treatment. "     _____________________________________________________  Pain Clinic telephone number: [phone], After Hours Emergencies:  [phone]  Messages are returned Monday - Friday between 8 AM and 4:00 PM, typically within 48-72 hours.  Call the clinic or send a [place] message with the medication name and your pharmacy 7 days in advance as it may take 3-4 days to process.  We do not refill medications outside of normal clinic hours.   If you are unable to keep your appointment please call at least 24 hours in advance to allow us to offer the appointment time to another patient. Multiple missed appointments will lead to dismissal from the clinic.

## 2024-06-05 NOTE — PROGRESS NOTES
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 Maricarmen is being seen today at the request of Michael Maldonado for evaluation of her pain issues and recommendations for management.     Referral placed: 4/29/2024  Michael Adams PA-C   [address] [place] [address]   Phone: [phone]   Fax: [phone]    Diagnoses: Chronic pain syndrome   Order: Pain Management  Referral      Referring provider comments: FM, Polyarthralgia    Relevant records/History:  I have reviewed available medical information in the patient's electronic medical record including relevant provider notes, laboratory work, and imaging.     ____________________________________________________________  History of Present Illness   Maricarmen reports:  - Widespread pain for over 25 years. She notices that if she does too much, it takes an entire day to recover. Symptoms seem like they have become harder to manage over time. She feels like she has had extensive evaluations over time without identifiable underlying cause other than fibromyalgia.  - In a typical week, she will have \"bad\" days more often than not. Always tries to start over the next day. A \"bad\" day is described as hard to walk, muscle aches, burning/pins and needles, overwhelming sensation of pain.  - NSAIDs help, but she finds oral and topical both increase her blood pressure. Alternates between topical and oral depending on where the area is located. Takes acetaminophen \"until my kidneys hurt\" " "(described as back pain) and ibuprofen \"until my blood pressure is 200/100.\"  - A typical day is her waking up/getting up slowly, then doing some light stretches to release pain.  In the morning, she feels pain in her chest, upper back and hips the most. She has found that her sleep number bed has been helpful in reducing morning symptoms. Maricarmen then does morning ADLS, feeds dogs, has breakfast, then light tasks. She will then go for a walk, garden or does light housekeeping. After this, she will sit and rest; if she is sits more than 30 minutes, she needs to be careful as she will be very stiff and tends to feel like she could fall. Later in the day is cooking and alternating activities and rest. If she does not take anything for pain, she will not be able to fall asleep. A good night is going to bed at 11pm and waking up at 5 or 6 am. Can only sleep 4-5 hours before pain wakes her up.   - The last time she felt like her pain was well controlled was in [id number]. After she had a concussion, her pain seemed to be overwhelming again. She has found it difficult to find effective treatments as she does not tolerate medications well.  - Pain is stopping her from hiking more, gardening more, engaging in hobbies (painting) more. Limited but pain and tightness in her joints.   - Has significant \"brain fog,\" this can limit her comprehension when reading.   - Bowel symptoms include IBS with constipation. Manages with magnesium daily and Miralax prn. This regimen tends to be effective in keeping symptoms controlled. At times (rarely), she uses tramadol prn for pain, this can increase her constipation. She does not seem to tolerate medications very well, so prefers to take holistic approach as this is more effective for her. Probiotic has also been helpful for reducing gut symptoms.  - Anxiety is \"really bad\" and has PTSD that led to disability. She is working with a trauma therapist every other week, this has been helpful. " "Maricarmen has noted that she can find herself having more pain if she has traumatic events.   - Self care activities includes stretching, light yoga, \"warming up\" daily versus just jumping in to things. Tries to pace activities based on symptoms, but not become immobilized.   - She has used medical cannabis since 2018 for fibromyalgia and anxiety. She has tried different formulations, found she can't vape (hurts lungs). Tends to respond more to CBD products> than THC products. She has been using 50- mg of oral CBD gummies at night, this is in divided doses to see what dose is necessary. Uses THC oral tablet 24 mg at bedtime with 50 mg CBD tab at bedtime to start. Cannot use cannabis products during the day as it feels too sedating. Has not tried topical versions yet.    Pain description:  Location:     Quality: burning, sharp, cutting, dull, aching, throbbing, pressure   Duration: Pain always present, but always worse at the end of the day   Severity/Intensity (0 = No pain to 10 = Worst pain imaginable)   Now: 8, Average: 9, Best: 5, Worst: 9  Aggravating factors include: lying down, standing, sitting, exercise, medications  Relieving factors include: lying down, walking, exercise    Pain Intensity and Interference in the past week  Average Pain 8  Pain interference with your ENJOYMENT OF LIFE? 8  Pain interference with your GENERAL ACTIVITY? 8  PEG Total Score: 8    Current pain medications:  Tylenol extra strength 1000mg, prn- mostly later afternoon or evening  Advil gels, 400mg, prn - mostly at night  CBD, 70 to 90mg prn at bedtime   THC 24mg at bedtime     Other relevant medications  Xyzal, 5mg daily   D3, 2000iu, am.   B12, 1000mcg am.   Magnesium 400mg am.   Probiotic am   Melatonin 20 mg at bedtime     Review of [place] Prescription Monitoring Program completed today, appropriate.      PAIN MANAGEMENT TREATMENT HISTORY  1. MEDICATIONS: Can be very sensitive to medications, uses charcoal supplement if this " "occurs.   Opioids: Tylenol #3, Hydrocodone, Methadone, Morphine, Oxycodone, Tramadol.  -April 2020: She started LDN and increased it up to 2mg daily. She noticed pain improvement with this but as she continued the increase she developed increased anxiety, restlessness, and difficulty sleeping. She tried adjusting the timing with most success taking LDN first thing in the morning.   NSAIDs: Celecoxib, Diclofenac, Ibuprofen, Ketorolac, Naproxen, Oxaprozin, Sulindac  Muscle relaxants: Baclofen, Carisoprodol, Clonazepam, Cyclobenzaprine , Methocarbamol, Orphenadrine, Tizanidine  Migraine Medications: Frovatriptan , Prednisone, Sumatriptan tablets  Pain Antidepressants/ Anxiolytics: Amitriptyline, Bupropion, Citalopram, Desipramine, Doxepin, Duloxetine, Fluoxetine, Mirtazapine, Nortriptyline, Paroxetine, Sertraline, Trazodone, Venlafaxine   Sleep Aids: Alprazolam, Clonazepam, Diazepam, Eszopiclone, Hydroxyzine, Lorazepam, Oxazepam, Temazepam, Zolpidem. Melatonin 20 mg at bedtime x 20 years  Neuroleptics: Gabapentin \"I loved it when I started it, but after a concussion had side effects.\" SE was increased pain.  Pregabalin - SE GI upset, blurry vision, Tegretol -not helpful SE, cognitive issues, sedated depakote- NH, SE:cognitive issues, Topamax--not helpful, SE   Topical: CBD/THC -not helpful, ketoprofen or Voltaren gel - somewhat helpful   Adjuvant medications: Acetaminophen, Medical Cannabis, and dietary supplements  2. PHYSICAL THERAPY:   2018, 2020 Pain PT with Anuja Hernández  3. PAIN PSYCHOLOGY:   2020- Alecia Rowland  2009- Dexter Austin x 1  4. SURGERY:   No pain related surgeries  5. INJECTIONS:   Has not tried  6. COMPLEMENTARY THERAPY:  Chiropractic: Tried, helpful; access is limited by cost  Acupuncture/acupressure: Tried, helpful; ccess is limited by cost  heat/cold applications: Cold packs are very helpful. Winter is the best season for her  complementary therapies: distraction/mindfulness and meditation  7. " PREVIOUS PAIN CLINIC:  Maricarmen has been seen at a pain clinic in the past. 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   Past Surgical History   She has a past surgical history that includes hysterectomy, cervix status unknown (2000); SPINAL ORTHOSIS,NOS (2002); choley (2011); ENT surgery (1986); Cholecystectomy (2011); back surgery; orthopedic surgery (2005); and Abdomen surgery (2012).   Social History   She reports that she has never smoked. She has never been exposed to tobacco smoke. She has never used smokeless tobacco. She reports that she does not currently use alcohol. She reports that she does not use drugs.  Social History     Social History Narrative    Not on file        Medications and Allergies reviewed.  Medications    has a current medication list which includes the following prescription(s): benzonatate, blood glucose monitoring, freestyle ashleigh 14 day reader, freestyle ashleigh 3 sensor, epinephrine, HEMP OIL OR EXTRACT OR OTHER CBD CANNABINOID, NOT MEDICAL CANNABIS,, insulin aspart, omnipod dash pods (gen 4), insulin pump, insulin syringe-needle u-100, lorazepam, magnesium, medical cannabis, melatonin, ondansetron, polyethylene glycol, and statin not prescribed.  Allergies   Amoxicillin, Bee, Contrast dye, Diatrizoate, Iodine, Losartan, Sulfa antibiotics, Tetanus-diphtheria toxoids td, Metoprolol, Zithromax [azithromycin dihydrate], Amlodipine, Atorvastatin, Catapres [clonidine], Crestor [rosuvastatin], Cyproheptadine, Humalog [insulin lispro], Hydrochlorothiazide, Latex, Lisinopril, Lorazepam, Metformin, Naltrexone, Nifedipine, Olmesartan, Onglyza [saxagliptin  hydrochloride], Phenergan [promethazine], Prochlorperazine, Reglan [metoclopramide], Sumatriptan, Terazosin, Tetracycline, and Sulindac  Objective   LMP 01/01/1999   Constitutional: Well developed, well nourished, appears stated age. No acute distress.  Gait is normal and steady  HEENT: Head atraumatic, normocephalic. Eyes without conjunctival injection or jaundice. Neck supple.   Skin: No obvious rash, lesions, or petechiae of exposed skin.   Extremities: Peripheral pulses intact. No clubbing, cyanosis, or edema. Moves all extremities.  Psychiatric/mental status: Alert, without lethargy or stupor. Speech fluent. Appropriate affect. Mood normal. Able to follow commands without difficulty.   Musculoskeletal exam: Normal bulk and tone. Unremarkable spinal curvature.     Significant Results and Procedures      Labs:  Creatinine   Date Value Ref Range Status   04/25/2024 0.88 0.51 - 0.95 mg/dL Final     AST   Date Value Ref Range Status   01/20/2021 22 0 - 45 U/L Final     ALT   Date Value Ref Range Status   03/20/2023 19 10 - 35 U/L Final         Assessment & Plan   Chronic pain syndrome  Fibromyalgia  Polyarthralgia  Discussed options for providing additional pain management tools to manage her chronic pain symptoms. She would like to try gabapentin again as it was helpful in the past. I recommended that she start low and titrate slowly. Information provided on LDN; I would recommend microdose if she was to try again as she had worsened pain at 2 mg dose in the past. Advised checking on topical options with her cannabis dispensary.   - She could consider Pain PT, pain psychology in the future.  - Recommended trying acupuncture again if able.   - gabapentin (NEURONTIN) 100 MG capsule  Dispense: 90 capsule; Refill: 2    Posttraumatic stress disorder  Anxiety  These affect her experience of pain and contribute to her clinically significant distress.    Type 2 diabetes, HbA1c goal < 7% (H)  Established chronic issue that  was considered when making medical decisions; no current exacerbations or new concerns.       Pau Marin, CNP-BC, PMGT-BC, AP-PMN  [facility]      BILLING TIME DOCUMENTATION: The total TIME spent on this patient on the date of the encounter/appointment was 20 minutes.   TOTAL TIME INCLUDED  Time spent preparing to see the patient by reviewing available medical information in the patient's medical record, including relevant provider notes, laboratory work, and imaging 60 minutes  Time spent face to face (or over the phone) with the patient 14 minutes   Time spent documenting clinical information in Epic 94 minutes

## 2024-06-05 NOTE — LETTER
"6/5/2024      Maricarmen Dotson  [address] [place] [address]      Dear Colleague,    Thank you for referring your patient, Maricarmen Dotson, to the [facility] [place]. Please see a copy of my visit note below.    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 Maricarmen is being seen today at the request of Michael Maldonado for evaluation of her pain issues and recommendations for management.     Referral placed: 4/29/2024  Michael Adams PA-C   [address] [place] [address] [address] [place] [place] [id number]   Phone: [phone]   Fax: [phone]    Diagnoses: Chronic pain syndrome   Order: Pain Management  Referral      Referring provider comments: FM, Polyarthralgia    Relevant records/History:  I have reviewed available medical information in the patient's electronic medical record including relevant provider notes, laboratory work, and imaging.     ____________________________________________________________  History of Present Illness  Maricarmen reports:  - Widespread pain for over 25 years. She notices that if she does too much, it takes an entire day to recover. Symptoms seem like they have become harder to manage over time. She feels like she has had extensive evaluations over time without identifiable underlying cause other than fibromyalgia.  - In a typical week, she will have \"bad\" days more often than not. Always tries to start over the next day. A \"bad\" day is described as hard to walk, muscle aches, burning/pins and " "needles, overwhelming sensation of pain.  - NSAIDs help, but she finds oral and topical both increase her blood pressure. Alternates between topical and oral depending on where the area is located. Takes acetaminophen \"until my kidneys hurt\" (described as back pain) and ibuprofen \"until my blood pressure is 200/100.\"  - A typical day is her waking up/getting up slowly, then doing some light stretches to release pain.  In the morning, she feels pain in her chest, upper back and hips the most. She has found that her sleep number bed has been helpful in reducing morning symptoms. Maricarmen then does morning ADLS, feeds dogs, has breakfast, then light tasks. She will then go for a walk, garden or does light housekeeping. After this, she will sit and rest; if she is sits more than 30 minutes, she needs to be careful as she will be very stiff and tends to feel like she could fall. Later in the day is cooking and alternating activities and rest. If she does not take anything for pain, she will not be able to fall asleep. A good night is going to bed at 11pm and waking up at 5 or 6 am. Can only sleep 4-5 hours before pain wakes her up.   - The last time she felt like her pain was well controlled was in [id number]. After she had a concussion, her pain seemed to be overwhelming again. She has found it difficult to find effective treatments as she does not tolerate medications well.  - Pain is stopping her from hiking more, gardening more, engaging in hobbies (painting) more. Limited but pain and tightness in her joints.   - Has significant \"brain fog,\" this can limit her comprehension when reading.   - Bowel symptoms include IBS with constipation. Manages with magnesium daily and Miralax prn. This regimen tends to be effective in keeping symptoms controlled. At times (rarely), she uses tramadol prn for pain, this can increase her constipation. She does not seem to tolerate medications very well, so prefers to take holistic approach " "as this is more effective for her. Probiotic has also been helpful for reducing gut symptoms.  - Anxiety is \"really bad\" and has PTSD that led to disability. She is working with a trauma therapist every other week, this has been helpful. Maricarmen has noted that she can find herself having more pain if she has traumatic events.   - Self care activities includes stretching, light yoga, \"warming up\" daily versus just jumping in to things. Tries to pace activities based on symptoms, but not become immobilized.   - She has used medical cannabis since 2018 for fibromyalgia and anxiety. She has tried different formulations, found she can't vape (hurts lungs). Tends to respond more to CBD products> than THC products. She has been using 50- mg of oral CBD gummies at night, this is in divided doses to see what dose is necessary. Uses THC oral tablet 24 mg at bedtime with 50 mg CBD tab at bedtime to start. Cannot use cannabis products during the day as it feels too sedating. Has not tried topical versions yet.    Pain description:  Location:     Quality: burning, sharp, cutting, dull, aching, throbbing, pressure   Duration: Pain always present, but always worse at the end of the day   Severity/Intensity (0 = No pain to 10 = Worst pain imaginable)   Now: 8, Average: 9, Best: 5, Worst: 9  Aggravating factors include: lying down, standing, sitting, exercise, medications  Relieving factors include: lying down, walking, exercise    Pain Intensity and Interference in the past week  Average Pain 8  Pain interference with your ENJOYMENT OF LIFE? 8  Pain interference with your GENERAL ACTIVITY? 8  PEG Total Score: 8    Current pain medications:  Tylenol extra strength 1000mg, prn- mostly later afternoon or evening  Advil gels, 400mg, prn - mostly at night  CBD, 70 to 90mg prn at bedtime   THC 24mg at bedtime     Other relevant medications  Xyzal, 5mg daily   D3, 2000iu, am.   B12, 1000mcg am.   Magnesium 400mg am.   Probiotic am " "  Melatonin 20 mg at bedtime     Review of [place] Prescription Monitoring Program completed today, appropriate.      PAIN MANAGEMENT TREATMENT HISTORY  1. MEDICATIONS: Can be very sensitive to medications, uses charcoal supplement if this occurs.   Opioids: Tylenol #3, Hydrocodone, Methadone, Morphine, Oxycodone, Tramadol.  -April 2020: She started LDN and increased it up to 2mg daily. She noticed pain improvement with this but as she continued the increase she developed increased anxiety, restlessness, and difficulty sleeping. She tried adjusting the timing with most success taking LDN first thing in the morning.   NSAIDs: Celecoxib, Diclofenac, Ibuprofen, Ketorolac, Naproxen, Oxaprozin, Sulindac  Muscle relaxants: Baclofen, Carisoprodol, Clonazepam, Cyclobenzaprine , Methocarbamol, Orphenadrine, Tizanidine  Migraine Medications: Frovatriptan , Prednisone, Sumatriptan tablets  Pain Antidepressants/ Anxiolytics: Amitriptyline, Bupropion, Citalopram, Desipramine, Doxepin, Duloxetine, Fluoxetine, Mirtazapine, Nortriptyline, Paroxetine, Sertraline, Trazodone, Venlafaxine   Sleep Aids: Alprazolam, Clonazepam, Diazepam, Eszopiclone, Hydroxyzine, Lorazepam, Oxazepam, Temazepam, Zolpidem. Melatonin 20 mg at bedtime x 20 years  Neuroleptics: Gabapentin \"I loved it when I started it, but after a concussion had side effects.\" SE was increased pain.  Pregabalin - SE GI upset, blurry vision, Tegretol -not helpful SE, cognitive issues, sedated depakote- NH, SE:cognitive issues, Topamax--not helpful, SE   Topical: CBD/THC -not helpful, ketoprofen or Voltaren gel - somewhat helpful   Adjuvant medications: Acetaminophen, Medical Cannabis, and dietary supplements  2. PHYSICAL THERAPY:   2018, 2020 Pain PT with Anuja Hernández  3. PAIN PSYCHOLOGY:   2020- Alecia Rowland  2009- Dexter Austin x 1  4. SURGERY:   No pain related surgeries  5. INJECTIONS:   Has not tried  6. COMPLEMENTARY THERAPY:  Chiropractic: Tried, helpful; access is " limited by cost  Acupuncture/acupressure: Tried, helpful; ccess is limited by cost  heat/cold applications: Cold packs are very helpful. Winter is the best season for her  complementary therapies: distraction/mindfulness and meditation  7. PREVIOUS PAIN CLINIC:  Maricarmen has been seen at a pain clinic in the past. 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   Past Surgical History  She has a past surgical history that includes hysterectomy, cervix status unknown (2000); SPINAL ORTHOSIS,NOS (2002); choley (2011); ENT surgery (1986); Cholecystectomy (2011); back surgery; orthopedic surgery (2005); and Abdomen surgery (2012).   Social History  She reports that she has never smoked. She has never been exposed to tobacco smoke. She has never used smokeless tobacco. She reports that she does not currently use alcohol. She reports that she does not use drugs.  Social History     Social History Narrative     Not on file        Medications and Allergies reviewed.  Medications   has a current medication list which includes the following prescription(s): benzonatate, blood glucose monitoring, freestyle ashleigh 14 day reader, freestyle ashleigh 3 sensor, epinephrine, HEMP OIL OR EXTRACT OR OTHER CBD CANNABINOID, NOT MEDICAL CANNABIS,, insulin aspart, omnipod dash pods (gen 4), insulin pump, insulin syringe-needle u-100, lorazepam, magnesium, medical cannabis, melatonin, ondansetron, polyethylene glycol, and statin not prescribed.  Allergies  Amoxicillin, Bee, Contrast dye, Diatrizoate, Iodine, Losartan, Sulfa antibiotics, Tetanus-diphtheria toxoids td, Metoprolol, Zithromax [azithromycin dihydrate],  Amlodipine, Atorvastatin, Catapres [clonidine], Crestor [rosuvastatin], Cyproheptadine, Humalog [insulin lispro], Hydrochlorothiazide, Latex, Lisinopril, Lorazepam, Metformin, Naltrexone, Nifedipine, Olmesartan, Onglyza [saxagliptin hydrochloride], Phenergan [promethazine], Prochlorperazine, Reglan [metoclopramide], Sumatriptan, Terazosin, Tetracycline, and Sulindac  Objective  LMP 01/01/1999   Constitutional: Well developed, well nourished, appears stated age. No acute distress.  Gait is normal and steady  HEENT: Head atraumatic, normocephalic. Eyes without conjunctival injection or jaundice. Neck supple.   Skin: No obvious rash, lesions, or petechiae of exposed skin.   Extremities: Peripheral pulses intact. No clubbing, cyanosis, or edema. Moves all extremities.  Psychiatric/mental status: Alert, without lethargy or stupor. Speech fluent. Appropriate affect. Mood normal. Able to follow commands without difficulty.   Musculoskeletal exam: Normal bulk and tone. Unremarkable spinal curvature.     Significant Results and Procedures     Labs:  Creatinine   Date Value Ref Range Status   04/25/2024 0.88 0.51 - 0.95 mg/dL Final     AST   Date Value Ref Range Status   01/20/2021 22 0 - 45 U/L Final     ALT   Date Value Ref Range Status   03/20/2023 19 10 - 35 U/L Final         Assessment & Plan  Chronic pain syndrome  Fibromyalgia  Polyarthralgia  Discussed options for providing additional pain management tools to manage her chronic pain symptoms. She would like to try gabapentin again as it was helpful in the past. I recommended that she start low and titrate slowly. Information provided on LDN; I would recommend microdose if she was to try again as she had worsened pain at 2 mg dose in the past. Advised checking on topical options with her cannabis dispensary.   - She could consider Pain PT, pain psychology in the future.  - Recommended trying acupuncture again if able.   - gabapentin (NEURONTIN) 100 MG capsule   Dispense: 90 capsule; Refill: 2    Posttraumatic stress disorder  Anxiety  These affect her experience of pain and contribute to her clinically significant distress.    Type 2 diabetes, HbA1c goal < 7% (H)  Established chronic issue that was considered when making medical decisions; no current exacerbations or new concerns.       Pau Marin, CNP-BC, PMGT-BC, AP-PMN  [facility]      BILLING TIME DOCUMENTATION: The total TIME spent on this patient on the date of the encounter/appointment was 20 minutes.   TOTAL TIME INCLUDED  Time spent preparing to see the patient by reviewing available medical information in the patient's medical record, including relevant provider notes, laboratory work, and imaging 60 minutes  Time spent face to face (or over the phone) with the patient 14 minutes   Time spent documenting clinical information in Epic 94 minutes         Again, thank you for allowing me to participate in the care of your patient.        Sincerely,        Pau Marin, NP

## 2024-07-08 ENCOUNTER — OFFICE VISIT (OUTPATIENT)
Dept: PALLIATIVE MEDICINE | Facility: CLINIC | Age: 64
End: 2024-07-08
Attending: NURSE PRACTITIONER
Payer: COMMERCIAL

## 2024-07-08 VITALS — DIASTOLIC BLOOD PRESSURE: 83 MMHG | SYSTOLIC BLOOD PRESSURE: 165 MMHG | HEART RATE: 58 BPM | OXYGEN SATURATION: 96 %

## 2024-07-08 DIAGNOSIS — E11.9 TYPE 2 DIABETES, HBA1C GOAL < 7% (H): ICD-10-CM

## 2024-07-08 DIAGNOSIS — M25.50 POLYARTHRALGIA: ICD-10-CM

## 2024-07-08 DIAGNOSIS — M79.7 FIBROMYALGIA: ICD-10-CM

## 2024-07-08 DIAGNOSIS — F43.10 POSTTRAUMATIC STRESS DISORDER: ICD-10-CM

## 2024-07-08 DIAGNOSIS — F41.9 ANXIETY: ICD-10-CM

## 2024-07-08 DIAGNOSIS — G89.4 CHRONIC PAIN SYNDROME: Primary | ICD-10-CM

## 2024-07-08 PROCEDURE — G2211 COMPLEX E/M VISIT ADD ON: HCPCS | Performed by: NURSE PRACTITIONER

## 2024-07-08 PROCEDURE — 99215 OFFICE O/P EST HI 40 MIN: CPT | Performed by: NURSE PRACTITIONER

## 2024-07-08 ASSESSMENT — PAIN SCALES - PAIN ENJOYMENT GENERAL ACTIVITY SCALE (PEG)
AVG_PAIN_PASTWEEK: 10 - PAIN AS BAD AS YOU CAN IMAGINE
PEG_TOTALSCORE: 9.33
AVG_PAIN_PASTWEEK: 10
INTERFERED_GENERAL_ACTIVITY: 9
INTERFERED_ENJOYMENT_LIFE: 9
PEG_TOTALSCORE: 9.33
INTERFERED_GENERAL_ACTIVITY: 9
INTERFERED_ENJOYMENT_LIFE: 9

## 2024-07-08 ASSESSMENT — PAIN SCALES - GENERAL: PAINLEVEL: SEVERE PAIN (7)

## 2024-07-08 NOTE — PROGRESS NOTES
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 Maricarmen is being seen today for ongoing management of chronic pain.    History:  Widespread pain for over 25 years. She notices that if she does too much, it takes an entire day to recover. Symptoms seem like they have become harder to manage over time. She feels like she has had extensive evaluations over time without identifiable underlying cause other than fibromyalgia. In [id number], after she had a concussion, her pain seemed to be overwhelming again. She has found it difficult to find effective treatments as she does not tolerate medications well    Recommendations from last visit:  Discussed options for providing additional pain management tools to manage her chronic pain symptoms. She would like to try gabapentin again as it was helpful in the past. I recommended that she start low and titrate slowly. Information provided on LDN; I would recommend microdose if she was to try again as she had worsened pain at 2 mg dose in the past. Advised checking on topical options with her cannabis dispensary.   - She could consider Pain PT, pain psychology in the future.  - Recommended trying acupuncture again if able.   - gabapentin trial  ____________________________________________________________  History of Present Illness   Maricarmen reports:  - Reaction to gabapentin 100 mg nightly.  - trying cbd topical. Has been fluctuating between her amounts of THC as she feels like she builds some tolerance and cuts back to reset her symptoms  "again. Also stimulates her appetite, which she feels helps.   - Wonders about trying mushrooms, has done some research on her own.   -  believes that her pain started after taking a large dose of paxil in the 1990's. After this, she had diabetes and fibromyalgia. Wonders what the mechanism of action in Paxil. She and her  questions if Paxil imbalanced her hormones, damaged cells?    - In a typical week, she will have \"bad\" days more often than not. Always tries to start over the next day. A \"bad\" day is described as hard to walk, muscle aches, burning/pins and needles, overwhelming sensation of pain.  - NSAIDs help, but she finds oral and topical both increase her blood pressure. Alternates between topical and oral depending on where the area is located. Takes acetaminophen \"until my kidneys hurt\" (described as back pain) and ibuprofen \"until my blood pressure is 200/100.\"  - A typical day is her waking up/getting up slowly, then doing some light stretches to release pain.  In the morning, she feels pain in her chest, upper back and hips the most. She has found that her sleep number bed has been helpful in reducing morning symptoms. Maricarmen then does morning ADLS, feeds dogs, has breakfast, then light tasks. She will then go for a walk, garden or does light housekeeping. After this, she will sit and rest; if she is sits more than 30 minutes, she needs to be careful as she will be very stiff and tends to feel like she could fall. Later in the day is cooking and alternating activities and rest. If she does not take anything for pain, she will not be able to fall asleep. A good night is going to bed at 11pm and waking up at 5 or 6 am. Can only sleep 4-5 hours before pain wakes her up.   - Pain is stopping her from hiking more, gardening more, engaging in hobbies (painting) more. Limited but pain and tightness in her joints.   - Has significant \"brain fog,\" this can limit her comprehension when reading.   - " "Bowel symptoms include IBS with constipation. Manages with magnesium daily and Miralax prn. This regimen tends to be effective in keeping symptoms controlled. At times (rarely), she uses tramadol prn for pain, this can increase her constipation. She does not seem to tolerate medications very well, so prefers to take holistic approach as this is more effective for her. Probiotic has also been helpful for reducing gut symptoms.  - Anxiety is \"really bad\" and has PTSD that led to disability. She is working with a trauma therapist every other week, this has been helpful. Maricarmen has noted that she can find herself having more pain if she has traumatic events.   - Self care activities includes stretching, light yoga, \"warming up\" daily versus just jumping in to things. Tries to pace activities based on symptoms, but not become immobilized.  Has been walking 1.5 miles regularly; previously had done 4-6 miles.   - She has used medical cannabis since 2018 for fibromyalgia and anxiety. She has tried different formulations, found she can't vape (hurts lungs). Tends to respond more to CBD products> than THC products. She has been using 50- mg of oral CBD gummies at night, this is in divided doses to see what dose is necessary. Uses THC oral tablet 24 mg at bedtime with 50 mg CBD tab at bedtime to start. Cannot use cannabis products during the day as it feels too sedating.     Pain description:  Location:     Quality: burning, sharp, cutting, dull, aching, throbbing, pressure   Duration: Pain always present, but always worse at the end of the day   Severity/Intensity: Severe Pain (7)  Aggravating factors include: lying down, standing, sitting, exercise, medications  Relieving factors include: lying down, walking, exercise  Pain Intensity and Interference in the past week  Date PEG   6/5/2024 8   7/8/2024 9.33         Current pain medications:  Tylenol Extra strength 1000mg, prn- mostly later afternoon or evening  Advil gels, " "400mg, prn - mostly at night  CBD, 70 to 90mg prn at bedtime   THC 24mg at bedtime     Other relevant medications  Xyzal, 5mg daily   D3, 2000iu, am.   B12, 1000mcg am.   Magnesium 400mg am.   Probiotic am   Melatonin 20 mg at bedtime     PAIN MANAGEMENT TREATMENT HISTORY  1. MEDICATIONS: Can be very sensitive to medications, uses charcoal supplement if this occurs.   Opioids: Tylenol #3, Hydrocodone, Methadone, Morphine, Oxycodone, Tramadol.  -April 2020: She started LDN and increased it up to 2mg daily. She noticed pain improvement with this but as she continued the increase she developed increased anxiety, restlessness, and difficulty sleeping. She tried adjusting the timing with most success taking LDN first thing in the morning.   NSAIDs: Celecoxib, Diclofenac, Ibuprofen, Ketorolac, Naproxen, Oxaprozin, Sulindac  Muscle relaxants: Baclofen, Carisoprodol, Clonazepam, Cyclobenzaprine , Methocarbamol, Orphenadrine, Tizanidine  Migraine Medications: Frovatriptan , Prednisone, Sumatriptan tablets  Pain Antidepressants/ Anxiolytics: Amitriptyline, Bupropion, Citalopram, Desipramine, Doxepin, Duloxetine, Fluoxetine, Mirtazapine, Nortriptyline, Paroxetine, Sertraline, Trazodone, Venlafaxine   Sleep Aids: Alprazolam, Clonazepam, Diazepam, Eszopiclone, Hydroxyzine, Lorazepam, Oxazepam, Temazepam, Zolpidem. Melatonin 20 mg at bedtime x 20 years  Neuroleptics: Gabapentin \"I loved it when I started it, but after a concussion had side effects.\" SE was increased pain.  Pregabalin - SE GI upset, blurry vision, Tegretol -not helpful SE, cognitive issues, sedated depakote- NH, SE:cognitive issues, Topamax--not helpful, SE   Topical: CBD/THC -not helpful, ketoprofen or Voltaren gel - somewhat helpful   Adjuvant medications: Acetaminophen, Medical Cannabis, and dietary supplements  2. PHYSICAL THERAPY:   2018, 2020 Pain PT with Anuja Hernández  3. PAIN PSYCHOLOGY:   2020- Alecia Rowland  2009- Dexter Austin x 1  4. SURGERY:   No " pain related surgeries  5. INJECTIONS:   Has not tried  6. COMPLEMENTARY THERAPY:  Chiropractic: Tried, helpful; access is limited by cost  Acupuncture/acupressure: Tried, helpful; ccess is limited by cost  heat/cold applications: Cold packs are very helpful. Winter is the best season for her  complementary therapies: distraction/mindfulness and meditation  7. PREVIOUS PAIN CLINIC:  Maricarmen has been seen at a pain clinic in the past. 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   Past Surgical History   She has a past surgical history that includes hysterectomy, cervix status unknown (2000); SPINAL ORTHOSIS,NOS (2002); choley (2011); ENT surgery (1986); Cholecystectomy (2011); back surgery; orthopedic surgery (2005); and Abdomen surgery (2012).   Social History   She reports that she has never smoked. She has never been exposed to tobacco smoke. She has never used smokeless tobacco. She reports that she does not currently use alcohol. She reports that she does not use drugs.  Social History     Social History Narrative    Not on file        Medications and Allergies reviewed.  Medications    has a current medication list which includes the following prescription(s): benzonatate, blood glucose monitoring, freestyle ashleigh 14 day reader, freestyle ashleigh 3 sensor, epinephrine, gabapentin, HEMP OIL OR EXTRACT OR OTHER CBD CANNABINOID, NOT MEDICAL CANNABIS,, insulin aspart, omnipod dash pods (gen 4), insulin pump, insulin syringe-needle u-100, magnesium, medical cannabis, melatonin, ondansetron, polyethylene glycol, and statin not prescribed.  Allergies   Amoxicillin, Bee, Contrast dye,  Diatrizoate, Iodine, Losartan, Sulfa antibiotics, Tetanus-diphtheria toxoids td, Metoprolol, Zithromax [azithromycin dihydrate], Altafluor benox [fluorescein-benoxinate], Amlodipine, Atorvastatin, Catapres [clonidine], Crestor [rosuvastatin], Cyproheptadine, Humalog [insulin lispro], Hydrochlorothiazide, Latex, Lisinopril, Lorazepam, Metformin, Naltrexone, Nifedipine, Olmesartan, Onglyza [saxagliptin hydrochloride], Phenergan [promethazine], Phenylephrine hcl, Prochlorperazine, Reglan [metoclopramide], Sumatriptan, Terazosin, Tetracycline, Tropicamide, and Sulindac  Objective   BP (!) 165/83   Pulse 58   LMP 01/01/1999   SpO2 96%   Constitutional: Well developed, well nourished, appears stated age. No acute distress.  Gait is normal and steady  HEENT: Head atraumatic, normocephalic. Eyes without conjunctival injection or jaundice. Neck supple.   Skin: No obvious rash, lesions, or petechiae of exposed skin.   Extremities: Peripheral pulses intact. No clubbing, cyanosis, or edema. Moves all extremities.  Psychiatric/mental status: Alert, without lethargy or stupor. Speech fluent. Appropriate affect. Mood normal. Able to follow commands without difficulty.   Musculoskeletal exam: Normal bulk and tone. Unremarkable spinal curvature.     Significant Results and Procedures      Labs:  Creatinine   Date Value Ref Range Status   04/25/2024 0.88 0.51 - 0.95 mg/dL Final     AST   Date Value Ref Range Status   01/20/2021 22 0 - 45 U/L Final     ALT   Date Value Ref Range Status   03/20/2023 19 10 - 35 U/L Final         Assessment & Plan   Chronic pain syndrome  Fibromyalgia  Polyarthralgia  I do not have other options to add to her treatment and I am not able to answer her questions about he impact of Paxil on her symptoms. As she has a paradoxical response to medications and tends to tolerate natural interventions, I have referred her to see Dr. Mayank Doherty or Veronica aMbry at the [facility] location for direction  on applying a functional medicine approach.   We also discussed gene testing to help determine effective medications. Also have offered Pain Pt and Pain Psychology as options.     Posttraumatic stress disorder  Anxiety  These affect her experience of pain and contribute to her clinically significant distress.    Type 2 diabetes, HbA1c goal < 7% (H)  Established chronic issue that was considered when making medical decisions; no current exacerbations or new concerns.       Pau Marin, CNP-BC, PMGT-BC, AP-PMN  [facility]      BILLING TIME DOCUMENTATION: The total TIME spent on this patient on the date of the encounter/appointment was 45 minutes.   TOTAL TIME INCLUDED  Time spent preparing to see the patient by reviewing available medical information in the patient's medical record, including relevant provider notes, laboratory work, and imaging 2 minutes  Time spent face to face (or over the phone) with the patient 37 minutes   Time spent documenting clinical information in Epic 5 minutes

## 2024-07-08 NOTE — PATIENT INSTRUCTIONS
Medications:   You report a paradoxical effect to medications (opposite to what the intended response.) Consider seeing a provider who specializes in functional medicine to explore different options for treatment of your symptoms. Referral placed to see Dr. Mayank Doherty in [place].   Another option may be to consider gene testing to see what medications may work better for you.   Naltrexone:  I would recommend micro-dosing if you were to try this again. (No more than 2 mg a day as you had a reaction at higher dose.)   Pain Physical Therapy: not right now.  Pain Psychology: could consider in the future.  Consider acupuncture as we discussed in the past.  Medical Cannabis was renewed today, it is now good for 3 years.   _____________________________________________________  Pain Clinic telephone number: [phone]. Messages are returned Monday - Friday between 8 AM and 4:00 PM.  If you need a medication refilled, call the clinic or send a [employer] message with the medication name and pharmacy 7 days in advance as it may take 3-4 days to process.  We do not refill medications on evenings or weekends.

## 2024-07-10 ENCOUNTER — LAB (OUTPATIENT)
Dept: LAB | Facility: CLINIC | Age: 64
End: 2024-07-10
Payer: COMMERCIAL

## 2024-07-10 ENCOUNTER — VIRTUAL VISIT (OUTPATIENT)
Dept: FAMILY MEDICINE | Facility: CLINIC | Age: 64
End: 2024-07-10
Payer: COMMERCIAL

## 2024-07-10 ENCOUNTER — TRANSFERRED RECORDS (OUTPATIENT)
Dept: HEALTH INFORMATION MANAGEMENT | Facility: CLINIC | Age: 64
End: 2024-07-10

## 2024-07-10 DIAGNOSIS — R35.0 URINARY FREQUENCY: Primary | ICD-10-CM

## 2024-07-10 DIAGNOSIS — R35.0 URINARY FREQUENCY: ICD-10-CM

## 2024-07-10 PROCEDURE — 99441 PR PHYSICIAN TELEPHONE EVALUATION 5-10 MIN: CPT | Mod: 93 | Performed by: NURSE PRACTITIONER

## 2024-07-10 PROCEDURE — 87086 URINE CULTURE/COLONY COUNT: CPT

## 2024-07-10 NOTE — PROGRESS NOTES
"Maricarmen is a 63 year old who is being evaluated via a billable telephone visit.    What phone number would you like to be contacted at? [phone]  How would you like to obtain your AVS? MyChart  Originating Location (pt. Location): Home  Distant Location (provider location):  On-site    Assessment & Plan     Urinary frequency  She will make a lab appointment, further plan pending results.   - UA with Microscopic; Future  - Urine Culture Aerobic Bacterial - lab collect; Future        BMI  Estimated body mass index is 33.23 kg/m  as calculated from the following:    Height as of 4/29/24: 1.626 m (5' 4\").    Weight as of 5/9/24: 87.8 kg (193 lb 9.6 oz).         Subjective   Maricarmen is a 63 year old, presenting for the following health issues:  Urinary Problem      HPI       Having discharge in her urine, looks like pus.  This has been going on for about a week.   No hematuria but urine appears darker.   Having urinary frequency and urgency.   No dysuria.    No fever.          Review of Systems  Constitutional, HEENT, cardiovascular, pulmonary, gi and gu systems are negative, except as otherwise noted.      Objective           Vitals:  No vitals were obtained today due to virtual visit.    Physical Exam   General: Alert and no distress //Respiratory: No audible wheeze, cough, or shortness of breath // Psychiatric:  Appropriate affect, tone, and pace of words            Phone call duration: 5 minutes  Signed Electronically by: Dafne Barfield CNP    "

## 2024-07-11 DIAGNOSIS — Z79.4 TYPE 2 DIABETES MELLITUS WITH HYPERGLYCEMIA, WITH LONG-TERM CURRENT USE OF INSULIN (H): ICD-10-CM

## 2024-07-11 DIAGNOSIS — E11.9 TYPE 2 DIABETES, HBA1C GOAL < 7% (H): ICD-10-CM

## 2024-07-11 DIAGNOSIS — E11.65 TYPE 2 DIABETES MELLITUS WITH HYPERGLYCEMIA, WITH LONG-TERM CURRENT USE OF INSULIN (H): ICD-10-CM

## 2024-07-11 LAB — BACTERIA UR CULT: NORMAL

## 2024-07-11 RX ORDER — INSULIN ASPART 100 [IU]/ML
INJECTION, SOLUTION INTRAVENOUS; SUBCUTANEOUS
Qty: 90 ML | Refills: 0 | Status: SHIPPED | OUTPATIENT
Start: 2024-07-11

## 2024-07-11 NOTE — TELEPHONE ENCOUNTER
Last Written Prescription Date:  11/14/23  Last Fill Quantity: 90,  # refills: 0   Last office visit: 5/9/2024 ; last virtual visit: 12/11/2023 with prescribing provider: Dr. Hightower   Future Office Visit: 8/29/24        Requested Prescriptions   Pending Prescriptions Disp Refills    NOVOLOG VIAL 100 UNIT/ML soln [Pharmacy Med Name: NOVOLOG VIAL 10ML 100U/ML] 90 mL 3     Sig: USE IN INSULIN PUMP, TOTAL DAILY DOSE  UNITS       Insulin Protocol Failed - 7/11/2024 11:01 AM        Failed - Chart Review Required     Review Chart.    Do not approve if insulin is used in a pump.  Instead, direct refill request to the patient's endocrinologist.  If the patient doesn't have an endocrinologist, then send the refill to the patient's PCP for review            Passed - Medication is active on med list        Passed - Has GFR on file in past 12 months and most recent value is normal        Passed - Recent (6 mo) or future (90 days) visit within the authorizing provider's specialty     The patient must have completed an in-person or virtual visit within the past 6 months or has a future visit scheduled within the next 90 days with the authorizing provider s specialty.  Urgent care and e-visits do not quality as an office visit for this protocol.          Passed - Medication indicated for associated diagnosis     Medication is associated with one or more of the following diagnoses:   - Type 1 diabetes mellitus  - Type 2 diabetes mellitus  - Diabetic nephropathy; Prophylaxis  - Neuropathy due to diabetes mellitus; Prophylaxis  - Retinopathy due to diabetes mellitus; Prophylaxis  - Diabetes mellitus associated with cystic fibrosis  - Disorder of cardiovascular system; Prophylaxis - Type 1 diabetes mellitus   - Disorder of cardiovascular system; Prophylaxis - Type 2 diabetes mellitus            Passed - Patient is 18 years of age or older           Refill sent  Kylah Foster RN

## 2024-07-15 ASSESSMENT — PAIN SCALES - PAIN ENJOYMENT GENERAL ACTIVITY SCALE (PEG)
INTERFERED_ENJOYMENT_LIFE: 8
AVG_PAIN_PASTWEEK: 9
INTERFERED_ENJOYMENT_LIFE: 8
PEG_TOTALSCORE: 8.33
AVG_PAIN_PASTWEEK: 9
INTERFERED_GENERAL_ACTIVITY: 8
PEG_TOTALSCORE: 8.33
INTERFERED_GENERAL_ACTIVITY: 8

## 2024-07-15 ASSESSMENT — ANXIETY QUESTIONNAIRES
GAD7 TOTAL SCORE: 21
GAD7 TOTAL SCORE: 21
5. BEING SO RESTLESS THAT IT IS HARD TO SIT STILL: NEARLY EVERY DAY
6. BECOMING EASILY ANNOYED OR IRRITABLE: NEARLY EVERY DAY
IF YOU CHECKED OFF ANY PROBLEMS ON THIS QUESTIONNAIRE, HOW DIFFICULT HAVE THESE PROBLEMS MADE IT FOR YOU TO DO YOUR WORK, TAKE CARE OF THINGS AT HOME, OR GET ALONG WITH OTHER PEOPLE: SOMEWHAT DIFFICULT
3. WORRYING TOO MUCH ABOUT DIFFERENT THINGS: NEARLY EVERY DAY
4. TROUBLE RELAXING: NEARLY EVERY DAY
8. IF YOU CHECKED OFF ANY PROBLEMS, HOW DIFFICULT HAVE THESE MADE IT FOR YOU TO DO YOUR WORK, TAKE CARE OF THINGS AT HOME, OR GET ALONG WITH OTHER PEOPLE?: SOMEWHAT DIFFICULT
1. FEELING NERVOUS, ANXIOUS, OR ON EDGE: NEARLY EVERY DAY
7. FEELING AFRAID AS IF SOMETHING AWFUL MIGHT HAPPEN: NEARLY EVERY DAY
2. NOT BEING ABLE TO STOP OR CONTROL WORRYING: NEARLY EVERY DAY
7. FEELING AFRAID AS IF SOMETHING AWFUL MIGHT HAPPEN: NEARLY EVERY DAY

## 2024-07-16 ENCOUNTER — OFFICE VISIT (OUTPATIENT)
Dept: PALLIATIVE MEDICINE | Facility: OTHER | Age: 64
End: 2024-07-16
Attending: NURSE PRACTITIONER
Payer: COMMERCIAL

## 2024-07-16 VITALS — OXYGEN SATURATION: 98 % | HEART RATE: 59 BPM

## 2024-07-16 DIAGNOSIS — E55.9 VITAMIN D DEFICIENCY, UNSPECIFIED: ICD-10-CM

## 2024-07-16 DIAGNOSIS — M79.7 FIBROMYALGIA: ICD-10-CM

## 2024-07-16 DIAGNOSIS — G89.4 CHRONIC PAIN SYNDROME: ICD-10-CM

## 2024-07-16 DIAGNOSIS — M25.50 POLYARTHRALGIA: ICD-10-CM

## 2024-07-16 PROCEDURE — G0463 HOSPITAL OUTPT CLINIC VISIT: HCPCS | Performed by: ANESTHESIOLOGY

## 2024-07-16 PROCEDURE — 99205 OFFICE O/P NEW HI 60 MIN: CPT | Performed by: ANESTHESIOLOGY

## 2024-07-16 ASSESSMENT — PAIN SCALES - GENERAL: PAINLEVEL: EXTREME PAIN (8)

## 2024-07-16 NOTE — NURSING NOTE
Patient presents to the clinic today for a follow up with POLI COLEMAN MD  regarding Pain Management.           7/16/2024     3:37 PM   PEG Score   PEG Total Score 9              Marsha Hernandez MA  [facility] [place]

## 2024-07-16 NOTE — PATIENT INSTRUCTIONS
"  [facility] Number:  [phone]  Call with any questions about your care and for scheduling assistance.   Calls are returned Monday through Friday between 8 AM and 4:30 PM. We usually get back to you within 2 business days depending on the issue/request.    If we are prescribing your medications:  For opioid medication refills, call the clinic or send a [employer] message 7 days in advance.  Please include:  Name of requested medication  Name of the pharmacy.  For non-opioid medications, call your pharmacy directly to request a refill. Please allow 3-4 days to be processed.   Per [place] State Law:  All controlled substance prescriptions must be filled within 30 days of being written.    For those controlled substances allowing refills, pickup must occur within 30 days of last fill.      We believe regular attendance is key to your success in our program!    Any time you are unable to keep your appointment we ask that you call us at least 24 hours in advance to cancel.This will allow us to offer the appointment time to another patient.   Multiple missed appointments may lead to dismissal from the clinic.    PLAN:    Labs at [facility] or [facility].    Referral made to physical therapy for craniosacral therapy.    Discussed the use of high-dose Curcumin that may cross the blood-brain barrier and help with neural inflammation May obtain \"Nordic naturals Curcumin Carlos Gummies\" 1 capsule twice a day.    DHA, essential omega-3 fatty acids structural in the brain, may obtain through [employer] 1 capsule twice a day.    After ensuring you have tolerance to these agents may obtain lithium orotate online, 2 mg capsule 1 at bedtime for 2 weeks, if tolerated 5 to 6 mg at bedtime for 2 weeks if tolerated then 10 mg at bedtime.    You are continue work with your therapist around complex PTSD and central sensitization.    Discussed the role of frequency specific " "microcurrent  May contact these providers to see if they take your insurance or you want to work with transitions [phone], , [facility] [phone],  chiropractic [phone].    Discussed use of grounding sheets, 1 resource is \"[employer]\" continue with your medical cannabis preparations        In the future may use methylene blue to help with pain, fatigue.    Will also address GI issues that may relate to pain and traumatic brain injury.  Would use the supplement \"Ortho spore Ig\" and \"glutashild\" that  may be obtained through orthomolecular labs.    Follow-up with DURGA Rai pharmacist in 4 to 5 weeks and with Dr. Doherty for return visit in 8 weeks        "
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
## 2024-07-18 ENCOUNTER — MYC MEDICAL ADVICE (OUTPATIENT)
Dept: PEDIATRICS | Facility: CLINIC | Age: 64
End: 2024-07-18

## 2024-07-18 ENCOUNTER — LAB (OUTPATIENT)
Dept: LAB | Facility: CLINIC | Age: 64
End: 2024-07-18
Payer: COMMERCIAL

## 2024-07-18 DIAGNOSIS — M25.50 POLYARTHRALGIA: ICD-10-CM

## 2024-07-18 DIAGNOSIS — M79.7 FIBROMYALGIA: ICD-10-CM

## 2024-07-18 DIAGNOSIS — E55.9 VITAMIN D DEFICIENCY, UNSPECIFIED: ICD-10-CM

## 2024-07-18 PROCEDURE — 36415 COLL VENOUS BLD VENIPUNCTURE: CPT

## 2024-07-18 PROCEDURE — 82306 VITAMIN D 25 HYDROXY: CPT

## 2024-07-18 PROCEDURE — 86140 C-REACTIVE PROTEIN: CPT

## 2024-07-18 PROCEDURE — 83516 IMMUNOASSAY NONANTIBODY: CPT

## 2024-07-18 PROCEDURE — 99207 MTHFR GENOTYPE: CPT

## 2024-07-18 PROCEDURE — G0452 MOLECULAR PATHOLOGY INTERPR: HCPCS | Performed by: PATHOLOGY

## 2024-07-18 PROCEDURE — 83520 IMMUNOASSAY QUANT NOS NONAB: CPT

## 2024-07-18 PROCEDURE — 82784 ASSAY IGA/IGD/IGG/IGM EACH: CPT

## 2024-07-19 LAB
CRP SERPL-MCNC: 5.13 MG/L
GLIADIN IGA SER-ACNC: 0.8 U/ML
GLIADIN IGG SER-ACNC: <0.6 U/ML
IGA SERPL-MCNC: 92 MG/DL (ref 84–499)
TTG IGA SER-ACNC: <0.2 U/ML
TTG IGG SER-ACNC: <0.6 U/ML
VIT D+METAB SERPL-MCNC: 38 NG/ML (ref 20–50)

## 2024-07-22 LAB — S100A8/A9 PROTEIN: 801.2 NG/ML (ref 98.4–1427)

## 2024-07-27 ASSESSMENT — ACTIVITIES OF DAILY LIVING (ADL)
WALKING_UP_STEEP_HILLS: NO DIFFICULTY AT ALL
LANDING: MODERATE DIFFICULTY
HOW_WOULD_YOU_RATE_YOUR_CURRENT_LEVEL_OF_FUNCTION_DURING_YOUR_USUAL_ACTIVITIES_OF_DAILY_LIVING_FROM_0_TO_100_WITH_100_BEING_YOUR_LEVEL_OF_FUNCTION_PRIOR_TO_YOUR_HIP_PROBLEM_AND_0_BEING_THE_INABILITY_TO_PERFORM_ANY_OF_YOUR_USUAL_DAILY_ACTIVITIES?: 50
WALKING_15_MINUTES_OR_GREATER: NO DIFFICULTY AT ALL
USING_TOOLS_OR_APPLIANCES: A LITTLE BIT OF DIFFICULTY
WASHING_YOUR_HAIR?: 1
GOING UP 1 FLIGHT OF STAIRS: MODERATE DIFFICULTY
TWISTING/PIVOTING ON INVOLVED LEG: NO DIFFICULTY AT ALL
HOW_WOULD_YOU_RATE_YOUR_CURRENT_LEVEL_OF_FUNCTION_DURING_YOUR_SPORTS_RELATED_ACTIVITIES_FROM_0_TO_100_WITH_100_BEING_YOUR_LEVEL_OF_FUNCTION_PRIOR_TO_YOUR_HIP_PROBLEM_AND_0_BEING_THE_INABILITY_TO_PERFORM_ANY_OF_YOUR_USUAL_DAILY_ACTIVITIES?: 50
WEAKNESS: THE SYMPTOM AFFECTS MY ACTIVITY SEVERELY
RECREATIONAL_ACTIVITIES: SLIGHT DIFFICULTY
ROLLING OVER IN BED: MODERATE DIFFICULTY
YOUR_USUAL_HOBBIES,_RECREATIONAL_OR_SPORTING_ACTIVITIES: MODERATE DIFFICULTY
SIT WITH YOUR KNEE BENT: ACTIVITY IS SOMEWHAT DIFFICULT
PERFORMING_HEAVY_ACTIVITIES_AROUND_YOUR_HOME: MODERATE DIFFICULTY
WALKING_APPROXIMATELY_10_MINUTES: NO DIFFICULTY AT ALL
SPORTS_COUNT: 9
WALKING_A_MILE: A LITTLE BIT OF DIFFICULTY
WHEN_LYING_ON_THE_INVOLVED_SIDE: 8
WASHING_YOUR_HAIR_OR_SCALP: MODERATE DIFFICULTY
PUTTING ON SOCKS AND SHOES: SLIGHT DIFFICULTY
PLEASE_INDICATE_YOR_PRIMARY_REASON_FOR_REFERRAL_TO_THERAPY:: KNEE
HOS_ADL_ITEM_SCORE_TOTAL: 51
GIVING WAY, BUCKLING OR SHIFTING OF KNEE: THE SYMPTOM AFFECTS MY ACTIVITY SLIGHTLY
WALKING_INITIALLY: SLIGHT DIFFICULTY
GETTING_INTO_OR_OUT_OF_A_CAR: MODERATE DIFFICULTY
GO UP STAIRS: ACTIVITY IS SOMEWHAT DIFFICULT
WALKING_DOWN_STEEP_HILLS: EXTREME DIFFICULTY
DEEP_SQUATTING: SLIGHT DIFFICULTY
SWELLING: THE SYMPTOM AFFECTS MY ACTIVITY SLIGHTLY
RUNNING_ON_EVEN_GROUND: QUITE A BIT OF DIFFICULTY
RECREATIONAL ACTIVITIES: SLIGHT DIFFICULTY
STIFFNESS: THE SYMPTOM AFFECTS MY ACTIVITY SEVERELY
OPENING_A_JAR: MODERATE DIFFICULTY
OPENING_DOORS: A LITTLE BIT OF DIFFICULTY
SITTING_FOR_15_MINUTES: MODERATE DIFFICULTY
LIFTING_A_BAG_OF_GROCERIES_TO_WAIST_LEVEL: A LITTLE BIT OF DIFFICULTY
GO DOWN STAIRS: ACTIVITY IS SOMEWHAT DIFFICULT
PUTTING_ON_YOUR_PANTS: 1
WALK: ACTIVITY IS MINIMALLY DIFFICULT
ANY_OF_YOUR_USUAL_WORK,_HOUSEWORK_OR_SCHOOL_ACTIVITIES: MODERATE DIFFICULTY
GETTING_INTO_AND_OUT_OF_A_BATHTUB: NO DIFFICULTY AT ALL
STIFFNESS: THE SYMPTOM AFFECTS MY ACTIVITY SEVERELY
KNEEL ON THE FRONT OF YOUR KNEE: ACTIVITY IS SOMEWHAT DIFFICULT
CLEANING: A LITTLE BIT OF DIFFICULTY
SQUAT: ACTIVITY IS SOMEWHAT DIFFICULT
UEFI_TOTAL_SCORE/80: .61
HOS_ADL_SCORE(%): 75
GOING DOWN 1 FLIGHT OF STAIRS: SLIGHT DIFFICULTY
PLEASE_INDICATE_YOR_PRIMARY_REASON_FOR_REFERRAL_TO_THERAPY:: HIP
SWELLING: THE SYMPTOM AFFECTS MY ACTIVITY SLIGHTLY
RUNNING_ONE_MILE: UNABLE TO DO
SPORTS_TOTAL_ITEM_SCORE: 0
PUSHING_UP_ON_YOUR_HANDS: A LITTLE BIT OF DIFFICULTY
RISE FROM A CHAIR: ACTIVITY IS SOMEWHAT DIFFICULT
PUTTING_ON_AN_UNDERSHIRT_OR_A_PULLOVER_SWEATER: 1
REACHING_FOR_SOMETHING_ON_A_HIGH_SHELF: 6
HOW_WOULD_YOU_RATE_THE_OVERALL_FUNCTION_OF_YOUR_KNEE_DURING_YOUR_USUAL_DAILY_ACTIVITIES?: NEARLY NORMAL
ADL_SCORE(%): 0
PLACING_AN_OBJECT_ON_A_HIGH_SHELF: 2
HOW_WOULD_YOU_RATE_YOUR_CURRENT_LEVEL_OF_FUNCTION_DURING_YOUR_USUAL_ACTIVITIES_OF_DAILY_LIVING_FROM_0_TO_100_WITH_100_BEING_YOUR_LEVEL_OF_FUNCTION_PRIOR_TO_YOUR_HIP_PROBLEM_AND_0_BEING_THE_INABILITY_TO_PERFORM_ANY_OF_YOUR_USUAL_DAILY_ACTIVITIES?: 50
PUTTING_ON_SOCKS_AND_SHOES: SLIGHT DIFFICULTY
PLEASE_INDICATE_YOR_PRIMARY_REASON_FOR_REFERRAL_TO_THERAPY:: ELBOW
LIGHT_TO_MODERATE_WORK: MODERATE DIFFICULTY
DOING_UP_BUTTONS: A LITTLE BIT OF DIFFICULTY
KNEE_ACTIVITY_OF_DAILY_LIVING_SUM: 37
STANDING_FOR_1_HOUR: MODERATE DIFFICULTY
REMOVING_SOMETHING_FROM_YOUR_BACK_POCKET: 1
TOUCHING_THE_BACK_OF_YOUR_NECK: 1
ABILITY_TO_PARTICIPATE_IN_YOUR_DESIRED_SPORT_AS_LONG_AS_YOU_WOULD_LIKE: MODERATE DIFFICULTY
UEFI_TOTAL_SCORE: 49
SITTING FOR 15 MINUTES: MODERATE DIFFICULTY
PREPARING_FOOD: A LITTLE BIT OF DIFFICULTY
ROLLING_OVER_IN_BED: MODERATE DIFFICULTY
PERFORMING_LIGHT_ACTIVITIES_AROUND_YOUR_HOME: NO DIFFICULTY
DEEP SQUATTING: SLIGHT DIFFICULTY
STAND: ACTIVITY IS SOMEWHAT DIFFICULT
GOING_UP_OR_DOWN_10_STAIRS: MODERATE DIFFICULTY
AS_A_RESULT_OF_YOUR_KNEE_INJURY,_HOW_WOULD_YOU_RATE_YOUR_CURRENT_LEVEL_OF_DAILY_ACTIVITY?: NEARLY NORMAL
CARRYING_A_SMALL_SUITCASE_WITH_YOUR_AFFECTED_LIMB: A LITTLE BIT OF DIFFICULTY
SQUAT: ACTIVITY IS SOMEWHAT DIFFICULT
RISE FROM A CHAIR: ACTIVITY IS SOMEWHAT DIFFICULT
GOING_UP_1_FLIGHT_OF_STAIRS: MODERATE DIFFICULTY
RUNNING_ON_UNEVEN_GROUND: QUITE A BIT OF DIFFICULTY
STANDING FOR 15 MINUTES: SLIGHT DIFFICULTY
PLACING_AN_OBJECT_ONTO,_OR_REMOVING_IT_FROM_AN_OVERHEAD_SHELF: MODERATE DIFFICULTY
WALKING_INITIALLY: SLIGHT DIFFICULTY
STANDING_FOR_15_MINUTES: SLIGHT DIFFICULTY
HOW_WOULD_YOU_RATE_THE_CURRENT_FUNCTION_OF_YOUR_KNEE_DURING_YOUR_USUAL_DAILY_ACTIVITIES_ON_A_SCALE_FROM_0_TO_100_WITH_100_BEING_YOUR_LEVEL_OF_KNEE_FUNCTION_PRIOR_TO_YOUR_INJURY_AND_0_BEING_THE_INABILITY_TO_PERFORM_ANY_OF_YOUR_USUAL_DAILY_ACTIVITIES?: 75
ANY_OF_YOUR_USUAL_WORK,_HOUSEWORK_OR_SCHOOL_ACTIVITIES: QUITE A BIT OF DIFFICULTY
MAKING_SHARP_TURNS_WHILE_RUNNING_FAST: MODERATE DIFFICULTY
TYING_OR_LACING_SHOES: A LITTLE BIT OF DIFFICULTY
ADL_HIGHEST_POTENTIAL_SCORE: 68
WASHING_YOUR_BACK: 1
SPORTS_HIGHEST_POTENTIAL_SCORE: 36
SIT WITH YOUR KNEE BENT: ACTIVITY IS SOMEWHAT DIFFICULT
DRIVING: MODERATE DIFFICULTY
TWISTING/PIVOTING_ON_INVOLVED_LEG: NO DIFFICULTY AT ALL
GETTING_INTO_AND_OUT_OF_AN_AVERAGE_CAR: SLIGHT DIFFICULTY
GETTING INTO AND OUT OF AN AVERAGE CAR: SLIGHT DIFFICULTY
PLEASE_INDICATE_YOR_PRIMARY_REASON_FOR_REFERRAL_TO_THERAPY:: SHOULDER
STAND: ACTIVITY IS SOMEWHAT DIFFICULT
LIMPING: THE SYMPTOM AFFECTS MY ACTIVITY SLIGHTLY
GIVING WAY, BUCKLING OR SHIFTING OF KNEE: THE SYMPTOM AFFECTS MY ACTIVITY SLIGHTLY
KNEEL ON THE FRONT OF YOUR KNEE: ACTIVITY IS SOMEWHAT DIFFICULT
AT_ITS_WORST?: 9
DRESS: A LITTLE BIT OF DIFFICULTY
LAUNDERING_CLOTHES: A LITTLE BIT OF DIFFICULTY
ADL_TOTAL_ITEM_SCORE: 0
ROLLING_OVER_IN_BED: QUITE A BIT OF DIFFICULTY
PUTTING_ON_YOUR_SHOES_OR_SOCKS: NO DIFFICULTY
WALKING_15_MINUTES_OR_GREATER: NO DIFFICULTY AT ALL
LIFTING_AN_OBJECT,_LIKE_A_BAG_OF_GROCERIES_FROM_THE_FLOOR: NO DIFFICULTY
WALKING_DOWN_STEEP_HILLS: EXTREME DIFFICULTY
SQUATTING: NO DIFFICULTY
AS_A_RESULT_OF_YOUR_KNEE_INJURY,_HOW_WOULD_YOU_RATE_YOUR_CURRENT_LEVEL_OF_DAILY_ACTIVITY?: NEARLY NORMAL
GETTING_INTO_AND_OUT_OF_A_BATH: MODERATE DIFFICULTY
STEPPING UP AND DOWN CURBS: SLIGHT DIFFICULTY
HOW_WOULD_YOU_RATE_THE_OVERALL_FUNCTION_OF_YOUR_KNEE_DURING_YOUR_USUAL_DAILY_ACTIVITIES?: NEARLY NORMAL
GO DOWN STAIRS: ACTIVITY IS SOMEWHAT DIFFICULT
GO UP STAIRS: ACTIVITY IS SOMEWHAT DIFFICULT
HOS_ADL_HIGHEST_POTENTIAL_SCORE: 68
CUTTING/LATERAL_MOVEMENTS: SLIGHT DIFFICULTY
JUMPING: EXTREME DIFFICULTY
SITTING_FOR_1_HOUR: QUITE A BIT OF DIFFICULTY
PAIN: THE SYMPTOM AFFECTS MY ACTIVITY SEVERELY
SLEEPING: EXTREME DIFFICULTY
CARRYING_A_HEAVY_OBJECT_OF_10_POUNDS: 1
STEPPING_UP_AND_DOWN_CURBS: SLIGHT DIFFICULTY
HOW_WOULD_YOU_RATE_YOUR_CURRENT_LEVEL_OF_FUNCTION?: ABNORMAL
WALKING_BETWEEN_ROOMS: NO DIFFICULTY
HEAVY_WORK: SLIGHT DIFFICULTY
WALKING_2_BLOCKS: A LITTLE BIT OF DIFFICULTY
SWINGING_OBJECTS_LIKE_A_GOLF_CLUB: SLIGHT DIFFICULTY
WALKING_FOR_APPROXIMATELY_10_MINUTES: NO DIFFICULTY AT ALL
STARTING_AND_STOPPING_QUICKLY: SLIGHT DIFFICULTY
PUTTING_ON_A_SHIRT_THAT_BUTTONS_DOWN_THE_FRONT: 1
HOW_WOULD_YOU_RATE_THE_CURRENT_FUNCTION_OF_YOUR_KNEE_DURING_YOUR_USUAL_DAILY_ACTIVITIES_ON_A_SCALE_FROM_0_TO_100_WITH_100_BEING_YOUR_LEVEL_OF_KNEE_FUNCTION_PRIOR_TO_YOUR_INJURY_AND_0_BEING_THE_INABILITY_TO_PERFORM_ANY_OF_YOUR_USUAL_DAILY_ACTIVITIES?: 75
GETTING_INTO_AND_OUT_OF_A_BATHTUB: NO DIFFICULTY AT ALL
PAIN: THE SYMPTOM AFFECTS MY ACTIVITY SEVERELY
LIGHT_TO_MODERATE_WORK: MODERATE DIFFICULTY
WALK: ACTIVITY IS MINIMALLY DIFFICULT
LOW_IMPACT_ACTIVITIES_LIKE_FAST_WALKING: SLIGHT DIFFICULTY
LEFS_SCORE(%): 0
LIMPING: THE SYMPTOM AFFECTS MY ACTIVITY SLIGHTLY
ABILITY_TO_PERFORM_ACTIVITY_WITH_YOUR_NORMAL_TECHNIQUE: SLIGHT DIFFICULTY
KNEE_ACTIVITY_OF_DAILY_LIVING_SCORE: 52.86
WEAKNESS: THE SYMPTOM AFFECTS MY ACTIVITY SEVERELY
SHOPPING: MODERATE DIFFICULTY
PUSHING_WITH_THE_INVOLVED_ARM: 1
RAW_SCORE: 37
GOING_DOWN_1_FLIGHT_OF_STAIRS: SLIGHT DIFFICULTY
SPORTS_SCORE(%): 0
WALKING_UP_STEEP_HILLS: NO DIFFICULTY AT ALL
YOUR_USUAL_HOBBIES,_RECREATIONAL_OR_SPORTING_ACTIVITIES: QUITE A BIT OF DIFFICULTY
PLEASE_INDICATE_YOR_PRIMARY_REASON_FOR_REFERRAL_TO_THERAPY:: FOOT AND/OR ANKLE
ADL_COUNT: 17
LEFS_RAW_SCORE: 0
THROWING_A_BALL: A LITTLE BIT OF DIFFICULTY
VACUUMING,_SWEEPING,_OR_RAKING: A LITTLE BIT OF DIFFICULTY
HEAVY_WORK: SLIGHT DIFFICULTY

## 2024-07-30 LAB — LAB DIRECTOR RESULTS: NORMAL

## 2024-07-30 NOTE — PROGRESS NOTES
"PHYSICAL THERAPY EVALUATION  Type of Visit: Evaluation       Fall Risk Screen:  Fall screen completed by: PT  Have you fallen 2 or more times in the past year?: No  Have you fallen and had an injury in the past year?: Yes  Is patient a fall risk?: Yes  Fall screen comments: pt attending PT for whole body strengthening which will help address balance/falls    Subjective       Presenting condition or subjective complaint: Uncontrolled Fibromyalgia pain.  Date of onset: 07/16/24    Relevant medical history: Arthritis; Bladder or bowel problems; Chest pain; Concussions; Depression; Diabetes; Dizziness; Fibromyalgia; High blood pressure; Incontinence; Menopause; Mental Illness; Numbness or tingling in perianal area; Ongoing fever or chills; Osteoporosis; Overweight; Pain at night or rest; Progressive neurological deficits; Significant weakness; Sleep disorder like apnea   Dates & types of surgery: Spinal fusion 1994, hardware removal of spinal [id number], full hysterectomy 2000, reattach left ankle [id number], gallbladder removal 2012.    Prior diagnostic imaging/testing results:       Prior therapy history for the same diagnosis, illness or injury: No      Prior Level of Function  Transfers:   Ambulation:   ADL:   IADL:     Living Environment  Social support: With family members   Type of home: House   Stairs to enter the home: Yes 16 Is there a railing: Yes     Ramp: No   Stairs inside the home: Yes 16 Is there a railing: Yes     Help at home: None  Equipment owned:       Employment: No    Hobbies/Interests: Garden, paint and draw, cooking, hicking, snowshoeing.    Patient goals for therapy: Walk everyday,  vacuum,  sleep more than 3 hours.    Most difficulty with: hips, knees, thoracic spine L>R, sternum, tibia, fibula and humerus; pt feels like a surgeon is \"scraping down on her bones\"    Current pain: 7/10; worst: 10/10    Limited walking to 3-4 miles, was doing 5-6 miles; sleeping- waking up from pain every 1-2 " hours with sharp/stabbing pain      Has PT in the past: increased pain significantly, is hypermobile    Pt does some morning stretching, pool exercises, walking as able      Pain assessment: Pain present     Objective      Cognitive Status Examination  Orientation: Oriented to person, place and time   Level of Consciousness: Alert  Follows Commands and Answers Questions: 100% of the time  Personal Safety and Judgement: Intact  Memory: Impaired, spouse present to assist if needed, pt reports impairment due to TBI    OBSERVATION:   INTEGUMENTARY:   POSTURE: Sitting Posture: Rounded shoulders, Forward head  PALPATION: deferred to craniosacral therapist to address  RANGE OF MOTION:   (Degrees) Left AROM Left PROM  Right AROM Right PROM   Hip Flexion       Hip Extension       Hip Abduction       Hip Adduction       Hip Internal Rotation       Hip External Rotation       Knee Flexion       Knee Extension       Lumbar Side glide     Lumbar Flexion To floor   Lumbar Extension WNL   Pain:   End feel:   STRENGTH:   Pain: - none + mild ++ moderate +++ severe  Strength Scale: 0-5/5 Left Right   Hip Flexion 5 5   Hip Extension     Hip Abduction     Hip Adduction     Hip Internal Rotation     Hip External Rotation     Knee Flexion     Knee Extension 5 5   Ankle DF: 5/5 farzaneh    BED MOBILITY: Independent    TRANSFERS: Independent    WHEELCHAIR MOBILITY: n/a        SENSATION:     REFLEXES:   COORDINATION:   MUSCLE TONE:         Assessment & Plan   CLINICAL IMPRESSIONS  Medical Diagnosis: Fibromyalgia    Treatment Diagnosis: Fibromyalgia   Impression/Assessment: Patient is a 63 year old female with multiple joint pain complaints secondary to fibromyalgia.  The following significant findings have been identified: Pain, Decreased strength, Impaired balance, Inflammation, Impaired gait, Decreased activity tolerance, and Impaired posture. These impairments interfere with their ability to perform self care tasks, recreational activities,  household chores, driving , household mobility, and community mobility as compared to previous level of function.     Clinical Decision Making (Complexity):  Clinical Presentation: Evolving/Changing  Clinical Presentation Rationale: based on medical and personal factors listed in PT evaluation  Clinical Decision Making (Complexity): Moderate complexity    PLAN OF CARE  Treatment Interventions:  Modalities: Cryotherapy, Hot Pack  Interventions: Gait Training, Manual Therapy, Neuromuscular Re-education, Therapeutic Activity, Therapeutic Exercise, Self-Care/Home Management    Long Term Goals     PT Goal 1  Goal Identifier: Walk  Goal Description: Pt will walk 4-5 miles with <7/10 pain to improve her aerobic endurance and participation in recreational activities  Target Date: 10/24/24  PT Goal 2  Goal Identifier: Sleep  Goal Description: Pt will sleep for 3+ hours without waking from pain to improve her sleep quality  Target Date: 10/24/24      Frequency of Treatment: 1x/week  Duration of Treatment: 12 weeks    Recommended Referrals to Other Professionals:   Education Assessment:   Learner/Method: Patient;Pictures/Video    Risks and benefits of evaluation/treatment have been explained.   Patient/Family/caregiver agrees with Plan of Care.     Evaluation Time:     PT Eval, Moderate Complexity Minutes (02774): 25       Signing Clinician: Claudia Lema, PT        [facility]                                                                                   OUTPATIENT PHYSICAL THERAPY      PLAN OF TREATMENT FOR OUTPATIENT REHABILITATION   Patient's Last Name, First Name, Maricarmen Leija YOB: 1960   Provider's Name   [facility]   Medical Record No.  [id number]     Onset Date: 07/16/24  Start of Care Date: 08/01/24     Medical Diagnosis:  Fibromyalgia      PT Treatment Diagnosis:  Fibromyalgia Plan of Treatment  Frequency/Duration: 1x/week/  12 weeks    Certification date from 08/01/24 to 10/24/24         See note for plan of treatment details and functional goals     Claudia Lema, PT                         I CERTIFY THE NEED FOR THESE SERVICES FURNISHED UNDER        THIS PLAN OF TREATMENT AND WHILE UNDER MY CARE     (Physician attestation of this document indicates review and certification of the therapy plan).              Referring Provider:  Mayank Doherty    Initial Assessment  See Epic Evaluation- Start of Care Date: 08/01/24

## 2024-08-01 ENCOUNTER — THERAPY VISIT (OUTPATIENT)
Dept: PHYSICAL THERAPY | Facility: CLINIC | Age: 64
End: 2024-08-01
Attending: ANESTHESIOLOGY
Payer: COMMERCIAL

## 2024-08-01 DIAGNOSIS — R29.3 POOR POSTURE: Primary | ICD-10-CM

## 2024-08-01 DIAGNOSIS — M62.81 GENERALIZED MUSCLE WEAKNESS: ICD-10-CM

## 2024-08-01 DIAGNOSIS — M79.7 FIBROMYALGIA: ICD-10-CM

## 2024-08-01 LAB
LAB DIRECTOR COMMENTS: NORMAL
LAB DIRECTOR DISCLAIMER: NORMAL
LAB DIRECTOR INTERPRETATION: NORMAL
LAB DIRECTOR METHODOLOGY: NORMAL
LAB DIRECTOR RESULTS: NORMAL
SPECIMEN DESCRIPTION: NORMAL

## 2024-08-01 PROCEDURE — 97162 PT EVAL MOD COMPLEX 30 MIN: CPT | Mod: GP | Performed by: PHYSICAL THERAPIST

## 2024-08-01 PROCEDURE — G0452 MOLECULAR PATHOLOGY INTERPR: HCPCS | Performed by: STUDENT IN AN ORGANIZED HEALTH CARE EDUCATION/TRAINING PROGRAM

## 2024-08-01 PROCEDURE — 97110 THERAPEUTIC EXERCISES: CPT | Mod: GP | Performed by: PHYSICAL THERAPIST

## 2024-08-03 ENCOUNTER — HEALTH MAINTENANCE LETTER (OUTPATIENT)
Age: 64
End: 2024-08-03

## 2024-08-08 ENCOUNTER — THERAPY VISIT (OUTPATIENT)
Dept: PHYSICAL THERAPY | Facility: CLINIC | Age: 64
End: 2024-08-08
Attending: ANESTHESIOLOGY
Payer: COMMERCIAL

## 2024-08-08 DIAGNOSIS — M79.7 FIBROMYALGIA: Primary | ICD-10-CM

## 2024-08-08 DIAGNOSIS — M62.81 GENERALIZED MUSCLE WEAKNESS: ICD-10-CM

## 2024-08-08 DIAGNOSIS — R29.3 POOR POSTURE: ICD-10-CM

## 2024-08-08 PROCEDURE — 97110 THERAPEUTIC EXERCISES: CPT | Mod: GP | Performed by: PHYSICAL THERAPIST

## 2024-08-19 ENCOUNTER — DOCUMENTATION ONLY (OUTPATIENT)
Dept: LAB | Facility: CLINIC | Age: 64
End: 2024-08-19

## 2024-08-19 NOTE — PROGRESS NOTES
Maricarmen Dotson has an upcoming lab appointment:    Future Appointments   Date Time Provider Department [place]   8/20/2024 11:30 AM EA [facility]   8/26/2024 10:30 AM Jeremy Rowley, PharmD [facility]   8/29/2024 11:30 AM Chaitanya Hightower MD [facility]    9/10/2024  3:30 PM Mayank Doherty MD [facility]     Patient is scheduled for the following lab(s): Scheduling Notes:To have A1c drawn.  Made On:8/19/2024 2:38 PM  By:  JULIANA WHITE      There is no order available. Please review and place either future orders or HMPO (Review of Health Maintenance Protocol Orders), as appropriate.    Health Maintenance Due   Topic    ANNUAL REVIEW OF HM ORDERS     LIPID     MICROALBUMIN     A1C     HEMOGLOBIN      If no labs are needed at this time please have the Care Team contact patient regarding this information and cancel lab appointment. Thank you.     Nora JUAREZ

## 2024-08-21 DIAGNOSIS — R80.9 TYPE 2 DIABETES MELLITUS WITH MICROALBUMINURIA, WITH LONG-TERM CURRENT USE OF INSULIN (H): Primary | ICD-10-CM

## 2024-08-21 DIAGNOSIS — Z79.4 TYPE 2 DIABETES MELLITUS WITH MICROALBUMINURIA, WITH LONG-TERM CURRENT USE OF INSULIN (H): Primary | ICD-10-CM

## 2024-08-21 DIAGNOSIS — E11.29 TYPE 2 DIABETES MELLITUS WITH MICROALBUMINURIA, WITH LONG-TERM CURRENT USE OF INSULIN (H): Primary | ICD-10-CM

## 2024-08-22 NOTE — PROGRESS NOTES
"Message noted, \"fasting\" lab orders placed.    JEAN PIERRE Hightower MD, MS  Endocrinology  [facility]      "

## 2024-08-22 NOTE — PROGRESS NOTES
Medication Therapy Management (MTM) Encounter    ASSESSMENT:                            Medication Adherence/Access: History of medication sensitivities. See below.       Pain/Fibromyalgia:   Did not tolerate recent supplements due to adverse effects. Based on blood glucose readings around the time of the trials, due not suspect the changes in blood glucose were supplement related, but patient did report other adverse effects including worsened pain, fatigue. As she did not tolerate these, reviewed previous discussion regarding methylene blue. May benefit from a trial. Given sensitives to medications, would start with even lower dose than usual.          Diabetes: A1C above goal <7%. Working with endo and therapist to manage binge eating concerns.     PLAN:                            Start Methylene Blue 1% USP drops.   Start 2-3 drops once daily in 8 oz water. Use for 5 days, and off for 2 days.   Then increase to 5 drops daily for 5 days on, 2 days off.   Then increase to 5 drops twice daily for 5 days on and 2 days off.      Follow-up: Schedule with PharmD as needed   9/10 with Dr. Doherty.     SUBJECTIVE/OBJECTIVE:                          Maricarmen Dotson is a 63 year old female seen for an initial visit. She was referred to me from Mayank Doherty MD. Patient was accompanied by spouse, Wesley.     Reason for visit: Pain follow-up.    Allergies/ADRs: Reviewed in chart  Past Medical History: Reviewed in chart  Tobacco: She reports that she has never smoked. She has never been exposed to tobacco smoke. She has never used smokeless tobacco.  Alcohol:  reports that she does not currently use alcohol.     Medication Adherence/Access: no issues reported      Pain/Fibromyalgia:   Enrolled in Medical Cannabis program - uses more CBD than THC. THC stimulates vagal nerve and binge eats. Only takes CBD at night.      At last visit with Dr. Doherty discussed:  -Curcumin supplement twice daily - Had increased muscle and join  pain with the increase. Glucose in the 300s. (Started in July 15).     -DHA Omega 3 twice daily. Increase pain. Took with a meal. Glucose increased to 300s. Pain in the large muscle groups. Tendons. (July 20)     -Lithium Orotate titrating up to 10 mg at bedtime. - did not help with sleep. Increase muscle tightness, cramping, joint pain, severe fatigue.   Couldn't drive (August 5)     Tried all of these after 3-5 days again.   Notes they all increased blood glucose in to the 300 and 400s.     Did discuss methylene blue at last visit.    thinks it might be helpful to try.     With CBD and THC, can increase sleep from 2-3 hours up to 5-6 hours.     Was referred to craniosacral therapy in [place]. Couldn't get in until February.          Diabetes   NovoLog in insulin pump        Blood sugar monitoring: Continuous Glucose Monitor                             Diet/Exercise: Notes episodes of binge eating, when anxious or having more pain. Working with therapist on this.      Eye exam is up to date       Hemoglobin A1C   Date Value Ref Range Status   04/25/2024 7.8 (H) 0.0 - 5.6 % Final     Comment:     Normal <5.7%   Prediabetes 5.7-6.4%    Diabetes 6.5% or higher     Note: Adopted from ADA consensus guidelines.   11/21/2023 7.6 (H) 0.0 - 5.6 % Final     Comment:     Normal <5.7%   Prediabetes 5.7-6.4%    Diabetes 6.5% or higher     Note: Adopted from ADA consensus guidelines.   07/05/2023 8.0 (H) 0.0 - 5.6 % Final     Comment:     Normal <5.7%   Prediabetes 5.7-6.4%    Diabetes 6.5% or higher     Note: Adopted from ADA consensus guidelines.   09/18/2020 7.4 (H) 0 - 5.6 % Final     Comment:     Normal <5.7% Prediabetes 5.7-6.4%  Diabetes 6.5% or higher - adopted from ADA   consensus guidelines.     02/27/2020 8.0 (H) 0 - 5.6 % Final     Comment:     Normal <5.7% Prediabetes 5.7-6.4%  Diabetes 6.5% or higher - adopted from ADA   consensus guidelines.  Reviewed: OK with previous     10/28/2019 8.0 (H) 0 - 5.6 %  Final     Comment:     Normal <5.7% Prediabetes 5.7-6.4%  Diabetes 6.5% or higher - adopted from ADA   consensus guidelines.  Reviewed: OK with previous                  Today's Vitals: LMP 01/01/1999   ----------------      I spent 30 minutes with this patient today. All changes were made via collaborative practice agreement with Mayank Doherty MD. A copy of the visit note was provided to the patient's provider(s).    A summary of these recommendations was sent via [employer].    Jeremy Rowley, RenéD  Medication Therapy Management (MTM) Pharmacist  [facility]           Medication Therapy Recommendations  Fibromyalgia    Current Medication: medical cannabis (Patient's own supply)   Rationale: Synergistic therapy - Needs additional medication therapy - Indication   Recommendation: Start Medication - METHYLENE BLUE PO   Status: Accepted per Provider

## 2024-08-26 ENCOUNTER — OFFICE VISIT (OUTPATIENT)
Dept: PHARMACY | Facility: OTHER | Age: 64
End: 2024-08-26
Payer: COMMERCIAL

## 2024-08-26 DIAGNOSIS — E11.65 TYPE 2 DIABETES MELLITUS WITH HYPERGLYCEMIA, WITH LONG-TERM CURRENT USE OF INSULIN (H): ICD-10-CM

## 2024-08-26 DIAGNOSIS — M79.7 FIBROMYALGIA: Primary | ICD-10-CM

## 2024-08-26 DIAGNOSIS — Z79.4 TYPE 2 DIABETES MELLITUS WITH HYPERGLYCEMIA, WITH LONG-TERM CURRENT USE OF INSULIN (H): ICD-10-CM

## 2024-08-26 PROCEDURE — 99207 PR NO CHARGE LOS: CPT | Performed by: PHARMACIST

## 2024-08-26 NOTE — PATIENT INSTRUCTIONS
"Recommendations from today's MTM visit:                                                    MTM (medication therapy management) is a service provided by a clinical pharmacist designed to help you get the most of out of your medicines.   Today we reviewed what your medicines are for, how to know if they are working, that your medicines are safe and how to make your medicine regimen as easy as possible.        Start Methylene Blue 1% USP drops.   Start 2-3 drops once daily in 8 oz water. Use for 5 days, and off for 2 days.   Then increase to 5 drops daily for 5 days on, 2 days off.   Then increase to 5 drops twice daily for 5 days on and 2 days off.      Follow-up: Schedule with PharmD as needed   9/10 with Dr. Doherty.     It was great speaking with you today.  I value your experience and would be very thankful for your time in providing feedback in our clinic survey. In the next few days, you may receive an email or text message from [employer] with a link to a survey related to your  clinical pharmacist.\"     To schedule another MTM appointment, please call the clinic directly or you may call the MTM scheduling line at [phone].    My Clinical Pharmacist's contact information:                                                      Please feel free to contact me with any questions or concerns you have.      Jeremy Rowley, PharmD  Medication Therapy Management (MTM) Pharmacist  [facility]      "

## 2024-08-30 ENCOUNTER — THERAPY VISIT (OUTPATIENT)
Dept: PHYSICAL THERAPY | Facility: REHABILITATION | Age: 64
End: 2024-08-30
Payer: COMMERCIAL

## 2024-08-30 DIAGNOSIS — M62.81 GENERALIZED MUSCLE WEAKNESS: ICD-10-CM

## 2024-08-30 DIAGNOSIS — M79.7 FIBROMYALGIA: ICD-10-CM

## 2024-08-30 DIAGNOSIS — R29.3 POOR POSTURE: Primary | ICD-10-CM

## 2024-08-30 DIAGNOSIS — M25.552 BILATERAL HIP PAIN: ICD-10-CM

## 2024-08-30 DIAGNOSIS — M25.551 BILATERAL HIP PAIN: ICD-10-CM

## 2024-08-30 PROCEDURE — 99207 PR NO CHARGE LOS: CPT | Performed by: PHYSICAL THERAPIST

## 2024-09-10 ENCOUNTER — OFFICE VISIT (OUTPATIENT)
Dept: PALLIATIVE MEDICINE | Facility: OTHER | Age: 64
End: 2024-09-10
Attending: NURSE PRACTITIONER
Payer: COMMERCIAL

## 2024-09-10 VITALS
OXYGEN SATURATION: 98 % | DIASTOLIC BLOOD PRESSURE: 105 MMHG | BODY MASS INDEX: 34.16 KG/M2 | WEIGHT: 199 LBS | SYSTOLIC BLOOD PRESSURE: 239 MMHG | HEART RATE: 73 BPM

## 2024-09-10 DIAGNOSIS — M79.7 FIBROMYALGIA: Primary | ICD-10-CM

## 2024-09-10 PROCEDURE — 99214 OFFICE O/P EST MOD 30 MIN: CPT | Performed by: ANESTHESIOLOGY

## 2024-09-10 PROCEDURE — G0463 HOSPITAL OUTPT CLINIC VISIT: HCPCS | Performed by: ANESTHESIOLOGY

## 2024-09-10 RX ORDER — ACETAMINOPHEN 500 MG
500-1000 TABLET ORAL EVERY 6 HOURS PRN
COMMUNITY

## 2024-09-10 ASSESSMENT — PAIN SCALES - GENERAL: PAINLEVEL: SEVERE PAIN (7)

## 2024-09-10 NOTE — PROGRESS NOTES
Patient presents to the clinic today for a visit with POLI COLEMAN MD regarding Pain Management.          7/16/2024     3:37 PM 9/10/2024     3:23 PM   PEG Score   PEG Total Score 9 9       UDS/CSA-     Medications-     Notes    Josiane Weller  [facility] Clinical Assistant

## 2024-09-10 NOTE — PATIENT INSTRUCTIONS
[facility] Number:  [phone]  Call with any questions about your care and for scheduling assistance.   Calls are returned Monday through Friday between 8 AM and 4:30 PM. We usually get back to you within 2 business days depending on the issue/request.    If we are prescribing your medications:  For opioid medication refills, call the clinic or send a [employer] message 7 days in advance.  Please include:  Name of requested medication  Name of the pharmacy.  For non-opioid medications, call your pharmacy directly to request a refill. Please allow 3-4 days to be processed.   Per [place] State Law:  All controlled substance prescriptions must be filled within 30 days of being written.    For those controlled substances allowing refills, pickup must occur within 30 days of last fill.      We believe regular attendance is key to your success in our program!    Any time you are unable to keep your appointment we ask that you call us at least 24 hours in advance to cancel.This will allow us to offer the appointment time to another patient.   Multiple missed appointments may lead to dismissal from the clinic.     PLAN:    Discussed the supplements Glutashield, and Ortho spore Ig.  May obtain at [facility] or [facility] in [place]    Discussed the role of frequency specific microcurrent  May contact these providers to see if they take your insurance or you want to work with transitions [phone], [facility] [phone], [facility] [phone], [facility] [phone].   May look up YouTube segments by hadley Perkins    You are scheduled for craniosacral therapy beginning in October.    Follow-up with Dr. Doherty in 8 weeks

## 2024-09-11 NOTE — PROGRESS NOTES
[facility] Follow-up    Date of visit: 9/11/2024    Chief complaint:   Chief Complaint   Patient presents with    Pain     Follow-up for fibromyalgia  Interval history:    Reviewing the record working with our [facility] pharmacist.  Has tried some of the supplements we discussed for inflammation.  Patient describing paradoxical side effects.    Seen today with her .  The rooming blood pressure was noted elevated.  She describes this is been a longstanding concern that is been noted.  She has been to emergency room's and hospitalized.  She does not tolerate a number of blood pressure medications with side effects.  Has a normal EKG.  Her cardiologist has not recommended ongoing management.    Describes with the agent such as could Derry and DHA, felt more agitated and anxious and more pain.  Lithium orotate caused adverse effects.    She then did look into the glutashield and Ortho spore Ig but on the website needed a doctor's number.  Reviewed for some of these medications patients do need to be actively working with a doctor.  Discussed they are available [facility] and she is familiar with that.    She is scheduled for craniosacral therapy to start in October.    Does have the methylene blue product at home, has been anxious about starting it giving that she tends to have side effects as noted too many things.    We discussed the frequency specific microcurrent.  Had not looked into it.  We discussed this a bit more given her particular 's background and medical devices.    She has used a TENS unit which has helped to some extent while it is on.    She reviews their dog did suddenly die.  She walked 2 to 5 miles a day with him.            Medications:  Current Outpatient Medications   Medication Sig Dispense Refill    acetaminophen (TYLENOL) 500 MG tablet Take 500-1,000 mg by mouth every 6  "hours as needed for mild pain. Can only use every three to four days or she gets kidney pain      blood glucose monitoring (FREESTYLE LITE) meter device kit Use to test blood sugar 6-7 times daily. 1 kit 0    Continuous Blood Gluc  (FREESTYLE RADHA 14 DAY READER) MARIA C USE TO CHECK BLOOD SUGARS AS DIRECTED 1 Device 0    Continuous Blood Gluc Sensor (FREESTYLE RADHA 3 SENSOR) MISC USE 1 SENSOR CONTINUOUSLY AS NEEDED, CHANGE EVERY 14 DAYS 6 each 3    EPINEPHrine (ANY BX GENERIC EQUIV) 0.3 MG/0.3ML injection 2-pack Inject 0.3 mLs (0.3 mg) into the muscle once as needed for anaphylaxis 2 each 1    HEMP OIL OR EXTRACT OR OTHER CBD CANNABINOID, NOT MEDICAL CANNABIS, daily CBD gummies as needed from  [facility] [employer]      IBUPROFEN PO Take by mouth. Try to not use more than every next day or her BP rises, she gets heart palpitations and chest pain      insulin aspart (NOVOLOG VIAL) 100 UNITS/ML vial USE IN INSULIN PUMP, TOTAL DAILY DOSE  UNITS 90 mL 0    Insulin Disposable Pump (OMNIPOD DASH PODS, GEN 4,) MISC INJECT 1 POD UNDER THE SKIN CONTINUOUSLY AS NEEDED, CHANGE PODS EVERY 2 TO 3 DAYS AS DIRECTED 40 each 3    INSULIN PUMP - OUTPATIENT INSULIN PUMP - OUTPATIENT  Date last updated: 6/4/21 Omnipod DASH  BASAL RATES and times:   12am: 1.3, 6 am: 0.75, 12 pm: 0.8, 6 pm: 0.95   CARB RATIO: 12am-12am: 10  Correction Factor (Sensitivity): 12AM (midnight): 31 -- 5 AM: 33  Target blood glucose: 100-120  Active insulin time: 4 hrs      insulin syringe-needle U-100 (31G X 5/16\" 0.3 ML) 31G X 5/16\" 0.3 ML miscellaneous Use 3 syringes daily or as directed, in the event of pump failure. 100 each 3    magnesium 250 MG tablet Take 1 tablet by mouth daily      medical cannabis (Patient's own supply) See Admin Instructions (The purpose of this order is to document that the patient reports taking medical cannabis.  This is not a prescription, and is not used to certify that the patient has a qualifying medical " condition.)    Taking up to 40 mg but usually 10-20 mg caplet from [facility].      MELATONIN PO Take 20 mg by mouth At Bedtime       ondansetron (ZOFRAN ODT) 4 MG ODT tab Take 1 tablet (4 mg) by mouth every 8 hours as needed for nausea 18 tablet 1    polyethylene glycol (MIRALAX/GLYCOLAX) packet Take 17 g by mouth At Bedtime       STATIN NOT PRESCRIBED, INTENTIONAL, Statin not prescribed intentionally due to Intolerance 0 each 0           Physical Exam:  Blood pressure (!) 239/105, pulse 73, weight 90.3 kg (199 lb), last menstrual period 01/01/1999, SpO2 98%, not currently breastfeeding.    Alert, clear sensorium.  No respiratory distress, no pain behavior.    Affect fairly full range and congruent            Assessment:   Fibromyalgia irritable bowel syndrome.  As noted did not tolerate a number of supplements and has not tolerated other medications.    High blood pressure is noted she is not particularly concerned given her history.    We discussed today the relationship of GI disturbance related to cognitive issues and traumatic brain injuries.  She is familiar with some of this literature.    Plan as above.    Total time 32 minutes       minutes spent on the date of encounter doing chart review, history, and exam documentation and further activities as noted above.     Mayank Doherty MD  [facility]

## 2024-09-16 ENCOUNTER — MEDICAL CORRESPONDENCE (OUTPATIENT)
Dept: HEALTH INFORMATION MANAGEMENT | Facility: CLINIC | Age: 64
End: 2024-09-16
Payer: COMMERCIAL

## 2024-09-16 ENCOUNTER — TRANSFERRED RECORDS (OUTPATIENT)
Dept: HEALTH INFORMATION MANAGEMENT | Facility: CLINIC | Age: 64
End: 2024-09-16
Payer: COMMERCIAL

## 2024-09-17 ENCOUNTER — OFFICE VISIT (OUTPATIENT)
Dept: PEDIATRICS | Facility: CLINIC | Age: 64
End: 2024-09-17
Payer: COMMERCIAL

## 2024-09-17 ENCOUNTER — TELEPHONE (OUTPATIENT)
Dept: PEDIATRICS | Facility: CLINIC | Age: 64
End: 2024-09-17

## 2024-09-17 VITALS
TEMPERATURE: 98.4 F | DIASTOLIC BLOOD PRESSURE: 74 MMHG | RESPIRATION RATE: 16 BRPM | OXYGEN SATURATION: 96 % | HEART RATE: 69 BPM | WEIGHT: 194.4 LBS | BODY MASS INDEX: 33.19 KG/M2 | SYSTOLIC BLOOD PRESSURE: 150 MMHG | HEIGHT: 64 IN

## 2024-09-17 DIAGNOSIS — R07.89 CHEST DISCOMFORT: Primary | ICD-10-CM

## 2024-09-17 DIAGNOSIS — R07.9 CHEST PAIN: Primary | ICD-10-CM

## 2024-09-17 PROCEDURE — 99214 OFFICE O/P EST MOD 30 MIN: CPT | Performed by: PHYSICIAN ASSISTANT

## 2024-09-17 ASSESSMENT — PATIENT HEALTH QUESTIONNAIRE - PHQ9
SUM OF ALL RESPONSES TO PHQ QUESTIONS 1-9: 14
SUM OF ALL RESPONSES TO PHQ QUESTIONS 1-9: 14
10. IF YOU CHECKED OFF ANY PROBLEMS, HOW DIFFICULT HAVE THESE PROBLEMS MADE IT FOR YOU TO DO YOUR WORK, TAKE CARE OF THINGS AT HOME, OR GET ALONG WITH OTHER PEOPLE: VERY DIFFICULT

## 2024-09-17 ASSESSMENT — PAIN SCALES - GENERAL: PAINLEVEL: MILD PAIN (2)

## 2024-09-17 NOTE — PROGRESS NOTES
"  Assessment & Plan     Chest discomfort  Proceed with Stress test. Scheduled for next week. Signs for emergent evaluation discussed with patient and .      Mario Alberto Hernadez is a 63 year old, presenting for the following health issues:  Hospital F/U        9/17/2024     1:15 PM   Additional Questions   Roomed by [facility]HS   Accompanied by - otf     HO  ED/UC Followup:    Facility:  [facility]  Date of visit: 09/16/2024  Reason for visit: Chest pain, hypertension   Current Status: patient reports still having chest pain that is still the same. Intermittent chest pain. Currently reports feeling discomfort in the chest (lungs). Also reports blurred vision and headaches that is becoming more apparent.     CXR, EKG and troponins NEGATIVE. Dr recommended outpt stress test.     Patient has risk factors such as HTN, HLD, DM however unable to take many meds due to SE.      Review of Systems  Constitutional, HEENT, cardiovascular, pulmonary, gi and gu systems are negative, except as otherwise noted.      Objective    BP (!) 150/74   Pulse 69   Temp 98.4  F (36.9  C) (Temporal)   Resp 16   Ht 1.626 m (5' 4\")   Wt 88.2 kg (194 lb 6.4 oz)   LMP 01/01/1999   SpO2 96%   BMI 33.37 kg/m    Body mass index is 33.37 kg/m .  Physical Exam   GENERAL: alert and no distress  RESP: lungs clear to auscultation - no rales, rhonchi or wheezes  CV: regular rate and rhythm, normal S1 S2, no S3 or S4, no murmur, click or rub, no peripheral edema    No results found for any visits on 09/17/24.        Signed Electronically by: Michael Adams PA-C    "

## 2024-09-17 NOTE — TELEPHONE ENCOUNTER
"Pt's  calls (CTC on file) to report recent ED visit for chest pain. He is requesting asap hospital follow-up visit and stress test scheduling. Scheduled pt to be seen today for hospital follow up visit. Order for stress test is seen per chart review, provided pt's  with cardiology scheduling line: [phone].    Reiterated discharge instructions per chart review:  \"Discharge Instructions  William Segundo MD - 09/16/2024 7:18 PM CDT   Formatting of this note might be different from the original.  Recommend that you are evaluated by her primary care provider or seen in the emergency department as soon as possible for stress testing. Your troponin, EKG, chest x-ray were normal today. However you do have significant risk factors given diabetes and uncontrolled hypertension. If things worsen between now and next evaluation I recommend being seen in the emergency department. He would benefit from stress testing as soon as possible.    Return for chest pain, shortness of breath caused during exertion or that worsens with exertion. Additionally, if you have neck, jaw or shoulder pain with exertion or worsen with exertion. Or for any new or concerning related symptoms.    Follow up with your family doctor and outpatient tests if discussed or directed. \"    Ruben Escoto RN on 9/17/2024 at 10:24 AM    "

## 2024-09-25 ENCOUNTER — HOSPITAL ENCOUNTER (OUTPATIENT)
Dept: CARDIOLOGY | Facility: CLINIC | Age: 64
Discharge: HOME | End: 2024-09-25
Attending: EMERGENCY MEDICINE | Admitting: EMERGENCY MEDICINE
Payer: COMMERCIAL

## 2024-09-25 DIAGNOSIS — R07.9 CHEST PAIN: ICD-10-CM

## 2024-09-25 PROCEDURE — 93321 DOPPLER ECHO F-UP/LMTD STD: CPT | Mod: 26 | Performed by: INTERNAL MEDICINE

## 2024-09-25 PROCEDURE — 93325 DOPPLER ECHO COLOR FLOW MAPG: CPT | Mod: TC

## 2024-09-25 PROCEDURE — 93325 DOPPLER ECHO COLOR FLOW MAPG: CPT | Mod: 26 | Performed by: INTERNAL MEDICINE

## 2024-09-25 PROCEDURE — 93350 STRESS TTE ONLY: CPT | Mod: 26 | Performed by: INTERNAL MEDICINE

## 2024-09-25 PROCEDURE — 93018 CV STRESS TEST I&R ONLY: CPT | Performed by: INTERNAL MEDICINE

## 2024-09-25 PROCEDURE — 93016 CV STRESS TEST SUPVJ ONLY: CPT | Performed by: INTERNAL MEDICINE

## 2024-10-15 ENCOUNTER — MYC MEDICAL ADVICE (OUTPATIENT)
Dept: PEDIATRICS | Facility: CLINIC | Age: 64
End: 2024-10-15
Payer: COMMERCIAL

## 2024-10-29 ENCOUNTER — THERAPY VISIT (OUTPATIENT)
Dept: PHYSICAL THERAPY | Facility: REHABILITATION | Age: 64
End: 2024-10-29
Payer: COMMERCIAL

## 2024-10-29 DIAGNOSIS — M62.81 GENERALIZED MUSCLE WEAKNESS: ICD-10-CM

## 2024-10-29 DIAGNOSIS — R29.3 POOR POSTURE: ICD-10-CM

## 2024-10-29 DIAGNOSIS — M79.7 FIBROMYALGIA: Primary | ICD-10-CM

## 2024-10-29 PROCEDURE — 97110 THERAPEUTIC EXERCISES: CPT | Mod: GP | Performed by: PHYSICAL THERAPIST

## 2024-10-29 PROCEDURE — 97140 MANUAL THERAPY 1/> REGIONS: CPT | Mod: GP | Performed by: PHYSICAL THERAPIST

## 2024-10-30 PROBLEM — N18.31 CHRONIC KIDNEY DISEASE, STAGE 3A (H): Status: RESOLVED | Noted: 2022-05-02 | Resolved: 2024-10-30

## 2024-10-30 NOTE — PROGRESS NOTES
[facility]                                                                                   OUTPATIENT PHYSICAL THERAPY    PLAN OF TREATMENT FOR OUTPATIENT REHABILITATION   Patient's Last Name, First Name, Maricarmen Leija YOB: 1960   Provider's Name   [facility]   Medical Record No.  [id number]     Onset Date: 07/16/24  Start of Care Date: 08/01/24     Medical Diagnosis:  Fibromyalgia      PT Treatment Diagnosis:  Fibromyalgia, generalized weakness, poor posture Plan of Treatment  Frequency/Duration: (P) 1x/week (12 visits)/ 12 weeks    Certification date from (P) 10/24/24 to (P) 01/22/25         See note for plan of treatment details and functional goals     SANJEEV RIVERA, PT                         I CERTIFY THE NEED FOR THESE SERVICES FURNISHED UNDER        THIS PLAN OF TREATMENT AND WHILE UNDER MY CARE     (Physician attestation of this document indicates review and certification of the therapy plan).              Referring Provider:  Mayank Doherty    Initial Assessment  See Epic Evaluation- Start of Care Date: 08/01/24            PLAN  Continue therapy per current plan of care.    Beginning/End Dates of Progress Note Reporting Period:  08/01/24 to 10/29/2024    Referring Provider:  Mayank Doherty         10/29/24 0500   Appointment Info   Signing clinician's name / credentials Sanjeev Rivera PT   Total/Authorized Visits (E&T)   Visits Used 3   Medical Diagnosis Fibromyalgia   PT Tx Diagnosis Fibromyalgia, generalized weakness, poor posture   Other pertinent information craniosacral therapy per order, provided information to schedule with specialty provider. Pt will start with traditional PT and transition to craniosacral PT   Quick Adds [facility] Auth & Certification   Progress Note/Certification   Start of Care Date 08/01/24   Onset of illness/injury or Date of Surgery 07/16/24   Therapy Frequency 1x/week (12  visits)   Predicted Duration 12 weeks   Certification date from 10/24/24   Certification date to 01/22/25   Progress Note Completed Date 08/01/24   [facility] Authorization Information   Condition type Chronic (continuous duration <3 months)   Cause of current episode Unspecified   Nature of treatment Rehabilitative   Functional ability Moderate functional limitations   Documented POC (choose all that apply) Measurable short and long term/discharge treatment goals related to physical and functional deficits.;Frequency of treatment visits and treatment activities to address deficit areas.;Patient agrees to program participation including home program   Briefly describe symptoms Fibromyalgia symptoms that affect her daily functional ability and really limits her sleep to at most 4-5 hours/night cummulatively   How did the symptoms start chronic in nature and has progressed as time went on.   Average pain/intensity last 24 hours 8/10   Average pain/intensity past week 9/10   Frequency of Symptoms Constantly (% of the time)   Symptom impact on ADLs Extremely   Condition change since eval Worse   General health reported by patient Poor   GOALS   PT Goals 2;3   PT Goal 1   Goal Identifier Walk   Goal Description Pt will walk 4-5 miles with <7/10 pain to improve her aerobic endurance and participation in recreational activities   Goal Progress no change   Target Date 10/24/24   PT Goal 2   Goal Identifier Sleep   Goal Description Pt will sleep for 3+ hours without waking from pain to improve her sleep quality   Goal Progress no change   Target Date 10/24/24   Subjective Report   Subjective Report She is unable to lay on her L side. The ribs in her mid thoracic spine get spasmodic and is really hard to catch her breath. Her hips are really painful and stiff. Food and meds can make a difference in her pain. She finds that if she is really active during the day she is really spent and is unable to do much. The mornings is the  only time she can really do anything.   Objective Measures   Objective Measures Objective Measure 1   Objective Measure 1   Objective Measure C-rot: 70/77   Details T-rot: 58/58   Treatment Interventions (PT)   Interventions Therapeutic Procedure/Exercise;Manual Therapy   Therapeutic Procedure/Exercise   Therapeutic Procedures: strength, endurance, ROM, flexibility minutes (83654) 25   Therapeutic Procedures Ther Proc 5   Ther Proc 1 Stretching   Ther Proc 1 - Details standing back stretches, odalis pose, press ups, cat/ cow, downward dog, kegels   Ther Proc 2 Isometric hip add   Ther Proc 2 - Details Does every other day; hold 3-5 sec, x 5 reps x 2 sets; addition of bigger towel roll to maintain neutral hip position- in sitting   Ther Proc 3 Isometric hip abd   Ther Proc 3 - Details seated: through partial ROM with GTB- x 4 reps, increased pain in L femur, not added to HEP   Ther Proc 4 Pool   Ther Proc 4 - Details education on standing hip abd and side stepping in the pool. Encouraged pt to reduce time in the pool (does 2-3 hours but when finished has increased pain/soreness) but try to work on increasing frequency. Only able to use outdoor home pool (4 ft high) and cannot tolerate chlorine/chemicals in pool.   PTRx Ther Proc 1 Posterior Pelvic Tilt   PTRx Ther Proc 1 - Details HEP   Skilled Intervention HEP for abdominal/hip strengthening, continue with stretching to reduce pain and improve function   Patient Response/Progress overall, poor tolerance to exercise   Ther Proc 5 Ed on continuing with HEP and discussion of how fascia relates to current symptoms   Manual Therapy   Manual Therapy: Mobilization, MFR, MLD, friction massage minutes (16574) 30   Manual Therapy 1 Fascial release using Strain-Counterstrain with mobilizations to B cervical SMED-LV, B cervical PEPI-ADP, dural tube release   Plan   Plan for next session Plan to con't with manual therapy to decrease fascial tension/tone to normalize ROM/decrease  inflammation/decrease mm tone and improve proprioception.   Comments   Comments She did have improvement in her sinus and HA post treatment today. Working more into her upper cervical spine will help to decrease more sensitization into her system.   Total Session Time   Timed Code Treatment Minutes 55   Total Treatment Time (sum of timed and untimed services) 55

## 2024-11-01 ASSESSMENT — PATIENT HEALTH QUESTIONNAIRE - PHQ9: SUM OF ALL RESPONSES TO PHQ QUESTIONS 1-9: 18

## 2024-11-01 ASSESSMENT — ANXIETY QUESTIONNAIRES
GAD7 TOTAL SCORE: 17
GAD7 TOTAL SCORE: 21

## 2024-11-02 ENCOUNTER — TRANSFERRED RECORDS (OUTPATIENT)
Dept: HEALTH INFORMATION MANAGEMENT | Facility: CLINIC | Age: 64
End: 2024-11-02
Payer: COMMERCIAL

## 2024-11-06 ENCOUNTER — OFFICE VISIT (OUTPATIENT)
Dept: CARDIOLOGY | Facility: CLINIC | Age: 64
End: 2024-11-06
Payer: COMMERCIAL

## 2024-11-06 ENCOUNTER — THERAPY VISIT (OUTPATIENT)
Dept: PHYSICAL THERAPY | Facility: REHABILITATION | Age: 64
End: 2024-11-06
Payer: COMMERCIAL

## 2024-11-06 VITALS
HEART RATE: 64 BPM | WEIGHT: 196 LBS | DIASTOLIC BLOOD PRESSURE: 94 MMHG | HEIGHT: 64 IN | BODY MASS INDEX: 33.46 KG/M2 | SYSTOLIC BLOOD PRESSURE: 172 MMHG

## 2024-11-06 DIAGNOSIS — I10 ESSENTIAL HYPERTENSION, BENIGN: Primary | ICD-10-CM

## 2024-11-06 DIAGNOSIS — R29.3 POOR POSTURE: ICD-10-CM

## 2024-11-06 DIAGNOSIS — M62.81 GENERALIZED MUSCLE WEAKNESS: ICD-10-CM

## 2024-11-06 DIAGNOSIS — M79.7 FIBROMYALGIA: Primary | ICD-10-CM

## 2024-11-06 PROCEDURE — G2211 COMPLEX E/M VISIT ADD ON: HCPCS | Performed by: INTERNAL MEDICINE

## 2024-11-06 PROCEDURE — 99215 OFFICE O/P EST HI 40 MIN: CPT | Performed by: INTERNAL MEDICINE

## 2024-11-06 PROCEDURE — 97140 MANUAL THERAPY 1/> REGIONS: CPT | Mod: GP | Performed by: PHYSICAL THERAPIST

## 2024-11-06 RX ORDER — EPLERENONE 25 MG/1
25 TABLET, FILM COATED ORAL DAILY
Qty: 90 TABLET | Refills: 3 | Status: SHIPPED | OUTPATIENT
Start: 2024-11-06

## 2024-11-06 NOTE — PATIENT INSTRUCTIONS
Start the eplerenone at 1/2 pill per day for two weeks (in morning) and try and cut your fluids by 1/3 or so.     If the medication doesn't cause issues, then can try a full pill daily.    See how things go and see my nurse practitioners in a month.

## 2024-11-06 NOTE — LETTER
11/6/2024    Madyson Mendoza MD  [address] [place] [address]    RE: Maricarmen Dotson       Dear Colleague,     I had the pleasure of seeing Maricarmen ROXANNE Davistoribio in the [facility].  Cardiology Progress Note          Assessment and Plan:       Uncontrolled hypertension  Trial of a small dose of eplerenone 12.5 mg every day, may increase to a full pill if tolerated.  Reduce amount of free water intake  Follow-up in 1 month with REYMUNDO.    40 minutes was spent with the patient, precharting and reviewing tests as well as post visit charting all done today..    This note was transcribed using electronic voice recognition software and there may be typographical errors present.     The longitudinal plan of care for the diagnosis(es)/condition(s) as documented were addressed during this visit. Due to the added complexity in care, I will continue to support Maricarmen in the subsequent management and with ongoing continuity of care.                    Interval History:     The patient is a 63 year old whom I meeting for the first time.  She has a number of medication intolerances.  She has uncontrolled hypertension.  She has an eating disorder which precludes weight loss.  She drinks a lot of fluid because her mouth is dry.  She tries to limit the amount of salt that she takes in.    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  They request consideration of taking small doses of multiple antihypertensives for blood pressure control.    We discussed that this is a reasonable strategy.  They were very thankful for a collaborative atmosphere in which they felt listened to as they feel like they have been dismissed in the past and minimized.                     Review of Systems:     Review of Systems:  Skin:  not assessed     Eyes:  not assessed    ENT:  not assessed    Respiratory:  Positive for shortness of breath;sleep apnea  Cardiovascular:    Positive  "for;palpitations;chest pain;heaviness;fatigue;lightheadedness;dizziness  Gastroenterology: not assessed    Genitourinary:  not assessed    Musculoskeletal:  not assessed    Neurologic:  not assessed    Psychiatric:  not assessed    Heme/Lymph/Imm:  not assessed    Endocrine:  not assessed                Physical Exam:     Vitals: BP (!) 172/94 (BP Location: Right arm, Patient Position: Sitting)   Pulse 64   Ht 1.626 m (5' 4\")   Wt 88.9 kg (196 lb)   LMP 01/01/1999   BMI 33.64 kg/m    Constitutional:  cooperative, alert and oriented, well developed, well nourished, in no acute distress overweight      Skin:  warm and dry to the touch, no apparent skin lesions or masses noted        Head:  normocephalic        Eyes:  pupils equal and round, conjunctivae and lids unremarkable, sclera white, no xanthalasma, EOMS intact, no nystagmus        Chest:           Cardiac: regular rhythm;normal S1 and S2       grade 1;systolic murmur          Extremities and Back:  no edema        Neurological:  no gross motor deficits;affect appropriate                 Medications:     Current Outpatient Medications   Medication Sig Dispense Refill     acetaminophen (TYLENOL) 500 MG tablet Take 500-1,000 mg by mouth every 6 hours as needed for mild pain. Can only use every three to four days or she gets kidney pain       blood glucose monitoring (FREESTYLE LITE) meter device kit Use to test blood sugar 6-7 times daily. 1 kit 0     Continuous Blood Gluc  (FREESTYLE RADHA 14 DAY READER) MARIA C USE TO CHECK BLOOD SUGARS AS DIRECTED 1 Device 0     Continuous Blood Gluc Sensor (FREESTYLE RADHA 3 SENSOR) MISC USE 1 SENSOR CONTINUOUSLY AS NEEDED, CHANGE EVERY 14 DAYS 6 each 3     EPINEPHrine (ANY BX GENERIC EQUIV) 0.3 MG/0.3ML injection 2-pack Inject 0.3 mLs (0.3 mg) into the muscle once as needed for anaphylaxis 2 each 1     eplerenone (INSPRA) 25 MG tablet Take 1 tablet (25 mg) by mouth daily. 90 tablet 3     HEMP OIL OR EXTRACT OR OTHER " "CBD CANNABINOID, NOT MEDICAL CANNABIS, daily CBD gummies as needed from [employer] [facility] [employer]       IBUPROFEN PO Take by mouth. Try to not use more than every next day or her BP rises, she gets heart palpitations and chest pain       insulin aspart (NOVOLOG VIAL) 100 UNITS/ML vial USE IN INSULIN PUMP, TOTAL DAILY DOSE  UNITS 90 mL 0     Insulin Disposable Pump (OMNIPOD DASH PODS, GEN 4,) MISC INJECT 1 POD UNDER THE SKIN CONTINUOUSLY AS NEEDED, CHANGE PODS EVERY 2 TO 3 DAYS AS DIRECTED 40 each 3     INSULIN PUMP - OUTPATIENT INSULIN PUMP - OUTPATIENT  Date last updated: 6/4/21 Omnipod DASH  BASAL RATES and times:   12am: 1.3, 6 am: 0.75, 12 pm: 0.8, 6 pm: 0.95   CARB RATIO: 12am-12am: 10  Correction Factor (Sensitivity): 12AM (midnight): 31 -- 5 AM: 33  Target blood glucose: 100-120  Active insulin time: 4 hrs       insulin syringe-needle U-100 (31G X 5/16\" 0.3 ML) 31G X 5/16\" 0.3 ML miscellaneous Use 3 syringes daily or as directed, in the event of pump failure. 100 each 3     medical cannabis (Patient's own supply) See Admin Instructions (The purpose of this order is to document that the patient reports taking medical cannabis.  This is not a prescription, and is not used to certify that the patient has a qualifying medical condition.)    Taking up to 40 mg but usually 10-20 mg caplet from [facility].       MELATONIN PO Take 20 mg by mouth At Bedtime        ondansetron (ZOFRAN ODT) 4 MG ODT tab Take 1 tablet (4 mg) by mouth every 8 hours as needed for nausea 18 tablet 1     polyethylene glycol (MIRALAX/GLYCOLAX) packet Take 17 g by mouth At Bedtime        magnesium 250 MG tablet Take 1 tablet by mouth daily       STATIN NOT PRESCRIBED, INTENTIONAL, Statin not prescribed intentionally due to Intolerance 0 each 0                Data:   All laboratory data reviewed  No results found. However, due to the size of the patient record, not all encounters were searched. Please check Results Review for a complete set of " results.    All laboratory data reviewed  Lab Results   Component Value Date    CHOL 210 07/05/2023    CHOL 221 09/18/2020     Lab Results   Component Value Date    HDL 57 07/05/2023    HDL 51 09/18/2020     Lab Results   Component Value Date     07/05/2023     09/18/2020     Lab Results   Component Value Date    TRIG 102 07/05/2023    TRIG 137 09/18/2020     Lab Results   Component Value Date    CHOLHDLRATIO 5.0 02/21/2015     TSH   Date Value Ref Range Status   11/21/2023 0.73 0.30 - 4.20 uIU/mL Final   09/18/2020 1.19 0.40 - 4.00 mU/L Final     Last Basic Metabolic Panel:  Lab Results   Component Value Date     04/25/2024     01/20/2021      Lab Results   Component Value Date    POTASSIUM 3.8 04/25/2024    POTASSIUM 3.8 07/06/2022    POTASSIUM 3.7 01/20/2021     Lab Results   Component Value Date    CHLORIDE 104 04/25/2024    CHLORIDE 107 07/06/2022    CHLORIDE 107 01/20/2021     Lab Results   Component Value Date    KARIN 9.5 04/25/2024    KARIN 9.1 01/20/2021     Lab Results   Component Value Date    CO2 27 04/25/2024    CO2 28 07/06/2022    CO2 30 01/20/2021     Lab Results   Component Value Date    BUN 18.9 04/25/2024    BUN 19 07/06/2022    BUN 10 01/20/2021     Lab Results   Component Value Date    CR 0.88 04/25/2024    CR 0.77 01/20/2021     Lab Results   Component Value Date     04/25/2024     07/06/2022     01/20/2021     Lab Results   Component Value Date    WBC 7.5 01/20/2021     Lab Results   Component Value Date    RBC 4.49 01/20/2021     Lab Results   Component Value Date    HGB 13.7 07/05/2023    HGB 13.4 01/20/2021     Lab Results   Component Value Date    HCT 39.8 01/20/2021     Lab Results   Component Value Date    MCV 89 01/20/2021     Lab Results   Component Value Date    MCH 29.8 01/20/2021     Lab Results   Component Value Date    MCHC 33.7 01/20/2021     Lab Results   Component Value Date    RDW 12.1 01/20/2021     Lab Results   Component Value  Date     01/20/2021             Thank you for allowing me to participate in the care of your patient.      Sincerely,     Neville Sharma MD     [facility]  cc:   No referring provider defined for this encounter.

## 2024-11-06 NOTE — PROGRESS NOTES
Cardiology Progress Note          Assessment and Plan:       Uncontrolled hypertension  Trial of a small dose of eplerenone 12.5 mg every day, may increase to a full pill if tolerated.  Reduce amount of free water intake  Follow-up in 1 month with REYMUNDO.    40 minutes was spent with the patient, precharting and reviewing tests as well as post visit charting all done today..    This note was transcribed using electronic voice recognition software and there may be typographical errors present.     The longitudinal plan of care for the diagnosis(es)/condition(s) as documented were addressed during this visit. Due to the added complexity in care, I will continue to support Maricarmen in the subsequent management and with ongoing continuity of care.                    Interval History:     The patient is a 63 year old whom I meeting for the first time.  She has a number of medication intolerances.  She has uncontrolled hypertension.  She has an eating disorder which precludes weight loss.  She drinks a lot of fluid because her mouth is dry.  She tries to limit the amount of salt that she takes in.    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  They request consideration of taking small doses of multiple antihypertensives for blood pressure control.    We discussed that this is a reasonable strategy.  They were very thankful for a collaborative atmosphere in which they felt listened to as they feel like they have been dismissed in the past and minimized.                     Review of Systems:     Review of Systems:  Skin:  not assessed     Eyes:  not assessed    ENT:  not assessed    Respiratory:  Positive for shortness of breath;sleep apnea  Cardiovascular:    Positive for;palpitations;chest pain;heaviness;fatigue;lightheadedness;dizziness  Gastroenterology: not assessed    Genitourinary:  not assessed    Musculoskeletal:  not assessed    Neurologic:  not assessed    Psychiatric:  not  "assessed    Heme/Lymph/Imm:  not assessed    Endocrine:  not assessed                Physical Exam:     Vitals: BP (!) 172/94 (BP Location: Right arm, Patient Position: Sitting)   Pulse 64   Ht 1.626 m (5' 4\")   Wt 88.9 kg (196 lb)   LMP 01/01/1999   BMI 33.64 kg/m    Constitutional:  cooperative, alert and oriented, well developed, well nourished, in no acute distress overweight      Skin:  warm and dry to the touch, no apparent skin lesions or masses noted        Head:  normocephalic        Eyes:  pupils equal and round, conjunctivae and lids unremarkable, sclera white, no xanthalasma, EOMS intact, no nystagmus        Chest:           Cardiac: regular rhythm;normal S1 and S2       grade 1;systolic murmur          Extremities and Back:  no edema        Neurological:  no gross motor deficits;affect appropriate                 Medications:     Current Outpatient Medications   Medication Sig Dispense Refill    acetaminophen (TYLENOL) 500 MG tablet Take 500-1,000 mg by mouth every 6 hours as needed for mild pain. Can only use every three to four days or she gets kidney pain      blood glucose monitoring (FREESTYLE LITE) meter device kit Use to test blood sugar 6-7 times daily. 1 kit 0    Continuous Blood Gluc  (FREESTYLE RADHA 14 DAY READER) MARIA C USE TO CHECK BLOOD SUGARS AS DIRECTED 1 Device 0    Continuous Blood Gluc Sensor (FREESTYLE RADHA 3 SENSOR) MISC USE 1 SENSOR CONTINUOUSLY AS NEEDED, CHANGE EVERY 14 DAYS 6 each 3    EPINEPHrine (ANY BX GENERIC EQUIV) 0.3 MG/0.3ML injection 2-pack Inject 0.3 mLs (0.3 mg) into the muscle once as needed for anaphylaxis 2 each 1    eplerenone (INSPRA) 25 MG tablet Take 1 tablet (25 mg) by mouth daily. 90 tablet 3    HEMP OIL OR EXTRACT OR OTHER CBD CANNABINOID, NOT MEDICAL CANNABIS, daily CBD gummies as needed from [facility] [employer] [facility] [employer]      IBUPROFEN PO Take by mouth. Try to not use more than every next day or her BP rises, she gets heart palpitations and chest pain      " "insulin aspart (NOVOLOG VIAL) 100 UNITS/ML vial USE IN INSULIN PUMP, TOTAL DAILY DOSE  UNITS 90 mL 0    Insulin Disposable Pump (OMNIPOD DASH PODS, GEN 4,) MISC INJECT 1 POD UNDER THE SKIN CONTINUOUSLY AS NEEDED, CHANGE PODS EVERY 2 TO 3 DAYS AS DIRECTED 40 each 3    INSULIN PUMP - OUTPATIENT INSULIN PUMP - OUTPATIENT  Date last updated: 6/4/21 Omnipod DASH  BASAL RATES and times:   12am: 1.3, 6 am: 0.75, 12 pm: 0.8, 6 pm: 0.95   CARB RATIO: 12am-12am: 10  Correction Factor (Sensitivity): 12AM (midnight): 31 -- 5 AM: 33  Target blood glucose: 100-120  Active insulin time: 4 hrs      insulin syringe-needle U-100 (31G X 5/16\" 0.3 ML) 31G X 5/16\" 0.3 ML miscellaneous Use 3 syringes daily or as directed, in the event of pump failure. 100 each 3    medical cannabis (Patient's own supply) See Admin Instructions (The purpose of this order is to document that the patient reports taking medical cannabis.  This is not a prescription, and is not used to certify that the patient has a qualifying medical condition.)    Taking up to 40 mg but usually 10-20 mg caplet from [facility].      MELATONIN PO Take 20 mg by mouth At Bedtime       ondansetron (ZOFRAN ODT) 4 MG ODT tab Take 1 tablet (4 mg) by mouth every 8 hours as needed for nausea 18 tablet 1    polyethylene glycol (MIRALAX/GLYCOLAX) packet Take 17 g by mouth At Bedtime       magnesium 250 MG tablet Take 1 tablet by mouth daily      STATIN NOT PRESCRIBED, INTENTIONAL, Statin not prescribed intentionally due to Intolerance 0 each 0                Data:   All laboratory data reviewed  No results found. However, due to the size of the patient record, not all encounters were searched. Please check Results Review for a complete set of results.    All laboratory data reviewed  Lab Results   Component Value Date    CHOL 210 07/05/2023    CHOL 221 09/18/2020     Lab Results   Component Value Date    HDL 57 07/05/2023    HDL 51 09/18/2020     Lab Results   Component Value Date "     07/05/2023     09/18/2020     Lab Results   Component Value Date    TRIG 102 07/05/2023    TRIG 137 09/18/2020     Lab Results   Component Value Date    CHOLHDLRATIO 5.0 02/21/2015     TSH   Date Value Ref Range Status   11/21/2023 0.73 0.30 - 4.20 uIU/mL Final   09/18/2020 1.19 0.40 - 4.00 mU/L Final     Last Basic Metabolic Panel:  Lab Results   Component Value Date     04/25/2024     01/20/2021      Lab Results   Component Value Date    POTASSIUM 3.8 04/25/2024    POTASSIUM 3.8 07/06/2022    POTASSIUM 3.7 01/20/2021     Lab Results   Component Value Date    CHLORIDE 104 04/25/2024    CHLORIDE 107 07/06/2022    CHLORIDE 107 01/20/2021     Lab Results   Component Value Date    KARIN 9.5 04/25/2024    KARIN 9.1 01/20/2021     Lab Results   Component Value Date    CO2 27 04/25/2024    CO2 28 07/06/2022    CO2 30 01/20/2021     Lab Results   Component Value Date    BUN 18.9 04/25/2024    BUN 19 07/06/2022    BUN 10 01/20/2021     Lab Results   Component Value Date    CR 0.88 04/25/2024    CR 0.77 01/20/2021     Lab Results   Component Value Date     04/25/2024     07/06/2022     01/20/2021     Lab Results   Component Value Date    WBC 7.5 01/20/2021     Lab Results   Component Value Date    RBC 4.49 01/20/2021     Lab Results   Component Value Date    HGB 13.7 07/05/2023    HGB 13.4 01/20/2021     Lab Results   Component Value Date    HCT 39.8 01/20/2021     Lab Results   Component Value Date    MCV 89 01/20/2021     Lab Results   Component Value Date    MCH 29.8 01/20/2021     Lab Results   Component Value Date    MCHC 33.7 01/20/2021     Lab Results   Component Value Date    RDW 12.1 01/20/2021     Lab Results   Component Value Date     01/20/2021

## 2024-11-13 ENCOUNTER — THERAPY VISIT (OUTPATIENT)
Dept: PHYSICAL THERAPY | Facility: REHABILITATION | Age: 64
End: 2024-11-13
Payer: COMMERCIAL

## 2024-11-13 DIAGNOSIS — M79.7 FIBROMYALGIA: Primary | ICD-10-CM

## 2024-11-13 DIAGNOSIS — R29.3 POOR POSTURE: ICD-10-CM

## 2024-11-13 DIAGNOSIS — M62.81 GENERALIZED MUSCLE WEAKNESS: ICD-10-CM

## 2024-11-13 PROCEDURE — 97140 MANUAL THERAPY 1/> REGIONS: CPT | Mod: GP | Performed by: PHYSICAL THERAPIST

## 2024-11-17 ASSESSMENT — PAIN SCALES - PAIN ENJOYMENT GENERAL ACTIVITY SCALE (PEG)
INTERFERED_ENJOYMENT_LIFE: 9
AVG_PAIN_PASTWEEK: 8
PEG_TOTALSCORE: 8.33
INTERFERED_GENERAL_ACTIVITY: 8

## 2024-11-18 ENCOUNTER — MYC MEDICAL ADVICE (OUTPATIENT)
Dept: CARDIOLOGY | Facility: CLINIC | Age: 64
End: 2024-11-18
Payer: COMMERCIAL

## 2024-11-18 DIAGNOSIS — I10 HYPERTENSION GOAL BP (BLOOD PRESSURE) < 140/90: Primary | ICD-10-CM

## 2024-11-20 ENCOUNTER — LAB (OUTPATIENT)
Dept: LAB | Facility: HOSPITAL | Age: 64
End: 2024-11-20
Attending: ANESTHESIOLOGY
Payer: COMMERCIAL

## 2024-11-20 ENCOUNTER — THERAPY VISIT (OUTPATIENT)
Dept: PHYSICAL THERAPY | Facility: REHABILITATION | Age: 64
End: 2024-11-20
Payer: COMMERCIAL

## 2024-11-20 ENCOUNTER — OFFICE VISIT (OUTPATIENT)
Dept: PALLIATIVE MEDICINE | Facility: OTHER | Age: 64
End: 2024-11-20
Attending: ANESTHESIOLOGY
Payer: COMMERCIAL

## 2024-11-20 VITALS
DIASTOLIC BLOOD PRESSURE: 80 MMHG | SYSTOLIC BLOOD PRESSURE: 180 MMHG | BODY MASS INDEX: 33.64 KG/M2 | WEIGHT: 196 LBS | HEART RATE: 58 BPM

## 2024-11-20 DIAGNOSIS — M79.7 FIBROMYALGIA: Primary | ICD-10-CM

## 2024-11-20 DIAGNOSIS — M25.50 MULTIPLE JOINT PAIN: ICD-10-CM

## 2024-11-20 DIAGNOSIS — M25.50 MULTIPLE JOINT PAIN: Primary | ICD-10-CM

## 2024-11-20 DIAGNOSIS — M62.81 GENERALIZED MUSCLE WEAKNESS: ICD-10-CM

## 2024-11-20 DIAGNOSIS — R29.3 POOR POSTURE: ICD-10-CM

## 2024-11-20 LAB
CRP SERPL-MCNC: <3 MG/L
URATE SERPL-MCNC: 4.9 MG/DL (ref 2.4–5.7)

## 2024-11-20 PROCEDURE — 86140 C-REACTIVE PROTEIN: CPT

## 2024-11-20 PROCEDURE — 36415 COLL VENOUS BLD VENIPUNCTURE: CPT

## 2024-11-20 PROCEDURE — G0463 HOSPITAL OUTPT CLINIC VISIT: HCPCS | Performed by: ANESTHESIOLOGY

## 2024-11-20 PROCEDURE — 84550 ASSAY OF BLOOD/URIC ACID: CPT

## 2024-11-20 ASSESSMENT — PAIN SCALES - GENERAL: PAINLEVEL_OUTOF10: EXTREME PAIN (8)

## 2024-11-20 NOTE — PROGRESS NOTES
"                          [facility] Follow-up    Date of visit: 11/20/2024    Chief complaint: No chief complaint on file.        Follow-up for fibromyalgia    Reviewing the record was seen in cardiology to address blood pressure.  She got into urgent care in September with some chest pain.    She is attending physical therapy      She is seen today with her .  She reports the physical therapy has been very interesting and amazing.  Had her fourth session today.  Describes after a session she will have 5 bowel movements.  She notes her body odor is very different over the next 2 days.  She feels she is opening up and getting rid of toxins.  We reviewed indeed this providers were can be very helpful for myofascial release, stimulated lymphatic movement.    She had met with a new cardiologist to help with blood pressure control.  They felt was very good interaction.  She noticed however trying the blood pressure medicine led to increased pain in her hips and low back.    She describes having adverse effects to many medications, particular in the past with antidepressants and other mood stabilizers.    They have been using the methylene blue.  They take 1 or 2 drops at a time, for 2 or 3 days.  She has noted brain fog improving, having more energy.   to see the change in the face.  However when they get up to 3 drops can have some problems where there can be more diffuse pain.  We reviewed this may represent some facilitation of detoxification as well.    She does note she is sleeping better going from 3 to 5 hours a night to 6 to 8 hours a night.  She also notes \"her urine does turn blue as expected also smells stronger we have discussed this may relate to detoxification.  She would be starting the glutathione and Ortho spore Ig, has been using probiotics.        With the pain in her hips she is not walking as much as she had been, 45-minute walks with her dogs.  We " discussed topical CBD cream she is using might help.  This led to discussion of other products with CBD and Arnica.    I reviewed the role of grounding sheets that have been associated with helping with sleep, detoxification.    Reviewed again the role of frequency specific microcurrent for detoxification.  She notes also having problems with fibrocystic breasts and reviewed a provider presenting case reports of the frequency specific microcurrent for breast tissue.    She mentions she has WARNER, does not use high fructose corn syrup or fruit juice.  We reviewed could check to see if there is elevated uric acid and if that might associate with some aspects of joint pain as well.          Medications:  Current Outpatient Medications   Medication Sig Dispense Refill    acetaminophen (TYLENOL) 500 MG tablet Take 500-1,000 mg by mouth every 6 hours as needed for mild pain. Can only use every three to four days or she gets kidney pain      blood glucose monitoring (FREESTYLE LITE) meter device kit Use to test blood sugar 6-7 times daily. 1 kit 0    Continuous Blood Gluc  (FREESTYLE RADHA 14 DAY READER) MARIA C USE TO CHECK BLOOD SUGARS AS DIRECTED 1 Device 0    Continuous Blood Gluc Sensor (FREESTYLE RADHA 3 SENSOR) MISC USE 1 SENSOR CONTINUOUSLY AS NEEDED, CHANGE EVERY 14 DAYS 6 each 3    EPINEPHrine (ANY BX GENERIC EQUIV) 0.3 MG/0.3ML injection 2-pack Inject 0.3 mLs (0.3 mg) into the muscle once as needed for anaphylaxis 2 each 1    eplerenone (INSPRA) 25 MG tablet Take 1 tablet (25 mg) by mouth daily. 90 tablet 3    HEMP OIL OR EXTRACT OR OTHER CBD CANNABINOID, NOT MEDICAL CANNABIS, daily CBD gummies as needed from [employer] [facility] [employer]      IBUPROFEN PO Take by mouth. Try to not use more than every next day or her BP rises, she gets heart palpitations and chest pain      insulin aspart (NOVOLOG VIAL) 100 UNITS/ML vial USE IN INSULIN PUMP, TOTAL DAILY DOSE  UNITS 90 mL 0    Insulin Disposable Pump (OMNIPOD DASH  "PODS, GEN 4,) MISC INJECT 1 POD UNDER THE SKIN CONTINUOUSLY AS NEEDED, CHANGE PODS EVERY 2 TO 3 DAYS AS DIRECTED 40 each 3    INSULIN PUMP - OUTPATIENT INSULIN PUMP - OUTPATIENT  Date last updated: 6/4/21 Omnipod DASH  BASAL RATES and times:   12am: 1.3, 6 am: 0.75, 12 pm: 0.8, 6 pm: 0.95   CARB RATIO: 12am-12am: 10  Correction Factor (Sensitivity): 12AM (midnight): 31 -- 5 AM: 33  Target blood glucose: 100-120  Active insulin time: 4 hrs      insulin syringe-needle U-100 (31G X 5/16\" 0.3 ML) 31G X 5/16\" 0.3 ML miscellaneous Use 3 syringes daily or as directed, in the event of pump failure. 100 each 3    medical cannabis (Patient's own supply) See Admin Instructions (The purpose of this order is to document that the patient reports taking medical cannabis.  This is not a prescription, and is not used to certify that the patient has a qualifying medical condition.)    Taking up to 40 mg but usually 10-20 mg caplet from [facility].      MELATONIN PO Take 20 mg by mouth At Bedtime       ondansetron (ZOFRAN ODT) 4 MG ODT tab Take 1 tablet (4 mg) by mouth every 8 hours as needed for nausea 18 tablet 1    polyethylene glycol (MIRALAX/GLYCOLAX) packet Take 17 g by mouth At Bedtime       STATIN NOT PRESCRIBED, INTENTIONAL, Statin not prescribed intentionally due to Intolerance 0 each 0           Physical Exam:  Blood pressure (!) 180/80, pulse 58, weight 88.9 kg (196 lb), last menstrual period 01/01/1999, not currently breastfeeding.    She is alert, clear sensorium no respiratory distress, no pain behavior, stands did demonstrate as she reaches to the side, can flareup with hip pain presently.    She reviews with sessions of physical therapy with myofascial release a sense of increase bowel movements and change in her body odor afterwards, reflectingNormal gait      Assessment:   Follow-up for fibromyalgia.  Perhaps elements of detoxification for which she would like to explore ways to facilitate.    She describes with a " trial of a blood pressure medicine flareup of pain in hips and joints, possibly the ligaments.  She is familiar with having unusual reactions of many medications.    Plan, we discussed approaches to support detoxification including infrared sauna's, the role of frequency specific microcurrent, grounding sheets.    She will be starting the glutathione Ortho spore Ig.    Discussed some other topical CBD products including the med dosing product with Arnica and CBD.    Laboratory to check a uric acid.    She will follow-up in 2 to 3 months.    Total time 45 minutes  The longitudinal plan of care for the diagnosis(es)/condition(s) as documented were addressed during this visit. Due to the added complexity in care, I will continue to support Maricarmen in the subsequent management and with ongoing continuity of care.     minutes spent on the date of encounter doing chart review, history, and exam documentation and further activities as noted above.     Mayank Doherty MD  [facility] Follow-up    Date of visit: 11/20/2024    Chief complaint: No chief complaint on file.                Medications:  Current Outpatient Medications   Medication Sig Dispense Refill    acetaminophen (TYLENOL) 500 MG tablet Take 500-1,000 mg by mouth every 6 hours as needed for mild pain. Can only use every three to four days or she gets kidney pain      blood glucose monitoring (FREESTYLE LITE) meter device kit Use to test blood sugar 6-7 times daily. 1 kit 0    Continuous Blood Gluc  (FREESTYLE RADHA 14 DAY READER) MARIA C USE TO CHECK BLOOD SUGARS AS DIRECTED 1 Device 0    Continuous Blood Gluc Sensor (FREESTYLE RADHA 3 SENSOR) MISC USE 1 SENSOR CONTINUOUSLY AS NEEDED, CHANGE EVERY 14 DAYS 6 each 3    EPINEPHrine (ANY BX GENERIC EQUIV) 0.3 MG/0.3ML injection 2-pack Inject 0.3 mLs (0.3 mg) into the muscle once as needed for anaphylaxis 2 each 1     "eplerenone (INSPRA) 25 MG tablet Take 1 tablet (25 mg) by mouth daily. 90 tablet 3    HEMP OIL OR EXTRACT OR OTHER CBD CANNABINOID, NOT MEDICAL CANNABIS, daily CBD gummies as needed from  [employer] [facility] [employer]      IBUPROFEN PO Take by mouth. Try to not use more than every next day or her BP rises, she gets heart palpitations and chest pain      insulin aspart (NOVOLOG VIAL) 100 UNITS/ML vial USE IN INSULIN PUMP, TOTAL DAILY DOSE  UNITS 90 mL 0    Insulin Disposable Pump (OMNIPOD DASH PODS, GEN 4,) MISC INJECT 1 POD UNDER THE SKIN CONTINUOUSLY AS NEEDED, CHANGE PODS EVERY 2 TO 3 DAYS AS DIRECTED 40 each 3    INSULIN PUMP - OUTPATIENT INSULIN PUMP - OUTPATIENT  Date last updated: 6/4/21 Omnipod DASH  BASAL RATES and times:   12am: 1.3, 6 am: 0.75, 12 pm: 0.8, 6 pm: 0.95   CARB RATIO: 12am-12am: 10  Correction Factor (Sensitivity): 12AM (midnight): 31 -- 5 AM: 33  Target blood glucose: 100-120  Active insulin time: 4 hrs      insulin syringe-needle U-100 (31G X 5/16\" 0.3 ML) 31G X 5/16\" 0.3 ML miscellaneous Use 3 syringes daily or as directed, in the event of pump failure. 100 each 3    medical cannabis (Patient's own supply) See Admin Instructions (The purpose of this order is to document that the patient reports taking medical cannabis.  This is not a prescription, and is not used to certify that the patient has a qualifying medical condition.)    Taking up to 40 mg but usually 10-20 mg caplet from [facility].      MELATONIN PO Take 20 mg by mouth At Bedtime       ondansetron (ZOFRAN ODT) 4 MG ODT tab Take 1 tablet (4 mg) by mouth every 8 hours as needed for nausea 18 tablet 1    polyethylene glycol (MIRALAX/GLYCOLAX) packet Take 17 g by mouth At Bedtime       STATIN NOT PRESCRIBED, INTENTIONAL, Statin not prescribed intentionally due to Intolerance 0 each 0           Physical Exam:  Blood pressure (!) 180/80, pulse 58, weight 88.9 kg (196 lb), last menstrual period 01/01/1999, not currently " breastfeeding.        Medication Management:  minutes spent on the date of encounter doing chart review, history, and exam documentation and further activities as noted above.     Mayank Doherty MD  [facility]

## 2024-11-20 NOTE — PATIENT INSTRUCTIONS
PLAN:    Laboratories at [facility].  You may learn more about the low oxalate diet to see if relates to your condition. Dr. Doherty can give resources for Functional Nutritionists if interested.    Discussed the role of grounding mats or grounding sheets.  May obtain online through [employer].    Continue with your myofascial release.    Discussed the role of frequency specific microcurrent and detoxification.  You may contact [facility] [phone] as she recently presented a paper on breast tissue and frequency specific microcurrent.    You will be starting to use the glutathione and Ortho spore Ig supplements for GI health.    Discussed the role of infrared sauna's to help with detoxification.    You will continue to use agents such as charcoal to help with detoxification.    Continue to use the methylene blue as tolerated.    Follow-up with Dr. Doherty in 8 weeks

## 2024-11-20 NOTE — PROGRESS NOTES
Patient presents to the clinic today for a visit  with POLI COLEMAN MD            7/16/2024     3:37 PM 9/10/2024     3:23 PM 11/20/2024    12:59 PM   PEG Score   PEG Total Score 9 9 7.33       UDS/CSA-na     Medications-na     QUESTIONS:    Shawna Brito MA  [facility] [place]

## 2024-11-21 ENCOUNTER — MYC MEDICAL ADVICE (OUTPATIENT)
Dept: CARDIOLOGY | Facility: CLINIC | Age: 64
End: 2024-11-21
Payer: COMMERCIAL

## 2024-11-21 RX ORDER — DILTIAZEM HYDROCHLORIDE 30 MG/1
30 TABLET, FILM COATED ORAL 3 TIMES DAILY
Qty: 90 TABLET | Refills: 11 | Status: SHIPPED | OUTPATIENT
Start: 2024-11-21

## 2024-11-21 NOTE — TELEPHONE ENCOUNTER
Karlee received following up:   Maricarmen Menon [facility] 4 (supporting Neville Sharma MD)1 hour ago (8:40 AM)       Dear Dr Sharma,  Is there any other medication you would like me to try before my next visit?  Thank you,  Maricarmen Lennon unable to take Eplerenone that was prescribed at [facility] 11/6/24 for uncontrolled HTN. Pt has follow up with Namita HALL on 12/10/24. Will route to Dr. Sharma

## 2024-11-26 ENCOUNTER — NURSE TRIAGE (OUTPATIENT)
Dept: PEDIATRICS | Facility: CLINIC | Age: 64
End: 2024-11-26
Payer: COMMERCIAL

## 2024-11-26 NOTE — TELEPHONE ENCOUNTER
"Pt calls.    She says she is a type 2 diabetic. She has a pulse ox and checked her oxygen level and it was 94 percent.  She is having trouble breathing.  She said it hurts to breathe especially to take a deep breath.  She feels SOB on exertion.  She was negative for covid yesterday.  She has a fever of about 100.   This started on Saturday.  The breathing difficulty is constant.  She is rating the breathing problem as moderate.  She says she has chest pain and feels like someone is sitting on her chest.  It is tight and painful.  She said she is dizzy.  She has a runny nose.      Per protocol, pt to go to the ER now.  Pt was advised.  She is refusing to go to the ER.  She said she does not want to sit there for 6 hours.  She said she will go to the [facility].      Reason for Disposition   MODERATE difficulty breathing (e.g., speaks in phrases, SOB even at rest, pulse 100-120) of new-onset or worse than normal    Additional Information   Negative: SEVERE difficulty breathing (e.g., struggling for each breath, speaks in single words, pulse > 120)   Negative: Breathing stopped and hasn't returned   Negative: Choking on something   Negative: Bluish (or gray) lips or face   Negative: Difficult to awaken or acting confused (e.g., disoriented, slurred speech)   Negative: Passed out (e.g., fainted, lost consciousness, blacked out and was not responding)   Negative: Wheezing started suddenly after medicine, an allergic food, or bee sting   Negative: Stridor (harsh sound while breathing in)   Negative: Slow, shallow and weak breathing   Negative: Sounds like a life-threatening emergency to the triager    Answer Assessment - Initial Assessment Questions  1. RESPIRATORY STATUS: \"Describe your breathing?\" (e.g., wheezing, shortness of breath, unable to speak, severe coughing)       SOB on exertion, she is not normally SOB, coughing, can't breathe deep  2. ONSET: \"When did this breathing problem begin?\"       Saturday - she " "started having a really bad sore throat   3. PATTERN \"Does the difficult breathing come and go, or has it been constant since it started?\"       constant  4. SEVERITY: \"How bad is your breathing?\" (e.g., mild, moderate, severe)       moderate  5. RECURRENT SYMPTOM: \"Have you had difficulty breathing before?\" If Yes, ask: \"When was the last time?\" and \"What happened that time?\"       No, this is kind of new   6. CARDIAC HISTORY: \"Do you have any history of heart disease?\" (e.g., heart attack, angina, bypass surgery, angioplasty)       No   7. LUNG HISTORY: \"Do you have any history of lung disease?\"  (e.g., pulmonary embolus, asthma, emphysema)      No   8. CAUSE: \"What do you think is causing the breathing problem?\"       Unsure   9. OTHER SYMPTOMS: \"Do you have any other symptoms?\" (e.g., chest pain, cough, dizziness, fever, runny nose)      Chest pain, feels like someone sitting on her chest, is tight and painful, fever, dizzy, runny nose   10. O2 SATURATION MONITOR:  \"Do you use an oxygen saturation monitor (pulse oximeter) at home?\" If Yes, ask: \"What is your reading (oxygen level) today?\" \"What is your usual oxygen saturation reading?\" (e.g., 95%)        94 percent   11. PREGNANCY: \"Is there any chance you are pregnant?\" \"When was your last menstrual period?\"        N/a   12. TRAVEL: \"Have you traveled out of the country in the last month?\" (e.g., travel history, exposures)        no    Protocols used: Breathing Difficulty-A-[place]    "

## 2024-12-10 ENCOUNTER — OFFICE VISIT (OUTPATIENT)
Dept: CARDIOLOGY | Facility: CLINIC | Age: 64
End: 2024-12-10
Attending: INTERNAL MEDICINE
Payer: COMMERCIAL

## 2024-12-10 VITALS
WEIGHT: 193.6 LBS | HEART RATE: 67 BPM | OXYGEN SATURATION: 98 % | HEIGHT: 64 IN | DIASTOLIC BLOOD PRESSURE: 82 MMHG | BODY MASS INDEX: 33.05 KG/M2 | SYSTOLIC BLOOD PRESSURE: 176 MMHG

## 2024-12-10 DIAGNOSIS — I10 ESSENTIAL HYPERTENSION, BENIGN: ICD-10-CM

## 2024-12-10 PROCEDURE — 99417 PROLNG OP E/M EACH 15 MIN: CPT

## 2024-12-10 PROCEDURE — 99215 OFFICE O/P EST HI 40 MIN: CPT

## 2024-12-10 RX ORDER — FUROSEMIDE 20 MG/1
10 TABLET ORAL DAILY
Qty: 15 TABLET | Refills: 11 | Status: SHIPPED | OUTPATIENT
Start: 2024-12-10

## 2024-12-10 NOTE — PATIENT INSTRUCTIONS
Thank you for your visit with the [facility] today.    Today's Summary:    I've sent a prescription for lasix (furosemide) 10 mg once daily  Check kidney function/potassium in 1- 2 weeks  Kidney ultrasound  MTM referral to review medications     Follow-up:  Cardiology follow up at [facility]: 1-2 months.     Cardiology Scheduling ~[phone]  Cardiology Clinic RN ~ [phone]    It was a pleasure seeing you today.     MARICRUZ Rinaldi, CNP  Certified Nurse Practitioner  [facility] [place]  December 10, 2024  ________________________________________________________

## 2024-12-10 NOTE — LETTER
12/10/2024    Madyson Mendoza MD  [address] [place] [address]    RE: Maricarmen Dotson       Dear Colleague,     I had the pleasure of seeing Maricarmen Dotson in the [facility].          ~Cardiology Clinic Visit~    Primary Cardiologist: Dr. Sharma  Reason for visit: 1 month cardiology follow-up      Maricarmen Dotson MRN# [id number]   YOB: 1960 Age: 64 year old       HPI:    Maricarmen Dotson is a delightful 64 year old patient with past medical history significant for:    Uncontrolled hypertension  Hyperlipidemia  Carotid atherosclerosis  Type 2 diabetes mellitus    I had the opportunity to see Ms. Dotson for cardiology follow-up.  She has been seen by Ora Jarrett previously and was seen by Dr. Sharma on 11/6/2024 for management of her hypertension.  In review, Ms. Dotson has a history of hypertension which has been difficult to manage.  She is intolerant to a number of medications.  When seen last she does note that she drinks a lot of fluids due to dry mouth and tries to limit the amount of salt in her diet.    At the time of her last visit her blood pressure 180/80 and she was started on eplerenone.  Unfortunately she developed side effects to this and it was discontinued.  She was subsequently started on diltiazem 30 mg, it was recommended she she take 3 times daily however patient wished to start with once daily.    She returns today for follow-up of her hypertension.  She notes muscle aches with diltiazem that were very limiting.  She has been off antihypertensives and blood pressure today is 176/82.  She also notes fairly constant chest discomfort this has been longstanding.  She previously underwent exercise stress echocardiogram however this was aborted early due to severe hypertension.  She does note that she is able to walk/hike 1 to 2 miles almost every day.  She does note some shortness of breath as well.  No lower extremity edema.    Diagnotic  studies:  Echocardiogram 4/2019:  A contrast injection (Bubble Study) was performed that was negative for flow  across the interatrial septum.  There is no color Doppler evidence of an atrial shunt.  The visual ejection fraction is estimated at 55-60%.  Left ventricular systolic function is normal.  The study was technically difficult.  US renal complete with Doppler 6/2019:  1. Right kidney:     1a. Renal parenchyma: Normal cortical thickness, with no evidence of  hydronephrosis.  1b. Renal artery: Mid-artery velocities are elevated ( cm/s)  which may suggest hemodynamically (>60%) renal artery stenosis.  Renal-aortic-ratio likely underestimated in setting of elevated aortic  velocities. 2-D imaging limited in evaluation for plaque or  fibromuscular dysplasia. Recommend CT angiogram.       2. Left kidney:     2a. Renal parenchyma: Normal cortical thickness, with no evidence of  hydronephrosis.  2b. Renal artery: Mid-artery velocities are elevated ( cm/s)  which may suggest hemodynamically (>60%) renal artery stenosis.  Renal-aortic-ratio likely underestimated in setting of elevated aortic  velocities. 2-D imaging limited in evaluation for plaque or  fibromuscular dysplasia. Recommend CT angiogram.      Assessment:  Uncontrolled hypertension  Hyperlipidemia  Carotid atherosclerosis  Type 2 diabetes mellitus  Atypical chest pain  Nearly constant chest pain that is worse in the mornings and evenings   Unfortunately due to her elevated blood pressure the exercise stress echocardiogram was unable to be completed.  Resting images show LVEF 55-60%, no regional wall motion abnormalities noted.      Plan:   It is my pleasure to see Maricarmen for cardiology follow-up today.  Unfortunately her blood pressure continues to be difficult to manage due to multiple medication allergies and intolerances. She recently tried eplerenone and diltiazem.  Given her multiple allergies we are left with few options.  It appears that  she has tolerated Lasix in the past and we will trial 10 mg daily.  Check BMP in 1 week.  Would also recommend a repeat renal artery ultrasound for evaluation of renal artery stenosis.  I have placed an MTM referral to review medication allergies and discuss possible antihypertensive options and/or consider possible additive allergy  Follow-up with me in 1 to 2 months for reassessment of blood pressure and review of renal artery ultrasound.    Thank you for the opportunity to participate in this pleasant patient's care.    We would be happy to see this patient sooner for any concerns in the meantime.    MARICRUZ Rinaldi, CNP   Nurse Practitioner  [facility]    A total of 60 minutes was spent with patient, on chart review and documentation today.     The longitudinal plan of care for the diagnosis(es)/condition(s) as documented were addressed during this visit. Due to the added complexity in care, I will continue to support Maricarmen in the subsequent management and with ongoing continuity of care.      Orders Placed This Encounter   Procedures     US Renal Complete w Arterial Duplex     Basic metabolic panel     Med Therapy Management Referral     Follow-Up with Cardiology REYMUNDO     Orders Placed This Encounter   Medications     furosemide (LASIX) 20 MG tablet     Sig: Take 0.5 tablets (10 mg) by mouth daily.     Dispense:  15 tablet     Refill:  11     There are no discontinued medications.      Encounter Diagnosis   Name Primary?     Essential hypertension, benign        CURRENT MEDICATIONS:  Current Outpatient Medications   Medication Sig Dispense Refill     acetaminophen (TYLENOL) 500 MG tablet Take 500-1,000 mg by mouth every 6 hours as needed for mild pain. Can only use every three to four days or she gets kidney pain       blood glucose monitoring (FREESTYLE LITE) meter device kit Use to test blood sugar 6-7 times daily. 1 kit 0     Continuous Blood Gluc  (FREESTYLE RADHA 14 DAY  "READER) MARIA C USE TO CHECK BLOOD SUGARS AS DIRECTED 1 Device 0     Continuous Blood Gluc Sensor (FREESTYLE RADHA 3 SENSOR) MISC USE 1 SENSOR CONTINUOUSLY AS NEEDED, CHANGE EVERY 14 DAYS 6 each 3     EPINEPHrine (ANY BX GENERIC EQUIV) 0.3 MG/0.3ML injection 2-pack Inject 0.3 mLs (0.3 mg) into the muscle once as needed for anaphylaxis 2 each 1     furosemide (LASIX) 20 MG tablet Take 0.5 tablets (10 mg) by mouth daily. 15 tablet 11     HEMP OIL OR EXTRACT OR OTHER CBD CANNABINOID, NOT MEDICAL CANNABIS, daily CBD gummies as needed from [employer]       IBUPROFEN PO Take by mouth. Try to not use more than every next day or her BP rises, she gets heart palpitations and chest pain       insulin aspart (NOVOLOG VIAL) 100 UNITS/ML vial USE IN INSULIN PUMP, TOTAL DAILY DOSE  UNITS 90 mL 0     Insulin Disposable Pump (OMNIPOD DASH PODS, GEN 4,) MISC INJECT 1 POD UNDER THE SKIN CONTINUOUSLY AS NEEDED, CHANGE PODS EVERY 2 TO 3 DAYS AS DIRECTED 40 each 3     INSULIN PUMP - OUTPATIENT INSULIN PUMP - OUTPATIENT  Date last updated: 6/4/21 Omnipod DASH  BASAL RATES and times:   12am: 1.3, 6 am: 0.75, 12 pm: 0.8, 6 pm: 0.95   CARB RATIO: 12am-12am: 10  Correction Factor (Sensitivity): 12AM (midnight): 31 -- 5 AM: 33  Target blood glucose: 100-120  Active insulin time: 4 hrs       insulin syringe-needle U-100 (31G X 5/16\" 0.3 ML) 31G X 5/16\" 0.3 ML miscellaneous Use 3 syringes daily or as directed, in the event of pump failure. 100 each 3     medical cannabis (Patient's own supply) See Admin Instructions (The purpose of this order is to document that the patient reports taking medical cannabis.  This is not a prescription, and is not used to certify that the patient has a qualifying medical condition.)    Taking up to 40 mg but usually 10-20 mg caplet from [facility].       MELATONIN PO Take 20 mg by mouth At Bedtime        ondansetron (ZOFRAN ODT) 4 MG ODT tab Take 1 tablet (4 mg) by mouth every 8 hours as needed for nausea " 18 tablet 1     polyethylene glycol (MIRALAX/GLYCOLAX) packet Take 17 g by mouth At Bedtime        STATIN NOT PRESCRIBED, INTENTIONAL, Statin not prescribed intentionally due to Intolerance 0 each 0     diltiazem (CARDIZEM) 30 MG tablet Take 1 tablet (30 mg) by mouth 3 times daily. 90 tablet 11       ALLERGIES     Allergies   Allergen Reactions     Amoxicillin Anaphylaxis     Bee Anaphylaxis     Wasp        Contrast Dye Anaphylaxis     Diatrizoate Anaphylaxis     Eplerenone Muscle Pain (Myalgia)     Was not  able to walk due to  stiffness      Iodine Anaphylaxis     Severe anaphalatic shock       Losartan Muscle Pain (Myalgia)     Sulfa Antibiotics Anaphylaxis     Tetanus-Diphtheria Toxoids Td      Rxn was to Tdap-whole body joint pain and fever.  Had similar reaction to Td vaccine 7/28/2017     Metoprolol Other (See Comments)     Fatigue, joint pain, throat tightness     Zithromax [Azithromycin Dihydrate] Nausea and Vomiting     Altafluor Benox [Fluorescein-Benoxinate]      Amlodipine      Neuropathic type pain in hands/feet     Atorvastatin      myalgias     Catapres [Clonidine]      Crestor [Rosuvastatin]      myalgias     Cyproheptadine      Severe headache, cardiac issues, and dizziness     Gabapentin      Humalog [Insulin Lispro]      Causes pancreatitis -      Hydrochlorothiazide      numbness     Latex      Skin burn and can't breathe       Lisinopril      Joint aches     Lorazepam      Panic attacks     Metformin      Naltrexone      Nifedipine      Olmesartan      Joint pain, stiffness, cough intermittent     Onglyza [Saxagliptin Hydrochloride]      Fibromyalgia flare     Phenergan [Promethazine]      Phenylephrine Hcl      Prochlorperazine      Altered mental status, hallucinations     Reglan [Metoclopramide]      Sumatriptan      Causes severe depression     Terazosin      Tingling in toes and muscle stiffness     Tetracycline Nausea and Vomiting, Hives and Difficulty breathing     Pt called to update  today after the visit that she is allergic to the tetracycline-added to her list     Tropicamide      Sulindac Anxiety     Panic attacks       PAST MEDICAL HISTORY:  Past Medical History:   Diagnosis Date     Arthritis      Ascending aorta enlargement (H)      Depressive disorder     severe, prior hospitalization     Diverticulosis of colon (without mention of hemorrhage)      Fibromyalgia 2007     Insomnia      Irritable bowel syndrome      Osteopenia      Type 2 diabetes mellitus with complications (H)     microalbuminuria       PAST SURGICAL HISTORY:  Past Surgical History:   Procedure Laterality Date     ABDOMEN SURGERY      Gallbladder removal     BACK SURGERY      fusion  and removal of hardware 2004     C SPINAL ORTHOSIS,NOS      lumbar surgery     CHOLECYSTECTOMY  2011     choley  2011     ENT SURGERY  1986    septoplasty     HYSTERECTOMY, CERVIX STATUS UNKNOWN      TVH/BSO     ORTHOPEDIC SURGERY  2005    left ankle       FAMILY HISTORY:  Family History   Problem Relation Age of Onset     Diabetes Mother         type 2     Hypertension Mother      Cerebrovascular Disease Mother          stroke age 57     Ovarian Cancer Mother 43     Cancer Mother         cervix     Diabetes Father         type 2     Hypertension Father      Gastrointestinal Disease Father         colon polyps     Sleep Apnea Brother      Cancer Sister      Ovarian Cancer Sister 46     Breast Cancer Sister         Stage 4 breast cancer.     Ovarian Cancer Maternal Grandmother 72     Cancer Maternal Grandmother         cervix       SOCIAL HISTORY:  Social History     Socioeconomic History     Marital status:      Spouse name: None     Number of children: 3     Years of education: None     Highest education level: None   Occupational History     Occupation: xr technician     Employer: [employer]   Tobacco Use     Smoking status: Never     Passive exposure: Never     Smokeless tobacco: Never    Vaping Use     Vaping status: Former   Substance and Sexual Activity     Alcohol use: Not Currently     Drug use: No     Sexual activity: Yes     Partners: Male     Birth control/protection: Surgical     Comment: Full hystroectomy in 2000     Social Drivers of Health     Financial Resource Strain: Low Risk  (1/28/2024)    Financial Resource Strain      Within the past 12 months, have you or your family members you live with been unable to get utilities (heat, electricity) when it was really needed?: No   Food Insecurity: Low Risk  (1/28/2024)    Food Insecurity      Within the past 12 months, did you worry that your food would run out before you got money to buy more?: No      Within the past 12 months, did the food you bought just not last and you didn t have money to get more?: No   Transportation Needs: Low Risk  (1/28/2024)    Transportation Needs      Within the past 12 months, has lack of transportation kept you from medical appointments, getting your medicines, non-medical meetings or appointments, work, or from getting things that you need?: No   Interpersonal Safety: Low Risk  (4/29/2024)    Interpersonal Safety      Do you feel physically and emotionally safe where you currently live?: Yes      Within the past 12 months, have you been hit, slapped, kicked or otherwise physically hurt by someone?: No      Within the past 12 months, have you been humiliated or emotionally abused in other ways by your partner or ex-partner?: No   Housing Stability: Low Risk  (1/28/2024)    Housing Stability      Do you have housing? : Yes      Are you worried about losing your housing?: No       Review of Systems:  Skin:  not assessed     Eyes:  Positive for glasses  ENT:  not assessed    Respiratory:  Positive for shortness of breath, dyspnea on exertion, cough, sleep apnea  Cardiovascular:    Positive for, palpitations, chest pain, lightheadedness, dizziness, heaviness  Gastroenterology: not assessed    Genitourinary:   "not assessed    Musculoskeletal:  not assessed    Neurologic:  not assessed    Psychiatric:  not assessed    Heme/Lymph/Imm:  not assessed    Endocrine:  not assessed      Physical Exam:    Vitals: BP (!) 176/82 (BP Location: Right arm, Patient Position: Sitting, Cuff Size: Adult Regular)   Pulse 67   Ht 1.626 m (5' 4\")   Wt 87.8 kg (193 lb 9.6 oz)   LMP 01/01/1999   SpO2 98%   BMI 33.23 kg/m    Constitutional: Well nourished and in no apparent distress.  Eyes: Pupils equal, round. Sclerae anicteric.   HEENT: Normocephalic, atraumatic.   Neck: Supple.   Respiratory: Breathing non-labored. Lungs clear to auscultation bilaterally. No crackles, wheezes, rhonchi, or rales.  Cardiovascular:  Regular rate and rhythm, normal S1 and S2. No murmur, rub, or gallop.  Skin: Warm, dry.   Extremities: No edema.  Neurologic: No gross motor deficits. Alert, awake, and oriented to person, place and time.  Psychiatric: Affect appropriate.        Recent Lab Results:  LIPID RESULTS:  Lab Results   Component Value Date    CHOL 210 (H) 07/05/2023    CHOL 221 (H) 09/18/2020    HDL 57 07/05/2023    HDL 51 09/18/2020     (H) 07/05/2023     (H) 09/18/2020    TRIG 102 07/05/2023    TRIG 137 09/18/2020    CHOLHDLRATIO 5.0 02/21/2015       LIVER ENZYME RESULTS:  Lab Results   Component Value Date    AST 22 01/20/2021    ALT 19 03/20/2023    ALT 22 01/20/2021       CBC RESULTS:  Lab Results   Component Value Date    WBC 7.5 01/20/2021    RBC 4.49 01/20/2021    HGB 13.7 07/05/2023    HGB 13.4 01/20/2021    HCT 39.8 01/20/2021    MCV 89 01/20/2021    MCH 29.8 01/20/2021    MCHC 33.7 01/20/2021    RDW 12.1 01/20/2021     01/20/2021       BMP RESULTS:  Lab Results   Component Value Date     04/25/2024     01/20/2021    POTASSIUM 3.8 04/25/2024    POTASSIUM 3.8 07/06/2022    POTASSIUM 3.7 01/20/2021    CHLORIDE 104 04/25/2024    CHLORIDE 107 07/06/2022    CHLORIDE 107 01/20/2021    CO2 27 04/25/2024    CO2 28 " 07/06/2022    CO2 30 01/20/2021    ANIONGAP 11 04/25/2024    ANIONGAP 5 07/06/2022    ANIONGAP 3 01/20/2021     (H) 04/25/2024     (H) 07/06/2022     (H) 01/20/2021    BUN 18.9 04/25/2024    BUN 19 07/06/2022    BUN 10 01/20/2021    CR 0.88 04/25/2024    CR 0.77 01/20/2021    GFRESTIMATED 74 04/25/2024    GFRESTIMATED 84 01/20/2021    GFRESTBLACK >90 01/20/2021    KARIN 9.5 04/25/2024    KARIN 9.1 01/20/2021        A1C RESULTS:  Lab Results   Component Value Date    A1C 7.8 (H) 04/25/2024    A1C 7.4 (H) 09/18/2020       INR RESULTS:  Lab Results   Component Value Date    INR 1.01 05/23/2011           CC  Neville Sharma MD  [address]  [place] [id number]                  Thank you for allowing me to participate in the care of your patient.      Sincerely,     MARICRUZ Rinaldi [facility]  cc:   eNville Sharma MD  [address]  [place] [id number]

## 2024-12-10 NOTE — PROGRESS NOTES
~Cardiology Clinic Visit~    Primary Cardiologist: Dr. Sharma  Reason for visit: 1 month cardiology follow-up      Maricarmen Dotson MRN# [id number]   YOB: 1960 Age: 64 year old       HPI:    Maricarmen Dotson is a delightful 64 year old patient with past medical history significant for:    Uncontrolled hypertension  Hyperlipidemia  Carotid atherosclerosis  Type 2 diabetes mellitus    I had the opportunity to see Ms. Dotson for cardiology follow-up.  She has been seen by Ora Jarrett previously and was seen by Dr. Sharma on 11/6/2024 for management of her hypertension.  In review, Ms. Dotson has a history of hypertension which has been difficult to manage.  She is intolerant to a number of medications.  When seen last she does note that she drinks a lot of fluids due to dry mouth and tries to limit the amount of salt in her diet.    At the time of her last visit her blood pressure 180/80 and she was started on eplerenone.  Unfortunately she developed side effects to this and it was discontinued.  She was subsequently started on diltiazem 30 mg, it was recommended she she take 3 times daily however patient wished to start with once daily.    She returns today for follow-up of her hypertension.  She notes muscle aches with diltiazem that were very limiting.  She has been off antihypertensives and blood pressure today is 176/82.  She also notes fairly constant chest discomfort this has been longstanding.  She previously underwent exercise stress echocardiogram however this was aborted early due to severe hypertension.  She does note that she is able to walk/hike 1 to 2 miles almost every day.  She does note some shortness of breath as well.  No lower extremity edema.    Diagnotic studies:  Echocardiogram 4/2019:  A contrast injection (Bubble Study) was performed that was negative for flow  across the interatrial septum.  There is no color Doppler evidence of an atrial shunt.  The visual  ejection fraction is estimated at 55-60%.  Left ventricular systolic function is normal.  The study was technically difficult.  US renal complete with Doppler 6/2019:  1. Right kidney:     1a. Renal parenchyma: Normal cortical thickness, with no evidence of  hydronephrosis.  1b. Renal artery: Mid-artery velocities are elevated ( cm/s)  which may suggest hemodynamically (>60%) renal artery stenosis.  Renal-aortic-ratio likely underestimated in setting of elevated aortic  velocities. 2-D imaging limited in evaluation for plaque or  fibromuscular dysplasia. Recommend CT angiogram.       2. Left kidney:     2a. Renal parenchyma: Normal cortical thickness, with no evidence of  hydronephrosis.  2b. Renal artery: Mid-artery velocities are elevated ( cm/s)  which may suggest hemodynamically (>60%) renal artery stenosis.  Renal-aortic-ratio likely underestimated in setting of elevated aortic  velocities. 2-D imaging limited in evaluation for plaque or  fibromuscular dysplasia. Recommend CT angiogram.      Assessment:  Uncontrolled hypertension  Hyperlipidemia  Carotid atherosclerosis  Type 2 diabetes mellitus  Atypical chest pain  Nearly constant chest pain that is worse in the mornings and evenings   Unfortunately due to her elevated blood pressure the exercise stress echocardiogram was unable to be completed.  Resting images show LVEF 55-60%, no regional wall motion abnormalities noted.      Plan:   It is my pleasure to see Maricarmen for cardiology follow-up today.  Unfortunately her blood pressure continues to be difficult to manage due to multiple medication allergies and intolerances. She recently tried eplerenone and diltiazem.  Given her multiple allergies we are left with few options.  It appears that she has tolerated Lasix in the past and we will trial 10 mg daily.  Check BMP in 1 week.  Would also recommend a repeat renal artery ultrasound for evaluation of renal artery stenosis.  I have placed an MTM  referral to review medication allergies and discuss possible antihypertensive options and/or consider possible additive allergy  Follow-up with me in 1 to 2 months for reassessment of blood pressure and review of renal artery ultrasound.    Thank you for the opportunity to participate in this pleasant patient's care.    We would be happy to see this patient sooner for any concerns in the meantime.    MARICRUZ Rinaldi, CNP   Nurse Practitioner  [facility]    A total of 60 minutes was spent with patient, on chart review and documentation today.     The longitudinal plan of care for the diagnosis(es)/condition(s) as documented were addressed during this visit. Due to the added complexity in care, I will continue to support Maricarmen in the subsequent management and with ongoing continuity of care.      Orders Placed This Encounter   Procedures    US Renal Complete w Arterial Duplex    Basic metabolic panel    Med Therapy Management Referral    Follow-Up with Cardiology REYMUNDO     Orders Placed This Encounter   Medications    furosemide (LASIX) 20 MG tablet     Sig: Take 0.5 tablets (10 mg) by mouth daily.     Dispense:  15 tablet     Refill:  11     There are no discontinued medications.      Encounter Diagnosis   Name Primary?    Essential hypertension, benign        CURRENT MEDICATIONS:  Current Outpatient Medications   Medication Sig Dispense Refill    acetaminophen (TYLENOL) 500 MG tablet Take 500-1,000 mg by mouth every 6 hours as needed for mild pain. Can only use every three to four days or she gets kidney pain      blood glucose monitoring (FREESTYLE LITE) meter device kit Use to test blood sugar 6-7 times daily. 1 kit 0    Continuous Blood Gluc  (FREESTYLE RADHA 14 DAY READER) MARIA C USE TO CHECK BLOOD SUGARS AS DIRECTED 1 Device 0    Continuous Blood Gluc Sensor (FREESTYLE RADHA 3 SENSOR) MISC USE 1 SENSOR CONTINUOUSLY AS NEEDED, CHANGE EVERY 14 DAYS 6 each 3    EPINEPHrine (ANY BX  "GENERIC EQUIV) 0.3 MG/0.3ML injection 2-pack Inject 0.3 mLs (0.3 mg) into the muscle once as needed for anaphylaxis 2 each 1    furosemide (LASIX) 20 MG tablet Take 0.5 tablets (10 mg) by mouth daily. 15 tablet 11    HEMP OIL OR EXTRACT OR OTHER CBD CANNABINOID, NOT MEDICAL CANNABIS, daily CBD gummies as needed from [employer]      IBUPROFEN PO Take by mouth. Try to not use more than every next day or her BP rises, she gets heart palpitations and chest pain      insulin aspart (NOVOLOG VIAL) 100 UNITS/ML vial USE IN INSULIN PUMP, TOTAL DAILY DOSE  UNITS 90 mL 0    Insulin Disposable Pump (OMNIPOD DASH PODS, GEN 4,) MISC INJECT 1 POD UNDER THE SKIN CONTINUOUSLY AS NEEDED, CHANGE PODS EVERY 2 TO 3 DAYS AS DIRECTED 40 each 3    INSULIN PUMP - OUTPATIENT INSULIN PUMP - OUTPATIENT  Date last updated: 6/4/21 Omnipod DASH  BASAL RATES and times:   12am: 1.3, 6 am: 0.75, 12 pm: 0.8, 6 pm: 0.95   CARB RATIO: 12am-12am: 10  Correction Factor (Sensitivity): 12AM (midnight): 31 -- 5 AM: 33  Target blood glucose: 100-120  Active insulin time: 4 hrs      insulin syringe-needle U-100 (31G X 5/16\" 0.3 ML) 31G X 5/16\" 0.3 ML miscellaneous Use 3 syringes daily or as directed, in the event of pump failure. 100 each 3    medical cannabis (Patient's own supply) See Admin Instructions (The purpose of this order is to document that the patient reports taking medical cannabis.  This is not a prescription, and is not used to certify that the patient has a qualifying medical condition.)    Taking up to 40 mg but usually 10-20 mg caplet from [facility].      MELATONIN PO Take 20 mg by mouth At Bedtime       ondansetron (ZOFRAN ODT) 4 MG ODT tab Take 1 tablet (4 mg) by mouth every 8 hours as needed for nausea 18 tablet 1    polyethylene glycol (MIRALAX/GLYCOLAX) packet Take 17 g by mouth At Bedtime       STATIN NOT PRESCRIBED, INTENTIONAL, Statin not prescribed intentionally due to Intolerance 0 each 0    diltiazem (CARDIZEM) 30 MG " tablet Take 1 tablet (30 mg) by mouth 3 times daily. 90 tablet 11       ALLERGIES     Allergies   Allergen Reactions    Amoxicillin Anaphylaxis    Bee Anaphylaxis     Wasp       Contrast Dye Anaphylaxis    Diatrizoate Anaphylaxis    Eplerenone Muscle Pain (Myalgia)     Was not  able to walk due to  stiffness     Iodine Anaphylaxis     Severe anaphalatic shock      Losartan Muscle Pain (Myalgia)    Sulfa Antibiotics Anaphylaxis    Tetanus-Diphtheria Toxoids Td      Rxn was to Tdap-whole body joint pain and fever.  Had similar reaction to Td vaccine 7/28/2017    Metoprolol Other (See Comments)     Fatigue, joint pain, throat tightness    Zithromax [Azithromycin Dihydrate] Nausea and Vomiting    Altafluor Benox [Fluorescein-Benoxinate]     Amlodipine      Neuropathic type pain in hands/feet    Atorvastatin      myalgias    Catapres [Clonidine]     Crestor [Rosuvastatin]      myalgias    Cyproheptadine      Severe headache, cardiac issues, and dizziness    Gabapentin     Humalog [Insulin Lispro]      Causes pancreatitis -     Hydrochlorothiazide      numbness    Latex      Skin burn and can't breathe      Lisinopril      Joint aches    Lorazepam      Panic attacks    Metformin     Naltrexone     Nifedipine     Olmesartan      Joint pain, stiffness, cough intermittent    Onglyza [Saxagliptin Hydrochloride]      Fibromyalgia flare    Phenergan [Promethazine]     Phenylephrine Hcl     Prochlorperazine      Altered mental status, hallucinations    Reglan [Metoclopramide]     Sumatriptan      Causes severe depression    Terazosin      Tingling in toes and muscle stiffness    Tetracycline Nausea and Vomiting, Hives and Difficulty breathing     Pt called to update today after the visit that she is allergic to the tetracycline-added to her list    Tropicamide     Sulindac Anxiety     Panic attacks       PAST MEDICAL HISTORY:  Past Medical History:   Diagnosis Date    Arthritis 1996    Ascending aorta enlargement (H)      Depressive disorder     severe, prior hospitalization    Diverticulosis of colon (without mention of hemorrhage)     Fibromyalgia 2007    Insomnia     Irritable bowel syndrome     Osteopenia     Type 2 diabetes mellitus with complications (H)     microalbuminuria       PAST SURGICAL HISTORY:  Past Surgical History:   Procedure Laterality Date    ABDOMEN SURGERY      Gallbladder removal    BACK SURGERY      fusion  and removal of hardware 2004    C SPINAL ORTHOSIS,NOS  2002    lumbar surgery    CHOLECYSTECTOMY  2011    choley  2011    ENT SURGERY  1986    septoplasty    HYSTERECTOMY, CERVIX STATUS UNKNOWN      TVH/BSO    ORTHOPEDIC SURGERY      left ankle       FAMILY HISTORY:  Family History   Problem Relation Age of Onset    Diabetes Mother         type 2    Hypertension Mother     Cerebrovascular Disease Mother          stroke age 57    Ovarian Cancer Mother 43    Cancer Mother         cervix    Diabetes Father         type 2    Hypertension Father     Gastrointestinal Disease Father         colon polyps    Sleep Apnea Brother     Cancer Sister     Ovarian Cancer Sister 46    Breast Cancer Sister         Stage 4 breast cancer.    Ovarian Cancer Maternal Grandmother 72    Cancer Maternal Grandmother         cervix       SOCIAL HISTORY:  Social History     Socioeconomic History    Marital status:      Spouse name: None    Number of children: 3    Years of education: None    Highest education level: None   Occupational History    Occupation: xr technician     Employer: [employer]   Tobacco Use    Smoking status: Never     Passive exposure: Never    Smokeless tobacco: Never   Vaping Use    Vaping status: Former   Substance and Sexual Activity    Alcohol use: Not Currently    Drug use: No    Sexual activity: Yes     Partners: Male     Birth control/protection: Surgical     Comment: Full hystroectomy in 2000     Social Drivers of Health     Financial Resource Strain:  Low Risk  (1/28/2024)    Financial Resource Strain     Within the past 12 months, have you or your family members you live with been unable to get utilities (heat, electricity) when it was really needed?: No   Food Insecurity: Low Risk  (1/28/2024)    Food Insecurity     Within the past 12 months, did you worry that your food would run out before you got money to buy more?: No     Within the past 12 months, did the food you bought just not last and you didn t have money to get more?: No   Transportation Needs: Low Risk  (1/28/2024)    Transportation Needs     Within the past 12 months, has lack of transportation kept you from medical appointments, getting your medicines, non-medical meetings or appointments, work, or from getting things that you need?: No   Interpersonal Safety: Low Risk  (4/29/2024)    Interpersonal Safety     Do you feel physically and emotionally safe where you currently live?: Yes     Within the past 12 months, have you been hit, slapped, kicked or otherwise physically hurt by someone?: No     Within the past 12 months, have you been humiliated or emotionally abused in other ways by your partner or ex-partner?: No   Housing Stability: Low Risk  (1/28/2024)    Housing Stability     Do you have housing? : Yes     Are you worried about losing your housing?: No       Review of Systems:  Skin:  not assessed     Eyes:  Positive for glasses  ENT:  not assessed    Respiratory:  Positive for shortness of breath, dyspnea on exertion, cough, sleep apnea  Cardiovascular:    Positive for, palpitations, chest pain, lightheadedness, dizziness, heaviness  Gastroenterology: not assessed    Genitourinary:  not assessed    Musculoskeletal:  not assessed    Neurologic:  not assessed    Psychiatric:  not assessed    Heme/Lymph/Imm:  not assessed    Endocrine:  not assessed      Physical Exam:    Vitals: BP (!) 176/82 (BP Location: Right arm, Patient Position: Sitting, Cuff Size: Adult Regular)   Pulse 67   Ht  "1.626 m (5' 4\")   Wt 87.8 kg (193 lb 9.6 oz)   LMP 01/01/1999   SpO2 98%   BMI 33.23 kg/m    Constitutional: Well nourished and in no apparent distress.  Eyes: Pupils equal, round. Sclerae anicteric.   HEENT: Normocephalic, atraumatic.   Neck: Supple.   Respiratory: Breathing non-labored. Lungs clear to auscultation bilaterally. No crackles, wheezes, rhonchi, or rales.  Cardiovascular:  Regular rate and rhythm, normal S1 and S2. No murmur, rub, or gallop.  Skin: Warm, dry.   Extremities: No edema.  Neurologic: No gross motor deficits. Alert, awake, and oriented to person, place and time.  Psychiatric: Affect appropriate.        Recent Lab Results:  LIPID RESULTS:  Lab Results   Component Value Date    CHOL 210 (H) 07/05/2023    CHOL 221 (H) 09/18/2020    HDL 57 07/05/2023    HDL 51 09/18/2020     (H) 07/05/2023     (H) 09/18/2020    TRIG 102 07/05/2023    TRIG 137 09/18/2020    CHOLHDLRATIO 5.0 02/21/2015       LIVER ENZYME RESULTS:  Lab Results   Component Value Date    AST 22 01/20/2021    ALT 19 03/20/2023    ALT 22 01/20/2021       CBC RESULTS:  Lab Results   Component Value Date    WBC 7.5 01/20/2021    RBC 4.49 01/20/2021    HGB 13.7 07/05/2023    HGB 13.4 01/20/2021    HCT 39.8 01/20/2021    MCV 89 01/20/2021    MCH 29.8 01/20/2021    MCHC 33.7 01/20/2021    RDW 12.1 01/20/2021     01/20/2021       BMP RESULTS:  Lab Results   Component Value Date     04/25/2024     01/20/2021    POTASSIUM 3.8 04/25/2024    POTASSIUM 3.8 07/06/2022    POTASSIUM 3.7 01/20/2021    CHLORIDE 104 04/25/2024    CHLORIDE 107 07/06/2022    CHLORIDE 107 01/20/2021    CO2 27 04/25/2024    CO2 28 07/06/2022    CO2 30 01/20/2021    ANIONGAP 11 04/25/2024    ANIONGAP 5 07/06/2022    ANIONGAP 3 01/20/2021     (H) 04/25/2024     (H) 07/06/2022     (H) 01/20/2021    BUN 18.9 04/25/2024    BUN 19 07/06/2022    BUN 10 01/20/2021    CR 0.88 04/25/2024    CR 0.77 01/20/2021    GFRESTIMATED " 74 04/25/2024    GFRESTIMATED 84 01/20/2021    GFRESTBLACK >90 01/20/2021    KARIN 9.5 04/25/2024    KARIN 9.1 01/20/2021        A1C RESULTS:  Lab Results   Component Value Date    A1C 7.8 (H) 04/25/2024    A1C 7.4 (H) 09/18/2020       INR RESULTS:  Lab Results   Component Value Date    INR 1.01 05/23/2011           CC  Neville Sharma MD  [id number]  [place] [id number]

## 2024-12-12 ENCOUNTER — VIRTUAL VISIT (OUTPATIENT)
Facility: OTHER | Age: 64
End: 2024-12-12
Payer: COMMERCIAL

## 2024-12-12 NOTE — PROGRESS NOTES
"Medication Therapy Management (MTM) Encounter    ASSESSMENT:                            Medication Adherence/Access: No issues identified.    Diabetes   Due for repeat A1c. Managed by endo.     Hyperlipidemia   Statin indicated given age and T2DM, however, patient has history of statin intolerance. Could consider alternative lipid lowering agent such as ezetimibe.     Hypertension   Uncontrolled. Blood pressure above goal of <130/80. Hypertension control limited due to several medication intolerances. Recommend trying furosemide as prescribed. Would be hesitant to try alternative beta blocker given \"throat tightness\" listed as side effect of metoprolol. Future considerations: hydralazine (appears to have tolerated previously, not sure why it was stopped), chlorthalidone (does have listed sulfa allergy, low risk of cross sensitivity), doxazosin or prazosin, re-try clonidine or clonidine patch (hallucinations reported), methyldopa     Constipation , nausea   Stable.      Supplements   Stable.      Fibromyalgia   Continue plan per [facility].     PLAN:                            Start furosemide 10 mg daily as prescribed by your cardiologist     Follow-up: 2-4 weeks    SUBJECTIVE/OBJECTIVE:                          Maricarmen Dotson is a 64 year old female seen for an initial visit. She was referred to me from Aure Emery CNP. Today's visit is a co-visit with his .     Reason for visit: uncontrolled hypertension with several medication allergies and intolerances. Wonder if more allergy to additive or other advice for antihypertensive medications .    Allergies/ADRs: Reviewed in chart  Past Medical History: Reviewed in chart  Tobacco: She reports that she has never smoked. She has never been exposed to tobacco smoke. She has never used smokeless tobacco.    Medication Adherence/Access: no issues reported.    Diabetes   NovoLog in insulin pump     Blood sugar monitoring: Continuous Glucose Monitor "     Diet/Exercise: trying to be active, sometimes limited by muscle/joint pains  Trying to limit her gluten intake, will get a bloated stomach. Eats low sugar, low carb diet.   Stays away from bananas, strawberries due to intolerance. Avoids red wine, red grapes due to worsening muscle pain        Hyperlipidemia   No statins due to intolerances     The 10-year ASCVD risk score (Christian LINDER, et al., 2019) is: 15.5%    Values used to calculate the score:      Age: 64 years      Sex: Female      Is Non- : No      Diabetic: Yes      Tobacco smoker: No      Systolic Blood Pressure: 176 mmHg      Is BP treated: No      HDL Cholesterol: 57 mg/dL      Total Cholesterol: 210 mg/dL       Hypertension   Furosemide 10 mg daily -- hasn't started yet,  will plan to start tomorrow 12/13. Per chart review, previously had incontinence with furosemide use in the past. Today she reports incontinence with coughing ever since she had COVID.     Patient reports the following symptoms of hypertension: Ringing in the ears, headaches, blurred vision. Reports she will lay down and do breathing exercises when she has these symptoms. Not often going to ER unless pain down arm or can't breath because wait is too long and she ends up being sent home with out any assistance. Reports she knows the s/s of a stroke.     Patient self monitors blood pressure.  Home BP monitoring daily .  Ranging 220/100 to 180/90, rarely , 140s     Past medication trials - compiled through allergy list and chart review. Reports she does not tolerate Pzifer products well and feels like Ervin Nordisk or Merick products are better tolerated. Reports she saw an allergist and had a prick test, reports every thing was normal.  MRA:  Eplerenone -- Was not  able to walk due to  stiffness  Spironolactone - joint pain   ACE/ARB  losartan -- Muscle Pain   olmestartan -- Joint pain, stiffness, cough intermittent   lisinopril -- Joint aches   Enalapril  -- worsening muscle and joint pain   CCB  amlodipine -- Neuropathic type pain in hands/feet, palpitations   Compounded amlodipine - muscle pain   Nifedipine -- treated high B; headaches, nausea, muscle cramps   Diltiazem -- stiffness in in joints and muscles, limiting her walking  Beta blockers  metoprolol -- Fatigue, joint pain, throat tightness   Diuretics:   hydrochlorothiazide -- muscle, joint, kidney pain   Alpha blockers  terazosin -- Tingling in toes and muscle stiffness  Clonidine -- irritability, hallucinations     BP Readings from Last 3 Encounters:   12/10/24 (!) 176/82   11/20/24 (!) 180/80   11/06/24 (!) 172/94     Constipation , nausea   Miralax 17 g daily   Zofran as needed  -- has not had to use in awhile, effective when nausea is really bad     Patient feels that current therapy is effective.   Patient reports no current medication side effects.       Supplements   Melatonin 20 mg at bedtime   Gut support probiotic     No reported issues at this time. Uses [employer] supplement products.      Fibromyalgia   Tylenol - not using often, will use if she is in pain with her high blood pressure   Ibuprofen -- using 3-4 times a month if she has lots of pain   CBD balm - using at the site of pain, finds effective   Medical cannabis 50-75 mg THC at night -- very effective     No reported issues at this time.     Today's Vitals: LMP 01/01/1999   ----------------      I spent 30 minutes with this patient today. All changes were made via collaborative practice agreement with Aure Emery.     A summary of these recommendations was mailed to the patient.    Caro Monterroso, PharmD  Medication Therapy Management Pharmacist  [facility]    Telemedicine Visit Details  The patient's medications can be safely assessed via a telemedicine encounter.  Type of service:  Telephone visit  Originating Location (pt. Location): Home    Distant Location (provider location):  Off-site  Start Time: 3:00  PM  End Time: 3:30 PM     Medication Therapy Recommendations  No medication therapy recommendations to display

## 2024-12-12 NOTE — Clinical Note
Hello - I put some suggestions for possible other medications to try. She hadnt started the furosemide yet when I spoke to her. It appears that many medications are triggering her fibromyalgia symptoms, not sure if this is the actual drug vs inactive ingredients. Happy to follow up with her to help manage, I see she has appt with you in about 6 weeks.

## 2024-12-17 NOTE — PATIENT INSTRUCTIONS
"Recommendations from today's MTM visit:                                                    MTM (medication therapy management) is a service provided by a clinical pharmacist designed to help you get the most of out of your medicines.   Today we reviewed what your medicines are for, how to know if they are working, that your medicines are safe and how to make your medicine regimen as easy as possible.      Start furosemide 10 mg daily as prescribed by your cardiologist     Follow-up: 2-4 weeks    It was great speaking with you today.  I value your experience and would be very thankful for your time in providing feedback in our clinic survey. In the next few days, you may receive an email or text message from [employer] with a link to a survey related to your  clinical pharmacist.\"     To schedule another MTM appointment, please call the clinic directly or you may call the MTM scheduling line at [phone].    My Clinical Pharmacist's contact information:                                                      Please feel free to contact me with any questions or concerns you have.      Caro Monterroso, PharmD  Medication Therapy Management Pharmacist  [facility]   "

## 2024-12-18 ENCOUNTER — LAB (OUTPATIENT)
Dept: LAB | Facility: CLINIC | Age: 64
End: 2024-12-18
Payer: COMMERCIAL

## 2024-12-18 DIAGNOSIS — I10 ESSENTIAL HYPERTENSION, BENIGN: ICD-10-CM

## 2024-12-18 DIAGNOSIS — R80.9 TYPE 2 DIABETES MELLITUS WITH MICROALBUMINURIA, WITH LONG-TERM CURRENT USE OF INSULIN (H): ICD-10-CM

## 2024-12-18 DIAGNOSIS — Z79.4 TYPE 2 DIABETES MELLITUS WITH MICROALBUMINURIA, WITH LONG-TERM CURRENT USE OF INSULIN (H): ICD-10-CM

## 2024-12-18 DIAGNOSIS — E11.29 TYPE 2 DIABETES MELLITUS WITH MICROALBUMINURIA, WITH LONG-TERM CURRENT USE OF INSULIN (H): ICD-10-CM

## 2024-12-18 LAB
ANION GAP SERPL CALCULATED.3IONS-SCNC: 10 MMOL/L (ref 7–15)
BUN SERPL-MCNC: 11.3 MG/DL (ref 8–23)
CALCIUM SERPL-MCNC: 9.6 MG/DL (ref 8.8–10.4)
CHLORIDE SERPL-SCNC: 103 MMOL/L (ref 98–107)
CHOLEST SERPL-MCNC: 222 MG/DL
CREAT SERPL-MCNC: 0.81 MG/DL (ref 0.51–0.95)
CREAT UR-MCNC: 128 MG/DL
EGFRCR SERPLBLD CKD-EPI 2021: 81 ML/MIN/1.73M2
EST. AVERAGE GLUCOSE BLD GHB EST-MCNC: 192 MG/DL
FASTING STATUS PATIENT QL REPORTED: YES
FASTING STATUS PATIENT QL REPORTED: YES
GLUCOSE SERPL-MCNC: 209 MG/DL (ref 70–99)
HBA1C MFR BLD: 8.3 % (ref 0–5.6)
HCO3 SERPL-SCNC: 26 MMOL/L (ref 22–29)
HDLC SERPL-MCNC: 60 MG/DL
LDLC SERPL CALC-MCNC: 140 MG/DL
MICROALBUMIN UR-MCNC: 88.4 MG/L
MICROALBUMIN/CREAT UR: 69.06 MG/G CR (ref 0–25)
NONHDLC SERPL-MCNC: 162 MG/DL
POTASSIUM SERPL-SCNC: 3.9 MMOL/L (ref 3.4–5.3)
SODIUM SERPL-SCNC: 139 MMOL/L (ref 135–145)
TRIGL SERPL-MCNC: 110 MG/DL

## 2024-12-18 PROCEDURE — 82043 UR ALBUMIN QUANTITATIVE: CPT

## 2024-12-18 PROCEDURE — 80048 BASIC METABOLIC PNL TOTAL CA: CPT

## 2024-12-18 PROCEDURE — 83036 HEMOGLOBIN GLYCOSYLATED A1C: CPT

## 2024-12-18 PROCEDURE — 82570 ASSAY OF URINE CREATININE: CPT

## 2024-12-18 PROCEDURE — 80061 LIPID PANEL: CPT

## 2024-12-18 PROCEDURE — 36415 COLL VENOUS BLD VENIPUNCTURE: CPT

## 2024-12-23 ENCOUNTER — THERAPY VISIT (OUTPATIENT)
Dept: PHYSICAL THERAPY | Facility: REHABILITATION | Age: 64
End: 2024-12-23
Payer: COMMERCIAL

## 2024-12-23 DIAGNOSIS — R29.3 POOR POSTURE: ICD-10-CM

## 2024-12-23 DIAGNOSIS — M79.7 FIBROMYALGIA: Primary | ICD-10-CM

## 2024-12-23 DIAGNOSIS — M62.81 GENERALIZED MUSCLE WEAKNESS: ICD-10-CM

## 2024-12-23 PROCEDURE — 97140 MANUAL THERAPY 1/> REGIONS: CPT | Mod: GP | Performed by: PHYSICAL THERAPIST

## 2024-12-28 DIAGNOSIS — E11.29 TYPE 2 DIABETES MELLITUS WITH MICROALBUMINURIA, WITH LONG-TERM CURRENT USE OF INSULIN (H): ICD-10-CM

## 2024-12-28 DIAGNOSIS — Z79.4 TYPE 2 DIABETES MELLITUS WITH MICROALBUMINURIA, WITH LONG-TERM CURRENT USE OF INSULIN (H): ICD-10-CM

## 2024-12-28 DIAGNOSIS — R80.9 TYPE 2 DIABETES MELLITUS WITH MICROALBUMINURIA, WITH LONG-TERM CURRENT USE OF INSULIN (H): ICD-10-CM

## 2024-12-30 RX ORDER — ACYCLOVIR 800 MG/1
TABLET ORAL
Qty: 6 EACH | Refills: 0 | Status: SHIPPED | OUTPATIENT
Start: 2024-12-30

## 2024-12-30 NOTE — TELEPHONE ENCOUNTER
Requested Prescriptions   Pending Prescriptions Disp Refills    Continuous Glucose Sensor (FREESTYLE RADHA 3 SENSOR) MISC [Pharmacy Med Name: FREESTYLE RADHA 3 SENSOR KIT 14 DAY]  3     Sig: USE 1 SENSOR CONTINUOUSLY AS NEEDED, CHANGE EVERY 14 DAYS       There is no refill protocol information for this order        Last Written Prescription Date:  2/6/24  Last Fill Quantity: 6 each,  # refills: 3   Last office visit: 5/9/2024 ; last virtual visit: 12/11/2023 with prescribing provider:  Dr Hightower   Future Office Visit:  2/24/25    Refill sent to [facility].    Lj Burk RN

## 2024-12-31 ENCOUNTER — HOSPITAL ENCOUNTER (OUTPATIENT)
Dept: CARDIOLOGY | Facility: CLINIC | Age: 64
Discharge: HOME | End: 2024-12-31
Payer: COMMERCIAL

## 2024-12-31 DIAGNOSIS — I10 ESSENTIAL HYPERTENSION, BENIGN: ICD-10-CM

## 2024-12-31 PROCEDURE — 76770 US EXAM ABDO BACK WALL COMP: CPT

## 2025-01-02 ENCOUNTER — THERAPY VISIT (OUTPATIENT)
Dept: PHYSICAL THERAPY | Facility: REHABILITATION | Age: 65
End: 2025-01-02
Payer: COMMERCIAL

## 2025-01-02 DIAGNOSIS — M79.7 FIBROMYALGIA: Primary | ICD-10-CM

## 2025-01-02 DIAGNOSIS — M62.81 GENERALIZED MUSCLE WEAKNESS: ICD-10-CM

## 2025-01-02 DIAGNOSIS — R29.3 POOR POSTURE: ICD-10-CM

## 2025-01-16 ENCOUNTER — THERAPY VISIT (OUTPATIENT)
Dept: PHYSICAL THERAPY | Facility: REHABILITATION | Age: 65
End: 2025-01-16
Payer: COMMERCIAL

## 2025-01-16 DIAGNOSIS — M79.7 FIBROMYALGIA: Primary | ICD-10-CM

## 2025-01-16 DIAGNOSIS — R29.3 POOR POSTURE: ICD-10-CM

## 2025-01-16 DIAGNOSIS — M62.81 GENERALIZED MUSCLE WEAKNESS: ICD-10-CM

## 2025-01-21 ENCOUNTER — MYC MEDICAL ADVICE (OUTPATIENT)
Dept: ENDOCRINOLOGY | Facility: CLINIC | Age: 65
End: 2025-01-21
Payer: COMMERCIAL

## 2025-01-21 DIAGNOSIS — Z79.4 TYPE 2 DIABETES MELLITUS WITH MICROALBUMINURIA, WITH LONG-TERM CURRENT USE OF INSULIN (H): Primary | ICD-10-CM

## 2025-01-21 DIAGNOSIS — E11.29 TYPE 2 DIABETES MELLITUS WITH MICROALBUMINURIA, WITH LONG-TERM CURRENT USE OF INSULIN (H): Primary | ICD-10-CM

## 2025-01-21 DIAGNOSIS — R80.9 TYPE 2 DIABETES MELLITUS WITH MICROALBUMINURIA, WITH LONG-TERM CURRENT USE OF INSULIN (H): Primary | ICD-10-CM

## 2025-01-22 RX ORDER — BLOOD-GLUCOSE METER
KIT MISCELLANEOUS
Qty: 300 STRIP | Refills: 3 | Status: SHIPPED | OUTPATIENT
Start: 2025-01-22

## 2025-01-28 ENCOUNTER — MYC MEDICAL ADVICE (OUTPATIENT)
Dept: ENDOCRINOLOGY | Facility: CLINIC | Age: 65
End: 2025-01-28
Payer: COMMERCIAL

## 2025-01-28 DIAGNOSIS — E11.29 TYPE 2 DIABETES MELLITUS WITH MICROALBUMINURIA, WITH LONG-TERM CURRENT USE OF INSULIN (H): ICD-10-CM

## 2025-01-28 DIAGNOSIS — R80.9 TYPE 2 DIABETES MELLITUS WITH MICROALBUMINURIA, WITH LONG-TERM CURRENT USE OF INSULIN (H): ICD-10-CM

## 2025-01-28 DIAGNOSIS — Z79.4 TYPE 2 DIABETES MELLITUS WITH MICROALBUMINURIA, WITH LONG-TERM CURRENT USE OF INSULIN (H): ICD-10-CM

## 2025-01-28 RX ORDER — BLOOD-GLUCOSE METER
KIT MISCELLANEOUS
Qty: 300 STRIP | Refills: 3 | Status: SHIPPED | OUTPATIENT
Start: 2025-01-28

## 2025-01-30 ENCOUNTER — THERAPY VISIT (OUTPATIENT)
Dept: PHYSICAL THERAPY | Facility: REHABILITATION | Age: 65
End: 2025-01-30
Payer: COMMERCIAL

## 2025-01-30 DIAGNOSIS — R29.3 POOR POSTURE: ICD-10-CM

## 2025-01-30 DIAGNOSIS — M79.7 FIBROMYALGIA: Primary | ICD-10-CM

## 2025-01-30 DIAGNOSIS — M62.81 GENERALIZED MUSCLE WEAKNESS: ICD-10-CM

## 2025-01-30 NOTE — PROGRESS NOTES
[facility]                                                                                   OUTPATIENT PHYSICAL THERAPY    PLAN OF TREATMENT FOR OUTPATIENT REHABILITATION   Patient's Last Name, First Name, Maricarmen Leija YOB: 1960   Provider's Name   [facility]   Medical Record No.  [id number]     Onset Date: 07/16/24  Start of Care Date: 08/01/24     Medical Diagnosis:  Fibromyalgia      PT Treatment Diagnosis:  Fibromyalgia, generalized weakness, poor posture Plan of Treatment  Frequency/Duration: 1x/week (12 visits)/ 12 weeks    Certification date from 01/22/25 to 04/22/25         See note for plan of treatment details and functional goals     SANJEEV RIVERA, PT                         I CERTIFY THE NEED FOR THESE SERVICES FURNISHED UNDER        THIS PLAN OF TREATMENT AND WHILE UNDER MY CARE     (Physician attestation of this document indicates review and certification of the therapy plan).              Referring Provider:  Mayank Doherty    Initial Assessment  See Epic Evaluation- Start of Care Date: 08/01/24            PLAN  Continue therapy per current plan of care.    Beginning/End Dates of Progress Note Reporting Period:  10/29/24 to 01/30/2025    Referring Provider:  Mayank Doherty         01/30/25 0500   Appointment Info   Signing clinician's name / credentials Sanjeev Rivera PT   Total/Authorized Visits (E&T)   Visits Used 9   Medical Diagnosis Fibromyalgia   PT Tx Diagnosis Fibromyalgia, generalized weakness, poor posture   Other pertinent information craniosacral therapy per order, provided information to schedule with specialty provider. Pt will start with traditional PT and transition to craniosacral PT   Progress Note/Certification   Start of Care Date 08/01/24   Onset of illness/injury or Date of Surgery 07/16/24   Therapy Frequency 1x/week (12 visits)   Predicted Duration 12 weeks   Certification  date from 01/22/25   Certification date to 04/22/25   Progress Note Completed Date 10/29/24   [facility] Authorization Information   Condition type Chronic (continuous duration <3 months)   Cause of current episode Unspecified   Nature of treatment Rehabilitative   Documented POC (choose all that apply) Measurable short and long term/discharge treatment goals related to physical and functional deficits.;Frequency of treatment visits and treatment activities to address deficit areas.;Patient agrees to program participation including home program   How did the symptoms start chronic in nature and has progressed as time went on.   General health reported by patient Poor   GOALS   PT Goals 2;3   PT Goal 1   Goal Identifier Walk   Goal Description Pt will walk 4-5 miles with <7/10 pain to improve her aerobic endurance and participation in recreational activities   Goal Progress unable to walk very far due to R hip pain lately   Target Date 10/24/24   PT Goal 2   Goal Identifier Sleep   Goal Description Pt will sleep for 3+ hours without waking from pain to improve her sleep quality   Goal Progress not able to sleep well lately due to recent aggravation of R hip pain   Target Date 10/24/24   Subjective Report   Subjective Report The day after the last treatment was really very good. It was so much better. She then tried to do life and seemed to irritate it. Today is the first day she has been out of bed in the last 4 days. She now knows that she can't walk a mile without irritating her pain. She has been alternating Advil/Tylenol every 4 hours. She is also trying to ice, Voltaren and CBD creams that she uses as well.   Objective Measures   Objective Measures Objective Measure 1   Objective Measure 1   Objective Measure C-rot: 80/81   Details T-rot: 60/70   Treatment Interventions (PT)   Interventions Therapeutic Procedure/Exercise;Manual Therapy   Therapeutic Procedure/Exercise   Therapeutic Procedures Ther Proc 5   Ther Proc 1  Stretching   Ther Proc 1 - Details standing back stretches, odalis pose, press ups, cat/ cow, downward dog, kegels   Ther Proc 2 Isometric hip add   Ther Proc 2 - Details Does every other day; hold 3-5 sec, x 5 reps x 2 sets; addition of bigger towel roll to maintain neutral hip position- in sitting   Ther Proc 3 Isometric hip abd   Ther Proc 3 - Details seated: through partial ROM with GTB- x 4 reps, increased pain in L femur, not added to HEP   Ther Proc 4 Pool   Ther Proc 4 - Details education on standing hip abd and side stepping in the pool. Encouraged pt to reduce time in the pool (does 2-3 hours but when finished has increased pain/soreness) but try to work on increasing frequency. Only able to use outdoor home pool (4 ft high) and cannot tolerate chlorine/chemicals in pool.   Ther Proc 5 Ed on continuing with HEP and discussion of how fascia relates to current symptoms   PTRx Ther Proc 1 Posterior Pelvic Tilt   PTRx Ther Proc 1 - Details HEP   Skilled Intervention HEP for abdominal/hip strengthening, continue with stretching to reduce pain and improve function   Patient Response/Progress overall, poor tolerance to exercise   Manual Therapy   Manual Therapy: Mobilization, MFR, MLD, friction massage minutes ([id number]) 55   Manual Therapy 1 Fascial release using Strain-Counterstrain with mobilizations to BLE/lumbopelvic-LV, B pelvis/femur (P)-MS, R sciatic-N   Plan   Plan for next session Plan to con't with manual therapy to decrease fascial tension/tone to normalize ROM/decrease inflammation/decrease mm tone and improve proprioception.   Comments   Comments She really has been doing much better overall in spite of her recent aggravation of R hip pain. This pain does seem to be associated with an increase in her activity level but prior to this she was feeling much better. She did walk with slightly better ease after treatment today and was encouraged to be as active as she can be instead of lying in bed. She does  remain appropriate for skilled PT to reach all stated goals.

## 2025-02-02 NOTE — PROGRESS NOTES
~Cardiology Clinic Visit~    Primary Cardiologist: Dr. Sharma  Reason for visit: 1 month cardiology follow-up      Maricarmen Dotson MRN# [id number]   YOB: 1960 Age: 64 year old       HPI:    Maricarmen Dotson is a delightful 64 year old patient with past medical history significant for:    Uncontrolled hypertension  Hyperlipidemia  Carotid atherosclerosis  Type 2 diabetes mellitus    I had the opportunity to see Ms. Dotson for cardiology follow-up.  She has been seen by Ora Jarrett previously and was seen by Dr. Sharma on 11/6/2024 for management of her hypertension.  In review, Ms. Dotson has a history of hypertension which has been difficult to manage.  She is intolerant to a number of medications.  When seen last she does note that she drinks a lot of fluids due to dry mouth and tries to limit the amount of salt in her diet.    When seen by Dr. Sharma her blood pressure was 180/80 and she was started on eplerenone.  Unfortunately she developed side effects to this and it was discontinued.  She was subsequently started on diltiazem 30 mg, it was recommended she she take 3 times daily however patient wished to start with once daily.    At the time of her last visit her blood pressure 176/82 and she was started on lasix as she had tolerated this in the past. She was also referred to [facility] for further management. Unfortunately she noted difficulty walking with this. She was then started on Imdur which also caused side effects. Triamterine was the recommended however due to incontinence she did not want to start. Doxazosin was then recommended however not started as we did not her back from her.    A repeat renal artery ultrasound was completed on 12/31/2024 which showed mildly elevated velocity of the mid right renal artery however normal renal aortic ratio suggesting no hemodynamic stenosis. Left renal arteries had normal velocity and renal artery ratio.      She returns today for follow-up of  her hypertension.  She also notes fairly constant chest discomfort this has been longstanding and seems to correlate with anxiety and sometimes with exertion like going up stairs.  This is also associated with palpitations. No shortness of breath or dyspnea on exertion.  No lower extremity edema.    Working with Dr. Doherty with medication intolerance. Additionally, going to physical therapist to help with right hip/leg muscle spasm/weakness and that has made walking difficult. Trying to lose weight to help with blood pressure.     Diagnotic studies:  Renal artery US 12/31/2024:  Mid right renal artery with mildly elevated velocity 191 cm/s but normal renal aortic ratio of 1.66 suggests no significant hemodynamic stenosis.     Left renal arteries are normal velocity and renal artery ratio.   Exercise stress echocardiogram 9/2024:  This was non-diagnostic stress echocardiogram due to the inability to achieve  target heart rate.     The patient's resting blood pressure was 186/80. This stress test was  terminated at 2:08 per protocol when the patient's blood pressure dionte to  240/94. She remained free of symptoms, but no post procedure imaging was  performed and no ECG changes were seeen     Options for noninvasive testing for CAD could include repeating the stress  echocardiogram once blood pressure is controlled or considering a  pharmacologic stress test once blood pressure is contolled.  24 Hour blood pressure 9/2022:  Average 154/70 mmHg  Echocardiogram 4/2019:  A contrast injection (Bubble Study) was performed that was negative for flow  across the interatrial septum.  There is no color Doppler evidence of an atrial shunt.  The visual ejection fraction is estimated at 55-60%.  Left ventricular systolic function is normal.  The study was technically difficult.    Assessment:  Uncontrolled hypertension  BP today 158/78  Recently tried medications with intolerance: epleronone, diltiazem, furosemide, and imdur.    MTM following  Renal artery US 12/31/2024 shows mid right renal artery with mildly elevated velocity but normal renal aortic ratio of 1.66 suggests no significant hemodynamic stenosis.Left renal arteries are normal velocity and renal artery ratio.  Hyperlipidemia  Lipid panel 12/2024: cholesterol 222, HDL, 60, ,   Previous statin intolerance  Carotid atherosclerosis  Type 2 diabetes mellitus  HgbA1c 12/2024: 8.3  Atypical chest pain  Nearly constant chest pain that is worse in the mornings and evenings   Unfortunately due to her elevated blood pressure the exercise stress echocardiogram was unable to be completed.  Resting images show LVEF 55-60%, no regional wall motion abnormalities noted.      Plan:   It is my pleasure to see Maricarmen for cardiology follow-up today.  Unfortunately her blood pressure continues to be difficult to manage due to multiple medication allergies and intolerances. We recently tried epleronone, diltiazem, Lasix, and Imdur however developed intolerances. Maricarmen has been working on losing weight which I have commended her on and encourage.   Pharmacogenetics consult to narrow down which antihypertensives will be most effective  Continue to work with MTM, consider alternative compounding  Follow-up with me in 1 to 2 months for reassessment of blood pressure and review of renal artery ultrasound.  Follow up with me in 2-3 months.     Thank you for the opportunity to participate in this pleasant patient's care.    We would be happy to see this patient sooner for any concerns in the meantime.    MARICRUZ Rinaldi, CNP   Nurse Practitioner  [facility]    A total of 60 minutes was spent with patient, on chart review and documentation today.     The longitudinal plan of care for the diagnosis(es)/condition(s) as documented were addressed during this visit. Due to the added complexity in care, I will continue to support Maricarmen in the subsequent management and with  ongoing continuity of care.      Orders Placed This Encounter   Procedures    Adult Genetics & Metabolism  Referral    Follow-Up with Cardiology REYMUNDO     No orders of the defined types were placed in this encounter.    There are no discontinued medications.      Encounter Diagnoses   Name Primary?    Essential hypertension, benign     Medication intolerance Yes         CURRENT MEDICATIONS:  Current Outpatient Medications   Medication Sig Dispense Refill    acetaminophen (TYLENOL) 500 MG tablet Take 500-1,000 mg by mouth every 6 hours as needed for mild pain. Can only use every three to four days or she gets kidney pain      blood glucose (FREESTYLE LITE) test strip Use to test blood sugar 3-4 times daily or as directed. 300 strip 3    blood glucose monitoring (FREESTYLE LITE) meter device kit Use to test blood sugar 6-7 times daily. 1 kit 0    Continuous Blood Gluc  (FREESTYLE RADHA 14 DAY READER) MARIA C USE TO CHECK BLOOD SUGARS AS DIRECTED 1 Device 0    Continuous Glucose Sensor (FREESTYLE RADHA 3 SENSOR) MISC Change every 14 days. 6 each 0    EPINEPHrine (ANY BX GENERIC EQUIV) 0.3 MG/0.3ML injection 2-pack Inject 0.3 mLs (0.3 mg) into the muscle once as needed for anaphylaxis 2 each 1    HEMP OIL OR EXTRACT OR OTHER CBD CANNABINOID, NOT MEDICAL CANNABIS, daily CBD gummies as needed from [employer] [facility] [employer]      IBUPROFEN PO Take 200 mg by mouth every 6 hours as needed for moderate pain. Try to not use more than every next day or her BP rises, she gets heart palpitations and chest pain      insulin aspart (NOVOLOG VIAL) 100 UNITS/ML vial USE IN INSULIN PUMP, TOTAL DAILY DOSE  UNITS 90 mL 0    Insulin Disposable Pump (OMNIPOD DASH PODS, GEN 4,) MISC INJECT 1 POD UNDER THE SKIN CONTINUOUSLY AS NEEDED, CHANGE PODS EVERY 2 TO 3 DAYS AS DIRECTED 40 each 3    INSULIN PUMP - OUTPATIENT INSULIN PUMP - OUTPATIENT  Date last updated: 6/4/21 Omnipod DASH  BASAL RATES and times:   12am: 1.3, 6 am: 0.75, 12 pm:  "0.8, 6 pm: 0.95   CARB RATIO: 12am-12am: 10  Correction Factor (Sensitivity): 12AM (midnight): 31 -- 5 AM: 33  Target blood glucose: 100-120  Active insulin time: 4 hrs      insulin syringe-needle U-100 (31G X 5/16\" 0.3 ML) 31G X 5/16\" 0.3 ML miscellaneous Use 3 syringes daily or as directed, in the event of pump failure. 100 each 3    medical cannabis (Patient's own supply) See Admin Instructions (The purpose of this order is to document that the patient reports taking medical cannabis.  This is not a prescription, and is not used to certify that the patient has a qualifying medical condition.)    Taking up to 40 mg but usually 10-20 mg caplet from [facility].      MELATONIN PO Take 20 mg by mouth At Bedtime       ondansetron (ZOFRAN ODT) 4 MG ODT tab Take 1 tablet (4 mg) by mouth every 8 hours as needed for nausea 18 tablet 1    polyethylene glycol (MIRALAX/GLYCOLAX) packet Take 17 g by mouth At Bedtime       furosemide (LASIX) 20 MG tablet Take 0.5 tablets (10 mg) by mouth daily. (Patient not taking: Reported on 2/3/2025) 15 tablet 11    STATIN NOT PRESCRIBED, INTENTIONAL, Statin not prescribed intentionally due to Intolerance 0 each 0       ALLERGIES     Allergies   Allergen Reactions    Amoxicillin Anaphylaxis    Bee Anaphylaxis     Wasp       Contrast Dye Anaphylaxis    Diatrizoate Anaphylaxis    Eplerenone Muscle Pain (Myalgia)     Was not  able to walk due to  stiffness     Iodine Anaphylaxis     Severe anaphalatic shock      Losartan Muscle Pain (Myalgia)    Sulfa Antibiotics Anaphylaxis    Tetanus-Diphtheria Toxoids Td      Rxn was to Tdap-whole body joint pain and fever.  Had similar reaction to Td vaccine 7/28/2017    Metoprolol Other (See Comments)     Fatigue, joint pain, throat tightness    Zithromax [Azithromycin Dihydrate] Nausea and Vomiting    Altafluor Benox [Fluorescein-Benoxinate]     Amlodipine      Neuropathic type pain in hands/feet    Atorvastatin      myalgias    Catapres [Clonidine]     " Crestor [Rosuvastatin]      myalgias    Cyproheptadine      Severe headache, cardiac issues, and dizziness    Gabapentin     Humalog [Insulin Lispro]      Causes pancreatitis -     Hydrochlorothiazide      numbness    Latex      Skin burn and can't breathe      Lisinopril      Joint aches    Lorazepam      Panic attacks    Metformin     Naltrexone     Nifedipine     Olmesartan      Joint pain, stiffness, cough intermittent    Onglyza [Saxagliptin Hydrochloride]      Fibromyalgia flare    Phenergan [Promethazine]     Phenylephrine Hcl     Prochlorperazine      Altered mental status, hallucinations    Reglan [Metoclopramide]     Sumatriptan      Causes severe depression    Terazosin      Tingling in toes and muscle stiffness    Tetracycline Nausea and Vomiting, Hives and Difficulty breathing     Pt called to update today after the visit that she is allergic to the tetracycline-added to her list    Tropicamide     Sulindac Anxiety     Panic attacks       PAST MEDICAL HISTORY:  Past Medical History:   Diagnosis Date    Arthritis     Ascending aorta enlargement     Depressive disorder     severe, prior hospitalization    Diverticulosis of colon (without mention of hemorrhage)     Fibromyalgia 2007    Insomnia     Irritable bowel syndrome     Osteopenia     Type 2 diabetes mellitus with complications (H)     microalbuminuria       PAST SURGICAL HISTORY:  Past Surgical History:   Procedure Laterality Date    ABDOMEN SURGERY      Gallbladder removal    BACK SURGERY      fusion  and removal of hardware 2004    C SPINAL ORTHOSIS,NOS  2002    lumbar surgery    CHOLECYSTECTOMY  2011    choley  2011    ENT SURGERY  1986    septoplasty    HYSTERECTOMY, CERVIX STATUS UNKNOWN      TVH/BSO    ORTHOPEDIC SURGERY      left ankle       FAMILY HISTORY:  Family History   Problem Relation Age of Onset    Diabetes Mother         type 2    Hypertension Mother     Cerebrovascular Disease Mother          stroke  age 57    Ovarian Cancer Mother 43    Cancer Mother         cervix    Diabetes Father         type 2    Hypertension Father     Gastrointestinal Disease Father         colon polyps    Sleep Apnea Brother     Cancer Sister     Ovarian Cancer Sister 46    Breast Cancer Sister         Stage 4 breast cancer.    Ovarian Cancer Maternal Grandmother 72    Cancer Maternal Grandmother         cervix       SOCIAL HISTORY:  Social History     Socioeconomic History    Marital status:      Spouse name: None    Number of children: 3    Years of education: None    Highest education level: None   Occupational History    Occupation: xr technician     Employer: [employer]   Tobacco Use    Smoking status: Never     Passive exposure: Never    Smokeless tobacco: Never   Vaping Use    Vaping status: Former   Substance and Sexual Activity    Alcohol use: Not Currently    Drug use: No    Sexual activity: Yes     Partners: Male     Birth control/protection: Surgical     Comment: Full hystroectomy in 2000     Social Drivers of Health     Financial Resource Strain: Low Risk  (1/28/2024)    Financial Resource Strain     Within the past 12 months, have you or your family members you live with been unable to get utilities (heat, electricity) when it was really needed?: No   Food Insecurity: Low Risk  (1/28/2024)    Food Insecurity     Within the past 12 months, did you worry that your food would run out before you got money to buy more?: No     Within the past 12 months, did the food you bought just not last and you didn t have money to get more?: No   Transportation Needs: Low Risk  (1/28/2024)    Transportation Needs     Within the past 12 months, has lack of transportation kept you from medical appointments, getting your medicines, non-medical meetings or appointments, work, or from getting things that you need?: No   Interpersonal Safety: Low Risk  (4/29/2024)    Interpersonal Safety     Do you feel physically and  "emotionally safe where you currently live?: Yes     Within the past 12 months, have you been hit, slapped, kicked or otherwise physically hurt by someone?: No     Within the past 12 months, have you been humiliated or emotionally abused in other ways by your partner or ex-partner?: No   Housing Stability: Low Risk  (1/28/2024)    Housing Stability     Do you have housing? : Yes     Are you worried about losing your housing?: No       Review of Systems:  Skin:        Eyes:       ENT:       Respiratory:  Positive for shortness of breath, dyspnea on exertion, cough, sleep apnea  Cardiovascular:    Positive for, palpitations, chest pain, lightheadedness, dizziness, heaviness  Gastroenterology:      Genitourinary:       Musculoskeletal:       Neurologic:       Psychiatric:       Heme/Lymph/Imm:       Endocrine:         Physical Exam:    Vitals: BP (!) 158/78   Pulse 62   Ht 1.626 m (5' 4\")   Wt 86 kg (189 lb 8 oz)   LMP 01/01/1999   SpO2 97%   BMI 32.53 kg/m    Constitutional: Well nourished and in no apparent distress.  Eyes: Pupils equal, round. Sclerae anicteric.   HEENT: Normocephalic, atraumatic.   Neck: Supple.   Respiratory: Breathing non-labored. Lungs clear to auscultation bilaterally. No crackles, wheezes, rhonchi, or rales.  Cardiovascular:  Regular rate and rhythm, normal S1 and S2. No murmur, rub, or gallop.   Skin: Warm, dry.   Extremities: No lower extremity edema.  Neurologic: No gross motor deficits. Alert, awake, and oriented to person, place and time.  Psychiatric: Affect appropriate.        Recent Lab Results:  LIPID RESULTS:  Lab Results   Component Value Date    CHOL 222 (H) 12/18/2024    CHOL 221 (H) 09/18/2020    HDL 60 12/18/2024    HDL 51 09/18/2020     (H) 12/18/2024     (H) 09/18/2020    TRIG 110 12/18/2024    TRIG 137 09/18/2020    CHOLHDLRATIO 5.0 02/21/2015       LIVER ENZYME RESULTS:  Lab Results   Component Value Date    AST 22 01/20/2021    ALT 19 03/20/2023    ALT 22 " 01/20/2021       CBC RESULTS:  Lab Results   Component Value Date    WBC 7.5 01/20/2021    RBC 4.49 01/20/2021    HGB 13.7 07/05/2023    HGB 13.4 01/20/2021    HCT 39.8 01/20/2021    MCV 89 01/20/2021    MCH 29.8 01/20/2021    MCHC 33.7 01/20/2021    RDW 12.1 01/20/2021     01/20/2021       BMP RESULTS:  Lab Results   Component Value Date     12/18/2024     01/20/2021    POTASSIUM 3.9 12/18/2024    POTASSIUM 3.8 07/06/2022    POTASSIUM 3.7 01/20/2021    CHLORIDE 103 12/18/2024    CHLORIDE 107 07/06/2022    CHLORIDE 107 01/20/2021    CO2 26 12/18/2024    CO2 28 07/06/2022    CO2 30 01/20/2021    ANIONGAP 10 12/18/2024    ANIONGAP 5 07/06/2022    ANIONGAP 3 01/20/2021     (H) 12/18/2024     (H) 07/06/2022     (H) 01/20/2021    BUN 11.3 12/18/2024    BUN 19 07/06/2022    BUN 10 01/20/2021    CR 0.81 12/18/2024    CR 0.77 01/20/2021    GFRESTIMATED 81 12/18/2024    GFRESTIMATED 84 01/20/2021    GFRESTBLACK >90 01/20/2021    KARIN 9.6 12/18/2024    KARIN 9.1 01/20/2021        A1C RESULTS:  Lab Results   Component Value Date    A1C 8.3 (H) 12/18/2024    A1C 7.4 (H) 09/18/2020       INR RESULTS:  Lab Results   Component Value Date    INR 1.01 05/23/2011

## 2025-02-03 ENCOUNTER — TELEPHONE (OUTPATIENT)
Dept: CARDIOLOGY | Facility: CLINIC | Age: 65
End: 2025-02-03

## 2025-02-03 ENCOUNTER — OFFICE VISIT (OUTPATIENT)
Dept: CARDIOLOGY | Facility: CLINIC | Age: 65
End: 2025-02-03
Payer: COMMERCIAL

## 2025-02-03 VITALS
BODY MASS INDEX: 32.35 KG/M2 | SYSTOLIC BLOOD PRESSURE: 158 MMHG | HEART RATE: 62 BPM | OXYGEN SATURATION: 97 % | WEIGHT: 189.5 LBS | DIASTOLIC BLOOD PRESSURE: 78 MMHG | HEIGHT: 64 IN

## 2025-02-03 DIAGNOSIS — I10 ESSENTIAL HYPERTENSION, BENIGN: ICD-10-CM

## 2025-02-03 DIAGNOSIS — Z78.9 MEDICATION INTOLERANCE: Primary | ICD-10-CM

## 2025-02-03 PROCEDURE — 99417 PROLNG OP E/M EACH 15 MIN: CPT

## 2025-02-03 PROCEDURE — 99215 OFFICE O/P EST HI 40 MIN: CPT

## 2025-02-03 NOTE — LETTER
2/3/2025    Madyson Mendoza MD  [address] [place] [address]    RE: Maricarmen Dotson       Dear Colleague,     I had the pleasure of seeing Maricarmen Dotson in the [facility].          ~Cardiology Clinic Visit~    Primary Cardiologist: Dr. Sharma  Reason for visit: 1 month cardiology follow-up      Maricarmen Dotson MRN# [id number]   YOB: 1960 Age: 64 year old       HPI:    Maricarmen Dotson is a delightful 64 year old patient with past medical history significant for:    Uncontrolled hypertension  Hyperlipidemia  Carotid atherosclerosis  Type 2 diabetes mellitus    I had the opportunity to see Ms. Dotson for cardiology follow-up.  She has been seen by Ora Jarrett previously and was seen by Dr. Sharma on 11/6/2024 for management of her hypertension.  In review, Ms. Dotson has a history of hypertension which has been difficult to manage.  She is intolerant to a number of medications.  When seen last she does note that she drinks a lot of fluids due to dry mouth and tries to limit the amount of salt in her diet.    When seen by Dr. Sharma her blood pressure was 180/80 and she was started on eplerenone.  Unfortunately she developed side effects to this and it was discontinued.  She was subsequently started on diltiazem 30 mg, it was recommended she she take 3 times daily however patient wished to start with once daily.    At the time of her last visit her blood pressure 176/82 and she was started on lasix as she had tolerated this in the past. She was also referred to [facility] for further management. Unfortunately she noted difficulty walking with this. She was then started on Imdur which also caused side effects. Triamterine was the recommended however due to incontinence she did not want to start. Doxazosin was then recommended however not started as we did not her back from her.    A repeat renal artery ultrasound was completed on 12/31/2024 which showed mildly elevated  velocity of the mid right renal artery however normal renal aortic ratio suggesting no hemodynamic stenosis. Left renal arteries had normal velocity and renal artery ratio.      She returns today for follow-up of her hypertension.  She also notes fairly constant chest discomfort this has been longstanding and seems to correlate with anxiety and sometimes with exertion like going up stairs.  This is also associated with palpitations. No shortness of breath or dyspnea on exertion.  No lower extremity edema.    Working with Dr. Doherty with medication intolerance. Additionally, going to physical therapist to help with right hip/leg muscle spasm/weakness and that has made walking difficult. Trying to lose weight to help with blood pressure.     Diagnotic studies:  Renal artery US 12/31/2024:  Mid right renal artery with mildly elevated velocity 191 cm/s but normal renal aortic ratio of 1.66 suggests no significant hemodynamic stenosis.     Left renal arteries are normal velocity and renal artery ratio.   Exercise stress echocardiogram 9/2024:  This was non-diagnostic stress echocardiogram due to the inability to achieve  target heart rate.     The patient's resting blood pressure was 186/80. This stress test was  terminated at 2:08 per protocol when the patient's blood pressure dionte to  240/94. She remained free of symptoms, but no post procedure imaging was  performed and no ECG changes were seeen     Options for noninvasive testing for CAD could include repeating the stress  echocardiogram once blood pressure is controlled or considering a  pharmacologic stress test once blood pressure is contolled.  24 Hour blood pressure 9/2022:  Average 154/70 mmHg  Echocardiogram 4/2019:  A contrast injection (Bubble Study) was performed that was negative for flow  across the interatrial septum.  There is no color Doppler evidence of an atrial shunt.  The visual ejection fraction is estimated at 55-60%.  Left ventricular systolic  function is normal.  The study was technically difficult.    Assessment:  Uncontrolled hypertension  BP today 158/78  Recently tried medications with intolerance: epleronone, diltiazem, furosemide, and imdur.   MTM following  Renal artery US 12/31/2024 shows mid right renal artery with mildly elevated velocity but normal renal aortic ratio of 1.66 suggests no significant hemodynamic stenosis.Left renal arteries are normal velocity and renal artery ratio.  Hyperlipidemia  Lipid panel 12/2024: cholesterol 222, HDL, 60, ,   Previous statin intolerance  Carotid atherosclerosis  Type 2 diabetes mellitus  HgbA1c 12/2024: 8.3  Atypical chest pain  Nearly constant chest pain that is worse in the mornings and evenings   Unfortunately due to her elevated blood pressure the exercise stress echocardiogram was unable to be completed.  Resting images show LVEF 55-60%, no regional wall motion abnormalities noted.      Plan:   It is my pleasure to see Maricarmen for cardiology follow-up today.  Unfortunately her blood pressure continues to be difficult to manage due to multiple medication allergies and intolerances. We recently tried epleronone, diltiazem, Lasix, and Imdur however developed intolerances. Maricarmen has been working on losing weight which I have commended her on and encourage.   Pharmacogenetics consult to narrow down which antihypertensives will be most effective  Continue to work with MTM, consider alternative compounding  Follow-up with me in 1 to 2 months for reassessment of blood pressure and review of renal artery ultrasound.  Follow up with me in 2-3 months.     Thank you for the opportunity to participate in this pleasant patient's care.    We would be happy to see this patient sooner for any concerns in the meantime.    MARICRUZ Rinaldi, CNP   Nurse Practitioner  [facility]    A total of 60 minutes was spent with patient, on chart review and documentation today.     The  longitudinal plan of care for the diagnosis(es)/condition(s) as documented were addressed during this visit. Due to the added complexity in care, I will continue to support Maricarmen in the subsequent management and with ongoing continuity of care.      Orders Placed This Encounter   Procedures     Adult Genetics & Metabolism  Referral     Follow-Up with Cardiology REYMUNDO     No orders of the defined types were placed in this encounter.    There are no discontinued medications.      Encounter Diagnoses   Name Primary?     Essential hypertension, benign      Medication intolerance Yes         CURRENT MEDICATIONS:  Current Outpatient Medications   Medication Sig Dispense Refill     acetaminophen (TYLENOL) 500 MG tablet Take 500-1,000 mg by mouth every 6 hours as needed for mild pain. Can only use every three to four days or she gets kidney pain       blood glucose (FREESTYLE LITE) test strip Use to test blood sugar 3-4 times daily or as directed. 300 strip 3     blood glucose monitoring (FREESTYLE LITE) meter device kit Use to test blood sugar 6-7 times daily. 1 kit 0     Continuous Blood Gluc  (FREESTYLE RADHA 14 DAY READER) MARIA C USE TO CHECK BLOOD SUGARS AS DIRECTED 1 Device 0     Continuous Glucose Sensor (FREESTYLE RADHA 3 SENSOR) MISC Change every 14 days. 6 each 0     EPINEPHrine (ANY BX GENERIC EQUIV) 0.3 MG/0.3ML injection 2-pack Inject 0.3 mLs (0.3 mg) into the muscle once as needed for anaphylaxis 2 each 1     HEMP OIL OR EXTRACT OR OTHER CBD CANNABINOID, NOT MEDICAL CANNABIS, daily CBD gummies as needed from [employer]       IBUPROFEN PO Take 200 mg by mouth every 6 hours as needed for moderate pain. Try to not use more than every next day or her BP rises, she gets heart palpitations and chest pain       insulin aspart (NOVOLOG VIAL) 100 UNITS/ML vial USE IN INSULIN PUMP, TOTAL DAILY DOSE  UNITS 90 mL 0     Insulin Disposable Pump (OMNIPOD DASH PODS, GEN 4,) MISC INJECT 1 POD UNDER THE  "SKIN CONTINUOUSLY AS NEEDED, CHANGE PODS EVERY 2 TO 3 DAYS AS DIRECTED 40 each 3     INSULIN PUMP - OUTPATIENT INSULIN PUMP - OUTPATIENT  Date last updated: 6/4/21 Omnipod DASH  BASAL RATES and times:   12am: 1.3, 6 am: 0.75, 12 pm: 0.8, 6 pm: 0.95   CARB RATIO: 12am-12am: 10  Correction Factor (Sensitivity): 12AM (midnight): 31 -- 5 AM: 33  Target blood glucose: 100-120  Active insulin time: 4 hrs       insulin syringe-needle U-100 (31G X 5/16\" 0.3 ML) 31G X 5/16\" 0.3 ML miscellaneous Use 3 syringes daily or as directed, in the event of pump failure. 100 each 3     medical cannabis (Patient's own supply) See Admin Instructions (The purpose of this order is to document that the patient reports taking medical cannabis.  This is not a prescription, and is not used to certify that the patient has a qualifying medical condition.)    Taking up to 40 mg but usually 10-20 mg caplet from [facility].       MELATONIN PO Take 20 mg by mouth At Bedtime        ondansetron (ZOFRAN ODT) 4 MG ODT tab Take 1 tablet (4 mg) by mouth every 8 hours as needed for nausea 18 tablet 1     polyethylene glycol (MIRALAX/GLYCOLAX) packet Take 17 g by mouth At Bedtime        furosemide (LASIX) 20 MG tablet Take 0.5 tablets (10 mg) by mouth daily. (Patient not taking: Reported on 2/3/2025) 15 tablet 11     STATIN NOT PRESCRIBED, INTENTIONAL, Statin not prescribed intentionally due to Intolerance 0 each 0       ALLERGIES     Allergies   Allergen Reactions     Amoxicillin Anaphylaxis     Bee Anaphylaxis     Wasp        Contrast Dye Anaphylaxis     Diatrizoate Anaphylaxis     Eplerenone Muscle Pain (Myalgia)     Was not  able to walk due to  stiffness      Iodine Anaphylaxis     Severe anaphalatic shock       Losartan Muscle Pain (Myalgia)     Sulfa Antibiotics Anaphylaxis     Tetanus-Diphtheria Toxoids Td      Rxn was to Tdap-whole body joint pain and fever.  Had similar reaction to Td vaccine 7/28/2017     Metoprolol Other (See Comments)     " Fatigue, joint pain, throat tightness     Zithromax [Azithromycin Dihydrate] Nausea and Vomiting     Altafluor Benox [Fluorescein-Benoxinate]      Amlodipine      Neuropathic type pain in hands/feet     Atorvastatin      myalgias     Catapres [Clonidine]      Crestor [Rosuvastatin]      myalgias     Cyproheptadine      Severe headache, cardiac issues, and dizziness     Gabapentin      Humalog [Insulin Lispro]      Causes pancreatitis -      Hydrochlorothiazide      numbness     Latex      Skin burn and can't breathe       Lisinopril      Joint aches     Lorazepam      Panic attacks     Metformin      Naltrexone      Nifedipine      Olmesartan      Joint pain, stiffness, cough intermittent     Onglyza [Saxagliptin Hydrochloride]      Fibromyalgia flare     Phenergan [Promethazine]      Phenylephrine Hcl      Prochlorperazine      Altered mental status, hallucinations     Reglan [Metoclopramide]      Sumatriptan      Causes severe depression     Terazosin      Tingling in toes and muscle stiffness     Tetracycline Nausea and Vomiting, Hives and Difficulty breathing     Pt called to update today after the visit that she is allergic to the tetracycline-added to her list     Tropicamide      Sulindac Anxiety     Panic attacks       PAST MEDICAL HISTORY:  Past Medical History:   Diagnosis Date     Arthritis 1996     Ascending aorta enlargement      Depressive disorder     severe, prior hospitalization     Diverticulosis of colon (without mention of hemorrhage)      Fibromyalgia 06/26/2007     Insomnia      Irritable bowel syndrome      Osteopenia      Type 2 diabetes mellitus with complications (H)     microalbuminuria       PAST SURGICAL HISTORY:  Past Surgical History:   Procedure Laterality Date     ABDOMEN SURGERY  2012    Gallbladder removal     BACK SURGERY      fusion 1994 and removal of hardware 2004     C SPINAL ORTHOSIS,NOS  2002    lumbar surgery     CHOLECYSTECTOMY  2011     choley  2011     ENT SURGERY  1986     septoplasty     HYSTERECTOMY, CERVIX STATUS UNKNOWN      TVH/BSO     ORTHOPEDIC SURGERY  2005    left ankle       FAMILY HISTORY:  Family History   Problem Relation Age of Onset     Diabetes Mother         type 2     Hypertension Mother      Cerebrovascular Disease Mother          stroke age 57     Ovarian Cancer Mother 43     Cancer Mother         cervix     Diabetes Father         type 2     Hypertension Father      Gastrointestinal Disease Father         colon polyps     Sleep Apnea Brother      Cancer Sister      Ovarian Cancer Sister 46     Breast Cancer Sister         Stage 4 breast cancer.     Ovarian Cancer Maternal Grandmother 72     Cancer Maternal Grandmother         cervix       SOCIAL HISTORY:  Social History     Socioeconomic History     Marital status:      Spouse name: None     Number of children: 3     Years of education: None     Highest education level: None   Occupational History     Occupation: xr technician     Employer: [employer]   Tobacco Use     Smoking status: Never     Passive exposure: Never     Smokeless tobacco: Never   Vaping Use     Vaping status: Former   Substance and Sexual Activity     Alcohol use: Not Currently     Drug use: No     Sexual activity: Yes     Partners: Male     Birth control/protection: Surgical     Comment: Full hystroectomy in      Social Drivers of Health     Financial Resource Strain: Low Risk  (2024)    Financial Resource Strain      Within the past 12 months, have you or your family members you live with been unable to get utilities (heat, electricity) when it was really needed?: No   Food Insecurity: Low Risk  (2024)    Food Insecurity      Within the past 12 months, did you worry that your food would run out before you got money to buy more?: No      Within the past 12 months, did the food you bought just not last and you didn t have money to get more?: No   Transportation Needs: Low Risk  (2024)     "Transportation Needs      Within the past 12 months, has lack of transportation kept you from medical appointments, getting your medicines, non-medical meetings or appointments, work, or from getting things that you need?: No   Interpersonal Safety: Low Risk  (4/29/2024)    Interpersonal Safety      Do you feel physically and emotionally safe where you currently live?: Yes      Within the past 12 months, have you been hit, slapped, kicked or otherwise physically hurt by someone?: No      Within the past 12 months, have you been humiliated or emotionally abused in other ways by your partner or ex-partner?: No   Housing Stability: Low Risk  (1/28/2024)    Housing Stability      Do you have housing? : Yes      Are you worried about losing your housing?: No       Review of Systems:  Skin:        Eyes:       ENT:       Respiratory:  Positive for shortness of breath, dyspnea on exertion, cough, sleep apnea  Cardiovascular:    Positive for, palpitations, chest pain, lightheadedness, dizziness, heaviness  Gastroenterology:      Genitourinary:       Musculoskeletal:       Neurologic:       Psychiatric:       Heme/Lymph/Imm:       Endocrine:         Physical Exam:    Vitals: BP (!) 158/78   Pulse 62   Ht 1.626 m (5' 4\")   Wt 86 kg (189 lb 8 oz)   LMP 01/01/1999   SpO2 97%   BMI 32.53 kg/m    Constitutional: Well nourished and in no apparent distress.  Eyes: Pupils equal, round. Sclerae anicteric.   HEENT: Normocephalic, atraumatic.   Neck: Supple.   Respiratory: Breathing non-labored. Lungs clear to auscultation bilaterally. No crackles, wheezes, rhonchi, or rales.  Cardiovascular:  Regular rate and rhythm, normal S1 and S2. No murmur, rub, or gallop.   Skin: Warm, dry.   Extremities: No lower extremity edema.  Neurologic: No gross motor deficits. Alert, awake, and oriented to person, place and time.  Psychiatric: Affect appropriate.        Recent Lab Results:  LIPID RESULTS:  Lab Results   Component Value Date    CHOL " 222 (H) 12/18/2024    CHOL 221 (H) 09/18/2020    HDL 60 12/18/2024    HDL 51 09/18/2020     (H) 12/18/2024     (H) 09/18/2020    TRIG 110 12/18/2024    TRIG 137 09/18/2020    CHOLHDLRATIO 5.0 02/21/2015       LIVER ENZYME RESULTS:  Lab Results   Component Value Date    AST 22 01/20/2021    ALT 19 03/20/2023    ALT 22 01/20/2021       CBC RESULTS:  Lab Results   Component Value Date    WBC 7.5 01/20/2021    RBC 4.49 01/20/2021    HGB 13.7 07/05/2023    HGB 13.4 01/20/2021    HCT 39.8 01/20/2021    MCV 89 01/20/2021    MCH 29.8 01/20/2021    MCHC 33.7 01/20/2021    RDW 12.1 01/20/2021     01/20/2021       BMP RESULTS:  Lab Results   Component Value Date     12/18/2024     01/20/2021    POTASSIUM 3.9 12/18/2024    POTASSIUM 3.8 07/06/2022    POTASSIUM 3.7 01/20/2021    CHLORIDE 103 12/18/2024    CHLORIDE 107 07/06/2022    CHLORIDE 107 01/20/2021    CO2 26 12/18/2024    CO2 28 07/06/2022    CO2 30 01/20/2021    ANIONGAP 10 12/18/2024    ANIONGAP 5 07/06/2022    ANIONGAP 3 01/20/2021     (H) 12/18/2024     (H) 07/06/2022     (H) 01/20/2021    BUN 11.3 12/18/2024    BUN 19 07/06/2022    BUN 10 01/20/2021    CR 0.81 12/18/2024    CR 0.77 01/20/2021    GFRESTIMATED 81 12/18/2024    GFRESTIMATED 84 01/20/2021    GFRESTBLACK >90 01/20/2021    KARIN 9.6 12/18/2024    KARIN 9.1 01/20/2021        A1C RESULTS:  Lab Results   Component Value Date    A1C 8.3 (H) 12/18/2024    A1C 7.4 (H) 09/18/2020       INR RESULTS:  Lab Results   Component Value Date    INR 1.01 05/23/2011         Thank you for allowing me to participate in the care of your patient.      Sincerely,     MARICRUZ Rinaldi CNP     [facility]  cc:   MARICRUZ Rinaldi CNP  [address], [id number]  [place] [id number]

## 2025-02-03 NOTE — TELEPHONE ENCOUNTER
----- Message from Namita Nowak sent at 2/3/2025  3:45 PM CST -----  Hello,  Can you let Maricarmen know that I talked with Caro the [facility] pharmacist who agrees that pharmacogenetics testing is a good next step.  In the meantime she will reach out to the compounding pharmacy and see which antihypertensive medications would be available to us.  I will be in touch once I see the results of the pharmacogenetics testing.  Thank you, Namita    Spoke with patient and updated on message above. Pt verbalized understanding.

## 2025-02-03 NOTE — PATIENT INSTRUCTIONS
Thank you for your visit with the [facility] today.    Today's Summary:    I've placed a pharmacogenetics consult to review which blood pressure medications work best with your genetic profile. Once we have this information we can work on honing in on a class of medications.   I'll update you with what the pharmacist recommends.   I agree that continuing to lose weight is a great strategy as well as limiting salt intake.     Follow-up:  Cardiology follow up at [facility]: Follow up with me in 2-3 months.     Cardiology Scheduling ~[phone]  Cardiology Clinic RN ~ [phone]    It was a pleasure seeing you today.     MARICRUZ Rinaldi, CNP  Certified Nurse Practitioner  [facility]  February 3, 2025  ________________________________________________________

## 2025-02-04 ENCOUNTER — OFFICE VISIT (OUTPATIENT)
Dept: PALLIATIVE MEDICINE | Facility: OTHER | Age: 65
End: 2025-02-04
Payer: COMMERCIAL

## 2025-02-04 VITALS
WEIGHT: 189 LBS | BODY MASS INDEX: 32.44 KG/M2 | DIASTOLIC BLOOD PRESSURE: 78 MMHG | HEART RATE: 63 BPM | OXYGEN SATURATION: 100 % | SYSTOLIC BLOOD PRESSURE: 146 MMHG

## 2025-02-04 DIAGNOSIS — M79.7 FIBROMYALGIA: Primary | ICD-10-CM

## 2025-02-04 PROCEDURE — G0463 HOSPITAL OUTPT CLINIC VISIT: HCPCS | Performed by: ANESTHESIOLOGY

## 2025-02-04 PROCEDURE — G2211 COMPLEX E/M VISIT ADD ON: HCPCS | Performed by: ANESTHESIOLOGY

## 2025-02-04 PROCEDURE — 99215 OFFICE O/P EST HI 40 MIN: CPT | Performed by: ANESTHESIOLOGY

## 2025-02-04 ASSESSMENT — PAIN SCALES - GENERAL: PAINLEVEL_OUTOF10: SEVERE PAIN (7)

## 2025-02-04 NOTE — PROGRESS NOTES
Patient presents to the clinic today for a visit  with POLI COLEMAN MD            9/10/2024     3:23 PM 11/20/2024    12:59 PM 2/4/2025    11:41 AM   PEG Score   PEG Total Score 9 7.33 6.67       UDS/CSA-na   Medications-na     QUESTIONS:    Shawna Brito MA  [facility] [place]

## 2025-02-04 NOTE — PATIENT INSTRUCTIONS
[facility] Number:  [phone]  Call with any questions about your care and for scheduling assistance.   Calls are returned Monday through Friday between 8 AM and 4:30 PM. We usually get back to you within 2 business days depending on the issue/request.    If we are prescribing your medications:  For opioid medication refills, call the clinic or send a [employer] message 7 days in advance.  Please include:  Name of requested medication  Name of the pharmacy.  For non-opioid medications, call your pharmacy directly to request a refill. Please allow 3-4 days to be processed.   Per [place] State Law:  All controlled substance prescriptions must be filled within 30 days of being written.    For those controlled substances allowing refills, pickup must occur within 30 days of last fill.      We believe regular attendance is key to your success in our program!    Any time you are unable to keep your appointment we ask that you call us at least 24 hours in advance to cancel.This will allow us to offer the appointment time to another patient.   Multiple missed appointments may lead to dismissal from the clinic.       PLAN:    Continue physical therapy    Methylene blue one drop in cup of water, take one-quarter cup daily for 1 days, if tolerating may try one-half cup    Discussed infra-red sauna may help with detox and have same wave-length as methylene blue    Discussed Homeopathy, may look up [place] [facility] for provider, and [facility] [phone]    Return to Dr. Doherty in 2-3 months

## 2025-02-04 NOTE — PROGRESS NOTES
"                          [facility] Follow-up    Date of visit: 2/4/2025    Chief complaint:   Chief Complaint   Patient presents with    Pain    Pain Management     Follow-up for history of fibromyalgia.    Reviewing the record seen in the emergency room 11/26, unstable blood pressure.    She reviews today flared up last week, hip pain, hard to get out of bed for 3 days.  Using Tylenol alternating with ibuprofen for a day.  Then did get to physical therapy and the therapist Sanjeev seem to be able to \"open up\" this.    Notes she is still to walk 3 to 6 miles at a time now can only go for a mile, discouraging.    Reviews does not want to be sedentary however is gets more stiff.    Reviews tried tumeric, her joint muscles got more sore.  She tends to have opposite or paradoxical reactions.  Alcohol makes her wired.  Antidepressants not tolerated.  IV medications tend to be better.  She will be having pharmacal genomic testing.    She did contact the person that does frequency specific microcurrent, did even remotely.  She became more stimulated also right after that session, more anxiety.  She did share some history from her past which seem to flare things up.    Her trauma therapist has not felt that she is ready to do EMDR.    Her  reviews that after her concussion, difficulties with mood seem to get worse.  She was previously managing PTSD and anxiety.  We discussed the association traumatic brain injuries and graft disturbance that they think can apply ice.    She did do transcranial magnetic stimuli.  Described after the 16th treatment had been started have some benefit, then her arm became activated so they had to reset things that did not do as well.    Reviews a goal to lose weight but part of her PTSD is binge eating.    She uses MiraLAX has been taking for some 15 years.    They reviewed with the methylene blue 10 mg, seem to note some improvement initially with " anxiety, with cognitive clarity, energy, however after about a week became more destabilized.  We discussed some people are quite sensitive.  Reviewed can do liquid forms and much lower doses and she would like to consider.    Reviewed again with physical therapy seeming to have a sense that she detox, but sweat had a different odor.  We reviewed again infrared sauna's in her area.    Discussed that she has been so sensitive to medications if we could try ketamine.  She had wondered about that but she was only familiar with the ketamine intramuscular use at the [facility] and was worried if it is placed intramuscularly it could last long-term was wrong dose.  Reviewed our approach using sublingual lozenges starting extremely low dose less likely to have adverse effect.    Also reviewed approaches such as homeopathy.    She did try Ortho spore Ig.  Also had an adverse reaction and read that it had glucosamine and it which is similar to where she has had bad reactions.                  Medications:  Current Outpatient Medications   Medication Sig Dispense Refill    acetaminophen (TYLENOL) 500 MG tablet Take 500-1,000 mg by mouth every 6 hours as needed for mild pain. Can only use every three to four days or she gets kidney pain      blood glucose (FREESTYLE LITE) test strip Use to test blood sugar 3-4 times daily or as directed. 300 strip 3    blood glucose monitoring (FREESTYLE LITE) meter device kit Use to test blood sugar 6-7 times daily. 1 kit 0    Continuous Blood Gluc  (FREESTYLE RADHA 14 DAY READER) MARIA C USE TO CHECK BLOOD SUGARS AS DIRECTED 1 Device 0    Continuous Glucose Sensor (FREESTYLE RADHA 3 SENSOR) MISC Change every 14 days. 6 each 0    EPINEPHrine (ANY BX GENERIC EQUIV) 0.3 MG/0.3ML injection 2-pack Inject 0.3 mLs (0.3 mg) into the muscle once as needed for anaphylaxis 2 each 1    HEMP OIL OR EXTRACT OR OTHER CBD CANNABINOID, NOT MEDICAL CANNABIS, daily CBD gummies as needed from  CBD at  "[facility]      IBUPROFEN PO Take 200 mg by mouth every 6 hours as needed for moderate pain. Try to not use more than every next day or her BP rises, she gets heart palpitations and chest pain      insulin aspart (NOVOLOG VIAL) 100 UNITS/ML vial USE IN INSULIN PUMP, TOTAL DAILY DOSE  UNITS 90 mL 0    Insulin Disposable Pump (OMNIPOD DASH PODS, GEN 4,) MISC INJECT 1 POD UNDER THE SKIN CONTINUOUSLY AS NEEDED, CHANGE PODS EVERY 2 TO 3 DAYS AS DIRECTED 40 each 3    INSULIN PUMP - OUTPATIENT INSULIN PUMP - OUTPATIENT  Date last updated: 6/4/21 Omnipod DASH  BASAL RATES and times:   12am: 1.3, 6 am: 0.75, 12 pm: 0.8, 6 pm: 0.95   CARB RATIO: 12am-12am: 10  Correction Factor (Sensitivity): 12AM (midnight): 31 -- 5 AM: 33  Target blood glucose: 100-120  Active insulin time: 4 hrs      insulin syringe-needle U-100 (31G X 5/16\" 0.3 ML) 31G X 5/16\" 0.3 ML miscellaneous Use 3 syringes daily or as directed, in the event of pump failure. 100 each 3    medical cannabis (Patient's own supply) See Admin Instructions (The purpose of this order is to document that the patient reports taking medical cannabis.  This is not a prescription, and is not used to certify that the patient has a qualifying medical condition.)    Taking up to 40 mg but usually 10-20 mg caplet from [facility].      MELATONIN PO Take 20 mg by mouth At Bedtime       ondansetron (ZOFRAN ODT) 4 MG ODT tab Take 1 tablet (4 mg) by mouth every 8 hours as needed for nausea 18 tablet 1    polyethylene glycol (MIRALAX/GLYCOLAX) packet Take 17 g by mouth At Bedtime       STATIN NOT PRESCRIBED, INTENTIONAL, Statin not prescribed intentionally due to Intolerance 0 each 0    furosemide (LASIX) 20 MG tablet Take 0.5 tablets (10 mg) by mouth daily. (Patient not taking: Reported on 2/4/2025) 15 tablet 11           Physical Exam:  Blood pressure (!) 146/78, pulse 63, weight 85.7 kg (189 lb), last menstrual period 01/01/1999, SpO2 100%, not currently breastfeeding.      Alert, clear " sensorium, no respiratory distress.  No pain behavior.    Affect fairly full range.        Assessment:   Diagnosis of fibromyalgia, diffuse arthralgias, particular joint pain.    Describes starting craniosacral and myofascial release physical therapy noting some release with her hip pain and experience of detoxification.    Describes a long history of not tolerating medications.  Challenges with treating her blood pressure is that she has not been able to tolerate every class and they are doing pharmacodynamic testing.     in particular noted some improvement in the first several days of methylene blue then having side effects.    Plan: She will start with very low-dose of methylene blue with 1 drop, couple water, starting with the quarter of that cup of water daily advancing to see if tolerated.    We discussed could also look into trying ketamine extremely low doses in the future.    Will continue with physical therapy.    May look into detox with infrared therapy, reviewed the infrared frequency and methylene blue are found to affect the similar area in the mitochondria electron chain process.    She will follow-up in 2 to 3 months though will be using MyChart.    Total time 44 minutesThe longitudinal plan of care for the diagnosis(es)/condition(s) as documented were addressed during this visit. Due to the added complexity in care, I will continue to support Maricarmen in the subsequent management and with ongoing continuity of care.     minutes spent on the date of encounter doing chart review, history, and exam documentation and further activities as noted above.     Mayank Doherty MD  [facility]

## 2025-02-05 ENCOUNTER — TELEPHONE (OUTPATIENT)
Dept: ENDOCRINOLOGY | Facility: CLINIC | Age: 65
End: 2025-02-05
Payer: COMMERCIAL

## 2025-02-05 NOTE — TELEPHONE ENCOUNTER
Prior Authorization Specialty Medication Request    Medication/Dose: Continuous Glucose Sensor (FREESTYLE RADHA 3 SENSOR) MISC  Diagnosis and ICD code (if different than what is on RX):  E11.29, R80.9, Z79.4       Insurance   Primary: [facility] - [employer] MEDICARE ADVANTAGE   Insurance ID:  [id number]

## 2025-02-10 NOTE — TELEPHONE ENCOUNTER
Central Prior Authorization Team   Phone: [phone]    PA Initiation    Medication: Continuous Glucose Sensor (FREESTYLE RADHA 3 SENSOR) [facility]  Insurance Company: [employer] ([facility]) - Phone [phone] Fax [phone]  Pharmacy Filling the Rx: [id number] - [place], [place] - [address] AT [facility]  Filling Pharmacy Phone: [phone]  Filling Pharmacy Fax:    Start Date: 2/10/2025

## 2025-02-17 ENCOUNTER — THERAPY VISIT (OUTPATIENT)
Dept: PHYSICAL THERAPY | Facility: REHABILITATION | Age: 65
End: 2025-02-17
Payer: COMMERCIAL

## 2025-02-17 DIAGNOSIS — M79.7 FIBROMYALGIA: Primary | ICD-10-CM

## 2025-02-17 DIAGNOSIS — R29.3 POOR POSTURE: ICD-10-CM

## 2025-02-17 DIAGNOSIS — M62.81 GENERALIZED MUSCLE WEAKNESS: ICD-10-CM

## 2025-02-17 PROCEDURE — 97140 MANUAL THERAPY 1/> REGIONS: CPT | Mod: GP | Performed by: PHYSICAL THERAPIST

## 2025-02-19 NOTE — TELEPHONE ENCOUNTER
Prior Authorization Approval    Authorization Effective Date: 2/13/2025  Authorization Expiration Date: 12/31/2025  Medication: Continuous Glucose Sensor (FREESTYLE RADHA 3 SENSOR) MISC-PA APPROVED   Approved Dose/Quantity:   Reference #:     Insurance Company: [employer] ([facility]) - Phone [phone] Fax [phone]  Expected CoPay:       CoPay Card Available:      Foundation Assistance Needed:    Which Pharmacy is filling the prescription (Not needed for infusion/clinic administered): [id number] - [place], [place] - [address] AT [facility]  Pharmacy Notified:  Yes- **Instructed pharmacy to notify patient when script is ready to /ship.**  Patient Notified:  Yes

## 2025-02-24 ENCOUNTER — VIRTUAL VISIT (OUTPATIENT)
Dept: CONSULT | Facility: CLINIC | Age: 65
End: 2025-02-24
Payer: COMMERCIAL

## 2025-02-24 ENCOUNTER — OFFICE VISIT (OUTPATIENT)
Dept: ENDOCRINOLOGY | Facility: CLINIC | Age: 65
End: 2025-02-24
Payer: COMMERCIAL

## 2025-02-24 VITALS
DIASTOLIC BLOOD PRESSURE: 70 MMHG | WEIGHT: 185 LBS | SYSTOLIC BLOOD PRESSURE: 153 MMHG | BODY MASS INDEX: 31.76 KG/M2 | HEART RATE: 62 BPM

## 2025-02-24 DIAGNOSIS — E11.29 TYPE 2 DIABETES MELLITUS WITH MICROALBUMINURIA, WITH LONG-TERM CURRENT USE OF INSULIN (H): Primary | ICD-10-CM

## 2025-02-24 DIAGNOSIS — R80.9 TYPE 2 DIABETES MELLITUS WITH MICROALBUMINURIA, WITH LONG-TERM CURRENT USE OF INSULIN (H): Primary | ICD-10-CM

## 2025-02-24 DIAGNOSIS — Z79.4 TYPE 2 DIABETES MELLITUS WITH MICROALBUMINURIA, WITH LONG-TERM CURRENT USE OF INSULIN (H): Primary | ICD-10-CM

## 2025-02-24 DIAGNOSIS — Z78.9 LACK OF DRUG EFFECT (PROPERLY PRESCRIBED AND ADMINISTERED): ICD-10-CM

## 2025-02-24 DIAGNOSIS — Z96.41 INSULIN PUMP STATUS: ICD-10-CM

## 2025-02-24 DIAGNOSIS — Z71.83 ENCOUNTER FOR NONPROCREATIVE GENETIC COUNSELING AND TESTING: ICD-10-CM

## 2025-02-24 DIAGNOSIS — Z13.71 ENCOUNTER FOR NONPROCREATIVE GENETIC COUNSELING AND TESTING: ICD-10-CM

## 2025-02-24 DIAGNOSIS — I10 ESSENTIAL HYPERTENSION, BENIGN: ICD-10-CM

## 2025-02-24 DIAGNOSIS — Z78.9 MEDICATION INTOLERANCE: Primary | ICD-10-CM

## 2025-02-24 PROCEDURE — G2211 COMPLEX E/M VISIT ADD ON: HCPCS | Performed by: INTERNAL MEDICINE

## 2025-02-24 PROCEDURE — 95251 CONT GLUC MNTR ANALYSIS I&R: CPT | Performed by: INTERNAL MEDICINE

## 2025-02-24 PROCEDURE — 96041 GENETIC COUNSELING SVC EA 30: CPT | Mod: GT,95

## 2025-02-24 PROCEDURE — 99214 OFFICE O/P EST MOD 30 MIN: CPT | Performed by: INTERNAL MEDICINE

## 2025-02-24 RX ORDER — BLOOD-GLUCOSE SENSOR
1 EACH MISCELLANEOUS CONTINUOUS PRN
Qty: 6 EACH | Refills: 1 | Status: SHIPPED | OUTPATIENT
Start: 2025-02-24

## 2025-02-24 RX ORDER — INSULIN PMP CART,AUT,G6/7,CNTR
1 EACH SUBCUTANEOUS CONTINUOUS
Qty: 2 EACH | Refills: 11 | Status: SHIPPED | OUTPATIENT
Start: 2025-02-24

## 2025-02-24 RX ORDER — INSULIN PMP CART,AUT,G6/7,CNTR
1 EACH SUBCUTANEOUS CONTINUOUS
Qty: 1 KIT | Refills: 0 | Status: SHIPPED | OUTPATIENT
Start: 2025-02-24

## 2025-02-24 NOTE — PROGRESS NOTES
"Virtual Visit Details    Type of service:  Video Visit (switched to telephone due to technology issue)  Video (Telephone) Start Time:  2:12P  Video (Telephone) End Time: 2:39P  Originating Location (pt. Location): Home  Distant Location (provider location):  On-site  Platform used for Video Visit: Unable to complete video visit - switched to telephone    GENETIC COUNSELING CONSULTATION NOTE    Date of visit: 02/24/25    Presenting Information:   Maricarmen \"Maricarmen\" Mauri is a 64 year old female referred to the [facility] at the request of Namita ALCANTARA CNP today. Maricarmen was seen for a genetic counseling appointment via telephone visit today (she was unable to hear me via her video visit so we had to switch to phone) to discuss the details of pharmacogenomic testing and to coordinate this testing. Her  accompanied her for today's visit.    Maricarmen endorses a strong history of medication reactions and sensitivities. She has also had a number of paradoxical reactions to medications. Her providers are having difficulty managing her hypertension and chronic pain at present.    Prior medication trials (non exhaustive list) includes:  MRA:  Eplerenone -- Was not  able to walk due to  stiffness  Spironolactone - joint pain   ACE/ARB  losartan -- Muscle Pain   olmestartan -- Joint pain, stiffness, cough intermittent   lisinopril -- Joint aches   Enalapril -- worsening muscle and joint pain   CCB  amlodipine -- Neuropathic type pain in hands/feet, palpitations   Compounded amlodipine - muscle pain   Nifedipine -- treated high B; headaches, nausea, muscle cramps   Diltiazem -- stiffness in in joints and muscles, limiting her walking  Beta blockers  metoprolol -- Fatigue, joint pain, throat tightness   Diuretics:   hydrochlorothiazide -- muscle, joint, kidney pain   Alpha blockers  terazosin -- Tingling in toes and muscle stiffness  Clonidine -- irritability, hallucinations    Gabapentin used to work " really well for 1.5 years and then suddenly stopped working.    Current Medications:  Current Outpatient Medications   Medication Sig Dispense Refill    acetaminophen (TYLENOL) 500 MG tablet Take 500-1,000 mg by mouth every 6 hours as needed for mild pain. Can only use every three to four days or she gets kidney pain      blood glucose (FREESTYLE LITE) test strip Use to test blood sugar 3-4 times daily or as directed. 300 strip 3    blood glucose monitoring (FREESTYLE LITE) meter device kit Use to test blood sugar 6-7 times daily. 1 kit 0    Continuous Blood Gluc  (FREESTYLE RADHA 14 DAY READER) MARIA C USE TO CHECK BLOOD SUGARS AS DIRECTED (Patient not taking: Reported on 2/24/2025) 1 Device 0    Continuous Glucose Sensor (FREESTYLE RADHA 3 PLUS SENSOR) MISC Change every 15 days. 6 each 1    Continuous Glucose Sensor (FREESTYLE RADHA 3 SENSOR) MISC Change every 14 days. 6 each 0    EPINEPHrine (ANY BX GENERIC EQUIV) 0.3 MG/0.3ML injection 2-pack Inject 0.3 mLs (0.3 mg) into the muscle once as needed for anaphylaxis 2 each 1    furosemide (LASIX) 20 MG tablet Take 0.5 tablets (10 mg) by mouth daily. (Patient not taking: Reported on 2/4/2025) 15 tablet 11    HEMP OIL OR EXTRACT OR OTHER CBD CANNABINOID, NOT MEDICAL CANNABIS, daily CBD gummies as needed from  CBD at [facility]      IBUPROFEN PO Take 200 mg by mouth every 6 hours as needed for moderate pain. Try to not use more than every next day or her BP rises, she gets heart palpitations and chest pain      insulin aspart (NOVOLOG VIAL) 100 UNITS/ML vial USE IN INSULIN PUMP, TOTAL DAILY DOSE  UNITS 90 mL 0    Insulin Disposable Pump (OMNIPOD DASH PODS, GEN 4,) MISC INJECT 1 POD UNDER THE SKIN CONTINUOUSLY AS NEEDED, CHANGE PODS EVERY 2 TO 3 DAYS AS DIRECTED 40 each 3    INSULIN PUMP - OUTPATIENT INSULIN PUMP - OUTPATIENT  Date last updated: 6/4/21 Omnipod DASH  BASAL RATES and times:   12am: 1.3, 6 am: 0.75, 12 pm: 0.8, 6 pm: 0.95   CARB RATIO: 12am-12am:  "10  Correction Factor (Sensitivity): 12AM (midnight): 31 -- 5 AM: 33  Target blood glucose: 100-120  Active insulin time: 4 hrs      insulin syringe-needle U-100 (31G X 5/16\" 0.3 ML) 31G X 5/16\" 0.3 ML miscellaneous Use 3 syringes daily or as directed, in the event of pump failure. 100 each 3    medical cannabis (Patient's own supply) See Admin Instructions (The purpose of this order is to document that the patient reports taking medical cannabis.  This is not a prescription, and is not used to certify that the patient has a qualifying medical condition.)    Taking up to 40 mg but usually 10-20 mg caplet from [facility].      MELATONIN PO Take 20 mg by mouth At Bedtime       ondansetron (ZOFRAN ODT) 4 MG ODT tab Take 1 tablet (4 mg) by mouth every 8 hours as needed for nausea 18 tablet 1    polyethylene glycol (MIRALAX/GLYCOLAX) packet Take 17 g by mouth At Bedtime       STATIN NOT PRESCRIBED, INTENTIONAL, Statin not prescribed intentionally due to Intolerance 0 each 0     No current facility-administered medications for this visit.        Family History: A three generation pedigree was obtained and scanned into the electronic medical record. The relevant portions are described below:    Children- Two daughters and a son. Oldest daughter with asthma and food sensitivities. Younger daughter with alpha-gal pork sensitivitiy with medication sensitivities too. Son with Gilbert disease and peanut allergy. Grandson is 4 years old and well. Both daughters are reported to have had negative genetic testing due to familial cancer risk (not verified today).  Siblings- Older sister with developmental differences, DM and high cholesterol. Older brother with multiple medical concerns and history of gastric bypass. Younger sister passed away from stage 4 breast cancer with reported BRCA genetic variant. Maricarmen reports prior genetic testing, upon review of her chart it appears she had a negative panel in 2015 and reviewed her history " again with oncology genetic counselor Krystal Varela in 2020. If Maricarmen is interested in reviewing this history and discussion of updated testing at any point, she is encouraged to reach out to the cancer risk management team at [phone].  Parents- Father passed away in an accident. He had a history of DM2, heart attack and high cholesterol. Mother passed away at 57 years old from a stroke. She had unmanaged DM, high cholesterol and HTN.  Maternal Relatives- Two aunts (now ) with breast cancer. One aunt () with lung cancer and positive smoking history. Aunt living in her 90s. Uncle living in his late 70s who had one kidney removed due to cancer and is doing better since that surgical procedure. Grandmother passed from ovarian cancer. Grandfather passed from liver cirrhosis with positive alcohol use history.  Paternal Relatives- Two uncles passed away in infancy. Aunt with alcohol use disorder who passed away from ramifications of the same. Both grandparents  with history of alcohol use disorder.    Family history is otherwise largely non-contributory.    Genetic Counseling Discussion:  For review, our bodies are made of cells that contain our chromosomes which are made up of long stretches of DNA containing our genes. Our genes serve as the instructions for our bodies to grow and function. We have two copies of each gene, one inherited from our mother and one inherited from our father.     What pharmacogenomic testing can tell us:  There are several factors that can determine how an individual responds to medications. Some of these factors include environmental factors such as lifestyle choices (diet, alcohol and/or tobacco use) or other medications an individual might be taking, and other factors can include a person's age, sex, ethnic background, disease, and underlying genetic factors. There are several genes in our body that provide instructions for breaking down the medications we take.  Changes in these genes (sometimes called variants or polymorphisms/SNPs) can alter how the body breaks down certain medications. For example, a change in a gene can slow down the process of medication breakdown leading to too much of that medication/drug in the body which can cause side effects. On the other hand, a change in a gene can speed up the breakdown of a medication so a therapeutic level is not reached and the medication may not work well at the standard doses. Therefore, identifying changes in these genes via pharmacogenomic testing can help guide which medications might work best for an individual, what dose of a medication may be best, or if a certain medication has a high risk for causing serious side effects.     The pharmacogenomic testing offered at [facility] analyzes several different polymorphisms in different genes associated with drug metabolism. These variants have been determined to be medically actionable by practice guidelines including CPIC (Clinical Pharmacogenetics Implementation Consortium), PharmGKB and/or FDA gene-drug interaction guidelines. Therefore, prescribing recommendations can be made by the results of this test, so this testing for Maricarmen is DIAGNOSTIC and is NOT investigational.     The results of this test can provide guidance for several different types of drugs such as antiinflammatories, antithrombotics, lipid-lowering medication, acid-lowering medications, antiemetics, immunosuppressants, antivirals, antifungals, antidepressants, ADHD medications, antimetabolites, and/or hormone antagonists. This means that, while this testing was recommended for a specific reason right now (Maricarmen's hypertension), it may provide guidance for future medication use.     As previously mentioned, we inherit our genes from our parents. This means that some of the pharmacogenomic variants found on this test may be shared by other relatives. These results could be helpful to share with  other relatives, but it is important to remember that there are many factors that can contribute to how an individual responds to medications. Relatives should consider their own pharmacogenomics testing to better determine their recommendations and they should consult with their health care providers before making any changes.     What pharmacogenomic testing cannot tell us:  This pharmacogenomic testing is not looking at every known pharmacogenomic polymorphism and therefore the results cannot tell us about every possible gene-drug interaction. It is also not looking at genes outside of the scope of pharmacogenomics, and therefore, the results will not tell us if Maricarmen is at risk for other genetic conditions. If Maricarmen has questions about a possible underlying genetic condition, they should talk with their primary care provider about seeking an appointment in our Genetics Clinic.     Testing logistics:  [facility] will try to obtain insurance coverage for the pharmacogenomic testing, however this is not always a covered service. A cost waiver form (ABN and Medicare replacement non-coverage form) is required, but cost to the patient is not expected to exceed $250.     A blood or buccal sample will be collected for DNA analysis. The results of this test take 1-2 weeks. An appointment will be made with the pharmacist to discuss these results in detail and to discuss the medication recommendations based on these results. These test results will be accessible in [email or web address] and may be viewable prior to the appointment with the pharmacist, but NO CHANGES SHOULD BE MADE TO MEDICATIONS BEFORE TALKING TO THE PHARMACIST OR HEALTHCARE PROVIDER.     Maricarmen consented to pharmacogenomic testing today. The insurance and billing were explained and Maricarmen agreed to the billing plan. Due to the fact that this was a virtual visit, Maricarmen gave me verbal permission/consent to sign the genetic testing consent form and the cost waiver form  (ABN and Medicare replacement non-coverage form) on their behalf.     It was a pleasure meeting Maricarmen today. They was encouraged to reach out to me if they have any further questions.     Plan:  Maricarmen will arrange a lab appointment at a [place] laboratory to have a blood sample drawn for the [facility] Pharmacogenomic Test  Maricarmen was given the phone number to call to make the results appointment with the [facility] pharmacist ([phone]). Maricarmen was instructed to call to schedule once they have their blood drawn. If Maricarmen does not call to schedule, someone will reach out to schedule when the Pharmacogenomics Test is completed.   Maricarmen reports prior genetic testing due to family history of cancer, upon review of her chart it appears she had a negative panel in 2015 and reviewed her history again with oncology genetic counselor Krystal Varela in 2020. If Maricarmen is interested in reviewing this history and discussion of updated testing at any point, she is encouraged to reach out to the cancer risk management team at [phone].    Lexie Chatterjee MS, [facility]  Genetic Counseling  [facility]  Email: [email or web address]  Phone: [phone]  Fax: [phone]    55 minutes spent on the date of the encounter in chart review, patient visit, test coordination/review, documentation, and/or discussion with other providers about the issues documented above.    This visit was recorded in accordance with the GC-PRO Study. Agreement to participate and option to stop recording at any point reviewed with patient at beginning of visit. Patient elected to proceed.

## 2025-02-24 NOTE — LETTER
"2/24/2025      RE: Maricarmne Dotson  [address] [place] [address]     Dear Colleague,    Thank you for the opportunity to participate in the care of your patient, Maricarmen Dotson, at the [facility] at [facility]. Please see a copy of my visit note below.    Virtual Visit Details    Type of service:  Video Visit (switched to telephone due to technology issue)  Video (Telephone) Start Time:  2:12P  Video (Telephone) End Time: 2:39P  Originating Location (pt. Location): Home  Distant Location (provider location):  On-site  Platform used for Video Visit: Unable to complete video visit - switched to telephone    GENETIC COUNSELING CONSULTATION NOTE    Date of visit: 02/24/25    Presenting Information:   Maricarmen \"Maricarmen\"Mauri is a 64 year old female referred to the [facility] at the request of Namita ALCANTARA CNP today. Maricarmen was seen for a genetic counseling appointment via telephone visit today (she was unable to hear me via her video visit so we had to switch to phone) to discuss the details of pharmacogenomic testing and to coordinate this testing. Her  accompanied her for today's visit.    Maricarmen endorses a strong history of medication reactions and sensitivities. She has also had a number of paradoxical reactions to medications. Her providers are having difficulty managing her hypertension and chronic pain at present.    Prior medication trials (non exhaustive list) includes:  MRA:  Eplerenone -- Was not  able to walk due to  stiffness  Spironolactone - joint pain   ACE/ARB  losartan -- Muscle Pain   olmestartan -- Joint pain, stiffness, cough intermittent   lisinopril -- Joint aches   Enalapril -- worsening muscle and joint pain   CCB  amlodipine -- Neuropathic type pain in hands/feet, palpitations   Compounded amlodipine - muscle pain   Nifedipine -- treated high B; headaches, nausea, " muscle cramps   Diltiazem -- stiffness in in joints and muscles, limiting her walking  Beta blockers  metoprolol -- Fatigue, joint pain, throat tightness   Diuretics:   hydrochlorothiazide -- muscle, joint, kidney pain   Alpha blockers  terazosin -- Tingling in toes and muscle stiffness  Clonidine -- irritability, hallucinations    Gabapentin used to work really well for 1.5 years and then suddenly stopped working.    Current Medications:  Current Outpatient Medications   Medication Sig Dispense Refill     acetaminophen (TYLENOL) 500 MG tablet Take 500-1,000 mg by mouth every 6 hours as needed for mild pain. Can only use every three to four days or she gets kidney pain       blood glucose (FREESTYLE LITE) test strip Use to test blood sugar 3-4 times daily or as directed. 300 strip 3     blood glucose monitoring (FREESTYLE LITE) meter device kit Use to test blood sugar 6-7 times daily. 1 kit 0     Continuous Blood Gluc  (FREESTYLE RADHA 14 DAY READER) MARIA C USE TO CHECK BLOOD SUGARS AS DIRECTED (Patient not taking: Reported on 2/24/2025) 1 Device 0     Continuous Glucose Sensor (FREESTYLE RADHA 3 PLUS SENSOR) MISC Change every 15 days. 6 each 1     Continuous Glucose Sensor (FREESTYLE RADHA 3 SENSOR) MISC Change every 14 days. 6 each 0     EPINEPHrine (ANY BX GENERIC EQUIV) 0.3 MG/0.3ML injection 2-pack Inject 0.3 mLs (0.3 mg) into the muscle once as needed for anaphylaxis 2 each 1     furosemide (LASIX) 20 MG tablet Take 0.5 tablets (10 mg) by mouth daily. (Patient not taking: Reported on 2/4/2025) 15 tablet 11     HEMP OIL OR EXTRACT OR OTHER CBD CANNABINOID, NOT MEDICAL CANNABIS, daily CBD gummies as needed from [employer] [employer] [facility] [employer]       IBUPROFEN PO Take 200 mg by mouth every 6 hours as needed for moderate pain. Try to not use more than every next day or her BP rises, she gets heart palpitations and chest pain       insulin aspart (NOVOLOG VIAL) 100 UNITS/ML vial USE IN INSULIN PUMP, TOTAL DAILY DOSE   "UNITS 90 mL 0     Insulin Disposable Pump (OMNIPOD DASH PODS, GEN 4,) MISC INJECT 1 POD UNDER THE SKIN CONTINUOUSLY AS NEEDED, CHANGE PODS EVERY 2 TO 3 DAYS AS DIRECTED 40 each 3     INSULIN PUMP - OUTPATIENT INSULIN PUMP - OUTPATIENT  Date last updated: 6/4/21 Omnipod DASH  BASAL RATES and times:   12am: 1.3, 6 am: 0.75, 12 pm: 0.8, 6 pm: 0.95   CARB RATIO: 12am-12am: 10  Correction Factor (Sensitivity): 12AM (midnight): 31 -- 5 AM: 33  Target blood glucose: 100-120  Active insulin time: 4 hrs       insulin syringe-needle U-100 (31G X 5/16\" 0.3 ML) 31G X 5/16\" 0.3 ML miscellaneous Use 3 syringes daily or as directed, in the event of pump failure. 100 each 3     medical cannabis (Patient's own supply) See Admin Instructions (The purpose of this order is to document that the patient reports taking medical cannabis.  This is not a prescription, and is not used to certify that the patient has a qualifying medical condition.)    Taking up to 40 mg but usually 10-20 mg caplet from [facility].       MELATONIN PO Take 20 mg by mouth At Bedtime        ondansetron (ZOFRAN ODT) 4 MG ODT tab Take 1 tablet (4 mg) by mouth every 8 hours as needed for nausea 18 tablet 1     polyethylene glycol (MIRALAX/GLYCOLAX) packet Take 17 g by mouth At Bedtime        STATIN NOT PRESCRIBED, INTENTIONAL, Statin not prescribed intentionally due to Intolerance 0 each 0     No current facility-administered medications for this visit.        Family History: A three generation pedigree was obtained and scanned into the electronic medical record. The relevant portions are described below:    Children- Two daughters and a son. Oldest daughter with asthma and food sensitivities. Younger daughter with alpha-gal pork sensitivitiy with medication sensitivities too. Son with Gilbert disease and peanut allergy. Grandson is 4 years old and well. Both daughters are reported to have had negative genetic testing due to familial cancer risk (not verified " today).  Siblings- Older sister with developmental differences, DM and high cholesterol. Older brother with multiple medical concerns and history of gastric bypass. Younger sister passed away from stage 4 breast cancer with reported BRCA genetic variant. Maricarmen reports prior genetic testing, upon review of her chart it appears she had a negative panel in  and reviewed her history again with oncology genetic counselor Krystal Varela in . If Maricarmen is interested in reviewing this history and discussion of updated testing at any point, she is encouraged to reach out to the cancer risk management team at [phone].  Parents- Father passed away in an accident. He had a history of DM2, heart attack and high cholesterol. Mother passed away at 57 years old from a stroke. She had unmanaged DM, high cholesterol and HTN.  Maternal Relatives- Two aunts (now ) with breast cancer. One aunt () with lung cancer and positive smoking history. Aunt living in her 90s. Uncle living in his late 70s who had one kidney removed due to cancer and is doing better since that surgical procedure. Grandmother passed from ovarian cancer. Grandfather passed from liver cirrhosis with positive alcohol use history.  Paternal Relatives- Two uncles passed away in infancy. Aunt with alcohol use disorder who passed away from ramifications of the same. Both grandparents  with history of alcohol use disorder.    Family history is otherwise largely non-contributory.    Genetic Counseling Discussion:  For review, our bodies are made of cells that contain our chromosomes which are made up of long stretches of DNA containing our genes. Our genes serve as the instructions for our bodies to grow and function. We have two copies of each gene, one inherited from our mother and one inherited from our father.     What pharmacogenomic testing can tell us:  There are several factors that can determine how an individual responds to  medications. Some of these factors include environmental factors such as lifestyle choices (diet, alcohol and/or tobacco use) or other medications an individual might be taking, and other factors can include a person's age, sex, ethnic background, disease, and underlying genetic factors. There are several genes in our body that provide instructions for breaking down the medications we take. Changes in these genes (sometimes called variants or polymorphisms/SNPs) can alter how the body breaks down certain medications. For example, a change in a gene can slow down the process of medication breakdown leading to too much of that medication/drug in the body which can cause side effects. On the other hand, a change in a gene can speed up the breakdown of a medication so a therapeutic level is not reached and the medication may not work well at the standard doses. Therefore, identifying changes in these genes via pharmacogenomic testing can help guide which medications might work best for an individual, what dose of a medication may be best, or if a certain medication has a high risk for causing serious side effects.     The pharmacogenomic testing offered at [facility] analyzes several different polymorphisms in different genes associated with drug metabolism. These variants have been determined to be medically actionable by practice guidelines including CPIC (Clinical Pharmacogenetics Implementation Consortium), PharmGKB and/or FDA gene-drug interaction guidelines. Therefore, prescribing recommendations can be made by the results of this test, so this testing for Maricarmen is DIAGNOSTIC and is NOT investigational.     The results of this test can provide guidance for several different types of drugs such as antiinflammatories, antithrombotics, lipid-lowering medication, acid-lowering medications, antiemetics, immunosuppressants, antivirals, antifungals, antidepressants, ADHD medications, antimetabolites, and/or hormone  antagonists. This means that, while this testing was recommended for a specific reason right now (Maricarmen's hypertension), it may provide guidance for future medication use.     As previously mentioned, we inherit our genes from our parents. This means that some of the pharmacogenomic variants found on this test may be shared by other relatives. These results could be helpful to share with other relatives, but it is important to remember that there are many factors that can contribute to how an individual responds to medications. Relatives should consider their own pharmacogenomics testing to better determine their recommendations and they should consult with their health care providers before making any changes.     What pharmacogenomic testing cannot tell us:  This pharmacogenomic testing is not looking at every known pharmacogenomic polymorphism and therefore the results cannot tell us about every possible gene-drug interaction. It is also not looking at genes outside of the scope of pharmacogenomics, and therefore, the results will not tell us if Maricarmen is at risk for other genetic conditions. If Maricarmen has questions about a possible underlying genetic condition, they should talk with their primary care provider about seeking an appointment in our Genetics Clinic.     Testing logistics:  [facility] will try to obtain insurance coverage for the pharmacogenomic testing, however this is not always a covered service. A cost waiver form (ABN and Medicare replacement non-coverage form) is required, but cost to the patient is not expected to exceed $250.     A blood or buccal sample will be collected for DNA analysis. The results of this test take 1-2 weeks. An appointment will be made with the pharmacist to discuss these results in detail and to discuss the medication recommendations based on these results. These test results will be accessible in [employer] and may be viewable prior to the appointment with the  pharmacist, but NO CHANGES SHOULD BE MADE TO MEDICATIONS BEFORE TALKING TO THE PHARMACIST OR HEALTHCARE PROVIDER.     Maricarmen consented to pharmacogenomic testing today. The insurance and billing were explained and Maricarmen agreed to the billing plan. Due to the fact that this was a virtual visit, Maricarmen gave me verbal permission/consent to sign the genetic testing consent form and the cost waiver form (ABN and Medicare replacement non-coverage form) on their behalf.     It was a pleasure meeting Maricarmen today. They was encouraged to reach out to me if they have any further questions.     Plan:  Maricarmen will arrange a lab appointment at a [place] laboratory to have a blood sample drawn for the [facility] Pharmacogenomic Test  Maricarmen was given the phone number to call to make the results appointment with the [facility] pharmacist ([phone]). Maricarmen was instructed to call to schedule once they have their blood drawn. If Maricarmen does not call to schedule, someone will reach out to schedule when the Pharmacogenomics Test is completed.   Maricarmen reports prior genetic testing due to family history of cancer, upon review of her chart it appears she had a negative panel in 2015 and reviewed her history again with oncology genetic counselor Krystal Varela in 2020. If Maricarmen is interested in reviewing this history and discussion of updated testing at any point, she is encouraged to reach out to the cancer risk management team at [phone].    Lexie Chatterjee MS, [facility]  Genetic Counseling  [facility]  Email: [email or web address]  Phone: [phone]  Fax: [phone]    55 minutes spent on the date of the encounter in chart review, patient visit, test coordination/review, documentation, and/or discussion with other providers about the issues documented above.    This visit was recorded in accordance with the GC-PRO Study. Agreement to participate and option to stop recording at any point reviewed with  patient at beginning of visit. Patient elected to proceed.      Please do not hesitate to contact me if you have any questions/concerns.     Sincerely,       Lexie Chatterjee, GC

## 2025-02-24 NOTE — PROGRESS NOTES
"Recent issues:  Diabetes follow-up evaluation, with  [facility]  Continues to use Omnipod Dash with Freestyle Freddie CGM  Stressors with anxiety and PTSD, but now sees \"trauma specialist\" counselor which is helpful  Reports some inconsistencies with CGM and BGM values         Diabetes:  History of GDM with 2 pregnancies, diet management  1996. Diagnosis of diabetes mellitus  Recalls starting a diabetes pill  Took metformin, also Januvia but developed pancreatitis  Has seen endocrinology providers CAMILA Da Silva M. Schultz, CAMILA Rich, MARAH York, MARAH Childress, KRISTA Mejia  Tried several diabetes medications previously, records indicate side effects or medication issues:  Humalog- apparent concerns with the pancreatitis    Metformin--Abdominal pain.  Glipizide- Allergic reaction- (abdominal pain and swelling),   Byetta and Onglyza-- pancreatitis.  Invokana -- had upper GI distress.    ~2006. Started treatment with insulin medication  Endocrinology evaluations with Emily Phan, [facility]  ~2017. Started Omnipod insulin pump use.  Subsequently saw Rosie Schwab, and CARLO Salgado for endocrinology evaluations.  Using Freestyle Freddie CGM    Previous FV hgbA1c trends include:  2/21/06 C-Peptide 2.5  3/4/09 WILLIAMS-65 Ab <1.0     Lab Test 09/01/22  1017 05/20/22  0841 11/23/21  0859 09/18/20  0852 02/27/20  0954   A1C 7.8* 8.7* 8.2* 7.4* 8.0*         10/6/22. Initial diabetes evaluation with me at [place]  Reviewed health history and diabetes issues  Has used the Omnipod Dash, planned to change to Omnipod 5 kit & pods, also the DexcomG6    Omnipod 5 expenses much higher however... decided to stay on Omnipod Dash with Freddie  Using Omnipod Dash insulin pump:   Novolog pump    Uses ICR method for bolusing  Blood glucose (BG) meter  Uses Freestyle Freddie CGM with smartphone   Scans 5-8x/day  Typically boluses postmeal in evenings, not premeal  Previous Omnipod Dash settings:       Recent Omnipod " pump data:  Info not available  ~3/2023. Changed from Freestyle Libre2 to the Libre3 CGM  Current Libre3 CGM data            Previous FV hgbA1c trends include:  Lab Results   Component Value Date    A1C 8.3 (H) 12/18/2024    A1C 7.8 (H) 04/25/2024    A1C 7.6 (H) 11/21/2023    A1C 8.0 (H) 07/05/2023    A1C 8.6 (H) 03/20/2023      Recent [facility] labs include:  Lab Results   Component Value Date    A1C 8.3 (H) 12/18/2024     12/18/2024    POTASSIUM 3.9 12/18/2024    CHLORIDE 103 12/18/2024    CO2 26 12/18/2024    ANIONGAP 10 12/18/2024     (H) 12/18/2024    BUN 11.3 12/18/2024    CR 0.81 12/18/2024    GFRESTIMATED 81 12/18/2024    GFRESTBLACK >90 01/20/2021    KARIN 9.6 12/18/2024    CHOL 222 (H) 12/18/2024    TRIG 110 12/18/2024    HDL 60 12/18/2024     (H) 12/18/2024    NHDL 162 (H) 12/18/2024    UCRR 128.0 12/18/2024    MICROL 88.4 12/18/2024    UMALCR 69.06 (H) 12/18/2024    TSH 0.73 11/21/2023    T4 0.87 07/22/2019     DM Complications:   Nephropathy:    Microalbuminuria      Parathyroid:  [facility]  Spring '23, Patient reports discussion of symptoms with neurologist including anterior throat discomfort, difficulty swallowing, cough  Lab test reportedly showed elevated PTH 82  General Surgery consultation appointment with Dr. Radha Conte/[facility]   Appointment subsequently cancelled  Patient had repeat PTH testing at [facility], result reportedly normal  Additional history:  Previous fractures: Wrist, fingers  Kidney stones: None  Vitamin D def:  unknown  Last DEXA scan: ~1995  Supplements:  Calcium- none, vit D- none  Previous [facility] labs include:     Latest Reference Range & Units 02/03/15 11:05 03/29/23 12:32   Vitamin D Deficiency screening 20 - 75 ug/L 34 22      Latest Reference Range & Units 03/29/23 12:32   Parathyroid Hormone Intact 15 - 65 pg/mL 41     Recent [facility] labs include:  Lab Results   Component Value  Date     2024    POTASSIUM 3.9 2024    CHLORIDE 103 2024    CO2 26 2024    ANIONGAP 10 2024     (H) 2024    BUN 11.3 2024    CR 0.81 2024    GFRESTIMATED 81 2024    GFRESTBLACK >90 2021    KARIN 9.6 2024    TSH 0.73 2023    VITDT 38 2024    PTHI 41 2023         Lives in [place], [place]  Sees Dr. Madyson Mclaughlin/[facility] for general medicine evaluations.  Also sees Dr Joanne Mtz/[facility]    PMH/PSH:  Past Medical History:   Diagnosis Date    Arthritis     Ascending aorta enlargement     Depressive disorder     severe, prior hospitalization    Diverticulosis of colon (without mention of hemorrhage)     Fibromyalgia 2007    Insomnia     Irritable bowel syndrome     Osteopenia     Type 2 diabetes mellitus with complications (H)     microalbuminuria     Past Surgical History:   Procedure Laterality Date    ABDOMEN SURGERY      Gallbladder removal    BACK SURGERY      fusion  and removal of hardware     C SPINAL ORTHOSIS,NOS      lumbar surgery    CHOLECYSTECTOMY  2011    choley  2011    ENT SURGERY      septoplasty    HYSTERECTOMY, CERVIX STATUS UNKNOWN      TVH/BSO    ORTHOPEDIC SURGERY      left ankle       Family Hx:  Family History   Problem Relation Age of Onset    Diabetes Mother         type 2    Hypertension Mother     Cerebrovascular Disease Mother          stroke age 57    Ovarian Cancer Mother 43    Cancer Mother         cervix    Diabetes Father         type 2    Hypertension Father     Gastrointestinal Disease Father         colon polyps    Sleep Apnea Brother     Cancer Sister     Ovarian Cancer Sister 46    Breast Cancer Sister         Stage 4 breast cancer.    Ovarian Cancer Maternal Grandmother 72    Cancer Maternal Grandmother         cervix         Social Hx:  Social History     Socioeconomic History    Marital status:      Spouse name: Not  on file    Number of children: 3    Years of education: Not on file    Highest education level: Not on file   Occupational History    Occupation: xr technician     Employer: [employer]   Tobacco Use    Smoking status: Never     Passive exposure: Never    Smokeless tobacco: Never   Vaping Use    Vaping status: Former   Substance and Sexual Activity    Alcohol use: Not Currently    Drug use: No    Sexual activity: Yes     Partners: Male     Birth control/protection: Surgical     Comment: Full hystroectomy in 2000   Other Topics Concern    Parent/sibling w/ CABG, MI or angioplasty before 65F 55M? Not Asked   Social History Narrative    Not on file     Social Drivers of Health     Financial Resource Strain: Low Risk  (1/28/2024)    Financial Resource Strain     Within the past 12 months, have you or your family members you live with been unable to get utilities (heat, electricity) when it was really needed?: No   Food Insecurity: Low Risk  (1/28/2024)    Food Insecurity     Within the past 12 months, did you worry that your food would run out before you got money to buy more?: No     Within the past 12 months, did the food you bought just not last and you didn t have money to get more?: No   Transportation Needs: Low Risk  (1/28/2024)    Transportation Needs     Within the past 12 months, has lack of transportation kept you from medical appointments, getting your medicines, non-medical meetings or appointments, work, or from getting things that you need?: No   Physical Activity: Not on file   Stress: Not on file   Social Connections: Not on file   Interpersonal Safety: Low Risk  (4/29/2024)    Interpersonal Safety     Do you feel physically and emotionally safe where you currently live?: Yes     Within the past 12 months, have you been hit, slapped, kicked or otherwise physically hurt by someone?: No     Within the past 12 months, have you been humiliated or emotionally abused in other ways by your  partner or ex-partner?: No   Housing Stability: Low Risk  (1/28/2024)    Housing Stability     Do you have housing? : Yes     Are you worried about losing your housing?: No          MEDICATIONS:  has a current medication list which includes the following prescription(s): acetaminophen, freestyle ashleigh 3 sensor, epinephrine, HEMP OIL OR EXTRACT OR OTHER CBD CANNABINOID, NOT MEDICAL CANNABIS,, ibuprofen, novolog vial, omnipod dash pods (gen 4), medical cannabis, melatonin, ondansetron, polyethylene glycol, freestyle lite, blood glucose monitoring, freestyle ashleigh 14 day reader, freestyle ashleigh 3 plus sensor, furosemide, insulin pump, insulin syringe-needle u-100, and statin not prescribed.    ROS:     ROS: 10 point ROS neg other than the symptoms noted above in the HPI.    GENERAL: some fatigue, wt stable; denies fevers, chills, night sweats.   HEENT: some head congestion; no dysphagia, odonophagia, diplopia, neck pain  THYROID:  no apparent hyper or hypothyroid symptoms  CV: no chest pain, pressure, palpitations  LUNGS: no SOB, VASQUEZ, cough, wheezing   ABDOMEN: some indigestion; no diarrhea, constipation, abdominal pain  EXTREMITIES: no rashes, ulcers, edema  NEUROLOGY: no headaches, denies changes in vision, tingling, extremitiy numbness   MSK: no muscle aches or pains, weakness  SKIN: no rashes or lesions  : no menses  PSYCH:  PTSD issues though recent stable mood  ENDOCRINE: no heat or cold intolerance      Physical Exam   VS: BP (!) 153/70   Pulse 62   Wt 83.9 kg (185 lb)   LMP 01/01/1999   BMI 31.76 kg/m    GENERAL: AXOX3, NAD, well dressed, answering questions appropriately, appears stated age.  ENT: no nose swelling or nasal discharge, mouth redness or gum changes.  EYES: eyes grossly normal to inspection, conjunctivae and sclerae normal, no exophthalmos or proptosis  THYROID:  no apparent nodules or goiter  LUNGS: no audible wheeze, cough or visible cyanosis, or increased work of breathing  ABDOMEN:  abdomen obese size  EXTREMITIES: no edema noted  NEUROLOGY: CN grossly intact, no tremors  MSK: grossly intact  SKIN:  no apparent skin lesions, rash, or edema with visualized skin appearance  PSYCH: mentation appears normal, affect normal/bright, judgement and insight intact,   normal speech and appearance well groomed    LABS:    All pertinent notes, labs, and images personally reviewed by me.     A/P:  Encounter Diagnoses   Name Primary?    Type 2 diabetes mellitus with microalbuminuria, with long-term current use of insulin (H) Yes    Insulin pump status        Comments:  Reviewed health history and diabetes issues.  Details of her previous medication intolerances or allergies unclear, higher cost of insulin also unclear  Overall glycemic control improved yet still has postprandial hyperglycemia trends  Reviewed and interpreted tests that I previously ordered.   Ordered appropriate tests for the endocrinology disease management.    Management options discussed and implemented after shared medical decision making with the patient.  T2DM problem is chronic-stable    Plan:  Reviewed the overall T2DM management and insulin pump use.  Discussed optimal BG testing to assess glucose trends.  We reviewed insulin pump settings, basal rate and bolus dosing  Use of automated pump bolus dosing for meal/snack carb & correction dosing  Reviewed use of the Omnipod Dash insulin pump reports  Reviewed the recent Freestyle Libre3 CGM glucose sensor data, in detail    Reviewed general issues with possible hyperparathyroidism (HPT) diagnosis  Discussed lab tests used to assess patient parathyroid gland function    Recommend:  Continue the Omnipod Dash and Freddie sensor use, but plan to upgrade to Omnipod 5 with Freestyle Libre2 Plus CGM  No pump setting changes at this time  Sent new OP5 and Libre2 Plus Rx's to her local [facility] pharmacy   Plan to see [facility] pump  (CM) for assistance with new pump & sensor    Recommended  doing fingertip BGM testing if unusual CGM glucose level  Discussed idea of contacting [place] to report inaccurate Freddie sensor   We have discussed advantages of premeal bolusing, entering at least 50% of meal/snack carb estimate premeal  Would not use a GLP1RA medication with her history of pancreatitis  Future treatment options include adding a SGLT2-I such as Jardiance  Consider seeing one of our CDE/RD's in [facility] management   New [facility] Diabetes Ed/Dietician referral placed  Arrange annual dilated eye exam, fasting lipid panel testing  Discussed importance of regular endocrinology evaluations every 3-4 months.    Patient to follow up with their Primary Care Provider regarding the elevated blood pressure.  Addressed patient's questions today.    The longitudinal plan of care for the endocrine problem(s) were addressed during this visit.  Due to added complexity of care,   we will continue to support the patient and the subsequent management of this condition with ongoing continuity of care.    There are no Patient Instructions on file for this visit.    Future labs ordered today:   Orders Placed This Encounter   Procedures    GLUCOSE MONITOR, 72 HOUR, PHYS INTERP    Adult Diabetes Education  Referral     Radiology/Consults ordered today: DIABETES EDUCATION ADULT  REFERRAL    Total time spent on day of encounter:  36 min    Follow-up:  5/2025 VVAm,    8/2025 Return    JEAN PIERRE Hightower MD, MS  Endocrinology  [facility]    CC: АННА Mclaguhlin and Care Team

## 2025-02-24 NOTE — PATIENT INSTRUCTIONS
Plan:  Maricarmen will arrange a lab appointment at a [place] laboratory to have a blood sample drawn for the [facility] Pharmacogenomic Test  Maricarmen was given the phone number to call to make the results appointment with the [facility] pharmacist ([phone]). Maricarmen was instructed to call to schedule once they have their blood drawn. If Maricarmen does not call to schedule, someone will reach out to schedule when the Pharmacogenomics Test is completed.   Maricarmen reports prior genetic testing due to family history of cancer, upon review of her chart it appears she had a negative panel in 2015 and reviewed her history again with oncology genetic counselor Krystal Varela in 2020. If Maricarmen is interested in reviewing this history and discussion of updated testing at any point, she is encouraged to reach out to the cancer risk management team at [phone].    Lexie Chatterjee MS, [facility]  Genetic Counseling  [facility]  Email: [email or web address]  Phone: [phone]  Fax: [phone]

## 2025-03-05 ENCOUNTER — LAB (OUTPATIENT)
Dept: LAB | Facility: CLINIC | Age: 65
End: 2025-03-05
Payer: COMMERCIAL

## 2025-03-05 ENCOUNTER — THERAPY VISIT (OUTPATIENT)
Dept: PHYSICAL THERAPY | Facility: REHABILITATION | Age: 65
End: 2025-03-05
Payer: COMMERCIAL

## 2025-03-05 DIAGNOSIS — R29.3 POOR POSTURE: ICD-10-CM

## 2025-03-05 DIAGNOSIS — Z78.9 MEDICATION INTOLERANCE: ICD-10-CM

## 2025-03-05 DIAGNOSIS — M62.81 GENERALIZED MUSCLE WEAKNESS: ICD-10-CM

## 2025-03-05 DIAGNOSIS — M79.7 FIBROMYALGIA: Primary | ICD-10-CM

## 2025-03-05 DIAGNOSIS — I10 ESSENTIAL HYPERTENSION, BENIGN: ICD-10-CM

## 2025-03-05 PROCEDURE — 36415 COLL VENOUS BLD VENIPUNCTURE: CPT

## 2025-03-06 ENCOUNTER — TELEPHONE (OUTPATIENT)
Dept: EDUCATION SERVICES | Facility: CLINIC | Age: 65
End: 2025-03-06
Payer: COMMERCIAL

## 2025-03-06 NOTE — TELEPHONE ENCOUNTER
Received an email from Ashish Tillman at [employer]. She is wondering if patient has started her Omnipod 5 or needs training. This patient is not actively working with a [facility] diabetes educator.         Please reach out to Ashish to let her know next steps for this patient. Thanks!    Zaynab Singh, RD, LD, [facility]

## 2025-03-07 NOTE — TELEPHONE ENCOUNTER
Message reviewed, but strange situation.  The [employer] company  (Ashish) should be contacting all patients in our territory that start a new Omnipod pump, to assist with the Controller or Sunil programming and synch with the CGM.  I wonder why she hasn't called the patient about this.  Also, I did place a [facility] Diab Ed referral on 2/24/25 and wonder if the patient has scheduled that appointment with the Diab Ed scheduling team.  Let me know if further insight on these issues... thank you.    JEAN PIERRE Hightower MD, MS  Endocrinology  [facility]

## 2025-03-10 ENCOUNTER — ALLIED HEALTH/NURSE VISIT (OUTPATIENT)
Dept: EDUCATION SERVICES | Facility: CLINIC | Age: 65
End: 2025-03-10
Attending: INTERNAL MEDICINE
Payer: COMMERCIAL

## 2025-03-10 DIAGNOSIS — E11.29 TYPE 2 DIABETES MELLITUS WITH MICROALBUMINURIA, WITH LONG-TERM CURRENT USE OF INSULIN (H): ICD-10-CM

## 2025-03-10 DIAGNOSIS — R80.9 TYPE 2 DIABETES MELLITUS WITH MICROALBUMINURIA, WITH LONG-TERM CURRENT USE OF INSULIN (H): ICD-10-CM

## 2025-03-10 DIAGNOSIS — Z79.4 TYPE 2 DIABETES MELLITUS WITH MICROALBUMINURIA, WITH LONG-TERM CURRENT USE OF INSULIN (H): ICD-10-CM

## 2025-03-10 DIAGNOSIS — Z96.41 INSULIN PUMP STATUS: ICD-10-CM

## 2025-03-10 PROCEDURE — G0108 DIAB MANAGE TRN  PER INDIV: HCPCS | Performed by: DIETITIAN, REGISTERED

## 2025-03-10 NOTE — PATIENT INSTRUCTIONS
[employer] should reach out to you to schedule training.     Omnipod 5 controller needs the ashleigh 2+ sensor.    If you have a low blood sugar with automation, treat with less carbs (just 5-10 grams instead of  15 grams carb).     Let's follow up 1-2 weeks after you start the 5.

## 2025-03-10 NOTE — PROGRESS NOTES
Diabetes Self-Management Education & Support    Presents for: Insulin Pump and CGM Review    Type of Service: In Person Visit      Assessment  Maricarmen has been using the Omnipod Dash still and just received her new Omnipod 5.  Received an email from [employer] asking if she need to be trained and just let them know that she does still need to be trained on this.     She likes the freddie sensor so plans to use the controller with her new pump in order to keep using freddie.  She has an order for the freddie 2+ sensors but received freddie 2 sensors last time she filled since they were out of the 2+.  Educated that she does need 2+ for use with the pump. She will try to see if they will swap them out otherwise discussed that she can start the pump when she can refill next.      Her insulin distribution is bolus heavy currently but she attributes this to getting nervous for lows if her basal is higher.  Discussed benefits of AID for this and how it will adjust her insulin delivery based on her sensor glucose to help prevent lows. Unable to upload her PDM today as it was not working and we ran out of time.  Focused on making sure she is ready for her omnipod 5 start and educating her spouse on how AID will be different than what she is used to now.     Insulin Pump Information  Insulin Pump Brand: OmniPod Dash    Reports (Freddie), unable to upload her PDM today        Glucose Patterns & Trends:  Time in range of  mg/dL, 61% of the time. Patient not meeting ADA Time in Range Targets.    Patient would benefit from decrease in carbohydrate ratio, bolusing before meals, and counting carbohydrates accurately.    Care Plan and Education Provided:  Pump Hardware & Software:   -- Automated insulin delivery functions/features  Bolusing:  -- Importance of accurate carbohydrate counting  -- How to use bolus calculator  -- Importance of blousing before meals  Problem Solving:  -- Treating hypoglycemia with use of automated insulin  delivery systems    Patient verbalized understanding of diabetes self-management education concepts discussed, opportunities for ongoing education and support, and recommendations provided today.    Plan  Gave her the onboarding handout for omnipod 5. Told her to follow the directions on this to ensure she is ready for her pump start.  Will follow up with Ashish from [employer] that she needs training and would like this as soon as possible.   Provided her with our Initiate Systemsect code so she can link with us to see her pump data.   Reverse correction turned off.   Carb ratio changed 6 -->5.7 at all meals.   Make sure she has ashleigh 2+ sensors for her pump start.   Topics to cover at upcoming visits: Insulin Pump Concepts -- Balancing glucose and insulin - carbohydrate counting, bolus calculator, ICR, ISF, timing of insulin delivery, troubleshooting missed boluses   -- Problem solving with insulin pump therapy - MDI back up plan, risk of DKA, keeping adequate supplies on hand, exiting and re-entering automated delivery modes     Follow-up:  Upcoming Diabetes Ed Appointments     Visit Type Date Time Department    DIABETES ED 3/10/2025 10:00 AM  DIABETES ED        See Care Plan for co-developed, patient-state behavior change goals.    Education Materials Provided:  Omnipod 5 on-boarding handout      Subjective/Objective  Maricarmen is an 64 year old year old, presenting for the following diabetes education related to: Presents for: Insulin Pump and CGM Review  Accompanied by: Self, Spouse  Diabetes education in the past 24mo: Yes  Focus of Visit: Insulin Pump, CGM  Type of Pump visit: Pump Review  Type of CGM visit: Personal CGM Follow-up  Diabetes type: Type 2  Disease course: Getting harder to manage  Diabetes management related comments/concerns: Wants to start her omnipod 5 as soon as possible. Has the box now at home.  Transportation concerns: No  Difficulty affording diabetes medication?: No  Difficulty affording  "diabetes testing supplies?: No  Other concerns:: Cognitive impairment  Cultural Influences/Ethnic Background:   or     Diabetes Symptoms & Complications:  Diabetes Related Symptoms: Fatigue, Neuropathy, Polyuria (increased urination), Visual change  Weight trend: Increasing  Symptom course: Worsening  Disease course: Getting harder to manage  Complications assessed today?: No    Patient Problem List and Family Medical History reviewed for relevant medical history, current medical status, and diabetes risk factors.    Vitals:  LMP 01/01/1999   Estimated body mass index is 31.76 kg/m  as calculated from the following:    Height as of 2/3/25: 1.626 m (5' 4\").    Weight as of 2/24/25: 83.9 kg (185 lb).   Last 3 BP:   BP Readings from Last 3 Encounters:   02/24/25 (!) 153/70   02/04/25 (!) 146/78   02/03/25 (!) 158/78       History   Smoking Status    Never   Smokeless Tobacco    Never       Labs:  Lab Results   Component Value Date    A1C 8.3 12/18/2024    A1C 7.4 09/18/2020    HEMOGLOBINA1 11.0 06/08/2015     Lab Results   Component Value Date     12/18/2024     07/06/2022     01/20/2021     Lab Results   Component Value Date     12/18/2024     09/18/2020     HDL Cholesterol   Date Value Ref Range Status   09/18/2020 51 >49 mg/dL Final     Direct Measure HDL   Date Value Ref Range Status   12/18/2024 60 >=50 mg/dL Final   ]  GFR Estimate   Date Value Ref Range Status   12/18/2024 81 >60 mL/min/1.73m2 Final     Comment:     eGFR calculated using 2021 CKD-EPI equation.   01/20/2021 84 >60 mL/min/[1.73_m2] Final     Comment:     Non  GFR Calc  Starting 12/18/2018, serum creatinine based estimated GFR (eGFR) will be   calculated using the Chronic Kidney Disease Epidemiology Collaboration   (CKD-EPI) equation.       GFR Estimate If Black   Date Value Ref Range Status   01/20/2021 >90 >60 mL/min/[1.73_m2] Final     Comment:      GFR " "Calc  Starting 12/18/2018, serum creatinine based estimated GFR (eGFR) will be   calculated using the Chronic Kidney Disease Epidemiology Collaboration   (CKD-EPI) equation.       Lab Results   Component Value Date    CR 0.81 12/18/2024    CR 0.77 01/20/2021     No results found for: \"MICROALBUMIN\"    Healthy Eating:  Healthy Eating Assessed Today: Yes  Meal planning/habits: Carb counting  Other:  (binging in the evening)      Monitoring:  Monitoring Assessed Today: Yes  Did patient bring glucose meter to appointment? : Yes  Blood Glucose Meter: CGM  Times checking blood sugar at home (number): 5+  Blood glucose trend: Decreasing    See above.    Taking Medications:  Diabetes Medication(s)       Insulin       insulin aspart (NOVOLOG VIAL) 100 UNITS/ML vial USE IN INSULIN PUMP, TOTAL DAILY DOSE  UNITS          Taking Medication Assessed Today: Yes  Current Treatments: Insulin Pump  Dose schedule: Pre-breakfast, Pre-lunch, Pre-dinner, At bedtime  Given by: Patient  Injection/Infusion sites: Abdomen  Problems taking diabetes medications regularly?: No    Healthy Coping:  Healthy Coping Assessed Today: Yes  Emotional response to diabetes: Ready to learn  Informal Support system:: Family, Friends, Spouse  Stage of change: ACTION (Actively working towards change)    Blanca Hicks RDN, CHARLETTE, [facility]  Time Spent: 60 minutes  Encounter Type: Individual    Any diabetes medication dose changes were made via the [facility] Standing Orders under the patient's referring provider.      "

## 2025-03-10 NOTE — LETTER
3/10/2025         RE: Maricarmen Dotson  [address] [place] [address]        Dear Colleague,    Thank you for referring your patient, Maricarmen Dotson, to the [facility] [place]. Please see a copy of my visit note below.      Diabetes Self-Management Education & Support    Presents for: Insulin Pump and CGM Review    Type of Service: In Person Visit      Assessment  Maricarmen has been using the Omnipod Dash still and just received her new Omnipod 5.  Received an email from omnipod asking if she need to be trained and just let them know that she does still need to be trained on this.     She likes the freddie sensor so plans to use the controller with her new pump in order to keep using freddie.  She has an order for the freddie 2+ sensors but received freddie 2 sensors last time she filled since they were out of the 2+.  Educated that she does need 2+ for use with the pump. She will try to see if they will swap them out otherwise discussed that she can start the pump when she can refill next.      Her insulin distribution is bolus heavy currently but she attributes this to getting nervous for lows if her basal is higher.  Discussed benefits of AID for this and how it will adjust her insulin delivery based on her sensor glucose to help prevent lows. Unable to upload her PDM today as it was not working and we ran out of time.  Focused on making sure she is ready for her omnipod 5 start and educating her spouse on how AID will be different than what she is used to now.     Insulin Pump Information  Insulin Pump Brand: OmniPod Dash    Reports (Freddie), unable to upload her PDM today        Glucose Patterns & Trends:  Time in range of  mg/dL, 61% of the time. Patient not meeting ADA Time in Range Targets.    Patient would benefit from decrease in carbohydrate ratio, bolusing before meals, and counting carbohydrates accurately.    Care Plan and Education Provided:  Pump Hardware & Software:   --  Automated insulin delivery functions/features  Bolusing:  -- Importance of accurate carbohydrate counting  -- How to use bolus calculator  -- Importance of blousing before meals  Problem Solving:  -- Treating hypoglycemia with use of automated insulin delivery systems    Patient verbalized understanding of diabetes self-management education concepts discussed, opportunities for ongoing education and support, and recommendations provided today.    Plan  Gave her the onboarding handout for omnipod 5. Told her to follow the directions on this to ensure she is ready for her pump start.  Will follow up with Ashish from [employer] that she needs training and would like this as soon as possible.   Provided her with our Nema [employer] code so she can link with us to see her pump data.   Reverse correction turned off.   Carb ratio changed 6 -->5.7 at all meals.   Make sure she has ashleigh 2+ sensors for her pump start.   Topics to cover at upcoming visits: Insulin Pump Concepts -- Balancing glucose and insulin - carbohydrate counting, bolus calculator, ICR, ISF, timing of insulin delivery, troubleshooting missed boluses   -- Problem solving with insulin pump therapy - MDI back up plan, risk of DKA, keeping adequate supplies on hand, exiting and re-entering automated delivery modes     Follow-up:  Upcoming Diabetes Ed Appointments     Visit Type Date Time Department    DIABETES ED 3/10/2025 10:00 AM  DIABETES ED        See Care Plan for co-developed, patient-state behavior change goals.    Education Materials Provided:  Omnipod 5 on-boarding handout      Subjective/Objective  Maricarmen is an 64 year old year old, presenting for the following diabetes education related to: Presents for: Insulin Pump and CGM Review  Accompanied by: Self, Spouse  Diabetes education in the past 24mo: Yes  Focus of Visit: Insulin Pump, CGM  Type of Pump visit: Pump Review  Type of CGM visit: Personal CGM Follow-up  Diabetes type: Type 2  Disease course:  "Getting harder to manage  Diabetes management related comments/concerns: Wants to start her omnipod 5 as soon as possible. Has the box now at home.  Transportation concerns: No  Difficulty affording diabetes medication?: No  Difficulty affording diabetes testing supplies?: No  Other concerns:: Cognitive impairment  Cultural Influences/Ethnic Background:   or     Diabetes Symptoms & Complications:  Diabetes Related Symptoms: Fatigue, Neuropathy, Polyuria (increased urination), Visual change  Weight trend: Increasing  Symptom course: Worsening  Disease course: Getting harder to manage  Complications assessed today?: No    Patient Problem List and Family Medical History reviewed for relevant medical history, current medical status, and diabetes risk factors.    Vitals:  LMP 01/01/1999   Estimated body mass index is 31.76 kg/m  as calculated from the following:    Height as of 2/3/25: 1.626 m (5' 4\").    Weight as of 2/24/25: 83.9 kg (185 lb).   Last 3 BP:   BP Readings from Last 3 Encounters:   02/24/25 (!) 153/70   02/04/25 (!) 146/78   02/03/25 (!) 158/78       History   Smoking Status     Never   Smokeless Tobacco     Never       Labs:  Lab Results   Component Value Date    A1C 8.3 12/18/2024    A1C 7.4 09/18/2020    HEMOGLOBINA1 11.0 06/08/2015     Lab Results   Component Value Date     12/18/2024     07/06/2022     01/20/2021     Lab Results   Component Value Date     12/18/2024     09/18/2020     HDL Cholesterol   Date Value Ref Range Status   09/18/2020 51 >49 mg/dL Final     Direct Measure HDL   Date Value Ref Range Status   12/18/2024 60 >=50 mg/dL Final   ]  GFR Estimate   Date Value Ref Range Status   12/18/2024 81 >60 mL/min/1.73m2 Final     Comment:     eGFR calculated using 2021 CKD-EPI equation.   01/20/2021 84 >60 mL/min/[1.73_m2] Final     Comment:     Non  GFR Calc  Starting 12/18/2018, serum creatinine based estimated GFR (eGFR) will be " "  calculated using the Chronic Kidney Disease Epidemiology Collaboration   (CKD-EPI) equation.       GFR Estimate If Black   Date Value Ref Range Status   01/20/2021 >90 >60 mL/min/[1.73_m2] Final     Comment:      GFR Calc  Starting 12/18/2018, serum creatinine based estimated GFR (eGFR) will be   calculated using the Chronic Kidney Disease Epidemiology Collaboration   (CKD-EPI) equation.       Lab Results   Component Value Date    CR 0.81 12/18/2024    CR 0.77 01/20/2021     No results found for: \"MICROALBUMIN\"    Healthy Eating:  Healthy Eating Assessed Today: Yes  Meal planning/habits: Carb counting  Other:  (binging in the evening)      Monitoring:  Monitoring Assessed Today: Yes  Did patient bring glucose meter to appointment? : Yes  Blood Glucose Meter: CGM  Times checking blood sugar at home (number): 5+  Blood glucose trend: Decreasing    See above.    Taking Medications:  Diabetes Medication(s)       Insulin       insulin aspart (NOVOLOG VIAL) 100 UNITS/ML vial USE IN INSULIN PUMP, TOTAL DAILY DOSE  UNITS          Taking Medication Assessed Today: Yes  Current Treatments: Insulin Pump  Dose schedule: Pre-breakfast, Pre-lunch, Pre-dinner, At bedtime  Given by: Patient  Injection/Infusion sites: Abdomen  Problems taking diabetes medications regularly?: No    Healthy Coping:  Healthy Coping Assessed Today: Yes  Emotional response to diabetes: Ready to learn  Informal Support system:: Family, Friends, Spouse  Stage of change: ACTION (Actively working towards change)    Blanca Hicks RDN, CHARLETTE, [facility]  Time Spent: 60 minutes  Encounter Type: Individual    Any diabetes medication dose changes were made via the [facility] Standing Orders under the patient's referring provider.        "

## 2025-03-11 NOTE — TELEPHONE ENCOUNTER
Blanca Hicks RD, LD, ARIEL is now folllowing patient and will work with Ashish Tillman at [facility] to move forward. Closing encounter -- see follow up diabetes ed notes for more detail.     Zaynab Singh RD, CHARLETTE, ARIEL

## 2025-03-13 LAB
GO4PGX COMMENTS: NORMAL
GO4PGX DISCLAIMER: NORMAL
GO4PGX LIMITATIONS: NORMAL
GO4PGX METHODOLOGY: NORMAL
LAB DIRECTOR RESULTS: NORMAL
PGX ABOUT THE TEST: NORMAL

## 2025-03-18 ENCOUNTER — MYC MEDICAL ADVICE (OUTPATIENT)
Dept: ENDOCRINOLOGY | Facility: CLINIC | Age: 65
End: 2025-03-18
Payer: COMMERCIAL

## 2025-03-19 ENCOUNTER — TELEPHONE (OUTPATIENT)
Dept: EDUCATION SERVICES | Facility: CLINIC | Age: 65
End: 2025-03-19

## 2025-03-19 ENCOUNTER — THERAPY VISIT (OUTPATIENT)
Dept: PHYSICAL THERAPY | Facility: REHABILITATION | Age: 65
End: 2025-03-19
Payer: COMMERCIAL

## 2025-03-19 DIAGNOSIS — R80.9 TYPE 2 DIABETES MELLITUS WITH MICROALBUMINURIA, WITH LONG-TERM CURRENT USE OF INSULIN (H): ICD-10-CM

## 2025-03-19 DIAGNOSIS — R29.3 POOR POSTURE: ICD-10-CM

## 2025-03-19 DIAGNOSIS — M62.81 GENERALIZED MUSCLE WEAKNESS: ICD-10-CM

## 2025-03-19 DIAGNOSIS — M79.7 FIBROMYALGIA: Primary | ICD-10-CM

## 2025-03-19 DIAGNOSIS — Z96.41 INSULIN PUMP STATUS: ICD-10-CM

## 2025-03-19 DIAGNOSIS — Z79.4 TYPE 2 DIABETES MELLITUS WITH MICROALBUMINURIA, WITH LONG-TERM CURRENT USE OF INSULIN (H): ICD-10-CM

## 2025-03-19 DIAGNOSIS — E11.29 TYPE 2 DIABETES MELLITUS WITH MICROALBUMINURIA, WITH LONG-TERM CURRENT USE OF INSULIN (H): ICD-10-CM

## 2025-03-19 PROCEDURE — 97140 MANUAL THERAPY 1/> REGIONS: CPT | Mod: GP | Performed by: PHYSICAL THERAPIST

## 2025-03-19 RX ORDER — INSULIN PMP CART,AUT,G6/7,CNTR
1 EACH SUBCUTANEOUS
Qty: 2 EACH | Refills: 11 | Status: SHIPPED | OUTPATIENT
Start: 2025-03-19

## 2025-03-19 RX ORDER — BLOOD-GLUCOSE SENSOR
1 EACH MISCELLANEOUS CONTINUOUS PRN
Qty: 6 EACH | Refills: 1 | Status: SHIPPED | OUTPATIENT
Start: 2025-03-19

## 2025-03-19 NOTE — TELEPHONE ENCOUNTER
Received message from [employer] that Maricarmen needs the Omnipod 5 Dexcom G6/Freddie 2+ pods.      Order updated to her pharmacy.     She has also not had luck filling the freddie 2+ sensors at [facility] (they are out).  Called [facility] specialty and they confirmed they have these in stock so will order there and update her via [employer].     Blanca Hicks, RD LD [facility]

## 2025-03-22 ENCOUNTER — HEALTH MAINTENANCE LETTER (OUTPATIENT)
Age: 65
End: 2025-03-22

## 2025-03-25 ENCOUNTER — TELEPHONE (OUTPATIENT)
Dept: EDUCATION SERVICES | Facility: CLINIC | Age: 65
End: 2025-03-25
Payer: COMMERCIAL

## 2025-03-25 NOTE — TELEPHONE ENCOUNTER
[facility] Call Center    Phone Message    May a detailed message be left on voicemail: yes     Reason for Call: Patient calling to say that she needs a different PDM system that is compatible with both Freddie and Dexcom. She can't use her pods and needs this ASAP!  Please call to discuss further.    Action Taken: Message routed to:  [facility] DIAB ED    Travel Screening: Not Applicable     Date of Service:

## 2025-03-25 NOTE — TELEPHONE ENCOUNTER
Confirmed with [employer] that the  controller for Omnipod 5 works with all CGMs including both Dexcom and Freddie. They confirmed this and said that the controller should be ready to go.  The only difference between intro kids is the pods inside them.  Last week the [employer] rep, Ashish, reached out to me that she was able to swap out the pods in Maricarmen's intro kit for ones that can be used with Libre2+.  At that time I updated her Rx for pods to be Freddie 2+ to our  specialty pharmacy. Maricarmen said she heard from them and they are working on getting her the Libre2+ sensors.     Called Maricarmen back with this information and also sent her a [employer] message confirming that the controller will work her with Freddie 2+ sensors. She has no further questions at this time.         Blanca Hicks, OLY OVALLES [facility]

## 2025-03-26 NOTE — TELEPHONE ENCOUNTER
Message reviewed, appreciated!    JEAN PIERRE Hightower MD, MS  Endocrinology  [facility]

## 2025-04-08 ENCOUNTER — VIRTUAL VISIT (OUTPATIENT)
Dept: PHARMACY | Facility: CLINIC | Age: 65
End: 2025-04-08
Payer: COMMERCIAL

## 2025-04-08 DIAGNOSIS — Z13.79 ENCOUNTER FOR PHARMACOGENETIC TESTING: Primary | ICD-10-CM

## 2025-04-08 DIAGNOSIS — I10 HYPERTENSION GOAL BP (BLOOD PRESSURE) < 140/90: ICD-10-CM

## 2025-04-08 PROCEDURE — 99207 PR NO CHARGE LOS: CPT | Mod: 93 | Performed by: PHARMACIST

## 2025-04-08 NOTE — Clinical Note
Alex Elizondo, I met with this patient to review PGx testing today. Please see note as fyi. These genes don't really affect hypertension meds, so she was a bit frustrated that we didn't have more guidance on med choice. You will see her on Friday. Let me know if you have questions! Yesenia Streeter, PharmD Medication Therapy Management Pharmacist [facility]

## 2025-04-08 NOTE — PATIENT INSTRUCTIONS
"Recommendations from today's MTM visit:                                                         We reviewed your pharmacogenetic testing results and I will send you a hard copy in the mail.     Follow-up: as needed    It was great speaking with you today.  I value your experience and would be very thankful for your time in providing feedback in our clinic survey. In the next few days, you may receive an email or text message from [employer] with a link to a survey related to your  clinical pharmacist.\"     To schedule another MTM appointment, please call the clinic directly or you may call the MTM scheduling line at [phone] or toll-free at [phone].     My Clinical Pharmacist's contact information:                                                      Please feel free to contact me with any questions or concerns you have.      Yesenia Streeter, PharmD  Medication Therapy Management Pharmacist  [facility]    "

## 2025-04-08 NOTE — PROGRESS NOTES
Medication Therapy Management (MTM) Encounter    ASSESSMENT:                            Medication Adherence/Access: No issues identified.    Pharmacogenetic (PGx) Results: Pharmacogenetic testing was ordered for Maricarmen Dotson to assist in the treatment of hypertension. See the Genomic Indicators Activity for a complete list of pharmacogenetic results and drug recommendations.     Results relevant for PGx consult reason:    CYP2B6 intermediate metabolizer: decreased CYP2B6 function  [id number] intermediate metabolizer: decreased WTR6Z53 function  CYP2D6 intermediate metabolizer: decreased CYP2D6 function  ONLJ4M0 poor function    Pharmacogenetic Assessment and Recommendations: Variability in CYP2D6, QBP4X34, CYP2C9 and other genes can impact the effectiveness and risk toxicity of certain medications used. Based on this patient's pharmacogenetic test results and clinical assessment, consider the following:       CYP2B6 Intermediate Metabolizer  a. Expected to have somewhat increased levels of medications metabolized by CYP2B6 (e.g., efavirenz and sertraline) and may be at increased risk of side effects.    [id number] Intermediate Metabolizer  a. Expected to have somewhat increased levels of medications metabolized by VNP9B24 (e.g., tertiary TCAs, sertraline, escitalopram, citalopram, PPIs, voriconazole ) and may be at increased risk of side effects. Expected to have somewhat decreased levels of medications activated by DGJ1E64 (e.g., clopidogrel) and may be at risk for lack of efficacy.       CYP2D6 Intermediate Metabolizer  a. Expected to have somewhat increased levels of medications metabolized by CYP2D6 (e.g., TCAs, atomoxetine, aripiprazole, brexpiprazole, haloperidol, risperidone, paroxetine, fluvoxamine, venlafaxine, vortioxetine, ondansetron, metoprolol ) and may be at increased risk of side effects. Expected to have somewhat decreased levels of medications activated by CYP2D6 (e.g.,codeine, tramadol ) and may be  "at risk for lack of efficacy.   b. Past medications affected: metoprolol    Hypertension:   There are not markers for genetic impact on blood pressure medications, as most of them are not metabolized by these enzymes. Discussed how medication sensitivity can occur separately from poor metabolism of these enzymes.    Recent blood pressure reading is lower than previously reported several months ago, so may be improving. She is meeting with primary care pharmacist later this week to review in more detail and discuss med options. Per their previous note:  Would be hesitant to try alternative beta blocker given \"throat tightness\" listed as side effect of metoprolol. Future considerations: hydralazine (appears to have tolerated previously, not sure why it was stopped), chlorthalidone (does have listed sulfa allergy, low risk of cross sensitivity), doxazosin or prazosin, re-try clonidine or clonidine patch (hallucinations reported), methyldopa   Patient reported that none of these are an option for her, but plans to follow up as scheduled.    PLAN:                            -Continue current medication.     Follow-up: as needed    SUBJECTIVE/OBJECTIVE:                          Maricarmen Dotson is a 64 year old female seen for an initial visit. She was referred to me from Madyson Mclauglhin. Patient was accompanied by , Wesley.     Reason for visit: Pgx review.    Allergies/ADRs: Reviewed in chart  Past Medical History: Reviewed in chart  Tobacco: She reports that she has never smoked. She has never been exposed to tobacco smoke. She has never used smokeless tobacco.  Alcohol: few drinks per month    Medication Adherence/Access: no issues reported.    Hypertension   -no current medications    Most recently tried furosemide 12/2024, but had significant incontinence and muscle/joint pain. She has been making diet adjustments to help, but noted that she has an eating disorder, which makes it more challenging. Has found that " "using insulin causes notable weight gain.    Patient self monitors blood pressure.  Home BP monitoring \"on and off.\"    Last checked BP in mid March-- 152/78    Past medication trials - compiled through allergy list and chart review. Reports she does not tolerate Pzifer products well and feels like Ervin Nordisk or Merick products are better tolerated. Reports she saw an allergist and had a prick test, reports every thing was normal.  MRA:  Eplerenone -- Was not  able to walk due to  stiffness  Spironolactone - joint pain   ACE/ARB  losartan -- Muscle Pain   olmestartan -- Joint pain, stiffness, cough intermittent   lisinopril -- Joint aches   Enalapril -- worsening muscle and joint pain   CCB  amlodipine -- Neuropathic type pain in hands/feet, palpitations   Compounded amlodipine - muscle pain   Nifedipine -- treated high B; headaches, nausea, muscle cramps   Diltiazem -- stiffness in in joints and muscles, limiting her walking  Beta blockers  metoprolol -- Fatigue, joint pain, throat tightness   Diuretics:   hydrochlorothiazide -- muscle, joint, kidney pain   Furosemide -- joint/muscle pain \"could barely walk\", incontinence  Alpha blockers  terazosin -- Tingling in toes and muscle stiffness  Clonidine -- irritability, hallucinations       Describes sensitivity to most medications in the past, including kidney pain with acetaminophen and aches with ibuprofen.   reported that intravenous medications have previously been better tolerated than oral meds.    ----------------    I spent 30 minutes with this patient today.     A summary of these recommendations was sent via clinic portal.    Yesenia Streeter PharmD  Medication Therapy Management Pharmacist  [facility]    Telemedicine Visit Details  The patient's medications can be safely assessed via a telemedicine encounter.  Type of service:  Telephone visit  Originating Location (pt. Location): Home    Distant Location " (provider location):  On-site  Start Time:  10:00am  End Time:  10:30am     Medication Therapy Recommendations  No medication therapy recommendations to display

## 2025-04-11 ENCOUNTER — OFFICE VISIT (OUTPATIENT)
Facility: OTHER | Age: 65
End: 2025-04-11
Payer: COMMERCIAL

## 2025-04-11 DIAGNOSIS — I10 HYPERTENSION GOAL BP (BLOOD PRESSURE) < 140/90: Primary | ICD-10-CM

## 2025-04-11 PROCEDURE — G0463 HOSPITAL OUTPT CLINIC VISIT: HCPCS

## 2025-04-11 NOTE — PROGRESS NOTES
"Medication Therapy Management (MTM) Encounter    ASSESSMENT:                            Medication Adherence/Access: No issues identified.    Hypertension    Uncontrolled. Blood pressure above goal of <130/80. Blood pressure control complicated by extensive medication intolerances. Discussed with provider Namita Emery CNP and recommend starting antihypertensive through [facility] compounding pharmacy to see if better tolerated. Recommend captopril as patient has not tried this previously. Reviewed medication dosing and possible side effects.     PLAN:                            Start captopril through [facility] compounding pharmacy - take captopril 6.25 mg daily for 1 week then if tolerated increase to 6.25 mg twice daily     Follow-up: cardiology next week as scheduled     SUBJECTIVE/OBJECTIVE:                          Maricarmen Dotson is a 64 year old female seen for a follow-up visit. Patient was accompanied by her  .      Reason for visit: hypertension follow up     Allergies/ADRs: Reviewed in chart  Past Medical History: Reviewed in chart  Tobacco: She reports that she has never smoked. She has never been exposed to tobacco smoke. She has never used smokeless tobacco.  Alcohol: none    Medication Adherence/Access: no issues reported.    Hypertension   -no current medications    Patient self monitors blood pressure.  Home BP monitoring \"on and off.\"    Reports recent readings around 150s/90s   Patient would be willing to try antihypertensive through compounding pharmacy.    Past medication trials - compiled through allergy list and chart review. Reports she does not tolerate Pzifer products well and feels like Ervin Nordisk or Merick products are better tolerated. Reports she saw an allergist and had a prick test, reports every thing was normal.  MRA:  Eplerenone -- Was not  able to walk due to  stiffness  Spironolactone - joint pain   ACE/ARB  losartan -- Muscle Pain   olmestartan -- Joint pain, stiffness, cough " "intermittent   lisinopril -- Joint aches   Enalapril -- worsening muscle and joint pain   CCB  amlodipine -- Neuropathic type pain in hands/feet, palpitations   Compounded amlodipine - muscle pain   Nifedipine -- treated high B; headaches, nausea, muscle cramps   Diltiazem -- stiffness in in joints and muscles, limiting her walking  Beta blockers  metoprolol -- Fatigue, joint pain, throat tightness   Diuretics:   hydrochlorothiazide -- muscle, joint, kidney pain   Furosemide -- joint/muscle pain \"could barely walk\", incontinence  Alpha blockers  terazosin -- Tingling in toes and muscle stiffness  Clonidine -- irritability, hallucinations       BP Readings from Last 3 Encounters:   02/24/25 (!) 153/70   02/04/25 (!) 146/78   02/03/25 (!) 158/78         Today's Vitals: LMP 01/01/1999   ----------------      I spent 20 minutes with this patient today. All changes were made via collaborative practice agreement with Namita Emery.     A summary of these recommendations was given to the patient.    Caro Monterroso, PharmD  Medication Therapy Management Pharmacist  [facility]         Medication Therapy Recommendations  No medication therapy recommendations to display   "

## 2025-04-15 NOTE — PATIENT INSTRUCTIONS
"Recommendations from today's MTM visit:                                                      Start captopril through [facility] compound[facility] pharmacy - take captopril 6.25 mg daily for 1 week then if tolerated increase to 6.25 mg twice daily     Follow-up: cardiology next week as scheduled     It was great speaking with you today.  I value your experience and would be very thankful for your time in providing feedback in our clinic survey. In the next few days, you may receive an email or text message from [employer] with a link to a survey related to your  clinical pharmacist.\"     To schedule another MTM appointment, please call the clinic directly or you may call the MTM scheduling line at [phone].    My Clinical Pharmacist's contact information:                                                      Please feel free to contact me with any questions or concerns you have.      Caro Monterroso, PharmD  Medication Therapy Management Pharmacist  [facility]   "

## 2025-04-29 ENCOUNTER — MYC MEDICAL ADVICE (OUTPATIENT)
Dept: CARDIOLOGY | Facility: CLINIC | Age: 65
End: 2025-04-29
Payer: COMMERCIAL

## 2025-04-29 DIAGNOSIS — I10 HYPERTENSION GOAL BP (BLOOD PRESSURE) < 140/90: ICD-10-CM

## 2025-04-29 ASSESSMENT — PAIN SCALES - PAIN ENJOYMENT GENERAL ACTIVITY SCALE (PEG)
INTERFERED_GENERAL_ACTIVITY: 8
AVG_PAIN_PASTWEEK: 8
INTERFERED_ENJOYMENT_LIFE: 9
PEG_TOTALSCORE: 8.33

## 2025-04-30 NOTE — TELEPHONE ENCOUNTER
Received message from the  compounding pharmacy and unfortunately captopril powder is no longer available. Of the antihypertensive medications available for compounding that the patient has not tried yet, options include benazepril or prazosin.    PP instructions

## 2025-05-01 ENCOUNTER — OFFICE VISIT (OUTPATIENT)
Dept: PALLIATIVE MEDICINE | Facility: OTHER | Age: 65
End: 2025-05-01
Attending: ANESTHESIOLOGY
Payer: COMMERCIAL

## 2025-05-01 VITALS
WEIGHT: 180 LBS | DIASTOLIC BLOOD PRESSURE: 102 MMHG | OXYGEN SATURATION: 100 % | SYSTOLIC BLOOD PRESSURE: 188 MMHG | BODY MASS INDEX: 30.9 KG/M2 | HEART RATE: 60 BPM

## 2025-05-01 DIAGNOSIS — M79.7 FIBROMYALGIA: Primary | ICD-10-CM

## 2025-05-01 PROCEDURE — G0463 HOSPITAL OUTPT CLINIC VISIT: HCPCS | Performed by: ANESTHESIOLOGY

## 2025-05-01 ASSESSMENT — PAIN SCALES - GENERAL: PAINLEVEL_OUTOF10: SEVERE PAIN (7)

## 2025-05-01 NOTE — PATIENT INSTRUCTIONS
"[facility] Number:  [phone]  Call with any questions about your care and for scheduling assistance.   Calls are returned Monday through Friday between 8 AM and 4:30 PM. We usually get back to you within 2 business days depending on the issue/request.    If we are prescribing your medications:  For opioid medication refills, call the clinic or send a [employer] message 7 days in advance.  Please include:  Name of requested medication  Name of the pharmacy.  For non-opioid medications, call your pharmacy directly to request a refill. Please allow 3-4 days to be processed.   Per [place] State Law:  All controlled substance prescriptions must be filled within 30 days of being written.    For those controlled substances allowing refills, pickup must occur within 30 days of last fill.      We believe regular attendance is key to your success in our program!    Any time you are unable to keep your appointment we ask that you call us at least 24 hours in advance to cancel.This will allow us to offer the appointment time to another patient.   Multiple missed appointments may lead to dismissal from the clinic.     PLAN:    You may contact the [facility] pharmacist at [phone], asking for Seb, to see if he can compound your blood pressure medicine.  May also ask them about the use of ketamine in the vehicles to administer it given your sensitivities. Call Dr. Doherty if would like to use ketmaine    Discussed avoiding toothpaste and mouthwash with fluoride.    Discussed the supplement \"C-15\" that may help with inflammation and your metabolism, may obtain online through \"fat[employer].    Discussed the CBD topical cream with Arnica through \"[employer]\".  You may also deal with the medical cannabis pharmacists about other topical agents.    Follow-up for return visit with Dr. Doherty in 2 to 3 months  "

## 2025-05-01 NOTE — PROGRESS NOTES
Patient presents to the clinic today for a visit  with POLI COLEMAN MD            11/20/2024    12:59 PM 2/4/2025    11:41 AM 5/1/2025    11:45 AM   PEG Score   PEG Total Score 7.33 6.67 6.67       UDS/CSA-na     Medications-na    QUESTIONS:    Shawna Brito MA  [facility]

## 2025-05-01 NOTE — PROGRESS NOTES
[facility] Follow-up    Date of visit: 5/1/2025    Chief complaint:   Chief Complaint   Patient presents with    Pain    Pain Management       Seen for fibromyalgia, arthralgias.    Reviewing the record did have pharmacodynamic testing.    She is companied by her .    Describes continuing learned to live with pain.  Takes Tylenol occasionally, does not help much.  Uses ibuprofen occasionally at bedtime when she really wants to sleep, though concerned about her kidneys.    Has been working with medical cannabis trying some different CBD topical creams.  Applies them to her hips other joints sometimes helpful.    Working with her cardiologist, challenges with finding blood pressure medicine she tolerates due to her sensitivities.  There was a compounding medication they wanted to use though difficulties getting that covered from the pharmacist.    She continues to have to pay attention to her diet, recognizing some things that seem to trigger.    Tried even lower doses of methylene blue, did not really tolerate.    Working with endocrinologist, trying to find different devices to try to help manage her diabetes.  They are apparently problems with matching the software the different devices.    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            Medications:  Current Outpatient Medications   Medication Sig Dispense Refill    acetaminophen (TYLENOL) 500 MG tablet Take 500-1,000 mg by mouth every 6 hours as needed for mild pain. Can only use every three to four days or she gets kidney pain      blood glucose (FREESTYLE LITE) test strip Use to test blood sugar 3-4 times daily or as directed. 300 strip 3    blood glucose monitoring (FREESTYLE LITE) meter device kit Use to test blood sugar 6-7 times daily. 1 kit 0    COMPOUNDED NON-CONTROLLED SUBSTANCE (CMPD RX) - PHARMACY TO MIX COMPOUNDED MEDICATION Benazepril 5 mg take  "one capsule daily for 1 week then if tolerated increase to two capsules (10 mg) daily. 60 capsule 5    Continuous Glucose Sensor (FREESTYLE RADHA 2 PLUS SENSOR) MISC 1 Device continuous prn (change every 15 days). 6 each 1    EPINEPHrine (ANY BX GENERIC EQUIV) 0.3 MG/0.3ML injection 2-pack Inject 0.3 mLs (0.3 mg) into the muscle once as needed for anaphylaxis 2 each 1    HEMP OIL OR EXTRACT OR OTHER CBD CANNABINOID, NOT MEDICAL CANNABIS, daily CBD gummies as needed from  [facility] [facility] [employer]      IBUPROFEN PO Take 200 mg by mouth every 6 hours as needed for moderate pain. Try to not use more than every next day or her BP rises, she gets heart palpitations and chest pain      insulin aspart (NOVOLOG VIAL) 100 UNITS/ML vial USE IN INSULIN PUMP, TOTAL DAILY DOSE  UNITS 90 mL 0    Insulin Disposable Pump (OMNIPOD 5 INTRO, GEN 5,) KIT 1 Device continuously. Use Omnipod 5 Controller (PDM), change pods every 3-days 1 kit 0    Insulin Disposable Pump (OMNIPOD 5 LIBRE2 PLUS G6 PODS) MISC 1 pod every 3 days. Needs 2 boxes of 5 pods every 30 days 2 each 11    INSULIN PUMP - OUTPATIENT INSULIN PUMP - OUTPATIENT  Date last updated: 6/4/21 Omnipod DASH  BASAL RATES and times:   12am: 1.3, 6 am: 0.75, 12 pm: 0.8, 6 pm: 0.95   CARB RATIO: 12am-12am: 10  Correction Factor (Sensitivity): 12AM (midnight): 31 -- 5 AM: 33  Target blood glucose: 100-120  Active insulin time: 4 hrs      insulin syringe-needle U-100 (31G X 5/16\" 0.3 ML) 31G X 5/16\" 0.3 ML miscellaneous Use 3 syringes daily or as directed, in the event of pump failure. 100 each 3    medical cannabis (Patient's own supply) See Admin Instructions (The purpose of this order is to document that the patient reports taking medical cannabis.  This is not a prescription, and is not used to certify that the patient has a qualifying medical condition.)    Taking up to 40 mg but usually 10-20 mg caplet from [facility].      MELATONIN PO Take 20 mg by mouth At Bedtime       ondansetron " "(ZOFRAN ODT) 4 MG ODT tab Take 1 tablet (4 mg) by mouth every 8 hours as needed for nausea 18 tablet 1    polyethylene glycol (MIRALAX/GLYCOLAX) packet Take 17 g by mouth At Bedtime       STATIN NOT PRESCRIBED, INTENTIONAL, Statin not prescribed intentionally due to Intolerance 0 each 0           Physical Exam:  Blood pressure (!) 188/102, pulse 60, weight 81.6 kg (180 lb), last menstrual period 01/01/1999, SpO2 100%, not currently breastfeeding.    Alert, clear sensorium, no respiratory distress, no pain behavior.    Has written notes about her questions.          Assessment:   Arthralgias, myalgias, reviews very sensitive to medications.  Addressing her diabetes where she is found she is quite sensitive to medications and also feels when there are glucose fluctuations she can feel that with her pain symptoms.    Plan: Reviewed other compounding pharmacy that she could talk to regarding the questions of the compounded blood pressure medications, fillers, capsule ingredients.    Reviewed some thoughts about avoiding toothpaste with fluoride, which affects the bacteria in the mouth particularly involved with helping breakdown vegetables for nitric oxide.    Reviewed the supplement \"C-15\" thought to be a new essential fatty acid, with associations for stabilizing blood glucose fluctuations, and helping with WARNER.  They will consider.    We had talked last time about another approach to help with her diffuse pain using ketamine, which may have different mechanism of action than what she is tried and be more tolerable given her sensitivities.  They will also look into that with the compounding pharmacist to contact me.  Reviewed mechanism of action, dosing with her approach with lozenges.    She will follow-up in 2 to 3 months.    Total time 35 minutesThe longitudinal plan of care for the diagnosis(es)/condition(s) as documented were addressed during this visit. Due to the added complexity in care, I will continue to " support Maricarmen in the subsequent management and with ongoing continuity of care.       minutes spent on the date of encounter doing chart review, history, and exam documentation and further activities as noted above.     Mayank Doherty MD  [facility]

## 2025-05-14 ENCOUNTER — THERAPY VISIT (OUTPATIENT)
Dept: PHYSICAL THERAPY | Facility: REHABILITATION | Age: 65
End: 2025-05-14
Payer: COMMERCIAL

## 2025-05-14 DIAGNOSIS — M79.7 FIBROMYALGIA: Primary | ICD-10-CM

## 2025-05-14 DIAGNOSIS — R29.3 POOR POSTURE: ICD-10-CM

## 2025-05-14 DIAGNOSIS — M62.81 GENERALIZED MUSCLE WEAKNESS: ICD-10-CM

## 2025-05-14 PROCEDURE — 97140 MANUAL THERAPY 1/> REGIONS: CPT | Mod: GP | Performed by: PHYSICAL THERAPIST

## 2025-05-14 NOTE — PROGRESS NOTES
[facility]                                                                                   OUTPATIENT PHYSICAL THERAPY    PLAN OF TREATMENT FOR OUTPATIENT REHABILITATION   Patient's Last Name, First Name, Maricarmen Leija YOB: 1960   Provider's Name   [facility]   Medical Record No.  [id number]     Onset Date: 07/16/24  Start of Care Date: 08/01/24     Medical Diagnosis:  Fibromyalgia      PT Treatment Diagnosis:  Fibromyalgia, generalized weakness, poor posture Plan of Treatment  Frequency/Duration: 1x/week (12 visits)/ 12 weeks    Certification date from 04/22/25 to 07/20/25         See note for plan of treatment details and functional goals     SANJEEV RIVERA, PT                         I CERTIFY THE NEED FOR THESE SERVICES FURNISHED UNDER        THIS PLAN OF TREATMENT AND WHILE UNDER MY CARE     (Physician attestation of this document indicates review and certification of the therapy plan).              Referring Provider:  Mayank Doherty    Initial Assessment  See Epic Evaluation- Start of Care Date: 08/01/24            PLAN  Continue therapy per current plan of care.    Beginning/End Dates of Progress Note Reporting Period:  01/30/25 to 05/14/2025    Referring Provider:  Mayank Doherty         05/14/25 0500   Appointment Info   Signing clinician's name / credentials Sanjeev Rivera PT   Total/Authorized Visits (E&T)   Visits Used 12   Medical Diagnosis Fibromyalgia   PT Tx Diagnosis Fibromyalgia, generalized weakness, poor posture   Other pertinent information craniosacral therapy per order, provided information to schedule with specialty provider. Pt will start with traditional PT and transition to craniosacral PT   Progress Note/Certification   Start of Care Date 08/01/24   Onset of illness/injury or Date of Surgery 07/16/24   Therapy Frequency 1x/week (12 visits)   Predicted Duration 12 weeks   Certification  date from 04/22/25   Certification date to 07/20/25   Progress Note Completed Date 01/30/25   [facility] Authorization Information   Condition type Chronic (continuous duration <3 months)   Cause of current episode Unspecified   Nature of treatment Rehabilitative   Functional ability Moderate functional limitations   Documented POC (choose all that apply) Measurable short and long term/discharge treatment goals related to physical and functional deficits.;Frequency of treatment visits and treatment activities to address deficit areas.;Patient agrees to program participation including home program   Briefly describe symptoms Hip pain, lower back pain. She is generally very fatigued as well. The upper back will get very tight when the lower back is sore   How did the symptoms start chronic in nature and has progressed as time went on.   Last 24H: avg pain/symptom intensity  8/10   Past wk: avg pain/symptom intensity 8/10   Frequency of Symptoms Constantly (% of the time)   Symptom impact on ADLs Quite a bit   Condition change since eval A little better   General health reported by patient Poor   GOALS   PT Goals 2;3   PT Goal 1   Goal Identifier Walk   Goal Description Pt will walk 4-5 miles with <7/10 pain to improve her aerobic endurance and participation in recreational activities   Goal Progress Progressing: walking 4 miles a few times a week now   Target Date 07/20/25   PT Goal 2   Goal Identifier Sleep   Goal Description Pt will sleep for 3+ hours without waking from pain to improve her sleep quality   Goal Progress Progressing: able to sleep 4-5 hours many nights   Target Date 07/20/25   Subjective Report   Subjective Report The right hip has improved. It feels like the mobility of the hip is much better. She is having to watch what she does at home as she can overdo with normal yard work at times. She does do her 4 mile walks a few times a week and then about a mile but some of that is off roading. She is able  to sleep 4-5 hours/night now mostly. She does find that having some time away from PT due to scheduling conflicts has affected her negatively. She overall has been better but with the long gap in treatment it has negatively. She is doing yoga most everyday and is able to tolerate that much better.   Objective Measures   Objective Measures Objective Measure 1   Objective Measure 1   Objective Measure C-rot: 80/81   Details T-rot: 60/70   Treatment Interventions (PT)   Interventions Therapeutic Procedure/Exercise;Manual Therapy   Therapeutic Procedure/Exercise   Therapeutic Procedures Ther Proc 5   Ther Proc 1 Stretching   Ther Proc 1 - Details standing back stretches, odalis pose, press ups, cat/ cow, downward dog, kegels   Ther Proc 2 Isometric hip add   Ther Proc 2 - Details Does every other day; hold 3-5 sec, x 5 reps x 2 sets; addition of bigger towel roll to maintain neutral hip position- in sitting   Ther Proc 3 Isometric hip abd   Ther Proc 3 - Details seated: through partial ROM with GTB- x 4 reps, increased pain in L femur, not added to HEP   Ther Proc 4 Pool   Ther Proc 4 - Details education on standing hip abd and side stepping in the pool. Encouraged pt to reduce time in the pool (does 2-3 hours but when finished has increased pain/soreness) but try to work on increasing frequency. Only able to use outdoor home pool (4 ft high) and cannot tolerate chlorine/chemicals in pool.   Ther Proc 5 Ed on continuing with HEP and discussion of how fascia relates to current symptoms   Ther Proc 5 - Details PNE education while performing manual work - no charge   PTRx Ther Proc 1 Posterior Pelvic Tilt   PTRx Ther Proc 1 - Details HEP   Skilled Intervention HEP for abdominal/hip strengthening, continue with stretching to reduce pain and improve function   Patient Response/Progress overall, poor tolerance to exercise   Manual Therapy   Manual Therapy: Mobilization, MFR, MLD, friction massage minutes ([id number]) 55   Manual  Therapy 1 Fascial release using Strain-Counterstrain with mobilizations to BLE/hip/lumbar/abdominal-LV, B anterior hip-N   Plan   Plan for next session Plan to con't with manual therapy to decrease fascial tension/tone to normalize ROM/decrease inflammation/decrease mm tone and improve proprioception.   Comments   Comments She has shown progress in function even with not coming in the last few months. Her pain levels have increased with not being seen and we should be able to decrease that in the coming visits. She has progressed towards all of her goals and we would like to be able to maintain this progress as her function increases. She continues to be appropriate for skilled PT to reach all stated goals.   Total Session Time   Timed Code Treatment Minutes 55   Total Treatment Time (sum of timed and untimed services) 55

## 2025-05-15 ENCOUNTER — MYC MEDICAL ADVICE (OUTPATIENT)
Dept: ENDOCRINOLOGY | Facility: CLINIC | Age: 65
End: 2025-05-15
Payer: COMMERCIAL

## 2025-05-27 ENCOUNTER — VIRTUAL VISIT (OUTPATIENT)
Dept: ENDOCRINOLOGY | Facility: CLINIC | Age: 65
End: 2025-05-27
Payer: COMMERCIAL

## 2025-05-27 ENCOUNTER — MYC MEDICAL ADVICE (OUTPATIENT)
Dept: CARDIOLOGY | Facility: CLINIC | Age: 65
End: 2025-05-27

## 2025-05-27 DIAGNOSIS — E11.29 TYPE 2 DIABETES MELLITUS WITH MICROALBUMINURIA, WITH LONG-TERM CURRENT USE OF INSULIN (H): Primary | ICD-10-CM

## 2025-05-27 DIAGNOSIS — Z79.4 TYPE 2 DIABETES MELLITUS WITH MICROALBUMINURIA, WITH LONG-TERM CURRENT USE OF INSULIN (H): Primary | ICD-10-CM

## 2025-05-27 DIAGNOSIS — R80.9 TYPE 2 DIABETES MELLITUS WITH MICROALBUMINURIA, WITH LONG-TERM CURRENT USE OF INSULIN (H): Primary | ICD-10-CM

## 2025-05-27 DIAGNOSIS — Z96.41 INSULIN PUMP STATUS: ICD-10-CM

## 2025-05-27 PROCEDURE — G2211 COMPLEX E/M VISIT ADD ON: HCPCS | Performed by: INTERNAL MEDICINE

## 2025-05-27 PROCEDURE — 95251 CONT GLUC MNTR ANALYSIS I&R: CPT | Performed by: INTERNAL MEDICINE

## 2025-05-27 PROCEDURE — 98006 SYNCH AUDIO-VIDEO EST MOD 30: CPT | Performed by: INTERNAL MEDICINE

## 2025-05-27 RX ORDER — HYDROCHLOROTHIAZIDE 12.5 MG/1
CAPSULE ORAL
Qty: 6 EACH | Refills: 0 | Status: SHIPPED | OUTPATIENT
Start: 2025-05-27

## 2025-05-27 NOTE — PROGRESS NOTES
Virtual Visit Details    Type of service:  Video Visit   Video Start Time:  2:40 PM  Video End Time: 2:58 PM    Originating Location (pt. Location): Home  Distant Location (provider location):  Off-site  Platform used for Video Visit: Robbie      Recent issues:  Diabetes follow-up evaluation, with  [facility]  Continues to use Omnipod Dash with Freestyle Freddie CGM  She reports recent hyperglycemia trends thought related to drug reaction...  She started benazepril HCL [facility] compounded pharmacy medication 4 days ago per Namita Nowak CNP/Cards  Symptoms of body pain, anxiety, insomnia, hyperglycemia, then stopped this medication today  Details of this BP med plan unclear today  Using the Omnipod Dash pods, has 1.5-2 months of pods... then plan for Omnipod 5         Diabetes:  History of GDM with 2 pregnancies, diet management  1996. Diagnosis of diabetes mellitus  Recalls starting a diabetes pill  Took metformin, also Januvia but developed pancreatitis  Has seen endocrinology providers CAMILA Da Silva M. Schultz, CAMILA Rich, MARAH York, MARAH Childress, KRISTA Mejia  Tried several diabetes medications previously, records indicate side effects or medication issues:  Humalog- apparent concerns with the pancreatitis    Metformin--Abdominal pain.  Glipizide- Allergic reaction- (abdominal pain and swelling),   Byetta and Onglyza-- pancreatitis.  Invokana -- had upper GI distress.    ~2006. Started treatment with insulin medication  Endocrinology evaluations with Emily Phan CNP/[facility]  ~2017. Started Omnipod insulin pump use.  Subsequently saw Rosie Schwab, and CARLO Salgado for endocrinology evaluations.  Using Freestyle Freddie CGM    Previous FV hgbA1c trends include:  2/21/06 C-Peptide 2.5  3/4/09 WILLIAMS-65 Ab <1.0     Lab Test 09/01/22  1017 05/20/22  0841 11/23/21  0859 09/18/20  0852 02/27/20  0954   A1C 7.8* 8.7* 8.2* 7.4* 8.0*         10/6/22. Initial diabetes evaluation with me at  [facility]  Reviewed health history and diabetes issues  Has used the Omnipod Dash, planned to change to Omnipod 5 kit & pods, also the DexcomG6    Omnipod 5 expenses much higher however... decided to stay on Omnipod Dash with Freddie  Using Omnipod Dash insulin pump:   Novolog pump    Uses ICR method for bolusing  Blood glucose (BG) meter  Uses Freestyle Freddie CGM with smartphone   Scans 5-8x/day  Typically boluses postmeal in evenings, not premeal  Previous Omnipod Dash settings:         Current Omnipod pump settings:       Recent Omnipod pump data:  Info not available  ~3/2023. Changed from Freestyle Libre2 to the Libre3 CGM  Current Libre3 CGM data            Previous [facility]V hgbA1c trends include:  Lab Results   Component Value Date    A1C 8.3 (H) 12/18/2024    A1C 7.8 (H) 04/25/2024    A1C 7.6 (H) 11/21/2023    A1C 8.0 (H) 07/05/2023    A1C 8.6 (H) 03/20/2023      Recent [facility] labs include:  Lab Results   Component Value Date    A1C 8.3 (H) 12/18/2024     12/18/2024    POTASSIUM 3.9 12/18/2024    CHLORIDE 103 12/18/2024    CO2 26 12/18/2024    ANIONGAP 10 12/18/2024     (H) 12/18/2024    BUN 11.3 12/18/2024    CR 0.81 12/18/2024    GFRESTIMATED 81 12/18/2024    GFRESTBLACK >90 01/20/2021    KARIN 9.6 12/18/2024    CHOL 222 (H) 12/18/2024    TRIG 110 12/18/2024    HDL 60 12/18/2024     (H) 12/18/2024    NHDL 162 (H) 12/18/2024    UCRR 128.0 12/18/2024    MICROL 88.4 12/18/2024    UMALCR 69.06 (H) 12/18/2024    TSH 0.73 11/21/2023    T4 0.87 07/22/2019     DM Complications:   Nephropathy:    Microalbuminuria      Parathyroid:  Patient has seen Dr. Joanne Mtz/[facility] for neurology evaluations  Spring '23, Patient reports discussion of symptoms with neurologist including anterior throat discomfort, difficulty swallowing, cough  Lab test reportedly showed elevated PTH 82  General Surgery consultation appointment with Dr. Radha Conte/[facility]   Appointment subsequently  cancelled  Patient had repeat PTH testing at [facility], result reportedly normal  Additional history:  Previous fractures: Wrist, fingers  Kidney stones: None  Vitamin D def:  unknown  Last DEXA scan: ~1995  Supplements:  Calcium- none, vit D- none  Previous [facility] labs include:     Latest Reference Range & Units 02/03/15 11:05 03/29/23 12:32   Vitamin D Deficiency screening 20 - 75 ug/L 34 22      Latest Reference Range & Units 03/29/23 12:32   Parathyroid Hormone Intact 15 - 65 pg/mL 41     Recent [facility] labs include:  Lab Results   Component Value Date     12/18/2024    POTASSIUM 3.9 12/18/2024    CHLORIDE 103 12/18/2024    CO2 26 12/18/2024    ANIONGAP 10 12/18/2024     (H) 12/18/2024    BUN 11.3 12/18/2024    CR 0.81 12/18/2024    GFRESTIMATED 81 12/18/2024    GFRESTBLACK >90 01/20/2021    KARIN 9.6 12/18/2024    TSH 0.73 11/21/2023    VITDT 38 07/18/2024    PTHI 41 03/29/2023         Lives in [place], [place]  Sees Dr. Madyson Mclaughlin/[facility] clinic for general medicine evaluations.  Also sees Dr Joanne Mtz[facility]    PMH/PSH:  Past Medical History:   Diagnosis Date    Arthritis 1996    Ascending aorta enlargement     Depressive disorder     severe, prior hospitalization    Diverticulosis of colon (without mention of hemorrhage)     Fibromyalgia 06/26/2007    Insomnia     Irritable bowel syndrome     Osteopenia     PTSD (post-traumatic stress disorder)     Type 2 diabetes mellitus with complications (H)     microalbuminuria     Past Surgical History:   Procedure Laterality Date    ABDOMEN SURGERY  2012    Gallbladder removal    BACK SURGERY      fusion 1994 and removal of hardware 2004    C SPINAL ORTHOSIS,NOS  2002    lumbar surgery    CHOLECYSTECTOMY  2011    choley  2011    ENT SURGERY  1986    septoplasty    HYSTERECTOMY, CERVIX STATUS UNKNOWN  2000    TVH/BSO    ORTHOPEDIC SURGERY  2005    left ankle       Family Hx:  Family History   Problem Relation Age of Onset    Diabetes Mother          type 2    Hypertension Mother     Cerebrovascular Disease Mother          stroke age 57    Ovarian Cancer Mother 43    Cancer Mother         cervix    Diabetes Father         type 2    Hypertension Father     Gastrointestinal Disease Father         colon polyps    Sleep Apnea Brother     Cancer Sister     Ovarian Cancer Sister 46    Breast Cancer Sister         Stage 4 breast cancer.    Ovarian Cancer Maternal Grandmother 72    Cancer Maternal Grandmother         cervix         Social Hx:  Social History     Socioeconomic History    Marital status:      Spouse name: Not on file    Number of children: 3    Years of education: Not on file    Highest education level: Not on file   Occupational History    Occupation: xr technician     Employer: [employer]   Tobacco Use    Smoking status: Never     Passive exposure: Never    Smokeless tobacco: Never   Vaping Use    Vaping status: Former   Substance and Sexual Activity    Alcohol use: Not Currently    Drug use: No    Sexual activity: Yes     Partners: Male     Birth control/protection: Surgical     Comment: Full hystroectomy in    Other Topics Concern    Parent/sibling w/ CABG, MI or angioplasty before 65F 55M? Not Asked   Social History Narrative    Not on file     Social Drivers of Health     Financial Resource Strain: Low Risk  (2024)    Financial Resource Strain     Within the past 12 months, have you or your family members you live with been unable to get utilities (heat, electricity) when it was really needed?: No   Food Insecurity: Low Risk  (2024)    Food Insecurity     Within the past 12 months, did you worry that your food would run out before you got money to buy more?: No     Within the past 12 months, did the food you bought just not last and you didn t have money to get more?: No   Transportation Needs: Low Risk  (2024)    Transportation Needs     Within the past 12 months, has lack of transportation kept you  from medical appointments, getting your medicines, non-medical meetings or appointments, work, or from getting things that you need?: No   Physical Activity: Not on file   Stress: Not on file   Social Connections: Not on file   Interpersonal Safety: Low Risk  (4/29/2024)    Interpersonal Safety     Do you feel physically and emotionally safe where you currently live?: Yes     Within the past 12 months, have you been hit, slapped, kicked or otherwise physically hurt by someone?: No     Within the past 12 months, have you been humiliated or emotionally abused in other ways by your partner or ex-partner?: No   Housing Stability: Low Risk  (1/28/2024)    Housing Stability     Do you have housing? : Yes     Are you worried about losing your housing?: No          MEDICATIONS:  has a current medication list which includes the following prescription(s): acetaminophen, freestyle ashleigh 3 plus sensor, novolog vial, omnipod 5 intro (gen 5), omnipod 5 libre2 plus g6 pods, medical cannabis, melatonin, ondansetron, polyethylene glycol, freestyle lite, blood glucose monitoring, compounded non-controlled substance, freestyle ashleigh 2 plus sensor, epinephrine, HEMP OIL OR EXTRACT OR OTHER CBD CANNABINOID, NOT MEDICAL CANNABIS,, ibuprofen, insulin pump, insulin syringe-needle u-100, ketamine hcl, and statin not prescribed.    ROS:     ROS: 10 point ROS neg other than the symptoms noted above in the HPI.    GENERAL: some fatigue, wt stable; denies fevers, chills, night sweats.   HEENT: some head congestion; no dysphagia, odonophagia, diplopia, neck pain  THYROID:  no apparent hyper or hypothyroid symptoms  CV: no chest pain, pressure, palpitations  LUNGS: no SOB, VASQUEZ, cough, wheezing   ABDOMEN: some indigestion; no diarrhea, constipation, abdominal pain  EXTREMITIES: no rashes, ulcers, edema  NEUROLOGY: no headaches, denies changes in vision, tingling, extremitiy numbness   MSK: no muscle aches or pains, weakness  SKIN: no rashes or  lesions  : no menses  PSYCH:  PTSD issues though recent stable mood  ENDOCRINE: no heat or cold intolerance      Physical Exam   VS: LMP 01/01/1999   GENERAL: AXOX3, NAD, well dressed, answering questions appropriately, appears stated age.  ENT: no nose swelling or nasal discharge, mouth redness or gum changes.  EYES: eyes grossly normal to inspection, conjunctivae and sclerae normal, no exophthalmos or proptosis  THYROID:  no apparent nodules or goiter  LUNGS: no audible wheeze, cough or visible cyanosis, or increased work of breathing  ABDOMEN: abdomen obese size  EXTREMITIES: no edema noted  NEUROLOGY: CN grossly intact, no tremors  MSK: grossly intact  SKIN:  no apparent skin lesions, rash, or edema with visualized skin appearance  PSYCH: mentation appears normal, affect normal/bright, judgement and insight intact,   normal speech and appearance well groomed    LABS:    All pertinent notes, labs, and images personally reviewed by me.     A/P:  Encounter Diagnoses   Name Primary?    Type 2 diabetes mellitus with microalbuminuria, with long-term current use of insulin (H) Yes    Insulin pump status          Comments:  Reviewed health history and diabetes issues.  Details of her previous medication intolerances or allergies unclear, higher cost of insulin also unclear  Overall glycemic control improved yet still has postprandial hyperglycemia trends  Reviewed and interpreted tests that I previously ordered.   Ordered appropriate tests for the endocrinology disease management.    Management options discussed and implemented after shared medical decision making with the patient.  T2DM problem is chronic-stable    Plan:  Reviewed the overall T2DM management and insulin pump use.  Discussed optimal BG testing to assess glucose trends.  We reviewed insulin pump settings, basal rate and bolus dosing  Use of automated pump bolus dosing for meal/snack carb & correction dosing  Reviewed use of the Omnipod Dash insulin  pump reports  Reviewed the recent Freestyle Libre3 CGM glucose sensor data, in detail    Reviewed general issues with possible hyperparathyroidism (HPT) diagnosis  Discussed lab tests used to assess patient parathyroid gland function    Recommend:  Continue the Omnipod Dash and Freddie sensor use, but plan to upgrade to Omnipod 5 with Freestyle Libre2 Plus CGM  No pump setting changes at this time    We have sent new OP5 and Libre2 Plus Rx's to her local [facility] pharmacy   Plan to see [employer]sulet pump  (CM) for assistance with new pump & sensor   Needs Freestyle Libre3 Plus for next 12 weeks, then change to Libre2 Plus  Recommended doing fingertip BGM testing if unusual CGM glucose level  We have discussed advantages of premeal bolusing, entering at least 50% of meal/snack carb estimate premeal  Would not use a GLP1RA medication with her history of pancreatitis  Future treatment options include adding a SGLT2-I such as Jardiance  Encouraged her to have another CDE/RD appointment with [facility], review wt management  Message me in 1-2 weeks with CGM update, off BP med  Arrange annual dilated eye exam, fasting lipid panel testing  Discussed importance of regular endocrinology evaluations every 3-4 months.    Contact Namita Nowak CNP/Cards to review the compounded BP med issues, replacement medication plan  Addressed patient's questions today.    The longitudinal plan of care for the endocrine problem(s) were addressed during this visit.  Due to added complexity of care,   we will continue to support the patient and the subsequent management of this condition with ongoing continuity of care.    There are no Patient Instructions on file for this visit.    Future labs ordered today:   Orders Placed This Encounter   Procedures    GLUCOSE MONITOR, 72 HOUR, PHYS INTERP    Hemoglobin A1c    Basic metabolic panel    TSH     Radiology/Consults ordered today: None    Total time spent on day of encounter:  35  min    Follow-up:  8/2025 Return    JEAN PIERRE Hightower MD, MS  Endocrinology  [facility]    CC: АННА Mclaughlin and Care Team

## 2025-05-27 NOTE — LETTER
5/27/2025      Maricarmen Dotson  [address] [place] [address]      Dear Colleague,    Thank you for referring your patient, Maricarmen Dotson, to the [facility] [place]. Please see a copy of my visit note below.    Virtual Visit Details    Type of service:  Video Visit   Video Start Time:  2:40 PM  Video End Time: 2:58 PM    Originating Location (pt. Location): Home  Distant Location (provider location):  Off-site  Platform used for Video Visit: Robbie      Recent issues:  Diabetes follow-up evaluation, with  [facility]  Continues to use Omnipod Dash with Freestyle Freddie CGM  She reports recent hyperglycemia trends thought related to drug reaction...  She started benazepril HCL [facility] compounded pharmacy medication 4 days ago per Namita Nowak CNP/Cards  Symptoms of body pain, anxiety, insomnia, hyperglycemia, then stopped this medication today  Details of this BP med plan unclear today  Using the Omnipod Dash pods, has 1.5-2 months of pods... then plan for Omnipod 5         Diabetes:  History of GDM with 2 pregnancies, diet management  1996. Diagnosis of diabetes mellitus  Recalls starting a diabetes pill  Took metformin, also Januvia but developed pancreatitis  Has seen endocrinology providers CAMILA Da Silva, AMINA Angeles, CAMILA Rich, MARAH [place], MARAH Childress, KRISTA Mejia  Tried several diabetes medications previously, records indicate side effects or medication issues:  Humalog- apparent concerns with the pancreatitis    Metformin--Abdominal pain.  Glipizide- Allergic reaction- (abdominal pain and swelling),   Byetta and Onglyza-- pancreatitis.  Invokana -- had upper GI distress.    ~2006. Started treatment with insulin medication  Endocrinology evaluations with Emily Phan CNP/[facility]  ~2017. Started Omnipod insulin pump use.  Subsequently saw Rosie Schwab, and CARLO Salgado for endocrinology evaluations.  Using Freestyle Freddie CGM    Previous FV hgbA1c  trends include:  2/21/06 C-Peptide 2.5  3/4/09 WILLIAMS-65 Ab <1.0     Lab Test 09/01/22  1017 05/20/22  0841 11/23/21  0859 09/18/20  0852 02/27/20  0954   A1C 7.8* 8.7* 8.2* 7.4* 8.0*         10/6/22. Initial diabetes evaluation with me at [place]  Reviewed health history and diabetes issues  Has used the Omnipod Dash, planned to change to Omnipod 5 kit & pods, also the DexcomG6    Omnipod 5 expenses much higher however... decided to stay on Omnipod Dash with Freddie  Using Omnipod Dash insulin pump:   Novolog pump    Uses ICR method for bolusing  Blood glucose (BG) meter  Uses Freestyle Freddie CGM with smartphone   Scans 5-8x/day  Typically boluses postmeal in evenings, not premeal  Previous Omnipod Dash settings:         Current Omnipod pump settings:       Recent Omnipod pump data:  Info not available  ~3/2023. Changed from Freestyle Libre2 to the Libre3 CGM  Current Libre3 CGM data            Previous FV hgbA1c trends include:  Lab Results   Component Value Date    A1C 8.3 (H) 12/18/2024    A1C 7.8 (H) 04/25/2024    A1C 7.6 (H) 11/21/2023    A1C 8.0 (H) 07/05/2023    A1C 8.6 (H) 03/20/2023      Recent FV labs include:  Lab Results   Component Value Date    A1C 8.3 (H) 12/18/2024     12/18/2024    POTASSIUM 3.9 12/18/2024    CHLORIDE 103 12/18/2024    CO2 26 12/18/2024    ANIONGAP 10 12/18/2024     (H) 12/18/2024    BUN 11.3 12/18/2024    CR 0.81 12/18/2024    GFRESTIMATED 81 12/18/2024    GFRESTBLACK >90 01/20/2021    KARIN 9.6 12/18/2024    CHOL 222 (H) 12/18/2024    TRIG 110 12/18/2024    HDL 60 12/18/2024     (H) 12/18/2024    NHDL 162 (H) 12/18/2024    UCRR 128.0 12/18/2024    MICROL 88.4 12/18/2024    UMALCR 69.06 (H) 12/18/2024    TSH 0.73 11/21/2023    T4 0.87 07/22/2019     DM Complications:   Nephropathy:    Microalbuminuria      Parathyroid:  Patient has [facility] for [facility]  Spring '23, Patient reports discussion of symptoms with neurologist  including anterior throat discomfort, difficulty swallowing, cough  Lab test reportedly showed elevated PTH 82  General Surgery consultation appointment with Dr. Radha Conte/[facility]   Appointment subsequently cancelled  Patient had repeat PTH testing at [facility], result reportedly normal  Additional history:  Previous fractures: Wrist, fingers  Kidney stones: None  Vitamin D def:  unknown  Last DEXA scan: ~1995  Supplements:  Calcium- none, vit D- none  Previous [facility]V labs include:     Latest Reference Range & Units 02/03/15 11:05 03/29/23 12:32   Vitamin D Deficiency screening 20 - 75 ug/L 34 22      Latest Reference Range & Units 03/29/23 12:32   Parathyroid Hormone Intact 15 - 65 pg/mL 41     Recent [facility]V labs include:  Lab Results   Component Value Date     12/18/2024    POTASSIUM 3.9 12/18/2024    CHLORIDE 103 12/18/2024    CO2 26 12/18/2024    ANIONGAP 10 12/18/2024     (H) 12/18/2024    BUN 11.3 12/18/2024    CR 0.81 12/18/2024    GFRESTIMATED 81 12/18/2024    GFRESTBLACK >90 01/20/2021    KARIN 9.6 12/18/2024    TSH 0.73 11/21/2023    VITDT 38 07/18/2024    PTHI 41 03/29/2023         Lives in [place], [place]  Sees Dr. Madyson Mclaughlin/[facility] for general medicine evaluations.  Also sees Dr Joanne Mtz/[facility]    PMH/PSH:  Past Medical History:   Diagnosis Date     Arthritis 1996     Ascending aorta enlargement      Depressive disorder     severe, prior hospitalization     Diverticulosis of colon (without mention of hemorrhage)      Fibromyalgia 06/26/2007     Insomnia      Irritable bowel syndrome      Osteopenia      PTSD (post-traumatic stress disorder)      Type 2 diabetes mellitus with complications (H)     microalbuminuria     Past Surgical History:   Procedure Laterality Date     ABDOMEN SURGERY  2012    Gallbladder removal     BACK SURGERY      fusion 1994 and removal of hardware 2004     C SPINAL ORTHOSIS,NOS  2002    lumbar surgery      CHOLECYSTECTOMY  2011     choley  2011     ENT SURGERY  1986    septoplasty     HYSTERECTOMY, CERVIX STATUS UNKNOWN      TVH/BSO     ORTHOPEDIC SURGERY  2005    left ankle       Family Hx:  Family History   Problem Relation Age of Onset     Diabetes Mother         type 2     Hypertension Mother      Cerebrovascular Disease Mother          stroke age 57     Ovarian Cancer Mother 43     Cancer Mother         cervix     Diabetes Father         type 2     Hypertension Father      Gastrointestinal Disease Father         colon polyps     Sleep Apnea Brother      Cancer Sister      Ovarian Cancer Sister 46     Breast Cancer Sister         Stage 4 breast cancer.     Ovarian Cancer Maternal Grandmother 72     Cancer Maternal Grandmother         cervix         Social Hx:  Social History     Socioeconomic History     Marital status:      Spouse name: Not on file     Number of children: 3     Years of education: Not on file     Highest education level: Not on file   Occupational History     Occupation: xr technician     Employer: [employer]   Tobacco Use     Smoking status: Never     Passive exposure: Never     Smokeless tobacco: Never   Vaping Use     Vaping status: Former   Substance and Sexual Activity     Alcohol use: Not Currently     Drug use: No     Sexual activity: Yes     Partners: Male     Birth control/protection: Surgical     Comment: Full hystroectomy in    Other Topics Concern     Parent/sibling w/ CABG, MI or angioplasty before 65F 55M? Not Asked   Social History Narrative     Not on file     Social Drivers of Health     Financial Resource Strain: Low Risk  (2024)    Financial Resource Strain      Within the past 12 months, have you or your family members you live with been unable to get utilities (heat, electricity) when it was really needed?: No   Food Insecurity: Low Risk  (2024)    Food Insecurity      Within the past 12 months, did you worry that your food would  run out before you got money to buy more?: No      Within the past 12 months, did the food you bought just not last and you didn t have money to get more?: No   Transportation Needs: Low Risk  (1/28/2024)    Transportation Needs      Within the past 12 months, has lack of transportation kept you from medical appointments, getting your medicines, non-medical meetings or appointments, work, or from getting things that you need?: No   Physical Activity: Not on file   Stress: Not on file   Social Connections: Not on file   Interpersonal Safety: Low Risk  (4/29/2024)    Interpersonal Safety      Do you feel physically and emotionally safe where you currently live?: Yes      Within the past 12 months, have you been hit, slapped, kicked or otherwise physically hurt by someone?: No      Within the past 12 months, have you been humiliated or emotionally abused in other ways by your partner or ex-partner?: No   Housing Stability: Low Risk  (1/28/2024)    Housing Stability      Do you have housing? : Yes      Are you worried about losing your housing?: No          MEDICATIONS:  has a current medication list which includes the following prescription(s): acetaminophen, freestyle ashleigh 3 plus sensor, novolog vial, omnipod 5 intro (gen 5), omnipod 5 libre2 plus g6 pods, medical cannabis, melatonin, ondansetron, polyethylene glycol, freestyle lite, blood glucose monitoring, compounded non-controlled substance, freestyle ashleigh 2 plus sensor, epinephrine, HEMP OIL OR EXTRACT OR OTHER CBD CANNABINOID, NOT MEDICAL CANNABIS,, ibuprofen, insulin pump, insulin syringe-needle u-100, ketamine hcl, and statin not prescribed.    ROS:     ROS: 10 point ROS neg other than the symptoms noted above in the HPI.    GENERAL: some fatigue, wt stable; denies fevers, chills, night sweats.   HEENT: some head congestion; no dysphagia, odonophagia, diplopia, neck pain  THYROID:  no apparent hyper or hypothyroid symptoms  CV: no chest pain, pressure,  palpitations  LUNGS: no SOB, VASQUEZ, cough, wheezing   ABDOMEN: some indigestion; no diarrhea, constipation, abdominal pain  EXTREMITIES: no rashes, ulcers, edema  NEUROLOGY: no headaches, denies changes in vision, tingling, extremitiy numbness   MSK: no muscle aches or pains, weakness  SKIN: no rashes or lesions  : no menses  PSYCH:  PTSD issues though recent stable mood  ENDOCRINE: no heat or cold intolerance      Physical Exam   VS: LMP 01/01/1999   GENERAL: AXOX3, NAD, well dressed, answering questions appropriately, appears stated age.  ENT: no nose swelling or nasal discharge, mouth redness or gum changes.  EYES: eyes grossly normal to inspection, conjunctivae and sclerae normal, no exophthalmos or proptosis  THYROID:  no apparent nodules or goiter  LUNGS: no audible wheeze, cough or visible cyanosis, or increased work of breathing  ABDOMEN: abdomen obese size  EXTREMITIES: no edema noted  NEUROLOGY: CN grossly intact, no tremors  MSK: grossly intact  SKIN:  no apparent skin lesions, rash, or edema with visualized skin appearance  PSYCH: mentation appears normal, affect normal/bright, judgement and insight intact,   normal speech and appearance well groomed    LABS:    All pertinent notes, labs, and images personally reviewed by me.     A/P:  Encounter Diagnoses   Name Primary?     Type 2 diabetes mellitus with microalbuminuria, with long-term current use of insulin (H) Yes     Insulin pump status          Comments:  Reviewed health history and diabetes issues.  Details of her previous medication intolerances or allergies unclear, higher cost of insulin also unclear  Overall glycemic control improved yet still has postprandial hyperglycemia trends  Reviewed and interpreted tests that I previously ordered.   Ordered appropriate tests for the endocrinology disease management.    Management options discussed and implemented after shared medical decision making with the patient.  T2DM problem is  chronic-stable    Plan:  Reviewed the overall T2DM management and insulin pump use.  Discussed optimal BG testing to assess glucose trends.  We reviewed insulin pump settings, basal rate and bolus dosing  Use of automated pump bolus dosing for meal/snack carb & correction dosing  Reviewed use of the Omnipod Dash insulin pump reports  Reviewed the recent Freestyle Libre3 CGM glucose sensor data, in detail    Reviewed general issues with possible hyperparathyroidism (HPT) diagnosis  Discussed lab tests used to assess patient parathyroid gland function    Recommend:  Continue the Omnipod Dash and Freddie sensor use, but plan to upgrade to Omnipod 5 with Freestyle Libre2 Plus CGM  No pump setting changes at this time    We have sent new OP5 and Libre2 Plus Rx's to her local [facility] pharmacy   Plan to see Insulet pump  (CM) for assistance with new pump & sensor   Needs Freestyle Libre3 Plus for next 12 weeks, then change to Libre2 Plus  Recommended doing fingertip BGM testing if unusual CGM glucose level  We have discussed advantages of premeal bolusing, entering at least 50% of meal/snack carb estimate premeal  Would not use a GLP1RA medication with her history of pancreatitis  Future treatment options include adding a SGLT2-I such as Jardiance  Encouraged her to have another CDE/RD appointment with [facility], review wt management  Message me in 1-2 weeks with CGM update, off BP med  Arrange annual dilated eye exam, fasting lipid panel testing  Discussed importance of regular endocrinology evaluations every 3-4 months.    Contact Namita Nowak CNP/Cards to review the compounded BP med issues, replacement medication plan  Addressed patient's questions today.    The longitudinal plan of care for the endocrine problem(s) were addressed during this visit.  Due to added complexity of care,   we will continue to support the patient and the subsequent management of this condition with ongoing continuity  of care.    There are no Patient Instructions on file for this visit.    Future labs ordered today:   Orders Placed This Encounter   Procedures     GLUCOSE MONITOR, 72 HOUR, PHYS INTERP     Hemoglobin A1c     Basic metabolic panel     TSH     Radiology/Consults ordered today: None    Total time spent on day of encounter:  35 min    Follow-up:  8/2025 Return    JEAN PIERRE Hightower MD, MS  Endocrinology  [facility]    CC: АННА Mclaughlin and Care Team                        Again, thank you for allowing me to participate in the care of your patient.        Sincerely,        Chaitanya Hightower MD    Electronically signed

## 2025-05-27 NOTE — Clinical Note
Please assist patient with her recent drug reaction and alternative antihypertensive med plan, thanks.

## 2025-05-28 ENCOUNTER — MYC MEDICAL ADVICE (OUTPATIENT)
Dept: EDUCATION SERVICES | Facility: CLINIC | Age: 65
End: 2025-05-28
Payer: COMMERCIAL

## 2025-06-03 ENCOUNTER — TELEPHONE (OUTPATIENT)
Dept: PEDIATRICS | Facility: CLINIC | Age: 65
End: 2025-06-03
Payer: COMMERCIAL

## 2025-06-03 NOTE — TELEPHONE ENCOUNTER
Patient Quality Outreach    Patient is due for the following:   Diabetes -  A1C, Diabetic Follow-Up Visit, and Foot Exam  Colon Cancer Screening  Breast Cancer Screening - Mammogram  Depression  -  PHQ-9 needed  Physical Preventive Adult Physical      Topic Date Due    Pneumococcal Vaccine (2 of 2 - PCV) 09/14/2022    Zoster (Shingles) Vaccine (2 of 2) 10/28/2022    COVID-19 Vaccine (5 - 2024-25 season) 09/01/2024       Action(s) Taken:   Schedule a office visit for diabetes follow up and Adult Preventative  Patient was assigned appropriate questionnaire to complete    Type of outreach:    Sent [employer] message.    Questions for provider review:    None         Anette Kevin MA  Chart routed to None.

## 2025-06-04 NOTE — TELEPHONE ENCOUNTER
Per Namita, NP:     Namita Emery, MARICRUZ CNP to [facility] Heart Team 4  Caro Monterroso, [facility] (Selected Message)  AM      6/4/25 12:53 PM  Hi team, can we reach out to Maricarmen and see if she's open to trying Ethacrynic acid, 25 mg daily? This medication can be used with sulfa allergy. This is likely our last remaining blood pressure option unless we try clinic administration in the allergy clinic with other agents. We could start by taking every other day and if tolerating after 2 weeks, go to daily. I would want to check BMP at time of her next appointment with me on 6/20. Thanks, Namita     Sent pt [employer] message with recommendations.

## 2025-06-12 ENCOUNTER — THERAPY VISIT (OUTPATIENT)
Dept: PHYSICAL THERAPY | Facility: REHABILITATION | Age: 65
End: 2025-06-12
Payer: COMMERCIAL

## 2025-06-12 DIAGNOSIS — R29.3 POOR POSTURE: ICD-10-CM

## 2025-06-12 DIAGNOSIS — M62.81 GENERALIZED MUSCLE WEAKNESS: ICD-10-CM

## 2025-06-12 DIAGNOSIS — M79.7 FIBROMYALGIA: Primary | ICD-10-CM

## 2025-06-19 ENCOUNTER — MYC MEDICAL ADVICE (OUTPATIENT)
Dept: CARDIOLOGY | Facility: CLINIC | Age: 65
End: 2025-06-19
Payer: COMMERCIAL

## 2025-06-19 DIAGNOSIS — I10 HYPERTENSION GOAL BP (BLOOD PRESSURE) < 140/90: Primary | ICD-10-CM

## 2025-06-23 NOTE — TELEPHONE ENCOUNTER
Namita Emery, APRN CNP to [facility] Heart Team 4 (Selected Message)  AM      6/23/25  3:11 PM  Can we arrange for her to be seen by Dr. Freeman for consideration of renal denervation? Thanks, Namita       Referral placed. Pt updated via [employer]

## 2025-07-05 ENCOUNTER — HEALTH MAINTENANCE LETTER (OUTPATIENT)
Age: 65
End: 2025-07-05

## 2025-07-07 ENCOUNTER — MYC MEDICAL ADVICE (OUTPATIENT)
Dept: PEDIATRICS | Facility: CLINIC | Age: 65
End: 2025-07-07
Payer: COMMERCIAL

## 2025-07-08 ENCOUNTER — TELEPHONE (OUTPATIENT)
Dept: ENDOCRINOLOGY | Facility: CLINIC | Age: 65
End: 2025-07-08
Payer: COMMERCIAL

## 2025-07-08 ENCOUNTER — MYC MEDICAL ADVICE (OUTPATIENT)
Dept: ENDOCRINOLOGY | Facility: CLINIC | Age: 65
End: 2025-07-08
Payer: COMMERCIAL

## 2025-07-08 DIAGNOSIS — Z96.41 INSULIN PUMP STATUS: ICD-10-CM

## 2025-07-08 DIAGNOSIS — E11.65 TYPE 2 DIABETES MELLITUS WITH HYPERGLYCEMIA, WITH LONG-TERM CURRENT USE OF INSULIN (H): ICD-10-CM

## 2025-07-08 DIAGNOSIS — R80.9 TYPE 2 DIABETES MELLITUS WITH MICROALBUMINURIA, WITH LONG-TERM CURRENT USE OF INSULIN (H): ICD-10-CM

## 2025-07-08 DIAGNOSIS — E11.29 TYPE 2 DIABETES MELLITUS WITH MICROALBUMINURIA, WITH LONG-TERM CURRENT USE OF INSULIN (H): ICD-10-CM

## 2025-07-08 DIAGNOSIS — Z79.4 TYPE 2 DIABETES MELLITUS WITH HYPERGLYCEMIA, WITH LONG-TERM CURRENT USE OF INSULIN (H): ICD-10-CM

## 2025-07-08 DIAGNOSIS — E11.9 TYPE 2 DIABETES, HBA1C GOAL < 7% (H): ICD-10-CM

## 2025-07-08 DIAGNOSIS — Z79.4 TYPE 2 DIABETES MELLITUS WITH MICROALBUMINURIA, WITH LONG-TERM CURRENT USE OF INSULIN (H): ICD-10-CM

## 2025-07-08 RX ORDER — INSULIN ASPART 100 [IU]/ML
INJECTION, SOLUTION INTRAVENOUS; SUBCUTANEOUS
Qty: 90 ML | Refills: 0 | Status: SHIPPED | OUTPATIENT
Start: 2025-07-08

## 2025-07-08 NOTE — TELEPHONE ENCOUNTER
Health Call Center    Phone Message    May a detailed message be left on voicemail: yes     Reason for Call: Medication Refill Request    Has the patient contacted the pharmacy for the refill? Yes   Name of medication being requested: Insulin Disposable Pump (OMNIPOD 5 INTRO, GEN 5,) KIT   And   insulin aspart (NOVOLOG VIAL) 100 UNITS/ML vial   Provider who prescribed the medication: Dr. Hightower  Pharmacy: [id number] - [place], [place] - [address] [facility] [address]   Date medication is needed: as soon as able.        Action Taken: Message routed to:  [facility] endo    Travel Screening: Not Applicable     Date of Service:

## 2025-07-08 NOTE — TELEPHONE ENCOUNTER
Last Written Prescription Date:  24  Last Fill Quantity: 90,  # refills: 0   Last office visit: 2025 ; last virtual visit: 2025 with prescribing provider:  Dr. Hightower   Future Office Visit:25     Next 5 appointments (look out 90 days)      2025 11:00 AM  (Arrive by 10:40 AM)  Provider Visit with Michael Adams PA-C  [facility] [address] [place] [address]  [address]  [facility] [address]  [phone]     Aug 19, 2025 11:00 AM  (Arrive by 10:40 AM)  Adult Preventative Visit with Kaley Cifuentes NP  [facility] [address] [place] [address]  [address]  [facility] [address]  [phone]           Requested Prescriptions   Pending Prescriptions Disp Refills    insulin aspart (NOVOLOG VIAL) 100 UNITS/ML vial 90 mL 0       There is no refill protocol information for this order       Insulin Disposable Pump (OMNIPOD 5 LIBRE2 PLUS G6 PODS) MISC 2 each 11     Si pod every 3 days. Needs 2 boxes of 5 pods every 30 days       There is no refill protocol information for this order        Refill sent for novolog  Pt already has refills avail for pods  Kylah Foster RN

## 2025-07-09 ENCOUNTER — ANCILLARY PROCEDURE (OUTPATIENT)
Dept: GENERAL RADIOLOGY | Facility: CLINIC | Age: 65
End: 2025-07-09
Attending: PHYSICIAN ASSISTANT
Payer: COMMERCIAL

## 2025-07-09 ENCOUNTER — OFFICE VISIT (OUTPATIENT)
Dept: PEDIATRICS | Facility: CLINIC | Age: 65
End: 2025-07-09
Payer: COMMERCIAL

## 2025-07-09 VITALS
OXYGEN SATURATION: 98 % | RESPIRATION RATE: 20 BRPM | BODY MASS INDEX: 32.25 KG/M2 | DIASTOLIC BLOOD PRESSURE: 80 MMHG | WEIGHT: 188.9 LBS | HEIGHT: 64 IN | SYSTOLIC BLOOD PRESSURE: 152 MMHG | TEMPERATURE: 97.7 F | HEART RATE: 66 BPM

## 2025-07-09 DIAGNOSIS — M25.551 HIP PAIN, RIGHT: Primary | ICD-10-CM

## 2025-07-09 DIAGNOSIS — M25.551 HIP PAIN, RIGHT: ICD-10-CM

## 2025-07-09 DIAGNOSIS — Z91.030 BEE ALLERGY STATUS: ICD-10-CM

## 2025-07-09 DIAGNOSIS — Z12.31 VISIT FOR SCREENING MAMMOGRAM: ICD-10-CM

## 2025-07-09 DIAGNOSIS — H61.23 BILATERAL IMPACTED CERUMEN: ICD-10-CM

## 2025-07-09 PROCEDURE — 73502 X-RAY EXAM HIP UNI 2-3 VIEWS: CPT | Mod: TC | Performed by: RADIOLOGY

## 2025-07-09 RX ORDER — MELATONIN 10 MG
10 CAPSULE ORAL AT BEDTIME
Status: SHIPPED
Start: 2025-07-09

## 2025-07-09 RX ORDER — EPINEPHRINE 0.3 MG/.3ML
0.3 INJECTION SUBCUTANEOUS
Qty: 2 EACH | Refills: 1 | Status: SHIPPED | OUTPATIENT
Start: 2025-07-09

## 2025-07-09 ASSESSMENT — PAIN SCALES - GENERAL: PAINLEVEL_OUTOF10: SEVERE PAIN (7)

## 2025-07-09 NOTE — PROGRESS NOTES
"  {PROVIDER CHARTING PREFERENCE:178342}    Subjective   Maricarmen is a 64 year old, presenting for the following health issues:  Ear Problem and Musculoskeletal Problem (Right hip pain)  Ear Problem    Musculoskeletal Problem    History of Present Illness       Reason for visit:  Well check with both ear pain, continued right hip pain.   She is taking medications regularly.      Acute Illness  Acute illness concerns: ear pain  Onset/Duration: 2 weeks  Symptoms:  Fever: YES--warm subjective. Worse at night  Chills/Sweats: YES  Headache (location?): No  Sinus Pressure: YES  Conjunctivitis:  No  Ear Pain: YES: bilateral  Rhinorrhea: YES  Congestion: YES  Sore Throat: No  Cough: YES-non-productive  Wheeze: No  Decreased Appetite: No  Nausea: No  Vomiting: No  Diarrhea: No  Dysuria/Freq.: No  Dysuria or Hematuria: No  Fatigue/Achiness: YES  Sick/Strep Exposure: No  Therapies tried and outcome: xyzal, claritin with little relief. Benedryl.     Pain History:  When did you first notice your pain? 1.5 years   Have you seen this provider for your pain in the past? No   Where in your body do your have pain? Right hip  Are you seeing anyone else for your pain? Yes - PT  What makes your pain better? resting  What makes your pain worse? Walking, standing  How has pain affected your ability to work? Modifies movements  Who lives in your household? Self, spouse, 2 daughters, dogs    Review of Systems  Constitutional, HEENT, cardiovascular, pulmonary, gi and gu systems are negative, except as otherwise noted.      Objective    BP (!) 152/80 (BP Location: Right arm, Patient Position: Sitting, Cuff Size: Adult Large)   Pulse 66   Temp 97.7  F (36.5  C) (Temporal)   Resp 20   Ht 1.626 m (5' 4\")   Wt 85.7 kg (188 lb 14.4 oz)   LMP 01/01/1999   SpO2 98%   BMI 32.42 kg/m    Body mass index is 32.42 kg/m .  Physical Exam   {Exam List (Optional):175835}    No results found for any visits on 07/09/25.        Signed Electronically by: " Michael Adams PA-C  {Email feedback regarding this note to [email or web address]   :[id number]}

## 2025-07-10 ENCOUNTER — PATIENT OUTREACH (OUTPATIENT)
Dept: CARE COORDINATION | Facility: CLINIC | Age: 65
End: 2025-07-10
Payer: COMMERCIAL

## 2025-07-11 ENCOUNTER — HOSPITAL ENCOUNTER (OUTPATIENT)
Dept: MAMMOGRAPHY | Facility: CLINIC | Age: 65
Discharge: HOME | End: 2025-07-11
Attending: PHYSICIAN ASSISTANT | Admitting: PHYSICIAN ASSISTANT
Payer: COMMERCIAL

## 2025-07-11 DIAGNOSIS — Z12.31 VISIT FOR SCREENING MAMMOGRAM: ICD-10-CM

## 2025-07-11 PROCEDURE — 77063 BREAST TOMOSYNTHESIS BI: CPT

## 2025-07-14 PROBLEM — M62.81 GENERALIZED MUSCLE WEAKNESS: Status: RESOLVED | Noted: 2024-08-01 | Resolved: 2025-07-14

## 2025-07-14 PROBLEM — M79.7 FIBROMYALGIA: Status: RESOLVED | Noted: 2017-07-30 | Resolved: 2025-07-14

## 2025-07-14 PROBLEM — R29.3 POOR POSTURE: Status: RESOLVED | Noted: 2024-08-01 | Resolved: 2025-07-14

## 2025-07-24 ENCOUNTER — OFFICE VISIT (OUTPATIENT)
Dept: CARDIOLOGY | Facility: CLINIC | Age: 65
End: 2025-07-24
Payer: COMMERCIAL

## 2025-07-24 VITALS
WEIGHT: 197.3 LBS | HEART RATE: 50 BPM | HEIGHT: 64 IN | DIASTOLIC BLOOD PRESSURE: 66 MMHG | SYSTOLIC BLOOD PRESSURE: 140 MMHG | BODY MASS INDEX: 33.68 KG/M2

## 2025-07-24 DIAGNOSIS — I10 HYPERTENSION GOAL BP (BLOOD PRESSURE) < 140/90: ICD-10-CM

## 2025-07-24 NOTE — PROGRESS NOTES
CARDIOLOGY CLINIC CONSULTATION    PRIMARY CARE PHYSICIAN:  Madyson Mendoza    HISTORY OF PRESENT ILLNESS:  The patient is a 64-year-old female with uncontrolled hypertension, hyperlipidemia, carotid atherosclerosis, type 2 diabetes, MIESHA (untreated) and prior concussion who is here for further evaluation regarding her uncontrolled hypertension.    She has been intolerant to host of medications (currently has 37 allergies or intolerances).  She is not any antihypertensive at this point.  She was recently seen by pharmacy and she had pharmacokinetic testing which suggested that she might benefit from captopril.  She is not taking captopril.    She is now referred to see if she is a candidate for renal denervation.    PAST MEDICAL HISTORY:  Past Medical History:   Diagnosis Date    Arthritis 1996    Ascending aorta enlargement     Depressive disorder     severe, prior hospitalization    Diverticulosis of colon (without mention of hemorrhage)     Fibromyalgia 06/26/2007    Insomnia     Irritable bowel syndrome     Osteopenia     PTSD (post-traumatic stress disorder)     Type 2 diabetes mellitus with complications (H)     microalbuminuria       MEDICATIONS:  Current Outpatient Medications   Medication Sig Dispense Refill    acetaminophen (TYLENOL) 500 MG tablet Take 500-1,000 mg by mouth every 6 hours as needed for mild pain. Can only use every three to four days or she gets kidney pain      blood glucose (FREESTYLE LITE) test strip Use to test blood sugar 3-4 times daily or as directed. 300 strip 3    blood glucose monitoring (FREESTYLE LITE) meter device kit Use to test blood sugar 6-7 times daily. 1 kit 0    Continuous Glucose Sensor (FREESTYLE RADHA 3 PLUS SENSOR) MISC Use for continuous glucose measurements, change every 15 days. 6 each 0    EPINEPHrine (ANY BX GENERIC EQUIV) 0.3 MG/0.3ML injection 2-pack Inject 0.3 mLs (0.3 mg) into the muscle once as needed for anaphylaxis. 2 each 1    HEMP OIL OR EXTRACT OR OTHER  "CBD CANNABINOID, NOT MEDICAL CANNABIS, daily CBD gummies as needed from [employer] [facility] [employer]      IBUPROFEN PO Take 200 mg by mouth every 6 hours as needed for moderate pain. Try to not use more than every next day or her BP rises, she gets heart palpitations and chest pain      insulin aspart (NOVOLOG VIAL) 100 UNITS/ML vial USE IN INSULIN PUMP, TOTAL DAILY DOSE  UNITS 90 mL 0    Insulin Disposable Pump (OMNIPOD 5 INTRO, GEN 5,) KIT 1 Device continuously. Use Omnipod 5 Controller (PDM), change pods every 3-days 1 kit 0    Insulin Disposable Pump (OMNIPOD 5 LIBRE2 PLUS G6 PODS) MISC 1 pod every 3 days. Needs 2 boxes of 5 pods every 30 days 2 each 11    INSULIN PUMP - OUTPATIENT INSULIN PUMP - OUTPATIENT  Date last updated: 6/4/21 Omnipod DASH  BASAL RATES and times:   12am: 1.3, 6 am: 0.75, 12 pm: 0.8, 6 pm: 0.95   CARB RATIO: 12am-12am: 10  Correction Factor (Sensitivity): 12AM (midnight): 31 -- 5 AM: 33  Target blood glucose: 100-120  Active insulin time: 4 hrs      insulin syringe-needle U-100 (31G X 5/16\" 0.3 ML) 31G X 5/16\" 0.3 ML miscellaneous Use 3 syringes daily or as directed, in the event of pump failure. 100 each 3    medical cannabis (Patient's own supply) See Admin Instructions (The purpose of this order is to document that the patient reports taking medical cannabis.  This is not a prescription, and is not used to certify that the patient has a qualifying medical condition.)    Taking up to 40 mg but usually 10-20 mg caplet from [facility].      MELATONIN 10 MG PO CAPS Take 10 mg by mouth at bedtime.      ondansetron (ZOFRAN ODT) 4 MG ODT tab Take 1 tablet (4 mg) by mouth every 8 hours as needed for nausea 18 tablet 1    polyethylene glycol (MIRALAX/GLYCOLAX) packet Take 17 g by mouth At Bedtime       Continuous Glucose Sensor (FREESTYLE RADHA 2 PLUS SENSOR) MISC 1 Device continuous prn (change every 15 days). (Patient not taking: Reported on 7/24/2025) 6 each 1    STATIN NOT PRESCRIBED, INTENTIONAL, " Statin not prescribed intentionally due to Intolerance (Patient not taking: Reported on 7/24/2025) 0 each 0     No current facility-administered medications for this visit.       SOCIAL HISTORY:  I have reviewed this patient's social history and updated it with pertinent information if needed. Maricarmen Dotson  reports that she has never smoked. She has never been exposed to tobacco smoke. She has never used smokeless tobacco. She reports current alcohol use. She reports that she does not use drugs.    PHYSICAL EXAM:  Pulse:  [50] 50  BP: (140)/(66) 140/66  197 lbs 4.8 oz    Constitutional: alert, no distress  Respiratory: Good bilateral air entry  Cardiovascular: Rhythm, no murmurs  GI: nondistended  Neuropsychiatric: appropriate affact    ASSESSMENT: Pertinent issues addressed/ reviewed during this cardiology visit    Hypertension, controlled  Type 2 diabetes  MIESHA, treated  Hyperlipidemia  History of concussion    RECOMMENDATIONS:  The patient's blood pressure has not been well-controlled over the years mainly because of the intolerance to essentially every antihypertensive.  Her office blood pressure today is reasonably well-controlled.  She reports using CBD which helps her sleep and in turn helps her blood pressure.    I reviewed her recent renal artery ultrasound which showed no evidence of renal artery stenosis.  I believe she would be a reasonable candidate for acute intervention.  Although it is FDA approved, the system is currently evaluated for this therapy.  Once our system approves it, we will reach out to the patient and work on the process of scheduling her for the procedure.    It was a pleasure seeing this patient in clinic today. Please do not hesitate to contact me with any future questions.     Ke Freeman MD, [facility]  Cardiology - [facility]  July 24, 2025    Review of the result(s) of each unique test - Last CBC, BMP, CBC, renal artery ultrasound     The level of medical decision making during this  visit was of moderate complexity.    This note was completed in part using dictation via the Dragon voice recognition software. Some word and grammatical errors may occur and must be interpreted in the appropriate clinical context.  If there are any questions pertaining to this issue, please contact me for further clarification.

## 2025-07-24 NOTE — LETTER
7/24/2025    Madyson Mendoza MD  [address] [place] [address]    RE: Maricarmen Dotson       Dear Colleague,     I had the pleasure of seeing Maricarmen Dotson in the [facility].  CARDIOLOGY CLINIC CONSULTATION    PRIMARY CARE PHYSICIAN:  Madyson Mendoza    HISTORY OF PRESENT ILLNESS:  The patient is a 64-year-old female with uncontrolled hypertension, hyperlipidemia, carotid atherosclerosis, type 2 diabetes, MIESHA (untreated) and prior concussion who is here for further evaluation regarding her uncontrolled hypertension.    She has been intolerant to host of medications (currently has 37 allergies or intolerances).  She is not any antihypertensive at this point.  She was recently seen by pharmacy and she had pharmacokinetic testing which suggested that she might benefit from captopril.  She is not taking captopril.    She is now referred to see if she is a candidate for renal denervation.    PAST MEDICAL HISTORY:  Past Medical History:   Diagnosis Date     Arthritis 1996     Ascending aorta enlargement      Depressive disorder     severe, prior hospitalization     Diverticulosis of colon (without mention of hemorrhage)      Fibromyalgia 06/26/2007     Insomnia      Irritable bowel syndrome      Osteopenia      PTSD (post-traumatic stress disorder)      Type 2 diabetes mellitus with complications (H)     microalbuminuria       MEDICATIONS:  Current Outpatient Medications   Medication Sig Dispense Refill     acetaminophen (TYLENOL) 500 MG tablet Take 500-1,000 mg by mouth every 6 hours as needed for mild pain. Can only use every three to four days or she gets kidney pain       blood glucose (FREESTYLE LITE) test strip Use to test blood sugar 3-4 times daily or as directed. 300 strip 3     blood glucose monitoring (FREESTYLE LITE) meter device kit Use to test blood sugar 6-7 times daily. 1 kit 0     Continuous Glucose Sensor (FREESTYLE RADHA 3 PLUS SENSOR) MISC Use for continuous glucose  "measurements, change every 15 days. 6 each 0     EPINEPHrine (ANY BX GENERIC EQUIV) 0.3 MG/0.3ML injection 2-pack Inject 0.3 mLs (0.3 mg) into the muscle once as needed for anaphylaxis. 2 each 1     HEMP OIL OR EXTRACT OR OTHER CBD CANNABINOID, NOT MEDICAL CANNABIS, daily CBD gummies as needed from [employer] [facility] [employer]       IBUPROFEN PO Take 200 mg by mouth every 6 hours as needed for moderate pain. Try to not use more than every next day or her BP rises, she gets heart palpitations and chest pain       insulin aspart (NOVOLOG VIAL) 100 UNITS/ML vial USE IN INSULIN PUMP, TOTAL DAILY DOSE  UNITS 90 mL 0     Insulin Disposable Pump (OMNIPOD 5 INTRO, GEN 5,) KIT 1 Device continuously. Use Omnipod 5 Controller (PDM), change pods every 3-days 1 kit 0     Insulin Disposable Pump (OMNIPOD 5 LIBRE2 PLUS G6 PODS) MISC 1 pod every 3 days. Needs 2 boxes of 5 pods every 30 days 2 each 11     INSULIN PUMP - OUTPATIENT INSULIN PUMP - OUTPATIENT  Date last updated: 6/4/21 Omnipod DASH  BASAL RATES and times:   12am: 1.3, 6 am: 0.75, 12 pm: 0.8, 6 pm: 0.95   CARB RATIO: 12am-12am: 10  Correction Factor (Sensitivity): 12AM (midnight): 31 -- 5 AM: 33  Target blood glucose: 100-120  Active insulin time: 4 hrs       insulin syringe-needle U-100 (31G X 5/16\" 0.3 ML) 31G X 5/16\" 0.3 ML miscellaneous Use 3 syringes daily or as directed, in the event of pump failure. 100 each 3     medical cannabis (Patient's own supply) See Admin Instructions (The purpose of this order is to document that the patient reports taking medical cannabis.  This is not a prescription, and is not used to certify that the patient has a qualifying medical condition.)    Taking up to 40 mg but usually 10-20 mg caplet from [facility].       MELATONIN 10 MG PO CAPS Take 10 mg by mouth at bedtime.       ondansetron (ZOFRAN ODT) 4 MG ODT tab Take 1 tablet (4 mg) by mouth every 8 hours as needed for nausea 18 tablet 1     polyethylene glycol (MIRALAX/GLYCOLAX) packet " Take 17 g by mouth At Bedtime        Continuous Glucose Sensor (FREESTYLE RADHA 2 PLUS SENSOR) MISC 1 Device continuous prn (change every 15 days). (Patient not taking: Reported on 7/24/2025) 6 each 1     STATIN NOT PRESCRIBED, INTENTIONAL, Statin not prescribed intentionally due to Intolerance (Patient not taking: Reported on 7/24/2025) 0 each 0     No current facility-administered medications for this visit.       SOCIAL HISTORY:  I have reviewed this patient's social history and updated it with pertinent information if needed. Maricarmen Dotson  reports that she has never smoked. She has never been exposed to tobacco smoke. She has never used smokeless tobacco. She reports current alcohol use. She reports that she does not use drugs.    PHYSICAL EXAM:  Pulse:  [50] 50  BP: (140)/(66) 140/66  197 lbs 4.8 oz    Constitutional: alert, no distress  Respiratory: Good bilateral air entry  Cardiovascular: Rhythm, no murmurs  GI: nondistended  Neuropsychiatric: appropriate affact    ASSESSMENT: Pertinent issues addressed/ reviewed during this cardiology visit    Hypertension, controlled  Type 2 diabetes  MIESHA, treated  Hyperlipidemia  History of concussion    RECOMMENDATIONS:  The patient's blood pressure has not been well-controlled over the years mainly because of the intolerance to essentially every antihypertensive.  Her office blood pressure today is reasonably well-controlled.  She reports using CBD which helps her sleep and in turn helps her blood pressure.    I reviewed her recent renal artery ultrasound which showed no evidence of renal artery stenosis.  I believe she would be a reasonable candidate for acute intervention.  Although it is FDA approved, the system is currently evaluated for this therapy.  Once our system approves it, we will reach out to the patient and work on the process of scheduling her for the procedure.    It was a pleasure seeing this patient in clinic today. Please do not hesitate to contact  me with any future questions.     Ke Freeman MD, [facility]  Cardiology - [facility]  July 24, 2025    Review of the result(s) of each unique test - Last CBC, BMP, CBC, renal artery ultrasound     The level of medical decision making during this visit was of moderate complexity.    This note was completed in part using dictation via the Dragon voice recognition software. Some word and grammatical errors may occur and must be interpreted in the appropriate clinical context.  If there are any questions pertaining to this issue, please contact me for further clarification.    Thank you for allowing me to participate in the care of your patient.      Sincerely,     Ke Freeman MD, MD     [facility]  cc:   MARICRUZ Damon CNP  [address], [id number]  [place] [address]

## 2025-07-28 ENCOUNTER — OFFICE VISIT (OUTPATIENT)
Dept: CARDIOLOGY | Facility: CLINIC | Age: 65
End: 2025-07-28
Payer: COMMERCIAL

## 2025-07-28 VITALS
WEIGHT: 189.7 LBS | OXYGEN SATURATION: 96 % | DIASTOLIC BLOOD PRESSURE: 76 MMHG | SYSTOLIC BLOOD PRESSURE: 156 MMHG | HEART RATE: 56 BPM | BODY MASS INDEX: 32.39 KG/M2 | HEIGHT: 64 IN

## 2025-07-28 DIAGNOSIS — I10 ESSENTIAL HYPERTENSION, BENIGN: ICD-10-CM

## 2025-07-28 DIAGNOSIS — Z78.9 MEDICATION INTOLERANCE: ICD-10-CM

## 2025-07-28 DIAGNOSIS — I65.29 CAROTID ATHEROSCLEROSIS, UNSPECIFIED LATERALITY: Primary | ICD-10-CM

## 2025-07-28 PROCEDURE — 99215 OFFICE O/P EST HI 40 MIN: CPT

## 2025-07-28 PROCEDURE — 3078F DIAST BP <80 MM HG: CPT

## 2025-07-28 PROCEDURE — 3077F SYST BP >= 140 MM HG: CPT

## 2025-07-28 NOTE — PATIENT INSTRUCTIONS
Thank you for your visit with the [facility] today.    Today's Summary:    No changes to the plan- we'll wait to hear on approval from Dr. Freeman on the renal denervation   Carotid ultrasound at your earliest convenience   Stress test once you've had the renal denervation    Follow-up:  Cardiology follow up at [facility]: 4 months with me.     Cardiology Scheduling ~[phone]  Cardiology Clinic RN ~ [phone]    It was a pleasure seeing you today.     MARICRUZ Rinaldi, CNP  Certified Nurse Practitioner  [facility]  July 28, 2025  ________________________________________________________

## 2025-07-28 NOTE — PROGRESS NOTES
~Cardiology Clinic Visit~    Primary Cardiologist: Dr. Sharma  Reason for visit: Cardiology follow-up      Maricarmen Dotson MRN# [id number]   YOB: 1960 Age: 64 year old       HPI:    Maricarmen Dotson is a delightful 64 year old patient with past medical history significant for:    Uncontrolled hypertension  Hyperlipidemia  Carotid atherosclerosis  Type 2 diabetes mellitus  MIESHA  Medication intolerance    I had the opportunity to see Ms. Dotson for cardiology follow-up.  She has been seen by Ora Jarrett previously and was seen by Dr. Sharma on 11/6/2024 for management of her hypertension.  In review, Ms. Dotson has a history of hypertension which has been difficult to manage.  She is intolerant to a number of medications.  When seen by Dr. Sharma she does note that she drinks a lot of fluids due to dry mouth and tries to limit the amount of salt in her diet. At that time her blood pressure was 180/80 and she was started on eplerenone. She subsequently developed side effects and was tried on diltiazem, isosorbide, and lasix.     A repeat renal artery ultrasound was completed on 12/31/2024 showed mildly elevated velocity of the mid right renal artery however normal renal aortic ratio suggesting no hemodynamic stenosis. Left renal arteries had normal velocity and renal artery ratio.      She was referred to [facility] and pharmacokinetic testing.  Following this she was prescribed captopril 6.25 mg daily through [place] compounding pharmacy.  Unfortunately the pharmacy could not compound this.     She was referred to Dr. Freeman for consideration of renal denervation and seen by him on 7/24/2025. She is felt to be a reasonable candidate for acute intervention. Once our system is approved for therapy she will be scheduled for the procedure.     She returns today for follow-up of her hypertension.  Since I saw Maricarmen she reports overall continued improvement in her cardiac symptoms. She has a longstanding  history of an atypical type chest discomfort that is non-exertional. She notes a lung fullness with recent low air quality. Otherwise, no shortness of breath or dyspnea on exertion.  No lower extremity edema.  Her palpitations and lightheadedness are both improved.  No syncope.     Blood pressure at home more recently has been running less than 150s/80s    Diagnotic studies:  Renal artery US 12/31/2024:  Mid right renal artery with mildly elevated velocity 191 cm/s but normal renal aortic ratio of 1.66 suggests no significant hemodynamic stenosis.     Left renal arteries are normal velocity and renal artery ratio.   Exercise stress echocardiogram 9/2024:  This was non-diagnostic stress echocardiogram due to the inability to achieve target heart rate.     The patient's resting blood pressure was 186/80. This stress test was terminated at 2:08 per protocol when the patient's blood pressure dionte to 240/94. She remained free of symptoms, but no post procedure imaging was performed and no ECG changes were seeen     Options for noninvasive testing for CAD could include repeating the stress echocardiogram once blood pressure is controlled or considering a pharmacologic stress test once blood pressure is contolled.  24 Hour blood pressure 9/2022:  Average 154/70 mmHg  Echocardiogram 4/2019:  A contrast injection (Bubble Study) was performed that was negative for flow across the interatrial septum.  There is no color Doppler evidence of an atrial shunt.  The visual ejection fraction is estimated at 55-60%.  Left ventricular systolic function is normal.  The study was technically difficult.    Assessment:  Uncontrolled hypertension, goal < 130/80  BP today 156/76  Most recent tried medications with intolerance: epleronone, diltiazem, furosemide, and imdur. Captopril not available from compounding pharmacy.   Appreciate assistance from MTM team  Renal artery US 12/31/2024 shows mid right renal artery with mildly elevated  velocity but normal renal aortic ratio of 1.66 suggests no significant hemodynamic stenosis. Left renal arteries are normal velocity and renal artery ratio.  Hyperlipidemia  Lipid panel 12/2024: cholesterol 222, HDL, 60, ,   Previous statin intolerance  Carotid atherosclerosis  Type 2 diabetes mellitus  HgbA1c 12/2024: 8.3  Atypical chest pain  No exertional chest pain symptoms  Unfortunately due to her elevated blood pressure the exercise stress echocardiogram was unable to be completed.  Resting images show LVEF 55-60%, no regional wall motion abnormalities noted.      Plan:   It is my pleasure to see Maricarmen for cardiology follow-up today.  Her blood pressure remains above goal but overall improved with readings at home running < 150/80. Recently seen by Dr. Freeman for consideration of renal denervation. She was felt to be a good candidate and is awaiting approval within our system.   At this time she been intolerant to all antihypertensive medications despite pharmacogenetic testing and working through compounding pharmacy. Encouraged Maricarmen to continue the lifestyle changes she's made for now until hopefully renal denervation can be performed.   Carotid ultrasound  Given improvement in chest pain will hold off on repeating exercise stress echo until blood pressure better controlled.   Follow up with me in 4 months.     Thank you for the opportunity to participate in this pleasant patient's care.    We would be happy to see this patient sooner for any concerns in the meantime.    MARICRUZ Rinaldi, CNP   Nurse Practitioner  [facility]    A total of 40 minutes was spent with patient, on chart review and documentation today.     The longitudinal plan of care for the diagnosis(es)/condition(s) as documented were addressed during this visit. Due to the added complexity in care, I will continue to support Maricarmen in the subsequent management and with ongoing continuity of care.      Orders  Placed This Encounter   Procedures    US Carotid Bilateral    Follow-Up with Cardiology REYMUNDO     No orders of the defined types were placed in this encounter.    There are no discontinued medications.      Encounter Diagnoses   Name Primary?    Essential hypertension, benign     Medication intolerance     Carotid atherosclerosis, unspecified laterality Yes             CURRENT MEDICATIONS:  Current Outpatient Medications   Medication Sig Dispense Refill    acetaminophen (TYLENOL) 500 MG tablet Take 500-1,000 mg by mouth every 6 hours as needed for mild pain. Can only use every three to four days or she gets kidney pain      blood glucose (FREESTYLE LITE) test strip Use to test blood sugar 3-4 times daily or as directed. 300 strip 3    blood glucose monitoring (FREESTYLE LITE) meter device kit Use to test blood sugar 6-7 times daily. 1 kit 0    Continuous Glucose Sensor (FREESTYLE RADHA 3 PLUS SENSOR) MISC Use for continuous glucose measurements, change every 15 days. 6 each 0    EPINEPHrine (ANY BX GENERIC EQUIV) 0.3 MG/0.3ML injection 2-pack Inject 0.3 mLs (0.3 mg) into the muscle once as needed for anaphylaxis. 2 each 1    HEMP OIL OR EXTRACT OR OTHER CBD CANNABINOID, NOT MEDICAL CANNABIS, daily CBD gummies as needed from  [employer]      IBUPROFEN PO Take 200 mg by mouth every 6 hours as needed for moderate pain. Try to not use more than every next day or her BP rises, she gets heart palpitations and chest pain      insulin aspart (NOVOLOG VIAL) 100 UNITS/ML vial USE IN INSULIN PUMP, TOTAL DAILY DOSE  UNITS 90 mL 0    Insulin Disposable Pump (OMNIPOD 5 INTRO, GEN 5,) KIT 1 Device continuously. Use Omnipod 5 Controller (PDM), change pods every 3-days 1 kit 0    Insulin Disposable Pump (OMNIPOD 5 LIBRE2 PLUS G6 PODS) MISC 1 pod every 3 days. Needs 2 boxes of 5 pods every 30 days 2 each 11    INSULIN PUMP - OUTPATIENT INSULIN PUMP - OUTPATIENT  Date last updated: 6/4/21 Omnipod DASH  BASAL RATES and times:  "  12am: 1.3, 6 am: 0.75, 12 pm: 0.8, 6 pm: 0.95   CARB RATIO: 12am-12am: 10  Correction Factor (Sensitivity): 12AM (midnight): 31 -- 5 AM: 33  Target blood glucose: 100-120  Active insulin time: 4 hrs      insulin syringe-needle U-100 (31G X 5/16\" 0.3 ML) 31G X 5/16\" 0.3 ML miscellaneous Use 3 syringes daily or as directed, in the event of pump failure. 100 each 3    medical cannabis (Patient's own supply) See Admin Instructions (The purpose of this order is to document that the patient reports taking medical cannabis.  This is not a prescription, and is not used to certify that the patient has a qualifying medical condition.)    Taking up to 40 mg but usually 10-20 mg caplet from [facility].      MELATONIN 10 MG PO CAPS Take 10 mg by mouth at bedtime.      ondansetron (ZOFRAN ODT) 4 MG ODT tab Take 1 tablet (4 mg) by mouth every 8 hours as needed for nausea 18 tablet 1    polyethylene glycol (MIRALAX/GLYCOLAX) packet Take 17 g by mouth At Bedtime       Continuous Glucose Sensor (FREESTYLE RADHA 2 PLUS SENSOR) MISC 1 Device continuous prn (change every 15 days). (Patient not taking: Reported on 7/28/2025) 6 each 1    STATIN NOT PRESCRIBED, INTENTIONAL, Statin not prescribed intentionally due to Intolerance (Patient not taking: Reported on 7/28/2025) 0 each 0       ALLERGIES     Allergies   Allergen Reactions    Amoxicillin Anaphylaxis    Bee Anaphylaxis     Wasp       Contrast Dye Anaphylaxis    Diatrizoate Anaphylaxis    Eplerenone Muscle Pain (Myalgia)     Was not  able to walk due to  stiffness     Iodine Anaphylaxis     Severe anaphalatic shock      Losartan Muscle Pain (Myalgia)    Sulfa Antibiotics Anaphylaxis    Tetanus-Diphtheria Toxoids Td      Rxn was to Tdap-whole body joint pain and fever.  Had similar reaction to Td vaccine 7/28/2017    Metoprolol Other (See Comments)     Fatigue, joint pain, throat tightness    Zithromax [Azithromycin Dihydrate] Nausea and Vomiting    Altafluor Benox " [Fluorescein-Benoxinate]     Amlodipine      Neuropathic type pain in hands/feet    Atorvastatin      myalgias    Catapres [Clonidine]     Crestor [Rosuvastatin]      myalgias    Cyproheptadine      Severe headache, cardiac issues, and dizziness    Gabapentin     Humalog [Insulin Lispro]      Causes pancreatitis -     Hydrochlorothiazide      numbness    Latex      Skin burn and can't breathe      Lisinopril      Joint aches    Lorazepam      Panic attacks    Metformin     Naltrexone     Nifedipine     Olmesartan      Joint pain, stiffness, cough intermittent    Onglyza [Saxagliptin Hydrochloride]      Fibromyalgia flare    Phenergan [Promethazine]     Phenylephrine Hcl     Prochlorperazine      Altered mental status, hallucinations    Reglan [Metoclopramide]     Sumatriptan      Causes severe depression    Terazosin      Tingling in toes and muscle stiffness    Tetracycline Nausea and Vomiting, Hives and Difficulty breathing     Pt called to update today after the visit that she is allergic to the tetracycline-added to her list    Tropicamide     Sulindac Anxiety     Panic attacks       PAST MEDICAL HISTORY:  Past Medical History:   Diagnosis Date    Arthritis 1996    Ascending aorta enlargement     Depressive disorder     severe, prior hospitalization    Diverticulosis of colon (without mention of hemorrhage)     Fibromyalgia 06/26/2007    Insomnia     Irritable bowel syndrome     Osteopenia     PTSD (post-traumatic stress disorder)     Type 2 diabetes mellitus with complications (H)     microalbuminuria       PAST SURGICAL HISTORY:  Past Surgical History:   Procedure Laterality Date    ABDOMEN SURGERY  2012    Gallbladder removal    BACK SURGERY      fusion 1994 and removal of hardware 2004    C SPINAL ORTHOSIS,NOS  2002    lumbar surgery    CHOLECYSTECTOMY  2011    choley  2011    ENT SURGERY  1986    septoplasty    HYSTERECTOMY, CERVIX STATUS UNKNOWN  2000    TVH/BSO    ORTHOPEDIC SURGERY  2005    left ankle        FAMILY HISTORY:  Family History   Problem Relation Age of Onset    Diabetes Mother         type 2    Hypertension Mother     Cerebrovascular Disease Mother          stroke age 57    Ovarian Cancer Mother 43    Cancer Mother         cervix    Diabetes Father         type 2    Hypertension Father     Gastrointestinal Disease Father         colon polyps    Sleep Apnea Brother     Cancer Sister     Ovarian Cancer Sister 46    Breast Cancer Sister         Stage 4 breast cancer.    Ovarian Cancer Maternal Grandmother 72    Cancer Maternal Grandmother         cervix       SOCIAL HISTORY:  Social History     Socioeconomic History    Marital status:      Spouse name: None    Number of children: 3    Years of education: None    Highest education level: None   Occupational History    Occupation: xr technician     Employer: [employer]   Tobacco Use    Smoking status: Never     Passive exposure: Never    Smokeless tobacco: Never   Vaping Use    Vaping status: Former   Substance and Sexual Activity    Alcohol use: Yes     Comment: Couple times per month    Drug use: No    Sexual activity: Yes     Partners: Male     Birth control/protection: Surgical     Comment: Full hystroectomy in      Social Drivers of Health     Financial Resource Strain: Low Risk  (2024)    Financial Resource Strain     Within the past 12 months, have you or your family members you live with been unable to get utilities (heat, electricity) when it was really needed?: No   Food Insecurity: Low Risk  (2024)    Food Insecurity     Within the past 12 months, did you worry that your food would run out before you got money to buy more?: No     Within the past 12 months, did the food you bought just not last and you didn t have money to get more?: No   Transportation Needs: Low Risk  (2024)    Transportation Needs     Within the past 12 months, has lack of transportation kept you from medical appointments,  "getting your medicines, non-medical meetings or appointments, work, or from getting things that you need?: No   Interpersonal Safety: Low Risk  (7/9/2025)    Interpersonal Safety     Do you feel physically and emotionally safe where you currently live?: Yes     Within the past 12 months, have you been hit, slapped, kicked or otherwise physically hurt by someone?: No     Within the past 12 months, have you been humiliated or emotionally abused in other ways by your partner or ex-partner?: No   Housing Stability: Low Risk  (1/28/2024)    Housing Stability     Do you have housing? : Yes     Are you worried about losing your housing?: No       Review of Systems:  Skin:        Eyes:       ENT:       Respiratory:  Negative    Cardiovascular:    Positive for, fatigue, chest pain, palpitations, lightheadedness, dizziness  Gastroenterology:      Genitourinary:       Musculoskeletal:       Neurologic:       Psychiatric:       Heme/Lymph/Imm:       Endocrine:         Physical Exam:    Vitals: BP (!) 156/76 (BP Location: Right arm, Patient Position: Sitting, Cuff Size: Adult Large)   Pulse 56   Ht 1.626 m (5' 4\")   Wt 86 kg (189 lb 11.2 oz)   LMP 01/01/1999   SpO2 96%   BMI 32.56 kg/m    Constitutional: Well nourished and in no apparent distress.  Neck: Supple.   Respiratory: Breathing non-labored. Lungs clear to auscultation bilaterally. No crackles, wheezes, rhonchi, or rales.  Cardiovascular:  Regular rate and rhythm, normal S1 and S2. No murmur, rub, or gallop.   Skin: Warm, dry.   Extremities: No lower extremity edema.  Neurologic: No gross motor deficits. Alert, awake, and oriented to person, place and time.  Psychiatric: Affect appropriate.        Recent Lab Results:  LIPID RESULTS:  Lab Results   Component Value Date    CHOL 222 (H) 12/18/2024    CHOL 221 (H) 09/18/2020    HDL 60 12/18/2024    HDL 51 09/18/2020     (H) 12/18/2024     (H) 09/18/2020    TRIG 110 12/18/2024    TRIG 137 09/18/2020    " CHOLHDLRATIO 5.0 02/21/2015       LIVER ENZYME RESULTS:  Lab Results   Component Value Date    AST 22 01/20/2021    ALT 19 03/20/2023    ALT 22 01/20/2021       CBC RESULTS:  Lab Results   Component Value Date    WBC 7.5 01/20/2021    RBC 4.49 01/20/2021    HGB 13.7 07/05/2023    HGB 13.4 01/20/2021    HCT 39.8 01/20/2021    MCV 89 01/20/2021    MCH 29.8 01/20/2021    MCHC 33.7 01/20/2021    RDW 12.1 01/20/2021     01/20/2021       BMP RESULTS:  Lab Results   Component Value Date     12/18/2024     01/20/2021    POTASSIUM 3.9 12/18/2024    POTASSIUM 3.8 07/06/2022    POTASSIUM 3.7 01/20/2021    CHLORIDE 103 12/18/2024    CHLORIDE 107 07/06/2022    CHLORIDE 107 01/20/2021    CO2 26 12/18/2024    CO2 28 07/06/2022    CO2 30 01/20/2021    ANIONGAP 10 12/18/2024    ANIONGAP 5 07/06/2022    ANIONGAP 3 01/20/2021     (H) 12/18/2024     (H) 07/06/2022     (H) 01/20/2021    BUN 11.3 12/18/2024    BUN 19 07/06/2022    BUN 10 01/20/2021    CR 0.81 12/18/2024    CR 0.77 01/20/2021    GFRESTIMATED 81 12/18/2024    GFRESTIMATED 84 01/20/2021    GFRESTBLACK >90 01/20/2021    KARIN 9.6 12/18/2024    KARIN 9.1 01/20/2021        A1C RESULTS:  Lab Results   Component Value Date    A1C 8.3 (H) 12/18/2024    A1C 7.4 (H) 09/18/2020       INR RESULTS:  Lab Results   Component Value Date    INR 1.01 05/23/2011

## 2025-07-28 NOTE — LETTER
7/28/2025    Madyson Mendoza MD  [address] [place] [address]    RE: Maricarmen Dotson       Dear Colleague,     I had the pleasure of seeing Maricarmen Dotson in the [facility].          ~Cardiology Clinic Visit~    Primary Cardiologist: Dr. Sharma  Reason for visit: Cardiology follow-up      Maricarmen Dotson MRN# [id number]   YOB: 1960 Age: 64 year old       HPI:    Maricarmen Dotson is a delightful 64 year old patient with past medical history significant for:    Uncontrolled hypertension  Hyperlipidemia  Carotid atherosclerosis  Type 2 diabetes mellitus  MIESHA  Medication intolerance    I had the opportunity to see Ms. Dotson for cardiology follow-up.  She has been seen by Ora Jarrett previously and was seen by Dr. Sharma on 11/6/2024 for management of her hypertension.  In review, Ms. Dotson has a history of hypertension which has been difficult to manage.  She is intolerant to a number of medications.  When seen by Dr. Sharma she does note that she drinks a lot of fluids due to dry mouth and tries to limit the amount of salt in her diet. At that time her blood pressure was 180/80 and she was started on eplerenone. She subsequently developed side effects and was tried on diltiazem, isosorbide, and lasix.     A repeat renal artery ultrasound was completed on 12/31/2024 showed mildly elevated velocity of the mid right renal artery however normal renal aortic ratio suggesting no hemodynamic stenosis. Left renal arteries had normal velocity and renal artery ratio.      She was referred to [facility] and pharmacokinetic testing.  Following this she was prescribed captopril 6.25 mg daily through [facility].  Unfortunately the pharmacy could not compound this.     She was referred to Dr. Freeman for consideration of renal denervation and seen by him on 7/24/2025. She is felt to be a reasonable candidate for acute intervention. Once our system is approved for  therapy she will be scheduled for the procedure.     She returns today for follow-up of her hypertension.  Since I saw Maricarmen she reports overall continued improvement in her cardiac symptoms. She has a longstanding history of an atypical type chest discomfort that is non-exertional. She notes a lung fullness with recent low air quality. Otherwise, no shortness of breath or dyspnea on exertion.  No lower extremity edema.  Her palpitations and lightheadedness are both improved.  No syncope.     Blood pressure at home more recently has been running less than 150s/80s    Diagnotic studies:  Renal artery US 12/31/2024:  Mid right renal artery with mildly elevated velocity 191 cm/s but normal renal aortic ratio of 1.66 suggests no significant hemodynamic stenosis.     Left renal arteries are normal velocity and renal artery ratio.   Exercise stress echocardiogram 9/2024:  This was non-diagnostic stress echocardiogram due to the inability to achieve target heart rate.     The patient's resting blood pressure was 186/80. This stress test was terminated at 2:08 per protocol when the patient's blood pressure dionte to 240/94. She remained free of symptoms, but no post procedure imaging was performed and no ECG changes were seeen     Options for noninvasive testing for CAD could include repeating the stress echocardiogram once blood pressure is controlled or considering a pharmacologic stress test once blood pressure is contolled.  24 Hour blood pressure 9/2022:  Average 154/70 mmHg  Echocardiogram 4/2019:  A contrast injection (Bubble Study) was performed that was negative for flow across the interatrial septum.  There is no color Doppler evidence of an atrial shunt.  The visual ejection fraction is estimated at 55-60%.  Left ventricular systolic function is normal.  The study was technically difficult.    Assessment:  Uncontrolled hypertension, goal < 130/80  BP today 156/76  Most recent tried medications with intolerance:  epleronone, diltiazem, furosemide, and imdur. Captopril not available from [facility] pharmacy.   Appreciate assistance from [facility] team  Renal artery US 12/31/2024 shows mid right renal artery with mildly elevated velocity but normal renal aortic ratio of 1.66 suggests no significant hemodynamic stenosis. Left renal arteries are normal velocity and renal artery ratio.  Hyperlipidemia  Lipid panel 12/2024: cholesterol 222, HDL, 60, ,   Previous statin intolerance  Carotid atherosclerosis  Type 2 diabetes mellitus  HgbA1c 12/2024: 8.3  Atypical chest pain  No exertional chest pain symptoms  Unfortunately due to her elevated blood pressure the exercise stress echocardiogram was unable to be completed.  Resting images show LVEF 55-60%, no regional wall motion abnormalities noted.      Plan:   It is my pleasure to see Maricarmen for cardiology follow-up today.  Her blood pressure remains above goal but overall improved with readings at home running < 150/80. Recently seen by Dr. Freeman for consideration of renal denervation. She was felt to be a good candidate and is awaiting approval within our system.   At this time she been intolerant to all antihypertensive medications despite pharmacogenetic testing and working through [facility] pharmacy. Encouraged Maricarmen to continue the lifestyle changes she's made for now until hopefully renal denervation can be performed.   Carotid ultrasound  Given improvement in chest pain will hold off on repeating exercise stress echo until blood pressure better controlled.   Follow up with me in 4 months.     Thank you for the opportunity to participate in this pleasant patient's care.    We would be happy to see this patient sooner for any concerns in the meantime.    MARICRUZ Rinaldi, CNP   Nurse Practitioner  [facility]    A total of 40 minutes was spent with patient, on chart review and documentation today.     The longitudinal plan of care for the  diagnosis(es)/condition(s) as documented were addressed during this visit. Due to the added complexity in care, I will continue to support Maricarmen in the subsequent management and with ongoing continuity of care.      Orders Placed This Encounter   Procedures     US Carotid Bilateral     Follow-Up with Cardiology REYMUNDO     No orders of the defined types were placed in this encounter.    There are no discontinued medications.      Encounter Diagnoses   Name Primary?     Essential hypertension, benign      Medication intolerance      Carotid atherosclerosis, unspecified laterality Yes             CURRENT MEDICATIONS:  Current Outpatient Medications   Medication Sig Dispense Refill     acetaminophen (TYLENOL) 500 MG tablet Take 500-1,000 mg by mouth every 6 hours as needed for mild pain. Can only use every three to four days or she gets kidney pain       blood glucose (FREESTYLE LITE) test strip Use to test blood sugar 3-4 times daily or as directed. 300 strip 3     blood glucose monitoring (FREESTYLE LITE) meter device kit Use to test blood sugar 6-7 times daily. 1 kit 0     Continuous Glucose Sensor (FREESTYLE RADHA 3 PLUS SENSOR) MISC Use for continuous glucose measurements, change every 15 days. 6 each 0     EPINEPHrine (ANY BX GENERIC EQUIV) 0.3 MG/0.3ML injection 2-pack Inject 0.3 mLs (0.3 mg) into the muscle once as needed for anaphylaxis. 2 each 1     HEMP OIL OR EXTRACT OR OTHER CBD CANNABINOID, NOT MEDICAL CANNABIS, daily CBD gummies as needed from  [employer] [facility] [employer]       IBUPROFEN PO Take 200 mg by mouth every 6 hours as needed for moderate pain. Try to not use more than every next day or her BP rises, she gets heart palpitations and chest pain       insulin aspart (NOVOLOG VIAL) 100 UNITS/ML vial USE IN INSULIN PUMP, TOTAL DAILY DOSE  UNITS 90 mL 0     Insulin Disposable Pump (OMNIPOD 5 INTRO, GEN 5,) KIT 1 Device continuously. Use Omnipod 5 Controller (PDM), change pods every 3-days 1 kit 0     Insulin  "Disposable Pump (OMNIPOD 5 LIBRE2 PLUS G6 PODS) MISC 1 pod every 3 days. Needs 2 boxes of 5 pods every 30 days 2 each 11     INSULIN PUMP - OUTPATIENT INSULIN PUMP - OUTPATIENT  Date last updated: 6/4/21 Omnipod DASH  BASAL RATES and times:   12am: 1.3, 6 am: 0.75, 12 pm: 0.8, 6 pm: 0.95   CARB RATIO: 12am-12am: 10  Correction Factor (Sensitivity): 12AM (midnight): 31 -- 5 AM: 33  Target blood glucose: 100-120  Active insulin time: 4 hrs       insulin syringe-needle U-100 (31G X 5/16\" 0.3 ML) 31G X 5/16\" 0.3 ML miscellaneous Use 3 syringes daily or as directed, in the event of pump failure. 100 each 3     medical cannabis (Patient's own supply) See Admin Instructions (The purpose of this order is to document that the patient reports taking medical cannabis.  This is not a prescription, and is not used to certify that the patient has a qualifying medical condition.)    Taking up to 40 mg but usually 10-20 mg caplet from [facility].       MELATONIN 10 MG PO CAPS Take 10 mg by mouth at bedtime.       ondansetron (ZOFRAN ODT) 4 MG ODT tab Take 1 tablet (4 mg) by mouth every 8 hours as needed for nausea 18 tablet 1     polyethylene glycol (MIRALAX/GLYCOLAX) packet Take 17 g by mouth At Bedtime        Continuous Glucose Sensor (FREESTYLE RADHA 2 PLUS SENSOR) MISC 1 Device continuous prn (change every 15 days). (Patient not taking: Reported on 7/28/2025) 6 each 1     STATIN NOT PRESCRIBED, INTENTIONAL, Statin not prescribed intentionally due to Intolerance (Patient not taking: Reported on 7/28/2025) 0 each 0       ALLERGIES     Allergies   Allergen Reactions     Amoxicillin Anaphylaxis     Bee Anaphylaxis     Wasp        Contrast Dye Anaphylaxis     Diatrizoate Anaphylaxis     Eplerenone Muscle Pain (Myalgia)     Was not  able to walk due to  stiffness      Iodine Anaphylaxis     Severe anaphalatic shock       Losartan Muscle Pain (Myalgia)     Sulfa Antibiotics Anaphylaxis     Tetanus-Diphtheria Toxoids Td      Rxn was to " Tdap-whole body joint pain and fever.  Had similar reaction to Td vaccine 7/28/2017     Metoprolol Other (See Comments)     Fatigue, joint pain, throat tightness     Zithromax [Azithromycin Dihydrate] Nausea and Vomiting     Altafluor Benox [Fluorescein-Benoxinate]      Amlodipine      Neuropathic type pain in hands/feet     Atorvastatin      myalgias     Catapres [Clonidine]      Crestor [Rosuvastatin]      myalgias     Cyproheptadine      Severe headache, cardiac issues, and dizziness     Gabapentin      Humalog [Insulin Lispro]      Causes pancreatitis -      Hydrochlorothiazide      numbness     Latex      Skin burn and can't breathe       Lisinopril      Joint aches     Lorazepam      Panic attacks     Metformin      Naltrexone      Nifedipine      Olmesartan      Joint pain, stiffness, cough intermittent     Onglyza [Saxagliptin Hydrochloride]      Fibromyalgia flare     Phenergan [Promethazine]      Phenylephrine Hcl      Prochlorperazine      Altered mental status, hallucinations     Reglan [Metoclopramide]      Sumatriptan      Causes severe depression     Terazosin      Tingling in toes and muscle stiffness     Tetracycline Nausea and Vomiting, Hives and Difficulty breathing     Pt called to update today after the visit that she is allergic to the tetracycline-added to her list     Tropicamide      Sulindac Anxiety     Panic attacks       PAST MEDICAL HISTORY:  Past Medical History:   Diagnosis Date     Arthritis 1996     Ascending aorta enlargement      Depressive disorder     severe, prior hospitalization     Diverticulosis of colon (without mention of hemorrhage)      Fibromyalgia 06/26/2007     Insomnia      Irritable bowel syndrome      Osteopenia      PTSD (post-traumatic stress disorder)      Type 2 diabetes mellitus with complications (H)     microalbuminuria       PAST SURGICAL HISTORY:  Past Surgical History:   Procedure Laterality Date     ABDOMEN SURGERY  2012    Gallbladder removal     BACK  SURGERY      fusion  and removal of hardware 2004     C SPINAL ORTHOSIS,NOS  2002    lumbar surgery     CHOLECYSTECTOMY  2011     choley  2011     ENT SURGERY  1986    septoplasty     HYSTERECTOMY, CERVIX STATUS UNKNOWN      TVH/BSO     ORTHOPEDIC SURGERY  2005    left ankle       FAMILY HISTORY:  Family History   Problem Relation Age of Onset     Diabetes Mother         type 2     Hypertension Mother      Cerebrovascular Disease Mother          stroke age 57     Ovarian Cancer Mother 43     Cancer Mother         cervix     Diabetes Father         type 2     Hypertension Father      Gastrointestinal Disease Father         colon polyps     Sleep Apnea Brother      Cancer Sister      Ovarian Cancer Sister 46     Breast Cancer Sister         Stage 4 breast cancer.     Ovarian Cancer Maternal Grandmother 72     Cancer Maternal Grandmother         cervix       SOCIAL HISTORY:  Social History     Socioeconomic History     Marital status:      Spouse name: None     Number of children: 3     Years of education: None     Highest education level: None   Occupational History     Occupation: xr technician     Employer: [employer]   Tobacco Use     Smoking status: Never     Passive exposure: Never     Smokeless tobacco: Never   Vaping Use     Vaping status: Former   Substance and Sexual Activity     Alcohol use: Yes     Comment: Couple times per month     Drug use: No     Sexual activity: Yes     Partners: Male     Birth control/protection: Surgical     Comment: Full hystroectomy in      Social Drivers of Health     Financial Resource Strain: Low Risk  (2024)    Financial Resource Strain      Within the past 12 months, have you or your family members you live with been unable to get utilities (heat, electricity) when it was really needed?: No   Food Insecurity: Low Risk  (2024)    Food Insecurity      Within the past 12 months, did you worry that your food would run out before  "you got money to buy more?: No      Within the past 12 months, did the food you bought just not last and you didn t have money to get more?: No   Transportation Needs: Low Risk  (1/28/2024)    Transportation Needs      Within the past 12 months, has lack of transportation kept you from medical appointments, getting your medicines, non-medical meetings or appointments, work, or from getting things that you need?: No   Interpersonal Safety: Low Risk  (7/9/2025)    Interpersonal Safety      Do you feel physically and emotionally safe where you currently live?: Yes      Within the past 12 months, have you been hit, slapped, kicked or otherwise physically hurt by someone?: No      Within the past 12 months, have you been humiliated or emotionally abused in other ways by your partner or ex-partner?: No   Housing Stability: Low Risk  (1/28/2024)    Housing Stability      Do you have housing? : Yes      Are you worried about losing your housing?: No       Review of Systems:  Skin:        Eyes:       ENT:       Respiratory:  Negative    Cardiovascular:    Positive for, fatigue, chest pain, palpitations, lightheadedness, dizziness  Gastroenterology:      Genitourinary:       Musculoskeletal:       Neurologic:       Psychiatric:       Heme/Lymph/Imm:       Endocrine:         Physical Exam:    Vitals: BP (!) 156/76 (BP Location: Right arm, Patient Position: Sitting, Cuff Size: Adult Large)   Pulse 56   Ht 1.626 m (5' 4\")   Wt 86 kg (189 lb 11.2 oz)   LMP 01/01/1999   SpO2 96%   BMI 32.56 kg/m    Constitutional: Well nourished and in no apparent distress.  Neck: Supple.   Respiratory: Breathing non-labored. Lungs clear to auscultation bilaterally. No crackles, wheezes, rhonchi, or rales.  Cardiovascular:  Regular rate and rhythm, normal S1 and S2. No murmur, rub, or gallop.   Skin: Warm, dry.   Extremities: No lower extremity edema.  Neurologic: No gross motor deficits. Alert, awake, and oriented to person, place and " time.  Psychiatric: Affect appropriate.        Recent Lab Results:  LIPID RESULTS:  Lab Results   Component Value Date    CHOL 222 (H) 12/18/2024    CHOL 221 (H) 09/18/2020    HDL 60 12/18/2024    HDL 51 09/18/2020     (H) 12/18/2024     (H) 09/18/2020    TRIG 110 12/18/2024    TRIG 137 09/18/2020    CHOLHDLRATIO 5.0 02/21/2015       LIVER ENZYME RESULTS:  Lab Results   Component Value Date    AST 22 01/20/2021    ALT 19 03/20/2023    ALT 22 01/20/2021       CBC RESULTS:  Lab Results   Component Value Date    WBC 7.5 01/20/2021    RBC 4.49 01/20/2021    HGB 13.7 07/05/2023    HGB 13.4 01/20/2021    HCT 39.8 01/20/2021    MCV 89 01/20/2021    MCH 29.8 01/20/2021    MCHC 33.7 01/20/2021    RDW 12.1 01/20/2021     01/20/2021       BMP RESULTS:  Lab Results   Component Value Date     12/18/2024     01/20/2021    POTASSIUM 3.9 12/18/2024    POTASSIUM 3.8 07/06/2022    POTASSIUM 3.7 01/20/2021    CHLORIDE 103 12/18/2024    CHLORIDE 107 07/06/2022    CHLORIDE 107 01/20/2021    CO2 26 12/18/2024    CO2 28 07/06/2022    CO2 30 01/20/2021    ANIONGAP 10 12/18/2024    ANIONGAP 5 07/06/2022    ANIONGAP 3 01/20/2021     (H) 12/18/2024     (H) 07/06/2022     (H) 01/20/2021    BUN 11.3 12/18/2024    BUN 19 07/06/2022    BUN 10 01/20/2021    CR 0.81 12/18/2024    CR 0.77 01/20/2021    GFRESTIMATED 81 12/18/2024    GFRESTIMATED 84 01/20/2021    GFRESTBLACK >90 01/20/2021    KARIN 9.6 12/18/2024    KARIN 9.1 01/20/2021        A1C RESULTS:  Lab Results   Component Value Date    A1C 8.3 (H) 12/18/2024    A1C 7.4 (H) 09/18/2020       INR RESULTS:  Lab Results   Component Value Date    INR 1.01 05/23/2011         Thank you for allowing me to participate in the care of your patient.      Sincerely,     MARICRUZ Rinaldi CNP     [facility]  cc:   MARICRUZ Rinaldi CNP  [address], [id number]  [place] [id number]

## 2025-07-29 NOTE — PROGRESS NOTES
"Maricarmen Dotson is a 59 year old female who is being evaluated via a billable telephone visit.      The patient has been notified of following:     \"This telephone visit will be conducted via a call between you and your physician/provider. We have found that certain health care needs can be provided without the need for a physical exam.  This service lets us provide the care you need with a short phone conversation.  If a prescription is necessary we can send it directly to your pharmacy.  If lab work is needed we can place an order for that and you can then stop by our lab to have the test done at a later time.    Telephone visits are billed at different rates depending on your insurance coverage. During this emergency period, for some insurers they may be billed the same as an in-person visit.  Please reach out to your insurance provider with any questions.    If during the course of the call the physician/provider feels a telephone visit is not appropriate, you will not be charged for this service.\"    Patient has given verbal consent for Telephone visit?  Yes    What phone number would you like to be contacted at? [phone]    How would you like to obtain your AVS? Jose Ghart    Subjective     Maricarmen Dotson is a 59 year old female who presents to clinic today for the following health issues:    HPI  Anxiety Follow-Up    How are you doing with your anxiety since your last visit? No change    Are you having other symptoms that might be associated with anxiety? Yes:  high BP    Have you had a significant life event? Health Concerns SISTER     Are you feeling depressed? Yes:  .    Do you have any concerns with your use of alcohol or other drugs? No    Social History     Tobacco Use     Smoking status: Never Smoker     Smokeless tobacco: Never Used   Substance Use Topics     Alcohol use: Yes     Alcohol/week: 0.0 standard drinks     Comment: maybe once a month     Drug use: No     WILLIAMS-7 SCORE 4/23/2020 5/6/2020 " Physical Therapy    Patient not seen in therapy.     Patient with other health care provider    PT eval attempted, with other provider. Will continue efforts as able for evaluation.       OBJECTIVE                          Documented in the chart in the following areas:  Assessment/Plan.           Therapy procedure time and total treatment time can be found documented on the Time Entry flowsheet   5/7/2020   Total Score - - -   Total Score - 21 (severe anxiety) -   Total Score 13 21 15   Some encounter information is confidential and restricted. Go to Review Flowsheets activity to see all data.     PHQ 4/8/2020 5/6/2020 5/7/2020   PHQ-9 Total Score 21 15 18   Q9: Thoughts of better off dead/self-harm past 2 weeks Several days Several days Several days   F/U: Thoughts of suicide or self-harm Yes Yes -   F/U: Self harm-plan No No -   F/U: Self-harm action No No -   F/U: Safety concerns Yes No -     Last PHQ-9 5/7/2020   1.  Little interest or pleasure in doing things 2   2.  Feeling down, depressed, or hopeless 2   3.  Trouble falling or staying asleep, or sleeping too much 3   4.  Feeling tired or having little energy 3   5.  Poor appetite or overeating 1   6.  Feeling bad about yourself 2   7.  Trouble concentrating 2   8.  Moving slowly or restless 2   Q9: Thoughts of better off dead/self-harm past 2 weeks 1   PHQ-9 Total Score 18   Difficulty at work, home, or with people Very difficult   In the past two weeks have you had thoughts of suicide or self harm? -   Do you have concerns about your personal safety or the safety of others? -   In the past 2 weeks have you thought about a plan or had intention to harm yourself? -   In the past 2 weeks have you acted on these thoughts in any way? -     WILLIAMS-7  5/7/2020   1. Feeling nervous, anxious, or on edge 3   2. Not being able to stop or control worrying 2   3. Worrying too much about different things 2   4. Trouble relaxing 2   5. Being so restless that it is hard to sit still 2   6. Becoming easily annoyed or irritable 2   7. Feeling afraid, as if something awful might happen 2   WILLIAMS-7 Total Score 15   If you checked any problems, how difficult have they made it for you to do your work, take care of things at home, or get along with other people? Very difficult         How many servings of fruits and vegetables do you eat daily?  2-3    On average, how many  "sweetened beverages do you drink each day (Examples: soda, juice, sweet tea, etc.  Do NOT count diet or artificially sweetened beverages)?   0    How many days per week do you exercise enough to make your heart beat faster? 7    How many minutes a day do you exercise enough to make your heart beat faster? 20 - 29    How many days per week do you miss taking your medication? 0    Maricarmen calls in for follow-up of her anxiety. She stopped taking the clonazepam and quetiapine as she was quite concerned about her blood sugars being elevated on these medications.     Of note, she reports that she has been taking tramadol for pain secondary to fibromyalgia in the evening, typically 1 tab. Will take a second tab around midnight if still in pain. This is prescribed by her rheumatologist (was not previously listed in Epic when we reviewed her medications at her last visit).     In terms of her lorazepam, she will gauge how the day is going and how much stress she is experiencing. If she is having a stressful day she will take a dose of lorazepam around 5:30pm with dinner. If she is feeling stable but is having mor anxiety later will take it instead at 9pm. She may take an additional dose between midnight and 3am if she is continuing to have anxiety symptoms that are keeping her up.     She is for the most part taking 1 pill per night, and 2-3 times per night will take 2 pills. Interestingly, she mentions that she has \"stocked up\" on lorazepam for a few months so she doesn't run out, as she has been worried about running out during the COVID-19 pandemic.     She feels that this combination of medications (tramadol and lorazepam) control her anxiety reasonably well and would like to remain on this combination.     Patient Active Problem List   Diagnosis     Irritable bowel syndrome     Diverticulosis of large intestine     Insomnia     Chronic pain syndrome     Hyperlipidemia LDL goal <100     Dyspepsia     Type 2 diabetes, HbA1c " goal < 7% (H)     Hepatic injury     Moderate major depression (H)     Overweight     Post concussion syndrome     Plantar fasciitis     Pain in joint, ankle and foot     Migraine headache     Anxiety     Panic attacks     TBI (traumatic brain injury) (H)     Pain in thoracic spine     Nonallopathic lesion of cervical region     Cervicalgia     Lumbago     Statin intolerance     Type 2 diabetes mellitus with hyperglycemia (H)     Fibromyalgia     Cervical pain     Carotid atherosclerosis, bilateral     Insulin pump in place     Hypertension goal BP (blood pressure) < 140/90     Posttraumatic stress disorder     Hypertensive urgency     MIESHA (obstructive sleep apnea)     Past Surgical History:   Procedure Laterality Date     BACK SURGERY      fusion  and removal of hardware 2004     C SPINAL ORTHOSIS,NOS  2002    lumbar surgery     CHOLECYSTECTOMY  2011     choley  2011     ENT SURGERY      septoplasty     HYSTERECTOMY, CERVIX STATUS UNKNOWN      TVH/BSO     ORTHOPEDIC SURGERY      left ankle       Social History     Tobacco Use     Smoking status: Never Smoker     Smokeless tobacco: Never Used   Substance Use Topics     Alcohol use: Yes     Alcohol/week: 0.0 standard drinks     Comment: maybe once a month     Family History   Problem Relation Age of Onset     Diabetes Mother         type 2     Hypertension Mother      Cerebrovascular Disease Mother          stroke age 57     Ovarian Cancer Mother 43     Cancer Mother         cervix     Diabetes Father         type 2     Hypertension Father      Gastrointestinal Disease Father         colon polyps     Sleep Apnea Brother      Cancer Sister      Ovarian Cancer Sister 46     Ovarian Cancer Maternal Grandmother 72     Cancer Maternal Grandmother         cervix           Reviewed and updated as needed this visit by Provider  Meds         Review of Systems   ROS COMP: Constitutional, neuro, psych systems are negative, except as otherwise noted.    "    Objective   Reported vitals:  [facility]/01/1999      PSYCH: Alert and oriented times 3; coherent speech, normal   rate and volume, able to articulate logical thoughts, able   to abstract reason, no tangential thoughts, no hallucinations   or delusions  Her affect is anxious, tearful and fearful  RESP: No cough, no audible wheezing, able to talk in full sentences  Remainder of exam unable to be completed due to telephone visits          Assessment/Plan:    ICD-10-CM    1. Anxiety  F41.9 naloxone (NARCAN) 4 MG/0.1ML nasal spray   2. Moderate major depression (H)  F32.1 naloxone (NARCAN) 4 MG/0.1ML nasal spray   3. Insomnia, unspecified type  G47.00      I had an extensive conversation with this patient regarding her medication use and my concern that she has been taking a synthetic opioid (tramadol) in addition to a benzodiazepine in higher doses than prescribed. We discussed my concern for respiratory suppression and overdose with this combination, particularly as it appears on chart review that she likely recently just restarted her tramadol (it appears this was discontinued by her pain specialist when she was started on naltrexone recently, but it is not clear that this was restarted when this medication was discontinued -- tramadol is listed as being filled in , but as it is prescribed by an outside physician does not register in our Epic system as having been restarted and it's unclear that the patient notified any of her providers about this).     The patient was quite upset when I recommended that she plan to use either tramadol or lorazepam for symptom management, but not both in combination due to my concerns for overdose. I'm particularly concerned as she is taking lorazepam at a higher rate than prescribed, which increases her risk for accidental overdose. At this time, she informed me that she had \"stockpiled\" lorazepam from prior prescriptions. We discussed that I found this a particularly concerning " "and worrisome behavior given the risks associated with this medication. I again strongly encouraged her not to take these medications together, and to have Narcan on hand if needed for overdose. I did send this today to her pharmacy. I also mentioned that given her concurrent tramadol use and her stockpiling of lorazepam, I would no longer be prescribing her benzodiazepines and encouraged her to follow-up with psychiatry (she has an appointment scheduled later this month) to determine if she would benefit from continuing to remain on this medication. At this time, I do not feel that she is at significant risk for BZD withdrawal, as it does not seem that she regularly takes more than 1-2mg daily, and per her report has \"plenty\" of medication.     Patient was quite frustrated and upset with the above discussion, and did express that she felt that I was not willing to engage in a treatment plan that worked well for her, and that she had a hard time trusting me. I attempted to validate her feelings, and reassured her that while I did not feel comfortable prescribing additional benzodiazipines for her, I would be more than happy to continue to care for her and her other medical issues. We did discuss that if she develops active suicidal ideation that she needs to go to the ED for an evaluation.     No follow-ups on file.      Phone call duration:  38 minutes    Madyson Mclaughlin MD  Internal Medicine-Pediatrics        Answers for HPI/ROS submitted by the patient on 5/6/2020   Chronic problems general questions HPI Form  How many servings of fruits and vegetables do you eat daily?: 2-3  On average, how many sweetened beverages do you drink each day (Examples: soda, juice, sweet tea, etc.  Do NOT count diet or artificially sweetened beverages)?: 0  How many minutes a day do you exercise enough to make your heart beat faster?: 30 to 60  How many days a week do you exercise enough to make your heart beat faster?: 5  How many " days per week do you miss taking your medication?: 0  Depression/Anxiety: Anxiety  Anxiety since last: : no change  Other associated symotome: : No  Significant life event: : relationship concerns, financial concerns, grief or loss, health concerns  Anxious:: Yes  Current substance use:: No  If you checked off any problems, how difficult have these problems made it for you to do your work, take care of things at home, or get along with other people?: Somewhat difficult  PHQ9 TOTAL SCORE: 15  WILLIAMS 7 TOTAL SCORE: 21  Do you check your blood pressure regularly outside of the clinic?: Yes  Are your blood pressures ever more than 140 on the top number (systolic) OR more than 90 on the bottom number (diastolic)? (For example, greater than 140/90): Yes  Are you following a low salt diet?: Yes

## 2025-08-11 ENCOUNTER — MYC MEDICAL ADVICE (OUTPATIENT)
Dept: PEDIATRICS | Facility: CLINIC | Age: 65
End: 2025-08-11
Payer: COMMERCIAL

## 2025-08-16 SDOH — HEALTH STABILITY: PHYSICAL HEALTH: ON AVERAGE, HOW MANY DAYS PER WEEK DO YOU ENGAGE IN MODERATE TO STRENUOUS EXERCISE (LIKE A BRISK WALK)?: 4 DAYS

## 2025-08-16 SDOH — HEALTH STABILITY: PHYSICAL HEALTH: ON AVERAGE, HOW MANY MINUTES DO YOU ENGAGE IN EXERCISE AT THIS LEVEL?: 120 MIN

## 2025-08-16 ASSESSMENT — SOCIAL DETERMINANTS OF HEALTH (SDOH): HOW OFTEN DO YOU GET TOGETHER WITH FRIENDS OR RELATIVES?: ONCE A WEEK

## 2025-08-19 ENCOUNTER — DOCUMENTATION ONLY (OUTPATIENT)
Dept: PEDIATRICS | Facility: CLINIC | Age: 65
End: 2025-08-19

## 2025-08-19 ENCOUNTER — OFFICE VISIT (OUTPATIENT)
Dept: PEDIATRICS | Facility: CLINIC | Age: 65
End: 2025-08-19
Payer: COMMERCIAL

## 2025-08-19 VITALS
OXYGEN SATURATION: 97 % | RESPIRATION RATE: 18 BRPM | WEIGHT: 192 LBS | HEIGHT: 63 IN | DIASTOLIC BLOOD PRESSURE: 60 MMHG | SYSTOLIC BLOOD PRESSURE: 122 MMHG | TEMPERATURE: 97.7 F | BODY MASS INDEX: 34.02 KG/M2 | HEART RATE: 53 BPM

## 2025-08-19 DIAGNOSIS — G89.4 CHRONIC PAIN SYNDROME: ICD-10-CM

## 2025-08-19 DIAGNOSIS — E11.65 TYPE 2 DIABETES MELLITUS WITH HYPERGLYCEMIA, WITH LONG-TERM CURRENT USE OF INSULIN (H): Primary | ICD-10-CM

## 2025-08-19 DIAGNOSIS — Z00.00 ENCOUNTER FOR MEDICARE ANNUAL WELLNESS EXAM: Primary | ICD-10-CM

## 2025-08-19 DIAGNOSIS — Z90.710 HISTORY OF TOTAL HYSTERECTOMY: ICD-10-CM

## 2025-08-19 DIAGNOSIS — I65.23 CAROTID ATHEROSCLEROSIS, BILATERAL: ICD-10-CM

## 2025-08-19 DIAGNOSIS — E11.65 TYPE 2 DIABETES MELLITUS WITH HYPERGLYCEMIA, WITH LONG-TERM CURRENT USE OF INSULIN (H): ICD-10-CM

## 2025-08-19 DIAGNOSIS — I10 BENIGN ESSENTIAL HYPERTENSION: ICD-10-CM

## 2025-08-19 DIAGNOSIS — M79.7 FIBROMYALGIA: ICD-10-CM

## 2025-08-19 DIAGNOSIS — E78.5 HYPERLIPIDEMIA LDL GOAL <70: ICD-10-CM

## 2025-08-19 DIAGNOSIS — Z79.4 TYPE 2 DIABETES MELLITUS WITH HYPERGLYCEMIA, WITH LONG-TERM CURRENT USE OF INSULIN (H): ICD-10-CM

## 2025-08-19 DIAGNOSIS — Z79.4 TYPE 2 DIABETES MELLITUS WITH HYPERGLYCEMIA, WITH LONG-TERM CURRENT USE OF INSULIN (H): Primary | ICD-10-CM

## 2025-08-19 DIAGNOSIS — Z12.11 SCREEN FOR COLON CANCER: ICD-10-CM

## 2025-08-19 DIAGNOSIS — F33.1 MODERATE EPISODE OF RECURRENT MAJOR DEPRESSIVE DISORDER (H): ICD-10-CM

## 2025-08-19 ASSESSMENT — PATIENT HEALTH QUESTIONNAIRE - PHQ9
10. IF YOU CHECKED OFF ANY PROBLEMS, HOW DIFFICULT HAVE THESE PROBLEMS MADE IT FOR YOU TO DO YOUR WORK, TAKE CARE OF THINGS AT HOME, OR GET ALONG WITH OTHER PEOPLE: SOMEWHAT DIFFICULT
SUM OF ALL RESPONSES TO PHQ QUESTIONS 1-9: 12
SUM OF ALL RESPONSES TO PHQ QUESTIONS 1-9: 12

## 2025-08-19 ASSESSMENT — PAIN SCALES - GENERAL: PAINLEVEL_OUTOF10: SEVERE PAIN (7)

## 2025-08-27 ENCOUNTER — OFFICE VISIT (OUTPATIENT)
Dept: ENDOCRINOLOGY | Facility: CLINIC | Age: 65
End: 2025-08-27
Payer: COMMERCIAL

## 2025-08-27 VITALS — SYSTOLIC BLOOD PRESSURE: 148 MMHG | DIASTOLIC BLOOD PRESSURE: 78 MMHG

## 2025-08-27 DIAGNOSIS — E11.29 TYPE 2 DIABETES MELLITUS WITH MICROALBUMINURIA, WITH LONG-TERM CURRENT USE OF INSULIN (H): Primary | ICD-10-CM

## 2025-08-27 DIAGNOSIS — Z79.4 TYPE 2 DIABETES MELLITUS WITH MICROALBUMINURIA, WITH LONG-TERM CURRENT USE OF INSULIN (H): Primary | ICD-10-CM

## 2025-08-27 DIAGNOSIS — Z96.41 INSULIN PUMP STATUS: ICD-10-CM

## 2025-08-27 DIAGNOSIS — R80.9 TYPE 2 DIABETES MELLITUS WITH MICROALBUMINURIA, WITH LONG-TERM CURRENT USE OF INSULIN (H): Primary | ICD-10-CM

## 2025-08-27 PROCEDURE — 3077F SYST BP >= 140 MM HG: CPT | Performed by: INTERNAL MEDICINE

## 2025-08-27 PROCEDURE — 3078F DIAST BP <80 MM HG: CPT | Performed by: INTERNAL MEDICINE

## 2025-08-27 PROCEDURE — 99214 OFFICE O/P EST MOD 30 MIN: CPT | Performed by: INTERNAL MEDICINE

## 2025-08-27 PROCEDURE — G2211 COMPLEX E/M VISIT ADD ON: HCPCS | Performed by: INTERNAL MEDICINE

## 2025-08-27 PROCEDURE — 95251 CONT GLUC MNTR ANALYSIS I&R: CPT | Performed by: INTERNAL MEDICINE

## 2025-08-28 ENCOUNTER — TELEPHONE (OUTPATIENT)
Dept: GASTROENTEROLOGY | Facility: CLINIC | Age: 65
End: 2025-08-28
Payer: COMMERCIAL

## 2025-09-02 ENCOUNTER — HOSPITAL ENCOUNTER (OUTPATIENT)
Dept: CARDIOLOGY | Facility: CLINIC | Age: 65
Discharge: HOME | End: 2025-09-02
Payer: COMMERCIAL

## 2025-09-02 DIAGNOSIS — I65.29 CAROTID ATHEROSCLEROSIS, UNSPECIFIED LATERALITY: ICD-10-CM

## 2025-09-02 PROCEDURE — 93880 EXTRACRANIAL BILAT STUDY: CPT
